# Patient Record
Sex: FEMALE | Race: OTHER | HISPANIC OR LATINO
[De-identification: names, ages, dates, MRNs, and addresses within clinical notes are randomized per-mention and may not be internally consistent; named-entity substitution may affect disease eponyms.]

---

## 2017-01-04 ENCOUNTER — RECORD ABSTRACTING (OUTPATIENT)
Age: 57
End: 2017-01-04

## 2017-01-30 ENCOUNTER — APPOINTMENT (OUTPATIENT)
Dept: INTERNAL MEDICINE | Facility: CLINIC | Age: 57
End: 2017-01-30

## 2017-02-21 ENCOUNTER — APPOINTMENT (OUTPATIENT)
Dept: INTERNAL MEDICINE | Facility: CLINIC | Age: 57
End: 2017-02-21

## 2017-02-28 ENCOUNTER — APPOINTMENT (OUTPATIENT)
Dept: CARDIOLOGY | Facility: CLINIC | Age: 57
End: 2017-02-28

## 2017-02-28 VITALS
HEIGHT: 63 IN | BODY MASS INDEX: 36.14 KG/M2 | DIASTOLIC BLOOD PRESSURE: 70 MMHG | WEIGHT: 204 LBS | SYSTOLIC BLOOD PRESSURE: 130 MMHG

## 2017-02-28 VITALS — HEART RATE: 87 BPM

## 2017-03-01 ENCOUNTER — APPOINTMENT (OUTPATIENT)
Dept: INTERNAL MEDICINE | Facility: CLINIC | Age: 57
End: 2017-03-01

## 2017-03-03 ENCOUNTER — APPOINTMENT (OUTPATIENT)
Dept: PODIATRY | Facility: CLINIC | Age: 57
End: 2017-03-03

## 2017-03-03 VITALS — BODY MASS INDEX: 32.44 KG/M2 | HEIGHT: 64 IN | WEIGHT: 190 LBS

## 2017-03-03 DIAGNOSIS — L85.3 XEROSIS CUTIS: ICD-10-CM

## 2017-03-03 DIAGNOSIS — B35.1 TINEA UNGUIUM: ICD-10-CM

## 2017-03-13 ENCOUNTER — APPOINTMENT (OUTPATIENT)
Dept: PODIATRY | Facility: CLINIC | Age: 57
End: 2017-03-13

## 2017-03-21 ENCOUNTER — OUTPATIENT (OUTPATIENT)
Dept: OUTPATIENT SERVICES | Facility: HOSPITAL | Age: 57
LOS: 1 days | Discharge: HOME | End: 2017-03-21

## 2017-03-21 ENCOUNTER — APPOINTMENT (OUTPATIENT)
Dept: CARDIOLOGY | Facility: CLINIC | Age: 57
End: 2017-03-21

## 2017-03-21 ENCOUNTER — OTHER (OUTPATIENT)
Age: 57
End: 2017-03-21

## 2017-03-21 ENCOUNTER — EMERGENCY (EMERGENCY)
Facility: HOSPITAL | Age: 57
LOS: 0 days | Discharge: HOME | End: 2017-03-21
Admitting: INTERNAL MEDICINE

## 2017-03-21 VITALS
BODY MASS INDEX: 32.44 KG/M2 | HEIGHT: 64 IN | HEART RATE: 82 BPM | WEIGHT: 190 LBS | SYSTOLIC BLOOD PRESSURE: 120 MMHG | DIASTOLIC BLOOD PRESSURE: 70 MMHG

## 2017-04-03 ENCOUNTER — OUTPATIENT (OUTPATIENT)
Dept: OUTPATIENT SERVICES | Facility: HOSPITAL | Age: 57
LOS: 1 days | Discharge: HOME | End: 2017-04-03

## 2017-04-03 ENCOUNTER — APPOINTMENT (OUTPATIENT)
Dept: INTERNAL MEDICINE | Facility: CLINIC | Age: 57
End: 2017-04-03

## 2017-04-03 VITALS
WEIGHT: 192 LBS | SYSTOLIC BLOOD PRESSURE: 145 MMHG | DIASTOLIC BLOOD PRESSURE: 82 MMHG | BODY MASS INDEX: 32.78 KG/M2 | HEART RATE: 96 BPM | HEIGHT: 64 IN

## 2017-04-03 DIAGNOSIS — F32.9 MAJOR DEPRESSIVE DISORDER, SINGLE EPISODE, UNSPECIFIED: ICD-10-CM

## 2017-04-03 DIAGNOSIS — E11.621 TYPE 2 DIABETES MELLITUS WITH FOOT ULCER: ICD-10-CM

## 2017-04-03 DIAGNOSIS — I73.9 PERIPHERAL VASCULAR DISEASE, UNSPECIFIED: ICD-10-CM

## 2017-04-03 DIAGNOSIS — L97.401 TYPE 2 DIABETES MELLITUS WITH FOOT ULCER: ICD-10-CM

## 2017-04-10 ENCOUNTER — APPOINTMENT (OUTPATIENT)
Dept: SURGERY | Facility: CLINIC | Age: 57
End: 2017-04-10

## 2017-04-11 ENCOUNTER — APPOINTMENT (OUTPATIENT)
Dept: PODIATRY | Facility: CLINIC | Age: 57
End: 2017-04-11

## 2017-04-11 ENCOUNTER — OUTPATIENT (OUTPATIENT)
Dept: OUTPATIENT SERVICES | Facility: HOSPITAL | Age: 57
LOS: 1 days | Discharge: HOME | End: 2017-04-11

## 2017-04-11 VITALS — BODY MASS INDEX: 32.78 KG/M2 | HEIGHT: 64 IN | WEIGHT: 192 LBS

## 2017-04-12 ENCOUNTER — APPOINTMENT (OUTPATIENT)
Dept: SURGERY | Facility: CLINIC | Age: 57
End: 2017-04-12

## 2017-04-18 ENCOUNTER — OUTPATIENT (OUTPATIENT)
Dept: OUTPATIENT SERVICES | Facility: HOSPITAL | Age: 57
LOS: 1 days | Discharge: HOME | End: 2017-04-18

## 2017-04-21 ENCOUNTER — APPOINTMENT (OUTPATIENT)
Dept: INTERNAL MEDICINE | Facility: CLINIC | Age: 57
End: 2017-04-21

## 2017-04-21 VITALS — HEIGHT: 64 IN | DIASTOLIC BLOOD PRESSURE: 75 MMHG | HEART RATE: 85 BPM | SYSTOLIC BLOOD PRESSURE: 120 MMHG

## 2017-04-21 VITALS
HEART RATE: 85 BPM | SYSTOLIC BLOOD PRESSURE: 120 MMHG | BODY MASS INDEX: 32.78 KG/M2 | DIASTOLIC BLOOD PRESSURE: 75 MMHG | HEIGHT: 64 IN | WEIGHT: 192 LBS

## 2017-04-21 DIAGNOSIS — R32 UNSPECIFIED URINARY INCONTINENCE: ICD-10-CM

## 2017-04-25 ENCOUNTER — APPOINTMENT (OUTPATIENT)
Dept: CARDIOLOGY | Facility: CLINIC | Age: 57
End: 2017-04-25

## 2017-04-25 VITALS — SYSTOLIC BLOOD PRESSURE: 124 MMHG | HEART RATE: 90 BPM | OXYGEN SATURATION: 94 % | DIASTOLIC BLOOD PRESSURE: 62 MMHG

## 2017-04-25 DIAGNOSIS — R94.31 ABNORMAL ELECTROCARDIOGRAM [ECG] [EKG]: ICD-10-CM

## 2017-04-26 ENCOUNTER — APPOINTMENT (OUTPATIENT)
Dept: PODIATRY | Facility: CLINIC | Age: 57
End: 2017-04-26

## 2017-05-17 ENCOUNTER — CLINICAL ADVICE (OUTPATIENT)
Age: 57
End: 2017-05-17

## 2017-05-17 ENCOUNTER — OUTPATIENT (OUTPATIENT)
Dept: OUTPATIENT SERVICES | Facility: HOSPITAL | Age: 57
LOS: 1 days | Discharge: HOME | End: 2017-05-17

## 2017-05-17 ENCOUNTER — APPOINTMENT (OUTPATIENT)
Dept: PODIATRY | Facility: CLINIC | Age: 57
End: 2017-05-17

## 2017-05-17 VITALS
HEIGHT: 64 IN | HEART RATE: 68 BPM | WEIGHT: 190 LBS | SYSTOLIC BLOOD PRESSURE: 128 MMHG | DIASTOLIC BLOOD PRESSURE: 74 MMHG | BODY MASS INDEX: 32.44 KG/M2

## 2017-05-22 ENCOUNTER — EMERGENCY (EMERGENCY)
Facility: HOSPITAL | Age: 57
LOS: 0 days | Discharge: HOME | End: 2017-05-22
Admitting: INTERNAL MEDICINE

## 2017-06-07 ENCOUNTER — APPOINTMENT (OUTPATIENT)
Dept: PODIATRY | Facility: CLINIC | Age: 57
End: 2017-06-07

## 2017-06-09 ENCOUNTER — APPOINTMENT (OUTPATIENT)
Dept: PULMONOLOGY | Facility: CLINIC | Age: 57
End: 2017-06-09

## 2017-06-09 ENCOUNTER — OUTPATIENT (OUTPATIENT)
Dept: OUTPATIENT SERVICES | Facility: HOSPITAL | Age: 57
LOS: 1 days | Discharge: HOME | End: 2017-06-09

## 2017-06-09 VITALS
DIASTOLIC BLOOD PRESSURE: 81 MMHG | BODY MASS INDEX: 32.78 KG/M2 | HEIGHT: 64 IN | HEART RATE: 98 BPM | SYSTOLIC BLOOD PRESSURE: 136 MMHG | WEIGHT: 192 LBS

## 2017-06-09 DIAGNOSIS — R06.83 SNORING: ICD-10-CM

## 2017-06-16 ENCOUNTER — APPOINTMENT (OUTPATIENT)
Dept: ENDOCRINOLOGY | Facility: CLINIC | Age: 57
End: 2017-06-16

## 2017-06-16 ENCOUNTER — OUTPATIENT (OUTPATIENT)
Dept: OUTPATIENT SERVICES | Facility: HOSPITAL | Age: 57
LOS: 1 days | Discharge: HOME | End: 2017-06-16

## 2017-06-16 ENCOUNTER — RESULT REVIEW (OUTPATIENT)
Age: 57
End: 2017-06-16

## 2017-06-16 VITALS
SYSTOLIC BLOOD PRESSURE: 130 MMHG | HEIGHT: 64 IN | DIASTOLIC BLOOD PRESSURE: 80 MMHG | BODY MASS INDEX: 32.44 KG/M2 | WEIGHT: 190 LBS | HEART RATE: 88 BPM

## 2017-06-16 DIAGNOSIS — M79.7 FIBROMYALGIA: ICD-10-CM

## 2017-06-16 DIAGNOSIS — F31.9 BIPOLAR DISORDER, UNSPECIFIED: ICD-10-CM

## 2017-06-16 DIAGNOSIS — I50.9 HEART FAILURE, UNSPECIFIED: ICD-10-CM

## 2017-06-16 DIAGNOSIS — R10.9 UNSPECIFIED ABDOMINAL PAIN: ICD-10-CM

## 2017-06-16 DIAGNOSIS — Z98.890 OTHER SPECIFIED POSTPROCEDURAL STATES: ICD-10-CM

## 2017-06-16 DIAGNOSIS — E11.40 TYPE 2 DIABETES MELLITUS WITH DIABETIC NEUROPATHY, UNSPECIFIED: ICD-10-CM

## 2017-06-16 DIAGNOSIS — E11.9 TYPE 2 DIABETES MELLITUS WITHOUT COMPLICATIONS: ICD-10-CM

## 2017-06-16 DIAGNOSIS — R53.1 WEAKNESS: ICD-10-CM

## 2017-06-16 DIAGNOSIS — I47.2 VENTRICULAR TACHYCARDIA: ICD-10-CM

## 2017-06-16 DIAGNOSIS — I81 PORTAL VEIN THROMBOSIS: ICD-10-CM

## 2017-06-16 DIAGNOSIS — G62.9 POLYNEUROPATHY, UNSPECIFIED: ICD-10-CM

## 2017-06-16 DIAGNOSIS — Z95.810 PRESENCE OF AUTOMATIC (IMPLANTABLE) CARDIAC DEFIBRILLATOR: ICD-10-CM

## 2017-06-16 DIAGNOSIS — K29.00 ACUTE GASTRITIS WITHOUT BLEEDING: ICD-10-CM

## 2017-06-16 DIAGNOSIS — A09 INFECTIOUS GASTROENTERITIS AND COLITIS, UNSPECIFIED: ICD-10-CM

## 2017-06-16 DIAGNOSIS — G43.909 MIGRAINE, UNSPECIFIED, NOT INTRACTABLE, WITHOUT STATUS MIGRAINOSUS: ICD-10-CM

## 2017-06-16 DIAGNOSIS — R07.9 CHEST PAIN, UNSPECIFIED: ICD-10-CM

## 2017-06-16 DIAGNOSIS — R55 SYNCOPE AND COLLAPSE: ICD-10-CM

## 2017-06-16 DIAGNOSIS — I31.4 CARDIAC TAMPONADE: ICD-10-CM

## 2017-06-23 ENCOUNTER — INPATIENT (INPATIENT)
Facility: HOSPITAL | Age: 57
LOS: 6 days | Discharge: HOME HEALTH | End: 2017-06-30
Attending: INTERNAL MEDICINE

## 2017-06-23 DIAGNOSIS — R55 SYNCOPE AND COLLAPSE: ICD-10-CM

## 2017-06-23 DIAGNOSIS — R53.1 WEAKNESS: ICD-10-CM

## 2017-06-23 DIAGNOSIS — F31.9 BIPOLAR DISORDER, UNSPECIFIED: ICD-10-CM

## 2017-06-23 DIAGNOSIS — R07.9 CHEST PAIN, UNSPECIFIED: ICD-10-CM

## 2017-06-23 DIAGNOSIS — G43.909 MIGRAINE, UNSPECIFIED, NOT INTRACTABLE, WITHOUT STATUS MIGRAINOSUS: ICD-10-CM

## 2017-06-23 DIAGNOSIS — I50.9 HEART FAILURE, UNSPECIFIED: ICD-10-CM

## 2017-06-23 DIAGNOSIS — I81 PORTAL VEIN THROMBOSIS: ICD-10-CM

## 2017-06-23 DIAGNOSIS — Z95.810 PRESENCE OF AUTOMATIC (IMPLANTABLE) CARDIAC DEFIBRILLATOR: ICD-10-CM

## 2017-06-23 DIAGNOSIS — A09 INFECTIOUS GASTROENTERITIS AND COLITIS, UNSPECIFIED: ICD-10-CM

## 2017-06-23 DIAGNOSIS — I47.2 VENTRICULAR TACHYCARDIA: ICD-10-CM

## 2017-06-23 DIAGNOSIS — Z98.890 OTHER SPECIFIED POSTPROCEDURAL STATES: ICD-10-CM

## 2017-06-23 DIAGNOSIS — I31.4 CARDIAC TAMPONADE: ICD-10-CM

## 2017-06-23 DIAGNOSIS — M79.7 FIBROMYALGIA: ICD-10-CM

## 2017-06-23 DIAGNOSIS — K29.00 ACUTE GASTRITIS WITHOUT BLEEDING: ICD-10-CM

## 2017-06-23 DIAGNOSIS — E11.9 TYPE 2 DIABETES MELLITUS WITHOUT COMPLICATIONS: ICD-10-CM

## 2017-06-23 DIAGNOSIS — R10.9 UNSPECIFIED ABDOMINAL PAIN: ICD-10-CM

## 2017-06-23 DIAGNOSIS — I50.43 ACUTE ON CHRONIC COMBINED SYSTOLIC (CONGESTIVE) AND DIASTOLIC (CONGESTIVE) HEART FAILURE: ICD-10-CM

## 2017-06-23 DIAGNOSIS — E11.40 TYPE 2 DIABETES MELLITUS WITH DIABETIC NEUROPATHY, UNSPECIFIED: ICD-10-CM

## 2017-06-28 ENCOUNTER — APPOINTMENT (OUTPATIENT)
Dept: PODIATRY | Facility: CLINIC | Age: 57
End: 2017-06-28

## 2017-06-28 DIAGNOSIS — E03.9 HYPOTHYROIDISM, UNSPECIFIED: ICD-10-CM

## 2017-06-28 DIAGNOSIS — E11.9 TYPE 2 DIABETES MELLITUS WITHOUT COMPLICATIONS: ICD-10-CM

## 2017-06-29 LAB — GLUCOSE SERPL-MCNC: > 400 MG/DL

## 2017-07-07 ENCOUNTER — OUTPATIENT (OUTPATIENT)
Dept: OUTPATIENT SERVICES | Facility: HOSPITAL | Age: 57
LOS: 1 days | Discharge: HOME | End: 2017-07-07

## 2017-07-07 ENCOUNTER — APPOINTMENT (OUTPATIENT)
Dept: INTERNAL MEDICINE | Facility: CLINIC | Age: 57
End: 2017-07-07

## 2017-07-07 VITALS
DIASTOLIC BLOOD PRESSURE: 73 MMHG | SYSTOLIC BLOOD PRESSURE: 127 MMHG | HEART RATE: 93 BPM | BODY MASS INDEX: 37.56 KG/M2 | HEIGHT: 64 IN | WEIGHT: 220 LBS

## 2017-07-07 DIAGNOSIS — F31.9 BIPOLAR DISORDER, UNSPECIFIED: ICD-10-CM

## 2017-07-07 DIAGNOSIS — L02.612 CUTANEOUS ABSCESS OF LEFT FOOT: ICD-10-CM

## 2017-07-07 DIAGNOSIS — A09 INFECTIOUS GASTROENTERITIS AND COLITIS, UNSPECIFIED: ICD-10-CM

## 2017-07-07 DIAGNOSIS — Z98.890 OTHER SPECIFIED POSTPROCEDURAL STATES: ICD-10-CM

## 2017-07-07 DIAGNOSIS — I63.9 CEREBRAL INFARCTION, UNSPECIFIED: ICD-10-CM

## 2017-07-07 DIAGNOSIS — K29.00 ACUTE GASTRITIS WITHOUT BLEEDING: ICD-10-CM

## 2017-07-07 DIAGNOSIS — E11.22 TYPE 2 DIABETES MELLITUS WITH DIABETIC CHRONIC KIDNEY DISEASE: ICD-10-CM

## 2017-07-07 DIAGNOSIS — R53.1 WEAKNESS: ICD-10-CM

## 2017-07-07 DIAGNOSIS — I50.9 HEART FAILURE, UNSPECIFIED: ICD-10-CM

## 2017-07-07 DIAGNOSIS — M79.7 FIBROMYALGIA: ICD-10-CM

## 2017-07-07 DIAGNOSIS — I81 PORTAL VEIN THROMBOSIS: ICD-10-CM

## 2017-07-07 DIAGNOSIS — Z79.4 LONG TERM (CURRENT) USE OF INSULIN: ICD-10-CM

## 2017-07-07 DIAGNOSIS — M86.8X7 OTHER OSTEOMYELITIS, ANKLE AND FOOT: ICD-10-CM

## 2017-07-07 DIAGNOSIS — I82.511 CHRONIC EMBOLISM AND THROMBOSIS OF RIGHT FEMORAL VEIN: ICD-10-CM

## 2017-07-07 DIAGNOSIS — Z79.01 LONG TERM (CURRENT) USE OF ANTICOAGULANTS: ICD-10-CM

## 2017-07-07 DIAGNOSIS — I69.351 HEMIPLEGIA AND HEMIPARESIS FOLLOWING CEREBRAL INFARCTION AFFECTING RIGHT DOMINANT SIDE: ICD-10-CM

## 2017-07-07 DIAGNOSIS — I73.9 PERIPHERAL VASCULAR DISEASE, UNSPECIFIED: ICD-10-CM

## 2017-07-07 DIAGNOSIS — E11.59 TYPE 2 DIABETES MELLITUS WITH OTHER CIRCULATORY COMPLICATIONS: ICD-10-CM

## 2017-07-07 DIAGNOSIS — I13.0 HYPERTENSIVE HEART AND CHRONIC KIDNEY DISEASE WITH HEART FAILURE AND STAGE 1 THROUGH STAGE 4 CHRONIC KIDNEY DISEASE, OR UNSPECIFIED CHRONIC KIDNEY DISEASE: ICD-10-CM

## 2017-07-07 DIAGNOSIS — E11.9 TYPE 2 DIABETES MELLITUS WITHOUT COMPLICATIONS: ICD-10-CM

## 2017-07-07 DIAGNOSIS — I31.4 CARDIAC TAMPONADE: ICD-10-CM

## 2017-07-07 DIAGNOSIS — R07.9 CHEST PAIN, UNSPECIFIED: ICD-10-CM

## 2017-07-07 DIAGNOSIS — T40.601A POISONING BY UNSPECIFIED NARCOTICS, ACCIDENTAL (UNINTENTIONAL), INITIAL ENCOUNTER: ICD-10-CM

## 2017-07-07 DIAGNOSIS — R10.9 UNSPECIFIED ABDOMINAL PAIN: ICD-10-CM

## 2017-07-07 DIAGNOSIS — R55 SYNCOPE AND COLLAPSE: ICD-10-CM

## 2017-07-07 DIAGNOSIS — N17.9 ACUTE KIDNEY FAILURE, UNSPECIFIED: ICD-10-CM

## 2017-07-07 DIAGNOSIS — Z95.810 PRESENCE OF AUTOMATIC (IMPLANTABLE) CARDIAC DEFIBRILLATOR: ICD-10-CM

## 2017-07-07 DIAGNOSIS — E11.40 TYPE 2 DIABETES MELLITUS WITH DIABETIC NEUROPATHY, UNSPECIFIED: ICD-10-CM

## 2017-07-07 DIAGNOSIS — J45.909 UNSPECIFIED ASTHMA, UNCOMPLICATED: ICD-10-CM

## 2017-07-07 DIAGNOSIS — N18.3 CHRONIC KIDNEY DISEASE, STAGE 3 (MODERATE): ICD-10-CM

## 2017-07-07 DIAGNOSIS — I47.2 VENTRICULAR TACHYCARDIA: ICD-10-CM

## 2017-07-07 DIAGNOSIS — G43.909 MIGRAINE, UNSPECIFIED, NOT INTRACTABLE, WITHOUT STATUS MIGRAINOSUS: ICD-10-CM

## 2017-07-07 DIAGNOSIS — E10.29 TYPE 1 DIABETES MELLITUS WITH OTHER DIABETIC KIDNEY COMPLICATION: ICD-10-CM

## 2017-07-07 DIAGNOSIS — I25.2 OLD MYOCARDIAL INFARCTION: ICD-10-CM

## 2017-07-07 DIAGNOSIS — J81.0 ACUTE PULMONARY EDEMA: ICD-10-CM

## 2017-07-07 DIAGNOSIS — I50.20 UNSPECIFIED SYSTOLIC (CONGESTIVE) HEART FAILURE: ICD-10-CM

## 2017-07-07 DIAGNOSIS — I50.23 ACUTE ON CHRONIC SYSTOLIC (CONGESTIVE) HEART FAILURE: ICD-10-CM

## 2017-07-07 DIAGNOSIS — E11.69 TYPE 2 DIABETES MELLITUS WITH OTHER SPECIFIED COMPLICATION: ICD-10-CM

## 2017-07-07 DIAGNOSIS — J96.01 ACUTE RESPIRATORY FAILURE WITH HYPOXIA: ICD-10-CM

## 2017-07-07 DIAGNOSIS — E11.51 TYPE 2 DIABETES MELLITUS WITH DIABETIC PERIPHERAL ANGIOPATHY WITHOUT GANGRENE: ICD-10-CM
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
## 2017-07-07 DIAGNOSIS — J44.9 CHRONIC OBSTRUCTIVE PULMONARY DISEASE, UNSPECIFIED: ICD-10-CM

## 2017-07-07 DIAGNOSIS — Z72.0 TOBACCO USE: ICD-10-CM

## 2017-07-07 DIAGNOSIS — E03.9 HYPOTHYROIDISM, UNSPECIFIED: ICD-10-CM

## 2017-07-07 DIAGNOSIS — R53.83 OTHER FATIGUE: ICD-10-CM

## 2017-07-07 DIAGNOSIS — E11.621 TYPE 2 DIABETES MELLITUS WITH FOOT ULCER: ICD-10-CM

## 2017-07-07 DIAGNOSIS — Z86.711 PERSONAL HISTORY OF PULMONARY EMBOLISM: ICD-10-CM

## 2017-07-07 RX ORDER — INSULIN GLARGINE 100 [IU]/ML
100 INJECTION, SOLUTION SUBCUTANEOUS
Qty: 100 | Refills: 0 | Status: COMPLETED | COMMUNITY
Start: 2017-03-21 | End: 2017-07-07

## 2017-07-07 RX ORDER — INSULIN LISPRO 100 [IU]/ML
(75-25) 100 INJECTION, SUSPENSION SUBCUTANEOUS
Qty: 2 | Refills: 0 | Status: COMPLETED | COMMUNITY
Start: 2017-04-03 | End: 2017-07-07

## 2017-07-07 RX ORDER — FENOFIBRIC ACID 135 MG/1
135 CAPSULE, DELAYED RELEASE ORAL DAILY
Qty: 30 | Refills: 0 | Status: COMPLETED | COMMUNITY
Start: 2017-03-21 | End: 2017-07-07

## 2017-07-12 ENCOUNTER — APPOINTMENT (OUTPATIENT)
Dept: PODIATRY | Facility: CLINIC | Age: 57
End: 2017-07-12

## 2017-07-19 ENCOUNTER — OUTPATIENT (OUTPATIENT)
Dept: OUTPATIENT SERVICES | Facility: HOSPITAL | Age: 57
LOS: 1 days | Discharge: HOME | End: 2017-07-19

## 2017-07-19 DIAGNOSIS — I63.9 CEREBRAL INFARCTION, UNSPECIFIED: ICD-10-CM

## 2017-07-19 DIAGNOSIS — I31.4 CARDIAC TAMPONADE: ICD-10-CM

## 2017-07-19 DIAGNOSIS — G43.909 MIGRAINE, UNSPECIFIED, NOT INTRACTABLE, WITHOUT STATUS MIGRAINOSUS: ICD-10-CM

## 2017-07-19 DIAGNOSIS — Z98.890 OTHER SPECIFIED POSTPROCEDURAL STATES: ICD-10-CM

## 2017-07-19 DIAGNOSIS — Z79.01 LONG TERM (CURRENT) USE OF ANTICOAGULANTS: ICD-10-CM

## 2017-07-19 DIAGNOSIS — I50.9 HEART FAILURE, UNSPECIFIED: ICD-10-CM

## 2017-07-19 DIAGNOSIS — Z95.810 PRESENCE OF AUTOMATIC (IMPLANTABLE) CARDIAC DEFIBRILLATOR: ICD-10-CM

## 2017-07-19 DIAGNOSIS — F31.9 BIPOLAR DISORDER, UNSPECIFIED: ICD-10-CM

## 2017-07-19 DIAGNOSIS — R53.1 WEAKNESS: ICD-10-CM

## 2017-07-19 DIAGNOSIS — M79.7 FIBROMYALGIA: ICD-10-CM

## 2017-07-19 DIAGNOSIS — I81 PORTAL VEIN THROMBOSIS: ICD-10-CM

## 2017-07-19 DIAGNOSIS — R07.9 CHEST PAIN, UNSPECIFIED: ICD-10-CM

## 2017-07-19 DIAGNOSIS — E11.9 TYPE 2 DIABETES MELLITUS WITHOUT COMPLICATIONS: ICD-10-CM

## 2017-07-19 DIAGNOSIS — E11.40 TYPE 2 DIABETES MELLITUS WITH DIABETIC NEUROPATHY, UNSPECIFIED: ICD-10-CM

## 2017-07-19 DIAGNOSIS — A09 INFECTIOUS GASTROENTERITIS AND COLITIS, UNSPECIFIED: ICD-10-CM

## 2017-07-19 DIAGNOSIS — I47.2 VENTRICULAR TACHYCARDIA: ICD-10-CM

## 2017-07-19 DIAGNOSIS — R10.9 UNSPECIFIED ABDOMINAL PAIN: ICD-10-CM

## 2017-07-19 DIAGNOSIS — K29.00 ACUTE GASTRITIS WITHOUT BLEEDING: ICD-10-CM

## 2017-07-19 DIAGNOSIS — R55 SYNCOPE AND COLLAPSE: ICD-10-CM

## 2017-07-26 ENCOUNTER — OUTPATIENT (OUTPATIENT)
Dept: OUTPATIENT SERVICES | Facility: HOSPITAL | Age: 57
LOS: 1 days | Discharge: HOME | End: 2017-07-26

## 2017-07-26 DIAGNOSIS — Z98.890 OTHER SPECIFIED POSTPROCEDURAL STATES: ICD-10-CM

## 2017-07-26 DIAGNOSIS — R10.9 UNSPECIFIED ABDOMINAL PAIN: ICD-10-CM

## 2017-07-26 DIAGNOSIS — Z95.810 PRESENCE OF AUTOMATIC (IMPLANTABLE) CARDIAC DEFIBRILLATOR: ICD-10-CM

## 2017-07-26 DIAGNOSIS — F31.9 BIPOLAR DISORDER, UNSPECIFIED: ICD-10-CM

## 2017-07-26 DIAGNOSIS — E11.40 TYPE 2 DIABETES MELLITUS WITH DIABETIC NEUROPATHY, UNSPECIFIED: ICD-10-CM

## 2017-07-26 DIAGNOSIS — E11.9 TYPE 2 DIABETES MELLITUS WITHOUT COMPLICATIONS: ICD-10-CM

## 2017-07-26 DIAGNOSIS — R55 SYNCOPE AND COLLAPSE: ICD-10-CM

## 2017-07-26 DIAGNOSIS — Z79.01 LONG TERM (CURRENT) USE OF ANTICOAGULANTS: ICD-10-CM

## 2017-07-26 DIAGNOSIS — G43.909 MIGRAINE, UNSPECIFIED, NOT INTRACTABLE, WITHOUT STATUS MIGRAINOSUS: ICD-10-CM

## 2017-07-26 DIAGNOSIS — K29.00 ACUTE GASTRITIS WITHOUT BLEEDING: ICD-10-CM

## 2017-07-26 DIAGNOSIS — I47.2 VENTRICULAR TACHYCARDIA: ICD-10-CM

## 2017-07-26 DIAGNOSIS — R53.1 WEAKNESS: ICD-10-CM

## 2017-07-26 DIAGNOSIS — I81 PORTAL VEIN THROMBOSIS: ICD-10-CM

## 2017-07-26 DIAGNOSIS — A09 INFECTIOUS GASTROENTERITIS AND COLITIS, UNSPECIFIED: ICD-10-CM

## 2017-07-26 DIAGNOSIS — R07.9 CHEST PAIN, UNSPECIFIED: ICD-10-CM

## 2017-07-26 DIAGNOSIS — I50.9 HEART FAILURE, UNSPECIFIED: ICD-10-CM

## 2017-07-26 DIAGNOSIS — I31.4 CARDIAC TAMPONADE: ICD-10-CM

## 2017-07-26 DIAGNOSIS — I63.9 CEREBRAL INFARCTION, UNSPECIFIED: ICD-10-CM

## 2017-07-26 DIAGNOSIS — M79.7 FIBROMYALGIA: ICD-10-CM

## 2017-07-28 DIAGNOSIS — L97.412 NON-PRESSURE CHRONIC ULCER OF RIGHT HEEL AND MIDFOOT WITH FAT LAYER EXPOSED: ICD-10-CM

## 2017-07-28 DIAGNOSIS — E11.9 TYPE 2 DIABETES MELLITUS WITHOUT COMPLICATIONS: ICD-10-CM

## 2017-07-28 DIAGNOSIS — I10 ESSENTIAL (PRIMARY) HYPERTENSION: ICD-10-CM

## 2017-07-28 DIAGNOSIS — I42.0 DILATED CARDIOMYOPATHY: ICD-10-CM

## 2017-07-28 DIAGNOSIS — B35.1 TINEA UNGUIUM: ICD-10-CM

## 2017-07-28 DIAGNOSIS — R94.31 ABNORMAL ELECTROCARDIOGRAM [ECG] [EKG]: ICD-10-CM

## 2017-07-28 DIAGNOSIS — J44.9 CHRONIC OBSTRUCTIVE PULMONARY DISEASE, UNSPECIFIED: ICD-10-CM

## 2017-07-28 DIAGNOSIS — I50.23 ACUTE ON CHRONIC SYSTOLIC (CONGESTIVE) HEART FAILURE: ICD-10-CM

## 2017-07-28 DIAGNOSIS — I50.22 CHRONIC SYSTOLIC (CONGESTIVE) HEART FAILURE: ICD-10-CM

## 2017-08-04 ENCOUNTER — MEDICATION RENEWAL (OUTPATIENT)
Age: 57
End: 2017-08-04

## 2017-08-04 ENCOUNTER — APPOINTMENT (OUTPATIENT)
Dept: ENDOCRINOLOGY | Facility: CLINIC | Age: 57
End: 2017-08-04

## 2017-08-11 ENCOUNTER — OUTPATIENT (OUTPATIENT)
Dept: OUTPATIENT SERVICES | Facility: HOSPITAL | Age: 57
LOS: 1 days | Discharge: HOME | End: 2017-08-11

## 2017-08-11 DIAGNOSIS — R07.9 CHEST PAIN, UNSPECIFIED: ICD-10-CM

## 2017-08-11 DIAGNOSIS — E11.9 TYPE 2 DIABETES MELLITUS WITHOUT COMPLICATIONS: ICD-10-CM

## 2017-08-11 DIAGNOSIS — I50.9 HEART FAILURE, UNSPECIFIED: ICD-10-CM

## 2017-08-11 DIAGNOSIS — R10.9 UNSPECIFIED ABDOMINAL PAIN: ICD-10-CM

## 2017-08-11 DIAGNOSIS — Z79.01 LONG TERM (CURRENT) USE OF ANTICOAGULANTS: ICD-10-CM

## 2017-08-11 DIAGNOSIS — R55 SYNCOPE AND COLLAPSE: ICD-10-CM

## 2017-08-11 DIAGNOSIS — G43.909 MIGRAINE, UNSPECIFIED, NOT INTRACTABLE, WITHOUT STATUS MIGRAINOSUS: ICD-10-CM

## 2017-08-11 DIAGNOSIS — A09 INFECTIOUS GASTROENTERITIS AND COLITIS, UNSPECIFIED: ICD-10-CM

## 2017-08-11 DIAGNOSIS — Z98.890 OTHER SPECIFIED POSTPROCEDURAL STATES: ICD-10-CM

## 2017-08-11 DIAGNOSIS — I81 PORTAL VEIN THROMBOSIS: ICD-10-CM

## 2017-08-11 DIAGNOSIS — R53.1 WEAKNESS: ICD-10-CM

## 2017-08-11 DIAGNOSIS — K29.00 ACUTE GASTRITIS WITHOUT BLEEDING: ICD-10-CM

## 2017-08-11 DIAGNOSIS — I31.4 CARDIAC TAMPONADE: ICD-10-CM

## 2017-08-11 DIAGNOSIS — I47.2 VENTRICULAR TACHYCARDIA: ICD-10-CM

## 2017-08-11 DIAGNOSIS — I63.9 CEREBRAL INFARCTION, UNSPECIFIED: ICD-10-CM

## 2017-08-11 DIAGNOSIS — E11.40 TYPE 2 DIABETES MELLITUS WITH DIABETIC NEUROPATHY, UNSPECIFIED: ICD-10-CM

## 2017-08-11 DIAGNOSIS — M79.7 FIBROMYALGIA: ICD-10-CM

## 2017-08-11 DIAGNOSIS — F31.9 BIPOLAR DISORDER, UNSPECIFIED: ICD-10-CM

## 2017-08-11 DIAGNOSIS — Z95.810 PRESENCE OF AUTOMATIC (IMPLANTABLE) CARDIAC DEFIBRILLATOR: ICD-10-CM

## 2017-08-18 ENCOUNTER — RESULT REVIEW (OUTPATIENT)
Age: 57
End: 2017-08-18

## 2017-08-18 ENCOUNTER — OUTPATIENT (OUTPATIENT)
Dept: OUTPATIENT SERVICES | Facility: HOSPITAL | Age: 57
LOS: 1 days | Discharge: HOME | End: 2017-08-18

## 2017-08-18 ENCOUNTER — APPOINTMENT (OUTPATIENT)
Dept: ENDOCRINOLOGY | Facility: CLINIC | Age: 57
End: 2017-08-18

## 2017-08-18 VITALS
DIASTOLIC BLOOD PRESSURE: 78 MMHG | BODY MASS INDEX: 36.19 KG/M2 | HEIGHT: 64 IN | WEIGHT: 212 LBS | SYSTOLIC BLOOD PRESSURE: 122 MMHG

## 2017-08-18 DIAGNOSIS — R10.9 UNSPECIFIED ABDOMINAL PAIN: ICD-10-CM

## 2017-08-18 DIAGNOSIS — I50.9 HEART FAILURE, UNSPECIFIED: ICD-10-CM

## 2017-08-18 DIAGNOSIS — I63.9 CEREBRAL INFARCTION, UNSPECIFIED: ICD-10-CM

## 2017-08-18 DIAGNOSIS — E11.9 TYPE 2 DIABETES MELLITUS WITHOUT COMPLICATIONS: ICD-10-CM

## 2017-08-18 DIAGNOSIS — I47.2 VENTRICULAR TACHYCARDIA: ICD-10-CM

## 2017-08-18 DIAGNOSIS — R07.9 CHEST PAIN, UNSPECIFIED: ICD-10-CM

## 2017-08-18 DIAGNOSIS — I81 PORTAL VEIN THROMBOSIS: ICD-10-CM

## 2017-08-18 DIAGNOSIS — Z79.01 LONG TERM (CURRENT) USE OF ANTICOAGULANTS: ICD-10-CM

## 2017-08-18 DIAGNOSIS — Z98.890 OTHER SPECIFIED POSTPROCEDURAL STATES: ICD-10-CM

## 2017-08-18 DIAGNOSIS — E11.40 TYPE 2 DIABETES MELLITUS WITH DIABETIC NEUROPATHY, UNSPECIFIED: ICD-10-CM

## 2017-08-18 DIAGNOSIS — M79.7 FIBROMYALGIA: ICD-10-CM

## 2017-08-18 DIAGNOSIS — K29.00 ACUTE GASTRITIS WITHOUT BLEEDING: ICD-10-CM

## 2017-08-18 DIAGNOSIS — G43.909 MIGRAINE, UNSPECIFIED, NOT INTRACTABLE, WITHOUT STATUS MIGRAINOSUS: ICD-10-CM

## 2017-08-18 DIAGNOSIS — A09 INFECTIOUS GASTROENTERITIS AND COLITIS, UNSPECIFIED: ICD-10-CM

## 2017-08-18 DIAGNOSIS — R55 SYNCOPE AND COLLAPSE: ICD-10-CM

## 2017-08-18 DIAGNOSIS — R53.1 WEAKNESS: ICD-10-CM

## 2017-08-18 DIAGNOSIS — I31.4 CARDIAC TAMPONADE: ICD-10-CM

## 2017-08-18 DIAGNOSIS — F31.9 BIPOLAR DISORDER, UNSPECIFIED: ICD-10-CM

## 2017-08-18 DIAGNOSIS — E03.9 HYPOTHYROIDISM, UNSPECIFIED: ICD-10-CM

## 2017-08-18 DIAGNOSIS — Z95.810 PRESENCE OF AUTOMATIC (IMPLANTABLE) CARDIAC DEFIBRILLATOR: ICD-10-CM

## 2017-08-18 RX ORDER — INSULIN LISPRO 100 [IU]/ML
(75-25) 100 INJECTION, SUSPENSION SUBCUTANEOUS
Qty: 2 | Refills: 0 | Status: COMPLETED | COMMUNITY
Start: 2017-08-04 | End: 2017-08-18

## 2017-08-19 LAB
ALBUMIN SERPL-MCNC: 2.9 G/DL
ALBUMIN/GLOB SERPL: 0.56
ALP SERPL-CCNC: 108 IU/L
ALT SERPL-CCNC: 20 IU/L
ANION GAP SERPL CALC-SCNC: 8 MEQ/L
AST SERPL-CCNC: 25 IU/L
BASOPHILS # BLD: 0.03 TH/MM3
BASOPHILS NFR BLD: 0.4 %
BILIRUB SERPL-MCNC: 0.6 MG/DL
BUN SERPL-MCNC: 25 MG/DL
BUN/CREAT SERPL: 17.4 %
CALCIUM SERPL-MCNC: 9 MG/DL
CHLORIDE SERPL-SCNC: 94 MEQ/L
CHOLEST SERPL-MCNC: 272 MG/DL
CO2 SERPL-SCNC: 29 MEQ/L
CREAT SERPL-MCNC: 1.44 MG/DL
CREAT UR-MCNC: 72 MG/DL
DIFFERENTIAL METHOD BLD: NORMAL
EOSINOPHIL # BLD: 0.22 TH/MM3
EOSINOPHIL NFR BLD: 3.1 %
ERYTHROCYTE [DISTWIDTH] IN BLOOD BY AUTOMATED COUNT: 15.8 %
ESTIMATED AVERGAGE GLUCOSE (NORTH): 255 MG/DL
GFR SERPL CREATININE-BSD FRML MDRD: 38
GLUCOSE SERPL-MCNC: 386 MG/DL
GRANULOCYTES # BLD: 4.66 TH/MM3
GRANULOCYTES NFR BLD: 65.6 %
HBA1C MFR BLD: 10.5 %
HCT VFR BLD AUTO: 38.3 %
HDLC SERPL-MCNC: 41 MG/DL
HDLC SERPL: 6.63
HGB BLD-MCNC: 12.1 G/DL
IMM GRANULOCYTES # BLD: 0.01 TH/MM3
IMM GRANULOCYTES NFR BLD: 0.1 %
LDLC SERPL DIRECT ASSAY-MCNC: 187 MG/DL
LYMPHOCYTES # BLD: 1.53 TH/MM3
LYMPHOCYTES NFR BLD: 21.5 %
MCH RBC QN AUTO: 28.9 PG
MCHC RBC AUTO-ENTMCNC: 31.6 G/DL
MCV RBC AUTO: 91.6 FL
MICROALBUMIN UR-MCNC: 131.7 MG/DL
MICROALBUMIN/CREAT UR-RTO: 1829 MG/G
MONOCYTES # BLD: 0.66 TH/MM3
MONOCYTES NFR BLD: 9.3 %
PLATELET # BLD: 230 TH/MM3
PMV BLD AUTO: 9.6 FL
POTASSIUM SERPL-SCNC: 4.2 MMOL/L
PROT SERPL-MCNC: 8.1 G/DL
RBC # BLD AUTO: 4.18 MIL/MM3
SODIUM SERPL-SCNC: 131 MEQ/L
T4 FREE SERPL-MCNC: 0.9 NG/DL
TRIGL SERPL-MCNC: 250 MG/DL
TSH SERPL DL<=0.005 MIU/L-ACNC: 4.35 UIU/ML
VLDLC SERPL-MCNC: 50 MG/DL
WBC # BLD: 7.11 TH/MM3

## 2017-08-23 LAB
25(OH)D3 SERPL-MCNC: 20 NG/ML
VITAMIN D2 SERPL-MCNC: <4 NG/ML
VITAMIN D3 SERPL-MCNC: 20 NG/ML

## 2017-08-28 DIAGNOSIS — Z02.9 ENCOUNTER FOR ADMINISTRATIVE EXAMINATIONS, UNSPECIFIED: ICD-10-CM

## 2017-08-28 DIAGNOSIS — F32.9 MAJOR DEPRESSIVE DISORDER, SINGLE EPISODE, UNSPECIFIED: ICD-10-CM

## 2017-08-28 DIAGNOSIS — J03.91 ACUTE RECURRENT TONSILLITIS, UNSPECIFIED: ICD-10-CM

## 2017-08-28 DIAGNOSIS — J45.909 UNSPECIFIED ASTHMA, UNCOMPLICATED: ICD-10-CM

## 2017-08-28 DIAGNOSIS — D17.39 BENIGN LIPOMATOUS NEOPLASM OF SKIN AND SUBCUTANEOUS TISSUE OF OTHER SITES: ICD-10-CM

## 2017-08-28 DIAGNOSIS — Z01.818 ENCOUNTER FOR OTHER PREPROCEDURAL EXAMINATION: ICD-10-CM

## 2017-08-28 DIAGNOSIS — I25.10 ATHEROSCLEROTIC HEART DISEASE OF NATIVE CORONARY ARTERY WITHOUT ANGINA PECTORIS: ICD-10-CM

## 2017-08-28 DIAGNOSIS — F31.9 BIPOLAR DISORDER, UNSPECIFIED: ICD-10-CM

## 2017-08-28 DIAGNOSIS — Z95.0 PRESENCE OF CARDIAC PACEMAKER: ICD-10-CM

## 2017-08-28 DIAGNOSIS — E11.9 TYPE 2 DIABETES MELLITUS WITHOUT COMPLICATIONS: ICD-10-CM

## 2017-08-28 DIAGNOSIS — I10 ESSENTIAL (PRIMARY) HYPERTENSION: ICD-10-CM

## 2017-08-28 DIAGNOSIS — I50.9 HEART FAILURE, UNSPECIFIED: ICD-10-CM

## 2017-08-28 DIAGNOSIS — I73.9 PERIPHERAL VASCULAR DISEASE, UNSPECIFIED: ICD-10-CM

## 2017-08-28 DIAGNOSIS — Z87.891 PERSONAL HISTORY OF NICOTINE DEPENDENCE: ICD-10-CM

## 2017-08-28 DIAGNOSIS — E01.8 OTHER IODINE-DEFICIENCY RELATED THYROID DISORDERS AND ALLIED CONDITIONS: ICD-10-CM

## 2017-08-28 DIAGNOSIS — K21.9 GASTRO-ESOPHAGEAL REFLUX DISEASE WITHOUT ESOPHAGITIS: ICD-10-CM

## 2017-08-29 ENCOUNTER — APPOINTMENT (OUTPATIENT)
Dept: CARDIOLOGY | Facility: CLINIC | Age: 57
End: 2017-08-29

## 2017-08-29 ENCOUNTER — OTHER (OUTPATIENT)
Age: 57
End: 2017-08-29

## 2017-09-01 ENCOUNTER — OTHER (OUTPATIENT)
Age: 57
End: 2017-09-01

## 2017-09-13 ENCOUNTER — OUTPATIENT (OUTPATIENT)
Dept: OUTPATIENT SERVICES | Facility: HOSPITAL | Age: 57
LOS: 1 days | Discharge: HOME | End: 2017-09-13

## 2017-09-13 DIAGNOSIS — Z98.890 OTHER SPECIFIED POSTPROCEDURAL STATES: ICD-10-CM

## 2017-09-13 DIAGNOSIS — E11.40 TYPE 2 DIABETES MELLITUS WITH DIABETIC NEUROPATHY, UNSPECIFIED: ICD-10-CM

## 2017-09-13 DIAGNOSIS — G43.909 MIGRAINE, UNSPECIFIED, NOT INTRACTABLE, WITHOUT STATUS MIGRAINOSUS: ICD-10-CM

## 2017-09-13 DIAGNOSIS — K29.00 ACUTE GASTRITIS WITHOUT BLEEDING: ICD-10-CM

## 2017-09-13 DIAGNOSIS — R07.9 CHEST PAIN, UNSPECIFIED: ICD-10-CM

## 2017-09-13 DIAGNOSIS — I50.9 HEART FAILURE, UNSPECIFIED: ICD-10-CM

## 2017-09-13 DIAGNOSIS — M79.7 FIBROMYALGIA: ICD-10-CM

## 2017-09-13 DIAGNOSIS — I63.9 CEREBRAL INFARCTION, UNSPECIFIED: ICD-10-CM

## 2017-09-13 DIAGNOSIS — R10.9 UNSPECIFIED ABDOMINAL PAIN: ICD-10-CM

## 2017-09-13 DIAGNOSIS — I81 PORTAL VEIN THROMBOSIS: ICD-10-CM

## 2017-09-13 DIAGNOSIS — R53.1 WEAKNESS: ICD-10-CM

## 2017-09-13 DIAGNOSIS — I47.2 VENTRICULAR TACHYCARDIA: ICD-10-CM

## 2017-09-13 DIAGNOSIS — Z95.810 PRESENCE OF AUTOMATIC (IMPLANTABLE) CARDIAC DEFIBRILLATOR: ICD-10-CM

## 2017-09-13 DIAGNOSIS — I31.4 CARDIAC TAMPONADE: ICD-10-CM

## 2017-09-13 DIAGNOSIS — A09 INFECTIOUS GASTROENTERITIS AND COLITIS, UNSPECIFIED: ICD-10-CM

## 2017-09-13 DIAGNOSIS — R55 SYNCOPE AND COLLAPSE: ICD-10-CM

## 2017-09-13 DIAGNOSIS — Z79.01 LONG TERM (CURRENT) USE OF ANTICOAGULANTS: ICD-10-CM

## 2017-09-13 DIAGNOSIS — E11.9 TYPE 2 DIABETES MELLITUS WITHOUT COMPLICATIONS: ICD-10-CM

## 2017-09-13 DIAGNOSIS — F31.9 BIPOLAR DISORDER, UNSPECIFIED: ICD-10-CM

## 2017-09-27 ENCOUNTER — OUTPATIENT (OUTPATIENT)
Dept: OUTPATIENT SERVICES | Facility: HOSPITAL | Age: 57
LOS: 1 days | Discharge: HOME | End: 2017-09-27

## 2017-09-27 ENCOUNTER — EMERGENCY (EMERGENCY)
Facility: HOSPITAL | Age: 57
LOS: 0 days | Discharge: HOME | End: 2017-09-27
Admitting: INTERNAL MEDICINE

## 2017-09-27 DIAGNOSIS — R10.9 UNSPECIFIED ABDOMINAL PAIN: ICD-10-CM

## 2017-09-27 DIAGNOSIS — E11.9 TYPE 2 DIABETES MELLITUS WITHOUT COMPLICATIONS: ICD-10-CM

## 2017-09-27 DIAGNOSIS — Z90.710 ACQUIRED ABSENCE OF BOTH CERVIX AND UTERUS: ICD-10-CM

## 2017-09-27 DIAGNOSIS — J02.9 ACUTE PHARYNGITIS, UNSPECIFIED: ICD-10-CM

## 2017-09-27 DIAGNOSIS — Z79.01 LONG TERM (CURRENT) USE OF ANTICOAGULANTS: ICD-10-CM

## 2017-09-27 DIAGNOSIS — R55 SYNCOPE AND COLLAPSE: ICD-10-CM

## 2017-09-27 DIAGNOSIS — I63.9 CEREBRAL INFARCTION, UNSPECIFIED: ICD-10-CM

## 2017-09-27 DIAGNOSIS — K29.00 ACUTE GASTRITIS WITHOUT BLEEDING: ICD-10-CM

## 2017-09-27 DIAGNOSIS — A09 INFECTIOUS GASTROENTERITIS AND COLITIS, UNSPECIFIED: ICD-10-CM

## 2017-09-27 DIAGNOSIS — K59.00 CONSTIPATION, UNSPECIFIED: ICD-10-CM

## 2017-09-27 DIAGNOSIS — I10 ESSENTIAL (PRIMARY) HYPERTENSION: ICD-10-CM

## 2017-09-27 DIAGNOSIS — R53.1 WEAKNESS: ICD-10-CM

## 2017-09-27 DIAGNOSIS — Z90.49 ACQUIRED ABSENCE OF OTHER SPECIFIED PARTS OF DIGESTIVE TRACT: ICD-10-CM

## 2017-09-27 DIAGNOSIS — I50.9 HEART FAILURE, UNSPECIFIED: ICD-10-CM

## 2017-09-27 DIAGNOSIS — Z98.890 OTHER SPECIFIED POSTPROCEDURAL STATES: ICD-10-CM

## 2017-09-27 DIAGNOSIS — Z95.0 PRESENCE OF CARDIAC PACEMAKER: ICD-10-CM

## 2017-09-27 DIAGNOSIS — E11.40 TYPE 2 DIABETES MELLITUS WITH DIABETIC NEUROPATHY, UNSPECIFIED: ICD-10-CM

## 2017-09-27 DIAGNOSIS — R07.9 CHEST PAIN, UNSPECIFIED: ICD-10-CM

## 2017-09-27 DIAGNOSIS — I81 PORTAL VEIN THROMBOSIS: ICD-10-CM

## 2017-09-27 DIAGNOSIS — Z79.899 OTHER LONG TERM (CURRENT) DRUG THERAPY: ICD-10-CM

## 2017-09-27 DIAGNOSIS — Z95.810 PRESENCE OF AUTOMATIC (IMPLANTABLE) CARDIAC DEFIBRILLATOR: ICD-10-CM

## 2017-09-27 DIAGNOSIS — I47.2 VENTRICULAR TACHYCARDIA: ICD-10-CM

## 2017-09-27 DIAGNOSIS — M79.7 FIBROMYALGIA: ICD-10-CM

## 2017-09-27 DIAGNOSIS — Z79.51 LONG TERM (CURRENT) USE OF INHALED STEROIDS: ICD-10-CM

## 2017-09-27 DIAGNOSIS — G43.909 MIGRAINE, UNSPECIFIED, NOT INTRACTABLE, WITHOUT STATUS MIGRAINOSUS: ICD-10-CM

## 2017-09-27 DIAGNOSIS — I31.4 CARDIAC TAMPONADE: ICD-10-CM

## 2017-09-27 DIAGNOSIS — M70.31 OTHER BURSITIS OF ELBOW, RIGHT ELBOW: ICD-10-CM

## 2017-09-27 DIAGNOSIS — F31.9 BIPOLAR DISORDER, UNSPECIFIED: ICD-10-CM

## 2017-09-27 DIAGNOSIS — Z79.4 LONG TERM (CURRENT) USE OF INSULIN: ICD-10-CM

## 2017-09-29 DIAGNOSIS — Z02.9 ENCOUNTER FOR ADMINISTRATIVE EXAMINATIONS, UNSPECIFIED: ICD-10-CM

## 2017-10-03 DIAGNOSIS — Z88.6 ALLERGY STATUS TO ANALGESIC AGENT: ICD-10-CM

## 2017-10-03 DIAGNOSIS — I63.9 CEREBRAL INFARCTION, UNSPECIFIED: ICD-10-CM

## 2017-10-03 DIAGNOSIS — D17.1 BENIGN LIPOMATOUS NEOPLASM OF SKIN AND SUBCUTANEOUS TISSUE OF TRUNK: ICD-10-CM

## 2017-10-03 DIAGNOSIS — I10 ESSENTIAL (PRIMARY) HYPERTENSION: ICD-10-CM

## 2017-10-03 DIAGNOSIS — E11.9 TYPE 2 DIABETES MELLITUS WITHOUT COMPLICATIONS: ICD-10-CM

## 2017-10-03 DIAGNOSIS — Z90.710 ACQUIRED ABSENCE OF BOTH CERVIX AND UTERUS: ICD-10-CM

## 2017-10-03 DIAGNOSIS — R10.11 RIGHT UPPER QUADRANT PAIN: ICD-10-CM

## 2017-10-03 DIAGNOSIS — Z79.01 LONG TERM (CURRENT) USE OF ANTICOAGULANTS: ICD-10-CM

## 2017-10-03 DIAGNOSIS — Z90.49 ACQUIRED ABSENCE OF OTHER SPECIFIED PARTS OF DIGESTIVE TRACT: ICD-10-CM

## 2017-10-03 DIAGNOSIS — K21.9 GASTRO-ESOPHAGEAL REFLUX DISEASE WITHOUT ESOPHAGITIS: ICD-10-CM

## 2017-10-03 DIAGNOSIS — Z95.0 PRESENCE OF CARDIAC PACEMAKER: ICD-10-CM

## 2017-10-03 DIAGNOSIS — F31.9 BIPOLAR DISORDER, UNSPECIFIED: ICD-10-CM

## 2017-10-03 DIAGNOSIS — J90 PLEURAL EFFUSION, NOT ELSEWHERE CLASSIFIED: ICD-10-CM

## 2017-10-03 DIAGNOSIS — Z95.810 PRESENCE OF AUTOMATIC (IMPLANTABLE) CARDIAC DEFIBRILLATOR: ICD-10-CM

## 2017-10-06 ENCOUNTER — APPOINTMENT (OUTPATIENT)
Dept: INTERNAL MEDICINE | Facility: CLINIC | Age: 57
End: 2017-10-06

## 2017-10-20 ENCOUNTER — OUTPATIENT (OUTPATIENT)
Dept: OUTPATIENT SERVICES | Facility: HOSPITAL | Age: 57
LOS: 1 days | Discharge: HOME | End: 2017-10-20

## 2017-10-20 ENCOUNTER — APPOINTMENT (OUTPATIENT)
Dept: INTERNAL MEDICINE | Facility: CLINIC | Age: 57
End: 2017-10-20

## 2017-10-20 ENCOUNTER — APPOINTMENT (OUTPATIENT)
Dept: PULMONOLOGY | Facility: CLINIC | Age: 57
End: 2017-10-20

## 2017-10-20 VITALS
BODY MASS INDEX: 35.51 KG/M2 | DIASTOLIC BLOOD PRESSURE: 78 MMHG | WEIGHT: 208 LBS | HEIGHT: 64 IN | SYSTOLIC BLOOD PRESSURE: 129 MMHG | HEART RATE: 82 BPM

## 2017-10-20 DIAGNOSIS — E11.40 TYPE 2 DIABETES MELLITUS WITH DIABETIC NEUROPATHY, UNSPECIFIED: ICD-10-CM

## 2017-10-20 DIAGNOSIS — R53.1 WEAKNESS: ICD-10-CM

## 2017-10-20 DIAGNOSIS — F31.9 BIPOLAR DISORDER, UNSPECIFIED: ICD-10-CM

## 2017-10-20 DIAGNOSIS — I31.4 CARDIAC TAMPONADE: ICD-10-CM

## 2017-10-20 DIAGNOSIS — I50.9 HEART FAILURE, UNSPECIFIED: ICD-10-CM

## 2017-10-20 DIAGNOSIS — R55 SYNCOPE AND COLLAPSE: ICD-10-CM

## 2017-10-20 DIAGNOSIS — Z98.890 OTHER SPECIFIED POSTPROCEDURAL STATES: ICD-10-CM

## 2017-10-20 DIAGNOSIS — Z79.01 LONG TERM (CURRENT) USE OF ANTICOAGULANTS: ICD-10-CM

## 2017-10-20 DIAGNOSIS — R07.9 CHEST PAIN, UNSPECIFIED: ICD-10-CM

## 2017-10-20 DIAGNOSIS — A09 INFECTIOUS GASTROENTERITIS AND COLITIS, UNSPECIFIED: ICD-10-CM

## 2017-10-20 DIAGNOSIS — G43.909 MIGRAINE, UNSPECIFIED, NOT INTRACTABLE, WITHOUT STATUS MIGRAINOSUS: ICD-10-CM

## 2017-10-20 DIAGNOSIS — Z95.810 PRESENCE OF AUTOMATIC (IMPLANTABLE) CARDIAC DEFIBRILLATOR: ICD-10-CM

## 2017-10-20 DIAGNOSIS — R10.9 UNSPECIFIED ABDOMINAL PAIN: ICD-10-CM

## 2017-10-20 DIAGNOSIS — I81 PORTAL VEIN THROMBOSIS: ICD-10-CM

## 2017-10-20 DIAGNOSIS — M79.7 FIBROMYALGIA: ICD-10-CM

## 2017-10-20 DIAGNOSIS — E11.9 TYPE 2 DIABETES MELLITUS WITHOUT COMPLICATIONS: ICD-10-CM

## 2017-10-20 DIAGNOSIS — I47.2 VENTRICULAR TACHYCARDIA: ICD-10-CM

## 2017-10-20 DIAGNOSIS — K29.00 ACUTE GASTRITIS WITHOUT BLEEDING: ICD-10-CM

## 2017-10-20 DIAGNOSIS — Z23 ENCOUNTER FOR IMMUNIZATION: ICD-10-CM

## 2017-10-20 DIAGNOSIS — I63.9 CEREBRAL INFARCTION, UNSPECIFIED: ICD-10-CM

## 2017-10-23 DIAGNOSIS — I25.10 ATHEROSCLEROTIC HEART DISEASE OF NATIVE CORONARY ARTERY WITHOUT ANGINA PECTORIS: ICD-10-CM

## 2017-10-23 DIAGNOSIS — R10.9 UNSPECIFIED ABDOMINAL PAIN: ICD-10-CM

## 2017-10-23 DIAGNOSIS — I73.9 PERIPHERAL VASCULAR DISEASE, UNSPECIFIED: ICD-10-CM

## 2017-11-06 ENCOUNTER — APPOINTMENT (OUTPATIENT)
Dept: PODIATRY | Facility: CLINIC | Age: 57
End: 2017-11-06

## 2017-11-06 ENCOUNTER — INPATIENT (INPATIENT)
Facility: HOSPITAL | Age: 57
LOS: 4 days | Discharge: HOME | End: 2017-11-11
Attending: INTERNAL MEDICINE | Admitting: INTERNAL MEDICINE

## 2017-11-06 DIAGNOSIS — I31.4 CARDIAC TAMPONADE: ICD-10-CM

## 2017-11-06 DIAGNOSIS — R55 SYNCOPE AND COLLAPSE: ICD-10-CM

## 2017-11-06 DIAGNOSIS — E11.9 TYPE 2 DIABETES MELLITUS WITHOUT COMPLICATIONS: ICD-10-CM

## 2017-11-06 DIAGNOSIS — I50.9 HEART FAILURE, UNSPECIFIED: ICD-10-CM

## 2017-11-06 DIAGNOSIS — R53.1 WEAKNESS: ICD-10-CM

## 2017-11-06 DIAGNOSIS — M79.7 FIBROMYALGIA: ICD-10-CM

## 2017-11-06 DIAGNOSIS — A09 INFECTIOUS GASTROENTERITIS AND COLITIS, UNSPECIFIED: ICD-10-CM

## 2017-11-06 DIAGNOSIS — K29.00 ACUTE GASTRITIS WITHOUT BLEEDING: ICD-10-CM

## 2017-11-06 DIAGNOSIS — R10.9 UNSPECIFIED ABDOMINAL PAIN: ICD-10-CM

## 2017-11-06 DIAGNOSIS — Z98.890 OTHER SPECIFIED POSTPROCEDURAL STATES: ICD-10-CM

## 2017-11-06 DIAGNOSIS — R07.9 CHEST PAIN, UNSPECIFIED: ICD-10-CM

## 2017-11-06 DIAGNOSIS — Z95.810 PRESENCE OF AUTOMATIC (IMPLANTABLE) CARDIAC DEFIBRILLATOR: ICD-10-CM

## 2017-11-06 DIAGNOSIS — I81 PORTAL VEIN THROMBOSIS: ICD-10-CM

## 2017-11-06 DIAGNOSIS — E11.40 TYPE 2 DIABETES MELLITUS WITH DIABETIC NEUROPATHY, UNSPECIFIED: ICD-10-CM

## 2017-11-06 DIAGNOSIS — I47.2 VENTRICULAR TACHYCARDIA: ICD-10-CM

## 2017-11-06 DIAGNOSIS — G43.909 MIGRAINE, UNSPECIFIED, NOT INTRACTABLE, WITHOUT STATUS MIGRAINOSUS: ICD-10-CM

## 2017-11-06 DIAGNOSIS — F31.9 BIPOLAR DISORDER, UNSPECIFIED: ICD-10-CM

## 2017-11-10 ENCOUNTER — APPOINTMENT (OUTPATIENT)
Dept: SURGERY | Facility: CLINIC | Age: 57
End: 2017-11-10

## 2017-11-14 DIAGNOSIS — I13.0 HYPERTENSIVE HEART AND CHRONIC KIDNEY DISEASE WITH HEART FAILURE AND STAGE 1 THROUGH STAGE 4 CHRONIC KIDNEY DISEASE, OR UNSPECIFIED CHRONIC KIDNEY DISEASE: ICD-10-CM

## 2017-11-14 DIAGNOSIS — F31.9 BIPOLAR DISORDER, UNSPECIFIED: ICD-10-CM

## 2017-11-14 DIAGNOSIS — Z51.89 ENCOUNTER FOR OTHER SPECIFIED AFTERCARE: ICD-10-CM

## 2017-11-14 DIAGNOSIS — I69.951 HEMIPLEGIA AND HEMIPARESIS FOLLOWING UNSPECIFIED CEREBROVASCULAR DISEASE AFFECTING RIGHT DOMINANT SIDE: ICD-10-CM

## 2017-11-14 DIAGNOSIS — I82.511 CHRONIC EMBOLISM AND THROMBOSIS OF RIGHT FEMORAL VEIN: ICD-10-CM

## 2017-11-14 DIAGNOSIS — J15.6 PNEUMONIA DUE TO OTHER GRAM-NEGATIVE BACTERIA: ICD-10-CM

## 2017-11-14 DIAGNOSIS — R04.2 HEMOPTYSIS: ICD-10-CM

## 2017-11-14 DIAGNOSIS — I34.0 NONRHEUMATIC MITRAL (VALVE) INSUFFICIENCY: ICD-10-CM

## 2017-11-14 DIAGNOSIS — E11.65 TYPE 2 DIABETES MELLITUS WITH HYPERGLYCEMIA: ICD-10-CM

## 2017-11-14 DIAGNOSIS — J18.9 PNEUMONIA, UNSPECIFIED ORGANISM: ICD-10-CM

## 2017-11-14 DIAGNOSIS — Z79.4 LONG TERM (CURRENT) USE OF INSULIN: ICD-10-CM

## 2017-11-14 DIAGNOSIS — I42.8 OTHER CARDIOMYOPATHIES: ICD-10-CM

## 2017-11-14 DIAGNOSIS — A41.9 SEPSIS, UNSPECIFIED ORGANISM: ICD-10-CM

## 2017-11-14 DIAGNOSIS — M19.90 UNSPECIFIED OSTEOARTHRITIS, UNSPECIFIED SITE: ICD-10-CM

## 2017-11-14 DIAGNOSIS — E87.1 HYPO-OSMOLALITY AND HYPONATREMIA: ICD-10-CM

## 2017-11-14 DIAGNOSIS — N17.9 ACUTE KIDNEY FAILURE, UNSPECIFIED: ICD-10-CM

## 2017-11-14 DIAGNOSIS — N18.9 CHRONIC KIDNEY DISEASE, UNSPECIFIED: ICD-10-CM

## 2017-11-14 DIAGNOSIS — G43.909 MIGRAINE, UNSPECIFIED, NOT INTRACTABLE, WITHOUT STATUS MIGRAINOSUS: ICD-10-CM

## 2017-11-14 DIAGNOSIS — E11.22 TYPE 2 DIABETES MELLITUS WITH DIABETIC CHRONIC KIDNEY DISEASE: ICD-10-CM

## 2017-11-14 DIAGNOSIS — F17.200 NICOTINE DEPENDENCE, UNSPECIFIED, UNCOMPLICATED: ICD-10-CM

## 2017-11-14 DIAGNOSIS — M79.7 FIBROMYALGIA: ICD-10-CM

## 2017-11-14 DIAGNOSIS — K21.9 GASTRO-ESOPHAGEAL REFLUX DISEASE WITHOUT ESOPHAGITIS: ICD-10-CM

## 2017-11-14 DIAGNOSIS — J44.0 CHRONIC OBSTRUCTIVE PULMONARY DISEASE WITH (ACUTE) LOWER RESPIRATORY INFECTION: ICD-10-CM

## 2017-11-14 DIAGNOSIS — Z79.01 LONG TERM (CURRENT) USE OF ANTICOAGULANTS: ICD-10-CM

## 2017-11-14 DIAGNOSIS — J96.21 ACUTE AND CHRONIC RESPIRATORY FAILURE WITH HYPOXIA: ICD-10-CM

## 2017-11-14 DIAGNOSIS — E11.51 TYPE 2 DIABETES MELLITUS WITH DIABETIC PERIPHERAL ANGIOPATHY WITHOUT GANGRENE: ICD-10-CM

## 2017-11-14 DIAGNOSIS — I25.10 ATHEROSCLEROTIC HEART DISEASE OF NATIVE CORONARY ARTERY WITHOUT ANGINA PECTORIS: ICD-10-CM

## 2017-11-14 DIAGNOSIS — I50.23 ACUTE ON CHRONIC SYSTOLIC (CONGESTIVE) HEART FAILURE: ICD-10-CM

## 2017-11-14 DIAGNOSIS — R65.21 SEVERE SEPSIS WITH SEPTIC SHOCK: ICD-10-CM

## 2017-11-14 DIAGNOSIS — Z87.11 PERSONAL HISTORY OF PEPTIC ULCER DISEASE: ICD-10-CM

## 2017-11-14 DIAGNOSIS — E11.40 TYPE 2 DIABETES MELLITUS WITH DIABETIC NEUROPATHY, UNSPECIFIED: ICD-10-CM

## 2017-11-20 ENCOUNTER — APPOINTMENT (OUTPATIENT)
Dept: INTERNAL MEDICINE | Facility: CLINIC | Age: 57
End: 2017-11-20

## 2017-11-20 ENCOUNTER — OUTPATIENT (OUTPATIENT)
Dept: OUTPATIENT SERVICES | Facility: HOSPITAL | Age: 57
LOS: 1 days | Discharge: HOME | End: 2017-11-20

## 2017-11-20 VITALS
BODY MASS INDEX: 35 KG/M2 | DIASTOLIC BLOOD PRESSURE: 87 MMHG | WEIGHT: 205 LBS | SYSTOLIC BLOOD PRESSURE: 143 MMHG | HEART RATE: 102 BPM | HEIGHT: 64 IN

## 2017-11-20 DIAGNOSIS — Z87.2 PERSONAL HISTORY OF DISEASES OF THE SKIN AND SUBCUTANEOUS TISSUE: ICD-10-CM

## 2017-11-20 DIAGNOSIS — R07.9 CHEST PAIN, UNSPECIFIED: ICD-10-CM

## 2017-11-20 DIAGNOSIS — I50.9 HEART FAILURE, UNSPECIFIED: ICD-10-CM

## 2017-11-20 DIAGNOSIS — A09 INFECTIOUS GASTROENTERITIS AND COLITIS, UNSPECIFIED: ICD-10-CM

## 2017-11-20 DIAGNOSIS — Z00.00 ENCOUNTER FOR GENERAL ADULT MEDICAL EXAMINATION WITHOUT ABNORMAL FINDINGS: ICD-10-CM

## 2017-11-20 DIAGNOSIS — R55 SYNCOPE AND COLLAPSE: ICD-10-CM

## 2017-11-20 DIAGNOSIS — F31.9 BIPOLAR DISORDER, UNSPECIFIED: ICD-10-CM

## 2017-11-20 DIAGNOSIS — I31.4 CARDIAC TAMPONADE: ICD-10-CM

## 2017-11-20 DIAGNOSIS — Z95.810 PRESENCE OF AUTOMATIC (IMPLANTABLE) CARDIAC DEFIBRILLATOR: ICD-10-CM

## 2017-11-20 DIAGNOSIS — Z98.890 OTHER SPECIFIED POSTPROCEDURAL STATES: ICD-10-CM

## 2017-11-20 DIAGNOSIS — M79.7 FIBROMYALGIA: ICD-10-CM

## 2017-11-20 DIAGNOSIS — K29.00 ACUTE GASTRITIS WITHOUT BLEEDING: ICD-10-CM

## 2017-11-20 DIAGNOSIS — I47.2 VENTRICULAR TACHYCARDIA: ICD-10-CM

## 2017-11-20 DIAGNOSIS — G43.909 MIGRAINE, UNSPECIFIED, NOT INTRACTABLE, WITHOUT STATUS MIGRAINOSUS: ICD-10-CM

## 2017-11-20 DIAGNOSIS — R10.9 UNSPECIFIED ABDOMINAL PAIN: ICD-10-CM

## 2017-11-20 DIAGNOSIS — I81 PORTAL VEIN THROMBOSIS: ICD-10-CM

## 2017-11-20 DIAGNOSIS — E11.9 TYPE 2 DIABETES MELLITUS WITHOUT COMPLICATIONS: ICD-10-CM

## 2017-11-20 DIAGNOSIS — J44.9 CHRONIC OBSTRUCTIVE PULMONARY DISEASE, UNSPECIFIED: ICD-10-CM

## 2017-11-20 DIAGNOSIS — E11.40 TYPE 2 DIABETES MELLITUS WITH DIABETIC NEUROPATHY, UNSPECIFIED: ICD-10-CM

## 2017-11-20 DIAGNOSIS — R53.1 WEAKNESS: ICD-10-CM

## 2017-11-20 DIAGNOSIS — Z02.9 ENCOUNTER FOR ADMINISTRATIVE EXAMINATIONS, UNSPECIFIED: ICD-10-CM

## 2017-11-20 DIAGNOSIS — Z79.01 LONG TERM (CURRENT) USE OF ANTICOAGULANTS: ICD-10-CM

## 2017-11-20 DIAGNOSIS — J18.9 PNEUMONIA, UNSPECIFIED ORGANISM: ICD-10-CM

## 2017-11-20 DIAGNOSIS — I63.9 CEREBRAL INFARCTION, UNSPECIFIED: ICD-10-CM

## 2017-11-20 DIAGNOSIS — A41.9 SEPSIS, UNSPECIFIED ORGANISM: ICD-10-CM

## 2017-11-20 RX ORDER — CLOPIDOGREL BISULFATE 75 MG/1
75 TABLET, FILM COATED ORAL DAILY
Qty: 90 | Refills: 1 | Status: COMPLETED | COMMUNITY
Start: 2017-03-21 | End: 2017-11-20

## 2017-11-24 ENCOUNTER — APPOINTMENT (OUTPATIENT)
Dept: GASTROENTEROLOGY | Facility: CLINIC | Age: 57
End: 2017-11-24

## 2017-11-24 ENCOUNTER — OUTPATIENT (OUTPATIENT)
Dept: OUTPATIENT SERVICES | Facility: HOSPITAL | Age: 57
LOS: 1 days | Discharge: HOME | End: 2017-11-24

## 2017-11-24 VITALS
DIASTOLIC BLOOD PRESSURE: 85 MMHG | BODY MASS INDEX: 35.17 KG/M2 | HEART RATE: 120 BPM | SYSTOLIC BLOOD PRESSURE: 129 MMHG | HEIGHT: 64 IN | WEIGHT: 206 LBS

## 2017-11-24 DIAGNOSIS — M79.7 FIBROMYALGIA: ICD-10-CM

## 2017-11-24 DIAGNOSIS — K29.00 ACUTE GASTRITIS WITHOUT BLEEDING: ICD-10-CM

## 2017-11-24 DIAGNOSIS — A09 INFECTIOUS GASTROENTERITIS AND COLITIS, UNSPECIFIED: ICD-10-CM

## 2017-11-24 DIAGNOSIS — I31.4 CARDIAC TAMPONADE: ICD-10-CM

## 2017-11-24 DIAGNOSIS — I81 PORTAL VEIN THROMBOSIS: ICD-10-CM

## 2017-11-24 DIAGNOSIS — F31.9 BIPOLAR DISORDER, UNSPECIFIED: ICD-10-CM

## 2017-11-24 DIAGNOSIS — R07.9 CHEST PAIN, UNSPECIFIED: ICD-10-CM

## 2017-11-24 DIAGNOSIS — I50.9 HEART FAILURE, UNSPECIFIED: ICD-10-CM

## 2017-11-24 DIAGNOSIS — K92.0 HEMATEMESIS: ICD-10-CM

## 2017-11-24 DIAGNOSIS — E11.9 TYPE 2 DIABETES MELLITUS WITHOUT COMPLICATIONS: ICD-10-CM

## 2017-11-24 DIAGNOSIS — R10.9 UNSPECIFIED ABDOMINAL PAIN: ICD-10-CM

## 2017-11-24 DIAGNOSIS — I47.2 VENTRICULAR TACHYCARDIA: ICD-10-CM

## 2017-11-24 DIAGNOSIS — Z95.810 PRESENCE OF AUTOMATIC (IMPLANTABLE) CARDIAC DEFIBRILLATOR: ICD-10-CM

## 2017-11-24 DIAGNOSIS — G43.909 MIGRAINE, UNSPECIFIED, NOT INTRACTABLE, WITHOUT STATUS MIGRAINOSUS: ICD-10-CM

## 2017-11-24 DIAGNOSIS — R53.1 WEAKNESS: ICD-10-CM

## 2017-11-24 DIAGNOSIS — R55 SYNCOPE AND COLLAPSE: ICD-10-CM

## 2017-11-24 DIAGNOSIS — Z98.890 OTHER SPECIFIED POSTPROCEDURAL STATES: ICD-10-CM

## 2017-11-24 DIAGNOSIS — Z79.01 LONG TERM (CURRENT) USE OF ANTICOAGULANTS: ICD-10-CM

## 2017-11-24 DIAGNOSIS — E11.40 TYPE 2 DIABETES MELLITUS WITH DIABETIC NEUROPATHY, UNSPECIFIED: ICD-10-CM

## 2017-11-24 DIAGNOSIS — I63.9 CEREBRAL INFARCTION, UNSPECIFIED: ICD-10-CM

## 2017-12-01 ENCOUNTER — RESULT REVIEW (OUTPATIENT)
Age: 57
End: 2017-12-01

## 2017-12-01 ENCOUNTER — OUTPATIENT (OUTPATIENT)
Dept: OUTPATIENT SERVICES | Facility: HOSPITAL | Age: 57
LOS: 1 days | Discharge: HOME | End: 2017-12-01

## 2017-12-01 ENCOUNTER — APPOINTMENT (OUTPATIENT)
Dept: SURGERY | Facility: CLINIC | Age: 57
End: 2017-12-01
Payer: MEDICARE

## 2017-12-01 VITALS
BODY MASS INDEX: 35.68 KG/M2 | WEIGHT: 209 LBS | HEIGHT: 64 IN | SYSTOLIC BLOOD PRESSURE: 122 MMHG | DIASTOLIC BLOOD PRESSURE: 64 MMHG

## 2017-12-01 DIAGNOSIS — A09 INFECTIOUS GASTROENTERITIS AND COLITIS, UNSPECIFIED: ICD-10-CM

## 2017-12-01 DIAGNOSIS — E11.40 TYPE 2 DIABETES MELLITUS WITH DIABETIC NEUROPATHY, UNSPECIFIED: ICD-10-CM

## 2017-12-01 DIAGNOSIS — I47.2 VENTRICULAR TACHYCARDIA: ICD-10-CM

## 2017-12-01 DIAGNOSIS — R07.9 CHEST PAIN, UNSPECIFIED: ICD-10-CM

## 2017-12-01 DIAGNOSIS — Z95.810 PRESENCE OF AUTOMATIC (IMPLANTABLE) CARDIAC DEFIBRILLATOR: ICD-10-CM

## 2017-12-01 DIAGNOSIS — I31.4 CARDIAC TAMPONADE: ICD-10-CM

## 2017-12-01 DIAGNOSIS — E11.9 TYPE 2 DIABETES MELLITUS WITHOUT COMPLICATIONS: ICD-10-CM

## 2017-12-01 DIAGNOSIS — Z98.890 OTHER SPECIFIED POSTPROCEDURAL STATES: ICD-10-CM

## 2017-12-01 DIAGNOSIS — F31.9 BIPOLAR DISORDER, UNSPECIFIED: ICD-10-CM

## 2017-12-01 DIAGNOSIS — I81 PORTAL VEIN THROMBOSIS: ICD-10-CM

## 2017-12-01 DIAGNOSIS — I50.9 HEART FAILURE, UNSPECIFIED: ICD-10-CM

## 2017-12-01 DIAGNOSIS — R10.9 UNSPECIFIED ABDOMINAL PAIN: ICD-10-CM

## 2017-12-01 DIAGNOSIS — R55 SYNCOPE AND COLLAPSE: ICD-10-CM

## 2017-12-01 DIAGNOSIS — I63.9 CEREBRAL INFARCTION, UNSPECIFIED: ICD-10-CM

## 2017-12-01 DIAGNOSIS — M79.7 FIBROMYALGIA: ICD-10-CM

## 2017-12-01 DIAGNOSIS — Z79.01 LONG TERM (CURRENT) USE OF ANTICOAGULANTS: ICD-10-CM

## 2017-12-01 DIAGNOSIS — G43.909 MIGRAINE, UNSPECIFIED, NOT INTRACTABLE, WITHOUT STATUS MIGRAINOSUS: ICD-10-CM

## 2017-12-01 DIAGNOSIS — R53.1 WEAKNESS: ICD-10-CM

## 2017-12-01 DIAGNOSIS — K29.00 ACUTE GASTRITIS WITHOUT BLEEDING: ICD-10-CM

## 2017-12-01 DIAGNOSIS — K21.9 GASTRO-ESOPHAGEAL REFLUX DISEASE WITHOUT ESOPHAGITIS: ICD-10-CM

## 2017-12-01 PROCEDURE — 99214 OFFICE O/P EST MOD 30 MIN: CPT

## 2017-12-04 LAB
ALBUMIN SERPL-MCNC: 3.5 G/DL
ALBUMIN/GLOB SERPL: 0.78
ALP SERPL-CCNC: 122 IU/L
ALT SERPL-CCNC: 26 IU/L
ANION GAP SERPL CALC-SCNC: 9 MEQ/L
AST SERPL-CCNC: 34 IU/L
BASOPHILS # BLD: 0.03 TH/MM3
BASOPHILS NFR BLD: 0.3 %
BILIRUB SERPL-MCNC: 0.6 MG/DL
BUN SERPL-MCNC: 16 MG/DL
BUN/CREAT SERPL: 9.7 %
CALCIUM SERPL-MCNC: 9.6 MG/DL
CHLORIDE SERPL-SCNC: 98 MEQ/L
CHOLEST SERPL-MCNC: 333 MG/DL
CO2 SERPL-SCNC: 27 MEQ/L
CREAT SERPL-MCNC: 1.65 MG/DL
DIFFERENTIAL METHOD BLD: NORMAL
EOSINOPHIL # BLD: 0.14 TH/MM3
EOSINOPHIL NFR BLD: 1.4 %
ERYTHROCYTE [DISTWIDTH] IN BLOOD BY AUTOMATED COUNT: 16 %
ESTIMATED AVERGAGE GLUCOSE (NORTH): 341 MG/DL
GFR SERPL CREATININE-BSD FRML MDRD: 32
GLUCOSE SERPL-MCNC: 295 MG/DL
GRANULOCYTES # BLD: 8.56 TH/MM3
GRANULOCYTES NFR BLD: 87.1 %
HBA1C MFR BLD: 13.5 %
HCT VFR BLD AUTO: 43.8 %
HDLC SERPL-MCNC: 39 MG/DL
HDLC SERPL: 8.54
HGB BLD-MCNC: 13.8 G/DL
IMM GRANULOCYTES # BLD: 0.02 TH/MM3
IMM GRANULOCYTES NFR BLD: 0.2 %
LDLC SERPL DIRECT ASSAY-MCNC: 189 MG/DL
LYMPHOCYTES # BLD: 0.87 TH/MM3
LYMPHOCYTES NFR BLD: 8.9 %
MCH RBC QN AUTO: 27.1 PG
MCHC RBC AUTO-ENTMCNC: 31.5 G/DL
MCV RBC AUTO: 85.9 FL
MONOCYTES # BLD: 0.21 TH/MM3
MONOCYTES NFR BLD: 2.1 %
PLATELET # BLD: 238 TH/MM3
PMV BLD AUTO: 10.1 FL
POTASSIUM SERPL-SCNC: 4.2 MMOL/L
PROT SERPL-MCNC: 8 G/DL
RBC # BLD AUTO: 5.1 MIL/MM3
SODIUM SERPL-SCNC: 134 MEQ/L
T4 FREE SERPL-MCNC: 1.1 NG/DL
TRIGL SERPL-MCNC: 480 MG/DL
TSH SERPL DL<=0.005 MIU/L-ACNC: 4.3 UIU/ML
VLDLC SERPL-MCNC: 96 MG/DL
WBC # BLD: 9.83 TH/MM3

## 2017-12-05 DIAGNOSIS — K21.9 GASTRO-ESOPHAGEAL REFLUX DISEASE WITHOUT ESOPHAGITIS: ICD-10-CM

## 2017-12-05 DIAGNOSIS — Z02.9 ENCOUNTER FOR ADMINISTRATIVE EXAMINATIONS, UNSPECIFIED: ICD-10-CM

## 2017-12-13 ENCOUNTER — OUTPATIENT (OUTPATIENT)
Dept: OUTPATIENT SERVICES | Facility: HOSPITAL | Age: 57
LOS: 1 days | Discharge: HOME | End: 2017-12-13

## 2017-12-13 DIAGNOSIS — R07.9 CHEST PAIN, UNSPECIFIED: ICD-10-CM

## 2017-12-13 DIAGNOSIS — F31.9 BIPOLAR DISORDER, UNSPECIFIED: ICD-10-CM

## 2017-12-13 DIAGNOSIS — I50.9 HEART FAILURE, UNSPECIFIED: ICD-10-CM

## 2017-12-13 DIAGNOSIS — I47.2 VENTRICULAR TACHYCARDIA: ICD-10-CM

## 2017-12-13 DIAGNOSIS — M79.7 FIBROMYALGIA: ICD-10-CM

## 2017-12-13 DIAGNOSIS — I31.4 CARDIAC TAMPONADE: ICD-10-CM

## 2017-12-13 DIAGNOSIS — E11.40 TYPE 2 DIABETES MELLITUS WITH DIABETIC NEUROPATHY, UNSPECIFIED: ICD-10-CM

## 2017-12-13 DIAGNOSIS — R53.1 WEAKNESS: ICD-10-CM

## 2017-12-13 DIAGNOSIS — K29.00 ACUTE GASTRITIS WITHOUT BLEEDING: ICD-10-CM

## 2017-12-13 DIAGNOSIS — I81 PORTAL VEIN THROMBOSIS: ICD-10-CM

## 2017-12-13 DIAGNOSIS — R10.9 UNSPECIFIED ABDOMINAL PAIN: ICD-10-CM

## 2017-12-13 DIAGNOSIS — A09 INFECTIOUS GASTROENTERITIS AND COLITIS, UNSPECIFIED: ICD-10-CM

## 2017-12-13 DIAGNOSIS — R55 SYNCOPE AND COLLAPSE: ICD-10-CM

## 2017-12-13 DIAGNOSIS — E11.9 TYPE 2 DIABETES MELLITUS WITHOUT COMPLICATIONS: ICD-10-CM

## 2017-12-13 DIAGNOSIS — D17.9 BENIGN LIPOMATOUS NEOPLASM, UNSPECIFIED: ICD-10-CM

## 2017-12-13 DIAGNOSIS — G43.909 MIGRAINE, UNSPECIFIED, NOT INTRACTABLE, WITHOUT STATUS MIGRAINOSUS: ICD-10-CM

## 2017-12-13 DIAGNOSIS — Z95.810 PRESENCE OF AUTOMATIC (IMPLANTABLE) CARDIAC DEFIBRILLATOR: ICD-10-CM

## 2017-12-13 DIAGNOSIS — Z98.890 OTHER SPECIFIED POSTPROCEDURAL STATES: ICD-10-CM

## 2017-12-15 DIAGNOSIS — Z02.9 ENCOUNTER FOR ADMINISTRATIVE EXAMINATIONS, UNSPECIFIED: ICD-10-CM

## 2017-12-15 DIAGNOSIS — D17.9 BENIGN LIPOMATOUS NEOPLASM, UNSPECIFIED: ICD-10-CM

## 2017-12-15 DIAGNOSIS — I63.9 CEREBRAL INFARCTION, UNSPECIFIED: ICD-10-CM

## 2017-12-15 DIAGNOSIS — Z79.01 LONG TERM (CURRENT) USE OF ANTICOAGULANTS: ICD-10-CM

## 2018-01-03 ENCOUNTER — RX RENEWAL (OUTPATIENT)
Age: 58
End: 2018-01-03

## 2018-01-05 ENCOUNTER — APPOINTMENT (OUTPATIENT)
Dept: SURGERY | Facility: CLINIC | Age: 58
End: 2018-01-05
Payer: MEDICARE

## 2018-01-05 VITALS
SYSTOLIC BLOOD PRESSURE: 139 MMHG | HEIGHT: 64 IN | DIASTOLIC BLOOD PRESSURE: 60 MMHG | WEIGHT: 215 LBS | BODY MASS INDEX: 36.7 KG/M2

## 2018-01-05 PROCEDURE — 99213 OFFICE O/P EST LOW 20 MIN: CPT

## 2018-01-19 ENCOUNTER — APPOINTMENT (OUTPATIENT)
Dept: INTERNAL MEDICINE | Facility: CLINIC | Age: 58
End: 2018-01-19

## 2018-01-19 ENCOUNTER — OUTPATIENT (OUTPATIENT)
Dept: OUTPATIENT SERVICES | Facility: HOSPITAL | Age: 58
LOS: 1 days | Discharge: HOME | End: 2018-01-19

## 2018-01-19 ENCOUNTER — OTHER (OUTPATIENT)
Age: 58
End: 2018-01-19

## 2018-01-19 ENCOUNTER — APPOINTMENT (OUTPATIENT)
Dept: PULMONOLOGY | Facility: CLINIC | Age: 58
End: 2018-01-19

## 2018-01-19 VITALS
OXYGEN SATURATION: 95 % | HEART RATE: 92 BPM | BODY MASS INDEX: 37.22 KG/M2 | HEIGHT: 64 IN | DIASTOLIC BLOOD PRESSURE: 92 MMHG | WEIGHT: 218 LBS | SYSTOLIC BLOOD PRESSURE: 117 MMHG

## 2018-01-19 VITALS
DIASTOLIC BLOOD PRESSURE: 74 MMHG | BODY MASS INDEX: 37.22 KG/M2 | WEIGHT: 218 LBS | RESPIRATION RATE: 16 BRPM | HEART RATE: 87 BPM | HEIGHT: 64 IN | SYSTOLIC BLOOD PRESSURE: 113 MMHG

## 2018-01-19 DIAGNOSIS — I50.9 HEART FAILURE, UNSPECIFIED: ICD-10-CM

## 2018-01-19 DIAGNOSIS — I81 PORTAL VEIN THROMBOSIS: ICD-10-CM

## 2018-01-19 DIAGNOSIS — K29.00 ACUTE GASTRITIS WITHOUT BLEEDING: ICD-10-CM

## 2018-01-19 DIAGNOSIS — I47.2 VENTRICULAR TACHYCARDIA: ICD-10-CM

## 2018-01-19 DIAGNOSIS — R55 SYNCOPE AND COLLAPSE: ICD-10-CM

## 2018-01-19 DIAGNOSIS — A09 INFECTIOUS GASTROENTERITIS AND COLITIS, UNSPECIFIED: ICD-10-CM

## 2018-01-19 DIAGNOSIS — G43.909 MIGRAINE, UNSPECIFIED, NOT INTRACTABLE, WITHOUT STATUS MIGRAINOSUS: ICD-10-CM

## 2018-01-19 DIAGNOSIS — Z98.890 OTHER SPECIFIED POSTPROCEDURAL STATES: ICD-10-CM

## 2018-01-19 DIAGNOSIS — R53.1 WEAKNESS: ICD-10-CM

## 2018-01-19 DIAGNOSIS — E11.9 TYPE 2 DIABETES MELLITUS WITHOUT COMPLICATIONS: ICD-10-CM

## 2018-01-19 DIAGNOSIS — R10.9 UNSPECIFIED ABDOMINAL PAIN: ICD-10-CM

## 2018-01-19 DIAGNOSIS — J44.9 CHRONIC OBSTRUCTIVE PULMONARY DISEASE, UNSPECIFIED: ICD-10-CM

## 2018-01-19 DIAGNOSIS — M79.7 FIBROMYALGIA: ICD-10-CM

## 2018-01-19 DIAGNOSIS — R07.9 CHEST PAIN, UNSPECIFIED: ICD-10-CM

## 2018-01-19 DIAGNOSIS — Z01.818 ENCOUNTER FOR OTHER PREPROCEDURAL EXAMINATION: ICD-10-CM

## 2018-01-19 DIAGNOSIS — Z95.810 PRESENCE OF AUTOMATIC (IMPLANTABLE) CARDIAC DEFIBRILLATOR: ICD-10-CM

## 2018-01-19 DIAGNOSIS — Z83.3 FAMILY HISTORY OF DIABETES MELLITUS: ICD-10-CM

## 2018-01-19 DIAGNOSIS — Z82.49 FAMILY HISTORY OF ISCHEMIC HEART DISEASE AND OTHER DISEASES OF THE CIRCULATORY SYSTEM: ICD-10-CM

## 2018-01-19 DIAGNOSIS — I31.4 CARDIAC TAMPONADE: ICD-10-CM

## 2018-01-19 DIAGNOSIS — D17.9 BENIGN LIPOMATOUS NEOPLASM, UNSPECIFIED: ICD-10-CM

## 2018-01-19 DIAGNOSIS — F31.9 BIPOLAR DISORDER, UNSPECIFIED: ICD-10-CM

## 2018-01-19 DIAGNOSIS — E11.40 TYPE 2 DIABETES MELLITUS WITH DIABETIC NEUROPATHY, UNSPECIFIED: ICD-10-CM

## 2018-01-19 DIAGNOSIS — I63.9 CEREBRAL INFARCTION, UNSPECIFIED: ICD-10-CM

## 2018-01-19 DIAGNOSIS — Z83.79 FAMILY HISTORY OF OTHER DISEASES OF THE DIGESTIVE SYSTEM: ICD-10-CM

## 2018-01-19 DIAGNOSIS — Z79.01 LONG TERM (CURRENT) USE OF ANTICOAGULANTS: ICD-10-CM

## 2018-01-22 DIAGNOSIS — Z02.9 ENCOUNTER FOR ADMINISTRATIVE EXAMINATIONS, UNSPECIFIED: ICD-10-CM

## 2018-01-22 DIAGNOSIS — D17.9 BENIGN LIPOMATOUS NEOPLASM, UNSPECIFIED: ICD-10-CM

## 2018-01-22 DIAGNOSIS — Z01.818 ENCOUNTER FOR OTHER PREPROCEDURAL EXAMINATION: ICD-10-CM

## 2018-01-23 ENCOUNTER — OUTPATIENT (OUTPATIENT)
Dept: OUTPATIENT SERVICES | Facility: HOSPITAL | Age: 58
LOS: 1 days | Discharge: HOME | End: 2018-01-23

## 2018-01-23 ENCOUNTER — APPOINTMENT (OUTPATIENT)
Dept: CARDIOLOGY | Facility: CLINIC | Age: 58
End: 2018-01-23

## 2018-01-23 VITALS
WEIGHT: 215 LBS | HEIGHT: 64 IN | HEART RATE: 80 BPM | SYSTOLIC BLOOD PRESSURE: 115 MMHG | DIASTOLIC BLOOD PRESSURE: 70 MMHG | BODY MASS INDEX: 36.7 KG/M2

## 2018-01-23 DIAGNOSIS — Z01.818 ENCOUNTER FOR OTHER PREPROCEDURAL EXAMINATION: ICD-10-CM

## 2018-01-24 ENCOUNTER — OUTPATIENT (OUTPATIENT)
Dept: OUTPATIENT SERVICES | Facility: HOSPITAL | Age: 58
LOS: 1 days | Discharge: HOME | End: 2018-01-24

## 2018-01-25 DIAGNOSIS — I42.0 DILATED CARDIOMYOPATHY: ICD-10-CM

## 2018-01-25 DIAGNOSIS — Z01.810 ENCOUNTER FOR PREPROCEDURAL CARDIOVASCULAR EXAMINATION: ICD-10-CM

## 2018-01-25 DIAGNOSIS — Z02.9 ENCOUNTER FOR ADMINISTRATIVE EXAMINATIONS, UNSPECIFIED: ICD-10-CM

## 2018-01-30 ENCOUNTER — APPOINTMENT (OUTPATIENT)
Dept: CARDIOLOGY | Facility: CLINIC | Age: 58
End: 2018-01-30

## 2018-01-30 DIAGNOSIS — Z79.4 LONG TERM (CURRENT) USE OF INSULIN: ICD-10-CM

## 2018-01-30 DIAGNOSIS — R19.00 INTRA-ABDOMINAL AND PELVIC SWELLING, MASS AND LUMP, UNSPECIFIED SITE: ICD-10-CM

## 2018-01-30 DIAGNOSIS — D17.39 BENIGN LIPOMATOUS NEOPLASM OF SKIN AND SUBCUTANEOUS TISSUE OF OTHER SITES: ICD-10-CM

## 2018-01-30 DIAGNOSIS — E11.9 TYPE 2 DIABETES MELLITUS WITHOUT COMPLICATIONS: ICD-10-CM

## 2018-02-02 ENCOUNTER — OUTPATIENT (OUTPATIENT)
Dept: OUTPATIENT SERVICES | Facility: HOSPITAL | Age: 58
LOS: 1 days | Discharge: HOME | End: 2018-02-02

## 2018-02-02 ENCOUNTER — APPOINTMENT (OUTPATIENT)
Dept: SURGERY | Facility: CLINIC | Age: 58
End: 2018-02-02
Payer: MEDICARE

## 2018-02-02 VITALS
HEIGHT: 64 IN | BODY MASS INDEX: 37.9 KG/M2 | WEIGHT: 222 LBS | DIASTOLIC BLOOD PRESSURE: 80 MMHG | SYSTOLIC BLOOD PRESSURE: 140 MMHG

## 2018-02-02 DIAGNOSIS — Z79.01 LONG TERM (CURRENT) USE OF ANTICOAGULANTS: ICD-10-CM

## 2018-02-02 DIAGNOSIS — I63.9 CEREBRAL INFARCTION, UNSPECIFIED: ICD-10-CM

## 2018-02-02 PROCEDURE — 99024 POSTOP FOLLOW-UP VISIT: CPT

## 2018-02-04 DIAGNOSIS — R53.1 WEAKNESS: ICD-10-CM

## 2018-02-04 DIAGNOSIS — R07.9 CHEST PAIN, UNSPECIFIED: ICD-10-CM

## 2018-02-04 DIAGNOSIS — M79.7 FIBROMYALGIA: ICD-10-CM

## 2018-02-04 DIAGNOSIS — R55 SYNCOPE AND COLLAPSE: ICD-10-CM

## 2018-02-04 DIAGNOSIS — I50.9 HEART FAILURE, UNSPECIFIED: ICD-10-CM

## 2018-02-04 DIAGNOSIS — A09 INFECTIOUS GASTROENTERITIS AND COLITIS, UNSPECIFIED: ICD-10-CM

## 2018-02-04 DIAGNOSIS — Z95.810 PRESENCE OF AUTOMATIC (IMPLANTABLE) CARDIAC DEFIBRILLATOR: ICD-10-CM

## 2018-02-04 DIAGNOSIS — E11.40 TYPE 2 DIABETES MELLITUS WITH DIABETIC NEUROPATHY, UNSPECIFIED: ICD-10-CM

## 2018-02-04 DIAGNOSIS — E11.9 TYPE 2 DIABETES MELLITUS WITHOUT COMPLICATIONS: ICD-10-CM

## 2018-02-04 DIAGNOSIS — F31.9 BIPOLAR DISORDER, UNSPECIFIED: ICD-10-CM

## 2018-02-04 DIAGNOSIS — I31.4 CARDIAC TAMPONADE: ICD-10-CM

## 2018-02-04 DIAGNOSIS — R10.9 UNSPECIFIED ABDOMINAL PAIN: ICD-10-CM

## 2018-02-04 DIAGNOSIS — I81 PORTAL VEIN THROMBOSIS: ICD-10-CM

## 2018-02-04 DIAGNOSIS — Z98.890 OTHER SPECIFIED POSTPROCEDURAL STATES: ICD-10-CM

## 2018-02-04 DIAGNOSIS — I47.2 VENTRICULAR TACHYCARDIA: ICD-10-CM

## 2018-02-04 DIAGNOSIS — K29.00 ACUTE GASTRITIS WITHOUT BLEEDING: ICD-10-CM

## 2018-02-04 DIAGNOSIS — G43.909 MIGRAINE, UNSPECIFIED, NOT INTRACTABLE, WITHOUT STATUS MIGRAINOSUS: ICD-10-CM

## 2018-02-09 ENCOUNTER — OUTPATIENT (OUTPATIENT)
Dept: OUTPATIENT SERVICES | Facility: HOSPITAL | Age: 58
LOS: 1 days | Discharge: HOME | End: 2018-02-09

## 2018-02-09 DIAGNOSIS — I63.9 CEREBRAL INFARCTION, UNSPECIFIED: ICD-10-CM

## 2018-02-09 DIAGNOSIS — Z79.01 LONG TERM (CURRENT) USE OF ANTICOAGULANTS: ICD-10-CM

## 2018-02-16 ENCOUNTER — APPOINTMENT (OUTPATIENT)
Dept: SURGERY | Facility: CLINIC | Age: 58
End: 2018-02-16

## 2018-02-18 ENCOUNTER — OUTPATIENT (OUTPATIENT)
Dept: OUTPATIENT SERVICES | Facility: HOSPITAL | Age: 58
LOS: 1 days | Discharge: HOME | End: 2018-02-18

## 2018-02-20 DIAGNOSIS — G47.33 OBSTRUCTIVE SLEEP APNEA (ADULT) (PEDIATRIC): ICD-10-CM

## 2018-03-05 ENCOUNTER — APPOINTMENT (OUTPATIENT)
Dept: INTERNAL MEDICINE | Facility: CLINIC | Age: 58
End: 2018-03-05

## 2018-03-09 ENCOUNTER — OUTPATIENT (OUTPATIENT)
Dept: OUTPATIENT SERVICES | Facility: HOSPITAL | Age: 58
LOS: 1 days | Discharge: HOME | End: 2018-03-09

## 2018-03-09 ENCOUNTER — APPOINTMENT (OUTPATIENT)
Dept: INTERNAL MEDICINE | Facility: CLINIC | Age: 58
End: 2018-03-09

## 2018-03-09 VITALS
BODY MASS INDEX: 36.7 KG/M2 | HEART RATE: 87 BPM | WEIGHT: 215 LBS | SYSTOLIC BLOOD PRESSURE: 113 MMHG | HEIGHT: 64 IN | DIASTOLIC BLOOD PRESSURE: 68 MMHG

## 2018-03-09 DIAGNOSIS — S42.309A UNSPECIFIED FRACTURE OF SHAFT OF HUMERUS, UNSPECIFIED ARM, INITIAL ENCOUNTER FOR CLOSED FRACTURE: ICD-10-CM

## 2018-03-09 DIAGNOSIS — Z79.01 LONG TERM (CURRENT) USE OF ANTICOAGULANTS: ICD-10-CM

## 2018-03-09 DIAGNOSIS — I63.9 CEREBRAL INFARCTION, UNSPECIFIED: ICD-10-CM

## 2018-03-12 DIAGNOSIS — S42.309A UNSPECIFIED FRACTURE OF SHAFT OF HUMERUS, UNSPECIFIED ARM, INITIAL ENCOUNTER FOR CLOSED FRACTURE: ICD-10-CM

## 2018-03-16 ENCOUNTER — APPOINTMENT (OUTPATIENT)
Dept: PULMONOLOGY | Facility: CLINIC | Age: 58
End: 2018-03-16

## 2018-03-23 ENCOUNTER — OUTPATIENT (OUTPATIENT)
Dept: OUTPATIENT SERVICES | Facility: HOSPITAL | Age: 58
LOS: 1 days | Discharge: HOME | End: 2018-03-23

## 2018-03-23 DIAGNOSIS — Z79.01 LONG TERM (CURRENT) USE OF ANTICOAGULANTS: ICD-10-CM

## 2018-03-23 DIAGNOSIS — I63.9 CEREBRAL INFARCTION, UNSPECIFIED: ICD-10-CM

## 2018-03-30 ENCOUNTER — OUTPATIENT (OUTPATIENT)
Dept: OUTPATIENT SERVICES | Facility: HOSPITAL | Age: 58
LOS: 1 days | Discharge: HOME | End: 2018-03-30

## 2018-03-30 DIAGNOSIS — I63.9 CEREBRAL INFARCTION, UNSPECIFIED: ICD-10-CM

## 2018-03-30 DIAGNOSIS — Z79.01 LONG TERM (CURRENT) USE OF ANTICOAGULANTS: ICD-10-CM

## 2018-04-04 ENCOUNTER — RX RENEWAL (OUTPATIENT)
Age: 58
End: 2018-04-04

## 2018-04-04 RX ORDER — INSULIN GLARGINE 100 [IU]/ML
100 INJECTION, SOLUTION SUBCUTANEOUS
Qty: 1 | Refills: 3 | Status: DISCONTINUED | COMMUNITY
Start: 2017-06-16 | End: 2018-04-04

## 2018-04-13 ENCOUNTER — OUTPATIENT (OUTPATIENT)
Dept: OUTPATIENT SERVICES | Facility: HOSPITAL | Age: 58
LOS: 1 days | Discharge: HOME | End: 2018-04-13

## 2018-04-13 DIAGNOSIS — Z79.01 LONG TERM (CURRENT) USE OF ANTICOAGULANTS: ICD-10-CM

## 2018-04-13 DIAGNOSIS — I63.9 CEREBRAL INFARCTION, UNSPECIFIED: ICD-10-CM

## 2018-04-20 ENCOUNTER — APPOINTMENT (OUTPATIENT)
Dept: INTERNAL MEDICINE | Facility: CLINIC | Age: 58
End: 2018-04-20

## 2018-04-23 ENCOUNTER — OUTPATIENT (OUTPATIENT)
Dept: OUTPATIENT SERVICES | Facility: HOSPITAL | Age: 58
LOS: 1 days | Discharge: HOME | End: 2018-04-23

## 2018-04-23 DIAGNOSIS — I63.9 CEREBRAL INFARCTION, UNSPECIFIED: ICD-10-CM

## 2018-04-23 DIAGNOSIS — Z79.01 LONG TERM (CURRENT) USE OF ANTICOAGULANTS: ICD-10-CM

## 2018-05-22 ENCOUNTER — APPOINTMENT (OUTPATIENT)
Dept: CARDIOLOGY | Facility: CLINIC | Age: 58
End: 2018-05-22

## 2018-05-29 DIAGNOSIS — Z95.0 PRESENCE OF CARDIAC PACEMAKER: ICD-10-CM

## 2018-05-29 DIAGNOSIS — J45.909 UNSPECIFIED ASTHMA, UNCOMPLICATED: ICD-10-CM

## 2018-05-29 DIAGNOSIS — I10 ESSENTIAL (PRIMARY) HYPERTENSION: ICD-10-CM

## 2018-05-29 DIAGNOSIS — Z88.8 ALLERGY STATUS TO OTHER DRUGS, MEDICAMENTS AND BIOLOGICAL SUBSTANCES: ICD-10-CM

## 2018-05-29 DIAGNOSIS — Z95.810 PRESENCE OF AUTOMATIC (IMPLANTABLE) CARDIAC DEFIBRILLATOR: ICD-10-CM

## 2018-05-29 DIAGNOSIS — D17.9 BENIGN LIPOMATOUS NEOPLASM, UNSPECIFIED: ICD-10-CM

## 2018-05-29 DIAGNOSIS — I63.9 CEREBRAL INFARCTION, UNSPECIFIED: ICD-10-CM

## 2018-05-29 DIAGNOSIS — Z79.899 OTHER LONG TERM (CURRENT) DRUG THERAPY: ICD-10-CM

## 2018-05-29 DIAGNOSIS — Z79.4 LONG TERM (CURRENT) USE OF INSULIN: ICD-10-CM

## 2018-05-29 DIAGNOSIS — K21.9 GASTRO-ESOPHAGEAL REFLUX DISEASE WITHOUT ESOPHAGITIS: ICD-10-CM

## 2018-05-29 DIAGNOSIS — E11.9 TYPE 2 DIABETES MELLITUS WITHOUT COMPLICATIONS: ICD-10-CM

## 2018-05-29 DIAGNOSIS — Z90.89 ACQUIRED ABSENCE OF OTHER ORGANS: ICD-10-CM

## 2018-05-29 DIAGNOSIS — Z79.01 LONG TERM (CURRENT) USE OF ANTICOAGULANTS: ICD-10-CM

## 2018-05-29 DIAGNOSIS — Z90.710 ACQUIRED ABSENCE OF BOTH CERVIX AND UTERUS: ICD-10-CM

## 2018-06-06 DIAGNOSIS — I63.9 CEREBRAL INFARCTION, UNSPECIFIED: ICD-10-CM

## 2018-06-06 DIAGNOSIS — Z79.01 LONG TERM (CURRENT) USE OF ANTICOAGULANTS: ICD-10-CM

## 2018-06-29 ENCOUNTER — APPOINTMENT (OUTPATIENT)
Dept: PULMONOLOGY | Facility: CLINIC | Age: 58
End: 2018-06-29

## 2018-08-20 ENCOUNTER — INPATIENT (INPATIENT)
Facility: HOSPITAL | Age: 58
LOS: 3 days | Discharge: HOME HEALTH | End: 2018-08-24
Attending: HOSPITALIST | Admitting: HOSPITALIST

## 2018-08-20 VITALS
OXYGEN SATURATION: 95 % | DIASTOLIC BLOOD PRESSURE: 67 MMHG | RESPIRATION RATE: 22 BRPM | SYSTOLIC BLOOD PRESSURE: 133 MMHG | HEART RATE: 103 BPM | TEMPERATURE: 97 F

## 2018-08-20 DIAGNOSIS — Z90.710 ACQUIRED ABSENCE OF BOTH CERVIX AND UTERUS: Chronic | ICD-10-CM

## 2018-08-20 DIAGNOSIS — Z95.810 PRESENCE OF AUTOMATIC (IMPLANTABLE) CARDIAC DEFIBRILLATOR: Chronic | ICD-10-CM

## 2018-08-20 DIAGNOSIS — Z98.51 TUBAL LIGATION STATUS: Chronic | ICD-10-CM

## 2018-08-20 DIAGNOSIS — Z90.49 ACQUIRED ABSENCE OF OTHER SPECIFIED PARTS OF DIGESTIVE TRACT: Chronic | ICD-10-CM

## 2018-08-20 LAB
ALBUMIN SERPL ELPH-MCNC: 3.6 G/DL — SIGNIFICANT CHANGE UP (ref 3.5–5.2)
ALP SERPL-CCNC: 134 U/L — HIGH (ref 30–115)
ALT FLD-CCNC: 12 U/L — SIGNIFICANT CHANGE UP (ref 0–41)
ANION GAP SERPL CALC-SCNC: 19 MMOL/L — HIGH (ref 7–14)
APTT BLD: 40.5 SEC — HIGH (ref 27–39.2)
AST SERPL-CCNC: 21 U/L — SIGNIFICANT CHANGE UP (ref 0–41)
BASOPHILS # BLD AUTO: 0.03 K/UL — SIGNIFICANT CHANGE UP (ref 0–0.2)
BASOPHILS NFR BLD AUTO: 0.4 % — SIGNIFICANT CHANGE UP (ref 0–1)
BILIRUB SERPL-MCNC: 1.1 MG/DL — SIGNIFICANT CHANGE UP (ref 0.2–1.2)
BUN SERPL-MCNC: 17 MG/DL — SIGNIFICANT CHANGE UP (ref 10–20)
CALCIUM SERPL-MCNC: 9 MG/DL — SIGNIFICANT CHANGE UP (ref 8.5–10.1)
CHLORIDE SERPL-SCNC: 95 MMOL/L — LOW (ref 98–110)
CO2 SERPL-SCNC: 19 MMOL/L — SIGNIFICANT CHANGE UP (ref 17–32)
CREAT SERPL-MCNC: 1.4 MG/DL — SIGNIFICANT CHANGE UP (ref 0.7–1.5)
EOSINOPHIL # BLD AUTO: 0.01 K/UL — SIGNIFICANT CHANGE UP (ref 0–0.7)
EOSINOPHIL NFR BLD AUTO: 0.1 % — SIGNIFICANT CHANGE UP (ref 0–8)
GLUCOSE SERPL-MCNC: 235 MG/DL — HIGH (ref 70–99)
HCT VFR BLD CALC: 37 % — SIGNIFICANT CHANGE UP (ref 37–47)
HGB BLD-MCNC: 12.1 G/DL — SIGNIFICANT CHANGE UP (ref 12–16)
IMM GRANULOCYTES NFR BLD AUTO: 0.3 % — SIGNIFICANT CHANGE UP (ref 0.1–0.3)
INR BLD: 1.26 RATIO — SIGNIFICANT CHANGE UP (ref 0.65–1.3)
LYMPHOCYTES # BLD AUTO: 0.75 K/UL — LOW (ref 1.2–3.4)
LYMPHOCYTES # BLD AUTO: 9.5 % — LOW (ref 20.5–51.1)
MCHC RBC-ENTMCNC: 27.3 PG — SIGNIFICANT CHANGE UP (ref 27–31)
MCHC RBC-ENTMCNC: 32.7 G/DL — SIGNIFICANT CHANGE UP (ref 32–37)
MCV RBC AUTO: 83.5 FL — SIGNIFICANT CHANGE UP (ref 81–99)
MONOCYTES # BLD AUTO: 0.75 K/UL — HIGH (ref 0.1–0.6)
MONOCYTES NFR BLD AUTO: 9.5 % — HIGH (ref 1.7–9.3)
NEUTROPHILS # BLD AUTO: 6.33 K/UL — SIGNIFICANT CHANGE UP (ref 1.4–6.5)
NEUTROPHILS NFR BLD AUTO: 80.2 % — HIGH (ref 42.2–75.2)
NRBC # BLD: 0 /100 WBCS — SIGNIFICANT CHANGE UP (ref 0–0)
PLATELET # BLD AUTO: 203 K/UL — SIGNIFICANT CHANGE UP (ref 130–400)
POTASSIUM SERPL-MCNC: 4.4 MMOL/L — SIGNIFICANT CHANGE UP (ref 3.5–5)
POTASSIUM SERPL-SCNC: 4.4 MMOL/L — SIGNIFICANT CHANGE UP (ref 3.5–5)
PROT SERPL-MCNC: 8.1 G/DL — HIGH (ref 6–8)
PROTHROM AB SERPL-ACNC: 13.6 SEC — HIGH (ref 9.95–12.87)
RBC # BLD: 4.43 M/UL — SIGNIFICANT CHANGE UP (ref 4.2–5.4)
RBC # FLD: 15.4 % — HIGH (ref 11.5–14.5)
SODIUM SERPL-SCNC: 133 MMOL/L — LOW (ref 135–146)
TROPONIN T SERPL-MCNC: <0.01 NG/ML — SIGNIFICANT CHANGE UP
WBC # BLD: 7.89 K/UL — SIGNIFICANT CHANGE UP (ref 4.8–10.8)
WBC # FLD AUTO: 7.89 K/UL — SIGNIFICANT CHANGE UP (ref 4.8–10.8)

## 2018-08-20 RX ORDER — ENOXAPARIN SODIUM 100 MG/ML
40 INJECTION SUBCUTANEOUS DAILY
Qty: 0 | Refills: 0 | Status: DISCONTINUED | OUTPATIENT
Start: 2018-08-20 | End: 2018-08-24

## 2018-08-20 RX ORDER — INSULIN LISPRO 100/ML
7 VIAL (ML) SUBCUTANEOUS
Qty: 0 | Refills: 0 | Status: DISCONTINUED | OUTPATIENT
Start: 2018-08-20 | End: 2018-08-24

## 2018-08-20 RX ORDER — INSULIN GLARGINE 100 [IU]/ML
25 INJECTION, SOLUTION SUBCUTANEOUS AT BEDTIME
Qty: 0 | Refills: 0 | Status: DISCONTINUED | OUTPATIENT
Start: 2018-08-20 | End: 2018-08-24

## 2018-08-20 RX ORDER — DEXTROSE 50 % IN WATER 50 %
15 SYRINGE (ML) INTRAVENOUS ONCE
Qty: 0 | Refills: 0 | Status: DISCONTINUED | OUTPATIENT
Start: 2018-08-20 | End: 2018-08-24

## 2018-08-20 RX ORDER — DEXTROSE 50 % IN WATER 50 %
25 SYRINGE (ML) INTRAVENOUS ONCE
Qty: 0 | Refills: 0 | Status: DISCONTINUED | OUTPATIENT
Start: 2018-08-20 | End: 2018-08-24

## 2018-08-20 RX ORDER — METHADONE HYDROCHLORIDE 40 MG/1
10 TABLET ORAL DAILY
Qty: 0 | Refills: 0 | Status: DISCONTINUED | OUTPATIENT
Start: 2018-08-20 | End: 2018-08-24

## 2018-08-20 RX ORDER — BUDESONIDE AND FORMOTEROL FUMARATE DIHYDRATE 160; 4.5 UG/1; UG/1
2 AEROSOL RESPIRATORY (INHALATION)
Qty: 0 | Refills: 0 | Status: DISCONTINUED | OUTPATIENT
Start: 2018-08-20 | End: 2018-08-24

## 2018-08-20 RX ORDER — AMITRIPTYLINE HCL 25 MG
10 TABLET ORAL AT BEDTIME
Qty: 0 | Refills: 0 | Status: DISCONTINUED | OUTPATIENT
Start: 2018-08-20 | End: 2018-08-22

## 2018-08-20 RX ORDER — INSULIN LISPRO 100/ML
VIAL (ML) SUBCUTANEOUS
Qty: 0 | Refills: 0 | Status: DISCONTINUED | OUTPATIENT
Start: 2018-08-20 | End: 2018-08-24

## 2018-08-20 RX ORDER — METOPROLOL TARTRATE 50 MG
25 TABLET ORAL DAILY
Qty: 0 | Refills: 0 | Status: DISCONTINUED | OUTPATIENT
Start: 2018-08-20 | End: 2018-08-24

## 2018-08-20 RX ORDER — DEXTROSE 50 % IN WATER 50 %
12.5 SYRINGE (ML) INTRAVENOUS ONCE
Qty: 0 | Refills: 0 | Status: DISCONTINUED | OUTPATIENT
Start: 2018-08-20 | End: 2018-08-24

## 2018-08-20 RX ORDER — GLUCAGON INJECTION, SOLUTION 0.5 MG/.1ML
1 INJECTION, SOLUTION SUBCUTANEOUS ONCE
Qty: 0 | Refills: 0 | Status: DISCONTINUED | OUTPATIENT
Start: 2018-08-20 | End: 2018-08-24

## 2018-08-20 RX ORDER — SODIUM CHLORIDE 9 MG/ML
1000 INJECTION, SOLUTION INTRAVENOUS
Qty: 0 | Refills: 0 | Status: DISCONTINUED | OUTPATIENT
Start: 2018-08-20 | End: 2018-08-24

## 2018-08-20 RX ORDER — TRAZODONE HCL 50 MG
50 TABLET ORAL DAILY
Qty: 0 | Refills: 0 | Status: DISCONTINUED | OUTPATIENT
Start: 2018-08-20 | End: 2018-08-24

## 2018-08-20 RX ORDER — SODIUM CHLORIDE 9 MG/ML
3 INJECTION INTRAMUSCULAR; INTRAVENOUS; SUBCUTANEOUS ONCE
Qty: 0 | Refills: 0 | Status: COMPLETED | OUTPATIENT
Start: 2018-08-20 | End: 2018-08-20

## 2018-08-20 RX ORDER — GABAPENTIN 400 MG/1
100 CAPSULE ORAL THREE TIMES A DAY
Qty: 0 | Refills: 0 | Status: DISCONTINUED | OUTPATIENT
Start: 2018-08-20 | End: 2018-08-24

## 2018-08-20 RX ORDER — SACUBITRIL AND VALSARTAN 24; 26 MG/1; MG/1
1 TABLET, FILM COATED ORAL
Qty: 0 | Refills: 0 | Status: DISCONTINUED | OUTPATIENT
Start: 2018-08-20 | End: 2018-08-22

## 2018-08-20 RX ORDER — ACETAMINOPHEN 500 MG
975 TABLET ORAL ONCE
Qty: 0 | Refills: 0 | Status: COMPLETED | OUTPATIENT
Start: 2018-08-20 | End: 2018-08-20

## 2018-08-20 RX ORDER — FUROSEMIDE 40 MG
40 TABLET ORAL
Qty: 0 | Refills: 0 | Status: DISCONTINUED | OUTPATIENT
Start: 2018-08-20 | End: 2018-08-22

## 2018-08-20 RX ORDER — ATORVASTATIN CALCIUM 80 MG/1
40 TABLET, FILM COATED ORAL AT BEDTIME
Qty: 0 | Refills: 0 | Status: DISCONTINUED | OUTPATIENT
Start: 2018-08-20 | End: 2018-08-24

## 2018-08-20 RX ORDER — FUROSEMIDE 40 MG
40 TABLET ORAL ONCE
Qty: 0 | Refills: 0 | Status: COMPLETED | OUTPATIENT
Start: 2018-08-20 | End: 2018-08-20

## 2018-08-20 RX ADMIN — GABAPENTIN 100 MILLIGRAM(S): 400 CAPSULE ORAL at 23:11

## 2018-08-20 RX ADMIN — INSULIN GLARGINE 25 UNIT(S): 100 INJECTION, SOLUTION SUBCUTANEOUS at 23:11

## 2018-08-20 RX ADMIN — Medication 975 MILLIGRAM(S): at 15:20

## 2018-08-20 RX ADMIN — ATORVASTATIN CALCIUM 40 MILLIGRAM(S): 80 TABLET, FILM COATED ORAL at 23:11

## 2018-08-20 RX ADMIN — Medication 975 MILLIGRAM(S): at 15:01

## 2018-08-20 RX ADMIN — SODIUM CHLORIDE 3 MILLILITER(S): 9 INJECTION INTRAMUSCULAR; INTRAVENOUS; SUBCUTANEOUS at 14:02

## 2018-08-20 RX ADMIN — Medication 40 MILLIGRAM(S): at 19:45

## 2018-08-20 RX ADMIN — Medication 10 MILLIGRAM(S): at 23:11

## 2018-08-20 NOTE — ED PROVIDER NOTE - PHYSICAL EXAMINATION
VITAL SIGNS: I have reviewed nursing notes and confirm.       CONSTITUTIONAL: Well-developed; well-nourished; in no acute distress.  SKIN: Skin exam is warm and dry, no acute rash.  HEAD: Normocephalic; atraumatic.  EYES: PERRL, EOM intact; conjunctiva and sclera clear.  ENT: No nasal discharge; airway clear. TMs clear.  NECK: Supple; non tender.  CARD: S1, S2 normal; no murmurs, gallops, or rubs. Regular rate and rhythm.  RESP: tachypneic, speaking in full sentences, no accessory muscle use, bibasilar fine rales  ABD: Normal bowel sounds; soft; non-distended; non-tender; no hepatosplenomegaly.  EXT: Normal ROM. No clubbing, cyanosis or edema.  LYMPH: No acute cervical adenopathy.  NEURO: Alert, oriented. Grossly unremarkable. No focal deficits.  PSYCH: Cooperative, appropriate.

## 2018-08-20 NOTE — H&P ADULT - NSHPLABSRESULTS_GEN_ALL_CORE
12.1   7.89  )-----------( 203      ( 20 Aug 2018 13:02 )             37.0   20 Aug 2018 13:02    133    |  95     |  17     ----------------------------<  235    4.4     |  19     |  1.4      Ca    9.0        20 Aug 2018 13:02    TPro  8.1    /  Alb  3.6    /  TBili  1.1    /  DBili  x      /  AST  21     /  ALT  12     /  AlkPhos  134    20 Aug 2018 13:02    INR: 1.26    Troponin T, Serum: <0.01 ng/mL (08.20.18 @ 13:02)  < from: Xray Chest 2 Views PA/Lat (08.20.18 @ 14:13) >    Interval increase in bilateral interstitial opacities since 1/19/2018.    < end of copied text >

## 2018-08-20 NOTE — ED PROVIDER NOTE - MEDICAL DECISION MAKING DETAILS
Concern for CHF exacerbation vs. COPD exacerbation, mild. Will obtain labs including CE, EKG, XR, and consider Lasix. Disposition unclear at this time.

## 2018-08-20 NOTE — H&P ADULT - PSH
AICD (automatic cardioverter/defibrillator) present    H/O abdominal hysterectomy    H/O tubal ligation    History of cholecystectomy

## 2018-08-20 NOTE — ED PROVIDER NOTE - OBJECTIVE STATEMENT
56 y/o F PMHx heart failure, COPD, not on home O2, and diabetes presenting with x2 days worsening SOB and decreased exercise tolerance. No CP. Pt on Lasix 40 mg daily. No leg swelling. Pt reports exercise tolerance normally 1 block, now half block. Pt is compliant with medications. No changes in diet. 5 pillow orthopnea, unchanged. No fevers or chills. Pt also states she has 6/10 RUQ pain, where prior lipoma was removed and is returning. Pt not currently taking any medications for pain.

## 2018-08-20 NOTE — H&P ADULT - HISTORY OF PRESENT ILLNESS
57 year female patient with history of CHF with reduced ejection fraction( 20-25%) S/P AICD, COPD on home O2 3L/MIN, diabetes mellitus, CVA with residual right sided weakness, chronic pain, depression presented for SOB. History goes back for 2 days ago , when she started feeling that she is worse than her baseline. Patient has been having SOB on minimal exertion for years, 5 pillows orthopnea, and paroxysmal nocturnal dyspnea, but for the last 2 days she is feeling SOB at rest , symptoms similar to the past when she had pulmonary edema according to her. She has intermittent cough for the last month , no fever or chills, intermittent sputum production. Patient denies chest pain, palpitation, she is complaint with her medications and salt restriction.

## 2018-08-20 NOTE — ED ADULT TRIAGE NOTE - CHIEF COMPLAINT QUOTE
patient reports sob for last few days with cough. lung sounds clear trace edema to lower extremities

## 2018-08-20 NOTE — ED PROVIDER NOTE - NS ED ROS FT
Constitutional: See HPI. No fever/chills.             Eyes: No visual changes, eye pain or discharge.  ENT: No hearing changes, pain, discharge or infections.  Neck: No neck pain or stiffness.  Cardiac: (+) SOB, decreased exercise tolerance. No chest pain or edema. No chest pain with exertion.  Respiratory: No cough or respiratory distress. No hemoptysis.   GI: (+) RUQ pain. No nausea, vomiting, diarrhea   : No dysuria, frequency or burning.   MS: No myalgia, muscle weakness, joint pain or back pain.  Neuro: No headache or weakness. No LOC.  Skin: No rash.  Except as documented in the HPI, all other systems are negative.

## 2018-08-20 NOTE — H&P ADULT - PMH
CHF (congestive heart failure)    Chronic pain    COPD (chronic obstructive pulmonary disease)    CVA (cerebral vascular accident)    Depression    Diabetes    HLD (hyperlipidemia)

## 2018-08-20 NOTE — H&P ADULT - NSHPREVIEWOFSYSTEMS_GEN_ALL_CORE
REVIEW OF SYSTEMS:    CONSTITUTIONAL: No weakness, fevers or chills  EYES/ENT: No visual changes;  No vertigo or throat pain   NECK: No pain or stiffness  RESPIRATORY: intermittent  cough, no wheezing, hemoptysis; SOB at rest.  CARDIOVASCULAR: No chest pain or palpitations  GASTROINTESTINAL: No abdominal or epigastric pain. No nausea, vomiting, or hematemesis; No diarrhea or constipation. No melena or hematochezia.  GENITOURINARY: No dysuria, frequency or hematuria  NEUROLOGICAL: No numbness or weakness  SKIN: No itching, burning, rashes, or lesions   All other review of systems is negative unless indicated above.

## 2018-08-20 NOTE — H&P ADULT - NSHPPHYSICALEXAM_GEN_ALL_CORE
T(C): 36.3 (08-20-18 @ 10:45), Max: 36.3 (08-20-18 @ 10:45)  HR: 103 (08-20-18 @ 10:45) (103 - 103)  BP: 133/67 (08-20-18 @ 10:45) (133/67 - 133/67)  RR: 22 (08-20-18 @ 10:45) (22 - 22)  SpO2: 95% (08-20-18 @ 10:45) (95% - 95%)    PHYSICAL EXAM:  GENERAL: NAD, well-developed  HEAD:  Atraumatic, Normocephalic  EYES: EOMI, PERRLA, conjunctiva and sclera clear  NECK: Supple, No JVD  CHEST/LUNG: bilateral basal crackles  HEART: Regular rate and rhythm; No murmurs, rubs, or gallops  ABDOMEN: Soft, Nontender, Nondistended; Bowel sounds present  EXTREMITIES:  2+ Peripheral Pulses, No clubbing, cyanosis, or edema  PSYCH: AAOx3  NEUROLOGY: non-focal  SKIN: No rashes or lesions

## 2018-08-20 NOTE — H&P ADULT - ASSESSMENT
57 year female patient with history of CHF with reduced ejection fraction( 20-25%) S/P AICD, COPD on home O2 3L/MIN, diabetes mellitus, CVA with residual right sided weakness, chronic pain, depression presented for worsening  SOB.      #acute decompensated heart failure with reduced ejection fraction:  lasix 40 IV bid  accurate intake/out put  daily body weight  continue with metoprolol, entresto  check 2d echo, f/u troponin, check BNP  fluid restriction  dash diet    #COPD on home O2 : continue with nebs  continue with O2 target spo2 92%    #diabetes: on insulin, f/u FS    #Fibromyalgia , chronic pain: on on methadone    #bipolar disorder: continue with buspirone, trazodone    #dyslipidemia continue with atorvastatin    #dvt prophylaxis: lovenox 57 year female patient with history of CHF with reduced ejection fraction( 20-25%) S/P AICD, COPD on home O2 3L/MIN, diabetes mellitus, CVA with residual right sided weakness, chronic pain, depression presented for worsening  SOB.      #acute decompensated heart failure with reduced ejection fraction:  lasix 40 IV bid  accurate intake/out put  daily body weight  continue with metoprolol, entresto  check 2d echo, f/u troponin, check BNP  fluid restriction  dash diet    #COPD on home O2 : continue with nebs  continue with O2 target spo2 92%    #diabetes: on insulin, f/u FS    #Fibromyalgia , chronic pain: on on methadone    #bipolar disorder: continue with buspirone, trazodone    #dyslipidemia continue with atorvastatin    #dvt prophylaxis: lovenox    ***medication are listed according to the patient memory, she has no list, called [facility] pharmacy in [place] for confirmation, but its closed***

## 2018-08-20 NOTE — H&P ADULT - ATTENDING COMMENTS
Patient seen and examined independently. Agree with resident note with exceptions.     #Acute on chronic systolic CHF, s/p AICD  - monitor I/o, daily weight, restrict fliuds, low NA diet.  - c/w IV lasix.  - c/w entresto, metoprolol  -Transthoracic Echocardiogram (08.21.18 @ 08:35) Left ventricular ejection fraction, by visual estimation, is 25 to   30%. Severely decreased global left ventricular systolic function.    #Chronic respiratory failure- stable  - c/w symbicort, albuterol.  - c/w supplemental oxygen.    #DM type2  - monitor FS  -c/w lantus/lispro    #CKD stage3  - monitor BUN/creat- pt on diuretics    #H/o CVA with residual right sided weakness.    #Chronic pain  -c/w  methadone.     #Fibromyalgia  - amitriptylin    #Depression  -c/w buspirone, trazodone    #Dyslipidemia   -c/w atorvastatin    #Dvt prophylaxis  - C/w lovenox

## 2018-08-21 LAB
ALBUMIN SERPL ELPH-MCNC: 3.4 G/DL — LOW (ref 3.5–5.2)
ALP SERPL-CCNC: 137 U/L — HIGH (ref 30–115)
ALT FLD-CCNC: 17 U/L — SIGNIFICANT CHANGE UP (ref 0–41)
ANION GAP SERPL CALC-SCNC: 16 MMOL/L — HIGH (ref 7–14)
AST SERPL-CCNC: 29 U/L — SIGNIFICANT CHANGE UP (ref 0–41)
BASOPHILS # BLD AUTO: 0.04 K/UL — SIGNIFICANT CHANGE UP (ref 0–0.2)
BASOPHILS NFR BLD AUTO: 1.1 % — HIGH (ref 0–1)
BILIRUB SERPL-MCNC: 1 MG/DL — SIGNIFICANT CHANGE UP (ref 0.2–1.2)
BUN SERPL-MCNC: 22 MG/DL — HIGH (ref 10–20)
CALCIUM SERPL-MCNC: 9 MG/DL — SIGNIFICANT CHANGE UP (ref 8.5–10.1)
CHLORIDE SERPL-SCNC: 96 MMOL/L — LOW (ref 98–110)
CK SERPL-CCNC: 51 U/L — SIGNIFICANT CHANGE UP (ref 0–225)
CO2 SERPL-SCNC: 26 MMOL/L — SIGNIFICANT CHANGE UP (ref 17–32)
CREAT SERPL-MCNC: 1.5 MG/DL — SIGNIFICANT CHANGE UP (ref 0.7–1.5)
EOSINOPHIL # BLD AUTO: 0.07 K/UL — SIGNIFICANT CHANGE UP (ref 0–0.7)
EOSINOPHIL NFR BLD AUTO: 1.9 % — SIGNIFICANT CHANGE UP (ref 0–8)
GLUCOSE BLDC GLUCOMTR-MCNC: 110 MG/DL — HIGH (ref 70–99)
GLUCOSE SERPL-MCNC: 198 MG/DL — HIGH (ref 70–99)
HCT VFR BLD CALC: 37.8 % — SIGNIFICANT CHANGE UP (ref 37–47)
HGB BLD-MCNC: 12.1 G/DL — SIGNIFICANT CHANGE UP (ref 12–16)
IMM GRANULOCYTES NFR BLD AUTO: 0 % — LOW (ref 0.1–0.3)
LYMPHOCYTES # BLD AUTO: 0.68 K/UL — LOW (ref 1.2–3.4)
LYMPHOCYTES # BLD AUTO: 18 % — LOW (ref 20.5–51.1)
MCHC RBC-ENTMCNC: 26.8 PG — LOW (ref 27–31)
MCHC RBC-ENTMCNC: 32 G/DL — SIGNIFICANT CHANGE UP (ref 32–37)
MCV RBC AUTO: 83.8 FL — SIGNIFICANT CHANGE UP (ref 81–99)
MONOCYTES # BLD AUTO: 0.58 K/UL — SIGNIFICANT CHANGE UP (ref 0.1–0.6)
MONOCYTES NFR BLD AUTO: 15.3 % — HIGH (ref 1.7–9.3)
NEUTROPHILS # BLD AUTO: 2.41 K/UL — SIGNIFICANT CHANGE UP (ref 1.4–6.5)
NEUTROPHILS NFR BLD AUTO: 63.7 % — SIGNIFICANT CHANGE UP (ref 42.2–75.2)
NRBC # BLD: 0 /100 WBCS — SIGNIFICANT CHANGE UP (ref 0–0)
NT-PROBNP SERPL-SCNC: 8352 PG/ML — HIGH (ref 0–300)
PLATELET # BLD AUTO: 181 K/UL — SIGNIFICANT CHANGE UP (ref 130–400)
POTASSIUM SERPL-MCNC: 4.1 MMOL/L — SIGNIFICANT CHANGE UP (ref 3.5–5)
POTASSIUM SERPL-SCNC: 4.1 MMOL/L — SIGNIFICANT CHANGE UP (ref 3.5–5)
PROT SERPL-MCNC: 8 G/DL — SIGNIFICANT CHANGE UP (ref 6–8)
RBC # BLD: 4.51 M/UL — SIGNIFICANT CHANGE UP (ref 4.2–5.4)
RBC # FLD: 15.5 % — HIGH (ref 11.5–14.5)
SODIUM SERPL-SCNC: 138 MMOL/L — SIGNIFICANT CHANGE UP (ref 135–146)
TROPONIN T SERPL-MCNC: 0.01 NG/ML — SIGNIFICANT CHANGE UP
WBC # BLD: 3.78 K/UL — LOW (ref 4.8–10.8)
WBC # FLD AUTO: 3.78 K/UL — LOW (ref 4.8–10.8)

## 2018-08-21 RX ORDER — MORPHINE SULFATE 50 MG/1
2 CAPSULE, EXTENDED RELEASE ORAL ONCE
Qty: 0 | Refills: 0 | Status: DISCONTINUED | OUTPATIENT
Start: 2018-08-21 | End: 2018-08-21

## 2018-08-21 RX ORDER — ACETAMINOPHEN 500 MG
650 TABLET ORAL ONCE
Qty: 0 | Refills: 0 | Status: COMPLETED | OUTPATIENT
Start: 2018-08-21 | End: 2018-08-21

## 2018-08-21 RX ADMIN — Medication 4: at 13:42

## 2018-08-21 RX ADMIN — ENOXAPARIN SODIUM 40 MILLIGRAM(S): 100 INJECTION SUBCUTANEOUS at 12:54

## 2018-08-21 RX ADMIN — Medication 650 MILLIGRAM(S): at 23:59

## 2018-08-21 RX ADMIN — SACUBITRIL AND VALSARTAN 1 TABLET(S): 24; 26 TABLET, FILM COATED ORAL at 17:31

## 2018-08-21 RX ADMIN — INSULIN GLARGINE 25 UNIT(S): 100 INJECTION, SOLUTION SUBCUTANEOUS at 22:58

## 2018-08-21 RX ADMIN — MORPHINE SULFATE 2 MILLIGRAM(S): 50 CAPSULE, EXTENDED RELEASE ORAL at 01:15

## 2018-08-21 RX ADMIN — Medication 7 UNIT(S): at 09:48

## 2018-08-21 RX ADMIN — SACUBITRIL AND VALSARTAN 1 TABLET(S): 24; 26 TABLET, FILM COATED ORAL at 06:09

## 2018-08-21 RX ADMIN — Medication 10 MILLIGRAM(S): at 17:26

## 2018-08-21 RX ADMIN — Medication 50 MILLIGRAM(S): at 12:53

## 2018-08-21 RX ADMIN — METHADONE HYDROCHLORIDE 10 MILLIGRAM(S): 40 TABLET ORAL at 12:57

## 2018-08-21 RX ADMIN — GABAPENTIN 100 MILLIGRAM(S): 400 CAPSULE ORAL at 22:55

## 2018-08-21 RX ADMIN — MORPHINE SULFATE 2 MILLIGRAM(S): 50 CAPSULE, EXTENDED RELEASE ORAL at 10:00

## 2018-08-21 RX ADMIN — GABAPENTIN 100 MILLIGRAM(S): 400 CAPSULE ORAL at 13:35

## 2018-08-21 RX ADMIN — BUDESONIDE AND FORMOTEROL FUMARATE DIHYDRATE 2 PUFF(S): 160; 4.5 AEROSOL RESPIRATORY (INHALATION) at 01:15

## 2018-08-21 RX ADMIN — Medication 4: at 09:49

## 2018-08-21 RX ADMIN — Medication 25 MILLIGRAM(S): at 06:10

## 2018-08-21 RX ADMIN — Medication 10 MILLIGRAM(S): at 22:55

## 2018-08-21 RX ADMIN — BUDESONIDE AND FORMOTEROL FUMARATE DIHYDRATE 2 PUFF(S): 160; 4.5 AEROSOL RESPIRATORY (INHALATION) at 22:55

## 2018-08-21 RX ADMIN — Medication 40 MILLIGRAM(S): at 06:10

## 2018-08-21 RX ADMIN — Medication 10 MILLIGRAM(S): at 06:10

## 2018-08-21 RX ADMIN — Medication 7 UNIT(S): at 19:33

## 2018-08-21 RX ADMIN — ATORVASTATIN CALCIUM 40 MILLIGRAM(S): 80 TABLET, FILM COATED ORAL at 22:55

## 2018-08-21 RX ADMIN — MORPHINE SULFATE 2 MILLIGRAM(S): 50 CAPSULE, EXTENDED RELEASE ORAL at 01:30

## 2018-08-21 RX ADMIN — Medication 7 UNIT(S): at 13:42

## 2018-08-21 RX ADMIN — GABAPENTIN 100 MILLIGRAM(S): 400 CAPSULE ORAL at 06:10

## 2018-08-21 RX ADMIN — BUDESONIDE AND FORMOTEROL FUMARATE DIHYDRATE 2 PUFF(S): 160; 4.5 AEROSOL RESPIRATORY (INHALATION) at 12:52

## 2018-08-21 RX ADMIN — Medication 40 MILLIGRAM(S): at 17:28

## 2018-08-21 RX ADMIN — MORPHINE SULFATE 2 MILLIGRAM(S): 50 CAPSULE, EXTENDED RELEASE ORAL at 18:21

## 2018-08-21 RX ADMIN — MORPHINE SULFATE 2 MILLIGRAM(S): 50 CAPSULE, EXTENDED RELEASE ORAL at 09:42

## 2018-08-21 NOTE — PROGRESS NOTE ADULT - ASSESSMENT
57 year female patient with history of CHF with reduced ejection fraction( 20-25%) S/P AICD, COPD on home O2 3L/MIN, diabetes mellitus, CVA with residual right sided weakness, chronic pain, depression   Admitted with worsening SOB       #acute decompensated heart failure with reduced ejection fraction:  lasix 40 IV bid  accurate intake/out put  daily body weight  continue with metoprolol, entresto  troponin neg x 2, BNP 8352  fluid restriction  dash diet    #COPD on home O2 : continue with nebs  continue with O2 target spo2 92%    #diabetes: on insulin, f/u FS    #Fibromyalgia , chronic pain: on on methadone    #bipolar disorder: continue with buspirone, trazodone    #dyslipidemia continue with atorvastatin    #dvt prophylaxis: lovenox 57 year female patient with history of CHF with reduced ejection fraction( 20-25%) S/P AICD, COPD on home O2 3L/MIN, diabetes mellitus, CVA with residual right sided weakness, chronic pain, depression   Admitted with worsening SOB       #acute decompensated heart failure with reduced ejection fraction:  lasix 40 IV bid  accurate intake/out put  daily body weight  continue with metoprolol, entresto  troponin neg x 2, BNP 8352  echo: Left ventricular ejection fraction, by visual estimation, is 25 to 30%. Severely decreased global left ventricular systolic function. Mild tricuspid regurgitation. Trace pulmonic valve regurgitation  fluid restriction  dash diet    #COPD on home O2 : continue with nebs  continue with O2 target spo2 92%    #diabetes: on insulin, f/u FS    #Fibromyalgia , chronic pain: on on methadone    #bipolar disorder: continue with buspirone, trazodone    #dyslipidemia continue with atorvastatin    #dvt prophylaxis: lovenox 57 year female patient with history of CHF with reduced ejection fraction( 20-25%) S/P AICD, COPD on home O2 3L/MIN, diabetes mellitus, CVA with residual right sided weakness, chronic pain, depression   Admitted with worsening SOB       #acute decompensated heart failure with reduced ejection fraction:  lasix 40 IV bid  accurate intake/out put  daily body weight  continue with metoprolol, entresto  troponin neg x 2, BNP 8352  echo: Left ventricular ejection fraction, by visual estimation, is 25 to 30%. Severely decreased global left ventricular systolic function. Mild tricuspid regurgitation. Trace pulmonic valve regurgitation  fluid restriction  dash diet    #COPD on home O2 : continue with nebs  continue with O2 target spo2 92%    #diabetes: on insulin, f/u FS    #Fibromyalgia , chronic pain: on on methadone    #bipolar disorder: continue with buspirone, trazodone    #dyslipidemia continue with atorvastatin    #dvt prophylaxis: lovenox    [facility]: amitriptyline 10mg od, atorvastatin 40mg od, furosemide 40mg od, insulin, entresto 24/26, metroprolol succinate 25mg od, trazadone 50mg od, gabapentin 300mg tds, busprione 10mg bid, symbicort inhaler

## 2018-08-21 NOTE — ED ADULT NURSE NOTE - NSIMPLEMENTINTERV_GEN_ALL_ED
Implemented All Universal Safety Interventions:  [place] to call system. Call bell, personal items and telephone within reach. Instruct patient to call for assistance. Room bathroom lighting operational. Non-slip footwear when patient is off stretcher. Physically safe environment: no spills, clutter or unnecessary equipment. Stretcher in lowest position, wheels locked, appropriate side rails in place.

## 2018-08-22 LAB
ALBUMIN SERPL ELPH-MCNC: 3 G/DL — LOW (ref 3.5–5.2)
ALP SERPL-CCNC: 111 U/L — SIGNIFICANT CHANGE UP (ref 30–115)
ALT FLD-CCNC: 17 U/L — SIGNIFICANT CHANGE UP (ref 0–41)
ANION GAP SERPL CALC-SCNC: 12 MMOL/L — SIGNIFICANT CHANGE UP (ref 7–14)
AST SERPL-CCNC: 29 U/L — SIGNIFICANT CHANGE UP (ref 0–41)
BILIRUB SERPL-MCNC: 0.5 MG/DL — SIGNIFICANT CHANGE UP (ref 0.2–1.2)
BUN SERPL-MCNC: 30 MG/DL — HIGH (ref 10–20)
CALCIUM SERPL-MCNC: 8.6 MG/DL — SIGNIFICANT CHANGE UP (ref 8.5–10.1)
CHLORIDE SERPL-SCNC: 97 MMOL/L — LOW (ref 98–110)
CO2 SERPL-SCNC: 28 MMOL/L — SIGNIFICANT CHANGE UP (ref 17–32)
CREAT SERPL-MCNC: 2.3 MG/DL — HIGH (ref 0.7–1.5)
GLUCOSE BLDC GLUCOMTR-MCNC: 102 MG/DL — HIGH (ref 70–99)
GLUCOSE BLDC GLUCOMTR-MCNC: 128 MG/DL — HIGH (ref 70–99)
GLUCOSE BLDC GLUCOMTR-MCNC: 137 MG/DL — HIGH (ref 70–99)
GLUCOSE BLDC GLUCOMTR-MCNC: 150 MG/DL — HIGH (ref 70–99)
GLUCOSE SERPL-MCNC: 149 MG/DL — HIGH (ref 70–99)
HCT VFR BLD CALC: 35.3 % — LOW (ref 37–47)
HGB BLD-MCNC: 11.4 G/DL — LOW (ref 12–16)
MCHC RBC-ENTMCNC: 27.5 PG — SIGNIFICANT CHANGE UP (ref 27–31)
MCHC RBC-ENTMCNC: 32.3 G/DL — SIGNIFICANT CHANGE UP (ref 32–37)
MCV RBC AUTO: 85.1 FL — SIGNIFICANT CHANGE UP (ref 81–99)
NRBC # BLD: 0 /100 WBCS — SIGNIFICANT CHANGE UP (ref 0–0)
PLATELET # BLD AUTO: 173 K/UL — SIGNIFICANT CHANGE UP (ref 130–400)
POTASSIUM SERPL-MCNC: 4.1 MMOL/L — SIGNIFICANT CHANGE UP (ref 3.5–5)
POTASSIUM SERPL-SCNC: 4.1 MMOL/L — SIGNIFICANT CHANGE UP (ref 3.5–5)
PROT SERPL-MCNC: 6.9 G/DL — SIGNIFICANT CHANGE UP (ref 6–8)
RBC # BLD: 4.15 M/UL — LOW (ref 4.2–5.4)
RBC # FLD: 15.5 % — HIGH (ref 11.5–14.5)
SODIUM SERPL-SCNC: 137 MMOL/L — SIGNIFICANT CHANGE UP (ref 135–146)
WBC # BLD: 5.09 K/UL — SIGNIFICANT CHANGE UP (ref 4.8–10.8)
WBC # FLD AUTO: 5.09 K/UL — SIGNIFICANT CHANGE UP (ref 4.8–10.8)

## 2018-08-22 RX ORDER — AMITRIPTYLINE HCL 25 MG
25 TABLET ORAL AT BEDTIME
Qty: 0 | Refills: 0 | Status: DISCONTINUED | OUTPATIENT
Start: 2018-08-22 | End: 2018-08-24

## 2018-08-22 RX ORDER — MORPHINE SULFATE 50 MG/1
2 CAPSULE, EXTENDED RELEASE ORAL ONCE
Qty: 0 | Refills: 0 | Status: DISCONTINUED | OUTPATIENT
Start: 2018-08-22 | End: 2018-08-22

## 2018-08-22 RX ADMIN — Medication 650 MILLIGRAM(S): at 00:30

## 2018-08-22 RX ADMIN — ATORVASTATIN CALCIUM 40 MILLIGRAM(S): 80 TABLET, FILM COATED ORAL at 21:17

## 2018-08-22 RX ADMIN — MORPHINE SULFATE 2 MILLIGRAM(S): 50 CAPSULE, EXTENDED RELEASE ORAL at 21:17

## 2018-08-22 RX ADMIN — GABAPENTIN 100 MILLIGRAM(S): 400 CAPSULE ORAL at 14:25

## 2018-08-22 RX ADMIN — ENOXAPARIN SODIUM 40 MILLIGRAM(S): 100 INJECTION SUBCUTANEOUS at 11:07

## 2018-08-22 RX ADMIN — Medication 10 MILLIGRAM(S): at 05:33

## 2018-08-22 RX ADMIN — INSULIN GLARGINE 25 UNIT(S): 100 INJECTION, SOLUTION SUBCUTANEOUS at 21:18

## 2018-08-22 RX ADMIN — Medication 10 MILLIGRAM(S): at 19:50

## 2018-08-22 RX ADMIN — MORPHINE SULFATE 2 MILLIGRAM(S): 50 CAPSULE, EXTENDED RELEASE ORAL at 01:53

## 2018-08-22 RX ADMIN — MORPHINE SULFATE 2 MILLIGRAM(S): 50 CAPSULE, EXTENDED RELEASE ORAL at 02:18

## 2018-08-22 RX ADMIN — Medication 25 MILLIGRAM(S): at 21:17

## 2018-08-22 RX ADMIN — MORPHINE SULFATE 2 MILLIGRAM(S): 50 CAPSULE, EXTENDED RELEASE ORAL at 21:40

## 2018-08-22 RX ADMIN — GABAPENTIN 100 MILLIGRAM(S): 400 CAPSULE ORAL at 21:17

## 2018-08-22 RX ADMIN — MORPHINE SULFATE 2 MILLIGRAM(S): 50 CAPSULE, EXTENDED RELEASE ORAL at 14:26

## 2018-08-22 RX ADMIN — METHADONE HYDROCHLORIDE 10 MILLIGRAM(S): 40 TABLET ORAL at 11:06

## 2018-08-22 RX ADMIN — Medication 7 UNIT(S): at 11:52

## 2018-08-22 RX ADMIN — Medication 50 MILLIGRAM(S): at 11:07

## 2018-08-22 RX ADMIN — MORPHINE SULFATE 2 MILLIGRAM(S): 50 CAPSULE, EXTENDED RELEASE ORAL at 11:05

## 2018-08-22 RX ADMIN — GABAPENTIN 100 MILLIGRAM(S): 400 CAPSULE ORAL at 05:33

## 2018-08-22 RX ADMIN — Medication 7 UNIT(S): at 16:47

## 2018-08-22 RX ADMIN — Medication 7 UNIT(S): at 08:40

## 2018-08-22 NOTE — PROGRESS NOTE ADULT - ASSESSMENT
57 year female patient with history of CHF with reduced ejection fraction( 20-25%) S/P AICD, COPD on home O2 3L/MIN, diabetes mellitus, CVA with residual right sided weakness, chronic pain, depression, admitted with worsening SOB. Pt was stable this AM. She reports no sob at rest. Pt complained of pain in RUQ and RLQ later. She had a episode of hypotension 84/60 with HR of 69.    #Acute decompensated heart failure with reduced ejection fraction (25-30%):  -Was on lasix 40 IV bid- Will hold lasix as the pt has RITCHIE (cr. 2.3<--1.5)  -monitor intake/out put  -daily body weight  -continue with metoprolol  -Held entresto due to hypotensive episode  -Fluid restriction     #RITCHIE- pre-renal likely 2/2 overdiuresis vs. hypotensive episode last night?  -Cr 2.3<--1.5  -Will hold diuretics  -Will hold entresto  -f/u bmp  -will f/u urine urea, phosphorus, na. pr/cr ratio  -US KUB to rule out calculus      #COPD on home 3L O2   -continue with nebs  -continue with O2 target spo2 92%    #Diabetes:   -on insulin, f/u FS    #Fibromyalgia  - on methadone   -Pt says it does not help her with the pain  -Pt f/u with Dr. Shultz, [place] to contact [place], [place]  -consulted chronic pain managament. Will f/u  -Will start bowel regimen if constiopation 2/2 chronic opiod use    #Bipolar disorder  -Continue with buspirone, trazodone    #dyslipidemia   -continue with atorvastatin    #dvt prophylaxis: lovenox  #Diet: consistent carbohydrate/ no snacks  confirmed home meds with [facility] pharmacy: amitriptyline 10mg od, atorvastatin 40mg od, furosemide 40mg od, insulin, entresto 24/26, metroprolol succinate 25mg od, trazadone 50mg od, gabapentin 300mg tds, busprione 10mg bid, symbicort inhaler

## 2018-08-22 NOTE — CONSULT NOTE ADULT - ASSESSMENT
58 y/o M with chronic pain, pain in right shoulder and flank, DM neuropathy    1.  Continue home opioids, no need to increase at this time; especially given the decreased respiratory status.    2.  Increase Amitriptyline to 25mg then 50mg QHS as tolerated.    3.  Increase Gabapentin once renal function improves.

## 2018-08-22 NOTE — CONSULT NOTE ADULT - ASSESSMENT
HISTORY OF PRESENT ILLNESS:   57 year old female with known hx of systolic CHF (LVEF 20-25%) s/p AICD, COPD on home O2 (3L), DM, CVA with residual right sided weakness, fibromyalgia, and depression presented to the ED for a 2 day hx of SOB. As per the patient she started feeling worse than her baseline and was SOB at rest. States she was compliant with diet and medications, denies recent viral illness. Denies palpitations, syncope, chest pain, GI, or  symptoms.    PAST MEDICAL & SURGICAL HISTORY:  Chronic pain  HLD (hyperlipidemia)  CVA (cerebral vascular accident)  Depression  COPD (chronic obstructive pulmonary disease)  Diabetes  CHF (congestive heart failure)  History of cholecystectomy  H/O tubal ligation  H/O abdominal hysterectomy  AICD (automatic cardioverter/defibrillator) present    Allergies  aspirin (Other (Moderate))  	  Home Medications:  amitriptyline 10 mg oral tablet: 1 tab(s) orally once a day (at bedtime) (20 Aug 2018 19:47)  atorvastatin 40 mg oral tablet: 1 tab(s) orally once a day (20 Aug 2018 19:47)  busPIRone 10 mg oral tablet: 1 tab(s) orally 2 times a day (20 Aug 2018 19:47)  Entresto 24 mg-26 mg oral tablet: 1 tab(s) orally 2 times a day (20 Aug 2018 19:47)  gabapentin 100 mg oral capsule: 1 cap(s) orally 3 times a day (20 Aug 2018 19:47)  Lantus 100 units/mL subcutaneous solution:  (20 Aug 2018 19:47)  Lasix 40 mg oral tablet: 1 tab(s) orally once a day (20 Aug 2018 19:47)  methadone 10 mg oral tablet: 1 tab(s) orally once a day (20 Aug 2018 19:47)  Metoprolol Succinate ER 25 mg oral tablet, extended release: 1 tab(s) orally once a day (20 Aug 2018 19:47)  NovoLOG 100 units/mL subcutaneous solution:  (20 Aug 2018 19:47)  Symbicort 160 mcg-4.5 mcg/inh inhalation aerosol: 2 puff(s) inhaled 2 times a day (20 Aug 2018 19:47)  traZODone 50 mg oral tablet: 1 tab(s) orally once a day (20 Aug 2018 19:47)    MEDICATIONS  (STANDING):  amitriptyline 10 milliGRAM(s) Oral at bedtime  atorvastatin 40 milliGRAM(s) Oral at bedtime  buDESOnide 160 MICROgram(s)/formoterol 4.5 MICROgram(s) Inhaler 2 Puff(s) Inhalation two times a day  busPIRone 10 milliGRAM(s) Oral two times a day  dextrose 5%. 1000 milliLiter(s) (50 mL/Hr) IV Continuous <Continuous>  dextrose 50% Injectable 12.5 Gram(s) IV Push once  dextrose 50% Injectable 25 Gram(s) IV Push once  dextrose 50% Injectable 25 Gram(s) IV Push once  enoxaparin Injectable 40 milliGRAM(s) SubCutaneous daily  gabapentin 100 milliGRAM(s) Oral three times a day  insulin glargine Injectable (LANTUS) 25 Unit(s) SubCutaneous at bedtime  insulin lispro (HumaLOG) corrective regimen sliding scale   SubCutaneous three times a day before meals  insulin lispro Injectable (HumaLOG) 7 Unit(s) SubCutaneous before breakfast  insulin lispro Injectable (HumaLOG) 7 Unit(s) SubCutaneous before lunch  insulin lispro Injectable (HumaLOG) 7 Unit(s) SubCutaneous before dinner  methadone    Tablet 10 milliGRAM(s) Oral daily  metoprolol succinate ER 25 milliGRAM(s) Oral daily  traZODone 50 milliGRAM(s) Oral daily    MEDICATIONS  (PRN):  dextrose 40% Gel 15 Gram(s) Oral once PRN Blood Glucose LESS THAN 70 milliGRAM(s)/deciliter  glucagon  Injectable 1 milliGRAM(s) IntraMuscular once PRN Glucose LESS THAN 70 milligrams/deciliter      SOCIAL HISTORY:    [ X] Former-smoker  [ ] Smoker  [ ] Alcohol      REVIEW OF SYSTEMS:  CONSTITUTIONAL: No fever, weight loss, or fatigue  CARDIOLOGY: PAtient denies chest pain, or syncopal episodes. Positive for shortness of breath, improved  RESPIRATORY: Positive for shortness of breath, Denies wheezing.   NEUROLOGICAL: No weakness, no focal deficits to report.  ENDOCRINOLOGICAL: no recent change in diabetic medications.   GI: no BRBPR, no N,V,diarrhea.    PSYCHIATRY: normal mood and affect  HEENT: no nasal discharge, no ecchymosis  SKIN: no ecchymosis, no breakdown  MUSCULOSKELETAL: Full range of motion x4.      PHYSICAL EXAM:  T(C): 35.7 (18 @ 04:50), Max: 36.3 (18 @ 16:22)  HR: 69 (18 @ 04:50) (69 - 90)  BP: 82/45 (18 @ 04:50) (82/45 - 133/69)  RR: 18 (18 @ 04:50) (18 - 18)      General Appearance: Normal, obese, on nasal cannula  Cardiovascular: Normal S1 S2, No JVD, No murmurs, No edema  Respiratory: Lungs clear to auscultation	  Psychiatry: A & O x 3, Mood & affect appropriate  Gastrointestinal:  Soft, Non-tender  Skin: No rashes, No ecchymoses, No cyanosis	  Neurologic: Non-focal  Extremities: Normal range of motion, No clubbing, cyanosis or edema  Vascular: Peripheral pulses palpable 2+ bilaterally        LABS:	 	                          11.4   5.09  )-----------( 173      ( 22 Aug 2018 06:08 )             35.3     08    137  |  97<L>  |  30<H>  ----------------------------<  149<H>  4.1   |  28  |  2.3<H>    Ca    8.6      22 Aug 2018 06:08    TPro  6.9  /  Alb  3.0<L>  /  TBili  0.5  /  DBili  x   /  AST  29  /  ALT  17  /  AlkPhos  111      CARDIAC MARKERS ( 21 Aug 2018 07:29 )  x     / 0.01 ng/mL / 51 U/L / x     / x      CARDIAC MARKERS ( 20 Aug 2018 13:02 )  x     / <0.01 ng/mL / x     / x     / x          PT/INR - ( 20 Aug 2018 13:02 )   PT: 13.60 sec;   INR: 1.26 ratio    PTT - ( 20 Aug 2018 13:02 )  PTT:40.5 sec    TELEMETRY EVENTS: N/A      EC18 @ 10:43  Sinus tachycardia  Possible Left atrial enlargement  Left axis deviation  Left ventricular hypertrophy  Cannot rule out Septal infarct , age undetermined  T wave abnormality, consider lateral ischemia  Abnormal ECG    RADIOLOGY: Xray Chest 18 @ 04:56)   Decreased hilar hypervascularity and diffuse interstitial markings.	    PREVIOUS DIAGNOSTIC TESTING:    [ X] Echocardiogram: Transthoracic Echocardiogram (18 @ 08:35)   1. Left ventricular ejection fraction, by visual estimation, is 25 to 30%.   2. Severely decreased global left ventricular systolic function.   3. Mild tricuspid regurgitation.   4. Trace pulmonic valve regurgitation.   5. Moderate mitral valve regurgitation is seen.     	  	  ASSESSMENT/PLAN: HISTORY OF PRESENT ILLNESS:   57 year old female with known hx of systolic CHF (LVEF 20-25%) s/p AICD, COPD on home O2 (3L), DM, CVA with residual right sided weakness, fibromyalgia, and depression presented to the ED for a 2 day hx of SOB. As per the patient she started feeling worse than her baseline and was SOB at rest. States she was compliant with diet and medications, denies recent viral illness. Denies palpitations, syncope, chest pain, GI, or  symptoms.    PAST MEDICAL & SURGICAL HISTORY:  Chronic pain  HLD (hyperlipidemia)  CVA (cerebral vascular accident)  Depression  COPD (chronic obstructive pulmonary disease)  Diabetes  CHF (congestive heart failure)  History of cholecystectomy  H/O tubal ligation  H/O abdominal hysterectomy  AICD (automatic cardioverter/defibrillator) present    Allergies  aspirin (Other (Moderate))  	  Home Medications:  amitriptyline 10 mg oral tablet: 1 tab(s) orally once a day (at bedtime) (20 Aug 2018 19:47)  atorvastatin 40 mg oral tablet: 1 tab(s) orally once a day (20 Aug 2018 19:47)  busPIRone 10 mg oral tablet: 1 tab(s) orally 2 times a day (20 Aug 2018 19:47)  Entresto 24 mg-26 mg oral tablet: 1 tab(s) orally 2 times a day (20 Aug 2018 19:47)  gabapentin 100 mg oral capsule: 1 cap(s) orally 3 times a day (20 Aug 2018 19:47)  Lantus 100 units/mL subcutaneous solution:  (20 Aug 2018 19:47)  Lasix 40 mg oral tablet: 1 tab(s) orally once a day (20 Aug 2018 19:47)  methadone 10 mg oral tablet: 1 tab(s) orally once a day (20 Aug 2018 19:47)  Metoprolol Succinate ER 25 mg oral tablet, extended release: 1 tab(s) orally once a day (20 Aug 2018 19:47)  NovoLOG 100 units/mL subcutaneous solution:  (20 Aug 2018 19:47)  Symbicort 160 mcg-4.5 mcg/inh inhalation aerosol: 2 puff(s) inhaled 2 times a day (20 Aug 2018 19:47)  traZODone 50 mg oral tablet: 1 tab(s) orally once a day (20 Aug 2018 19:47)    MEDICATIONS  (STANDING):  amitriptyline 10 milliGRAM(s) Oral at bedtime  atorvastatin 40 milliGRAM(s) Oral at bedtime  buDESOnide 160 MICROgram(s)/formoterol 4.5 MICROgram(s) Inhaler 2 Puff(s) Inhalation two times a day  busPIRone 10 milliGRAM(s) Oral two times a day  dextrose 5%. 1000 milliLiter(s) (50 mL/Hr) IV Continuous <Continuous>  dextrose 50% Injectable 12.5 Gram(s) IV Push once  dextrose 50% Injectable 25 Gram(s) IV Push once  dextrose 50% Injectable 25 Gram(s) IV Push once  enoxaparin Injectable 40 milliGRAM(s) SubCutaneous daily  gabapentin 100 milliGRAM(s) Oral three times a day  insulin glargine Injectable (LANTUS) 25 Unit(s) SubCutaneous at bedtime  insulin lispro (HumaLOG) corrective regimen sliding scale   SubCutaneous three times a day before meals  insulin lispro Injectable (HumaLOG) 7 Unit(s) SubCutaneous before breakfast  insulin lispro Injectable (HumaLOG) 7 Unit(s) SubCutaneous before lunch  insulin lispro Injectable (HumaLOG) 7 Unit(s) SubCutaneous before dinner  methadone    Tablet 10 milliGRAM(s) Oral daily  metoprolol succinate ER 25 milliGRAM(s) Oral daily  traZODone 50 milliGRAM(s) Oral daily    MEDICATIONS  (PRN):  dextrose 40% Gel 15 Gram(s) Oral once PRN Blood Glucose LESS THAN 70 milliGRAM(s)/deciliter  glucagon  Injectable 1 milliGRAM(s) IntraMuscular once PRN Glucose LESS THAN 70 milligrams/deciliter      SOCIAL HISTORY:    [ X] Former-smoker  [ ] Smoker  [ ] Alcohol      REVIEW OF SYSTEMS:  CONSTITUTIONAL: No fever, weight loss, or fatigue  CARDIOLOGY: PAtient denies chest pain, or syncopal episodes. Positive for shortness of breath, improved  RESPIRATORY: Positive for shortness of breath, Denies wheezing.   NEUROLOGICAL: No weakness, no focal deficits to report.  ENDOCRINOLOGICAL: no recent change in diabetic medications.   GI: no BRBPR, no N,V,diarrhea.    PSYCHIATRY: normal mood and affect  HEENT: no nasal discharge, no ecchymosis  SKIN: no ecchymosis, no breakdown  MUSCULOSKELETAL: Full range of motion x4.      PHYSICAL EXAM:  T(C): 35.7 (18 @ 04:50), Max: 36.3 (18 @ 16:22)  HR: 69 (18 @ 04:50) (69 - 90)  BP: 82/45 (18 @ 04:50) (82/45 - 133/69)  RR: 18 (18 @ 04:50) (18 - 18)      General Appearance: Normal, obese, on nasal cannula  Cardiovascular: Normal S1 S2, No JVD, No murmurs, No edema  Respiratory: Lungs clear to auscultation	  Psychiatry: A & O x 3, Mood & affect appropriate  Gastrointestinal:  Soft, Non-tender  Skin: No rashes, No ecchymoses, No cyanosis	  Neurologic: Non-focal  Extremities: Normal range of motion, No clubbing, cyanosis or edema  Vascular: Peripheral pulses palpable 2+ bilaterally        LABS:	 	                          11.4   5.09  )-----------( 173      ( 22 Aug 2018 06:08 )             35.3     08    137  |  97<L>  |  30<H>  ----------------------------<  149<H>  4.1   |  28  |  2.3<H>    Ca    8.6      22 Aug 2018 06:08    TPro  6.9  /  Alb  3.0<L>  /  TBili  0.5  /  DBili  x   /  AST  29  /  ALT  17  /  AlkPhos  111      CARDIAC MARKERS ( 21 Aug 2018 07:29 )  x     / 0.01 ng/mL / 51 U/L / x     / x      CARDIAC MARKERS ( 20 Aug 2018 13:02 )  x     / <0.01 ng/mL / x     / x     / x          PT/INR - ( 20 Aug 2018 13:02 )   PT: 13.60 sec;   INR: 1.26 ratio    PTT - ( 20 Aug 2018 13:02 )  PTT:40.5 sec    TELEMETRY EVENTS: N/A      EC18 @ 10:43  Sinus tachycardia  Possible Left atrial enlargement  Left axis deviation  Left ventricular hypertrophy  Cannot rule out Septal infarct , age undetermined  T wave abnormality, consider lateral ischemia  Abnormal ECG    RADIOLOGY: Xray Chest 18 @ 04:56)   Decreased hilar hypervascularity and diffuse interstitial markings.	    PREVIOUS DIAGNOSTIC TESTING:    [ X] Echocardiogram: Transthoracic Echocardiogram (18 @ 08:35)   1. Left ventricular ejection fraction, by visual estimation, is 25 to 30%.   2. Severely decreased global left ventricular systolic function.   3. Mild tricuspid regurgitation.   4. Trace pulmonic valve regurgitation.   5. Moderate mitral valve regurgitation is seen.     	  	  ASSESSMENT/PLAN: 	  57 year old female with known hx of systolic CHF (LVEF 20-25%) s/p AICD, COPD on home O2 (3L), DM, CVA with residual right sided weakness, fibromyalgia, and depression presented to the ED for a 2 day hx of SOB. Admitted for acute on chronic decompensated systolic CHF, complicated by RITCHIE and borderline hypotension.    1. Acute on Chronic Decompensated Systolic CHF, improved  - Patient appears Euvolemic, requiring home amount of O2 (3L NC) comfortable at rest. No dependent edema present  - Diuretics, Entresto, and BB stopped secondary to hypotension and RITCHIE  - ECHO stable from 2017  - Would interrogate patient's device  - Cardiac Enzymes negative, low likelihood of ACS as cause of Acute CHF decompensation  - When renal function returns to baseline would restart home entresto, BB, and oral diuretics  - Will follow HISTORY OF PRESENT ILLNESS:   57 year old female with known hx of systolic CHF (LVEF 20-25%) s/p AICD, COPD on home O2 (3L), DM, CVA with residual right sided weakness, fibromyalgia, and depression presented to the ED for a 2 day hx of SOB. As per the patient she started feeling worse than her baseline and was SOB at rest. States she was compliant with diet and medications, denies recent viral illness. Denies palpitations, syncope, chest pain, GI, or  symptoms.    PAST MEDICAL & SURGICAL HISTORY:  Chronic pain  HLD (hyperlipidemia)  CVA (cerebral vascular accident)  Depression  COPD (chronic obstructive pulmonary disease)  Diabetes  CHF (congestive heart failure)  History of cholecystectomy  H/O tubal ligation  H/O abdominal hysterectomy  AICD (automatic cardioverter/defibrillator) present    Allergies  aspirin (Other (Moderate))  	  Home Medications:  amitriptyline 10 mg oral tablet: 1 tab(s) orally once a day (at bedtime) (20 Aug 2018 19:47)  atorvastatin 40 mg oral tablet: 1 tab(s) orally once a day (20 Aug 2018 19:47)  busPIRone 10 mg oral tablet: 1 tab(s) orally 2 times a day (20 Aug 2018 19:47)  Entresto 24 mg-26 mg oral tablet: 1 tab(s) orally 2 times a day (20 Aug 2018 19:47)  gabapentin 100 mg oral capsule: 1 cap(s) orally 3 times a day (20 Aug 2018 19:47)  Lantus 100 units/mL subcutaneous solution:  (20 Aug 2018 19:47)  Lasix 40 mg oral tablet: 1 tab(s) orally once a day (20 Aug 2018 19:47)  methadone 10 mg oral tablet: 1 tab(s) orally once a day (20 Aug 2018 19:47)  Metoprolol Succinate ER 25 mg oral tablet, extended release: 1 tab(s) orally once a day (20 Aug 2018 19:47)  NovoLOG 100 units/mL subcutaneous solution:  (20 Aug 2018 19:47)  Symbicort 160 mcg-4.5 mcg/inh inhalation aerosol: 2 puff(s) inhaled 2 times a day (20 Aug 2018 19:47)  traZODone 50 mg oral tablet: 1 tab(s) orally once a day (20 Aug 2018 19:47)    MEDICATIONS  (STANDING):  amitriptyline 10 milliGRAM(s) Oral at bedtime  atorvastatin 40 milliGRAM(s) Oral at bedtime  buDESOnide 160 MICROgram(s)/formoterol 4.5 MICROgram(s) Inhaler 2 Puff(s) Inhalation two times a day  busPIRone 10 milliGRAM(s) Oral two times a day  dextrose 5%. 1000 milliLiter(s) (50 mL/Hr) IV Continuous <Continuous>  dextrose 50% Injectable 12.5 Gram(s) IV Push once  dextrose 50% Injectable 25 Gram(s) IV Push once  dextrose 50% Injectable 25 Gram(s) IV Push once  enoxaparin Injectable 40 milliGRAM(s) SubCutaneous daily  gabapentin 100 milliGRAM(s) Oral three times a day  insulin glargine Injectable (LANTUS) 25 Unit(s) SubCutaneous at bedtime  insulin lispro (HumaLOG) corrective regimen sliding scale   SubCutaneous three times a day before meals  insulin lispro Injectable (HumaLOG) 7 Unit(s) SubCutaneous before breakfast  insulin lispro Injectable (HumaLOG) 7 Unit(s) SubCutaneous before lunch  insulin lispro Injectable (HumaLOG) 7 Unit(s) SubCutaneous before dinner  methadone    Tablet 10 milliGRAM(s) Oral daily  metoprolol succinate ER 25 milliGRAM(s) Oral daily  traZODone 50 milliGRAM(s) Oral daily    MEDICATIONS  (PRN):  dextrose 40% Gel 15 Gram(s) Oral once PRN Blood Glucose LESS THAN 70 milliGRAM(s)/deciliter  glucagon  Injectable 1 milliGRAM(s) IntraMuscular once PRN Glucose LESS THAN 70 milligrams/deciliter      SOCIAL HISTORY:    [ X] Former-smoker  [ ] Smoker  [ ] Alcohol      REVIEW OF SYSTEMS:  CONSTITUTIONAL: No fever, weight loss, or fatigue  CARDIOLOGY: PAtient denies chest pain, or syncopal episodes. Positive for shortness of breath, improved  RESPIRATORY: Positive for shortness of breath, Denies wheezing.   NEUROLOGICAL: No weakness, no focal deficits to report.  ENDOCRINOLOGICAL: no recent change in diabetic medications.   GI: no BRBPR, no N,V,diarrhea.    PSYCHIATRY: normal mood and affect  HEENT: no nasal discharge, no ecchymosis  SKIN: no ecchymosis, no breakdown  MUSCULOSKELETAL: Full range of motion x4.      PHYSICAL EXAM:  T(C): 35.7 (18 @ 04:50), Max: 36.3 (18 @ 16:22)  HR: 69 (18 @ 04:50) (69 - 90)  BP: 82/45 (18 @ 04:50) (82/45 - 133/69)  RR: 18 (18 @ 04:50) (18 - 18)      General Appearance: Normal, obese, on nasal cannula  Cardiovascular: Normal S1 S2, No JVD, No murmurs, No edema  Respiratory: Lungs clear to auscultation	  Psychiatry: A & O x 3, Mood & affect appropriate  Gastrointestinal:  Soft, Non-tender  Skin: No rashes, No ecchymoses, No cyanosis	  Neurologic: Non-focal  MS: Normal range of motion, No clubbing, cyanosis or edema  Vascular: Peripheral pulses palpable 2+ bilaterally        LABS:	 	                          11.4   5.09  )-----------( 173      ( 22 Aug 2018 06:08 )             35.3     08    137  |  97<L>  |  30<H>  ----------------------------<  149<H>  4.1   |  28  |  2.3<H>    Ca    8.6      22 Aug 2018 06:08    TPro  6.9  /  Alb  3.0<L>  /  TBili  0.5  /  DBili  x   /  AST  29  /  ALT  17  /  AlkPhos  111      CARDIAC MARKERS ( 21 Aug 2018 07:29 )  x     / 0.01 ng/mL / 51 U/L / x     / x      CARDIAC MARKERS ( 20 Aug 2018 13:02 )  x     / <0.01 ng/mL / x     / x     / x          PT/INR - ( 20 Aug 2018 13:02 )   PT: 13.60 sec;   INR: 1.26 ratio    PTT - ( 20 Aug 2018 13:02 )  PTT:40.5 sec    TELEMETRY EVENTS: N/A      EC18 @ 10:43  Sinus tachycardia  Possible Left atrial enlargement  Left axis deviation  Left ventricular hypertrophy  Cannot rule out Septal infarct , age undetermined  T wave abnormality, consider lateral ischemia  Abnormal ECG    RADIOLOGY: Xray Chest 18 @ 04:56)   Decreased hilar hypervascularity and diffuse interstitial markings.	    PREVIOUS DIAGNOSTIC TESTING:    [ X] Echocardiogram: Transthoracic Echocardiogram (18 @ 08:35)   1. Left ventricular ejection fraction, by visual estimation, is 25 to 30%.   2. Severely decreased global left ventricular systolic function.   3. Mild tricuspid regurgitation.   4. Trace pulmonic valve regurgitation.   5. Moderate mitral valve regurgitation is seen.     	  	  ASSESSMENT/PLAN: 	  57 year old female with known hx of systolic CHF (LVEF 20-25%) s/p AICD, COPD on home O2 (3L), DM, CVA with residual right sided weakness, fibromyalgia, and depression presented to the ED for a 2 day hx of SOB. Admitted for acute on chronic decompensated systolic CHF, complicated by RITCHIE and borderline hypotension.    1. Acute on Chronic Decompensated Systolic CHF, improved  - Patient appears Euvolemic, requiring home amount of O2 (3L NC) comfortable at rest. No dependent edema present  - Diuretics, Entresto, and BB stopped secondary to hypotension and RITCHIE  - ECHO stable from 2017  - Would interrogate patient's device  - Cardiac Enzymes negative, low likelihood of ACS as cause of Acute CHF decompensation  - When renal function returns to baseline would restart home entresto, BB, and oral diuretics  - Will follow

## 2018-08-22 NOTE — PROGRESS NOTE ADULT - ASSESSMENT
57 year female patient with history of CHF with reduced ejection fraction( 20-25%) S/P AICD, COPD on home O2 3L/MIN, diabetes mellitus, CVA with residual right sided weakness, chronic pain, depression, admitted with worsening SOB. Pt was stable this AM. She reports no sob at rest. Pt complained of pain in RUQ and RLQ later. She had a episode of hypotension 84/60 with HR of 69.    #Acute decompensated heart failure with reduced ejection fraction (25-30%):  -Was on lasix 40 IV bid- Will hold lasix as the pt has RITCHIE (cr. 2.3<--1.5)  -monitor intake/out put  -daily body weight  -continue with metoprolol  -Held entresto due to hypotensive episode  -Fluid restriction     #RITCHIE- pre-renal likely 2/2 overdiuresis vs. hypotensive episode last night?  -Cr 2.3<--1.5  -Will hold diuretics  -Will hold entresto  -f/u bmp  -will f/u urine urea, phosphorus, na. pr/cr ratio  -US KUB to rule out calculus      #COPD on home 3L O2   -continue with nebs  -continue with O2 target spo2 92%    #Diabetes:   -on insulin, f/u FS    #Fibromyalgia  - on methadone   -Pt says it does not help her with the pain  -Pt f/u with Dr. Shultz, tried to contact [place], [place]  -consulted chronic pain managament. Will f/u  -Will start bowel regimen if constiopation 2/2 chronic opiod use    #Bipolar disorder  -Continue with buspirone, trazodone    #dyslipidemia   -continue with atorvastatin    #dvt prophylaxis: lovenox  #Diet: consistent carbohydrate/ no snacks  confirmed home meds with [facility] pharmacy: amitriptyline 10mg od, atorvastatin 40mg od, furosemide 40mg od, insulin, entresto 24/26, metroprolol succinate 25mg od, trazadone 50mg od, gabapentin 300mg tds, busprione 10mg bid, symbicort inhaler

## 2018-08-22 NOTE — CONSULT NOTE ADULT - SUBJECTIVE AND OBJECTIVE BOX
Chief Complaint:    HPI:  57 year female patient with history of CHF with reduced ejection fraction( 20-25%) S/P AICD, COPD on home O2 3L/MIN, diabetes mellitus, CVA with residual right sided weakness, chronic pain, depression presented for SOB.  Patient admitted for CHF.  Now complains of pain in the right shoulder, right flank (s/p lipoma removal 2 months ago) and bilateral lower extremity pain due DM neuropathy.  Patient has pain management as out patient.  On Methadone 10mg Qday.  Patient continues to ask for IV pain medications.    PAST MEDICAL & SURGICAL HISTORY:  Chronic pain  HLD (hyperlipidemia)  CVA (cerebral vascular accident)  Depression  COPD (chronic obstructive pulmonary disease)  Diabetes  CHF (congestive heart failure)  History of cholecystectomy  H/O tubal ligation  H/O abdominal hysterectomy  AICD (automatic cardioverter/defibrillator) present      FAMILY HISTORY:      SOCIAL HISTORY:  [x] Denies Smoking, Alcohol, or Drug Use    Allergies    aspirin (Other (Moderate))    Intolerances        PAIN MEDICATIONS:  amitriptyline 10 milliGRAM(s) Oral at bedtime  busPIRone 10 milliGRAM(s) Oral two times a day  gabapentin 100 milliGRAM(s) Oral three times a day  methadone    Tablet 10 milliGRAM(s) Oral daily  traZODone 50 milliGRAM(s) Oral daily    Heme:  enoxaparin Injectable 40 milliGRAM(s) SubCutaneous daily    Antibiotics:    Cardiovascular:  metoprolol succinate ER 25 milliGRAM(s) Oral daily    GI:    Endocrine:  atorvastatin 40 milliGRAM(s) Oral at bedtime  dextrose 40% Gel 15 Gram(s) Oral once PRN  dextrose 50% Injectable 12.5 Gram(s) IV Push once  dextrose 50% Injectable 25 Gram(s) IV Push once  dextrose 50% Injectable 25 Gram(s) IV Push once  glucagon  Injectable 1 milliGRAM(s) IntraMuscular once PRN  insulin glargine Injectable (LANTUS) 25 Unit(s) SubCutaneous at bedtime  insulin lispro (HumaLOG) corrective regimen sliding scale   SubCutaneous three times a day before meals  insulin lispro Injectable (HumaLOG) 7 Unit(s) SubCutaneous before breakfast  insulin lispro Injectable (HumaLOG) 7 Unit(s) SubCutaneous before lunch  insulin lispro Injectable (HumaLOG) 7 Unit(s) SubCutaneous before dinner    All Other Medications:  dextrose 5%. 1000 milliLiter(s) IV Continuous <Continuous>      REVIEW OF SYSTEMS:    CONSTITUTIONAL: No fever, weight loss, or fatigue  EYES: No eye pain, visual disturbances, or discharge  ENMT:  No difficulty hearing, tinnitus, vertigo; No sinus or throat pain  NECK: No pain or stiffness  BREASTS: No pain, masses, or nipple discharge  RESPIRATORY: As per HPI  CARDIOVASCULAR: No chest pain, palpitations, dizziness, or leg swelling  GASTROINTESTINAL: No abdominal or epigastric pain. No nausea, vomiting, or hematemesis; No diarrhea or constipation. No melena or hematochezia.  GENITOURINARY: No dysuria, frequency, hematuria, or incontinence  NEUROLOGICAL: As per HPI  SKIN: No itching, burning, rashes, or lesions   LYMPH NODES: No enlarged glands  ENDOCRINE: No heat or cold intolerance; No hair loss  MUSCULOSKELETAL: As per HPI  PSYCHIATRIC: No depression, anxiety, mood swings, or difficulty sleeping  HEME/LYMPH: No easy bruising, or bleeding gums  ALLERY AND IMMUNOLOGIC: No hives or eczema      Vital Signs Last 24 Hrs  T(C): 35.6 (22 Aug 2018 14:44), Max: 36.3 (21 Aug 2018 16:22)  T(F): 96.1 (22 Aug 2018 14:44), Max: 97.3 (21 Aug 2018 16:22)  HR: 98 (22 Aug 2018 14:44) (69 - 98)  BP: 99/53 (22 Aug 2018 14:44) (82/45 - 133/69)  BP(mean): --  RR: 16 (22 Aug 2018 14:44) (16 - 18)  SpO2: --    PAIN SCORE:    10     SCALE USED: (1-10 VNRS)             PHYSICAL EXAM:    GENERAL: NAD, well-groomed, well-developed, sleeping  HEAD:  Atraumatic, Normocephalic  EYES: EOMI, PERRLA, conjunctiva and sclera clear  ENMT: No tonsillar erythema, exudates, or enlargement; Moist mucous membranes, Good dentition, No lesions  MUSCULOSKELETAL: pain over scar of lipoma excision C/D/I   NERVOUS SYSTEM:  Alert & Oriented X3, Good concentration; Motor Strength 5/5 B/L upper and lower extremities; DTRs 2+ intact and symmetric  CHEST/LUNG: Clear to percussion bilaterally; No rales, rhonchi, wheezing, or rubs  HEART: Regular rate and rhythm; No murmurs, rubs, or gallops  ABDOMEN: Soft, Nontender, Nondistended; Bowel sounds present  EXTREMITIES:  TTP over the right shoulder  LYMPH: No lymphadenopathy noted  SKIN: No rashes or lesions        LABS:                          11.4   5.09  )-----------( 173      ( 22 Aug 2018 06:08 )             35.3     08-22    137  |  97<L>  |  30<H>  ----------------------------<  149<H>  4.1   |  28  |  2.3<H>    Ca    8.6      22 Aug 2018 06:08    TPro  6.9  /  Alb  3.0<L>  /  TBili  0.5  /  DBili  x   /  AST  29  /  ALT  17  /  AlkPhos  111  08-22

## 2018-08-23 ENCOUNTER — TRANSCRIPTION ENCOUNTER (OUTPATIENT)
Age: 58
End: 2018-08-23

## 2018-08-23 LAB
ANION GAP SERPL CALC-SCNC: 13 MMOL/L — SIGNIFICANT CHANGE UP (ref 7–14)
BUN SERPL-MCNC: 29 MG/DL — HIGH (ref 10–20)
CALCIUM SERPL-MCNC: 8.6 MG/DL — SIGNIFICANT CHANGE UP (ref 8.5–10.1)
CHLORIDE SERPL-SCNC: 99 MMOL/L — SIGNIFICANT CHANGE UP (ref 98–110)
CHLORIDE UR-SCNC: <20 — SIGNIFICANT CHANGE UP
CO2 SERPL-SCNC: 25 MMOL/L — SIGNIFICANT CHANGE UP (ref 17–32)
CREAT ?TM UR-MCNC: 102 MG/DL — SIGNIFICANT CHANGE UP
CREAT SERPL-MCNC: 1.8 MG/DL — HIGH (ref 0.7–1.5)
GLUCOSE BLDC GLUCOMTR-MCNC: 145 MG/DL — HIGH (ref 70–99)
GLUCOSE BLDC GLUCOMTR-MCNC: 154 MG/DL — HIGH (ref 70–99)
GLUCOSE BLDC GLUCOMTR-MCNC: 161 MG/DL — HIGH (ref 70–99)
GLUCOSE BLDC GLUCOMTR-MCNC: 176 MG/DL — HIGH (ref 70–99)
GLUCOSE SERPL-MCNC: 166 MG/DL — HIGH (ref 70–99)
HCT VFR BLD CALC: 37.6 % — SIGNIFICANT CHANGE UP (ref 37–47)
HGB BLD-MCNC: 12 G/DL — SIGNIFICANT CHANGE UP (ref 12–16)
MCHC RBC-ENTMCNC: 26.8 PG — LOW (ref 27–31)
MCHC RBC-ENTMCNC: 31.9 G/DL — LOW (ref 32–37)
MCV RBC AUTO: 84.1 FL — SIGNIFICANT CHANGE UP (ref 81–99)
NRBC # BLD: 0 /100 WBCS — SIGNIFICANT CHANGE UP (ref 0–0)
PLATELET # BLD AUTO: 181 K/UL — SIGNIFICANT CHANGE UP (ref 130–400)
POTASSIUM SERPL-MCNC: 4.6 MMOL/L — SIGNIFICANT CHANGE UP (ref 3.5–5)
POTASSIUM SERPL-SCNC: 4.6 MMOL/L — SIGNIFICANT CHANGE UP (ref 3.5–5)
PROT ?TM UR-MCNC: 151 MG/DLG/24H — SIGNIFICANT CHANGE UP
PROT/CREAT UR-RTO: 1.5 RATIO — HIGH (ref 0–0.2)
RBC # BLD: 4.47 M/UL — SIGNIFICANT CHANGE UP (ref 4.2–5.4)
RBC # FLD: 15.4 % — HIGH (ref 11.5–14.5)
SODIUM SERPL-SCNC: 137 MMOL/L — SIGNIFICANT CHANGE UP (ref 135–146)
SODIUM UR-SCNC: 40 MMOL/L — SIGNIFICANT CHANGE UP
UUN UR-MCNC: 454 MG/DL — SIGNIFICANT CHANGE UP
WBC # BLD: 5.42 K/UL — SIGNIFICANT CHANGE UP (ref 4.8–10.8)
WBC # FLD AUTO: 5.42 K/UL — SIGNIFICANT CHANGE UP (ref 4.8–10.8)

## 2018-08-23 RX ORDER — LIDOCAINE 4 G/100G
2 CREAM TOPICAL ONCE
Qty: 0 | Refills: 0 | Status: COMPLETED | OUTPATIENT
Start: 2018-08-23 | End: 2018-08-23

## 2018-08-23 RX ORDER — ACETAMINOPHEN 500 MG
650 TABLET ORAL EVERY 4 HOURS
Qty: 0 | Refills: 0 | Status: DISCONTINUED | OUTPATIENT
Start: 2018-08-23 | End: 2018-08-24

## 2018-08-23 RX ADMIN — INSULIN GLARGINE 25 UNIT(S): 100 INJECTION, SOLUTION SUBCUTANEOUS at 21:51

## 2018-08-23 RX ADMIN — Medication 7 UNIT(S): at 08:17

## 2018-08-23 RX ADMIN — Medication 10 MILLIGRAM(S): at 19:04

## 2018-08-23 RX ADMIN — GABAPENTIN 100 MILLIGRAM(S): 400 CAPSULE ORAL at 21:51

## 2018-08-23 RX ADMIN — METHADONE HYDROCHLORIDE 10 MILLIGRAM(S): 40 TABLET ORAL at 11:31

## 2018-08-23 RX ADMIN — ENOXAPARIN SODIUM 40 MILLIGRAM(S): 100 INJECTION SUBCUTANEOUS at 11:31

## 2018-08-23 RX ADMIN — Medication 7 UNIT(S): at 17:52

## 2018-08-23 RX ADMIN — GABAPENTIN 100 MILLIGRAM(S): 400 CAPSULE ORAL at 17:53

## 2018-08-23 RX ADMIN — Medication 25 MILLIGRAM(S): at 05:22

## 2018-08-23 RX ADMIN — GABAPENTIN 100 MILLIGRAM(S): 400 CAPSULE ORAL at 05:22

## 2018-08-23 RX ADMIN — ATORVASTATIN CALCIUM 40 MILLIGRAM(S): 80 TABLET, FILM COATED ORAL at 21:51

## 2018-08-23 RX ADMIN — Medication 10 MILLIGRAM(S): at 05:22

## 2018-08-23 RX ADMIN — Medication 2: at 08:18

## 2018-08-23 RX ADMIN — BUDESONIDE AND FORMOTEROL FUMARATE DIHYDRATE 2 PUFF(S): 160; 4.5 AEROSOL RESPIRATORY (INHALATION) at 08:19

## 2018-08-23 RX ADMIN — Medication 50 MILLIGRAM(S): at 11:35

## 2018-08-23 RX ADMIN — Medication 25 MILLIGRAM(S): at 21:51

## 2018-08-23 RX ADMIN — LIDOCAINE 2 PATCH: 4 CREAM TOPICAL at 17:56

## 2018-08-23 RX ADMIN — Medication 7 UNIT(S): at 11:32

## 2018-08-23 RX ADMIN — Medication 2: at 11:33

## 2018-08-23 NOTE — DISCHARGE NOTE ADULT - PLAN OF CARE
control and treatment -take your medications daily  -weight daily or at least 3/week and if weight increases consider adding extra tabs of lasix  -no salt  -decrease fluids intake  follow up PMD and heart doctor in 1 week avoid recurrence -avoid antiinflammatory medications such as ibuprofen - naproxen  -avoid dehydration  -if diarrhea vomiting decrease lasix and check your blood pressure, if low skip BP medications

## 2018-08-23 NOTE — DISCHARGE NOTE ADULT - PATIENT PORTAL LINK FT
You can access the [facility] Patient Portal, offered by [facility], by registering with the following website: [email or web address]

## 2018-08-23 NOTE — DISCHARGE NOTE ADULT - MEDICATION SUMMARY - MEDICATIONS TO TAKE
I will START or STAY ON the medications listed below when I get home from the hospital:    methadone 10 mg oral tablet  -- 1 tab(s) by mouth once a day  -- Indication: For pain    Entresto 24 mg-26 mg oral tablet  -- 1 tab(s) by mouth 2 times a day  -- Indication: For Heart failure    gabapentin 100 mg oral capsule  -- 1 cap(s) by mouth 3 times a day  -- Indication: For pain    traZODone 50 mg oral tablet  -- 1 tab(s) by mouth once a day  -- Indication: For Depression    amitriptyline 25 mg oral tablet  -- 1 tab(s) by mouth once a day (at bedtime)  -- Indication: For fibromyalgia    Lantus 100 units/mL subcutaneous solution  -- 25 unit(s) subcutaneous once a day (at bedtime)  -- Indication: For Diabetes    NovoLOG 100 units/mL subcutaneous solution  -- 7 unit(s) subcutaneous 3 times a day (before meals)  -- Indication: For Diabetes    atorvastatin 40 mg oral tablet  -- 1 tab(s) by mouth once a day  -- Indication: For Dld    busPIRone 10 mg oral tablet  -- 1 tab(s) by mouth 2 times a day  -- Indication: For Depression    Metoprolol Succinate ER 25 mg oral tablet, extended release  -- 1 tab(s) by mouth once a day  -- Indication: For Heart failure    Symbicort 160 mcg-4.5 mcg/inh inhalation aerosol  -- 2 puff(s) inhaled 2 times a day  -- Indication: For COPD (chronic obstructive pulmonary disease)    Lasix 40 mg oral tablet  -- 1 tab(s) by mouth once a day  -- Indication: For Heart failure I will START or STAY ON the medications listed below when I get home from the hospital:    methadone 10 mg oral tablet  -- 1 tab(s) by mouth once a day  -- Indication: For pain    Entresto 24 mg-26 mg oral tablet  -- 1 tab(s) by mouth 2 times a day  -- Indication: For Heart failure    gabapentin 100 mg oral capsule  -- 1 cap(s) by mouth 3 times a day  -- Indication: For pain    traZODone 50 mg oral tablet  -- 1 tab(s) by mouth once a day  -- Indication: For Depression    amitriptyline 25 mg oral tablet  -- 1 tab(s) by mouth once a day (at bedtime)  -- Indication: For Depression    Lantus 100 units/mL subcutaneous solution  -- 25 unit(s) subcutaneous once a day (at bedtime)  -- Indication: For Diabetes    NovoLOG 100 units/mL subcutaneous solution  -- 7 unit(s) subcutaneous 3 times a day (before meals)  -- Indication: For Diabetes    atorvastatin 40 mg oral tablet  -- 1 tab(s) by mouth once a day  -- Indication: For DLD    busPIRone 10 mg oral tablet  -- 1 tab(s) by mouth 2 times a day  -- Indication: For Depression    Metoprolol Succinate ER 25 mg oral tablet, extended release  -- 1 tab(s) by mouth once a day  -- Indication: For Heart failure    Symbicort 160 mcg-4.5 mcg/inh inhalation aerosol  -- 2 puff(s) inhaled 2 times a day  -- Indication: For COPD (chronic obstructive pulmonary disease)    Lasix 40 mg oral tablet  -- 1 tab(s) by mouth once a day  -- Indication: For Hert failure

## 2018-08-23 NOTE — DISCHARGE NOTE ADULT - CARE PROVIDERS DIRECT ADDRESSES
[email or web address]

## 2018-08-23 NOTE — PROGRESS NOTE ADULT - ASSESSMENT
7 year female patient with history of CHF with reduced ejection fraction( 20-25%) S/P AICD, COPD on home O2 3L/MIN, diabetes mellitus, CVA with residual right sided weakness, chronic pain, depression, admitted with worsening SOB. Pt was stable this AM. She reports no sob at rest. Pt complained of pain in RUQ and RLQ later. She had a episode of hypotension 84/60 with HR of 69.    #Acute decompensated heart failure with reduced ejection fraction (25-30%):  -Was on lasix 40 IV bid- Will hold lasix for today as the pt has RITCHIE (cr.1.8<-- 2.3<--1.5)  -Can restart lasix when creat is back to baseline  -monitor intake/output  -daily body weights  -continue with metoprolol  -Held entresto due to hypotensive episode; /57 today. Will continue holding entresto.   -Fluid restriction, keep negative balance     #RITCHIE- pre-renal likely 2/2 overdiuresis vs. hypotensive episode?  -Cr1.8<-- 2.3<--1.5  -Will hold diuretics  -Will hold entresto  -f/u bmp  -will f/u urine urea, phosphorus, na. Pr/cr ratio: 1.5  -FeUrea 27.6%: Pre-renal RITCHIE    #COPD on home 3L O2   -continue with nebs  -continue with O2 target spo2 92%  -Pt is not sob even without oxygen    #Diabetes:   -on insulin, f/u FS    #Fibromyalgia/Chronic pain: Complains of severe pain in the left shoulder and RUQ abdo s/p lipoma resection. Scar is clean, no infection  -on methadone 10mg  -Pt says it does not help her with the pain  -Pt f/u with Dr. Shultz, [place] contact [place], [place]  -consulted chronic pain managament. Dr Pete's eval appreciated  -Will increase amitriptyline from 10mg to 25mg. Will continue same dose of methadone as repiratory status is compromised as per pain management   -Will keep gabapentin at the same dose till RITCHIE resolves, as per pain management  -Tylenol 625mg Q4hrly PRN for pain   -Lidocaine patches for shoulder and back pain.  -F/u with orthopedics outpt fro suspected OA let shoulder.  -f/u with surgeon outpt for RUQ pain  -Will start bowel regimen if constiopation 2/2 chronic opioid use    #Bipolar disorder  -Continue with buspirone, trazodone    #dyslipidemia   -continue with atorvastatin    #dvt prophylaxis: lovenox  #Diet: consistent carbohydrate/ no snacks  #Dispo home  [facility]: amitriptyline 10mg od, atorvastatin 40mg od, furosemide 40mg od, insulin, entresto 24/26, metroprolol succinate 25mg od, trazadone 50mg od, gabapentin 300mg tds, busprione 10mg bid, symbicort inhaler    Would do d/c planning with lower dose of lasix and entresto.  F/U with PMD for BMP.

## 2018-08-23 NOTE — DISCHARGE NOTE ADULT - CARE PROVIDER_API CALL
Deion Gregory), Internal Medicine  [address]  [address]  [place] [address]  Phone: [phone]  Fax: [phone]    Jaiden Goldberg; PhD), ChildAdolescent Psychiatry; Psychiatry  [address] [place] [address]  Phone: [phone]  Fax: [phone]    Rod Morales), Cardiovascular Disease; Internal Medicine; Interventional Cardiology  [address] [facility]  [address]  [place] [address]  Phone: [phone]  Fax: [phone]

## 2018-08-23 NOTE — DISCHARGE NOTE ADULT - CARE PLAN
Principal Discharge DX:	CHF (congestive heart failure)  Secondary Diagnosis:	RITCHIE (acute kidney injury) Principal Discharge DX:	CHF (congestive heart failure)  Goal:	control and treatment  Assessment and plan of treatment:	-take your medications daily  -weight daily or at least 3/week and if weight increases consider adding extra tabs of lasix  -no salt  -decrease fluids intake  follow up PMD and heart doctor in 1 week  Secondary Diagnosis:	RITCHIE (acute kidney injury)  Goal:	avoid recurrence  Assessment and plan of treatment:	-avoid antiinflammatory medications such as ibuprofen - naproxen  -avoid dehydration  -if diarrhea vomiting decrease lasix and check your blood pressure, if low skip BP medications

## 2018-08-23 NOTE — DISCHARGE NOTE ADULT - HOSPITAL COURSE
57 year female patient with history of CHF with reduced ejection fraction( 20-25%) S/P AICD, COPD on home O2 3L/MIN, diabetes mellitus, CVA with residual right sided weakness, chronic pain, depression, admitted with worsening SOB.   she was diagnosed with Acute decompensated heart failure with reduced ejection fraction repeat echo showed 25 to 30%. she received IV diuresis with symptoms improvement but the patient developed RITCHIE. lasix and entresto were held and creatinine improved subsequently and is back to baseline.  the patient had severe pain, pain management consulted, her amitriptyline was increased but no opioids to be added. the plan to discharge today and follow up with dr Luna as OP

## 2018-08-24 VITALS
TEMPERATURE: 96 F | RESPIRATION RATE: 19 BRPM | HEART RATE: 65 BPM | DIASTOLIC BLOOD PRESSURE: 56 MMHG | SYSTOLIC BLOOD PRESSURE: 104 MMHG

## 2018-08-24 LAB
ANION GAP SERPL CALC-SCNC: 12 MMOL/L — SIGNIFICANT CHANGE UP (ref 7–14)
BUN SERPL-MCNC: 23 MG/DL — HIGH (ref 10–20)
CALCIUM SERPL-MCNC: 8.6 MG/DL — SIGNIFICANT CHANGE UP (ref 8.5–10.1)
CHLORIDE SERPL-SCNC: 101 MMOL/L — SIGNIFICANT CHANGE UP (ref 98–110)
CO2 SERPL-SCNC: 25 MMOL/L — SIGNIFICANT CHANGE UP (ref 17–32)
CREAT SERPL-MCNC: 1.4 MG/DL — SIGNIFICANT CHANGE UP (ref 0.7–1.5)
GLUCOSE BLDC GLUCOMTR-MCNC: 105 MG/DL — HIGH (ref 70–99)
GLUCOSE BLDC GLUCOMTR-MCNC: 215 MG/DL — HIGH (ref 70–99)
GLUCOSE BLDC GLUCOMTR-MCNC: 215 MG/DL — HIGH (ref 70–99)
GLUCOSE BLDC GLUCOMTR-MCNC: 60 MG/DL — LOW (ref 70–99)
GLUCOSE BLDC GLUCOMTR-MCNC: 99 MG/DL — SIGNIFICANT CHANGE UP (ref 70–99)
GLUCOSE SERPL-MCNC: 214 MG/DL — HIGH (ref 70–99)
HCT VFR BLD CALC: 38.1 % — SIGNIFICANT CHANGE UP (ref 37–47)
HGB BLD-MCNC: 12 G/DL — SIGNIFICANT CHANGE UP (ref 12–16)
MCHC RBC-ENTMCNC: 27 PG — SIGNIFICANT CHANGE UP (ref 27–31)
MCHC RBC-ENTMCNC: 31.5 G/DL — LOW (ref 32–37)
MCV RBC AUTO: 85.8 FL — SIGNIFICANT CHANGE UP (ref 81–99)
NRBC # BLD: 0 /100 WBCS — SIGNIFICANT CHANGE UP (ref 0–0)
PLATELET # BLD AUTO: 197 K/UL — SIGNIFICANT CHANGE UP (ref 130–400)
POTASSIUM SERPL-MCNC: 4.6 MMOL/L — SIGNIFICANT CHANGE UP (ref 3.5–5)
POTASSIUM SERPL-SCNC: 4.6 MMOL/L — SIGNIFICANT CHANGE UP (ref 3.5–5)
RBC # BLD: 4.44 M/UL — SIGNIFICANT CHANGE UP (ref 4.2–5.4)
RBC # FLD: 15.4 % — HIGH (ref 11.5–14.5)
SODIUM SERPL-SCNC: 138 MMOL/L — SIGNIFICANT CHANGE UP (ref 135–146)
WBC # BLD: 4.81 K/UL — SIGNIFICANT CHANGE UP (ref 4.8–10.8)
WBC # FLD AUTO: 4.81 K/UL — SIGNIFICANT CHANGE UP (ref 4.8–10.8)

## 2018-08-24 RX ORDER — AMITRIPTYLINE HCL 25 MG
1 TABLET ORAL
Qty: 0 | Refills: 0 | COMMUNITY

## 2018-08-24 RX ORDER — KETOROLAC TROMETHAMINE 30 MG/ML
15 SYRINGE (ML) INJECTION ONCE
Qty: 0 | Refills: 0 | Status: DISCONTINUED | OUTPATIENT
Start: 2018-08-24 | End: 2018-08-24

## 2018-08-24 RX ORDER — INSULIN GLARGINE 100 [IU]/ML
0 INJECTION, SOLUTION SUBCUTANEOUS
Qty: 0 | Refills: 0 | COMMUNITY

## 2018-08-24 RX ORDER — AMITRIPTYLINE HCL 25 MG
1 TABLET ORAL
Qty: 30 | Refills: 0 | OUTPATIENT
Start: 2018-08-24 | End: 2018-09-22

## 2018-08-24 RX ORDER — INSULIN ASPART 100 [IU]/ML
0 INJECTION, SOLUTION SUBCUTANEOUS
Qty: 0 | Refills: 0 | COMMUNITY

## 2018-08-24 RX ADMIN — Medication 7 UNIT(S): at 08:02

## 2018-08-24 RX ADMIN — GABAPENTIN 100 MILLIGRAM(S): 400 CAPSULE ORAL at 05:16

## 2018-08-24 RX ADMIN — METHADONE HYDROCHLORIDE 10 MILLIGRAM(S): 40 TABLET ORAL at 12:03

## 2018-08-24 RX ADMIN — Medication 10 MILLIGRAM(S): at 18:22

## 2018-08-24 RX ADMIN — BUDESONIDE AND FORMOTEROL FUMARATE DIHYDRATE 2 PUFF(S): 160; 4.5 AEROSOL RESPIRATORY (INHALATION) at 08:04

## 2018-08-24 RX ADMIN — BUDESONIDE AND FORMOTEROL FUMARATE DIHYDRATE 2 PUFF(S): 160; 4.5 AEROSOL RESPIRATORY (INHALATION) at 14:58

## 2018-08-24 RX ADMIN — ENOXAPARIN SODIUM 40 MILLIGRAM(S): 100 INJECTION SUBCUTANEOUS at 12:07

## 2018-08-24 RX ADMIN — Medication 7 UNIT(S): at 12:04

## 2018-08-24 RX ADMIN — GABAPENTIN 100 MILLIGRAM(S): 400 CAPSULE ORAL at 14:58

## 2018-08-24 RX ADMIN — Medication 10 MILLIGRAM(S): at 05:18

## 2018-08-24 RX ADMIN — LIDOCAINE 2 PATCH: 4 CREAM TOPICAL at 05:23

## 2018-08-24 RX ADMIN — Medication 4: at 12:05

## 2018-08-24 RX ADMIN — Medication 50 MILLIGRAM(S): at 12:07

## 2018-08-24 RX ADMIN — Medication 25 MILLIGRAM(S): at 05:19

## 2018-08-24 RX ADMIN — Medication 4: at 08:03

## 2018-08-24 NOTE — PROGRESS NOTE ADULT - ASSESSMENT
7 year female patient with history of CHF with reduced ejection fraction( 20-25%) S/P AICD, COPD on home O2 3L/MIN, diabetes mellitus, CVA with residual right sided weakness, chronic pain, depression, admitted with worsening SOB. Pt was stable this AM. She reports no sob at rest. Pt complained of pain in RUQ and RLQ later. She had a episode of hypotension 84/60 with HR of 69.    #Acute decompensated heart failure with reduced ejection fraction (25-30%):  -Was on lasix 40 IV bid- Will hold lasix for today as the pt has RITCHIE (cr.1.8<-- 2.3<--1.5)  -Can restart lasix when creat is back to baseline  -monitor intake/output  -daily body weights  -continue with metoprolol  -Held entresto due to hypotensive episode; /57 today. Will continue holding entresto.   -Fluid restriction, keep negative balance     #RITCHIE- pre-renal likely 2/2 overdiuresis vs. hypotensive episode?  -Cr  1.4 <  1.8<-- 2.3<--1.5  -Would resume lasix.  -Would resume  entresto as well.  -f/u bmp as outpt.    #COPD on home 3L O2   -continue with nebs  -continue with O2 target spo2 92%  -Pt is not sob even without oxygen    #Diabetes:   -on insulin, f/u FS    #Fibromyalgia/Chronic pain: Complains of severe pain in the left shoulder and RUQ abdo s/p lipoma resection. Scar is clean, no infection  -on methadone 10mg  -Pt says it does not help her with the pain  -Pt f/u with Dr. Shultz, tried to contact him, he is [place]  -consulted chronic pain managament. Dr Pete's eval appreciated  -Will increase amitriptyline from 10mg to 25mg. Will continue same dose of methadone as repiratory status is compromised as per pain management   -Will keep gabapentin at the same dose till RITCHIE resolves, as per pain management  -Tylenol 625mg Q4hrly PRN for pain   -Lidocaine patches for shoulder and back pain.  -F/u with orthopedics outpt fro suspected OA let shoulder.  -f/u with surgeon outpt for RUQ pain  -Will start bowel regimen if constiopation 2/2 chronic opioid use    #Bipolar disorder  -Continue with buspirone, trazodone    #dyslipidemia   -continue with atorvastatin    #dvt prophylaxis: lovenox  #Diet: consistent carbohydrate/ no snacks  #Dispo home  [facility]: amitriptyline 10mg od, atorvastatin 40mg od, furosemide 40mg od, insulin, entresto 24/26, metroprolol succinate 25mg od, trazadone 50mg od, gabapentin 300mg tds, busprione 10mg bid, symbicort inhaler    Would do d/c planning with lower dose of lasix and entresto.  F/U with PMD for BMP. 7 year female patient with history of CHF with reduced ejection fraction( 20-25%) S/P AICD, COPD on home O2 3L/MIN, diabetes mellitus, CVA with residual right sided weakness, chronic pain, depression, admitted with worsening SOB. Pt was stable this AM. She reports no sob at rest. Pt complained of pain in RUQ and RLQ later. She had a episode of hypotension 84/60 with HR of 69.    #Acute decompensated heart failure with reduced ejection fraction (25-30%):  -Was on lasix 40 IV bid- Will hold lasix for today as the pt has RITCHIE (cr.1.8<-- 2.3<--1.5)  -Can restart lasix when creat is back to baseline  -monitor intake/output  -daily body weights  -continue with metoprolol  -Held entresto due to hypotensive episode; /57 today. Will continue holding entresto.   -Fluid restriction, keep negative balance     #RITCHIE- pre-renal likely 2/2 overdiuresis vs. hypotensive episode?  -Cr  1.4 <  1.8<-- 2.3<--1.5  -Would resume lasix.  -Would resume  entresto as well.  -f/u bmp as outpt.    #COPD on home 3L O2   -continue with nebs  -continue with O2 target spo2 92%  -Pt is not sob even without oxygen    #Diabetes:   -on insulin, f/u FS    #Fibromyalgia/Chronic pain: Complains of severe pain in the left shoulder and RUQ abdo s/p lipoma resection. Scar is clean, no infection  -on methadone 10mg  -Pt says it does not help her with the pain  -Pt f/u with Dr. Shultz, tried to contact him, he is [place]  -consulted chronic pain managament. Dr Pete's eval appreciated  -Will increase amitriptyline from 10mg to 25mg. Will continue same dose of methadone as repiratory status is compromised as per pain management   -Will keep gabapentin at the same dose till RITCHIE resolves, as per pain management  -Tylenol 625mg Q4hrly PRN for pain   -Lidocaine patches for shoulder and back pain.  -F/u with orthopedics outpt fro suspected OA let shoulder.  -f/u with surgeon outpt for RUQ pain  -Will start bowel regimen if constiopation 2/2 chronic opioid use    #Bipolar disorder  -Continue with buspirone, trazodone    #dyslipidemia   -continue with atorvastatin    #dvt prophylaxis: lovenox  #Diet: consistent carbohydrate/ no snacks  #Dispo home: patient still needs home oxygen 2/2 COPD and CHF with EF 20-30%, Sats 80% on RA after ambulation and improves to 97% with  02 3L/M via NC.      Would do d/c planning with lower dose of lasix and entresto.  F/U with PMD for BMP.

## 2018-08-24 NOTE — PROGRESS NOTE ADULT - SUBJECTIVE AND OBJECTIVE BOX
LENGTH OF HOSPITAL STAY: 2d    CHIEF COMPLAINT:   Patient is a 57y old  Female who presents with a chief complaint of Difficulty breathing, lump on my (R) side (21 Aug 2018 23:30)    HPI:  57 year female patient with history of CHF with reduced ejection fraction( 20-25%) S/P AICD, COPD on home O2 3L/MIN, diabetes mellitus, CVA with residual right sided weakness, chronic pain, depression presented for SOB. History goes back for 2 days ago , when she started feeling that she is worse than her baseline. Patient has been having SOB on minimal exertion for years, 5 pillows orthopnea, and paroxysmal nocturnal dyspnea, but for the last 2 days she is feeling SOB at rest , symptoms similar to the past when she had pulmonary edema according to her. She has intermittent cough for the last month , no fever or chills, intermittent sputum production. Patient denies chest pain, palpitation, she is complaint with her medications and salt restriction. (20 Aug 2018 19:35)    Overnight events:  Pt complained of shoulder pain at night and was given morphine 2gm IV push once. Pt also had low BP 84/60, HR 69 and T96F, no fluid were given as the Pt came in with fluid overload. Pt was also complaining of RUQ and RLQ pain and was crying. Pt is on methadone at home, but states that it does not help her, and tylenol, motrin does not help her pain.    ALLERGIES:  aspirin (Other (Moderate))    MEDICATIONS:  STANDING MEDICATIONS  amitriptyline 10 milliGRAM(s) Oral at bedtime  atorvastatin 40 milliGRAM(s) Oral at bedtime  buDESOnide 160 MICROgram(s)/formoterol 4.5 MICROgram(s) Inhaler 2 Puff(s) Inhalation two times a day  busPIRone 10 milliGRAM(s) Oral two times a day  dextrose 5%. 1000 milliLiter(s) IV Continuous <Continuous>  dextrose 50% Injectable 12.5 Gram(s) IV Push once  dextrose 50% Injectable 25 Gram(s) IV Push once  dextrose 50% Injectable 25 Gram(s) IV Push once  enoxaparin Injectable 40 milliGRAM(s) SubCutaneous daily  gabapentin 100 milliGRAM(s) Oral three times a day  insulin glargine Injectable (LANTUS) 25 Unit(s) SubCutaneous at bedtime  insulin lispro (HumaLOG) corrective regimen sliding scale   SubCutaneous three times a day before meals  insulin lispro Injectable (HumaLOG) 7 Unit(s) SubCutaneous before breakfast  insulin lispro Injectable (HumaLOG) 7 Unit(s) SubCutaneous before lunch  insulin lispro Injectable (HumaLOG) 7 Unit(s) SubCutaneous before dinner  methadone    Tablet 10 milliGRAM(s) Oral daily  metoprolol succinate ER 25 milliGRAM(s) Oral daily  traZODone 50 milliGRAM(s) Oral daily    PRN MEDICATIONS  dextrose 40% Gel 15 Gram(s) Oral once PRN  glucagon  Injectable 1 milliGRAM(s) IntraMuscular once PRN    VITALS:   T(F): 96.3  HR: 69  BP: 82/45  RR: 18  SpO2: --    LABS:                        11.4   5.09  )-----------( 173      ( 22 Aug 2018 06:08 )             35.3     08-22    137  |  97<L>  |  30<H>  ----------------------------<  149<H>  4.1   |  28  |  2.3<H>    Ca    8.6      22 Aug 2018 06:08    TPro  6.9  /  Alb  3.0<L>  /  TBili  0.5  /  DBili  x   /  AST  29  /  ALT  17  /  AlkPhos  111  08-22    PT/INR - ( 20 Aug 2018 13:02 )   PT: 13.60 sec;   INR: 1.26 ratio         PTT - ( 20 Aug 2018 13:02 )  PTT:40.5 sec          CARDIAC MARKERS ( 21 Aug 2018 07:29 )  x     / 0.01 ng/mL / 51 U/L / x     / x      CARDIAC MARKERS ( 20 Aug 2018 13:02 )  x     / <0.01 ng/mL / x     / x     / x        < from: Transthoracic Echocardiogram (08.21.18 @ 08:35) >  Summary:   1. Left ventricular ejection fraction, by visual estimation, is 25 to   30%.   2. Severely decreased global left ventricular systolic function.   3. Mild tricuspid regurgitation.   4. Trace pulmonic valve regurgitation.    PHYSICIAN INTERPRETATION:  Left Ventricle: The left ventricular internal cavity size is moderately   increased. Global LV systolic function was severely decreased. Left   ventricular ejection fraction, by visual estimation, is 25 to 30%.  Right Ventricle: The right ventricular size is normal.  Left Atrium: Moderately enlarged left atrium.  Right Atrium: Normal right atrial size.  Pericardium: There is no evidence of pericardial effusion.  Mitral Valve: Moderate mitral valve regurgitation is seen. The mitral   valve is normal in structure.  Tricuspid Valve: Mild tricuspid regurgitation is visualized. The   tricuspid valve is normal.  Aortic Valve: No evidence of aortic valve regurgitation is seen. The   aortic valve is trileaflet. No evidence of aortic stenosis.  Pulmonic Valve: Trace pulmonic valve regurgitation.  Aorta: The aortic root is normal in size and structure.  Additional Comments: A pacer wire is visualized in the right ventricle   and right atrium.    < end of copied text >      RADIOLOGY:  < from: Xray Chest 2 Views PA/Lat (08.20.18 @ 14:13) >  mpression:      Interval increase in bilateral interstitial opacities since 1/19/2018.      < end of copied text >    < from: Xray Chest 1 View- PORTABLE-Routine (08.21.18 @ 04:56) >  IMPRESSION:    Decreased hilar hypervascularity and diffuse interstitial markings.      < end of copied text >      PHYSICAL EXAM:  GEN: No acute distress  HEENT: supple neck. no jvd  LUNGS: Clear to auscultation bilaterally   HEART: S1/S2 present. RRR.   ABD: Soft, non-tender, non-distended. Bowel sounds present  EXT: within normal limits  NEURO: AAOX3
BECKY SULY  57y  Female      Patient is a 57y old  Female who presents with a chief complaint of Difficulty breathing, lump on my (R) side (21 Aug 2018 23:30)      INTERVAL HPI/OVERNIGHT EVENTS:      ******************************* REVIEW OF SYSTEMS:**********************************************    All other review of systems negative    *********************** VITALS ******************************************    T(F): 96.3 (08-22-18 @ 04:50)  HR: 69 (08-22-18 @ 04:50) (69 - 90)  BP: 82/45 (08-22-18 @ 04:50) (82/45 - 133/69)  RR: 18 (08-22-18 @ 04:50) (18 - 18)  SpO2: --            ******************************** PHYSICAL EXAM:**************************************************  GENERAL: NAD    PSYCH: no agitation, baseline mentation  HEENT:     NERVOUS SYSTEM:  Alert & Oriented X3, MS  5/5 B/L  UE and LE ; Sensory intact    PULMONARY: HALEY, CTA    CARDIOVASCULAR: S1S2 RRR    GI: Soft, RUQ tenderness, ND; BS present.    EXTREMITIES:  Chronic skin changes LE B/L    LYMPH: No lymphadenopathy noted    SKIN: AS ABOVE    ******************************************************************************************    Consultant(s) Notes Reviewed:  [x ] YES  [ ] NO    Discussed with Consultants/Other Providers [ x] YES     **************************** LABS *******************************************************                          11.4   5.09  )-----------( 173      ( 22 Aug 2018 06:08 )             35.3     08-22    137  |  97<L>  |  30<H>  ----------------------------<  149<H>  4.1   |  28  |  2.3<H>    Ca    8.6      22 Aug 2018 06:08    TPro  6.9  /  Alb  3.0<L>  /  TBili  0.5  /  DBili  x   /  AST  29  /  ALT  17  /  AlkPhos  111  08-22          Lactate Trend    CARDIAC MARKERS ( 21 Aug 2018 07:29 )  x     / 0.01 ng/mL / 51 U/L / x     / x          CAPILLARY BLOOD GLUCOSE  139 (21 Aug 2018 16:22)      POCT Blood Glucose.: 137 mg/dL (22 Aug 2018 10:57)          **************************Active Medications *******************************************  aspirin (Other (Moderate))      amitriptyline 10 milliGRAM(s) Oral at bedtime  atorvastatin 40 milliGRAM(s) Oral at bedtime  buDESOnide 160 MICROgram(s)/formoterol 4.5 MICROgram(s) Inhaler 2 Puff(s) Inhalation two times a day  busPIRone 10 milliGRAM(s) Oral two times a day  dextrose 40% Gel 15 Gram(s) Oral once PRN  dextrose 5%. 1000 milliLiter(s) IV Continuous <Continuous>  dextrose 50% Injectable 12.5 Gram(s) IV Push once  dextrose 50% Injectable 25 Gram(s) IV Push once  dextrose 50% Injectable 25 Gram(s) IV Push once  enoxaparin Injectable 40 milliGRAM(s) SubCutaneous daily  gabapentin 100 milliGRAM(s) Oral three times a day  glucagon  Injectable 1 milliGRAM(s) IntraMuscular once PRN  insulin glargine Injectable (LANTUS) 25 Unit(s) SubCutaneous at bedtime  insulin lispro (HumaLOG) corrective regimen sliding scale   SubCutaneous three times a day before meals  insulin lispro Injectable (HumaLOG) 7 Unit(s) SubCutaneous before breakfast  insulin lispro Injectable (HumaLOG) 7 Unit(s) SubCutaneous before lunch  insulin lispro Injectable (HumaLOG) 7 Unit(s) SubCutaneous before dinner  methadone    Tablet 10 milliGRAM(s) Oral daily  metoprolol succinate ER 25 milliGRAM(s) Oral daily  traZODone 50 milliGRAM(s) Oral daily      ***************************************************  RADIOLOGY & ADDITIONAL TESTS:    Imaging Personally Reviewed:  [ ] YES  [ ] NO    HEALTH ISSUES - PROBLEM Dx:
BECKY SULY  57y  Female      Patient is a 57y old  Female who presents with a chief complaint of Difficulty breathing, lump on my (R) side (23 Aug 2018 15:34)      INTERVAL HPI/OVERNIGHT EVENTS:      ******************************* REVIEW OF SYSTEMS:**********************************************      All other review of systems negative    *********************** VITALS ******************************************    T(F): 97.5 (08-24-18 @ 04:54)  HR: 88 (08-24-18 @ 04:54) (76 - 89)  BP: 129/59 (08-24-18 @ 04:54) (99/57 - 129/59)  RR: 18 (08-24-18 @ 04:54) (18 - 20)  SpO2: 80% (08-23-18 @ 12:11) (80% - 97%)            ******************************** PHYSICAL EXAM:**************************************************  GENERAL: NAD    PSYCH: no agitation, baseline mentation  HEENT:     NERVOUS SYSTEM:  Alert & Oriented X3, MS  5/5 B/L  UE and LE ; Sensory intact    PULMONARY: HALEY, decreased crackles     CARDIOVASCULAR: S1S2 RRR    GI: Soft, NT, ND; BS present.    EXTREMITIES:  2+ Peripheral Pulses, No clubbing, cyanosis, or edema    LYMPH: No lymphadenopathy noted    SKIN: No rashes or lesions    ******************************************************************************************    Consultant(s) Notes Reviewed:  [x ] YES  [ ] NO    Discussed with Consultants/Other Providers [ x] YES     **************************** LABS *******************************************************                          12.0   4.81  )-----------( 197      ( 24 Aug 2018 07:31 )             38.1     08-24    138  |  101  |  23<H>  ----------------------------<  214<H>  4.6   |  25  |  1.4    Ca    8.6      24 Aug 2018 07:31            Lactate Trend        CAPILLARY BLOOD GLUCOSE      POCT Blood Glucose.: 215 mg/dL (24 Aug 2018 07:47)          **************************Active Medications *******************************************  aspirin (Other (Moderate))      acetaminophen   Tablet. 650 milliGRAM(s) Oral every 4 hours PRN  amitriptyline 25 milliGRAM(s) Oral at bedtime  atorvastatin 40 milliGRAM(s) Oral at bedtime  buDESOnide 160 MICROgram(s)/formoterol 4.5 MICROgram(s) Inhaler 2 Puff(s) Inhalation two times a day  busPIRone 10 milliGRAM(s) Oral two times a day  dextrose 40% Gel 15 Gram(s) Oral once PRN  dextrose 5%. 1000 milliLiter(s) IV Continuous <Continuous>  dextrose 50% Injectable 12.5 Gram(s) IV Push once  dextrose 50% Injectable 25 Gram(s) IV Push once  dextrose 50% Injectable 25 Gram(s) IV Push once  enoxaparin Injectable 40 milliGRAM(s) SubCutaneous daily  gabapentin 100 milliGRAM(s) Oral three times a day  glucagon  Injectable 1 milliGRAM(s) IntraMuscular once PRN  insulin glargine Injectable (LANTUS) 25 Unit(s) SubCutaneous at bedtime  insulin lispro (HumaLOG) corrective regimen sliding scale   SubCutaneous three times a day before meals  insulin lispro Injectable (HumaLOG) 7 Unit(s) SubCutaneous before breakfast  insulin lispro Injectable (HumaLOG) 7 Unit(s) SubCutaneous before lunch  insulin lispro Injectable (HumaLOG) 7 Unit(s) SubCutaneous before dinner  ketorolac   Injectable 15 milliGRAM(s) IV Push once  methadone    Tablet 10 milliGRAM(s) Oral daily  metoprolol succinate ER 25 milliGRAM(s) Oral daily  traZODone 50 milliGRAM(s) Oral daily      ***************************************************  RADIOLOGY & ADDITIONAL TESTS:    Imaging Personally Reviewed:  [ ] YES  [ ] NO    HEALTH ISSUES - PROBLEM Dx:
Patient is a 57y old  Female who presents with a chief complaint of shortness of breath (20 Aug 2018 19:35)      PAST MEDICAL & SURGICAL HISTORY:  Chronic pain  HLD (hyperlipidemia)  CVA (cerebral vascular accident)  Depression  COPD (chronic obstructive pulmonary disease)  Diabetes  CHF (congestive heart failure)  History of cholecystectomy  H/O tubal ligation  H/O abdominal hysterectomy  AICD (automatic cardioverter/defibrillator) present      MEDICATIONS  (STANDING):  amitriptyline 10 milliGRAM(s) Oral at bedtime  atorvastatin 40 milliGRAM(s) Oral at bedtime  buDESOnide 160 MICROgram(s)/formoterol 4.5 MICROgram(s) Inhaler 2 Puff(s) Inhalation two times a day  busPIRone 10 milliGRAM(s) Oral two times a day  dextrose 5%. 1000 milliLiter(s) (50 mL/Hr) IV Continuous <Continuous>  dextrose 50% Injectable 12.5 Gram(s) IV Push once  dextrose 50% Injectable 25 Gram(s) IV Push once  dextrose 50% Injectable 25 Gram(s) IV Push once  enoxaparin Injectable 40 milliGRAM(s) SubCutaneous daily  furosemide   Injectable 40 milliGRAM(s) IV Push two times a day  gabapentin 100 milliGRAM(s) Oral three times a day  insulin glargine Injectable (LANTUS) 25 Unit(s) SubCutaneous at bedtime  insulin lispro (HumaLOG) corrective regimen sliding scale   SubCutaneous three times a day before meals  insulin lispro Injectable (HumaLOG) 7 Unit(s) SubCutaneous before breakfast  insulin lispro Injectable (HumaLOG) 7 Unit(s) SubCutaneous before lunch  insulin lispro Injectable (HumaLOG) 7 Unit(s) SubCutaneous before dinner  methadone    Tablet 10 milliGRAM(s) Oral daily  metoprolol succinate ER 25 milliGRAM(s) Oral daily  sacubitril 24 mG/valsartan 26 mG 1 Tablet(s) Oral two times a day  traZODone 50 milliGRAM(s) Oral daily    MEDICATIONS  (PRN):  acetaminophen   Tablet. 650 milliGRAM(s) Oral once PRN Moderate Pain (4 - 6)  dextrose 40% Gel 15 Gram(s) Oral once PRN Blood Glucose LESS THAN 70 milliGRAM(s)/deciliter  glucagon  Injectable 1 milliGRAM(s) IntraMuscular once PRN Glucose LESS THAN 70 milligrams/deciliter      Overnight events:    Vital Signs Last 24 Hrs  T(C): 36.1 (21 Aug 2018 08:23), Max: 36.6 (21 Aug 2018 04:55)  T(F): 97 (21 Aug 2018 08:23), Max: 97.9 (21 Aug 2018 04:55)  HR: 89 (21 Aug 2018 08:23) (80 - 92)  BP: 95/50 (21 Aug 2018 08:23) (95/50 - 128/70)  BP(mean): --  RR: 18 (21 Aug 2018 08:23) (16 - 18)  SpO2: 99% (21 Aug 2018 08:23) (98% - 99%)  CAPILLARY BLOOD GLUCOSE  223 (21 Aug 2018 11:28)        I&O's Summary      Physical Exam:    GENERAL: NAD  EYES: EOMI, PERRLA, conjunctiva and sclera clear  NECK: Supple  CHEST/LUNG: mild bilateral crackles  HEART: rrr,no m/r/g/  ABDOMEN: soft,NT, +BS  EXTREMITIES:  2+ Peripheral Pulses, No clubbing, cyanosis, or edema  PSYCH: AAOx3  NEUROLOGY: non-focal          Labs:                        12.1   3.78  )-----------( 181      ( 21 Aug 2018 07:29 )             37.8             08-21    138  |  96<L>  |  22<H>  ----------------------------<  198<H>  4.1   |  26  |  1.5    Ca    9.0      21 Aug 2018 07:29    TPro  8.0  /  Alb  3.4<L>  /  TBili  1.0  /  DBili  x   /  AST  29  /  ALT  17  /  AlkPhos  137<H>  08-21    LIVER FUNCTIONS - ( 21 Aug 2018 07:29 )  Alb: 3.4 g/dL / Pro: 8.0 g/dL / ALK PHOS: 137 U/L / ALT: 17 U/L / AST: 29 U/L / GGT: x                 PT/INR - ( 20 Aug 2018 13:02 )   PT: 13.60 sec;   INR: 1.26 ratio         PTT - ( 20 Aug 2018 13:02 )  PTT:40.5 sec  CARDIAC MARKERS ( 21 Aug 2018 07:29 )  x     / 0.01 ng/mL / 51 U/L / x     / x      CARDIAC MARKERS ( 20 Aug 2018 13:02 )  x     / <0.01 ng/mL / x     / x     / x
SULY PENA  57y  Female      Patient is a 57y old  Female who presents with a chief complaint of Difficulty breathing, lump on my (R) side (21 Aug 2018 23:30)      INTERVAL HPI/OVERNIGHT EVENTS:      ******************************* REVIEW OF SYSTEMS:**********************************************    All other review of systems negative    *********************** VITALS ******************************************    T(F): 97.5 (08-23-18 @ 04:34)  HR: 85 (08-23-18 @ 04:34) (85 - 98)  BP: 112/57 (08-23-18 @ 04:34) (99/53 - 112/57)  RR: 16 (08-23-18 @ 04:34) (16 - 16)  SpO2: 80% (08-23-18 @ 12:11) (80% - 97%)            ******************************** PHYSICAL EXAM:**************************************************  GENERAL: NAD    PSYCH: no agitation, baseline mentation  HEENT:     NERVOUS SYSTEM:  Alert & Oriented X3, MS  5/5 B/L  UE and LE ; Sensory intact    PULMONARY: HALEY, decreased crackles     CARDIOVASCULAR: S1S2 RRR    GI: Soft, NT, ND; BS present.    EXTREMITIES:  2+ Peripheral Pulses, No clubbing, cyanosis, or edema    LYMPH: No lymphadenopathy noted    SKIN: No rashes or lesions    ******************************************************************************************    Consultant(s) Notes Reviewed:  [x ] YES  [ ] NO    Discussed with Consultants/Other Providers [ x] YES     **************************** LABS *******************************************************                          12.0   5.42  )-----------( 181      ( 23 Aug 2018 08:03 )             37.6     08-23    137  |  99  |  29<H>  ----------------------------<  166<H>  4.6   |  25  |  1.8<H>    Ca    8.6      23 Aug 2018 08:03    TPro  6.9  /  Alb  3.0<L>  /  TBili  0.5  /  DBili  x   /  AST  29  /  ALT  17  /  AlkPhos  111  08-22          Lactate Trend        CAPILLARY BLOOD GLUCOSE  139 (21 Aug 2018 16:22)      POCT Blood Glucose.: 161 mg/dL (23 Aug 2018 11:26)          **************************Active Medications *******************************************  aspirin (Other (Moderate))      acetaminophen   Tablet. 650 milliGRAM(s) Oral every 4 hours PRN  amitriptyline 25 milliGRAM(s) Oral at bedtime  atorvastatin 40 milliGRAM(s) Oral at bedtime  buDESOnide 160 MICROgram(s)/formoterol 4.5 MICROgram(s) Inhaler 2 Puff(s) Inhalation two times a day  busPIRone 10 milliGRAM(s) Oral two times a day  dextrose 40% Gel 15 Gram(s) Oral once PRN  dextrose 5%. 1000 milliLiter(s) IV Continuous <Continuous>  dextrose 50% Injectable 12.5 Gram(s) IV Push once  dextrose 50% Injectable 25 Gram(s) IV Push once  dextrose 50% Injectable 25 Gram(s) IV Push once  enoxaparin Injectable 40 milliGRAM(s) SubCutaneous daily  gabapentin 100 milliGRAM(s) Oral three times a day  glucagon  Injectable 1 milliGRAM(s) IntraMuscular once PRN  insulin glargine Injectable (LANTUS) 25 Unit(s) SubCutaneous at bedtime  insulin lispro (HumaLOG) corrective regimen sliding scale   SubCutaneous three times a day before meals  insulin lispro Injectable (HumaLOG) 7 Unit(s) SubCutaneous before breakfast  insulin lispro Injectable (HumaLOG) 7 Unit(s) SubCutaneous before lunch  insulin lispro Injectable (HumaLOG) 7 Unit(s) SubCutaneous before dinner  lidocaine   Patch 2 Patch Transdermal once  methadone    Tablet 10 milliGRAM(s) Oral daily  metoprolol succinate ER 25 milliGRAM(s) Oral daily  traZODone 50 milliGRAM(s) Oral daily      ***************************************************  RADIOLOGY & ADDITIONAL TESTS:    Imaging Personally Reviewed:  [ ] YES  [ ] NO    HEALTH ISSUES - PROBLEM Dx:
LENGTH OF HOSPITAL STAY: 3d    CHIEF COMPLAINT:   Patient is a 57y old  Female who presents with a chief complaint of Difficulty breathing, lump on my (R) side (21 Aug 2018 23:30)      Overnight events:    No acute events overnight    ALLERGIES:  aspirin (Other (Moderate))    MEDICATIONS:  STANDING MEDICATIONS  amitriptyline 25 milliGRAM(s) Oral at bedtime  atorvastatin 40 milliGRAM(s) Oral at bedtime  buDESOnide 160 MICROgram(s)/formoterol 4.5 MICROgram(s) Inhaler 2 Puff(s) Inhalation two times a day  busPIRone 10 milliGRAM(s) Oral two times a day  dextrose 5%. 1000 milliLiter(s) IV Continuous <Continuous>  dextrose 50% Injectable 12.5 Gram(s) IV Push once  dextrose 50% Injectable 25 Gram(s) IV Push once  dextrose 50% Injectable 25 Gram(s) IV Push once  enoxaparin Injectable 40 milliGRAM(s) SubCutaneous daily  gabapentin 100 milliGRAM(s) Oral three times a day  insulin glargine Injectable (LANTUS) 25 Unit(s) SubCutaneous at bedtime  insulin lispro (HumaLOG) corrective regimen sliding scale   SubCutaneous three times a day before meals  insulin lispro Injectable (HumaLOG) 7 Unit(s) SubCutaneous before breakfast  insulin lispro Injectable (HumaLOG) 7 Unit(s) SubCutaneous before lunch  insulin lispro Injectable (HumaLOG) 7 Unit(s) SubCutaneous before dinner  lidocaine   Patch 2 Patch Transdermal once  methadone    Tablet 10 milliGRAM(s) Oral daily  metoprolol succinate ER 25 milliGRAM(s) Oral daily  traZODone 50 milliGRAM(s) Oral daily    PRN MEDICATIONS  acetaminophen   Tablet. 650 milliGRAM(s) Oral every 4 hours PRN  dextrose 40% Gel 15 Gram(s) Oral once PRN  glucagon  Injectable 1 milliGRAM(s) IntraMuscular once PRN    VITALS:   T(F): 97.5  HR: 85  BP: 112/57  RR: 16  SpO2: 97%    LABS:                        12.0   5.42  )-----------( 181      ( 23 Aug 2018 08:03 )             37.6     08-23    137  |  99  |  29<H>  ----------------------------<  166<H>  4.6   |  25  |  1.8<H>    Ca    8.6      23 Aug 2018 08:03    TPro  6.9  /  Alb  3.0<L>  /  TBili  0.5  /  DBili  x   /  AST  29  /  ALT  17  /  AlkPhos  111  08-22    < from: Transthoracic Echocardiogram (08.21.18 @ 08:35) >  Summary:   1. Left ventricular ejection fraction, by visual estimation, is 25 to   30%.   2. Severely decreased global left ventricular systolic function.   3. Mild tricuspid regurgitation.   4. Trace pulmonic valve regurgitation.      RADIOLOGY:  < from: Xray Chest 2 Views PA/Lat (08.20.18 @ 14:13) >  mpression:      Interval increase in bilateral interstitial opacities since 1/19/2018.      < end of copied text >    < from: Xray Chest 1 View- PORTABLE-Routine (08.21.18 @ 04:56) >  IMPRESSION:    Decreased hilar hypervascularity and diffuse interstitial markings.      < end of copied text >    PHYSICAL EXAM:  GEN: No acute distress  HEENT:   LUNGS: Clear to auscultation bilaterally   HEART: S1/S2 present. RRR.   ABD: Soft, non-tender, non-distended. Bowel sounds present  EXT:  NEURO: AAOX3

## 2018-08-31 DIAGNOSIS — F31.9 BIPOLAR DISORDER, UNSPECIFIED: ICD-10-CM

## 2018-08-31 DIAGNOSIS — Z87.891 PERSONAL HISTORY OF NICOTINE DEPENDENCE: ICD-10-CM

## 2018-08-31 DIAGNOSIS — I50.23 ACUTE ON CHRONIC SYSTOLIC (CONGESTIVE) HEART FAILURE: ICD-10-CM

## 2018-08-31 DIAGNOSIS — I08.8 OTHER RHEUMATIC MULTIPLE VALVE DISEASES: ICD-10-CM

## 2018-08-31 DIAGNOSIS — N18.3 CHRONIC KIDNEY DISEASE, STAGE 3 (MODERATE): ICD-10-CM

## 2018-08-31 DIAGNOSIS — I13.0 HYPERTENSIVE HEART AND CHRONIC KIDNEY DISEASE WITH HEART FAILURE AND STAGE 1 THROUGH STAGE 4 CHRONIC KIDNEY DISEASE, OR UNSPECIFIED CHRONIC KIDNEY DISEASE: ICD-10-CM

## 2018-08-31 DIAGNOSIS — F32.9 MAJOR DEPRESSIVE DISORDER, SINGLE EPISODE, UNSPECIFIED: ICD-10-CM

## 2018-08-31 DIAGNOSIS — J44.9 CHRONIC OBSTRUCTIVE PULMONARY DISEASE, UNSPECIFIED: ICD-10-CM

## 2018-08-31 DIAGNOSIS — M79.7 FIBROMYALGIA: ICD-10-CM

## 2018-08-31 DIAGNOSIS — J96.10 CHRONIC RESPIRATORY FAILURE, UNSPECIFIED WHETHER WITH HYPOXIA OR HYPERCAPNIA: ICD-10-CM

## 2018-08-31 DIAGNOSIS — I69.351 HEMIPLEGIA AND HEMIPARESIS FOLLOWING CEREBRAL INFARCTION AFFECTING RIGHT DOMINANT SIDE: ICD-10-CM

## 2018-08-31 DIAGNOSIS — E78.5 HYPERLIPIDEMIA, UNSPECIFIED: ICD-10-CM

## 2018-08-31 DIAGNOSIS — E11.22 TYPE 2 DIABETES MELLITUS WITH DIABETIC CHRONIC KIDNEY DISEASE: ICD-10-CM

## 2018-08-31 LAB — GLUCOSE BLDC GLUCOMTR-MCNC: 139 MG/DL — HIGH (ref 70–99)

## 2018-09-11 PROBLEM — M25.511 PAIN IN JOINT OF RIGHT SHOULDER: Status: ACTIVE | Noted: 2018-09-11

## 2018-09-12 ENCOUNTER — APPOINTMENT (OUTPATIENT)
Dept: INTERNAL MEDICINE | Facility: CLINIC | Age: 58
End: 2018-09-12

## 2018-09-12 ENCOUNTER — APPOINTMENT (OUTPATIENT)
Dept: ORTHOPEDIC SURGERY | Facility: CLINIC | Age: 58
End: 2018-09-12

## 2018-09-12 ENCOUNTER — OUTPATIENT (OUTPATIENT)
Dept: OUTPATIENT SERVICES | Facility: HOSPITAL | Age: 58
LOS: 1 days | Discharge: HOME | End: 2018-09-12

## 2018-09-12 VITALS
BODY MASS INDEX: 36.54 KG/M2 | HEIGHT: 64 IN | DIASTOLIC BLOOD PRESSURE: 75 MMHG | WEIGHT: 214 LBS | SYSTOLIC BLOOD PRESSURE: 141 MMHG | HEART RATE: 120 BPM | TEMPERATURE: 98.2 F

## 2018-09-12 DIAGNOSIS — Z90.710 ACQUIRED ABSENCE OF BOTH CERVIX AND UTERUS: Chronic | ICD-10-CM

## 2018-09-12 DIAGNOSIS — Z90.49 ACQUIRED ABSENCE OF OTHER SPECIFIED PARTS OF DIGESTIVE TRACT: Chronic | ICD-10-CM

## 2018-09-12 DIAGNOSIS — Z98.51 TUBAL LIGATION STATUS: Chronic | ICD-10-CM

## 2018-09-12 DIAGNOSIS — Z95.810 PRESENCE OF AUTOMATIC (IMPLANTABLE) CARDIAC DEFIBRILLATOR: Chronic | ICD-10-CM

## 2018-09-12 DIAGNOSIS — I63.9 CEREBRAL INFARCTION, UNSPECIFIED: ICD-10-CM

## 2018-09-12 DIAGNOSIS — Z79.01 LONG TERM (CURRENT) USE OF ANTICOAGULANTS: ICD-10-CM

## 2018-09-12 DIAGNOSIS — M25.511 PAIN IN RIGHT SHOULDER: ICD-10-CM

## 2018-09-12 PROBLEM — I50.9 HEART FAILURE, UNSPECIFIED: Chronic | Status: ACTIVE | Noted: 2018-08-20

## 2018-09-12 PROBLEM — E78.5 HYPERLIPIDEMIA, UNSPECIFIED: Chronic | Status: ACTIVE | Noted: 2018-08-20

## 2018-09-12 PROBLEM — J44.9 CHRONIC OBSTRUCTIVE PULMONARY DISEASE, UNSPECIFIED: Chronic | Status: ACTIVE | Noted: 2018-08-20

## 2018-09-12 PROBLEM — G89.29 OTHER CHRONIC PAIN: Chronic | Status: ACTIVE | Noted: 2018-08-20

## 2018-09-12 LAB
POCT INR: 1.1 RATIO — SIGNIFICANT CHANGE UP (ref 0.9–1.2)
POCT PT: 13.6 SEC — HIGH (ref 10–13.4)

## 2018-09-12 RX ORDER — CARVEDILOL 6.25 MG/1
6.25 TABLET, FILM COATED ORAL TWICE DAILY
Qty: 180 | Refills: 0 | Status: COMPLETED | COMMUNITY
Start: 2017-03-21 | End: 2018-09-12

## 2018-09-13 DIAGNOSIS — I82.519 CHRONIC EMBOLISM AND THROMBOSIS OF UNSPECIFIED FEMORAL VEIN: ICD-10-CM

## 2018-09-13 DIAGNOSIS — Z23 ENCOUNTER FOR IMMUNIZATION: ICD-10-CM

## 2018-09-13 DIAGNOSIS — J44.9 CHRONIC OBSTRUCTIVE PULMONARY DISEASE, UNSPECIFIED: ICD-10-CM

## 2018-09-13 DIAGNOSIS — I50.23 ACUTE ON CHRONIC SYSTOLIC (CONGESTIVE) HEART FAILURE: ICD-10-CM

## 2018-09-14 DIAGNOSIS — S42.209A UNSPECIFIED FRACTURE OF UPPER END OF UNSPECIFIED HUMERUS, INITIAL ENCOUNTER FOR CLOSED FRACTURE: ICD-10-CM

## 2018-09-18 ENCOUNTER — APPOINTMENT (OUTPATIENT)
Dept: CARDIOLOGY | Facility: CLINIC | Age: 58
End: 2018-09-18

## 2018-09-20 ENCOUNTER — FORM ENCOUNTER (OUTPATIENT)
Age: 58
End: 2018-09-20

## 2018-09-21 ENCOUNTER — OUTPATIENT (OUTPATIENT)
Dept: OUTPATIENT SERVICES | Facility: HOSPITAL | Age: 58
LOS: 1 days | Discharge: HOME | End: 2018-09-21

## 2018-09-21 ENCOUNTER — APPOINTMENT (OUTPATIENT)
Dept: SURGERY | Facility: CLINIC | Age: 58
End: 2018-09-21
Payer: MEDICARE

## 2018-09-21 VITALS
WEIGHT: 213 LBS | SYSTOLIC BLOOD PRESSURE: 124 MMHG | DIASTOLIC BLOOD PRESSURE: 66 MMHG | HEIGHT: 64 IN | BODY MASS INDEX: 36.37 KG/M2

## 2018-09-21 DIAGNOSIS — Z98.51 TUBAL LIGATION STATUS: Chronic | ICD-10-CM

## 2018-09-21 DIAGNOSIS — Z90.49 ACQUIRED ABSENCE OF OTHER SPECIFIED PARTS OF DIGESTIVE TRACT: Chronic | ICD-10-CM

## 2018-09-21 DIAGNOSIS — D17.9 BENIGN LIPOMATOUS NEOPLASM, UNSPECIFIED: ICD-10-CM

## 2018-09-21 DIAGNOSIS — Z95.810 PRESENCE OF AUTOMATIC (IMPLANTABLE) CARDIAC DEFIBRILLATOR: Chronic | ICD-10-CM

## 2018-09-21 DIAGNOSIS — Z90.710 ACQUIRED ABSENCE OF BOTH CERVIX AND UTERUS: Chronic | ICD-10-CM

## 2018-09-21 DIAGNOSIS — R92.8 OTHER ABNORMAL AND INCONCLUSIVE FINDINGS ON DIAGNOSTIC IMAGING OF BREAST: ICD-10-CM

## 2018-09-21 DIAGNOSIS — I48.91 UNSPECIFIED ATRIAL FIBRILLATION: ICD-10-CM

## 2018-09-21 DIAGNOSIS — Z12.31 ENCOUNTER FOR SCREENING MAMMOGRAM FOR MALIGNANT NEOPLASM OF BREAST: ICD-10-CM

## 2018-09-21 DIAGNOSIS — I82.409 ACUTE EMBOLISM AND THROMBOSIS OF UNSPECIFIED DEEP VEINS OF UNSPECIFIED LOWER EXTREMITY: ICD-10-CM

## 2018-09-21 DIAGNOSIS — Z79.01 LONG TERM (CURRENT) USE OF ANTICOAGULANTS: ICD-10-CM

## 2018-09-21 DIAGNOSIS — Z02.9 ENCOUNTER FOR ADMINISTRATIVE EXAMINATIONS, UNSPECIFIED: ICD-10-CM

## 2018-09-21 LAB
POCT INR: 1.1 RATIO — SIGNIFICANT CHANGE UP (ref 0.9–1.2)
POCT PT: 13.7 SEC — HIGH (ref 10–13.4)

## 2018-09-21 PROCEDURE — 99213 OFFICE O/P EST LOW 20 MIN: CPT

## 2018-09-24 DIAGNOSIS — Z02.9 ENCOUNTER FOR ADMINISTRATIVE EXAMINATIONS, UNSPECIFIED: ICD-10-CM

## 2018-09-24 DIAGNOSIS — D17.9 BENIGN LIPOMATOUS NEOPLASM, UNSPECIFIED: ICD-10-CM

## 2018-09-24 DIAGNOSIS — I82.409 ACUTE EMBOLISM AND THROMBOSIS OF UNSPECIFIED DEEP VEINS OF UNSPECIFIED LOWER EXTREMITY: ICD-10-CM

## 2018-09-24 DIAGNOSIS — R92.8 OTHER ABNORMAL AND INCONCLUSIVE FINDINGS ON DIAGNOSTIC IMAGING OF BREAST: ICD-10-CM

## 2018-09-24 LAB
ALBUMIN SERPL ELPH-MCNC: 3.5 G/DL
ALP BLD-CCNC: 125 U/L
ALT SERPL-CCNC: 9 U/L
ANION GAP SERPL CALC-SCNC: 12 MMOL/L
AST SERPL-CCNC: 14 U/L
BILIRUB SERPL-MCNC: 0.4 MG/DL
BUN SERPL-MCNC: 22 MG/DL
CALCIUM SERPL-MCNC: 9.1 MG/DL
CHLORIDE SERPL-SCNC: 96 MMOL/L
CO2 SERPL-SCNC: 31 MMOL/L
CREAT SERPL-MCNC: 1.7 MG/DL
GLUCOSE SERPL-MCNC: 282 MG/DL
HCV AB SER QL: NONREACTIVE
HCV S/CO RATIO: 0.05 S/CO
POTASSIUM SERPL-SCNC: 4.4 MMOL/L
PROT SERPL-MCNC: 7.8 G/DL
SODIUM SERPL-SCNC: 139 MMOL/L

## 2018-09-28 ENCOUNTER — APPOINTMENT (OUTPATIENT)
Dept: SURGERY | Facility: CLINIC | Age: 58
End: 2018-09-28

## 2018-09-28 LAB
HCV RNA SERPL NAA DL=5-ACNC: NOT DETECTED IU/ML
HCV RNA SERPL NAA+PROBE-LOG IU: NOT DETECTED LOGIU/ML

## 2018-10-01 ENCOUNTER — INPATIENT (INPATIENT)
Facility: HOSPITAL | Age: 58
LOS: 3 days | Discharge: HOME HEALTH | End: 2018-10-05
Attending: INTERNAL MEDICINE | Admitting: INTERNAL MEDICINE

## 2018-10-01 VITALS — OXYGEN SATURATION: 99 % | HEART RATE: 117 BPM

## 2018-10-01 DIAGNOSIS — Z95.810 PRESENCE OF AUTOMATIC (IMPLANTABLE) CARDIAC DEFIBRILLATOR: Chronic | ICD-10-CM

## 2018-10-01 DIAGNOSIS — Z98.51 TUBAL LIGATION STATUS: Chronic | ICD-10-CM

## 2018-10-01 DIAGNOSIS — Z90.49 ACQUIRED ABSENCE OF OTHER SPECIFIED PARTS OF DIGESTIVE TRACT: Chronic | ICD-10-CM

## 2018-10-01 DIAGNOSIS — Z90.710 ACQUIRED ABSENCE OF BOTH CERVIX AND UTERUS: Chronic | ICD-10-CM

## 2018-10-01 LAB
ALBUMIN SERPL ELPH-MCNC: 3.7 G/DL — SIGNIFICANT CHANGE UP (ref 3.5–5.2)
ALP SERPL-CCNC: 145 U/L — HIGH (ref 30–115)
ALT FLD-CCNC: 10 U/L — SIGNIFICANT CHANGE UP (ref 0–41)
ANION GAP SERPL CALC-SCNC: 15 MMOL/L — HIGH (ref 7–14)
AST SERPL-CCNC: 19 U/L — SIGNIFICANT CHANGE UP (ref 0–41)
BASE EXCESS BLDV CALC-SCNC: 5 MMOL/L — HIGH (ref -2–2)
BILIRUB SERPL-MCNC: 1 MG/DL — SIGNIFICANT CHANGE UP (ref 0.2–1.2)
BUN SERPL-MCNC: 21 MG/DL — HIGH (ref 10–20)
CA-I SERPL-SCNC: 1.13 MMOL/L — SIGNIFICANT CHANGE UP (ref 1.12–1.3)
CALCIUM SERPL-MCNC: 8.9 MG/DL — SIGNIFICANT CHANGE UP (ref 8.5–10.1)
CHLORIDE SERPL-SCNC: 93 MMOL/L — LOW (ref 98–110)
CO2 SERPL-SCNC: 24 MMOL/L — SIGNIFICANT CHANGE UP (ref 17–32)
CREAT SERPL-MCNC: 1.3 MG/DL — SIGNIFICANT CHANGE UP (ref 0.7–1.5)
FLU A RESULT: NEGATIVE — SIGNIFICANT CHANGE UP
FLU A RESULT: NEGATIVE — SIGNIFICANT CHANGE UP
FLUAV AG NPH QL: NEGATIVE — SIGNIFICANT CHANGE UP
FLUBV AG NPH QL: NEGATIVE — SIGNIFICANT CHANGE UP
GAS PNL BLDV: 133 MMOL/L — LOW (ref 136–145)
GAS PNL BLDV: SIGNIFICANT CHANGE UP
GLUCOSE BLDC GLUCOMTR-MCNC: 149 MG/DL — HIGH (ref 70–99)
GLUCOSE BLDC GLUCOMTR-MCNC: 276 MG/DL — HIGH (ref 70–99)
GLUCOSE BLDC GLUCOMTR-MCNC: 296 MG/DL — HIGH (ref 70–99)
GLUCOSE SERPL-MCNC: 303 MG/DL — HIGH (ref 70–99)
HCO3 BLDV-SCNC: 28 MMOL/L — SIGNIFICANT CHANGE UP (ref 22–29)
HCT VFR BLD CALC: 32.8 % — LOW (ref 37–47)
HCT VFR BLDA CALC: 35 % — SIGNIFICANT CHANGE UP (ref 34–44)
HGB BLD CALC-MCNC: 11.4 G/DL — LOW (ref 14–18)
HGB BLD-MCNC: 10.7 G/DL — LOW (ref 12–16)
LACTATE BLDV-MCNC: 1.5 MMOL/L — SIGNIFICANT CHANGE UP (ref 0.5–1.6)
MCHC RBC-ENTMCNC: 27.9 PG — SIGNIFICANT CHANGE UP (ref 27–31)
MCHC RBC-ENTMCNC: 32.6 G/DL — SIGNIFICANT CHANGE UP (ref 32–37)
MCV RBC AUTO: 85.4 FL — SIGNIFICANT CHANGE UP (ref 81–99)
NRBC # BLD: 0 /100 WBCS — SIGNIFICANT CHANGE UP (ref 0–0)
NT-PROBNP SERPL-SCNC: HIGH PG/ML (ref 0–300)
PCO2 BLDV: 35 MMHG — LOW (ref 41–51)
PH BLDV: 7.51 — HIGH (ref 7.26–7.43)
PLATELET # BLD AUTO: 178 K/UL — SIGNIFICANT CHANGE UP (ref 130–400)
PO2 BLDV: 140 MMHG — HIGH (ref 20–40)
POTASSIUM BLDV-SCNC: 4.5 MMOL/L — SIGNIFICANT CHANGE UP (ref 3.3–5.6)
POTASSIUM SERPL-MCNC: 4.7 MMOL/L — SIGNIFICANT CHANGE UP (ref 3.5–5)
POTASSIUM SERPL-SCNC: 4.7 MMOL/L — SIGNIFICANT CHANGE UP (ref 3.5–5)
PROT SERPL-MCNC: 7.9 G/DL — SIGNIFICANT CHANGE UP (ref 6–8)
RBC # BLD: 3.84 M/UL — LOW (ref 4.2–5.4)
RBC # FLD: 16.1 % — HIGH (ref 11.5–14.5)
SAO2 % BLDV: 100 % — SIGNIFICANT CHANGE UP
SODIUM SERPL-SCNC: 132 MMOL/L — LOW (ref 135–146)
TROPONIN T SERPL-MCNC: <0.01 NG/ML — SIGNIFICANT CHANGE UP
WBC # BLD: 8.99 K/UL — SIGNIFICANT CHANGE UP (ref 4.8–10.8)
WBC # FLD AUTO: 8.99 K/UL — SIGNIFICANT CHANGE UP (ref 4.8–10.8)

## 2018-10-01 RX ORDER — INSULIN LISPRO 100/ML
VIAL (ML) SUBCUTANEOUS
Qty: 0 | Refills: 0 | Status: DISCONTINUED | OUTPATIENT
Start: 2018-10-01 | End: 2018-10-05

## 2018-10-01 RX ORDER — DEXTROSE 50 % IN WATER 50 %
12.5 SYRINGE (ML) INTRAVENOUS ONCE
Qty: 0 | Refills: 0 | Status: DISCONTINUED | OUTPATIENT
Start: 2018-10-01 | End: 2018-10-05

## 2018-10-01 RX ORDER — METHADONE HYDROCHLORIDE 40 MG/1
10 TABLET ORAL DAILY
Qty: 0 | Refills: 0 | Status: DISCONTINUED | OUTPATIENT
Start: 2018-10-01 | End: 2018-10-05

## 2018-10-01 RX ORDER — IPRATROPIUM/ALBUTEROL SULFATE 18-103MCG
3 AEROSOL WITH ADAPTER (GRAM) INHALATION ONCE
Qty: 0 | Refills: 0 | Status: COMPLETED | OUTPATIENT
Start: 2018-10-01 | End: 2018-10-01

## 2018-10-01 RX ORDER — INSULIN GLARGINE 100 [IU]/ML
25 INJECTION, SOLUTION SUBCUTANEOUS AT BEDTIME
Qty: 0 | Refills: 0 | Status: DISCONTINUED | OUTPATIENT
Start: 2018-10-01 | End: 2018-10-05

## 2018-10-01 RX ORDER — FUROSEMIDE 40 MG
60 TABLET ORAL
Qty: 0 | Refills: 0 | Status: DISCONTINUED | OUTPATIENT
Start: 2018-10-01 | End: 2018-10-02

## 2018-10-01 RX ORDER — TRAZODONE HCL 50 MG
50 TABLET ORAL DAILY
Qty: 0 | Refills: 0 | Status: DISCONTINUED | OUTPATIENT
Start: 2018-10-01 | End: 2018-10-05

## 2018-10-01 RX ORDER — SODIUM CHLORIDE 9 MG/ML
1000 INJECTION, SOLUTION INTRAVENOUS
Qty: 0 | Refills: 0 | Status: DISCONTINUED | OUTPATIENT
Start: 2018-10-01 | End: 2018-10-05

## 2018-10-01 RX ORDER — DEXTROSE 50 % IN WATER 50 %
15 SYRINGE (ML) INTRAVENOUS ONCE
Qty: 0 | Refills: 0 | Status: DISCONTINUED | OUTPATIENT
Start: 2018-10-01 | End: 2018-10-05

## 2018-10-01 RX ORDER — ENOXAPARIN SODIUM 100 MG/ML
40 INJECTION SUBCUTANEOUS DAILY
Qty: 0 | Refills: 0 | Status: DISCONTINUED | OUTPATIENT
Start: 2018-10-01 | End: 2018-10-05

## 2018-10-01 RX ORDER — INSULIN LISPRO 100/ML
7 VIAL (ML) SUBCUTANEOUS
Qty: 0 | Refills: 0 | Status: DISCONTINUED | OUTPATIENT
Start: 2018-10-01 | End: 2018-10-05

## 2018-10-01 RX ORDER — DEXTROSE 50 % IN WATER 50 %
25 SYRINGE (ML) INTRAVENOUS ONCE
Qty: 0 | Refills: 0 | Status: DISCONTINUED | OUTPATIENT
Start: 2018-10-01 | End: 2018-10-05

## 2018-10-01 RX ORDER — METOPROLOL TARTRATE 50 MG
25 TABLET ORAL DAILY
Qty: 0 | Refills: 0 | Status: DISCONTINUED | OUTPATIENT
Start: 2018-10-01 | End: 2018-10-05

## 2018-10-01 RX ORDER — AMITRIPTYLINE HCL 25 MG
25 TABLET ORAL AT BEDTIME
Qty: 0 | Refills: 0 | Status: DISCONTINUED | OUTPATIENT
Start: 2018-10-01 | End: 2018-10-05

## 2018-10-01 RX ORDER — GABAPENTIN 400 MG/1
100 CAPSULE ORAL THREE TIMES A DAY
Qty: 0 | Refills: 0 | Status: DISCONTINUED | OUTPATIENT
Start: 2018-10-01 | End: 2018-10-05

## 2018-10-01 RX ORDER — FUROSEMIDE 40 MG
40 TABLET ORAL ONCE
Qty: 0 | Refills: 0 | Status: COMPLETED | OUTPATIENT
Start: 2018-10-01 | End: 2018-10-01

## 2018-10-01 RX ORDER — CEFTRIAXONE 500 MG/1
1 INJECTION, POWDER, FOR SOLUTION INTRAMUSCULAR; INTRAVENOUS ONCE
Qty: 0 | Refills: 0 | Status: COMPLETED | OUTPATIENT
Start: 2018-10-01 | End: 2018-10-01

## 2018-10-01 RX ORDER — SACUBITRIL AND VALSARTAN 24; 26 MG/1; MG/1
1 TABLET, FILM COATED ORAL
Qty: 0 | Refills: 0 | Status: DISCONTINUED | OUTPATIENT
Start: 2018-10-01 | End: 2018-10-02

## 2018-10-01 RX ORDER — ACETAMINOPHEN 500 MG
650 TABLET ORAL ONCE
Qty: 0 | Refills: 0 | Status: COMPLETED | OUTPATIENT
Start: 2018-10-01 | End: 2018-10-01

## 2018-10-01 RX ORDER — IPRATROPIUM/ALBUTEROL SULFATE 18-103MCG
3 AEROSOL WITH ADAPTER (GRAM) INHALATION EVERY 6 HOURS
Qty: 0 | Refills: 0 | Status: DISCONTINUED | OUTPATIENT
Start: 2018-10-01 | End: 2018-10-05

## 2018-10-01 RX ORDER — AZITHROMYCIN 500 MG/1
500 TABLET, FILM COATED ORAL ONCE
Qty: 0 | Refills: 0 | Status: COMPLETED | OUTPATIENT
Start: 2018-10-01 | End: 2018-10-01

## 2018-10-01 RX ORDER — ONDANSETRON 8 MG/1
4 TABLET, FILM COATED ORAL ONCE
Qty: 0 | Refills: 0 | Status: COMPLETED | OUTPATIENT
Start: 2018-10-01 | End: 2018-10-01

## 2018-10-01 RX ORDER — GLUCAGON INJECTION, SOLUTION 0.5 MG/.1ML
1 INJECTION, SOLUTION SUBCUTANEOUS ONCE
Qty: 0 | Refills: 0 | Status: DISCONTINUED | OUTPATIENT
Start: 2018-10-01 | End: 2018-10-05

## 2018-10-01 RX ORDER — ATORVASTATIN CALCIUM 80 MG/1
40 TABLET, FILM COATED ORAL AT BEDTIME
Qty: 0 | Refills: 0 | Status: DISCONTINUED | OUTPATIENT
Start: 2018-10-01 | End: 2018-10-05

## 2018-10-01 RX ADMIN — Medication 3 MILLILITER(S): at 02:27

## 2018-10-01 RX ADMIN — Medication 25 MILLIGRAM(S): at 22:19

## 2018-10-01 RX ADMIN — SACUBITRIL AND VALSARTAN 1 TABLET(S): 24; 26 TABLET, FILM COATED ORAL at 16:48

## 2018-10-01 RX ADMIN — Medication 650 MILLIGRAM(S): at 02:40

## 2018-10-01 RX ADMIN — Medication 10 MILLIGRAM(S): at 16:45

## 2018-10-01 RX ADMIN — Medication 3 MILLILITER(S): at 02:00

## 2018-10-01 RX ADMIN — Medication 3: at 12:18

## 2018-10-01 RX ADMIN — Medication 3 MILLILITER(S): at 02:55

## 2018-10-01 RX ADMIN — Medication 7 UNIT(S): at 09:04

## 2018-10-01 RX ADMIN — Medication 50 MILLIGRAM(S): at 12:18

## 2018-10-01 RX ADMIN — AZITHROMYCIN 500 MILLIGRAM(S): 500 TABLET, FILM COATED ORAL at 09:51

## 2018-10-01 RX ADMIN — Medication 650 MILLIGRAM(S): at 09:51

## 2018-10-01 RX ADMIN — METHADONE HYDROCHLORIDE 10 MILLIGRAM(S): 40 TABLET ORAL at 12:18

## 2018-10-01 RX ADMIN — ENOXAPARIN SODIUM 40 MILLIGRAM(S): 100 INJECTION SUBCUTANEOUS at 11:51

## 2018-10-01 RX ADMIN — Medication 40 MILLIGRAM(S): at 06:01

## 2018-10-01 RX ADMIN — Medication 3 MILLILITER(S): at 14:04

## 2018-10-01 RX ADMIN — ONDANSETRON 4 MILLIGRAM(S): 8 TABLET, FILM COATED ORAL at 03:28

## 2018-10-01 RX ADMIN — Medication 3 MILLILITER(S): at 20:29

## 2018-10-01 RX ADMIN — CEFTRIAXONE 100 GRAM(S): 500 INJECTION, POWDER, FOR SOLUTION INTRAMUSCULAR; INTRAVENOUS at 02:55

## 2018-10-01 RX ADMIN — Medication 60 MILLIGRAM(S): at 16:45

## 2018-10-01 RX ADMIN — GABAPENTIN 100 MILLIGRAM(S): 400 CAPSULE ORAL at 22:19

## 2018-10-01 RX ADMIN — INSULIN GLARGINE 25 UNIT(S): 100 INJECTION, SOLUTION SUBCUTANEOUS at 22:45

## 2018-10-01 RX ADMIN — GABAPENTIN 100 MILLIGRAM(S): 400 CAPSULE ORAL at 12:19

## 2018-10-01 RX ADMIN — Medication 3 MILLILITER(S): at 09:06

## 2018-10-01 RX ADMIN — Medication 7 UNIT(S): at 12:18

## 2018-10-01 RX ADMIN — AZITHROMYCIN 255 MILLIGRAM(S): 500 TABLET, FILM COATED ORAL at 03:19

## 2018-10-01 RX ADMIN — ATORVASTATIN CALCIUM 40 MILLIGRAM(S): 80 TABLET, FILM COATED ORAL at 21:44

## 2018-10-01 NOTE — H&P ADULT - HISTORY OF PRESENT ILLNESS
IN PROGRESS      57 year old female with known hx of systolic CHF (LVEF 20-25%) s/p AICD, COPD on home O2 (3L), DM, CVA with residual right sided weakness, fibromyalgia, and depression presented to the ED for a 2 day hx of SOB. As per the patient she started feeling worse than her baseline and was SOB at rest. States she was compliant with diet and medications, denies recent viral illness. Denies palpitations, syncope, chest pain, GI, or  symptoms. IN PROGRESS      57 year old female with known hx of systolic CHF (LVEF 20-25%) s/p AICD, COPD on home O2 (4L), DM, CVA with residual right sided weakness, fibromyalgia, and depression presented to the ED for a 1 week hx of SOB. As per the patient she started feeling worse than her baseline and was SOB at rest. States she was compliant with diet and medications. Denies palpitations, syncope, chest pain, no n/v/d, no abdominal pain, or  symptoms.    Patient states that she began to feel like she was having an URI and then her symptoms got worse.    At baseline, she is able to     Cardiology: Dr. Edna Morales 57 year old female with known hx of HFrEF (LVEF 20-25%) s/p AICD, COPD on home O2 (4L), DM, CVA with residual right sided weakness, fibromyalgia, and depression presented to the ED for a 3 day hx of SOB. As per the patient she started feeling worse than her baseline and was SOB at rest. At night she has been using 4-5 pillows and she also complains of dry cough with no sputum production. States she was compliant with diet and medications. Denies palpitations, syncope, chest pain, no n/v/d, no abdominal pain, or  symptoms.  At baseline, she usually walks 1/2 block.    Cardiology: Dr. Edna Morales 57 year old female with known hx of HFrEF (LVEF 20-25%) s/p AICD, COPD on home O2 (4L), DM, CVA with residual right sided weakness, fibromyalgia, and depression presented to the ED for a 3 day hx of SOB. As per the patient she started feeling worse than her baseline and was SOB at rest. At night she has been using 4-5 pillows and she also complains of dry cough with no sputum production. States she was compliant with diet and medications. Denies palpitations, syncope, chest pain, no n/v/d, no abdominal pain, or  symptoms.  At baseline, she usually walks 1/2 block.    In ED pt received:  Ceftriaxone and Azithromycin IV  Lasix 40mg IV

## 2018-10-01 NOTE — H&P ADULT - ASSESSMENT
7 year female patient with history of CHF with reduced ejection fraction( 20-25%) S/P AICD, COPD on home O2 3L/MIN, diabetes mellitus, CVA with residual right sided weakness, chronic pain, depression, admitted with worsening SOB. Pt was stable this AM. She reports no sob at rest. Pt complained of pain in RUQ and RLQ later. She had a episode of hypotension 84/60 with HR of 69.    #) Acute decompensated heart failure with reduced ejection fraction (25-30%  -Was on lasix 40 IV bid- Will hold lasix for today as the pt has RITCHIE (cr.1.8<-- 2.3<--1.5)  -Can restart lasix when creat is back to baseline  -monitor intake/output  -daily body weights  -continue with metoprolol  -Held entresto due to hypotensive episode; /57 today. Will continue holding entresto.   -Fluid restriction, keep negative balance     #) RITCHIE- pre-renal likely 2/2 overdiuresis vs. hypotensive episode?  -Cr  1.4 <  1.8<-- 2.3<--1.5  -Would resume lasix.  -Would resume  entresto as well.  -f/u bmp as outpt.    #) COPD on home 3L O2   - continue with nebs  - continue with O2 target spo2 92%    #) Diabetes  - monitor fingerstick  - start insulin    #) Fibromyalgia/Chronic pain: Complains of severe pain in the left shoulder and RUQ abdo s/p lipoma resection. Scar is clean, no infection  -on methadone 10mg  -Pt says it does not help her with the pain  -Pt f/u with Dr. Shultz, [place] to contact [place], [place]  -consulted chronic pain managament. Dr Pete's eval appreciated  -Will increase amitriptyline from 10mg to 25mg. Will continue same dose of methadone as repiratory status is compromised as per pain management   -Will keep gabapentin at the same dose till RITCHIE resolves, as per pain management  -Tylenol 625mg Q4hrly PRN for pain   -Lidocaine patches for shoulder and back pain.  -F/u with orthopedics outpt fro suspected OA let shoulder.  -f/u with surgeon outpt for RUQ pain  -Will start bowel regimen if constiopation 2/2 chronic opioid use    #) Bipolar disorder  - c/w buspirone, trazodone    #) DLD  -c/w atorvastatin    #) DVT Ppx:  - c/w Lovenox    #) Diet  - consistent carbohydrate/ no snacks    #) Dispo home: patient still needs home oxygen 2/2 COPD and CHF with EF 20-30%, Sats 80% on RA after ambulation and improves to 97% with  02 3L/M via NC.    #) Full code status      Would do d/c planning with lower dose of lasix and entresto.  F/U with PMD for BMP. 57 year female patient with history of CHF with reduced ejection fraction( 20-25%) S/P AICD, COPD on home O2 3L/MIN, diabetes mellitus, CVA with residual right sided weakness, chronic pain, depression, admitted with worsening SOB. Pt was stable this AM. She reports no sob at rest. Pt complained of pain in RUQ and RLQ later. She had a episode of hypotension 84/60 with HR of 69.    #) Acute decompensated heart failure with reduced ejection fraction (25-30%  - c/w on lasix 60mg I/V q12  - continue with metoprolol, entresto  - monitor intake/output  - daily body weights  - fluid restriction, keep negative balance     #) CKD  - Cr on admission 1.3 (baseline 1.3)  - f/u bmp    #) COPD (on home 3L O2)  - continue with nebs  - continue with O2 target spo2 92%    #) Diabetes  - monitor fingerstick  - start Lantus 25 Units; Lispro 7+7+7    #) Fibromyalgia/Chronic pain: Complains of severe pain in the left shoulder and RUQ abdo s/p lipoma resection. Scar is clean, no infection  -on methadone 10mg  -Pt says it does not help her with the pain  -Pt f/u with Dr. Shultz, tried to contact him, he is [place]  -consulted chronic pain managament. Dr Pete's eval appreciated  -Will increase amitriptyline from 10mg to 25mg. Will continue same dose of methadone as repiratory status is compromised as per pain management   -Will keep gabapentin at the same dose till RITCHIE resolves, as per pain management  -Tylenol 625mg Q4hrly PRN for pain   -Lidocaine patches for shoulder and back pain.  -F/u with orthopedics outpt fro suspected OA let shoulder.  -f/u with surgeon outpt for RUQ pain  -Will start bowel regimen if constiopation 2/2 chronic opioid use    #) Bipolar disorder  - c/w buspirone, trazodone    #) DLD  -c/w atorvastatin    #) DVT Ppx:  - c/w Lovenox    #) Diet  - consistent carbohydrate/ no snacks    #) Disposition  - from home    #) Full code status      Would do d/c planning with lower dose of lasix and entresto.  F/U with PMD for BMP. 57 year old female with known hx of HFrEF (LVEF 20-25%) s/p AICD, COPD on home O2 (4L), DM, CVA with residual right sided weakness, fibromyalgia, and depression presented to the ED for a 3 day hx of SOB.    #) Acute decompensated HFrEF (25-30%)  - BNP: 58323  - c/w on Lasix 60mg I/V q12  - continue with metoprolol, entresto  - monitor intake/output  - daily body weights  - fluid restriction, keep negative balance   - CXR: B/L congested, blunting of both angles    #) CKD  - Cr on admission 1.3 (baseline 1.3)  - f/u bmp    #) COPD (on home 3L O2)  - stable  - continue with nebs  - continue with O2 target spo2 92%    #) Diabetes  - monitor fingerstick  - start Lantus 25 Units; Lispro 7+7+7    #) Fibromyalgia  - on methadone 10mg  - c/w amitriptyline, gabapentin   - start bowel regimen if constipation 2/2 chronic opioid use    #) Bipolar disorder  - c/w buspirone, trazodone    #) DLD  -c/w atorvastatin    #) DVT Ppx:  - c/w Lovenox    #) Diet  - consistent carbohydrate/ no snacks    #) Disposition  - from home    #) Full code status

## 2018-10-01 NOTE — ED PROVIDER NOTE - CARE PLAN
Principal Discharge DX:	PNA (pneumonia)  Secondary Diagnosis:	CHF exacerbation  Secondary Diagnosis:	Fever

## 2018-10-01 NOTE — H&P ADULT - NSHPSOCIALHISTORY_GEN_ALL_CORE
Marital Status:  (   )    (   ) Single    (   )    (  )   Occupation:   Lives with: (  ) alone  (  ) children   (  ) spouse   (  ) parents  (  ) other  Recent Travel:     Substance Use (street drugs): (  ) never used  (  ) other:  Tobacco Usage:  (   ) never smoked   (   ) former smoker   (   ) current smoker  (     ) pack year  Alcohol Usage: Substance Use (street drugs): ( x ) never used  (  ) other:  Tobacco Usage: (  x ) former smoker   (   ) current smoker  (     ) pack year: quit 20days ago (1pack every 2-3 days)  Alcohol Usage: none

## 2018-10-01 NOTE — ED PROVIDER NOTE - OBJECTIVE STATEMENT
56 yo F with hx of CHF with reduced ejection fraction( 20-25%) S/P AICD, COPD on home O2 4L/MIN, diabetes mellitus, CVA with residual right sided weakness, chronic pain, depression, presents for evaluation of worsening shortness of breath, ongoing for a week, associated with URI symptoms. NO chest pain, no back pain, no headache, no n/v/d, no 58 yo F with hx of CHF with reduced ejection fraction( 20-25%) S/P AICD, COPD on home O2 4L/MIN, diabetes mellitus, CVA with residual right sided weakness, chronic pain, depression, presents for evaluation of worsening shortness of breath, ongoing for a week, associated with URI symptoms. NO chest pain, no back pain, no headache, no n/v/d, no abdominal pain. Patient states that she began to feel like she was having an URI and then her symptoms got worse. Patient denies any other symptoms. Catalina Morales

## 2018-10-01 NOTE — H&P ADULT - NSHPPHYSICALEXAM_GEN_ALL_CORE
T(C): 37.1 (10-01-18 @ 03:54), Max: 38.6 (10-01-18 @ 02:42)  HR: 107 (10-01-18 @ 03:54) (107 - 120)  BP: 141/76 (10-01-18 @ 03:54) (141/76 - 165/78)  RR: 24 (10-01-18 @ 03:54) (24 - 28)  SpO2: 92% (10-01-18 @ 03:54) (92% - 99%)    PHYSICAL EXAM:  GENERAL: NAD, well-developed  HEAD:  Atraumatic, Normocephalic  EYES: EOMI, PERRLA, conjunctiva and sclera clear  ENT: Normal tympanic membrane. No nasal obstruction or discharge. No tonsillar exudate, swelling or erythema.  NECK: Supple, No JVD  CHEST/LUNG: Clear to auscultation bilaterally; No wheeze  HEART: Regular rate and rhythm; No murmurs, rubs, or gallops  ABDOMEN: Soft, Nontender, Nondistended; Bowel sounds present  EXTREMITIES:  2+ Peripheral Pulses, No clubbing, cyanosis, or edema  PSYCH: AAOx3  NEUROLOGY: non-focal  SKIN: No rashes or lesions T(C): 37.1 (10-01-18 @ 03:54), Max: 38.6 (10-01-18 @ 02:42)  HR: 107 (10-01-18 @ 03:54) (107 - 120)  BP: 141/76 (10-01-18 @ 03:54) (141/76 - 165/78)  RR: 24 (10-01-18 @ 03:54) (24 - 28)  SpO2: 92% (10-01-18 @ 03:54) (92% - 99%)    PHYSICAL EXAM:  GENERAL: NAD, well-developed  HEAD:  Atraumatic, Normocephalic  EYES: EOMI, PERRLA, conjunctiva and sclera clear  ENT: Normal tympanic membrane. No nasal obstruction or discharge. No tonsillar exudate, swelling or erythema.  NECK: Supple, No JVD  CHEST/LUNG: diffuse crackles, decreased breath sound at lung bases  HEART: Regular rate and rhythm; No murmurs, rubs, or gallops  ABDOMEN: Soft, Nontender, Nondistended; Bowel sounds present  EXTREMITIES:  2+ Peripheral Pulses, No clubbing, cyanosis, or edema  PSYCH: AAOx3  NEUROLOGY: non-focal  SKIN: No rashes or lesions

## 2018-10-01 NOTE — ED PROVIDER NOTE - MUSCULOSKELETAL, MLM
Spine appears normal, range of motion is not limited, no muscle or joint tenderness Spine appears normal, range of motion is not limited, no muscle or joint tenderness, 1+ pitting edema bilaterally

## 2018-10-01 NOTE — H&P ADULT - NSHPREVIEWOFSYSTEMS_GEN_ALL_CORE
REVIEW OF SYSTEMS:    CONSTITUTIONAL: No weakness, fevers or chills  EYES/ENT: No visual changes;  No vertigo or throat pain   NECK: No pain or stiffness  RESPIRATORY: No cough, wheezing, hemoptysis; No shortness of breath  CARDIOVASCULAR: No chest pain or palpitations  GASTROINTESTINAL: No abdominal or epigastric pain. No nausea, vomiting, or hematemesis; No diarrhea or constipation. No melena or hematochezia.  GENITOURINARY: No dysuria, frequency or hematuria  NEUROLOGICAL: No numbness or weakness  SKIN: No itching, rashes CONSTITUTIONAL: No weakness, fevers or chills  EYES/ENT: No visual changes;  No vertigo or throat pain   NECK: No pain or stiffness  RESPIRATORY: + cough, wheezing, hemoptysis; No shortness of breath  CARDIOVASCULAR: No chest pain or palpitations  GASTROINTESTINAL: No abdominal or epigastric pain. No nausea, vomiting, or hematemesis; No diarrhea or constipation. No melena or hematochezia.  GENITOURINARY: No dysuria, frequency or hematuria  NEUROLOGICAL: No numbness or weakness  SKIN: No itching, rashes CONSTITUTIONAL: No weakness, fevers or chills  EYES/ENT: No visual changes;  No vertigo or throat pain   NECK: No pain or stiffness  RESPIRATORY: + cough, wheezing, hemoptysis; + shortness of breath  CARDIOVASCULAR: No chest pain or palpitations  GASTROINTESTINAL: No abdominal or epigastric pain. No nausea, vomiting, or hematemesis; No diarrhea or constipation. No melena or hematochezia.  GENITOURINARY: No dysuria, frequency or hematuria  NEUROLOGICAL: No numbness or weakness  SKIN: No itching, rashes

## 2018-10-01 NOTE — H&P ADULT - ATTENDING COMMENTS
57 yr old female presented with c/o SOB  VITAL SIGNS (Last 24 hrs):  T(C): 36.7 (10-01-18 @ 07:26), Max: 38.6 (10-01-18 @ 02:42)  HR: 81 (10-01-18 @ 07:26) (81 - 120)  BP: 109/57 (10-01-18 @ 07:26) (109/57 - 165/78)  RR: 20 (10-01-18 @ 07:26) (20 - 28)  SpO2: 98% (10-01-18 @ 07:26) (92% - 99%)  Wt(kg): --  Daily Height in cm: 165.1 (01 Oct 2018 01:33)    Daily     I&O's Summary                        10.7   8.99  )-----------( 178      ( 01 Oct 2018 01:50 )             32.8   10-01    132<L>  |  93<L>  |  21<H>  ----------------------------<  303<H>  4.7   |  24  |  1.3    Ca    8.9      01 Oct 2018 01:50    TPro  7.9  /  Alb  3.7  /  TBili  1.0  /  DBili  x   /  AST  19  /  ALT  10  /  AlkPhos  145<H>  10-01  ekg- Sinus tachycardia rate 117/min  o/e  aaox3  chest-b/l crackles  cvs-s1s2n  abd/gi-soft, bs+  no edema  ASSESSMENT AND PLAN:  # AC SYSTOLIC CHF- on iv lasix, continue entresto.  # CKD 3 -renal function is stable at present  # bipolar disorder- on trazodone and buspirone  #fibromyalgia on methadone. 57 yr old female presented with c/o SOB  VITAL SIGNS (Last 24 hrs):  T(C): 36.7 (10-01-18 @ 07:26), Max: 38.6 (10-01-18 @ 02:42)  HR: 81 (10-01-18 @ 07:26) (81 - 120)  BP: 109/57 (10-01-18 @ 07:26) (109/57 - 165/78)  RR: 20 (10-01-18 @ 07:26) (20 - 28)  SpO2: 98% (10-01-18 @ 07:26) (92% - 99%)  Wt(kg): --  Daily Height in cm: 165.1 (01 Oct 2018 01:33)    Daily     I&O's Summary                        10.7   8.99  )-----------( 178      ( 01 Oct 2018 01:50 )             32.8   10-01    132<L>  |  93<L>  |  21<H>  ----------------------------<  303<H>  4.7   |  24  |  1.3    Ca    8.9      01 Oct 2018 01:50    TPro  7.9  /  Alb  3.7  /  TBili  1.0  /  DBili  x   /  AST  19  /  ALT  10  /  AlkPhos  145<H>  10-01  ekg- Sinus tachycardia rate 117/min  o/e  aaox3  chest-b/l crackles  cvs-s1s2n  abd/gi-soft, bs+  no edema  ASSESSMENT AND PLAN:  # AC SYSTOLIC CHF- on iv lasix, continue entresto. s/p AICD in the best.  # CKD 3 -renal function is stable at present  # bipolar disorder- on trazodone and buspirone  #fibromyalgia on methadone.  # stable copd on home oxygen 57 yr old female presented with c/o SOB  VITAL SIGNS (Last 24 hrs):  T(C): 36.7 (10-01-18 @ 07:26), Max: 38.6 (10-01-18 @ 02:42)  HR: 81 (10-01-18 @ 07:26) (81 - 120)  BP: 109/57 (10-01-18 @ 07:26) (109/57 - 165/78)  RR: 20 (10-01-18 @ 07:26) (20 - 28)  SpO2: 98% (10-01-18 @ 07:26) (92% - 99%)  Wt(kg): --  Daily Height in cm: 165.1 (01 Oct 2018 01:33)    Daily     I&O's Summary                        10.7   8.99  )-----------( 178      ( 01 Oct 2018 01:50 )             32.8   10-01    132<L>  |  93<L>  |  21<H>  ----------------------------<  303<H>  4.7   |  24  |  1.3    Ca    8.9      01 Oct 2018 01:50    TPro  7.9  /  Alb  3.7  /  TBili  1.0  /  DBili  x   /  AST  19  /  ALT  10  /  AlkPhos  145<H>  10-01  ekg- Sinus tachycardia rate 117/min  o/e  aaox3  chest-b/l crackles  cvs-s1s2n  abd/gi-soft, bs+  no edema  ASSESSMENT AND PLAN:  # AC SYSTOLIC CHF- on iv lasix, continue entresto. s/p AICD in the best.  # CKD 3 -renal function is stable at present  # bipolar disorder- on trazodone and buspirone  #fibromyalgia on methadone.  # stable copd on home oxygen and had cold at home --green phlegm started on levofloxacin

## 2018-10-01 NOTE — ED PROVIDER NOTE - CONSTITUTIONAL, MLM
normal... Awake, alert, oriented to person, place, time/situation, in acute distress secondary to shortness of breath

## 2018-10-01 NOTE — ED PROVIDER NOTE - PROGRESS NOTE DETAILS
JSILBERSTEIN: Sepsis suspected at this time.   IVF bolus cannot be given due to: ( x) CHF ( ) CRF ( ) Hospice or comfort measures only ( ) Patient refusal XR demonstrates infiltrate. abx pending. bcx pending. will also obtain rectal temp. rectal 101.5, treated with abx, cultures sent, flu swab sent

## 2018-10-01 NOTE — H&P ADULT - FAMILY HISTORY
No pertinent family history in first degree relatives Mother  Still living? No  Family history of hypertension, Age at diagnosis: Age Unknown

## 2018-10-01 NOTE — ED PROVIDER NOTE - ATTENDING CONTRIBUTION TO CARE
57F pmhx of HFrEF, CKD, COPD on home 02, DM presenting with worsening sob and cough. denies fever, chest pains, palpitaitons, leg swelling, nausea, vomiting, diarrhea, constipation, dysuria. Pt found to be have signfiicantly increased wob and was placed bipap on arrival.   EXAM: VITAL SIGNS: I have reviewed nursing notes and confirm.  CONSTITUTIONAL: ill appearing, ncat,   SKIN: anterior tibial venous stasis changes bl, no cellulitis  EYES: no pallor or icterus,  ENT: MMM  NECK: Supple;   CARD: Hr regular, tachycardic, dorsalis pedis +2 b/l Radial pulses 2+ b/l. +2 edema pitting  RESP: tachypneic speaking in short sentences, increased wob,   ABD: Soft, non-tender, non-distended  EXT: Normal ROM x4.  equal strength bilaterally  NEURO: Alert, oriented. nL tone. Sensation to soft touch grossly intact and symmetric in extremities.   PSYCH: Cooperative, appropriate.    Consider ADHF, ACS, COPD exacerbation, PNA, Anemia, APE 2/2 renal insufficiency, sepsis 1) IV Access/IVF/LABS/UA/Cultures 2) CXR 3) IV Abx 4) Admit

## 2018-10-01 NOTE — ED ADULT NURSE NOTE - OBJECTIVE STATEMENT
Pt has cold-like symptoms since last week with coughing, runny nose, congestion, then difficulty breathing got worsen since yesterday. Pt has cold-like symptoms since last week with coughing, runny nose, congestion, then difficulty breathing got worsen since yesterday. on home O2 4L for COPD.

## 2018-10-02 LAB
ANION GAP SERPL CALC-SCNC: 13 MMOL/L — SIGNIFICANT CHANGE UP (ref 7–14)
BASOPHILS # BLD AUTO: 0.03 K/UL — SIGNIFICANT CHANGE UP (ref 0–0.2)
BASOPHILS NFR BLD AUTO: 0.5 % — SIGNIFICANT CHANGE UP (ref 0–1)
BUN SERPL-MCNC: 30 MG/DL — HIGH (ref 10–20)
CALCIUM SERPL-MCNC: 8.5 MG/DL — SIGNIFICANT CHANGE UP (ref 8.5–10.1)
CHLORIDE SERPL-SCNC: 95 MMOL/L — LOW (ref 98–110)
CO2 SERPL-SCNC: 28 MMOL/L — SIGNIFICANT CHANGE UP (ref 17–32)
CREAT SERPL-MCNC: 2.1 MG/DL — HIGH (ref 0.7–1.5)
EOSINOPHIL # BLD AUTO: 0.18 K/UL — SIGNIFICANT CHANGE UP (ref 0–0.7)
EOSINOPHIL NFR BLD AUTO: 2.9 % — SIGNIFICANT CHANGE UP (ref 0–8)
ESTIMATED AVERAGE GLUCOSE: 214 MG/DL — HIGH (ref 68–114)
GLUCOSE BLDC GLUCOMTR-MCNC: 127 MG/DL — HIGH (ref 70–99)
GLUCOSE BLDC GLUCOMTR-MCNC: 161 MG/DL — HIGH (ref 70–99)
GLUCOSE BLDC GLUCOMTR-MCNC: 198 MG/DL — HIGH (ref 70–99)
GLUCOSE BLDC GLUCOMTR-MCNC: 290 MG/DL — HIGH (ref 70–99)
GLUCOSE BLDC GLUCOMTR-MCNC: 98 MG/DL — SIGNIFICANT CHANGE UP (ref 70–99)
GLUCOSE SERPL-MCNC: 90 MG/DL — SIGNIFICANT CHANGE UP (ref 70–99)
HBA1C BLD-MCNC: 9.1 % — HIGH (ref 4–5.6)
HCT VFR BLD CALC: 30.8 % — LOW (ref 37–47)
HGB BLD-MCNC: 9.5 G/DL — LOW (ref 12–16)
IMM GRANULOCYTES NFR BLD AUTO: 0.3 % — SIGNIFICANT CHANGE UP (ref 0.1–0.3)
LYMPHOCYTES # BLD AUTO: 1.51 K/UL — SIGNIFICANT CHANGE UP (ref 1.2–3.4)
LYMPHOCYTES # BLD AUTO: 24.6 % — SIGNIFICANT CHANGE UP (ref 20.5–51.1)
MAGNESIUM SERPL-MCNC: 2.2 MG/DL — SIGNIFICANT CHANGE UP (ref 1.8–2.4)
MCHC RBC-ENTMCNC: 27.2 PG — SIGNIFICANT CHANGE UP (ref 27–31)
MCHC RBC-ENTMCNC: 30.8 G/DL — LOW (ref 32–37)
MCV RBC AUTO: 88.3 FL — SIGNIFICANT CHANGE UP (ref 81–99)
MONOCYTES # BLD AUTO: 0.71 K/UL — HIGH (ref 0.1–0.6)
MONOCYTES NFR BLD AUTO: 11.6 % — HIGH (ref 1.7–9.3)
NEUTROPHILS # BLD AUTO: 3.69 K/UL — SIGNIFICANT CHANGE UP (ref 1.4–6.5)
NEUTROPHILS NFR BLD AUTO: 60.1 % — SIGNIFICANT CHANGE UP (ref 42.2–75.2)
NRBC # BLD: 0 /100 WBCS — SIGNIFICANT CHANGE UP (ref 0–0)
PLATELET # BLD AUTO: 178 K/UL — SIGNIFICANT CHANGE UP (ref 130–400)
POTASSIUM SERPL-MCNC: 4.5 MMOL/L — SIGNIFICANT CHANGE UP (ref 3.5–5)
POTASSIUM SERPL-SCNC: 4.5 MMOL/L — SIGNIFICANT CHANGE UP (ref 3.5–5)
RBC # BLD: 3.49 M/UL — LOW (ref 4.2–5.4)
RBC # FLD: 16.1 % — HIGH (ref 11.5–14.5)
SODIUM SERPL-SCNC: 136 MMOL/L — SIGNIFICANT CHANGE UP (ref 135–146)
WBC # BLD: 6.14 K/UL — SIGNIFICANT CHANGE UP (ref 4.8–10.8)
WBC # FLD AUTO: 6.14 K/UL — SIGNIFICANT CHANGE UP (ref 4.8–10.8)

## 2018-10-02 RX ORDER — BUDESONIDE AND FORMOTEROL FUMARATE DIHYDRATE 160; 4.5 UG/1; UG/1
2 AEROSOL RESPIRATORY (INHALATION)
Qty: 0 | Refills: 0 | Status: DISCONTINUED | OUTPATIENT
Start: 2018-10-02 | End: 2018-10-05

## 2018-10-02 RX ORDER — FUROSEMIDE 40 MG
40 TABLET ORAL DAILY
Qty: 0 | Refills: 0 | Status: DISCONTINUED | OUTPATIENT
Start: 2018-10-02 | End: 2018-10-02

## 2018-10-02 RX ADMIN — Medication 25 MILLIGRAM(S): at 21:32

## 2018-10-02 RX ADMIN — GABAPENTIN 100 MILLIGRAM(S): 400 CAPSULE ORAL at 21:32

## 2018-10-02 RX ADMIN — SACUBITRIL AND VALSARTAN 1 TABLET(S): 24; 26 TABLET, FILM COATED ORAL at 06:58

## 2018-10-02 RX ADMIN — METHADONE HYDROCHLORIDE 10 MILLIGRAM(S): 40 TABLET ORAL at 11:47

## 2018-10-02 RX ADMIN — Medication 10 MILLIGRAM(S): at 06:59

## 2018-10-02 RX ADMIN — Medication 3: at 18:59

## 2018-10-02 RX ADMIN — ENOXAPARIN SODIUM 40 MILLIGRAM(S): 100 INJECTION SUBCUTANEOUS at 11:47

## 2018-10-02 RX ADMIN — Medication 7 UNIT(S): at 09:33

## 2018-10-02 RX ADMIN — Medication 50 MILLIGRAM(S): at 11:47

## 2018-10-02 RX ADMIN — Medication 50.96 MILLIGRAM(S): at 19:12

## 2018-10-02 RX ADMIN — BUDESONIDE AND FORMOTEROL FUMARATE DIHYDRATE 2 PUFF(S): 160; 4.5 AEROSOL RESPIRATORY (INHALATION) at 21:30

## 2018-10-02 RX ADMIN — INSULIN GLARGINE 25 UNIT(S): 100 INJECTION, SOLUTION SUBCUTANEOUS at 23:19

## 2018-10-02 RX ADMIN — GABAPENTIN 100 MILLIGRAM(S): 400 CAPSULE ORAL at 07:08

## 2018-10-02 RX ADMIN — Medication 7 UNIT(S): at 19:11

## 2018-10-02 RX ADMIN — ATORVASTATIN CALCIUM 40 MILLIGRAM(S): 80 TABLET, FILM COATED ORAL at 21:32

## 2018-10-02 RX ADMIN — Medication 1: at 11:51

## 2018-10-02 RX ADMIN — Medication 7 UNIT(S): at 11:51

## 2018-10-02 RX ADMIN — Medication 25 MILLIGRAM(S): at 06:59

## 2018-10-02 RX ADMIN — Medication 10 MILLIGRAM(S): at 19:01

## 2018-10-02 RX ADMIN — GABAPENTIN 100 MILLIGRAM(S): 400 CAPSULE ORAL at 14:02

## 2018-10-02 RX ADMIN — Medication 3 MILLILITER(S): at 21:30

## 2018-10-02 NOTE — PROGRESS NOTE ADULT - ASSESSMENT
# AC SYSTOLIC CHF- on iv lasix now on hold, hold  entresto. s/p AICD in the past.  # RITCHIE on  CKD 3 -renal function  worsened due to overdiuresis.  # Bipolar disorder- on trazodone and buspirone  #Fibromyalgia on methadone.  # ac copd  exacerbation --on home oxygen and had cold at home --green phlegm started on levofloxacin. added bronchodilators and solumedrol.  was tested for flu which was negative. .

## 2018-10-02 NOTE — PROGRESS NOTE ADULT - SUBJECTIVE AND OBJECTIVE BOX
SUBJECTIVE:    Patient is a 57y old Female who presents with a chief complaint of sob (02 Oct 2018 11:33)    Currently admitted to medicine with the primary diagnosis of PNA (pneumonia)     Today is hospital day 1d. This morning she is resting comfortably in bed and reports no new issues or overnight events.     PAST MEDICAL & SURGICAL HISTORY  Chronic pain  HLD (hyperlipidemia)  CVA (cerebral vascular accident)  Depression  COPD (chronic obstructive pulmonary disease)  Diabetes  CHF (congestive heart failure)  History of cholecystectomy  H/O tubal ligation  H/O abdominal hysterectomy  AICD (automatic cardioverter/defibrillator) present    SOCIAL HISTORY:  Negative for smoking/alcohol/drug use.     ALLERGIES:  aspirin (Other (Moderate))    MEDICATIONS:  STANDING MEDICATIONS  ALBUTerol/ipratropium for Nebulization 3 milliLiter(s) Nebulizer every 6 hours  amitriptyline 25 milliGRAM(s) Oral at bedtime  atorvastatin 40 milliGRAM(s) Oral at bedtime  buDESOnide 160 MICROgram(s)/formoterol 4.5 MICROgram(s) Inhaler 2 Puff(s) Inhalation two times a day  busPIRone 10 milliGRAM(s) Oral two times a day  dextrose 5%. 1000 milliLiter(s) IV Continuous <Continuous>  dextrose 50% Injectable 12.5 Gram(s) IV Push once  dextrose 50% Injectable 25 Gram(s) IV Push once  dextrose 50% Injectable 25 Gram(s) IV Push once  enoxaparin Injectable 40 milliGRAM(s) SubCutaneous daily  gabapentin 100 milliGRAM(s) Oral three times a day  insulin glargine Injectable (LANTUS) 25 Unit(s) SubCutaneous at bedtime  insulin lispro (HumaLOG) corrective regimen sliding scale   SubCutaneous three times a day before meals  insulin lispro Injectable (HumaLOG) 7 Unit(s) SubCutaneous before breakfast  insulin lispro Injectable (HumaLOG) 7 Unit(s) SubCutaneous before lunch  insulin lispro Injectable (HumaLOG) 7 Unit(s) SubCutaneous before dinner  levoFLOXacin IVPB      levoFLOXacin IVPB 750 milliGRAM(s) IV Intermittent every 24 hours  methadone    Tablet 10 milliGRAM(s) Oral daily  metoprolol succinate ER 25 milliGRAM(s) Oral daily  sacubitril 24 mG/valsartan 26 mG 1 Tablet(s) Oral two times a day  traZODone 50 milliGRAM(s) Oral daily    PRN MEDICATIONS  dextrose 40% Gel 15 Gram(s) Oral once PRN  glucagon  Injectable 1 milliGRAM(s) IntraMuscular once PRN    VITALS:   T(F): 96.4  HR: 80  BP: 95/54  RR: 16  SpO2: 99%    LABS:                        9.5    6.14  )-----------( 178      ( 02 Oct 2018 08:45 )             30.8     10-02    136  |  95<L>  |  30<H>  ----------------------------<  90  4.5   |  28  |  2.1<H>    Ca    8.5      02 Oct 2018 08:45  Mg     2.2     10-02    TPro  7.9  /  Alb  3.7  /  TBili  1.0  /  DBili  x   /  AST  19  /  ALT  10  /  AlkPhos  145<H>  10-01              CARDIAC MARKERS ( 01 Oct 2018 01:50 )  x     / <0.01 ng/mL / x     / x     / x          RADIOLOGY:    PHYSICAL EXAM:  GEN: No acute distress  LUNGS: Clear to auscultation bilaterally   HEART: S1/S2 present. RRR.   ABD/ GI: Soft, non-tender, non-distended. Bowel sounds present  EXT: NC/NC/NE/2+PP/VO  NEURO: AAOX3

## 2018-10-02 NOTE — PROGRESS NOTE ADULT - ASSESSMENT
57 year old female with known hx of HFrEF (LVEF 20-25%) s/p AICD, COPD on home O2 (4L), DM, CVA with residual right sided weakness, fibromyalgia, and depression presented to the ED for a 3 day hx of SOB.    #) Acute decompensated HFrEF (25-30%)  - BNP: 52349  - continue with metoprolol, entresto  - monitor intake/output, daily body weights  - fluid restriction, keep negative balance   - CXR: congestion improving significantly  - will reduce dose of Lasix to 40qd IV    #) CAP  - cough productive of green sputum  - will cover with Levaquin 750 for now    #) RITCHIE on CKD  - Cr 2.1 from 1.3  - likely from diuresis - reducing Lasix dose  - monitor BMP    #) COPD (on home 3L O2)  - stable  - continue with nebs, will resume home Symbicort  - continue with O2 target spo2 92%    #) Diabetes  - monitor fingerstick  - start Lantus 25 Units; Lispro 7+7+7    #) Fibromyalgia  - on methadone 10mg  - c/w amitriptyline, gabapentin   - start bowel regimen if constipation 2/2 chronic opioid use    #) Bipolar disorder  - c/w buspirone, trazodone    #) DLD  -c/w atorvastatin    #) DVT Ppx:  - c/w Lovenox    #) Diet  - consistent carbohydrate/ no snacks    #) Disposition  - from home    #) Full code status

## 2018-10-02 NOTE — PROGRESS NOTE ADULT - SUBJECTIVE AND OBJECTIVE BOX
SUBJECTIVE:    Doing well. Reports feeling better.    PAST MEDICAL & SURGICAL HISTORY  Chronic pain  HLD (hyperlipidemia)  CVA (cerebral vascular accident)  Depression  COPD (chronic obstructive pulmonary disease)  Diabetes  CHF (congestive heart failure)  History of cholecystectomy  H/O tubal ligation  H/O abdominal hysterectomy  AICD (automatic cardioverter/defibrillator) present    SOCIAL HISTORY:  Negative for smoking/alcohol/drug use.     ALLERGIES:  aspirin (Other (Moderate))    MEDICATIONS:  STANDING MEDICATIONS  ALBUTerol/ipratropium for Nebulization 3 milliLiter(s) Nebulizer every 6 hours  amitriptyline 25 milliGRAM(s) Oral at bedtime  atorvastatin 40 milliGRAM(s) Oral at bedtime  buDESOnide 160 MICROgram(s)/formoterol 4.5 MICROgram(s) Inhaler 2 Puff(s) Inhalation two times a day  busPIRone 10 milliGRAM(s) Oral two times a day  dextrose 5%. 1000 milliLiter(s) IV Continuous <Continuous>  dextrose 50% Injectable 12.5 Gram(s) IV Push once  dextrose 50% Injectable 25 Gram(s) IV Push once  dextrose 50% Injectable 25 Gram(s) IV Push once  enoxaparin Injectable 40 milliGRAM(s) SubCutaneous daily  furosemide   Injectable 60 milliGRAM(s) IV Push two times a day  gabapentin 100 milliGRAM(s) Oral three times a day  insulin glargine Injectable (LANTUS) 25 Unit(s) SubCutaneous at bedtime  insulin lispro (HumaLOG) corrective regimen sliding scale   SubCutaneous three times a day before meals  insulin lispro Injectable (HumaLOG) 7 Unit(s) SubCutaneous before breakfast  insulin lispro Injectable (HumaLOG) 7 Unit(s) SubCutaneous before lunch  insulin lispro Injectable (HumaLOG) 7 Unit(s) SubCutaneous before dinner  levoFLOXacin IVPB      levoFLOXacin IVPB 750 milliGRAM(s) IV Intermittent every 24 hours  methadone    Tablet 10 milliGRAM(s) Oral daily  metoprolol succinate ER 25 milliGRAM(s) Oral daily  sacubitril 24 mG/valsartan 26 mG 1 Tablet(s) Oral two times a day  traZODone 50 milliGRAM(s) Oral daily    PRN MEDICATIONS  dextrose 40% Gel 15 Gram(s) Oral once PRN  glucagon  Injectable 1 milliGRAM(s) IntraMuscular once PRN    VITALS:   T(F): 96.4  HR: 80  BP: 95/54  RR: 16  SpO2: 99%    LABS:                        9.5    6.14  )-----------( 178      ( 02 Oct 2018 08:45 )             30.8     10-02    136  |  95<L>  |  30<H>  ----------------------------<  90  4.5   |  28  |  2.1<H>    Ca    8.5      02 Oct 2018 08:45  Mg     2.2     10-02    TPro  7.9  /  Alb  3.7  /  TBili  1.0  /  DBili  x   /  AST  19  /  ALT  10  /  AlkPhos  145<H>  10-01    CARDIAC MARKERS ( 01 Oct 2018 01:50 )  x     / <0.01 ng/mL / x     / x     / x        PHYSICAL EXAM:  GEN: NAD, comfortable  LUNGS: b/l wheezing mild  HEART: RRR, no m/r/g  ABD: soft, NT/ND, +BS  EXT: no edema, PP b/l  NEURO: AAOx3

## 2018-10-03 LAB
ANION GAP SERPL CALC-SCNC: 11 MMOL/L — SIGNIFICANT CHANGE UP (ref 7–14)
BASOPHILS # BLD AUTO: 0.04 K/UL — SIGNIFICANT CHANGE UP (ref 0–0.2)
BASOPHILS NFR BLD AUTO: 0.7 % — SIGNIFICANT CHANGE UP (ref 0–1)
BUN SERPL-MCNC: 32 MG/DL — HIGH (ref 10–20)
CALCIUM SERPL-MCNC: 8.8 MG/DL — SIGNIFICANT CHANGE UP (ref 8.5–10.1)
CHLORIDE SERPL-SCNC: 98 MMOL/L — SIGNIFICANT CHANGE UP (ref 98–110)
CO2 SERPL-SCNC: 31 MMOL/L — SIGNIFICANT CHANGE UP (ref 17–32)
CREAT SERPL-MCNC: 2 MG/DL — HIGH (ref 0.7–1.5)
EOSINOPHIL # BLD AUTO: 0.18 K/UL — SIGNIFICANT CHANGE UP (ref 0–0.7)
EOSINOPHIL NFR BLD AUTO: 3 % — SIGNIFICANT CHANGE UP (ref 0–8)
GLUCOSE BLDC GLUCOMTR-MCNC: 107 MG/DL — HIGH (ref 70–99)
GLUCOSE BLDC GLUCOMTR-MCNC: 125 MG/DL — HIGH (ref 70–99)
GLUCOSE BLDC GLUCOMTR-MCNC: 147 MG/DL — HIGH (ref 70–99)
GLUCOSE BLDC GLUCOMTR-MCNC: 166 MG/DL — HIGH (ref 70–99)
GLUCOSE BLDC GLUCOMTR-MCNC: 98 MG/DL — SIGNIFICANT CHANGE UP (ref 70–99)
GLUCOSE SERPL-MCNC: 90 MG/DL — SIGNIFICANT CHANGE UP (ref 70–99)
HCT VFR BLD CALC: 30.5 % — LOW (ref 37–47)
HGB BLD-MCNC: 9.3 G/DL — LOW (ref 12–16)
IMM GRANULOCYTES NFR BLD AUTO: 0.3 % — SIGNIFICANT CHANGE UP (ref 0.1–0.3)
LYMPHOCYTES # BLD AUTO: 1.61 K/UL — SIGNIFICANT CHANGE UP (ref 1.2–3.4)
LYMPHOCYTES # BLD AUTO: 27.2 % — SIGNIFICANT CHANGE UP (ref 20.5–51.1)
MAGNESIUM SERPL-MCNC: 2.3 MG/DL — SIGNIFICANT CHANGE UP (ref 1.8–2.4)
MCHC RBC-ENTMCNC: 27.1 PG — SIGNIFICANT CHANGE UP (ref 27–31)
MCHC RBC-ENTMCNC: 30.5 G/DL — LOW (ref 32–37)
MCV RBC AUTO: 88.9 FL — SIGNIFICANT CHANGE UP (ref 81–99)
MONOCYTES # BLD AUTO: 0.76 K/UL — HIGH (ref 0.1–0.6)
MONOCYTES NFR BLD AUTO: 12.8 % — HIGH (ref 1.7–9.3)
NEUTROPHILS # BLD AUTO: 3.32 K/UL — SIGNIFICANT CHANGE UP (ref 1.4–6.5)
NEUTROPHILS NFR BLD AUTO: 56 % — SIGNIFICANT CHANGE UP (ref 42.2–75.2)
NRBC # BLD: 0 /100 WBCS — SIGNIFICANT CHANGE UP (ref 0–0)
PLATELET # BLD AUTO: 207 K/UL — SIGNIFICANT CHANGE UP (ref 130–400)
POTASSIUM SERPL-MCNC: 4.4 MMOL/L — SIGNIFICANT CHANGE UP (ref 3.5–5)
POTASSIUM SERPL-SCNC: 4.4 MMOL/L — SIGNIFICANT CHANGE UP (ref 3.5–5)
RBC # BLD: 3.43 M/UL — LOW (ref 4.2–5.4)
RBC # FLD: 16.2 % — HIGH (ref 11.5–14.5)
SODIUM SERPL-SCNC: 140 MMOL/L — SIGNIFICANT CHANGE UP (ref 135–146)
WBC # BLD: 5.93 K/UL — SIGNIFICANT CHANGE UP (ref 4.8–10.8)
WBC # FLD AUTO: 5.93 K/UL — SIGNIFICANT CHANGE UP (ref 4.8–10.8)

## 2018-10-03 RX ADMIN — Medication 50 MILLIGRAM(S): at 11:13

## 2018-10-03 RX ADMIN — GABAPENTIN 100 MILLIGRAM(S): 400 CAPSULE ORAL at 13:21

## 2018-10-03 RX ADMIN — GABAPENTIN 100 MILLIGRAM(S): 400 CAPSULE ORAL at 06:04

## 2018-10-03 RX ADMIN — Medication 1: at 11:36

## 2018-10-03 RX ADMIN — Medication 25 MILLIGRAM(S): at 06:04

## 2018-10-03 RX ADMIN — ENOXAPARIN SODIUM 40 MILLIGRAM(S): 100 INJECTION SUBCUTANEOUS at 11:14

## 2018-10-03 RX ADMIN — Medication 10 MILLIGRAM(S): at 06:04

## 2018-10-03 RX ADMIN — Medication 7 UNIT(S): at 11:36

## 2018-10-03 RX ADMIN — GABAPENTIN 100 MILLIGRAM(S): 400 CAPSULE ORAL at 21:49

## 2018-10-03 RX ADMIN — Medication 25 MILLIGRAM(S): at 21:49

## 2018-10-03 RX ADMIN — METHADONE HYDROCHLORIDE 10 MILLIGRAM(S): 40 TABLET ORAL at 11:34

## 2018-10-03 RX ADMIN — BUDESONIDE AND FORMOTEROL FUMARATE DIHYDRATE 2 PUFF(S): 160; 4.5 AEROSOL RESPIRATORY (INHALATION) at 21:49

## 2018-10-03 RX ADMIN — Medication 3 MILLILITER(S): at 16:47

## 2018-10-03 RX ADMIN — INSULIN GLARGINE 25 UNIT(S): 100 INJECTION, SOLUTION SUBCUTANEOUS at 22:13

## 2018-10-03 RX ADMIN — Medication 1200 MILLIGRAM(S): at 18:02

## 2018-10-03 RX ADMIN — ATORVASTATIN CALCIUM 40 MILLIGRAM(S): 80 TABLET, FILM COATED ORAL at 21:49

## 2018-10-03 RX ADMIN — Medication 10 MILLIGRAM(S): at 18:02

## 2018-10-03 RX ADMIN — Medication 1200 MILLIGRAM(S): at 13:21

## 2018-10-03 NOTE — PROGRESS NOTE ADULT - SUBJECTIVE AND OBJECTIVE BOX
SUBJECTIVE:    Reports breathing much improved - essentially at baseline. Complaining of cough.    PAST MEDICAL & SURGICAL HISTORY  Chronic pain  HLD (hyperlipidemia)  CVA (cerebral vascular accident)  Depression  COPD (chronic obstructive pulmonary disease)  Diabetes  CHF (congestive heart failure)  History of cholecystectomy  H/O tubal ligation  H/O abdominal hysterectomy  AICD (automatic cardioverter/defibrillator) present    SOCIAL HISTORY:  Negative for smoking/alcohol/drug use.     ALLERGIES:  aspirin (Other (Moderate))    MEDICATIONS:  STANDING MEDICATIONS  ALBUTerol/ipratropium for Nebulization 3 milliLiter(s) Nebulizer every 6 hours  amitriptyline 25 milliGRAM(s) Oral at bedtime  atorvastatin 40 milliGRAM(s) Oral at bedtime  buDESOnide 160 MICROgram(s)/formoterol 4.5 MICROgram(s) Inhaler 2 Puff(s) Inhalation two times a day  busPIRone 10 milliGRAM(s) Oral two times a day  dextrose 5%. 1000 milliLiter(s) IV Continuous <Continuous>  dextrose 50% Injectable 12.5 Gram(s) IV Push once  dextrose 50% Injectable 25 Gram(s) IV Push once  dextrose 50% Injectable 25 Gram(s) IV Push once  enoxaparin Injectable 40 milliGRAM(s) SubCutaneous daily  gabapentin 100 milliGRAM(s) Oral three times a day  insulin glargine Injectable (LANTUS) 25 Unit(s) SubCutaneous at bedtime  insulin lispro (HumaLOG) corrective regimen sliding scale   SubCutaneous three times a day before meals  insulin lispro Injectable (HumaLOG) 7 Unit(s) SubCutaneous before breakfast  insulin lispro Injectable (HumaLOG) 7 Unit(s) SubCutaneous before lunch  insulin lispro Injectable (HumaLOG) 7 Unit(s) SubCutaneous before dinner  levoFLOXacin IVPB      levoFLOXacin IVPB 750 milliGRAM(s) IV Intermittent every 24 hours  methadone    Tablet 10 milliGRAM(s) Oral daily  methylPREDNISolone sodium succinate IVPB 60 milliGRAM(s) IV Intermittent two times a day  metoprolol succinate ER 25 milliGRAM(s) Oral daily  traZODone 50 milliGRAM(s) Oral daily    PRN MEDICATIONS  dextrose 40% Gel 15 Gram(s) Oral once PRN  glucagon  Injectable 1 milliGRAM(s) IntraMuscular once PRN    VITALS:   T(F): 97.2  HR: 80  BP: 106/54  RR: 20  SpO2: 100%    LABS:                        9.3    5.93  )-----------( 207      ( 03 Oct 2018 06:27 )             30.5     10-03    140  |  98  |  32<H>  ----------------------------<  90  4.4   |  31  |  2.0<H>    Ca    8.8      03 Oct 2018 06:27  Mg     2.3     10-03    Culture - Blood (collected 01 Oct 2018 02:35)  Source: .Blood Blood  Preliminary Report (02 Oct 2018 14:01):    No growth to date.    Culture - Blood (collected 01 Oct 2018 02:35)  Source: .Blood Blood  Preliminary Report (02 Oct 2018 14:01):    No growth to date.    PHYSICAL EXAM:  GEN: NAD, comfortable  LUNGS: CTAB, no w/r/r  HEART: RRR, no m/r/g  ABD: soft, NT/ND, +BS  EXT: no edema, PP b/l  NEURO: AAOx3

## 2018-10-03 NOTE — PROGRESS NOTE ADULT - SUBJECTIVE AND OBJECTIVE BOX
SUBJECTIVE:    Patient is a 57y old Female who presents with a chief complaint of sob (03 Oct 2018 10:33)    Currently admitted to medicine with the primary diagnosis of PNA (pneumonia)     Today is hospital day 2d. This morning she is resting comfortably in bed and reports no new issues or overnight events.     PAST MEDICAL & SURGICAL HISTORY  Chronic pain  HLD (hyperlipidemia)  CVA (cerebral vascular accident)  Depression  COPD (chronic obstructive pulmonary disease)  Diabetes  CHF (congestive heart failure)  History of cholecystectomy  H/O tubal ligation  H/O abdominal hysterectomy  AICD (automatic cardioverter/defibrillator) present    SOCIAL HISTORY:  Negative for smoking/alcohol/drug use.     ALLERGIES:  aspirin (Other (Moderate))    MEDICATIONS:  STANDING MEDICATIONS  ALBUTerol/ipratropium for Nebulization 3 milliLiter(s) Nebulizer every 6 hours  amitriptyline 25 milliGRAM(s) Oral at bedtime  atorvastatin 40 milliGRAM(s) Oral at bedtime  buDESOnide 160 MICROgram(s)/formoterol 4.5 MICROgram(s) Inhaler 2 Puff(s) Inhalation two times a day  busPIRone 10 milliGRAM(s) Oral two times a day  dextrose 5%. 1000 milliLiter(s) IV Continuous <Continuous>  dextrose 50% Injectable 12.5 Gram(s) IV Push once  dextrose 50% Injectable 25 Gram(s) IV Push once  dextrose 50% Injectable 25 Gram(s) IV Push once  enoxaparin Injectable 40 milliGRAM(s) SubCutaneous daily  gabapentin 100 milliGRAM(s) Oral three times a day  guaiFENesin ER 1200 milliGRAM(s) Oral every 12 hours  insulin glargine Injectable (LANTUS) 25 Unit(s) SubCutaneous at bedtime  insulin lispro (HumaLOG) corrective regimen sliding scale   SubCutaneous three times a day before meals  insulin lispro Injectable (HumaLOG) 7 Unit(s) SubCutaneous before breakfast  insulin lispro Injectable (HumaLOG) 7 Unit(s) SubCutaneous before lunch  insulin lispro Injectable (HumaLOG) 7 Unit(s) SubCutaneous before dinner  levoFLOXacin IVPB      levoFLOXacin IVPB 750 milliGRAM(s) IV Intermittent every 24 hours  methadone    Tablet 10 milliGRAM(s) Oral daily  methylPREDNISolone sodium succinate IVPB 60 milliGRAM(s) IV Intermittent two times a day  metoprolol succinate ER 25 milliGRAM(s) Oral daily  traZODone 50 milliGRAM(s) Oral daily    PRN MEDICATIONS  dextrose 40% Gel 15 Gram(s) Oral once PRN  glucagon  Injectable 1 milliGRAM(s) IntraMuscular once PRN    VITALS:   T(F): 97.2  HR: 80  BP: 106/54  RR: 20  SpO2: 100%    LABS:                        9.3    5.93  )-----------( 207      ( 03 Oct 2018 06:27 )             30.5     10-03    140  |  98  |  32<H>  ----------------------------<  90  4.4   |  31  |  2.0<H>    Ca    8.8      03 Oct 2018 06:27  Mg     2.3     10-03                Culture - Blood (collected 01 Oct 2018 02:35)  Source: .Blood Blood  Preliminary Report (02 Oct 2018 14:01):    No growth to date.    Culture - Blood (collected 01 Oct 2018 02:35)  Source: .Blood Blood  Preliminary Report (02 Oct 2018 14:01):    No growth to date.          RADIOLOGY:    PHYSICAL EXAM:  GEN: No acute distress  LUNGS: Clear to auscultation bilaterally   HEART: S1/S2 present. RRR.   ABD/ GI: Soft, non-tender, non-distended. Bowel sounds present  EXT: NC/NC/NE/2+PP/VO  NEURO: AAOX3 SUBJECTIVE:    Patient is a 57y old Female who presents with a chief complaint of sob (03 Oct 2018 10:33)    Currently admitted to medicine with the primary diagnosis of PNA (pneumonia)     Today is hospital day 2d. This morning she is resting comfortably in bed and reports no new issues or overnight events.     PAST MEDICAL & SURGICAL HISTORY  Chronic pain  HLD (hyperlipidemia)  CVA (cerebral vascular accident)  Depression  COPD (chronic obstructive pulmonary disease)  Diabetes  CHF (congestive heart failure)  History of cholecystectomy  H/O tubal ligation  H/O abdominal hysterectomy  AICD (automatic cardioverter/defibrillator) present    SOCIAL HISTORY:  Negative for smoking/alcohol/drug use.     ALLERGIES:  aspirin (Other (Moderate))    MEDICATIONS:  STANDING MEDICATIONS  ALBUTerol/ipratropium for Nebulization 3 milliLiter(s) Nebulizer every 6 hours  amitriptyline 25 milliGRAM(s) Oral at bedtime  atorvastatin 40 milliGRAM(s) Oral at bedtime  buDESOnide 160 MICROgram(s)/formoterol 4.5 MICROgram(s) Inhaler 2 Puff(s) Inhalation two times a day  busPIRone 10 milliGRAM(s) Oral two times a day  dextrose 5%. 1000 milliLiter(s) IV Continuous <Continuous>  dextrose 50% Injectable 12.5 Gram(s) IV Push once  dextrose 50% Injectable 25 Gram(s) IV Push once  dextrose 50% Injectable 25 Gram(s) IV Push once  enoxaparin Injectable 40 milliGRAM(s) SubCutaneous daily  gabapentin 100 milliGRAM(s) Oral three times a day  guaiFENesin ER 1200 milliGRAM(s) Oral every 12 hours  insulin glargine Injectable (LANTUS) 25 Unit(s) SubCutaneous at bedtime  insulin lispro (HumaLOG) corrective regimen sliding scale   SubCutaneous three times a day before meals  insulin lispro Injectable (HumaLOG) 7 Unit(s) SubCutaneous before breakfast  insulin lispro Injectable (HumaLOG) 7 Unit(s) SubCutaneous before lunch  insulin lispro Injectable (HumaLOG) 7 Unit(s) SubCutaneous before dinner  levoFLOXacin IVPB      levoFLOXacin IVPB 750 milliGRAM(s) IV Intermittent every 24 hours  methadone    Tablet 10 milliGRAM(s) Oral daily  methylPREDNISolone sodium succinate IVPB 60 milliGRAM(s) IV Intermittent two times a day  metoprolol succinate ER 25 milliGRAM(s) Oral daily  traZODone 50 milliGRAM(s) Oral daily    PRN MEDICATIONS  dextrose 40% Gel 15 Gram(s) Oral once PRN  glucagon  Injectable 1 milliGRAM(s) IntraMuscular once PRN    VITALS:   T(F): 97.2  HR: 80  BP: 106/54  RR: 20  SpO2: 100%    LABS:                        9.3    5.93  )-----------( 207      ( 03 Oct 2018 06:27 )             30.5     10-03    140  |  98  |  32<H>  ----------------------------<  90  4.4   |  31  |  2.0<H>    Ca    8.8      03 Oct 2018 06:27  Mg     2.3     10-03                Culture - Blood (collected 01 Oct 2018 02:35)  Source: .Blood Blood  Preliminary Report (02 Oct 2018 14:01):    No growth to date.    Culture - Blood (collected 01 Oct 2018 02:35)  Source: .Blood Blood  Preliminary Report (02 Oct 2018 14:01):    No growth to date.          RADIOLOGY:    PHYSICAL EXAM:  GEN: No acute distress  LUNGS: wheezing at present  HEART: S1/S2 present. RRR.   ABD/ GI: Soft, non-tender, non-distended. Bowel sounds present  EXT: NC/NC/NE/2+PP/VO  NEURO: AAOX3

## 2018-10-03 NOTE — PROGRESS NOTE ADULT - ASSESSMENT
# AC SYSTOLIC CHF-  lasix now on hold, hold  entresto. s/p AICD in the past.    # RITCHIE on  CKD 3 -renal function  worsened due to overdiuresis.now is stable    # Bipolar disorder- on trazodone and buspirone    #Fibromyalgia on methadone.    # ac copd  exacerbation --on home oxygen and had cold at home --green phlegm started on levofloxacin. added bronchodilators and solumedrol. She was tested for flu which was negative. .

## 2018-10-03 NOTE — PROGRESS NOTE ADULT - ASSESSMENT
57 year old female with known hx of HFrEF (LVEF 20-25%) s/p AICD, COPD on home O2 (4L), DM, CVA with residual right sided weakness, fibromyalgia, and depression presented to the ED for a 3 day hx of SOB.    #) Acute decompensated HFrEF (25-30%)  - BNP: 57434  - continue with metoprolol, holding Entresto for RITCHIE  - monitor intake/output, daily body weights  - fluid restriction, keep negative balance   - CXR: congestion improving significantly  - holding Lasix for RITCHIE. Still diuresing well    #) CAP  - cough productive of green sputum  - will cover with Levaquin 750 for now  - starting Mucinex for cough    #) RITCHIE on CKD  - Cr trending down though still elevated (1.3 -> 2.1 -> 2.0)  - likely from diuresis - holding Lasix  - monitor BMP    #) COPD (on home 3L O2)  - stable  - continue with nebs, will resume home Symbicort  - continue with O2 target spo2 92%    #) Diabetes  - monitor fingerstick  - start Lantus 25 Units; Lispro 7+7+7    #) Fibromyalgia  - on methadone 10mg  - c/w amitriptyline, gabapentin   - start bowel regimen if constipation 2/2 chronic opioid use    #) Bipolar disorder  - c/w buspirone, trazodone    #) DLD  -c/w atorvastatin    #) DVT Ppx:  - c/w Lovenox    #) Diet  - consistent carbohydrate/ no snacks    #) Disposition  - from home    #) Full code status

## 2018-10-04 ENCOUNTER — TRANSCRIPTION ENCOUNTER (OUTPATIENT)
Age: 58
End: 2018-10-04

## 2018-10-04 LAB
ANION GAP SERPL CALC-SCNC: 10 MMOL/L — SIGNIFICANT CHANGE UP (ref 7–14)
BASOPHILS # BLD AUTO: 0.04 K/UL — SIGNIFICANT CHANGE UP (ref 0–0.2)
BASOPHILS NFR BLD AUTO: 0.8 % — SIGNIFICANT CHANGE UP (ref 0–1)
BUN SERPL-MCNC: 27 MG/DL — HIGH (ref 10–20)
CALCIUM SERPL-MCNC: 8.9 MG/DL — SIGNIFICANT CHANGE UP (ref 8.5–10.1)
CHLORIDE SERPL-SCNC: 97 MMOL/L — LOW (ref 98–110)
CO2 SERPL-SCNC: 29 MMOL/L — SIGNIFICANT CHANGE UP (ref 17–32)
CREAT SERPL-MCNC: 1.5 MG/DL — SIGNIFICANT CHANGE UP (ref 0.7–1.5)
EOSINOPHIL # BLD AUTO: 0.15 K/UL — SIGNIFICANT CHANGE UP (ref 0–0.7)
EOSINOPHIL NFR BLD AUTO: 2.9 % — SIGNIFICANT CHANGE UP (ref 0–8)
GLUCOSE BLDC GLUCOMTR-MCNC: 126 MG/DL — HIGH (ref 70–99)
GLUCOSE BLDC GLUCOMTR-MCNC: 190 MG/DL — HIGH (ref 70–99)
GLUCOSE BLDC GLUCOMTR-MCNC: 206 MG/DL — HIGH (ref 70–99)
GLUCOSE BLDC GLUCOMTR-MCNC: 312 MG/DL — HIGH (ref 70–99)
GLUCOSE BLDC GLUCOMTR-MCNC: 319 MG/DL — HIGH (ref 70–99)
GLUCOSE BLDC GLUCOMTR-MCNC: 339 MG/DL — HIGH (ref 70–99)
GLUCOSE SERPL-MCNC: 96 MG/DL — SIGNIFICANT CHANGE UP (ref 70–99)
HCT VFR BLD CALC: 30.6 % — LOW (ref 37–47)
HGB BLD-MCNC: 9.4 G/DL — LOW (ref 12–16)
IMM GRANULOCYTES NFR BLD AUTO: 0.2 % — SIGNIFICANT CHANGE UP (ref 0.1–0.3)
LYMPHOCYTES # BLD AUTO: 1.34 K/UL — SIGNIFICANT CHANGE UP (ref 1.2–3.4)
LYMPHOCYTES # BLD AUTO: 26.1 % — SIGNIFICANT CHANGE UP (ref 20.5–51.1)
MAGNESIUM SERPL-MCNC: 2.3 MG/DL — SIGNIFICANT CHANGE UP (ref 1.8–2.4)
MCHC RBC-ENTMCNC: 27.2 PG — SIGNIFICANT CHANGE UP (ref 27–31)
MCHC RBC-ENTMCNC: 30.7 G/DL — LOW (ref 32–37)
MCV RBC AUTO: 88.4 FL — SIGNIFICANT CHANGE UP (ref 81–99)
MONOCYTES # BLD AUTO: 0.47 K/UL — SIGNIFICANT CHANGE UP (ref 0.1–0.6)
MONOCYTES NFR BLD AUTO: 9.2 % — SIGNIFICANT CHANGE UP (ref 1.7–9.3)
NEUTROPHILS # BLD AUTO: 3.12 K/UL — SIGNIFICANT CHANGE UP (ref 1.4–6.5)
NEUTROPHILS NFR BLD AUTO: 60.8 % — SIGNIFICANT CHANGE UP (ref 42.2–75.2)
NRBC # BLD: 0 /100 WBCS — SIGNIFICANT CHANGE UP (ref 0–0)
PLATELET # BLD AUTO: 193 K/UL — SIGNIFICANT CHANGE UP (ref 130–400)
POTASSIUM SERPL-MCNC: 4.9 MMOL/L — SIGNIFICANT CHANGE UP (ref 3.5–5)
POTASSIUM SERPL-SCNC: 4.9 MMOL/L — SIGNIFICANT CHANGE UP (ref 3.5–5)
RBC # BLD: 3.46 M/UL — LOW (ref 4.2–5.4)
RBC # FLD: 15.9 % — HIGH (ref 11.5–14.5)
SODIUM SERPL-SCNC: 136 MMOL/L — SIGNIFICANT CHANGE UP (ref 135–146)
WBC # BLD: 5.13 K/UL — SIGNIFICANT CHANGE UP (ref 4.8–10.8)
WBC # FLD AUTO: 5.13 K/UL — SIGNIFICANT CHANGE UP (ref 4.8–10.8)

## 2018-10-04 RX ORDER — CHLORHEXIDINE GLUCONATE 213 G/1000ML
1 SOLUTION TOPICAL
Qty: 0 | Refills: 0 | Status: DISCONTINUED | OUTPATIENT
Start: 2018-10-04 | End: 2018-10-05

## 2018-10-04 RX ORDER — METHADONE HYDROCHLORIDE 40 MG/1
10 TABLET ORAL ONCE
Qty: 0 | Refills: 0 | Status: DISCONTINUED | OUTPATIENT
Start: 2018-10-04 | End: 2018-10-04

## 2018-10-04 RX ADMIN — Medication 4: at 18:49

## 2018-10-04 RX ADMIN — Medication 4: at 12:31

## 2018-10-04 RX ADMIN — Medication 7 UNIT(S): at 18:48

## 2018-10-04 RX ADMIN — Medication 25 MILLIGRAM(S): at 05:36

## 2018-10-04 RX ADMIN — BUDESONIDE AND FORMOTEROL FUMARATE DIHYDRATE 2 PUFF(S): 160; 4.5 AEROSOL RESPIRATORY (INHALATION) at 21:29

## 2018-10-04 RX ADMIN — Medication 1200 MILLIGRAM(S): at 18:50

## 2018-10-04 RX ADMIN — Medication 3 MILLILITER(S): at 14:30

## 2018-10-04 RX ADMIN — METHADONE HYDROCHLORIDE 10 MILLIGRAM(S): 40 TABLET ORAL at 22:34

## 2018-10-04 RX ADMIN — Medication 10 MILLIGRAM(S): at 05:36

## 2018-10-04 RX ADMIN — Medication 3 MILLILITER(S): at 08:54

## 2018-10-04 RX ADMIN — CHLORHEXIDINE GLUCONATE 1 APPLICATION(S): 213 SOLUTION TOPICAL at 13:24

## 2018-10-04 RX ADMIN — Medication 25 MILLIGRAM(S): at 21:29

## 2018-10-04 RX ADMIN — ENOXAPARIN SODIUM 40 MILLIGRAM(S): 100 INJECTION SUBCUTANEOUS at 13:21

## 2018-10-04 RX ADMIN — ATORVASTATIN CALCIUM 40 MILLIGRAM(S): 80 TABLET, FILM COATED ORAL at 21:29

## 2018-10-04 RX ADMIN — Medication 7 UNIT(S): at 08:27

## 2018-10-04 RX ADMIN — Medication 60 MILLIGRAM(S): at 05:35

## 2018-10-04 RX ADMIN — BUDESONIDE AND FORMOTEROL FUMARATE DIHYDRATE 2 PUFF(S): 160; 4.5 AEROSOL RESPIRATORY (INHALATION) at 08:29

## 2018-10-04 RX ADMIN — INSULIN GLARGINE 25 UNIT(S): 100 INJECTION, SOLUTION SUBCUTANEOUS at 21:52

## 2018-10-04 RX ADMIN — Medication 60 MILLIGRAM(S): at 18:50

## 2018-10-04 RX ADMIN — Medication 50 MILLIGRAM(S): at 13:28

## 2018-10-04 RX ADMIN — Medication 10 MILLIGRAM(S): at 18:48

## 2018-10-04 RX ADMIN — GABAPENTIN 100 MILLIGRAM(S): 400 CAPSULE ORAL at 21:29

## 2018-10-04 RX ADMIN — Medication 7 UNIT(S): at 12:30

## 2018-10-04 RX ADMIN — GABAPENTIN 100 MILLIGRAM(S): 400 CAPSULE ORAL at 05:36

## 2018-10-04 RX ADMIN — Medication 1200 MILLIGRAM(S): at 05:35

## 2018-10-04 NOTE — PROGRESS NOTE ADULT - ASSESSMENT
57 year old female with known hx of HFrEF (LVEF 20-25%) s/p AICD, COPD on home O2 (4L), DM, CVA with residual right sided weakness, fibromyalgia, and depression presented to the ED for a 3 day hx of SOB.    Acute on Chronic HFrEF (25-30%)    Feels better  Pt on 2L O2, sat 94%  Still coughing  Continue Mucinex for cough  BNP: 33104  Continue with metoprolol  Hold Entresto for RITCHIE  Monitor intake/output, daily body weights  Fluid restriction, keep negative balance   CXR: congestion improving significantly  Hold Lasix for RITCHIE. Still diuresing well  Levaquine d/c'd (no evidence of PNA)    RITCHIE on CKD 2/2 possible CRS2    Cr trending down though still elevated (1.3 -> 2.1 -> 2.0>> 1.5)  Likely from diuresis - holding Lasix  Monitor BMP    COPD (on home 3L O2): Stable    Continue with nebs, will resume home Symbicort  Continue with O2 target spo2 92%    Diabetes    Monitor fingerstick  Start Lantus 25 Units; Lispro 7+7+7    Fibromyalgia: Stable    Cont. methadone 10mg  C/w amitriptyline, gabapentin   Consider bowel regimen if constipation 2/2 chronic opioid use    Bipolar disorder    C/w buspirone, trazodone    DLD  C/w atorvastatin    DVT Ppx:  C/w Lovenox    Diet  Consistent carbohydrate/ no snacks    Disposition  from home    Full code status

## 2018-10-04 NOTE — PROGRESS NOTE ADULT - SUBJECTIVE AND OBJECTIVE BOX
CHIEF COMPLAINT:    Patient is a 57y old Female who presents with a chief complaint of sob (03 Oct 2018 12:52)    Currently admitted to medicine with the primary diagnosis of PNA (pneumonia)     Today is hospital day 3d. This morning she is resting comfortably in bed and reports no new issues or overnight events.     PAST MEDICAL & SURGICAL HISTORY  Chronic pain  HLD (hyperlipidemia)  CVA (cerebral vascular accident)  Depression  COPD (chronic obstructive pulmonary disease)  Diabetes  CHF (congestive heart failure)  History of cholecystectomy  H/O tubal ligation  H/O abdominal hysterectomy  AICD (automatic cardioverter/defibrillator) present    SOCIAL HISTORY:  Negative for smoking/alcohol/drug use.     ALLERGIES:  aspirin (Other (Moderate))    MEDICATIONS:  STANDING MEDICATIONS  ALBUTerol/ipratropium for Nebulization 3 milliLiter(s) Nebulizer every 6 hours  amitriptyline 25 milliGRAM(s) Oral at bedtime  atorvastatin 40 milliGRAM(s) Oral at bedtime  buDESOnide 160 MICROgram(s)/formoterol 4.5 MICROgram(s) Inhaler 2 Puff(s) Inhalation two times a day  busPIRone 10 milliGRAM(s) Oral two times a day  dextrose 5%. 1000 milliLiter(s) IV Continuous <Continuous>  dextrose 50% Injectable 12.5 Gram(s) IV Push once  dextrose 50% Injectable 25 Gram(s) IV Push once  dextrose 50% Injectable 25 Gram(s) IV Push once  enoxaparin Injectable 40 milliGRAM(s) SubCutaneous daily  gabapentin 100 milliGRAM(s) Oral three times a day  guaiFENesin ER 1200 milliGRAM(s) Oral every 12 hours  insulin glargine Injectable (LANTUS) 25 Unit(s) SubCutaneous at bedtime  insulin lispro (HumaLOG) corrective regimen sliding scale   SubCutaneous three times a day before meals  insulin lispro Injectable (HumaLOG) 7 Unit(s) SubCutaneous before breakfast  insulin lispro Injectable (HumaLOG) 7 Unit(s) SubCutaneous before lunch  insulin lispro Injectable (HumaLOG) 7 Unit(s) SubCutaneous before dinner  levoFLOXacin IVPB      levoFLOXacin IVPB 750 milliGRAM(s) IV Intermittent every 24 hours  methadone    Tablet 10 milliGRAM(s) Oral daily  methylPREDNISolone sodium succinate Injectable 60 milliGRAM(s) IV Push every 12 hours  metoprolol succinate ER 25 milliGRAM(s) Oral daily  traZODone 50 milliGRAM(s) Oral daily    PRN MEDICATIONS  dextrose 40% Gel 15 Gram(s) Oral once PRN  glucagon  Injectable 1 milliGRAM(s) IntraMuscular once PRN    VITALS:   T(F): 97.8  HR: 83  BP: 112/69  RR: 18  SpO2: 98%    LABS:                        9.3    5.93  )-----------( 207      ( 03 Oct 2018 06:27 )             30.5     10-03    140  |  98  |  32<H>  ----------------------------<  90  4.4   |  31  |  2.0<H>    Ca    8.8      03 Oct 2018 06:27  Mg     2.3     10-03                      RADIOLOGY:    PHYSICAL EXAM:  GEN: No acute distress  PULM: Clear to auscultation bilaterally   CARD: S1/S2 present. RRR.   GI: Soft, non-tender, non-distended. Bowel sounds present  MSK: NC/NC/NE/2+PP/VO  NEURO: AAOX3 CHIEF COMPLAINT:    Patient is a 57y old Female who presents with a chief complaint of sob (03 Oct 2018 12:52)    Currently admitted to medicine with the primary diagnosis of PNA (pneumonia)     Today is hospital day 3d. This morning she is resting comfortably in bed and reports no new issues or overnight events. Pt still SOB and coughing. She denied fever, chills, N/V/D/C, palpitations, CP.  She feels better today.     PAST MEDICAL & SURGICAL HISTORY  Chronic pain  HLD (hyperlipidemia)  CVA (cerebral vascular accident)  Depression  COPD (chronic obstructive pulmonary disease)  Diabetes  CHF (congestive heart failure)  History of cholecystectomy  H/O tubal ligation  H/O abdominal hysterectomy  AICD (automatic cardioverter/defibrillator) present    SOCIAL HISTORY:  Negative for smoking/alcohol/drug use.     ALLERGIES:  aspirin (Other (Moderate))    MEDICATIONS:  STANDING MEDICATIONS  ALBUTerol/ipratropium for Nebulization 3 milliLiter(s) Nebulizer every 6 hours  amitriptyline 25 milliGRAM(s) Oral at bedtime  atorvastatin 40 milliGRAM(s) Oral at bedtime  buDESOnide 160 MICROgram(s)/formoterol 4.5 MICROgram(s) Inhaler 2 Puff(s) Inhalation two times a day  busPIRone 10 milliGRAM(s) Oral two times a day  dextrose 5%. 1000 milliLiter(s) IV Continuous <Continuous>  dextrose 50% Injectable 12.5 Gram(s) IV Push once  dextrose 50% Injectable 25 Gram(s) IV Push once  dextrose 50% Injectable 25 Gram(s) IV Push once  enoxaparin Injectable 40 milliGRAM(s) SubCutaneous daily  gabapentin 100 milliGRAM(s) Oral three times a day  guaiFENesin ER 1200 milliGRAM(s) Oral every 12 hours  insulin glargine Injectable (LANTUS) 25 Unit(s) SubCutaneous at bedtime  insulin lispro (HumaLOG) corrective regimen sliding scale   SubCutaneous three times a day before meals  insulin lispro Injectable (HumaLOG) 7 Unit(s) SubCutaneous before breakfast  insulin lispro Injectable (HumaLOG) 7 Unit(s) SubCutaneous before lunch  insulin lispro Injectable (HumaLOG) 7 Unit(s) SubCutaneous before dinner  levoFLOXacin IVPB      levoFLOXacin IVPB 750 milliGRAM(s) IV Intermittent every 24 hours  methadone    Tablet 10 milliGRAM(s) Oral daily  methylPREDNISolone sodium succinate Injectable 60 milliGRAM(s) IV Push every 12 hours  metoprolol succinate ER 25 milliGRAM(s) Oral daily  traZODone 50 milliGRAM(s) Oral daily    PRN MEDICATIONS  dextrose 40% Gel 15 Gram(s) Oral once PRN  glucagon  Injectable 1 milliGRAM(s) IntraMuscular once PRN    VITALS:   T(F): 97.8  HR: 83  BP: 112/69  RR: 18  SpO2: 98%    LABS:                        9.3    5.93  )-----------( 207      ( 03 Oct 2018 06:27 )             30.5     10-03    140  |  98  |  32<H>  ----------------------------<  90  4.4   |  31  |  2.0<H>    Ca    8.8      03 Oct 2018 06:27  Mg     2.3     10-03                      RADIOLOGY:    PHYSICAL EXAM:  GEN: No acute distress, lying comfortably in bed, obese  PULM: Fine crackles on auscultation bilaterally   CARD: S1/S2 present. RRR.   GI: Soft, non-tender, non-distended. Bowel sounds present  MSK: No edema, pulses 2+ b/l  NEURO: AAOX3, nonfocal

## 2018-10-04 NOTE — DISCHARGE NOTE ADULT - PATIENT PORTAL LINK FT
You can access the [facility], offered by [facility], by registering with the following website: [email or web address]

## 2018-10-04 NOTE — DISCHARGE NOTE ADULT - PLAN OF CARE
Return to baseline Take medications as precribed. Follow up with your Primary Care Physician, and Cardiologist in 1-2 weeks after discharge. Return to baseline function Take medications as precribed. Follow up with your Primary Care Physician, and Cardiologist in 1-2 weeks after discharge. Take medications as precribed. Follow up with your Primary Care Physician, and Cardiologist in 1-2 weeks after discharge.

## 2018-10-04 NOTE — PROGRESS NOTE ADULT - ASSESSMENT
57 year old female with known hx of HFrEF (LVEF 20-25%) s/p AICD, COPD on home O2 (4L), DM, CVA with residual right sided weakness, fibromyalgia, and depression presented to the ED for a 3 day hx of SOB.    #) Acute decompensated HFrEF (25-30%)  - BNP: 07512  - continue with metoprolol, holding Entresto for RITCHIE  - monitor intake/output, daily body weights  - fluid restriction, keep negative balance   - CXR: congestion improving significantly  - holding Lasix for RITCHIE. Still diuresing well    #) CAP ?????????  - cough productive of green sputum  - will cover with Levaquin 750 for now  - starting Mucinex for cough    #) RITCHIE on CKD  - Cr trending down though still elevated (1.3 -> 2.1 -> 2.0)  - likely from diuresis - holding Lasix  - monitor BMP    #) COPD (on home 3L O2)  - stable  - continue with nebs, will resume home Symbicort  - continue with O2 target spo2 92%    #) Diabetes  - monitor fingerstick  - start Lantus 25 Units; Lispro 7+7+7    #) Fibromyalgia  - on methadone 10mg  - c/w amitriptyline, gabapentin   - start bowel regimen if constipation 2/2 chronic opioid use    #) Bipolar disorder  - c/w buspirone, trazodone    #) DLD  -c/w atorvastatin    #) DVT Ppx:  - c/w Lovenox    #) Diet  - consistent carbohydrate/ no snacks    #) Disposition  - from home    #) Full code status 57 year old female with known hx of HFrEF (LVEF 20-25%) s/p AICD, COPD on home O2 (4L), DM, CVA with residual right sided weakness, fibromyalgia, and depression presented to the ED for a 3 day hx of SOB.    Acute on Chronic HFrEF (25-30%)    Feels better  Pt on 2L O2, sat 94%  Still coughing  Continue Mucinex for cough  BNP: 92819  Continue with metoprolol  Hold Entresto for RITCHIE  Monitor intake/output, daily body weights  Fluid restriction, keep negative balance   CXR: congestion improving significantly  Hold Lasix for RITCHIE. Still diuresing well  Levaquine d/c'd (no evidence of PNA)    RITCHIE on CKD vs Hepatorenal Syndrome :Stable    Cr trending down though still elevated (1.3 -> 2.1 -> 2.0)  Likely from diuresis - holding Lasix  Monitor BMP    COPD (on home 3L O2): Stable    Continue with nebs, will resume home Symbicort  Continue with O2 target spo2 92%    Diabetes    Monitor fingerstick  Start Lantus 25 Units; Lispro 7+7+7    Fibromyalgia: Stable    Cont. methadone 10mg  C/w amitriptyline, gabapentin   Consider bowel regimen if constipation 2/2 chronic opioid use    Bipolar disorder    C/w buspirone, trazodone    DLD  C/w atorvastatin    DVT Ppx:  C/w Lovenox    Diet  Consistent carbohydrate/ no snacks    Disposition  from home    Full code status 57 year old female with known hx of HFrEF (LVEF 20-25%) s/p AICD, COPD on home O2 (4L), DM, CVA with residual right sided weakness, fibromyalgia, and depression presented to the ED for a 3 day hx of SOB.    Acute on Chronic HFrEF (25-30%)    Feels better  Pt on 2L O2, sat 94%  Still coughing  Continue Mucinex for cough  BNP: 97599  Continue with Metoprolol  Hold Entresto for RITCHIE  Monitor intake/output (-200), daily weights (95.3 kg)  Fluid restriction, keep negative balance   CXR: congestion improved significantly  Hold Lasix for RITCHIE. Still diuresing well  Levaquine d/c'd (no evidence of PNA)  Ambulate as tolerated    RITCHIE on CKD : improving    Cr trending down (1.3 -> 2.1 -> 2.0->1.5)  Likely from diuresis - holding Lasix  Monitor BMP    COPD (on home 3L O2 PRN): Stable    Continue with nebs, will resume home Symbicort  Continue with O2 target spo2 92%    Diabetes: Uncontrolled    Monitor fingerstick (126-339)  Lantus 25 Units; Lispro 7+7+7  Pt is on Prednisone 60 mg  BID    Fibromyalgia: Stable    Cont. methadone 10mg  C/w amitriptyline, gabapentin   Consider bowel regimen if constipation 2/2 chronic opioid use    Bipolar disorder : Stable    C/w buspirone, trazodone    DLD : Stable    C/w atorvastatin    DVT Ppx:  C/w Lovenox    Diet  Consistent carbohydrate/ no snacks    Disposition  from home    Full code status 57 year old female with known hx of HFrEF (LVEF 20-25%) s/p AICD, COPD on home O2 (4L), DM, CVA with residual right sided weakness, fibromyalgia, and depression presented to the ED for a 3 day hx of SOB.    Acute on Chronic HFrEF (25-30%)    Feels better  Pt on 2L O2, sat 94%  Still coughing  Continue Mucinex for cough  BNP: 42591  Continue with Metoprolol  Hold Entresto for RITCHIE  Monitor intake/output (-200), daily weights (95.3 kg)  Fluid restriction, keep negative balance   CXR: congestion improved significantly  Hold Lasix for RITCHIE. Still diuresing well  Levaquine d/c'd (no evidence of PNA)  Ambulate as tolerated    RITCHIE on CKD : improving    Cr trending down (1.3 -> 2.1 -> 2.0->1.5)  Likely from diuresis - holding Lasix  Monitor BMP    COPD (on home 3L O2 PRN): Stable    Continue with nebs, will resume home Symbicort  Continue with O2 target spo2 92%    Diabetes: Uncontrolled    Monitor fingerstick (126-339)  Lantus 25 Units; Lispro 7+7+7  Pt is on IV Prednisone 60 mg  BID    Fibromyalgia: Stable    Cont. methadone 10mg  C/w amitriptyline, gabapentin   Consider bowel regimen if constipation 2/2 chronic opioid use    Bipolar disorder : Stable    C/w buspirone, trazodone    DLD : Stable    C/w atorvastatin    DVT Ppx:  C/w Lovenox    Diet  Consistent carbohydrate/ no snacks    Disposition  from home    Full code status

## 2018-10-04 NOTE — DISCHARGE NOTE ADULT - ADDITIONAL INSTRUCTIONS
Take medications as precribed. Follow up with your Primary Care Physician, and Cardiologist in 1-2 weeks after discharge.

## 2018-10-04 NOTE — PROGRESS NOTE ADULT - SUBJECTIVE AND OBJECTIVE BOX
SULY PENA  57y  Female      Patient is a 57y old  Female who presents with a chief complaint of sob (04 Oct 2018 09:42)      INTERVAL HPI/OVERNIGHT EVENTS:      ******************************* REVIEW OF SYSTEMS:**********************************************      All other review of systems negative    *********************** VITALS ******************************************    T(F): 97.8 (10-04-18 @ 06:01)  HR: 83 (10-04-18 @ 06:01) (82 - 86)  BP: 112/69 (10-04-18 @ 06:01) (103/56 - 112/69)  RR: 18 (10-04-18 @ 06:01) (17 - 18)  SpO2: 98% (10-03-18 @ 21:00) (98% - 98%)    10-03-18 @ 07:01  -  10-04-18 @ 07:00  --------------------------------------------------------  IN: 100 mL / OUT: 300 mL / NET: -200 mL            10-03-18 @ 07:01  -  10-04-18 @ 07:00  --------------------------------------------------------  IN: 100 mL / OUT: 300 mL / NET: -200 mL        ******************************** PHYSICAL EXAM:**************************************************  GENERAL: NAD    PSYCH: no agitation, baseline mentation  HEENT:     NERVOUS SYSTEM:  Alert & Oriented X3, MS  5/5 B/L  UE and LE ; Sensory intact    PULMONARY: HALEY, Crackles B/L    CARDIOVASCULAR: S1S2 RRR    GI: Soft, NT, ND; BS present.    EXTREMITIES:  LE edema b/l    LYMPH: No lymphadenopathy noted    SKIN: No rashes or lesions    ******************************************************************************************    Consultant(s) Notes Reviewed:  [x ] YES  [ ] NO    Discussed with Consultants/Other Providers [ x] YES     **************************** LABS *******************************************************                          9.4    5.13  )-----------( 193      ( 04 Oct 2018 06:38 )             30.6     10-04    136  |  97<L>  |  27<H>  ----------------------------<  96  4.9   |  29  |  1.5    Ca    8.9      04 Oct 2018 06:38  Mg     2.3     10-04            Lactate Trend        CAPILLARY BLOOD GLUCOSE  102 (03 Oct 2018 08:00)      POCT Blood Glucose.: 312 mg/dL (04 Oct 2018 11:37)          **************************Active Medications *******************************************  aspirin (Other (Moderate))  IV Contrast (Unknown)      ALBUTerol/ipratropium for Nebulization 3 milliLiter(s) Nebulizer every 6 hours  amitriptyline 25 milliGRAM(s) Oral at bedtime  atorvastatin 40 milliGRAM(s) Oral at bedtime  buDESOnide 160 MICROgram(s)/formoterol 4.5 MICROgram(s) Inhaler 2 Puff(s) Inhalation two times a day  busPIRone 10 milliGRAM(s) Oral two times a day  chlorhexidine 4% Liquid 1 Application(s) Topical <User Schedule>  dextrose 40% Gel 15 Gram(s) Oral once PRN  dextrose 5%. 1000 milliLiter(s) IV Continuous <Continuous>  dextrose 50% Injectable 12.5 Gram(s) IV Push once  dextrose 50% Injectable 25 Gram(s) IV Push once  dextrose 50% Injectable 25 Gram(s) IV Push once  enoxaparin Injectable 40 milliGRAM(s) SubCutaneous daily  gabapentin 100 milliGRAM(s) Oral three times a day  glucagon  Injectable 1 milliGRAM(s) IntraMuscular once PRN  guaiFENesin ER 1200 milliGRAM(s) Oral every 12 hours  insulin glargine Injectable (LANTUS) 25 Unit(s) SubCutaneous at bedtime  insulin lispro (HumaLOG) corrective regimen sliding scale   SubCutaneous three times a day before meals  insulin lispro Injectable (HumaLOG) 7 Unit(s) SubCutaneous before breakfast  insulin lispro Injectable (HumaLOG) 7 Unit(s) SubCutaneous before lunch  insulin lispro Injectable (HumaLOG) 7 Unit(s) SubCutaneous before dinner  methadone    Tablet 10 milliGRAM(s) Oral daily  methylPREDNISolone sodium succinate Injectable 60 milliGRAM(s) IV Push every 12 hours  metoprolol succinate ER 25 milliGRAM(s) Oral daily  traZODone 50 milliGRAM(s) Oral daily      ***************************************************  RADIOLOGY & ADDITIONAL TESTS:    Imaging Personally Reviewed:  [ ] YES  [ ] NO    HEALTH ISSUES - PROBLEM Dx:

## 2018-10-04 NOTE — DISCHARGE NOTE ADULT - CARE PROVIDERS DIRECT ADDRESSES
[email or web address]

## 2018-10-04 NOTE — DISCHARGE NOTE ADULT - MEDICATION SUMMARY - MEDICATIONS TO TAKE
I will START or STAY ON the medications listed below when I get home from the hospital:    methadone 10 mg oral tablet  -- 1 tab(s) by mouth once a day  -- Indication: For Pain    Entresto 24 mg-26 mg oral tablet  -- 1 tab(s) by mouth 2 times a day  -- Indication: For CHF     gabapentin 100 mg oral capsule  -- 1 cap(s) by mouth 3 times a day  -- Indication: For Pain    traZODone 50 mg oral tablet  -- 1 tab(s) by mouth once a day  -- Indication: For Sleep    amitriptyline 25 mg oral tablet  -- 1 tab(s) by mouth once a day (at bedtime)  -- Indication: For Neuropathy    Lantus 100 units/mL subcutaneous solution  -- 25 unit(s) subcutaneous once a day (at bedtime)  -- Indication: For Diabetes    NovoLOG 100 units/mL subcutaneous solution  -- 7 unit(s) subcutaneous 3 times a day (before meals)  -- Indication: For Diabetes    atorvastatin 40 mg oral tablet  -- 1 tab(s) by mouth once a day  -- Indication: For DLD    busPIRone 10 mg oral tablet  -- 1 tab(s) by mouth 2 times a day  -- Indication: For Anxiety    Metoprolol Succinate ER 25 mg oral tablet, extended release  -- 1 tab(s) by mouth once a day  -- Indication: For HTN    Symbicort 160 mcg-4.5 mcg/inh inhalation aerosol  -- 2 puff(s) inhaled 2 times a day  -- Indication: For COPD    Lasix 40 mg oral tablet  -- 1 tab(s) by mouth once a day  -- Indication: For CHF exacerbation

## 2018-10-04 NOTE — DISCHARGE NOTE ADULT - HOSPITAL COURSE
57 year old female with known hx of HFrEF (LVEF 20-25%) s/p AICD, COPD on home O2 (4L), DM, CVA with residual right sided weakness, fibromyalgia, and depression presented to the ED for a 3 day hx of SOB. She was evaluated and treated for the following conditions:    Acute on Chronic HFrEF (25-30%): improved    Feels better  Pt on 2L O2, sat 97%  Cough improved  Continue Mucinex   BNP: 00348  Continue with Metoprolol  Continue Entresto  Monitor intake/output  Salt and fluid restriction  CXR: congestion improved significantly    RITCHIE on CKD : improved    Cr trending down (1.3 -> 2.1 -> 2.0->1.5)  Likely from diuresis - held Lasix    COPD (on home 3L O2 PRN): Stable    Continue with nebs and home Symbicort  Continue with O2 target spo2 92%    Diabetes: Uncontrolled    Monitor fingerstick (126-339)  Lantus 25 Units; Lispro 7+7+7  Pt was on IV Prednisone 60 mg  BID    Fibromyalgia: Stable    Cont. methadone 10mg  C/w amitriptyline, gabapentin     Bipolar disorder : Stable    C/w buspirone, trazodone    DLD : Stable    C/w atorvastatin    She is being discharged home with the following instructions: Take medications as precribed. Follow up with your Primary Care Physician, and Cardiologist in 1-2 weeks after discharge.

## 2018-10-04 NOTE — DISCHARGE NOTE ADULT - CARE PLAN
Principal Discharge DX:	Acute on chronic systolic congestive heart failure  Goal:	Return to baseline  Assessment and plan of treatment:	Take medications as precribed. Follow up with your Primary Care Physician, and Cardiologist in 1-2 weeks after discharge.  Secondary Diagnosis:	Acute kidney injury superimposed on chronic kidney disease  Goal:	Return to baseline function  Assessment and plan of treatment:	Take medications as precribed. Follow up with your Primary Care Physician, and Cardiologist in 1-2 weeks after discharge. Take medications as precribed. Follow up with your Primary Care Physician, and Cardiologist in 1-2 weeks after discharge.

## 2018-10-04 NOTE — DISCHARGE NOTE ADULT - CARE PROVIDER_API CALL
Deion Gregory), Internal Medicine  [address]  [address]  [place] [address]  Phone: [phone]  Fax: [phone] Deion Gregory), Internal Medicine  [address]  [address]  [place] [address]  Phone: [phone]  Fax: [phone]    Edna Morales), Internal Medicine  [address] [place] [address] [address]  [place] [address]  Phone: [phone]  Fax: [phone]

## 2018-10-05 VITALS
RESPIRATION RATE: 18 BRPM | DIASTOLIC BLOOD PRESSURE: 66 MMHG | SYSTOLIC BLOOD PRESSURE: 120 MMHG | TEMPERATURE: 96 F | HEART RATE: 76 BPM

## 2018-10-05 LAB
ALBUMIN SERPL ELPH-MCNC: 3.2 G/DL — LOW (ref 3.5–5.2)
ALP SERPL-CCNC: 114 U/L — SIGNIFICANT CHANGE UP (ref 30–115)
ALT FLD-CCNC: 7 U/L — SIGNIFICANT CHANGE UP (ref 0–41)
ANION GAP SERPL CALC-SCNC: 12 MMOL/L — SIGNIFICANT CHANGE UP (ref 7–14)
AST SERPL-CCNC: 10 U/L — SIGNIFICANT CHANGE UP (ref 0–41)
BASOPHILS # BLD AUTO: 0.03 K/UL — SIGNIFICANT CHANGE UP (ref 0–0.2)
BASOPHILS NFR BLD AUTO: 0.4 % — SIGNIFICANT CHANGE UP (ref 0–1)
BILIRUB DIRECT SERPL-MCNC: <0.2 MG/DL — SIGNIFICANT CHANGE UP (ref 0–0.2)
BILIRUB INDIRECT FLD-MCNC: >0.2 MG/DL — SIGNIFICANT CHANGE UP (ref 0.2–1.2)
BILIRUB SERPL-MCNC: 0.4 MG/DL — SIGNIFICANT CHANGE UP (ref 0.2–1.2)
BUN SERPL-MCNC: 33 MG/DL — HIGH (ref 10–20)
CALCIUM SERPL-MCNC: 8.5 MG/DL — SIGNIFICANT CHANGE UP (ref 8.5–10.1)
CHLORIDE SERPL-SCNC: 101 MMOL/L — SIGNIFICANT CHANGE UP (ref 98–110)
CO2 SERPL-SCNC: 27 MMOL/L — SIGNIFICANT CHANGE UP (ref 17–32)
CREAT SERPL-MCNC: 1.5 MG/DL — SIGNIFICANT CHANGE UP (ref 0.7–1.5)
EOSINOPHIL # BLD AUTO: 0.1 K/UL — SIGNIFICANT CHANGE UP (ref 0–0.7)
EOSINOPHIL NFR BLD AUTO: 1.4 % — SIGNIFICANT CHANGE UP (ref 0–8)
GLUCOSE BLDC GLUCOMTR-MCNC: 202 MG/DL — HIGH (ref 70–99)
GLUCOSE BLDC GLUCOMTR-MCNC: 293 MG/DL — HIGH (ref 70–99)
GLUCOSE BLDC GLUCOMTR-MCNC: 305 MG/DL — HIGH (ref 70–99)
GLUCOSE SERPL-MCNC: 147 MG/DL — HIGH (ref 70–99)
HCT VFR BLD CALC: 30 % — LOW (ref 37–47)
HGB BLD-MCNC: 9.3 G/DL — LOW (ref 12–16)
IMM GRANULOCYTES NFR BLD AUTO: 0.4 % — HIGH (ref 0.1–0.3)
LYMPHOCYTES # BLD AUTO: 1.07 K/UL — LOW (ref 1.2–3.4)
LYMPHOCYTES # BLD AUTO: 15 % — LOW (ref 20.5–51.1)
MAGNESIUM SERPL-MCNC: 2.5 MG/DL — HIGH (ref 1.8–2.4)
MCHC RBC-ENTMCNC: 27.4 PG — SIGNIFICANT CHANGE UP (ref 27–31)
MCHC RBC-ENTMCNC: 31 G/DL — LOW (ref 32–37)
MCV RBC AUTO: 88.2 FL — SIGNIFICANT CHANGE UP (ref 81–99)
MONOCYTES # BLD AUTO: 0.44 K/UL — SIGNIFICANT CHANGE UP (ref 0.1–0.6)
MONOCYTES NFR BLD AUTO: 6.2 % — SIGNIFICANT CHANGE UP (ref 1.7–9.3)
NEUTROPHILS # BLD AUTO: 5.47 K/UL — SIGNIFICANT CHANGE UP (ref 1.4–6.5)
NEUTROPHILS NFR BLD AUTO: 76.6 % — HIGH (ref 42.2–75.2)
NRBC # BLD: 0 /100 WBCS — SIGNIFICANT CHANGE UP (ref 0–0)
PHOSPHATE SERPL-MCNC: 4.2 MG/DL — SIGNIFICANT CHANGE UP (ref 2.1–4.9)
PLATELET # BLD AUTO: 183 K/UL — SIGNIFICANT CHANGE UP (ref 130–400)
POTASSIUM SERPL-MCNC: 5.1 MMOL/L — HIGH (ref 3.5–5)
POTASSIUM SERPL-SCNC: 5.1 MMOL/L — HIGH (ref 3.5–5)
PROT SERPL-MCNC: 7 G/DL — SIGNIFICANT CHANGE UP (ref 6–8)
RBC # BLD: 3.4 M/UL — LOW (ref 4.2–5.4)
RBC # FLD: 15.9 % — HIGH (ref 11.5–14.5)
SODIUM SERPL-SCNC: 140 MMOL/L — SIGNIFICANT CHANGE UP (ref 135–146)
WBC # BLD: 7.14 K/UL — SIGNIFICANT CHANGE UP (ref 4.8–10.8)
WBC # FLD AUTO: 7.14 K/UL — SIGNIFICANT CHANGE UP (ref 4.8–10.8)

## 2018-10-05 RX ADMIN — METHADONE HYDROCHLORIDE 10 MILLIGRAM(S): 40 TABLET ORAL at 14:08

## 2018-10-05 RX ADMIN — Medication 1200 MILLIGRAM(S): at 17:16

## 2018-10-05 RX ADMIN — Medication 3 MILLILITER(S): at 14:21

## 2018-10-05 RX ADMIN — GABAPENTIN 100 MILLIGRAM(S): 400 CAPSULE ORAL at 14:09

## 2018-10-05 RX ADMIN — Medication 10 MILLIGRAM(S): at 05:26

## 2018-10-05 RX ADMIN — Medication 60 MILLIGRAM(S): at 05:27

## 2018-10-05 RX ADMIN — Medication 2: at 08:18

## 2018-10-05 RX ADMIN — CHLORHEXIDINE GLUCONATE 1 APPLICATION(S): 213 SOLUTION TOPICAL at 05:27

## 2018-10-05 RX ADMIN — Medication 25 MILLIGRAM(S): at 05:27

## 2018-10-05 RX ADMIN — GABAPENTIN 100 MILLIGRAM(S): 400 CAPSULE ORAL at 05:26

## 2018-10-05 RX ADMIN — BUDESONIDE AND FORMOTEROL FUMARATE DIHYDRATE 2 PUFF(S): 160; 4.5 AEROSOL RESPIRATORY (INHALATION) at 12:39

## 2018-10-05 RX ADMIN — Medication 10 MILLIGRAM(S): at 17:30

## 2018-10-05 RX ADMIN — Medication 1200 MILLIGRAM(S): at 05:26

## 2018-10-05 RX ADMIN — Medication 4: at 12:38

## 2018-10-05 RX ADMIN — Medication 50 MILLIGRAM(S): at 14:08

## 2018-10-05 RX ADMIN — Medication 7 UNIT(S): at 08:17

## 2018-10-05 RX ADMIN — Medication 3: at 17:15

## 2018-10-05 RX ADMIN — ENOXAPARIN SODIUM 40 MILLIGRAM(S): 100 INJECTION SUBCUTANEOUS at 17:12

## 2018-10-05 RX ADMIN — Medication 7 UNIT(S): at 12:38

## 2018-10-05 RX ADMIN — Medication 7 UNIT(S): at 17:16

## 2018-10-05 NOTE — PROGRESS NOTE ADULT - SUBJECTIVE AND OBJECTIVE BOX
SULY PENA  57y  Female      Patient is a 57y old  Female who presents with a chief complaint of sob (05 Oct 2018 07:18)      INTERVAL HPI/OVERNIGHT EVENTS:      ******************************* REVIEW OF SYSTEMS:**********************************************        All other review of systems negative    *********************** VITALS ******************************************    T(F): 96.5 (10-05-18 @ 14:44)  HR: 76 (10-05-18 @ 14:44) (67 - 76)  BP: 120/66 (10-05-18 @ 14:44) (102/51 - 120/66)  RR: 18 (10-05-18 @ 14:44) (18 - 20)  SpO2: 97% (10-05-18 @ 09:24) (97% - 100%)    10-04-18 @ 07:01  -  10-05-18 @ 07:00  --------------------------------------------------------  IN: 200 mL / OUT: 0 mL / NET: 200 mL            10-04-18 @ 07:01  -  10-05-18 @ 07:00  --------------------------------------------------------  IN: 200 mL / OUT: 0 mL / NET: 200 mL        ******************************** PHYSICAL EXAM:**************************************************  GENERAL: NAD    PSYCH: no agitation, baseline mentation  HEENT:     NERVOUS SYSTEM:  Alert & Oriented X3    PULMONARY: HALEY, decreased crackles    CARDIOVASCULAR: S1S2 RRR    GI: Soft, NT, ND; BS present.    EXTREMITIES: decreased LE edema    LYMPH: No lymphadenopathy noted    SKIN: No rashes or lesions    ******************************************************************************************    Consultant(s) Notes Reviewed:  [x ] YES  [ ] NO    Discussed with Consultants/Other Providers [ x] YES     **************************** LABS *******************************************************                          9.3    7.14  )-----------( 183      ( 05 Oct 2018 07:19 )             30.0     10-05    140  |  101  |  33<H>  ----------------------------<  147<H>  5.1<H>   |  27  |  1.5    Ca    8.5      05 Oct 2018 07:19  Phos  4.2     10-05  Mg     2.5     10-05    TPro  7.0  /  Alb  3.2<L>  /  TBili  0.4  /  DBili  <0.2  /  AST  10  /  ALT  7   /  AlkPhos  114  10-05          Lactate Trend        CAPILLARY BLOOD GLUCOSE      POCT Blood Glucose.: 305 mg/dL (05 Oct 2018 11:50)          **************************Active Medications *******************************************  aspirin (Other (Moderate))  IV Contrast (Unknown)      ALBUTerol/ipratropium for Nebulization 3 milliLiter(s) Nebulizer every 6 hours  amitriptyline 25 milliGRAM(s) Oral at bedtime  atorvastatin 40 milliGRAM(s) Oral at bedtime  buDESOnide 160 MICROgram(s)/formoterol 4.5 MICROgram(s) Inhaler 2 Puff(s) Inhalation two times a day  busPIRone 10 milliGRAM(s) Oral two times a day  chlorhexidine 4% Liquid 1 Application(s) Topical <User Schedule>  dextrose 40% Gel 15 Gram(s) Oral once PRN  dextrose 5%. 1000 milliLiter(s) IV Continuous <Continuous>  dextrose 50% Injectable 12.5 Gram(s) IV Push once  dextrose 50% Injectable 25 Gram(s) IV Push once  dextrose 50% Injectable 25 Gram(s) IV Push once  enoxaparin Injectable 40 milliGRAM(s) SubCutaneous daily  gabapentin 100 milliGRAM(s) Oral three times a day  glucagon  Injectable 1 milliGRAM(s) IntraMuscular once PRN  guaiFENesin ER 1200 milliGRAM(s) Oral every 12 hours  insulin glargine Injectable (LANTUS) 25 Unit(s) SubCutaneous at bedtime  insulin lispro (HumaLOG) corrective regimen sliding scale   SubCutaneous three times a day before meals  insulin lispro Injectable (HumaLOG) 7 Unit(s) SubCutaneous before breakfast  insulin lispro Injectable (HumaLOG) 7 Unit(s) SubCutaneous before lunch  insulin lispro Injectable (HumaLOG) 7 Unit(s) SubCutaneous before dinner  methadone    Tablet 10 milliGRAM(s) Oral daily  metoprolol succinate ER 25 milliGRAM(s) Oral daily  traZODone 50 milliGRAM(s) Oral daily      ***************************************************  RADIOLOGY & ADDITIONAL TESTS:    Imaging Personally Reviewed:  [ ] YES  [ ] NO    HEALTH ISSUES - PROBLEM Dx:

## 2018-10-05 NOTE — PROGRESS NOTE ADULT - SUBJECTIVE AND OBJECTIVE BOX
CHIEF COMPLAINT:    Patient is a 57y old Female who presents with a chief complaint of sob (04 Oct 2018 13:26)    Currently admitted to medicine with the primary diagnosis of PNA (pneumonia)     Today is hospital day 4d. This morning she is resting comfortably in bed and reports no new issues or overnight events.     PAST MEDICAL & SURGICAL HISTORY  Chronic pain  HLD (hyperlipidemia)  CVA (cerebral vascular accident)  Depression  COPD (chronic obstructive pulmonary disease)  Diabetes  CHF (congestive heart failure)  History of cholecystectomy  H/O tubal ligation  H/O abdominal hysterectomy  AICD (automatic cardioverter/defibrillator) present    SOCIAL HISTORY:  Negative for smoking/alcohol/drug use.     ALLERGIES:  aspirin (Other (Moderate))  IV Contrast (Unknown)    MEDICATIONS:  STANDING MEDICATIONS  ALBUTerol/ipratropium for Nebulization 3 milliLiter(s) Nebulizer every 6 hours  amitriptyline 25 milliGRAM(s) Oral at bedtime  atorvastatin 40 milliGRAM(s) Oral at bedtime  buDESOnide 160 MICROgram(s)/formoterol 4.5 MICROgram(s) Inhaler 2 Puff(s) Inhalation two times a day  busPIRone 10 milliGRAM(s) Oral two times a day  chlorhexidine 4% Liquid 1 Application(s) Topical <User Schedule>  dextrose 5%. 1000 milliLiter(s) IV Continuous <Continuous>  dextrose 50% Injectable 12.5 Gram(s) IV Push once  dextrose 50% Injectable 25 Gram(s) IV Push once  dextrose 50% Injectable 25 Gram(s) IV Push once  enoxaparin Injectable 40 milliGRAM(s) SubCutaneous daily  gabapentin 100 milliGRAM(s) Oral three times a day  guaiFENesin ER 1200 milliGRAM(s) Oral every 12 hours  insulin glargine Injectable (LANTUS) 25 Unit(s) SubCutaneous at bedtime  insulin lispro (HumaLOG) corrective regimen sliding scale   SubCutaneous three times a day before meals  insulin lispro Injectable (HumaLOG) 7 Unit(s) SubCutaneous before breakfast  insulin lispro Injectable (HumaLOG) 7 Unit(s) SubCutaneous before lunch  insulin lispro Injectable (HumaLOG) 7 Unit(s) SubCutaneous before dinner  methadone    Tablet 10 milliGRAM(s) Oral daily  methylPREDNISolone sodium succinate Injectable 60 milliGRAM(s) IV Push every 12 hours  metoprolol succinate ER 25 milliGRAM(s) Oral daily  traZODone 50 milliGRAM(s) Oral daily    PRN MEDICATIONS  dextrose 40% Gel 15 Gram(s) Oral once PRN  glucagon  Injectable 1 milliGRAM(s) IntraMuscular once PRN    VITALS:   T(F): 96.9  HR: 67  BP: 120/54  RR: 20  SpO2: 100%    LABS:                        9.4    5.13  )-----------( 193      ( 04 Oct 2018 06:38 )             30.6     10-04    136  |  97<L>  |  27<H>  ----------------------------<  96  4.9   |  29  |  1.5    Ca    8.9      04 Oct 2018 06:38  Mg     2.3     10-04                      RADIOLOGY:    PHYSICAL EXAM:  GEN: No acute distress  PULM: Clear to auscultation bilaterally   CARD: S1/S2 present. RRR.   GI: Soft, non-tender, non-distended. Bowel sounds present  MSK: NC/NC/NE/2+PP/VO  NEURO: AAOX3

## 2018-10-05 NOTE — PROGRESS NOTE ADULT - ASSESSMENT
57 year old female with known hx of HFrEF (LVEF 20-25%) s/p AICD, COPD on home O2 (4L), DM, CVA with residual right sided weakness, fibromyalgia, and depression presented to the ED for a 3 day hx of SOB.    Acute on Chronic HFrEF (25-30%)    Feels better  Pt on 2L O2, sat 94%  Continue with metoprolol  Monitor intake/output, daily body weights  Fluid restriction, keep negative balance   CXR: congestion improving significantly  Hold Lasix for RITCHIE. Still diuresing well  Levaquine d/c'd (no evidence of PNA)    RITCHIE on CKD 2/2 possible CRS2    Cr trending down though still elevated (1.3 -> 2.1 -> 2.0>> 1.5)  Likely from diuresis - holding Lasix  Monitor BMP  Resume entresto, lasix.    Acute on chronic respiratory failure 2/2 COPD exacerbation.(on home 3L O2)    Responded well to BiPAP.  Continue with nebs, will resume home Symbicort  Continue with O2 target spo2 92%    Diabetes    Monitor fingerstick  Start Lantus 25 Units; Lispro 7+7+7    Fibromyalgia: Stable    Cont. methadone 10mg  C/w amitriptyline, gabapentin   Consider bowel regimen if constipation 2/2 chronic opioid use    Bipolar disorder    C/w buspirone, trazodone    DLD  C/w atorvastatin    DVT Ppx:  C/w Lovenox    Diet  Consistent carbohydrate/ no snacks    Disposition  from home    D/C PLANNING.    Full code status

## 2018-10-05 NOTE — PROGRESS NOTE ADULT - PROVIDER SPECIALTY LIST ADULT
Hospitalist
Internal Medicine
Hospitalist

## 2018-10-05 NOTE — CHART NOTE - NSCHARTNOTEFT_GEN_A_CORE
<<<RESIDENT DISCHARGE NOTE>>>     SULY PENA  [id number]    VITAL SIGNS:  T(F): 96.5 (10-05-18 @ 14:44), Max: 96.9 (10-04-18 @ 20:17)  HR: 76 (10-05-18 @ 14:44)  BP: 120/66 (10-05-18 @ 14:44)  SpO2: 97% (10-05-18 @ 09:24)      PHYSICAL EXAMINATION:  General: NAD  Head & Neck: NC/AT, neck is supple  Pulmonary: Fine crackles in the lower lung fields  Cardiovascular: Normal S1, S2, no MRG, RRR  Gastrointestinal/Abdomen & Pelvis: ND, NT, BSx4 Qs  Neurologic/Motor: A&Ox3, nonfocal    TEST RESULTS:                        9.3    7.14  )-----------( 183      ( 05 Oct 2018 07:19 )             30.0       10-05    140  |  101  |  33<H>  ----------------------------<  147<H>  5.1<H>   |  27  |  1.5    Ca    8.5      05 Oct 2018 07:19  Phos  4.2     10-05  Mg     2.5     10-05    TPro  7.0  /  Alb  3.2<L>  /  TBili  0.4  /  DBili  <0.2  /  AST  10  /  ALT  7   /  AlkPhos  114  10-05      FINAL DISCHARGE INTERVIEW:  Resident(s) Present: (Name; Ashley RIVER RN Present: (Name: )    DISCHARGE MEDICATION RECONCILIATION  reviewed with Attending (Name: Ayah Waddell MD)    DISPOSITION:   [  ] Home,    [x] Home with Visiting Nursing Services,   [    ]  SNF/ NH,    [   ] Acute Rehab (4A),   [   ] Other (Specify:_________) <<<RESIDENT DISCHARGE NOTE>>>     SULY PENA  [id number]    VITAL SIGNS:  T(F): 96.5 (10-05-18 @ 14:44), Max: 96.9 (10-04-18 @ 20:17)  HR: 76 (10-05-18 @ 14:44)  BP: 120/66 (10-05-18 @ 14:44)  SpO2: 97% (10-05-18 @ 09:24)      PHYSICAL EXAMINATION:  General: NAD  Head & Neck: NC/AT, neck is supple  Pulmonary: Fine crackles in the lower lung fields  Cardiovascular: Normal S1, S2, no MRG, RRR  Gastrointestinal/Abdomen & Pelvis: ND, NT, BSx4 Qs  Neurologic/Motor: A&Ox3, nonfocal    TEST RESULTS:                        9.3    7.14  )-----------( 183      ( 05 Oct 2018 07:19 )             30.0       10-05    140  |  101  |  33<H>  ----------------------------<  147<H>  5.1<H>   |  27  |  1.5    Ca    8.5      05 Oct 2018 07:19  Phos  4.2     10-05  Mg     2.5     10-05    TPro  7.0  /  Alb  3.2<L>  /  TBili  0.4  /  DBili  <0.2  /  AST  10  /  ALT  7   /  AlkPhos  114  10-05      FINAL DISCHARGE INTERVIEW:  Resident(s) Present: (Name; Ashley RIVER, RN Present: (Name: Tamie Verdugo)    DISCHARGE MEDICATION RECONCILIATION  reviewed with Attending (Name: Ayah Waddell MD)    DISPOSITION:   [  ] Home,    [x] Home with Visiting Nursing Services,   [    ]  SNF/ NH,    [   ] Acute Rehab (4A),   [   ] Other (Specify:_________)

## 2018-10-05 NOTE — PROGRESS NOTE ADULT - ASSESSMENT
57 year old female with known hx of HFrEF (LVEF 20-25%) s/p AICD, COPD on home O2 (4L), DM, CVA with residual right sided weakness, fibromyalgia, and depression presented to the ED for a 3 day hx of SOB.    Acute on Chronic HFrEF (25-30%)    Feels better  Pt on 2L O2, sat 100%  Still coughing  Continue Mucinex for cough  BNP: 39803  Continue with Metoprolol  Hold Entresto for RITCHIE  Monitor intake/output (-200), daily weights (95.3 kg)  Fluid restriction, keep negative balance   CXR: congestion improved significantly  Hold Lasix for RITCHIE. Still diuresing well  Levaquine d/c'd (no evidence of PNA)  Ambulate as tolerated    RITCHIE on CKD : improving    Cr trending down (1.3 -> 2.1 -> 2.0->1.5)  Likely from diuresis - holding Lasix  Monitor BMP    COPD (on home 3L O2 PRN): Stable    Continue with nebs, will resume home Symbicort  Continue with O2 target spo2 92%    Diabetes: Uncontrolled    Monitor fingerstick (126-339)  Lantus 25 Units; Lispro 7+7+7  Pt is on IV Prednisone 60 mg  BID ?    Fibromyalgia: Stable    Cont. methadone 10mg  C/w amitriptyline, gabapentin   Consider bowel regimen if constipation 2/2 chronic opioid use    Bipolar disorder : Stable    C/w buspirone, trazodone    DLD : Stable    C/w atorvastatin    DVT Ppx:  C/w Lovenox    Diet  Consistent carbohydrate/ no snacks    Disposition  from home    Full code status

## 2018-10-06 LAB
CULTURE RESULTS: SIGNIFICANT CHANGE UP
CULTURE RESULTS: SIGNIFICANT CHANGE UP
SPECIMEN SOURCE: SIGNIFICANT CHANGE UP
SPECIMEN SOURCE: SIGNIFICANT CHANGE UP

## 2018-10-17 DIAGNOSIS — Z87.891 PERSONAL HISTORY OF NICOTINE DEPENDENCE: ICD-10-CM

## 2018-10-17 DIAGNOSIS — E11.22 TYPE 2 DIABETES MELLITUS WITH DIABETIC CHRONIC KIDNEY DISEASE: ICD-10-CM

## 2018-10-17 DIAGNOSIS — N17.9 ACUTE KIDNEY FAILURE, UNSPECIFIED: ICD-10-CM

## 2018-10-17 DIAGNOSIS — F31.9 BIPOLAR DISORDER, UNSPECIFIED: ICD-10-CM

## 2018-10-17 DIAGNOSIS — N18.3 CHRONIC KIDNEY DISEASE, STAGE 3 (MODERATE): ICD-10-CM

## 2018-10-17 DIAGNOSIS — I69.351 HEMIPLEGIA AND HEMIPARESIS FOLLOWING CEREBRAL INFARCTION AFFECTING RIGHT DOMINANT SIDE: ICD-10-CM

## 2018-10-17 DIAGNOSIS — J44.1 CHRONIC OBSTRUCTIVE PULMONARY DISEASE WITH (ACUTE) EXACERBATION: ICD-10-CM

## 2018-10-17 DIAGNOSIS — Z95.810 PRESENCE OF AUTOMATIC (IMPLANTABLE) CARDIAC DEFIBRILLATOR: ICD-10-CM

## 2018-10-17 DIAGNOSIS — Z90.710 ACQUIRED ABSENCE OF BOTH CERVIX AND UTERUS: ICD-10-CM

## 2018-10-17 DIAGNOSIS — I50.23 ACUTE ON CHRONIC SYSTOLIC (CONGESTIVE) HEART FAILURE: ICD-10-CM

## 2018-10-17 DIAGNOSIS — Z98.51 TUBAL LIGATION STATUS: ICD-10-CM

## 2018-10-17 DIAGNOSIS — G89.29 OTHER CHRONIC PAIN: ICD-10-CM

## 2018-10-17 DIAGNOSIS — Z91.041 RADIOGRAPHIC DYE ALLERGY STATUS: ICD-10-CM

## 2018-10-17 DIAGNOSIS — J96.20 ACUTE AND CHRONIC RESPIRATORY FAILURE, UNSPECIFIED WHETHER WITH HYPOXIA OR HYPERCAPNIA: ICD-10-CM

## 2018-10-17 DIAGNOSIS — Z79.82 LONG TERM (CURRENT) USE OF ASPIRIN: ICD-10-CM

## 2018-10-17 DIAGNOSIS — I13.0 HYPERTENSIVE HEART AND CHRONIC KIDNEY DISEASE WITH HEART FAILURE AND STAGE 1 THROUGH STAGE 4 CHRONIC KIDNEY DISEASE, OR UNSPECIFIED CHRONIC KIDNEY DISEASE: ICD-10-CM

## 2018-10-17 DIAGNOSIS — E78.5 HYPERLIPIDEMIA, UNSPECIFIED: ICD-10-CM

## 2018-10-17 DIAGNOSIS — M79.7 FIBROMYALGIA: ICD-10-CM

## 2018-10-26 ENCOUNTER — APPOINTMENT (OUTPATIENT)
Dept: GASTROENTEROLOGY | Facility: CLINIC | Age: 58
End: 2018-10-26

## 2018-11-07 ENCOUNTER — OUTPATIENT (OUTPATIENT)
Dept: OUTPATIENT SERVICES | Facility: HOSPITAL | Age: 58
LOS: 1 days | Discharge: HOME | End: 2018-11-07

## 2018-11-07 DIAGNOSIS — Z95.810 PRESENCE OF AUTOMATIC (IMPLANTABLE) CARDIAC DEFIBRILLATOR: Chronic | ICD-10-CM

## 2018-11-07 DIAGNOSIS — Z79.01 LONG TERM (CURRENT) USE OF ANTICOAGULANTS: ICD-10-CM

## 2018-11-07 DIAGNOSIS — Z90.710 ACQUIRED ABSENCE OF BOTH CERVIX AND UTERUS: Chronic | ICD-10-CM

## 2018-11-07 DIAGNOSIS — Z98.51 TUBAL LIGATION STATUS: Chronic | ICD-10-CM

## 2018-11-07 DIAGNOSIS — Z90.49 ACQUIRED ABSENCE OF OTHER SPECIFIED PARTS OF DIGESTIVE TRACT: Chronic | ICD-10-CM

## 2018-11-07 DIAGNOSIS — I63.9 CEREBRAL INFARCTION, UNSPECIFIED: ICD-10-CM

## 2018-11-21 ENCOUNTER — OUTPATIENT (OUTPATIENT)
Dept: OUTPATIENT SERVICES | Facility: HOSPITAL | Age: 58
LOS: 1 days | Discharge: HOME | End: 2018-11-21

## 2018-11-21 DIAGNOSIS — Z98.51 TUBAL LIGATION STATUS: Chronic | ICD-10-CM

## 2018-11-21 DIAGNOSIS — Z90.49 ACQUIRED ABSENCE OF OTHER SPECIFIED PARTS OF DIGESTIVE TRACT: Chronic | ICD-10-CM

## 2018-11-21 DIAGNOSIS — Z90.710 ACQUIRED ABSENCE OF BOTH CERVIX AND UTERUS: Chronic | ICD-10-CM

## 2018-11-21 DIAGNOSIS — Z95.810 PRESENCE OF AUTOMATIC (IMPLANTABLE) CARDIAC DEFIBRILLATOR: Chronic | ICD-10-CM

## 2018-11-21 DIAGNOSIS — Z79.01 LONG TERM (CURRENT) USE OF ANTICOAGULANTS: ICD-10-CM

## 2018-11-21 DIAGNOSIS — I63.9 CEREBRAL INFARCTION, UNSPECIFIED: ICD-10-CM

## 2018-11-21 LAB
POCT INR: 2.3 RATIO — HIGH (ref 0.9–1.2)
POCT PT: 27 SEC — HIGH (ref 10–13.4)

## 2018-11-23 ENCOUNTER — APPOINTMENT (OUTPATIENT)
Dept: PULMONOLOGY | Facility: CLINIC | Age: 58
End: 2018-11-23

## 2018-11-29 NOTE — ED PROVIDER NOTE - NSTIMEPROVIDERCAREINITIATE_GEN_ER
Occupational Therapy EVALUATION/discharge Patient: Erika Baker (34 y.o. male) Date: 11/29/2018 Primary Diagnosis: LUMBAR SPONDILOLISTHESIS Spondylolisthesis of lumbar region Procedure(s) (LRB): 
L4-5 LAMINECTOMY, FUSION, PLIF (N/A) 1 Day Post-Op Precautions:   Spinal, Fall ASSESSMENT:  
Based on the objective data described below, the patient presents with increased back pain particularly with sit<> stands and log roll s/p L4-5 laminectomy and fusion. Educated pt on log roll technique which he completed with SBA as well as back movements to avoid (BLT). He completed toilet transfers without use of UEs for support with SBA. reviewed bathing,dressing, activity limitations and home safety with pt indicating understanding. He will need assistance from wife at home, likely for shoes. Recommend discharge home and OP PT per MD. Further skilled acute occupational therapy is not indicated at this time. Discharge Recommendations: OP PT Further Equipment Recommendations for Discharge: none SUBJECTIVE:  
Patient stated it just hurts to stand and sit.  OBJECTIVE DATA SUMMARY:  
HISTORY:  
Past Medical History:  
Diagnosis Date  Bursitis of shoulder   
 right; \"comes and goes\"  Essential hypertension 9/8/2017  GERD (gastroesophageal reflux disease)  Hypercholesteremia  Osteoarthritis Past Surgical History:  
Procedure Laterality Date  HX ACL RECONSTRUCTION Left  HX HERNIA REPAIR    
 HX OTHER SURGICAL INGROWN TOE NAIL REMOVED OFF L big toe  HX OTHER SURGICAL  03/2018  
 orthoscopy--R hip  HX REFRACTIVE SURGERY    
 HX SHOULDER ARTHROSCOPY  3/01/2012 Right shoulder  HX SHOULDER ARTHROSCOPY  04/2014 R shoulder  HX VASECTOMY Prior Level of Function/Environment/Context: independent Occupations in which the patient is/was successful, what are the barriers preventing that success:  
Performance Patterns (routines, roles, habits, and rituals):  
 Personal Interests and/or values:  
Expanded or extensive additional review of patient history:  
 
Home Situation Home Environment: Private residence Rails to Enter: Yes Hand Rails : Right One/Two Story Residence: Two story Living Alone: No 
Support Systems: Family member(s), Friends \ neighbors, Spouse/Significant Other/Partner Patient Expects to be Discharged to[de-identified] Private residence Current DME Used/Available at Home: None Tub or Shower Type: Shower Hand dominance: Right EXAMINATION OF PERFORMANCE DEFICITS: 
Cognitive/Behavioral Status: 
Neurologic State: Alert; Appropriate for age Orientation Level: Oriented X4 Cognition: Follows commands Perception: Appears intact Perseveration: No perseveration noted Safety/Judgement: Awareness of environment Skin: lumbar incision C/D/I Edema: None noted Hearing: Auditory Auditory Impairment: None Vision/Perceptual:   
Tracking: Able to track stimulus in all quadrants w/o difficulty Range of Motion: 
 
AROM: Within functional limits Strength: 
 
Strength: Generally decreased, functional 
  
  
  
  
 
Coordination: 
Coordination: Within functional limits Fine Motor Skills-Upper: Left Intact; Right Intact Gross Motor Skills-Upper: Left Intact; Right Intact Tone & Sensation: 
 
Tone: Normal 
Sensation: Intact Balance: 
Sitting: Intact Standing: Intact; Without support Functional Mobility and Transfers for ADLs:Bed Mobility: 
  
 
Transfers: 
Sit to Stand: Stand-by assistance Stand to Sit: Stand-by assistance Bathroom Mobility: Stand-by assistance Toilet Transfer : Stand-by assistance ADL Assessment: 
Feeding: Independent Oral Facial Hygiene/Grooming: Modified Independent Bathing: Stand-by assistance Upper Body Dressing: Independent Lower Body Dressing: Minimum assistance Toileting: Stand by assistance ADL Intervention and task modifications: Pt educated on 3/3 back precautions. Pt educated on the following: no bending, no lifting greater than 5 lbs, no twisting, log-roll technique, repositioning every 20-30 min except when sleeping, brace when OOB . Bathing: Patient instructed when bathing to not submerge wound in water, stand to shower or sponge bathe. Dressing brace: Pt educated to avoid bending during LB dressing and bathing. Instructed to use reacher or long handled sponge to assist with dressing and bathing. Patient educated to cross legs or bring feet towards the body. Home safety: Patient instructed on home modifications and safety (raise height of ADL objects, appropriate height of chair surfaces, recliner safety, change of floor surfaces, clear pathways) to increase independence and fall prevention with good understanding. Toileting: Pt instructed to avoid twisting during bowel hygiene. Educated pt on techniques such as flexing at the hips, wiping front to back, using wet wipes, or adaptive equipment such as toilet tongs or [employer]. Cognitive Retraining Safety/Judgement: Awareness of environment Functional Measure: 
Barthel Index: 
 
Bathin Bladder: 10 Bowels: 10 
Groomin Dressin Feeding: 10 Mobility: 15 
Stairs: 10 Toilet Use: 10 Transfer (Bed to Chair and Back): 15 Total: 90 Barthel and G-code impairment scale: 
Percentage of impairment CH 
0% CI 
1-19% CJ 
20-39% CK 
40-59% CL 
60-79% CM 
80-99% CN 
100% Barthel Score 0-100 100 99-80 79-60 59-40 20-39 1-19 
 0 Barthel Score 0-20 20 17-19 13-16 9-12 5-8 1-4 0 The Barthel ADL Index: Guidelines 1. The index should be used as a record of what a patient does, not as a record of what a patient could do. 2. The main aim is to establish degree of independence from any help, physical or verbal, however minor and for whatever reason. 3. The need for supervision renders the patient not independent. 4. A patient's performance should be established using the best available evidence. Asking the patient, friends/relatives and nurses are the usual sources, but direct observation and common sense are also important. However direct testing is not needed. 5. Usually the patient's performance over the preceding 24-48 hours is important, but occasionally longer periods will be relevant. 6. Middle categories imply that the patient supplies over 50 per cent of the effort. 7. Use of aids to be independent is allowed. Anel Blair., Barthel, DMARISOL ([id number]). Functional evaluation: the Barthel Index. [address] [facility] (14)2. Rich Daniel johnathan MELODY Tinoco, Lindy Holcomb., Adin Pierre., [place], [address] (1999). Measuring the change indisability after inpatient rehabilitation; comparison of the responsiveness of the Barthel Index and Functional [place] Measure. Journal of Neurology, Neurosurgery, and Psychiatry, 66(4), 047-381. Francisco Javier Chao, N.J.A, OLIVERIO Awad, & Kassie Gonsalez M.A. (2004.) Assessment of post-stroke quality of life in cost-effectiveness studies: The usefulness of the Barthel Index and the EuroQoL-5D. [facility], 13, 885-48 G codes: In compliance with CMSs Claims Based Outcome Reporting, the following G-code set was chosen for this patient based on their primary functional limitation being treated: The outcome measure chosen to determine the severity of the functional limitation was the barthel index with a score of 90/100 which was correlated with the impairment scale. ? Self Care:  
  - CURRENT STATUS: CI - 1%-19% impaired, limited or restricted  - GOAL STATUS: CI - 1%-19% impaired, limited or restricted  - D/C STATUS:  CI - 1%-19% impaired, limited or restricted Occupational Therapy Evaluation Charge Determination History Examination Decision-Making LOW Complexity : Brief history review  LOW Complexity : 1-3 performance deficits relating to physical, cognitive , or psychosocial skils that result in activity limitations and / or participation restrictions  LOW Complexity : No comorbidities that affect functional and no verbal or physical assistance needed to complete eval tasks Based on the above components, the patient evaluation is determined to be of the following complexity level: LOW Pain: 
Pain Scale 1: Numeric (0 - 10) Pain Intensity 1: 6 Pain Location 1: Back Pain Orientation 1: Lower Pain Description 1: Aching Pain Intervention(s) 1: Medication (see MAR); Rest;Repositioned; Ice Activity Tolerance: VSS Please refer to the flowsheet for vital signs taken during this treatment. After treatment:  
[x]  Patient left in no apparent distress sitting up in chair 
[]  Patient left in no apparent distress in bed 
[x]  Call bell left within reach [x]  Nursing notified 
[]  Caregiver present 
[]  Bed alarm activated COMMUNICATION/EDUCATION:  
Communication/Collaboration: 
[x]      Home safety education was provided and the patient/caregiver indicated understanding. [x]      Patient/family have participated as able and agree with findings and recommendations. []      Patient is unable to participate in plan of care at this time. Findings and recommendations were discussed with: Physical Therapy Assistant and Registered Nurse Di Avery OT Time Calculation: 29 mins 01-Oct-2018 01:40

## 2018-11-30 ENCOUNTER — APPOINTMENT (OUTPATIENT)
Dept: GASTROENTEROLOGY | Facility: CLINIC | Age: 58
End: 2018-11-30

## 2018-12-11 ENCOUNTER — INPATIENT (INPATIENT)
Facility: HOSPITAL | Age: 58
LOS: 7 days | Discharge: HOME HEALTH | End: 2018-12-19
Attending: INTERNAL MEDICINE | Admitting: INTERNAL MEDICINE
Payer: MEDICARE

## 2018-12-11 VITALS
RESPIRATION RATE: 22 BRPM | TEMPERATURE: 98 F | SYSTOLIC BLOOD PRESSURE: 118 MMHG | OXYGEN SATURATION: 97 % | DIASTOLIC BLOOD PRESSURE: 58 MMHG | HEART RATE: 90 BPM | WEIGHT: 199.96 LBS

## 2018-12-11 DIAGNOSIS — Z90.49 ACQUIRED ABSENCE OF OTHER SPECIFIED PARTS OF DIGESTIVE TRACT: Chronic | ICD-10-CM

## 2018-12-11 DIAGNOSIS — Z98.51 TUBAL LIGATION STATUS: Chronic | ICD-10-CM

## 2018-12-11 DIAGNOSIS — Z95.810 PRESENCE OF AUTOMATIC (IMPLANTABLE) CARDIAC DEFIBRILLATOR: Chronic | ICD-10-CM

## 2018-12-11 DIAGNOSIS — Z90.710 ACQUIRED ABSENCE OF BOTH CERVIX AND UTERUS: Chronic | ICD-10-CM

## 2018-12-11 LAB
ALBUMIN SERPL ELPH-MCNC: 3.4 G/DL — LOW (ref 3.5–5.2)
ALP SERPL-CCNC: 136 U/L — HIGH (ref 30–115)
ALT FLD-CCNC: 9 U/L — SIGNIFICANT CHANGE UP (ref 0–41)
ANION GAP SERPL CALC-SCNC: 11 MMOL/L — SIGNIFICANT CHANGE UP (ref 7–14)
AST SERPL-CCNC: 17 U/L — SIGNIFICANT CHANGE UP (ref 0–41)
BASE EXCESS BLDV CALC-SCNC: 10.9 MMOL/L — HIGH (ref -2–2)
BASOPHILS # BLD AUTO: 0.04 K/UL — SIGNIFICANT CHANGE UP (ref 0–0.2)
BASOPHILS NFR BLD AUTO: 0.6 % — SIGNIFICANT CHANGE UP (ref 0–1)
BILIRUB DIRECT SERPL-MCNC: 0.2 MG/DL — SIGNIFICANT CHANGE UP (ref 0–0.2)
BILIRUB INDIRECT FLD-MCNC: 1 MG/DL — SIGNIFICANT CHANGE UP (ref 0.2–1.2)
BILIRUB SERPL-MCNC: 1.2 MG/DL — SIGNIFICANT CHANGE UP (ref 0.2–1.2)
BUN SERPL-MCNC: 19 MG/DL — SIGNIFICANT CHANGE UP (ref 10–20)
CA-I SERPL-SCNC: 1.15 MMOL/L — SIGNIFICANT CHANGE UP (ref 1.12–1.3)
CALCIUM SERPL-MCNC: 9 MG/DL — SIGNIFICANT CHANGE UP (ref 8.5–10.1)
CHLORIDE SERPL-SCNC: 93 MMOL/L — LOW (ref 98–110)
CO2 SERPL-SCNC: 31 MMOL/L — SIGNIFICANT CHANGE UP (ref 17–32)
CREAT SERPL-MCNC: 1.6 MG/DL — HIGH (ref 0.7–1.5)
EOSINOPHIL # BLD AUTO: 0.14 K/UL — SIGNIFICANT CHANGE UP (ref 0–0.7)
EOSINOPHIL NFR BLD AUTO: 2.1 % — SIGNIFICANT CHANGE UP (ref 0–8)
GAS PNL BLDV: 137 MMOL/L — SIGNIFICANT CHANGE UP (ref 136–145)
GAS PNL BLDV: SIGNIFICANT CHANGE UP
GLUCOSE BLDC GLUCOMTR-MCNC: 444 MG/DL — HIGH (ref 70–99)
GLUCOSE BLDC GLUCOMTR-MCNC: 466 MG/DL — CRITICAL HIGH (ref 70–99)
GLUCOSE BLDC GLUCOMTR-MCNC: 470 MG/DL — CRITICAL HIGH (ref 70–99)
GLUCOSE BLDC GLUCOMTR-MCNC: 504 MG/DL — CRITICAL HIGH (ref 70–99)
GLUCOSE SERPL-MCNC: 116 MG/DL — HIGH (ref 70–99)
HCO3 BLDV-SCNC: 38 MMOL/L — HIGH (ref 22–29)
HCT VFR BLD CALC: 33 % — LOW (ref 37–47)
HCT VFR BLDA CALC: 33.1 % — LOW (ref 34–44)
HGB BLD CALC-MCNC: 10.8 G/DL — LOW (ref 14–18)
HGB BLD-MCNC: 10.2 G/DL — LOW (ref 12–16)
IMM GRANULOCYTES NFR BLD AUTO: 0.5 % — HIGH (ref 0.1–0.3)
LACTATE BLDV-MCNC: 1.4 MMOL/L — SIGNIFICANT CHANGE UP (ref 0.5–1.6)
LACTATE SERPL-SCNC: 1.4 MMOL/L — SIGNIFICANT CHANGE UP (ref 0.5–2.2)
LIDOCAIN IGE QN: 18 U/L — SIGNIFICANT CHANGE UP (ref 7–60)
LYMPHOCYTES # BLD AUTO: 0.92 K/UL — LOW (ref 1.2–3.4)
LYMPHOCYTES # BLD AUTO: 14 % — LOW (ref 20.5–51.1)
MCHC RBC-ENTMCNC: 26.7 PG — LOW (ref 27–31)
MCHC RBC-ENTMCNC: 30.9 G/DL — LOW (ref 32–37)
MCV RBC AUTO: 86.4 FL — SIGNIFICANT CHANGE UP (ref 81–99)
MONOCYTES # BLD AUTO: 0.66 K/UL — HIGH (ref 0.1–0.6)
MONOCYTES NFR BLD AUTO: 10.1 % — HIGH (ref 1.7–9.3)
NEUTROPHILS # BLD AUTO: 4.76 K/UL — SIGNIFICANT CHANGE UP (ref 1.4–6.5)
NEUTROPHILS NFR BLD AUTO: 72.7 % — SIGNIFICANT CHANGE UP (ref 42.2–75.2)
NRBC # BLD: 0 /100 WBCS — SIGNIFICANT CHANGE UP (ref 0–0)
NT-PROBNP SERPL-SCNC: HIGH PG/ML (ref 0–300)
PCO2 BLDV: 59 MMHG — HIGH (ref 41–51)
PH BLDV: 7.41 — SIGNIFICANT CHANGE UP (ref 7.26–7.43)
PLATELET # BLD AUTO: 240 K/UL — SIGNIFICANT CHANGE UP (ref 130–400)
PO2 BLDV: 21 MMHG — SIGNIFICANT CHANGE UP (ref 20–40)
POTASSIUM BLDV-SCNC: 4.1 MMOL/L — SIGNIFICANT CHANGE UP (ref 3.3–5.6)
POTASSIUM SERPL-MCNC: 4.3 MMOL/L — SIGNIFICANT CHANGE UP (ref 3.5–5)
POTASSIUM SERPL-SCNC: 4.3 MMOL/L — SIGNIFICANT CHANGE UP (ref 3.5–5)
PROT SERPL-MCNC: 7.6 G/DL — SIGNIFICANT CHANGE UP (ref 6–8)
RBC # BLD: 3.82 M/UL — LOW (ref 4.2–5.4)
RBC # FLD: 16.2 % — HIGH (ref 11.5–14.5)
SAO2 % BLDV: 28 % — SIGNIFICANT CHANGE UP
SODIUM SERPL-SCNC: 135 MMOL/L — SIGNIFICANT CHANGE UP (ref 135–146)
TROPONIN T SERPL-MCNC: <0.01 NG/ML — SIGNIFICANT CHANGE UP
WBC # BLD: 6.55 K/UL — SIGNIFICANT CHANGE UP (ref 4.8–10.8)
WBC # FLD AUTO: 6.55 K/UL — SIGNIFICANT CHANGE UP (ref 4.8–10.8)

## 2018-12-11 PROCEDURE — 93010 ELECTROCARDIOGRAM REPORT: CPT

## 2018-12-11 PROCEDURE — 93925 LOWER EXTREMITY STUDY: CPT | Mod: 26

## 2018-12-11 RX ORDER — CEFEPIME 1 G/1
2000 INJECTION, POWDER, FOR SOLUTION INTRAMUSCULAR; INTRAVENOUS EVERY 12 HOURS
Qty: 0 | Refills: 0 | Status: DISCONTINUED | OUTPATIENT
Start: 2018-12-11 | End: 2018-12-14

## 2018-12-11 RX ORDER — INSULIN LISPRO 100/ML
10 VIAL (ML) SUBCUTANEOUS ONCE
Qty: 0 | Refills: 0 | Status: COMPLETED | OUTPATIENT
Start: 2018-12-11 | End: 2018-12-11

## 2018-12-11 RX ORDER — DEXTROSE 50 % IN WATER 50 %
12.5 SYRINGE (ML) INTRAVENOUS ONCE
Qty: 0 | Refills: 0 | Status: DISCONTINUED | OUTPATIENT
Start: 2018-12-11 | End: 2018-12-19

## 2018-12-11 RX ORDER — DEXTROSE 50 % IN WATER 50 %
25 SYRINGE (ML) INTRAVENOUS ONCE
Qty: 0 | Refills: 0 | Status: DISCONTINUED | OUTPATIENT
Start: 2018-12-11 | End: 2018-12-19

## 2018-12-11 RX ORDER — GLUCAGON INJECTION, SOLUTION 0.5 MG/.1ML
1 INJECTION, SOLUTION SUBCUTANEOUS ONCE
Qty: 0 | Refills: 0 | Status: DISCONTINUED | OUTPATIENT
Start: 2018-12-11 | End: 2018-12-19

## 2018-12-11 RX ORDER — TRAMADOL HYDROCHLORIDE 50 MG/1
50 TABLET ORAL ONCE
Qty: 0 | Refills: 0 | Status: DISCONTINUED | OUTPATIENT
Start: 2018-12-11 | End: 2018-12-11

## 2018-12-11 RX ORDER — SODIUM CHLORIDE 9 MG/ML
1000 INJECTION, SOLUTION INTRAVENOUS
Qty: 0 | Refills: 0 | Status: DISCONTINUED | OUTPATIENT
Start: 2018-12-11 | End: 2018-12-15

## 2018-12-11 RX ORDER — FUROSEMIDE 40 MG
40 TABLET ORAL DAILY
Qty: 0 | Refills: 0 | Status: DISCONTINUED | OUTPATIENT
Start: 2018-12-11 | End: 2018-12-13

## 2018-12-11 RX ORDER — METHADONE HYDROCHLORIDE 40 MG/1
10 TABLET ORAL DAILY
Qty: 0 | Refills: 0 | Status: DISCONTINUED | OUTPATIENT
Start: 2018-12-11 | End: 2018-12-18

## 2018-12-11 RX ORDER — METOPROLOL TARTRATE 50 MG
25 TABLET ORAL DAILY
Qty: 0 | Refills: 0 | Status: DISCONTINUED | OUTPATIENT
Start: 2018-12-11 | End: 2018-12-13

## 2018-12-11 RX ORDER — IPRATROPIUM/ALBUTEROL SULFATE 18-103MCG
3 AEROSOL WITH ADAPTER (GRAM) INHALATION ONCE
Qty: 0 | Refills: 0 | Status: COMPLETED | OUTPATIENT
Start: 2018-12-11 | End: 2018-12-11

## 2018-12-11 RX ORDER — INSULIN GLARGINE 100 [IU]/ML
25 INJECTION, SOLUTION SUBCUTANEOUS AT BEDTIME
Qty: 0 | Refills: 0 | Status: DISCONTINUED | OUTPATIENT
Start: 2018-12-11 | End: 2018-12-19

## 2018-12-11 RX ORDER — BUDESONIDE AND FORMOTEROL FUMARATE DIHYDRATE 160; 4.5 UG/1; UG/1
2 AEROSOL RESPIRATORY (INHALATION)
Qty: 0 | Refills: 0 | Status: DISCONTINUED | OUTPATIENT
Start: 2018-12-11 | End: 2018-12-19

## 2018-12-11 RX ORDER — AMITRIPTYLINE HCL 25 MG
25 TABLET ORAL AT BEDTIME
Qty: 0 | Refills: 0 | Status: DISCONTINUED | OUTPATIENT
Start: 2018-12-11 | End: 2018-12-15

## 2018-12-11 RX ORDER — INSULIN LISPRO 100/ML
7 VIAL (ML) SUBCUTANEOUS
Qty: 0 | Refills: 0 | Status: DISCONTINUED | OUTPATIENT
Start: 2018-12-11 | End: 2018-12-19

## 2018-12-11 RX ORDER — ALBUTEROL 90 UG/1
1 AEROSOL, METERED ORAL EVERY 4 HOURS
Qty: 0 | Refills: 0 | Status: DISCONTINUED | OUTPATIENT
Start: 2018-12-11 | End: 2018-12-19

## 2018-12-11 RX ORDER — ATORVASTATIN CALCIUM 80 MG/1
40 TABLET, FILM COATED ORAL AT BEDTIME
Qty: 0 | Refills: 0 | Status: DISCONTINUED | OUTPATIENT
Start: 2018-12-11 | End: 2018-12-19

## 2018-12-11 RX ORDER — AZITHROMYCIN 500 MG/1
500 TABLET, FILM COATED ORAL ONCE
Qty: 0 | Refills: 0 | Status: COMPLETED | OUTPATIENT
Start: 2018-12-11 | End: 2018-12-11

## 2018-12-11 RX ORDER — HEPARIN SODIUM 5000 [USP'U]/ML
5000 INJECTION INTRAVENOUS; SUBCUTANEOUS EVERY 8 HOURS
Qty: 0 | Refills: 0 | Status: DISCONTINUED | OUTPATIENT
Start: 2018-12-11 | End: 2018-12-14

## 2018-12-11 RX ORDER — DEXTROSE 50 % IN WATER 50 %
15 SYRINGE (ML) INTRAVENOUS ONCE
Qty: 0 | Refills: 0 | Status: DISCONTINUED | OUTPATIENT
Start: 2018-12-11 | End: 2018-12-19

## 2018-12-11 RX ORDER — INSULIN LISPRO 100/ML
8 VIAL (ML) SUBCUTANEOUS ONCE
Qty: 0 | Refills: 0 | Status: COMPLETED | OUTPATIENT
Start: 2018-12-11 | End: 2018-12-11

## 2018-12-11 RX ORDER — GABAPENTIN 400 MG/1
100 CAPSULE ORAL THREE TIMES A DAY
Qty: 0 | Refills: 0 | Status: DISCONTINUED | OUTPATIENT
Start: 2018-12-11 | End: 2018-12-19

## 2018-12-11 RX ORDER — MORPHINE SULFATE 50 MG/1
4 CAPSULE, EXTENDED RELEASE ORAL ONCE
Qty: 0 | Refills: 0 | Status: DISCONTINUED | OUTPATIENT
Start: 2018-12-11 | End: 2018-12-11

## 2018-12-11 RX ORDER — INSULIN LISPRO 100/ML
VIAL (ML) SUBCUTANEOUS
Qty: 0 | Refills: 0 | Status: DISCONTINUED | OUTPATIENT
Start: 2018-12-11 | End: 2018-12-19

## 2018-12-11 RX ORDER — IPRATROPIUM/ALBUTEROL SULFATE 18-103MCG
3 AEROSOL WITH ADAPTER (GRAM) INHALATION EVERY 6 HOURS
Qty: 0 | Refills: 0 | Status: DISCONTINUED | OUTPATIENT
Start: 2018-12-11 | End: 2018-12-19

## 2018-12-11 RX ORDER — SACUBITRIL AND VALSARTAN 24; 26 MG/1; MG/1
1 TABLET, FILM COATED ORAL
Qty: 0 | Refills: 0 | Status: DISCONTINUED | OUTPATIENT
Start: 2018-12-11 | End: 2018-12-13

## 2018-12-11 RX ORDER — CEFEPIME 1 G/1
1000 INJECTION, POWDER, FOR SOLUTION INTRAMUSCULAR; INTRAVENOUS ONCE
Qty: 0 | Refills: 0 | Status: COMPLETED | OUTPATIENT
Start: 2018-12-11 | End: 2018-12-11

## 2018-12-11 RX ORDER — DIPHENHYDRAMINE HCL 50 MG
25 CAPSULE ORAL ONCE
Qty: 0 | Refills: 0 | Status: COMPLETED | OUTPATIENT
Start: 2018-12-11 | End: 2018-12-11

## 2018-12-11 RX ORDER — TRAZODONE HCL 50 MG
50 TABLET ORAL DAILY
Qty: 0 | Refills: 0 | Status: DISCONTINUED | OUTPATIENT
Start: 2018-12-11 | End: 2018-12-15

## 2018-12-11 RX ADMIN — TRAMADOL HYDROCHLORIDE 50 MILLIGRAM(S): 50 TABLET ORAL at 17:45

## 2018-12-11 RX ADMIN — Medication 7 UNIT(S): at 22:26

## 2018-12-11 RX ADMIN — Medication 8 UNIT(S): at 18:55

## 2018-12-11 RX ADMIN — BUDESONIDE AND FORMOTEROL FUMARATE DIHYDRATE 2 PUFF(S): 160; 4.5 AEROSOL RESPIRATORY (INHALATION) at 21:14

## 2018-12-11 RX ADMIN — Medication 10 MILLIGRAM(S): at 17:45

## 2018-12-11 RX ADMIN — CEFEPIME 100 MILLIGRAM(S): 1 INJECTION, POWDER, FOR SOLUTION INTRAMUSCULAR; INTRAVENOUS at 18:29

## 2018-12-11 RX ADMIN — Medication 10 UNIT(S): at 22:07

## 2018-12-11 RX ADMIN — Medication 3 MILLILITER(S): at 20:31

## 2018-12-11 RX ADMIN — Medication 110 MILLIGRAM(S): at 18:29

## 2018-12-11 RX ADMIN — Medication 25 MILLIGRAM(S): at 17:45

## 2018-12-11 RX ADMIN — Medication 125 MILLIGRAM(S): at 07:00

## 2018-12-11 RX ADMIN — Medication 25 MILLIGRAM(S): at 22:11

## 2018-12-11 RX ADMIN — Medication 3 MILLILITER(S): at 08:33

## 2018-12-11 RX ADMIN — SACUBITRIL AND VALSARTAN 1 TABLET(S): 24; 26 TABLET, FILM COATED ORAL at 17:45

## 2018-12-11 RX ADMIN — ATORVASTATIN CALCIUM 40 MILLIGRAM(S): 80 TABLET, FILM COATED ORAL at 22:10

## 2018-12-11 RX ADMIN — GABAPENTIN 100 MILLIGRAM(S): 400 CAPSULE ORAL at 22:10

## 2018-12-11 RX ADMIN — HEPARIN SODIUM 5000 UNIT(S): 5000 INJECTION INTRAVENOUS; SUBCUTANEOUS at 22:12

## 2018-12-11 RX ADMIN — AZITHROMYCIN 255 MILLIGRAM(S): 500 TABLET, FILM COATED ORAL at 10:37

## 2018-12-11 RX ADMIN — CEFEPIME 100 MILLIGRAM(S): 1 INJECTION, POWDER, FOR SOLUTION INTRAMUSCULAR; INTRAVENOUS at 10:38

## 2018-12-11 RX ADMIN — Medication 12: at 18:30

## 2018-12-11 RX ADMIN — Medication 40 MILLIGRAM(S): at 22:04

## 2018-12-11 RX ADMIN — Medication 7 UNIT(S): at 18:31

## 2018-12-11 RX ADMIN — METHADONE HYDROCHLORIDE 10 MILLIGRAM(S): 40 TABLET ORAL at 22:35

## 2018-12-11 RX ADMIN — Medication 25 MILLIGRAM(S): at 08:30

## 2018-12-11 RX ADMIN — MORPHINE SULFATE 4 MILLIGRAM(S): 50 CAPSULE, EXTENDED RELEASE ORAL at 08:30

## 2018-12-11 RX ADMIN — Medication 7 UNIT(S): at 22:27

## 2018-12-11 RX ADMIN — INSULIN GLARGINE 25 UNIT(S): 100 INJECTION, SOLUTION SUBCUTANEOUS at 22:12

## 2018-12-11 NOTE — H&P ADULT - NSHPPHYSICALEXAM_GEN_ALL_CORE
T(C): 36.7 (12-11-18 @ 07:51), Max: 36.7 (12-11-18 @ 05:57)  HR: 88 (12-11-18 @ 07:51) (88 - 92)  BP: 127/64 (12-11-18 @ 07:51) (118/58 - 127/64)  RR: 16 (12-11-18 @ 07:51) (16 - 22)  SpO2: 95% (12-11-18 @ 07:51) (95% - 99%)    PHYSICAL EXAM:  GENERAL: NAD, well-developed  HEAD:  Atraumatic, Normocephalic  EYES: EOMI, PERRLA, normal conjunctiva and sclera   ENT: Normal tympanic membrane. No nasal obstruction or discharge. No tonsillar exudate, swelling or erythema.  NECK: Supple, No JVD  CHEST/LUNG: decrease air entry at bases, basal crackles, no wheezing   HEART: Regular rate and rhythm; No murmurs, rubs, or gallops  ABDOMEN: Soft, mild tenderness at RUQ   EXTREMITIES:  2+ Peripheral Pulses, No clubbing, cyanosis, or edema  PSYCH: AAOx3  NEUROLOGY: non-focal  SKIN: No rashes or lesions T(C): 36.7 (12-11-18 @ 07:51), Max: 36.7 (12-11-18 @ 05:57)  HR: 88 (12-11-18 @ 07:51) (88 - 92)  BP: 127/64 (12-11-18 @ 07:51) (118/58 - 127/64)  RR: 16 (12-11-18 @ 07:51) (16 - 22)  SpO2: 95% (12-11-18 @ 07:51) (95% - 99%)    PHYSICAL EXAM:  GENERAL: NAD, well-developed  HEAD:  Atraumatic, Normocephalic  EYES: EOMI, PERRLA, normal conjunctiva and sclera   ENT: Normal tympanic membrane. No nasal obstruction or discharge. No tonsillar exudate, swelling or erythema.  NECK: Supple, No JVD  CHEST/LUNG: decrease air entry at bases, basal crackles, no wheezing   HEART: Regular rate and rhythm; No murmurs, rubs, or gallops  ABDOMEN: Soft, mild tenderness at RUQ   EXTREMITIES:  2+ Peripheral Pulses, No clubbing, cyanosis, or edema  PSYCH: AAOx3  NEUROLOGY: 4/5 rt side, 5/5 left side  SKIN: No rashes or lesions

## 2018-12-11 NOTE — H&P ADULT - HISTORY OF PRESENT ILLNESS
58 year female patient with history of HFrEF ( EF 20-25%) , s/p AICD, COPD on home O2 (4L) , CVD, CVA with residual rt sided weakness , depression, fibromyalgia , PVD, presented for one week history of SOB that started at exertion and progressed to be at rest. Patient symptoms started with rhinorrhea , nasal congestion, sore throat, myalgia and arthralgia one week ago , associated with productive cough , with greenish sputum, subjective fever, and chills. Patient SOB worsened with flat position, patient complains of orthopnea , and mentioned that she recently need to sleep on 4 pillows ,and paroxysmal nocturnal dyspnea. There is a history of sick  contact . No recent travel. Patient denies any chest pain, there is no history of palpitations, no weight loss, no night sweats.  Patient mentioned mild lower limb swelling in the previous days, she is complaint to her medications.  Patient complains of RUQ pain starting 2 days ago, localized, intermittent, stabbing  in nature, moderate in intensity, related to coughing and taking deep breath, resolves spontaneously. Patient denied any RUQ pain without deep breath or coughing  In the ED  patient received one dosage of solumedrol , nebulizers , azithromycin and cefepime  patient SPO2 is 97% on bipap, mentioned that she improved since admission.

## 2018-12-11 NOTE — ED ADULT NURSE REASSESSMENT NOTE - NS ED NURSE REASSESS COMMENT FT1
pt c.o. abd pain. mar and covering md aware. unable to put in orders because [phone] is not covering. [phone] notified "will figure out who is covering"

## 2018-12-11 NOTE — ED PROVIDER NOTE - PROGRESS NOTE DETAILS
Endorsed to Dr Peterson to f/u labs, reasess and dispo. PT SIGNED OUT TO ME, FOLLOW UP LABS, CXR, CT SCAN, REASSESS AND DISPO.

## 2018-12-11 NOTE — H&P ADULT - NSHPREVIEWOFSYSTEMS_GEN_ALL_CORE
REVIEW OF SYSTEMS:    CONSTITUTIONAL: + weakness, fevers , chills  EYES/ENT: No visual changes;  No vertigo or throat pain   NECK: No pain or stiffness  RESPIRATORY: +cough, no wheezing, no hemoptysis; + shortness of breath  CARDIOVASCULAR: No chest pain or palpitations  GASTROINTESTINAL: + RUQ pain . No nausea, vomiting, or hematemesis; No diarrhea or constipation. No melena or hematochezia.  GENITOURINARY: No dysuria, frequency or hematuria  NEUROLOGICAL: No numbness or weakness  SKIN: No itching, burning, rashes, or lesions   All other review of systems is negative unless indicated above.

## 2018-12-11 NOTE — ED PROVIDER NOTE - CARE PLAN
Principal Discharge DX:	Pneumonia  Secondary Diagnosis:	CHF (congestive heart failure)  Secondary Diagnosis:	Shortness of breath  Secondary Diagnosis:	Abdominal pain

## 2018-12-11 NOTE — H&P ADULT - ASSESSMENT
58 year female patient with history of HFrEF ( EF 20-25%) , s/p AICD, COPD on home O2 (4L) , CVD, CVA with residual rt sided weakness , depression, fibromyalgia , PVD, presented for one week history of SOB that started at exertion and progressed to be at rest, orthopnea, paroxysmal nocturnal dyspnea,  associated with cough with greenish sputum , subjective fever, and chills.      #decompensated heart failure with reduced ejection fraction with possible Rt lower lobe pneumonia  accurate intake /out put  daily body weight  lasix 40 IV od  cefepime and doxycyline ( patient on multiple medications that prolong qtc)  Bipap for now, keep spo2 >92%  sputum culture  continue with metoprolol, Entresto , atorvastatin      #COPD: continue with nebs  Symbicort BID  bipap alternating with nasal cannula       #depression : continue with buspirone, trazodone    #fibromyalgia: continue with amitriptyline , gabapentin,  methadone    #CKD: creatine at baseline    #diabetes: basal/bolus    #dyslipidemia : on statin    #full code     #from home 58 year female patient with history of HFrEF ( EF 20-25%) , s/p AICD, COPD on home O2 (4L) , CVD, CVA with residual rt sided weakness , depression, fibromyalgia , PVD, presented for one week history of SOB that started at exertion and progressed to be at rest, orthopnea, paroxysmal nocturnal dyspnea,  associated with cough with greenish sputum , subjective fever, and chills.      #decompensated heart failure with reduced ejection fraction with possible Rt lower lobe pneumonia  accurate intake /out put  daily body weight  Lasix 40 IV od  cefepime and doxycyline ( patient on multiple medications that prolong qtc)  BiPAP for now, keep spo2 >92%  sputum culture  continue with metoprolol, Entresto , atorvastatin      #COPD: continue with nebs  Symbicort BID  BiPAP alternating with nasal cannula     #RUQ pain: mostly musculoskeletal    #New since prior examination is enlargement and haziness of the left adrenal gland. This may be seen with adrenal hemorrhage or an inflammatory process  patient is completely asymptomatic , hemodynamically stable  patient is not able to have MRI due to AICD  ultrasound abdomen is ordered to comment on this haziness and  the New indeterminate left lower pole renal lesion measuring 1 cm shown by ct scan    #occluded left common femoral stent : consult vascular    #depression : continue with buspirone, trazodone    #fibromyalgia: continue with amitriptyline , gabapentin,  methadone    #CKD: creatine at baseline    #diabetes: basal/bolus    #dyslipidemia : on statin    #full code     #from home

## 2018-12-11 NOTE — H&P ADULT - NSHPLABSRESULTS_GEN_ALL_CORE
10.2   6.55  )-----------( 240      ( 11 Dec 2018 06:43 )             33.0   11 Dec 2018 06:43    135    |  93     |  19     ----------------------------<  116    4.3     |  31     |  1.6      Ca    9.0        11 Dec 2018 06:43    TPro  7.6    /  Alb  3.4    /  TBili  1.2    /  DBili  0.2    /  AST  17     /  ALT  9      /  AlkPhos  136    11 Dec 2018 06:43    Blood Gas Profile - Venous (12.11.18 @ 06:57)    pH, Venous: 7.41    pCO2, Venous: 59 mmHg    pO2, Venous: 21 mmHg    HCO3, Venous: 38 mmoL/L    Base Excess, Venous: 10.9 mmoL/L    Oxygen Saturation, Venous: 28 %    Serum Pro-Brain Natriuretic Peptide: 69335 pg/mL (12.11.18 @ 06:43)    Troponin T, Serum: <0.01 ng/mL (12.11.18 @ 06:43)  Blood Gas Venous - Lactate: 1.4 mmoL/L (12.11.18 @ 06:57)  < from: 12 Lead ECG (12.11.18 @ 08:54) >    Diagnosis Line Normal sinus rhythm  Possible Left atrial enlargement  Left axis deviation  Left ventricular hypertrophy  T wave abnormality, consider lateral ischemia  Abnormal ECG    < end of copied text >    < from: Xray Chest 1 View-PORTABLE IMMEDIATE (12.11.18 @ 07:38) >    Right lower lung field patchy parenchymal opacity and small right pleural   effusion.      < end of copied text >  < from: CT Abdomen and Pelvis w/ IV Cont (12.11.18 @ 08:52) >    New since prior examination is enlargement and haziness of the left   adrenal gland. This may be seen with adrenal hemorrhage or an   inflammatory process    Bilateral lower lung field patchy opacities predominantly within the   right lower lobe possibly reflecting infectious/inflammatory etiology or   related to pulmonary edema in the appropriate clinical setting. Trace   right pleural effusion.    New indeterminate left lower pole renal lesion measuring 1 cm. As this   patient has a pacemaker and likely cannot have an MRI, further evaluation   with a focused ultrasound of the lower pole of left kidney is recommended   to exclude neoplasm. If this lesion is not well-seen on this exam,   follow-up CT is recommended in 3-6 months to demonstrate stability.    Occluded left common femoral artery stent    Subcutaneous nodules, likely related to injection phenomena. Follow to   resolution is recommended      < end of copied text >

## 2018-12-11 NOTE — H&P ADULT - ATTENDING COMMENTS
Patient seen and examined independently. Agree with resident note/ history / physical exam and plan of care with following exceptions/additions/updates. Case discussed with house-staff, nursing and patient  pt is sitting in bed, very comfortable at this time. she is feeling better and is not sob. says she has RAMSEY.   no fever, no chills, no swallowing problem.   Vital Signs Last 24 Hrs, T 98 axillary  HR: 54 (12 Dec 2018 00:55) (54 - 89)  BP: 116/64 (11 Dec 2018 23:47) (116/64 - 147/65)  SpO2: 96% (12 Dec 2018 00:55) (96% - 100%)  Physical exam:   constitutional NAD, AAOX3, Respiratory  lungs bilat crackles left >right , CVS heart RRR, GI: abdomen Soft NT, ND, BS+, skin: intact  neuro exam non focal. +1 pedal edema.                           9.2    6.60  )-----------( 240      ( 12 Dec 2018 07:29 )             29.8   12-12    134<L>  |  93<L>  |  38<H>  ----------------------------<  159<H>  4.5   |  28  |  2.1<H>    Ca    8.6      12 Dec 2018 07:29    TPro  7.6  /  Alb  3.4<L>  /  TBili  1.2  /  DBili  0.2  /  AST  17  /  ALT  9   /  AlkPhos  136<H>  12-11    < from: Xray Chest 1 View-PORTABLE IMMEDIATE (12.11.18 @ 07:38) >    Right lower lung field patchy parenchymal opacity and small right pleural   effusion.    < end of copied text >    < from: Transthoracic Echocardiogram (08.21.18 @ 08:35) >     1. Left ventricular ejection fraction, by visual estimation, is 25 to   30%.   2. Severely decreased global left ventricular systolic function.   3. Mild tricuspid regurgitation.   4. Trace pulmonic valve regurgitation.    < end of copied text >    a/p  1- possible pneumonia as per imaging, vs acute on chronic systolic chf plus fluid overload due to CKD stage 4 ( cr is 2.1 , her baseline is 1.5-2.2)   cardio consult, ID consult, at this point pt is on abx, cont tele, repeat cardiac enzymes.     2- DM cont meds. monitor fs , insulin protocol     3- hx of COPD , cont home meds.     DVT prophylaxis  spoke with PMD, Dr Gregory.

## 2018-12-11 NOTE — ED PROVIDER NOTE - ATTENDING CONTRIBUTION TO CARE
57 yo female PMH as noted c/o URI like symptoms for the past few days which she says exacerbated her COPD, reports cough productive of white phlegm.  In addition, reports right sided abdominal pain for a week, associated with several episodes of NBNB emesis.  No fever, chills, CP, urinary complaints, leg pain or swelling, no recent illness or trauma,.  Well-appearing middle aged female, resting on a stretcher, NAD, PERRL, mmm, no acute respiratory distress, speaking full sentences,  b/l expiratory wheezing, equal air entry, RRR, abdomen soft , ND, + rt sided ttp with voluntary guarding, no leg edema or calf ttp,. no CVA or midline spine ttp, A&Ox3, no gross neuro deficits.  Will give, nebs, check labs, CXR,   likley CT scan abdomen/pelvis , reassess.

## 2018-12-11 NOTE — ED PROVIDER NOTE - NS ED ROS FT
Constitutional: no fever, chills, no recent weight loss, change in appetite or malaise  ENT: + rhinorrhea  Cardiac: + sob. No chest pain  Respiratory: No cough or respiratory distress  GI: + ruq abd pain and nausea. No diarrhea  MS: no pain to back or extremities, no loss of ROM, no weakness  Neuro: No headache or weakness. No LOC.  Skin: No skin rash.  Endocrine: + hx of DM  Except as documented in the HPI, all other systems are negative.

## 2018-12-11 NOTE — ED PROVIDER NOTE - MEDICAL DECISION MAKING DETAILS
57 Y/O F PMHX AS DOCUMENTED PERSNETED WITH C/O SOB AND ABD PAIN.PT DIAGNOSED WITH COPD EXACERBATION AND PNA. ALL DIAGNOSTIC TESTING REVIEWED. PT ADMITTED TO MEDICINE SERVICE.

## 2018-12-11 NOTE — ED ADULT NURSE NOTE - OBJECTIVE STATEMENT
pt states I feel sob for a week. I have home 02 but its not helping and now I have a cough and my stomach hurts.

## 2018-12-11 NOTE — ED PROVIDER NOTE - PHYSICAL EXAMINATION
CONSTITUTIONAL: Well-appearing; well-nourished; in no apparent distress.   NECK: Supple; non-tender; no cervical lymphadenopathy. No JVD.   CARDIOVASCULAR: Normal S1, S2; no murmurs, rubs, or gallops.   RESPIRATORY: PT on 4L O2. + tachypnea. + wheezing b/l. No retractions  GI/: + non distended. + ttp over epigastric/RUQ with guarding. No ttp at mcburneys point  MS: No evidence of trauma or deformity. Normal ROM in all four extremities; non-tender to palpation; distal pulses are normal.   Extrem: No peripheral edema  SKIN: Normal for age and race; warm; dry; good turgor; no apparent lesions or exudate.   NEURO/PSYCH: A & O x 4; grossly unremarkable. mood and manner are appropriate.

## 2018-12-12 LAB
ANION GAP SERPL CALC-SCNC: 13 MMOL/L — SIGNIFICANT CHANGE UP (ref 7–14)
APPEARANCE UR: CLEAR — SIGNIFICANT CHANGE UP
BACTERIA # UR AUTO: ABNORMAL /HPF
BASOPHILS # BLD AUTO: 0.01 K/UL — SIGNIFICANT CHANGE UP (ref 0–0.2)
BASOPHILS NFR BLD AUTO: 0.2 % — SIGNIFICANT CHANGE UP (ref 0–1)
BILIRUB UR-MCNC: NEGATIVE — SIGNIFICANT CHANGE UP
BUN SERPL-MCNC: 38 MG/DL — HIGH (ref 10–20)
CALCIUM SERPL-MCNC: 8.6 MG/DL — SIGNIFICANT CHANGE UP (ref 8.5–10.1)
CHLORIDE SERPL-SCNC: 93 MMOL/L — LOW (ref 98–110)
CO2 SERPL-SCNC: 28 MMOL/L — SIGNIFICANT CHANGE UP (ref 17–32)
COLOR SPEC: YELLOW — SIGNIFICANT CHANGE UP
CREAT SERPL-MCNC: 2.1 MG/DL — HIGH (ref 0.7–1.5)
DIFF PNL FLD: ABNORMAL
EOSINOPHIL # BLD AUTO: 0 K/UL — SIGNIFICANT CHANGE UP (ref 0–0.7)
EOSINOPHIL NFR BLD AUTO: 0 % — SIGNIFICANT CHANGE UP (ref 0–8)
EPI CELLS # UR: ABNORMAL /HPF
ESTIMATED AVERAGE GLUCOSE: 209 MG/DL — HIGH (ref 68–114)
GLUCOSE BLDC GLUCOMTR-MCNC: 109 MG/DL — HIGH (ref 70–99)
GLUCOSE BLDC GLUCOMTR-MCNC: 136 MG/DL — HIGH (ref 70–99)
GLUCOSE BLDC GLUCOMTR-MCNC: 176 MG/DL — HIGH (ref 70–99)
GLUCOSE BLDC GLUCOMTR-MCNC: 185 MG/DL — HIGH (ref 70–99)
GLUCOSE BLDC GLUCOMTR-MCNC: 222 MG/DL — HIGH (ref 70–99)
GLUCOSE BLDC GLUCOMTR-MCNC: 358 MG/DL — HIGH (ref 70–99)
GLUCOSE SERPL-MCNC: 159 MG/DL — HIGH (ref 70–99)
GLUCOSE UR QL: NEGATIVE MG/DL — SIGNIFICANT CHANGE UP
HBA1C BLD-MCNC: 8.9 % — HIGH (ref 4–5.6)
HCT VFR BLD CALC: 29.8 % — LOW (ref 37–47)
HGB BLD-MCNC: 9.2 G/DL — LOW (ref 12–16)
IMM GRANULOCYTES NFR BLD AUTO: 0.5 % — HIGH (ref 0.1–0.3)
KETONES UR-MCNC: NEGATIVE — SIGNIFICANT CHANGE UP
LEUKOCYTE ESTERASE UR-ACNC: NEGATIVE — SIGNIFICANT CHANGE UP
LYMPHOCYTES # BLD AUTO: 0.83 K/UL — LOW (ref 1.2–3.4)
LYMPHOCYTES # BLD AUTO: 12.6 % — LOW (ref 20.5–51.1)
MCHC RBC-ENTMCNC: 26.3 PG — LOW (ref 27–31)
MCHC RBC-ENTMCNC: 30.9 G/DL — LOW (ref 32–37)
MCV RBC AUTO: 85.1 FL — SIGNIFICANT CHANGE UP (ref 81–99)
MONOCYTES # BLD AUTO: 0.82 K/UL — HIGH (ref 0.1–0.6)
MONOCYTES NFR BLD AUTO: 12.4 % — HIGH (ref 1.7–9.3)
NEUTROPHILS # BLD AUTO: 4.91 K/UL — SIGNIFICANT CHANGE UP (ref 1.4–6.5)
NEUTROPHILS NFR BLD AUTO: 74.3 % — SIGNIFICANT CHANGE UP (ref 42.2–75.2)
NITRITE UR-MCNC: NEGATIVE — SIGNIFICANT CHANGE UP
NRBC # BLD: 0 /100 WBCS — SIGNIFICANT CHANGE UP (ref 0–0)
PH UR: 5.5 — SIGNIFICANT CHANGE UP (ref 5–8)
PLATELET # BLD AUTO: 240 K/UL — SIGNIFICANT CHANGE UP (ref 130–400)
POTASSIUM SERPL-MCNC: 4.5 MMOL/L — SIGNIFICANT CHANGE UP (ref 3.5–5)
POTASSIUM SERPL-SCNC: 4.5 MMOL/L — SIGNIFICANT CHANGE UP (ref 3.5–5)
PROT UR-MCNC: 100 MG/DL
RBC # BLD: 3.5 M/UL — LOW (ref 4.2–5.4)
RBC # FLD: 15.9 % — HIGH (ref 11.5–14.5)
RBC CASTS # UR COMP ASSIST: ABNORMAL /HPF
SODIUM SERPL-SCNC: 134 MMOL/L — LOW (ref 135–146)
SP GR SPEC: 1.02 — SIGNIFICANT CHANGE UP (ref 1.01–1.03)
UROBILINOGEN FLD QL: 0.2 MG/DL — SIGNIFICANT CHANGE UP (ref 0.2–0.2)
WBC # BLD: 6.6 K/UL — SIGNIFICANT CHANGE UP (ref 4.8–10.8)
WBC # FLD AUTO: 6.6 K/UL — SIGNIFICANT CHANGE UP (ref 4.8–10.8)

## 2018-12-12 PROCEDURE — 99222 1ST HOSP IP/OBS MODERATE 55: CPT

## 2018-12-12 RX ORDER — ACETAMINOPHEN 500 MG
650 TABLET ORAL EVERY 6 HOURS
Qty: 0 | Refills: 0 | Status: DISCONTINUED | OUTPATIENT
Start: 2018-12-12 | End: 2018-12-19

## 2018-12-12 RX ADMIN — CEFEPIME 100 MILLIGRAM(S): 1 INJECTION, POWDER, FOR SOLUTION INTRAMUSCULAR; INTRAVENOUS at 05:35

## 2018-12-12 RX ADMIN — Medication 7 UNIT(S): at 17:55

## 2018-12-12 RX ADMIN — Medication 110 MILLIGRAM(S): at 05:35

## 2018-12-12 RX ADMIN — HEPARIN SODIUM 5000 UNIT(S): 5000 INJECTION INTRAVENOUS; SUBCUTANEOUS at 14:16

## 2018-12-12 RX ADMIN — Medication 40 MILLIGRAM(S): at 05:36

## 2018-12-12 RX ADMIN — METHADONE HYDROCHLORIDE 10 MILLIGRAM(S): 40 TABLET ORAL at 11:15

## 2018-12-12 RX ADMIN — SACUBITRIL AND VALSARTAN 1 TABLET(S): 24; 26 TABLET, FILM COATED ORAL at 05:36

## 2018-12-12 RX ADMIN — Medication 10 MILLIGRAM(S): at 18:38

## 2018-12-12 RX ADMIN — GABAPENTIN 100 MILLIGRAM(S): 400 CAPSULE ORAL at 21:23

## 2018-12-12 RX ADMIN — Medication 10 MILLIGRAM(S): at 06:18

## 2018-12-12 RX ADMIN — HEPARIN SODIUM 5000 UNIT(S): 5000 INJECTION INTRAVENOUS; SUBCUTANEOUS at 05:37

## 2018-12-12 RX ADMIN — Medication 25 MILLIGRAM(S): at 21:23

## 2018-12-12 RX ADMIN — Medication 50 MILLIGRAM(S): at 11:17

## 2018-12-12 RX ADMIN — Medication 3 MILLILITER(S): at 09:03

## 2018-12-12 RX ADMIN — Medication 7 UNIT(S): at 11:16

## 2018-12-12 RX ADMIN — HEPARIN SODIUM 5000 UNIT(S): 5000 INJECTION INTRAVENOUS; SUBCUTANEOUS at 21:23

## 2018-12-12 RX ADMIN — Medication 2: at 09:03

## 2018-12-12 RX ADMIN — BUDESONIDE AND FORMOTEROL FUMARATE DIHYDRATE 2 PUFF(S): 160; 4.5 AEROSOL RESPIRATORY (INHALATION) at 22:26

## 2018-12-12 RX ADMIN — Medication 110 MILLIGRAM(S): at 18:27

## 2018-12-12 RX ADMIN — Medication 3 MILLILITER(S): at 14:15

## 2018-12-12 RX ADMIN — CEFEPIME 100 MILLIGRAM(S): 1 INJECTION, POWDER, FOR SOLUTION INTRAMUSCULAR; INTRAVENOUS at 17:54

## 2018-12-12 RX ADMIN — Medication 25 MILLIGRAM(S): at 05:37

## 2018-12-12 RX ADMIN — Medication 4: at 11:16

## 2018-12-12 RX ADMIN — SACUBITRIL AND VALSARTAN 1 TABLET(S): 24; 26 TABLET, FILM COATED ORAL at 17:53

## 2018-12-12 RX ADMIN — GABAPENTIN 100 MILLIGRAM(S): 400 CAPSULE ORAL at 05:36

## 2018-12-12 RX ADMIN — GABAPENTIN 100 MILLIGRAM(S): 400 CAPSULE ORAL at 14:16

## 2018-12-12 RX ADMIN — ATORVASTATIN CALCIUM 40 MILLIGRAM(S): 80 TABLET, FILM COATED ORAL at 21:23

## 2018-12-12 RX ADMIN — INSULIN GLARGINE 25 UNIT(S): 100 INJECTION, SOLUTION SUBCUTANEOUS at 22:22

## 2018-12-12 RX ADMIN — BUDESONIDE AND FORMOTEROL FUMARATE DIHYDRATE 2 PUFF(S): 160; 4.5 AEROSOL RESPIRATORY (INHALATION) at 11:16

## 2018-12-12 RX ADMIN — Medication 7 UNIT(S): at 09:03

## 2018-12-12 NOTE — CONSULT NOTE ADULT - SUBJECTIVE AND OBJECTIVE BOX
VASCULAR SURGERY CONSULT NOTE      HPI:  58 year female patient with history of HFrEF ( EF 20-25%) , s/p AICD, COPD on home O2 (4L) , CVD, CVA with residual rt sided weakness , depression, fibromyalgia , PVD, IVC filter 6/17/11, Right iliac stent 3/1/13, Left SFA stents 5/10/13 and 6/11/13, Left femoral-below knee popliteal bypass with rSVG 1/6/15,  presented for one week history of SOB that started at exertion and progressed to be at rest. Patient symptoms started with rhinorrhea , nasal congestion, sore throat, myalgia and arthralgia one week ago , associated with productive cough , with greenish sputum, subjective fever, and chills. Patient SOB worsened with flat position, patient complains of orthopnea , and mentioned that she recently need to sleep on 4 pillows ,and paroxysmal nocturnal dyspnea. There is a history of sick  contact . No recent travel. Patient denies any chest pain, there is no history of palpitations, no weight loss, no night sweats.  Patient mentioned mild lower limb swelling in the previous days, she is complaint to her medications.  Patient complains of RUQ pain starting 2 days ago, localized, intermittent, stabbing  in nature, moderate in intensity, related to coughing and taking deep breath, resolves spontaneously. Patient denied any RUQ pain without deep breath or coughing  In the ED  patient received one dosage of solumedrol , nebulizers , azithromycin and cefepime  patient SPO2 is 97% on bipap, mentioned that she improved since admission. (11 Dec 2018 14:59)        PAST MEDICAL & SURGICAL HISTORY:  Chronic pain  HLD (hyperlipidemia)  CVA (cerebral vascular accident)  Depression  COPD (chronic obstructive pulmonary disease)  Diabetes  CHF (congestive heart failure)  History of cholecystectomy  H/O tubal ligation  H/O abdominal hysterectomy  AICD (automatic cardioverter/defibrillator) present    aspirin (Other (Moderate))  IV Contrast (Unknown)    Home Medications:  atorvastatin 40 mg oral tablet: 1 tab(s) orally once a day (20 Aug 2018 19:47)  busPIRone 10 mg oral tablet: 1 tab(s) orally 2 times a day (20 Aug 2018 19:47)  Entresto 24 mg-26 mg oral tablet: 1 tab(s) orally 2 times a day (20 Aug 2018 19:47)  gabapentin 100 mg oral capsule: 1 cap(s) orally 3 times a day (20 Aug 2018 19:47)  Lantus 100 units/mL subcutaneous solution: 25 unit(s) subcutaneous once a day (at bedtime) (24 Aug 2018 09:38)  Lasix 40 mg oral tablet: 1 tab(s) orally once a day (20 Aug 2018 19:47)  methadone 10 mg oral tablet: 1 tab(s) orally once a day (20 Aug 2018 19:47)  Metoprolol Succinate ER 25 mg oral tablet, extended release: 1 tab(s) orally once a day (20 Aug 2018 19:47)  NovoLOG 100 units/mL subcutaneous solution: 7 unit(s) subcutaneous 3 times a day (before meals) (24 Aug 2018 09:38)  Symbicort 160 mcg-4.5 mcg/inh inhalation aerosol: 2 puff(s) inhaled 2 times a day (20 Aug 2018 19:47)  traZODone 50 mg oral tablet: 1 tab(s) orally once a day (20 Aug 2018 19:47)    No permtinent family history of PVD    REVIEW OF SYSTEMS:  GENERAL:                                         negative  SKIN:                                                 negative  OPTHALMOLOGIC:                          negative  ENMT:                                               negative  RESPIRATORY AND THORAX:       see HPI  CARDIOVASCULAR:                         see HPI  GASTROINTESTINAL:                       negative  NEPHROLOGY:                                  negative  MUSCULOSKELETAL:                       negative  NEUROLOGIC:                                   see hpi  PSYCHIATRIC:                                    negative  HEMATOLOGY/LYMPHATICS:         negative  ENDOCRINE:                                     see HPI  ALLERGIC/IMMUNOLOGIC:            negative    12 point ROS otherwise normal except as stated in HPI    PHYSICAL EXAM  Vital Signs Last 24 Hrs  T(C): --  T(F): --  HR: 54 (12 Dec 2018 00:55) (54 - 89)  BP: 116/64 (11 Dec 2018 23:47) (116/64 - 147/65)  BP(mean): --  RR: --  SpO2: 96% (12 Dec 2018 00:55) (96% - 100%)    Appearance: Normal	  HEENT:   Normal oral mucosa, PERRL, EOMI	  Neck: Supple, - JVD; Carotid Bruit   Cardiovascular: Normal S1 S2, No JVD, No murmurs,   Respiratory: Lungs clear to auscultation, + bilateral Wheezing	  Gastrointestinal:  Soft, Non-tender, positive BS	  Skin: No rashes, No ecchymoses, No cyanosis  Extremities: Normal range of motion, No clubbing, cyanosis or edema. + Warm feet, no open wounds  Vascular: Peripheral pulses palpable 2+ Left DP  Neurologic: Non-focal  Psychiatry: A & O x 3, Mood & affect appropriate      PULSES:    Dorsal Pedal: left palpable, right dopplerable  Posterior Tibial: b/l dopplerable  Capillary:    MEDICATIONS:   MEDICATIONS  (STANDING):  ALBUTerol    90 MICROgram(s) HFA Inhaler 1 Puff(s) Inhalation every 4 hours  ALBUTerol/ipratropium for Nebulization 3 milliLiter(s) Nebulizer every 6 hours  amitriptyline 25 milliGRAM(s) Oral at bedtime  atorvastatin 40 milliGRAM(s) Oral at bedtime  buDESOnide 160 MICROgram(s)/formoterol 4.5 MICROgram(s) Inhaler 2 Puff(s) Inhalation two times a day  busPIRone 10 milliGRAM(s) Oral two times a day  cefepime   IVPB 2000 milliGRAM(s) IV Intermittent every 12 hours  dextrose 5%. 1000 milliLiter(s) (50 mL/Hr) IV Continuous <Continuous>  dextrose 50% Injectable 12.5 Gram(s) IV Push once  dextrose 50% Injectable 25 Gram(s) IV Push once  dextrose 50% Injectable 25 Gram(s) IV Push once  doxycycline IVPB 100 milliGRAM(s) IV Intermittent every 12 hours  furosemide   Injectable 40 milliGRAM(s) IV Push daily  gabapentin 100 milliGRAM(s) Oral three times a day  heparin  Injectable 5000 Unit(s) SubCutaneous every 8 hours  insulin glargine Injectable (LANTUS) 25 Unit(s) SubCutaneous at bedtime  insulin lispro (HumaLOG) corrective regimen sliding scale   SubCutaneous three times a day before meals  insulin lispro Injectable (HumaLOG) 7 Unit(s) SubCutaneous before breakfast  insulin lispro Injectable (HumaLOG) 7 Unit(s) SubCutaneous before lunch  insulin lispro Injectable (HumaLOG) 7 Unit(s) SubCutaneous before dinner  methadone    Tablet 10 milliGRAM(s) Oral daily  metoprolol succinate ER 25 milliGRAM(s) Oral daily  sacubitril 24 mG/valsartan 26 mG 1 Tablet(s) Oral two times a day  traZODone 50 milliGRAM(s) Oral daily    MEDICATIONS  (PRN):  acetaminophen   Tablet .. 650 milliGRAM(s) Oral every 6 hours PRN Moderate Pain (4 - 6)  dextrose 40% Gel 15 Gram(s) Oral once PRN Blood Glucose LESS THAN 70 milliGRAM(s)/deciliter  glucagon  Injectable 1 milliGRAM(s) IntraMuscular once PRN Glucose LESS THAN 70 milligrams/deciliter      LAB/STUDIES:                        9.2    6.60  )-----------( 240      ( 12 Dec 2018 07:29 )             29.8     12-12    134<L>  |  93<L>  |  38<H>  ----------------------------<  159<H>  4.5   |  28  |  2.1<H>    Ca    8.6      12 Dec 2018 07:29    TPro  7.6  /  Alb  3.4<L>  /  TBili  1.2  /  DBili  0.2  /  AST  17  /  ALT  9   /  AlkPhos  136<H>  12-11      LIVER FUNCTIONS - ( 11 Dec 2018 06:43 )  Alb: 3.4 g/dL / Pro: 7.6 g/dL / ALK PHOS: 136 U/L / ALT: 9 U/L / AST: 17 U/L / GGT: x               CARDIAC MARKERS ( 11 Dec 2018 06:43 )  x     / <0.01 ng/mL / x     / x     / x          IMAGING:  < from: CT Abdomen and Pelvis w/ IV Cont (12.11.18 @ 08:52) >   An IVC filter is noted just inferior to the level of the renal   veins. Status post surgical change in the left groin. There is a left   common femoral arterial stent, left external iliac artery stent and right   external iliac artery stent. The partially imaged left femoral arterial   stent is only partially imaged and is occluded. Stable haziness   surrounding the celiac and superior mesenteric arteries.

## 2018-12-12 NOTE — CONSULT NOTE ADULT - ASSESSMENT
58 year female patient with history of HFrEF ( EF 20-25%) , s/p AICD, COPD on home O2 (4L) , CVD, CVA with residual rt sided weakness , depression, fibromyalgia , PVD, IVC filter 6/17/11, Right iliac stent 3/1/13, Left SFA stents 5/10/13 and 6/11/13, Left femoral-below knee popliteal bypass with rSVG 1/6/15,  presented for one week history of SOB that started at exertion and progressed to be at rest. Patient symptoms started with rhinorrhea , nasal congestion, sore throat, myalgia and arthralgia one week ago , associated with productive cough , with greenish sputum, subjective fever, and chills. Patient SOB worsened with flat position, patient complains of orthopnea , and mentioned that she recently need to sleep on 4 pillows ,and paroxysmal nocturnal dyspnea.  Pt is admitted with CHF exacerbation and PNA. Incidentally, found left femoral stent occluded. Exam is benign with no evidence of flow compromise    - please, follow up on the official arterial duplex      SPECTRA [id number]

## 2018-12-12 NOTE — PROGRESS NOTE ADULT - SUBJECTIVE AND OBJECTIVE BOX
SUBJECTIVE:    Patient is a 58y old Female who presents with a chief complaint of sob (12 Dec 2018 10:25)    Currently admitted to medicine with the primary diagnosis of Pneumonia     Today is hospital day 1d. This morning she is resting comfortably in bed and reports no new issues or overnight events.     PAST MEDICAL & SURGICAL HISTORY  Chronic pain  HLD (hyperlipidemia)  CVA (cerebral vascular accident)  Depression  COPD (chronic obstructive pulmonary disease)  Diabetes  CHF (congestive heart failure)  History of cholecystectomy  H/O tubal ligation  H/O abdominal hysterectomy  AICD (automatic cardioverter/defibrillator) present    SOCIAL HISTORY:  Negative for smoking/alcohol/drug use.     ALLERGIES:  aspirin (Other (Moderate))  IV Contrast (Unknown)    MEDICATIONS:  STANDING MEDICATIONS  ALBUTerol    90 MICROgram(s) HFA Inhaler 1 Puff(s) Inhalation every 4 hours  ALBUTerol/ipratropium for Nebulization 3 milliLiter(s) Nebulizer every 6 hours  amitriptyline 25 milliGRAM(s) Oral at bedtime  atorvastatin 40 milliGRAM(s) Oral at bedtime  buDESOnide 160 MICROgram(s)/formoterol 4.5 MICROgram(s) Inhaler 2 Puff(s) Inhalation two times a day  busPIRone 10 milliGRAM(s) Oral two times a day  cefepime   IVPB 2000 milliGRAM(s) IV Intermittent every 12 hours  dextrose 5%. 1000 milliLiter(s) IV Continuous <Continuous>  dextrose 50% Injectable 12.5 Gram(s) IV Push once  dextrose 50% Injectable 25 Gram(s) IV Push once  dextrose 50% Injectable 25 Gram(s) IV Push once  doxycycline IVPB 100 milliGRAM(s) IV Intermittent every 12 hours  furosemide   Injectable 40 milliGRAM(s) IV Push daily  gabapentin 100 milliGRAM(s) Oral three times a day  heparin  Injectable 5000 Unit(s) SubCutaneous every 8 hours  insulin glargine Injectable (LANTUS) 25 Unit(s) SubCutaneous at bedtime  insulin lispro (HumaLOG) corrective regimen sliding scale   SubCutaneous three times a day before meals  insulin lispro Injectable (HumaLOG) 7 Unit(s) SubCutaneous before breakfast  insulin lispro Injectable (HumaLOG) 7 Unit(s) SubCutaneous before lunch  insulin lispro Injectable (HumaLOG) 7 Unit(s) SubCutaneous before dinner  methadone    Tablet 10 milliGRAM(s) Oral daily  metoprolol succinate ER 25 milliGRAM(s) Oral daily  sacubitril 24 mG/valsartan 26 mG 1 Tablet(s) Oral two times a day  traZODone 50 milliGRAM(s) Oral daily    PRN MEDICATIONS  acetaminophen   Tablet .. 650 milliGRAM(s) Oral every 6 hours PRN  dextrose 40% Gel 15 Gram(s) Oral once PRN  glucagon  Injectable 1 milliGRAM(s) IntraMuscular once PRN    VITALS:   T(F): --  HR: 54  BP: 116/64  RR: --  SpO2: 96%    CAPILLARY BLOOD GLUCOSE      LABS:                        9.2    6.60  )-----------( 240      ( 12 Dec 2018 07:29 )             29.8     12-12    134<L>  |  93<L>  |  38<H>  ----------------------------<  159<H>  4.5   |  28  |  2.1<H>    Ca    8.6      12 Dec 2018 07:29    TPro  7.6  /  Alb  3.4<L>  /  TBili  1.2  /  DBili  0.2  /  AST  17  /  ALT  9   /  AlkPhos  136<H>  12-11              CARDIAC MARKERS ( 11 Dec 2018 06:43 )  x     / <0.01 ng/mL / x     / x     / x          RADIOLOGY:    PHYSICAL EXAM:  GEN: No acute distress  LUNGS: bibasilar crackles and occasional wheezes. reduced air entry b/l  HEART: S1/S2 present. RRR.   ABD: Soft, non-tender, non-distended. Bowel sounds present  EXT: NC/NC/NE/2+PP/VO. +1 peripheral ulse  NEURO: AAOX3

## 2018-12-12 NOTE — PROGRESS NOTE ADULT - ASSESSMENT
58 year female patient with history of HFrEF ( EF 20-25%) , s/p AICD, COPD on home O2 (4L) , CVD, CVA with residual rt sided weakness , depression, fibromyalgia , PVD, presented for one week history of SOB that started at exertion and progressed to be at rest, orthopnea, paroxysmal nocturnal dyspnea,  associated with cough with greenish sputum , subjective fever, and chills.    #Decompensated heart failure with reduced ejection fraction with possible Rt lower lobe pneumonia - Stable on 4L  - c/w IV lasix for now  - strict I&O and daily weight  - c/w cefepime and doxycycline  = c/w NC and BipAP if sx worsen  = f/u sputum cx  - c/w metoprolol, entresto and lipitor  - f/u echo    #COPD: continue with nebs  Symbicort BID  BiPAP alternating with nasal cannula     #RUQ pain: mostly musculoskeletal    #New since prior examination is enlargement and haziness of the left adrenal gland. This may be seen with adrenal hemorrhage or an inflammatory process  patient is completely asymptomatic , hemodynamically stable  - r/u Sono  - likely outpt follow up as pt is not clnically symptomatic     #occluded left common femoral stent :  = f/u arterial duplex  - f/u vascular    #depression : continue with buspirone, trazodone    #fibromyalgia: continue with amitriptyline , gabapentin,  methadone    #CKD: creatine at baseline    #diabetes: basal/bolus    #dyslipidemia : on statin    #full code     #from home

## 2018-12-13 DIAGNOSIS — I63.9 CEREBRAL INFARCTION, UNSPECIFIED: ICD-10-CM

## 2018-12-13 DIAGNOSIS — I73.9 PERIPHERAL VASCULAR DISEASE, UNSPECIFIED: ICD-10-CM

## 2018-12-13 DIAGNOSIS — I50.23 ACUTE ON CHRONIC SYSTOLIC (CONGESTIVE) HEART FAILURE: ICD-10-CM

## 2018-12-13 DIAGNOSIS — J18.9 PNEUMONIA, UNSPECIFIED ORGANISM: ICD-10-CM

## 2018-12-13 DIAGNOSIS — J44.9 CHRONIC OBSTRUCTIVE PULMONARY DISEASE, UNSPECIFIED: ICD-10-CM

## 2018-12-13 DIAGNOSIS — N18.3 CHRONIC KIDNEY DISEASE, STAGE 3 (MODERATE): ICD-10-CM

## 2018-12-13 DIAGNOSIS — E11.9 TYPE 2 DIABETES MELLITUS WITHOUT COMPLICATIONS: ICD-10-CM

## 2018-12-13 LAB
ALBUMIN SERPL ELPH-MCNC: 3.3 G/DL — LOW (ref 3.5–5.2)
ALP SERPL-CCNC: 114 U/L — SIGNIFICANT CHANGE UP (ref 30–115)
ALT FLD-CCNC: 10 U/L — SIGNIFICANT CHANGE UP (ref 0–41)
ANION GAP SERPL CALC-SCNC: 18 MMOL/L — HIGH (ref 7–14)
AST SERPL-CCNC: 16 U/L — SIGNIFICANT CHANGE UP (ref 0–41)
BILIRUB SERPL-MCNC: 0.5 MG/DL — SIGNIFICANT CHANGE UP (ref 0.2–1.2)
BUN SERPL-MCNC: 45 MG/DL — HIGH (ref 10–20)
CALCIUM SERPL-MCNC: 8.7 MG/DL — SIGNIFICANT CHANGE UP (ref 8.5–10.1)
CHLORIDE SERPL-SCNC: 93 MMOL/L — LOW (ref 98–110)
CHLORIDE UR-SCNC: 21 — SIGNIFICANT CHANGE UP
CO2 SERPL-SCNC: 27 MMOL/L — SIGNIFICANT CHANGE UP (ref 17–32)
CREAT SERPL-MCNC: 3.1 MG/DL — HIGH (ref 0.7–1.5)
GLUCOSE BLDC GLUCOMTR-MCNC: 108 MG/DL — HIGH (ref 70–99)
GLUCOSE BLDC GLUCOMTR-MCNC: 119 MG/DL — HIGH (ref 70–99)
GLUCOSE BLDC GLUCOMTR-MCNC: 125 MG/DL — HIGH (ref 70–99)
GLUCOSE BLDC GLUCOMTR-MCNC: 159 MG/DL — HIGH (ref 70–99)
GLUCOSE SERPL-MCNC: 133 MG/DL — HIGH (ref 70–99)
HCT VFR BLD CALC: 32.3 % — LOW (ref 37–47)
HGB BLD-MCNC: 9.9 G/DL — LOW (ref 12–16)
MAGNESIUM SERPL-MCNC: 1.9 MG/DL — SIGNIFICANT CHANGE UP (ref 1.8–2.4)
MCHC RBC-ENTMCNC: 26.7 PG — LOW (ref 27–31)
MCHC RBC-ENTMCNC: 30.7 G/DL — LOW (ref 32–37)
MCV RBC AUTO: 87.1 FL — SIGNIFICANT CHANGE UP (ref 81–99)
MRSA PCR RESULT.: NEGATIVE — SIGNIFICANT CHANGE UP
NRBC # BLD: 0 /100 WBCS — SIGNIFICANT CHANGE UP (ref 0–0)
NT-PROBNP SERPL-SCNC: 6184 PG/ML — HIGH (ref 0–300)
OSMOLALITY UR: 324 MOS/KG — SIGNIFICANT CHANGE UP (ref 50–1400)
PLATELET # BLD AUTO: 266 K/UL — SIGNIFICANT CHANGE UP (ref 130–400)
POTASSIUM SERPL-MCNC: 4.3 MMOL/L — SIGNIFICANT CHANGE UP (ref 3.5–5)
POTASSIUM SERPL-SCNC: 4.3 MMOL/L — SIGNIFICANT CHANGE UP (ref 3.5–5)
POTASSIUM UR-SCNC: 28 MMOL/L — SIGNIFICANT CHANGE UP
PROT SERPL-MCNC: 7.2 G/DL — SIGNIFICANT CHANGE UP (ref 6–8)
RBC # BLD: 3.71 M/UL — LOW (ref 4.2–5.4)
RBC # FLD: 16 % — HIGH (ref 11.5–14.5)
SODIUM SERPL-SCNC: 138 MMOL/L — SIGNIFICANT CHANGE UP (ref 135–146)
SODIUM UR-SCNC: 31 MMOL/L — SIGNIFICANT CHANGE UP
UUN UR-MCNC: 403 MG/DL — SIGNIFICANT CHANGE UP
WBC # BLD: 7.71 K/UL — SIGNIFICANT CHANGE UP (ref 4.8–10.8)
WBC # FLD AUTO: 7.71 K/UL — SIGNIFICANT CHANGE UP (ref 4.8–10.8)

## 2018-12-13 RX ORDER — ASPIRIN/CALCIUM CARB/MAGNESIUM 324 MG
81 TABLET ORAL DAILY
Qty: 0 | Refills: 0 | Status: DISCONTINUED | OUTPATIENT
Start: 2018-12-13 | End: 2018-12-13

## 2018-12-13 RX ORDER — METOPROLOL TARTRATE 50 MG
25 TABLET ORAL DAILY
Qty: 0 | Refills: 0 | Status: DISCONTINUED | OUTPATIENT
Start: 2018-12-13 | End: 2018-12-19

## 2018-12-13 RX ORDER — CHLORHEXIDINE GLUCONATE 213 G/1000ML
1 SOLUTION TOPICAL
Qty: 0 | Refills: 0 | Status: DISCONTINUED | OUTPATIENT
Start: 2018-12-13 | End: 2018-12-19

## 2018-12-13 RX ORDER — FUROSEMIDE 40 MG
40 TABLET ORAL DAILY
Qty: 0 | Refills: 0 | Status: DISCONTINUED | OUTPATIENT
Start: 2018-12-13 | End: 2018-12-14

## 2018-12-13 RX ADMIN — HEPARIN SODIUM 5000 UNIT(S): 5000 INJECTION INTRAVENOUS; SUBCUTANEOUS at 14:21

## 2018-12-13 RX ADMIN — INSULIN GLARGINE 25 UNIT(S): 100 INJECTION, SOLUTION SUBCUTANEOUS at 21:51

## 2018-12-13 RX ADMIN — Medication 3 MILLILITER(S): at 13:02

## 2018-12-13 RX ADMIN — CEFEPIME 100 MILLIGRAM(S): 1 INJECTION, POWDER, FOR SOLUTION INTRAMUSCULAR; INTRAVENOUS at 05:07

## 2018-12-13 RX ADMIN — Medication 10 MILLIGRAM(S): at 05:08

## 2018-12-13 RX ADMIN — HEPARIN SODIUM 5000 UNIT(S): 5000 INJECTION INTRAVENOUS; SUBCUTANEOUS at 05:07

## 2018-12-13 RX ADMIN — Medication 2: at 12:26

## 2018-12-13 RX ADMIN — BUDESONIDE AND FORMOTEROL FUMARATE DIHYDRATE 2 PUFF(S): 160; 4.5 AEROSOL RESPIRATORY (INHALATION) at 21:46

## 2018-12-13 RX ADMIN — SACUBITRIL AND VALSARTAN 1 TABLET(S): 24; 26 TABLET, FILM COATED ORAL at 05:08

## 2018-12-13 RX ADMIN — ATORVASTATIN CALCIUM 40 MILLIGRAM(S): 80 TABLET, FILM COATED ORAL at 21:45

## 2018-12-13 RX ADMIN — GABAPENTIN 100 MILLIGRAM(S): 400 CAPSULE ORAL at 05:08

## 2018-12-13 RX ADMIN — Medication 50 MILLIGRAM(S): at 11:10

## 2018-12-13 RX ADMIN — GABAPENTIN 100 MILLIGRAM(S): 400 CAPSULE ORAL at 14:21

## 2018-12-13 RX ADMIN — Medication 3 MILLILITER(S): at 20:40

## 2018-12-13 RX ADMIN — Medication 7 UNIT(S): at 16:52

## 2018-12-13 RX ADMIN — Medication 110 MILLIGRAM(S): at 06:10

## 2018-12-13 RX ADMIN — Medication 40 MILLIGRAM(S): at 05:07

## 2018-12-13 RX ADMIN — METHADONE HYDROCHLORIDE 10 MILLIGRAM(S): 40 TABLET ORAL at 11:10

## 2018-12-13 RX ADMIN — GABAPENTIN 100 MILLIGRAM(S): 400 CAPSULE ORAL at 21:45

## 2018-12-13 RX ADMIN — Medication 25 MILLIGRAM(S): at 21:45

## 2018-12-13 RX ADMIN — BUDESONIDE AND FORMOTEROL FUMARATE DIHYDRATE 2 PUFF(S): 160; 4.5 AEROSOL RESPIRATORY (INHALATION) at 08:47

## 2018-12-13 RX ADMIN — Medication 10 MILLIGRAM(S): at 17:45

## 2018-12-13 RX ADMIN — Medication 7 UNIT(S): at 12:26

## 2018-12-13 RX ADMIN — CEFEPIME 100 MILLIGRAM(S): 1 INJECTION, POWDER, FOR SOLUTION INTRAMUSCULAR; INTRAVENOUS at 17:30

## 2018-12-13 RX ADMIN — Medication 110 MILLIGRAM(S): at 17:46

## 2018-12-13 RX ADMIN — HEPARIN SODIUM 5000 UNIT(S): 5000 INJECTION INTRAVENOUS; SUBCUTANEOUS at 21:44

## 2018-12-13 NOTE — PROGRESS NOTE ADULT - PROBLEM SELECTOR PLAN 1
- fluid restriction 1000 ml in 24 hours  - daily weight with intake and output monitoring   - check BNP  - change Lasix to PO  - on Entresto, statin, BB   - cardiology consult ( pt asked for Dr Gutierrez) - fluid restriction 1000 ml in 24 hours  - daily weight with intake and output monitoring   - check BNP  - change Lasix to PO  - hold Entresto ( Cr above 2), c/w  statin, BB   - cardiology consult ( pt asked for Dr Gutierrez)

## 2018-12-13 NOTE — CONSULT NOTE ADULT - SUBJECTIVE AND OBJECTIVE BOX
HISTORY OF PRESENTING ILLNESS:   59 yo F with PMHx of HFrEF [2018: EF 25-30%] s/p AICD [placed by Dr. Oliveira in ], COPD on Home O2 (4L NC), ex-smoker, PVD s/p fem-pop bypass (), DM2, MI (), CVA with residual R-sided weakness (, ), depression, and fibromyalgia presents with progressive SOB x 1 week. Symptoms began with rhinorrhea, nasal congestion, sore throat, generalized myalgia and arthralgia with cough productive of screen sputum, associated with chills. SOB was initially only on exertion, but progressed to be at rest. SOB is worsened with supine positioning where she would feel like she is drowning, recently had to sleep on 4 pillows, and she wakes up occasionally at night with the need to cough. Patient denies any chest pain, palpitations, no weight changes, no bilateral lower extremity swelling, no night sweats. She is compliant with her medications. She was recently admitted on 10/01- 10/05 for acute on chronic CHF exacerbation.     She was following up with Dr. Edna Morales but requested Dr. Gutierrez because she wanted to change her Cardiologist. She was scheduled to see Dr. Gutierrez next month but has never seen him at his office previously.     On admission, Trop 0.01 x 1, BNP 31889, EKG shows TWI in Leads I and aVL which is consistent with prior EKGs.     In the ED, she received Solumedrol x 1, nebulizers, azithromycin and cefepime for possible CAP.     PAST MEDICAL & SURGICAL HISTORY:  PAST MEDICAL & SURGICAL HISTORY:  Chronic pain  HLD (hyperlipidemia)  CVA (cerebral vascular accident)  Depression  COPD (chronic obstructive pulmonary disease)  Diabetes  CHF (congestive heart failure)  History of cholecystectomy  H/O tubal ligation  H/O abdominal hysterectomy  AICD (automatic cardioverter/defibrillator) present    SOCIAL HISTORY:  Ex-smoker; smoked 1 ppd every 2-3 days for 20 years, quit 6 months ago    ALLERGIES:  aspirin (Other (Moderate))  IV Contrast (Unknown)    MEDICATIONS:  STANDING MEDICATIONS  ALBUTerol    90 MICROgram(s) HFA Inhaler 1 Puff(s) Inhalation every 4 hours  ALBUTerol/ipratropium for Nebulization 3 milliLiter(s) Nebulizer every 6 hours  amitriptyline 25 milliGRAM(s) Oral at bedtime  atorvastatin 40 milliGRAM(s) Oral at bedtime  buDESOnide 160 MICROgram(s)/formoterol 4.5 MICROgram(s) Inhaler 2 Puff(s) Inhalation two times a day  busPIRone 10 milliGRAM(s) Oral two times a day  cefepime   IVPB 2000 milliGRAM(s) IV Intermittent every 12 hours  chlorhexidine 4% Liquid 1 Application(s) Topical <User Schedule>  dextrose 5%. 1000 milliLiter(s) IV Continuous <Continuous>  dextrose 50% Injectable 12.5 Gram(s) IV Push once  dextrose 50% Injectable 25 Gram(s) IV Push once  dextrose 50% Injectable 25 Gram(s) IV Push once  doxycycline IVPB 100 milliGRAM(s) IV Intermittent every 12 hours  furosemide    Tablet 40 milliGRAM(s) Oral daily  gabapentin 100 milliGRAM(s) Oral three times a day  heparin  Injectable 5000 Unit(s) SubCutaneous every 8 hours  insulin glargine Injectable (LANTUS) 25 Unit(s) SubCutaneous at bedtime  insulin lispro (HumaLOG) corrective regimen sliding scale   SubCutaneous three times a day before meals  insulin lispro Injectable (HumaLOG) 7 Unit(s) SubCutaneous before breakfast  insulin lispro Injectable (HumaLOG) 7 Unit(s) SubCutaneous before lunch  insulin lispro Injectable (HumaLOG) 7 Unit(s) SubCutaneous before dinner  methadone    Tablet 10 milliGRAM(s) Oral daily  metoprolol succinate ER 25 milliGRAM(s) Oral daily  traZODone 50 milliGRAM(s) Oral daily    PRN MEDICATIONS  acetaminophen   Tablet .. 650 milliGRAM(s) Oral every 6 hours PRN  dextrose 40% Gel 15 Gram(s) Oral once PRN  glucagon  Injectable 1 milliGRAM(s) IntraMuscular once PRN  guaiFENesin    Syrup 100 milliGRAM(s) Oral every 6 hours PRN    VITALS:   T(F): 96.5  HR: 85  BP: 114/57  RR: 15  SpO2: 100%    LABS:                        9.9    7.71  )-----------( 266      ( 13 Dec 2018 12:06 )             32.3     12-13    138  |  93<L>  |  45<H>  ----------------------------<  133<H>  4.3   |  27  |  3.1<H>    Ca    8.7      13 Dec 2018 12:06  Mg     1.9         TPro  7.2  /  Alb  3.3<L>  /  TBili  0.5  /  DBili  x   /  AST  16  /  ALT  10  /  AlkPhos  114        Urinalysis Basic - ( 12 Dec 2018 19:00 )    Color: Yellow / Appearance: Clear / S.020 / pH: x  Gluc: x / Ketone: Negative  / Bili: Negative / Urobili: 0.2 mg/dL   Blood: x / Protein: 100 mg/dL / Nitrite: Negative   Leuk Esterase: Negative / RBC: 3-5 /HPF / WBC x   Sq Epi: x / Non Sq Epi: Occasional /HPF / Bacteria: Few /HPF    RADIOLOGY:  < from: Xray Chest 1 View-PORTABLE IMMEDIATE (18 @ 07:38) >  Right lower lung field patchy parenchymal opacity and small right pleural   effusion.    < end of copied text >    Transthoracic echocardiogram [2018]:   1. Left ventricular ejection fraction, by visual estimation, is 25 to   30%.   2. Severely decreased global left ventricular systolic function.   3. Mild tricuspid regurgitation.   4. Trace pulmonic valve regurgitation.    PHYSICAL EXAM:  GEN: Obese  female lying in mild distress on bedside, anxious, nasal cannula  HEENT: NCAT  LUNGS: R-sided rhonchi throughout lung, reduced breath sounds R >L lung base  HEART: S1/S2 present. RRR.   ABD: Soft, non-tender, non-distended. Bowel sounds present  EXT: No pitting edema. Scar on L thigh  NEURO: AAOX3 HISTORY OF PRESENTING ILLNESS:   59 yo F with PMHx of HFrEF [2018: EF 25-30%] s/p AICD [placed by Dr. Sandoval in ], COPD on Home O2 (4L NC), ex-smoker, PVD s/p fem-pop bypass (), DM2, MI (), CVA with residual R-sided weakness (, ), depression, and fibromyalgia presents with progressive SOB x 1 week. Symptoms began with rhinorrhea, nasal congestion, sore throat, generalized myalgia and arthralgia with cough productive of green sputum, associated with chills. SOB was initially only on exertion, but progressed to be at rest. SOB is worsened with supine positioning where she would feel like she is drowning, recently had to sleep on 4 pillows, and she wakes up occasionally at night with the need to cough. Patient denies any chest pain, palpitations, no weight changes, no bilateral lower extremity swelling, no night sweats. She is compliant with her medications. She was recently admitted on 10/01- 10/05 for acute on chronic CHF exacerbation.     She was following up with Dr. Edna Morales but requested Dr. Gutierrez because she wanted to change her Cardiologist. She was scheduled to see Dr. Gutierrez next month but has never seen him at his office previously.     On admission, Trop 0.01 x 1, BNP 82649, EKG shows TWI in Leads I and aVL which is consistent with prior EKGs.     In the ED, she received Solumedrol x 1, nebulizers, azithromycin and cefepime for possible CAP.     PAST MEDICAL & SURGICAL HISTORY:  PAST MEDICAL & SURGICAL HISTORY:  Chronic pain  HLD (hyperlipidemia)  CVA (cerebral vascular accident)  Depression  COPD (chronic obstructive pulmonary disease)  Diabetes  CHF (congestive heart failure)  History of cholecystectomy  H/O tubal ligation  H/O abdominal hysterectomy  AICD (automatic cardioverter/defibrillator) present    SOCIAL HISTORY:  Ex-smoker; smoked 1 ppd every 2-3 days for 20 years, quit 6 months ago    ALLERGIES:  aspirin (Other (Moderate))  IV Contrast (Unknown)    MEDICATIONS:  STANDING MEDICATIONS  ALBUTerol    90 MICROgram(s) HFA Inhaler 1 Puff(s) Inhalation every 4 hours  ALBUTerol/ipratropium for Nebulization 3 milliLiter(s) Nebulizer every 6 hours  amitriptyline 25 milliGRAM(s) Oral at bedtime  atorvastatin 40 milliGRAM(s) Oral at bedtime  buDESOnide 160 MICROgram(s)/formoterol 4.5 MICROgram(s) Inhaler 2 Puff(s) Inhalation two times a day  busPIRone 10 milliGRAM(s) Oral two times a day  cefepime   IVPB 2000 milliGRAM(s) IV Intermittent every 12 hours  chlorhexidine 4% Liquid 1 Application(s) Topical <User Schedule>  dextrose 5%. 1000 milliLiter(s) IV Continuous <Continuous>  dextrose 50% Injectable 12.5 Gram(s) IV Push once  dextrose 50% Injectable 25 Gram(s) IV Push once  dextrose 50% Injectable 25 Gram(s) IV Push once  doxycycline IVPB 100 milliGRAM(s) IV Intermittent every 12 hours  furosemide    Tablet 40 milliGRAM(s) Oral daily  gabapentin 100 milliGRAM(s) Oral three times a day  heparin  Injectable 5000 Unit(s) SubCutaneous every 8 hours  insulin glargine Injectable (LANTUS) 25 Unit(s) SubCutaneous at bedtime  insulin lispro (HumaLOG) corrective regimen sliding scale   SubCutaneous three times a day before meals  insulin lispro Injectable (HumaLOG) 7 Unit(s) SubCutaneous before breakfast  insulin lispro Injectable (HumaLOG) 7 Unit(s) SubCutaneous before lunch  insulin lispro Injectable (HumaLOG) 7 Unit(s) SubCutaneous before dinner  methadone    Tablet 10 milliGRAM(s) Oral daily  metoprolol succinate ER 25 milliGRAM(s) Oral daily  traZODone 50 milliGRAM(s) Oral daily    PRN MEDICATIONS  acetaminophen   Tablet .. 650 milliGRAM(s) Oral every 6 hours PRN  dextrose 40% Gel 15 Gram(s) Oral once PRN  glucagon  Injectable 1 milliGRAM(s) IntraMuscular once PRN  guaiFENesin    Syrup 100 milliGRAM(s) Oral every 6 hours PRN    VITALS:   T(F): 96.5  HR: 85  BP: 114/57  RR: 15  SpO2: 100%    LABS:                        9.9    7.71  )-----------( 266      ( 13 Dec 2018 12:06 )             32.3     12-13    138  |  93<L>  |  45<H>  ----------------------------<  133<H>  4.3   |  27  |  3.1<H>    Ca    8.7      13 Dec 2018 12:06  Mg     1.9     12-    TPro  7.2  /  Alb  3.3<L>  /  TBili  0.5  /  DBili  x   /  AST  16  /  ALT  10  /  AlkPhos  114  12-      Urinalysis Basic - ( 12 Dec 2018 19:00 )    Color: Yellow / Appearance: Clear / S.020 / pH: x  Gluc: x / Ketone: Negative  / Bili: Negative / Urobili: 0.2 mg/dL   Blood: x / Protein: 100 mg/dL / Nitrite: Negative   Leuk Esterase: Negative / RBC: 3-5 /HPF / WBC x   Sq Epi: x / Non Sq Epi: Occasional /HPF / Bacteria: Few /HPF    RADIOLOGY:  < from: Xray Chest 1 View-PORTABLE IMMEDIATE (18 @ 07:38) >  Right lower lung field patchy parenchymal opacity and small right pleural   effusion.    < end of copied text >    Transthoracic echocardiogram [2018]:   1. Left ventricular ejection fraction, by visual estimation, is 25 to   30%.   2. Severely decreased global left ventricular systolic function.   3. Mild tricuspid regurgitation.   4. Trace pulmonic valve regurgitation.    PHYSICAL EXAM:  GEN: Obese  female lying in mild distress on bedside, anxious, nasal cannula  HEENT: NCAT  LUNGS: R-sided rhonchi throughout lung, reduced breath sounds R >L lung base  HEART: S1/S2 present. RRR.   ABD: Soft, non-tender, non-distended. Bowel sounds present  EXT: No pitting edema. Scar on L thigh  NEURO: AAOX3

## 2018-12-13 NOTE — PROGRESS NOTE ADULT - SUBJECTIVE AND OBJECTIVE BOX
58 year female patient with history of HFrEF ( EF 20-25%) , s/p AICD, COPD on home O2 (4L) , CVD, CVA with residual rt sided weakness , depression, fibromyalgia , PVD, presented for one week history of SOB that started at exertion and progressed to be at rest. Patient symptoms started with rhinorrhea , nasal congestion, sore throat, myalgia and arthralgia one week ago , associated with productive cough , with greenish sputum, subjective fever, and chills. Patient SOB worsened with flat position, patient complains of orthopnea , and mentioned that she recently need to sleep on 4 pillows ,and paroxysmal nocturnal dyspnea.  Pt was admitted for acute on chronic systolic CHF with questionable PNA.  Today she feels little better, c/o dry cough and SOB on ambulation.       Vital Signs Last 24 Hrs  T(C): 35.1 (13 Dec 2018 05:32), Max: 36.2 (12 Dec 2018 20:40)  T(F): 95.2 (13 Dec 2018 05:32), Max: 97.2 (12 Dec 2018 20:40)  HR: 55 (13 Dec 2018 05:32) (55 - 74)  BP: 92/49 (13 Dec 2018 05:32) (92/49 - 125/69)  RR: 18 (13 Dec 2018 05:32) (18 - 18)  SpO2: 95% (12 Dec 2018 20:00) (95% - 95%)    PHYSICAL EXAM:  GENERAL: NAD, well-groomed, well-developed  HEAD:  Atraumatic, Normocephalic  EYES: EOMI, PERRLA, conjunctiva and sclera clear  ENMT: No tonsillar erythema, exudates, or enlargement; Moist mucous membranes, Good dentition, No lesions  NECK: Supple, No JVD, Normal thyroid  NERVOUS SYSTEM:  Alert & Oriented X3, Good concentration; no gross focal neuro deficit  CHEST/LUNG: decreased BS b/l at bases   HEART: Regular rate and rhythm; S1,S2   ABDOMEN: Soft, Nontender, Nondistended; Bowel sounds present  EXTREMITIES:  static dermatitis with 2+ pitting edema   LYMPH: No lymphadenopathy noted  SKIN: hyperpigmentation on LE with long scar on LLE       LABS:                        9.2    6.60  )-----------( 240      ( 12 Dec 2018 07:29 )             29.8         134<L>  |  93<L>  |  38<H>  ----------------------------<  159<H>  4.5   |  28  |  2.1<H>    Ca    8.6      12 Dec 2018 07:29    Urinalysis Basic - ( 12 Dec 2018 19:00 )    Color: Yellow / Appearance: Clear / S.020 / pH: x  Gluc: x / Ketone: Negative  / Bili: Negative / Urobili: 0.2 mg/dL   Blood: x / Protein: 100 mg/dL / Nitrite: Negative   Leuk Esterase: Negative / RBC: 3-5 /HPF / WBC x   Sq Epi: x / Non Sq Epi: Occasional /HPF / Bacteria: Few /HPF    RADIOLOGY & ADDITIONAL TESTS:  < from: VA Duplex Lower Extrem Arterial, Bilat (18 @ 14:09) >    Impression:    No evidence of hemodynamically significant disease in the right lower   extremity. Patent left femoral to popliteal artery bypass.  < from: US Renal (18 @ 09:20) >    Impression:  The indeterminate 1 cm lesion seen on recent CT arising off the left   lower pole is not visualized sonographically, likely due to technical   factors limiting evaluation of the lower pole.  < from: CT Abdomen and Pelvis w/ IV Cont (18 @ 08:52) >  IMPRESSION:        New since prior examination is enlargement and haziness of the left   adrenal gland. This may be seen with adrenal hemorrhage or an   inflammatory process    Bilateral lower lung field patchy opacities predominantly within the   right lower lobe possibly reflecting infectious/inflammatory etiology or   related to pulmonary edema in the appropriate clinical setting. Trace   right pleural effusion.    New indeterminate left lower pole renal lesion measuring 1 cm. As this   patient has a pacemaker and likely cannot have an MRI, further evaluation   with a focused ultrasound of the lower pole of left kidney is recommended   to exclude neoplasm. If this lesion is not well-seen on this exam,   follow-up CT is recommended in 3-6 months to demonstrate stability.    Occluded left common femoral artery stent    Subcutaneous nodules, likely related to injection phenomena. Follow to   resolution is recommended    < from: Transthoracic Echocardiogram (18 @ 08:35) >  Summary:   1. Left ventricular ejection fraction, by visual estimation, is 25 to   30%.   2. Severely decreased global left ventricular systolic function.   3. Mild tricuspid regurgitation.   4. Trace pulmonic valve regurgitation.    MEDICATIONS  (STANDING):  ALBUTerol    90 MICROgram(s) HFA Inhaler 1 Puff(s) Inhalation every 4 hours  ALBUTerol/ipratropium for Nebulization 3 milliLiter(s) Nebulizer every 6 hours  amitriptyline 25 milliGRAM(s) Oral at bedtime  atorvastatin 40 milliGRAM(s) Oral at bedtime  buDESOnide 160 MICROgram(s)/formoterol 4.5 MICROgram(s) Inhaler 2 Puff(s) Inhalation two times a day  busPIRone 10 milliGRAM(s) Oral two times a day  cefepime   IVPB 2000 milliGRAM(s) IV Intermittent every 12 hours  chlorhexidine 4% Liquid 1 Application(s) Topical <User Schedule>  dextrose 5%. 1000 milliLiter(s) (50 mL/Hr) IV Continuous <Continuous>  dextrose 50% Injectable 12.5 Gram(s) IV Push once  dextrose 50% Injectable 25 Gram(s) IV Push once  dextrose 50% Injectable 25 Gram(s) IV Push once  doxycycline IVPB 100 milliGRAM(s) IV Intermittent every 12 hours  furosemide   Injectable 40 milliGRAM(s) IV Push daily  gabapentin 100 milliGRAM(s) Oral three times a day  heparin  Injectable 5000 Unit(s) SubCutaneous every 8 hours  insulin glargine Injectable (LANTUS) 25 Unit(s) SubCutaneous at bedtime  insulin lispro (HumaLOG) corrective regimen sliding scale   SubCutaneous three times a day before meals  insulin lispro Injectable (HumaLOG) 7 Unit(s) SubCutaneous before breakfast  insulin lispro Injectable (HumaLOG) 7 Unit(s) SubCutaneous before lunch  insulin lispro Injectable (HumaLOG) 7 Unit(s) SubCutaneous before dinner  methadone    Tablet 10 milliGRAM(s) Oral daily  metoprolol succinate ER 25 milliGRAM(s) Oral daily  sacubitril 24 mG/valsartan 26 mG 1 Tablet(s) Oral two times a day  traZODone 50 milliGRAM(s) Oral daily    MEDICATIONS  (PRN):  acetaminophen   Tablet .. 650 milliGRAM(s) Oral every 6 hours PRN Moderate Pain (4 - 6)  dextrose 40% Gel 15 Gram(s) Oral once PRN Blood Glucose LESS THAN 70 milliGRAM(s)/deciliter  glucagon  Injectable 1 milliGRAM(s) IntraMuscular once PRN Glucose LESS THAN 70 milligrams/deciliter  guaiFENesin    Syrup 100 milliGRAM(s) Oral every 6 hours PRN Cough

## 2018-12-13 NOTE — CONSULT NOTE ADULT - SUBJECTIVE AND OBJECTIVE BOX
NEPHROLOGY CONSULTATION NOTE    58 year old female, patient with history of HFrEF ( EF 20-25%) , s/p AICD, COPD on home O2 (4L) , CVD, CVA with residual rt sided weakness , depression, fibromyalgia , PVD, presented for one week history of SOB that started at exertion and progressed to be at rest. Patient symptoms started with rhinorrhea , nasal congestion, sore throat, myalgia and arthralgia one week ago , associated with productive cough , with greenish sputum, subjective fever, and chills. Patient SOB worsened with flat position, patient complains of orthopnea , and mentioned that she recently need to sleep on 4 pillows ,and paroxysmal nocturnal dyspnea. There is a history of sick  contact . No recent travel. Patient denies any chest pain, there is no history of palpitations, no weight loss, no night sweats.  Patient mentioned mild lower limb swelling in the previous days, she is complaint to her medications.In the ED  patient received one dosage of solumedrol , nebulizers , azithromycin and cefepime patient SPO2 is 97% on bipap, mentioned that she improved since admission.  Patient was admitted with the diagnosis of decompensated heart failure with reduced ejection fraction with possible Rt lower lobe pneumonia.   Patient is a know CKD patient with creatinine at 1.3 to 1.6 which was at baseline at admission. However patient creatinine increase to 2.1 since yesterday. nephrology consult was requested for RITCHIE on CKD III. Today patient symptoms improved and she had a good UO. she denies dysuria frequency and urgency         PAST MEDICAL & SURGICAL HISTORY:  Chronic pain  HLD (hyperlipidemia)  CVA (cerebral vascular accident)  Depression  COPD (chronic obstructive pulmonary disease)  Diabetes  CHF (congestive heart failure)  History of cholecystectomy  H/O tubal ligation  H/O abdominal hysterectomy  AICD (automatic cardioverter/defibrillator) present    Allergies:  aspirin (Other (Moderate))  IV Contrast (Unknown)    Home Medications Reviewed  Hospital Medications:   MEDICATIONS  (STANDING):  ALBUTerol    90 MICROgram(s) HFA Inhaler 1 Puff(s) Inhalation every 4 hours  ALBUTerol/ipratropium for Nebulization 3 milliLiter(s) Nebulizer every 6 hours  amitriptyline 25 milliGRAM(s) Oral at bedtime  aspirin  chewable 81 milliGRAM(s) Oral daily  atorvastatin 40 milliGRAM(s) Oral at bedtime  buDESOnide 160 MICROgram(s)/formoterol 4.5 MICROgram(s) Inhaler 2 Puff(s) Inhalation two times a day  busPIRone 10 milliGRAM(s) Oral two times a day  cefepime   IVPB 2000 milliGRAM(s) IV Intermittent every 12 hours  chlorhexidine 4% Liquid 1 Application(s) Topical <User Schedule>  dextrose 5%. 1000 milliLiter(s) (50 mL/Hr) IV Continuous <Continuous>  dextrose 50% Injectable 12.5 Gram(s) IV Push once  dextrose 50% Injectable 25 Gram(s) IV Push once  dextrose 50% Injectable 25 Gram(s) IV Push once  doxycycline IVPB 100 milliGRAM(s) IV Intermittent every 12 hours  furosemide    Tablet 40 milliGRAM(s) Oral daily  gabapentin 100 milliGRAM(s) Oral three times a day  heparin  Injectable 5000 Unit(s) SubCutaneous every 8 hours  insulin glargine Injectable (LANTUS) 25 Unit(s) SubCutaneous at bedtime  insulin lispro (HumaLOG) corrective regimen sliding scale   SubCutaneous three times a day before meals  insulin lispro Injectable (HumaLOG) 7 Unit(s) SubCutaneous before breakfast  insulin lispro Injectable (HumaLOG) 7 Unit(s) SubCutaneous before lunch  insulin lispro Injectable (HumaLOG) 7 Unit(s) SubCutaneous before dinner  methadone    Tablet 10 milliGRAM(s) Oral daily  metoprolol succinate ER 25 milliGRAM(s) Oral daily  traZODone 50 milliGRAM(s) Oral daily      SOCIAL HISTORY:  Denies ETOH,Smoking,   FAMILY HISTORY:  Family history of diabetes mellitus (Mother)  Family history of hypertension (Mother)        REVIEW OF SYSTEMS:  negative except as mentioned above    VITALS:  T(F): 96.5 (18 @ 12:26), Max: 97.2 (18 @ 20:40)  HR: 85 (18 @ 12:26)  BP: 114/57 (18 @ 12:26)  RR: 15 (18 @ 12:26)  SpO2: 100% (18 @ 07:35)     @ 07:01  -   @ 07:00  --------------------------------------------------------  IN: 40 mL / OUT: 0 mL / NET: 40 mL     @ 07: @ 13:26  --------------------------------------------------------  IN: 400 mL / OUT: 0 mL / NET: 400 mL            I&O's Detail    12 Dec 2018 07:  -  13 Dec 2018 07:00  --------------------------------------------------------  IN:    Oral Fluid: 40 mL  Total IN: 40 mL    OUT:  Total OUT: 0 mL    Total NET: 40 mL      13 Dec 2018 07:  -  13 Dec 2018 13:26  --------------------------------------------------------  IN:    Oral Fluid: 400 mL  Total IN: 400 mL    OUT:  Total OUT: 0 mL    Total NET: 400 mL            PHYSICAL EXAM:  Constitutional: NAD  HEENT: anicteric sclera, oropharynx clear, MMM  Neck: No JVD  Respiratory: b/l basal inspiratory crackles   Cardiovascular: S1, S2, RRR  Gastrointestinal: BS+, soft, NT/ND  Extremities: No cyanosis or clubbing. +1-2 LE edema   Neurological: A/O x 3  Psychiatric: Normal mood, normal affect  : No CVA tenderness. No medina.   Skin: No rashes      LABS:      134<L>  |  93<L>  |  38<H>  ----------------------------<  159<H>  4.5   |  28  |  2.1<H>    Ca    8.6      12 Dec 2018 07:29      Creatinine Trend: 2.1 <--, 1.6 <--                        9.9    7.71  )-----------( 266      ( 13 Dec 2018 12:06 )             32.3     Urine Studies:  Urinalysis Basic - ( 12 Dec 2018 19:00 )    Color: Yellow / Appearance: Clear / S.020 / pH:   Gluc:  / Ketone: Negative  / Bili: Negative / Urobili: 0.2 mg/dL   Blood:  / Protein: 100 mg/dL / Nitrite: Negative   Leuk Esterase: Negative / RBC: 3-5 /HPF / WBC    Sq Epi:  / Non Sq Epi: Occasional /HPF / Bacteria: Few /HPF                RADIOLOGY & ADDITIONAL STUDIES:      < from: CT Abdomen and Pelvis w/ IV Cont (18 @ 08:52) >  MPRESSION:        New since prior examination is enlargement and haziness of the left   adrenal gland. This may be seen with adrenal hemorrhage or an   inflammatory process    Bilateral lower lung field patchy opacities predominantly within the   right lower lobe possibly reflecting infectious/inflammatory etiology or   related to pulmonary edema in the appropriate clinical setting. Trace   right pleural effusion.    New indeterminate left lower pole renal lesion measuring 1 cm. As this   patient has a pacemaker and likely cannot have an MRI, further evaluation   with a focused ultrasound of the lower pole of left kidney is recommended   to exclude neoplasm. If this lesion is not well-seen on this exam,   follow-up CT is recommended in 3-6 months to demonstrate stability.    Occluded left common femoral artery stent    Subcutaneous nodules, likely related to injection phenomena. Follow to   resolution is recommended            < from: US Renal (18 @ 09:20) >    Impression:  The indeterminate 1 cm lesion seen on recent CT arising off the left   lower pole is not visualized sonographically, likely due to technical   factors limiting evaluation of the lower pole.

## 2018-12-13 NOTE — CONSULT NOTE ADULT - ASSESSMENT
58 year old female, patient with history of HFrEF ( EF 20-25%) , s/p AICD, COPD on home O2 (4L) , CVD, CVA with residual rt sided weakness , depression, fibromyalgia , PVD, presented for one week history of SOB that started at exertion and progressed to be at rest. Patient was found to have elevated creatinine to 2.1 from her baseline CKD III.       #RITCHIE on CKD III  differential includes Cardiorenal syndrome vs over-diuresis   patient with crackles and LE pitting edema on exam  congested CXR  Hx of HFrEF  UA is negative     recommendation  repeat BMP  Urine electrolytes   urine urea to calculate the Fe of urea  c/w lasix PO for now   no indication for HD     #left renal lesion that is ill defined   f/u as out-patient with nephrology for further imaging

## 2018-12-13 NOTE — PROGRESS NOTE ADULT - ASSESSMENT
58 year female patient with history of HFrEF ( EF 20-25%) , s/p AICD, COPD on home O2 (4L) , CVD, CVA with residual rt sided weakness , depression, fibromyalgia , PVD, presented for one week history of SOB that started at exertion and progressed to be at rest. Patient symptoms started with rhinorrhea , nasal congestion, sore throat, myalgia and arthralgia one week ago , associated with productive cough , with greenish sputum, subjective fever, and chills. Patient SOB worsened with flat position, patient complains of orthopnea , and mentioned that she recently need to sleep on 4 pillows ,and paroxysmal nocturnal dyspnea.  Pt was admitted for acute on chronic systolic CHF with possible PNA, pt is clinically improving for last 24 hours.

## 2018-12-13 NOTE — CONSULT NOTE ADULT - ASSESSMENT
59 yo F with PMHx of HFrEF [August 2018: EF 25-30%] s/p AICD [placed by Dr. Oliveira in 2010], COPD on Home O2 (4L NC), ex-smoker, PVD s/p fem-pop bypass (2015), DM2, MI (2013), CVA with residual R-sided weakness (2013, 2015), depression, and fibromyalgia presents with progressive SOB x 1 week.    #) Community-acquired pneumonia   #) Chronic HFrEF s/p AICD (2010)  #) History of MI (2013)  #) RITCHIE on CKD3B  #) Uncontrolled DM2 (A1c 8.9%)    - BNP essentially unchanged from prior admission (38983 --> 68850)  - Chest radiography and history more consistent with infectious etiology; no sign of fluid overload   - Continue with Lasix 40 mg QD, Toprol XL 25 mg QD, Atorvastatin 40 mg QHS  - Continue with care as per primary team 57 yo F with PMHx of HFrEF [August 2018: EF 25-30%] s/p AICD [placed by Dr. Sandoval in 2010], COPD on Home O2 (4L NC), ex-smoker, PVD s/p fem-pop bypass (2015), DM2, MI (2013), CVA with residual R-sided weakness (2013, 2015), depression, and fibromyalgia presents with progressive SOB x 1 week.     #) Community-acquired pneumonia   #) Chronic HFrEF s/p AICD (2010)  #) History of MI (2013)  #) RITCHIE on CKD3B  #) Uncontrolled DM2 (A1c 8.9%)    - BNP essentially unchanged from prior admission (58535 --> 73599)  - Chest radiography and history more consistent with infectious etiology; no sign of fluid overload   - Continue with Lasix 40 mg QD, Toprol XL 25 mg QD, Atorvastatin 40 mg QHS  - Continue with care as per primary team 57 yo F with PMHx of HFrEF [August 2018: EF 25-30%] s/p AICD [placed by Dr. Sandoval in 2010], COPD on Home O2 (4L NC), ex-smoker, PVD s/p fem-pop bypass (2015), DM2, MI (2013), CVA with residual R-sided weakness (2013, 2015), depression, and fibromyalgia presents with progressive SOB x 1 week.     #) Community-acquired pneumonia   #) HFrEF s/p AICD (2010)  #) History of MI (2013), ??? No CAD in 2011 but she was not cathed at the time of her ? MI   #) RITCHIE on CKD3B,   #) Uncontrolled DM2 (A1c 8.9%)    - BNP essentially unchanged from prior admission (00773 --> 94561)   - Continue with Lasix 40 mg QD, Toprol XL 25 mg QD, Atorvastatin 40 mg QHS  - Continue with care as per primary team  -restart Coumadin if no contraindication

## 2018-12-13 NOTE — PROGRESS NOTE ADULT - PROBLEM SELECTOR PLAN 7
- with RITCHIE  - will decrease Lasix to 40 mg po Q 24 hours  - pt was extensively consulted regarding sufficient po hydration  - monitor BUN/Cr ( pt received contrast on admission)   - hold Entresto   - nephrology consult

## 2018-12-13 NOTE — PROGRESS NOTE ADULT - PROBLEM SELECTOR PLAN 2
- stable now  - Nebs PRN  - supplement oxygen  - monitor pulse Ox ( pts private pulmonary is Dr Greene)

## 2018-12-13 NOTE — PROGRESS NOTE ADULT - ASSESSMENT
58 year female patient with history of HFrEF ( EF 20-25%) , s/p AICD, COPD on home O2 (4L) , CVD, CVA with residual rt sided weakness , depression, fibromyalgia , PVD, presented for one week history of SOB that started at exertion and progressed to be at rest. Patient symptoms started with rhinorrhea , nasal congestion, sore throat, myalgia and arthralgia one week ago , associated with productive cough , with greenish sputum, subjective fever, and chills. Patient SOB worsened with flat position, patient complains of orthopnea , and mentioned that she recently need to sleep on 4 pillows ,and paroxysmal nocturnal dyspnea.  Pt was admitted for acute on chronic systolic CHF with possible PNA, pt is clinically improving for last 24 hours.     # Acute on chronic systolic CHF (EF 20-25%).  Plan: - fluid restriction 1L in 24 hours  - daily weight with intake and output monitoring   - BNP 14,302  - change Lasix 40 to PO  - hold Entresto ( Cr above 2), c/w  statin, BB   - cardiology consult ( pt asked for Dr Gutierrez).  # COPD stable  - duonebs q6hs  -symbicort bid  - supplement oxygen    #Pneumonia  IV cefepime  g 12h  doxycyclin  q12h  - clinically improving.      CVA (cerebral vascular accident).     on statin   - not on ASA due to allergy      # PVD (peripheral vascular disease). Plan: - on statin   - vascular surgery consult.    # RITCHIE on CKD (chronic kidney disease) stage 3, GFR 30-59 ml/min.    - will decrease Lasix to 40 mg po Q 24 hours  - pt was extensively consulted regarding sufficient po hydration  - monitor BUN/Cr ( pt received contrast on admission)   - hold Entresto   - nephrology consult.     #RUQ pain: mostly musculoskeletal    #New since prior examination is enlargement and haziness of the left adrenal gland on CT. This may be seen with adrenal hemorrhage or an inflammatory process  patient is completely asymptomatic , hemodynamically stable  - f/u 3- 6 months      #CT occluded left common femoral stent on :  - arterial duplex- left femoral to popliteal artery bypass  - f/u vascular    #depression : continue with buspirone, trazodone    #fibromyalgia: continue with amitriptyline , gabapentin,  methadone    #diabetes: lispro 7 tid, glargine 25    #dyslipidemia : on statin    #full code     #from home 58 year female patient with history of HFrEF ( EF 20-25%) , s/p AICD, COPD on home O2 (4L) , CVD, CVA with residual rt sided weakness , depression, fibromyalgia , PVD, presented for one week history of SOB that started at exertion and progressed to be at rest. Patient symptoms started with rhinorrhea , nasal congestion, sore throat, myalgia and arthralgia one week ago , associated with productive cough , with greenish sputum, subjective fever, and chills. Patient SOB worsened with flat position, patient complains of orthopnea , and mentioned that she recently need to sleep on 4 pillows ,and paroxysmal nocturnal dyspnea.  Pt was admitted for acute on chronic systolic CHF with possible PNA, pt is clinically improving for last 24 hours.     # Acute on chronic systolic CHF (EF 20-25%).  Plan: - fluid restriction 1L in 24 hours  - daily weight with intake and output monitoring   - BNP 14,302  - change Lasix 40 to PO  - hold Entresto ( Cr above 2), c/w  statin, BB   - cardiology consult ( pt asked for Dr Gutierrez).  # COPD stable  - duonebs q6hs  -symbicort bid  - supplement oxygen    #Pneumonia  IV cefepime  g 12h  doxycyclin  q12h  - clinically improving.      CVA (cerebral vascular accident).     on statin   - not on ASA due to allergy      # PVD (peripheral vascular disease). Plan: - on statin   - vascular surgery consult.    # RITCHIE on CKD (chronic kidney disease) stage 3, GFR 30-59 ml/min.    - will decrease Lasix to 40 mg po Q 24 hours  - pt was extensively consulted regarding sufficient po hydration  - monitor BUN/Cr ( pt received contrast on admission)   - hold Entresto   - nephrology- urine urea, lytes  US kidney- indeterminate 1 cm lesion seen on recent CT arising off the left lower pole is not visualized sonographically    #RUQ pain: mostly musculoskeletal    #New since prior examination is enlargement and haziness of the left adrenal gland on CT. This may be seen with adrenal hemorrhage or an inflammatory process  patient is completely asymptomatic , hemodynamically stable  - f/u 3- 6 months      #CT occluded left common femoral stent on :  - arterial duplex- left femoral to popliteal artery bypass  - f/u vascular    #depression : continue with buspirone, trazodone    #fibromyalgia: continue with amitriptyline , gabapentin,  methadone    #diabetes: lispro 7 tid, glargine 25    #dyslipidemia : on statin    #full code     #from home

## 2018-12-13 NOTE — CONSULT NOTE ADULT - ATTENDING COMMENTS
Pt seen and examined  above note reviwed agree w/ exam and findings   RITCHIE on CKD3. Likely ANNA (received contrast for CT on 12/11 Cr jossue day later)  vol up on exam and imaging   agree w/ cont lasix  CT report noted "New indeterminate left lower pole renal lesion measuring 1 cm. As this   patient has a pacemaker and likely cannot have an MRI, further evaluation   with a focused ultrasound of the lower pole of left kidney is recommended   to exclude neoplasm. If this lesion is not well-seen on this exam,   follow-up CT is recommended in 3-6 months to demonstrate stability."    Afore-mentioned cyst not noted on US. will need f/u
as above    duplex reviewed. Patient with patent bypass. no acute intervention. she can follow as outpatient.
The patient was seen and examined with resident and discussed with 3A staff. . The patient has known history of nonischemic CM ssevere Lv dysfunction DM , PAD DVT S/P IVC filter .severe COPD on home 02 who is here for suspected PNA and CHF . The patient was on Entresto at home . This has been stopped as the patient has developed acute n chronic renal failure . . She has an apparent PNA as well some component of HF . Treatment is difficult in view of creatinine increased to 3.0 . Would consider IV steroids or pulmonary evaluation . Continue PO Lasix for now , may eventually need IV Lasix . , Repeat echocardiogram . The patient was on Coumadin on the outside  Suspect some degree of hypercoagulable state. .Suggest renal evaluation .
I have personally examined the patient and reviewed the documentation above.  Corrections and edits were made wherever needed.

## 2018-12-13 NOTE — PROGRESS NOTE ADULT - SUBJECTIVE AND OBJECTIVE BOX
SUBJECTIVE:    Patient is a 58y old Female who presents with a chief complaint of sob (13 Dec 2018 10:36)    Currently admitted to medicine with the primary diagnosis of Pneumonia     Today is hospital day 2d. Overnight no event. Denied CP, SOB while on NC, abd pain, urinary/BM issues.    PAST MEDICAL & SURGICAL HISTORY  Chronic pain  HLD (hyperlipidemia)  CVA (cerebral vascular accident)  Depression  COPD (chronic obstructive pulmonary disease)  Diabetes  CHF (congestive heart failure)  History of cholecystectomy  H/O tubal ligation  H/O abdominal hysterectomy  AICD (automatic cardioverter/defibrillator) present        ALLERGIES:  aspirin (Other (Moderate))  IV Contrast (Unknown)    MEDICATIONS:  STANDING MEDICATIONS  ALBUTerol    90 MICROgram(s) HFA Inhaler 1 Puff(s) Inhalation every 4 hours  ALBUTerol/ipratropium for Nebulization 3 milliLiter(s) Nebulizer every 6 hours  amitriptyline 25 milliGRAM(s) Oral at bedtime  aspirin  chewable 81 milliGRAM(s) Oral daily  atorvastatin 40 milliGRAM(s) Oral at bedtime  buDESOnide 160 MICROgram(s)/formoterol 4.5 MICROgram(s) Inhaler 2 Puff(s) Inhalation two times a day  busPIRone 10 milliGRAM(s) Oral two times a day  cefepime   IVPB 2000 milliGRAM(s) IV Intermittent every 12 hours  chlorhexidine 4% Liquid 1 Application(s) Topical <User Schedule>  dextrose 5%. 1000 milliLiter(s) IV Continuous <Continuous>  dextrose 50% Injectable 12.5 Gram(s) IV Push once  dextrose 50% Injectable 25 Gram(s) IV Push once  dextrose 50% Injectable 25 Gram(s) IV Push once  doxycycline IVPB 100 milliGRAM(s) IV Intermittent every 12 hours  furosemide    Tablet 40 milliGRAM(s) Oral daily  gabapentin 100 milliGRAM(s) Oral three times a day  heparin  Injectable 5000 Unit(s) SubCutaneous every 8 hours  insulin glargine Injectable (LANTUS) 25 Unit(s) SubCutaneous at bedtime  insulin lispro (HumaLOG) corrective regimen sliding scale   SubCutaneous three times a day before meals  insulin lispro Injectable (HumaLOG) 7 Unit(s) SubCutaneous before breakfast  insulin lispro Injectable (HumaLOG) 7 Unit(s) SubCutaneous before lunch  insulin lispro Injectable (HumaLOG) 7 Unit(s) SubCutaneous before dinner  methadone    Tablet 10 milliGRAM(s) Oral daily  metoprolol succinate ER 25 milliGRAM(s) Oral daily  traZODone 50 milliGRAM(s) Oral daily    PRN MEDICATIONS  acetaminophen   Tablet .. 650 milliGRAM(s) Oral every 6 hours PRN  dextrose 40% Gel 15 Gram(s) Oral once PRN  glucagon  Injectable 1 milliGRAM(s) IntraMuscular once PRN  guaiFENesin    Syrup 100 milliGRAM(s) Oral every 6 hours PRN    VITALS:   T(F): 96.5  HR: 85  BP: 114/57  RR: 15  SpO2: 100%    LABS:                        9.2    6.60  )-----------( 240      ( 12 Dec 2018 07:29 )             29.8     12-    134<L>  |  93<L>  |  38<H>  ----------------------------<  159<H>  4.5   |  28  |  2.1<H>    Ca    8.6      12 Dec 2018 07:29        Urinalysis Basic - ( 12 Dec 2018 19:00 )    Color: Yellow / Appearance: Clear / S.020 / pH: x  Gluc: x / Ketone: Negative  / Bili: Negative / Urobili: 0.2 mg/dL   Blood: x / Protein: 100 mg/dL / Nitrite: Negative   Leuk Esterase: Negative / RBC: 3-5 /HPF / WBC x   Sq Epi: x / Non Sq Epi: Occasional /HPF / Bacteria: Few /HPF                RADIOLOGY:    < from: [facility] Duplex Lower Extrem Arterial, Bilat (18 @ 14:09) >  No evidence of hemodynamically significant disease in the right lower   extremity. Patent left femoral to popliteal artery bypass.    < end of copied text >    < from: CT Abdomen and Pelvis w/ IV Cont (18 @ 08:52) >  LOWER CHEST: Bilateral patchy opacities predominantly within the right   lower lobe. Additional interlobular septal thickening. Trace right   pleural effusion. Stable left lower lobe 8 mm calcified granuloma.   Partially imaged pacemaker lead.    HEPATOBILIARY: Post cholecystectomy. Unchanged punctate right hepatic   hypodensities too small to further characterize.    SPLEEN: Stable punctate calcifications within the spleen, likely from   prior granulomatous disease.    PANCREAS: Unremarkable.    ADRENAL GLANDS: New since prior examination is enlargement and haziness   of the left adrenal gland.    KIDNEYS: Symmetric enhancement bilaterally. No hydronephrosis. Previously   seen indeterminate left upperpole renal hypodensities no longer   visualized. There is a new indeterminate 1.0 cm left lower pole   hypodensity.    ABDOMINOPELVIC NODES: No lymphadenopathy.    PELVIC ORGANS: Stable    PERITONEUM/MESENTERY/BOWEL: No evidence of bowel obstruction,ascites or   pneumoperitoneum.     BONES/SOFT TISSUES: Degenerative changes of the spine. Small   fat-containing umbilical hernia. Multiple anterior abdominal soft tissue   nodules, likely related to injection phenomena. There is postoperative   subcutaneous changes..    OTHER: An IVC filter is noted just inferior to the level of the renal   veins. Status post surgical change in the left groin. There is a left   common femoral arterial stent, left external iliac artery stent and right   external iliac artery stent. The partially imaged left femoral arterial   stent is only partially imaged and is occluded. Stable haziness   surrounding the celiac and superior mesenteric arteries.    IMPRESSION:        New since prior examination is enlargement and haziness of the left   adrenal gland. This may be seen with adrenal hemorrhage or an   inflammatory process    Bilateral lower lung field patchy opacities predominantly within the   right lower lobe possibly reflecting infectious/inflammatory etiology or   related to pulmonary edema in the appropriate clinical setting. Trace   right pleural effusion.    New indeterminate left lower pole renal lesion measuring 1 cm. As this   patient has a pacemaker and likely cannot have an MRI, further evaluation   with a focused ultrasound of the lower pole of left kidney is recommended   to exclude neoplasm. If this lesion is not well-seen on this exam,   follow-up CT is recommended in 3-6 months to demonstrate stability.    Occluded left common femoral artery stent    Subcutaneous nodules, likely related to injection phenomena. Follow to   resolution is recommended    < end of copied text >    PHYSICAL EXAM:  GEN: No acute distress  LUNGS: bibasilar crackles and occasional wheezes. reduced air entry b/l  HEART: S1/S2 present. RRR.   ABD: Soft, non-tender, non-distended. Bowel sounds present  EXT: NC/NC/NE/2+PP/VO. +1 peripheral ulse  NEURO: AAOX3

## 2018-12-13 NOTE — CONSULT NOTE ADULT - ASSESSMENT
58 year female patient with history of HFrEF ( EF 20-25%)  s/p AICD, COPD on home O2 (4L) , CVD, CVA with residual rt sided weakness , depression, fibromyalgia , PVD, presented for one week history of SOB that started at exertion and progressed to be at rest. Patient symptoms started with rhinorrhea , nasal congestion, sore throat, myalgia and arthralgia one week ago , associated with productive cough , with greenish sputum, subjective fever, and chills. Patient SOB worsened with flat position, patient complains of orthopnea , and mentioned that she recently need to sleep on 4 pillows ,and paroxysmal nocturnal dyspnea. There is a history of sick  contact . No recent travel. Patient denies any chest pain, there is no history of palpitations, no weight loss, no night sweats.  Patient mentioned mild lower limb swelling in the previous days, she is complaint to her medications.  Patient complains of RUQ pain starting 2 days ago, localized, intermittent, stabbing  in nature, moderate in intensity, related to coughing and taking deep breath, resolves spontaneously. Patient denied any RUQ pain without deep breath or coughing  In the ED  patient received one dosage of solumedrol , nebulizers , azithromycin and cefepime. VS: /86, HR89, RR 18, Afebrile.   Pt was admitted with principal diagnosis of decompensated heart failure with reduced ejection fraction and started on IV lasix. He was also started on Cefepime and doxycycline for RLL opacity suspicious for pneumonia.    - Afebrile, no leukocytosis  - CXR: RLL opacity, slightly worse than previous CXR in October  - Flu -ve, Blood Cx -ve. Please send Sputum culture (currently pending collection) 58 year female patient with history of HFrEF ( EF 20-25%)  s/p AICD, COPD on home O2 (4L) presenting with productive cough and SOB of 3 weeks duration and RLL opacity on CXR    - Afebrile, no leukocytosis  - CXR: RLL opacity, slightly worse than previous CXR in October  - CT chest: Bilateral lower lung field patchy opacities predominantly within the right lower lobe possibly reflecting infectious/inflammatory etiology  - Flu -ve, Blood Cx -ve. Please send Sputum culture (currently pending collection) 58 year female patient with history of HFrEF ( EF 20-25%)  s/p AICD, COPD on home O2 (4L) presenting with productive cough and SOB of 3 weeks duration and RLL opacity on CXR    IMPRESSION:  Chronic multlobar GNR PNA RML/RLL/LLL with minimal right sided pleural effusion with no sepsis.  Does have a component of CHF  Is responding to current ABx  WBC6.6  BCx NTD    RECOMMENDATIONS:  Cefepime 2 gm iv q12h  Doxycycline 100 mg iv q12h  Will change to po in am

## 2018-12-13 NOTE — CONSULT NOTE ADULT - SUBJECTIVE AND OBJECTIVE BOX
CONSUELO PENADYS  58y, Female  Allergy: aspirin (Other (Moderate))  IV Contrast (Unknown)      HPI:  58 year female patient with history of HFrEF ( EF 20-25%)  s/p AICD, COPD on home O2 (4L) , CVD, CVA with residual rt sided weakness , depression, fibromyalgia , PVD, presented for one week history of SOB that started at exertion and progressed to be at rest. Patient symptoms started with rhinorrhea , nasal congestion, sore throat, myalgia and arthralgia one week ago , associated with productive cough , with greenish sputum, subjective fever, and chills. Patient SOB worsened with flat position, patient complains of orthopnea , and mentioned that she recently need to sleep on 4 pillows ,and paroxysmal nocturnal dyspnea. There is a history of sick  contact . No recent travel. Patient denies any chest pain, there is no history of palpitations, no weight loss, no night sweats.  Patient mentioned mild lower limb swelling in the previous days, she is complaint to her medications.  Patient complains of RUQ pain starting 2 days ago, localized, intermittent, stabbing  in nature, moderate in intensity, related to coughing and taking deep breath, resolves spontaneously. Patient denied any RUQ pain without deep breath or coughing  In the ED  patient received one dosage of solumedrol , nebulizers , azithromycin and cefepime. VS: /86, HR89, RR 18, Afebrile.   Pt was admitted with principal diagnosis of decompensated heart failure with reduced ejection fraction and started on IV lasix. He was also started on Cefepime and doxycycline for RLL opacity suspicious for pneumonia.    patient SPO2 is 97% on bipap, mentioned that she improved since admission. (11 Dec 2018 14:59)    FAMILY HISTORY:  Family history of diabetes mellitus (Mother)  Family history of hypertension (Mother)    PAST MEDICAL & SURGICAL HISTORY:  Chronic pain  HLD (hyperlipidemia)  CVA (cerebral vascular accident)  Depression  COPD (chronic obstructive pulmonary disease)  Diabetes  CHF (congestive heart failure)  History of cholecystectomy  H/O tubal ligation  H/O abdominal hysterectomy  AICD (automatic cardioverter/defibrillator) present        VITALS:  T(F): 95.2, Max: 97.2 (12-12-18 @ 20:40)  HR: 55  BP: 92/49  RR: 18Vital Signs Last 24 Hrs  T(C): 35.1 (13 Dec 2018 05:32), Max: 36.2 (12 Dec 2018 20:40)  T(F): 95.2 (13 Dec 2018 05:32), Max: 97.2 (12 Dec 2018 20:40)  HR: 55 (13 Dec 2018 05:32) (55 - 74)  BP: 92/49 (13 Dec 2018 05:32) (92/49 - 125/69)  BP(mean): --  RR: 18 (13 Dec 2018 05:32) (18 - 18)  SpO2: 100% (13 Dec 2018 07:35) (95% - 100%)    PHYSICAL EXAM:    General: Not in distress.   HEENT: Moist mucus membranes. PERRLA.  Cardio: Regular rate and rhythm, S1, S2, no murmur, rub, or gallop.  Pulm: Clear to auscultation bilaterally. No wheezing, or rhonchi  Abdomen: Soft, non-tender, non-distended. Normoactive bowel sounds.  Extremities: No cyanosis or edema bilaterally. No calf tenderness to palpation.  Neuro: A&O x3. No focal deficits.     TESTS & MEASUREMENTS:                        9.2    6.60  )-----------( 240      ( 12 Dec 2018 07:29 )             29.8     12-12    134<L>  |  93<L>  |  38<H>  ----------------------------<  159<H>  4.5   |  28  |  2.1<H>    Ca    8.6      12 Dec 2018 07:29          Urinalysis Basic - ( 12 Dec 2018 19:00 )    Color: Yellow / Appearance: Clear / S.020 / pH: x  Gluc: x / Ketone: Negative  / Bili: Negative / Urobili: 0.2 mg/dL   Blood: x / Protein: 100 mg/dL / Nitrite: Negative   Leuk Esterase: Negative / RBC: 3-5 /HPF / WBC x   Sq Epi: x / Non Sq Epi: Occasional /HPF / Bacteria: Few /HPF          RADIOLOGY & ADDITIONAL TESTS:  < from: Xray Chest 1 View-PORTABLE IMMEDIATE (18 @ 07:38) >  Impression:      Right lower lung field patchy parenchymal opacity and small right pleural   effusion.    < end of copied text >      < from: CT Abdomen and Pelvis w/ IV Cont (18 @ 08:52) >    IMPRESSION:        New since prior examination is enlargement and haziness of the left   adrenal gland. This may be seen with adrenal hemorrhage or an   inflammatory process    Bilateral lower lung field patchy opacities predominantly within the   right lower lobe possibly reflecting infectious/inflammatory etiology or   related to pulmonary edema in the appropriate clinical setting. Trace   right pleural effusion.    New indeterminate left lower pole renal lesion measuring 1 cm. As this   patient has a pacemaker and likely cannot have an MRI, further evaluation   with a focused ultrasound of the lower pole of left kidney is recommended   to exclude neoplasm. If this lesion is not well-seen on this exam,   follow-up CT is recommended in 3-6 months to demonstrate stability.    Occluded left common femoral artery stent    Subcutaneous nodules, likely related to injection phenomena. Follow to   resolution is recommended      < end of copied text >        ANTIBIOTICS:  cefepime   IVPB 2000 milliGRAM(s) IV Intermittent every 12 hours  doxycycline IVPB 100 milliGRAM(s) IV Intermittent every 12 hours SULY PENA  58y, Female  Allergy: aspirin (Other (Moderate))  IV Contrast (Unknown)      HPI:  58 year female patient with history of HFrEF ( EF 20-25%)  s/p AICD, COPD on home O2 (4L) , CVD, CVA with residual rt sided weakness , depression, fibromyalgia , PVD, presented for one week history of SOB that started at exertion . Patient symptoms started with rhinorrhea , nasal congestion, sore throat, myalgia and arthralgia one week ago , associated with productive cough , with greenish sputum. Pt reports she sleeps on 5 pillows at baseline, and has been doing so for 4-5 years, and is unable to lie flat.   Patient denies any chest pain, there is no history of palpitations, no weight loss, no night sweats, and no recent travel., she is complaint to her medications.  She also had complaints of  sharp pleuritic r-sided chest pain that is stabbing  in nature, moderate in intensity, that has now resolved.    In the ED  patient received one dosage of solumedrol , nebulizers , azithromycin and cefepime. VS: /86, HR89, RR 18, Afebrile.   Pt was admitted with principal diagnosis of decompensated heart failure with reduced ejection fraction and started on IV lasix. she was also started on Cefepime and doxycycline for RLL opacity suspicious for pneumonia.      FAMILY HISTORY:  Family history of diabetes mellitus (Mother)  Family history of hypertension (Mother)    PAST MEDICAL & SURGICAL HISTORY:  Chronic pain  HLD (hyperlipidemia)  CVA (cerebral vascular accident)  Depression  COPD (chronic obstructive pulmonary disease)  Diabetes  CHF (congestive heart failure)  History of cholecystectomy  H/O tubal ligation  H/O abdominal hysterectomy  AICD (automatic cardioverter/defibrillator) present        VITALS:  T(F): 95.2, Max: 97.2 (18 @ 20:40)  HR: 55  BP: 92/49  RR: 18Vital Signs Last 24 Hrs  T(C): 35.1 (13 Dec 2018 05:32), Max: 36.2 (12 Dec 2018 20:40)  T(F): 95.2 (13 Dec 2018 05:32), Max: 97.2 (12 Dec 2018 20:40)  HR: 55 (13 Dec 2018 05:32) (55 - 74)  BP: 92/49 (13 Dec 2018 05:32) (92/49 - 125/69)  BP(mean): --  RR: 18 (13 Dec 2018 05:32) (18 - 18)  SpO2: 100% (13 Dec 2018 07:35) (95% - 100%)    PHYSICAL EXAM:    General: Not in distress.   HEENT: Moist mucus membranes. PERRLA.  Cardio: Regular rate and rhythm, S1, S2, no murmur, rub, or gallop.  Pulm: Reduced BS b/l  Abdomen: Soft, non-tender, non-distended.   Extremities: No cyanosis or edema bilaterally.   Neuro: A&O x3. No focal deficits.     TESTS & MEASUREMENTS:                        9.2    6.60  )-----------( 240      ( 12 Dec 2018 07:29 )             29.8     12    134<L>  |  93<L>  |  38<H>  ----------------------------<  159<H>  4.5   |  28  |  2.1<H>    Ca    8.6      12 Dec 2018 07:29          Urinalysis Basic - ( 12 Dec 2018 19:00 )    Color: Yellow / Appearance: Clear / S.020 / pH: x  Gluc: x / Ketone: Negative  / Bili: Negative / Urobili: 0.2 mg/dL   Blood: x / Protein: 100 mg/dL / Nitrite: Negative   Leuk Esterase: Negative / RBC: 3-5 /HPF / WBC x   Sq Epi: x / Non Sq Epi: Occasional /HPF / Bacteria: Few /HPF          RADIOLOGY & ADDITIONAL TESTS:  < from: Xray Chest 1 View-PORTABLE IMMEDIATE (18 @ 07:38) >  Impression:      Right lower lung field patchy parenchymal opacity and small right pleural   effusion.    < end of copied text >      < from: CT Abdomen and Pelvis w/ IV Cont (18 @ 08:52) >    IMPRESSION:        New since prior examination is enlargement and haziness of the left   adrenal gland. This may be seen with adrenal hemorrhage or an   inflammatory process    Bilateral lower lung field patchy opacities predominantly within the   right lower lobe possibly reflecting infectious/inflammatory etiology or   related to pulmonary edema in the appropriate clinical setting. Trace   right pleural effusion.    New indeterminate left lower pole renal lesion measuring 1 cm. As this   patient has a pacemaker and likely cannot have an MRI, further evaluation   with a focused ultrasound of the lower pole of left kidney is recommended   to exclude neoplasm. If this lesion is not well-seen on this exam,   follow-up CT is recommended in 3-6 months to demonstrate stability.    Occluded left common femoral artery stent    Subcutaneous nodules, likely related to injection phenomena. Follow to   resolution is recommended      < end of copied text >        ANTIBIOTICS:  cefepime   IVPB 2000 milliGRAM(s) IV Intermittent every 12 hours  doxycycline IVPB 100 milliGRAM(s) IV Intermittent every 12 hours

## 2018-12-14 ENCOUNTER — TRANSCRIPTION ENCOUNTER (OUTPATIENT)
Age: 58
End: 2018-12-14

## 2018-12-14 LAB
ALBUMIN SERPL ELPH-MCNC: 3.3 G/DL — LOW (ref 3.5–5.2)
ALP SERPL-CCNC: 108 U/L — SIGNIFICANT CHANGE UP (ref 30–115)
ALT FLD-CCNC: 10 U/L — SIGNIFICANT CHANGE UP (ref 0–41)
ANION GAP SERPL CALC-SCNC: 16 MMOL/L — HIGH (ref 7–14)
APTT BLD: 47.2 SEC — HIGH (ref 27–39.2)
AST SERPL-CCNC: 16 U/L — SIGNIFICANT CHANGE UP (ref 0–41)
BASOPHILS # BLD AUTO: 0.02 K/UL — SIGNIFICANT CHANGE UP (ref 0–0.2)
BASOPHILS # BLD AUTO: 0.03 K/UL — SIGNIFICANT CHANGE UP (ref 0–0.2)
BASOPHILS NFR BLD AUTO: 0.3 % — SIGNIFICANT CHANGE UP (ref 0–1)
BASOPHILS NFR BLD AUTO: 0.4 % — SIGNIFICANT CHANGE UP (ref 0–1)
BILIRUB SERPL-MCNC: 0.4 MG/DL — SIGNIFICANT CHANGE UP (ref 0.2–1.2)
BUN SERPL-MCNC: 45 MG/DL — HIGH (ref 10–20)
CALCIUM SERPL-MCNC: 8.4 MG/DL — LOW (ref 8.5–10.1)
CHLORIDE SERPL-SCNC: 98 MMOL/L — SIGNIFICANT CHANGE UP (ref 98–110)
CO2 SERPL-SCNC: 27 MMOL/L — SIGNIFICANT CHANGE UP (ref 17–32)
CREAT SERPL-MCNC: 3 MG/DL — HIGH (ref 0.7–1.5)
EOSINOPHIL # BLD AUTO: 0.02 K/UL — SIGNIFICANT CHANGE UP (ref 0–0.7)
EOSINOPHIL # BLD AUTO: 0.12 K/UL — SIGNIFICANT CHANGE UP (ref 0–0.7)
EOSINOPHIL NFR BLD AUTO: 0.3 % — SIGNIFICANT CHANGE UP (ref 0–8)
EOSINOPHIL NFR BLD AUTO: 1.8 % — SIGNIFICANT CHANGE UP (ref 0–8)
GAS PNL BLDA: SIGNIFICANT CHANGE UP
GLUCOSE BLDC GLUCOMTR-MCNC: 107 MG/DL — HIGH (ref 70–99)
GLUCOSE BLDC GLUCOMTR-MCNC: 108 MG/DL — HIGH (ref 70–99)
GLUCOSE BLDC GLUCOMTR-MCNC: 125 MG/DL — HIGH (ref 70–99)
GLUCOSE BLDC GLUCOMTR-MCNC: 92 MG/DL — SIGNIFICANT CHANGE UP (ref 70–99)
GLUCOSE SERPL-MCNC: 82 MG/DL — SIGNIFICANT CHANGE UP (ref 70–99)
GRAM STN FLD: SIGNIFICANT CHANGE UP
HCT VFR BLD CALC: 30.9 % — LOW (ref 37–47)
HCT VFR BLD CALC: 33.8 % — LOW (ref 37–47)
HGB BLD-MCNC: 10.5 G/DL — LOW (ref 12–16)
HGB BLD-MCNC: 9.5 G/DL — LOW (ref 12–16)
IMM GRANULOCYTES NFR BLD AUTO: 0.3 % — SIGNIFICANT CHANGE UP (ref 0.1–0.3)
IMM GRANULOCYTES NFR BLD AUTO: 0.6 % — HIGH (ref 0.1–0.3)
INR BLD: 2.81 RATIO — HIGH (ref 0.65–1.3)
LYMPHOCYTES # BLD AUTO: 0.8 K/UL — LOW (ref 1.2–3.4)
LYMPHOCYTES # BLD AUTO: 1.16 K/UL — LOW (ref 1.2–3.4)
LYMPHOCYTES # BLD AUTO: 11.1 % — LOW (ref 20.5–51.1)
LYMPHOCYTES # BLD AUTO: 17.3 % — LOW (ref 20.5–51.1)
MAGNESIUM SERPL-MCNC: 1.9 MG/DL — SIGNIFICANT CHANGE UP (ref 1.8–2.4)
MCHC RBC-ENTMCNC: 26.5 PG — LOW (ref 27–31)
MCHC RBC-ENTMCNC: 26.6 PG — LOW (ref 27–31)
MCHC RBC-ENTMCNC: 30.7 G/DL — LOW (ref 32–37)
MCHC RBC-ENTMCNC: 31.1 G/DL — LOW (ref 32–37)
MCV RBC AUTO: 85.8 FL — SIGNIFICANT CHANGE UP (ref 81–99)
MCV RBC AUTO: 86.1 FL — SIGNIFICANT CHANGE UP (ref 81–99)
MONOCYTES # BLD AUTO: 0.67 K/UL — HIGH (ref 0.1–0.6)
MONOCYTES # BLD AUTO: 0.81 K/UL — HIGH (ref 0.1–0.6)
MONOCYTES NFR BLD AUTO: 12.1 % — HIGH (ref 1.7–9.3)
MONOCYTES NFR BLD AUTO: 9.3 % — SIGNIFICANT CHANGE UP (ref 1.7–9.3)
NEUTROPHILS # BLD AUTO: 4.57 K/UL — SIGNIFICANT CHANGE UP (ref 1.4–6.5)
NEUTROPHILS # BLD AUTO: 5.64 K/UL — SIGNIFICANT CHANGE UP (ref 1.4–6.5)
NEUTROPHILS NFR BLD AUTO: 68.1 % — SIGNIFICANT CHANGE UP (ref 42.2–75.2)
NEUTROPHILS NFR BLD AUTO: 78.4 % — HIGH (ref 42.2–75.2)
NRBC # BLD: 0 /100 WBCS — SIGNIFICANT CHANGE UP (ref 0–0)
NRBC # BLD: 0 /100 WBCS — SIGNIFICANT CHANGE UP (ref 0–0)
PLATELET # BLD AUTO: 230 K/UL — SIGNIFICANT CHANGE UP (ref 130–400)
PLATELET # BLD AUTO: 243 K/UL — SIGNIFICANT CHANGE UP (ref 130–400)
POTASSIUM SERPL-MCNC: 4.3 MMOL/L — SIGNIFICANT CHANGE UP (ref 3.5–5)
POTASSIUM SERPL-SCNC: 4.3 MMOL/L — SIGNIFICANT CHANGE UP (ref 3.5–5)
PROT SERPL-MCNC: 7.3 G/DL — SIGNIFICANT CHANGE UP (ref 6–8)
PROTHROM AB SERPL-ACNC: 32 SEC — HIGH (ref 9.95–12.87)
RBC # BLD: 3.59 M/UL — LOW (ref 4.2–5.4)
RBC # BLD: 3.94 M/UL — LOW (ref 4.2–5.4)
RBC # FLD: 16.2 % — HIGH (ref 11.5–14.5)
RBC # FLD: 16.2 % — HIGH (ref 11.5–14.5)
SODIUM SERPL-SCNC: 141 MMOL/L — SIGNIFICANT CHANGE UP (ref 135–146)
SPECIMEN SOURCE: SIGNIFICANT CHANGE UP
WBC # BLD: 6.71 K/UL — SIGNIFICANT CHANGE UP (ref 4.8–10.8)
WBC # BLD: 7.19 K/UL — SIGNIFICANT CHANGE UP (ref 4.8–10.8)
WBC # FLD AUTO: 6.71 K/UL — SIGNIFICANT CHANGE UP (ref 4.8–10.8)
WBC # FLD AUTO: 7.19 K/UL — SIGNIFICANT CHANGE UP (ref 4.8–10.8)

## 2018-12-14 RX ORDER — WARFARIN SODIUM 2.5 MG/1
1 TABLET ORAL ONCE
Qty: 0 | Refills: 0 | Status: DISCONTINUED | OUTPATIENT
Start: 2018-12-14 | End: 2018-12-14

## 2018-12-14 RX ORDER — AZITHROMYCIN 500 MG/1
250 TABLET, FILM COATED ORAL DAILY
Qty: 0 | Refills: 0 | Status: DISCONTINUED | OUTPATIENT
Start: 2018-12-14 | End: 2018-12-14

## 2018-12-14 RX ORDER — CEFEPIME 1 G/1
1000 INJECTION, POWDER, FOR SOLUTION INTRAMUSCULAR; INTRAVENOUS DAILY
Qty: 0 | Refills: 0 | Status: DISCONTINUED | OUTPATIENT
Start: 2018-12-14 | End: 2018-12-15

## 2018-12-14 RX ORDER — PROTHROMBIN COMPLEX CONCENTRATE (HUMAN) 25.5; 16.5; 24; 22; 22; 26 [IU]/ML; [IU]/ML; [IU]/ML; [IU]/ML; [IU]/ML; [IU]/ML
1500 POWDER, FOR SOLUTION INTRAVENOUS ONCE
Qty: 0 | Refills: 0 | Status: COMPLETED | OUTPATIENT
Start: 2018-12-14 | End: 2018-12-14

## 2018-12-14 RX ORDER — FUROSEMIDE 40 MG
40 TABLET ORAL
Qty: 0 | Refills: 0 | Status: DISCONTINUED | OUTPATIENT
Start: 2018-12-15 | End: 2018-12-18

## 2018-12-14 RX ORDER — CHLORHEXIDINE GLUCONATE 213 G/1000ML
1 SOLUTION TOPICAL
Qty: 0 | Refills: 0 | Status: DISCONTINUED | OUTPATIENT
Start: 2018-12-14 | End: 2018-12-14

## 2018-12-14 RX ORDER — SODIUM CHLORIDE 9 MG/ML
1000 INJECTION INTRAMUSCULAR; INTRAVENOUS; SUBCUTANEOUS
Qty: 0 | Refills: 0 | Status: DISCONTINUED | OUTPATIENT
Start: 2018-12-14 | End: 2018-12-14

## 2018-12-14 RX ORDER — FUROSEMIDE 40 MG
80 TABLET ORAL ONCE
Qty: 0 | Refills: 0 | Status: COMPLETED | OUTPATIENT
Start: 2018-12-14 | End: 2018-12-14

## 2018-12-14 RX ORDER — CEFEPIME 1 G/1
1000 INJECTION, POWDER, FOR SOLUTION INTRAMUSCULAR; INTRAVENOUS DAILY
Qty: 0 | Refills: 0 | Status: DISCONTINUED | OUTPATIENT
Start: 2018-12-14 | End: 2018-12-14

## 2018-12-14 RX ORDER — FUROSEMIDE 40 MG
40 TABLET ORAL DAILY
Qty: 0 | Refills: 0 | Status: DISCONTINUED | OUTPATIENT
Start: 2018-12-14 | End: 2018-12-16

## 2018-12-14 RX ORDER — FUROSEMIDE 40 MG
80 TABLET ORAL EVERY 8 HOURS
Qty: 0 | Refills: 0 | Status: DISCONTINUED | OUTPATIENT
Start: 2018-12-14 | End: 2018-12-14

## 2018-12-14 RX ORDER — FUROSEMIDE 40 MG
40 TABLET ORAL ONCE
Qty: 0 | Refills: 0 | Status: COMPLETED | OUTPATIENT
Start: 2018-12-14 | End: 2018-12-14

## 2018-12-14 RX ADMIN — GABAPENTIN 100 MILLIGRAM(S): 400 CAPSULE ORAL at 05:32

## 2018-12-14 RX ADMIN — Medication 40 MILLIGRAM(S): at 12:22

## 2018-12-14 RX ADMIN — GABAPENTIN 100 MILLIGRAM(S): 400 CAPSULE ORAL at 15:01

## 2018-12-14 RX ADMIN — Medication 10 MILLIGRAM(S): at 05:33

## 2018-12-14 RX ADMIN — GABAPENTIN 100 MILLIGRAM(S): 400 CAPSULE ORAL at 23:49

## 2018-12-14 RX ADMIN — Medication 110 MILLIGRAM(S): at 17:21

## 2018-12-14 RX ADMIN — HEPARIN SODIUM 5000 UNIT(S): 5000 INJECTION INTRAVENOUS; SUBCUTANEOUS at 15:01

## 2018-12-14 RX ADMIN — Medication 25 MILLIGRAM(S): at 23:49

## 2018-12-14 RX ADMIN — Medication 3 MILLILITER(S): at 21:01

## 2018-12-14 RX ADMIN — METHADONE HYDROCHLORIDE 10 MILLIGRAM(S): 40 TABLET ORAL at 12:22

## 2018-12-14 RX ADMIN — Medication 7 UNIT(S): at 08:46

## 2018-12-14 RX ADMIN — PROTHROMBIN COMPLEX CONCENTRATE (HUMAN) 400 INTERNATIONAL UNIT(S): 25.5; 16.5; 24; 22; 22; 26 POWDER, FOR SOLUTION INTRAVENOUS at 23:48

## 2018-12-14 RX ADMIN — BUDESONIDE AND FORMOTEROL FUMARATE DIHYDRATE 2 PUFF(S): 160; 4.5 AEROSOL RESPIRATORY (INHALATION) at 08:49

## 2018-12-14 RX ADMIN — Medication 3 MILLILITER(S): at 13:52

## 2018-12-14 RX ADMIN — Medication 7 UNIT(S): at 12:22

## 2018-12-14 RX ADMIN — Medication 40 MILLIGRAM(S): at 14:32

## 2018-12-14 RX ADMIN — Medication 3 MILLILITER(S): at 08:00

## 2018-12-14 RX ADMIN — CEFEPIME 100 MILLIGRAM(S): 1 INJECTION, POWDER, FOR SOLUTION INTRAMUSCULAR; INTRAVENOUS at 12:22

## 2018-12-14 RX ADMIN — HEPARIN SODIUM 5000 UNIT(S): 5000 INJECTION INTRAVENOUS; SUBCUTANEOUS at 05:31

## 2018-12-14 RX ADMIN — AZITHROMYCIN 250 MILLIGRAM(S): 500 TABLET, FILM COATED ORAL at 12:22

## 2018-12-14 RX ADMIN — Medication 110 MILLIGRAM(S): at 07:01

## 2018-12-14 RX ADMIN — ATORVASTATIN CALCIUM 40 MILLIGRAM(S): 80 TABLET, FILM COATED ORAL at 23:49

## 2018-12-14 RX ADMIN — Medication 50 MILLIGRAM(S): at 12:22

## 2018-12-14 RX ADMIN — CHLORHEXIDINE GLUCONATE 1 APPLICATION(S): 213 SOLUTION TOPICAL at 05:33

## 2018-12-14 RX ADMIN — Medication 80 MILLIGRAM(S): at 17:08

## 2018-12-14 RX ADMIN — Medication 25 MILLIGRAM(S): at 06:39

## 2018-12-14 RX ADMIN — CEFEPIME 100 MILLIGRAM(S): 1 INJECTION, POWDER, FOR SOLUTION INTRAMUSCULAR; INTRAVENOUS at 05:31

## 2018-12-14 RX ADMIN — Medication 40 MILLIGRAM(S): at 05:32

## 2018-12-14 NOTE — PROGRESS NOTE ADULT - ENMT
Pt is having pain and swelling to the left ankle after she missed a step and injured the ankle. Per family, it was initially deformed and was reduced by the family prior to coming in to be evaluated. There is an abrasion to the left medial ankle noted. No oral lesions; no gross abnormalities

## 2018-12-14 NOTE — CHART NOTE - NSCHARTNOTEFT_GEN_A_CORE
58 year female patient with history of HFrEF ( EF 20-25%) , s/p AICD, COPD on home O2 (4L) , CVD, CVA with residual rt sided weakness, depression, fibromyalgia , PVD, presented for one week history of SOB that started at exertion and progressed to be at rest. Patient symptoms started with rhinorrhea , nasal congestion, sore throat, myalgia and arthralgia one week ago , associated with productive cough , with greenish sputum, subjective fever, and chills.   Pt was admitted for acute on chronic systolic CHF with possible PNA. She also had RITCHIE on CKD, Cr 3.1 (baseline 1.3, received IV contrast). Today pt reported SOB, O2 sat 94% on biPAP, /93, . Got po lasix 40 2x, 40 IV and 80 IV without much urine output and improvement in SOB. CXR showed stable pulm edema. ABG pH 7.43, CO 44, O2 66, HOC2 29, O2 sat 94%. Bedside bladder scan >200 mL urine. Pt was approved for upgrade to ICU by Dr Greene. Medina placed and 750 mL came out. BP dec to 107/57. HR 57, O2 95. SOB improved.     A/P:  Obstructive Uropathy causing RITCHIE  Hypertension 2/2 ab pain due to retained urine 1L    - Urinary retention  with fluid removal 1L pt received lasix 200mg ( 120 IV and 80 PO) lasix ; pt is symptomatically improved and BP improved   - rpt labs @ 8pm  - Hold Coumadin tonight ; pt was off coumadin for 3-4 days and INR is ?therapeutic ( enlarged liver on CT abdomen)  - Pt was hypertensive and oliguric when ICU attending had seen the pt, but after placement of medina, her vitals improved and sxs improved. We are now concerned about hypotension   - Please watch for hypotension, c.w lasix 40mg IV BID with hold parameters SBP <90    ____________________________________________________________________________    # Acute on chronic systolic CHF (EF 20-25%).    - fluid restriction 1L in 24 hours  - daily weight with intake and output monitoring   - NC and biPAP as needed  - BNP 14,302, dec to 6,184  - Received lasix 40 po x2, and 40 IV 1x  -CXR 12/14- stable pulm edema  -ABG pH 7.43, CO 44, O2 66 (can be due to COPD per respiratory therapist), HOC2 29, O2 sat 94%  - hold Entresto ( Cr above 2), c/w  statin, BB   - cardiology- lasix    # COPD stable  - duonebs q6h  -symbicort bid  - supplement oxygen    #Pneumonia  IV cefepime 1 g qd  doxycyclin  q12h. Dr. verde spoke with ID and ok with doxy  No fever     #CVA (cerebral vascular accident).     on statin   - not on ASA due to allergy  cardio-pt was on coumadin for CVA, restart if no CI   INR 2.8 today despite no coumadin since admission (LFT wnl). Hold coumadin, home dose 1 mg qd except Wed 2 mg.      # RITCHIE on CKD (chronic kidney disease) stage 3  -Nephro following  - Cr 3.0 today, was 1.3 in Oct  due to retention and cardiorenal syndrome   made 900 cc urine after placing Medina and LAsix 80 IVP  BP was 203/93, dec to 107/57, HR 57 after medina  UA is negative   strict Is and OS - if postobstructive polyuria occurs, clamp Medina for 15 min every hour to avoid hypotension  repeat BMP mag Pt/PTT 8 pm-replete electrolytes as needed  doubt pt will need HD with this UO, but may give Vit K 2 mg to correct INR to ~ in case she needs a line tomorrow  - hold Entresto   US kidney- indeterminate 1 cm lesion seen on recent CT arising off the left lower pole is not visualized sonographically    #RUQ pain: mostly musculoskeletal    #New since prior examination is enlargement and haziness of the left adrenal gland on CT. This may be seen with adrenal hemorrhage or an inflammatory process  patient is completely asymptomatic , hemodynamically stable  - f/u 3- 6 months    #PVD- on statin  f/u vascular    #CT occluded left common femoral stent:  - arterial duplex- left femoral to popliteal artery bypass patent  - f/u vascular    #depression : continue with buspirone, trazodone    #fibromyalgia: continue with amitriptyline , gabapentin,  methadone    #diabetes: lispro 7 tid, glargine 25    #dyslipidemia : on statin    #full code     #from home

## 2018-12-14 NOTE — PROGRESS NOTE ADULT - SUBJECTIVE AND OBJECTIVE BOX
Nephrology progress note    Patient is seen and examined, events over the last 24 h noted .  Worsening SOB. Pt has not voided all day despite LAsix 80 po and 40 IV give this afternoon. On BiPAP  Allergies:  aspirin (Other (Moderate))  IV Contrast (Unknown)    Hospital Medications:   MEDICATIONS  (STANDING):  ALBUTerol    90 MICROgram(s) HFA Inhaler 1 Puff(s) Inhalation every 4 hours  ALBUTerol/ipratropium for Nebulization 3 milliLiter(s) Nebulizer every 6 hours  amitriptyline 25 milliGRAM(s) Oral at bedtime  atorvastatin 40 milliGRAM(s) Oral at bedtime  buDESOnide 160 MICROgram(s)/formoterol 4.5 MICROgram(s) Inhaler 2 Puff(s) Inhalation two times a day  busPIRone 10 milliGRAM(s) Oral two times a day  cefepime   IVPB 1000 milliGRAM(s) IV Intermittent daily  chlorhexidine 4% Liquid 1 Application(s) Topical <User Schedule>  dextrose 5%. 1000 milliLiter(s) (50 mL/Hr) IV Continuous <Continuous>  dextrose 50% Injectable 12.5 Gram(s) IV Push once  dextrose 50% Injectable 25 Gram(s) IV Push once  dextrose 50% Injectable 25 Gram(s) IV Push once  doxycycline IVPB 100 milliGRAM(s) IV Intermittent every 12 hours  furosemide    Tablet 40 milliGRAM(s) Oral daily  furosemide   Injectable 80 milliGRAM(s) IV Push every 8 hours  gabapentin 100 milliGRAM(s) Oral three times a day  guaiFENesin/dextromethorphan  Syrup 10 milliLiter(s) Oral every 4 hours  heparin  Injectable 5000 Unit(s) SubCutaneous every 8 hours  insulin glargine Injectable (LANTUS) 25 Unit(s) SubCutaneous at bedtime  insulin lispro (HumaLOG) corrective regimen sliding scale   SubCutaneous three times a day before meals  insulin lispro Injectable (HumaLOG) 7 Unit(s) SubCutaneous before breakfast  insulin lispro Injectable (HumaLOG) 7 Unit(s) SubCutaneous before lunch  insulin lispro Injectable (HumaLOG) 7 Unit(s) SubCutaneous before dinner  methadone    Tablet 10 milliGRAM(s) Oral daily  metoprolol succinate ER 25 milliGRAM(s) Oral daily  traZODone 50 milliGRAM(s) Oral daily        VITALS:  T(F): 96.3 (18 @ 14:00), Max: 98.9 (18 @ 05:00)  HR: 79 (18 @ 16:57)  BP: 121/58 (18 @ 16:57)  RR: 22 (18 @ 14:06)  SpO2: 97% (18 @ 20:00)  Wt(kg): --     07:01  -   @ 07:00  --------------------------------------------------------  IN: 40 mL / OUT: 0 mL / NET: 40 mL     @ :01  -   @ 07:00  --------------------------------------------------------  IN: 850 mL / OUT: 500 mL / NET: 350 mL      Height (cm): 165.1 ( @ 20:00)    PHYSICAL EXAM:  Constitutional: in resp distress, on BiPAP  HEENT: anicteric sclera, oropharynx clear, MMM  Respiratory: basilar crackles  Cardiovascular: S1, S2, RRR  Gastrointestinal: BS+, soft, distended  Extremities:1+ peripheral edema  Neurological: A/O x 3,  : No CVA tenderness. Ward just put in 900 cc PVR   Skin: No rashes  Vascular Access:    LABS:      141  |  98  |  45<H>  ----------------------------<  82  4.3   |  27  |  3.0<H>  Creatinine Trend: 3.0<--, 3.1<--, 2.1<--, 1.6<--    Ca    8.4<L>      14 Dec 2018 07:25  Mg     1.9         TPro  7.3  /  Alb  3.3<L>  /  TBili  0.4  /  DBili      /  AST  16  /  ALT  10  /  AlkPhos  108                            9.5    6.71  )-----------( 243      ( 14 Dec 2018 07:25 )             30.9       Urine Studies:  Urinalysis Basic - ( 12 Dec 2018 19:00 )    Color: Yellow / Appearance: Clear / S.020 / pH:   Gluc:  / Ketone: Negative  / Bili: Negative / Urobili: 0.2 mg/dL   Blood:  / Protein: 100 mg/dL / Nitrite: Negative   Leuk Esterase: Negative / RBC: 3-5 /HPF / WBC    Sq Epi:  / Non Sq Epi: Occasional /HPF / Bacteria: Few /HPF      Osmolality, Random Urine: 324 mos/kg ( @ 18:35)  Sodium, Random Urine: 31.0 mmoL/L ( @ 18:35)  Potassium, Random Urine: 28 mmol/L ( @ 18:35)  Chloride, Random Urine: 21 ( @ 18:35)    RADIOLOGY & ADDITIONAL STUDIES:

## 2018-12-14 NOTE — PROGRESS NOTE ADULT - SUBJECTIVE AND OBJECTIVE BOX
SUBJECTIVE:    Patient is a 58y old Female who presents with a chief complaint of sob (14 Dec 2018 14:49)    Currently admitted to medicine with the primary diagnosis of Pneumonia     Today is hospital day 3d. Overnight no event. Pt report SOB, O2 sat 94% on NC. Was placed back on biPAP. Denied CP, abd pain, urinary/BM issues    PAST MEDICAL & SURGICAL HISTORY  Chronic pain  HLD (hyperlipidemia)  CVA (cerebral vascular accident)  Depression  COPD (chronic obstructive pulmonary disease)  Diabetes  CHF (congestive heart failure)  History of cholecystectomy  H/O tubal ligation  H/O abdominal hysterectomy  AICD (automatic cardioverter/defibrillator) present        ALLERGIES:  aspirin (Other (Moderate))  IV Contrast (Unknown)    MEDICATIONS:  STANDING MEDICATIONS  ALBUTerol    90 MICROgram(s) HFA Inhaler 1 Puff(s) Inhalation every 4 hours  ALBUTerol/ipratropium for Nebulization 3 milliLiter(s) Nebulizer every 6 hours  amitriptyline 25 milliGRAM(s) Oral at bedtime  atorvastatin 40 milliGRAM(s) Oral at bedtime  buDESOnide 160 MICROgram(s)/formoterol 4.5 MICROgram(s) Inhaler 2 Puff(s) Inhalation two times a day  busPIRone 10 milliGRAM(s) Oral two times a day  cefepime   IVPB 1000 milliGRAM(s) IV Intermittent daily  chlorhexidine 4% Liquid 1 Application(s) Topical <User Schedule>  dextrose 5%. 1000 milliLiter(s) IV Continuous <Continuous>  dextrose 50% Injectable 12.5 Gram(s) IV Push once  dextrose 50% Injectable 25 Gram(s) IV Push once  dextrose 50% Injectable 25 Gram(s) IV Push once  doxycycline IVPB 100 milliGRAM(s) IV Intermittent every 12 hours  furosemide    Tablet 40 milliGRAM(s) Oral daily  furosemide   Injectable 80 milliGRAM(s) IV Push once  gabapentin 100 milliGRAM(s) Oral three times a day  guaiFENesin/dextromethorphan  Syrup 10 milliLiter(s) Oral every 4 hours  heparin  Injectable 5000 Unit(s) SubCutaneous every 8 hours  insulin glargine Injectable (LANTUS) 25 Unit(s) SubCutaneous at bedtime  insulin lispro (HumaLOG) corrective regimen sliding scale   SubCutaneous three times a day before meals  insulin lispro Injectable (HumaLOG) 7 Unit(s) SubCutaneous before breakfast  insulin lispro Injectable (HumaLOG) 7 Unit(s) SubCutaneous before lunch  insulin lispro Injectable (HumaLOG) 7 Unit(s) SubCutaneous before dinner  methadone    Tablet 10 milliGRAM(s) Oral daily  metoprolol succinate ER 25 milliGRAM(s) Oral daily  traZODone 50 milliGRAM(s) Oral daily    PRN MEDICATIONS  acetaminophen   Tablet .. 650 milliGRAM(s) Oral every 6 hours PRN  dextrose 40% Gel 15 Gram(s) Oral once PRN  glucagon  Injectable 1 milliGRAM(s) IntraMuscular once PRN    VITALS:   T(F): 96.3  HR: 118  BP: 180/80  RR: 22  SpO2: 97%    LABS:                        9.5    6.71  )-----------( 243      ( 14 Dec 2018 07:25 )             30.9     12-    141  |  98  |  45<H>  ----------------------------<  82  4.3   |  27  |  3.0<H>    Ca    8.4<L>      14 Dec 2018 07:25  Mg     1.9         TPro  7.3  /  Alb  3.3<L>  /  TBili  0.4  /  DBili  x   /  AST  16  /  ALT  10  /  AlkPhos  108  12-14    PT/INR - ( 14 Dec 2018 11:27 )   PT: 32.00 sec;   INR: 2.81 ratio         PTT - ( 14 Dec 2018 11:27 )  PTT:47.2 sec  Urinalysis Basic - ( 12 Dec 2018 19:00 )    Color: Yellow / Appearance: Clear / S.020 / pH: x  Gluc: x / Ketone: Negative  / Bili: Negative / Urobili: 0.2 mg/dL   Blood: x / Protein: 100 mg/dL / Nitrite: Negative   Leuk Esterase: Negative / RBC: 3-5 /HPF / WBC x   Sq Epi: x / Non Sq Epi: Occasional /HPF / Bacteria: Few /HPF      ABG - ( 14 Dec 2018 14:45 )  pH, Arterial: 7.43  pH, Blood: x     /  pCO2: 44    /  pO2: 66    / HCO3: 29    / Base Excess: 4.1   /  SaO2: 94                    Culture - Sputum (collected 14 Dec 2018 07:15)  Source: .Sputum Sputum  Gram Stain (14 Dec 2018 15:23):    Moderate polymorphonuclear leukocytes per low power field    Rare Squamous epithelial cells per low power field    No organisms seen per oil power field          RADIOLOGY:    PHYSICAL EXAM:  GEN: No acute distress  LUNGS: bibasilar crackles and wheezes. reduced air entry b/l  HEART: S1/S2 present. RRR.   ABD: Soft, non-tender, non-distended. Bowel sounds present  EXT: +1 peripheral pulse, b/l 2+ pitting edema  NEURO: [id number]

## 2018-12-14 NOTE — CHART NOTE - NSCHARTNOTEFT_GEN_A_CORE
Spoke with Dr. Sawyer for possible ICU approval. Dr Sanchez spoke with Renal for HD. Will give lasix 80 IV and will monitor her O/p Spoke with Dr. Sawyer for possible ICU approval. Dr Sanchez spoke with Renal for HD. Will give lasix 80 IV and will monitor her O/p    Previous ABG noted, pt has chronic CO2 retention and O2 in 66 Spoke with Dr. Sawyer for possible ICU approval. Dr Sanchez spoke with Renal for HD. Will give lasix 80 IV and will monitor her o/p    T(F): 96.3  HR: 79  BP: 121/58  RR: 22  SpO2: --                          9.5    6.71  )-----------( 243      ( 14 Dec 2018 07:25 )             30.9       12-14    141  |  98  |  45<H>  ----------------------------<  82  4.3   |  27  |  3.0<H>    Ca    8.4<L>      14 Dec 2018 07:25  Mg     1.9     12-14    TPro  7.3  /  Alb  3.3<L>  /  TBili  0.4  /  DBili  x   /  AST  16  /  ALT  10  /  AlkPhos  108  12-14      LIVER FUNCTIONS - ( 14 Dec 2018 07:25 )  Alb: 3.3 g/dL / Pro: 7.3 g/dL / ALK PHOS: 108 U/L / ALT: 10 U/L / AST: 16 U/L / GGT: x             PT/INR - ( 14 Dec 2018 11:27 )   PT: 32.00 sec;   INR: 2.81 ratio         PTT - ( 14 Dec 2018 11:27 )  PTT:47.2 sec        T(F): 96.3  HR: 79  BP: 121/58  RR: 22  SpO2: --      PHYSICAL EXAM:  GENERAL: lethargy  NECK: Supple, No JVD  CHEST/LUNG: bibasilar crackles   HEART: Regular rate and rhythm  ABDOMEN: + TTP.   EXTREMITIES:  2+ Pitting edema       Pt was given 80mg IV x 1 in addition to lasix 40mg IV x 1 and 80mg PO  Pt's bedside U/S showed 200cc per nursing  Ward was placed and U/O- 1L  rpt BP- 107/57, HR-75, spo2- 95    Impression-->   Obstructive Uropathy causing RITCHIE  Hypertension 2/2 ab pain due to retained urine 1L      - Urinary retention  with fluid removal 1L post 80mg IV stat and pt received 120mg ( 40 IV and 80 PO) lasix ; pt is symptomatically improved and BP improved   - rpt labs @ 8pm  - Hold Coumadin tonight ; pt was off coumadin for 3-4 days and INR is ?therapeutic  - NPO for AM for possible UDall if kidney function worsens   - Signed out to Dr. Mccormack and pt was approved by Dr. Greene   - Please watch for hypotension, c.w lasix 40mg IV BID with hold parameters SBP <90 Spoke with Dr. Sawyer for possible ICU approval. Dr Sanchez spoke with Renal for HD. Will give lasix 80 IV and will monitor her o/p    T(F): 96.3  HR: 79  BP: 121/58  RR: 22  SpO2: --                          9.5    6.71  )-----------( 243      ( 14 Dec 2018 07:25 )             30.9       12-14    141  |  98  |  45<H>  ----------------------------<  82  4.3   |  27  |  3.0<H>    Ca    8.4<L>      14 Dec 2018 07:25  Mg     1.9     12-14    TPro  7.3  /  Alb  3.3<L>  /  TBili  0.4  /  DBili  x   /  AST  16  /  ALT  10  /  AlkPhos  108  12-14      LIVER FUNCTIONS - ( 14 Dec 2018 07:25 )  Alb: 3.3 g/dL / Pro: 7.3 g/dL / ALK PHOS: 108 U/L / ALT: 10 U/L / AST: 16 U/L / GGT: x             PT/INR - ( 14 Dec 2018 11:27 )   PT: 32.00 sec;   INR: 2.81 ratio         PTT - ( 14 Dec 2018 11:27 )  PTT:47.2 sec        T(F): 96.3  HR: 79  BP: 121/58  RR: 22  SpO2: --      PHYSICAL EXAM:  GENERAL: lethargy  NECK: Supple, No JVD  CHEST/LUNG: bibasilar crackles   HEART: Regular rate and rhythm  ABDOMEN: + TTP.   EXTREMITIES:  1+ Pitting edema     Pt was given 80mg IV x 1 in addition to lasix 40mg IV x 1 and 80mg PO  Pt's bedside U/S showed 200cc per nursing  Medina was placed and U/O- 1L  rpt BP- 107/57, HR-75, spo2- 95    Impression-->   Obstructive Uropathy causing RITCHIE  Hypertension 2/2 ab pain due to retained urine 1L      - Urinary retention  with fluid removal 1L post 80mg IV stat and pt received 120mg ( 40 IV and 80 PO) lasix ; pt is symptomatically improved and BP improved   - rpt labs @ 8pm  - Hold Coumadin tonight ; pt was off coumadin for 3-4 days and INR is ?therapeutic ( enlarged liver on CT abdomen)  - Pt was hypertensive and oliguric when ICU attending had seen the pt, but after placement of medina, her VItals improved and sxs improved. We are now concerned about hypotension   - Signed out to Dr. Mccormack and pt was approved by Dr. Greene   - Please watch for hypotension, c.w lasix 40mg IV BID with hold parameters SBP <90  - Plan d/w Dr. Sanchez and Dr. Brewster

## 2018-12-14 NOTE — DISCHARGE NOTE ADULT - CARE PROVIDER_API CALL
Tito Montes), Internal Medicine; Nephrology  [address] [place] [address]  Phone: [phone]  Fax: [phone]    Guerrero Jackson), Critical Care Medicine; Pulmonary Disease; Sleep Medicine  [address] [place] [address]  Phone: [phone]  Fax: [phone]    Luis Angel Gutierrez), Cardiovascular Disease; Internal Medicine  [address] [facility] [address]  [place] [address]  Phone: [phone]  Fax: [phone]    Deion Gregory), Internal Medicine  [address] [facility] [address]  [place] [address]  Phone: [phone]  Fax: [phone]

## 2018-12-14 NOTE — PROGRESS NOTE ADULT - ASSESSMENT
58 year old female, patient with history of HFrEF ( EF 20-25%) , s/p AICD, COPD on home O2 (4L) , CVD, CVA with residual rt sided weakness , PVD, presented for one week history of SOB.Patient was found to have elevated creatinine to 2.1 from her baseline CKD III.       #RITCHIE on CKD III  due to retention and cardiorenal syndrome   pt made 900 cc urine after placing Ward and LAsix 80 IVP  Pt with worsening SOB, oligo-anuric, CXR with pulmonary edema.  Hx of HFrEF  UA is negative   strict Is and OS - if postobstructive polyuria occurs, clamp Ward for 15 min every hour to avoid hypotension  repeat BMP mag Pt/PTT in 4 hours-replete electrolytes as needed  doubt pt will need HD with this UO,but may give Vit K 2 mg to correct INR to ~ in case she needs a line tomorrow  Pt was approved for tx to ICU  no indication for HD     #left renal lesion that is ill defined   f/u as out-patient with nephrology for further imaging     D/W Medicine

## 2018-12-14 NOTE — PROGRESS NOTE ADULT - ASSESSMENT
58 year female patient with history of HFrEF ( EF 20-25%) , s/p AICD, COPD on home O2 (4L) , CVD, CVA with residual rt sided weakness , depression, fibromyalgia , PVD, presented for one week history of SOB that started at exertion and progressed to be at rest. Patient symptoms started with rhinorrhea , nasal congestion, sore throat, myalgia and arthralgia one week ago , associated with productive cough , with greenish sputum, subjective fever, and chills. Patient SOB worsened with flat position, patient complains of orthopnea , and mentioned that she recently need to sleep on 4 pillows ,and paroxysmal nocturnal dyspnea.  Pt was admitted for acute on chronic systolic CHF with possible PNA, pt is clinically improving for last 24 hours.     # Acute on chronic systolic CHF (EF 20-25%).    - fluid restriction 1L in 24 hours  - daily weight with intake and output monitoring   - NC and biPAP as needed  - BNP 14,302, dec to 6,184  - Received lasix 40 po x2, and 40 IV 1x  - hold Entresto ( Cr above 2), c/w  statin, BB   - cardiology- lasix    # COPD stable  - duonebs q6h  -symbicort bid  - supplement oxygen    #Pneumonia  IV cefepime 1 g qd  doxycyclin  q12h. Dr. verde spoke with ID and ok with doxy  No fever     #CVA (cerebral vascular accident).     on statin   - not on ASA due to allergy  cardio-pt was on coumadin for CVA, restart if no CI   INR 2.8 today despite no coumadin since admission (LFT wnl). Hold coumadin, home dose 1 mg qd except Wed 2 mg.      # RITCHIE on CKD (chronic kidney disease) stage 3  - Cr 3.0  - monitor BUN/Cr ( pt received contrast on admission)   - hold Entresto   - nephrology- urine urea, lytes  US kidney- indeterminate 1 cm lesion seen on recent CT arising off the left lower pole is not visualized sonographically    #RUQ pain: mostly musculoskeletal    #New since prior examination is enlargement and haziness of the left adrenal gland on CT. This may be seen with adrenal hemorrhage or an inflammatory process  patient is completely asymptomatic , hemodynamically stable  - f/u 3- 6 months    #PVD- on statin  f/u vascular    #CT occluded left common femoral stent:  - arterial duplex- left femoral to popliteal artery bypass patent  - f/u vascular    #depression : continue with buspirone, trazodone    #fibromyalgia: continue with amitriptyline , gabapentin,  methadone    #diabetes: lispro 7 tid, glargine 25    #dyslipidemia : on statin    #full code     #from home          #Acute on chronic systolic CHF, lasix iv,( po lasix is not going to work in this pt due to acute chf and ritchie) she might need higher doses.   #RITCHIE , possibly multi factorial, contrast and possible cardio renal   # hx cva, cardio recommended anticoagulation but pt has not received ac for a few days and has high INR, could be due to liver disease possibly due to liver congestion ( ? cardiac congestion)   # anemia, stable, monitor, no active bleeding  #copd/asthma, not severe, pt does not need steroids  # abnormal ct suggestive of pna, cont abx, dw ID: no need to change doxy, cont doxy and cefepim   # HTN< pt had low bp before, reevaluate after pt received lasix    high risk, if sob does not improve with lasix IV, will need ICU eval, spoke with resident.   Full code.     high risk, if sob does not improve with lasix IV, will need ICU eval per Dr. Verde 58 year female patient with history of HFrEF ( EF 20-25%) , s/p AICD, COPD on home O2 (4L) , CVD, CVA with residual rt sided weakness , depression, fibromyalgia , PVD, presented for one week history of SOB that started at exertion and progressed to be at rest. Patient symptoms started with rhinorrhea , nasal congestion, sore throat, myalgia and arthralgia one week ago , associated with productive cough , with greenish sputum, subjective fever, and chills. Patient SOB worsened with flat position, patient complains of orthopnea , and mentioned that she recently need to sleep on 4 pillows ,and paroxysmal nocturnal dyspnea.  Pt was admitted for acute on chronic systolic CHF with possible PNA, pt is clinically improving for last 24 hours.     # Acute on chronic systolic CHF (EF 20-25%).    - fluid restriction 1L in 24 hours  - daily weight with intake and output monitoring   - NC and biPAP as needed  - BNP 14,302, dec to 6,184  - Received lasix 40 po x2, and 40 IV 1x  -CXR 12/14- stable pulm edema  -ABG pH 7.43, CO 44, O2 66 (can be due to COPD per respiratory therapist), HOC2 29, O2 sat 94%  - hold Entresto ( Cr above 2), c/w  statin, BB   - cardiology- lasix    # COPD stable  - duonebs q6h  -symbicort bid  - supplement oxygen    #Pneumonia  IV cefepime 1 g qd  doxycyclin  q12h. Dr. verde spoke with ID and ok with doxy  No fever     #CVA (cerebral vascular accident).     on statin   - not on ASA due to allergy  cardio-pt was on coumadin for CVA, restart if no CI   INR 2.8 today despite no coumadin since admission (LFT wnl). Hold coumadin, home dose 1 mg qd except Wed 2 mg.      # RITCHIE on CKD (chronic kidney disease) stage 3  - Cr 3.0  - monitor BUN/Cr ( pt received contrast on admission)   - hold Entresto   - nephrology- urine urea, lytes  US kidney- indeterminate 1 cm lesion seen on recent CT arising off the left lower pole is not visualized sonographically    #RUQ pain: mostly musculoskeletal    #New since prior examination is enlargement and haziness of the left adrenal gland on CT. This may be seen with adrenal hemorrhage or an inflammatory process  patient is completely asymptomatic , hemodynamically stable  - f/u 3- 6 months    #PVD- on statin  f/u vascular    #CT occluded left common femoral stent:  - arterial duplex- left femoral to popliteal artery bypass patent  - f/u vascular    #depression : continue with buspirone, trazodone    #fibromyalgia: continue with amitriptyline , gabapentin,  methadone    #diabetes: lispro 7 tid, glargine 25    #dyslipidemia : on statin    #full code     #from home          #Acute on chronic systolic CHF, lasix iv,( po lasix is not going to work in this pt due to acute chf and ritchie) she might need higher doses.   #RITCHIE , possibly multi factorial, contrast and possible cardio renal   # hx cva, cardio recommended anticoagulation but pt has not received ac for a few days and has high INR, could be due to liver disease possibly due to liver congestion ( ? cardiac congestion)   # anemia, stable, monitor, no active bleeding  #copd/asthma, not severe, pt does not need steroids  # abnormal ct suggestive of pna, cont abx, dw ID: no need to change doxy, cont doxy and cefepim   # HTN< pt had low bp before, reevaluate after pt received lasix    high risk, if sob does not improve with lasix IV, will need ICU eval, spoke with resident.   Full code.     high risk, if sob does not improve with lasix IV, will need ICU eval per Dr. Verde 58 year female patient with history of HFrEF ( EF 20-25%) , s/p AICD, COPD on home O2 (4L) , CVD, CVA with residual rt sided weakness , depression, fibromyalgia , PVD, presented for one week history of SOB that started at exertion and progressed to be at rest. Patient symptoms started with rhinorrhea , nasal congestion, sore throat, myalgia and arthralgia one week ago , associated with productive cough , with greenish sputum, subjective fever, and chills. Patient SOB worsened with flat position, patient complains of orthopnea , and mentioned that she recently need to sleep on 4 pillows ,and paroxysmal nocturnal dyspnea.  Pt was admitted for acute on chronic systolic CHF with possible PNA, pt is clinically improving for last 24 hours.     # Acute on chronic systolic CHF (EF 20-25%).    - fluid restriction 1L in 24 hours  - daily weight with intake and output monitoring   - NC and biPAP as needed  - BNP 14,302, dec to 6,184  - Received lasix 40 po x2, and 40 IV 1x  -CXR 12/14- stable pulm edema  -ABG pH 7.43, CO 44, O2 66 (can be due to COPD per respiratory therapist), HOC2 29, O2 sat 94%  - hold Entresto ( Cr above 2), c/w  statin, BB   - cardiology- lasix    # COPD stable  - duonebs q6h  -symbicort bid  - supplement oxygen    #Pneumonia  IV cefepime 1 g qd  doxycyclin  q12h. Dr. verde spoke with ID and ok with doxy  No fever     #CVA (cerebral vascular accident).     on statin   - not on ASA due to allergy  cardio-pt was on coumadin for CVA, restart if no CI   INR 2.8 today despite no coumadin since admission (LFT wnl). Hold coumadin, home dose 1 mg qd except Wed 2 mg.      # RITCHIE on CKD (chronic kidney disease) stage 3  -Nephro following  - Cr 3.0 today, was 1.3 in Oct  due to retention and cardiorenal syndrome   made 900 cc urine after placing Medina and LAsix 80 IVP  BP was 203/93, dec to 107/57, HR 57 after medina  UA is negative   strict Is and OS - if postobstructive polyuria occurs, clamp Medina for 15 min every hour to avoid hypotension  repeat BMP mag Pt/PTT 8 pm-replete electrolytes as needed  doubt pt will need HD with this UO, but may give Vit K 2 mg to correct INR to ~ in case she needs a line tomorrow  - hold Entresto   US kidney- indeterminate 1 cm lesion seen on recent CT arising off the left lower pole is not visualized sonographically    #RUQ pain: mostly musculoskeletal    #New since prior examination is enlargement and haziness of the left adrenal gland on CT. This may be seen with adrenal hemorrhage or an inflammatory process  patient is completely asymptomatic , hemodynamically stable  - f/u 3- 6 months    #PVD- on statin  f/u vascular    #CT occluded left common femoral stent:  - arterial duplex- left femoral to popliteal artery bypass patent  - f/u vascular    #depression : continue with buspirone, trazodone    #fibromyalgia: continue with amitriptyline , gabapentin,  methadone    #diabetes: lispro 7 tid, glargine 25    #dyslipidemia : on statin    #full code     #from home          #Acute on chronic systolic CHF, lasix iv,( po lasix is not going to work in this pt due to acute chf and ritchie) she might need higher doses.   #RITCHIE , possibly multi factorial, contrast and possible cardio renal   # hx cva, cardio recommended anticoagulation but pt has not received ac for a few days and has high INR, could be due to liver disease possibly due to liver congestion ( ? cardiac congestion)   # anemia, stable, monitor, no active bleeding  #copd/asthma, not severe, pt does not need steroids  # abnormal ct suggestive of pna, cont abx, dw ID: no need to change doxy, cont doxy and cefepim   # HTN< pt had low bp before, reevaluate after pt received lasix    high risk, if sob does not improve with lasix IV, will need ICU eval, spoke with resident.   Full code.     high risk, if sob does not improve with lasix IV, will need ICU eval per Dr. Verde

## 2018-12-14 NOTE — DISCHARGE NOTE ADULT - PATIENT PORTAL LINK FT
You can access the [email or web address] Patient Portal, offered by [facility], by registering with the following website: [email or web address]

## 2018-12-14 NOTE — DISCHARGE NOTE ADULT - MEDICATION SUMMARY - MEDICATIONS TO TAKE
I will START or STAY ON the medications listed below when I get home from the hospital:    methadone 10 mg oral tablet  -- 1 tab(s) by mouth once a day  -- Indication: For Detox    traMADol 50 mg oral tablet  -- 1 tab(s) by mouth every 8 hours, As needed, Severe Pain (7 - 10) / headache MDD:3 Tablets  -- Indication: For headache    isosorbide dinitrate 10 mg oral tablet  -- 1 tab(s) by mouth 3 times a day  -- Indication: For CHF (congestive heart failure)    warfarin 5 mg oral tablet  -- 1 tab(s) by mouth once a day   -- Indication: For Dvt    gabapentin 100 mg oral capsule  -- 1 cap(s) by mouth 3 times a day  -- Indication: For Pain    traZODone 50 mg oral tablet  -- 1 tab(s) by mouth once a day  -- Indication: For Pain    amitriptyline 25 mg oral tablet  -- 1 tab(s) by mouth once a day (at bedtime)  -- Indication: For Pain    Lantus 100 units/mL subcutaneous solution  -- 25 unit(s) subcutaneous once a day (at bedtime)  -- Indication: For DmII    NovoLOG 100 units/mL subcutaneous solution  -- 7 unit(s) subcutaneous 3 times a day (before meals)  -- Indication: For DmII    atorvastatin 40 mg oral tablet  -- 1 tab(s) by mouth once a day  -- Indication: For DLD    busPIRone 10 mg oral tablet  -- 1 tab(s) by mouth 2 times a day  -- Indication: For Anxiety    Metoprolol Succinate ER 25 mg oral tablet, extended release  -- 1 tab(s) by mouth once a day  -- Indication: For CHF (congestive heart failure)    Symbicort 160 mcg-4.5 mcg/inh inhalation aerosol  -- 2 puff(s) inhaled 2 times a day  -- Indication: For respiratory agents    Lasix 40 mg oral tablet  -- 1 tab(s) by mouth 2 times a day  -- Indication: For CHF (congestive heart failure)    hydrALAZINE 10 mg oral tablet  -- 1 tab(s) by mouth every 8 hours  -- Indication: For htn

## 2018-12-14 NOTE — CONSULT NOTE ADULT - ASSESSMENT
IMPRESSION:  Acute on chronic hypoxic respiratory failure  pulmonary edema  HFrEF  h/o COPD  RITCHIE      PLAN:    CNS:  no depressants    HEENT: Oral care    PULMONARY:  HOB @ 45 degrees, BPAP as needed     CARDIOVASCULAR: I < O, IV lasic, 2d echo, CE x2, cardiology eval    GI: GI prophylaxis.  Feeding     RENAL:  Follow up lytes.  Correct as needed    INFECTIOUS DISEASE: Follow up cultures    HEMATOLOGICAL:  DVT prophylaxis. LE duplex    ENDOCRINE:  Follow up FS.  Insulin protocol if needed.    MUSCULOSKELETAL:    admit to ICU

## 2018-12-14 NOTE — DISCHARGE NOTE ADULT - PLAN OF CARE
base line functional status successful treatment of PNA with antibiotic course while in hospital  Treatment of CHF exacerbation with IV lasix, correction of kidney injury as well.  Follow up with pulmonary for hemoptysis   Follow up with cardiologist Dr. Gutierrez and Nephrologist Dr. Shivam savage

## 2018-12-14 NOTE — CONSULT NOTE ADULT - SUBJECTIVE AND OBJECTIVE BOX
Patient is a 58y old  Female who presents with a chief complaint of sob (14 Dec 2018 17:18)      HPI:  58 year female patient with history of HFrEF ( EF 20-25%) , s/p AICD, COPD on home O2 (4L) , CVD, CVA with residual rt sided weakness , depression, fibromyalgia , PVD, presented for one week history of SOB that started at exertion and progressed to be at rest. Patient symptoms started with rhinorrhea , nasal congestion, sore throat, myalgia and arthralgia one week ago , associated with productive cough , with greenish sputum, subjective fever, and chills. Patient SOB worsened with flat position, patient complains of orthopnea , and mentioned that she recently need to sleep on 4 pillows ,and paroxysmal nocturnal dyspnea. There is a history of sick  contact . No recent travel. Patient denies any chest pain, there is no history of palpitations, no weight loss, no night sweats.  Patient mentioned mild lower limb swelling in the previous days, she is complaint to her medications.  Patient complains of RUQ pain starting 2 days ago, localized, intermittent, stabbing  in nature, moderate in intensity, related to coughing and taking deep breath, resolves spontaneously. Patient denied any RUQ pain without deep breath or coughing  In the ED  patient received one dosage of solumedrol , nebulizers , azithromycin and cefepime  patient SPO2 is 97% on bipap, mentioned that she improved since admission. (11 Dec 2018 14:59)      PAST MEDICAL & SURGICAL HISTORY:  Chronic pain  HLD (hyperlipidemia)  CVA (cerebral vascular accident)  Depression  COPD (chronic obstructive pulmonary disease)  Diabetes  CHF (congestive heart failure)  History of cholecystectomy  H/O tubal ligation  H/O abdominal hysterectomy  AICD (automatic cardioverter/defibrillator) present      SOCIAL HX:   Smoking        neg                 ETOH      neg                      Other   neg    FAMILY HISTORY:  Family history of diabetes mellitus (Mother)  Family history of hypertension (Mother)      ROS:  See HPI     Allergies    aspirin (Other (Moderate))  IV Contrast (Unknown)    Intolerances          PHYSICAL EXAM    ICU Vital Signs Last 24 Hrs  T(C): 35.7 (14 Dec 2018 14:00), Max: 37.2 (14 Dec 2018 05:00)  T(F): 96.3 (14 Dec 2018 14:00), Max: 98.9 (14 Dec 2018 05:00)  HR: 75 (14 Dec 2018 17:22) (75 - 126)  BP: 107/57 (14 Dec 2018 17:22) (95/49 - 203/93)  BP(mean): --  ABP: --  ABP(mean): --  RR: 22 (14 Dec 2018 14:06) (16 - 22)  SpO2: 97% (13 Dec 2018 20:00) (97% - 97%)      General:  on BPAP  HEENT:  JAQUELIN              Lymph Nodes: No cervical LN   Lungs: Bilateral BS, b/l crackles  Cardiovascular: Regular  Abdomen: Soft, Positive BS  Extremities: No clubbing, +2 b/l LLE  Skin: Warm  Neurological: Non focal       18 @ 07:01  -  18 @ 07:00  --------------------------------------------------------  IN:    Oral Fluid: 850 mL  Total IN: 850 mL    OUT:    Voided: 500 mL  Total OUT: 500 mL    Total NET: 350 mL          LABS:                          9.5    6.71  )-----------( 243      ( 14 Dec 2018 07:25 )             30.9                                               12-14    141  |  98  |  45<H>  ----------------------------<  82  4.3   |  27  |  3.0<H>    Ca    8.4<L>      14 Dec 2018 07:25  Mg     1.9         TPro  7.3  /  Alb  3.3<L>  /  TBili  0.4  /  DBili  x   /  AST  16  /  ALT  10  /  AlkPhos  108  12-14      PT/INR - ( 14 Dec 2018 11:27 )   PT: 32.00 sec;   INR: 2.81 ratio         PTT - ( 14 Dec 2018 11:27 )  PTT:47.2 sec                                       Urinalysis Basic - ( 12 Dec 2018 19:00 )    Color: Yellow / Appearance: Clear / S.020 / pH: x  Gluc: x / Ketone: Negative  / Bili: Negative / Urobili: 0.2 mg/dL   Blood: x / Protein: 100 mg/dL / Nitrite: Negative   Leuk Esterase: Negative / RBC: 3-5 /HPF / WBC x   Sq Epi: x / Non Sq Epi: Occasional /HPF / Bacteria: Few /HPF                                                  LIVER FUNCTIONS - ( 14 Dec 2018 07:25 )  Alb: 3.3 g/dL / Pro: 7.3 g/dL / ALK PHOS: 108 U/L / ALT: 10 U/L / AST: 16 U/L / GGT: x                                                  Culture - Sputum (collected 14 Dec 2018 07:15)  Source: .Sputum Sputum  Gram Stain (14 Dec 2018 15:23):    Moderate polymorphonuclear leukocytes per low power field    Rare Squamous epithelial cells per low power field    No organisms seen per oil power field                                                                                       ABG - ( 14 Dec 2018 14:45 )  pH, Arterial: 7.43  pH, Blood: x     /  pCO2: 44    /  pO2: 66    / HCO3: 29    / Base Excess: 4.1   /  SaO2: 94                  X-Rays       b/l opacities                                                                             MEDICATIONS  (STANDING):  ALBUTerol    90 MICROgram(s) HFA Inhaler 1 Puff(s) Inhalation every 4 hours  ALBUTerol/ipratropium for Nebulization 3 milliLiter(s) Nebulizer every 6 hours  amitriptyline 25 milliGRAM(s) Oral at bedtime  atorvastatin 40 milliGRAM(s) Oral at bedtime  buDESOnide 160 MICROgram(s)/formoterol 4.5 MICROgram(s) Inhaler 2 Puff(s) Inhalation two times a day  busPIRone 10 milliGRAM(s) Oral two times a day  cefepime   IVPB 1000 milliGRAM(s) IV Intermittent daily  chlorhexidine 4% Liquid 1 Application(s) Topical <User Schedule>  dextrose 5%. 1000 milliLiter(s) (50 mL/Hr) IV Continuous <Continuous>  dextrose 50% Injectable 12.5 Gram(s) IV Push once  dextrose 50% Injectable 25 Gram(s) IV Push once  dextrose 50% Injectable 25 Gram(s) IV Push once  doxycycline IVPB 100 milliGRAM(s) IV Intermittent every 12 hours  furosemide    Tablet 40 milliGRAM(s) Oral daily  gabapentin 100 milliGRAM(s) Oral three times a day  guaiFENesin/dextromethorphan  Syrup 10 milliLiter(s) Oral every 4 hours  heparin  Injectable 5000 Unit(s) SubCutaneous every 8 hours  insulin glargine Injectable (LANTUS) 25 Unit(s) SubCutaneous at bedtime  insulin lispro (HumaLOG) corrective regimen sliding scale   SubCutaneous three times a day before meals  insulin lispro Injectable (HumaLOG) 7 Unit(s) SubCutaneous before breakfast  insulin lispro Injectable (HumaLOG) 7 Unit(s) SubCutaneous before lunch  insulin lispro Injectable (HumaLOG) 7 Unit(s) SubCutaneous before dinner  methadone    Tablet 10 milliGRAM(s) Oral daily  metoprolol succinate ER 25 milliGRAM(s) Oral daily  traZODone 50 milliGRAM(s) Oral daily    MEDICATIONS  (PRN):  acetaminophen   Tablet .. 650 milliGRAM(s) Oral every 6 hours PRN Moderate Pain (4 - 6)  dextrose 40% Gel 15 Gram(s) Oral once PRN Blood Glucose LESS THAN 70 milliGRAM(s)/deciliter  glucagon  Injectable 1 milliGRAM(s) IntraMuscular once PRN Glucose LESS THAN 70 milligrams/deciliter

## 2018-12-14 NOTE — PROGRESS NOTE ADULT - ASSESSMENT
58 year female patient with history of HFrEF ( EF 20-25%)  s/p AICD, COPD on home O2 (4L) presenting with productive cough and SOB of 3 weeks duration and RLL opacity on CXR    IMPRESSION:  Chronic multlobar GNR PNA RML/RLL/LLL with minimal right sided pleural effusion with no sepsis.  Does have a component of CHF  Is responding to current ABx  WBC 6.7  Worsening renal function  BCx NTD  Nares for ORSA  RVP NTD    RECOMMENDATIONS:  CHANGE CEFEPIME TO 1 GM IV Q24H  D/c Doxycycline   AZITHROMYCIN 250 MG Q24H IV  Duration of therapy is 7 days.  Could change to po Levoquin 250 mg q24h to complete the 7 days on discharge  Recall prn please  Will change to po in am

## 2018-12-14 NOTE — PROGRESS NOTE ADULT - SUBJECTIVE AND OBJECTIVE BOX
58 year female patient with history of HFrEF ( EF 20-25%) , s/p AICD, COPD on home O2 (4L) , CVD, CVA with residual rt sided weakness , depression, fibromyalgia , PVD, presented for one week history of SOB that started at exertion and progressed to be at rest. Patient symptoms started with rhinorrhea , nasal congestion, sore throat, myalgia and arthralgia one week ago , associated with productive cough , with greenish sputum, subjective fever, and chills. Patient SOB worsened with flat position, patient complains of orthopnea , and mentioned that she recently need to sleep on 4 pillows ,and paroxysmal nocturnal dyspnea. There is a history of sick  contact . No recent travel. Patient denies any chest pain, there is no history of palpitations, no weight loss, no night sweats.  Patient mentioned mild lower limb swelling in the previous days, she is complaint to her medications.  pt has developed RITCHIE in the last few days.   today pt has sob and is in mild to mod respiratory distress.     Vital Signs Last 24 Hrs  T(C): 35.7 (14 Dec 2018 14:00), Max: 37.2 (14 Dec 2018 05:00)  T(F): 96.3 (14 Dec 2018 14:00), Max: 98.9 (14 Dec 2018 05:00)  HR: 118 (14 Dec 2018 14:06) (84 - 126)  BP: 180/80 (14 Dec 2018 14:06) (95/49 - 203/93)  RR: 22 (14 Dec 2018 14:06) (16 - 22)  SpO2: 97% (13 Dec 2018 20:00) (97% - 97%)    Physical exam:   constitutional mild resp distress, AAOX3 but pt feels tired , Respiratory  lungs bilat crackles up to mid to upper chest,  CVS heart RRR, GI: abdomen Soft NT, ND, BS+, skin: intact  neuro exam non focal. lower ext has edema bilat.                           9.5    6.71  )-----------( 243      ( 14 Dec 2018 07:25 )             30.9   12-14    141  |  98  |  45<H>  ----------------------------<  82  4.3   |  27  |  3.0<H>    Ca    8.4<L>      14 Dec 2018 07:25  Mg     1.9     12-14    TPro  7.3  /  Alb  3.3<L>  /  TBili  0.4  /  DBili  x   /  AST  16  /  ALT  10  /  AlkPhos  108  12-14    dw cardio and nephro, gave pt IV lasix, Check abg and put pt back on bipap    A/P    #Acute on chronic systolic CHF, lasix iv,( po lasix is not going to work in this pt due to acute chf and ritchie) she might need higher doses.   #RITCHIE , possibly multi factorial, contrast and possible cardio renal   # hx cva, cardio recommended anticoagulation but pt has not received ac for a few days and has high INR, could be due to liver disease possibly due to liver congestion ( ? cardiac congestion)   # anemia, stable, monitor, no active bleeding  #copd/asthma, not severe, pt does not need steroids  # abnormal ct suggestive of pna, cont abx, dw ID: no need to change doxy, cont doxy and cefepim   # HTN< pt had low bp before, reevaluate after pt received lasix    high risk, if sob does not improve with lasix IV, will need ICU eval, spoke with resident.   Full code.

## 2018-12-14 NOTE — PROGRESS NOTE ADULT - SUBJECTIVE AND OBJECTIVE BOX
SULY PENA  58y, Female      OVERNIGHT EVENTS:    no fevers, no new complaints  no cough, has SOB, no phlegm    VITALS:  T(F): 98.9, Max: 98.9 (- @ 05:00)  HR: 84  BP: 121/60  RR: 18Vital Signs Last 24 Hrs  T(C): 37.2 (14 Dec 2018 05:00), Max: 37.2 (14 Dec 2018 05:00)  T(F): 98.9 (14 Dec 2018 05:00), Max: 98.9 (14 Dec 2018 05:00)  HR: 84 (14 Dec 2018 05:00) (84 - 86)  BP: 121/60 (14 Dec 2018 05:00) (95/49 - 121/60)  BP(mean): --  RR: 18 (14 Dec 2018 05:00) (15 - 18)  SpO2: 97% (13 Dec 2018 20:00) (97% - 97%)    TESTS & MEASUREMENTS:                        9.5    6.71  )-----------( 243      ( 14 Dec 2018 07:25 )             30.9         141  |  98  |  45<H>  ----------------------------<  82  4.3   |  27  |  3.0<H>    Ca    8.4<L>      14 Dec 2018 07:25  Mg     1.9         TPro  7.3  /  Alb  3.3<L>  /  TBili  0.4  /  DBili  x   /  AST  16  /  ALT  10  /  AlkPhos  108      LIVER FUNCTIONS - ( 14 Dec 2018 07:25 )  Alb: 3.3 g/dL / Pro: 7.3 g/dL / ALK PHOS: 108 U/L / ALT: 10 U/L / AST: 16 U/L / GGT: x             Urinalysis Basic - ( 12 Dec 2018 19:00 )    Color: Yellow / Appearance: Clear / S.020 / pH: x  Gluc: x / Ketone: Negative  / Bili: Negative / Urobili: 0.2 mg/dL   Blood: x / Protein: 100 mg/dL / Nitrite: Negative   Leuk Esterase: Negative / RBC: 3-5 /HPF / WBC x   Sq Epi: x / Non Sq Epi: Occasional /HPF / Bacteria: Few /HPF          RADIOLOGY & ADDITIONAL TESTS:    ANTIBIOTICS:  cefepime   IVPB 2000 milliGRAM(s) IV Intermittent every 12 hours  doxycycline IVPB 100 milliGRAM(s) IV Intermittent every 12 hours

## 2018-12-14 NOTE — DISCHARGE NOTE ADULT - HOSPITAL COURSE
57yo F with Past Medical History Chronic Systolic CHF status post AICD placement, COPD on home O2 (4L) , CVD, CVA with residual right sided weakness, depression, fibromyalgia and PVD admitted for worsening dyspnea x1 week. Her hospital course was complicated by an episode of acute respiratory failure and hemoptysis.     For her Acute on chronic systolic heart failure Lasix was switched to 40mg PO q12 from 4 days of IV lasix.  She did well on Continued supportive care with BIPAP at night.  She was taken Off Entresto due to persistent hypotension and RITCHIE. Her case was discussed with cardiology, Dr. Gutierrez, who advised starting Hydralazine and Isosorbide dinitrate which was and BP tolerated well. Acute kidney injury secondary to cardiorenal syndrome aslo treated with IV lasix, and Nephrology input was appreciated and Serum creatinine remains stable @ 1.5  Pt had course of ABX for rule out Community Acquired PNA: status post Cefepime and Azithromycin.    Pt has unclear hx of Chronic Thromboembolism/? Hypercoagulable state: and was started on Hep gtt until INR remains therapeutic.  The patient was previously taking 1mg at home, decrease Coumadin to 3mg at bedtime.    Also had episode of hemoptysis which is why AC was held and starting was delayed. 57yo F with Past Medical History Chronic Systolic CHF status post AICD placement, COPD on home O2 (4L) , CVD, CVA with residual right sided weakness, depression, fibromyalgia and PVD admitted for worsening dyspnea x1 week. Her hospital course was complicated by an episode of acute respiratory failure and hemoptysis.     For her Acute on chronic systolic heart failure Lasix was switched to 40mg PO q12 from 4 days of IV lasix.  She did well on Continued supportive care with BIPAP at night.  She was taken Off Entresto due to persistent hypotension and RITCHIE. Her case was discussed with cardiology, Dr. Gutierrez, who advised starting Hydralazine and Isosorbide dinitrate which was and BP tolerated well. Acute kidney injury secondary to cardiorenal syndrome aslo treated with IV lasix, and Nephrology input was appreciated and Serum creatinine remains stable @ 1.5.  The patient was also treated with a short course of antibiotics (Cefepime and Azithromycin).     Pt has unclear hx of Chronic Thromboembolism/? Hypercoagulable state, and was started on Hep gtt until INR remains therapeutic.  She was previously taking 1mg at home, though we increased her dosage to 3mg as inpatient.  The patient will be discharged home on 1mg at bedtime.  Her last INR was 1.7 on 12/19/18.  Also had episode of hemoptysis which is why AC was held and starting was delayed.

## 2018-12-14 NOTE — DISCHARGE NOTE ADULT - MEDICATION SUMMARY - MEDICATIONS TO STOP TAKING
I will STOP taking the medications listed below when I get home from the hospital:    Entresto 24 mg-26 mg oral tablet  -- 1 tab(s) by mouth 2 times a day

## 2018-12-14 NOTE — DISCHARGE NOTE ADULT - CARE PROVIDERS DIRECT ADDRESSES
,[email or web address]

## 2018-12-15 LAB
ALBUMIN SERPL ELPH-MCNC: 3 G/DL — LOW (ref 3.5–5.2)
ALBUMIN SERPL ELPH-MCNC: 3.4 G/DL — LOW (ref 3.5–5.2)
ALP SERPL-CCNC: 113 U/L — SIGNIFICANT CHANGE UP (ref 30–115)
ALP SERPL-CCNC: 119 U/L — HIGH (ref 30–115)
ALT FLD-CCNC: 10 U/L — SIGNIFICANT CHANGE UP (ref 0–41)
ALT FLD-CCNC: 11 U/L — SIGNIFICANT CHANGE UP (ref 0–41)
ANION GAP SERPL CALC-SCNC: 11 MMOL/L — SIGNIFICANT CHANGE UP (ref 7–14)
ANION GAP SERPL CALC-SCNC: 12 MMOL/L — SIGNIFICANT CHANGE UP (ref 7–14)
ANION GAP SERPL CALC-SCNC: 13 MMOL/L — SIGNIFICANT CHANGE UP (ref 7–14)
APTT BLD: 41.9 SEC — HIGH (ref 27–39.2)
APTT BLD: 42 SEC — HIGH (ref 27–39.2)
APTT BLD: 50.4 SEC — HIGH (ref 27–39.2)
APTT BLD: 72.5 SEC — CRITICAL HIGH (ref 27–39.2)
AST SERPL-CCNC: 15 U/L — SIGNIFICANT CHANGE UP (ref 0–41)
AST SERPL-CCNC: 19 U/L — SIGNIFICANT CHANGE UP (ref 0–41)
BASOPHILS # BLD AUTO: 0.01 K/UL — SIGNIFICANT CHANGE UP (ref 0–0.2)
BASOPHILS # BLD AUTO: 0.02 K/UL — SIGNIFICANT CHANGE UP (ref 0–0.2)
BASOPHILS NFR BLD AUTO: 0.2 % — SIGNIFICANT CHANGE UP (ref 0–1)
BASOPHILS NFR BLD AUTO: 0.3 % — SIGNIFICANT CHANGE UP (ref 0–1)
BILIRUB SERPL-MCNC: 0.4 MG/DL — SIGNIFICANT CHANGE UP (ref 0.2–1.2)
BILIRUB SERPL-MCNC: 0.5 MG/DL — SIGNIFICANT CHANGE UP (ref 0.2–1.2)
BLD GP AB SCN SERPL QL: SIGNIFICANT CHANGE UP
BUN SERPL-MCNC: 43 MG/DL — HIGH (ref 10–20)
BUN SERPL-MCNC: 44 MG/DL — HIGH (ref 10–20)
BUN SERPL-MCNC: 44 MG/DL — HIGH (ref 10–20)
CALCIUM SERPL-MCNC: 8.3 MG/DL — LOW (ref 8.5–10.1)
CALCIUM SERPL-MCNC: 8.6 MG/DL — SIGNIFICANT CHANGE UP (ref 8.5–10.1)
CALCIUM SERPL-MCNC: 8.6 MG/DL — SIGNIFICANT CHANGE UP (ref 8.5–10.1)
CHLORIDE SERPL-SCNC: 91 MMOL/L — LOW (ref 98–110)
CHLORIDE SERPL-SCNC: 92 MMOL/L — LOW (ref 98–110)
CHLORIDE SERPL-SCNC: 96 MMOL/L — LOW (ref 98–110)
CK MB CFR SERPL CALC: 1.7 NG/ML — SIGNIFICANT CHANGE UP (ref 0.6–6.3)
CK SERPL-CCNC: 33 U/L — SIGNIFICANT CHANGE UP (ref 0–225)
CO2 SERPL-SCNC: 26 MMOL/L — SIGNIFICANT CHANGE UP (ref 17–32)
CO2 SERPL-SCNC: 29 MMOL/L — SIGNIFICANT CHANGE UP (ref 17–32)
CO2 SERPL-SCNC: 30 MMOL/L — SIGNIFICANT CHANGE UP (ref 17–32)
CREAT SERPL-MCNC: 2.5 MG/DL — HIGH (ref 0.7–1.5)
CREAT SERPL-MCNC: 2.6 MG/DL — HIGH (ref 0.7–1.5)
CREAT SERPL-MCNC: 2.7 MG/DL — HIGH (ref 0.7–1.5)
EOSINOPHIL # BLD AUTO: 0.03 K/UL — SIGNIFICANT CHANGE UP (ref 0–0.7)
EOSINOPHIL # BLD AUTO: 0.11 K/UL — SIGNIFICANT CHANGE UP (ref 0–0.7)
EOSINOPHIL NFR BLD AUTO: 0.5 % — SIGNIFICANT CHANGE UP (ref 0–8)
EOSINOPHIL NFR BLD AUTO: 1.6 % — SIGNIFICANT CHANGE UP (ref 0–8)
GLUCOSE BLDC GLUCOMTR-MCNC: 160 MG/DL — HIGH (ref 70–99)
GLUCOSE BLDC GLUCOMTR-MCNC: 334 MG/DL — HIGH (ref 70–99)
GLUCOSE BLDC GLUCOMTR-MCNC: 63 MG/DL — LOW (ref 70–99)
GLUCOSE BLDC GLUCOMTR-MCNC: 77 MG/DL — SIGNIFICANT CHANGE UP (ref 70–99)
GLUCOSE BLDC GLUCOMTR-MCNC: 85 MG/DL — SIGNIFICANT CHANGE UP (ref 70–99)
GLUCOSE BLDC GLUCOMTR-MCNC: 91 MG/DL — SIGNIFICANT CHANGE UP (ref 70–99)
GLUCOSE BLDC GLUCOMTR-MCNC: 99 MG/DL — SIGNIFICANT CHANGE UP (ref 70–99)
GLUCOSE SERPL-MCNC: 164 MG/DL — HIGH (ref 70–99)
GLUCOSE SERPL-MCNC: 196 MG/DL — HIGH (ref 70–99)
GLUCOSE SERPL-MCNC: 90 MG/DL — SIGNIFICANT CHANGE UP (ref 70–99)
HCT VFR BLD CALC: 31 % — LOW (ref 37–47)
HCT VFR BLD CALC: 31 % — LOW (ref 37–47)
HGB BLD-MCNC: 9.5 G/DL — LOW (ref 12–16)
HGB BLD-MCNC: 9.6 G/DL — LOW (ref 12–16)
IMM GRANULOCYTES NFR BLD AUTO: 0.3 % — SIGNIFICANT CHANGE UP (ref 0.1–0.3)
IMM GRANULOCYTES NFR BLD AUTO: 0.5 % — HIGH (ref 0.1–0.3)
INR BLD: 1.46 RATIO — HIGH (ref 0.65–1.3)
INR BLD: 1.7 RATIO — HIGH (ref 0.65–1.3)
INR BLD: 2.51 RATIO — HIGH (ref 0.65–1.3)
LEGIONELLA AG UR QL: NEGATIVE — SIGNIFICANT CHANGE UP
LYMPHOCYTES # BLD AUTO: 0.85 K/UL — LOW (ref 1.2–3.4)
LYMPHOCYTES # BLD AUTO: 1.25 K/UL — SIGNIFICANT CHANGE UP (ref 1.2–3.4)
LYMPHOCYTES # BLD AUTO: 13.2 % — LOW (ref 20.5–51.1)
LYMPHOCYTES # BLD AUTO: 17.9 % — LOW (ref 20.5–51.1)
MAGNESIUM SERPL-MCNC: 1.7 MG/DL — LOW (ref 1.8–2.4)
MAGNESIUM SERPL-MCNC: 1.7 MG/DL — LOW (ref 1.8–2.4)
MAGNESIUM SERPL-MCNC: 1.8 MG/DL — SIGNIFICANT CHANGE UP (ref 1.8–2.4)
MCHC RBC-ENTMCNC: 26.1 PG — LOW (ref 27–31)
MCHC RBC-ENTMCNC: 26.5 PG — LOW (ref 27–31)
MCHC RBC-ENTMCNC: 30.6 G/DL — LOW (ref 32–37)
MCHC RBC-ENTMCNC: 31 G/DL — LOW (ref 32–37)
MCV RBC AUTO: 85.2 FL — SIGNIFICANT CHANGE UP (ref 81–99)
MCV RBC AUTO: 85.6 FL — SIGNIFICANT CHANGE UP (ref 81–99)
MONOCYTES # BLD AUTO: 0.66 K/UL — HIGH (ref 0.1–0.6)
MONOCYTES # BLD AUTO: 0.89 K/UL — HIGH (ref 0.1–0.6)
MONOCYTES NFR BLD AUTO: 10.2 % — HIGH (ref 1.7–9.3)
MONOCYTES NFR BLD AUTO: 12.8 % — HIGH (ref 1.7–9.3)
NEUTROPHILS # BLD AUTO: 4.68 K/UL — SIGNIFICANT CHANGE UP (ref 1.4–6.5)
NEUTROPHILS # BLD AUTO: 4.86 K/UL — SIGNIFICANT CHANGE UP (ref 1.4–6.5)
NEUTROPHILS NFR BLD AUTO: 67.1 % — SIGNIFICANT CHANGE UP (ref 42.2–75.2)
NEUTROPHILS NFR BLD AUTO: 75.4 % — HIGH (ref 42.2–75.2)
NRBC # BLD: 0 /100 WBCS — SIGNIFICANT CHANGE UP (ref 0–0)
NRBC # BLD: 0 /100 WBCS — SIGNIFICANT CHANGE UP (ref 0–0)
PHOSPHATE SERPL-MCNC: 3.8 MG/DL — SIGNIFICANT CHANGE UP (ref 2.1–4.9)
PHOSPHATE SERPL-MCNC: 3.8 MG/DL — SIGNIFICANT CHANGE UP (ref 2.1–4.9)
PHOSPHATE SERPL-MCNC: 4 MG/DL — SIGNIFICANT CHANGE UP (ref 2.1–4.9)
PLATELET # BLD AUTO: 221 K/UL — SIGNIFICANT CHANGE UP (ref 130–400)
PLATELET # BLD AUTO: 234 K/UL — SIGNIFICANT CHANGE UP (ref 130–400)
POTASSIUM SERPL-MCNC: 4.4 MMOL/L — SIGNIFICANT CHANGE UP (ref 3.5–5)
POTASSIUM SERPL-MCNC: 4.4 MMOL/L — SIGNIFICANT CHANGE UP (ref 3.5–5)
POTASSIUM SERPL-MCNC: 5.3 MMOL/L — HIGH (ref 3.5–5)
POTASSIUM SERPL-SCNC: 4.4 MMOL/L — SIGNIFICANT CHANGE UP (ref 3.5–5)
POTASSIUM SERPL-SCNC: 4.4 MMOL/L — SIGNIFICANT CHANGE UP (ref 3.5–5)
POTASSIUM SERPL-SCNC: 5.3 MMOL/L — HIGH (ref 3.5–5)
PROT SERPL-MCNC: 6.6 G/DL — SIGNIFICANT CHANGE UP (ref 6–8)
PROT SERPL-MCNC: 7.5 G/DL — SIGNIFICANT CHANGE UP (ref 6–8)
PROTHROM AB SERPL-ACNC: 16.7 SEC — HIGH (ref 9.95–12.87)
PROTHROM AB SERPL-ACNC: 19.5 SEC — HIGH (ref 9.95–12.87)
PROTHROM AB SERPL-ACNC: 28.6 SEC — HIGH (ref 9.95–12.87)
RBC # BLD: 3.62 M/UL — LOW (ref 4.2–5.4)
RBC # BLD: 3.64 M/UL — LOW (ref 4.2–5.4)
RBC # FLD: 16.1 % — HIGH (ref 11.5–14.5)
RBC # FLD: 16.2 % — HIGH (ref 11.5–14.5)
SODIUM SERPL-SCNC: 131 MMOL/L — LOW (ref 135–146)
SODIUM SERPL-SCNC: 132 MMOL/L — LOW (ref 135–146)
SODIUM SERPL-SCNC: 137 MMOL/L — SIGNIFICANT CHANGE UP (ref 135–146)
TROPONIN T SERPL-MCNC: <0.01 NG/ML — SIGNIFICANT CHANGE UP
TYPE + AB SCN PNL BLD: SIGNIFICANT CHANGE UP
WBC # BLD: 6.44 K/UL — SIGNIFICANT CHANGE UP (ref 4.8–10.8)
WBC # BLD: 6.97 K/UL — SIGNIFICANT CHANGE UP (ref 4.8–10.8)
WBC # FLD AUTO: 6.44 K/UL — SIGNIFICANT CHANGE UP (ref 4.8–10.8)
WBC # FLD AUTO: 6.97 K/UL — SIGNIFICANT CHANGE UP (ref 4.8–10.8)

## 2018-12-15 RX ORDER — HEPARIN SODIUM 5000 [USP'U]/ML
1200 INJECTION INTRAVENOUS; SUBCUTANEOUS
Qty: 25000 | Refills: 0 | Status: DISCONTINUED | OUTPATIENT
Start: 2018-12-15 | End: 2018-12-16

## 2018-12-15 RX ORDER — PHYTONADIONE (VIT K1) 5 MG
2.5 TABLET ORAL ONCE
Qty: 0 | Refills: 0 | Status: COMPLETED | OUTPATIENT
Start: 2018-12-15 | End: 2018-12-15

## 2018-12-15 RX ADMIN — Medication 650 MILLIGRAM(S): at 02:47

## 2018-12-15 RX ADMIN — Medication 40 MILLIGRAM(S): at 05:52

## 2018-12-15 RX ADMIN — Medication 10 MILLIGRAM(S): at 17:53

## 2018-12-15 RX ADMIN — Medication 110 MILLIGRAM(S): at 05:51

## 2018-12-15 RX ADMIN — Medication 10 MILLIGRAM(S): at 05:52

## 2018-12-15 RX ADMIN — Medication 40 MILLIGRAM(S): at 17:31

## 2018-12-15 RX ADMIN — GABAPENTIN 100 MILLIGRAM(S): 400 CAPSULE ORAL at 22:11

## 2018-12-15 RX ADMIN — METHADONE HYDROCHLORIDE 10 MILLIGRAM(S): 40 TABLET ORAL at 12:51

## 2018-12-15 RX ADMIN — Medication 100.5 MILLIGRAM(S): at 01:38

## 2018-12-15 RX ADMIN — Medication 3 MILLILITER(S): at 08:22

## 2018-12-15 RX ADMIN — Medication 650 MILLIGRAM(S): at 01:45

## 2018-12-15 RX ADMIN — ATORVASTATIN CALCIUM 40 MILLIGRAM(S): 80 TABLET, FILM COATED ORAL at 22:11

## 2018-12-15 RX ADMIN — INSULIN GLARGINE 25 UNIT(S): 100 INJECTION, SOLUTION SUBCUTANEOUS at 00:59

## 2018-12-15 RX ADMIN — Medication 7 UNIT(S): at 17:17

## 2018-12-15 RX ADMIN — GABAPENTIN 100 MILLIGRAM(S): 400 CAPSULE ORAL at 05:54

## 2018-12-15 RX ADMIN — CHLORHEXIDINE GLUCONATE 1 APPLICATION(S): 213 SOLUTION TOPICAL at 05:54

## 2018-12-15 RX ADMIN — Medication 3 MILLILITER(S): at 13:34

## 2018-12-15 RX ADMIN — Medication 50 MILLIGRAM(S): at 12:51

## 2018-12-15 RX ADMIN — Medication 2: at 17:17

## 2018-12-15 RX ADMIN — GABAPENTIN 100 MILLIGRAM(S): 400 CAPSULE ORAL at 14:49

## 2018-12-15 RX ADMIN — HEPARIN SODIUM 12 UNIT(S)/HR: 5000 INJECTION INTRAVENOUS; SUBCUTANEOUS at 12:32

## 2018-12-15 NOTE — PROGRESS NOTE ADULT - SUBJECTIVE AND OBJECTIVE BOX
INTERVAL HISTORY: + hemoptysis overnight, vit K given  no active card complains this am, of bipap  good UO  	  MEDICATIONS:  acetaminophen   Tablet .. 650 milliGRAM(s) Oral every 6 hours PRN  ALBUTerol    90 MICROgram(s) HFA Inhaler 1 Puff(s) Inhalation every 4 hours  ALBUTerol/ipratropium for Nebulization 3 milliLiter(s) Nebulizer every 6 hours  atorvastatin 40 milliGRAM(s) Oral at bedtime  buDESOnide 160 MICROgram(s)/formoterol 4.5 MICROgram(s) Inhaler 2 Puff(s) Inhalation two times a day  busPIRone 10 milliGRAM(s) Oral two times a day  chlorhexidine 4% Liquid 1 Application(s) Topical <User Schedule>  dextrose 40% Gel 15 Gram(s) Oral once PRN  dextrose 50% Injectable 12.5 Gram(s) IV Push once  dextrose 50% Injectable 25 Gram(s) IV Push once  dextrose 50% Injectable 25 Gram(s) IV Push once  furosemide    Tablet 40 milliGRAM(s) Oral daily  furosemide   Injectable 40 milliGRAM(s) IV Push two times a day  gabapentin 100 milliGRAM(s) Oral three times a day  glucagon  Injectable 1 milliGRAM(s) IntraMuscular once PRN  guaiFENesin/dextromethorphan  Syrup 10 milliLiter(s) Oral every 4 hours  heparin  Infusion 1200 Unit(s)/Hr IV Continuous <Continuous>  insulin glargine Injectable (LANTUS) 25 Unit(s) SubCutaneous at bedtime  insulin lispro (HumaLOG) corrective regimen sliding scale   SubCutaneous three times a day before meals  insulin lispro Injectable (HumaLOG) 7 Unit(s) SubCutaneous before breakfast  insulin lispro Injectable (HumaLOG) 7 Unit(s) SubCutaneous before lunch  insulin lispro Injectable (HumaLOG) 7 Unit(s) SubCutaneous before dinner  methadone    Tablet 10 milliGRAM(s) Oral daily  metoprolol succinate ER 25 milliGRAM(s) Oral daily      PHYSICAL EXAM:  T(C): 37 (12-15-18 @ 12:00), Max: 37 (12-15-18 @ 12:00)  HR: 84 (12-15-18 @ 13:00) (60 - 118)  BP: 109/61 (12-15-18 @ 13:00) (97/50 - 180/80)  RR: 40 (12-15-18 @ 13:00) (11 - 40)  SpO2: 91% (12-15-18 @ 13:00) (91% - 99%)  Wt(kg): --  I&O's Summary    14 Dec 2018 07:01  -  15 Dec 2018 07:00  --------------------------------------------------------  IN: 210 mL / OUT: 1420 mL / NET: -1210 mL    15 Dec 2018 07:01  -  15 Dec 2018 14:03  --------------------------------------------------------  IN: 12 mL / OUT: 875 mL / NET: -863 mL      Height (cm): 165.1 (12-15 @ 13:14)  Weight (kg): 97.5 (12-15 @ 13:14)  BMI (kg/m2): 35.8 (12-15 @ 13:14)  BSA (m2): 2.04 (12-15 @ 13:14)    Constitutional: NAD  HEENT: anicteric sclera  Neck: No JVD  Respiratory: Crackles at the bases   Cardiovascular: S1, S2, RRR  Gastrointestinal: BS+, soft, NT/ND  Extremities: No cyanosis or clubbing. No peripheral edema  Skin: No rashes      LABS:	 	    CARDIAC MARKERS ( 14 Dec 2018 23:35 )  x     / <0.01 ng/mL / 33 U/L / x     / 1.7 ng/mL                            9.5    6.97  )-----------( 234      ( 15 Dec 2018 04:20 )             31.0     12-15    137  |  96<L>  |  44<H>  ----------------------------<  90  4.4   |  30  |  2.5<H>    Ca    8.6      15 Dec 2018 04:20  Phos  3.8     12-15  Mg     1.8     12-15    TPro  6.6  /  Alb  3.0<L>  /  TBili  0.4  /  DBili  x   /  AST  15  /  ALT  10  /  AlkPhos  113  12-15

## 2018-12-15 NOTE — PROGRESS NOTE ADULT - ASSESSMENT
CHF/NICM  RITCHIE on CKD  COPD/PNA  Hemoptysis  A/C for DVT    - cont card meds  - iv lasix to 24-48 hours, then change to po  - resume a/c once safe from pulm standpoint  - supplement k/mg as needed  - will follow

## 2018-12-15 NOTE — PROGRESS NOTE ADULT - ASSESSMENT
58 year female patient with history of HFrEF ( EF 20-25%) , s/p AICD, COPD on home O2 (4L) , CVD, CVA with residual rt sided weakness, depression, fibromyalgia , PVD, presented for one week history of SOB that started at exertion and progressed to be at rest. Patient symptoms started with rhinorrhea , nasal congestion, sore throat, myalgia and arthralgia one week ago , associated with productive cough , with greenish sputum, subjective fever, and chills.   Pt was admitted for acute on chronic systolic CHF with possible PNA. She also had RITCHIE on CKD, Cr 3.1 (baseline 1.3, received IV contrast). Yesterday pt reported SOB, O2 sat 94% on biPAP, /93, . Got po lasix 40 2x, 40 IV and 80 IV without much urine output and improvement in SOB. CXR showed stable pulm edema. ABG pH 7.43, CO 44, O2 66, HOC2 29, O2 sat 94%. Bedside bladder scan >200 mL urine. Pt was approved for upgrade to ICU by Dr Greene. Medina placed and 750 mL came out. BP decreased  to 107/57. HR 57, O2 95. SOB improved.      IMPRESSION:  Hemoptysis: resolved    Acute on chronic hypoxic respiratory failure  pulmonary edema  HFrEF  h/o COPD  RITCHIE  Urinary retention     PLAN:    CNS:  no depressants    HEENT: Oral care    PULMONARY:  HOB @ 45 degrees, BPAP as needed     CARDIOVASCULAR: I < O, IV lasic, 2d echo, CE x2, cardiology eval    GI: GI prophylaxis.  NPO s/p     RENAL:  Follow up lytes.  Correct as needed, Keep medina     INFECTIOUS DISEASE: Follow up cultures if negative DC ABx, repeat RVP    HEMATOLOGICAL:   LE duplex negative  S/p Kacentra and 1 2.5 IV Vit K     ENDOCRINE:  Follow up FS.  Insulin protocol if needed.    MUSCULOSKELETAL: OOB to chair     DISPO: ICU monitoring     Electrolyte Imbalances: Correct as needed  []  Hyponatremia   /   Hypernatremia  []   []  Hypokalemia   /   Hyperkalemia  []   []  Hypochlorhydria   /    Hypochlorhydria  []   [X]  Hypomagnesemia   /   Hypermagnesemia  []   []  Hypophosphatemia   /   Hyperphosphatemia  []       GI ppx:                                   [] Not indicated   /   [X] Pantoprazole 40mg PO Daily    DVT ppx: Hold for Bleeding revaluate on rounds   [X] Not indicated / [] Heparin 5000mg SubQ / [] Lovenox 40mg SubQ / [] SCDs    Fluids:   [] PO  |  [] IVF    Activity:  [X] Assistance needed   [X] Increase as Tolerated  /  [] OOB w/ assist  /  [] Bed Rest    BMI:  Height (cm): 165.1 (12-14)  Weight (kg): 97.5 (12-14)  BMI (kg/m2): 35.8 (12-14)        DISPO:  Patient to be discharged when condition(s) optimized.  [ X] From Home     [ ] NH/SNF   [ ] 4A Rehab  [ ] Detox Clinic  [] Plan Discussion with patient and/or family.  [] Discussed Case and Plan with the Medical Attending.    CODE STATUS  [X] FULL   /    [] DNR 58 year female patient with history of HFrEF ( EF 20-25%) , s/p AICD, COPD on home O2 (4L) , CVD, CVA with residual rt sided weakness, depression, fibromyalgia , PVD, presented for one week history of SOB that started at exertion and progressed to be at rest. Patient symptoms started with rhinorrhea , nasal congestion, sore throat, myalgia and arthralgia one week ago , associated with productive cough , with greenish sputum, subjective fever, and chills.   Pt was admitted for acute on chronic systolic CHF with possible PNA. She also had RITCHIE on CKD, Cr 3.1 (baseline 1.3, received IV contrast). Yesterday pt reported SOB, O2 sat 94% on biPAP, /93, . Got po lasix 40 2x, 40 IV and 80 IV without much urine output and improvement in SOB. CXR showed stable pulm edema. ABG pH 7.43, CO 44, O2 66, HOC2 29, O2 sat 94%. Bedside bladder scan >200 mL urine. Pt was approved for upgrade to ICU by Dr Greene. Medina placed and 750 mL came out. BP decreased  to 107/57. HR 57, O2 95. SOB improved.      IMPRESSION:  Hemoptysis: resolved    Acute on chronic hypoxic respiratory failure  pulmonary edema  HFrEF  h/o COPD  RITCHIE  Urinary retention     PLAN:    CNS:  no depressants    HEENT: Oral care    PULMONARY:  HOB @ 45 degrees, BPAP as needed     CARDIOVASCULAR: I < O, IV lasic, 2d echo, CE x2, cardiology eval    GI: GI prophylaxis.  Restart feeds     RENAL:  Follow up lytes.  Correct as needed, Keep medina DC  Robitussin amitriptyline,  and any other urinary retention causing drugs    INFECTIOUS DISEASE: Follow up cultures if negative DC ABx, repeat RVP    HEMATOLOGICAL:   LE duplex negative start Heparin later in day , Inquire about  allergy to ASA  if minute start ASA    ENDOCRINE:  Follow up FS.  Insulin protocol if needed.    MUSCULOSKELETAL: OOB to chair     DISPO: Possible downgrade    Electrolyte Imbalances: Correct as needed  []  Hyponatremia   /   Hypernatremia  []   []  Hypokalemia   /   Hyperkalemia  []   []  Hypochlorhydria   /    Hypochlorhydria  []   [X]  Hypomagnesemia   /   Hypermagnesemia  []   []  Hypophosphatemia   /   Hyperphosphatemia  []       GI ppx:                                   [] Not indicated   /   [X] Pantoprazole 40mg PO Daily    DVT ppx: Hold for Bleeding revaluate on rounds   [X] Not indicated / [] Heparin 5000mg SubQ / [] Lovenox 40mg SubQ / [] SCDs    Fluids:   [] PO  |  [] IVF    Activity:  [X] Assistance needed   [X] Increase as Tolerated  /  [] OOB w/ assist  /  [] Bed Rest    BMI:  Height (cm): 165.1 (12-14)  Weight (kg): 97.5 (12-14)  BMI (kg/m2): 35.8 (12-14)        DISPO:  Patient to be discharged when condition(s) optimized.  [ X] From Home     [ ] NH/SNF   [ ] 4A Rehab  [ ] Detox Clinic  [] Plan Discussion with patient and/or family.  [] Discussed Case and Plan with the Medical Attending.    CODE STATUS  [X] FULL   /    [] DNR 58 year female patient with history of HFrEF ( EF 20-25%) , s/p AICD, COPD on home O2 (4L) , CVD, CVA with residual rt sided weakness, depression, fibromyalgia , PVD, presented for one week history of SOB that started at exertion and progressed to be at rest. Patient symptoms started with rhinorrhea , nasal congestion, sore throat, myalgia and arthralgia one week ago , associated with productive cough , with greenish sputum, subjective fever, and chills.   Pt was admitted for acute on chronic systolic CHF with possible PNA. She also had RITCHIE on CKD, Cr 3.1 (baseline 1.3, received IV contrast). Yesterday pt reported SOB, O2 sat 94% on biPAP, /93, . Got po lasix 40 2x, 40 IV and 80 IV without much urine output and improvement in SOB. CXR showed stable pulm edema. ABG pH 7.43, CO 44, O2 66, HOC2 29, O2 sat 94%. Bedside bladder scan >200 mL urine. Pt was approved for upgrade to ICU by Dr Greene. Medina placed and 750 mL came out. BP decreased  to 107/57. HR 57, O2 95. SOB improved.      IMPRESSION:  Hemoptysis: resolved    Acute on chronic hypoxic respiratory failure  pulmonary edema  HFrEF  h/o COPD  RITCHIE  Urinary retention     PLAN:    CNS:  no depressants    HEENT: Oral care    PULMONARY:  HOB @ 45 degrees, BPAP as needed     CARDIOVASCULAR: I < O, IV lasic, 2d echo, CE x2, cardiology eval    GI: GI prophylaxis.  Restart feeds     RENAL:  Follow up lytes.  Correct as needed, Keep medina DC  Robitussin amitriptyline,  and any other urinary retention causing drugs    INFECTIOUS DISEASE: Follow up cultures if negative DC ABx,     HEMATOLOGICAL:   LE duplex negative start Heparin later in day , Inquire about  allergy to ASA  if minute start ASA    ENDOCRINE:  Follow up FS.  Insulin protocol if needed.    MUSCULOSKELETAL: OOB to chair     DISPO: Possible downgrade    Electrolyte Imbalances: Correct as needed  []  Hyponatremia   /   Hypernatremia  []   []  Hypokalemia   /   Hyperkalemia  []   []  Hypochlorhydria   /    Hypochlorhydria  []   [X]  Hypomagnesemia   /   Hypermagnesemia  []   []  Hypophosphatemia   /   Hyperphosphatemia  []       GI ppx:                                   [] Not indicated   /   [X] Pantoprazole 40mg PO Daily    DVT ppx: Hold for Bleeding revaluate on rounds   [X] Not indicated / [] Heparin 5000mg SubQ / [] Lovenox 40mg SubQ / [] SCDs    Fluids:   [] PO  |  [] IVF    Activity:  [X] Assistance needed   [X] Increase as Tolerated  /  [] OOB w/ assist  /  [] Bed Rest    BMI:  Height (cm): 165.1 (12-14)  Weight (kg): 97.5 (12-14)  BMI (kg/m2): 35.8 (12-14)        DISPO:  Patient to be discharged when condition(s) optimized.  [ X] From Home     [ ] NH/SNF   [ ] 4A Rehab  [ ] Detox Clinic  [] Plan Discussion with patient and/or family.  [] Discussed Case and Plan with the Medical Attending.    CODE STATUS  [X] FULL   /    [] DNR 58 year female patient with history of HFrEF ( EF 20-25%) , s/p AICD, COPD on home O2 (4L) , CVD, CVA with residual rt sided weakness, depression, fibromyalgia , PVD, presented for one week history of SOB that started at exertion and progressed to be at rest. Patient symptoms started with rhinorrhea , nasal congestion, sore throat, myalgia and arthralgia one week ago , associated with productive cough , with greenish sputum, subjective fever, and chills.   Pt was admitted for acute on chronic systolic CHF with possible PNA. She also had RITCHIE on CKD, Cr 3.1 (baseline 1.3, received IV contrast). Yesterday pt reported SOB, O2 sat 94% on biPAP, /93, . Got po lasix 40 2x, 40 IV and 80 IV without much urine output and improvement in SOB. CXR showed stable pulm edema. ABG pH 7.43, CO 44, O2 66, HOC2 29, O2 sat 94%. Bedside bladder scan >200 mL urine. Pt was approved for upgrade to ICU by Dr Greene. Medina placed and 750 mL came out. BP decreased  to 107/57. HR 57, O2 95. SOB improved.      IMPRESSION:  Hemoptysis: resolved    Acute on chronic hypoxic respiratory failure  pulmonary edema  HFrEF  h/o COPD  RITCHIE  Urinary retention   HO DVT on coumadin    PLAN:    CNS:  no depressants    HEENT: Oral care    PULMONARY:  HOB @ 45 degrees, BPAP as needed     CARDIOVASCULAR: I < O, IV lasic, 2d echo, CE x2, cardiology eval    GI: GI prophylaxis.  Restart feeds     RENAL:  Follow up lytes.  Correct as needed, Keep medina DC  Robitussin amitriptyline,  and any other urinary retention causing drugs    INFECTIOUS DISEASE: Follow up cultures if negative DC ABx,     HEMATOLOGICAL:   LE duplex negative start Heparin later in day , Inquire about  allergy to ASA  if minute start ASA    ENDOCRINE:  Follow up FS.  Insulin protocol if needed.    MUSCULOSKELETAL: OOB to chair     DISPO: Possible downgrade    Electrolyte Imbalances: Correct as needed  []  Hyponatremia   /   Hypernatremia  []   []  Hypokalemia   /   Hyperkalemia  []   []  Hypochlorhydria   /    Hypochlorhydria  []   [X]  Hypomagnesemia   /   Hypermagnesemia  []   []  Hypophosphatemia   /   Hyperphosphatemia  []       GI ppx:                                   [] Not indicated   /   [X] Pantoprazole 40mg PO Daily    DVT ppx: Hold for Bleeding revaluate on rounds   [X] Not indicated / [] Heparin 5000mg SubQ / [] Lovenox 40mg SubQ / [] SCDs    Fluids:   [] PO  |  [] IVF    Activity:  [X] Assistance needed   [X] Increase as Tolerated  /  [] OOB w/ assist  /  [] Bed Rest    BMI:  Height (cm): 165.1 (12-14)  Weight (kg): 97.5 (12-14)  BMI (kg/m2): 35.8 (12-14)        DISPO:  Patient to be discharged when condition(s) optimized.  [ X] From Home     [ ] NH/SNF   [ ] 4A Rehab  [ ] Detox Clinic  [] Plan Discussion with patient and/or family.  [] Discussed Case and Plan with the Medical Attending.    CODE STATUS  [X] FULL   /    [] DNR

## 2018-12-15 NOTE — PROGRESS NOTE ADULT - SUBJECTIVE AND OBJECTIVE BOX
chief complaint: shortness of breath    59 yo F w HFrEF ( EF 20-25%) , s/p AICD, COPD on home O2 (4L) , CVD, CVA with residual rt sided weakness , depression, fibromyalgia , PVD, presented for one week history of SOB that started at exertion and progressed to be at rest. Patient symptoms started with rhinorrhea , nasal congestion, sore throat, myalgia and arthralgia one week ago, associated with productive cough, with greenish sputum, subjective fever, and chills. Patient SOB worsened with flat position, patient complains of orthopnea , and mentioned that she recently need to sleep on 4 pillows and paroxysmal nocturnal dyspnea.  Pt was admitted for acute on chronic systolic CHF with possible PNA. She has a deterioration of respiratory status yesterday and the wards and was transferred to ICU due to respiratory complaints. A medina was inserted yielding > 700 ml of urine. She improved overnight not requiring any NIV support. Currently off NC in chair and tolerates PO intake.     ROS:  See HPI    PHYSICAL EXAM    ICU Vital Signs Last 24 Hrs  T(C): 37 (15 Dec 2018 12:00), Max: 37 (15 Dec 2018 12:00)  T(F): 98.6 (15 Dec 2018 12:00), Max: 98.6 (15 Dec 2018 12:00)  HR: 84 (15 Dec 2018 13:00) (60 - 126)  BP: 109/61 (15 Dec 2018 13:00) (97/50 - 203/93)  BP(mean): 77 (15 Dec 2018 13:00) (66 - 97)  ABP: --  ABP(mean): --  RR: 40 (15 Dec 2018 13:00) (11 - 40)  SpO2: 91% (15 Dec 2018 13:00) (91% - 99%)      General: alert, awake, verbal  HEENT: no jvd  Lymph Nodes: NO cervical LN   Lungs: diffuse, mild b/l crackles  Cardiovascular: Regular s1s2   Abdomen: Soft, Positive BS  Extremities: No clubbing, LE discoloration b/l, no edema  Neurological: a/o x3, follows commands      I&O's Detail    14 Dec 2018 07:01  -  15 Dec 2018 07:00  --------------------------------------------------------  IN:    IV PiggyBack: 210 mL  Total IN: 210 mL    OUT:    Indwelling Catheter - Urethral: 1420 mL  Total OUT: 1420 mL    Total NET: -1210 mL      15 Dec 2018 07:01  -  15 Dec 2018 13:16  --------------------------------------------------------  IN:    heparin Infusion: 12 mL  Total IN: 12 mL    OUT:    Indwelling Catheter - Urethral: 875 mL  Total OUT: 875 mL    Total NET: -863 mL          LABS:                          9.5    6.97  )-----------( 234      ( 15 Dec 2018 04:20 )             31.0         15 Dec 2018 04:20    137    |  96     |  44     ----------------------------<  90     4.4     |  30     |  2.5      Ca    8.6        15 Dec 2018 04:20  Phos  3.8       15 Dec 2018 04:20  Mg     1.8       15 Dec 2018 04:20    TPro  6.6    /  Alb  3.0    /  TBili  0.4    /  DBili  x      /  AST  15     /  ALT  10     /  AlkPhos  113    15 Dec 2018 04:20  Amylase x     lipase x                                                 PT/INR - ( 15 Dec 2018 04:20 )   PT: 19.50 sec;   INR: 1.70 ratio         PTT - ( 15 Dec 2018 04:20 )  PTT:42.0 sec                                             CARDIAC MARKERS ( 14 Dec 2018 23:35 )  x     / <0.01 ng/mL / 33 U/L / x     / 1.7 ng/mL                                                        Culture - Sputum (collected 14 Dec 2018 07:15)  Source: .Sputum Sputum  Gram Stain (14 Dec 2018 15:23):    Moderate polymorphonuclear leukocytes per low power field    Rare Squamous epithelial cells per low power field    No organisms seen per oil power field  Preliminary Report (15 Dec 2018 09:39):    No growth                                                                                       ABG - ( 14 Dec 2018 14:45 )  pH, Arterial: 7.43  pH, Blood: x     /  pCO2: 44    /  pO2: 66    / HCO3: 29    / Base Excess: 4.1   /  SaO2: 94                  MEDICATIONS  (STANDING):  ALBUTerol    90 MICROgram(s) HFA Inhaler 1 Puff(s) Inhalation every 4 hours  ALBUTerol/ipratropium for Nebulization 3 milliLiter(s) Nebulizer every 6 hours  amitriptyline 25 milliGRAM(s) Oral at bedtime  atorvastatin 40 milliGRAM(s) Oral at bedtime  buDESOnide 160 MICROgram(s)/formoterol 4.5 MICROgram(s) Inhaler 2 Puff(s) Inhalation two times a day  busPIRone 10 milliGRAM(s) Oral two times a day  chlorhexidine 4% Liquid 1 Application(s) Topical <User Schedule>  dextrose 50% Injectable 12.5 Gram(s) IV Push once  dextrose 50% Injectable 25 Gram(s) IV Push once  dextrose 50% Injectable 25 Gram(s) IV Push once  furosemide    Tablet 40 milliGRAM(s) Oral daily  furosemide   Injectable 40 milliGRAM(s) IV Push two times a day  gabapentin 100 milliGRAM(s) Oral three times a day  guaiFENesin/dextromethorphan  Syrup 10 milliLiter(s) Oral every 4 hours  heparin  Infusion 1200 Unit(s)/Hr (12 mL/Hr) IV Continuous <Continuous>  insulin glargine Injectable (LANTUS) 25 Unit(s) SubCutaneous at bedtime  insulin lispro (HumaLOG) corrective regimen sliding scale   SubCutaneous three times a day before meals  insulin lispro Injectable (HumaLOG) 7 Unit(s) SubCutaneous before breakfast  insulin lispro Injectable (HumaLOG) 7 Unit(s) SubCutaneous before lunch  insulin lispro Injectable (HumaLOG) 7 Unit(s) SubCutaneous before dinner  methadone    Tablet 10 milliGRAM(s) Oral daily  metoprolol succinate ER 25 milliGRAM(s) Oral daily  traZODone 50 milliGRAM(s) Oral daily    MEDICATIONS  (PRN):  acetaminophen   Tablet .. 650 milliGRAM(s) Oral every 6 hours PRN Moderate Pain (4 - 6)  dextrose 40% Gel 15 Gram(s) Oral once PRN Blood Glucose LESS THAN 70 milliGRAM(s)/deciliter  glucagon  Injectable 1 milliGRAM(s) IntraMuscular once PRN Glucose LESS THAN 70 milligrams/deciliter      Xrays:  diffuse b/l congestion      IMPRESSION:  59 yo F w HFrEF ( EF 20-25%) , s/p AICD, COPD on home O2 (4L) , CVD, CVA with residual rt sided weakness , depression, fibromyalgia , PVD, presented for one week history of SOB that started at exertion and progressed to be at rest. She was on wards when she decompensated due to Acute hypoxia in setting of urinary retention and HTN - likely due to antimuscarinic effects of her polypharmacy (robitussin, trazadone, amitryptaline)    CNS: stop all meds with antimuscarinic effects    CARDIOVASCULAR: stop IVF, PO diet, PO ASA, statin beta blockers, diuretics. keep medina for 24 hours    GI:   Feeding     RENAL: lasix daily as at home    INFECTIOUS DISEASE: stop antibx    HEMATOLOGICAL:  DVT prophylaxis.    ENDOCRINE:  Follow up FS.  Insulin protocol if needed.    OOB to chair

## 2018-12-15 NOTE — CHART NOTE - NSCHARTNOTEFT_GEN_A_CORE
Call to [facility] for patient with gross  Hemoptysis 10ml bloody production. Last INR 2.8  subq hep through out day. Vitals were all stable. Patient show no evidence of respiratory compromise.  She will given kcentra x1 and Vit K x1 dose.  with follow up am labs with stab  labs. CXr unremarkable.  Plan discussed and executed with senior and pulm fellow.   Vital Signs (24 Hrs):  T(C): 35.8 (18 @ 21:00), Max: 37.2 (18 @ 05:00)  HR: 80 (18 @ 23:00) (74 - 126)  BP: 129/68 (18 @ 23:00) (107/57 - 203/93)  RR: 27 (18 @ 23:00) (17 - 27)  SpO2: 98% (18 @ 23:00) (98% - 99%)  Wt(kg): --  Daily Height in cm: 165.1 (14 Dec 2018 21:00)    Daily Weight in k (14 Dec 2018 09:50)    I&O's Summary    13 Dec 2018 07:01  -  14 Dec 2018 07:00  --------------------------------------------------------  IN: 850 mL / OUT: 500 mL / NET: 350 mL    14 Dec 2018 07:01  -  15 Dec 2018 00:12  --------------------------------------------------------  IN: 0 mL / OUT: 575 mL / NET: -575 mL

## 2018-12-15 NOTE — PROGRESS NOTE ADULT - ASSESSMENT
58 year old female, patient with history of HFrEF ( EF 20-25%) , s/p AICD, COPD on home O2 (4L) , CVD, CVA with residual rt sided weakness , PVD, presented for one week history of SOB.Patient was found to have elevated creatinine to 2.1 from her baseline CKD III.       ·	RITCHIE on CKD III/ respiratory failure   due to retention and cardiorenal syndrome   would continue lasix IV current   SOB is better   Hx of HFrEF repeat Echo/ last EF 25%  daily weights   UA is negative  Check IP and PTH    No need for RRT     ·	left renal lesion that is ill defined   f/u as out-patient with nephrology for further imaging     ·	Anemia chronic  check Fe studies  no need for HEATHER or Tx for now     D/W Medicine

## 2018-12-15 NOTE — PROGRESS NOTE ADULT - SUBJECTIVE AND OBJECTIVE BOX
SUBJECTIVE:    Patient is a 58y old Female who presents with a chief complaint of sob (14 Dec 2018 18:26)    Overnight Eevents: Call to bedside for patient with gross  Hemoptysis 10ml bloody production. Last INR 2.8  subq hep through out day. Vitals were all stable. Patient show no evidence of respiratory compromise.  She will given kcentra x1 and Vit K x1 dose.  with follow up am labs with stab  labs. CXr unremarkable.  Plan discussed and executed with senior and pulm fellow.   Patient was seen at bed side this morning no more hemoptysis     PAST MEDICAL & SURGICAL HISTORY  Chronic pain  HLD (hyperlipidemia)  CVA (cerebral vascular accident)  Depression  COPD (chronic obstructive pulmonary disease)  Diabetes  CHF (congestive heart failure)  History of cholecystectomy  H/O tubal ligation  H/O abdominal hysterectomy  AICD (automatic cardioverter/defibrillator) present    SOCIAL HISTORY:  Negative for smoking/alcohol/drug use.     ALLERGIES:  aspirin (Other (Moderate))  IV Contrast (Unknown)    MEDICATIONS:  STANDING MEDICATIONS  ALBUTerol    90 MICROgram(s) HFA Inhaler 1 Puff(s) Inhalation every 4 hours  ALBUTerol/ipratropium for Nebulization 3 milliLiter(s) Nebulizer every 6 hours  amitriptyline 25 milliGRAM(s) Oral at bedtime  atorvastatin 40 milliGRAM(s) Oral at bedtime  buDESOnide 160 MICROgram(s)/formoterol 4.5 MICROgram(s) Inhaler 2 Puff(s) Inhalation two times a day  busPIRone 10 milliGRAM(s) Oral two times a day  cefepime   IVPB 1000 milliGRAM(s) IV Intermittent daily  chlorhexidine 4% Liquid 1 Application(s) Topical <User Schedule>  dextrose 5%. 1000 milliLiter(s) IV Continuous <Continuous>  dextrose 50% Injectable 12.5 Gram(s) IV Push once  dextrose 50% Injectable 25 Gram(s) IV Push once  dextrose 50% Injectable 25 Gram(s) IV Push once  doxycycline IVPB 100 milliGRAM(s) IV Intermittent every 12 hours  furosemide    Tablet 40 milliGRAM(s) Oral daily  furosemide   Injectable 40 milliGRAM(s) IV Push two times a day  gabapentin 100 milliGRAM(s) Oral three times a day  guaiFENesin/dextromethorphan  Syrup 10 milliLiter(s) Oral every 4 hours  insulin glargine Injectable (LANTUS) 25 Unit(s) SubCutaneous at bedtime  insulin lispro (HumaLOG) corrective regimen sliding scale   SubCutaneous three times a day before meals  insulin lispro Injectable (HumaLOG) 7 Unit(s) SubCutaneous before breakfast  insulin lispro Injectable (HumaLOG) 7 Unit(s) SubCutaneous before lunch  insulin lispro Injectable (HumaLOG) 7 Unit(s) SubCutaneous before dinner  methadone    Tablet 10 milliGRAM(s) Oral daily  metoprolol succinate ER 25 milliGRAM(s) Oral daily  traZODone 50 milliGRAM(s) Oral daily    PRN MEDICATIONS  acetaminophen   Tablet .. 650 milliGRAM(s) Oral every 6 hours PRN  dextrose 40% Gel 15 Gram(s) Oral once PRN  glucagon  Injectable 1 milliGRAM(s) IntraMuscular once PRN    VITALS:   T(F): 97  HR: 74  BP: 115/58  RR: 16  SpO2: 97%    PHYSICAL EXAM:  . General: NAD AAOx4   . HEENT  · Respiratory: Breath Sounds equal & clear to  auscultation, no accessory muscle use  · Cardiovascular: Regular rate & rhythm, normal S1, S2; no murmurs, gallops or rubs; no S3, S4  · Gastrointestinal	Soft, non-tender, no hepatosplenomegaly, normal bowel sounds  · Genitourinary: Normal genitalia; no lesions; no discharge  · Extremities:	No cyanosis, clubbing or edema  · Skin no macular rashs, no lacerations.   . Neuro:    LABS:                        9.6    6.44  )-----------( 221      ( 15 Dec 2018 00:37 )             31.0     12-15    131<L>  |  92<L>  |  43<H>  ----------------------------<  196<H>  4.4   |  26  |  2.6<H>    Ca    8.3<L>      15 Dec 2018 00:37  Phos  3.8     12-15  Mg     1.7     12-15    TPro  7.5  /  Alb  3.4<L>  /  TBili  0.5  /  DBili  x   /  AST  19  /  ALT  11  /  AlkPhos  119<H>  12-14    PT/INR - ( 15 Dec 2018 00:37 )   PT: 16.70 sec;   INR: 1.46 ratio         PTT - ( 15 Dec 2018 00:37 )  PTT:41.9 sec    ABG - ( 14 Dec 2018 14:45 )  pH, Arterial: 7.43  pH, Blood: x     /  pCO2: 44    /  pO2: 66    / HCO3: 29    / Base Excess: 4.1   /  SaO2: 94                Creatine Kinase, Serum: 33 U/L (12-14-18 @ 23:35)  Troponin T, Serum: <0.01 ng/mL (12-14-18 @ 23:35)      Culture - Sputum (collected 14 Dec 2018 07:15)  Source: .Sputum Sputum  Gram Stain (14 Dec 2018 15:23):    Moderate polymorphonuclear leukocytes per low power field    Rare Squamous epithelial cells per low power field    No organisms seen per oil power field      CARDIAC MARKERS ( 14 Dec 2018 23:35 )  x     / <0.01 ng/mL / 33 U/L / x     / 1.7 ng/mL          12-13-18 @ 07:01  -  12-14-18 @ 07:00  --------------------------------------------------------  IN: 850 mL / OUT: 500 mL / NET: 350 mL    12-14-18 @ 07:01  -  12-15-18 @ 02:33  --------------------------------------------------------  IN: 60 mL / OUT: 850 mL / NET: -790 mL          Radiology:    CXR: improved

## 2018-12-15 NOTE — PROGRESS NOTE ADULT - SUBJECTIVE AND OBJECTIVE BOX
Nephrology progress note    Patient is seen and examined, events over the last 24 h noted .    Allergies:  aspirin (Other (Moderate))  IV Contrast (Unknown)    Hospital Medications:   MEDICATIONS  (STANDING):  ALBUTerol    90 MICROgram(s) HFA Inhaler 1 Puff(s) Inhalation every 4 hours  ALBUTerol/ipratropium for Nebulization 3 milliLiter(s) Nebulizer every 6 hours  amitriptyline 25 milliGRAM(s) Oral at bedtime  atorvastatin 40 milliGRAM(s) Oral at bedtime  buDESOnide 160 MICROgram(s)/formoterol 4.5 MICROgram(s) Inhaler 2 Puff(s) Inhalation two times a day  busPIRone 10 milliGRAM(s) Oral two times a day  cefepime   IVPB 1000 milliGRAM(s) IV Intermittent daily  chlorhexidine 4% Liquid 1 Application(s) Topical <User Schedule>  dextrose 5%. 1000 milliLiter(s) (50 mL/Hr) IV Continuous <Continuous>  dextrose 50% Injectable 12.5 Gram(s) IV Push once  dextrose 50% Injectable 25 Gram(s) IV Push once  dextrose 50% Injectable 25 Gram(s) IV Push once  doxycycline IVPB 100 milliGRAM(s) IV Intermittent every 12 hours  furosemide    Tablet 40 milliGRAM(s) Oral daily  furosemide   Injectable 40 milliGRAM(s) IV Push two times a day  gabapentin 100 milliGRAM(s) Oral three times a day  guaiFENesin/dextromethorphan  Syrup 10 milliLiter(s) Oral every 4 hours  insulin glargine Injectable (LANTUS) 25 Unit(s) SubCutaneous at bedtime  insulin lispro (HumaLOG) corrective regimen sliding scale   SubCutaneous three times a day before meals  insulin lispro Injectable (HumaLOG) 7 Unit(s) SubCutaneous before breakfast  insulin lispro Injectable (HumaLOG) 7 Unit(s) SubCutaneous before lunch  insulin lispro Injectable (HumaLOG) 7 Unit(s) SubCutaneous before dinner  methadone    Tablet 10 milliGRAM(s) Oral daily  metoprolol succinate ER 25 milliGRAM(s) Oral daily  traZODone 50 milliGRAM(s) Oral daily        VITALS:  T(F): 96.4 (12-15-18 @ 08:00), Max: 97 (12-15-18 @ 00:00)  HR: 70 (12-15-18 @ 08:00)  BP: 109/62 (12-15-18 @ 08:00)  RR: 19 (12-15-18 @ 08:00)  SpO2: 97% (12-15-18 @ 08:30)  Wt(kg): --     @ 07:01  -   @ 07:00  --------------------------------------------------------  IN: 850 mL / OUT: 500 mL / NET: 350 mL     @ 07:01  -  12-15 @ 07:00  --------------------------------------------------------  IN: 210 mL / OUT: 1420 mL / NET: -1210 mL    12-15 @ 07:01  -  12-15 @ 09:02  --------------------------------------------------------  IN: 0 mL / OUT: 250 mL / NET: -250 mL      Height (cm): 165.1 ( @ 21:00)  Weight (kg): 97.5 ( 21:00)  BMI (kg/m2): 35.8 ( 21:00)  BSA (m2): 2.04 ( @ 21:00)    PHYSICAL EXAM:  Constitutional: NAD  HEENT: anicteric sclera, oropharynx clear, MMM  Neck: No JVD  Respiratory: CTAB, no wheezes, rales or rhonchi  Cardiovascular: S1, S2, RRR  Gastrointestinal: BS+, soft, NT/ND  Extremities: No cyanosis or clubbing. No peripheral edema  :  No medina.   Skin: No rashes    LABS:  12-15    137  |  96<L>  |  44<H>  ----------------------------<  90  4.4   |  30  |  2.5<H>  Creatinine Trend: 2.5<--, 2.6<--, 2.7<--, 3.0<--, 3.1<--, 2.1<--  Ca    8.6      15 Dec 2018 04:20  Phos  3.8     12-15  Mg     1.8     12-15    TPro  6.6  /  Alb  3.0<L>  /  TBili  0.4  /  DBili      /  AST  15  /  ALT  10  /  AlkPhos  113  12-15                          9.5    6.97  )-----------( 234      ( 15 Dec 2018 04:20 )             31.0       Urine Studies:  Urinalysis Basic - ( 12 Dec 2018 19:00 )    Color: Yellow / Appearance: Clear / S.020 / pH:   Gluc:  / Ketone: Negative  / Bili: Negative / Urobili: 0.2 mg/dL   Blood:  / Protein: 100 mg/dL / Nitrite: Negative   Leuk Esterase: Negative / RBC: 3-5 /HPF / WBC    Sq Epi:  / Non Sq Epi: Occasional /HPF / Bacteria: Few /HPF      Osmolality, Random Urine: 324 mos/kg ( @ 18:35)  Sodium, Random Urine: 31.0 mmoL/L ( @ 18:35)  Potassium, Random Urine: 28 mmol/L ( @ 18:35)  Chloride, Random Urine: 21 ( @ 18:35)    RADIOLOGY & ADDITIONAL STUDIES: Nephrology progress note    Patient is seen and examined, events over the last 24 h noted .  Upgraded to ICU overnight  Denied any chest pain SOB better     Allergies:  aspirin (Other (Moderate))  IV Contrast (Unknown)    Hospital Medications:   MEDICATIONS  (STANDING):  ALBUTerol    90 MICROgram(s) HFA Inhaler 1 Puff(s) Inhalation every 4 hours  ALBUTerol/ipratropium for Nebulization 3 milliLiter(s) Nebulizer every 6 hours  amitriptyline 25 milliGRAM(s) Oral at bedtime  atorvastatin 40 milliGRAM(s) Oral at bedtime  buDESOnide 160 MICROgram(s)/formoterol 4.5 MICROgram(s) Inhaler 2 Puff(s) Inhalation two times a day  busPIRone 10 milliGRAM(s) Oral two times a day  cefepime   IVPB 1000 milliGRAM(s) IV Intermittent daily  chlorhexidine 4% Liquid 1 Application(s) Topical <User Schedule>  doxycycline IVPB 100 milliGRAM(s) IV Intermittent every 12 hours  furosemide    Tablet 40 milliGRAM(s) Oral daily  furosemide   Injectable 40 milliGRAM(s) IV Push two times a day  gabapentin 100 milliGRAM(s) Oral three times a day  guaiFENesin/dextromethorphan  Syrup 10 milliLiter(s) Oral every 4 hours  insulin glargine Injectable (LANTUS) 25 Unit(s) SubCutaneous at bedtimer  methadone    Tablet 10 milliGRAM(s) Oral daily  metoprolol succinate ER 25 milliGRAM(s) Oral daily  traZODone 50 milliGRAM(s) Oral daily        VITALS:  T(F): 96.4 (12-15-18 @ 08:00), Max: 97 (12-15-18 @ 00:00)  HR: 70 (12-15-18 @ 08:00)  BP: 109/62 (12-15-18 @ 08:00)  RR: 19 (12-15-18 @ 08:00)  SpO2: 97% (12-15-18 @ 08:30)  Wt(kg): --     @ 07:01  -   @ 07:00  --------------------------------------------------------  IN: 850 mL / OUT: 500 mL / NET: 350 mL     @ 07:01  -  12-15 @ 07:00  --------------------------------------------------------  IN: 210 mL / OUT: 1420 mL / NET: -1210 mL    12-15 @ 07:01  -  12-15 @ 09:02  --------------------------------------------------------  IN: 0 mL / OUT: 250 mL / NET: -250 mL      Height (cm): 165.1 ( @ 21:00)  Weight (kg): 97.5 ( @ 21:00)  BMI (kg/m2): 35.8 ( @ 21:00)  BSA (m2): 2.04 ( @ 21:00)    PHYSICAL EXAM:  Constitutional: NAD  HEENT: anicteric sclera, oropharynx clear, MMM  Neck: No JVD  Respiratory: Crackles at base   Cardiovascular: S1, S2, RRR  Gastrointestinal: BS+, soft, NT/ND  Extremities: No cyanosis or clubbing. No peripheral edema  :  No medina.   Skin: No rashes    LABS:  12-15    137  |  96<L>  |  44<H>  ----------------------------<  90  4.4   |  30  |  2.5<H>  Creatinine Trend: 2.5<--, 2.6<--, 2.7<--, 3.0<--, 3.1<--, 2.1<--  Ca    8.6      15 Dec 2018 04:20  Phos  3.8     12-15  Mg     1.8     12-15    TPro  6.6  /  Alb  3.0<L>  /  TBili  0.4  /  DBili      /  AST  15  /  ALT  10  /  AlkPhos  113  12-15                          9.5    6.97  )-----------( 234      ( 15 Dec 2018 04:20 )             31.0       Urine Studies:  Urinalysis Basic - ( 12 Dec 2018 19:00 )    Color: Yellow / Appearance: Clear / S.020 / pH:   Gluc:  / Ketone: Negative  / Bili: Negative / Urobili: 0.2 mg/dL   Blood:  / Protein: 100 mg/dL / Nitrite: Negative   Leuk Esterase: Negative / RBC: 3-5 /HPF / WBC    Sq Epi:  / Non Sq Epi: Occasional /HPF / Bacteria: Few /HPF      Osmolality, Random Urine: 324 mos/kg ( @ 18:35)  Sodium, Random Urine: 31.0 mmoL/L ( @ 18:35)  Potassium, Random Urine: 28 mmol/L ( @ 18:35)  Chloride, Random Urine: 21 ( @ 18:35)    RADIOLOGY & ADDITIONAL STUDIES:  < from: Transthoracic Echocardiogram (18 @ 08:35) >  Summary:   1. Left ventricular ejection fraction, by visual estimation, is 25 to   30%.   2. Severely decreased global left ventricular systolic function.   3. Mild tricuspid regurgitation.   4. Trace pulmonic valve regurgitation.    < end of copied text >

## 2018-12-16 LAB
ALBUMIN SERPL ELPH-MCNC: 3.5 G/DL — SIGNIFICANT CHANGE UP (ref 3.5–5.2)
ALP SERPL-CCNC: 121 U/L — HIGH (ref 30–115)
ALT FLD-CCNC: 11 U/L — SIGNIFICANT CHANGE UP (ref 0–41)
ANION GAP SERPL CALC-SCNC: 15 MMOL/L — HIGH (ref 7–14)
APTT BLD: 100.9 SEC — CRITICAL HIGH (ref 27–39.2)
APTT BLD: 49.2 SEC — HIGH (ref 27–39.2)
APTT BLD: 49.6 SEC — HIGH (ref 27–39.2)
APTT BLD: 90.7 SEC — CRITICAL HIGH (ref 27–39.2)
APTT BLD: 94.2 SEC — CRITICAL HIGH (ref 27–39.2)
AST SERPL-CCNC: 17 U/L — SIGNIFICANT CHANGE UP (ref 0–41)
BASOPHILS # BLD AUTO: 0.04 K/UL — SIGNIFICANT CHANGE UP (ref 0–0.2)
BASOPHILS NFR BLD AUTO: 0.6 % — SIGNIFICANT CHANGE UP (ref 0–1)
BILIRUB SERPL-MCNC: 0.5 MG/DL — SIGNIFICANT CHANGE UP (ref 0.2–1.2)
BUN SERPL-MCNC: 37 MG/DL — HIGH (ref 10–20)
CALCIUM SERPL-MCNC: 9 MG/DL — SIGNIFICANT CHANGE UP (ref 8.5–10.1)
CHLORIDE SERPL-SCNC: 95 MMOL/L — LOW (ref 98–110)
CO2 SERPL-SCNC: 30 MMOL/L — SIGNIFICANT CHANGE UP (ref 17–32)
CREAT SERPL-MCNC: 2 MG/DL — HIGH (ref 0.7–1.5)
CULTURE RESULTS: SIGNIFICANT CHANGE UP
EOSINOPHIL # BLD AUTO: 0.22 K/UL — SIGNIFICANT CHANGE UP (ref 0–0.7)
EOSINOPHIL NFR BLD AUTO: 3.1 % — SIGNIFICANT CHANGE UP (ref 0–8)
GLUCOSE BLDC GLUCOMTR-MCNC: 146 MG/DL — HIGH (ref 70–99)
GLUCOSE BLDC GLUCOMTR-MCNC: 158 MG/DL — HIGH (ref 70–99)
GLUCOSE BLDC GLUCOMTR-MCNC: 171 MG/DL — HIGH (ref 70–99)
GLUCOSE BLDC GLUCOMTR-MCNC: 53 MG/DL — LOW (ref 70–99)
GLUCOSE BLDC GLUCOMTR-MCNC: 85 MG/DL — SIGNIFICANT CHANGE UP (ref 70–99)
GLUCOSE SERPL-MCNC: 114 MG/DL — HIGH (ref 70–99)
HCT VFR BLD CALC: 35.1 % — LOW (ref 37–47)
HGB BLD-MCNC: 10.7 G/DL — LOW (ref 12–16)
IMM GRANULOCYTES NFR BLD AUTO: 0.4 % — HIGH (ref 0.1–0.3)
INR BLD: 1.22 RATIO — SIGNIFICANT CHANGE UP (ref 0.65–1.3)
LYMPHOCYTES # BLD AUTO: 2.02 K/UL — SIGNIFICANT CHANGE UP (ref 1.2–3.4)
LYMPHOCYTES # BLD AUTO: 28.8 % — SIGNIFICANT CHANGE UP (ref 20.5–51.1)
M PNEUMO IGM SER-ACNC: 31 UNITS/ML — SIGNIFICANT CHANGE UP
MAGNESIUM SERPL-MCNC: 1.8 MG/DL — SIGNIFICANT CHANGE UP (ref 1.8–2.4)
MCHC RBC-ENTMCNC: 26.1 PG — LOW (ref 27–31)
MCHC RBC-ENTMCNC: 30.5 G/DL — LOW (ref 32–37)
MCV RBC AUTO: 85.6 FL — SIGNIFICANT CHANGE UP (ref 81–99)
MONOCYTES # BLD AUTO: 0.83 K/UL — HIGH (ref 0.1–0.6)
MONOCYTES NFR BLD AUTO: 11.8 % — HIGH (ref 1.7–9.3)
MYCOPLASMA AG SPEC QL: NEGATIVE — SIGNIFICANT CHANGE UP
NEUTROPHILS # BLD AUTO: 3.88 K/UL — SIGNIFICANT CHANGE UP (ref 1.4–6.5)
NEUTROPHILS NFR BLD AUTO: 55.3 % — SIGNIFICANT CHANGE UP (ref 42.2–75.2)
NRBC # BLD: 0 /100 WBCS — SIGNIFICANT CHANGE UP (ref 0–0)
PLATELET # BLD AUTO: 234 K/UL — SIGNIFICANT CHANGE UP (ref 130–400)
POTASSIUM SERPL-MCNC: 4.5 MMOL/L — SIGNIFICANT CHANGE UP (ref 3.5–5)
POTASSIUM SERPL-SCNC: 4.5 MMOL/L — SIGNIFICANT CHANGE UP (ref 3.5–5)
PROT SERPL-MCNC: 7.3 G/DL — SIGNIFICANT CHANGE UP (ref 6–8)
PROTHROM AB SERPL-ACNC: 14 SEC — HIGH (ref 9.95–12.87)
RBC # BLD: 4.1 M/UL — LOW (ref 4.2–5.4)
RBC # FLD: 16.5 % — HIGH (ref 11.5–14.5)
SODIUM SERPL-SCNC: 140 MMOL/L — SIGNIFICANT CHANGE UP (ref 135–146)
SPECIMEN SOURCE: SIGNIFICANT CHANGE UP
WBC # BLD: 7.02 K/UL — SIGNIFICANT CHANGE UP (ref 4.8–10.8)
WBC # FLD AUTO: 7.02 K/UL — SIGNIFICANT CHANGE UP (ref 4.8–10.8)

## 2018-12-16 RX ORDER — HEPARIN SODIUM 5000 [USP'U]/ML
1300 INJECTION INTRAVENOUS; SUBCUTANEOUS
Qty: 25000 | Refills: 0 | Status: DISCONTINUED | OUTPATIENT
Start: 2018-12-16 | End: 2018-12-17

## 2018-12-16 RX ORDER — HEPARIN SODIUM 5000 [USP'U]/ML
1400 INJECTION INTRAVENOUS; SUBCUTANEOUS
Qty: 25000 | Refills: 0 | Status: DISCONTINUED | OUTPATIENT
Start: 2018-12-16 | End: 2018-12-16

## 2018-12-16 RX ADMIN — BUDESONIDE AND FORMOTEROL FUMARATE DIHYDRATE 2 PUFF(S): 160; 4.5 AEROSOL RESPIRATORY (INHALATION) at 21:12

## 2018-12-16 RX ADMIN — Medication 7 UNIT(S): at 08:29

## 2018-12-16 RX ADMIN — Medication 10 MILLIGRAM(S): at 06:02

## 2018-12-16 RX ADMIN — Medication 40 MILLIGRAM(S): at 17:45

## 2018-12-16 RX ADMIN — METHADONE HYDROCHLORIDE 10 MILLIGRAM(S): 40 TABLET ORAL at 12:00

## 2018-12-16 RX ADMIN — Medication 25 MILLIGRAM(S): at 05:42

## 2018-12-16 RX ADMIN — HEPARIN SODIUM 12 UNIT(S)/HR: 5000 INJECTION INTRAVENOUS; SUBCUTANEOUS at 05:44

## 2018-12-16 RX ADMIN — ATORVASTATIN CALCIUM 40 MILLIGRAM(S): 80 TABLET, FILM COATED ORAL at 21:12

## 2018-12-16 RX ADMIN — Medication 10 MILLIGRAM(S): at 17:43

## 2018-12-16 RX ADMIN — Medication 2: at 12:14

## 2018-12-16 RX ADMIN — HEPARIN SODIUM 14 UNIT(S)/HR: 5000 INJECTION INTRAVENOUS; SUBCUTANEOUS at 14:45

## 2018-12-16 RX ADMIN — GABAPENTIN 100 MILLIGRAM(S): 400 CAPSULE ORAL at 13:47

## 2018-12-16 RX ADMIN — Medication 3 MILLILITER(S): at 08:12

## 2018-12-16 RX ADMIN — Medication 40 MILLIGRAM(S): at 05:43

## 2018-12-16 RX ADMIN — GABAPENTIN 100 MILLIGRAM(S): 400 CAPSULE ORAL at 05:42

## 2018-12-16 RX ADMIN — INSULIN GLARGINE 25 UNIT(S): 100 INJECTION, SOLUTION SUBCUTANEOUS at 21:12

## 2018-12-16 RX ADMIN — HEPARIN SODIUM 13 UNIT(S)/HR: 5000 INJECTION INTRAVENOUS; SUBCUTANEOUS at 21:36

## 2018-12-16 RX ADMIN — Medication 3 MILLILITER(S): at 13:30

## 2018-12-16 RX ADMIN — Medication 7 UNIT(S): at 12:14

## 2018-12-16 RX ADMIN — BUDESONIDE AND FORMOTEROL FUMARATE DIHYDRATE 2 PUFF(S): 160; 4.5 AEROSOL RESPIRATORY (INHALATION) at 08:30

## 2018-12-16 RX ADMIN — GABAPENTIN 100 MILLIGRAM(S): 400 CAPSULE ORAL at 21:12

## 2018-12-16 NOTE — PROGRESS NOTE ADULT - ASSESSMENT
58 year old female, patient with history of HFrEF ( EF 20-25%) , s/p AICD, COPD on home O2 (4L) , CVD, CVA with residual rt sided weakness , PVD, presented for one week history of SOB.Patient was found to have elevated creatinine to 2.1 from her baseline CKD III.       ·	RITCHIE on CKD III/ respiratory failure   due to retention and cardiorenal syndrome   would continue lasix IV current for 24 h more   SOB is better / creat better  Hx of HFrEF repeat Echo/ last EF 25%  daily weights   UA is negative  Check IP and PTH    No need for RRT   voiding trial in AM     ·	left renal lesion that is ill defined   f/u as out-patient with nephrology for further imaging     ·	Anemia chronic  check Fe studies  no need for HEATHER or Tx for now

## 2018-12-16 NOTE — PROGRESS NOTE ADULT - SUBJECTIVE AND OBJECTIVE BOX
58 year female patient with history of HFrEF ( EF 20-25%) , s/p AICD, COPD on home O2 (4L) , CVD, CVA with residual rt sided weakness , depression, fibromyalgia , PVD, presented for one week history of SOB that started at exertion and progressed to be at rest. Patient symptoms started with rhinorrhea , nasal congestion, sore throat, myalgia and arthralgia one week ago , associated with productive cough , with greenish sputum, subjective fever, and chills. Patient SOB worsened with flat position, patient complains of orthopnea , and mentioned that she recently need to sleep on 4 pillows ,and paroxysmal nocturnal dyspnea. There is a history of sick  contact . No recent travel. Patient denies any chest pain, there is no history of palpitations, no weight loss, no night sweats.  Patient mentioned mild lower limb swelling in the previous days, she is complaint to her medications.  pt has developed RITCHIE in the last few days.   today pt has sob and is in mild to mod respiratory distress.     Vital Signs Last 24 Hrs  T(C): 36.7 (16 Dec 2018 14:41), Max: 36.7 (16 Dec 2018 14:41)  T(F): 98.1 (16 Dec 2018 14:41), Max: 98.1 (16 Dec 2018 14:41)  HR: 83 (16 Dec 2018 14:41) (76 - 84)  BP: 138/65 (16 Dec 2018 14:41) (99/48 - 138/65)  BP(mean): 77 (15 Dec 2018 18:00) (72 - 77)  RR: 20 (16 Dec 2018 14:41) (17 - 24)  SpO2: 96% (16 Dec 2018 08:11) (92% - 97%)    PHYSICAL EXAM:  +JVD   GENERAL: NAD, well-developed  HEAD:  Atraumatic, Normocephalic  EYES: EOMI, PERRLA, conjunctiva and sclera clear  NECK: Supple, No JVD  CHEST/LUNG: bilateral crackles   HEART: Regular rate and rhythm; No murmurs, rubs, or gallops  ABDOMEN: Soft, Nontender, Nondistended; Bowel sounds present  EXTREMITIES:  2+ Peripheral Pulses, +1 edema bilateral, No clubbing or Cyanosis  PSYCH: AAOx3  NEUROLOGY: non-focal  SKIN: No rashes or lesions                                 10.7   7.02  )-----------( 234      ( 16 Dec 2018 09:06 )             35.1     12-16    140  |  95<L>  |  37<H>  ----------------------------<  114<H>  4.5   |  30  |  2.0<H>    Ca    9.0      16 Dec 2018 09:06  Phos  3.8     12-15  Mg     1.8     12-16    TPro  7.3  /  Alb  3.5  /  TBili  0.5  /  DBili  x   /  AST  17  /  ALT  11  /  AlkPhos  121<H>  12-16    MEDICATIONS  (STANDING):  ALBUTerol    90 MICROgram(s) HFA Inhaler 1 Puff(s) Inhalation every 4 hours  ALBUTerol/ipratropium for Nebulization 3 milliLiter(s) Nebulizer every 6 hours  atorvastatin 40 milliGRAM(s) Oral at bedtime  buDESOnide 160 MICROgram(s)/formoterol 4.5 MICROgram(s) Inhaler 2 Puff(s) Inhalation two times a day  busPIRone 10 milliGRAM(s) Oral two times a day  chlorhexidine 4% Liquid 1 Application(s) Topical <User Schedule>  dextrose 50% Injectable 12.5 Gram(s) IV Push once  dextrose 50% Injectable 25 Gram(s) IV Push once  dextrose 50% Injectable 25 Gram(s) IV Push once  furosemide   Injectable 40 milliGRAM(s) IV Push two times a day  gabapentin 100 milliGRAM(s) Oral three times a day  guaiFENesin/dextromethorphan  Syrup 10 milliLiter(s) Oral every 4 hours  heparin  Infusion 1400 Unit(s)/Hr (14 mL/Hr) IV Continuous <Continuous>  insulin glargine Injectable (LANTUS) 25 Unit(s) SubCutaneous at bedtime  insulin lispro (HumaLOG) corrective regimen sliding scale   SubCutaneous three times a day before meals  insulin lispro Injectable (HumaLOG) 7 Unit(s) SubCutaneous before breakfast  insulin lispro Injectable (HumaLOG) 7 Unit(s) SubCutaneous before lunch  insulin lispro Injectable (HumaLOG) 7 Unit(s) SubCutaneous before dinner  methadone    Tablet 10 milliGRAM(s) Oral daily  metoprolol succinate ER 25 milliGRAM(s) Oral daily    MEDICATIONS  (PRN):  acetaminophen   Tablet .. 650 milliGRAM(s) Oral every 6 hours PRN Moderate Pain (4 - 6)  dextrose 40% Gel 15 Gram(s) Oral once PRN Blood Glucose LESS THAN 70 milliGRAM(s)/deciliter  glucagon  Injectable 1 milliGRAM(s) IntraMuscular once PRN Glucose LESS THAN 70 milligrams/deciliter    Assessment and Plan:    #Acute on chronic systolic Heart Failure  - I/Os, Daily wts, Low Salt diet discussed  - Lasix 40mg IV q12h  - BiPAP prn and qhs  - NC Oxygen 3L at this time try to titrate down    #RITCHIE ,   - Cardiorenal Syndrome   - Continue Diuresis as above  - Nephrology follow up appreciated   - f/u BMP    # Community Acquired PNA  - Cefepime and Azithromycin  - Bipap and Oxygen NC    # History of CVA with Residual Deficites  - Cardiology recommend Anticoagulation  - Pt on Heparin drip and Coumadin dosing qhs  - INR qd (goal 2-3)    # Anemia   - Likely due to Chronic Illness / CKD  - F/U Iron studies sent today  - Nephro addressing    # COPD/Asthma  - No acute exarcerbation this hospitalization  - BiPAP is for CHF  - Resume usual home maintanance meds per orders        cardio recommended anticoagulation but pt has not received ac for a few days and has high INR, could be due to liver disease possibly due to liver congestion ( ? cardiac congestion)   # anemia, stable, monitor, no active bleeding    #copd/asthma  - stable  - c/w home meds per orders    # DM2  - Lantus / Lispro   - FS Checks per routine  - Diabetic Diet    # HTN  - stable     FULL CODE    Dispo: Acute / ICU downgrade today

## 2018-12-16 NOTE — PROGRESS NOTE ADULT - SUBJECTIVE AND OBJECTIVE BOX
Nephrology progress note    Patient is seen and examined, events over the last 24 h noted .    Allergies:  aspirin (Other (Moderate))  IV Contrast (Unknown)    Hospital Medications:   MEDICATIONS  (STANDING):  ALBUTerol    90 MICROgram(s) HFA Inhaler 1 Puff(s) Inhalation every 4 hours  ALBUTerol/ipratropium for Nebulization 3 milliLiter(s) Nebulizer every 6 hours  atorvastatin 40 milliGRAM(s) Oral at bedtime  buDESOnide 160 MICROgram(s)/formoterol 4.5 MICROgram(s) Inhaler 2 Puff(s) Inhalation two times a day  busPIRone 10 milliGRAM(s) Oral two times a day  furosemide   Injectable 40 milliGRAM(s) IV Push two times a day  gabapentin 100 milliGRAM(s) Oral three times a day  guaiFENesin/dextromethorphan  Syrup 10 milliLiter(s) Oral every 4 hours  heparin  Infusion 1200 Unit(s)/Hr (12 mL/Hr) IV Continuous <Continuous>  insulin glargine Injectable (LANTUS) 25 Unit(s) SubCutaneous at bedtime  insulin lispro (HumaLOG) corrective regimen sliding scale   SubCutaneous three times a day before meals  insulin lispro Injectable (HumaLOG) 7 Unit(s) SubCutaneous before breakfast  insulin lispro Injectable (HumaLOG) 7 Unit(s) SubCutaneous before lunch  insulin lispro Injectable (HumaLOG) 7 Unit(s) SubCutaneous before dinner  methadone    Tablet 10 milliGRAM(s) Oral daily  metoprolol succinate ER 25 milliGRAM(s) Oral daily        VITALS:  T(F): 95.2 (18 @ 05:28), Max: 98.6 (12-15-18 @ 12:00)  HR: 82 (18 @ 05:28)  BP: 138/61 (18 @ 05:28)  RR: 20 (18 @ 05:28)  SpO2: 96% (18 @ 08:11)  Wt(kg): --     @ 07:01  -  12-15 @ 07:00  --------------------------------------------------------  IN: 210 mL / OUT: 1420 mL / NET: -1210 mL    12-15 @ 07:01  -   @ 07:00  --------------------------------------------------------  IN: 72 mL / OUT: 1995 mL / NET: -1923 mL     @ 07:01  -   @ 10:49  --------------------------------------------------------  IN: 0 mL / OUT: 2700 mL / NET: -2700 mL      Height (cm): 165.1 (12-15 @ 13:14)  Weight (kg): 97.5 (12-15 @ 13:14)  BMI (kg/m2): 35.8 (12-15 @ 13:14)  BSA (m2): 2.04 (12-15 @ 13:14)    PHYSICAL EXAM:  Constitutional: NAD  HEENT: anicteric sclera, oropharynx clear, MMM  Neck: No JVD  Respiratory: CTAB, no wheezes, rales or rhonchi  Cardiovascular: S1, S2, RRR  Gastrointestinal: BS+, soft, NT/ND  Extremities: No cyanosis or clubbing. No peripheral edema  :  No medina.   Skin: No rashes    LABS:      140  |  95<L>  |  37<H>  ----------------------------<  114<H>  4.5   |  30  |  2.0<H>  Creatinine Trend: 2.0<--, 2.5<--, 2.6<--, 2.7<--, 3.0<--, 3.1<--  Ca    9.0      16 Dec 2018 09:06  Phos  3.8     12-15  Mg     1.8         TPro  7.3  /  Alb  3.5  /  TBili  0.5  /  DBili      /  AST  17  /  ALT  11  /  AlkPhos  121<H>                            10.7   7.02  )-----------( 234      ( 16 Dec 2018 09:06 )             35.1       Urine Studies:  Urinalysis Basic - ( 12 Dec 2018 19:00 )    Color: Yellow / Appearance: Clear / S.020 / pH:   Gluc:  / Ketone: Negative  / Bili: Negative / Urobili: 0.2 mg/dL   Blood:  / Protein: 100 mg/dL / Nitrite: Negative   Leuk Esterase: Negative / RBC: 3-5 /HPF / WBC    Sq Epi:  / Non Sq Epi: Occasional /HPF / Bacteria: Few /HPF      Osmolality, Random Urine: 324 mos/kg ( @ 18:35)  Sodium, Random Urine: 31.0 mmoL/L ( @ 18:35)  Potassium, Random Urine: 28 mmol/L ( @ 18:35)  Chloride, Random Urine: 21 ( @ 18:35)    RADIOLOGY & ADDITIONAL STUDIES: Nephrology progress note    Patient is seen and examined, events over the last 24 h noted .  feels ok  SOB is better     Allergies:  aspirin (Other (Moderate))  IV Contrast (Unknown)    Hospital Medications:   MEDICATIONS  (STANDING):  ALBUTerol    90 MICROgram(s) HFA Inhaler 1 Puff(s) Inhalation every 4 hours  ALBUTerol/ipratropium for Nebulization 3 milliLiter(s) Nebulizer every 6 hours  atorvastatin 40 milliGRAM(s) Oral at bedtime  buDESOnide 160 MICROgram(s)/formoterol 4.5 MICROgram(s) Inhaler 2 Puff(s) Inhalation two times a day  busPIRone 10 milliGRAM(s) Oral two times a day  furosemide   Injectable 40 milliGRAM(s) IV Push two times a day  gabapentin 100 milliGRAM(s) Oral three times a day  guaiFENesin/dextromethorphan  Syrup 10 milliLiter(s) Oral every 4 hours  heparin  Infusion 1200 Unit(s)/Hr (12 mL/Hr) IV Continuous <Continuous>  insulin glargine Injectable (LANTUS) 25 Unit(s) SubCutaneous at bedtime  insulin lispro (HumaLOG) corrective regimen sliding scale   SubCutaneous three times a day before meals  insulin lispro Injectable (HumaLOG) 7 Unit(s) SubCutaneous before breakfast  insulin lispro Injectable (HumaLOG) 7 Unit(s) SubCutaneous before lunch  insulin lispro Injectable (HumaLOG) 7 Unit(s) SubCutaneous before dinner  methadone    Tablet 10 milliGRAM(s) Oral daily  metoprolol succinate ER 25 milliGRAM(s) Oral daily        VITALS:  T(F): 95.2 (18 @ 05:28), Max: 98.6 (12-15-18 @ 12:00)  HR: 82 (18 @ 05:28)  BP: 138/61 (18 @ 05:28)  RR: 20 (18 @ 05:28)  SpO2: 96% (18 @ 08:11)     @ 07:01  -  12-15 @ 07:00  --------------------------------------------------------  IN: 210 mL / OUT: 1420 mL / NET: -1210 mL    12-15 @ 07:01  -   @ 07:00  --------------------------------------------------------  IN: 72 mL / OUT: 1995 mL / NET: -1923 mL     @ 07:01  -   @ 10:49  --------------------------------------------------------  IN: 0 mL / OUT: 2700 mL / NET: -2700 mL      Height (cm): 165.1 (12-15 @ 13:14)  Weight (kg): 97.5 (12-15 @ 13:14)  BMI (kg/m2): 35.8 (12-15 @ 13:14)  BSA (m2): 2.04 (12-15 @ 13:14)    PHYSICAL EXAM:  Constitutional: NAD  HEENT: anicteric sclera, oropharynx clear, MMM  Neck: No JVD  Respiratory: CTAB, no wheezes, rales or rhonchi  Cardiovascular: S1, S2, RRR  Gastrointestinal: BS+, soft, NT/ND  Extremities: No cyanosis or clubbing. plus one edema   :  positive medina   Skin: No rashes    LABS:      140  |  95<L>  |  37<H>  ----------------------------<  114<H>  4.5   |  30  |  2.0<H>    Creatinine Trend: 2.0<--, 2.5<--, 2.6<--, 2.7<--, 3.0<--, 3.1<--    Ca    9.0      16 Dec 2018 09:06  Phos  3.8     12-15  Mg     1.8         TPro  7.3  /  Alb  3.5  /  TBili  0.5  /  DBili      /  AST  17  /  ALT  11  /  AlkPhos  121<H>                            10.7   7.02  )-----------( 234      ( 16 Dec 2018 09:06 )             35.1       Urine Studies:  Urinalysis Basic - ( 12 Dec 2018 19:00 )    Color: Yellow / Appearance: Clear / S.020 / pH:   Gluc:  / Ketone: Negative  / Bili: Negative / Urobili: 0.2 mg/dL   Blood:  / Protein: 100 mg/dL / Nitrite: Negative   Leuk Esterase: Negative / RBC: 3-5 /HPF / WBC    Sq Epi:  / Non Sq Epi: Occasional /HPF / Bacteria: Few /HPF      Osmolality, Random Urine: 324 mos/kg ( @ 18:35)  Sodium, Random Urine: 31.0 mmoL/L ( @ 18:35)  Potassium, Random Urine: 28 mmol/L ( @ 18:35)  Chloride, Random Urine: 21 ( @ 18:35)    RADIOLOGY & ADDITIONAL STUDIES:

## 2018-12-17 LAB
ALBUMIN SERPL ELPH-MCNC: 3.5 G/DL — SIGNIFICANT CHANGE UP (ref 3.5–5.2)
ALP SERPL-CCNC: 120 U/L — HIGH (ref 30–115)
ALT FLD-CCNC: 14 U/L — SIGNIFICANT CHANGE UP (ref 0–41)
APTT BLD: 73 SEC — CRITICAL HIGH (ref 27–39.2)
APTT BLD: 93.8 SEC — CRITICAL HIGH (ref 27–39.2)
APTT BLD: 99.5 SEC — CRITICAL HIGH (ref 27–39.2)
AST SERPL-CCNC: 21 U/L — SIGNIFICANT CHANGE UP (ref 0–41)
BASOPHILS # BLD AUTO: 0.04 K/UL — SIGNIFICANT CHANGE UP (ref 0–0.2)
BASOPHILS NFR BLD AUTO: 0.5 % — SIGNIFICANT CHANGE UP (ref 0–1)
BILIRUB SERPL-MCNC: 0.5 MG/DL — SIGNIFICANT CHANGE UP (ref 0.2–1.2)
BUN SERPL-MCNC: 31 MG/DL — HIGH (ref 10–20)
CALCIUM SERPL-MCNC: 9.1 MG/DL — SIGNIFICANT CHANGE UP (ref 8.5–10.1)
CHLORIDE SERPL-SCNC: 93 MMOL/L — LOW (ref 98–110)
CO2 SERPL-SCNC: 32 MMOL/L — SIGNIFICANT CHANGE UP (ref 17–32)
CREAT SERPL-MCNC: 1.7 MG/DL — HIGH (ref 0.7–1.5)
EOSINOPHIL # BLD AUTO: 0.24 K/UL — SIGNIFICANT CHANGE UP (ref 0–0.7)
EOSINOPHIL NFR BLD AUTO: 3.1 % — SIGNIFICANT CHANGE UP (ref 0–8)
FERRITIN SERPL-MCNC: 273 NG/ML — HIGH (ref 15–150)
FOLATE SERPL-MCNC: 15 NG/ML — SIGNIFICANT CHANGE UP
GLUCOSE BLDC GLUCOMTR-MCNC: 121 MG/DL — HIGH (ref 70–99)
GLUCOSE BLDC GLUCOMTR-MCNC: 126 MG/DL — HIGH (ref 70–99)
GLUCOSE BLDC GLUCOMTR-MCNC: 168 MG/DL — HIGH (ref 70–99)
GLUCOSE BLDC GLUCOMTR-MCNC: 184 MG/DL — HIGH (ref 70–99)
GLUCOSE SERPL-MCNC: 86 MG/DL — SIGNIFICANT CHANGE UP (ref 70–99)
HCT VFR BLD CALC: 35.7 % — LOW (ref 37–47)
HGB BLD-MCNC: 10.8 G/DL — LOW (ref 12–16)
IMM GRANULOCYTES NFR BLD AUTO: 0.5 % — HIGH (ref 0.1–0.3)
INR BLD: 1.28 RATIO — SIGNIFICANT CHANGE UP (ref 0.65–1.3)
IRON SATN MFR SERPL: 30 % — SIGNIFICANT CHANGE UP (ref 15–50)
IRON SATN MFR SERPL: 63 UG/DL — SIGNIFICANT CHANGE UP (ref 35–150)
LYMPHOCYTES # BLD AUTO: 2.33 K/UL — SIGNIFICANT CHANGE UP (ref 1.2–3.4)
LYMPHOCYTES # BLD AUTO: 29.9 % — SIGNIFICANT CHANGE UP (ref 20.5–51.1)
MAGNESIUM SERPL-MCNC: 1.8 MG/DL — SIGNIFICANT CHANGE UP (ref 1.8–2.4)
MCHC RBC-ENTMCNC: 26.1 PG — LOW (ref 27–31)
MCHC RBC-ENTMCNC: 30.3 G/DL — LOW (ref 32–37)
MCV RBC AUTO: 86.2 FL — SIGNIFICANT CHANGE UP (ref 81–99)
MONOCYTES # BLD AUTO: 0.99 K/UL — HIGH (ref 0.1–0.6)
MONOCYTES NFR BLD AUTO: 12.7 % — HIGH (ref 1.7–9.3)
NEUTROPHILS # BLD AUTO: 4.14 K/UL — SIGNIFICANT CHANGE UP (ref 1.4–6.5)
NEUTROPHILS NFR BLD AUTO: 53.3 % — SIGNIFICANT CHANGE UP (ref 42.2–75.2)
NRBC # BLD: 0 /100 WBCS — SIGNIFICANT CHANGE UP (ref 0–0)
PLATELET # BLD AUTO: 247 K/UL — SIGNIFICANT CHANGE UP (ref 130–400)
POTASSIUM SERPL-MCNC: 4.4 MMOL/L — SIGNIFICANT CHANGE UP (ref 3.5–5)
POTASSIUM SERPL-SCNC: 4.4 MMOL/L — SIGNIFICANT CHANGE UP (ref 3.5–5)
PROT SERPL-MCNC: 7.2 G/DL — SIGNIFICANT CHANGE UP (ref 6–8)
PROTHROM AB SERPL-ACNC: 14.7 SEC — HIGH (ref 9.95–12.87)
RBC # BLD: 4.14 M/UL — LOW (ref 4.2–5.4)
RBC # FLD: 16.2 % — HIGH (ref 11.5–14.5)
S PNEUM AG UR QL: NEGATIVE — SIGNIFICANT CHANGE UP
SODIUM SERPL-SCNC: 137 MMOL/L — SIGNIFICANT CHANGE UP (ref 135–146)
TIBC SERPL-MCNC: 211 UG/DL — LOW (ref 220–430)
TSH SERPL-MCNC: 1.74 UIU/ML — SIGNIFICANT CHANGE UP (ref 0.27–4.2)
UIBC SERPL-MCNC: 148 UG/DL — SIGNIFICANT CHANGE UP (ref 110–370)
VIT B12 SERPL-MCNC: 665 PG/ML — SIGNIFICANT CHANGE UP (ref 232–1245)
WBC # BLD: 7.78 K/UL — SIGNIFICANT CHANGE UP (ref 4.8–10.8)
WBC # FLD AUTO: 7.78 K/UL — SIGNIFICANT CHANGE UP (ref 4.8–10.8)

## 2018-12-17 RX ORDER — WARFARIN SODIUM 2.5 MG/1
5 TABLET ORAL ONCE
Qty: 0 | Refills: 0 | Status: COMPLETED | OUTPATIENT
Start: 2018-12-17 | End: 2018-12-17

## 2018-12-17 RX ORDER — HEPARIN SODIUM 5000 [USP'U]/ML
1200 INJECTION INTRAVENOUS; SUBCUTANEOUS
Qty: 25000 | Refills: 0 | Status: DISCONTINUED | OUTPATIENT
Start: 2018-12-17 | End: 2018-12-17

## 2018-12-17 RX ORDER — HEPARIN SODIUM 5000 [USP'U]/ML
1000 INJECTION INTRAVENOUS; SUBCUTANEOUS
Qty: 25000 | Refills: 0 | Status: DISCONTINUED | OUTPATIENT
Start: 2018-12-17 | End: 2018-12-18

## 2018-12-17 RX ORDER — HEPARIN SODIUM 5000 [USP'U]/ML
1100 INJECTION INTRAVENOUS; SUBCUTANEOUS
Qty: 25000 | Refills: 0 | Status: DISCONTINUED | OUTPATIENT
Start: 2018-12-17 | End: 2018-12-17

## 2018-12-17 RX ADMIN — Medication 650 MILLIGRAM(S): at 14:45

## 2018-12-17 RX ADMIN — GABAPENTIN 100 MILLIGRAM(S): 400 CAPSULE ORAL at 21:14

## 2018-12-17 RX ADMIN — WARFARIN SODIUM 5 MILLIGRAM(S): 2.5 TABLET ORAL at 21:14

## 2018-12-17 RX ADMIN — Medication 650 MILLIGRAM(S): at 00:35

## 2018-12-17 RX ADMIN — Medication 3 MILLILITER(S): at 13:51

## 2018-12-17 RX ADMIN — Medication 3 MILLILITER(S): at 19:27

## 2018-12-17 RX ADMIN — Medication 25 MILLIGRAM(S): at 05:14

## 2018-12-17 RX ADMIN — Medication 7 UNIT(S): at 11:32

## 2018-12-17 RX ADMIN — Medication 10 MILLIGRAM(S): at 17:18

## 2018-12-17 RX ADMIN — GABAPENTIN 100 MILLIGRAM(S): 400 CAPSULE ORAL at 13:55

## 2018-12-17 RX ADMIN — Medication 7 UNIT(S): at 08:38

## 2018-12-17 RX ADMIN — Medication 40 MILLIGRAM(S): at 17:18

## 2018-12-17 RX ADMIN — INSULIN GLARGINE 25 UNIT(S): 100 INJECTION, SOLUTION SUBCUTANEOUS at 21:32

## 2018-12-17 RX ADMIN — ATORVASTATIN CALCIUM 40 MILLIGRAM(S): 80 TABLET, FILM COATED ORAL at 21:14

## 2018-12-17 RX ADMIN — Medication 650 MILLIGRAM(S): at 13:54

## 2018-12-17 RX ADMIN — HEPARIN SODIUM 10 UNIT(S)/HR: 5000 INJECTION INTRAVENOUS; SUBCUTANEOUS at 14:25

## 2018-12-17 RX ADMIN — Medication 7 UNIT(S): at 17:16

## 2018-12-17 RX ADMIN — METHADONE HYDROCHLORIDE 10 MILLIGRAM(S): 40 TABLET ORAL at 11:31

## 2018-12-17 RX ADMIN — Medication 40 MILLIGRAM(S): at 05:15

## 2018-12-17 RX ADMIN — HEPARIN SODIUM 11 UNIT(S)/HR: 5000 INJECTION INTRAVENOUS; SUBCUTANEOUS at 05:13

## 2018-12-17 RX ADMIN — GABAPENTIN 100 MILLIGRAM(S): 400 CAPSULE ORAL at 05:14

## 2018-12-17 RX ADMIN — BUDESONIDE AND FORMOTEROL FUMARATE DIHYDRATE 2 PUFF(S): 160; 4.5 AEROSOL RESPIRATORY (INHALATION) at 08:39

## 2018-12-17 RX ADMIN — Medication 2: at 11:32

## 2018-12-17 RX ADMIN — Medication 10 MILLIGRAM(S): at 05:15

## 2018-12-17 RX ADMIN — Medication 650 MILLIGRAM(S): at 21:33

## 2018-12-17 NOTE — PROGRESS NOTE ADULT - SUBJECTIVE AND OBJECTIVE BOX
The patient is significantly improved. No SOB , Sitting up in bed eating. Able to ambulate to bathroom     PHYSICAL EXAM:  Vital Signs Last 24 Hrs  T(C): 35.3 (17 Dec 2018 06:33), Max: 36.8 (16 Dec 2018 21:26)  T(F): 95.5 (17 Dec 2018 06:33), Max: 98.3 (16 Dec 2018 21:26)  HR: 69 (17 Dec 2018 06:33) (69 - 83)  BP: 131/63 (17 Dec 2018 06:33) (113/59 - 138/65)  BP(mean): --  RR: 19 (17 Dec 2018 06:33) (19 - 20)  SpO2: 98% (17 Dec 2018 07:23) (97% - 98%)  Daily     Daily Weight in k.1 (17 Dec 2018 06:33)  I&O's Detail    16 Dec 2018 07:01  -  17 Dec 2018 07:00  --------------------------------------------------------  IN:    heparin Infusion: 54 mL  Total IN: 54 mL    OUT:    Indwelling Catheter - Urethral: 5600 mL  Total OUT: 5600 mL    Total NET: -5546 mL        GENERAL: NAD, well-groomed, well-developed  HEAD:  Atraumatic, Normocephalic  EYES: EOMI, PERRLA, conjunctiva and sclera clear  ENMT: No tonsillar erythema, exudates, or enlargement; Moist mucous membranes, Good dentition, No lesions  NECK: Supple, No JVD, Normal thyroid  NERVOUS SYSTEM:  Alert & Oriented X3, Good concentration; Motor Strength 5/5 B/L upper and lower extremities; DTRs 2+ intact and symmetric  CHEST/LUNG: rales at bases . No wheeezing   HEART: Regular rate and rhythm; No murmurs, rubs, or gallops  ABDOMEN: Soft, Nontender, Nondistended; Bowel sounds present  EXTREMITIES: No edema   LYMPH: No lymphadenopathy noted  SKIN: No rashes or lesion        LABS:                        10.7   7.02  )-----------( 234      ( 16 Dec 2018 09:06 )             35.1     12-16    140  |  95<L>  |  37<H>  ----------------------------<  114<H>  4.5   |  30  |  2.0<H>    Ca    9.0      16 Dec 2018 09:06  Mg     1.8     12-    TPro  7.3  /  Alb  3.5  /  TBili  0.5  /  DBili  x   /  AST  17  /  ALT  11  /  AlkPhos  121<H>  12-16    PT/INR - ( 16 Dec 2018 09:06 )   PT: 14.00 sec;   INR: 1.22 ratio         PTT - ( 17 Dec 2018 01:10 )  PTT:99.5 sec  MEDICATIONS  (STANDING):  ALBUTerol    90 MICROgram(s) HFA Inhaler 1 Puff(s) Inhalation every 4 hours  ALBUTerol/ipratropium for Nebulization 3 milliLiter(s) Nebulizer every 6 hours  atorvastatin 40 milliGRAM(s) Oral at bedtime  buDESOnide 160 MICROgram(s)/formoterol 4.5 MICROgram(s) Inhaler 2 Puff(s) Inhalation two times a day  busPIRone 10 milliGRAM(s) Oral two times a day  chlorhexidine 4% Liquid 1 Application(s) Topical <User Schedule>  dextrose 50% Injectable 12.5 Gram(s) IV Push once  dextrose 50% Injectable 25 Gram(s) IV Push once  dextrose 50% Injectable 25 Gram(s) IV Push once  furosemide   Injectable 40 milliGRAM(s) IV Push two times a day  gabapentin 100 milliGRAM(s) Oral three times a day  guaiFENesin/dextromethorphan  Syrup 10 milliLiter(s) Oral every 4 hours  heparin  Infusion 1100 Unit(s)/Hr (11 mL/Hr) IV Continuous <Continuous>  insulin glargine Injectable (LANTUS) 25 Unit(s) SubCutaneous at bedtime  insulin lispro (HumaLOG) corrective regimen sliding scale   SubCutaneous three times a day before meals  insulin lispro Injectable (HumaLOG) 7 Unit(s) SubCutaneous before breakfast  insulin lispro Injectable (HumaLOG) 7 Unit(s) SubCutaneous before lunch  insulin lispro Injectable (HumaLOG) 7 Unit(s) SubCutaneous before dinner  methadone    Tablet 10 milliGRAM(s) Oral daily  metoprolol succinate ER 25 milliGRAM(s) Oral daily    MEDICATIONS  (PRN):  acetaminophen   Tablet .. 650 milliGRAM(s) Oral every 6 hours PRN Moderate Pain (4 - 6)  dextrose 40% Gel 15 Gram(s) Oral once PRN Blood Glucose LESS THAN 70 milliGRAM(s)/deciliter  glucagon  Injectable 1 milliGRAM(s) IntraMuscular once PRN Glucose LESS THAN 70 milligrams/deciliter

## 2018-12-17 NOTE — PROGRESS NOTE ADULT - SUBJECTIVE AND OBJECTIVE BOX
SULY PENA MRN-[id number]    Hospitalist Note  57yo F with Past Medical History Chronic Systolic CHF status post AICD placement, COPD on home O2 (4L) , CVD, CVA with residual right sided weakness, depression, fibromyalgia and PVD admitted for worsening dyspnea x1 week. Her hospital course was complicated by an episode of acute respiratory failure and hemoptysis.    Overnight events/Updates: No acute events over the past 24 hours.  No further hemoptysis reported.  The patient reports that her breathing improves on BIPAP    Vital Signs Last 24 Hrs  T(C): 36 (17 Dec 2018 13:30), Max: 36.8 (16 Dec 2018 21:26)  T(F): 96.8 (17 Dec 2018 13:30), Max: 98.3 (16 Dec 2018 21:26)  HR: 74 (17 Dec 2018 13:30) (69 - 75)  BP: 134/69 (17 Dec 2018 13:30) (113/59 - 134/69)  BP(mean): --  RR: 18 (17 Dec 2018 13:30) (18 - 20)  SpO2: 98% (17 Dec 2018 07:23) (97% - 98%)    Physical Examination:  General: AAO x 3  HEENT: PERRLA, EOMI, NC  CV= S1 & S2 appreciated  Lungs= no wheezes, + cough  Abdominal Examination= + BS, Soft, NT/ND  Extremity Examination= No C/C/E    ROS: No chest pain.  All other systems reviewed and are within normal limits except for the complaints in the HPI.    MEDICATIONS  (STANDING):  ALBUTerol    90 MICROgram(s) HFA Inhaler 1 Puff(s) Inhalation every 4 hours  ALBUTerol/ipratropium for Nebulization 3 milliLiter(s) Nebulizer every 6 hours  atorvastatin 40 milliGRAM(s) Oral at bedtime  buDESOnide 160 MICROgram(s)/formoterol 4.5 MICROgram(s) Inhaler 2 Puff(s) Inhalation two times a day  busPIRone 10 milliGRAM(s) Oral two times a day  chlorhexidine 4% Liquid 1 Application(s) Topical <User Schedule>  dextrose 50% Injectable 12.5 Gram(s) IV Push once  dextrose 50% Injectable 25 Gram(s) IV Push once  dextrose 50% Injectable 25 Gram(s) IV Push once  furosemide   Injectable 40 milliGRAM(s) IV Push two times a day  gabapentin 100 milliGRAM(s) Oral three times a day  heparin  Infusion 1000 Unit(s)/Hr (10 mL/Hr) IV Continuous <Continuous>  insulin glargine Injectable (LANTUS) 25 Unit(s) SubCutaneous at bedtime  insulin lispro (HumaLOG) corrective regimen sliding scale   SubCutaneous three times a day before meals  insulin lispro Injectable (HumaLOG) 7 Unit(s) SubCutaneous before breakfast  insulin lispro Injectable (HumaLOG) 7 Unit(s) SubCutaneous before lunch  insulin lispro Injectable (HumaLOG) 7 Unit(s) SubCutaneous before dinner  methadone    Tablet 10 milliGRAM(s) Oral daily  metoprolol succinate ER 25 milliGRAM(s) Oral daily  warfarin 5 milliGRAM(s) Oral once    MEDICATIONS  (PRN):  acetaminophen   Tablet .. 650 milliGRAM(s) Oral every 6 hours PRN Moderate Pain (4 - 6)  dextrose 40% Gel 15 Gram(s) Oral once PRN Blood Glucose LESS THAN 70 milliGRAM(s)/deciliter  glucagon  Injectable 1 milliGRAM(s) IntraMuscular once PRN Glucose LESS THAN 70 milligrams/deciliter  guaiFENesin/dextromethorphan  Syrup 10 milliLiter(s) Oral every 4 hours PRN Cough                            10.8   7.78  )-----------( 247      ( 17 Dec 2018 08:04 )             35.7     12-17    137  |  93<L>  |  31<H>  ----------------------------<  86  4.4   |  32  |  1.7<H>    Ca    9.1      17 Dec 2018 08:04  Mg     1.8     12-17    TPro  7.2  /  Alb  3.5  /  TBili  0.5  /  DBili  x   /  AST  21  /  ALT  14  /  AlkPhos  120<H>  12-17      Case discussed with housestaff & family  ZOLTAN Rey [id number]

## 2018-12-17 NOTE — PROGRESS NOTE ADULT - ASSESSMENT
-CHF known nonischemic CM , severe LV dysfunction , clinically improved   -Acute on chronic renal insuffiencey   -COPD exacerbation ? PNA   -Hemopysis - resolved. Needs pulmonary follow up .   - History of DVT . Suspect component of having a hypercoaguable state. Has IVC filter since 2011.     - IV Lasix today , if stable would change to PO at current dose in AM   - Follow electrolytes and renal function   - Pulmonary consult follow up re cause of hemopysis   - OOB to chair.   -She was on Entresto prior to admission . This would be difficult to restart in view of renal function   - If BP tolerates would add hydralazine 10 mg q8h and Isosorbide dinitrate 10 mg TID

## 2018-12-17 NOTE — PROGRESS NOTE ADULT - ASSESSMENT
58 year female patient with history of HFrEF ( EF 20-25%) , s/p AICD, COPD on home O2 (4L) , CVD, CVA with residual rt sided weakness , depression, fibromyalgia , PVD, presented for one week history of SOB that started at exertion and progressed to be at rest    # SOB secondary to Acute on chronic systolic Heart Failure, Community acquired pna with underlying COPD and chronic respiratory insufficiency.   - I/Os, Daily wts, Low Salt diet   - Lasix 40mg IV q12h   - BiPAP QHS and PRN. Being arranged for home BiPAP  - Baseline 3-4L home o2, wean NC as tolerated target Sat 88%-92%  - s/p Cefepime and Azithromycin     # IRTCHIE on CKDIII likely cardiorenal syndrome and urinary retention  - Renal following, continue IV dieresis, switch lasix to po tomorrow  - f/u BMP  - SOB is better / cr better    # History of CVA with Residual Deficits  - Cardiology recommend Anticoagulation  - Pt on Heparin drip. Coumadin never started thus far. pt receievd [facility] for hemoptysis in icu.   - INR qd (goal 2-3)    # Left renal lesion that is ill defined - f/u as out-patient with nephrology for further imaging   # Anemia chronic - f/u c Fe studies  no need for HEATHER or Tx for now   # DM2 - Lantus / Lispro   # HTN - no entresto given RITCHIE. If BP tolerates would add hydralazine 10 mg q8h and Isosorbide dinitrate 10 mg TID   # GI PPx - ()Protonix (X) Not indicated  # DVT PPx - (X) Heparin 5000 mg SubQ     # Activity -  (X) Increase as Tolerated   # Dispo -   Patient to be discharged when medically optimized. Home o2   # Code Status - (X) FULL    () DNR / DNI    (X) Discussed Case and Plan with the Medical Attending.  Please call Dr. Peacock with any questions/ recommendations: Spectra #[id number] 58 year female patient with history of HFrEF ( EF 20-25%) , s/p AICD, COPD on home O2 (4L) , CVD, CVA with residual rt sided weakness , depression, fibromyalgia , PVD, presented for one week history of SOB that started at exertion and progressed to be at rest    # SOB secondary to Acute on chronic systolic Heart Failure, Community acquired pna with underlying COPD and chronic respiratory insufficiency.   - I/Os, Daily wts, Low Salt diet   - Lasix 40mg IV q12h   - BiPAP QHS and PRN. Being arranged for home BiPAP  - Baseline 3-4L home o2, wean NC as tolerated target Sat 88%-92%  - s/p Cefepime and Azithromycin     # RITCHIE on CKDIII likely cardiorenal syndrome and urinary retention  - Renal following, continue IV dieresis, switch lasix to po tomorrow  - f/u BMP  - SOB is better / cr better    # History of CVA with Residual Deficits  - Cardiology recommend Anticoagulation  - Pt on Heparin drip. Coumadin never started thus far. pt receievd [facility] for hemoptysis in icu.   - INR qd (goal 2-3)    # Left renal lesion that is ill defined - f/u as out-patient with nephrology for further imaging   # Anemia chronic - f/u c Fe studies  no need for HEATHER or Tx for now   # DM2 - Lantus / Lispro   # HTN - no entresto given RITCHIE. If BP tolerates would add hydralazine 10 mg q8h and Isosorbide dinitrate 10 mg TID   # GI PPx - ()Protonix (X) Not indicated  # DVT PPx - (X) Heparin 5000 mg SubQ     # Activity -  (X) Increase as Tolerated   # Dispo -   Patient to be discharged when medically optimized. Home o2   58 year old female admitted with chronic respiratory failure, severe COPD, CFF. On this admission she was retaining CO2 level of 59. She was upgraded to ICU and placed on BiPAP. She continued to retain high level Co2, She will need NIV on discharge. the NIV is being ordered to decrease the work of breathing. It is to be used at bedtime, nap time and periods of shortness of breath. Without NIV the patient may clinically decline and cause re admission.     # Code Status - (X) FULL    () DNR / DNI  (X) Discussed Case and Plan with the Medical Attending.  Please call Dr. Peacock with any questions/ recommendations: Spectra #[id number] 58 year female patient with history of HFrEF ( EF 20-25%) , s/p AICD, COPD on home O2 (4L) , CVD, CVA with residual rt sided weakness , depression, fibromyalgia , PVD, presented for one week history of SOB that started at exertion and progressed to be at rest    # SOB secondary to Acute on chronic systolic Heart Failure, Community acquired pna (resolved) with underlying COPD and chronic respiratory insufficiency.   - I/Os, Daily wts, Low Salt diet   - Lasix 40mg IV q12h   - NiV QHS and PRN. Being arranged for home NIV  - Baseline 3-4L home o2, wean NC as tolerated target Sat 88%-92%  - s/p Cefepime and Azithromycin course completed.     # RITCHIE on CKDIII likely cardiorenal syndrome and urinary retention  - Renal following, continue IV dieresis, switch lasix to po tomorrow  - f/u BMP  - SOB is better / cr better    # History of CVA with Residual Deficits  - Cardiology recommend Anticoagulation  - Pt on Heparin drip. Coumadin never started thus far. pt receievd [facility] for hemoptysis in icu.   - INR qd (goal 2-3)    # Left renal lesion that is ill defined - f/u as out-patient with nephrology for further imaging   # Anemia chronic - f/u c Fe studies  no need for HEATHER or Tx for now   # DM2 - Lantus / Lispro   # HTN - no entresto given RITCHIE. If BP tolerates would add hydralazine 10 mg q8h and Isosorbide dinitrate 10 mg TID   # GI PPx - ()Protonix (X) Not indicated  # DVT PPx - (X) Heparin 5000 mg SubQ     # Activity -  (X) Increase as Tolerated   # Dispo -   Patient to be discharged when medically optimized. Home o2   58 year old female admitted with chronic respiratory failure, severe COPD, CFF. On this admission she was retaining CO2 level of 59. She was upgraded to ICU and placed on BiPAP. She continued to retain high level Co2, She will need NIV on discharge. the NIV is being ordered to decrease the work of breathing. It is to be used at bedtime, nap time and periods of shortness of breath. Without NIV the patient may clinically decline and cause re admission.     # Code Status - (X) FULL    () DNR / DNI  (X) Discussed Case and Plan with the Medical Attending.  Please call Dr. Peacock with any questions/ recommendations: Spectra #[id number]

## 2018-12-17 NOTE — PROGRESS NOTE ADULT - ASSESSMENT
59yo F with Past Medical History Chronic Systolic CHF status post AICD placement, COPD on home O2 (4L) , CVD, CVA with residual right sided weakness, depression, fibromyalgia and PVD admitted for worsening dyspnea x1 week    Acute on chronic systolic heart failure: clinically improved from admission.  Switch IV Lasix 40mg q12 to an oral formulation tomorrow.  Trend pulse oximetry and monitor daily weights.  Pt reports symptomatic improvement after using BIPAP at night.   evaluation for outpatient BIPAP.  Entresto was discontinued due to persistent hypotension.  Cardiology advised introducing Hydralazine and Imdur if blood pressure can tolerate additional medications.  Start hydralazine in AM.  Acute kidney injury secondary to cardiorenal syndrome: see above for management of diuretics.  Nephrology recommendations were reviewed.  Repeat BMP in AM  R/o Community Acquired PNA: complete treatment with Cefepime and Azithromycin x1 week.  Prior CVA with residual deficits: continue Lipitor at bedtime  Chronic Thromboembolism/? Hypercoaguable state: patient was restarted on heparin gtt and was previously taking Coumadin as outpatient.  Administer Coumadin 5mg at bedtime, repeat INR in AM, and monitor for bleeding  Chronic Anemia: likely multifactorial. Hgb stable @ 10.8.   DMII: continue Lantus/Lispro and monitor FS with meals  GI prophylaxis

## 2018-12-17 NOTE — PROGRESS NOTE ADULT - ASSESSMENT
58 year old female, patient with history of HFrEF ( EF 20-25%) , s/p AICD, COPD on home O2 (4L) , CVD, CVA with residual rt sided weakness , PVD, presented for one week history of SOB.Patient was found to have elevated creatinine to 2.1 from her baseline CKD III.       ·	RITCHIE on CKD III/ respiratory failure   due to retention and cardiorenal syndrome   switch lasix to po   SOB is better / creat better  Hx of HFrEF repeat Echo/ last EF 25%  daily weights   Check IP and PTH    No need for RRT   voiding trial today     ·	left renal lesion that is ill defined   f/u as out-patient with nephrology for further imaging     ·	Anemia chronic  check Fe studies  no need for HEATHER or Tx for now

## 2018-12-17 NOTE — PROGRESS NOTE ADULT - SUBJECTIVE AND OBJECTIVE BOX
DAILY PROGRESS NOTE  ===========================================================  Patient Information:   Hospital Day:</SULY BECKY  /  58y  /  Female  /b> 6d /  MRN#: [id number]  Working / Admitting Diagnosis:  1.     |:::::::::::::::::::::::::::::| SUBJECTIVE |:::::::::::::::::::::::::::::::|  OVERNIGHT EVENTS:   No acute events noted. Using BiPAP overnight and NC during day. Still being diureses IV lasix   Denies chest pain / SOB / palpitations / Nausea / Vomitting / constipation / diarrhea or abdominal pain.   ROS otherwise negative.  Past Medical History:   Chronic pain  HLD (hyperlipidemia)  CVA (cerebral vascular accident)  Depression  COPD (chronic obstructive pulmonary disease)  Diabetes  CHF (congestive heart failure)  History of cholecystectomy  H/O tubal ligation  H/O abdominal hysterectomy  AICD (automatic cardioverter/defibrillator) present    ALLERGIES:  aspirin (Other (Moderate))  IV Contrast (Unknown)    HOME MEDICATIONS:  atorvastatin 40 mg oral tablet: 1 tab(s) orally once a day (20 Aug 2018 19:47)  busPIRone 10 mg oral tablet: 1 tab(s) orally 2 times a day (20 Aug 2018 19:47)  Entresto 24 mg-26 mg oral tablet: 1 tab(s) orally 2 times a day (20 Aug 2018 19:47)  gabapentin 100 mg oral capsule: 1 cap(s) orally 3 times a day (20 Aug 2018 19:47)  Lantus 100 units/mL subcutaneous solution: 25 unit(s) subcutaneous once a day (at bedtime) (24 Aug 2018 09:38)  Lasix 40 mg oral tablet: 1 tab(s) orally once a day (20 Aug 2018 19:47)  methadone 10 mg oral tablet: 1 tab(s) orally once a day (20 Aug 2018 19:47)  Metoprolol Succinate ER 25 mg oral tablet, extended release: 1 tab(s) orally once a day (20 Aug 2018 19:47)  NovoLOG 100 units/mL subcutaneous solution: 7 unit(s) subcutaneous 3 times a day (before meals) (24 Aug 2018 09:38)  Symbicort 160 mcg-4.5 mcg/inh inhalation aerosol: 2 puff(s) inhaled 2 times a day (20 Aug 2018 19:47)  traZODone 50 mg oral tablet: 1 tab(s) orally once a day (20 Aug 2018 19:47)      |:::::::::::::::::::::::::::| OBJECTIVE |:::::::::::::::::::::::::::|    VITAL SIGNS: Last 24 Hours  T(F): 96.8 (17 Dec 2018 13:30), Max: 98.3 (16 Dec 2018 21:26)  HR: 74 (17 Dec 2018 13:30) (69 - 83)  BP: 134/69 (17 Dec 2018 13:30) (113/59 - 138/65)  RR: 18 (17 Dec 2018 13:30) (18 - 20)  SpO2: 98% (17 Dec 2018 07:23) (97% - 98%)    12-16-18 @ 07:01  -  12-17-18 @ 07:00  --------------------------------------------------------  IN: 54 mL / OUT: 5600 mL / NET: -5546 mL      PHYSICAL EXAM:  GENERAL:   Awake, alert; NAD.  HEENT:  Head NC/AT  NECK:   Supple.  CARDIO:   RRR; S1 & S2 audible. No JVD  RESP:  No respiratory distress or accessory muscle use. bibasilar crackles   GI:   Soft/ NT/ND / No guarding; No rebound tenderness.  EXT:   Without any cyanosis, clubbing, rash, lesions or edema.     LAB RESULTS:                        10.8   7.78  )-----------( 247      ( 17 Dec 2018 08:04 )             35.7     12-17    137  |  93<L>  |  31<H>  ----------------------------<  86  4.4   |  32  |  1.7<H>    Ca    9.1      17 Dec 2018 08:04  Mg     1.8     12-17  TPro  7.2  /  Alb  3.5  /  TBili  0.5  /  DBili  x   /  AST  21  /  ALT  14  /  AlkPhos  120<H>  12-17  PT/INR - ( 17 Dec 2018 08:04 )   PT: 14.70 sec;   INR: 1.28 ratio    PTT - ( 17 Dec 2018 08:04 )  PTT:93.8 sec    MICROBIOLOGY:      RADIOLOGY:    < from: CT Abdomen and Pelvis w/ IV Cont (12.11.18 @ 08:52) >IMPRESSION:        New since prior examination is enlargement and haziness of the left   adrenal gland. This may be seen with adrenal hemorrhage or an   inflammatory process    Bilateral lower lung field patchy opacities predominantly within the   right lower lobe possibly reflecting infectious/inflammatory etiology or   related to pulmonary edema in the appropriate clinical setting. Trace   right pleural effusion.    New indeterminate left lower pole renal lesion measuring 1 cm. As this   patient has a pacemaker and likely cannot have an MRI, further evaluation   with a focused ultrasound of the lower pole of left kidney is recommended   to exclude neoplasm. If this lesion is not well-seen on this exam,   follow-up CT is recommended in 3-6 months to demonstrate stability.    Occluded left common femoral artery stent    Subcutaneous nodules, likely related to injection phenomena. Follow to   resolution is recommended    < end of copied text >    INPATIENT MEDICATIONS:  ALBUTerol    90 MICROgram(s) HFA Inhaler 1 Puff(s) Inhalation every 4 hours  ALBUTerol/ipratropium for Nebulization 3 milliLiter(s) Nebulizer every 6 hours  atorvastatin 40 milliGRAM(s) Oral at bedtime  buDESOnide 160 MICROgram(s)/formoterol 4.5 MICROgram(s) Inhaler 2 Puff(s) Inhalation two times a day  busPIRone 10 milliGRAM(s) Oral two times a day  chlorhexidine 4% Liquid 1 Application(s) Topical <User Schedule>  dextrose 50% Injectable 12.5 Gram(s) IV Push once  dextrose 50% Injectable 25 Gram(s) IV Push once  dextrose 50% Injectable 25 Gram(s) IV Push once  furosemide   Injectable 40 milliGRAM(s) IV Push two times a day  gabapentin 100 milliGRAM(s) Oral three times a day  guaiFENesin/dextromethorphan  Syrup 10 milliLiter(s) Oral every 4 hours  heparin  Infusion 1000 Unit(s)/Hr IV Continuous <Continuous>  insulin glargine Injectable (LANTUS) 25 Unit(s) SubCutaneous at bedtime  insulin lispro (HumaLOG) corrective regimen sliding scale   SubCutaneous three times a day before meals  insulin lispro Injectable (HumaLOG) 7 Unit(s) SubCutaneous before breakfast  insulin lispro Injectable (HumaLOG) 7 Unit(s) SubCutaneous before lunch  insulin lispro Injectable (HumaLOG) 7 Unit(s) SubCutaneous before dinner  methadone    Tablet 10 milliGRAM(s) Oral daily  metoprolol succinate ER 25 milliGRAM(s) Oral daily    PRN MEDICATIONS  acetaminophen   Tablet .. 650 milliGRAM(s) Oral every 6 hours PRN  dextrose 40% Gel 15 Gram(s) Oral once PRN  glucagon  Injectable 1 milliGRAM(s) IntraMuscular once PRN    ------------------------------------------------------------------------------------------------------------

## 2018-12-17 NOTE — PROGRESS NOTE ADULT - SUBJECTIVE AND OBJECTIVE BOX
Nephrology progress note    Patient is seen and examined, events over the last 24 h noted .    Allergies:  aspirin (Other (Moderate))  IV Contrast (Unknown)    Hospital Medications:   MEDICATIONS  (STANDING):  ALBUTerol    90 MICROgram(s) HFA Inhaler 1 Puff(s) Inhalation every 4 hours  ALBUTerol/ipratropium for Nebulization 3 milliLiter(s) Nebulizer every 6 hours  atorvastatin 40 milliGRAM(s) Oral at bedtime  buDESOnide 160 MICROgram(s)/formoterol 4.5 MICROgram(s) Inhaler 2 Puff(s) Inhalation two times a day  busPIRone 10 milliGRAM(s) Oral two times a day  chlorhexidine 4% Liquid 1 Application(s) Topical <User Schedule>  dextrose 50% Injectable 12.5 Gram(s) IV Push once  dextrose 50% Injectable 25 Gram(s) IV Push once  dextrose 50% Injectable 25 Gram(s) IV Push once  furosemide   Injectable 40 milliGRAM(s) IV Push two times a day  gabapentin 100 milliGRAM(s) Oral three times a day  guaiFENesin/dextromethorphan  Syrup 10 milliLiter(s) Oral every 4 hours  heparin  Infusion 1100 Unit(s)/Hr (11 mL/Hr) IV Continuous <Continuous>  insulin glargine Injectable (LANTUS) 25 Unit(s) SubCutaneous at bedtime  insulin lispro (HumaLOG) corrective regimen sliding scale   SubCutaneous three times a day before meals  insulin lispro Injectable (HumaLOG) 7 Unit(s) SubCutaneous before breakfast  insulin lispro Injectable (HumaLOG) 7 Unit(s) SubCutaneous before lunch  insulin lispro Injectable (HumaLOG) 7 Unit(s) SubCutaneous before dinner  methadone    Tablet 10 milliGRAM(s) Oral daily  metoprolol succinate ER 25 milliGRAM(s) Oral daily        VITALS:  T(F): 95.5 (18 @ 06:33), Max: 98.3 (18 @ 21:26)  HR: 69 (18 @ 06:33)  BP: 131/63 (18 @ 06:33)  RR: 19 (18 @ 06:33)  SpO2: 98% (18 @ 07:23)  Wt(kg): --    12-15 @ 07:01  -   @ 07:00  --------------------------------------------------------  IN: 72 mL / OUT: 1995 mL / NET: -1923 mL     @ 07:01  -   @ 07:00  --------------------------------------------------------  IN: 54 mL / OUT: 5600 mL / NET: -5546 mL          PHYSICAL EXAM:  Constitutional: NAD  HEENT: anicteric sclera, oropharynx clear, MMM  Neck: No JVD  Respiratory: CTAB, no wheezes, rales or rhonchi  Cardiovascular: S1, S2, RRR  Gastrointestinal: BS+, soft, NT/ND  Extremities: No cyanosis or clubbing. No peripheral edema  :  No medina.   Skin: No rashes    LABS:      140  |  95<L>  |  37<H>  ----------------------------<  114<H>  4.5   |  30  |  2.0<H>    Ca    9.0      16 Dec 2018 09:06  Mg     1.8         TPro  7.3  /  Alb  3.5  /  TBili  0.5  /  DBili      /  AST  17  /  ALT  11  /  AlkPhos  121<H>                            10.7   7.02  )-----------( 234      ( 16 Dec 2018 09:06 )             35.1       Urine Studies:  Urinalysis Basic - ( 12 Dec 2018 19:00 )    Color: Yellow / Appearance: Clear / S.020 / pH:   Gluc:  / Ketone: Negative  / Bili: Negative / Urobili: 0.2 mg/dL   Blood:  / Protein: 100 mg/dL / Nitrite: Negative   Leuk Esterase: Negative / RBC: 3-5 /HPF / WBC    Sq Epi:  / Non Sq Epi: Occasional /HPF / Bacteria: Few /HPF      Osmolality, Random Urine: 324 mos/kg ( @ 18:35)  Sodium, Random Urine: 31.0 mmoL/L ( @ 18:35)  Potassium, Random Urine: 28 mmol/L ( @ 18:35)  Chloride, Random Urine: 21 ( @ 18:35)    RADIOLOGY & ADDITIONAL STUDIES: Nephrology progress note    Patient is seen and examined, events over the last 24 h noted .  SOB is better   non oliguric positive medina     Allergies:  aspirin (Other (Moderate))  IV Contrast (Unknown)    Hospital Medications:   MEDICATIONS  (STANDING):  ALBUTerol    90 MICROgram(s) HFA Inhaler 1 Puff(s) Inhalation every 4 hours  ALBUTerol/ipratropium for Nebulization 3 milliLiter(s) Nebulizer every 6 hours  atorvastatin 40 milliGRAM(s) Oral at bedtime  buDESOnide 160 MICROgram(s)/formoterol 4.5 MICROgram(s) Inhaler 2 Puff(s) Inhalation two times a day  busPIRone 10 milliGRAM(s) Oral two times a day  chlorhexidine 4% Liquid 1 Application(s) Topical <User Schedule>  furosemide   Injectable 40 milliGRAM(s) IV Push two times a day  gabapentin 100 milliGRAM(s) Oral three times a day  guaiFENesin/dextromethorphan  Syrup 10 milliLiter(s) Oral every 4 hours  heparin  Infusion 1100 Unit(s)/Hr (11 mL/Hr) IV Continuous <Continuous>  insulin glargine Injectable (LANTUS) 25 Unit(s) SubCutaneous at bedtime  insulin lispro (HumaLOG) corrective regimen sliding scale   SubCutaneous three times a day before meals  insulin lispro Injectable (HumaLOG) 7 Unit(s) SubCutaneous before breakfast  insulin lispro Injectable (HumaLOG) 7 Unit(s) SubCutaneous before lunch  insulin lispro Injectable (HumaLOG) 7 Unit(s) SubCutaneous before dinner  methadone    Tablet 10 milliGRAM(s) Oral daily  metoprolol succinate ER 25 milliGRAM(s) Oral daily        VITALS:  T(F): 95.5 (18 @ 06:33), Max: 98.3 (18 @ 21:26)  HR: 69 (18 @ 06:33)  BP: 131/63 (18 @ 06:33)  RR: 19 (18 @ 06:33)  SpO2: 98% (18 @ 07:23)      12-15 @ 07:01  -  16 @ 07:00  --------------------------------------------------------  IN: 72 mL / OUT: 1995 mL / NET: -1923 mL     @ 07:01  -   @ 07:00  --------------------------------------------------------  IN: 54 mL / OUT: 5600 mL / NET: -5546 mL          PHYSICAL EXAM:  Constitutional: NAD  HEENT: anicteric sclera, oropharynx clear, MMM  Neck: No JVD  Respiratory: Crackles fine at base   Cardiovascular: S1, S2, RRR  Gastrointestinal: BS+, soft, NT/ND  Extremities: No cyanosis or clubbing. trace edema  :  No medina.   Skin: No rashes    LABS:      140  |  95<L>  |  37<H>  ----------------------------<  114<H>  4.5   |  30  |  2.0<H>    Ca    9.0      16 Dec 2018 09:06  Mg     1.8         TPro  7.3  /  Alb  3.5  /  TBili  0.5  /  DBili      /  AST  17  /  ALT  11  /  AlkPhos  121<H>                            10.7   7.02  )-----------( 234      ( 16 Dec 2018 09:06 )             35.1       Urine Studies:  Urinalysis Basic - ( 12 Dec 2018 19:00 )    Color: Yellow / Appearance: Clear / S.020 / pH:   Gluc:  / Ketone: Negative  / Bili: Negative / Urobili: 0.2 mg/dL   Blood:  / Protein: 100 mg/dL / Nitrite: Negative   Leuk Esterase: Negative / RBC: 3-5 /HPF / WBC    Sq Epi:  / Non Sq Epi: Occasional /HPF / Bacteria: Few /HPF      Osmolality, Random Urine: 324 mos/kg ( @ 18:35)  Sodium, Random Urine: 31.0 mmoL/L ( @ 18:35)  Potassium, Random Urine: 28 mmol/L ( @ 18:35)  Chloride, Random Urine: 21 ( @ 18:35)    RADIOLOGY & ADDITIONAL STUDIES:

## 2018-12-18 LAB
ALBUMIN SERPL ELPH-MCNC: 3.6 G/DL — SIGNIFICANT CHANGE UP (ref 3.5–5.2)
ALP SERPL-CCNC: 127 U/L — HIGH (ref 30–115)
ALT FLD-CCNC: 18 U/L — SIGNIFICANT CHANGE UP (ref 0–41)
ANION GAP SERPL CALC-SCNC: 11 MMOL/L — SIGNIFICANT CHANGE UP (ref 7–14)
APTT BLD: 101.5 SEC — CRITICAL HIGH (ref 27–39.2)
APTT BLD: 64.4 SEC — HIGH (ref 27–39.2)
APTT BLD: 79.6 SEC — CRITICAL HIGH (ref 27–39.2)
AST SERPL-CCNC: 27 U/L — SIGNIFICANT CHANGE UP (ref 0–41)
BASOPHILS # BLD AUTO: 0.05 K/UL — SIGNIFICANT CHANGE UP (ref 0–0.2)
BASOPHILS NFR BLD AUTO: 0.6 % — SIGNIFICANT CHANGE UP (ref 0–1)
BILIRUB SERPL-MCNC: 0.3 MG/DL — SIGNIFICANT CHANGE UP (ref 0.2–1.2)
BUN SERPL-MCNC: 29 MG/DL — HIGH (ref 10–20)
CALCIUM SERPL-MCNC: 9.3 MG/DL — SIGNIFICANT CHANGE UP (ref 8.5–10.1)
CHLORIDE SERPL-SCNC: 96 MMOL/L — LOW (ref 98–110)
CO2 SERPL-SCNC: 35 MMOL/L — HIGH (ref 17–32)
CREAT SERPL-MCNC: 1.5 MG/DL — SIGNIFICANT CHANGE UP (ref 0.7–1.5)
EOSINOPHIL # BLD AUTO: 0.19 K/UL — SIGNIFICANT CHANGE UP (ref 0–0.7)
EOSINOPHIL NFR BLD AUTO: 2.4 % — SIGNIFICANT CHANGE UP (ref 0–8)
GLUCOSE BLDC GLUCOMTR-MCNC: 109 MG/DL — HIGH (ref 70–99)
GLUCOSE BLDC GLUCOMTR-MCNC: 128 MG/DL — HIGH (ref 70–99)
GLUCOSE BLDC GLUCOMTR-MCNC: 131 MG/DL — HIGH (ref 70–99)
GLUCOSE BLDC GLUCOMTR-MCNC: 152 MG/DL — HIGH (ref 70–99)
GLUCOSE SERPL-MCNC: 93 MG/DL — SIGNIFICANT CHANGE UP (ref 70–99)
HCT VFR BLD CALC: 39.9 % — SIGNIFICANT CHANGE UP (ref 37–47)
HGB BLD-MCNC: 11.7 G/DL — LOW (ref 12–16)
IMM GRANULOCYTES NFR BLD AUTO: 0.6 % — HIGH (ref 0.1–0.3)
INR BLD: 1.21 RATIO — SIGNIFICANT CHANGE UP (ref 0.65–1.3)
LYMPHOCYTES # BLD AUTO: 2.84 K/UL — SIGNIFICANT CHANGE UP (ref 1.2–3.4)
LYMPHOCYTES # BLD AUTO: 35.7 % — SIGNIFICANT CHANGE UP (ref 20.5–51.1)
MAGNESIUM SERPL-MCNC: 1.9 MG/DL — SIGNIFICANT CHANGE UP (ref 1.8–2.4)
MCHC RBC-ENTMCNC: 25.6 PG — LOW (ref 27–31)
MCHC RBC-ENTMCNC: 29.3 G/DL — LOW (ref 32–37)
MCV RBC AUTO: 87.3 FL — SIGNIFICANT CHANGE UP (ref 81–99)
MONOCYTES # BLD AUTO: 0.8 K/UL — HIGH (ref 0.1–0.6)
MONOCYTES NFR BLD AUTO: 10.1 % — HIGH (ref 1.7–9.3)
NEUTROPHILS # BLD AUTO: 4.02 K/UL — SIGNIFICANT CHANGE UP (ref 1.4–6.5)
NEUTROPHILS NFR BLD AUTO: 50.6 % — SIGNIFICANT CHANGE UP (ref 42.2–75.2)
NRBC # BLD: 0 /100 WBCS — SIGNIFICANT CHANGE UP (ref 0–0)
PHOSPHATE SERPL-MCNC: 4.4 MG/DL — SIGNIFICANT CHANGE UP (ref 2.1–4.9)
PLATELET # BLD AUTO: 276 K/UL — SIGNIFICANT CHANGE UP (ref 130–400)
POTASSIUM SERPL-MCNC: 5.2 MMOL/L — HIGH (ref 3.5–5)
POTASSIUM SERPL-SCNC: 5.2 MMOL/L — HIGH (ref 3.5–5)
PROT SERPL-MCNC: 8 G/DL — SIGNIFICANT CHANGE UP (ref 6–8)
PROTHROM AB SERPL-ACNC: 13.9 SEC — HIGH (ref 9.95–12.87)
RBC # BLD: 4.57 M/UL — SIGNIFICANT CHANGE UP (ref 4.2–5.4)
RBC # FLD: 16.3 % — HIGH (ref 11.5–14.5)
SODIUM SERPL-SCNC: 142 MMOL/L — SIGNIFICANT CHANGE UP (ref 135–146)
WBC # BLD: 7.95 K/UL — SIGNIFICANT CHANGE UP (ref 4.8–10.8)
WBC # FLD AUTO: 7.95 K/UL — SIGNIFICANT CHANGE UP (ref 4.8–10.8)

## 2018-12-18 RX ORDER — HYDRALAZINE HCL 50 MG
10 TABLET ORAL EVERY 8 HOURS
Qty: 0 | Refills: 0 | Status: DISCONTINUED | OUTPATIENT
Start: 2018-12-18 | End: 2018-12-19

## 2018-12-18 RX ORDER — GABAPENTIN 400 MG/1
1 CAPSULE ORAL
Qty: 0 | Refills: 0 | COMMUNITY
Start: 2018-12-18

## 2018-12-18 RX ORDER — SUMATRIPTAN SUCCINATE 4 MG/.5ML
50 INJECTION, SOLUTION SUBCUTANEOUS
Qty: 0 | Refills: 0 | Status: DISCONTINUED | OUTPATIENT
Start: 2018-12-18 | End: 2018-12-18

## 2018-12-18 RX ORDER — ACETAMINOPHEN 500 MG
500 TABLET ORAL ONCE
Qty: 0 | Refills: 0 | Status: DISCONTINUED | OUTPATIENT
Start: 2018-12-18 | End: 2018-12-18

## 2018-12-18 RX ORDER — ISOSORBIDE DINITRATE 5 MG/1
1 TABLET ORAL
Qty: 90 | Refills: 0 | OUTPATIENT
Start: 2018-12-18

## 2018-12-18 RX ORDER — FUROSEMIDE 40 MG
40 TABLET ORAL
Qty: 0 | Refills: 0 | Status: DISCONTINUED | OUTPATIENT
Start: 2018-12-18 | End: 2018-12-19

## 2018-12-18 RX ORDER — HEPARIN SODIUM 5000 [USP'U]/ML
800 INJECTION INTRAVENOUS; SUBCUTANEOUS
Qty: 25000 | Refills: 0 | Status: DISCONTINUED | OUTPATIENT
Start: 2018-12-18 | End: 2018-12-19

## 2018-12-18 RX ORDER — WARFARIN SODIUM 2.5 MG/1
1 TABLET ORAL
Qty: 30 | Refills: 0 | OUTPATIENT
Start: 2018-12-18 | End: 2019-01-16

## 2018-12-18 RX ORDER — ACETAMINOPHEN 500 MG
325 TABLET ORAL ONCE
Qty: 0 | Refills: 0 | Status: COMPLETED | OUTPATIENT
Start: 2018-12-18 | End: 2018-12-18

## 2018-12-18 RX ORDER — ISOSORBIDE DINITRATE 5 MG/1
10 TABLET ORAL THREE TIMES A DAY
Qty: 0 | Refills: 0 | Status: DISCONTINUED | OUTPATIENT
Start: 2018-12-18 | End: 2018-12-19

## 2018-12-18 RX ORDER — FUROSEMIDE 40 MG
1 TABLET ORAL
Qty: 0 | Refills: 0 | COMMUNITY

## 2018-12-18 RX ORDER — WARFARIN SODIUM 2.5 MG/1
5 TABLET ORAL ONCE
Qty: 0 | Refills: 0 | Status: DISCONTINUED | OUTPATIENT
Start: 2018-12-18 | End: 2018-12-18

## 2018-12-18 RX ORDER — GABAPENTIN 400 MG/1
1 CAPSULE ORAL
Qty: 0 | Refills: 0 | COMMUNITY

## 2018-12-18 RX ORDER — WARFARIN SODIUM 2.5 MG/1
3 TABLET ORAL ONCE
Qty: 0 | Refills: 0 | Status: COMPLETED | OUTPATIENT
Start: 2018-12-18 | End: 2018-12-18

## 2018-12-18 RX ORDER — TRAMADOL HYDROCHLORIDE 50 MG/1
1 TABLET ORAL
Qty: 21 | Refills: 0 | OUTPATIENT
Start: 2018-12-18

## 2018-12-18 RX ORDER — TRAMADOL HYDROCHLORIDE 50 MG/1
50 TABLET ORAL EVERY 8 HOURS
Qty: 0 | Refills: 0 | Status: DISCONTINUED | OUTPATIENT
Start: 2018-12-18 | End: 2018-12-19

## 2018-12-18 RX ORDER — HYDRALAZINE HCL 50 MG
1 TABLET ORAL
Qty: 90 | Refills: 0 | OUTPATIENT
Start: 2018-12-18

## 2018-12-18 RX ORDER — SACUBITRIL AND VALSARTAN 24; 26 MG/1; MG/1
1 TABLET, FILM COATED ORAL
Qty: 0 | Refills: 0 | COMMUNITY

## 2018-12-18 RX ADMIN — Medication 10 MILLIGRAM(S): at 13:04

## 2018-12-18 RX ADMIN — Medication 7 UNIT(S): at 12:40

## 2018-12-18 RX ADMIN — METHADONE HYDROCHLORIDE 10 MILLIGRAM(S): 40 TABLET ORAL at 11:10

## 2018-12-18 RX ADMIN — Medication 2: at 12:40

## 2018-12-18 RX ADMIN — Medication 25 MILLIGRAM(S): at 05:25

## 2018-12-18 RX ADMIN — Medication 10 MILLIGRAM(S): at 05:25

## 2018-12-18 RX ADMIN — Medication 10 MILLIGRAM(S): at 17:07

## 2018-12-18 RX ADMIN — GABAPENTIN 100 MILLIGRAM(S): 400 CAPSULE ORAL at 13:04

## 2018-12-18 RX ADMIN — BUDESONIDE AND FORMOTEROL FUMARATE DIHYDRATE 2 PUFF(S): 160; 4.5 AEROSOL RESPIRATORY (INHALATION) at 11:07

## 2018-12-18 RX ADMIN — ATORVASTATIN CALCIUM 40 MILLIGRAM(S): 80 TABLET, FILM COATED ORAL at 21:20

## 2018-12-18 RX ADMIN — TRAMADOL HYDROCHLORIDE 50 MILLIGRAM(S): 50 TABLET ORAL at 13:02

## 2018-12-18 RX ADMIN — Medication 3 MILLILITER(S): at 21:23

## 2018-12-18 RX ADMIN — WARFARIN SODIUM 3 MILLIGRAM(S): 2.5 TABLET ORAL at 21:24

## 2018-12-18 RX ADMIN — Medication 40 MILLIGRAM(S): at 17:07

## 2018-12-18 RX ADMIN — Medication 10 MILLIGRAM(S): at 21:22

## 2018-12-18 RX ADMIN — Medication 325 MILLIGRAM(S): at 01:49

## 2018-12-18 RX ADMIN — GABAPENTIN 100 MILLIGRAM(S): 400 CAPSULE ORAL at 05:24

## 2018-12-18 RX ADMIN — HEPARIN SODIUM 8 UNIT(S)/HR: 5000 INJECTION INTRAVENOUS; SUBCUTANEOUS at 19:48

## 2018-12-18 RX ADMIN — GABAPENTIN 100 MILLIGRAM(S): 400 CAPSULE ORAL at 21:21

## 2018-12-18 RX ADMIN — ISOSORBIDE DINITRATE 10 MILLIGRAM(S): 5 TABLET ORAL at 21:23

## 2018-12-18 RX ADMIN — INSULIN GLARGINE 25 UNIT(S): 100 INJECTION, SOLUTION SUBCUTANEOUS at 21:23

## 2018-12-18 RX ADMIN — ISOSORBIDE DINITRATE 10 MILLIGRAM(S): 5 TABLET ORAL at 13:04

## 2018-12-18 RX ADMIN — TRAMADOL HYDROCHLORIDE 50 MILLIGRAM(S): 50 TABLET ORAL at 11:14

## 2018-12-18 RX ADMIN — Medication 7 UNIT(S): at 17:49

## 2018-12-18 RX ADMIN — HEPARIN SODIUM 8 UNIT(S)/HR: 5000 INJECTION INTRAVENOUS; SUBCUTANEOUS at 12:38

## 2018-12-18 RX ADMIN — Medication 40 MILLIGRAM(S): at 05:24

## 2018-12-18 RX ADMIN — Medication 7 UNIT(S): at 08:45

## 2018-12-18 NOTE — PROGRESS NOTE ADULT - SUBJECTIVE AND OBJECTIVE BOX
Nephrology progress note    Patient is seen and examined, events over the last 24 h noted .    Allergies:  aspirin (Other (Moderate))  IV Contrast (Unknown)    Hospital Medications:   MEDICATIONS  (STANDING):  ALBUTerol    90 MICROgram(s) HFA Inhaler 1 Puff(s) Inhalation every 4 hours  ALBUTerol/ipratropium for Nebulization 3 milliLiter(s) Nebulizer every 6 hours  atorvastatin 40 milliGRAM(s) Oral at bedtime  buDESOnide 160 MICROgram(s)/formoterol 4.5 MICROgram(s) Inhaler 2 Puff(s) Inhalation two times a day  busPIRone 10 milliGRAM(s) Oral two times a day  chlorhexidine 4% Liquid 1 Application(s) Topical <User Schedule>  dextrose 50% Injectable 12.5 Gram(s) IV Push once  dextrose 50% Injectable 25 Gram(s) IV Push once  dextrose 50% Injectable 25 Gram(s) IV Push once  furosemide    Tablet 40 milliGRAM(s) Oral two times a day  gabapentin 100 milliGRAM(s) Oral three times a day  heparin  Infusion 800 Unit(s)/Hr (8 mL/Hr) IV Continuous <Continuous>  hydrALAZINE 10 milliGRAM(s) Oral every 8 hours  insulin glargine Injectable (LANTUS) 25 Unit(s) SubCutaneous at bedtime  insulin lispro (HumaLOG) corrective regimen sliding scale   SubCutaneous three times a day before meals  insulin lispro Injectable (HumaLOG) 7 Unit(s) SubCutaneous before breakfast  insulin lispro Injectable (HumaLOG) 7 Unit(s) SubCutaneous before lunch  insulin lispro Injectable (HumaLOG) 7 Unit(s) SubCutaneous before dinner  isosorbide   dinitrate Tablet (ISORDIL) 10 milliGRAM(s) Oral three times a day  metoprolol succinate ER 25 milliGRAM(s) Oral daily  warfarin 5 milliGRAM(s) Oral once        VITALS:  T(F): 96.8 (18 @ 04:45), Max: 96.8 (18 @ 13:30)  HR: 67 (18 @ 04:45)  BP: 130/74 (18 @ 04:45)  RR: 18 (18 @ 04:45)  SpO2: 98% (18 @ 08:09)  Wt(kg): --     @ 07:01  -  12-17 @ 07:00  --------------------------------------------------------  IN: 114 mL / OUT: 5600 mL / NET: -5486 mL     @ 07:  -   @ 07:00  --------------------------------------------------------  IN: 125 mL / OUT: 1550 mL / NET: -1425 mL     @ 07:  -   @ 11:50  --------------------------------------------------------  IN: 0 mL / OUT: 1400 mL / NET: -1400 mL          PHYSICAL EXAM:  Constitutional: NAD  HEENT: anicteric sclera, oropharynx clear, MMM  Neck: No JVD  Respiratory: CTAB, no wheezes, rales or rhonchi  Cardiovascular: S1, S2, RRR  Gastrointestinal: BS+, soft, NT/ND  Extremities: No cyanosis or clubbing. No peripheral edema  :  No medina.   Skin: No rashes    LABS:      142  |  96<L>  |  29<H>  ----------------------------<  93  5.2<H>   |  35<H>  |  1.5    Ca    9.3      18 Dec 2018 08:32  Phos  4.4       Mg     1.9         TPro  8.0  /  Alb  3.6  /  TBili  0.3  /  DBili      /  AST  27  /  ALT  18  /  AlkPhos  127<H>                            11.7   7.95  )-----------( 276      ( 18 Dec 2018 08:32 )             39.9       Urine Studies:  Urinalysis Basic - ( 12 Dec 2018 19:00 )    Color: Yellow / Appearance: Clear / S.020 / pH:   Gluc:  / Ketone: Negative  / Bili: Negative / Urobili: 0.2 mg/dL   Blood:  / Protein: 100 mg/dL / Nitrite: Negative   Leuk Esterase: Negative / RBC: 3-5 /HPF / WBC    Sq Epi:  / Non Sq Epi: Occasional /HPF / Bacteria: Few /HPF      Osmolality, Random Urine: 324 mos/kg ( @ 18:35)  Sodium, Random Urine: 31.0 mmoL/L ( @ 18:35)  Potassium, Random Urine: 28 mmol/L ( @ 18:35)  Chloride, Random Urine: 21 ( @ 18:35)    RADIOLOGY & ADDITIONAL STUDIES: Nephrology progress note    Patient is seen and examined, events over the last 24 h noted .  feels ok   medina out     Allergies:  aspirin (Other (Moderate))  IV Contrast (Unknown)    Hospital Medications:   MEDICATIONS  (STANDING):  ALBUTerol    90 MICROgram(s) HFA Inhaler 1 Puff(s) Inhalation every 4 hours  ALBUTerol/ipratropium for Nebulization 3 milliLiter(s) Nebulizer every 6 hours  atorvastatin 40 milliGRAM(s) Oral at bedtime  buDESOnide 160 MICROgram(s)/formoterol 4.5 MICROgram(s) Inhaler 2 Puff(s) Inhalation two times a day  busPIRone 10 milliGRAM(s) Oral two times a day  furosemide    Tablet 40 milliGRAM(s) Oral two times a day  gabapentin 100 milliGRAM(s) Oral three times a day  heparin  Infusion 800 Unit(s)/Hr (8 mL/Hr) IV Continuous <Continuous>  hydrALAZINE 10 milliGRAM(s) Oral every 8 hours  insulin glargine Injectable (LANTUS) 25 Unit(s) SubCutaneous at bedtime  insulin lispro (HumaLOG) corrective regimen sliding scale   SubCutaneous three times a day before meals  isosorbide   dinitrate Tablet (ISORDIL) 10 milliGRAM(s) Oral three times a day  metoprolol succinate ER 25 milliGRAM(s) Oral daily  warfarin 5 milliGRAM(s) Oral once        VITALS:  T(F): 96.8 (18 @ 04:45), Max: 96.8 (18 @ 13:30)  HR: 67 (18 @ 04:45)  BP: 130/74 (18 @ 04:45)  RR: 18 (18 @ 04:45)  SpO2: 98% (18 @ 08:09)     @ 07:01  -   @ 07:00  --------------------------------------------------------  IN: 114 mL / OUT: 5600 mL / NET: -5486 mL     @ 07:01  -   @ 07:00  --------------------------------------------------------  IN: 125 mL / OUT: 1550 mL / NET: -1425 mL     @ 07:01  -   @ 11:50  --------------------------------------------------------  IN: 0 mL / OUT: 1400 mL / NET: -1400 mL          PHYSICAL EXAM:  Constitutional: NAD  HEENT: anicteric sclera, oropharynx clear, MMM  Neck: No JVD  Respiratory: CTAB, no wheezes, rales or rhonchi  Cardiovascular: S1, S2, RRR  Gastrointestinal: BS+, soft, NT/ND  Extremities: No cyanosis or clubbing. No peripheral edema  :  No medina.   Skin: No rashes    LABS:      142  |  96<L>  |  29<H>  ----------------------------<  93  5.2<H>   |  35<H>  |  1.5    Ca    9.3      18 Dec 2018 08:32  Phos  4.4       Mg     1.9         TPro  8.0  /  Alb  3.6  /  TBili  0.3  /  DBili      /  AST  27  /  ALT  18  /  AlkPhos  127<H>                            11.7   7.95  )-----------( 276      ( 18 Dec 2018 08:32 )             39.9       Urine Studies:  Urinalysis Basic - ( 12 Dec 2018 19:00 )    Color: Yellow / Appearance: Clear / S.020 / pH:   Gluc:  / Ketone: Negative  / Bili: Negative / Urobili: 0.2 mg/dL   Blood:  / Protein: 100 mg/dL / Nitrite: Negative   Leuk Esterase: Negative / RBC: 3-5 /HPF / WBC    Sq Epi:  / Non Sq Epi: Occasional /HPF / Bacteria: Few /HPF      Osmolality, Random Urine: 324 mos/kg ( @ 18:35)  Sodium, Random Urine: 31.0 mmoL/L ( @ 18:35)  Potassium, Random Urine: 28 mmol/L ( @ 18:35)  Chloride, Random Urine: 21 ( @ 18:35)    RADIOLOGY & ADDITIONAL STUDIES:

## 2018-12-18 NOTE — PROGRESS NOTE ADULT - SUBJECTIVE AND OBJECTIVE BOX
The patient is feeling much better. No SOB .     PHYSICAL EXAM:  Vital Signs Last 24 Hrs  T(C): 36 (18 Dec 2018 04:45), Max: 36 (17 Dec 2018 13:30)  T(F): 96.8 (18 Dec 2018 04:45), Max: 96.8 (17 Dec 2018 13:30)  HR: 67 (18 Dec 2018 04:45) (67 - 74)  BP: 130/74 (18 Dec 2018 04:45) (118/57 - 134/69)  BP(mean): --  RR: 18 (18 Dec 2018 04:45) (18 - 18)  SpO2: 98% (18 Dec 2018 08:09) (98% - 98%)  Daily     Daily   I&O's Detail    17 Dec 2018 07:01  -  18 Dec 2018 07:00  --------------------------------------------------------  IN:    heparin Infusion: 55 mL    heparin Infusion: 70 mL  Total IN: 125 mL    OUT:    Indwelling Catheter - Urethral: 1000 mL    Voided: 550 mL  Total OUT: 1550 mL    Total NET: -1425 mL      18 Dec 2018 07:01  -  18 Dec 2018 09:28  --------------------------------------------------------  IN:  Total IN: 0 mL    OUT:    Voided: 1400 mL  Total OUT: 1400 mL    Total NET: -1400 mL        GENERAL: NAD, well-groomed, well-developed  HEAD:  Atraumatic, Normocephalic  NECK: Supple, No JVD, Normal thyroid  NERVOUS SYSTEM:  Alert & Oriented X3,  CHEST/LUNG: Clear   HEART: Regular rate and rhythm;  ABDOMEN: Soft, Nontender, Nondistended; Bowel sounds present  EXTREMITIES:  No edema  LYMPH: No lymphadenopathy noted  SKIN: No rashes or lesion        LABS:                        10.8   7.78  )-----------( 247      ( 17 Dec 2018 08:04 )             35.7     12-17    137  |  93<L>  |  31<H>  ----------------------------<  86  4.4   |  32  |  1.7<H>    Ca    9.1      17 Dec 2018 08:04  Mg     1.8     12-17    TPro  7.2  /  Alb  3.5  /  TBili  0.5  /  DBili  x   /  AST  21  /  ALT  14  /  AlkPhos  120<H>  12-17    PT/INR - ( 17 Dec 2018 08:04 )   PT: 14.70 sec;   INR: 1.28 ratio         PTT - ( 18 Dec 2018 00:52 )  PTT:79.6 sec  MEDICATIONS  (STANDING):  ALBUTerol    90 MICROgram(s) HFA Inhaler 1 Puff(s) Inhalation every 4 hours  ALBUTerol/ipratropium for Nebulization 3 milliLiter(s) Nebulizer every 6 hours  atorvastatin 40 milliGRAM(s) Oral at bedtime  buDESOnide 160 MICROgram(s)/formoterol 4.5 MICROgram(s) Inhaler 2 Puff(s) Inhalation two times a day  busPIRone 10 milliGRAM(s) Oral two times a day  chlorhexidine 4% Liquid 1 Application(s) Topical <User Schedule>  dextrose 50% Injectable 12.5 Gram(s) IV Push once  dextrose 50% Injectable 25 Gram(s) IV Push once  dextrose 50% Injectable 25 Gram(s) IV Push once  furosemide    Tablet 40 milliGRAM(s) Oral two times a day  gabapentin 100 milliGRAM(s) Oral three times a day  heparin  Infusion 1000 Unit(s)/Hr (10 mL/Hr) IV Continuous <Continuous>  hydrALAZINE 10 milliGRAM(s) Oral every 8 hours  insulin glargine Injectable (LANTUS) 25 Unit(s) SubCutaneous at bedtime  insulin lispro (HumaLOG) corrective regimen sliding scale   SubCutaneous three times a day before meals  insulin lispro Injectable (HumaLOG) 7 Unit(s) SubCutaneous before breakfast  insulin lispro Injectable (HumaLOG) 7 Unit(s) SubCutaneous before lunch  insulin lispro Injectable (HumaLOG) 7 Unit(s) SubCutaneous before dinner  isosorbide   dinitrate Tablet (ISORDIL) 10 milliGRAM(s) Oral three times a day  methadone    Tablet 10 milliGRAM(s) Oral daily  metoprolol succinate ER 25 milliGRAM(s) Oral daily    MEDICATIONS  (PRN):  acetaminophen   Tablet .. 650 milliGRAM(s) Oral every 6 hours PRN Moderate Pain (4 - 6)  dextrose 40% Gel 15 Gram(s) Oral once PRN Blood Glucose LESS THAN 70 milliGRAM(s)/deciliter  glucagon  Injectable 1 milliGRAM(s) IntraMuscular once PRN Glucose LESS THAN 70 milligrams/deciliter  guaiFENesin/dextromethorphan  Syrup 10 milliLiter(s) Oral every 4 hours PRN Cough

## 2018-12-18 NOTE — PROGRESS NOTE ADULT - ASSESSMENT
58 year female patient with history of HFrEF ( EF 20-25%) , s/p AICD, COPD on home O2 (4L) , CVD, CVA with residual rt sided weakness , depression, fibromyalgia , PVD, presented for one week history of SOB that started at exertion and progressed to be at rest    # SOB secondary to Acute on chronic systolic Heart Failure, Community acquired pna (resolved) with underlying COPD and chronic respiratory insufficiency.   - I/Os, Daily wts, Low Salt diet   - Lasix 40mg transition to PO today.   - NiV QHS and PRN. Being arranged for home NIV  - Baseline 3-4L home o2, wean NC as tolerated target Sat 88%-92%  - s/p Cefepime and Azithromycin course completed.     # RITCHIE on CKDIII likely cardiorenal syndrome and urinary retention - f/u BMP this AM.   # HTN - no entresto given RITCHIE. Starting hydralazine 10 mg q8h and Isosorbide dinitrate 10 mg TID  12/18.     # History of CVA with Residual Deficits and underlying hypercoagulable disease with hx of DVT.   - Cardiology recommend Anticoagulation  - pt receievd Kcentra for hemoptysis in icu. Resolved  - On Heparin gtt. Received 5mg Coumadin 12/17. f/u INR and redose coumadin tonight.   - INR qd (goal 2-3)    # Left renal lesion that is ill defined - f/u as out-patient with nephrology for further imaging   # Anemia chronic - f/u c Fe studies  no need for HEATHER or Tx for now   # DM2 - Lantus / Lispro   # GI PPx - ()Protonix (X) Not indicated  # DVT PPx - (X) Heparin 5000 mg SubQ     # Activity -  (X) Increase as Tolerated   # Dispo -   Patient to be discharged when medically optimized. Home o2   # Code Status - (X) FULL    () DNR / DNI    (X) Discussed Case and Plan with the Medical Attending.    Please call Dr. Peacock with any questions/ recommendations: Spectra #[id number] 58 year female patient with history of HFrEF ( EF 20-25%) , s/p AICD, COPD on home O2 (4L) , CVD, CVA with residual rt sided weakness , depression, fibromyalgia , PVD, presented for one week history of SOB that started at exertion and progressed to be at rest    # SOB secondary to Acute on chronic systolic Heart Failure, Community acquired pna (resolved) with underlying COPD and chronic respiratory insufficiency.   - I/Os, Daily wts, Low Salt diet   - Lasix 40mg transition to PO today.   - NiV QHS and PRN. Being arranged for home NIV  - Baseline 3-4L home o2, wean NC as tolerated target Sat 88%-92%  - s/p Cefepime and Azithromycin course completed.     # RITCHIE on CKDIII likely cardiorenal syndrome and urinary retention - f/u BMP this AM.   # HTN - no entresto given RITCHIE. Starting hydralazine 10 mg q8h and Isosorbide dinitrate 10 mg TID  12/18.     # History of CVA with Residual Deficits and underlying hypercoagulable disease with hx of DVT.   - Cardiology recommend Anticoagulation  - pt receievd Kcentra for hemoptysis in icu. Resolved  - On Heparin gtt. Received 5mg Coumadin 12/17. f/u INR and redose coumadin tonight.   - INR qd (goal 2-3)    # Left renal lesion that is ill defined - f/u as out-patient with nephrology for further imaging   # Anemia chronic - f/u c Fe studies  no need for HEATHER or Tx for now   # DM2 - Lantus / Lispro   # GI PPx - ()Protonix (X) Not indicated  # DVT PPx - (X) Heparin 5000 mg SubQ     # Activity -  (X) Increase as Tolerated. PT EVal and treat today.   # Dispo -   Patient to be discharged when medically optimized. Home o2   # Code Status - (X) FULL    () DNR / DNI    (X) Discussed Case and Plan with the Medical Attending.    Please call Dr. Peacock with any questions/ recommendations: Spectra #[id number] 58 year female patient with history of HFrEF ( EF 20-25%) , s/p AICD, COPD on home O2 (4L) , CVD, CVA with residual rt sided weakness , depression, fibromyalgia , PVD, presented for one week history of SOB that started at exertion and progressed to be at rest    # SOB secondary to Acute on chronic systolic Heart Failure, Community acquired pna (resolved) with underlying COPD and chronic respiratory insufficiency.   - I/Os, Daily wts, Low Salt diet   - Lasix 40mg transition to PO today.   - NiV QHS and PRN. Being arranged for home NIV  - Baseline 3-4L home o2, wean NC as tolerated target Sat 88%-92%  - s/p Cefepime and Azithromycin course completed.     # RITCHIE on CKDIII likely cardiorenal syndrome and urinary retention - f/u BMP this AM.   # HTN - no entresto given RITCHIE. Starting hydralazine 10 mg q8h and Isosorbide dinitrate 10 mg TID  12/18.     # History of CVA with Residual Deficits and underlying hypercoagulable disease with hx of DVT.   - Cardiology recommend Anticoagulation  - pt receievd Kcentra for hemoptysis in icu. Resolved  - On Heparin gtt. Received 5mg Coumadin 12/17. 5mg coumadin ordered for tonight. INR 1.2  - INR qd (goal 2-3)    # Headach with hx of migrain - given cardiac hx no triptan. Trial Tramadol  # Left renal lesion that is ill defined - f/u as out-patient with nephrology for further imaging   # Anemia chronic - f/u c Fe studies  no need for HEATHER or Tx for now   # DM2 - Lantus / Lispro   # GI PPx - ()Protonix (X) Not indicated  # DVT PPx - (X) Heparin 5000 mg SubQ     # Activity -  (X) Increase as Tolerated. PT EVal and treat today.   # Dispo -   Patient to be discharged when medically optimized. Home o2   # Code Status - (X) FULL    () DNR / DNI    (X) Discussed Case and Plan with the Medical Attending.    Please call Dr. Peacock with any questions/ recommendations: Spectra #[phone]

## 2018-12-18 NOTE — DIETITIAN INITIAL EVALUATION ADULT. - ENERGY NEEDS
total kcal = 7209-8452 kcal/day (25-30 kcal/kg IBW as BMI >30).   total protein = 57-68 g/day (1.0-1.2 g/kg IBW. will monitor renal profile and adjust PRN).  total fluid = 1000mL restriction as per LIP

## 2018-12-18 NOTE — PROGRESS NOTE ADULT - SUBJECTIVE AND OBJECTIVE BOX
DAILY PROGRESS NOTE  ===========================================================  Patient Information:   Hospital Day:</SULY BECKY  /  58y  /  Female  /b> 6d /  MRN#: [id number]  Working / Admitting Diagnosis:  1. SOB 2/2 CHF and pna    |:::::::::::::::::::::::::::::| SUBJECTIVE |:::::::::::::::::::::::::::::::|  OVERNIGHT EVENTS:   No acute events noted. Using BiPAP overnight and NC during day. Responded well to Lasix IV -1450.   Denies chest pain / SOB / palpitations / Nausea / Vomiting / constipation / diarrhea or abdominal pain.   ROS otherwise negative.    Past Medical History:   Chronic pain  HLD (hyperlipidemia)  CVA (cerebral vascular accident)  Depression  COPD (chronic obstructive pulmonary disease)  Diabetes  CHF (congestive heart failure)  History of cholecystectomy  H/O tubal ligation  H/O abdominal hysterectomy  AICD (automatic cardioverter/defibrillator) present    ALLERGIES:  aspirin (Other (Moderate))  IV Contrast (Unknown)    HOME MEDICATIONS:  atorvastatin 40 mg oral tablet: 1 tab(s) orally once a day (20 Aug 2018 19:47)  busPIRone 10 mg oral tablet: 1 tab(s) orally 2 times a day (20 Aug 2018 19:47)  Entresto 24 mg-26 mg oral tablet: 1 tab(s) orally 2 times a day (20 Aug 2018 19:47)  gabapentin 100 mg oral capsule: 1 cap(s) orally 3 times a day (20 Aug 2018 19:47)  Lantus 100 units/mL subcutaneous solution: 25 unit(s) subcutaneous once a day (at bedtime) (24 Aug 2018 09:38)  Lasix 40 mg oral tablet: 1 tab(s) orally once a day (20 Aug 2018 19:47)  methadone 10 mg oral tablet: 1 tab(s) orally once a day (20 Aug 2018 19:47)  Metoprolol Succinate ER 25 mg oral tablet, extended release: 1 tab(s) orally once a day (20 Aug 2018 19:47)  NovoLOG 100 units/mL subcutaneous solution: 7 unit(s) subcutaneous 3 times a day (before meals) (24 Aug 2018 09:38)  Symbicort 160 mcg-4.5 mcg/inh inhalation aerosol: 2 puff(s) inhaled 2 times a day (20 Aug 2018 19:47)  traZODone 50 mg oral tablet: 1 tab(s) orally once a day (20 Aug 2018 19:47)      |:::::::::::::::::::::::::::| OBJECTIVE |:::::::::::::::::::::::::::|    VITAL SIGNS: Last 24 Hours  T(F): 96.8 (18 Dec 2018 04:45), Max: 96.8 (17 Dec 2018 13:30)  HR: 67 (18 Dec 2018 04:45) (67 - 74)  BP: 130/74 (18 Dec 2018 04:45) (118/57 - 134/69)  RR: 18 (18 Dec 2018 04:45) (18 - 18)  SpO2: 98% (18 Dec 2018 08:09) (98% - 98%)    I&O's Summary    17 Dec 2018 07:01  -  18 Dec 2018 07:00  --------------------------------------------------------  IN: 125 mL / OUT: 1550 mL / NET: -1425 mL    18 Dec 2018 07:01  -  18 Dec 2018 09:24  --------------------------------------------------------  IN: 0 mL / OUT: 1400 mL / NET: -1400 mL      PHYSICAL EXAM:  GENERAL:   Awake, alert; NAD.  HEENT:  Head NC/AT  NECK:   Supple.  CARDIO:   RRR; S1 & S2 audible. No JVD  RESP:  No respiratory distress or accessory muscle use. crackles improved   GI:   Soft/ NT/ND / No guarding; No rebound tenderness.  EXT:   Without any cyanosis, clubbing, rash, lesions or edema.     LAB RESULTS:               RADIOLOGY:    < from: CT Abdomen and Pelvis w/ IV Cont (12.11.18 @ 08:52) >IMPRESSION:        New since prior examination is enlargement and haziness of the left   adrenal gland. This may be seen with adrenal hemorrhage or an   inflammatory process    Bilateral lower lung field patchy opacities predominantly within the   right lower lobe possibly reflecting infectious/inflammatory etiology or   related to pulmonary edema in the appropriate clinical setting. Trace   right pleural effusion.    New indeterminate left lower pole renal lesion measuring 1 cm. As this   patient has a pacemaker and likely cannot have an MRI, further evaluation   with a focused ultrasound of the lower pole of left kidney is recommended   to exclude neoplasm. If this lesion is not well-seen on this exam,   follow-up CT is recommended in 3-6 months to demonstrate stability.    Occluded left common femoral artery stent    Subcutaneous nodules, likely related to injection phenomena. Follow to   resolution is recommended    < end of copied text >    INPATIENT MEDICATIONS:  MEDICATIONS  (STANDING):  ALBUTerol    90 MICROgram(s) HFA Inhaler 1 Puff(s) Inhalation every 4 hours  ALBUTerol/ipratropium for Nebulization 3 milliLiter(s) Nebulizer every 6 hours  atorvastatin 40 milliGRAM(s) Oral at bedtime  buDESOnide 160 MICROgram(s)/formoterol 4.5 MICROgram(s) Inhaler 2 Puff(s) Inhalation two times a day  busPIRone 10 milliGRAM(s) Oral two times a day  chlorhexidine 4% Liquid 1 Application(s) Topical <User Schedule>  dextrose 50% Injectable 12.5 Gram(s) IV Push once  dextrose 50% Injectable 25 Gram(s) IV Push once  dextrose 50% Injectable 25 Gram(s) IV Push once  furosemide    Tablet 40 milliGRAM(s) Oral two times a day  gabapentin 100 milliGRAM(s) Oral three times a day  heparin  Infusion 1000 Unit(s)/Hr (10 mL/Hr) IV Continuous <Continuous>  hydrALAZINE 10 milliGRAM(s) Oral every 8 hours  insulin glargine Injectable (LANTUS) 25 Unit(s) SubCutaneous at bedtime  insulin lispro (HumaLOG) corrective regimen sliding scale   SubCutaneous three times a day before meals  insulin lispro Injectable (HumaLOG) 7 Unit(s) SubCutaneous before breakfast  insulin lispro Injectable (HumaLOG) 7 Unit(s) SubCutaneous before lunch  insulin lispro Injectable (HumaLOG) 7 Unit(s) SubCutaneous before dinner  isosorbide   dinitrate Tablet (ISORDIL) 10 milliGRAM(s) Oral three times a day  methadone    Tablet 10 milliGRAM(s) Oral daily  metoprolol succinate ER 25 milliGRAM(s) Oral daily    MEDICATIONS  (PRN):  acetaminophen   Tablet .. 650 milliGRAM(s) Oral every 6 hours PRN Moderate Pain (4 - 6)  dextrose 40% Gel 15 Gram(s) Oral once PRN Blood Glucose LESS THAN 70 milliGRAM(s)/deciliter  glucagon  Injectable 1 milliGRAM(s) IntraMuscular once PRN Glucose LESS THAN 70 milligrams/deciliter  guaiFENesin/dextromethorphan  Syrup 10 milliLiter(s) Oral every 4 hours PRN Cough      ------------------------------------------------------------------------------------------------------------

## 2018-12-18 NOTE — DIETITIAN INITIAL EVALUATION ADULT. - ORAL INTAKE PTA
Pt reports typically adequate appetite and PO intake. Eats 3 meals/day with occasional snacks. NKFA. Denies use of nutrition supplements./good

## 2018-12-18 NOTE — PROGRESS NOTE ADULT - ASSESSMENT
58 year old female, patient with history of HFrEF ( EF 20-25%) , s/p AICD, COPD on home O2 (4L) , CVD, CVA with residual rt sided weakness , PVD, presented for one week history of SOB.Patient was found to have elevated creatinine to 2.1 from her baseline CKD III.       ·	RITCHIE on CKD III/ respiratory failure   due to retention and cardiorenal syndrome   on lasix po   SOB is better / creat better  Hx of HFrEF repeat Echo/ last EF 25%  daily weights   IP noted ok  No need for RRT   voiding trial today     ·	left renal lesion that is ill defined   f/u as out-patient with nephrology for further imaging     ·	Anemia chronic  check Fe studies  no need for HEATHER or Tx for now     will sign off OP renal follow up

## 2018-12-18 NOTE — PROGRESS NOTE ADULT - SUBJECTIVE AND OBJECTIVE BOX
SULY PENA MRN-[id number]    Hospitalist Note  59yo F with Past Medical History Chronic Systolic CHF status post AICD placement, COPD on home O2 (4L) , CVD, CVA with residual right sided weakness, depression, fibromyalgia and PVD admitted for worsening dyspnea x1 week. Her hospital course was complicated by an episode of acute respiratory failure and hemoptysis.    Overnight events/Updates: No new complaints or hemoptysis.  Her breathing has responded to treatment with BIPAP.    Vital Signs Last 24 Hrs  T(C): 36 (18 Dec 2018 04:45), Max: 36 (17 Dec 2018 13:30)  T(F): 96.8 (18 Dec 2018 04:45), Max: 96.8 (17 Dec 2018 13:30)  HR: 67 (18 Dec 2018 04:45) (67 - 74)  BP: 130/74 (18 Dec 2018 04:45) (118/57 - 134/69)  BP(mean): --  RR: 18 (18 Dec 2018 04:45) (18 - 18)  SpO2: 98% (18 Dec 2018 08:09) (98% - 98%)    Physical Examination:  General: AAO x 3  HEENT: PERRLA, EOMI  CV= S1 & S2 appreciated  Lungs= cough without wheezes  Abdominal Examination= + BS, Soft, NT/ND  Extremity Examination= No C/C    ROS: No chest pain.  + cough  All other systems reviewed and are within normal limits except for the complaints in the HPI.    MEDICATIONS  (STANDING):  ALBUTerol    90 MICROgram(s) HFA Inhaler 1 Puff(s) Inhalation every 4 hours  ALBUTerol/ipratropium for Nebulization 3 milliLiter(s) Nebulizer every 6 hours  atorvastatin 40 milliGRAM(s) Oral at bedtime  buDESOnide 160 MICROgram(s)/formoterol 4.5 MICROgram(s) Inhaler 2 Puff(s) Inhalation two times a day  busPIRone 10 milliGRAM(s) Oral two times a day  chlorhexidine 4% Liquid 1 Application(s) Topical <User Schedule>  dextrose 50% Injectable 12.5 Gram(s) IV Push once  dextrose 50% Injectable 25 Gram(s) IV Push once  dextrose 50% Injectable 25 Gram(s) IV Push once  furosemide    Tablet 40 milliGRAM(s) Oral two times a day  gabapentin 100 milliGRAM(s) Oral three times a day  heparin  Infusion 800 Unit(s)/Hr (8 mL/Hr) IV Continuous <Continuous>  hydrALAZINE 10 milliGRAM(s) Oral every 8 hours  insulin glargine Injectable (LANTUS) 25 Unit(s) SubCutaneous at bedtime  insulin lispro (HumaLOG) corrective regimen sliding scale   SubCutaneous three times a day before meals  insulin lispro Injectable (HumaLOG) 7 Unit(s) SubCutaneous before breakfast  insulin lispro Injectable (HumaLOG) 7 Unit(s) SubCutaneous before lunch  insulin lispro Injectable (HumaLOG) 7 Unit(s) SubCutaneous before dinner  isosorbide   dinitrate Tablet (ISORDIL) 10 milliGRAM(s) Oral three times a day  metoprolol succinate ER 25 milliGRAM(s) Oral daily  warfarin 5 milliGRAM(s) Oral once    MEDICATIONS  (PRN):  acetaminophen   Tablet .. 650 milliGRAM(s) Oral every 6 hours PRN Moderate Pain (4 - 6)  dextrose 40% Gel 15 Gram(s) Oral once PRN Blood Glucose LESS THAN 70 milliGRAM(s)/deciliter  glucagon  Injectable 1 milliGRAM(s) IntraMuscular once PRN Glucose LESS THAN 70 milligrams/deciliter  guaiFENesin/dextromethorphan  Syrup 10 milliLiter(s) Oral every 4 hours PRN Cough  traMADol 50 milliGRAM(s) Oral every 8 hours PRN Severe Pain (7 - 10) / headache                            11.7   7.95  )-----------( 276      ( 18 Dec 2018 08:32 )             39.9     12-18    142  |  96<L>  |  29<H>  ----------------------------<  93  5.2<H>   |  35<H>  |  1.5    Ca    9.3      18 Dec 2018 08:32  Phos  4.4     12-18  Mg     1.9     12-18    TPro  8.0  /  Alb  3.6  /  TBili  0.3  /  DBili  x   /  AST  27  /  ALT  18  /  AlkPhos  127<H>  12-18      Case discussed with housestaff & family  ZOLTAN Candido [id number]

## 2018-12-18 NOTE — PROGRESS NOTE ADULT - ASSESSMENT
-CHF known nonischemic CM , severe LV dysfunction , clinically improved   -Acute on chronic renal insuffiencey   -COPD exacerbation ? PNA   -Hemopysis - resolved. Needs pulmonary follow up . Tolerating A/C   - History of DVT . Suspect component of having a hypercoaguable state. Has IVC filter since 2011.     - Change to PO Lasix 40 mg BID   - Follow electrolytes and renal function   - Pulmonary consult follow up re cause of hemopysis   - OOB to chair. Ambulate   -She was on Entresto prior to admission . This would be difficult to restart in view of renal function   -  hydralazine 10 mg q8h and Isosorbide dinitrate 10 mg TID   -Will refer to advance HF team as outpatient.

## 2018-12-18 NOTE — DIETITIAN INITIAL EVALUATION ADULT. - DIET TYPE
DASH/TLC (sodium and cholesterol restricted diet)/consistent carbohydrate (no snacks)/1000ml/Pt reports appetite and PO intake remain adequate. Consumes 75% or greater of meals and snacks. Discussed CHO consistent, DASH/TLC diet order at bedside. no further nutrition intervention at this time.

## 2018-12-18 NOTE — PROGRESS NOTE ADULT - ASSESSMENT
57yo F with Past Medical History Chronic Systolic CHF status post AICD placement, COPD on home O2 (4L) , CVD, CVA with residual right sided weakness, depression, fibromyalgia and PVD admitted for worsening dyspnea x1 week    Acute on chronic systolic heart failure: Lasix was switched to 40mg PO q12.  Trend pulse oximetry and monitor daily weights.  Continue supportive care with BIPAP at night.  Off Entresto due to persistent hypotension.  Her case was discussed with cardiology, Dr. Gutierrez, who advised starting Hydralazine and Isosorbide dinitrate.  Start Hydralazine 10mg PO q8 and Isosorbide 10mg PO q8.  Hold for SBP <110mm Hg.  Acute kidney injury secondary to cardiorenal syndrome: see above for management of diuretics.  Nephrology recommendations were reviewed.  Serum creatinine remains stable @ 1.5  R/o Community Acquired PNA: status post Cefepime and Azithromycin.  Prior CVA with residual deficits: continue Lipitor at bedtime  Chronic Thromboembolism/? Hypercoaguable state: heparin gtt is within normal limits.  INR remains subtherapeutic.  The patient was previously taking 1mg at home, decrease Coumadin to 3mg at bedtime.  Repeat coagulation studies in Am  Chronic Anemia: likely multifactorial. Hgb stable @ 10.8.   DMII: continue Lantus/Lispro and monitor FS with meals  GI prophylaxis

## 2018-12-18 NOTE — DIETITIAN INITIAL EVALUATION ADULT. - OTHER INFO
Pt p/w worsening dyspnea x1 week PTA with primary dx: PNA, CHF, SOB and abd discomfort. Hospital course complicated by acute on chronic systolic heart failure--lasix in place, BiPAP at night. RITCHIE 2/2 cardiorenal syndrome--serum Cr stable at 1.5. r/o community acquired PNA. Prior CVA with residual deficits. Chronic Thromboembolism/? Hypercoaguable state--coumadin in place. Chronic anemia. T2DM. GI ppx.Reason for assessment: LOS.

## 2018-12-19 VITALS — OXYGEN SATURATION: 97 %

## 2018-12-19 LAB
ALBUMIN SERPL ELPH-MCNC: 3.5 G/DL — SIGNIFICANT CHANGE UP (ref 3.5–5.2)
ALP SERPL-CCNC: 123 U/L — HIGH (ref 30–115)
ALT FLD-CCNC: 38 U/L — SIGNIFICANT CHANGE UP (ref 0–41)
ANION GAP SERPL CALC-SCNC: 11 MMOL/L — SIGNIFICANT CHANGE UP (ref 7–14)
APTT BLD: 68.2 SEC — HIGH (ref 27–39.2)
APTT BLD: 73.7 SEC — CRITICAL HIGH (ref 27–39.2)
AST SERPL-CCNC: 55 U/L — HIGH (ref 0–41)
BASOPHILS # BLD AUTO: 0.05 K/UL — SIGNIFICANT CHANGE UP (ref 0–0.2)
BASOPHILS NFR BLD AUTO: 0.6 % — SIGNIFICANT CHANGE UP (ref 0–1)
BILIRUB SERPL-MCNC: 0.4 MG/DL — SIGNIFICANT CHANGE UP (ref 0.2–1.2)
BUN SERPL-MCNC: 26 MG/DL — HIGH (ref 10–20)
CALCIUM SERPL-MCNC: 8.9 MG/DL — SIGNIFICANT CHANGE UP (ref 8.5–10.1)
CHLORIDE SERPL-SCNC: 96 MMOL/L — LOW (ref 98–110)
CO2 SERPL-SCNC: 33 MMOL/L — HIGH (ref 17–32)
CREAT SERPL-MCNC: 1.5 MG/DL — SIGNIFICANT CHANGE UP (ref 0.7–1.5)
EOSINOPHIL # BLD AUTO: 0.25 K/UL — SIGNIFICANT CHANGE UP (ref 0–0.7)
EOSINOPHIL NFR BLD AUTO: 2.8 % — SIGNIFICANT CHANGE UP (ref 0–8)
GLUCOSE BLDC GLUCOMTR-MCNC: 140 MG/DL — HIGH (ref 70–99)
GLUCOSE BLDC GLUCOMTR-MCNC: 145 MG/DL — HIGH (ref 70–99)
GLUCOSE SERPL-MCNC: 123 MG/DL — HIGH (ref 70–99)
HCT VFR BLD CALC: 36.3 % — LOW (ref 37–47)
HGB BLD-MCNC: 11 G/DL — LOW (ref 12–16)
IMM GRANULOCYTES NFR BLD AUTO: 1.2 % — HIGH (ref 0.1–0.3)
INR BLD: 1.71 RATIO — HIGH (ref 0.65–1.3)
LYMPHOCYTES # BLD AUTO: 2.97 K/UL — SIGNIFICANT CHANGE UP (ref 1.2–3.4)
LYMPHOCYTES # BLD AUTO: 33.4 % — SIGNIFICANT CHANGE UP (ref 20.5–51.1)
MAGNESIUM SERPL-MCNC: 1.8 MG/DL — SIGNIFICANT CHANGE UP (ref 1.8–2.4)
MCHC RBC-ENTMCNC: 26.6 PG — LOW (ref 27–31)
MCHC RBC-ENTMCNC: 30.3 G/DL — LOW (ref 32–37)
MCV RBC AUTO: 87.7 FL — SIGNIFICANT CHANGE UP (ref 81–99)
MONOCYTES # BLD AUTO: 1.02 K/UL — HIGH (ref 0.1–0.6)
MONOCYTES NFR BLD AUTO: 11.5 % — HIGH (ref 1.7–9.3)
NEUTROPHILS # BLD AUTO: 4.49 K/UL — SIGNIFICANT CHANGE UP (ref 1.4–6.5)
NEUTROPHILS NFR BLD AUTO: 50.5 % — SIGNIFICANT CHANGE UP (ref 42.2–75.2)
NRBC # BLD: 0 /100 WBCS — SIGNIFICANT CHANGE UP (ref 0–0)
PHOSPHATE SERPL-MCNC: 3.7 MG/DL — SIGNIFICANT CHANGE UP (ref 2.1–4.9)
PLATELET # BLD AUTO: 279 K/UL — SIGNIFICANT CHANGE UP (ref 130–400)
POTASSIUM SERPL-MCNC: 5 MMOL/L — SIGNIFICANT CHANGE UP (ref 3.5–5)
POTASSIUM SERPL-SCNC: 5 MMOL/L — SIGNIFICANT CHANGE UP (ref 3.5–5)
PROT SERPL-MCNC: 7.6 G/DL — SIGNIFICANT CHANGE UP (ref 6–8)
PROTHROM AB SERPL-ACNC: 19.6 SEC — HIGH (ref 9.95–12.87)
RBC # BLD: 4.14 M/UL — LOW (ref 4.2–5.4)
RBC # FLD: 16.1 % — HIGH (ref 11.5–14.5)
SODIUM SERPL-SCNC: 140 MMOL/L — SIGNIFICANT CHANGE UP (ref 135–146)
WBC # BLD: 8.89 K/UL — SIGNIFICANT CHANGE UP (ref 4.8–10.8)
WBC # FLD AUTO: 8.89 K/UL — SIGNIFICANT CHANGE UP (ref 4.8–10.8)

## 2018-12-19 RX ORDER — WARFARIN SODIUM 2.5 MG/1
1 TABLET ORAL
Qty: 30 | Refills: 0 | OUTPATIENT
Start: 2018-12-19 | End: 2019-01-17

## 2018-12-19 RX ADMIN — GABAPENTIN 100 MILLIGRAM(S): 400 CAPSULE ORAL at 05:50

## 2018-12-19 RX ADMIN — Medication 10 MILLIGRAM(S): at 05:51

## 2018-12-19 RX ADMIN — Medication 7 UNIT(S): at 12:32

## 2018-12-19 RX ADMIN — Medication 40 MILLIGRAM(S): at 05:50

## 2018-12-19 RX ADMIN — Medication 25 MILLIGRAM(S): at 05:50

## 2018-12-19 RX ADMIN — ISOSORBIDE DINITRATE 10 MILLIGRAM(S): 5 TABLET ORAL at 05:52

## 2018-12-19 RX ADMIN — Medication 7 UNIT(S): at 08:08

## 2018-12-19 RX ADMIN — Medication 10 MILLIGRAM(S): at 05:52

## 2018-12-19 RX ADMIN — BUDESONIDE AND FORMOTEROL FUMARATE DIHYDRATE 2 PUFF(S): 160; 4.5 AEROSOL RESPIRATORY (INHALATION) at 08:09

## 2018-12-19 RX ADMIN — CHLORHEXIDINE GLUCONATE 1 APPLICATION(S): 213 SOLUTION TOPICAL at 05:50

## 2018-12-19 RX ADMIN — Medication 3 MILLILITER(S): at 09:16

## 2018-12-19 NOTE — CHART NOTE - NSCHARTNOTEFT_GEN_A_CORE
Called Coumadin clinic and was told that pt had not arranged for follow up as of yet.  Called patients house and spoke to daughter in-law Marely. Patient is not yet home but she will let her mother in-law know to call coumadin clinic ASAP to schedule appointment for Friday

## 2018-12-19 NOTE — PROGRESS NOTE ADULT - ASSESSMENT
58 year female patient with history of HFrEF ( EF 20-25%) , s/p AICD, COPD on home O2 (4L) , CVD, CVA with residual rt sided weakness , depression, fibromyalgia , PVD, presented for one week history of SOB that started at exertion and progressed to be at rest    # SOB secondary to Acute on chronic systolic Heart Failure, Community acquired pna (resolved) with underlying COPD and chronic respiratory insufficiency.   - I/Os, Daily wts, Low Salt diet   - Lasix 40mg Po now  - NiV QHS and PRN. Being arranged for home NIV  - Baseline 3-4L home o2, wean NC as tolerated target Sat 88%-92%  - s/p Cefepime and Azithromycin course completed.     # RITCHIE on CKDIII likely cardiorenal syndrome and urinary retention - f/u BMP this AM.   # HTN - no entresto given RITCHIE. Starting hydralazine 10 mg q8h and Isosorbide dinitrate 10 mg TID  12/18.     # History of CVA with Residual Deficits and underlying hypercoagulable disease with hx of DVT.   - Cardiology recommend Anticoagulation  - pt receievd Kcentra for hemoptysis in icu. Resolved  - On Heparin gtt. Received 5mg Coumadin 12/17. 3mg coumadin 12/18. AM INR prending  - INR qd (goal 2-3)    # Headach with hx of migrain - given cardiac hx no triptan. Trial Tramadol  # Left renal lesion that is ill defined - f/u as out-patient with nephrology for further imaging   # Anemia chronic - f/u c Fe studies  no need for HEATHER or Tx for now   # DM2 - Lantus / Lispro   # GI PPx - ()Protonix (X) Not indicated  # DVT PPx - (X) Heparin 5000 mg SubQ     # Activity -  (X) Increase as Tolerated. PT EVal and treat today.   # Dispo -   Patient to be discharged when medically optimized. Home o2 and therapeutic INR  # Code Status - (X) FULL    () DNR / DNI    (X) Discussed Case and Plan with the Medical Attending.    Please call Dr. Peacock with any questions/ recommendations: Spectra #[phone]

## 2018-12-19 NOTE — PROGRESS NOTE ADULT - PROVIDER SPECIALTY LIST ADULT
Cardiology
Critical Care
Critical Care
Hospitalist
Infectious Disease
Internal Medicine
Nephrology
Internal Medicine

## 2018-12-19 NOTE — PROGRESS NOTE ADULT - SUBJECTIVE AND OBJECTIVE BOX
DAILY PROGRESS NOTE  ===========================================================  Patient Information:   Hospital Day:</SULY BECKY  /  58y  /  Female  /b> 6d /  MRN#: [id number]  Working / Admitting Diagnosis:  1. SOB 2/2 CHF and pna    |:::::::::::::::::::::::::::::| SUBJECTIVE |:::::::::::::::::::::::::::::::|  OVERNIGHT EVENTS:   No acute events noted. Using BiPAP overnight and NC during day. Transitioned to PO lasix  Remains therapeutic PTT on hep gtt ,sub therapeutic inr  Denies chest pain / SOB / palpitations / Nausea / Vomiting / constipation / diarrhea or abdominal pain.   ROS otherwise negative.    Past Medical History:   Chronic pain  HLD (hyperlipidemia)  CVA (cerebral vascular accident)  Depression  COPD (chronic obstructive pulmonary disease)  Diabetes  CHF (congestive heart failure)  History of cholecystectomy  H/O tubal ligation  H/O abdominal hysterectomy  AICD (automatic cardioverter/defibrillator) present    ALLERGIES:  aspirin (Other (Moderate))  IV Contrast (Unknown)    HOME MEDICATIONS:  atorvastatin 40 mg oral tablet: 1 tab(s) orally once a day (20 Aug 2018 19:47)  busPIRone 10 mg oral tablet: 1 tab(s) orally 2 times a day (20 Aug 2018 19:47)  Entresto 24 mg-26 mg oral tablet: 1 tab(s) orally 2 times a day (20 Aug 2018 19:47)  gabapentin 100 mg oral capsule: 1 cap(s) orally 3 times a day (20 Aug 2018 19:47)  Lantus 100 units/mL subcutaneous solution: 25 unit(s) subcutaneous once a day (at bedtime) (24 Aug 2018 09:38)  Lasix 40 mg oral tablet: 1 tab(s) orally once a day (20 Aug 2018 19:47)  methadone 10 mg oral tablet: 1 tab(s) orally once a day (20 Aug 2018 19:47)  Metoprolol Succinate ER 25 mg oral tablet, extended release: 1 tab(s) orally once a day (20 Aug 2018 19:47)  NovoLOG 100 units/mL subcutaneous solution: 7 unit(s) subcutaneous 3 times a day (before meals) (24 Aug 2018 09:38)  Symbicort 160 mcg-4.5 mcg/inh inhalation aerosol: 2 puff(s) inhaled 2 times a day (20 Aug 2018 19:47)  traZODone 50 mg oral tablet: 1 tab(s) orally once a day (20 Aug 2018 19:47)      |:::::::::::::::::::::::::::| OBJECTIVE |:::::::::::::::::::::::::::|    VITAL SIGNS: Last 24 Hours    T(F): 98.4 (18 Dec 2018 20:47), Max: 99 (18 Dec 2018 13:35)  HR: 71 (19 Dec 2018 04:30) (71 - 98)  BP: 115/58 (19 Dec 2018 04:30) (115/58 - 139/69)  RR: 18 (19 Dec 2018 04:30) (18 - 18)  SpO2: 97% (19 Dec 2018 07:53) (97% - 97%)    I&O's Summary    18 Dec 2018 07:01  -  19 Dec 2018 07:00  --------------------------------------------------------  IN: 0 mL / OUT: 1400 mL / NET: -1400 mL      PHYSICAL EXAM:  GENERAL:   Awake, alert; NAD.  HEENT:  Head NC/AT  NECK:   Supple.  CARDIO:   RRR; S1 & S2 audible. No JVD  RESP:  No respiratory distress or accessory muscle use. crackles improved   GI:   Soft/ NT/ND / No guarding; No rebound tenderness.  EXT:   Without any cyanosis, clubbing, rash, lesions or edema.     LAB RESULTS:               RADIOLOGY:    < from: CT Abdomen and Pelvis w/ IV Cont (12.11.18 @ 08:52) >IMPRESSION:        New since prior examination is enlargement and haziness of the left   adrenal gland. This may be seen with adrenal hemorrhage or an   inflammatory process    Bilateral lower lung field patchy opacities predominantly within the   right lower lobe possibly reflecting infectious/inflammatory etiology or   related to pulmonary edema in the appropriate clinical setting. Trace   right pleural effusion.    New indeterminate left lower pole renal lesion measuring 1 cm. As this   patient has a pacemaker and likely cannot have an MRI, further evaluation   with a focused ultrasound of the lower pole of left kidney is recommended   to exclude neoplasm. If this lesion is not well-seen on this exam,   follow-up CT is recommended in 3-6 months to demonstrate stability.    Occluded left common femoral artery stent    Subcutaneous nodules, likely related to injection phenomena. Follow to   resolution is recommended    < end of copied text >    INPATIENT MEDICATIONS:  MEDICATIONS  (STANDING):  ALBUTerol    90 MICROgram(s) HFA Inhaler 1 Puff(s) Inhalation every 4 hours  ALBUTerol/ipratropium for Nebulization 3 milliLiter(s) Nebulizer every 6 hours  atorvastatin 40 milliGRAM(s) Oral at bedtime  buDESOnide 160 MICROgram(s)/formoterol 4.5 MICROgram(s) Inhaler 2 Puff(s) Inhalation two times a day  busPIRone 10 milliGRAM(s) Oral two times a day  chlorhexidine 4% Liquid 1 Application(s) Topical <User Schedule>  dextrose 50% Injectable 12.5 Gram(s) IV Push once  dextrose 50% Injectable 25 Gram(s) IV Push once  dextrose 50% Injectable 25 Gram(s) IV Push once  furosemide    Tablet 40 milliGRAM(s) Oral two times a day  gabapentin 100 milliGRAM(s) Oral three times a day  heparin  Infusion 1000 Unit(s)/Hr (10 mL/Hr) IV Continuous <Continuous>  hydrALAZINE 10 milliGRAM(s) Oral every 8 hours  insulin glargine Injectable (LANTUS) 25 Unit(s) SubCutaneous at bedtime  insulin lispro (HumaLOG) corrective regimen sliding scale   SubCutaneous three times a day before meals  insulin lispro Injectable (HumaLOG) 7 Unit(s) SubCutaneous before breakfast  insulin lispro Injectable (HumaLOG) 7 Unit(s) SubCutaneous before lunch  insulin lispro Injectable (HumaLOG) 7 Unit(s) SubCutaneous before dinner  isosorbide   dinitrate Tablet (ISORDIL) 10 milliGRAM(s) Oral three times a day  methadone    Tablet 10 milliGRAM(s) Oral daily  metoprolol succinate ER 25 milliGRAM(s) Oral daily    MEDICATIONS  (PRN):  acetaminophen   Tablet .. 650 milliGRAM(s) Oral every 6 hours PRN Moderate Pain (4 - 6)  dextrose 40% Gel 15 Gram(s) Oral once PRN Blood Glucose LESS THAN 70 milliGRAM(s)/deciliter  glucagon  Injectable 1 milliGRAM(s) IntraMuscular once PRN Glucose LESS THAN 70 milligrams/deciliter  guaiFENesin/dextromethorphan  Syrup 10 milliLiter(s) Oral every 4 hours PRN Cough      ------------------------------------------------------------------------------------------------------------

## 2018-12-19 NOTE — PROGRESS NOTE ADULT - SUBJECTIVE AND OBJECTIVE BOX
SULY PENA MRN-[id number]    Hospitalist Note  57yo F with Past Medical History Chronic Systolic CHF status post AICD placement, COPD on home O2 (4L) , CVD, CVA with residual right sided weakness, depression, fibromyalgia and PVD admitted for worsening dyspnea x1 week. Her hospital course was complicated by an episode of acute respiratory failure and hemoptysis.    Overnight events/Updates: INR improved to 1.7.  Her dyspnea has resolved from admission.    Vital Signs Last 24 Hrs  T(C): 36.9 (18 Dec 2018 20:47), Max: 37.2 (18 Dec 2018 13:35)  T(F): 98.4 (18 Dec 2018 20:47), Max: 99 (18 Dec 2018 13:35)  HR: 71 (19 Dec 2018 04:30) (71 - 98)  BP: 115/58 (19 Dec 2018 04:30) (115/58 - 139/69)  BP(mean): --  RR: 18 (19 Dec 2018 04:30) (18 - 18)  SpO2: 97% (19 Dec 2018 07:53) (97% - 97%)    Physical Examination:  General: AAO x 3  HEENT: PERRLA, EOMI  CV= S1 & S2 appreciated  Lungs= coarse breath sounds without wheezes  Abdominal Examination= + BS, Soft, NT/ND  Extremity Examination= No C/C/E    ROS: No chest pain, no shortness of breath.  All other systems reviewed and are within normal limits except for the complaints in the HPI.    MEDICATIONS  (STANDING):  ALBUTerol    90 MICROgram(s) HFA Inhaler 1 Puff(s) Inhalation every 4 hours  ALBUTerol/ipratropium for Nebulization 3 milliLiter(s) Nebulizer every 6 hours  atorvastatin 40 milliGRAM(s) Oral at bedtime  buDESOnide 160 MICROgram(s)/formoterol 4.5 MICROgram(s) Inhaler 2 Puff(s) Inhalation two times a day  busPIRone 10 milliGRAM(s) Oral two times a day  chlorhexidine 4% Liquid 1 Application(s) Topical <User Schedule>  dextrose 50% Injectable 12.5 Gram(s) IV Push once  dextrose 50% Injectable 25 Gram(s) IV Push once  dextrose 50% Injectable 25 Gram(s) IV Push once  furosemide    Tablet 40 milliGRAM(s) Oral two times a day  gabapentin 100 milliGRAM(s) Oral three times a day  heparin  Infusion 800 Unit(s)/Hr (8 mL/Hr) IV Continuous <Continuous>  hydrALAZINE 10 milliGRAM(s) Oral every 8 hours  insulin glargine Injectable (LANTUS) 25 Unit(s) SubCutaneous at bedtime  insulin lispro (HumaLOG) corrective regimen sliding scale   SubCutaneous three times a day before meals  insulin lispro Injectable (HumaLOG) 7 Unit(s) SubCutaneous before breakfast  insulin lispro Injectable (HumaLOG) 7 Unit(s) SubCutaneous before lunch  insulin lispro Injectable (HumaLOG) 7 Unit(s) SubCutaneous before dinner  isosorbide   dinitrate Tablet (ISORDIL) 10 milliGRAM(s) Oral three times a day  metoprolol succinate ER 25 milliGRAM(s) Oral daily    MEDICATIONS  (PRN):  acetaminophen   Tablet .. 650 milliGRAM(s) Oral every 6 hours PRN Moderate Pain (4 - 6)  dextrose 40% Gel 15 Gram(s) Oral once PRN Blood Glucose LESS THAN 70 milliGRAM(s)/deciliter  glucagon  Injectable 1 milliGRAM(s) IntraMuscular once PRN Glucose LESS THAN 70 milligrams/deciliter  guaiFENesin/dextromethorphan  Syrup 10 milliLiter(s) Oral every 4 hours PRN Cough  traMADol 50 milliGRAM(s) Oral every 8 hours PRN Severe Pain (7 - 10) / headache                            11.0   8.89  )-----------( 279      ( 19 Dec 2018 08:37 )             36.3     12-19    140  |  96<L>  |  26<H>  ----------------------------<  123<H>  5.0   |  33<H>  |  1.5    Ca    8.9      19 Dec 2018 08:37  Phos  3.7     12-19  Mg     1.8     12-19    TPro  7.6  /  Alb  3.5  /  TBili  0.4  /  DBili  x   /  AST  55<H>  /  ALT  38  /  AlkPhos  123<H>  12-19      Case discussed with housestaff & family  SHIENMarguerite Rey [id number]

## 2018-12-19 NOTE — PROGRESS NOTE ADULT - ASSESSMENT
57yo F with Past Medical History Chronic Systolic CHF status post AICD placement, COPD on home O2 (4L) , CVD, CVA with residual right sided weakness, depression, fibromyalgia and PVD admitted for worsening dyspnea x1 week    Acute on chronic systolic heart failure: Continue Lasix 40mg PO q12.  Trend pulse oximetry and monitor daily weights.  Continue supportive care with BIPAP at night.  Off Entresto due to persistent hypotension.  Blood pressure has remained stable after initiating treatment with Hydralazine and Isosorbide dinitrate.  Acute kidney injury secondary to cardiorenal syndrome: see above for management of diuretics.  Nephrology recommendations were reviewed.  Serum creatinine unchanged at 1.5.  R/o Community Acquired PNA: status post Cefepime and Azithromycin.  Prior CVA with residual deficits: continue Lipitor at bedtime  Chronic Thromboembolism/? Hypercoaguable state: discontinue heparin gtt.  INR increased to 1.7.  The patient received Coumadin 5mg & 3mg the past 2 nights.  She previously took 1mg at home; will discharge home on 1mg at bedtime.  Chronic Anemia: likely multifactorial. Hgb improved to 11.  DMII: continue Lantus/Lispro and monitor FS with meals  GI prophylaxis

## 2018-12-20 LAB — GLUCOSE BLDC GLUCOMTR-MCNC: 156 MG/DL — HIGH (ref 70–99)

## 2018-12-21 DIAGNOSIS — I25.10 ATHEROSCLEROTIC HEART DISEASE OF NATIVE CORONARY ARTERY WITHOUT ANGINA PECTORIS: ICD-10-CM

## 2018-12-21 DIAGNOSIS — R33.9 RETENTION OF URINE, UNSPECIFIED: ICD-10-CM

## 2018-12-21 DIAGNOSIS — J45.909 UNSPECIFIED ASTHMA, UNCOMPLICATED: ICD-10-CM

## 2018-12-21 DIAGNOSIS — I69.351 HEMIPLEGIA AND HEMIPARESIS FOLLOWING CEREBRAL INFARCTION AFFECTING RIGHT DOMINANT SIDE: ICD-10-CM

## 2018-12-21 DIAGNOSIS — E27.49 OTHER ADRENOCORTICAL INSUFFICIENCY: ICD-10-CM

## 2018-12-21 DIAGNOSIS — Z95.828 PRESENCE OF OTHER VASCULAR IMPLANTS AND GRAFTS: ICD-10-CM

## 2018-12-21 DIAGNOSIS — I50.23 ACUTE ON CHRONIC SYSTOLIC (CONGESTIVE) HEART FAILURE: ICD-10-CM

## 2018-12-21 DIAGNOSIS — J15.6 PNEUMONIA DUE TO OTHER GRAM-NEGATIVE BACTERIA: ICD-10-CM

## 2018-12-21 DIAGNOSIS — J44.0 CHRONIC OBSTRUCTIVE PULMONARY DISEASE WITH (ACUTE) LOWER RESPIRATORY INFECTION: ICD-10-CM

## 2018-12-21 DIAGNOSIS — T50.8X5A ADVERSE EFFECT OF DIAGNOSTIC AGENTS, INITIAL ENCOUNTER: ICD-10-CM

## 2018-12-21 DIAGNOSIS — J96.21 ACUTE AND CHRONIC RESPIRATORY FAILURE WITH HYPOXIA: ICD-10-CM

## 2018-12-21 DIAGNOSIS — E78.5 HYPERLIPIDEMIA, UNSPECIFIED: ICD-10-CM

## 2018-12-21 DIAGNOSIS — D63.1 ANEMIA IN CHRONIC KIDNEY DISEASE: ICD-10-CM

## 2018-12-21 DIAGNOSIS — N18.3 CHRONIC KIDNEY DISEASE, STAGE 3 (MODERATE): ICD-10-CM

## 2018-12-21 DIAGNOSIS — N17.9 ACUTE KIDNEY FAILURE, UNSPECIFIED: ICD-10-CM

## 2018-12-21 DIAGNOSIS — Z95.810 PRESENCE OF AUTOMATIC (IMPLANTABLE) CARDIAC DEFIBRILLATOR: ICD-10-CM

## 2018-12-21 DIAGNOSIS — M79.7 FIBROMYALGIA: ICD-10-CM

## 2018-12-21 DIAGNOSIS — N14.1 NEPHROPATHY INDUCED BY OTHER DRUGS, MEDICAMENTS AND BIOLOGICAL SUBSTANCES: ICD-10-CM

## 2018-12-21 DIAGNOSIS — Z99.81 DEPENDENCE ON SUPPLEMENTAL OXYGEN: ICD-10-CM

## 2018-12-21 DIAGNOSIS — R04.2 HEMOPTYSIS: ICD-10-CM

## 2018-12-21 DIAGNOSIS — E11.22 TYPE 2 DIABETES MELLITUS WITH DIABETIC CHRONIC KIDNEY DISEASE: ICD-10-CM

## 2018-12-21 DIAGNOSIS — I25.2 OLD MYOCARDIAL INFARCTION: ICD-10-CM

## 2018-12-21 DIAGNOSIS — E11.51 TYPE 2 DIABETES MELLITUS WITH DIABETIC PERIPHERAL ANGIOPATHY WITHOUT GANGRENE: ICD-10-CM

## 2018-12-26 ENCOUNTER — APPOINTMENT (OUTPATIENT)
Dept: INTERNAL MEDICINE | Facility: CLINIC | Age: 58
End: 2018-12-26

## 2018-12-27 ENCOUNTER — OUTPATIENT (OUTPATIENT)
Dept: OUTPATIENT SERVICES | Facility: HOSPITAL | Age: 58
LOS: 1 days | Discharge: HOME | End: 2018-12-27

## 2018-12-27 ENCOUNTER — APPOINTMENT (OUTPATIENT)
Dept: INTERNAL MEDICINE | Facility: CLINIC | Age: 58
End: 2018-12-27

## 2018-12-27 VITALS
BODY MASS INDEX: 35.17 KG/M2 | WEIGHT: 206 LBS | HEART RATE: 89 BPM | TEMPERATURE: 96.8 F | HEIGHT: 64 IN | DIASTOLIC BLOOD PRESSURE: 82 MMHG | SYSTOLIC BLOOD PRESSURE: 160 MMHG

## 2018-12-27 DIAGNOSIS — Z95.2 PRESENCE OF PROSTHETIC HEART VALVE: ICD-10-CM

## 2018-12-27 DIAGNOSIS — Z79.01 LONG TERM (CURRENT) USE OF ANTICOAGULANTS: ICD-10-CM

## 2018-12-27 DIAGNOSIS — I50.23 ACUTE ON CHRONIC SYSTOLIC (CONGESTIVE) HEART FAILURE: ICD-10-CM

## 2018-12-27 DIAGNOSIS — I82.409 ACUTE EMBOLISM AND THROMBOSIS OF UNSPECIFIED DEEP VEINS OF UNSPECIFIED LOWER EXTREMITY: ICD-10-CM

## 2018-12-27 DIAGNOSIS — Z95.810 PRESENCE OF AUTOMATIC (IMPLANTABLE) CARDIAC DEFIBRILLATOR: Chronic | ICD-10-CM

## 2018-12-27 DIAGNOSIS — N18.3 CHRONIC KIDNEY DISEASE, STAGE 3 (MODERATE): ICD-10-CM

## 2018-12-27 DIAGNOSIS — Z90.710 ACQUIRED ABSENCE OF BOTH CERVIX AND UTERUS: Chronic | ICD-10-CM

## 2018-12-27 DIAGNOSIS — Z90.49 ACQUIRED ABSENCE OF OTHER SPECIFIED PARTS OF DIGESTIVE TRACT: Chronic | ICD-10-CM

## 2018-12-27 DIAGNOSIS — Z98.51 TUBAL LIGATION STATUS: Chronic | ICD-10-CM

## 2018-12-27 LAB
POCT INR: 6.2 RATIO — CRITICAL HIGH (ref 0.9–1.2)
POCT PT: 74.8 SEC — HIGH (ref 10–13.4)

## 2018-12-27 RX ORDER — SPIRONOLACTONE 25 MG/1
25 TABLET ORAL DAILY
Qty: 90 | Refills: 1 | Status: DISCONTINUED | COMMUNITY
Start: 2017-03-21 | End: 2018-12-27

## 2018-12-27 RX ORDER — SACUBITRIL AND VALSARTAN 24; 26 MG/1; MG/1
24-26 TABLET, FILM COATED ORAL TWICE DAILY
Qty: 180 | Refills: 1 | Status: DISCONTINUED | COMMUNITY
Start: 2017-03-21 | End: 2018-12-27

## 2019-01-04 ENCOUNTER — OUTPATIENT (OUTPATIENT)
Dept: OUTPATIENT SERVICES | Facility: HOSPITAL | Age: 59
LOS: 1 days | Discharge: HOME | End: 2019-01-04

## 2019-01-04 ENCOUNTER — APPOINTMENT (OUTPATIENT)
Dept: PULMONOLOGY | Facility: CLINIC | Age: 59
End: 2019-01-04

## 2019-01-04 VITALS
SYSTOLIC BLOOD PRESSURE: 128 MMHG | BODY MASS INDEX: 34.66 KG/M2 | HEIGHT: 64 IN | WEIGHT: 203 LBS | OXYGEN SATURATION: 96 % | DIASTOLIC BLOOD PRESSURE: 77 MMHG | HEART RATE: 86 BPM | TEMPERATURE: 97.8 F

## 2019-01-04 DIAGNOSIS — Z98.51 TUBAL LIGATION STATUS: Chronic | ICD-10-CM

## 2019-01-04 DIAGNOSIS — K21.9 GASTRO-ESOPHAGEAL REFLUX DISEASE W/OUT ESOPHAGITIS: ICD-10-CM

## 2019-01-04 DIAGNOSIS — Z90.49 ACQUIRED ABSENCE OF OTHER SPECIFIED PARTS OF DIGESTIVE TRACT: Chronic | ICD-10-CM

## 2019-01-04 DIAGNOSIS — Z95.810 PRESENCE OF AUTOMATIC (IMPLANTABLE) CARDIAC DEFIBRILLATOR: Chronic | ICD-10-CM

## 2019-01-04 DIAGNOSIS — I48.91 UNSPECIFIED ATRIAL FIBRILLATION: ICD-10-CM

## 2019-01-04 DIAGNOSIS — Z90.710 ACQUIRED ABSENCE OF BOTH CERVIX AND UTERUS: Chronic | ICD-10-CM

## 2019-01-04 DIAGNOSIS — Z79.01 LONG TERM (CURRENT) USE OF ANTICOAGULANTS: ICD-10-CM

## 2019-01-04 LAB
POCT INR: 3.2 RATIO — HIGH (ref 0.9–1.2)
POCT PT: 38.3 SEC — HIGH (ref 10–13.4)

## 2019-01-04 NOTE — REVIEW OF SYSTEMS
[Shortness Of Breath] : shortness of breath [Wheezing] : wheezing [Cough] : cough [Dyspnea on Exertion] : dyspnea on exertion [Negative] : Neurological

## 2019-01-04 NOTE — HISTORY OF PRESENT ILLNESS
[FreeTextEntry1] : 57 year old female with PMH of HTN, DM, Fibromyalgia, CHF, Asthma, DJD, and DLD presents to the pulmonary clinic for follow up.  She was recently admitted to the ICU at [facility] about 2 months ago for an episode of upper GI bleed.  Patient explains that she was not intubated and eventually was discharged.  At her follow up with the pmd it was recommended for her to follow up with the clinic because she was having increased sob.  Patient explains currently she uses home 02 @ 3-4 L daily.  She describes a productive cough which is chronic.  No recent fevers, chills, sick contacts or recent travel.

## 2019-01-04 NOTE — PHYSICAL EXAM
[No Acute Distress] : no acute distress [Well Nourished] : well nourished [Well Developed] : well developed [Well-Appearing] : well-appearing [Normal Rate] : normal rate  [Regular Rhythm] : with a regular rhythm [Normal S1, S2] : normal S1 and S2 [Soft] : abdomen soft [Non Tender] : non-tender [No HSM] : no HSM [Normal Bowel Sounds] : normal bowel sounds [No Joint Swelling] : no joint swelling [Grossly Normal Strength/Tone] : grossly normal strength/tone [No Rash] : no rash [No Skin Lesions] : no skin lesions [de-identified] : diffuse rhonci heard in upper and lower lung fields

## 2019-01-04 NOTE — END OF VISIT
[] : Resident [FreeTextEntry3] : concur with the above assessment and plan/////o2 sat at rest-96%-walking-93% [Time Spent: ___ minutes] : I have spent [unfilled] minutes of face to face time with the patient

## 2019-01-04 NOTE — ASSESSMENT
[FreeTextEntry1] : 57 year old female with PMH of HTN, DM, Fibromyalgia, CHF, Asthma, DJD, and DLD presents to the pulmonary clinic for Preop clearance for removal of a lipoma. Patient currently not complaining of any SOB or any Discomfort. Her asthma is currently under control. \par \par # COPD/ Asthma \par - Stable and well controlled. \par - Patient does not use rescue inhaler at this time. \par - recently started on CPAP\par - counselled on smoking cessation\par - pfts ordered\par - 6 months rto or prn\par \par # DM \par stable \par Patient following with routine blood work and Visits with Medical Clinic \par \par # Hypertension \par -Following with medicine Clinic\par -Stable at this time \par \par # CAD  \par - Stable \par - Patient to continue Rx and follow with Cardiology. \par

## 2019-01-07 DIAGNOSIS — J44.9 CHRONIC OBSTRUCTIVE PULMONARY DISEASE, UNSPECIFIED: ICD-10-CM

## 2019-01-13 ENCOUNTER — INPATIENT (INPATIENT)
Facility: HOSPITAL | Age: 59
LOS: 1 days | Discharge: HOME | DRG: 291 | End: 2019-01-15
Attending: INTERNAL MEDICINE | Admitting: INTERNAL MEDICINE
Payer: MEDICARE

## 2019-01-13 VITALS
OXYGEN SATURATION: 96 % | DIASTOLIC BLOOD PRESSURE: 76 MMHG | HEART RATE: 110 BPM | TEMPERATURE: 99 F | RESPIRATION RATE: 25 BRPM | SYSTOLIC BLOOD PRESSURE: 158 MMHG

## 2019-01-13 DIAGNOSIS — Z98.51 TUBAL LIGATION STATUS: Chronic | ICD-10-CM

## 2019-01-13 DIAGNOSIS — R63.8 OTHER SYMPTOMS AND SIGNS CONCERNING FOOD AND FLUID INTAKE: ICD-10-CM

## 2019-01-13 DIAGNOSIS — I63.9 CEREBRAL INFARCTION, UNSPECIFIED: ICD-10-CM

## 2019-01-13 DIAGNOSIS — G62.9 POLYNEUROPATHY, UNSPECIFIED: ICD-10-CM

## 2019-01-13 DIAGNOSIS — E03.9 HYPOTHYROIDISM, UNSPECIFIED: ICD-10-CM

## 2019-01-13 DIAGNOSIS — Z95.810 PRESENCE OF AUTOMATIC (IMPLANTABLE) CARDIAC DEFIBRILLATOR: Chronic | ICD-10-CM

## 2019-01-13 DIAGNOSIS — I10 ESSENTIAL (PRIMARY) HYPERTENSION: ICD-10-CM

## 2019-01-13 DIAGNOSIS — Z90.49 ACQUIRED ABSENCE OF OTHER SPECIFIED PARTS OF DIGESTIVE TRACT: Chronic | ICD-10-CM

## 2019-01-13 DIAGNOSIS — E11.9 TYPE 2 DIABETES MELLITUS WITHOUT COMPLICATIONS: ICD-10-CM

## 2019-01-13 DIAGNOSIS — Z95.828 PRESENCE OF OTHER VASCULAR IMPLANTS AND GRAFTS: Chronic | ICD-10-CM

## 2019-01-13 DIAGNOSIS — I82.409 ACUTE EMBOLISM AND THROMBOSIS OF UNSPECIFIED DEEP VEINS OF UNSPECIFIED LOWER EXTREMITY: ICD-10-CM

## 2019-01-13 DIAGNOSIS — I50.23 ACUTE ON CHRONIC SYSTOLIC (CONGESTIVE) HEART FAILURE: ICD-10-CM

## 2019-01-13 DIAGNOSIS — Z90.710 ACQUIRED ABSENCE OF BOTH CERVIX AND UTERUS: Chronic | ICD-10-CM

## 2019-01-13 DIAGNOSIS — F31.30 BIPOLAR DISORDER, CURRENT EPISODE DEPRESSED, MILD OR MODERATE SEVERITY, UNSPECIFIED: ICD-10-CM

## 2019-01-13 LAB
ANION GAP SERPL CALC-SCNC: 12 MMOL/L — SIGNIFICANT CHANGE UP (ref 5–17)
BUN SERPL-MCNC: 28 MG/DL — HIGH (ref 7–23)
CALCIUM SERPL-MCNC: 9.6 MG/DL — SIGNIFICANT CHANGE UP (ref 8.4–10.5)
CHLORIDE SERPL-SCNC: 93 MMOL/L — LOW (ref 96–108)
CK MB CFR SERPL CALC: <1 NG/ML — SIGNIFICANT CHANGE UP (ref 0–6.7)
CK SERPL-CCNC: 60 U/L — SIGNIFICANT CHANGE UP (ref 25–170)
CO2 SERPL-SCNC: 29 MMOL/L — SIGNIFICANT CHANGE UP (ref 22–31)
CREAT SERPL-MCNC: 1.39 MG/DL — HIGH (ref 0.5–1.3)
GLUCOSE SERPL-MCNC: 301 MG/DL — HIGH (ref 70–99)
POTASSIUM SERPL-MCNC: 4.4 MMOL/L — SIGNIFICANT CHANGE UP (ref 3.5–5.3)
POTASSIUM SERPL-SCNC: 4.4 MMOL/L — SIGNIFICANT CHANGE UP (ref 3.5–5.3)
SODIUM SERPL-SCNC: 134 MMOL/L — LOW (ref 135–145)
TROPONIN T SERPL-MCNC: <0.01 NG/ML — SIGNIFICANT CHANGE UP (ref 0–0.01)

## 2019-01-13 PROCEDURE — 99285 EMERGENCY DEPT VISIT HI MDM: CPT

## 2019-01-13 PROCEDURE — 71045 X-RAY EXAM CHEST 1 VIEW: CPT | Mod: 26

## 2019-01-13 PROCEDURE — 99222 1ST HOSP IP/OBS MODERATE 55: CPT

## 2019-01-13 RX ORDER — DEXTROSE 50 % IN WATER 50 %
15 SYRINGE (ML) INTRAVENOUS ONCE
Qty: 0 | Refills: 0 | Status: DISCONTINUED | OUTPATIENT
Start: 2019-01-13 | End: 2019-01-15

## 2019-01-13 RX ORDER — BUDESONIDE AND FORMOTEROL FUMARATE DIHYDRATE 160; 4.5 UG/1; UG/1
2 AEROSOL RESPIRATORY (INHALATION)
Qty: 0 | Refills: 0 | Status: DISCONTINUED | OUTPATIENT
Start: 2019-01-13 | End: 2019-01-15

## 2019-01-13 RX ORDER — WARFARIN SODIUM 2.5 MG/1
1 TABLET ORAL ONCE
Qty: 0 | Refills: 0 | Status: COMPLETED | OUTPATIENT
Start: 2019-01-13 | End: 2019-01-13

## 2019-01-13 RX ORDER — ATORVASTATIN CALCIUM 80 MG/1
40 TABLET, FILM COATED ORAL AT BEDTIME
Qty: 0 | Refills: 0 | Status: DISCONTINUED | OUTPATIENT
Start: 2019-01-13 | End: 2019-01-14

## 2019-01-13 RX ORDER — INSULIN LISPRO 100/ML
20 VIAL (ML) SUBCUTANEOUS
Qty: 0 | Refills: 0 | Status: DISCONTINUED | OUTPATIENT
Start: 2019-01-13 | End: 2019-01-14

## 2019-01-13 RX ORDER — FENOFIBRATE,MICRONIZED 130 MG
145 CAPSULE ORAL DAILY
Qty: 0 | Refills: 0 | Status: DISCONTINUED | OUTPATIENT
Start: 2019-01-13 | End: 2019-01-15

## 2019-01-13 RX ORDER — HYDRALAZINE HCL 50 MG
10 TABLET ORAL THREE TIMES A DAY
Qty: 0 | Refills: 0 | Status: DISCONTINUED | OUTPATIENT
Start: 2019-01-14 | End: 2019-01-15

## 2019-01-13 RX ORDER — ISOSORBIDE MONONITRATE 60 MG/1
60 TABLET, EXTENDED RELEASE ORAL DAILY
Qty: 0 | Refills: 0 | Status: DISCONTINUED | OUTPATIENT
Start: 2019-01-14 | End: 2019-01-15

## 2019-01-13 RX ORDER — FUROSEMIDE 40 MG
40 TABLET ORAL ONCE
Qty: 0 | Refills: 0 | Status: COMPLETED | OUTPATIENT
Start: 2019-01-13 | End: 2019-01-13

## 2019-01-13 RX ORDER — PANTOPRAZOLE SODIUM 20 MG/1
40 TABLET, DELAYED RELEASE ORAL
Qty: 0 | Refills: 0 | Status: DISCONTINUED | OUTPATIENT
Start: 2019-01-13 | End: 2019-01-15

## 2019-01-13 RX ORDER — ESCITALOPRAM OXALATE 10 MG/1
10 TABLET, FILM COATED ORAL DAILY
Qty: 0 | Refills: 0 | Status: DISCONTINUED | OUTPATIENT
Start: 2019-01-13 | End: 2019-01-15

## 2019-01-13 RX ORDER — LEVOTHYROXINE SODIUM 125 MCG
25 TABLET ORAL DAILY
Qty: 0 | Refills: 0 | Status: DISCONTINUED | OUTPATIENT
Start: 2019-01-13 | End: 2019-01-15

## 2019-01-13 RX ORDER — MORPHINE SULFATE 50 MG/1
2 CAPSULE, EXTENDED RELEASE ORAL ONCE
Qty: 0 | Refills: 0 | Status: DISCONTINUED | OUTPATIENT
Start: 2019-01-13 | End: 2019-01-13

## 2019-01-13 RX ORDER — DEXTROSE 50 % IN WATER 50 %
25 SYRINGE (ML) INTRAVENOUS ONCE
Qty: 0 | Refills: 0 | Status: DISCONTINUED | OUTPATIENT
Start: 2019-01-13 | End: 2019-01-15

## 2019-01-13 RX ORDER — GABAPENTIN 400 MG/1
100 CAPSULE ORAL DAILY
Qty: 0 | Refills: 0 | Status: DISCONTINUED | OUTPATIENT
Start: 2019-01-14 | End: 2019-01-15

## 2019-01-13 RX ORDER — DEXTROSE 50 % IN WATER 50 %
12.5 SYRINGE (ML) INTRAVENOUS ONCE
Qty: 0 | Refills: 0 | Status: DISCONTINUED | OUTPATIENT
Start: 2019-01-13 | End: 2019-01-15

## 2019-01-13 RX ORDER — GLUCAGON INJECTION, SOLUTION 0.5 MG/.1ML
1 INJECTION, SOLUTION SUBCUTANEOUS ONCE
Qty: 0 | Refills: 0 | Status: DISCONTINUED | OUTPATIENT
Start: 2019-01-13 | End: 2019-01-15

## 2019-01-13 RX ORDER — INSULIN LISPRO 100/ML
VIAL (ML) SUBCUTANEOUS
Qty: 0 | Refills: 0 | Status: DISCONTINUED | OUTPATIENT
Start: 2019-01-13 | End: 2019-01-15

## 2019-01-13 RX ORDER — IPRATROPIUM/ALBUTEROL SULFATE 18-103MCG
3 AEROSOL WITH ADAPTER (GRAM) INHALATION EVERY 6 HOURS
Qty: 0 | Refills: 0 | Status: DISCONTINUED | OUTPATIENT
Start: 2019-01-13 | End: 2019-01-15

## 2019-01-13 RX ORDER — ACETAMINOPHEN 500 MG
1000 TABLET ORAL ONCE
Qty: 0 | Refills: 0 | Status: COMPLETED | OUTPATIENT
Start: 2019-01-13 | End: 2019-01-13

## 2019-01-13 RX ORDER — FUROSEMIDE 40 MG
40 TABLET ORAL EVERY 8 HOURS
Qty: 0 | Refills: 0 | Status: DISCONTINUED | OUTPATIENT
Start: 2019-01-13 | End: 2019-01-14

## 2019-01-13 RX ORDER — INSULIN GLARGINE 100 [IU]/ML
30 INJECTION, SOLUTION SUBCUTANEOUS AT BEDTIME
Qty: 0 | Refills: 0 | Status: DISCONTINUED | OUTPATIENT
Start: 2019-01-13 | End: 2019-01-14

## 2019-01-13 RX ORDER — SODIUM CHLORIDE 9 MG/ML
1000 INJECTION, SOLUTION INTRAVENOUS
Qty: 0 | Refills: 0 | Status: DISCONTINUED | OUTPATIENT
Start: 2019-01-13 | End: 2019-01-15

## 2019-01-13 RX ORDER — HEPARIN SODIUM 5000 [USP'U]/ML
5000 INJECTION INTRAVENOUS; SUBCUTANEOUS EVERY 8 HOURS
Qty: 0 | Refills: 0 | Status: DISCONTINUED | OUTPATIENT
Start: 2019-01-13 | End: 2019-01-13

## 2019-01-13 RX ADMIN — Medication 1000 MILLIGRAM(S): at 22:45

## 2019-01-13 RX ADMIN — ATORVASTATIN CALCIUM 40 MILLIGRAM(S): 80 TABLET, FILM COATED ORAL at 21:58

## 2019-01-13 RX ADMIN — INSULIN GLARGINE 30 UNIT(S): 100 INJECTION, SOLUTION SUBCUTANEOUS at 22:45

## 2019-01-13 RX ADMIN — ESCITALOPRAM OXALATE 10 MILLIGRAM(S): 10 TABLET, FILM COATED ORAL at 21:59

## 2019-01-13 RX ADMIN — WARFARIN SODIUM 1 MILLIGRAM(S): 2.5 TABLET ORAL at 22:09

## 2019-01-13 RX ADMIN — Medication 400 MILLIGRAM(S): at 21:56

## 2019-01-13 RX ADMIN — Medication 40 MILLIGRAM(S): at 22:06

## 2019-01-13 RX ADMIN — Medication 40 MILLIGRAM(S): at 15:22

## 2019-01-13 RX ADMIN — Medication 0: at 22:01

## 2019-01-13 RX ADMIN — MORPHINE SULFATE 2 MILLIGRAM(S): 50 CAPSULE, EXTENDED RELEASE ORAL at 17:24

## 2019-01-13 NOTE — H&P ADULT - PROBLEM SELECTOR PLAN 9
Diet: NPO for Bipap, otherwise DASH/TLC, Diabetic, Fluid restricted diet  Replete lytes prn  No IVF    Dispo: Admit to [facility]  Code Status:  VTE ppx: coumadin Diet: NPO for Bipap, otherwise DASH/TLC, Diabetic, Fluid restricted diet  Replete lytes prn  No IVF    Dispo: Admit to [facility]  Code Status: Full Code   VTE ppx: coumadin -Continue with synthroid 25 mcg daily    #HLD:   -Continue with lipitor and fenofibrate     #GERD:  -Continue with protonix 40 mg daily

## 2019-01-13 NOTE — H&P ADULT - PMH
Acute on chronic systolic heart failure    Bipolar depression    CVA (cerebral vascular accident)    Depression    DM (diabetes mellitus)    DVT (deep venous thrombosis)    HTN (hypertension)    Neuropathy    PAD (peripheral artery disease)

## 2019-01-13 NOTE — H&P ADULT - PROBLEM SELECTOR PLAN 2
-Hx of 4 DVTs in the past, unknown etiology, based on history appear to unprovoked. Patient unclear of any hypercoagubale work up, will need to obtain collateral in the am  -Continue with coumadin 1 mg for now -Hx of 4 DVTs in the past, unknown etiology, based on history appear to unprovoked. Patient unclear of any hypercoagulable work up, will need to obtain collateral in the am  -Continue with coumadin 1 mg for now -Hx of 4 DVTs in the past, unknown etiology, based on history appear to unprovoked. Patient unclear of any hypercoagulable work up, will need to obtain collateral in the am  -Continue with coumadin 1 mg for now, follow up daily PT/INRs and dose coumadin accordingly.

## 2019-01-13 NOTE — ED ADULT TRIAGE NOTE - CHIEF COMPLAINT QUOTE
Brought in by , complaining of shortness of breath for a few days but got worst today, hx of DM and CHF. As per  they ran out of oxygen on their way here. Tachypnic and tachycardiac.

## 2019-01-13 NOTE — ED PROVIDER NOTE - OBJECTIVE STATEMENT
Patient is a 59yo F with PMHx of DM, CHF, who presents with 3days of SOB. Patient states that she has episodes like this often- last episode was 1 month ago, and usually goes to [facility] to the ED. 2-3days ago her abdomen felt as though it was filling with fluid and she felt her body was getting more swollen. This morning, her SOB got worse where she has trouble completing a full sentence. Patient endorses swelling throuhgout her body, chills, wet cought, denies chest pain, headache, fevers, constipation/diarrhea.

## 2019-01-13 NOTE — H&P ADULT - PROBLEM SELECTOR PLAN 10
Diet: NPO for Bipap, otherwise DASH/TLC, Diabetic, Fluid restricted diet  Replete lytes prn  No IVF    Dispo: Admit to [facility]  Code Status: Full Code   VTE ppx: coumadin

## 2019-01-13 NOTE — H&P ADULT - PROBLEM SELECTOR PLAN 1
-Admit to cardiac telemetry for acute on chronic systolic CHF exacerbation, as per the patient last EF 25%  -Lasix 40 mg IV TID for aggressive diuresis  -Strict I/Os, daily weights, maintain net negative 1-2L  -Monitoring of electrolytes while on diuresis  -Less likely precipitated by ischemic event given no chest pain and cardiac enzymes negative, will trend till negative x2  -Echo in the am   -Continue with Bipap  -Fluid restricted diet -Admit to cardiac telemetry for acute on chronic systolic CHF exacerbation, as per the patient last EF 25%  -Lasix 40 mg IV TID for aggressive diuresis  -Strict I/Os, daily weights, maintain net negative 1-2L  -Monitoring of electrolytes while on diuresis  -Less likely precipitated by ischemic event given no chest pain and cardiac enzymes negative, will trend till negative x2  -Echo in the am   -Continue with Bipap  -Fluid restricted diet  -Follow up am CXR -Admit to cardiac telemetry for acute on chronic systolic CHF exacerbation, as per the patient last EF 25%  -Lasix 40 mg IV TID for aggressive diuresis  -Strict I/Os, daily weights, maintain net negative 1-2L  -Monitoring of electrolytes while on diuresis  -Less likely precipitated by ischemic event given no chest pain and cardiac enzymes negative, will trend till negative x2  -Echo in the am   -Continue with Bipap  -Fluid restricted diet  -Follow up am CXR  -Holding BB, can restart tomorrow -Admit to cardiac telemetry for acute on chronic systolic CHF exacerbation, as per the patient last EF 25%  -Lasix 40 mg IV TID for aggressive diuresis  -Strict I/Os, daily weights, maintain net negative 1-2L  -Monitoring of electrolytes while on diuresis  -Less likely precipitated by ischemic event given no chest pain and cardiac enzymes negative, will trend till negative x2  -Echo in the am   -Continue with Bipap  -Fluid restricted diet  -Follow up am CXR  -Holding BB, can restart tomorrow  -Follow up am lipid profile, TSH and HbA1c -Admit to cardiac telemetry for acute on chronic systolic CHF exacerbation, as per the patient last EF 25%; unclear etiology whether ischemic or non ischemic.   -Lasix 40 mg IV TID for aggressive diuresis  -Strict I/Os, daily weights, maintain net negative 1-2L  -Monitoring of electrolytes while on diuresis  -Less likely precipitated by ischemic event given no chest pain and cardiac enzymes negative, will trend till negative x2  -Echo in the am   -Continue with Bipap  -Fluid restricted diet  -Follow up am CXR  -Holding BB in the setting of exacerbation, can restart tomorrow  -Follow up am lipid profile, TSH and HbA1c

## 2019-01-13 NOTE — H&P ADULT - ASSESSMENT
59 yo F with multiple co morbidities presenting with worsening SOB/RAMSEY and orthopnea, admitted to 5 [facility] for acute on chronic CHF exacerbation. 59 yo F with multiple co morbidities presenting with worsening SOB/RAMSEY and orthopnea, admitted to 5 [facility] for acute on chronic sCHF exacerbation.

## 2019-01-13 NOTE — ED ADULT NURSE NOTE - OBJECTIVE STATEMENT
pt received in room 16 A & O x 3 pt has a history of CHF with and EF of 25% pt c/o Shortness of breath for 2 days , chest discomfort , pt ran out of her )2 on way to the hospital and became more tachypneic , pt on 3 litters of O2 at home pt 100% on oxygen upon arrival , IV was accessed pt on CM sinus rhythm noted , IV was accessed , awaiting respiratory therapist to place patient on Bipap .

## 2019-01-13 NOTE — H&P ADULT - NSHPLABSRESULTS_GEN_ALL_CORE
LABS:                         10.2   11.4  )-----------( 212      ( 2019 15:12 )             33.8         134<L>  |  93<L>  |  28<H>  ----------------------------<  301<H>  4.4   |  29  |  1.39<H>    Ca    9.6      2019 16:39    TPro  9.1<H>  /  Alb  3.8  /  TBili  1.0  /  DBili  x   /  AST  see note  /  ALT  see note  /  AlkPhos  106      PT/INR - ( 2019 15:12 )   PT: 32.6 sec;   INR: 2.79          PTT - ( 2019 15:12 )  PTT:47.7 sec  Urinalysis Basic - ( 2019 15:14 )    Color: Yellow / Appearance: Clear / S.020 / pH: x  Gluc: x / Ketone: NEGATIVE  / Bili: Negative / Urobili: 0.2 E.U./dL   Blood: x / Protein: 100 mg/dL / Nitrite: NEGATIVE   Leuk Esterase: NEGATIVE / RBC: 5-10 /HPF / WBC < 5 /HPF   Sq Epi: x / Non Sq Epi: 0-5 /HPF / Bacteria: Present /HPF      CARDIAC MARKERS ( 2019 15:13 )  x     / <0.01 ng/mL / 100 U/L / x     / 1.4 ng/mL    Serum Pro-Brain Natriuretic Peptide: 6485 pg/mL ( @ 15:13)    RADIOLOGY, EKG & ADDITIONAL TESTS: Reviewed.

## 2019-01-13 NOTE — H&P ADULT - PROBLEM SELECTOR PLAN 3
-Hx of 2 CVA's in the past which patient states was in the setting of her LE DVTs, she states she had a DELLA in the past to evaluate for embolic CVA however unsure what the results were  -Continue with coumadin 1 mg for now  -Obtain collateral in the am

## 2019-01-13 NOTE — H&P ADULT - PROBLEM SELECTOR PLAN 5
-Continue with gabapentin -holding home hydralazine, patient currently normotensive, will add other medications in the am to optimize CHF regimen. -Currently normotensive, continue hydralazine TID  -Holding home BB in the setting of CHF exacerbation  -Monitor blood pressures -Currently normotensive, continue hydralazine TID  -Holding home BB in the setting of CHF exacerbation  -Monitor blood pressures    #DHARMESH:  -Continue with CPAP at night -RITCHIE vs CKD with unknown baseline creatinine, likely component of CKD given multiple co morbidities  -Continue to trend BMP closely     #Anemia:  -Unknown baseline, HD stable, no signs or symptoms of bleeding, continue to trend.

## 2019-01-13 NOTE — H&P ADULT - PROBLEM SELECTOR PLAN 4
-Continue home regimen Lantus 30 units at bedtime (home regimen Levemir 30 units), Lispro 20 units pre meal TID  -ISS for coverage  -F/u am HbA1c  -Diabetic Diet

## 2019-01-13 NOTE — H&P ADULT - PROBLEM SELECTOR PLAN 6
-Continue with escitalpram -Continue with escitalopram -Continue with gabapentin -Currently normotensive, continue hydralazine TID  -Holding home BB in the setting of CHF exacerbation  -Monitor blood pressures    #DHARMESH:  -Continue with CPAP at night

## 2019-01-13 NOTE — H&P ADULT - NSHPOUTPATIENTPROVIDERS_GEN_ALL_CORE
Dr. Zoya Gutierrez and Dr. Lenny Olguin (cardiology)  Dr. Jean Marie Greene (pulmonary)  Dr. Bri Albarran (PCP)  Dr. Sandoval (EP) Dr. Zoya Olguin and Dr. Luis Angel Gutierrez (cardiology)  Dr. Jean Marie Greene (pulmonary)  Dr. Bri Albarran (PCP)  Dr. Sandoval (EP)

## 2019-01-13 NOTE — H&P ADULT - PROBLEM SELECTOR PLAN 7
-Continue with synthroid 25 mcg daily -Continue with escitalopram  -Continue with buspirone -Continue with escitalopram  -Continue with buspirone    #Asthma:  -Continue with symbicort and duonebs prn -Continue with gabapentin

## 2019-01-13 NOTE — ED PROVIDER NOTE - MEDICAL DECISION MAKING DETAILS
Patient is a 57yo F with history of CHF, DM, who presents with 3 days of worsening SOB, swelling- especially abdominally. Most likely diagnosis is acute decompensated heart failure. Other possible less likely diagnoses include ACS, renal dysfunction, interstitial lung disease, rule/out cardiogenic shock. Workup includes ECG, CXR, blood gas, CBC, CMP, BNP.

## 2019-01-13 NOTE — H&P ADULT - PROBLEM SELECTOR PLAN 8
Diet: NPO for Bipap, otherwise DASH/TLC, Diabetic, Fluid restricted diet  Replete lytes prn  No IVF    Dispo: Admit to [facility]  Code Status:  VTE ppx: coumadin -Continue with synthroid 25 mcg daily -Continue with synthroid 25 mcg daily    #HLD:   -Continue with lipitor and fenofibrate     #GERD:  -Continue with protonix 40 mg daily -Continue with escitalopram  -Continue with buspirone    #Asthma:  -Continue with symbicort and duonebs prn

## 2019-01-13 NOTE — ED PROVIDER NOTE - ATTENDING CONTRIBUTION TO CARE
57 yo hx of DM, CHF w shortness of breath, states similar in presentation to prior events of CHF exac requiring admission at [facility]. no chest pain, abd pain, currently on lasix. Mild tachypnea, talking, nad, nc/at, dec lungs sounds b/l, heart reg, abd soft, nt, ext no gross deformity, no gross neuro deficits, plan for cardiac eval, diuresis, likely admit.

## 2019-01-13 NOTE — H&P ADULT - NSHPPHYSICALEXAM_GEN_ALL_CORE
.  VITAL SIGNS:  T(C): 36.4 (01-13-19 @ 19:14), Max: 37.3 (01-13-19 @ 14:57)  T(F): 97.6 (01-13-19 @ 19:14), Max: 99.1 (01-13-19 @ 14:57)  HR: 99 (01-13-19 @ 19:15) (87 - 110)  BP: 126/65 (01-13-19 @ 19:15) (123/59 - 158/76)  BP(mean): 87 (01-13-19 @ 19:14) (87 - 87)  RR: 20 (01-13-19 @ 19:15) (20 - 25)  SpO2: 100% (01-13-19 @ 19:15) (96% - 100%)  Wt(kg): --    PHYSICAL EXAM:    Constitutional: WDWN resting comfortably in bed; NAD  Head: NC/AT  Eyes: PERRL, EOMI, anicteric sclera  ENT: no nasal discharge; uvula midline, no oropharyngeal erythema or exudates; MMM  Neck: supple; no JVD or thyromegaly  Respiratory: CTA B/L; no W/R/R, no retractions  Cardiac: +S1/S2; RRR; no M/R/G; PMI non-displaced  Gastrointestinal: abdomen soft, NT/ND; no rebound or guarding; +BSx4  Back: spine midline, no bony tenderness or step-offs; no CVAT B/L  Extremities: WWP, no clubbing or cyanosis; no peripheral edema  Musculoskeletal: NROM x4; no joint swelling, tenderness or erythema  Vascular: 2+ radial, femoral, DP/PT pulses B/L  Dermatologic: skin warm, dry and intact; no rashes, wounds, or scars  Lymphatic: no submandibular or cervical LAD  Neurologic: AAOx3; CNII-XII grossly intact; no focal deficits  Psychiatric: affect and characteristics of appearance, verbalizations, behaviors are appropriate VITAL SIGNS:  T(C): 36.4 (01-13-19 @ 19:14), Max: 37.3 (01-13-19 @ 14:57)  T(F): 97.6 (01-13-19 @ 19:14), Max: 99.1 (01-13-19 @ 14:57)  HR: 99 (01-13-19 @ 19:15) (87 - 110)  BP: 126/65 (01-13-19 @ 19:15) (123/59 - 158/76)  BP(mean): 87 (01-13-19 @ 19:14) (87 - 87)  RR: 20 (01-13-19 @ 19:15) (20 - 25)  SpO2: 100% (01-13-19 @ 19:15) (96% - 100%)  Wt(kg): --    PHYSICAL EXAM:  Constitutional: Elderly female lying in bed, Bipap in place, NAD  Head: NC/AT  Eyes: PERRL, EOMI, anicteric sclera  ENT: no nasal discharge; uvula midline, no oropharyngeal erythema or exudates; MMM  Neck: supple; no JVD or thyromegaly  Respiratory: CTA B/L; no W/R/R, no retractions  Cardiac: +S1/S2; RRR; no M/R/G; PMI non-displaced  Gastrointestinal: abdomen soft, NT/ND; no rebound or guarding; +BSx4  Back: spine midline, no bony tenderness or step-offs; no CVAT B/L  Extremities: WWP, no clubbing or cyanosis; no peripheral edema  Musculoskeletal: NROM x4; no joint swelling, tenderness or erythema  Vascular: 2+ radial, femoral, DP/PT pulses B/L  Dermatologic: skin warm, dry and intact; no rashes, wounds, or scars  Lymphatic: no submandibular or cervical LAD  Neurologic: AAOx3; CNII-XII grossly intact; no focal deficits  Psychiatric: affect and characteristics of appearance, verbalizations, behaviors are appropriate VITAL SIGNS:  T(C): 36.4 (01-13-19 @ 19:14), Max: 37.3 (01-13-19 @ 14:57)  T(F): 97.6 (01-13-19 @ 19:14), Max: 99.1 (01-13-19 @ 14:57)  HR: 99 (01-13-19 @ 19:15) (87 - 110)  BP: 126/65 (01-13-19 @ 19:15) (123/59 - 158/76)  BP(mean): 87 (01-13-19 @ 19:14) (87 - 87)  RR: 20 (01-13-19 @ 19:15) (20 - 25)  SpO2: 100% (01-13-19 @ 19:15) (96% - 100%)  Wt(kg): --    PHYSICAL EXAM:  Constitutional: Elderly female lying in bed, Bipap in place, NAD  Head: NC/AT  Eyes: PERRL, EOMI, anicteric sclera  ENT: Bipap in place  Neck: supple; no JVD or thyromegaly  Respiratory: Diffuse bibasilar crackles from n/l lower lung fields to mid lung fields, no wheezes. No accessory muscle usage seen.  Cardiac: +S1/S2; RRR; no M/R/G; PMI non-displaced  Gastrointestinal: abdomen soft, distended, non tender; no rebound or guarding; +BSx4  Back: spine midline, no bony tenderness or step-offs; no CVAT B/L  Extremities: 1+ LE pitting edema   Musculoskeletal: NROM x4; no joint swelling, tenderness or erythema  Dermatologic: chronic LE venous stasis changes  Lymphatic: no submandibular or cervical LAD  Neurologic: AAOx3; no focal deficits  Psychiatric: affect and characteristics of appearance, verbalizations, behaviors are appropriate VITAL SIGNS:  T(C): 36.4 (01-13-19 @ 19:14), Max: 37.3 (01-13-19 @ 14:57)  T(F): 97.6 (01-13-19 @ 19:14), Max: 99.1 (01-13-19 @ 14:57)  HR: 99 (01-13-19 @ 19:15) (87 - 110)  BP: 126/65 (01-13-19 @ 19:15) (123/59 - 158/76)  BP(mean): 87 (01-13-19 @ 19:14) (87 - 87)  RR: 20 (01-13-19 @ 19:15) (20 - 25)  SpO2: 100% (01-13-19 @ 19:15) (96% - 100%)  Wt(kg): --    PHYSICAL EXAM:  Constitutional: Elderly female lying in bed, Bipap in place, NAD  Head: NC/AT  Eyes: PERRL, EOMI, anicteric sclera  ENT: Bipap in place  Neck: supple; no JVD or thyromegaly  Respiratory: Diffuse bibasilar crackles from b/l lower lung fields to mid lung fields, no wheezes. No accessory muscle usage seen.  Cardiac: +S1/S2; RRR; no M/R/G; PMI non-displaced  Gastrointestinal: abdomen soft, distended, non tender; no rebound or guarding; +BSx4  Back: spine midline, no bony tenderness or step-offs; no CVAT B/L  Extremities: 1+ LE pitting edema   Musculoskeletal: NROM x4; no joint swelling, tenderness or erythema  Dermatologic: chronic LE venous stasis changes  Lymphatic: no submandibular or cervical LAD  Neurologic: AAOx3; no focal deficits  Psychiatric: affect and characteristics of appearance, verbalizations, behaviors are appropriate VITAL SIGNS:  T(C): 36.4 (01-13-19 @ 19:14), Max: 37.3 (01-13-19 @ 14:57)  T(F): 97.6 (01-13-19 @ 19:14), Max: 99.1 (01-13-19 @ 14:57)  HR: 99 (01-13-19 @ 19:15) (87 - 110)  BP: 126/65 (01-13-19 @ 19:15) (123/59 - 158/76)  BP(mean): 87 (01-13-19 @ 19:14) (87 - 87)  RR: 20 (01-13-19 @ 19:15) (20 - 25)  SpO2: 100% (01-13-19 @ 19:15) (96% - 100%)  Wt(kg): --    PHYSICAL EXAM:  Constitutional: Elderly female lying in bed, Bipap in place, NAD  Head: NC/AT  Eyes: PERRL, EOMI, anicteric sclera  ENT: Bipap in place  Neck: supple; no JVD or thyromegaly  Respiratory: Diffuse bibasilar crackles from b/l lower lung fields to mid lung fields, no wheezes. No accessory muscle usage seen, speaking in full sentences  Cardiac: +S1/S2; RRR; no M/R/G; PMI non-displaced  Gastrointestinal: abdomen soft, distended, non tender; no rebound or guarding; +BSx4  Back: spine midline, no bony tenderness or step-offs; no CVAT B/L  Extremities: 1+ LE pitting edema   Musculoskeletal: NROM x4; no joint swelling, tenderness or erythema  Dermatologic: chronic LE venous stasis changes  Lymphatic: no submandibular or cervical LAD  Neurologic: AAOx3; no focal deficits  Psychiatric: affect and characteristics of appearance, verbalizations, behaviors are appropriate

## 2019-01-14 ENCOUNTER — APPOINTMENT (OUTPATIENT)
Dept: HEART AND VASCULAR | Facility: CLINIC | Age: 59
End: 2019-01-14

## 2019-01-14 DIAGNOSIS — N17.9 ACUTE KIDNEY FAILURE, UNSPECIFIED: ICD-10-CM

## 2019-01-14 DIAGNOSIS — N18.3 CHRONIC KIDNEY DISEASE, STAGE 3 (MODERATE): ICD-10-CM

## 2019-01-14 LAB
ALBUMIN SERPL ELPH-MCNC: 3.7 G/DL — SIGNIFICANT CHANGE UP (ref 3.3–5)
ALP SERPL-CCNC: 107 U/L — SIGNIFICANT CHANGE UP (ref 40–120)
ALT FLD-CCNC: 8 U/L — LOW (ref 10–45)
ANION GAP SERPL CALC-SCNC: 12 MMOL/L — SIGNIFICANT CHANGE UP (ref 5–17)
ANION GAP SERPL CALC-SCNC: 14 MMOL/L — SIGNIFICANT CHANGE UP (ref 5–17)
APTT BLD: 50.1 SEC — HIGH (ref 27.5–36.3)
APTT BLD: 52.2 SEC — HIGH (ref 27.5–36.3)
AST SERPL-CCNC: 11 U/L — SIGNIFICANT CHANGE UP (ref 10–40)
BASOPHILS NFR BLD AUTO: 0.4 % — SIGNIFICANT CHANGE UP (ref 0–2)
BILIRUB SERPL-MCNC: 1.3 MG/DL — HIGH (ref 0.2–1.2)
BUN SERPL-MCNC: 25 MG/DL — HIGH (ref 7–23)
BUN SERPL-MCNC: 28 MG/DL — HIGH (ref 7–23)
CALCIUM SERPL-MCNC: 9.1 MG/DL — SIGNIFICANT CHANGE UP (ref 8.4–10.5)
CALCIUM SERPL-MCNC: 9.3 MG/DL — SIGNIFICANT CHANGE UP (ref 8.4–10.5)
CHLORIDE SERPL-SCNC: 93 MMOL/L — LOW (ref 96–108)
CHLORIDE SERPL-SCNC: 96 MMOL/L — SIGNIFICANT CHANGE UP (ref 96–108)
CHOLEST SERPL-MCNC: 195 MG/DL — SIGNIFICANT CHANGE UP (ref 10–199)
CK MB CFR SERPL CALC: <1 NG/ML — SIGNIFICANT CHANGE UP (ref 0–6.7)
CK SERPL-CCNC: 57 U/L — SIGNIFICANT CHANGE UP (ref 25–170)
CO2 SERPL-SCNC: 31 MMOL/L — SIGNIFICANT CHANGE UP (ref 22–31)
CO2 SERPL-SCNC: 32 MMOL/L — HIGH (ref 22–31)
CREAT SERPL-MCNC: 1.46 MG/DL — HIGH (ref 0.5–1.3)
CREAT SERPL-MCNC: 1.48 MG/DL — HIGH (ref 0.5–1.3)
EOSINOPHIL NFR BLD AUTO: 2.5 % — SIGNIFICANT CHANGE UP (ref 0–6)
GLUCOSE SERPL-MCNC: 105 MG/DL — HIGH (ref 70–99)
GLUCOSE SERPL-MCNC: 143 MG/DL — HIGH (ref 70–99)
HBA1C BLD-MCNC: 7.9 % — HIGH (ref 4–5.6)
HCT VFR BLD CALC: 30.8 % — LOW (ref 34.5–45)
HCT VFR BLD CALC: 31.2 % — LOW (ref 34.5–45)
HCV AB S/CO SERPL IA: 0.05 S/CO — SIGNIFICANT CHANGE UP
HCV AB SERPL-IMP: SIGNIFICANT CHANGE UP
HDLC SERPL-MCNC: 41 MG/DL — LOW
HGB BLD-MCNC: 9.4 G/DL — LOW (ref 11.5–15.5)
HGB BLD-MCNC: 9.5 G/DL — LOW (ref 11.5–15.5)
HIV 1+2 AB+HIV1 P24 AG SERPL QL IA: SIGNIFICANT CHANGE UP
INR BLD: 3.14 — HIGH (ref 0.88–1.16)
INR BLD: 3.53 — HIGH (ref 0.88–1.16)
LIPID PNL WITH DIRECT LDL SERPL: 132 MG/DL — HIGH
LYMPHOCYTES # BLD AUTO: 12.7 % — LOW (ref 13–44)
MAGNESIUM SERPL-MCNC: 2 MG/DL — SIGNIFICANT CHANGE UP (ref 1.6–2.6)
MCHC RBC-ENTMCNC: 27.1 PG — SIGNIFICANT CHANGE UP (ref 27–34)
MCHC RBC-ENTMCNC: 27.5 PG — SIGNIFICANT CHANGE UP (ref 27–34)
MCHC RBC-ENTMCNC: 30.1 G/DL — LOW (ref 32–36)
MCHC RBC-ENTMCNC: 30.8 G/DL — LOW (ref 32–36)
MCV RBC AUTO: 89 FL — SIGNIFICANT CHANGE UP (ref 80–100)
MCV RBC AUTO: 89.9 FL — SIGNIFICANT CHANGE UP (ref 80–100)
MONOCYTES NFR BLD AUTO: 10.6 % — SIGNIFICANT CHANGE UP (ref 2–14)
NEUTROPHILS NFR BLD AUTO: 73.8 % — SIGNIFICANT CHANGE UP (ref 43–77)
PLATELET # BLD AUTO: 202 K/UL — SIGNIFICANT CHANGE UP (ref 150–400)
PLATELET # BLD AUTO: 220 K/UL — SIGNIFICANT CHANGE UP (ref 150–400)
POTASSIUM SERPL-MCNC: 3.7 MMOL/L — SIGNIFICANT CHANGE UP (ref 3.5–5.3)
POTASSIUM SERPL-MCNC: 4.6 MMOL/L — SIGNIFICANT CHANGE UP (ref 3.5–5.3)
POTASSIUM SERPL-SCNC: 3.7 MMOL/L — SIGNIFICANT CHANGE UP (ref 3.5–5.3)
POTASSIUM SERPL-SCNC: 4.6 MMOL/L — SIGNIFICANT CHANGE UP (ref 3.5–5.3)
PROT SERPL-MCNC: 8.3 G/DL — SIGNIFICANT CHANGE UP (ref 6–8.3)
PROTHROM AB SERPL-ACNC: 36.8 SEC — HIGH (ref 10–12.9)
PROTHROM AB SERPL-ACNC: 41.5 SEC — HIGH (ref 10–12.9)
RAPID RVP RESULT: SIGNIFICANT CHANGE UP
RBC # BLD: 3.46 M/UL — LOW (ref 3.8–5.2)
RBC # BLD: 3.47 M/UL — LOW (ref 3.8–5.2)
RBC # FLD: 17.6 % — HIGH (ref 10.3–16.9)
RBC # FLD: 17.6 % — HIGH (ref 10.3–16.9)
SODIUM SERPL-SCNC: 138 MMOL/L — SIGNIFICANT CHANGE UP (ref 135–145)
SODIUM SERPL-SCNC: 140 MMOL/L — SIGNIFICANT CHANGE UP (ref 135–145)
TOTAL CHOLESTEROL/HDL RATIO MEASUREMENT: 4.8 RATIO — SIGNIFICANT CHANGE UP (ref 3.3–7.1)
TRIGL SERPL-MCNC: 109 MG/DL — SIGNIFICANT CHANGE UP (ref 10–149)
TROPONIN T SERPL-MCNC: <0.01 NG/ML — SIGNIFICANT CHANGE UP (ref 0–0.01)
TSH SERPL-MCNC: 2.32 UIU/ML — SIGNIFICANT CHANGE UP (ref 0.35–4.94)
WBC # BLD: 8.3 K/UL — SIGNIFICANT CHANGE UP (ref 3.8–10.5)
WBC # BLD: 9 K/UL — SIGNIFICANT CHANGE UP (ref 3.8–10.5)
WBC # FLD AUTO: 8.3 K/UL — SIGNIFICANT CHANGE UP (ref 3.8–10.5)
WBC # FLD AUTO: 9 K/UL — SIGNIFICANT CHANGE UP (ref 3.8–10.5)

## 2019-01-14 PROCEDURE — 93010 ELECTROCARDIOGRAM REPORT: CPT

## 2019-01-14 PROCEDURE — 99291 CRITICAL CARE FIRST HOUR: CPT

## 2019-01-14 PROCEDURE — 99233 SBSQ HOSP IP/OBS HIGH 50: CPT

## 2019-01-14 PROCEDURE — 93010 ELECTROCARDIOGRAM REPORT: CPT | Mod: 77

## 2019-01-14 PROCEDURE — 71045 X-RAY EXAM CHEST 1 VIEW: CPT | Mod: 26

## 2019-01-14 RX ORDER — POTASSIUM CHLORIDE 20 MEQ
40 PACKET (EA) ORAL ONCE
Qty: 0 | Refills: 0 | Status: COMPLETED | OUTPATIENT
Start: 2019-01-14 | End: 2019-01-14

## 2019-01-14 RX ORDER — LIDOCAINE 4 G/100G
1 CREAM TOPICAL ONCE
Qty: 0 | Refills: 0 | Status: COMPLETED | OUTPATIENT
Start: 2019-01-14 | End: 2019-01-14

## 2019-01-14 RX ORDER — ATORVASTATIN CALCIUM 80 MG/1
80 TABLET, FILM COATED ORAL AT BEDTIME
Qty: 0 | Refills: 0 | Status: DISCONTINUED | OUTPATIENT
Start: 2019-01-14 | End: 2019-01-15

## 2019-01-14 RX ORDER — MORPHINE SULFATE 50 MG/1
1 CAPSULE, EXTENDED RELEASE ORAL ONCE
Qty: 0 | Refills: 0 | Status: DISCONTINUED | OUTPATIENT
Start: 2019-01-14 | End: 2019-01-14

## 2019-01-14 RX ORDER — METOPROLOL TARTRATE 50 MG
25 TABLET ORAL DAILY
Qty: 0 | Refills: 0 | Status: DISCONTINUED | OUTPATIENT
Start: 2019-01-14 | End: 2019-01-14

## 2019-01-14 RX ORDER — METOPROLOL TARTRATE 50 MG
50 TABLET ORAL DAILY
Qty: 0 | Refills: 0 | Status: DISCONTINUED | OUTPATIENT
Start: 2019-01-14 | End: 2019-01-15

## 2019-01-14 RX ORDER — METHADONE HYDROCHLORIDE 40 MG/1
10 TABLET ORAL DAILY
Qty: 0 | Refills: 0 | Status: DISCONTINUED | OUTPATIENT
Start: 2019-01-14 | End: 2019-01-15

## 2019-01-14 RX ORDER — ACETAMINOPHEN 500 MG
650 TABLET ORAL EVERY 6 HOURS
Qty: 0 | Refills: 0 | Status: DISCONTINUED | OUTPATIENT
Start: 2019-01-14 | End: 2019-01-15

## 2019-01-14 RX ADMIN — Medication 25 MICROGRAM(S): at 06:22

## 2019-01-14 RX ADMIN — Medication 40 MILLIGRAM(S): at 06:21

## 2019-01-14 RX ADMIN — BUDESONIDE AND FORMOTEROL FUMARATE DIHYDRATE 2 PUFF(S): 160; 4.5 AEROSOL RESPIRATORY (INHALATION) at 17:31

## 2019-01-14 RX ADMIN — ESCITALOPRAM OXALATE 10 MILLIGRAM(S): 10 TABLET, FILM COATED ORAL at 12:09

## 2019-01-14 RX ADMIN — BUDESONIDE AND FORMOTEROL FUMARATE DIHYDRATE 2 PUFF(S): 160; 4.5 AEROSOL RESPIRATORY (INHALATION) at 06:36

## 2019-01-14 RX ADMIN — Medication 145 MILLIGRAM(S): at 12:08

## 2019-01-14 RX ADMIN — ATORVASTATIN CALCIUM 80 MILLIGRAM(S): 80 TABLET, FILM COATED ORAL at 21:57

## 2019-01-14 RX ADMIN — Medication 3: at 22:00

## 2019-01-14 RX ADMIN — MORPHINE SULFATE 1 MILLIGRAM(S): 50 CAPSULE, EXTENDED RELEASE ORAL at 06:21

## 2019-01-14 RX ADMIN — Medication 10 MILLIGRAM(S): at 19:31

## 2019-01-14 RX ADMIN — Medication 10 MILLIGRAM(S): at 06:22

## 2019-01-14 RX ADMIN — Medication 650 MILLIGRAM(S): at 17:31

## 2019-01-14 RX ADMIN — MORPHINE SULFATE 1 MILLIGRAM(S): 50 CAPSULE, EXTENDED RELEASE ORAL at 07:04

## 2019-01-14 RX ADMIN — Medication 50 MILLIGRAM(S): at 13:52

## 2019-01-14 RX ADMIN — ISOSORBIDE MONONITRATE 60 MILLIGRAM(S): 60 TABLET, EXTENDED RELEASE ORAL at 12:08

## 2019-01-14 RX ADMIN — GABAPENTIN 100 MILLIGRAM(S): 400 CAPSULE ORAL at 12:09

## 2019-01-14 RX ADMIN — Medication 10 MILLIGRAM(S): at 13:52

## 2019-01-14 RX ADMIN — Medication 20 UNIT(S): at 12:09

## 2019-01-14 RX ADMIN — Medication 0: at 07:03

## 2019-01-14 RX ADMIN — Medication 40 MILLIEQUIVALENT(S): at 12:15

## 2019-01-14 RX ADMIN — Medication 40 MILLIGRAM(S): at 17:30

## 2019-01-14 RX ADMIN — PANTOPRAZOLE SODIUM 40 MILLIGRAM(S): 20 TABLET, DELAYED RELEASE ORAL at 06:31

## 2019-01-14 RX ADMIN — Medication 650 MILLIGRAM(S): at 18:00

## 2019-01-14 RX ADMIN — Medication 10 MILLIGRAM(S): at 06:29

## 2019-01-14 RX ADMIN — Medication 20 UNIT(S): at 08:05

## 2019-01-14 RX ADMIN — Medication 10 MILLIGRAM(S): at 21:57

## 2019-01-14 RX ADMIN — METHADONE HYDROCHLORIDE 10 MILLIGRAM(S): 40 TABLET ORAL at 12:09

## 2019-01-14 RX ADMIN — LIDOCAINE 1 PATCH: 4 CREAM TOPICAL at 21:57

## 2019-01-14 NOTE — PROGRESS NOTE ADULT - ASSESSMENT
59 yo F w/ PMHx HTN, IDDM c/b neuropathy, bipolar disorder, depression, history of DVT (4 episodes in her lower extremities), CVA x2 (last one in 2013 with residual right sided weakness), PAD s/p bypass, HFrEF (EF 25%, unknown etiology whether ischemic or non ischemic) s/p AICD for primary prevention, hypothyroidism, asthma (on 2-3L oxygen at home), presenting with worsening SOB/RAMSEY and orthopnea, admitted to [address] for acute on chronic systolic CHF exacerbation. 57 yo F w/ PMHx HTN, IDDM c/b neuropathy, bipolar disorder, depression, history of DVT (4 episodes in her lower extremities), CVA x2 (last one in 2013 with residual right sided weakness), PAD s/p bypass, NICM (neg cardiac cath 2010), HFrEF (EF 25% on echo) s/p AICD for primary prevention, hypothyroidism, COPD (on 2-3L oxygen at home), presenting with worsening SOB/RAMSEY and orthopnea, admitted to [address] for acute on chronic systolic CHF exacerbation.

## 2019-01-14 NOTE — PROGRESS NOTE ADULT - PROBLEM SELECTOR PLAN 4
-Continue home regimen Lantus 30 units at bedtime (home regimen Levemir 30 units), Lispro 20 units pre meal TID  -ISS for coverage  -F/u am HbA1c  -Diabetic Diet -Continue home regimen Lantus 30 units at bedtime (home regimen Levemir 30 units), Lispro 20 units pre meal TID  -ISS for coverage  -HbA1c 7.9  -Diabetic Diet CKD stage 3, baseline crea appears 1.5, likely component of CKD given multiple comorbidities  -Continue to trend BMP closely     #Anemia:  Baseline 9-10 at OSH records  -HD stable, no signs or symptoms of bleeding, continue to trend.

## 2019-01-14 NOTE — DIETITIAN INITIAL EVALUATION ADULT. - PROBLEM SELECTOR PLAN 8
-Continue with escitalopram  -Continue with buspirone    #Asthma:  -Continue with symbicort and duonebs prn

## 2019-01-14 NOTE — CONSULT NOTE ADULT - ATTENDING COMMENTS
59 yo F with multiple co-morbidities presenting with worsening SOB/RAMSEY and orthopnea, admitted to [facility] for acute-on-chronic sCHF exacerbation with multifactorial SOB---primarily systolic HF and asthma    Problem:      Acute-on-chronic systolic heart failure, with EF of 25%. Collateral information obtained regarding 2010 coronary catheterization: normal coronary anatomy  . Patient’s volume status is net negative 1500 ml since admission.     Plan:     Torsemide 40mg 1x/day   Increase Toprol dose to 50mg 1x/day   Maximize asthma therapy    C/w strict I/Os, daily weights, maintain net negative   Monitor electrolytes while on diuresis   F/u Echo    Not on acei/ entresto given renal dysfunction on these meds in past  Hydralazine/ isordil useful for BP control      ZOLTAN Olguin

## 2019-01-14 NOTE — PROGRESS NOTE ADULT - PROBLEM SELECTOR PLAN 8
-Continue with escitalopram  -Continue with buspirone    #Asthma:  -Continue with symbicort and duonebs prn -Continue with escitalopram  -Continue with buspirone    #COPD on home O2:  -Not in acute exacerbation  -Continue with symbicort and duonebs prn

## 2019-01-14 NOTE — PROGRESS NOTE ADULT - SUBJECTIVE AND OBJECTIVE BOX
CARDIOLOGY NP PROGRESS NOTE    Subjective: Pt seen and examined at bedside. Reports feeling better, weaned on BIPAP to 3L home O2. Denies chest pain  Remainder ROS otherwise negative.    Overnight Events:     TELEMETRY:    EKG:      VITAL SIGNS:  T(C): 36.2 (01-14-19 @ 10:45), Max: 37.3 (01-13-19 @ 14:57)  HR: 96 (01-14-19 @ 09:20) (66 - 110)  BP: 121/75 (01-14-19 @ 09:20) (121/75 - 158/76)  RR: 18 (01-14-19 @ 09:20) (15 - 25)  SpO2: 100% (01-14-19 @ 09:20) (95% - 100%)  Wt(kg): --    I&O's Summary    13 Jan 2019 07:01  -  14 Jan 2019 07:00  --------------------------------------------------------  IN: 0 mL / OUT: 1500 mL / NET: -1500 mL    14 Jan 2019 07:01  -  14 Jan 2019 12:05  --------------------------------------------------------  IN: 256 mL / OUT: 225 mL / NET: 31 mL          PHYSICAL EXAM:    General: A/ox 3, No acute Distress  Neck: Supple, NO JVD  Cardiac: S1 S2, No M/R/G  Pulmonary: CTAB, Breathing unlabored, No Rhonchi/Rales/Wheezing  Abdomen: Soft, Non -tender, +BS x 4 quads  Extremities: No Rashes, No edema  Neuro: A/o x 3, No focal deficits          LABS:                          9.4    9.0   )-----------( 202      ( 14 Jan 2019 06:12 )             31.2                              01-14    140  |  96  |  25<H>  ----------------------------<  105<H>  3.7   |  32<H>  |  1.46<H>    Ca    9.3      14 Jan 2019 06:12  Mg     2.0     01-14    TPro  8.3  /  Alb  3.7  /  TBili  1.3<H>  /  DBili  x   /  AST  11  /  ALT  8<L>  /  AlkPhos  107  01-14    LIVER FUNCTIONS - ( 14 Jan 2019 06:12 )  Alb: 3.7 g/dL / Pro: 8.3 g/dL / ALK PHOS: 107 U/L / ALT: 8 U/L / AST: 11 U/L / GGT: x                                 PT/INR - ( 14 Jan 2019 06:12 )   PT: 36.8 sec;   INR: 3.14          PTT - ( 14 Jan 2019 06:12 )  PTT:50.1 sec  CAPILLARY BLOOD GLUCOSE      POCT Blood Glucose.: 184 mg/dL (14 Jan 2019 11:54)  POCT Blood Glucose.: 113 mg/dL (14 Jan 2019 06:55)  POCT Blood Glucose.: 109 mg/dL (13 Jan 2019 21:25)  POCT Blood Glucose.: 143 mg/dL (13 Jan 2019 19:55)    CARDIAC MARKERS ( 13 Jan 2019 22:19 )  x     / <0.01 ng/mL / 60 U/L / x     / <1.0 ng/mL  CARDIAC MARKERS ( 13 Jan 2019 15:13 )  x     / <0.01 ng/mL / 100 U/L / x     / 1.4 ng/mL          Allergies:  aspirin (Other (Moderate); Rash)  IV Contrast (Unknown)    MEDICATIONS  (STANDING):  atorvastatin 80 milliGRAM(s) Oral at bedtime  buDESOnide 160 MICROgram(s)/formoterol 4.5 MICROgram(s) Inhaler 2 Puff(s) Inhalation two times a day  busPIRone 10 milliGRAM(s) Oral every 12 hours  dextrose 5%. 1000 milliLiter(s) (50 mL/Hr) IV Continuous <Continuous>  dextrose 50% Injectable 12.5 Gram(s) IV Push once  dextrose 50% Injectable 25 Gram(s) IV Push once  dextrose 50% Injectable 25 Gram(s) IV Push once  escitalopram 10 milliGRAM(s) Oral daily  fenofibrate Tablet 145 milliGRAM(s) Oral daily  furosemide   Injectable 40 milliGRAM(s) IV Push every 8 hours  gabapentin 100 milliGRAM(s) Oral daily  hydrALAZINE 10 milliGRAM(s) Oral three times a day  insulin glargine Injectable (LANTUS) 30 Unit(s) SubCutaneous at bedtime  insulin lispro (HumaLOG) corrective regimen sliding scale   SubCutaneous Before meals and at bedtime  insulin lispro Injectable (HumaLOG) 20 Unit(s) SubCutaneous three times a day before meals  isosorbide   mononitrate ER Tablet (IMDUR) 60 milliGRAM(s) Oral daily  levothyroxine 25 MICROGram(s) Oral daily  methadone    Tablet 10 milliGRAM(s) Oral daily  metoprolol succinate ER 25 milliGRAM(s) Oral daily  pantoprazole    Tablet 40 milliGRAM(s) Oral before breakfast  potassium chloride    Tablet ER 40 milliEquivalent(s) Oral once    MEDICATIONS  (PRN):  ALBUTerol/ipratropium for Nebulization 3 milliLiter(s) Nebulizer every 6 hours PRN Shortness of Breath and/or Wheezing  dextrose 40% Gel 15 Gram(s) Oral once PRN Blood Glucose LESS THAN 70 milliGRAM(s)/deciliter  glucagon  Injectable 1 milliGRAM(s) IntraMuscular once PRN Glucose LESS THAN 70 milligrams/deciliter        DIAGNOSTIC TESTS: CARDIOLOGY NP PROGRESS NOTE    Subjective: Pt seen and examined at bedside. Reports productive cough w/ greenish sputum x 3months, was at [facility] and developed RAMSEY, decreased ETT from 1 block to sob walking in house. Was diagnosed w/ CHF approx 5yrs ago, reports had cardiac cath in [facility] 2 yrs ago. Reports sob feeling better, weaned off BIPAP to 3L NC home O2. Denies chest pain, sob at rest, lightheadedness, dizziness, palpitations, n/v, fever, chills.  Remainder ROS otherwise negative.    Overnight Events: Weaned off BIPAP this AM to 3L home O2. Net neg 1.5L.    TELEMETRY: SR 70s, occasional PVCs    EKG: SR, TWi leads I, aVL      VITAL SIGNS:  T(C): 36.2 (01-14-19 @ 10:45), Max: 37.3 (01-13-19 @ 14:57)  HR: 96 (01-14-19 @ 09:20) (66 - 110)  BP: 121/75 (01-14-19 @ 09:20) (121/75 - 158/76)  RR: 18 (01-14-19 @ 09:20) (15 - 25)  SpO2: 100% (01-14-19 @ 09:20) (95% - 100%)  Wt(kg): --    I&O's Summary    13 Jan 2019 07:01  -  14 Jan 2019 07:00  --------------------------------------------------------  IN: 0 mL / OUT: 1500 mL / NET: -1500 mL    14 Jan 2019 07:01  -  14 Jan 2019 12:05  --------------------------------------------------------  IN: 256 mL / OUT: 225 mL / NET: 31 mL          PHYSICAL EXAM:    General: A/ox 3, No acute Distress at rest  Neck: Supple, flat JVD  Cardiac: S1 S2, No M/R/G  Pulmonary: Lungs w/ wyatt coarse crackles, Breathing unlabored on 3L O2, No Rhonchi/Wheezing  Abdomen: Soft, Non -tender, +BS x 4 quads  Extremities: No Rashes, Trace wyatt LE edema  Neuro: A/o x 3, No focal deficits          LABS:                          9.4    9.0   )-----------( 202      ( 14 Jan 2019 06:12 )             31.2                              01-14    140  |  96  |  25<H>  ----------------------------<  105<H>  3.7   |  32<H>  |  1.46<H>    Ca    9.3      14 Jan 2019 06:12  Mg     2.0     01-14    TPro  8.3  /  Alb  3.7  /  TBili  1.3<H>  /  DBili  x   /  AST  11  /  ALT  8<L>  /  AlkPhos  107  01-14    LIVER FUNCTIONS - ( 14 Jan 2019 06:12 )  Alb: 3.7 g/dL / Pro: 8.3 g/dL / ALK PHOS: 107 U/L / ALT: 8 U/L / AST: 11 U/L / GGT: x         PT/INR - ( 14 Jan 2019 06:12 )   PT: 36.8 sec;   INR: 3.14          PTT - ( 14 Jan 2019 06:12 )  PTT:50.1 sec  CAPILLARY BLOOD GLUCOSE      POCT Blood Glucose.: 184 mg/dL (14 Jan 2019 11:54)  POCT Blood Glucose.: 113 mg/dL (14 Jan 2019 06:55)  POCT Blood Glucose.: 109 mg/dL (13 Jan 2019 21:25)  POCT Blood Glucose.: 143 mg/dL (13 Jan 2019 19:55)    CARDIAC MARKERS ( 13 Jan 2019 22:19 )  x     / <0.01 ng/mL / 60 U/L / x     / <1.0 ng/mL  CARDIAC MARKERS ( 13 Jan 2019 15:13 )  x     / <0.01 ng/mL / 100 U/L / x     / 1.4 ng/mL          Allergies:  aspirin (Other (Moderate); Rash)  IV Contrast (Unknown)    MEDICATIONS  (STANDING):  atorvastatin 80 milliGRAM(s) Oral at bedtime  buDESOnide 160 MICROgram(s)/formoterol 4.5 MICROgram(s) Inhaler 2 Puff(s) Inhalation two times a day  busPIRone 10 milliGRAM(s) Oral every 12 hours  dextrose 5%. 1000 milliLiter(s) (50 mL/Hr) IV Continuous <Continuous>  dextrose 50% Injectable 12.5 Gram(s) IV Push once  dextrose 50% Injectable 25 Gram(s) IV Push once  dextrose 50% Injectable 25 Gram(s) IV Push once  escitalopram 10 milliGRAM(s) Oral daily  fenofibrate Tablet 145 milliGRAM(s) Oral daily  furosemide   Injectable 40 milliGRAM(s) IV Push every 8 hours  gabapentin 100 milliGRAM(s) Oral daily  hydrALAZINE 10 milliGRAM(s) Oral three times a day  insulin glargine Injectable (LANTUS) 30 Unit(s) SubCutaneous at bedtime  insulin lispro (HumaLOG) corrective regimen sliding scale   SubCutaneous Before meals and at bedtime  insulin lispro Injectable (HumaLOG) 20 Unit(s) SubCutaneous three times a day before meals  isosorbide   mononitrate ER Tablet (IMDUR) 60 milliGRAM(s) Oral daily  levothyroxine 25 MICROGram(s) Oral daily  methadone    Tablet 10 milliGRAM(s) Oral daily  metoprolol succinate ER 25 milliGRAM(s) Oral daily  pantoprazole    Tablet 40 milliGRAM(s) Oral before breakfast  potassium chloride    Tablet ER 40 milliEquivalent(s) Oral once    MEDICATIONS  (PRN):  ALBUTerol/ipratropium for Nebulization 3 milliLiter(s) Nebulizer every 6 hours PRN Shortness of Breath and/or Wheezing  dextrose 40% Gel 15 Gram(s) Oral once PRN Blood Glucose LESS THAN 70 milliGRAM(s)/deciliter  glucagon  Injectable 1 milliGRAM(s) IntraMuscular once PRN Glucose LESS THAN 70 milligrams/deciliter        DIAGNOSTIC TESTS:

## 2019-01-14 NOTE — DIETITIAN INITIAL EVALUATION ADULT. - PROBLEM SELECTOR PLAN 6
-Currently normotensive, continue hydralazine TID  -Holding home BB in the setting of CHF exacerbation  -Monitor blood pressures    #DHARMESH:  -Continue with CPAP at night

## 2019-01-14 NOTE — DIETITIAN INITIAL EVALUATION ADULT. - ENERGY NEEDS
Height: 5'5" Weight: 206.3 lbs (1/13), 204.5 lbs (1/14),  lbs+/-10%, %%, BMI 34.3,  IBW used to calculate EER as per pt's current body weight >120% of IBW  Estimated nutrient needs based on [facility] SOC for maintenance in adults adjusted for obesity and DM neuropathy   mL/d per team 2/2 CHF

## 2019-01-14 NOTE — DIETITIAN INITIAL EVALUATION ADULT. - PROBLEM SELECTOR PLAN 2
-Hx of 4 DVTs in the past, unknown etiology, based on history appear to unprovoked. Patient unclear of any hypercoagulable work up, will need to obtain collateral in the am  -Continue with coumadin 1 mg for now, follow up daily PT/INRs and dose coumadin accordingly.

## 2019-01-14 NOTE — PROGRESS NOTE ADULT - PROBLEM SELECTOR PLAN 5
-RITCHIE vs CKD with unknown baseline creatinine, likely component of CKD given multiple co morbidities  -Continue to trend BMP closely     #Anemia:  -Unknown baseline, HD stable, no signs or symptoms of bleeding, continue to trend. CKD stage 3, baseline crea appears 1.5, likely component of CKD given multiple comorbidities  -Continue to trend BMP closely     #Anemia:  Baseline 9-10 at OSH records  -HD stable, no signs or symptoms of bleeding, continue to trend. -Continue home regimen Lantus 30 units at bedtime (home regimen Levemir 30 units), Lispro 20 units pre meal TID  -ISS for coverage  -HbA1c 7.9  -Diabetic Diet

## 2019-01-14 NOTE — PROGRESS NOTE ADULT - PROBLEM SELECTOR PLAN 1
-Admit to cardiac telemetry for acute on chronic systolic CHF exacerbation, as per the patient last EF 25%; unclear etiology whether ischemic or non ischemic.   -Lasix 40 mg IV TID for aggressive diuresis  -Strict I/Os, daily weights, maintain net negative 1-2L  -Monitoring of electrolytes while on diuresis  -Less likely precipitated by ischemic event given no chest pain and cardiac enzymes negative, will trend till negative x2  -Echo in the am   -Continue with Bipap  -Fluid restricted diet  -Follow up am CXR  -Holding BB in the setting of exacerbation, can restart tomorrow  -Follow up am lipid profile, TSH and HbA1c Admit to cardiac telemetry for acute on chronic systolic CHF exacerbation, last EF 25% on echo; NICM from review of [facility] records, pt w/ negative cardiac cath in 2010. Less likely precipitated by ischemic event given no chest pain and cardiac enzymes negative x2, EKG unchanged.  -Transition Lasix 40 mg IV TID to Torsemide 40mg qd today  -Resume Toprol 25mg qd. Increase Toprol 50mg qd tomorrow  -Pending Echo today  -Weaned off BIPAP to 2-3L NC O2  - CXR w/ decreased pulm vasc congestion  -Strict I/Os, daily weights, 800cc Fluid restricted diet, maintain net negative 1-2L  -F/u RVP as pt reports greenish sputum x 3mos  -, TSH WNL, and HbA1c 7.9 Admit to cardiac telemetry for acute on chronic systolic CHF exacerbation, last EF 25% on echo; NICM from review of [facility] records, pt w/ negative cardiac cath in 2010. Less likely precipitated by ischemic event given no chest pain and cardiac enzymes negative x2, EKG unchanged.  -Transition Lasix 40 mg IV TID to Torsemide 40mg qd today  -Resume Toprol 25mg qd. Increase Toprol 50mg qd tomorrow  -HF consulted. F/u recs  -Pending Echo today  -Weaned off BIPAP to 2-3L NC O2  - CXR w/ decreased pulm vasc congestion  -Strict I/Os, daily weights, 800cc Fluid restricted diet, maintain net negative 1-2L  -F/u RVP as pt reports greenish sputum x 3mos  -, TSH WNL, and HbA1c 7.9

## 2019-01-14 NOTE — DIETITIAN INITIAL EVALUATION ADULT. - PROBLEM SELECTOR PLAN 5
-RITCHIE vs CKD with unknown baseline creatinine, likely component of CKD given multiple co morbidities  -Continue to trend BMP closely     #Anemia:  -Unknown baseline, HD stable, no signs or symptoms of bleeding, continue to trend.

## 2019-01-14 NOTE — CONSULT NOTE ADULT - SUBJECTIVE AND OBJECTIVE BOX
CC: Worsening SOB, RAMSEY, orthopnea         HPI: Patient is a 58-year-old woman with a PMHx of HTN, IDDM c/b neuropathy, DVT (4x in LE), CVA x2 (last one in 2013 with residual right-sided weakness), PAD s/p bypass, HFrEF (EF 25% likely non-ischemic) s/p AICD, and asthma/COPD (on 2-3L O2 nasal cannula at home) presents with worsening SOB, RAMSEY, and abdominal swelling that she says is similar to her prior CHF exacerbations. She reports ~monthly hospital admissions for these exacerbations, with the last admission in December 2018 at [facility]. She denies CP, palpitations, nausea/vomiting, urinary symptoms, sick contacts, fever, or chills. She had a coronary catheterization in 2010 that showed normal coronary anatomy. She reports good adherence to a home medication regimen that includes oral Lasix 40mg 2x/day, Metoprolol Succinate 25mg XR 2x/day (prescribed after last hospital discharge in December), Isosorbide mononitrate 60mg XR 1x/day, Hydralazine 10mg 3x/day, and warfarin 1mg, in addition to her other medications.      On presentation to the ED, the patient was afebrile, , /76, RR 25, O2 96% on RA with labs significant for WBC 11.4 (neutrophilic 83.6%), Hgb 10.2, INR 2.79, Cr 1.39, BNP 6485, negative cardiac enzymes x1. CXR revealed significant fluid overload. EKG showed no significant changes. ED course included Lasix 40mg IV x1 and morphine 2mg IV x1. She was admitted for a CHF exacerbation.         PAST MEDICAL & SURGICAL HISTORY:   HTN   IDDM c/b neuropathy   Depression   Bipolar disorder   Hx of DVT (4x in LE)   CVA x2 (last one in 2013 with residual right-sided weakness)   PAD s/p bypass   HFrEF (EF 25%) s/p AICD   Asthma/COPD (on home O2 2-3L nasal cannula)   Hypothyroidism          Home Medications:   Lasix 40 mg oral tablet: orally 2 times a day, Last Dose Taken:     ·     Ambien 10 mg oral tablet: 1 tab(s) orally once a day (at bedtime), As Needed, Last Dose Taken:     ·     Lipitor 40 mg oral tablet: 1 tab(s) orally once a day, Last Dose Taken:     ·     amitriptyline 10 mg oral tablet: 1 tab(s) orally once a day (at bedtime), As Needed, Last Dose Taken:     ·     busPIRone 10 mg oral tablet: orally once a day, Last Dose Taken:     ·     cyclobenzaprine 5 mg oral tablet: Last Dose Taken:     ·     escitalopram 10 mg oral tablet: 1 tab(s) orally once a day, Last Dose Taken:     ·     fenofibrate 145 mg oral tablet: 1 tab(s) orally once a day, Last Dose Taken:     ·     gabapentin 100 mg oral capsule: orally once a day, Last Dose Taken:     ·     hydrALAZINE 10 mg oral tablet: orally 3 times a day, Last Dose Taken:     ·     isosorbide mononitrate 60 mg oral tablet, extended release: 1 tab(s) orally once a day (in the morning), Last Dose Taken:     ·     Levemir 100 units/mL subcutaneous solution: 30 unit(s) subcutaneous once a day (at bedtime), Last Dose Taken:     ·     levothyroxine 25 mcg (0.025 mg) oral tablet: 1 tab(s) orally once a day, Last Dose Taken:     ·     metoprolol succinate 25 mg oral tablet, extended release: 1 tab(s) orally once a day   ·     methadone 10 mg oral tablet: orally once a day   ·     NovoLOG 100 units/mL subcutaneous solution: 20 unit(s) subcutaneous 3 times a day (before meals)   ·     omeprazole 40 mg oral delayed release capsule: 1 cap(s) orally once a day   ·     sucralfate 1 g oral tablet:    ·     Symbicort 160 mcg-4.5 mcg/inh inhalation aerosol: 2 puff(s) inhaled 2 times a day   ·     tiZANidine 4 mg oral tablet: muscle spasms   ·     traZODone 50 mg oral tablet: orally once a day (at bedtime)   ·     warfarin 1 mg oral tablet: 1 tab(s) orally once a day         PREVIOUS CARDIAC DIAGNOSTIC TESTING:     ECHO   FINDINGS:   PROCEDURE DATE:  08/21/2018   Summary:   1. Left ventricular ejection fraction, by visual estimation, is 25 to    30%.   2. Severely decreased global left ventricular systolic function.   3. Mild tricuspid regurgitation.   4. Trace pulmonic valve regurgitation.         STRESS     FINDINGS: No stress test           CATHETERIZATION   FINDINGS: normal coronary anatomy as of 2010           RADIOLOGY & ADDITIONAL STUDIES:    Reveiwed           INPATIENT MEDICATIONS    Atorvastatin 80mg oral at bedtime   Hydralazine 10mg TID   Isosorbide mononitrate ER tablet 60mg daily   Metoprolol succinate ER 25mg oral daily   lbuterol/ipratropium for nebulization   Budesonide 160mcg/formoterol 4.5mcg inhaler   Insulin             FAMILY HISTORY:    Mother, still living, has history of HTN, HLD, IDDM, double bypass. Father passed away from liver disease (etiology unknown to patient).            SOCIAL HISTORY:   CIGARETTES: Started smoking at age 20, 2 packs/week. Quit 3 months ago after tapering.   ALCOHOL: Denies history of heavy drinking, admits to ~2-3 drinks/month at most.           REVIEW OF SYSTEMS   CONSTITUTIONAL:  No weight loss, fever, chills, weakness or fatigue.   HEENT:  Eyes:  No visual loss, blurred vision, double vision or yellow sclerae. Ears, Nose, Throat:  No hearing loss, sneezing, congestion, runny nose or sore throat.   SKIN:  No rash or itching.   CARDIOVASCULAR:  No chest pain, chest pressure or chest discomfort. No palpitations or edema.   RESPIRATORY:  Improved shortness of breath, cough productive of yellow/green sputum.   GASTROINTESTINAL:  No anorexia, nausea, vomiting or diarrhea. No abdominal pain or blood.   GENITOURINARY:  No burning on urination.    NEUROLOGICAL:  No headache, dizziness, syncope, paralysis, ataxia, numbness or tingling in the extremities. No change in bowel or bladder control.   MUSCULOSKELETAL:  No muscle, back pain, joint pain or stiffness.   HEMATOLOGIC:  No anemia, bleeding or bruising.   LYMPHATICS:  No enlarged nodes. No history of splenectomy.   PSYCHIATRIC:  No history of depression or anxiety.   ENDOCRINOLOGIC:  No reports of sweating, cold or heat intolerance. No polyuria or polydipsia.   ALLERGIES:  No history of asthma, hives, eczema or rhinitis.                 Vital Signs Last 24 Hrs   Temp: 96.4-97.2 F   HR: 78-96   Systolic BP: 121-134   Diastolic BP: 62-75   RR: 18   SpO2: % on supplemental O2, 2L NC                PHYSICAL EXAMINATION   GENERAL:  The patient appeared to be in no distress.  Lying in bed comfortably.   SKIN:  No skin changes noted   HEENT:  Head was atraumatic and normocephalic.  Eyes:  Extraocular muscles were intact.  The patient was anicteric.  Pupils were equally reactive to light. ENT exam normal.   NECK:  Supple.  There was no JVD.  No bruit was heard over the carotids.   HEART:  S1 and S2, regular.  No MRG.   LUNGS:  Crackles in right lower lung bases > L, exhalatory wheezes noted   ABDOMEN:  Soft and nontender.  Bowel sounds were present.   EXTREMITIES: Pulses palpable x4, trace edema   MUSCULOSKELETAL:  There was no deformity.  There was full range of motion in all the extremities.   NEUROLOGICAL:  There was no focal deficit.                TELEMETRY:     NSR with PVCs      ECG: NSR           I&O's Detail      Length of stay total     Intake: 256     Output: 1725     Net: -1469         LABS:                               9.4       9.0  )-----------( 202      ( 14 Jan 2019 06:12 )                 31.2         140  | 96          | 25 <H>     -------------------------------------< 105      3.7   |  32 <H> |  1.46 <H>       CARDIAC MARKERS ( 13 Jan 2019 22:19 )     x     / <0.01 ng/mL / 100 U/L / x     / 1.4 ng/mL           Serum Pro-Brain Natriuretic Peptide: 6485 pg/mL (01-13 @ 15:13)         PT/INR -      PT: 36.8 <H>    INR: 3.14 <H>            PTT - 50.1 <H>          ASSESSMENT AND PLAN     59 yo F with multiple co-morbidities presenting with worsening SOB/RAMSEY and orthopnea, admitted to [facility] for acute-on-chronic sCHF exacerbation with multifactorial SOB.     Problem:      Acute-on-chronic systolic heart failure, with EF of 25%. Collateral information obtained regarding 2010 coronary catheterization: normal coronary anatomy. Likely non-ischemic given collateral information and absence of chest pain, palpitations, and negative cardiac enzymes. Patient’s volume status is net negative 1500 ml since admission.     Plan:     Torsemide 40mg 1x/day   Increase Toprol dose to 50mg 1x/day   Maximize asthma therapy    C/w strict I/Os, daily weights, maintain net negative   Monitor electrolytes while on diuresis   F/u Echo        Liam Dickinson MS  Discussed with Dr Hardeep Garay (Cardiology Fellow) and Dr. Zoya Olguin (Attending)

## 2019-01-14 NOTE — PROGRESS NOTE ADULT - PROBLEM SELECTOR PLAN 3
-Hx of 2 CVA's in the past which patient states was in the setting of her LE DVTs, she states she had a DELLA in the past to evaluate for embolic CVA however unsure what the results were  -Continue with coumadin 1 mg for now  -Obtain collateral in the am Hx of 2 CVA's in the past which patient states was in the setting of her LE DVTs, she states she had a DELLA in the past to evaluate for embolic CVA however unsure what the results were  -Continue with coumadin 1 mg for now

## 2019-01-14 NOTE — CHART NOTE - NSCHARTNOTEFT_GEN_A_CORE
Patient with symptomatic hypoglycemia (FS 48) while in echocardiogram holding area. Patient reports feeling "funny" and "sweaty".   Vital signs HR 70-80 regular, SBP > 140-150, RR: 20, O2: > 95%  Patient given 50ml of Dextrose 50 with repeat   Patient deemed stable for transport back to [facility] where she reports her symptoms were improved.   STAT labs, including cardiac enzymes were drawn. EKG done.   Vital signs remained stable throughout episode  Patient a known diabetic, was given home dose insulin regimen (Lantus 30qhs, Humalog 20TID)   Will discontinue and adjust insulin regimen accordingly   Will continue to monitor closely

## 2019-01-14 NOTE — DIETITIAN INITIAL EVALUATION ADULT. - PROBLEM SELECTOR PLAN 1
-Admit to cardiac telemetry for acute on chronic systolic CHF exacerbation, as per the patient last EF 25%; unclear etiology whether ischemic or non ischemic.   -Lasix 40 mg IV TID for aggressive diuresis  -Strict I/Os, daily weights, maintain net negative 1-2L  -Monitoring of electrolytes while on diuresis  -Less likely precipitated by ischemic event given no chest pain and cardiac enzymes negative, will trend till negative x2  -Echo in the am   -Continue with Bipap  -Fluid restricted diet  -Follow up am CXR  -Holding BB in the setting of exacerbation, can restart tomorrow  -Follow up am lipid profile, TSH and HbA1c

## 2019-01-14 NOTE — DIETITIAN INITIAL EVALUATION ADULT. - PERTINENT MEDS FT
Atorvastatin, Gabapentin, Hydralazine, Isosorbide mononitrate ER, Methadone, KCl tab, Fenofibrate, Furosemide, Lantus, Humalog SS, Levothyroxine, Pantoprazole

## 2019-01-14 NOTE — DIETITIAN INITIAL EVALUATION ADULT. - PROBLEM SELECTOR PLAN 9
-Continue with synthroid 25 mcg daily    #HLD:   -Continue with lipitor and fenofibrate     #GERD:  -Continue with protonix 40 mg daily

## 2019-01-14 NOTE — DIETITIAN INITIAL EVALUATION ADULT. - OTHER INFO
Nutrition consult for CHF diet edu by MD appreciated. 58 y.o F from home with PMHx of HTN, DM with neuropathy, Bipolar disorder, Depression, h/o DVT, CVA x2 with residual right sided weakness, PAD, HFrEF EF 25% s/p AICD, Hypothyroidism, Asthma. Pt admitted with CHF exacerbation. Daughter in law cooks at home, daughter in law monitor and controls dietary intake, reports adhere to low sodium and monitor fluid intake up to 1 liter per day, fair appetite, NKFA. -200 lbs. Pt takes Lasix, Lipitor, Fenofibrate, Gabapentin, Hydralazine, Warfarin per home meds. Pt has been on Warfarin for 5 years now and is aware of FDI with Coumadin and Vitamin K. Reports self monitor blood glucose at home 2x/d fasting AM and PM, typically around ~150-160 mg/dL. Currently tolerating DASH/TLC, CSTCHOSNdiet,  mL/d, needs further observation to determine appetite/PO intake. Denies N/V/D/C or pain. +BM 1/13 per pt. Skin intact. Nutrition edu provided to pt at bedside. RD will f/u per protocol.

## 2019-01-14 NOTE — PROGRESS NOTE ADULT - ATTENDING COMMENTS
Reviewed above documentation. Reviewed vitals, labs, medications, cardiac studies and additional imaging 1/14/19. Agree with the above with the following additions/amendments:  58F with multiple co morbidities presenting with worsening SOB/RAMSEY and orthopnea, admitted for acute on chronic sCHF exacerbation. Cath 2010 reviewed and patient with normal coronaries. Patient currently asx, HDS, and clinically near euvolemic with normal JVP, trace RUPINDER, and scant basilar crackles.   Plan for:  Transition to po diuretics  Torsemide 40mg po daily (patient enrolled in TRANSFORM-HF study and to remain on Torsemide)  Augment Toprol to 50mg XL daily  TTE for structural assessment  Will not pursue ishemia evaluation at this time given lack of ischemic s/s   Anticipated discharge date 1/14  MILY Lion.  Cardiology Attending Reviewed above documentation. Reviewed vitals, labs, medications, cardiac studies and additional imaging 1/14/19. Agree with the above with the following additions/amendments:  58F with multiple co morbidities presenting with worsening SOB/RAMSEY and orthopnea, admitted for acute on chronic sCHF exacerbation. Cath 2010 reviewed and patient with normal coronaries. Patient currently asx, HDS, and clinically near euvolemic with normal JVP, trace RUPINDER, and scant basilar crackles.   Plan for:  Transition to po diuretics  Torsemide 40mg po daily (patient enrolled in TRANSFORM-HF study and to remain on Torsemide)  Augment Toprol to 50mg XL daily  TTE for structural assessment  Will not pursue ishemia evaluation at this time given lack of ischemic s/s   Strict I/O, use handheld urinal for output measurement  Daily STANDING weights to be recorded and tracked  Dietician/Nutritian consultation for CHF/cardiac diet reinforcement  Bedside CHF Education ongoing  Advanced CHF consultation, appreciate input  EP consultation, AICD/CRT  PT evaluation, patient to be referred to cardiac rehabilitation upon discharge for CHF cardiac conditioning  Anticipated discharge date 1/14, SW/CM consulted as patient on home O2 will have transportation needs    MILY Lion.  Cardiology Attending

## 2019-01-14 NOTE — PROGRESS NOTE ADULT - PROBLEM SELECTOR PLAN 9
-Continue with synthroid 25 mcg daily    #HLD:   -Continue with lipitor and fenofibrate     #GERD:  -Continue with protonix 40 mg daily -Continue with synthroid 25 mcg daily  -TSH WNL    #HLD:   -Continue with lipitor 80mg qd and fenofibrate     #GERD:  -Continue with protonix 40 mg daily

## 2019-01-15 ENCOUNTER — TRANSCRIPTION ENCOUNTER (OUTPATIENT)
Age: 59
End: 2019-01-15

## 2019-01-15 VITALS — TEMPERATURE: 97 F

## 2019-01-15 LAB
ANION GAP SERPL CALC-SCNC: 11 MMOL/L — SIGNIFICANT CHANGE UP (ref 5–17)
BUN SERPL-MCNC: 31 MG/DL — HIGH (ref 7–23)
CALCIUM SERPL-MCNC: 8.8 MG/DL — SIGNIFICANT CHANGE UP (ref 8.4–10.5)
CHLORIDE SERPL-SCNC: 97 MMOL/L — SIGNIFICANT CHANGE UP (ref 96–108)
CO2 SERPL-SCNC: 32 MMOL/L — HIGH (ref 22–31)
CREAT SERPL-MCNC: 1.8 MG/DL — HIGH (ref 0.5–1.3)
FERRITIN SERPL-MCNC: 199 NG/ML — HIGH (ref 15–150)
GLUCOSE SERPL-MCNC: 149 MG/DL — HIGH (ref 70–99)
HCT VFR BLD CALC: 29.1 % — LOW (ref 34.5–45)
HGB BLD-MCNC: 8.7 G/DL — LOW (ref 11.5–15.5)
INR BLD: 3.89 — HIGH (ref 0.88–1.16)
IRON SATN MFR SERPL: 15 % — SIGNIFICANT CHANGE UP (ref 14–50)
IRON SATN MFR SERPL: 41 UG/DL — SIGNIFICANT CHANGE UP (ref 30–160)
MAGNESIUM SERPL-MCNC: 2.2 MG/DL — SIGNIFICANT CHANGE UP (ref 1.6–2.6)
MCHC RBC-ENTMCNC: 26.9 PG — LOW (ref 27–34)
MCHC RBC-ENTMCNC: 29.9 G/DL — LOW (ref 32–36)
MCV RBC AUTO: 90.1 FL — SIGNIFICANT CHANGE UP (ref 80–100)
PLATELET # BLD AUTO: 204 K/UL — SIGNIFICANT CHANGE UP (ref 150–400)
POTASSIUM SERPL-MCNC: 4.2 MMOL/L — SIGNIFICANT CHANGE UP (ref 3.5–5.3)
POTASSIUM SERPL-SCNC: 4.2 MMOL/L — SIGNIFICANT CHANGE UP (ref 3.5–5.3)
PROTHROM AB SERPL-ACNC: 45.9 SEC — HIGH (ref 10–12.9)
RBC # BLD: 3.23 M/UL — LOW (ref 3.8–5.2)
RBC # FLD: 17.6 % — HIGH (ref 10.3–16.9)
SODIUM SERPL-SCNC: 140 MMOL/L — SIGNIFICANT CHANGE UP (ref 135–145)
TIBC SERPL-MCNC: 273 UG/DL — SIGNIFICANT CHANGE UP (ref 220–430)
UIBC SERPL-MCNC: 232 UG/DL — SIGNIFICANT CHANGE UP (ref 110–370)
WBC # BLD: 6.6 K/UL — SIGNIFICANT CHANGE UP (ref 3.8–10.5)
WBC # FLD AUTO: 6.6 K/UL — SIGNIFICANT CHANGE UP (ref 3.8–10.5)

## 2019-01-15 PROCEDURE — 81001 URINALYSIS AUTO W/SCOPE: CPT

## 2019-01-15 PROCEDURE — 82607 VITAMIN B-12: CPT

## 2019-01-15 PROCEDURE — 96374 THER/PROPH/DIAG INJ IV PUSH: CPT

## 2019-01-15 PROCEDURE — 87486 CHLMYD PNEUM DNA AMP PROBE: CPT

## 2019-01-15 PROCEDURE — 71045 X-RAY EXAM CHEST 1 VIEW: CPT

## 2019-01-15 PROCEDURE — 84484 ASSAY OF TROPONIN QUANT: CPT

## 2019-01-15 PROCEDURE — 82553 CREATINE MB FRACTION: CPT

## 2019-01-15 PROCEDURE — 84295 ASSAY OF SERUM SODIUM: CPT

## 2019-01-15 PROCEDURE — 83735 ASSAY OF MAGNESIUM: CPT

## 2019-01-15 PROCEDURE — 82803 BLOOD GASES ANY COMBINATION: CPT

## 2019-01-15 PROCEDURE — 36415 COLL VENOUS BLD VENIPUNCTURE: CPT

## 2019-01-15 PROCEDURE — 86803 HEPATITIS C AB TEST: CPT

## 2019-01-15 PROCEDURE — 82746 ASSAY OF FOLIC ACID SERUM: CPT

## 2019-01-15 PROCEDURE — 83615 LACTATE (LD) (LDH) ENZYME: CPT

## 2019-01-15 PROCEDURE — 83880 ASSAY OF NATRIURETIC PEPTIDE: CPT

## 2019-01-15 PROCEDURE — 87798 DETECT AGENT NOS DNA AMP: CPT

## 2019-01-15 PROCEDURE — 83036 HEMOGLOBIN GLYCOSYLATED A1C: CPT

## 2019-01-15 PROCEDURE — ZZZZZ: CPT

## 2019-01-15 PROCEDURE — 99239 HOSP IP/OBS DSCHRG MGMT >30: CPT

## 2019-01-15 PROCEDURE — 80048 BASIC METABOLIC PNL TOTAL CA: CPT

## 2019-01-15 PROCEDURE — 82728 ASSAY OF FERRITIN: CPT

## 2019-01-15 PROCEDURE — 85025 COMPLETE CBC W/AUTO DIFF WBC: CPT

## 2019-01-15 PROCEDURE — 85610 PROTHROMBIN TIME: CPT

## 2019-01-15 PROCEDURE — 87389 HIV-1 AG W/HIV-1&-2 AB AG IA: CPT

## 2019-01-15 PROCEDURE — 93306 TTE W/DOPPLER COMPLETE: CPT | Mod: 26

## 2019-01-15 PROCEDURE — 84132 ASSAY OF SERUM POTASSIUM: CPT

## 2019-01-15 PROCEDURE — 83550 IRON BINDING TEST: CPT

## 2019-01-15 PROCEDURE — 94640 AIRWAY INHALATION TREATMENT: CPT

## 2019-01-15 PROCEDURE — 94660 CPAP INITIATION&MGMT: CPT

## 2019-01-15 PROCEDURE — 99285 EMERGENCY DEPT VISIT HI MDM: CPT | Mod: 25

## 2019-01-15 PROCEDURE — 80061 LIPID PANEL: CPT

## 2019-01-15 PROCEDURE — 84443 ASSAY THYROID STIM HORMONE: CPT

## 2019-01-15 PROCEDURE — 83540 ASSAY OF IRON: CPT

## 2019-01-15 PROCEDURE — 71045 X-RAY EXAM CHEST 1 VIEW: CPT | Mod: 26

## 2019-01-15 PROCEDURE — 87633 RESP VIRUS 12-25 TARGETS: CPT

## 2019-01-15 PROCEDURE — 85027 COMPLETE CBC AUTOMATED: CPT

## 2019-01-15 PROCEDURE — 82330 ASSAY OF CALCIUM: CPT

## 2019-01-15 PROCEDURE — 82550 ASSAY OF CK (CPK): CPT

## 2019-01-15 PROCEDURE — 80053 COMPREHEN METABOLIC PANEL: CPT

## 2019-01-15 PROCEDURE — 93306 TTE W/DOPPLER COMPLETE: CPT

## 2019-01-15 PROCEDURE — 85730 THROMBOPLASTIN TIME PARTIAL: CPT

## 2019-01-15 PROCEDURE — 87581 M.PNEUMON DNA AMP PROBE: CPT

## 2019-01-15 PROCEDURE — 93005 ELECTROCARDIOGRAM TRACING: CPT

## 2019-01-15 PROCEDURE — 82962 GLUCOSE BLOOD TEST: CPT

## 2019-01-15 PROCEDURE — 96375 TX/PRO/DX INJ NEW DRUG ADDON: CPT

## 2019-01-15 RX ORDER — METOPROLOL TARTRATE 50 MG
1 TABLET ORAL
Qty: 0 | Refills: 0 | COMMUNITY

## 2019-01-15 RX ORDER — INSULIN DETEMIR 100/ML (3)
30 INSULIN PEN (ML) SUBCUTANEOUS
Qty: 0 | Refills: 0 | COMMUNITY

## 2019-01-15 RX ORDER — METHADONE HYDROCHLORIDE 40 MG/1
1 TABLET ORAL
Qty: 0 | Refills: 0 | COMMUNITY

## 2019-01-15 RX ORDER — INSULIN ASPART 100 [IU]/ML
15 INJECTION, SOLUTION SUBCUTANEOUS
Qty: 0 | Refills: 0 | DISCHARGE
Start: 2019-01-15

## 2019-01-15 RX ORDER — INSULIN LISPRO 100/ML
5 VIAL (ML) SUBCUTANEOUS
Qty: 0 | Refills: 0 | Status: DISCONTINUED | OUTPATIENT
Start: 2019-01-15 | End: 2019-01-15

## 2019-01-15 RX ORDER — INSULIN ASPART 100 [IU]/ML
5 INJECTION, SOLUTION SUBCUTANEOUS
Qty: 0 | Refills: 0 | DISCHARGE
Start: 2019-01-15

## 2019-01-15 RX ORDER — METOPROLOL TARTRATE 50 MG
1 TABLET ORAL
Qty: 30 | Refills: 0 | OUTPATIENT
Start: 2019-01-15 | End: 2019-02-13

## 2019-01-15 RX ORDER — INSULIN GLARGINE 100 [IU]/ML
25 INJECTION, SOLUTION SUBCUTANEOUS
Qty: 0 | Refills: 0 | COMMUNITY

## 2019-01-15 RX ORDER — INSULIN DETEMIR 100/ML (3)
28 INSULIN PEN (ML) SUBCUTANEOUS
Qty: 0 | Refills: 0 | DISCHARGE
Start: 2019-01-15

## 2019-01-15 RX ORDER — INSULIN DETEMIR 100/ML (3)
40 INSULIN PEN (ML) SUBCUTANEOUS
Qty: 0 | Refills: 0 | DISCHARGE
Start: 2019-01-15

## 2019-01-15 RX ORDER — ATORVASTATIN CALCIUM 80 MG/1
1 TABLET, FILM COATED ORAL
Qty: 0 | Refills: 0 | COMMUNITY

## 2019-01-15 RX ORDER — INSULIN ASPART 100 [IU]/ML
22 INJECTION, SOLUTION SUBCUTANEOUS
Qty: 0 | Refills: 0 | DISCHARGE
Start: 2019-01-15

## 2019-01-15 RX ORDER — INSULIN DETEMIR 100/ML (3)
30 INSULIN PEN (ML) SUBCUTANEOUS
Qty: 0 | Refills: 0 | DISCHARGE
Start: 2019-01-15

## 2019-01-15 RX ORDER — HYDRALAZINE HCL 50 MG
0 TABLET ORAL
Qty: 0 | Refills: 0 | COMMUNITY

## 2019-01-15 RX ORDER — WARFARIN SODIUM 2.5 MG/1
1 TABLET ORAL
Qty: 0 | Refills: 0 | COMMUNITY

## 2019-01-15 RX ORDER — INSULIN ASPART 100 [IU]/ML
5 INJECTION, SOLUTION SUBCUTANEOUS
Qty: 1 | Refills: 0
Start: 2019-01-15

## 2019-01-15 RX ORDER — INSULIN ASPART 100 [IU]/ML
12 INJECTION, SOLUTION SUBCUTANEOUS
Qty: 0 | Refills: 0 | DISCHARGE
Start: 2019-01-15

## 2019-01-15 RX ORDER — INSULIN ASPART 100 [IU]/ML
7 INJECTION, SOLUTION SUBCUTANEOUS
Qty: 0 | Refills: 0 | COMMUNITY

## 2019-01-15 RX ORDER — INSULIN ASPART 100 [IU]/ML
20 INJECTION, SOLUTION SUBCUTANEOUS
Qty: 0 | Refills: 0 | COMMUNITY

## 2019-01-15 RX ORDER — ATORVASTATIN CALCIUM 80 MG/1
1 TABLET, FILM COATED ORAL
Qty: 30 | Refills: 0 | OUTPATIENT
Start: 2019-01-15 | End: 2019-02-13

## 2019-01-15 RX ORDER — INSULIN GLARGINE 100 [IU]/ML
15 INJECTION, SOLUTION SUBCUTANEOUS AT BEDTIME
Qty: 0 | Refills: 0 | Status: DISCONTINUED | OUTPATIENT
Start: 2019-01-15 | End: 2019-01-15

## 2019-01-15 RX ORDER — BUDESONIDE AND FORMOTEROL FUMARATE DIHYDRATE 160; 4.5 UG/1; UG/1
2 AEROSOL RESPIRATORY (INHALATION)
Qty: 0 | Refills: 0 | COMMUNITY

## 2019-01-15 RX ORDER — FERROUS SULFATE 325(65) MG
1 TABLET ORAL
Qty: 0 | Refills: 0 | DISCHARGE
Start: 2019-01-15 | End: 2019-02-13

## 2019-01-15 RX ORDER — TRAZODONE HCL 50 MG
1 TABLET ORAL
Qty: 0 | Refills: 0 | COMMUNITY

## 2019-01-15 RX ORDER — INSULIN DETEMIR 100/ML (3)
15 INSULIN PEN (ML) SUBCUTANEOUS
Qty: 1 | Refills: 0
Start: 2019-01-15

## 2019-01-15 RX ORDER — FERROUS SULFATE 325(65) MG
325 TABLET ORAL
Qty: 0 | Refills: 0 | Status: DISCONTINUED | OUTPATIENT
Start: 2019-01-15 | End: 2019-01-15

## 2019-01-15 RX ORDER — AMITRIPTYLINE HCL 25 MG
1 TABLET ORAL
Qty: 0 | Refills: 0 | COMMUNITY

## 2019-01-15 RX ORDER — CLOPIDOGREL BISULFATE 75 MG/1
1 TABLET, FILM COATED ORAL
Qty: 30 | Refills: 0
Start: 2019-01-15 | End: 2019-02-13

## 2019-01-15 RX ORDER — FERROUS SULFATE 325(65) MG
1 TABLET ORAL
Qty: 60 | Refills: 0
Start: 2019-01-15 | End: 2019-02-13

## 2019-01-15 RX ORDER — FUROSEMIDE 40 MG
0 TABLET ORAL
Qty: 0 | Refills: 0 | COMMUNITY

## 2019-01-15 RX ORDER — FUROSEMIDE 40 MG
1 TABLET ORAL
Qty: 0 | Refills: 0 | COMMUNITY

## 2019-01-15 RX ORDER — CLOPIDOGREL BISULFATE 75 MG/1
75 TABLET, FILM COATED ORAL DAILY
Qty: 0 | Refills: 0 | Status: DISCONTINUED | OUTPATIENT
Start: 2019-01-15 | End: 2019-01-15

## 2019-01-15 RX ADMIN — Medication 10 MILLIGRAM(S): at 11:48

## 2019-01-15 RX ADMIN — BUDESONIDE AND FORMOTEROL FUMARATE DIHYDRATE 2 PUFF(S): 160; 4.5 AEROSOL RESPIRATORY (INHALATION) at 07:43

## 2019-01-15 RX ADMIN — Medication 2: at 11:48

## 2019-01-15 RX ADMIN — PANTOPRAZOLE SODIUM 40 MILLIGRAM(S): 20 TABLET, DELAYED RELEASE ORAL at 07:41

## 2019-01-15 RX ADMIN — METHADONE HYDROCHLORIDE 10 MILLIGRAM(S): 40 TABLET ORAL at 11:48

## 2019-01-15 RX ADMIN — ESCITALOPRAM OXALATE 10 MILLIGRAM(S): 10 TABLET, FILM COATED ORAL at 11:48

## 2019-01-15 RX ADMIN — Medication 50 MILLIGRAM(S): at 07:40

## 2019-01-15 RX ADMIN — GABAPENTIN 100 MILLIGRAM(S): 400 CAPSULE ORAL at 11:48

## 2019-01-15 RX ADMIN — LIDOCAINE 1 PATCH: 4 CREAM TOPICAL at 07:00

## 2019-01-15 RX ADMIN — Medication 145 MILLIGRAM(S): at 11:48

## 2019-01-15 RX ADMIN — LIDOCAINE 1 PATCH: 4 CREAM TOPICAL at 09:00

## 2019-01-15 RX ADMIN — ISOSORBIDE MONONITRATE 60 MILLIGRAM(S): 60 TABLET, EXTENDED RELEASE ORAL at 11:48

## 2019-01-15 RX ADMIN — Medication 10 MILLIGRAM(S): at 13:16

## 2019-01-15 RX ADMIN — Medication 10 MILLIGRAM(S): at 07:41

## 2019-01-15 RX ADMIN — CLOPIDOGREL BISULFATE 75 MILLIGRAM(S): 75 TABLET, FILM COATED ORAL at 11:48

## 2019-01-15 RX ADMIN — Medication 40 MILLIGRAM(S): at 07:39

## 2019-01-15 RX ADMIN — Medication 25 MICROGRAM(S): at 07:40

## 2019-01-15 NOTE — DISCHARGE NOTE ADULT - CARE PROVIDERS DIRECT ADDRESSES
,DirectAddress_Unknown,[email or web address] ,DirectAddress_Unknown,[email or web address] ,DirectAddress_Unknown,[email or web address]

## 2019-01-15 NOTE — DISCHARGE NOTE ADULT - ADDITIONAL INSTRUCTIONS
1. Please follow up with Heart Failure Specialist Zoya Miller (MD), Cardiology; Interventional Cardiology  [address] [address]  Phone: [phone]    2. Please follow up with your cardiologist Dr Gutierrez in 1-2 weeks.  Luis Angel Gutierrez), Cardiovascular Disease; Internal Medicine  [address] [place] [address], [place] [address]  Phone: [phone] 1. Please follow up with Heart Failure Specialist Zoya Miller (MD), Cardiology; Interventional Cardiology  [address] [place] [address]  Phone: [phone]    2. Please follow up with cardiologist Dr Díaz in 2 weeks.  Taras Díaz), Cardiovascular Disease; Internal Medicine  Cardiology [facility]  [address] [place] [address]  Phone: [phone] 1. Please follow up with Heart Failure Specialist Dr Olguin as scheduled next week Wednesday on 1/23/19 at 2:40 PM.  Zoya Olguin), Cardiology; Interventional Cardiology  [address] [place] [address]  Phone: [phone]    2. Please follow up with cardiologist Dr Díaz as scheduled on Monday, 1/28/2019 at 3 PM.  Taras Díaz), Cardiovascular Disease; Internal Medicine  Cardiology [facility]  [address] [place] [address]  Phone: [phone]    3. Please follow up with Nephrologist Dr Ledezma in 2-3 weeks.  Pranay Ledezma), Internal Medicine; Nephrology  [address] [place] [address]  Phone: [phone] 1. Please follow up with Heart Failure Specialist Dr Olguin as scheduled next week Wednesday on 1/23/19 at 2:40 PM.  Zoya Olguin), Cardiology; Interventional Cardiology  [address] [place] [address]  Phone: [phone]    2. Please follow up with your cardiologist Dr Gutierrez, or if you prefer at [facility] cardiologist Dr Díaz as scheduled on Monday, 1/28/2019 at 3 PM.  Taras Díaz), Cardiovascular Disease; Internal Medicine  Cardiology [facility]  [address] [place] [address]  Phone: [phone]    3. Please follow up with Nephrologist Dr Ledezma in 2-3 weeks.  Pranay Ledezma), Internal Medicine; Nephrology  [address] [place] [address]  Phone: [phone]    4. Please follow up with your Endocrinologist in 2 weeks.

## 2019-01-15 NOTE — PROGRESS NOTE ADULT - SUBJECTIVE AND OBJECTIVE BOX
CARDIOLOGY PROGRESS NOTE           Interval Events     Patient had an episode of symptomatic hypoglycemia (FS48) while in echocardiogram holding area yesterday but remained hemodynamically stable. Patient was given 50mL of dextrose 50 with repeat , and symptoms improved. Cardiac enzymes were negative. Echo was completed this morning (results below). No other complaints.               Vitals past 24 hours:     Temp: 96.5 F (97.9, 98.2, 96.3)     HR: 70 (54-80)     BP: 105/60 (109-144/44-75)     RR: 14-18     SpO2: % on 2L NC           PHYSICAL EXAM:     Appearance: Normal         HEENT:   Normal oral mucosa, PERRL, EOMI         Cardiovascular: Normal S1 S2, Mild JVD, 3/6 systolic murmur in mitral window, No edema     Respiratory: Lungs clear to auscultation         Psychiatry: A & O x 3, Mood & affect appropriate     Gastrointestinal:  Soft, Non-tender, + BS         Skin: No rashes, No ecchymoses, No cyanosis     Neurologic: Non-focal     Extremities: Normal range of motion, No clubbing, cyanosis or edema     Vascular: Peripheral pulses palpable 2+ bilaterally           TELEMETRY: NSR with PVCs           ECG: NSR                DIAGNOSTIC TESTING:     Echocardiogram Interpretation Summary:     The left ventricle is moderately dilated. There is severe global hypokinesis of the left ventricle. The left ventricular ejection fraction is 25%. The left atrium is moderately dilated. The mitral inflow pattern is consistent with restrictive left ventricular filling, suggestive of severely elevated left atrial pressure (>20mmHg).  The right atrium is dilated. Structurally normal aortic valve. No aortic regurgitation noted. Structurally normal mitral valve. There is moderate mitral regurgitation. Structurally normal tricuspid valve. There is mild to moderate tricuspid regurgitation. There is moderate pulmonary hypertension. The pulmonary artery systolic pressure is estimated to be 50 mmHg. Structurally normal pulmonic valve. There is mild pulmonic regurgitation. No aortic root dilatation. The IVC is dilated (>2.1 cm) with an abnormal inspiratory collapse (<50%) consistent with elevated right atrial pressure.            CURRENT MEDICATIONS:     Clopidogrel 75mg qd     Toprol increased to 50mg oral qd     Torsemide 40mg oral qd     Hydralazine 10mg TID     Isosorbide mononitrate 60mg qd     Albuterol/ipratropium     Budesonide      Insulin            CARDIAC MARKERS:     Cardiac enzymes negative           LABS:              15 [facility] 2019, 06:22                         8.7 <L>      6.6  )---------------( 204                29.1 <L>                  140 | 97           | 31 <H>      -----------------------------------< 149 <H>       4.2  | 32 <H>   |  1.8 <H>           I/Os:     Past 24 hours:     Input: 801     Output: 1075     Net: -274          Grand Total:     Input: 801     Output: 8055     Net: -5507 CARDIOLOGY PROGRESS NOTE           Interval Events     Patient had an episode of symptomatic hypoglycemia (FS48) while in echocardiogram holding area yesterday but remained hemodynamically stable. Patient was given 50mL of dextrose 50 with repeat , and symptoms improved. Cardiac enzymes were negative. Echo was completed this morning (results below). No other complaints.               Vitals past 24 hours:     Temp: 96.5 F (97.9, 98.2, 96.3)     HR: 70 (54-80)     BP: 105/60 (109-144/44-75)     RR: 14-18     SpO2: % on 2L NC           PHYSICAL EXAM:   Appearance: Normal       HEENT:   Normal oral mucosa, PERRL, EOMI       Cardiovascular: Normal S1 S2, Mild JVD, 3/6 systolic murmur in mitral window, No edema   Respiratory: Lungs clear to auscultation       Psychiatry: A & O x 3, Mood & affect appropriate   Gastrointestinal:  Soft, Non-tender, + BS       Skin: No rashes, No ecchymoses, No cyanosis   Neurologic: Non-focal     Extremities: Normal range of motion, No clubbing, cyanosis or edema     Vascular: Peripheral pulses palpable 2+ bilaterally           TELEMETRY: NSR with PVCs           ECG: NSR                DIAGNOSTIC TESTING:     Echocardiogram Interpretation Summary:     The left ventricle is moderately dilated. There is severe global hypokinesis of the left ventricle. The left ventricular ejection fraction is 25%. The left atrium is moderately dilated. The mitral inflow pattern is consistent with restrictive left ventricular filling, suggestive of severely elevated left atrial pressure (>20mmHg).  The right atrium is dilated. Structurally normal aortic valve. No aortic regurgitation noted. Structurally normal mitral valve. There is moderate mitral regurgitation. Structurally normal tricuspid valve. There is mild to moderate tricuspid regurgitation. There is moderate pulmonary hypertension. The pulmonary artery systolic pressure is estimated to be 50 mmHg. Structurally normal pulmonic valve. There is mild pulmonic regurgitation. No aortic root dilatation. The IVC is dilated (>2.1 cm) with an abnormal inspiratory collapse (<50%) consistent with elevated right atrial pressure.            CURRENT MEDICATIONS:     Clopidogrel 75mg qd     Toprol increased to 50mg oral qd     Torsemide 40mg oral qd     Hydralazine 10mg TID     Isosorbide mononitrate 60mg qd     Albuterol/ipratropium     Budesonide      Insulin            CARDIAC MARKERS:     Cardiac enzymes negative           LABS:              15 Herbert 2019, 06:22                         8.7 <L>      6.6  )---------------( 204                29.1 <L>                  140 | 97           | 31 <H>      -----------------------------------< 149 <H>       4.2  | 32 <H>   |  1.8 <H>           I/Os:     Past 24 hours:     Input: 801     Output: 1075     Net: -274          Grand Total:     Input: 801     Output: 1095     Net: -7060

## 2019-01-15 NOTE — DISCHARGE NOTE ADULT - CARE PROVIDER_API CALL
Zoya Olguin), Cardiology; Interventional Cardiology  [address] [address]  Phone: [phone]  Fax: [phone]    Luis Angel Gutierrez), Cardiovascular Disease; Internal Medicine  [address] [place] [address]  Phone: [phone]  Fax: [phone] Zoya Olguin), Cardiology; Interventional Cardiology  [address] [place] [address]  Phone: [phone]  Fax: [phone]    Taras Díaz), Cardiovascular Disease; Internal Medicine  Cardiology [facility]  [address] [place] [address]  Phone: [phone]  Fax: [phone] Zoya Olguin), Cardiology; Interventional Cardiology  [address] [place] [address]  Phone: [phone]  Fax: [phone]    Taras Díaz (MD), Cardiovascular Disease; Internal Medicine  Cardiology [facility]  [address] [place] [address]  Phone: [phone]  Fax: [phone]    Pranay Ledezma), Internal Medicine; Nephrology  [address] [place] [address]  Phone: [phone]  Fax: [phone]

## 2019-01-15 NOTE — DISCHARGE NOTE ADULT - MEDICATION SUMMARY - MEDICATIONS TO CHANGE
I will SWITCH the dose or number of times a day I take the medications listed below when I get home from the hospital:    Metoprolol Succinate ER 25 mg oral tablet, extended release  -- 1 tab(s) by mouth once a day    atorvastatin 40 mg oral tablet  -- 1 tab(s) by mouth once a day    Lantus 100 units/mL subcutaneous solution  -- 25 unit(s) subcutaneous once a day (at bedtime)    NovoLOG 100 units/mL subcutaneous solution  -- 7 unit(s) subcutaneous 3 times a day (before meals)    Levemir 100 units/mL subcutaneous solution  -- 30 unit(s) subcutaneous once a day (at bedtime)    metoprolol succinate 25 mg oral tablet, extended release  -- 1 tab(s) by mouth once a day    NovoLOG 100 units/mL subcutaneous solution  -- 20 unit(s) subcutaneous 3 times a day (before meals)

## 2019-01-15 NOTE — DISCHARGE NOTE ADULT - PLAN OF CARE
Take medications as prescribed You were admitted to the cardiac telemetry floor with congestive heart failure.  You were given intravenous Lasix to get rid of the fluid in your lungs and body to help you breathe better. Your diuretic medication was switched from Lasix to TORSEMIDE 40MG DAILY. Please take this as prescribed without missing doses. You had an echocardiogram performed that showed your heart muscle is very weak, the pumping function of your heart or Ejection Fraction is 25%. Your heart is unable to pump the blood effectively throughout your body and can cause fluid backup into your lungs and rest of body. Please maintain a low salt diet and fluid restriction of 1.5 liters per day to prevent from fluid being reaccumulated. Weigh yourself daily and report any weight gain over 2 pounds/day or per week to your Doctor. Please continue taking your medications as prescribed. You were admitted to the cardiac telemetry floor with congestive heart failure.  You were given intravenous Lasix to get rid of the fluid in your lungs and body to help you breathe better. Your diuretic medication was switched from Lasix to TORSEMIDE 40MG DAILY. Please take this as prescribed without missing doses. You had an echocardiogram performed that showed your heart muscle is very weak, the pumping function of your heart or Ejection Fraction is 25%. Your heart is unable to pump the blood effectively throughout your body and can cause fluid backup into your lungs and rest of body. Please maintain a low salt diet and fluid restriction of 1.5 liters per day to prevent from fluid being reaccumulated. Weigh yourself daily and report any weight gain over 2 pounds/day or per week to your Doctor. Please continue taking your medications as prescribed.  -YOU WERE ENROLLED IN THE TRANSFORM HEART FAILURE TRIAL AND WAS PLACED ON TORSEMIDE AS THE DIURETIC MEDICATION. PLEASE BE AWARE YOU SHOULD NOT BE SWITCHED FROM TORSEMIDE, BUT THE DOSE OF THE MEDICATION MAY BE CHANGED. You have a history of peripheral artery disease where you had previous stenting in the left leg and bypass surgery. You were resumed on Plavix 75mg daily due to inability to be on aspirin due to your allergy. You have a history of anemia, likely due to chronic kidney disease. You were started on oral iron supplementation to help improve the anemia. Please follow up with Nephrologist Dr Ledezma in 2-3 weeks. Your INR level for Coumadin was elevated to 3.89. Your coumadin was held for a few days while in the hospital. Please continue to HOLD Coumadin for 2 days, and recheck INR level on Thursday and adjust the Coumadin dose accordingly. You were found to have very low blood sugar level FS 40, your insulin regimen was decreased, please take Levemir 15units at bedtime, and Novolog 5units three times a day before meals.

## 2019-01-15 NOTE — DISCHARGE NOTE ADULT - CARE PLAN
Principal Discharge DX:	Acute on chronic systolic heart failure  Goal:	Take medications as prescribed  Assessment and plan of treatment:	You were admitted to the cardiac telemetry floor with congestive heart failure.  You were given intravenous Lasix to get rid of the fluid in your lungs and body to help you breathe better. Your diuretic medication was switched from Lasix to TORSEMIDE 40MG DAILY. Please take this as prescribed without missing doses. You had an echocardiogram performed that showed your heart muscle is very weak, the pumping function of your heart or Ejection Fraction is 25%. Your heart is unable to pump the blood effectively throughout your body and can cause fluid backup into your lungs and rest of body. Please maintain a low salt diet and fluid restriction of 1.5 liters per day to prevent from fluid being reaccumulated. Weigh yourself daily and report any weight gain over 2 pounds/day or per week to your Doctor. Please continue taking your medications as prescribed. Principal Discharge DX:	Acute on chronic systolic heart failure  Goal:	Take medications as prescribed  Assessment and plan of treatment:	You were admitted to the cardiac telemetry floor with congestive heart failure.  You were given intravenous Lasix to get rid of the fluid in your lungs and body to help you breathe better. Your diuretic medication was switched from Lasix to TORSEMIDE 40MG DAILY. Please take this as prescribed without missing doses. You had an echocardiogram performed that showed your heart muscle is very weak, the pumping function of your heart or Ejection Fraction is 25%. Your heart is unable to pump the blood effectively throughout your body and can cause fluid backup into your lungs and rest of body. Please maintain a low salt diet and fluid restriction of 1.5 liters per day to prevent from fluid being reaccumulated. Weigh yourself daily and report any weight gain over 2 pounds/day or per week to your Doctor. Please continue taking your medications as prescribed.  -YOU WERE ENROLLED IN THE TRANSFORM HEART FAILURE TRIAL AND WAS PLACED ON TORSEMIDE AS THE DIURETIC MEDICATION. PLEASE BE AWARE YOU SHOULD NOT BE SWITCHED FROM TORSEMIDE, BUT THE DOSE OF THE MEDICATION MAY BE CHANGED. Principal Discharge DX:	Acute on chronic systolic heart failure  Goal:	Take medications as prescribed  Assessment and plan of treatment:	You were admitted to the cardiac telemetry floor with congestive heart failure.  You were given intravenous Lasix to get rid of the fluid in your lungs and body to help you breathe better. Your diuretic medication was switched from Lasix to TORSEMIDE 40MG DAILY. Please take this as prescribed without missing doses. You had an echocardiogram performed that showed your heart muscle is very weak, the pumping function of your heart or Ejection Fraction is 25%. Your heart is unable to pump the blood effectively throughout your body and can cause fluid backup into your lungs and rest of body. Please maintain a low salt diet and fluid restriction of 1.5 liters per day to prevent from fluid being reaccumulated. Weigh yourself daily and report any weight gain over 2 pounds/day or per week to your Doctor. Please continue taking your medications as prescribed.  -YOU WERE ENROLLED IN THE TRANSFORM HEART FAILURE TRIAL AND WAS PLACED ON TORSEMIDE AS THE DIURETIC MEDICATION. PLEASE BE AWARE YOU SHOULD NOT BE SWITCHED FROM TORSEMIDE, BUT THE DOSE OF THE MEDICATION MAY BE CHANGED.  Secondary Diagnosis:	PAD (peripheral artery disease)  Assessment and plan of treatment:	You have a history of peripheral artery disease where you had previous stenting in the left leg and bypass surgery. You were resumed on Plavix 75mg daily due to inability to be on aspirin due to your allergy.  Secondary Diagnosis:	Anemia  Assessment and plan of treatment:	You have a history of anemia, likely due to chronic kidney disease. You were started on oral iron supplementation to help improve the anemia. Please follow up with Nephrologist Dr Ledezma in 2-3 weeks.  Secondary Diagnosis:	Supratherapeutic INR  Assessment and plan of treatment:	Your INR level for Coumadin was elevated to 3.89. Your coumadin was held for a few days while in the hospital. Please continue to HOLD Coumadin for 2 days, and recheck INR level on Thursday and adjust the Coumadin dose accordingly. Principal Discharge DX:	Acute on chronic systolic heart failure  Goal:	Take medications as prescribed  Assessment and plan of treatment:	You were admitted to the cardiac telemetry floor with congestive heart failure.  You were given intravenous Lasix to get rid of the fluid in your lungs and body to help you breathe better. Your diuretic medication was switched from Lasix to TORSEMIDE 40MG DAILY. Please take this as prescribed without missing doses. You had an echocardiogram performed that showed your heart muscle is very weak, the pumping function of your heart or Ejection Fraction is 25%. Your heart is unable to pump the blood effectively throughout your body and can cause fluid backup into your lungs and rest of body. Please maintain a low salt diet and fluid restriction of 1.5 liters per day to prevent from fluid being reaccumulated. Weigh yourself daily and report any weight gain over 2 pounds/day or per week to your Doctor. Please continue taking your medications as prescribed.  -YOU WERE ENROLLED IN THE TRANSFORM HEART FAILURE TRIAL AND WAS PLACED ON TORSEMIDE AS THE DIURETIC MEDICATION. PLEASE BE AWARE YOU SHOULD NOT BE SWITCHED FROM TORSEMIDE, BUT THE DOSE OF THE MEDICATION MAY BE CHANGED.  Secondary Diagnosis:	PAD (peripheral artery disease)  Assessment and plan of treatment:	You have a history of peripheral artery disease where you had previous stenting in the left leg and bypass surgery. You were resumed on Plavix 75mg daily due to inability to be on aspirin due to your allergy.  Secondary Diagnosis:	Anemia  Assessment and plan of treatment:	You have a history of anemia, likely due to chronic kidney disease. You were started on oral iron supplementation to help improve the anemia. Please follow up with Nephrologist Dr Ledezma in 2-3 weeks.  Secondary Diagnosis:	Supratherapeutic INR  Assessment and plan of treatment:	Your INR level for Coumadin was elevated to 3.89. Your coumadin was held for a few days while in the hospital. Please continue to HOLD Coumadin for 2 days, and recheck INR level on Thursday and adjust the Coumadin dose accordingly.  Secondary Diagnosis:	DM (diabetes mellitus)  Assessment and plan of treatment:	You were found to have very low blood sugar level FS 40, your insulin regimen was decreased, please take Levemir 15units at bedtime, and Novolog 5units three times a day before meals.

## 2019-01-15 NOTE — PROGRESS NOTE ADULT - ATTENDING COMMENTS
Feeling better  57 yo F with multiple co-morbidities presenting with worsening SOB/RAMSEY and orthopnea, admitted to [facility] for acute-on-chronic sCHF exacerbation with multifactorial SOB.    Problem:  Acute-on-chronic systolic heart failure, with EF of 25%.   2010 coronary catheterization: normal coronary anatomy.     Plan:    C/w Torsemide 40mg 1x/day    C/w Toprol 50mg 1x/day    Maximize asthma therapy     Not on ACE-i/Entresto given renal dysfunction on these meds in past. Consider as outpatient   Hydralazine/isordil useful for BP control   Ambulate  Stable for d/c    ZOLTAN Olguin

## 2019-01-15 NOTE — DISCHARGE NOTE ADULT - HOSPITAL COURSE
59 yo F with an extensive pmhx including HTN, IDDM c/b neuropathy, bipolar disorder, depression, history of DVT (4 episodes in her lower extremities), CVA x2 (last one in 2013 with residual right sided weakness), PAD s/p bypass, HFrEF (EF 25%, unknown etiology whether ischemic or non ischemic) s/p AICD for primary prevention, hypothyroidism, asthma (on 2-3L oxygen at home) presenting with complaints of worsening shortness of breath, RAMSEY, orthopnea, limited exertional capacities and worsening of her LE edema which are similar to her usual episodes of CHF exacerbation after which she decided to present to [facility] for further evaluation. Of note, patient has a history of multiple CHF exacerbations in the past, almost every month with last admission in December 2018 at [place]. She states she has had an ischemic evaluation previously approximately 2 years ago however cannot remember what it showed or if she had stents placed. Denies any chest pain, palpitations, nausea, vomiting, abdominal or urinary symptoms, sick contacts, fevers, chills, URI symptoms. She states these episodes are very similar to her usual episodes of CHF exacerbation. At the time of this conversation, patient is on Bipap and appears comfortable, she states her breathing has significantly improved. She denies any dietary indiscretions, states she eats a low fluid low salt diet.   On arrival to [facility]: VS: afebrile, , bp 158/76, RR 25 with o2 saturation of 96% on RA, tachypnea resolved with Bipap. Initial labs s/f WBC 11.4 with neutrophil predominance of 83.6%, Hgb of 10.2, INR of 2.79, creatinine of 1.39, BNP of 6485, cardiac enzymes negative x1, CXR with significant fluid overload. EKG with no significant ischemic changes.   ED course: lasix 40 mg IVX1, morphine 2 mg IVx1.   Patient is being admitted to [facility] for CHF exacerbation. 59 yo F with an extensive PMHx including HTN, IDDM c/b neuropathy, bipolar disorder, depression, history of DVT on Coumadin (4 episodes in her lower extremities), CVA x2 (last one in 2013 with residual right sided weakness), PAD s/p bypass, NICM, HFrEF (EF 25% on echo) s/p AICD for primary prevention, hypothyroidism, COPD (on 2-3L oxygen at home) presenting with complaints of worsening shortness of breath, RAMSEY, orthopnea, limited exertional capacities and worsening of her LE edema which are similar to her usual episodes of CHF exacerbation. Of note, patient has a history of multiple CHF exacerbations in the past, almost every month with last admission in December 2018 at [place]. She states she has had an ischemic evaluation previously approximately 2 years ago however cannot remember what it showed or if she had stents placed. Denies any chest pain, palpitations, nausea, vomiting, abdominal or urinary symptoms, sick contacts, fevers, chills, URI symptoms. She states these episodes are very similar to her usual episodes of CHF exacerbation. At the time of this conversation, patient is on Bipap and appears comfortable, she states her breathing has significantly improved. She denies any dietary indiscretions, states she eats a low fluid low salt diet.   On arrival to [facility]: VS: afebrile, , bp 158/76, RR 25 with o2 saturation of 96% on RA, tachypnea resolved with Bipap. Initial labs s/f WBC 11.4 with neutrophil predominance of 83.6%, Hgb of 10.2, INR of 2.79, creatinine of 1.39, BNP of 6485, cardiac enzymes negative x1, CXR with significant fluid overload. EKG with no significant ischemic changes.   ED course: lasix 40 mg IVX1, morphine 2 mg IVx1.   Patient is being admitted to [facility] for CHF exacerbation. 59 yo F with an extensive PMHx including HTN, IDDM c/b neuropathy, bipolar disorder, depression, history of DVT on Coumadin (4 episodes in her lower extremities), CVA x2 (last one in 2013 with residual right sided weakness), PAD s/p bypass, NICM, HFrEF (EF 25% on echo) s/p AICD for primary prevention, hypothyroidism, COPD (on 2-3L oxygen at home) presenting with complaints of worsening shortness of breath, RAMSYE, orthopnea, limited exertional capacities and worsening of her LE edema which are similar to her usual episodes of CHF exacerbation. Of note, patient has a history of multiple readmissions for CHF exacerbations in the past year, most recently 12/2018 at [place]. On arrival to [facility]: VS: afebrile, , bp 158/76, RR 25 with O2 saturation of 96% on RA, tachypnea resolved with BIPAP. BNP of 6485, cardiac enzymes negative, CXR with significant fluid overload. EKG with no significant ischemic changes. Pt was admitted to [address] for acute on chronic systolic CHF exacerbation.  HF team was consulted. She was diuresed w/ IV Lasix 40mg TID, and enrolled in TRANSFORM-HF trial. She was randomized to Torsemide 40mg qd (PT SHOULD NOT BE SWITCHED FROM BRAND OF DIURETIC, BUT DOSING MAY BE CHANGED PRN). She was weaned off BIPAP to home O2 3L NC, sob symptoms improved. Pt w/ Hx of PAD s/p PCI/bypass surgery, antiplatelet Plavix was resumed as pt has allergy to ASA. She was found to have anemia of chronic disease, and was started on PO Ferrous Sulfate, to f/u with Renal as outpt to eval for possible need of Epogen. Of note, pt was noted to have hypoglycemia, FS 40s during hospitalization on outpt insulin regimen, likely 2/2 dietary changes and dec PO intake inhospital. Insulin regimen was adjusted. Pt is hemodynamically stable for discharge home and transported via ambulette on home O2. Home care services resumed by SW/.

## 2019-01-15 NOTE — PROGRESS NOTE ADULT - ASSESSMENT
ASSESSMENT / PLAN:     59 yo F with multiple co-morbidities presenting with worsening SOB/RAMSEY and orthopnea, admitted to [facility] for acute-on-chronic sCHF exacerbation with multifactorial SOB.    Problem:  Acute-on-chronic systolic heart failure, with EF of 25%. Collateral information obtained regarding 2010 coronary catheterization: normal coronary anatomy. Patient’s volume status is net negative 1700 ml since admission.      Plan:    C/w Torsemide 40mg 1x/day    C/w Toprol 50mg 1x/day    Maximize asthma therapy     C/w strict I/Os, daily weights, maintain net negative    Monitor electrolytes while on diuresis    Not on ACE-i/Entresto given renal dysfunction on these meds in past   Hydralazine/isordil useful for BP control         Liam Dickinson, MS   Discussed with Dr Hardeep aGray (Cardiology Fellow) and Dr. Zoya Olguin (Attending)

## 2019-01-15 NOTE — DISCHARGE NOTE ADULT - PATIENT PORTAL LINK FT
You can access the [facility], offered by [facility], by registering with the following website: [email or web address]

## 2019-01-15 NOTE — DISCHARGE NOTE ADULT - MEDICATION SUMMARY - MEDICATIONS TO STOP TAKING
I will STOP taking the medications listed below when I get home from the hospital:    Lasix 40 mg oral tablet  -- 1 tab(s) by mouth 2 times a day    Lasix 40 mg oral tablet  -- orally 2 times a day

## 2019-01-15 NOTE — DISCHARGE NOTE ADULT - MEDICATION SUMMARY - MEDICATIONS TO TAKE
I will START or STAY ON the medications listed below when I get home from the hospital:    traMADol 50 mg oral tablet  -- 1 tab(s) by mouth every 8 hours, As needed, Severe Pain (7 - 10) / headache MDD:3 Tablets  -- Indication: For Neuropathy    methadone 10 mg oral tablet  -- orally once a day  -- Indication: For Neuropathy    isosorbide mononitrate 60 mg oral tablet, extended release  -- 1 tab(s) by mouth once a day (in the morning)  -- Indication: For Acute on chronic systolic heart failure/Hypertension    gabapentin 100 mg oral capsule  -- orally once a day  -- Indication: For Neuropathy    amitriptyline 25 mg oral tablet  -- 1 tab(s) by mouth once a day (at bedtime)  -- Indication: For Depression    escitalopram 10 mg oral tablet  -- 1 tab(s) by mouth once a day  -- Indication: For Depression    traZODone 50 mg oral tablet  -- orally once a day (at bedtime)  -- Indication: For Depression    NovoLOG FlexPen 100 units/mL injectable solution  -- 5 unit(s) injectable 3 times a day (before meals)   -- Check with your doctor before becoming pregnant.  Do not drink alcoholic beverages when taking this medication.  Keep in refrigerator.  Do not freeze.  Obtain medical advice before taking any non-prescription drugs as some may affect the action of this medication.    -- Indication: For DM (diabetes mellitus)    Levemir FlexTouch 100 units/mL subcutaneous solution  -- 15 unit(s) subcutaneous once a day (at bedtime)   -- Check with your doctor before becoming pregnant.  Do not drink alcoholic beverages when taking this medication.  Keep in refrigerator.  Do not freeze.    -- Indication: For DM (diabetes mellitus)    atorvastatin 80 mg oral tablet  -- 1 tab(s) by mouth once a day (at bedtime)  -- Indication: For PAD (peripheral artery disease)    fenofibrate 145 mg oral tablet  -- 1 tab(s) by mouth once a day  -- Indication: For Hyperlipidemia    clopidogrel 75 mg oral tablet  -- 1 tab(s) by mouth once a day  -- Indication: For PAD (peripheral artery disease) s/p stenting    Ambien 10 mg oral tablet  -- 1 tab(s) by mouth once a day (at bedtime), As Needed  -- Indication: For insomnia    busPIRone 10 mg oral tablet  -- 1 tab(s) by mouth 2 times a day  -- Indication: For insomnia    metoprolol succinate 50 mg oral tablet, extended release  -- 1 tab(s) by mouth once a day  -- Indication: For Acute on chronic systolic heart failure/Hypertension    Symbicort 160 mcg-4.5 mcg/inh inhalation aerosol  -- 2 puff(s) inhaled 2 times a day  -- Indication: For Copd    torsemide 20 mg oral tablet  -- 2 tab(s) by mouth once a day  -- Indication: For Acute on chronic systolic heart failure    FeroSul 325 mg (65 mg elemental iron) oral tablet  -- 1 tab(s) by mouth 2 times a day   -- Indication: For Anemia    sucralfate 1 g oral tablet  -- Indication: For gastric reflux    tiZANidine 4 mg oral tablet  -- muscle spasms  -- Indication: For Neuropathy    cyclobenzaprine 5 mg oral tablet  -- Indication: For Neuropathy    omeprazole 40 mg oral delayed release capsule  -- 1 cap(s) by mouth once a day  -- Indication: For gastric reflux    levothyroxine 25 mcg (0.025 mg) oral tablet  -- 1 tab(s) by mouth once a day  -- Indication: For Hypothyroidism    hydrALAZINE 10 mg oral tablet  -- 1 tab(s) by mouth every 8 hours  -- Indication: For Acute on chronic systolic heart failure/Hypertension

## 2019-01-16 ENCOUNTER — APPOINTMENT (OUTPATIENT)
Dept: CARDIOLOGY | Facility: CLINIC | Age: 59
End: 2019-01-16

## 2019-01-16 LAB
FOLATE SERPL-MCNC: >20 NG/ML — SIGNIFICANT CHANGE UP
VIT B12 SERPL-MCNC: 509 PG/ML — SIGNIFICANT CHANGE UP (ref 232–1245)

## 2019-01-17 DIAGNOSIS — Z79.02 LONG TERM (CURRENT) USE OF ANTITHROMBOTICS/ANTIPLATELETS: ICD-10-CM

## 2019-01-17 DIAGNOSIS — Z79.01 LONG TERM (CURRENT) USE OF ANTICOAGULANTS: ICD-10-CM

## 2019-01-17 DIAGNOSIS — Z79.4 LONG TERM (CURRENT) USE OF INSULIN: ICD-10-CM

## 2019-01-17 DIAGNOSIS — F31.30 BIPOLAR DISORDER, CURRENT EPISODE DEPRESSED, MILD OR MODERATE SEVERITY, UNSPECIFIED: ICD-10-CM

## 2019-01-17 DIAGNOSIS — Z86.73 PERSONAL HISTORY OF TRANSIENT ISCHEMIC ATTACK (TIA), AND CEREBRAL INFARCTION WITHOUT RESIDUAL DEFICITS: ICD-10-CM

## 2019-01-17 DIAGNOSIS — D63.1 ANEMIA IN CHRONIC KIDNEY DISEASE: ICD-10-CM

## 2019-01-17 DIAGNOSIS — E11.22 TYPE 2 DIABETES MELLITUS WITH DIABETIC CHRONIC KIDNEY DISEASE: ICD-10-CM

## 2019-01-17 DIAGNOSIS — E11.649 TYPE 2 DIABETES MELLITUS WITH HYPOGLYCEMIA WITHOUT COMA: ICD-10-CM

## 2019-01-17 DIAGNOSIS — E78.5 HYPERLIPIDEMIA, UNSPECIFIED: ICD-10-CM

## 2019-01-17 DIAGNOSIS — Z95.828 PRESENCE OF OTHER VASCULAR IMPLANTS AND GRAFTS: ICD-10-CM

## 2019-01-17 DIAGNOSIS — Z95.810 PRESENCE OF AUTOMATIC (IMPLANTABLE) CARDIAC DEFIBRILLATOR: ICD-10-CM

## 2019-01-17 DIAGNOSIS — E11.40 TYPE 2 DIABETES MELLITUS WITH DIABETIC NEUROPATHY, UNSPECIFIED: ICD-10-CM

## 2019-01-17 DIAGNOSIS — N18.3 CHRONIC KIDNEY DISEASE, STAGE 3 (MODERATE): ICD-10-CM

## 2019-01-17 DIAGNOSIS — I50.9 HEART FAILURE, UNSPECIFIED: ICD-10-CM

## 2019-01-17 DIAGNOSIS — Z90.49 ACQUIRED ABSENCE OF OTHER SPECIFIED PARTS OF DIGESTIVE TRACT: ICD-10-CM

## 2019-01-17 DIAGNOSIS — Z99.81 DEPENDENCE ON SUPPLEMENTAL OXYGEN: ICD-10-CM

## 2019-01-17 DIAGNOSIS — Z79.899 OTHER LONG TERM (CURRENT) DRUG THERAPY: ICD-10-CM

## 2019-01-17 DIAGNOSIS — Z90.710 ACQUIRED ABSENCE OF BOTH CERVIX AND UTERUS: ICD-10-CM

## 2019-01-17 DIAGNOSIS — Z83.3 FAMILY HISTORY OF DIABETES MELLITUS: ICD-10-CM

## 2019-01-17 DIAGNOSIS — I13.0 HYPERTENSIVE HEART AND CHRONIC KIDNEY DISEASE WITH HEART FAILURE AND STAGE 1 THROUGH STAGE 4 CHRONIC KIDNEY DISEASE, OR UNSPECIFIED CHRONIC KIDNEY DISEASE: ICD-10-CM

## 2019-01-17 DIAGNOSIS — I50.23 ACUTE ON CHRONIC SYSTOLIC (CONGESTIVE) HEART FAILURE: ICD-10-CM

## 2019-01-17 DIAGNOSIS — Z86.718 PERSONAL HISTORY OF OTHER VENOUS THROMBOSIS AND EMBOLISM: ICD-10-CM

## 2019-01-17 DIAGNOSIS — E11.51 TYPE 2 DIABETES MELLITUS WITH DIABETIC PERIPHERAL ANGIOPATHY WITHOUT GANGRENE: ICD-10-CM

## 2019-01-17 DIAGNOSIS — Z82.49 FAMILY HISTORY OF ISCHEMIC HEART DISEASE AND OTHER DISEASES OF THE CIRCULATORY SYSTEM: ICD-10-CM

## 2019-01-17 DIAGNOSIS — J44.9 CHRONIC OBSTRUCTIVE PULMONARY DISEASE, UNSPECIFIED: ICD-10-CM

## 2019-01-17 DIAGNOSIS — Z98.51 TUBAL LIGATION STATUS: ICD-10-CM

## 2019-01-23 ENCOUNTER — APPOINTMENT (OUTPATIENT)
Dept: HEART AND VASCULAR | Facility: CLINIC | Age: 59
End: 2019-01-23
Payer: MEDICARE

## 2019-01-23 VITALS
HEIGHT: 64 IN | BODY MASS INDEX: 36.19 KG/M2 | DIASTOLIC BLOOD PRESSURE: 56 MMHG | OXYGEN SATURATION: 96 % | WEIGHT: 212 LBS | TEMPERATURE: 98.5 F | HEART RATE: 77 BPM | SYSTOLIC BLOOD PRESSURE: 109 MMHG

## 2019-01-23 PROBLEM — I82.409 ACUTE EMBOLISM AND THROMBOSIS OF UNSPECIFIED DEEP VEINS OF UNSPECIFIED LOWER EXTREMITY: Chronic | Status: ACTIVE | Noted: 2019-01-13

## 2019-01-23 PROBLEM — F31.30 BIPOLAR DISORDER, CURRENT EPISODE DEPRESSED, MILD OR MODERATE SEVERITY, UNSPECIFIED: Chronic | Status: ACTIVE | Noted: 2019-01-13

## 2019-01-23 PROBLEM — I63.9 CEREBRAL INFARCTION, UNSPECIFIED: Chronic | Status: ACTIVE | Noted: 2019-01-13

## 2019-01-23 PROBLEM — G62.9 POLYNEUROPATHY, UNSPECIFIED: Chronic | Status: ACTIVE | Noted: 2019-01-13

## 2019-01-23 PROBLEM — I73.9 PERIPHERAL VASCULAR DISEASE, UNSPECIFIED: Chronic | Status: ACTIVE | Noted: 2019-01-13

## 2019-01-23 PROBLEM — E11.9 TYPE 2 DIABETES MELLITUS WITHOUT COMPLICATIONS: Chronic | Status: ACTIVE | Noted: 2019-01-13

## 2019-01-23 PROBLEM — I10 ESSENTIAL (PRIMARY) HYPERTENSION: Chronic | Status: ACTIVE | Noted: 2019-01-13

## 2019-01-23 PROBLEM — F32.9 MAJOR DEPRESSIVE DISORDER, SINGLE EPISODE, UNSPECIFIED: Chronic | Status: ACTIVE | Noted: 2019-01-13

## 2019-01-23 PROCEDURE — 36415 COLL VENOUS BLD VENIPUNCTURE: CPT

## 2019-01-23 PROCEDURE — 93000 ELECTROCARDIOGRAM COMPLETE: CPT

## 2019-01-23 PROCEDURE — 99214 OFFICE O/P EST MOD 30 MIN: CPT

## 2019-01-23 RX ORDER — FUROSEMIDE 40 MG/1
40 TABLET ORAL
Qty: 90 | Refills: 3 | Status: DISCONTINUED | COMMUNITY
Start: 2017-03-21 | End: 2019-01-23

## 2019-01-23 NOTE — PHYSICAL EXAM
[General Appearance - Well Developed] : well developed [Normal Appearance] : normal appearance [Well Groomed] : well groomed [General Appearance - Well Nourished] : well nourished [No Deformities] : no deformities [General Appearance - In No Acute Distress] : no acute distress [Normal Conjunctiva] : the conjunctiva exhibited no abnormalities [Eyelids - No Xanthelasma] : the eyelids demonstrated no xanthelasmas [Normal Oral Mucosa] : normal oral mucosa [No Oral Pallor] : no oral pallor [No Oral Cyanosis] : no oral cyanosis [Normal Jugular Venous A Waves Present] : normal jugular venous A waves present [Normal Jugular Venous V Waves Present] : normal jugular venous V waves present [No Jugular Venous [place] A Waves] : no jugular venous [place] A waves [Heart Rate And Rhythm] : heart rate and rhythm were normal [Heart Sounds] : normal S1 and S2 [Murmurs] : no murmurs present [Edema] : no peripheral edema present [Abdomen Soft] : soft [Abdomen Tenderness] : non-tender [Abdomen Mass (___ Cm)] : no abdominal mass palpated [Nail Clubbing] : no clubbing of the fingernails [Cyanosis, Localized] : no localized cyanosis [Petechial Hemorrhages (___cm)] : no petechial hemorrhages [Skin Color & Pigmentation] : normal skin color and pigmentation [] : no rash [No Venous Stasis] : no venous stasis [Skin Lesions] : no skin lesions [No Skin Ulcers] : no skin ulcer [No Xanthoma] : no  xanthoma was observed [Oriented To Time, Place, And Person] : oriented to person, place, and time [Affect] : the affect was normal [Mood] : the mood was normal [No Anxiety] : not feeling anxious [FreeTextEntry1] : rare expiratory wheeezes

## 2019-01-23 NOTE — ASSESSMENT
[FreeTextEntry1] : 59 yo with SOB secondary to CHF, asthma and her weight. Plus has chronic pain.\par increase toprol from 50 to 75 and in 2 weeks 75 to 100\par check BMP and consider entresto at next visit.\par \par

## 2019-01-23 NOTE — HISTORY OF PRESENT ILLNESS
[FreeTextEntry1] : Seen in hospital early Jan 2019\par 57 yo F with multiple co-morbidities presenting with worsening SOB/RAMSEY and orthopnea, admitted to 5 [facility] for acute-on-chronic sCHF exacerbation with multifactorial SOB.  \par Problem:  Acute-on-chronic systolic heart failure, with EF of 25%. \par 2010 coronary catheterization: normal coronary anatomy. \par \par Plan:  \par C/w Torsemide 40mg 1x/day  \par C/w Toprol 50mg 1x/day  \par Maximize asthma therapy   \par Not on ACE-i/Entresto given renal dysfunction on these meds in past. Consider as outpatient \par \par

## 2019-01-24 LAB
ANION GAP SERPL CALC-SCNC: 14 MMOL/L
BASOPHILS # BLD AUTO: 0.05 K/UL
BASOPHILS NFR BLD AUTO: 0.6 %
BUN SERPL-MCNC: 34 MG/DL
CALCIUM SERPL-MCNC: 9.1 MG/DL
CHLORIDE SERPL-SCNC: 99 MMOL/L
CO2 SERPL-SCNC: 29 MMOL/L
CREAT SERPL-MCNC: 1.91 MG/DL
EOSINOPHIL # BLD AUTO: 0.14 K/UL
EOSINOPHIL NFR BLD AUTO: 1.7 %
GLUCOSE SERPL-MCNC: 186 MG/DL
HCT VFR BLD CALC: 32.3 %
HGB BLD-MCNC: 9.2 G/DL
IMM GRANULOCYTES NFR BLD AUTO: 0.2 %
LYMPHOCYTES # BLD AUTO: 1.52 K/UL
LYMPHOCYTES NFR BLD AUTO: 18.1 %
MAN DIFF?: NORMAL
MCHC RBC-ENTMCNC: 27 PG
MCHC RBC-ENTMCNC: 28.5 GM/DL
MCV RBC AUTO: 94.7 FL
MONOCYTES # BLD AUTO: 0.65 K/UL
MONOCYTES NFR BLD AUTO: 7.8 %
NEUTROPHILS # BLD AUTO: 6 K/UL
NEUTROPHILS NFR BLD AUTO: 71.6 %
PLATELET # BLD AUTO: 253 K/UL
POTASSIUM SERPL-SCNC: 4.2 MMOL/L
RBC # BLD: 3.41 M/UL
RBC # FLD: 19.2 %
SODIUM SERPL-SCNC: 142 MMOL/L
WBC # FLD AUTO: 8.38 K/UL

## 2019-01-28 ENCOUNTER — APPOINTMENT (OUTPATIENT)
Dept: HEART AND VASCULAR | Facility: CLINIC | Age: 59
End: 2019-01-28

## 2019-02-01 ENCOUNTER — OUTPATIENT (OUTPATIENT)
Dept: OUTPATIENT SERVICES | Facility: HOSPITAL | Age: 59
LOS: 1 days | Discharge: HOME | End: 2019-02-01

## 2019-02-01 DIAGNOSIS — I48.91 UNSPECIFIED ATRIAL FIBRILLATION: ICD-10-CM

## 2019-02-01 DIAGNOSIS — Z95.810 PRESENCE OF AUTOMATIC (IMPLANTABLE) CARDIAC DEFIBRILLATOR: Chronic | ICD-10-CM

## 2019-02-01 DIAGNOSIS — Z98.51 TUBAL LIGATION STATUS: Chronic | ICD-10-CM

## 2019-02-01 DIAGNOSIS — Z90.710 ACQUIRED ABSENCE OF BOTH CERVIX AND UTERUS: Chronic | ICD-10-CM

## 2019-02-01 DIAGNOSIS — Z95.828 PRESENCE OF OTHER VASCULAR IMPLANTS AND GRAFTS: Chronic | ICD-10-CM

## 2019-02-01 DIAGNOSIS — Z90.49 ACQUIRED ABSENCE OF OTHER SPECIFIED PARTS OF DIGESTIVE TRACT: Chronic | ICD-10-CM

## 2019-02-01 DIAGNOSIS — Z79.01 LONG TERM (CURRENT) USE OF ANTICOAGULANTS: ICD-10-CM

## 2019-02-04 ENCOUNTER — APPOINTMENT (OUTPATIENT)
Dept: VASCULAR SURGERY | Facility: CLINIC | Age: 59
End: 2019-02-04
Payer: MEDICARE

## 2019-02-04 VITALS — HEIGHT: 64 IN | BODY MASS INDEX: 36.02 KG/M2 | WEIGHT: 211 LBS

## 2019-02-04 DIAGNOSIS — I70.213 ATHEROSCLEROSIS OF NATIVE ARTERIES OF EXTREMITIES WITH INTERMITTENT CLAUDICATION, BILATERAL LEGS: ICD-10-CM

## 2019-02-04 DIAGNOSIS — F17.200 NICOTINE DEPENDENCE, UNSPECIFIED, UNCOMPLICATED: ICD-10-CM

## 2019-02-04 PROCEDURE — 93925 LOWER EXTREMITY STUDY: CPT

## 2019-02-04 PROCEDURE — 99213 OFFICE O/P EST LOW 20 MIN: CPT

## 2019-02-04 NOTE — DATA REVIEWED
[FreeTextEntry1] : I performed an arterial duplex which was medically necessary to evaluate for patency of the stents. It showed patent BROOKE stents bilaterally. Right SFA stenosis with velocity >200 cm/s, patent right popliteal artery, patent left femoral-popliteal vein bypass, with flow disturbance in the left CFA extending into the anastomosis.\par

## 2019-02-04 NOTE — HISTORY OF PRESENT ILLNESS
[FreeTextEntry1] : 57 y/o female with h/o hypercoagulable state, h/o DVT on Coumadin, h/o CVA, systolic CHF, COPD, current smoker, presents for left leg pain and swelling, INR was 2.7 few days ago. She has a h/o bilateral iliac artery angioplasty and stent, left SFA stent that occluded and underwent a left femoral-below knee popliteal artery reverse saphenous vein bypass in January 2015. She also has a h/o DVT and placement of an eclipse IVC filter placement in 2011 after which she developed thrombosis of the filter and underwent thrombectomy.\par She presents for left lower extremity pain.

## 2019-02-04 NOTE — ASSESSMENT
[FreeTextEntry1] : 57 y/o female with h/o hypercoagulable state, h/o DVT on Coumadin, h/o CVA, systolic CHF, COPD, current smoker, presents for left leg pain and swelling, INR was 2.7 few days ago. She has a h/o bilateral iliac artery angioplasty and stent, left SFA stent that occluded and underwent a left femoral-below knee popliteal artery reverse saphenous vein bypass in January 2015. She also has a h/o DVT and placement of an eclipse IVC filter placement in 2011 after which she developed thrombosis of the filter and underwent thrombectomy.\par She presents for left lower extremity pain. I performed an arterial duplex which was medically necessary to evaluate for patency of the stents. It showed patent BROOKE stents bilaterally. Right SFA stenosis with velocity >200 cm/s, patent right popliteal artery, patent left femoral-popliteal vein bypass, with flow disturbance in the left CFA extending into the anastomosis. I have informed her of the test results, no surgical treatment is needed at this time. I have advised her to follow up in six months time.\par

## 2019-02-07 ENCOUNTER — INPATIENT (INPATIENT)
Facility: HOSPITAL | Age: 59
LOS: 2 days | Discharge: HOME HEALTH | DRG: 291 | End: 2019-02-10
Attending: INTERNAL MEDICINE | Admitting: INTERNAL MEDICINE
Payer: MEDICARE

## 2019-02-07 VITALS
TEMPERATURE: 98 F | DIASTOLIC BLOOD PRESSURE: 71 MMHG | HEART RATE: 73 BPM | WEIGHT: 211.64 LBS | RESPIRATION RATE: 36 BRPM | SYSTOLIC BLOOD PRESSURE: 143 MMHG | OXYGEN SATURATION: 92 %

## 2019-02-07 DIAGNOSIS — Z95.828 PRESENCE OF OTHER VASCULAR IMPLANTS AND GRAFTS: Chronic | ICD-10-CM

## 2019-02-07 DIAGNOSIS — I10 ESSENTIAL (PRIMARY) HYPERTENSION: ICD-10-CM

## 2019-02-07 DIAGNOSIS — Z90.49 ACQUIRED ABSENCE OF OTHER SPECIFIED PARTS OF DIGESTIVE TRACT: Chronic | ICD-10-CM

## 2019-02-07 DIAGNOSIS — I73.9 PERIPHERAL VASCULAR DISEASE, UNSPECIFIED: ICD-10-CM

## 2019-02-07 DIAGNOSIS — F31.30 BIPOLAR DISORDER, CURRENT EPISODE DEPRESSED, MILD OR MODERATE SEVERITY, UNSPECIFIED: ICD-10-CM

## 2019-02-07 DIAGNOSIS — N18.3 CHRONIC KIDNEY DISEASE, STAGE 3 (MODERATE): ICD-10-CM

## 2019-02-07 DIAGNOSIS — J44.9 CHRONIC OBSTRUCTIVE PULMONARY DISEASE, UNSPECIFIED: ICD-10-CM

## 2019-02-07 DIAGNOSIS — Z95.810 PRESENCE OF AUTOMATIC (IMPLANTABLE) CARDIAC DEFIBRILLATOR: Chronic | ICD-10-CM

## 2019-02-07 DIAGNOSIS — Z98.51 TUBAL LIGATION STATUS: Chronic | ICD-10-CM

## 2019-02-07 DIAGNOSIS — Z90.710 ACQUIRED ABSENCE OF BOTH CERVIX AND UTERUS: Chronic | ICD-10-CM

## 2019-02-07 DIAGNOSIS — I50.23 ACUTE ON CHRONIC SYSTOLIC (CONGESTIVE) HEART FAILURE: ICD-10-CM

## 2019-02-07 DIAGNOSIS — E11.42 TYPE 2 DIABETES MELLITUS WITH DIABETIC POLYNEUROPATHY: ICD-10-CM

## 2019-02-07 DIAGNOSIS — I82.409 ACUTE EMBOLISM AND THROMBOSIS OF UNSPECIFIED DEEP VEINS OF UNSPECIFIED LOWER EXTREMITY: ICD-10-CM

## 2019-02-07 DIAGNOSIS — Z29.9 ENCOUNTER FOR PROPHYLACTIC MEASURES, UNSPECIFIED: ICD-10-CM

## 2019-02-07 DIAGNOSIS — Z02.9 ENCOUNTER FOR ADMINISTRATIVE EXAMINATIONS, UNSPECIFIED: ICD-10-CM

## 2019-02-07 LAB
ANION GAP SERPL CALC-SCNC: 10 MMOL/L — SIGNIFICANT CHANGE UP (ref 5–17)
ANION GAP SERPL CALC-SCNC: 11 MMOL/L — SIGNIFICANT CHANGE UP (ref 5–17)
APTT BLD: 45 SEC — HIGH (ref 27.5–36.3)
BASOPHILS # BLD AUTO: 0.02 K/UL — SIGNIFICANT CHANGE UP (ref 0–0.2)
BASOPHILS NFR BLD AUTO: 0.3 % — SIGNIFICANT CHANGE UP (ref 0–2)
BUN SERPL-MCNC: 23 MG/DL — SIGNIFICANT CHANGE UP (ref 7–23)
BUN SERPL-MCNC: 27 MG/DL — HIGH (ref 7–23)
CALCIUM SERPL-MCNC: 8.9 MG/DL — SIGNIFICANT CHANGE UP (ref 8.4–10.5)
CALCIUM SERPL-MCNC: 9 MG/DL — SIGNIFICANT CHANGE UP (ref 8.4–10.5)
CHLORIDE SERPL-SCNC: 100 MMOL/L — SIGNIFICANT CHANGE UP (ref 96–108)
CHLORIDE SERPL-SCNC: 100 MMOL/L — SIGNIFICANT CHANGE UP (ref 96–108)
CK MB CFR SERPL CALC: 3.3 NG/ML — SIGNIFICANT CHANGE UP (ref 0–6.7)
CK SERPL-CCNC: 160 U/L — SIGNIFICANT CHANGE UP (ref 25–170)
CO2 SERPL-SCNC: 27 MMOL/L — SIGNIFICANT CHANGE UP (ref 22–31)
CO2 SERPL-SCNC: 30 MMOL/L — SIGNIFICANT CHANGE UP (ref 22–31)
CREAT SERPL-MCNC: 1.48 MG/DL — HIGH (ref 0.5–1.3)
CREAT SERPL-MCNC: 1.56 MG/DL — HIGH (ref 0.5–1.3)
EOSINOPHIL # BLD AUTO: 0.16 K/UL — SIGNIFICANT CHANGE UP (ref 0–0.5)
EOSINOPHIL NFR BLD AUTO: 2.2 % — SIGNIFICANT CHANGE UP (ref 0–6)
GLUCOSE SERPL-MCNC: 125 MG/DL — HIGH (ref 70–99)
GLUCOSE SERPL-MCNC: 237 MG/DL — HIGH (ref 70–99)
HCT VFR BLD CALC: 34.7 % — SIGNIFICANT CHANGE UP (ref 34.5–45)
HGB BLD-MCNC: 10.1 G/DL — LOW (ref 11.5–15.5)
IMM GRANULOCYTES NFR BLD AUTO: 0.3 % — SIGNIFICANT CHANGE UP (ref 0–1.5)
INR BLD: 3.45 — HIGH (ref 0.88–1.16)
LYMPHOCYTES # BLD AUTO: 0.53 K/UL — LOW (ref 1–3.3)
LYMPHOCYTES # BLD AUTO: 7.2 % — LOW (ref 13–44)
MCHC RBC-ENTMCNC: 27.7 PG — SIGNIFICANT CHANGE UP (ref 27–34)
MCHC RBC-ENTMCNC: 29.1 GM/DL — LOW (ref 32–36)
MCV RBC AUTO: 95.1 FL — SIGNIFICANT CHANGE UP (ref 80–100)
MONOCYTES # BLD AUTO: 0.54 K/UL — SIGNIFICANT CHANGE UP (ref 0–0.9)
MONOCYTES NFR BLD AUTO: 7.3 % — SIGNIFICANT CHANGE UP (ref 2–14)
NEUTROPHILS # BLD AUTO: 6.11 K/UL — SIGNIFICANT CHANGE UP (ref 1.8–7.4)
NEUTROPHILS NFR BLD AUTO: 82.7 % — HIGH (ref 43–77)
NRBC # BLD: 0 /100 WBCS — SIGNIFICANT CHANGE UP (ref 0–0)
NT-PROBNP SERPL-SCNC: 6613 PG/ML — HIGH (ref 0–300)
PLATELET # BLD AUTO: 194 K/UL — SIGNIFICANT CHANGE UP (ref 150–400)
POTASSIUM SERPL-MCNC: 3.6 MMOL/L — SIGNIFICANT CHANGE UP (ref 3.5–5.3)
POTASSIUM SERPL-MCNC: 4.1 MMOL/L — SIGNIFICANT CHANGE UP (ref 3.5–5.3)
POTASSIUM SERPL-SCNC: 3.6 MMOL/L — SIGNIFICANT CHANGE UP (ref 3.5–5.3)
POTASSIUM SERPL-SCNC: 4.1 MMOL/L — SIGNIFICANT CHANGE UP (ref 3.5–5.3)
PROTHROM AB SERPL-ACNC: 40.5 SEC — HIGH (ref 10–12.9)
RBC # BLD: 3.65 M/UL — LOW (ref 3.8–5.2)
RBC # FLD: 19.2 % — HIGH (ref 10.3–14.5)
SODIUM SERPL-SCNC: 138 MMOL/L — SIGNIFICANT CHANGE UP (ref 135–145)
SODIUM SERPL-SCNC: 140 MMOL/L — SIGNIFICANT CHANGE UP (ref 135–145)
TROPONIN T SERPL-MCNC: 0.02 NG/ML — HIGH (ref 0–0.01)
TROPONIN T SERPL-MCNC: 0.04 NG/ML — CRITICAL HIGH (ref 0–0.01)
WBC # BLD: 7.38 K/UL — SIGNIFICANT CHANGE UP (ref 3.8–10.5)
WBC # FLD AUTO: 7.38 K/UL — SIGNIFICANT CHANGE UP (ref 3.8–10.5)

## 2019-02-07 PROCEDURE — 99223 1ST HOSP IP/OBS HIGH 75: CPT

## 2019-02-07 PROCEDURE — 99285 EMERGENCY DEPT VISIT HI MDM: CPT | Mod: 25

## 2019-02-07 PROCEDURE — 71045 X-RAY EXAM CHEST 1 VIEW: CPT | Mod: 26

## 2019-02-07 RX ORDER — FERROUS SULFATE 325(65) MG
325 TABLET ORAL
Qty: 0 | Refills: 0 | Status: DISCONTINUED | OUTPATIENT
Start: 2019-02-07 | End: 2019-02-10

## 2019-02-07 RX ORDER — CYCLOBENZAPRINE HYDROCHLORIDE 10 MG/1
0 TABLET, FILM COATED ORAL
Qty: 0 | Refills: 0 | COMMUNITY

## 2019-02-07 RX ORDER — CYCLOBENZAPRINE HYDROCHLORIDE 10 MG/1
5 TABLET, FILM COATED ORAL DAILY
Qty: 0 | Refills: 0 | Status: DISCONTINUED | OUTPATIENT
Start: 2019-02-07 | End: 2019-02-10

## 2019-02-07 RX ORDER — FUROSEMIDE 40 MG
80 TABLET ORAL ONCE
Qty: 0 | Refills: 0 | Status: COMPLETED | OUTPATIENT
Start: 2019-02-07 | End: 2019-02-07

## 2019-02-07 RX ORDER — DEXTROSE 50 % IN WATER 50 %
25 SYRINGE (ML) INTRAVENOUS ONCE
Qty: 0 | Refills: 0 | Status: DISCONTINUED | OUTPATIENT
Start: 2019-02-07 | End: 2019-02-10

## 2019-02-07 RX ORDER — TRAZODONE HCL 50 MG
50 TABLET ORAL AT BEDTIME
Qty: 0 | Refills: 0 | Status: DISCONTINUED | OUTPATIENT
Start: 2019-02-07 | End: 2019-02-10

## 2019-02-07 RX ORDER — METHADONE HYDROCHLORIDE 40 MG/1
0 TABLET ORAL
Qty: 0 | Refills: 0 | COMMUNITY

## 2019-02-07 RX ORDER — DEXTROSE 50 % IN WATER 50 %
12.5 SYRINGE (ML) INTRAVENOUS ONCE
Qty: 0 | Refills: 0 | Status: DISCONTINUED | OUTPATIENT
Start: 2019-02-07 | End: 2019-02-10

## 2019-02-07 RX ORDER — FUROSEMIDE 40 MG
60 TABLET ORAL
Qty: 0 | Refills: 0 | Status: DISCONTINUED | OUTPATIENT
Start: 2019-02-07 | End: 2019-02-08

## 2019-02-07 RX ORDER — ESCITALOPRAM OXALATE 10 MG/1
10 TABLET, FILM COATED ORAL
Qty: 0 | Refills: 0 | Status: DISCONTINUED | OUTPATIENT
Start: 2019-02-07 | End: 2019-02-10

## 2019-02-07 RX ORDER — ISOSORBIDE MONONITRATE 60 MG/1
60 TABLET, EXTENDED RELEASE ORAL DAILY
Qty: 0 | Refills: 0 | Status: DISCONTINUED | OUTPATIENT
Start: 2019-02-07 | End: 2019-02-10

## 2019-02-07 RX ORDER — FENOFIBRATE,MICRONIZED 130 MG
145 CAPSULE ORAL DAILY
Qty: 0 | Refills: 0 | Status: DISCONTINUED | OUTPATIENT
Start: 2019-02-07 | End: 2019-02-10

## 2019-02-07 RX ORDER — INSULIN LISPRO 100/ML
VIAL (ML) SUBCUTANEOUS
Qty: 0 | Refills: 0 | Status: DISCONTINUED | OUTPATIENT
Start: 2019-02-07 | End: 2019-02-10

## 2019-02-07 RX ORDER — CLOPIDOGREL BISULFATE 75 MG/1
75 TABLET, FILM COATED ORAL DAILY
Qty: 0 | Refills: 0 | Status: DISCONTINUED | OUTPATIENT
Start: 2019-02-07 | End: 2019-02-10

## 2019-02-07 RX ORDER — PANTOPRAZOLE SODIUM 20 MG/1
40 TABLET, DELAYED RELEASE ORAL
Qty: 0 | Refills: 0 | Status: DISCONTINUED | OUTPATIENT
Start: 2019-02-07 | End: 2019-02-10

## 2019-02-07 RX ORDER — ATORVASTATIN CALCIUM 80 MG/1
80 TABLET, FILM COATED ORAL AT BEDTIME
Qty: 0 | Refills: 0 | Status: DISCONTINUED | OUTPATIENT
Start: 2019-02-07 | End: 2019-02-10

## 2019-02-07 RX ORDER — SUCRALFATE 1 G
1 TABLET ORAL
Qty: 0 | Refills: 0 | Status: DISCONTINUED | OUTPATIENT
Start: 2019-02-07 | End: 2019-02-10

## 2019-02-07 RX ORDER — INSULIN LISPRO 100/ML
5 VIAL (ML) SUBCUTANEOUS
Qty: 0 | Refills: 0 | Status: DISCONTINUED | OUTPATIENT
Start: 2019-02-07 | End: 2019-02-10

## 2019-02-07 RX ORDER — INSULIN GLARGINE 100 [IU]/ML
15 INJECTION, SOLUTION SUBCUTANEOUS AT BEDTIME
Qty: 0 | Refills: 0 | Status: DISCONTINUED | OUTPATIENT
Start: 2019-02-07 | End: 2019-02-10

## 2019-02-07 RX ORDER — SUCRALFATE 1 G
0 TABLET ORAL
Qty: 0 | Refills: 0 | COMMUNITY

## 2019-02-07 RX ORDER — GLUCAGON INJECTION, SOLUTION 0.5 MG/.1ML
1 INJECTION, SOLUTION SUBCUTANEOUS ONCE
Qty: 0 | Refills: 0 | Status: DISCONTINUED | OUTPATIENT
Start: 2019-02-07 | End: 2019-02-10

## 2019-02-07 RX ORDER — METOPROLOL TARTRATE 50 MG
50 TABLET ORAL DAILY
Qty: 0 | Refills: 0 | Status: DISCONTINUED | OUTPATIENT
Start: 2019-02-07 | End: 2019-02-10

## 2019-02-07 RX ORDER — SODIUM CHLORIDE 9 MG/ML
1000 INJECTION, SOLUTION INTRAVENOUS
Qty: 0 | Refills: 0 | Status: DISCONTINUED | OUTPATIENT
Start: 2019-02-07 | End: 2019-02-10

## 2019-02-07 RX ORDER — DEXTROSE 50 % IN WATER 50 %
15 SYRINGE (ML) INTRAVENOUS ONCE
Qty: 0 | Refills: 0 | Status: DISCONTINUED | OUTPATIENT
Start: 2019-02-07 | End: 2019-02-10

## 2019-02-07 RX ORDER — BUDESONIDE AND FORMOTEROL FUMARATE DIHYDRATE 160; 4.5 UG/1; UG/1
2 AEROSOL RESPIRATORY (INHALATION)
Qty: 0 | Refills: 0 | Status: DISCONTINUED | OUTPATIENT
Start: 2019-02-07 | End: 2019-02-10

## 2019-02-07 RX ORDER — GABAPENTIN 400 MG/1
100 CAPSULE ORAL AT BEDTIME
Qty: 0 | Refills: 0 | Status: DISCONTINUED | OUTPATIENT
Start: 2019-02-07 | End: 2019-02-10

## 2019-02-07 RX ORDER — METHADONE HYDROCHLORIDE 40 MG/1
10 TABLET ORAL EVERY 8 HOURS
Qty: 0 | Refills: 0 | Status: DISCONTINUED | OUTPATIENT
Start: 2019-02-07 | End: 2019-02-10

## 2019-02-07 RX ORDER — LEVOTHYROXINE SODIUM 125 MCG
25 TABLET ORAL DAILY
Qty: 0 | Refills: 0 | Status: DISCONTINUED | OUTPATIENT
Start: 2019-02-07 | End: 2019-02-10

## 2019-02-07 RX ADMIN — Medication 60 MILLIGRAM(S): at 16:14

## 2019-02-07 RX ADMIN — GABAPENTIN 100 MILLIGRAM(S): 400 CAPSULE ORAL at 21:49

## 2019-02-07 RX ADMIN — Medication 80 MILLIGRAM(S): at 09:17

## 2019-02-07 RX ADMIN — Medication 325 MILLIGRAM(S): at 17:58

## 2019-02-07 RX ADMIN — ESCITALOPRAM OXALATE 10 MILLIGRAM(S): 10 TABLET, FILM COATED ORAL at 17:58

## 2019-02-07 RX ADMIN — ATORVASTATIN CALCIUM 80 MILLIGRAM(S): 80 TABLET, FILM COATED ORAL at 21:49

## 2019-02-07 RX ADMIN — METHADONE HYDROCHLORIDE 10 MILLIGRAM(S): 40 TABLET ORAL at 16:14

## 2019-02-07 RX ADMIN — INSULIN GLARGINE 15 UNIT(S): 100 INJECTION, SOLUTION SUBCUTANEOUS at 21:48

## 2019-02-07 RX ADMIN — Medication 1 GRAM(S): at 21:49

## 2019-02-07 RX ADMIN — Medication 145 MILLIGRAM(S): at 16:14

## 2019-02-07 RX ADMIN — METHADONE HYDROCHLORIDE 10 MILLIGRAM(S): 40 TABLET ORAL at 21:49

## 2019-02-07 RX ADMIN — Medication 50 MILLIGRAM(S): at 21:49

## 2019-02-07 RX ADMIN — Medication 50 MILLIGRAM(S): at 16:14

## 2019-02-07 NOTE — ED ADULT NURSE NOTE - NSIMPLEMENTINTERV_GEN_ALL_ED
Implemented All Universal Safety Interventions:  [place] to call system. Call bell, personal items and telephone within reach. Instruct patient to call for assistance. Room bathroom lighting operational. Non-slip footwear when patient is off stretcher. Physically safe environment: no spills, clutter or unnecessary equipment. Stretcher in lowest position, wheels locked, appropriate side rails in place.

## 2019-02-07 NOTE — ED PROVIDER NOTE - OBJECTIVE STATEMENT
59 yo F h/o HTN, IDDM c/b neuropathy, bipolar disorder, depression, history of DVT on Coumadin (4 episodes in her lower extremities), CVA x2 (last one in 2013 with residual right sided weakness), PAD s/p bypass, NICM, HFrEF (EF 25% on echo, last admit Jan 2019) s/p AICD for primary prevention, hypothyroidism, COPD (on 2-3L oxygen at home) 57 yo F h/o HTN, IDDM c/b neuropathy, bipolar disorder, depression, history of DVT on Coumadin (4 episodes in her lower extremities), CVA x2 (last one in 2013 with residual right sided weakness), PAD s/p bypass, NICM, HFrEF (EF 25% on echo, last admit Jan 2019) s/p AICD for primary prevention, hypothyroidism, COPD (on 2-3L oxygen at home) c/o increased sob and le edema x ~ 1 wk, worse since last pm.  Pt states, "I'm full of water."  Pt reports compliance w home meds, cpap machine.  No cp, palpitations, + sob, orthopnea, pnd.  No sig uri sx, cough, fever.  Pt has not yet had fu since TX mid Jan.

## 2019-02-07 NOTE — H&P ADULT - PROBLEM SELECTOR PLAN 7
chronic history on Warfarin 1mg daily. INR 3.45.  Hold Coumadin tonight and f/u INR in AM to determine next dose.

## 2019-02-07 NOTE — ED ADULT NURSE NOTE - NS ED NURSE LEVEL OF CONSCIOUSNESS MENTAL STATUS
Problem: Patient Care Overview  Goal: Plan of Care/Patient Progress Review  Occupational Therapy Discharge Summary    Reason for therapy discharge:    Discharged to home.    Progress towards therapy goal(s). See goals on Care Plan in [facility] electronic health record for goal details.  Goals not met.  Barriers to achieving goals:   discharge from facility.    Therapy recommendation(s):    No further therapy is recommended. Patient discharged home with hospice care.         Cooperative/Awake/Alert

## 2019-02-07 NOTE — ED PROVIDER NOTE - MUSCULOSKELETAL, MLM
Spine appears normal, range of motion is not limited, no muscle or joint tenderness, no le ttp but + edema bilat

## 2019-02-07 NOTE — H&P ADULT - ATTENDING COMMENTS
Reviewed above documentation. Reviewed vitals, labs, medications, cardiac studies and additional imaging 2/7/19. Agree with the above with the following additions/amendments:  58F TRANSFORM-HF PATIENT (randomized to Torsemide) with multiple co morbidities as noted above presenting with worsening SOB/RAMSEY and orthopnea, admitted for acute on chronic sCHF exacerbation. Cath 2010 reviewed and patient with normal coronaries. Patient symptomatically improved s/p BIPAP initiation and IV lasix administration in ED.    Plan for:  Lasix 60mg IV BID  BIPAP, attempt titration to nasal cannula  Cont home Toprol to 50mg XL daily  -->NB: pt was planned for BB increase by CHF outpatient team but patient reports she continues to take Toprol 50XL daily  Strict I/O, use hat in commode for output measurement  Daily STANDING weights to be recorded and tracked  Dietician/Nutritian consultation for CHF/cardiac diet reinforcement  Bedside CHF Education to initiate upon admission  -->Of note, patient gained 10lbs since discharge and she has only weighed self once since being home. Needs reinforcement on daily weights  Advanced CHF team informed of patient readmission, team will see patient on 2/8   PT evaluation, patient to be referred to cardiac rehabilitation upon discharge for CHF cardiac conditioning    MILY Lion.  Cardiology Attending Reviewed above documentation. Reviewed vitals, labs, medications, cardiac studies and additional imaging 2/7/19. Agree with the above with the following additions/amendments:  58F TRANSFORM-HF PATIENT (randomized to Torsemide) with multiple co morbidities as noted above presenting with worsening SOB/RAMSEY and orthopnea, admitted for acute on chronic sCHF exacerbation. Cath 2010 reviewed and patient with normal coronaries. Patient symptomatically improved s/p BIPAP initiation and IV lasix administration in ED.    Plan for:  Lasix 60mg IV BID  BIPAP, attempt titration to nasal cannula  Cont home Toprol to 50mg XL daily  -->NB: pt was planned for BB increase by CHF outpatient team but patient reports she continues to take Toprol 50XL daily  Tonight hold coumadin for DVT A/C, given supratx INR  -->will obtain collateral from outpatient MD on total duration of A/C tx for DVT  Strict I/O, use hat in commode for output measurement  Daily STANDING weights to be recorded and tracked  Dietician/Nutritian consultation for CHF/cardiac diet reinforcement  Bedside CHF Education to initiate upon admission  -->Of note, patient gained 10lbs since discharge and she has only weighed self once since being home. Needs reinforcement on daily weights  Advanced CHF team informed of patient readmission, team will see patient on 2/8   PT evaluation, patient to be referred to cardiac rehabilitation upon discharge for CHF cardiac conditioning    MILY Lion.  Cardiology Attending Reviewed above documentation. Reviewed vitals, labs, medications, cardiac studies and additional imaging 2/7/19. Agree with the above with the following additions/amendments:  58F TRANSFORM-HF PATIENT (randomized to Torsemide) with multiple co morbidities as noted above presenting with worsening SOB/RAMSEY and orthopnea, admitted for acute on chronic sCHF exacerbation. Cath 2010 reviewed and patient with normal coronaries. Patient symptomatically improved s/p BIPAP initiation and IV lasix administration in ED.    Plan for:  Lasix 60mg IV BID  BIPAP, attempt titration to nasal cannula  Cont home Toprol to 50mg XL daily  -->NB: pt was planned for BB increase by CHF outpatient team but patient reports she continues to take Toprol 50XL daily  Tonight hold coumadin for DVT (x4)  A/C, given supratx INR  -->will obtain collateral from outpatient MD on total duration of A/C (anticipate lifelong given numerous prior DVTs)  Strict I/O, use hat in commode for output measurement  Daily STANDING weights to be recorded and tracked  Dietician/Nutritian consultation for CHF/cardiac diet reinforcement  Bedside CHF Education to initiate upon admission  -->Of note, patient gained 10lbs since discharge and she has only weighed self once since being home. Needs reinforcement on daily weights  Advanced CHF team informed of patient readmission, team will see patient on 2/8   PT evaluation, patient to be referred to cardiac rehabilitation upon discharge for CHF cardiac conditioning    MILY Lion.  Cardiology Attending

## 2019-02-07 NOTE — H&P ADULT - PROBLEM SELECTOR PLAN 1
-SOB improving with IV diuresis and BiPAP.   -Attempt to wean off BiPAP back to baseline Home O2 2-3L as pt tolerates.   -Pt with 9lb weight gain since discharge, reports med and fluid compliance but not with daily weight  -Continue diuresis with Lasix 60IV BID, monitor bun/cr and maintain strict I/Os and dialy weights (RN informed).  Pt in Transform Study, DC home back on Torsemide.  -1 Liter fluid restriction  -Continue Toprol XL 50mg daily, Imdur 60mg daily.  Hold Hydralazine as pt reports causes dizziness.   -No ACE-I/ARB at this time given Cr 1.56. Was under conisderation for possible initiation of Entresto as outpt pending renal function  -Reinforce CHF teaching with pt   Heart failure team notified of pt's admission  -Elevated troponin likely 2/2 acute CHF though ws not elevated last admit.  Trend Troponin, ECG unchanged.  Monitor QTc.

## 2019-02-07 NOTE — ED PROVIDER NOTE - CONSTITUTIONAL, MLM
normal... Well appearing, well nourished, awake, alert, oriented to person, place, time/situation and in sig resp distress. +tachypnea

## 2019-02-07 NOTE — H&P ADULT - PROBLEM SELECTOR PLAN 6
Cr 1.56 stable within baseline   Monitor closely with IV Diuresis.  -Pt was to folow up as outpt with Renal, reports having appt to see Dr Caicedo this month

## 2019-02-07 NOTE — ED PROVIDER NOTE - PROGRESS NOTE DETAILS
Pt feeling better w bipap, lasix.  Trop, bnp elevated, cr elevated similar to prev.  CXR c/w chf.  Will consult cardiology for admit; pt's outpt cardiologist paged to update.  Pt remains cp free. Pt feeling minimally better w bipap, lasix.  Trop, bnp elevated, cr elevated similar to prev.  CXR c/w chf.  Will consult cardiology for admit; pt's outpt cardiologist paged to update.  Pt remains cp free. Pt discussed w Dr Olguin - he requests pt be admitted to hospital[facility].  Card [facility] paged - pt discussed w Dr Simmons.  Pt accepted.

## 2019-02-07 NOTE — H&P ADULT - PROBLEM SELECTOR PLAN 2
On Levemir at home; Continue Lantus at bedtime, NovoLog with meals, and moderate ISS.    Hypothyroidism: ran out of Levothyroxine two days ago. Resume home dose and check TSH level.

## 2019-02-07 NOTE — H&P ADULT - HISTORY OF PRESENT ILLNESS
57 yo F with Contrast and Aspirin Allergy and an extensive PMHx including HTN, IDDM c/b neuropathy, bipolar disorder, depression, history of DVT on Coumadin (4 episodes in her lower extremities), CVA x2 (last one in 2013 with residual right sided weakness), PAD s/p bypass, NICM, chronic HFrEF (EF 25% on echo) s/p AICD for primary prevention, hypothyroidism, current smoker with COPD (on 2-3L oxygen at home), DHARMESH on CPAP, presenting to [facility] ER with complaints 1 week of worsening shortness of breath, RAMSEY, orthopnea (5pillows is baseline) but has increased PND, limited exertional capacities and worsening of her LE edema which are similar to her usual episodes of CHF exacerbation. Pt has had a history of multiple readmissions for CHF exacerbations in the past year, most recently 1/2019 where she was diuresed and enrolled in Transform trial.  Since her discharge she reports compliance with her medications and fluid restrictions though endorses not beinig compliant with her daily weight.  Lst weight two ago was 212lbs per pt (DC weight was 203.9lbs on 1/15/19).  She further endorses dizziness with hydralazine but does take it.  She denies any chest pain, palpitations, fever chills, abdominal pain, syncope,     On arrival to [facility]: VS: /71  HR 71  RR 36  T 97.8  O2Sat 92% RA;. BNP of 6613 (range in 6000s prior admis), Troponin elevated at 0.04 (neg last admit), CXR with significant fluid overload, EKG SR with incomplete LBBB and no significant ischemic changes, tachypnea resolved with BIPAP and Lasix 80mg IVP x 1.  Pt is now admitted to [facility] for acute on chronic systolic CHF exacerbation.  HF team has been made aware pt’s admission.  At time of interview, pt has voided 4 times with last measured void of 300cc.  Pt remains on BiPap but reports breathing has improved. 59 yo F with Contrast and Aspirin Allergy and an extensive PMHx including HTN, IDDM c/b neuropathy, bipolar disorder, depression, history of DVT on Coumadin (4 episodes in her lower extremities), CVA x2 (last one in 2013 with residual right sided weakness), PAD s/p bypass, NICM, chronic HFrEF (EF 25% on echo) s/p AICD for primary prevention, hypothyroidism, current smoker with COPD (on 2-3L oxygen at home), DHARMESH on CPAP, presenting to [facility] ER with complaints 1 week of worsening shortness of breath, RAMSEY, orthopnea (5pillows is baseline) but has increased PND, limited exertional capacities with dyspnea walking < 25ft to bathroom, and worsening of her LE edema which are similar to her usual episodes of CHF exacerbation. Pt has had a history of multiple readmissions for CHF exacerbations in the past year, most recently 1/2019 where she was diuresed and enrolled in Transform trial.  Since her discharge she reports compliance with her medications and fluid restrictions though endorses not beinig compliant with her daily weight.  Lst weight two ago was 212lbs per pt (DC weight was 203.9lbs on 1/15/19).  She further endorses dizziness with hydralazine but does take it.  She denies any chest pain, palpitations, fever chills, abdominal pain, syncope,     On arrival to [facility]: VS: /71  HR 71  RR 36  T 97.8  O2Sat 92% RA;. BNP of 6613 (range in 6000s prior admis), Troponin elevated at 0.04 (neg last admit), CXR with significant fluid overload, EKG SR with incomplete LBBB and no significant ischemic changes, tachypnea resolved with BIPAP and Lasix 80mg IVP x 1.  Pt is now admitted to [address] for acute on chronic systolic CHF exacerbation.  HF team has been made aware pt’s admission.  At time of interview, pt has voided 4 times with last measured void of 300cc.  Pt remains on BiPap but reports breathing has improved.

## 2019-02-07 NOTE — ED PROVIDER NOTE - DIAGNOSTIC INTERPRETATION
ER Physician:  Adriana Director  CHEST XRAY INTERPRETATION: lungs w chf, cardiomeg similar to prev, bony structures intact

## 2019-02-07 NOTE — H&P ADULT - PROBLEM SELECTOR PLAN 5
Continue Plavix  -Had recent LE Duplex as outpt with Vascular @[facility] showing patent B/L BROOKE stents and recommended for RTC in 6months.

## 2019-02-07 NOTE — H&P ADULT - NSHPSOCIALHISTORY_GEN_ALL_CORE
Current smoker 3cigs/day; has smoked for many years.  Denies any drug abuse, alcohol abuse history    Lives with family; has HHA 5hrs/day 7days per week

## 2019-02-07 NOTE — H&P ADULT - PROBLEM SELECTOR PLAN 10
DC planning to home once clinically improved. Will need reinstatement of HHA and reinforcement of CHF teaching to prevent readmission.      Case D/W Dr Simmons

## 2019-02-07 NOTE — H&P ADULT - PROBLEM SELECTOR PLAN 9
F/E/N: Dash CHO Diet with 1Liter fluid restriction  Keep K+ > 4, Mg > 2.0  VTE PPx: INR supertherapeutic.  OOB with assistance. F/E/N: Dash CHO Diet with 1Liter fluid restriction  Keep K+ > 4, Mg > 2.0  VTE PPx: INR supertherapeutic.  OOB with assistance.  Pain MGMT: Continue current home med regimen. ePer outpt Pain MGMT MD Methadone was increased to 10mg QID. Decreasing to TID for now and monitor QTc.

## 2019-02-07 NOTE — H&P ADULT - RS GEN PE MLT RESP DETAILS PC
rhonchi/no wheezes/diminished breath sounds, L/rales/diminished breath sounds, R/no chest wall tenderness

## 2019-02-07 NOTE — ED PROVIDER NOTE - MEDICAL DECISION MAKING DETAILS
Pt w h/o mult med problems c/o sob she feels is her chf.  Lung w rhonchi w/o crackles, sat nl w pt's home o2.  Pt requesting bipap - bipap ordered.  ? chf vs copd (no sig wheezing and good air movement), less concern for uri/pna.  Plan labs, ekg, cxr, bipap, lasix, reassess. Pt w h/o mult med problems c/o sob she feels is her chf.  Lung w rhonchi w/o crackles, sat nl w pt's home o2.  + mild tachypnea.  Pt requesting bipap - bipap ordered.  ? chf vs copd (no sig wheezing and good air movement), less concern for uri/pna.  Plan labs, ekg, cxr, bipap, lasix, reassess.

## 2019-02-07 NOTE — H&P ADULT - ASSESSMENT
59 yo F with Contrast and Aspirin Allergy and an extensive PMHx including HTN, IDDM c/b neuropathy, bipolar disorder, depression, history of DVT on Coumadin (4 episodes in her lower extremities), CVA x2 (last one in 2013 with residual right sided weakness), PAD s/p bypass, NICM, chronic HFrEF (EF 25% on echo) s/p AICD for primary prevention, hypothyroidism, current smoker with COPD (on 2-3L oxygen at home), DHARMESH on CPAP, now readmitted with acute on chronic systolic CHF exacerbation.

## 2019-02-07 NOTE — H&P ADULT - NSHPOUTPATIENTPROVIDERS_GEN_ALL_CORE
Dr. Zoya Olguin and Dr. Luis Angel Gutierrez (cardiology)  Dr. Jean Marie Greene (pulmonary)  Dr. Bri Albarran (PCP)  Dr. Sandoval (EP)  Dr Luna (Pain Mgmt)  [phone]

## 2019-02-07 NOTE — ED ADULT TRIAGE NOTE - CHIEF COMPLAINT QUOTE
Pt w hx of COPD and prior intubations presents c/o difficulty breathing x one week, worse thi morning , pt states she is compliant w her meds and uses cpap at night. Pt is tachypneic in triage, speaking in short sentences. Denies chest pain.

## 2019-02-07 NOTE — ED PROVIDER NOTE - PSH
AICD (automatic cardioverter/defibrillator) present    H/O abdominal hysterectomy    H/O extremity bypass graft    H/O tubal ligation    History of cholecystectomy

## 2019-02-07 NOTE — ED PROVIDER NOTE - PMH
Acute on chronic systolic heart failure    Bipolar depression    CHF (congestive heart failure)    Chronic pain    COPD (chronic obstructive pulmonary disease)    CVA (cerebral vascular accident)    CVA (cerebral vascular accident)    Depression    Depression    Diabetes    DM (diabetes mellitus)    DVT (deep venous thrombosis)    HLD (hyperlipidemia)    HTN (hypertension)    Neuropathy    PAD (peripheral artery disease)

## 2019-02-08 ENCOUNTER — TRANSCRIPTION ENCOUNTER (OUTPATIENT)
Age: 59
End: 2019-02-08

## 2019-02-08 LAB
ANION GAP SERPL CALC-SCNC: 11 MMOL/L — SIGNIFICANT CHANGE UP (ref 5–17)
APTT BLD: 47.6 SEC — HIGH (ref 27.5–36.3)
BASOPHILS # BLD AUTO: 0.03 K/UL — SIGNIFICANT CHANGE UP (ref 0–0.2)
BASOPHILS NFR BLD AUTO: 0.4 % — SIGNIFICANT CHANGE UP (ref 0–2)
BUN SERPL-MCNC: 21 MG/DL — SIGNIFICANT CHANGE UP (ref 7–23)
CALCIUM SERPL-MCNC: 8.9 MG/DL — SIGNIFICANT CHANGE UP (ref 8.4–10.5)
CHLORIDE SERPL-SCNC: 99 MMOL/L — SIGNIFICANT CHANGE UP (ref 96–108)
CO2 SERPL-SCNC: 29 MMOL/L — SIGNIFICANT CHANGE UP (ref 22–31)
CREAT SERPL-MCNC: 1.44 MG/DL — HIGH (ref 0.5–1.3)
EOSINOPHIL # BLD AUTO: 0.27 K/UL — SIGNIFICANT CHANGE UP (ref 0–0.5)
EOSINOPHIL NFR BLD AUTO: 3.8 % — SIGNIFICANT CHANGE UP (ref 0–6)
GLUCOSE SERPL-MCNC: 104 MG/DL — HIGH (ref 70–99)
HCT VFR BLD CALC: 32.8 % — LOW (ref 34.5–45)
HGB BLD-MCNC: 9.4 G/DL — LOW (ref 11.5–15.5)
IMM GRANULOCYTES NFR BLD AUTO: 0.3 % — SIGNIFICANT CHANGE UP (ref 0–1.5)
INR BLD: 4.17 — HIGH (ref 0.88–1.16)
LYMPHOCYTES # BLD AUTO: 0.87 K/UL — LOW (ref 1–3.3)
LYMPHOCYTES # BLD AUTO: 12.3 % — LOW (ref 13–44)
MAGNESIUM SERPL-MCNC: 2.1 MG/DL — SIGNIFICANT CHANGE UP (ref 1.6–2.6)
MCHC RBC-ENTMCNC: 27.5 PG — SIGNIFICANT CHANGE UP (ref 27–34)
MCHC RBC-ENTMCNC: 28.7 GM/DL — LOW (ref 32–36)
MCV RBC AUTO: 95.9 FL — SIGNIFICANT CHANGE UP (ref 80–100)
MONOCYTES # BLD AUTO: 0.76 K/UL — SIGNIFICANT CHANGE UP (ref 0–0.9)
MONOCYTES NFR BLD AUTO: 10.7 % — SIGNIFICANT CHANGE UP (ref 2–14)
NEUTROPHILS # BLD AUTO: 5.13 K/UL — SIGNIFICANT CHANGE UP (ref 1.8–7.4)
NEUTROPHILS NFR BLD AUTO: 72.5 % — SIGNIFICANT CHANGE UP (ref 43–77)
PLATELET # BLD AUTO: 175 K/UL — SIGNIFICANT CHANGE UP (ref 150–400)
POTASSIUM SERPL-MCNC: 3.6 MMOL/L — SIGNIFICANT CHANGE UP (ref 3.5–5.3)
POTASSIUM SERPL-SCNC: 3.6 MMOL/L — SIGNIFICANT CHANGE UP (ref 3.5–5.3)
PROTHROM AB SERPL-ACNC: 49.3 SEC — HIGH (ref 10–12.9)
RBC # BLD: 3.42 M/UL — LOW (ref 3.8–5.2)
RBC # FLD: 19.2 % — HIGH (ref 10.3–14.5)
SODIUM SERPL-SCNC: 139 MMOL/L — SIGNIFICANT CHANGE UP (ref 135–145)
T4 AB SER-ACNC: 5.81 UG/DL — SIGNIFICANT CHANGE UP (ref 3.17–11.72)
TROPONIN T SERPL-MCNC: <0.01 NG/ML — SIGNIFICANT CHANGE UP (ref 0–0.01)
WBC # BLD: 7.08 K/UL — SIGNIFICANT CHANGE UP (ref 3.8–10.5)
WBC # FLD AUTO: 7.08 K/UL — SIGNIFICANT CHANGE UP (ref 3.8–10.5)

## 2019-02-08 PROCEDURE — 99221 1ST HOSP IP/OBS SF/LOW 40: CPT | Mod: GC

## 2019-02-08 PROCEDURE — 99233 SBSQ HOSP IP/OBS HIGH 50: CPT

## 2019-02-08 RX ORDER — POTASSIUM CHLORIDE 20 MEQ
40 PACKET (EA) ORAL ONCE
Qty: 0 | Refills: 0 | Status: COMPLETED | OUTPATIENT
Start: 2019-02-08 | End: 2019-02-08

## 2019-02-08 RX ORDER — FUROSEMIDE 40 MG
20 TABLET ORAL ONCE
Qty: 0 | Refills: 0 | Status: COMPLETED | OUTPATIENT
Start: 2019-02-08 | End: 2019-02-08

## 2019-02-08 RX ORDER — FUROSEMIDE 40 MG
80 TABLET ORAL
Qty: 0 | Refills: 0 | Status: DISCONTINUED | OUTPATIENT
Start: 2019-02-08 | End: 2019-02-09

## 2019-02-08 RX ADMIN — PANTOPRAZOLE SODIUM 40 MILLIGRAM(S): 20 TABLET, DELAYED RELEASE ORAL at 07:38

## 2019-02-08 RX ADMIN — Medication 10 MILLIGRAM(S): at 07:25

## 2019-02-08 RX ADMIN — CLOPIDOGREL BISULFATE 75 MILLIGRAM(S): 75 TABLET, FILM COATED ORAL at 11:04

## 2019-02-08 RX ADMIN — Medication 40 MILLIEQUIVALENT(S): at 08:53

## 2019-02-08 RX ADMIN — Medication 325 MILLIGRAM(S): at 18:05

## 2019-02-08 RX ADMIN — Medication 5 UNIT(S): at 16:41

## 2019-02-08 RX ADMIN — Medication 145 MILLIGRAM(S): at 11:04

## 2019-02-08 RX ADMIN — Medication 60 MILLIGRAM(S): at 07:25

## 2019-02-08 RX ADMIN — METHADONE HYDROCHLORIDE 10 MILLIGRAM(S): 40 TABLET ORAL at 05:50

## 2019-02-08 RX ADMIN — Medication 20 MILLIGRAM(S): at 09:54

## 2019-02-08 RX ADMIN — Medication 50 MILLIGRAM(S): at 05:50

## 2019-02-08 RX ADMIN — ISOSORBIDE MONONITRATE 60 MILLIGRAM(S): 60 TABLET, EXTENDED RELEASE ORAL at 11:04

## 2019-02-08 RX ADMIN — BUDESONIDE AND FORMOTEROL FUMARATE DIHYDRATE 2 PUFF(S): 160; 4.5 AEROSOL RESPIRATORY (INHALATION) at 08:50

## 2019-02-08 RX ADMIN — BUDESONIDE AND FORMOTEROL FUMARATE DIHYDRATE 2 PUFF(S): 160; 4.5 AEROSOL RESPIRATORY (INHALATION) at 21:32

## 2019-02-08 RX ADMIN — Medication 325 MILLIGRAM(S): at 05:50

## 2019-02-08 RX ADMIN — ESCITALOPRAM OXALATE 10 MILLIGRAM(S): 10 TABLET, FILM COATED ORAL at 18:05

## 2019-02-08 RX ADMIN — ATORVASTATIN CALCIUM 80 MILLIGRAM(S): 80 TABLET, FILM COATED ORAL at 21:32

## 2019-02-08 RX ADMIN — Medication 10 MILLIGRAM(S): at 18:04

## 2019-02-08 RX ADMIN — Medication 1 GRAM(S): at 11:04

## 2019-02-08 RX ADMIN — Medication 1 GRAM(S): at 18:05

## 2019-02-08 RX ADMIN — Medication 5 UNIT(S): at 07:26

## 2019-02-08 RX ADMIN — METHADONE HYDROCHLORIDE 10 MILLIGRAM(S): 40 TABLET ORAL at 15:56

## 2019-02-08 RX ADMIN — Medication 80 MILLIGRAM(S): at 15:53

## 2019-02-08 RX ADMIN — Medication 1 GRAM(S): at 05:50

## 2019-02-08 RX ADMIN — Medication 25 MICROGRAM(S): at 05:50

## 2019-02-08 RX ADMIN — GABAPENTIN 100 MILLIGRAM(S): 400 CAPSULE ORAL at 21:32

## 2019-02-08 RX ADMIN — INSULIN GLARGINE 15 UNIT(S): 100 INJECTION, SOLUTION SUBCUTANEOUS at 22:00

## 2019-02-08 RX ADMIN — Medication 50 MILLIGRAM(S): at 21:32

## 2019-02-08 RX ADMIN — METHADONE HYDROCHLORIDE 10 MILLIGRAM(S): 40 TABLET ORAL at 21:32

## 2019-02-08 NOTE — DISCHARGE NOTE ADULT - MEDICATION SUMMARY - MEDICATIONS TO TAKE
I will START or STAY ON the medications listed below when I get home from the hospital:    methadone 10 mg oral tablet  -- 1 tab(s) by mouth 4 times a day  -- Indication: For Neuropathy    isosorbide mononitrate 60 mg oral tablet, extended release  -- 1 tab(s) by mouth once a day (in the morning)  -- Indication: For Hypertension / Chronic Systolic CHF    Coumadin 1 mg oral tablet  -- 1 tab(s) by mouth once tonight (See MD tomorrow for Coumadin level/INR)  -- Indication: For DVT (deep venous thrombosis)    gabapentin 100 mg oral capsule  -- orally once a day  -- Indication: For Neuropaty    escitalopram 10 mg oral tablet  -- 1 tab(s) by mouth once a day  -- Indication: For Depression    traZODone 50 mg oral tablet  -- orally once a day (at bedtime)  -- Indication: For Depression    NovoLOG FlexPen 100 units/mL injectable solution  -- 5 unit(s) injectable 3 times a day (before meals)   -- Check with your doctor before becoming pregnant.  Do not drink alcoholic beverages when taking this medication.  Keep in refrigerator.  Do not freeze.  Obtain medical advice before taking any non-prescription drugs as some may affect the action of this medication.    -- Indication: For Diabetes    Levemir FlexTouch 100 units/mL subcutaneous solution  -- 15 unit(s) subcutaneous once a day (at bedtime)   -- Check with your doctor before becoming pregnant.  Do not drink alcoholic beverages when taking this medication.  Keep in refrigerator.  Do not freeze.    -- Indication: For Diabetes    atorvastatin 80 mg oral tablet  -- 1 tab(s) by mouth once a day (at bedtime)  -- Indication: For Cholesterol.    Zetia 10 mg oral tablet  -- 1 tab(s) by mouth once a day   -- Check with your doctor before becoming pregnant.  It is very important that you take or use this exactly as directed.  Do not skip doses or discontinue unless directed by your doctor.  Obtain medical advice before taking any non-prescription drugs as some may affect the action of this medication.    -- Indication: For Cholesterol.    fenofibrate 145 mg oral tablet  -- 1 tab(s) by mouth once a day  -- Indication: For Cholesterol.    clopidogrel 75 mg oral tablet  -- 1 tab(s) by mouth once a day  -- Indication: For Peripheral Vascular Disease    busPIRone 10 mg oral tablet  -- 1 tab(s) by mouth 2 times a day  -- Indication: For Bipolar / depression    metoprolol succinate 50 mg oral tablet, extended release  -- 1 tab(s) by mouth once a day  -- Indication: For HTN (hypertension) / Chronic Systolic CHF    ipratropium-albuterol 0.5 mg-2.5 mg/3 mLinhalation solution  -- 3 milliliter(s) inhaled every 6 hours, As Needed -for shortness of breath and/or wheezing   -- Indication: For COPD (chronic obstructive pulmonary disease)    Symbicort 160 mcg-4.5 mcg/inh inhalation aerosol  -- 2 puff(s) inhaled 2 times a day  -- Indication: For COPD (chronic obstructive pulmonary disease)    torsemide 20 mg oral tablet  -- 4 tab(s) by mouth once a day   -- Indication: For Chronic Systolic CHF.    FeroSul 325 mg (65 mg elemental iron) oral tablet  -- 1 tab(s) by mouth 2 times a day   -- Indication: For Iron Deficiency Anemia    azithromycin 250 mg oral tablet  -- 1 tab(s) by mouth once a day  (3 more days remaining - last day 2/13/19)   -- Indication: For COPD (chronic obstructive pulmonary disease)    sucralfate 1 g oral tablet  -- 1 tab(s) by mouth 4 times a day  -- Indication: For Constipation.    cyclobenzaprine 5 mg oral tablet  -- 1  by mouth once a day, As Needed  -- Indication: For Muscle Spasm    omeprazole 40 mg oral delayed release capsule  -- 1 cap(s) by mouth once a day  -- Indication: For GERD    levothyroxine 25 mcg (0.025 mg) oral tablet  -- 1 tab(s) by mouth once a day  -- Indication: For Hypothyroidism

## 2019-02-08 NOTE — PROGRESS NOTE ADULT - ATTENDING COMMENTS
Reviewed above documentation. Reviewed vitals, labs, medications, cardiac studies and additional imaging 2/8/19. Agree with the above with the following additions/amendments:  58F TRANSFORM-HF PATIENT (randomized to [facility]) with multiple co morbidities as noted above presenting with worsening SOB/RAMSEY and orthopnea, admitted for acute on chronic sCHF exacerbation. Cath 2010 reviewed and patient with normal coronaries. Patient symptomatically improved s/p BIPAP initiation and IV lasix administration. Pt remains hypervolemic but volume status much improved. Pt loss 4lb since admission.    Plan for:  Continue Lasix 60mg IV BID  Wean to home NC 3L for supplemental O2  Toprol 50mg XL daily  -->will consider dose augmentation to Toprol 100XL when euvolemic  HOLD COUMADIN give supratx INR again today  Strict I/O documentation  Daily STANDING weights   Dietician/Nutritian consultation   Bedside CHF Education, reinfoced importance of daily weight   Advanced CHF team following  Patient to be referred to cardiac rehabilitation upon discharge for CHF cardiac conditioning    MILY Lion.  Cardiology Attending Reviewed above documentation. Reviewed vitals, labs, medications, cardiac studies and additional imaging 2/8/19. Agree with the above with the following additions/amendments:  58F TRANSFORM-HF PATIENT (randomized to [facility]) with multiple co morbidities as noted above presenting with worsening SOB/RAMSEY and orthopnea, admitted for acute on chronic sCHF exacerbation. Cath 2010 reviewed and patient with normal coronaries. Patient symptomatically improved s/p BIPAP initiation and IV lasix administration. Pt remains hypervolemic but volume status much improved. Pt loss 4lb since admission.    Plan for:  Augment Lasix 80mg IV BID  -->goal net neg 3L per day  Wean to home NC 3L for supplemental O2  Toprol 50mg XL daily  -->will consider dose augmentation to Toprol 100XL when euvolemic  HOLD COUMADIN give supratx INR again today  Strict I/O documentation  Daily STANDING weights   Dietician/Nutritian consultation   Bedside CHF Education, reinfoced importance of daily weight   Advanced CHF team following  Patient to be referred to cardiac rehabilitation upon discharge for CHF cardiac conditioning    MILY Lion.  Cardiology Attending Reviewed above documentation. Reviewed vitals, labs, medications, cardiac studies and additional imaging 2/8/19. Agree with the above with the following additions/amendments:  58F TRANSFORM-HF PATIENT (randomized to [facility]) with multiple co morbidities as noted above presenting with worsening SOB/RAMSEY and orthopnea, admitted for acute on chronic sCHF exacerbation. Cath 2010 reviewed and patient with normal coronaries. Patient symptomatically improved s/p BIPAP initiation and IV lasix administration. Pt remains hypervolemic but volume status much improved. Pt loss 4lb since admission.    Plan for:  Augment Lasix 80mg IV BID  -->goal net neg 3L per day  Wean to home NC 3L for supplemental O2  Toprol 50mg XL daily  -->will consider dose augmentation to Toprol 100XL when euvolemic  HOLD COUMADIN give supratx INR again today  Strict I/O documentation  Daily STANDING weights   Dietician/Nutrition consultation   Bedside CHF Education, reinfoced importance of daily weight   Advanced CHF team following  Re: DHARMESH, patient likely needs updated outpatient sleep study to qualify for home CPAP machine. She does not have CPAP at home.   -->Will refer for outpatient sleep study upon discharge  Patient to be referred to cardiac rehabilitation upon discharge for CHF cardiac conditioning    MILY Lion.  Cardiology Attending

## 2019-02-08 NOTE — CONSULT NOTE ADULT - ATTENDING COMMENTS
59 yo F with and an extensive PMHx, NICM, chronic HFrEF (EF 25% on echo) s/p AICD for primary prevention, HTN, IDDM c/b neuropathy, bipolar disorder, depression, history of DVT on Coumadin (4 episodes in her lower extremities), CVA x2 (last one in 2013 with residual right sided weakness), PAD s/p bypass, hypothyroidism, current smoker with COPD (on 2-3L oxygen at home), DHARMESH on CPAP, presenting to [facility] ER with complaints 1 week of worsening shortness of breath, RAMSEY, orthopnea (5pillows is baseline) increased PND, limited exertional capacities with dyspnea walking < 25ft to bathroom, and worsening of her LE edema / increased weight gain which are similar to her usual episodes of CHF exacerbation.     Pt has had a history of multiple readmissions for CHF exacerbations in the past year, most recently 1/2019 where she was diuresed.  Since her discharge she reports compliance with her medications and fluid restrictions though endorses not being compliant with her daily weight. She denies any chest pain, palpitations, fever chills, abdominal pain, syncope,     On arrival to [facility]: VS: /71  HR 71  RR 36  T 97.8  O2Sat 92% RA;. BNP of 6613 (range in 6000s prior admis), Troponin elevated at 0.04 (neg last admit), CXR with significant fluid overload, EKG SR with incomplete LBBB and no significant ischemic changes, tachypnea resolved with BIPAP and Lasix 80mg IVP x 1.  Pt is now admitted to [address] for acute on chronic systolic CHF exacerbation.  At time of interview, pt has voided 4 times with last measured void of 300cc.  Pt remains on BiPap but reports breathing has improved.    was seen in the CHF clinic ~ 2 weeks ago when her beta blocker was increased and patient was stable.  Currently in holding area on Lasix 60mg IV BID.  with multifactorial SOB.    Problem:  Acute-on-chronic systolic heart failure, with EF of 25%.   2010 coronary catheterization: normal coronary anatomy.     Not on ACE-i/Entresto given renal dysfunction on these meds in past. Consider as outpatient   Hydralazine/isordil useful for BP control   - Continue IV Diuresis until euvolemic and can switch to PO Diuretic regimen,  - Continue current dose of Toprol XL  - Not on ACE-i/Entresto given renal dysfunction on these meds in past. Consider as outpatient       ZOTLAN Olguin

## 2019-02-08 NOTE — PROGRESS NOTE ADULT - PROBLEM SELECTOR PLAN 1
-SOB improving with IV diuresis and BiPAP.   -Attempt to wean off BiPAP back to baseline Home O2 2-3L as pt tolerates.   -Pt with 9lb weight gain since discharge, reports med and fluid compliance but not with daily weight  -Continue diuresis with Lasix 60IV BID, monitor bun/cr and maintain strict I/Os and dialy weights (RN informed).  Pt in Transform Study, DC home back on Torsemide.  -1 Liter fluid restriction  -Continue Toprol XL 50mg daily, Imdur 60mg daily.  Hold Hydralazine as pt reports causes dizziness.   -No ACE-I/ARB at this time given Cr 1.56. Was under conisderation for possible initiation of Entresto as outpt pending renal function  -Reinforce CHF teaching with pt   Heart failure team notified of pt's admission  -Elevated troponin likely 2/2 acute CHF though ws not elevated last admit.  Trend Troponin, ECG unchanged.  Monitor QTc. -SOB improving with IV diuresis, used BiPAP ovenight now on NC.   -Pt weight 94kg today down 2kg since yesterday.    -Continue diuresis increasing Lasix 80IV BID, monitor bun/cr and maintain strict I/Os and daily weights (RN informed).  Pt in Transform Study, DC home back on Torsemide.  -1 Liter fluid restriction  -Continue Toprol XL 50mg daily, Imdur 60mg daily.  Uptitrate Toprol XL once euvolemic. Hydralazine DC'd as pt reports causes dizziness.   -No ACE-I/ARB at this time given Cr 1.56. Was under consideration for possible initiation of Entresto as outpt pending renal function  -Reinforce CHF teaching and daily weight with pt   Heart failure team notified of pt's admission and agree with plan.   -Elevated troponin likely 2/2 acute CHF though ws not elevated last admit.  Trend Troponin, ECG unchanged.  Monitor QTc.

## 2019-02-08 NOTE — PROGRESS NOTE ADULT - SUBJECTIVE AND OBJECTIVE BOX
Interventional Cardiology PA Adult Progress Note    C.C.: SOB    Subjective Assessment:  Pt seen and examined. Reports breathing improved overnight, used BiPAP, voiding regularly, less dyspneic with conversation and ambulation, back on home NC 2-3L.        12 Point ROS Negative except as per Subjective and HPI  	  MEDICATIONS:  furosemide   Injectable 80 milliGRAM(s) IV Push <User Schedule>  isosorbide   mononitrate ER Tablet (IMDUR) 60 milliGRAM(s) Oral daily  metoprolol succinate ER 50 milliGRAM(s) Oral daily      buDESOnide 160 MICROgram(s)/formoterol 4.5 MICROgram(s) Inhaler 2 Puff(s) Inhalation two times a day    busPIRone 10 milliGRAM(s) Oral two times a day  cyclobenzaprine 5 milliGRAM(s) Oral daily PRN  escitalopram 10 milliGRAM(s) Oral <User Schedule>  gabapentin 100 milliGRAM(s) Oral at bedtime  methadone    Tablet 10 milliGRAM(s) Oral every 8 hours  traZODone 50 milliGRAM(s) Oral at bedtime    pantoprazole    Tablet 40 milliGRAM(s) Oral before breakfast  sucralfate 1 Gram(s) Oral four times a day    atorvastatin 80 milliGRAM(s) Oral at bedtime  dextrose 40% Gel 15 Gram(s) Oral once PRN  dextrose 50% Injectable 12.5 Gram(s) IV Push once  dextrose 50% Injectable 25 Gram(s) IV Push once  dextrose 50% Injectable 25 Gram(s) IV Push once  fenofibrate Tablet 145 milliGRAM(s) Oral daily  glucagon  Injectable 1 milliGRAM(s) IntraMuscular once PRN  insulin glargine Injectable (LANTUS) 15 Unit(s) SubCutaneous at bedtime  insulin lispro (HumaLOG) corrective regimen sliding scale   SubCutaneous Before meals and at bedtime  insulin lispro Injectable (HumaLOG) 5 Unit(s) SubCutaneous three times a day before meals  levothyroxine 25 MICROGram(s) Oral daily    clopidogrel Tablet 75 milliGRAM(s) Oral daily  dextrose 5%. 1000 milliLiter(s) IV Continuous <Continuous>  ferrous    sulfate 325 milliGRAM(s) Oral two times a day      	    [PHYSICAL EXAM:  TELEMETRY:  T(C): 36.8 (02-08-19 @ 08:52), Max: 37.1 (02-08-19 @ 00:20)  HR: 61 (02-08-19 @ 08:52) (60 - 76)  BP: 131/69 (02-08-19 @ 08:52) (98/55 - 149/67)  RR: 21 (02-08-19 @ 08:52) (16 - 23)  SpO2: 96% (02-08-19 @ 08:52) (92% - 98%)  Wt(kg): --  I&O's Summary    07 Feb 2019 07:01  -  08 Feb 2019 07:00  --------------------------------------------------------  IN: 240 mL / OUT: 1850 mL / NET: -1610 mL    08 Feb 2019 07:01  -  08 Feb 2019 10:36  --------------------------------------------------------  IN: 0 mL / OUT: 675 mL / NET: -675 mL        Ward:  Central/PICC/Mid Line:                                         Appearance: Normal	  HEENT:   Normal oral mucosa, PERRL, EOMI	  Neck: Supple, - JVD   Cardiovascular: RRR Normal S1 S2, No murmurs,   Respiratory: Lungs Decreased Breath Sounds at bases otherwise CTA; No rales, Rhonchi, Wheezing	  Gastrointestinal: obese abdomen, non-distended, Soft, Non-tender, + BS	  Skin: chronic venous stasis changes b/l le  Extremities: 1+ edema; Normal range of motion, No clubbing, cyanosis or edema  Neurologic: Non-focal  Psychiatry: A & O x 3, Mood & affect appropriate      	    	  RADIOLOGY:   LABS:	 	  CARDIAC MARKERS:                          9.4    7.08  )-----------( 175      ( 08 Feb 2019 05:34 )             32.8     02-08    139  |  99  |  21  ----------------------------<  104<H>  3.6   |  29  |  1.44<H>    Ca    8.9      08 Feb 2019 05:33  Mg     2.1     02-08      proBNP:   Lipid Profile:   HgA1c:   TSH:   PT/INR - ( 08 Feb 2019 05:35 )   PT: 49.3 sec;   INR: 4.17          PTT - ( 08 Feb 2019 05:35 )  PTT:47.6 sec

## 2019-02-08 NOTE — CONSULT NOTE ADULT - SUBJECTIVE AND OBJECTIVE BOX
Patient is a 58y old  Female who presents with a chief complaint of SOB (07 Feb 2019 13:42)      HPI:  59 yo F with and an extensive PMHx, NICM, chronic HFrEF (EF 25% on echo) s/p AICD for primary prevention, HTN, IDDM c/b neuropathy, bipolar disorder, depression, history of DVT on Coumadin (4 episodes in her lower extremities), CVA x2 (last one in 2013 with residual right sided weakness), PAD s/p bypass, hypothyroidism, current smoker with COPD (on 2-3L oxygen at home), DHARMESH on CPAP, presenting to [facility] ER with complaints 1 week of worsening shortness of breath, RAMSEY, orthopnea (5pillows is baseline) increased PND, limited exertional capacities with dyspnea walking < 25ft to bathroom, and worsening of her LE edema / increased weight gain which are similar to her usual episodes of CHF exacerbation.     Pt has had a history of multiple readmissions for CHF exacerbations in the past year, most recently 1/2019 where she was diuresed.  Since her discharge she reports compliance with her medications and fluid restrictions though endorses not being compliant with her daily weight. She denies any chest pain, palpitations, fever chills, abdominal pain, syncope,     On arrival to [facility]: VS: /71  HR 71  RR 36  T 97.8  O2Sat 92% RA;. BNP of 6613 (range in 6000s prior admis), Troponin elevated at 0.04 (neg last admit), CXR with significant fluid overload, EKG SR with incomplete LBBB and no significant ischemic changes, tachypnea resolved with BIPAP and Lasix 80mg IVP x 1.  Pt is now admitted to [address] for acute on chronic systolic CHF exacerbation.  HF team has been made aware pt’s admission.  At time of interview, pt has voided 4 times with last measured void of 300cc.  Pt remains on BiPap but reports breathing has improved.    Patient is known to the CHF service from last admission and was seen in the CHF clinic ~ 2 weeks ago when her beta blocker was increased and patient was stable.  Currently on Lasix 60mg IV BID.      PAST MEDICAL & SURGICAL HISTORY:  Neuropathy  PAD (peripheral artery disease)  Bipolar depression  Depression  HTN (hypertension)  DVT (deep venous thrombosis)  CVA (cerebral vascular accident)  DM (diabetes mellitus)  Acute on chronic systolic heart failure  Chronic pain  HLD (hyperlipidemia)  CVA (cerebral vascular accident)  Depression  COPD (chronic obstructive pulmonary disease)  Diabetes  CHF (congestive heart failure)  H/O extremity bypass graft  History of cholecystectomy  H/O tubal ligation  H/O abdominal hysterectomy  AICD (automatic cardioverter/defibrillator) present      Home Medications:  Ambien 10 mg oral tablet: 1 tab(s) orally once a day (at bedtime), As Needed (07 Feb 2019 13:53)  busPIRone 10 mg oral tablet: 1 tab(s) orally 2 times a day (07 Feb 2019 13:53)  cyclobenzaprine 5 mg oral tablet: 1  orally once a day, As Needed (07 Feb 2019 13:53)  escitalopram 10 mg oral tablet: 1 tab(s) orally once a day (07 Feb 2019 13:53)  fenofibrate 145 mg oral tablet: 1 tab(s) orally once a day (07 Feb 2019 13:53)  gabapentin 100 mg oral capsule: orally once a day (07 Feb 2019 13:53)  isosorbide mononitrate 60 mg oral tablet, extended release: 1 tab(s) orally once a day (in the morning) (07 Feb 2019 13:53)  levothyroxine 25 mcg (0.025 mg) oral tablet: 1 tab(s) orally once a day (07 Feb 2019 13:53)  methadone 10 mg oral tablet: 1 tab(s) orally 4 times a day (07 Feb 2019 13:53)  omeprazole 40 mg oral delayed release capsule: 1 cap(s) orally once a day (07 Feb 2019 13:53)  sucralfate 1 g oral tablet: 1 tab(s) orally 4 times a day (07 Feb 2019 13:53)  Symbicort 160 mcg-4.5 mcg/inh inhalation aerosol: 2 puff(s) inhaled 2 times a day (07 Feb 2019 13:53)  tiZANidine 4 mg oral tablet: muscle spasms (07 Feb 2019 13:53)  traZODone 50 mg oral tablet: orally once a day (at bedtime) (07 Feb 2019 13:53)      PREVIOUS DIAGNOSTIC TESTING:      ECHO  FINDINGS: 2/8/2019  Interpretation Summary  The left ventricle is moderately dilated.There is severe global   hypokinesis of   the left ventricle.The left ventricular ejection fraction is 25%.The left   atrium is moderately dilated.The mitral inflow pattern is consistent with   restrictive left ventricular filling, suggestive of severely elevated   left   atrial pressure (>20mmHg).  The right atrium is dilated.Structurally   normal   aortic valve.No aortic regurgitation noted.Structurally normal mitral   valve.There is moderate mitral regurgitation.Structurally normal   tricuspid   valve.There is mild to moderate tricuspid regurgitation.There is moderate   pulmonary hypertension.The pulmonary artery systolic pressure is   estimated to   be 50 mmHg.Structurally normal pulmonic valve.There is mild pulmonic   regurgitation.No aortic root dilatation.The IVC is dilated (>2.1 cm)   with an   abnormal inspiratory collapse (<50%) consistent with elevated right   atrial   pressure.  The echocardiogram is essentially normal.      STRESS   FINDINGS: No stress test           CATHETERIZATION   FINDINGS: normal coronary anatomy as of 2010     RADIOLOGY & ADDITIONAL STUDIES:      MEDICATIONS  (STANDING):  atorvastatin 80 milliGRAM(s) Oral at bedtime  buDESOnide 160 MICROgram(s)/formoterol 4.5 MICROgram(s) Inhaler 2 Puff(s) Inhalation two times a day  busPIRone 10 milliGRAM(s) Oral two times a day  clopidogrel Tablet 75 milliGRAM(s) Oral daily  fenofibrate Tablet 145 milliGRAM(s) Oral daily  ferrous    sulfate 325 milliGRAM(s) Oral two times a day  furosemide   Injectable 60 milliGRAM(s) IV Push <User Schedule>  gabapentin 100 milliGRAM(s) Oral at bedtime  insulin glargine Injectable (LANTUS) 15 Unit(s) SubCutaneous at bedtime  insulin lispro (HumaLOG) corrective regimen sliding scale   SubCutaneous Before meals and at bedtime  insulin lispro Injectable (HumaLOG) 5 Unit(s) SubCutaneous three times a day before meals  isosorbide   mononitrate ER Tablet (IMDUR) 60 milliGRAM(s) Oral daily  levothyroxine 25 MICROGram(s) Oral daily  methadone    Tablet 10 milliGRAM(s) Oral every 8 hours  metoprolol succinate ER 50 milliGRAM(s) Oral daily  pantoprazole    Tablet 40 milliGRAM(s) Oral before breakfast  sucralfate 1 Gram(s) Oral four times a day  traZODone 50 milliGRAM(s) Oral at bedtime    MEDICATIONS  (PRN):  cyclobenzaprine 5 milliGRAM(s) Oral daily PRN Muscle Spasm  dextrose 40% Gel 15 Gram(s) Oral once PRN Blood Glucose LESS THAN 70 milliGRAM(s)/deciliter  glucagon  Injectable 1 milliGRAM(s) IntraMuscular once PRN Glucose LESS THAN 70 milligrams/deciliter          FAMILY HISTORY:  Family history of diabetes mellitus (Mother)  Family history of hypertension (Mother)      SOCIAL HISTORY:      REVIEW OF SYSTEMS  CONSTITUTIONAL:  No weight loss, fever, chills, weakness or fatigue.  HEENT:  Eyes:  No visual loss, blurred vision, double vision or yellow sclerae. Ears, Nose, Throat:  No hearing loss, sneezing, congestion, runny nose or sore throat.  SKIN:  No rash or itching.  CARDIOVASCULAR:  No chest pain, chest pressure or chest discomfort. No palpitations or edema.  RESPIRATORY:  No shortness of breath, cough or sputum.  GASTROINTESTINAL:  No anorexia, nausea, vomiting or diarrhea. No abdominal pain or blood.  GENITOURINARY:  No burning on urination.   NEUROLOGICAL:  No headache, dizziness, syncope, paralysis, ataxia, numbness or tingling in the extremities. No change in bowel or bladder control.  MUSCULOSKELETAL:  No muscle, back pain, joint pain or stiffness.  HEMATOLOGIC:  No anemia, bleeding or bruising.  LYMPHATICS:  No enlarged nodes. No history of splenectomy.  PSYCHIATRIC:  No history of depression or anxiety.  ENDOCRINOLOGIC:  No reports of sweating, cold or heat intolerance. No polyuria or polydipsia.  ALLERGIES:  No history of asthma, hives, eczema or rhinitis.      Vital Signs Last 24 Hrs  T(C): 36.8 (08 Feb 2019 08:52), Max: 37.1 (08 Feb 2019 00:20)  T(F): 98.3 (08 Feb 2019 08:52), Max: 98.8 (08 Feb 2019 00:20)  HR: 61 (08 Feb 2019 08:52) (60 - 76)  BP: 131/69 (08 Feb 2019 08:52) (98/55 - 149/67)  BP(mean): 94 (07 Feb 2019 13:42) (94 - 94)  RR: 21 (08 Feb 2019 08:52) (16 - 23)  SpO2: 96% (08 Feb 2019 08:52) (92% - 98%)    PHYSICAL EXAMINATION  GENERAL:  The patient appeared to be in no distress.  Lying in bed comfortably.  SKIN:  No skin changes noted  HEENT:  Head was atraumatic and normocephalic.  Eyes:  Extraocular muscles were intact.  The patient was anicteric.  Pupils were equally reactive to light. ENT exam normal.  NECK:  Supple.  There was no JVD.  No bruit was heard over the carotids.  HEART:  S1 and S2, regular.  No MRG.  LUNGS:  CTAB  ABDOMEN:  Soft and nontender.  Bowel sounds were present.  EXTREMITIES: Pulses palpable x4, no Edema  MUSCULOSKELETAL:  There was no deformity.  There was full range of motion in all the extremities.  NEUROLOGICAL:  There was no focal deficit.         TELEMETRY: NSR    ECG: NSR, Incomplete LBBB, LAD    I&O's Detail    07 Feb 2019 07:01  -  08 Feb 2019 07:00  --------------------------------------------------------  IN:    Oral Fluid: 240 mL  Total IN: 240 mL    OUT:    Voided: 1850 mL  Total OUT: 1850 mL    Total NET: -1610 mL          LABS:                        9.4    7.08  )-----------( 175      ( 08 Feb 2019 05:34 )             32.8     02-08    139  |  99  |  21  ----------------------------<  104<H>  3.6   |  29  |  1.44<H>    Ca    8.9      08 Feb 2019 05:33  Mg     2.1     02-08      CARDIAC MARKERS ( 08 Feb 2019 05:33 )  x     / <0.01 ng/mL / x     / x     / x      CARDIAC MARKERS ( 07 Feb 2019 16:49 )  x     / 0.02 ng/mL / 160 U/L / x     / 3.3 ng/mL  CARDIAC MARKERS ( 07 Feb 2019 09:21 )  x     / 0.04 ng/mL / x     / x     / x          PT/INR - ( 08 Feb 2019 05:35 )   PT: 49.3 sec;   INR: 4.17          PTT - ( 08 Feb 2019 05:35 )  PTT:47.6 sec    I&O's Summary    07 Feb 2019 07:01  -  08 Feb 2019 07:00  --------------------------------------------------------  IN: 240 mL / OUT: 1850 mL / NET: -1610 mL Patient is a 58y old  Female who presents with a chief complaint of SOB (07 Feb 2019 13:42)      HPI:  57 yo F with and an extensive PMHx, NICM, chronic HFrEF (EF 25% on echo) s/p AICD for primary prevention, HTN, IDDM c/b neuropathy, bipolar disorder, depression, history of DVT on Coumadin (4 episodes in her lower extremities), CVA x2 (last one in 2013 with residual right sided weakness), PAD s/p bypass, hypothyroidism, current smoker with COPD (on 2-3L oxygen at home), DHARMESH on CPAP, presenting to [facility] ER with complaints 1 week of worsening shortness of breath, RAMSEY, orthopnea (5pillows is baseline) increased PND, limited exertional capacities with dyspnea walking < 25ft to bathroom, and worsening of her LE edema / increased weight gain which are similar to her usual episodes of CHF exacerbation.     Pt has had a history of multiple readmissions for CHF exacerbations in the past year, most recently 1/2019 where she was diuresed.  Since her discharge she reports compliance with her medications and fluid restrictions though endorses not being compliant with her daily weight. She denies any chest pain, palpitations, fever chills, abdominal pain, syncope,     On arrival to [facility]: VS: /71  HR 71  RR 36  T 97.8  O2Sat 92% RA;. BNP of 6613 (range in 6000s prior admis), Troponin elevated at 0.04 (neg last admit), CXR with significant fluid overload, EKG SR with incomplete LBBB and no significant ischemic changes, tachypnea resolved with BIPAP and Lasix 80mg IVP x 1.  Pt is now admitted to [address] for acute on chronic systolic CHF exacerbation.  HF team has been made aware pt’s admission.  At time of interview, pt has voided 4 times with last measured void of 300cc.  Pt remains on BiPap but reports breathing has improved.    Patient is known to the CHF service from last admission and was seen in the CHF clinic ~ 2 weeks ago when her beta blocker was increased and patient was stable.  Currently on Lasix 60mg IV BID.      PAST MEDICAL & SURGICAL HISTORY:  Neuropathy  PAD (peripheral artery disease)  Bipolar depression  Depression  HTN (hypertension)  DVT (deep venous thrombosis)  CVA (cerebral vascular accident)  DM (diabetes mellitus)  Acute on chronic systolic heart failure  Chronic pain  HLD (hyperlipidemia)  CVA (cerebral vascular accident)  Depression  COPD (chronic obstructive pulmonary disease)  Diabetes  CHF (congestive heart failure)  H/O extremity bypass graft  History of cholecystectomy  H/O tubal ligation  H/O abdominal hysterectomy  AICD (automatic cardioverter/defibrillator) present      Home Medications:  Ambien 10 mg oral tablet: 1 tab(s) orally once a day (at bedtime), As Needed (07 Feb 2019 13:53)  busPIRone 10 mg oral tablet: 1 tab(s) orally 2 times a day (07 Feb 2019 13:53)  cyclobenzaprine 5 mg oral tablet: 1  orally once a day, As Needed (07 Feb 2019 13:53)  escitalopram 10 mg oral tablet: 1 tab(s) orally once a day (07 Feb 2019 13:53)  fenofibrate 145 mg oral tablet: 1 tab(s) orally once a day (07 Feb 2019 13:53)  gabapentin 100 mg oral capsule: orally once a day (07 Feb 2019 13:53)  isosorbide mononitrate 60 mg oral tablet, extended release: 1 tab(s) orally once a day (in the morning) (07 Feb 2019 13:53)  levothyroxine 25 mcg (0.025 mg) oral tablet: 1 tab(s) orally once a day (07 Feb 2019 13:53)  methadone 10 mg oral tablet: 1 tab(s) orally 4 times a day (07 Feb 2019 13:53)  omeprazole 40 mg oral delayed release capsule: 1 cap(s) orally once a day (07 Feb 2019 13:53)  sucralfate 1 g oral tablet: 1 tab(s) orally 4 times a day (07 Feb 2019 13:53)  Symbicort 160 mcg-4.5 mcg/inh inhalation aerosol: 2 puff(s) inhaled 2 times a day (07 Feb 2019 13:53)  tiZANidine 4 mg oral tablet: muscle spasms (07 Feb 2019 13:53)  traZODone 50 mg oral tablet: orally once a day (at bedtime) (07 Feb 2019 13:53)      PREVIOUS DIAGNOSTIC TESTING:      ECHO  FINDINGS: 2/8/2019  Interpretation Summary  The left ventricle is moderately dilated.There is severe global   hypokinesis of   the left ventricle.The left ventricular ejection fraction is 25%.The left   atrium is moderately dilated.The mitral inflow pattern is consistent with   restrictive left ventricular filling, suggestive of severely elevated   left   atrial pressure (>20mmHg).  The right atrium is dilated.Structurally   normal   aortic valve.No aortic regurgitation noted.Structurally normal mitral   valve.There is moderate mitral regurgitation.Structurally normal   tricuspid   valve.There is mild to moderate tricuspid regurgitation.There is moderate   pulmonary hypertension.The pulmonary artery systolic pressure is   estimated to   be 50 mmHg.Structurally normal pulmonic valve.There is mild pulmonic   regurgitation.No aortic root dilatation.The IVC is dilated (>2.1 cm)   with an   abnormal inspiratory collapse (<50%) consistent with elevated right   atrial   pressure.  The echocardiogram is essentially normal.      STRESS   FINDINGS: No stress test           CATHETERIZATION   FINDINGS: normal coronary anatomy as of 2010     RADIOLOGY & ADDITIONAL STUDIES:      MEDICATIONS  (STANDING):  atorvastatin 80 milliGRAM(s) Oral at bedtime  buDESOnide 160 MICROgram(s)/formoterol 4.5 MICROgram(s) Inhaler 2 Puff(s) Inhalation two times a day  busPIRone 10 milliGRAM(s) Oral two times a day  clopidogrel Tablet 75 milliGRAM(s) Oral daily  fenofibrate Tablet 145 milliGRAM(s) Oral daily  ferrous    sulfate 325 milliGRAM(s) Oral two times a day  furosemide   Injectable 60 milliGRAM(s) IV Push <User Schedule>  gabapentin 100 milliGRAM(s) Oral at bedtime  insulin glargine Injectable (LANTUS) 15 Unit(s) SubCutaneous at bedtime  insulin lispro (HumaLOG) corrective regimen sliding scale   SubCutaneous Before meals and at bedtime  insulin lispro Injectable (HumaLOG) 5 Unit(s) SubCutaneous three times a day before meals  isosorbide   mononitrate ER Tablet (IMDUR) 60 milliGRAM(s) Oral daily  levothyroxine 25 MICROGram(s) Oral daily  methadone    Tablet 10 milliGRAM(s) Oral every 8 hours  metoprolol succinate ER 50 milliGRAM(s) Oral daily  pantoprazole    Tablet 40 milliGRAM(s) Oral before breakfast  sucralfate 1 Gram(s) Oral four times a day  traZODone 50 milliGRAM(s) Oral at bedtime    MEDICATIONS  (PRN):  cyclobenzaprine 5 milliGRAM(s) Oral daily PRN Muscle Spasm  dextrose 40% Gel 15 Gram(s) Oral once PRN Blood Glucose LESS THAN 70 milliGRAM(s)/deciliter  glucagon  Injectable 1 milliGRAM(s) IntraMuscular once PRN Glucose LESS THAN 70 milligrams/deciliter          FAMILY HISTORY:  Family history of diabetes mellitus (Mother)  Family history of hypertension (Mother)      SOCIAL HISTORY:  Current smoker 3cigs/day; has smoked for many years.  Denies any drug abuse, alcohol abuse history    REVIEW OF SYSTEMS  AS noted in HPI      Vital Signs Last 24 Hrs  T(C): 36.8 (08 Feb 2019 08:52), Max: 37.1 (08 Feb 2019 00:20)  T(F): 98.3 (08 Feb 2019 08:52), Max: 98.8 (08 Feb 2019 00:20)  HR: 61 (08 Feb 2019 08:52) (60 - 76)  BP: 131/69 (08 Feb 2019 08:52) (98/55 - 149/67)  BP(mean): 94 (07 Feb 2019 13:42) (94 - 94)  RR: 21 (08 Feb 2019 08:52) (16 - 23)  SpO2: 96% (08 Feb 2019 08:52) (92% - 98%)    PHYSICAL EXAMINATION  GENERAL:  The patient appeared to be in no distress.  Lying in bed comfortably.  SKIN:  No skin changes noted  HEENT:  Head was atraumatic and normocephalic.  Eyes:  Extraocular muscles were intact.  The patient was anicteric.  Pupils were equally reactive to light. ENT exam normal.  NECK:  Supple.  There was no JVD.  No bruit was heard over the carotids.  HEART:  S1 and S2, regular.  No MRG.  LUNGS:  CTAB  ABDOMEN:  Soft and nontender, mild distention.  Bowel sounds were present.  EXTREMITIES: Pulses palpable x4, no Edema  MUSCULOSKELETAL:  There was no deformity.  There was full range of motion in all the extremities.  NEUROLOGICAL:  There was no focal deficit.         TELEMETRY: NSR    ECG: NSR, Incomplete LBBB, LAD    I&O's Detail    07 Feb 2019 07:01  -  08 Feb 2019 07:00  --------------------------------------------------------  IN:    Oral Fluid: 240 mL  Total IN: 240 mL    OUT:    Voided: 1850 mL  Total OUT: 1850 mL    Total NET: -1610 mL          LABS:                        9.4    7.08  )-----------( 175      ( 08 Feb 2019 05:34 )             32.8     02-08    139  |  99  |  21  ----------------------------<  104<H>  3.6   |  29  |  1.44<H>    Ca    8.9      08 Feb 2019 05:33  Mg     2.1     02-08      CARDIAC MARKERS ( 08 Feb 2019 05:33 )  x     / <0.01 ng/mL / x     / x     / x      CARDIAC MARKERS ( 07 Feb 2019 16:49 )  x     / 0.02 ng/mL / 160 U/L / x     / 3.3 ng/mL  CARDIAC MARKERS ( 07 Feb 2019 09:21 )  x     / 0.04 ng/mL / x     / x     / x          PT/INR - ( 08 Feb 2019 05:35 )   PT: 49.3 sec;   INR: 4.17          PTT - ( 08 Feb 2019 05:35 )  PTT:47.6 sec    I&O's Summary    07 Feb 2019 07:01  -  08 Feb 2019 07:00  --------------------------------------------------------  IN: 240 mL / OUT: 1850 mL / NET: -1610 mL

## 2019-02-08 NOTE — DISCHARGE NOTE ADULT - CARE PROVIDER_API CALL
Zoya Olguin)  Cardiology; Interventional Cardiology  [address] [place] [address]  Phone: [phone]  Fax: [phone]  Follow Up Time:

## 2019-02-08 NOTE — CONSULT NOTE ADULT - ASSESSMENT
59 yo F with PMHx of NICM, COPD, multiple co-morbidities presenting with worsening SOB/RAMSEY and orthopnea, admitted for acute-on-chronic sCHF exacerbation with multifactorial SOB. Readmission from recent admission in January 2019. Placed on Bipap in ER, now improved after IV diuresis.  Problem:  Acute-on-chronic systolic heart failure, with EF of 25%.    Current CHF Meds: Lasix 60mg IV BID, Toprol XL 50mg daily, Imdur 60    Recs  - Continue IV Diuresis until euvolemic and can switch to PO  - 59 yo F with PMHx of NICM, COPD, multiple co-morbidities presenting with worsening SOB/RAMSEY and orthopnea, admitted for acute-on-chronic sCHF exacerbation with multifactorial SOB. Readmission from recent admission in January 2019. Placed on Bipap in ER, now improved after IV diuresis.  Problem:  Acute-on-chronic systolic heart failure, with EF of 25%.    Current CHF Meds: Lasix 60mg IV BID, Toprol XL 50mg daily, Imdur 60    Recs  - Continue IV Diuresis until euvolemic and can switch to PO Diuretic regimen, defer PO regimen choice to primary team, patient in [facility] HF trial  - Continue current dose of Toprol XL  - Follow up in CHF clinic for further management

## 2019-02-08 NOTE — DISCHARGE NOTE ADULT - PATIENT PORTAL LINK FT
You can access the [facility], offered by [facility], by registering with the following website: [email or web address]

## 2019-02-08 NOTE — DISCHARGE NOTE ADULT - SECONDARY DIAGNOSIS.
HTN (hypertension) COPD (chronic obstructive pulmonary disease) HLD (hyperlipidemia) DVT (deep venous thrombosis) Chronic anemia PAD (peripheral artery disease)

## 2019-02-08 NOTE — PROGRESS NOTE ADULT - PROBLEM SELECTOR PLAN 10
DC planning to home once clinically improved. Will need reinstatement of HHA and reinforcement of CHF teaching to prevent readmission.      Case D/W Dr Simmons DC planning to home once clinically improved likely overweekend or MOnday. Will need reinstatement of HHA and reinforcement of CHF teaching to prevent readmission.      Case D/W Dr Simmons

## 2019-02-08 NOTE — PROGRESS NOTE ADULT - PROBLEM SELECTOR PLAN 2
On Levemir at home; Continue Lantus at bedtime, NovoLog with meals, and moderate ISS.    Hypothyroidism: ran out of Levothyroxine two days ago. Resume home dose and check TSH level. Continue Lantus at bedtime, NovoLog with meals, and moderate ISS.  Levemir at home;     Hypothyroidism: ran out of Levothyroxine two days PTA. TSH normal and resumed home dose.

## 2019-02-08 NOTE — DISCHARGE NOTE ADULT - CARE PLAN
Principal Discharge DX:	Acute on chronic systolic CHF (congestive heart failure), NYHA class 4  Goal:	MD follow-up Medication compliance  Assessment and plan of treatment:	See Dr. Olguin 2/9/19 at 10:45AM. Follow-up with Primary Doctor tomorrow. Monitor weight daily. If lower extremity swelling or shortness of breath or increased weight gain noted call MD immediately and seek medical attention as directed. Low salt diet. 1.5 liter fluid restriction. Can schedule follow-up with NP Hedy Martínez as well tomorrow at [phone].  Secondary Diagnosis:	HTN (hypertension)  Assessment and plan of treatment:	Continue listed medical regimen. Stop Hydralazine. MD follow-up  Secondary Diagnosis:	COPD (chronic obstructive pulmonary disease)  Assessment and plan of treatment:	Complete 3 more days of Azithromycin. Your Primary Doctor is to monitor your QTC EKG level tomorrow on follow-up on this antibiotic and medical current regimen. Continue current nebulizer/inhaler regimen. Follow-up with Primary Doctor tomorrow.  Secondary Diagnosis:	HLD (hyperlipidemia)  Assessment and plan of treatment:	Continue Lipitor. Tricor. Zetia added. MD follow-up  Secondary Diagnosis:	DVT (deep venous thrombosis)  Assessment and plan of treatment:	Coumadin 1 mg tonight. Repeat INR tomorrow with Primary Doctor/clinic and discuss further management. Follow-up with Primary Doctor tomorrow.  Secondary Diagnosis:	Chronic anemia  Assessment and plan of treatment:	Continue Iron. If abnormal bleeding or dark tarry stools or bright red blood per rectum or blood noted in urine call MD immediately and seek medical attention  Secondary Diagnosis:	PAD (peripheral artery disease)  Assessment and plan of treatment:	MD follow-up Principal Discharge DX:	Acute on chronic systolic CHF (congestive heart failure), NYHA class 4  Goal:	MD follow-up Medication compliance  Assessment and plan of treatment:	See Dr. Olguin 2/19/19 at 10:45AM. Follow-up with Primary Doctor tomorrow. Monitor weight daily. If lower extremity swelling or shortness of breath or increased weight gain noted call MD immediately and seek medical attention as directed. Low salt diet. 1.5 liter fluid restriction. Can schedule follow-up with NP Hedy Martínez as well tomorrow at [phone].  Secondary Diagnosis:	HTN (hypertension)  Assessment and plan of treatment:	Continue listed medical regimen. Stop Hydralazine. MD follow-up  Secondary Diagnosis:	COPD (chronic obstructive pulmonary disease)  Assessment and plan of treatment:	Complete 3 more days of Azithromycin. Your Primary Doctor is to monitor your QTC EKG level tomorrow on follow-up on this antibiotic and medical current regimen. Continue current nebulizer/inhaler regimen. Follow-up with Primary Doctor tomorrow.  Secondary Diagnosis:	HLD (hyperlipidemia)  Assessment and plan of treatment:	Continue Lipitor. Tricor. Zetia added. MD follow-up  Secondary Diagnosis:	DVT (deep venous thrombosis)  Assessment and plan of treatment:	Coumadin 1 mg tonight. Repeat INR tomorrow with Primary Doctor/clinic and discuss further management. Follow-up with Primary Doctor tomorrow.  Secondary Diagnosis:	Chronic anemia  Assessment and plan of treatment:	Continue Iron. If abnormal bleeding or dark tarry stools or bright red blood per rectum or blood noted in urine call MD immediately and seek medical attention  Secondary Diagnosis:	PAD (peripheral artery disease)  Assessment and plan of treatment:	MD follow-up

## 2019-02-08 NOTE — PROGRESS NOTE ADULT - PROBLEM SELECTOR PLAN 9
F/E/N: Dash CHO Diet with 1Liter fluid restriction  Keep K+ > 4, Mg > 2.0  VTE PPx: INR supertherapeutic.  OOB with assistance.  Pain MGMT: Continue current home med regimen. ePer outpt Pain MGMT MD Methadone was increased to 10mg QID. Decreasing to TID for now and monitor QTc. F/E/N: Dash CHO Diet with 1Liter fluid restriction  Keep K+ > 4, Mg > 2.0 - K+ repleted today for K 3.6  VTE PPx: INR supertherapeutic 4.17 today; continue to hold warfarin. OOB with assistance.    Pain MGMT: Continue current home med regimen. Per outpt Pain MGMT MD Methadone was increased to 10mg QID. Decreasing to TID for now and monitor QTc.

## 2019-02-08 NOTE — PROGRESS NOTE ADULT - PROBLEM SELECTOR PLAN 5
Continue Plavix  -Had recent LE Duplex as outpt with Vascular @[facility] showing patent B/L BROOKE stents and recommended for RTC in 6months.

## 2019-02-08 NOTE — DISCHARGE NOTE ADULT - ADDITIONAL INSTRUCTIONS
See Dr. Olguin 2/9/19 at 10:45AM. Follow-up with Primary Doctor tomorrow. CHF follow-up with MAYELIN Martínez. See Dr. Olguin 2/9/19 at 10:45AM. Follow-up with Primary Doctor tomorrow. CHF follow-up with NP Hedy Martínez. For Diabetes. Continue current insulin regimen (eat regular and full meals when taking current insulin regimen). Check fingerstick three times daily before meals and at bedtime. If fingerstick greater than 200 or less than 80 call MD for further instructions. Avoid concentrated sweets. Follow diabetic diet. See Primary Doctor tomorrow See Dr. Olguin 2/19/19 at 10:45AM. Follow-up with Primary Doctor tomorrow. CHF follow-up with NP Hedy Martínez. For Diabetes. Continue current insulin regimen (eat regular and full meals when taking current insulin regimen). Check fingerstick three times daily before meals and at bedtime. If fingerstick greater than 200 or less than 80 call MD for further instructions. Avoid concentrated sweets. Follow diabetic diet. See Primary Doctor tomorrow

## 2019-02-08 NOTE — DISCHARGE NOTE ADULT - PLAN OF CARE
MD follow-up Medication compliance See Dr. Olguin 2/9/19 at 10:45AM. Follow-up with Primary Doctor tomorrow. Monitor weight daily. If lower extremity swelling or shortness of breath or increased weight gain noted call MD immediately and seek medical attention as directed. Low salt diet. 1.5 liter fluid restriction. Can schedule follow-up with NP Hedy Martínez as well tomorrow at [phone]. Continue listed medical regimen. Stop Hydralazine. MD follow-up Complete 3 more days of Azithromycin. Your Primary Doctor is to monitor your QTC EKG level tomorrow on follow-up on this antibiotic and medical current regimen. Continue current nebulizer/inhaler regimen. Follow-up with Primary Doctor tomorrow. Continue Lipitor. Tricor. Zetia added. MD follow-up Coumadin 1 mg tonight. Repeat INR tomorrow with Primary Doctor/clinic and discuss further management. Follow-up with Primary Doctor tomorrow. Continue Iron. If abnormal bleeding or dark tarry stools or bright red blood per rectum or blood noted in urine call MD immediately and seek medical attention MD follow-up See Dr. Olguin 2/19/19 at 10:45AM. Follow-up with Primary Doctor tomorrow. Monitor weight daily. If lower extremity swelling or shortness of breath or increased weight gain noted call MD immediately and seek medical attention as directed. Low salt diet. 1.5 liter fluid restriction. Can schedule follow-up with NP Hedy Martínez as well tomorrow at [phone].

## 2019-02-08 NOTE — DISCHARGE NOTE ADULT - HOSPITAL COURSE
57 y/o F Contrast/ASA Allergy, MHX HTN, DM, Bipolar Disorder/Depression, h/o DVT on Coumadin, CVA x2 residual right sided weakness, CKD stage 3, Chronic Anemia, PAD s/p LE Bypass, NICM, Chronic Systolic CHF s/p AICD for primary prevention, Hypothyroidism, Current smoker with COPD (on 2-3L oxygen at home), DHARMESH on CPAP, who presented w/ Acute on Chronic Systolic CHF, supratherepeutic INR, COPD exacerbation, in TRANSFORM HF trial transitioned to PO Torsemide on 2/9/19, COPD now stable on Azithromycin course and current inhaler/nebulizer regimen, INR now therapeutic. See Dr. Olguin 2/9/19 at 10:45AM. Follow-up with Primary Doctor tomorrow. CHF follow-up with MAYELIN Martínez.     Acute on Chronic Systolic CHF  TTE 1/2019 showed EF 25%, moderate MR, suggestive elevated LA pressure, moderate pulmonary HTN  S/p IV diuresis  Torsemide 80mg PO daily  Continue Toprol XL 50mg daily   Continue Imdur 60mg daily   No ACEI/ARB 2nd to CKD.     HTN  Stop Hydralazine   Continue BB/Imdur/Torsemide     COPD  Continue home O2 2-3L at home and CPAP at home.  Continue Azithromycin 250mg daily for three more days to complete 5 day course.  QTC monitored on Azithromycin ( today)  Continue Symbicort  Duoneb prescribed PRN     DM  Continue home insulin regimen    Hypothyroidism  Continue home dose Synthroid    HLD  Continue Lipitor 80mg Daily. Zetia 10mg daily added       Bipolar depression.    Continue home dose Psych meds     PAD  Continue Plavix/ Statin  Outpatient follow-up    CKD stage 3  Cr. 1.5 on admission, Cr. level mostly 1.5-2.0 in the last year at [facility].   Cr. currently 1.78. Voiding well.  Outpatient monitoring on follow-up.     Chronic Anemia  Hgb 9-10 at baseline.  Continue PO Iron.     DVT  INR now 2.6 today. Resume Coumadin 1mg tonight  Repeat INR tomorrow with Primary Doctor/clinic to discuss further management.     Neuropathy  On Methadone 10mg every 6 hours as outpatient.    Outpatient PT  HHA reinstated   Social work referred patient for [employer] star phone call provider service     See Dr. Olguin 2/9/19 at 10:45AM. Follow-up with Primary Doctor tomorrow. CHF follow-up with MAYELIN Martínez.     Pt seen and examined bedside. Reports cough improved.   Patient denies C/P, SOB, N/V, dizziness, palpitations, and diaphoresis.  Pt denies fever/chills, dysuria, abdominal pain, diarrhea.  	  V/S 	BP: 100s-140s/50s-60s 	HR: 70s   RR: 16   T: 98	   O2: 98% on 2-3 L NC   	  GEN: NAD  PULM:  Mild Bibasilar Crackles resolved post cough  CARD: No JVD B/L, RRR, S1 and S2   ABD: +BS, NT, soft/ND	  EXT: No Edema B/L LE  NEURO: A+Ox3, no new focal deficits                          9.7    6.27  )-----------( 166      ( 10 Feb 2019 06:51 )             33.8     02-10    140  |  99  |  21  ----------------------------<  92  4.1   |  34<H>  |  1.78<H>    Ca    9.5      10 Feb 2019 06:51  Mg     2.1     02-10 57 y/o F Contrast/ASA Allergy, MHX HTN, DM, Bipolar Disorder/Depression, h/o DVT on Coumadin, CVA x2 residual right sided weakness, CKD stage 3, Chronic Anemia, PAD s/p LE Bypass, NICM, Chronic Systolic CHF s/p AICD for primary prevention, Hypothyroidism, Current smoker with COPD (on 2-3L oxygen at home), DHARMESH on CPAP, who presented w/ Acute on Chronic Systolic CHF, supratherepeutic INR, COPD exacerbation, in TRANSFORM HF trial transitioned to PO Torsemide on 2/9/19, COPD now stable on Azithromycin course and current inhaler/nebulizer regimen, INR now therapeutic. See Dr. Olguin 2/9/19 at 10:45AM. Follow-up with Primary Doctor tomorrow. CHF follow-up with NP Hedy Martínez.     Acute on Chronic Systolic CHF  TTE 1/2019 showed EF 25%, moderate MR, suggestive elevated LA pressure, moderate pulmonary HTN  S/p IV diuresis  Torsemide 80mg PO daily  Continue Toprol XL 50mg daily   Continue Imdur 60mg daily   No ACEI/ARB 2nd to CKD.     HTN  Stop Hydralazine   Continue BB/Imdur/Torsemide     COPD  Continue home O2 2-3L at home and CPAP at home.  Continue Azithromycin 250mg daily for three more days to complete 5 day course.  QTC monitored on Azithromycin ( today)  Continue Symbicort  Duoneb prescribed PRN     DM  FS 90s to 140s prior this admit   Patient did not eat a proper lunch, small intake after nutritional insulin FS 51 and patient lightheaded. Patient ate a sandwich and had juice repeat .  Currently asymptomatic See Primary Doctor tomorrow for close follow-up.    Continue home insulin regimen Lantus 15 units QHS. Continue Lispro 5 units pre-meal (only if eating a complete/full meal),    Hypothyroidism  Continue home dose Synthroid    HLD  Continue Lipitor 80mg Daily. Zetia 10mg daily added       Bipolar depression.    Continue home dose Psych meds     PAD  Continue Plavix/ Statin  Outpatient follow-up    CKD stage 3  Cr. 1.5 on admission, Cr. level mostly 1.5-2.0 in the last year at [facility].   Cr. currently 1.78. Voiding well.  Outpatient monitoring on follow-up.     Chronic Anemia  Hgb 9-10 at baseline.  Continue PO Iron.     DVT  INR now 2.6 today. Resume Coumadin 1mg tonight  Repeat INR tomorrow with Primary Doctor/clinic to discuss further management.     Neuropathy  On Methadone 10mg every 6 hours as outpatient.    Outpatient PT  HHA reinstated   Social work referred patient for [employer] star phone call provider service     See Dr. Olguin 2/9/19 at 10:45AM. Follow-up with Primary Doctor tomorrow. CHF follow-up with NP Hedy Martínez.     Pt seen and examined bedside. Reports cough improved.   Patient denies C/P, SOB, N/V, dizziness, palpitations, and diaphoresis.  Pt denies fever/chills, dysuria, abdominal pain, diarrhea.  	  V/S 	BP: 100s-140s/50s-60s 	HR: 70s   RR: 16   T: 98	   O2: 98% on 2-3 L NC   	  GEN: NAD  PULM:  Mild Bibasilar Crackles resolved post cough  CARD: No JVD B/L, RRR, S1 and S2   ABD: +BS, NT, soft/ND	  EXT: No Edema B/L LE  NEURO: A+Ox3, no new focal deficits               9.7    6.27  )-----------( 166      ( 10 Feb 2019 06:51 )             33.8     140  |  99  |  21  ----------------------------<  92  4.1   |  34<H>  |  1.78<H>    Ca    9.5      10 Feb 2019 06:51  Mg     2.1 57 y/o F Contrast/ASA Allergy, MHX HTN, DM, Bipolar Disorder/Depression, h/o DVT on Coumadin, CVA x2 residual right sided weakness, CKD stage 3, Chronic Anemia, PAD s/p LE Bypass, NICM, Chronic Systolic CHF s/p AICD for primary prevention, Hypothyroidism, Current smoker with COPD (on 2-3L oxygen at home), DHARMESH on CPAP, who presented w/ Acute on Chronic Systolic CHF, supratherepeutic INR, COPD exacerbation, in TRANSFORM HF trial transitioned to PO Torsemide on 2/9/19, COPD now stable on Azithromycin course and current inhaler/nebulizer regimen, INR now therapeutic. See Dr. Olguin 2/19/19 at 10:45AM. Follow-up with Primary Doctor tomorrow. CHF follow-up with NP Hedy Martínez.     Acute on Chronic Systolic CHF  TTE 1/2019 showed EF 25%, moderate MR, suggestive elevated LA pressure, moderate pulmonary HTN  S/p IV diuresis  Torsemide 80mg PO daily  Continue Toprol XL 50mg daily   Continue Imdur 60mg daily   No ACEI/ARB 2nd to CKD.     HTN  Stop Hydralazine   Continue BB/Imdur/Torsemide     COPD  Continue home O2 2-3L at home and CPAP at home.  Continue Azithromycin 250mg daily for three more days to complete 5 day course.  QTC monitored on Azithromycin ( today)  Continue Symbicort  Duoneb prescribed PRN     DM  FS 90s to 140s prior this admit   Patient did not eat a proper lunch, small intake after nutritional insulin FS 51 and patient lightheaded. Patient ate a sandwich and had juice repeat .  Currently asymptomatic See Primary Doctor tomorrow for close follow-up.    Continue home insulin regimen Lantus 15 units QHS. Continue Lispro 5 units pre-meal (only if eating a complete/full meal),    Hypothyroidism  Continue home dose Synthroid    HLD  Continue Lipitor 80mg Daily. Zetia 10mg daily added       Bipolar depression.    Continue home dose Psych meds     PAD  Continue Plavix/ Statin  Outpatient follow-up    CKD stage 3  Cr. 1.5 on admission, Cr. level mostly 1.5-2.0 in the last year at [facility].   Cr. currently 1.78. Voiding well.  Outpatient monitoring on follow-up.     Chronic Anemia  Hgb 9-10 at baseline.  Continue PO Iron.     DVT  INR now 2.6 today. Resume Coumadin 1mg tonight  Repeat INR tomorrow with Primary Doctor/clinic to discuss further management.     Neuropathy  On Methadone 10mg every 6 hours as outpatient.    Outpatient PT  HHA reinstated   Social work referred patient for [employer] star phone call provider service     See Dr. Olguin 2/19/19 at 10:45AM. Follow-up with Primary Doctor tomorrow. CHF follow-up with NP Hedy Martínez.     Pt seen and examined bedside. Reports cough improved.   Patient denies C/P, SOB, N/V, dizziness, palpitations, and diaphoresis.  Pt denies fever/chills, dysuria, abdominal pain, diarrhea.  	  V/S 	BP: 100s-140s/50s-60s 	HR: 70s   RR: 16   T: 98	   O2: 98% on 2-3 L NC   	  GEN: NAD  PULM:  Mild Bibasilar Crackles resolved post cough  CARD: No JVD B/L, RRR, S1 and S2   ABD: +BS, NT, soft/ND	  EXT: No Edema B/L LE  NEURO: A+Ox3, no new focal deficits               9.7    6.27  )-----------( 166      ( 10 Feb 2019 06:51 )             33.8     140  |  99  |  21  ----------------------------<  92  4.1   |  34<H>  |  1.78<H>    Ca    9.5      10 Feb 2019 06:51  Mg     2.1 57 y/o F Contrast/ASA Allergy, MHX HTN, DM, Bipolar Disorder/Depression, h/o DVT on Coumadin, CVA x2 residual right sided weakness, CKD stage 3, Chronic Anemia, PAD s/p LE Bypass, NICM, Chronic Systolic CHF s/p AICD for primary prevention, Hypothyroidism, Current smoker with COPD (on 2-3L oxygen at home), DHARMESH on CPAP, who presented w/ Acute on Chronic Systolic CHF, supratherepeutic INR, COPD exacerbation, in TRANSFORM HF trial transitioned to PO Torsemide on 2/9/19, COPD now stable on Azithromycin course and current inhaler/nebulizer regimen, INR now therapeutic. See Dr. Olguin 2/19/19 at 10:45AM. Follow-up with Primary Doctor tomorrow. CHF follow-up with NP Hedy Martínez.     Acute on Chronic Systolic CHF  TTE 1/2019 showed EF 25%, moderate MR, suggestive elevated LA pressure, moderate pulmonary HTN  S/p IV diuresis  Torsemide 80mg PO daily  Continue Toprol XL 50mg daily   Continue Imdur 60mg daily   No ACEI/ARB 2nd to CKD.     HTN  Stop Hydralazine   Continue BB/Imdur/Torsemide     COPD  Continue home O2 2-3L at home and CPAP at home.  Continue Azithromycin 250mg daily for three more days to complete 5 day course.  QTC monitored on Azithromycin ( today)  Continue Symbicort  Duoneb prescribed PRN     DM  FS 90s to 140s prior this admit   Patient did not eat a proper lunch, small intake after nutritional insulin FS 51 and was lightheaded. Patient ate a sandwich and had juice repeat .  Currently asymptomatic See Primary Doctor tomorrow for close follow-up.    Continue home insulin regimen Levemir 15 units QHS. Continue Novolog 5 units pre-meal (only if eating / must eat a complete/full meal),    Hypothyroidism  Continue home dose Synthroid    HLD  Continue Lipitor 80mg Daily. Zetia 10mg daily added       Bipolar depression.    Continue home dose Psych meds     PAD  Continue Plavix/ Statin  Outpatient follow-up    CKD stage 3  Cr. 1.5 on admission, Cr. level mostly 1.5-2.0 in the last year at [facility].   Cr. currently 1.78. Voiding well.  Outpatient monitoring on follow-up.     Chronic Anemia  Hgb 9-10 at baseline.  Continue PO Iron.     DVT  INR now 2.6 today. Resume Coumadin 1mg tonight  Repeat INR tomorrow with Primary Doctor/clinic to discuss further management.     Neuropathy  On Methadone 10mg every 6 hours as outpatient.    Outpatient PT  HHA reinstated   Social work referred patient for [employer] star phone call provider service     See Dr. Olguin 2/19/19 at 10:45AM. Follow-up with Primary Doctor tomorrow. CHF follow-up with NP Hedy Martínez.     Pt seen and examined bedside. Reports cough improved.   Patient denies C/P, SOB, N/V, dizziness, palpitations, and diaphoresis.  Pt denies fever/chills, dysuria, abdominal pain, diarrhea.  	  V/S 	BP: 100s-140s/50s-60s 	HR: 70s   RR: 16   T: 98	   O2: 98% on 2-3 L NC   	  GEN: NAD  PULM:  Mild Bibasilar Crackles resolved post cough  CARD: No JVD B/L, RRR, S1 and S2   ABD: +BS, NT, soft/ND	  EXT: No Edema B/L LE  NEURO: A+Ox3, no new focal deficits               9.7    6.27  )-----------( 166      ( 10 Feb 2019 06:51 )             33.8     140  |  99  |  21  ----------------------------<  92  4.1   |  34<H>  |  1.78<H>    Ca    9.5      10 Feb 2019 06:51  Mg     2.1

## 2019-02-08 NOTE — PROGRESS NOTE ADULT - PROBLEM SELECTOR PLAN 6
Cr 1.56 stable within baseline   Monitor closely with IV Diuresis.  -Pt was to folow up as outpt with [facility], reports having appt to see Dr Caicedo this month Cr 1.56 stable within baseline   Monitor closely with IV Diuresis.  -Pt was to follow up as outpt with Renal, reports having appt to see Dr Neal this month  -Anemia of Chronic disease - continue Iron

## 2019-02-09 DIAGNOSIS — E78.5 HYPERLIPIDEMIA, UNSPECIFIED: ICD-10-CM

## 2019-02-09 LAB
ANION GAP SERPL CALC-SCNC: 11 MMOL/L — SIGNIFICANT CHANGE UP (ref 5–17)
BASOPHILS # BLD AUTO: 0.04 K/UL — SIGNIFICANT CHANGE UP (ref 0–0.2)
BASOPHILS NFR BLD AUTO: 0.5 % — SIGNIFICANT CHANGE UP (ref 0–2)
BUN SERPL-MCNC: 19 MG/DL — SIGNIFICANT CHANGE UP (ref 7–23)
CALCIUM SERPL-MCNC: 9.3 MG/DL — SIGNIFICANT CHANGE UP (ref 8.4–10.5)
CHLORIDE SERPL-SCNC: 100 MMOL/L — SIGNIFICANT CHANGE UP (ref 96–108)
CO2 SERPL-SCNC: 31 MMOL/L — SIGNIFICANT CHANGE UP (ref 22–31)
CREAT SERPL-MCNC: 1.74 MG/DL — HIGH (ref 0.5–1.3)
EOSINOPHIL # BLD AUTO: 0.22 K/UL — SIGNIFICANT CHANGE UP (ref 0–0.5)
EOSINOPHIL NFR BLD AUTO: 2.7 % — SIGNIFICANT CHANGE UP (ref 0–6)
GLUCOSE SERPL-MCNC: 75 MG/DL — SIGNIFICANT CHANGE UP (ref 70–99)
HCT VFR BLD CALC: 36.2 % — SIGNIFICANT CHANGE UP (ref 34.5–45)
HGB BLD-MCNC: 10.3 G/DL — LOW (ref 11.5–15.5)
IMM GRANULOCYTES NFR BLD AUTO: 0.5 % — SIGNIFICANT CHANGE UP (ref 0–1.5)
INR BLD: 3.28 — HIGH (ref 0.88–1.16)
LYMPHOCYTES # BLD AUTO: 0.95 K/UL — LOW (ref 1–3.3)
LYMPHOCYTES # BLD AUTO: 11.6 % — LOW (ref 13–44)
MAGNESIUM SERPL-MCNC: 2.1 MG/DL — SIGNIFICANT CHANGE UP (ref 1.6–2.6)
MCHC RBC-ENTMCNC: 28.1 PG — SIGNIFICANT CHANGE UP (ref 27–34)
MCHC RBC-ENTMCNC: 28.5 GM/DL — LOW (ref 32–36)
MCV RBC AUTO: 98.6 FL — SIGNIFICANT CHANGE UP (ref 80–100)
MONOCYTES # BLD AUTO: 0.92 K/UL — HIGH (ref 0–0.9)
MONOCYTES NFR BLD AUTO: 11.2 % — SIGNIFICANT CHANGE UP (ref 2–14)
NEUTROPHILS # BLD AUTO: 6.03 K/UL — SIGNIFICANT CHANGE UP (ref 1.8–7.4)
NEUTROPHILS NFR BLD AUTO: 73.5 % — SIGNIFICANT CHANGE UP (ref 43–77)
PLATELET # BLD AUTO: 199 K/UL — SIGNIFICANT CHANGE UP (ref 150–400)
POTASSIUM SERPL-MCNC: 4.3 MMOL/L — SIGNIFICANT CHANGE UP (ref 3.5–5.3)
POTASSIUM SERPL-SCNC: 4.3 MMOL/L — SIGNIFICANT CHANGE UP (ref 3.5–5.3)
PROTHROM AB SERPL-ACNC: 38.5 SEC — HIGH (ref 10–12.9)
RBC # BLD: 3.67 M/UL — LOW (ref 3.8–5.2)
RBC # FLD: 18.8 % — HIGH (ref 10.3–14.5)
SODIUM SERPL-SCNC: 142 MMOL/L — SIGNIFICANT CHANGE UP (ref 135–145)
WBC # BLD: 8.2 K/UL — SIGNIFICANT CHANGE UP (ref 3.8–10.5)
WBC # FLD AUTO: 8.2 K/UL — SIGNIFICANT CHANGE UP (ref 3.8–10.5)

## 2019-02-09 PROCEDURE — 99233 SBSQ HOSP IP/OBS HIGH 50: CPT

## 2019-02-09 RX ORDER — IPRATROPIUM/ALBUTEROL SULFATE 18-103MCG
3 AEROSOL WITH ADAPTER (GRAM) INHALATION EVERY 6 HOURS
Qty: 0 | Refills: 0 | Status: DISCONTINUED | OUTPATIENT
Start: 2019-02-09 | End: 2019-02-10

## 2019-02-09 RX ORDER — AZITHROMYCIN 500 MG/1
500 TABLET, FILM COATED ORAL ONCE
Qty: 0 | Refills: 0 | Status: COMPLETED | OUTPATIENT
Start: 2019-02-09 | End: 2019-02-09

## 2019-02-09 RX ORDER — AZITHROMYCIN 500 MG/1
250 TABLET, FILM COATED ORAL DAILY
Qty: 0 | Refills: 0 | Status: DISCONTINUED | OUTPATIENT
Start: 2019-02-10 | End: 2019-02-10

## 2019-02-09 RX ORDER — ACETAMINOPHEN 500 MG
650 TABLET ORAL ONCE
Qty: 0 | Refills: 0 | Status: COMPLETED | OUTPATIENT
Start: 2019-02-09 | End: 2019-02-09

## 2019-02-09 RX ADMIN — BUDESONIDE AND FORMOTEROL FUMARATE DIHYDRATE 2 PUFF(S): 160; 4.5 AEROSOL RESPIRATORY (INHALATION) at 06:28

## 2019-02-09 RX ADMIN — Medication 10 MILLIGRAM(S): at 06:52

## 2019-02-09 RX ADMIN — Medication 10 MILLIGRAM(S): at 21:48

## 2019-02-09 RX ADMIN — Medication 3 MILLILITER(S): at 10:30

## 2019-02-09 RX ADMIN — Medication 25 MICROGRAM(S): at 06:16

## 2019-02-09 RX ADMIN — Medication 2: at 11:26

## 2019-02-09 RX ADMIN — Medication 1 GRAM(S): at 11:12

## 2019-02-09 RX ADMIN — Medication 50 MILLIGRAM(S): at 11:12

## 2019-02-09 RX ADMIN — AZITHROMYCIN 500 MILLIGRAM(S): 500 TABLET, FILM COATED ORAL at 19:47

## 2019-02-09 RX ADMIN — CLOPIDOGREL BISULFATE 75 MILLIGRAM(S): 75 TABLET, FILM COATED ORAL at 11:11

## 2019-02-09 RX ADMIN — METHADONE HYDROCHLORIDE 10 MILLIGRAM(S): 40 TABLET ORAL at 14:28

## 2019-02-09 RX ADMIN — Medication 100 MILLIGRAM(S): at 22:26

## 2019-02-09 RX ADMIN — Medication 650 MILLIGRAM(S): at 16:14

## 2019-02-09 RX ADMIN — METHADONE HYDROCHLORIDE 10 MILLIGRAM(S): 40 TABLET ORAL at 21:48

## 2019-02-09 RX ADMIN — Medication 1 GRAM(S): at 19:48

## 2019-02-09 RX ADMIN — Medication 50 MILLIGRAM(S): at 21:47

## 2019-02-09 RX ADMIN — METHADONE HYDROCHLORIDE 10 MILLIGRAM(S): 40 TABLET ORAL at 06:16

## 2019-02-09 RX ADMIN — PANTOPRAZOLE SODIUM 40 MILLIGRAM(S): 20 TABLET, DELAYED RELEASE ORAL at 06:16

## 2019-02-09 RX ADMIN — Medication 1 GRAM(S): at 06:16

## 2019-02-09 RX ADMIN — Medication 5 UNIT(S): at 11:26

## 2019-02-09 RX ADMIN — ISOSORBIDE MONONITRATE 60 MILLIGRAM(S): 60 TABLET, EXTENDED RELEASE ORAL at 11:13

## 2019-02-09 RX ADMIN — Medication 100 MILLIGRAM(S): at 06:15

## 2019-02-09 RX ADMIN — ATORVASTATIN CALCIUM 80 MILLIGRAM(S): 80 TABLET, FILM COATED ORAL at 21:47

## 2019-02-09 RX ADMIN — ESCITALOPRAM OXALATE 10 MILLIGRAM(S): 10 TABLET, FILM COATED ORAL at 19:49

## 2019-02-09 RX ADMIN — Medication 1 GRAM(S): at 23:52

## 2019-02-09 RX ADMIN — Medication 145 MILLIGRAM(S): at 11:12

## 2019-02-09 RX ADMIN — Medication 80 MILLIGRAM(S): at 11:45

## 2019-02-09 RX ADMIN — INSULIN GLARGINE 15 UNIT(S): 100 INJECTION, SOLUTION SUBCUTANEOUS at 22:22

## 2019-02-09 RX ADMIN — BUDESONIDE AND FORMOTEROL FUMARATE DIHYDRATE 2 PUFF(S): 160; 4.5 AEROSOL RESPIRATORY (INHALATION) at 21:47

## 2019-02-09 RX ADMIN — GABAPENTIN 100 MILLIGRAM(S): 400 CAPSULE ORAL at 21:47

## 2019-02-09 RX ADMIN — Medication 3 MILLILITER(S): at 21:48

## 2019-02-09 RX ADMIN — Medication 80 MILLIGRAM(S): at 06:15

## 2019-02-09 RX ADMIN — Medication 3 MILLILITER(S): at 16:48

## 2019-02-09 NOTE — PROGRESS NOTE ADULT - PROBLEM SELECTOR PLAN 2
- HgbA1C 7.9%  - Continue Lantus 15 units at bedtime, NovoLog 5 units with meals, MISS (on Levemir at home)    #Hypothyroidism:  - Ran out of Levothyroxine two days PTA.   - TSH normal and resumed home dose synthroid 25 mcg PO QD # COPD exacerbation with productive cough with green sputum over past  month  - Crackles anteriorly and posteriorly B/L  - Azithromycin 500 mg x1 today followed by 250 mg PO QD x 4 days (last dose 2/13). QTc 453 ms today. Monitor.   - Chest PT, IC, duonebs q6hr standing  - Maintain O2 Sat > 92%, on Home O2 2-3L and CPAP at home.   - Keep BiPAP at bedside.

## 2019-02-09 NOTE — PROGRESS NOTE ADULT - PROBLEM SELECTOR PROBLEM 2
Type 2 diabetes mellitus with diabetic polyneuropathy, with long-term current use of insulin COPD (chronic obstructive pulmonary disease)

## 2019-02-09 NOTE — PROGRESS NOTE ADULT - PROBLEM SELECTOR PLAN 10
Pain MGMT: Continue home regimen. Per outpt Pain MGMT MD: Methadone was increased to 10mg QID. Decreasing to TID for now and monitor QTc.    Dispo:  - Plan for discharge tomorrow.   - SW alerted for reinstatement of HHA starting Monday  - PT attempted to see pt today but pt sleeping, will reattempt tomorrow    VTE PPx:   - INR supertherapeutic 3.28 today; continue to hold warfarin.    Case d/w Dr Simmons

## 2019-02-09 NOTE — PROGRESS NOTE ADULT - PROBLEM SELECTOR PLAN 6
- Continue Plavix  - Had recent LE Duplex as outpt with Vascular @[facility] showing patent B/L BROOKE stents and recommended for RTC in 6months.

## 2019-02-09 NOTE — DIETITIAN INITIAL EVALUATION ADULT. - PROBLEM SELECTOR PLAN 9
F/E/N: Dash CHO Diet with 1Liter fluid restriction  Keep K+ > 4, Mg > 2.0  VTE PPx: INR supertherapeutic.  OOB with assistance.  Pain MGMT: Continue current home med regimen. ePer outpt Pain MGMT MD Methadone was increased to 10mg QID. Decreasing to TID for now and monitor QTc.

## 2019-02-09 NOTE — PROVIDER CONTACT NOTE (OTHER) - ACTION/TREATMENT ORDERED:
MATY Thompson completed neuro assessment w/ no deficits or changes noted. Patient given cup of orange juice and placed back on oxygen with improvement of symptoms.

## 2019-02-09 NOTE — PROGRESS NOTE ADULT - PROBLEM SELECTOR PROBLEM 3
COPD (chronic obstructive pulmonary disease) Type 2 diabetes mellitus with diabetic polyneuropathy, with long-term current use of insulin

## 2019-02-09 NOTE — PROGRESS NOTE ADULT - SUBJECTIVE AND OBJECTIVE BOX
Interventional Cardiology PA Adult Progress Note    CC: CHF exacerbation on admit    Subjective Assessment:  Pt seen and examined at bedside. Pt with SSCP described as tight and when she eats only. Denies SOB, dizziness, palpitations, N/V, abdominal pain. All other 12 point review of systems otherwise negative except per subjective.   	  MEDICATIONS:  isosorbide   mononitrate ER Tablet (IMDUR) 60 milliGRAM(s) Oral daily  metoprolol succinate ER 50 milliGRAM(s) Oral daily  torsemide 80 milliGRAM(s) Oral daily  ALBUTerol/ipratropium for Nebulization 3 milliLiter(s) Nebulizer every 6 hours  buDESOnide 160 MICROgram(s)/formoterol 4.5 MICROgram(s) Inhaler 2 Puff(s) Inhalation two times a day  guaiFENesin    Syrup 100 milliGRAM(s) Oral every 6 hours PRN  busPIRone 10 milliGRAM(s) Oral two times a day  cyclobenzaprine 5 milliGRAM(s) Oral daily PRN  escitalopram 10 milliGRAM(s) Oral <User Schedule>  gabapentin 100 milliGRAM(s) Oral at bedtime  methadone    Tablet 10 milliGRAM(s) Oral every 8 hours  traZODone 50 milliGRAM(s) Oral at bedtime  pantoprazole    Tablet 40 milliGRAM(s) Oral before breakfast  sucralfate 1 Gram(s) Oral four times a day  atorvastatin 80 milliGRAM(s) Oral at bedtime  dextrose 40% Gel 15 Gram(s) Oral once PRN  dextrose 50% Injectable 12.5 Gram(s) IV Push once  dextrose 50% Injectable 25 Gram(s) IV Push once  dextrose 50% Injectable 25 Gram(s) IV Push once  fenofibrate Tablet 145 milliGRAM(s) Oral daily  glucagon  Injectable 1 milliGRAM(s) IntraMuscular once PRN  insulin glargine Injectable (LANTUS) 15 Unit(s) SubCutaneous at bedtime  insulin lispro (HumaLOG) corrective regimen sliding scale   SubCutaneous Before meals and at bedtime  insulin lispro Injectable (HumaLOG) 5 Unit(s) SubCutaneous three times a day before meals  levothyroxine 25 MICROGram(s) Oral daily  clopidogrel Tablet 75 milliGRAM(s) Oral daily  dextrose 5%. 1000 milliLiter(s) IV Continuous <Continuous>  ferrous    sulfate 325 milliGRAM(s) Oral two times a day      	    [PHYSICAL EXAM:  TELEMETRY:  T(C): 36.5 (02-09-19 @ 18:54), Max: 36.8 (02-09-19 @ 13:01)  HR: 74 (02-09-19 @ 16:17) (53 - 74)  BP: 143/69 (02-09-19 @ 16:17) (102/51 - 143/69)  RR: 18 (02-09-19 @ 14:29) (18 - 18)  SpO2: 96% (02-09-19 @ 13:00) (94% - 98%)  Wt(kg): --  I&O's Summary    08 Feb 2019 07:01  -  09 Feb 2019 07:00  --------------------------------------------------------  IN: 540 mL / OUT: 875 mL / NET: -335 mL    09 Feb 2019 07:01  -  09 Feb 2019 19:04  --------------------------------------------------------  IN: 360 mL / OUT: 250 mL / NET: 110 mL                              Appearance: WD/WN laying in hospital bed in NAD	  HEENT:   Normal oral mucosa, PERRL, EOMI	  Neck: Supple, - JVD  Cardiovascular: Normal S1 S2, No murmurs,   Respiratory: Scattered wheezing and crackles throughout posteriorly and anteriorly B/L	  Gastrointestinal:  Soft, Non-tender, + BS	x4  Skin: No rashes, No ecchymoses, No cyanosis  Extremities: Normal range of motion, no c/c/e  Vascular: DP faint B/L, PT 1+ b/L, radial 2+ b/L  Neurologic: Non-focal,  Psychiatry: A & O x 3, Mood & affect appropriate                   10.3   8.20  )-----------( 199      ( 09 Feb 2019 07:38 )             36.2     02-09    142  |  100  |  19  ----------------------------<  75  4.3   |  31  |  1.74<H>    Ca    9.3      09 Feb 2019 07:38  Mg     2.1     02-09    PT/INR - ( 09 Feb 2019 07:38 )   PT: 38.5 sec;   INR: 3.28     PTT - ( 08 Feb 2019 05:35 )  PTT:47.6 sec Interventional Cardiology PA Adult Progress Note    CC: CHF exacerbation on admit    Subjective Assessment:  Pt seen and examined at bedside. Pt with SSCP described as tight and when she eats only. Denies SOB, dizziness, palpitations, N/V, abdominal pain. All other 12 point review of systems otherwise negative except per subjective.   	  MEDICATIONS:  isosorbide   mononitrate ER Tablet (IMDUR) 60 milliGRAM(s) Oral daily  metoprolol succinate ER 50 milliGRAM(s) Oral daily  torsemide 80 milliGRAM(s) Oral daily  ALBUTerol/ipratropium for Nebulization 3 milliLiter(s) Nebulizer every 6 hours  buDESOnide 160 MICROgram(s)/formoterol 4.5 MICROgram(s) Inhaler 2 Puff(s) Inhalation two times a day  guaiFENesin    Syrup 100 milliGRAM(s) Oral every 6 hours PRN  busPIRone 10 milliGRAM(s) Oral two times a day  cyclobenzaprine 5 milliGRAM(s) Oral daily PRN  escitalopram 10 milliGRAM(s) Oral <User Schedule>  gabapentin 100 milliGRAM(s) Oral at bedtime  methadone    Tablet 10 milliGRAM(s) Oral every 8 hours  traZODone 50 milliGRAM(s) Oral at bedtime  pantoprazole    Tablet 40 milliGRAM(s) Oral before breakfast  sucralfate 1 Gram(s) Oral four times a day  atorvastatin 80 milliGRAM(s) Oral at bedtime  dextrose 40% Gel 15 Gram(s) Oral once PRN  dextrose 50% Injectable 12.5 Gram(s) IV Push once  dextrose 50% Injectable 25 Gram(s) IV Push once  dextrose 50% Injectable 25 Gram(s) IV Push once  fenofibrate Tablet 145 milliGRAM(s) Oral daily  glucagon  Injectable 1 milliGRAM(s) IntraMuscular once PRN  insulin glargine Injectable (LANTUS) 15 Unit(s) SubCutaneous at bedtime  insulin lispro (HumaLOG) corrective regimen sliding scale   SubCutaneous Before meals and at bedtime  insulin lispro Injectable (HumaLOG) 5 Unit(s) SubCutaneous three times a day before meals  levothyroxine 25 MICROGram(s) Oral daily  clopidogrel Tablet 75 milliGRAM(s) Oral daily  dextrose 5%. 1000 milliLiter(s) IV Continuous <Continuous>  ferrous    sulfate 325 milliGRAM(s) Oral two times a day      	    [PHYSICAL EXAM:  TELEMETRY:  T(C): 36.5 (02-09-19 @ 18:54), Max: 36.8 (02-09-19 @ 13:01)  HR: 74 (02-09-19 @ 16:17) (53 - 74)  BP: 143/69 (02-09-19 @ 16:17) (102/51 - 143/69)  RR: 18 (02-09-19 @ 14:29) (18 - 18)  SpO2: 96% (02-09-19 @ 13:00) (94% - 98%)  Wt(kg): --  I&O's Summary    08 Feb 2019 07:01  -  09 Feb 2019 07:00  --------------------------------------------------------  IN: 540 mL / OUT: 875 mL / NET: -335 mL    09 Feb 2019 07:01  -  09 Feb 2019 19:04  --------------------------------------------------------  IN: 360 mL / OUT: 250 mL / NET: 110 mL                              Appearance: WD/WN laying in hospital bed in NAD	  HEENT:   Normal oral mucosa, PERRL, EOMI	  Neck: Supple, - JVD  Cardiovascular: Normal S1 S2, No murmurs,   Respiratory: Scattered wheezing and crackles throughout posteriorly and anteriorly B/L	  Gastrointestinal:  Soft, Non-tender, + BS	x4  Skin: No rashes, No ecchymoses, No cyanosis  Extremities: Normal range of motion, trace edema to L shin  Vascular: DP faint B/L, PT 1+ b/L, radial 2+ b/L  Neurologic: Non-focal,  Psychiatry: A & O x 3, Mood & affect appropriate                   10.3   8.20  )-----------( 199      ( 09 Feb 2019 07:38 )             36.2     02-09    142  |  100  |  19  ----------------------------<  75  4.3   |  31  |  1.74<H>    Ca    9.3      09 Feb 2019 07:38  Mg     2.1     02-09    PT/INR - ( 09 Feb 2019 07:38 )   PT: 38.5 sec;   INR: 3.28     PTT - ( 08 Feb 2019 05:35 )  PTT:47.6 sec

## 2019-02-09 NOTE — DIETITIAN INITIAL EVALUATION ADULT. - NS AS NUTRI INTERV ED CONTENT
Purpose of the nutrition education/Reveiwed heart healthy, DM diet with pt in depth, with emphasis on looking at food labels and choosing low sodium options. Set goals with pt for when she goes home- pt states she will increase vegetable intake, limit fluid intake and be more conscious of salt intake.

## 2019-02-09 NOTE — PROGRESS NOTE ADULT - PROBLEM SELECTOR PLAN 1
Saturating well on 2-3 L NC, heart failure team consulted this admit  - Net neg 1836 cc since admit, 4# weight loss since admit  - Trop 0.04>0.02> neg likely in light of CHF exacerbation  - ECHO 1/2019: LV moderately dilated, severe global hypokinesis of LV,  LVEF 25%, LA moderately dilated, mitral inflow pattern is c/w restrictive LV filling, suggestive of severely elevated, RA dilated, moderate MR, mild to moderate TR, moderate pulm HTN (PASP 50 mmHg)  - Lasix 80 mg IV BID transitioned to Torsemide 80 mg  PO QD today.   - Pt in Transform Study, D/C home back on Torsemide.  - Continue Toprol XL 50mg daily, Imdur 60mg daily.    - No hydralazine as pt reports causes dizziness.   - Not on ACE-i/Entresto given renal dysfunction on these meds in past, was under consideration for possible initiation as outpatient pending renal function  - Daily weights, strict I&Os, 1L fluid restriction, core measures ordered  - Pt to follow up in CHF clinic for further management as outpatient. Appointment made.

## 2019-02-09 NOTE — PROGRESS NOTE ADULT - PROBLEM SELECTOR PLAN 8
Chronic history on Warfarin 1mg daily.  -  INR 4.17> 3.45> 3.28 today  - Hold Coumadin tonight and f/u INR in AM to determine next dose.

## 2019-02-09 NOTE — DIETITIAN INITIAL EVALUATION ADULT. - OTHER INFO
57 yo F with extensive PMHx including HTN, IDDM c/b neuropathy, bipolar disorder, depression, history of DVT on Coumadin (4 episodes in her lower extremities), CVA x2 (last one in 2013 with residual right sided weakness), PAD s/p bypass, NICM, chronic HFrEF (EF 25% on echo) s/p AICD for primary prevention, hypothyroidism, current smoker with COPD (on 2-3L oxygen at home), DHARMEHS on CPAP, now readmitted with acute on chronic systolic CHF exacerbation. Consult received for diet education, previously educated by RD on last admission. Pt with good appetite this AM, pt reports trying to make dietary changes since last admission (drinking less fluids, eating less carbs, not adding salt to meals). Per previous RD note, pt aware of interaction between Coumadin and Vitamin K rich foods. Pt reports fasting BG levels at home range from 115-160 Pt reports some pain. Pt reports normal BM today, denies N/V. Skin: some scratches per flowsheet. Reveiwed heart healthy, DM diet with pt in depth, with emphasis on looking at food labels and choosing low sodium options. Set goals with pt for when she goes home- pt states she will increase vegetable intake, limit fluid intake and be more conscious of salt intake. RD to follow closely.

## 2019-02-09 NOTE — PROGRESS NOTE ADULT - ASSESSMENT
59 yo F with Contrast and Aspirin Allergy and an extensive PMHx including HTN, IDDM c/b neuropathy, bipolar disorder, depression, history of DVT on Coumadin (4 episodes in her lower extremities), CVA x2 (last one in 2013 with residual right sided weakness), PAD s/p bypass, NICM, chronic HFrEF (EF 25% on echo) s/p AICD for primary prevention, hypothyroidism, current smoker with COPD (on 2-3L oxygen at home), DHARMESH on CPAP, now readmitted with acute on chronic systolic CHF exacerbation. 57 yo F with Contrast and Aspirin Allergy and an extensive PMHx including HTN, IDDM c/b neuropathy, bipolar disorder, depression, history of DVT on Coumadin (4 episodes in her lower extremities), CVA x2 (last one in 2013 with residual right sided weakness), PAD s/p bypass, NICM, chronic HFrEF (EF 25% on echo) s/p AICD for primary prevention, hypothyroidism, current smoker with COPD (on 2-3L oxygen at home), DHARMESH on CPAP, now readmitted with acute on chronic systolic CHF exacerbation, IV diuresis transitioned to PO and plan for possible discharge tomorrow. 59 yo F with Contrast and Aspirin Allergy and an extensive PMHx including HTN, IDDM c/b neuropathy, bipolar disorder, depression, history of DVT on Coumadin (4 episodes in her lower extremities), CVA x2 (last one in 2013 with residual right sided weakness), PAD s/p bypass, NICM, chronic HFrEF (EF 25% on echo) s/p AICD for primary prevention, hypothyroidism, current smoker with COPD (on 2-3L oxygen at home), DHARMESH on CPAP, now readmitted with acute on chronic systolic CHF exacerbation IV diuresis transitioned to PO and concomitant COPD exacerbation (productive cough with green sputum over past month, started on azithromycin plan for possible discharge tomorrow).

## 2019-02-09 NOTE — PROGRESS NOTE ADULT - PROBLEM SELECTOR PLAN 7
Cr 1.56 stable within baseline   - CR 1.56>1.44>1.74 today  - Lasix 80 mg IV BID transitioned to Torsemide 80 mg PO QD today  - Pt was to follow up as outpt with Renal, reports having appt to see Dr Neal this month  - Avoid NSAIDs, nephrotoxic drugs     #Anemia of Chronic disease  - H/H 10.3/36.2 today  - Continue ferrous sulfate 325 mg BID

## 2019-02-09 NOTE — PROGRESS NOTE ADULT - PROBLEM SELECTOR PLAN 5
Continue home dose Psych meds  - Monitor ECG for QTc Continue home dose Psych meds and started on azithromycin today for COPD exacerbation  - QTc 452 ms today  - Monitor daily ECG for QTc

## 2019-02-09 NOTE — PROGRESS NOTE ADULT - PROBLEM SELECTOR PLAN 3
Maintain O2 Sat > 92%, on Home O2 2-3L and CPAP at home.   -Keep BiPAP at bedside. - HgbA1C 7.9%  - Continue Lantus 15 units at bedtime, NovoLog 5 units with meals, MISS (on Levemir at home)    #Hypothyroidism:  - Ran out of Levothyroxine two days PTA.   - TSH normal and resumed home dose synthroid 25 mcg PO QD

## 2019-02-09 NOTE — PROVIDER CONTACT NOTE (OTHER) - ASSESSMENT
VS: BP = 112/56, HR = 71, 88% on RA with RR = 20. FS = 97. Patient was anxious and c/o chest tightness

## 2019-02-09 NOTE — DIETITIAN INITIAL EVALUATION ADULT. - ENERGY NEEDS
Ht (2/9 per pt): 64 in, Wt (2/8): 94kg, Wt (2/9): 93.9kg *pt reports dry weight 190-200#  IBW: 120# +/-10%, %IBW: 167%, (using dry wt 200#), BMI: 34.3 (using dry wt 200#)  Using adjusted body weight for calculations as pt >120% IBW and BMI >30. Fluid needs per team due to CHF.

## 2019-02-09 NOTE — DIETITIAN INITIAL EVALUATION ADULT. - PROBLEM SELECTOR PLAN 5
Continue Plavix  -Had recent LE Duplex as outpt with [facility] @[facility] showing patent B/L BROOKE stents and recommended for RTC in 6months.

## 2019-02-10 VITALS
DIASTOLIC BLOOD PRESSURE: 59 MMHG | SYSTOLIC BLOOD PRESSURE: 129 MMHG | HEART RATE: 64 BPM | OXYGEN SATURATION: 96 % | RESPIRATION RATE: 16 BRPM

## 2019-02-10 LAB
ANION GAP SERPL CALC-SCNC: 7 MMOL/L — SIGNIFICANT CHANGE UP (ref 5–17)
APTT BLD: 46.6 SEC — HIGH (ref 27.5–36.3)
BUN SERPL-MCNC: 21 MG/DL — SIGNIFICANT CHANGE UP (ref 7–23)
CALCIUM SERPL-MCNC: 9.5 MG/DL — SIGNIFICANT CHANGE UP (ref 8.4–10.5)
CHLORIDE SERPL-SCNC: 99 MMOL/L — SIGNIFICANT CHANGE UP (ref 96–108)
CO2 SERPL-SCNC: 34 MMOL/L — HIGH (ref 22–31)
CREAT SERPL-MCNC: 1.78 MG/DL — HIGH (ref 0.5–1.3)
GLUCOSE SERPL-MCNC: 92 MG/DL — SIGNIFICANT CHANGE UP (ref 70–99)
HCT VFR BLD CALC: 33.8 % — LOW (ref 34.5–45)
HGB BLD-MCNC: 9.7 G/DL — LOW (ref 11.5–15.5)
INR BLD: 2.62 — HIGH (ref 0.88–1.16)
MAGNESIUM SERPL-MCNC: 2.1 MG/DL — SIGNIFICANT CHANGE UP (ref 1.6–2.6)
MCHC RBC-ENTMCNC: 27.7 PG — SIGNIFICANT CHANGE UP (ref 27–34)
MCHC RBC-ENTMCNC: 28.7 GM/DL — LOW (ref 32–36)
MCV RBC AUTO: 96.6 FL — SIGNIFICANT CHANGE UP (ref 80–100)
NRBC # BLD: 0 /100 WBCS — SIGNIFICANT CHANGE UP (ref 0–0)
PLATELET # BLD AUTO: 166 K/UL — SIGNIFICANT CHANGE UP (ref 150–400)
POTASSIUM SERPL-MCNC: 4.1 MMOL/L — SIGNIFICANT CHANGE UP (ref 3.5–5.3)
POTASSIUM SERPL-SCNC: 4.1 MMOL/L — SIGNIFICANT CHANGE UP (ref 3.5–5.3)
PROTHROM AB SERPL-ACNC: 30.5 SEC — HIGH (ref 10–12.9)
RBC # BLD: 3.5 M/UL — LOW (ref 3.8–5.2)
RBC # FLD: 18.8 % — HIGH (ref 10.3–14.5)
SODIUM SERPL-SCNC: 140 MMOL/L — SIGNIFICANT CHANGE UP (ref 135–145)
WBC # BLD: 6.27 K/UL — SIGNIFICANT CHANGE UP (ref 3.8–10.5)
WBC # FLD AUTO: 6.27 K/UL — SIGNIFICANT CHANGE UP (ref 3.8–10.5)

## 2019-02-10 PROCEDURE — 99239 HOSP IP/OBS DSCHRG MGMT >30: CPT

## 2019-02-10 RX ORDER — LEVOTHYROXINE SODIUM 125 MCG
1 TABLET ORAL
Qty: 30 | Refills: 0
Start: 2019-02-10 | End: 2019-03-11

## 2019-02-10 RX ORDER — LEVOTHYROXINE SODIUM 125 MCG
1 TABLET ORAL
Qty: 0 | Refills: 0 | COMMUNITY

## 2019-02-10 RX ORDER — IPRATROPIUM/ALBUTEROL SULFATE 18-103MCG
3 AEROSOL WITH ADAPTER (GRAM) INHALATION
Qty: 30 | Refills: 0 | OUTPATIENT
Start: 2019-02-10 | End: 2019-03-11

## 2019-02-10 RX ORDER — EZETIMIBE 10 MG/1
1 TABLET ORAL
Qty: 30 | Refills: 0
Start: 2019-02-10 | End: 2019-03-11

## 2019-02-10 RX ORDER — ATORVASTATIN CALCIUM 80 MG/1
1 TABLET, FILM COATED ORAL
Qty: 0 | Refills: 0 | DISCHARGE
Start: 2019-02-10

## 2019-02-10 RX ORDER — ZOLPIDEM TARTRATE 10 MG/1
1 TABLET ORAL
Qty: 0 | Refills: 0 | COMMUNITY

## 2019-02-10 RX ORDER — TIZANIDINE 4 MG/1
0 TABLET ORAL
Qty: 0 | Refills: 0 | COMMUNITY

## 2019-02-10 RX ORDER — AZITHROMYCIN 500 MG/1
1 TABLET, FILM COATED ORAL
Qty: 3 | Refills: 0 | OUTPATIENT
Start: 2019-02-10 | End: 2019-02-12

## 2019-02-10 RX ADMIN — ISOSORBIDE MONONITRATE 60 MILLIGRAM(S): 60 TABLET, EXTENDED RELEASE ORAL at 11:47

## 2019-02-10 RX ADMIN — PANTOPRAZOLE SODIUM 40 MILLIGRAM(S): 20 TABLET, DELAYED RELEASE ORAL at 06:23

## 2019-02-10 RX ADMIN — Medication 10 MILLIGRAM(S): at 06:23

## 2019-02-10 RX ADMIN — Medication 3 MILLILITER(S): at 10:00

## 2019-02-10 RX ADMIN — Medication 1 GRAM(S): at 06:23

## 2019-02-10 RX ADMIN — Medication 2: at 11:48

## 2019-02-10 RX ADMIN — Medication 25 MICROGRAM(S): at 06:23

## 2019-02-10 RX ADMIN — Medication 5 UNIT(S): at 11:48

## 2019-02-10 RX ADMIN — AZITHROMYCIN 250 MILLIGRAM(S): 500 TABLET, FILM COATED ORAL at 11:47

## 2019-02-10 RX ADMIN — METHADONE HYDROCHLORIDE 10 MILLIGRAM(S): 40 TABLET ORAL at 14:21

## 2019-02-10 RX ADMIN — BUDESONIDE AND FORMOTEROL FUMARATE DIHYDRATE 2 PUFF(S): 160; 4.5 AEROSOL RESPIRATORY (INHALATION) at 09:30

## 2019-02-10 RX ADMIN — Medication 50 MILLIGRAM(S): at 11:49

## 2019-02-10 RX ADMIN — CLOPIDOGREL BISULFATE 75 MILLIGRAM(S): 75 TABLET, FILM COATED ORAL at 11:47

## 2019-02-10 RX ADMIN — METHADONE HYDROCHLORIDE 10 MILLIGRAM(S): 40 TABLET ORAL at 06:23

## 2019-02-10 RX ADMIN — Medication 1 GRAM(S): at 11:47

## 2019-02-10 RX ADMIN — Medication 80 MILLIGRAM(S): at 06:29

## 2019-02-10 RX ADMIN — Medication 3 MILLILITER(S): at 04:22

## 2019-02-10 RX ADMIN — Medication 145 MILLIGRAM(S): at 11:47

## 2019-02-10 NOTE — PHYSICAL THERAPY INITIAL EVALUATION ADULT - ADDITIONAL COMMENTS
Patient has HHA 5 hours 7 days, 5 steps upon entry and 21 steps inside, lives with family, states she has never fallen at home, ambultes predominantly indoors except for appointments-sometimes having 2-3 appointments in one day

## 2019-02-10 NOTE — PHYSICAL THERAPY INITIAL EVALUATION ADULT - PERTINENT HX OF CURRENT PROBLEM, REHAB EVAL
59 yo F with Contrast and Aspirin Allergy and an extensive PMHx including HTN, IDDM c/b neuropathy, bipolar disorder, depression, history of DVT on Coumadin (4 episodes in her lower extremities), CVA x2 (last one in 2013 with residual right sided weakness), PAD s/p bypass, NICM, chronic HFrEF (EF 25% on echo) s/p AICD for primary prevention, hypothyroidism, current smoker with COPD (on 2-3L oxygen at home), DHARMESH on CPAP, now readmitted with acute on chronic systolic CHF exacerbation, seen for PT

## 2019-02-10 NOTE — PHYSICAL THERAPY INITIAL EVALUATION ADULT - PLANNED THERAPY INTERVENTIONS, PT EVAL
strengthening/transfer training/balance training/stair neg training/gait training/postural re-education

## 2019-02-10 NOTE — PHYSICAL THERAPY INITIAL EVALUATION ADULT - MODALITIES TREATMENT COMMENTS
reviewed nose-mouth deep breathing techniques, posture for ease with ventilation, tripod positioning, pursed lip breathing, inspiration expiration patient express fair understanding

## 2019-02-10 NOTE — PHYSICAL THERAPY INITIAL EVALUATION ADULT - GENERAL OBSERVATIONS, REHAB EVAL
Patient received seated at the edge of the bed (+) 2 L nc (+) heplock (+) EKG no apparent distress tolerating hallway ambulation and flight of stairs. Patient cleared for discharge home with outpatient follow up.

## 2019-02-13 ENCOUNTER — RX RENEWAL (OUTPATIENT)
Age: 59
End: 2019-02-13

## 2019-02-13 DIAGNOSIS — I27.20 PULMONARY HYPERTENSION, UNSPECIFIED: ICD-10-CM

## 2019-02-13 DIAGNOSIS — Z90.49 ACQUIRED ABSENCE OF OTHER SPECIFIED PARTS OF DIGESTIVE TRACT: ICD-10-CM

## 2019-02-13 DIAGNOSIS — Z91.041 RADIOGRAPHIC DYE ALLERGY STATUS: ICD-10-CM

## 2019-02-13 DIAGNOSIS — Z95.810 PRESENCE OF AUTOMATIC (IMPLANTABLE) CARDIAC DEFIBRILLATOR: ICD-10-CM

## 2019-02-13 DIAGNOSIS — F17.210 NICOTINE DEPENDENCE, CIGARETTES, UNCOMPLICATED: ICD-10-CM

## 2019-02-13 DIAGNOSIS — E87.70 FLUID OVERLOAD, UNSPECIFIED: ICD-10-CM

## 2019-02-13 DIAGNOSIS — E78.5 HYPERLIPIDEMIA, UNSPECIFIED: ICD-10-CM

## 2019-02-13 DIAGNOSIS — I50.23 ACUTE ON CHRONIC SYSTOLIC (CONGESTIVE) HEART FAILURE: ICD-10-CM

## 2019-02-13 DIAGNOSIS — Z83.3 FAMILY HISTORY OF DIABETES MELLITUS: ICD-10-CM

## 2019-02-13 DIAGNOSIS — Z91.19 PATIENT'S NONCOMPLIANCE WITH OTHER MEDICAL TREATMENT AND REGIMEN: ICD-10-CM

## 2019-02-13 DIAGNOSIS — Z79.01 LONG TERM (CURRENT) USE OF ANTICOAGULANTS: ICD-10-CM

## 2019-02-13 DIAGNOSIS — Z79.4 LONG TERM (CURRENT) USE OF INSULIN: ICD-10-CM

## 2019-02-13 DIAGNOSIS — R74.8 ABNORMAL LEVELS OF OTHER SERUM ENZYMES: ICD-10-CM

## 2019-02-13 DIAGNOSIS — E11.22 TYPE 2 DIABETES MELLITUS WITH DIABETIC CHRONIC KIDNEY DISEASE: ICD-10-CM

## 2019-02-13 DIAGNOSIS — Z79.51 LONG TERM (CURRENT) USE OF INHALED STEROIDS: ICD-10-CM

## 2019-02-13 DIAGNOSIS — I42.8 OTHER CARDIOMYOPATHIES: ICD-10-CM

## 2019-02-13 DIAGNOSIS — Z99.81 DEPENDENCE ON SUPPLEMENTAL OXYGEN: ICD-10-CM

## 2019-02-13 DIAGNOSIS — J44.1 CHRONIC OBSTRUCTIVE PULMONARY DISEASE WITH (ACUTE) EXACERBATION: ICD-10-CM

## 2019-02-13 DIAGNOSIS — N18.3 CHRONIC KIDNEY DISEASE, STAGE 3 (MODERATE): ICD-10-CM

## 2019-02-13 DIAGNOSIS — Z79.891 LONG TERM (CURRENT) USE OF OPIATE ANALGESIC: ICD-10-CM

## 2019-02-13 DIAGNOSIS — I13.0 HYPERTENSIVE HEART AND CHRONIC KIDNEY DISEASE WITH HEART FAILURE AND STAGE 1 THROUGH STAGE 4 CHRONIC KIDNEY DISEASE, OR UNSPECIFIED CHRONIC KIDNEY DISEASE: ICD-10-CM

## 2019-02-13 DIAGNOSIS — E11.42 TYPE 2 DIABETES MELLITUS WITH DIABETIC POLYNEUROPATHY: ICD-10-CM

## 2019-02-13 DIAGNOSIS — Z95.828 PRESENCE OF OTHER VASCULAR IMPLANTS AND GRAFTS: ICD-10-CM

## 2019-02-13 DIAGNOSIS — Z82.49 FAMILY HISTORY OF ISCHEMIC HEART DISEASE AND OTHER DISEASES OF THE CIRCULATORY SYSTEM: ICD-10-CM

## 2019-02-13 DIAGNOSIS — G89.29 OTHER CHRONIC PAIN: ICD-10-CM

## 2019-02-13 DIAGNOSIS — R79.1 ABNORMAL COAGULATION PROFILE: ICD-10-CM

## 2019-02-13 DIAGNOSIS — F31.9 BIPOLAR DISORDER, UNSPECIFIED: ICD-10-CM

## 2019-02-13 DIAGNOSIS — Z86.718 PERSONAL HISTORY OF OTHER VENOUS THROMBOSIS AND EMBOLISM: ICD-10-CM

## 2019-02-13 DIAGNOSIS — I69.351 HEMIPLEGIA AND HEMIPARESIS FOLLOWING CEREBRAL INFARCTION AFFECTING RIGHT DOMINANT SIDE: ICD-10-CM

## 2019-02-13 DIAGNOSIS — D63.1 ANEMIA IN CHRONIC KIDNEY DISEASE: ICD-10-CM

## 2019-02-13 DIAGNOSIS — E11.51 TYPE 2 DIABETES MELLITUS WITH DIABETIC PERIPHERAL ANGIOPATHY WITHOUT GANGRENE: ICD-10-CM

## 2019-02-13 DIAGNOSIS — E03.9 HYPOTHYROIDISM, UNSPECIFIED: ICD-10-CM

## 2019-02-13 DIAGNOSIS — I44.7 LEFT BUNDLE-BRANCH BLOCK, UNSPECIFIED: ICD-10-CM

## 2019-02-13 DIAGNOSIS — Z88.6 ALLERGY STATUS TO ANALGESIC AGENT: ICD-10-CM

## 2019-02-19 ENCOUNTER — APPOINTMENT (OUTPATIENT)
Dept: HEART AND VASCULAR | Facility: CLINIC | Age: 59
End: 2019-02-19
Payer: MEDICARE

## 2019-02-19 ENCOUNTER — OUTPATIENT (OUTPATIENT)
Dept: OUTPATIENT SERVICES | Facility: HOSPITAL | Age: 59
LOS: 1 days | Discharge: HOME | End: 2019-02-19

## 2019-02-19 VITALS
DIASTOLIC BLOOD PRESSURE: 57 MMHG | TEMPERATURE: 98.1 F | HEIGHT: 64 IN | OXYGEN SATURATION: 95 % | WEIGHT: 203.99 LBS | BODY MASS INDEX: 34.83 KG/M2 | HEART RATE: 70 BPM | SYSTOLIC BLOOD PRESSURE: 114 MMHG

## 2019-02-19 DIAGNOSIS — Z90.710 ACQUIRED ABSENCE OF BOTH CERVIX AND UTERUS: Chronic | ICD-10-CM

## 2019-02-19 DIAGNOSIS — Z90.49 ACQUIRED ABSENCE OF OTHER SPECIFIED PARTS OF DIGESTIVE TRACT: Chronic | ICD-10-CM

## 2019-02-19 DIAGNOSIS — Z79.01 LONG TERM (CURRENT) USE OF ANTICOAGULANTS: ICD-10-CM

## 2019-02-19 DIAGNOSIS — Z98.51 TUBAL LIGATION STATUS: Chronic | ICD-10-CM

## 2019-02-19 DIAGNOSIS — Z95.828 PRESENCE OF OTHER VASCULAR IMPLANTS AND GRAFTS: Chronic | ICD-10-CM

## 2019-02-19 DIAGNOSIS — Z95.810 PRESENCE OF AUTOMATIC (IMPLANTABLE) CARDIAC DEFIBRILLATOR: Chronic | ICD-10-CM

## 2019-02-19 DIAGNOSIS — I48.91 UNSPECIFIED ATRIAL FIBRILLATION: ICD-10-CM

## 2019-02-19 DIAGNOSIS — J40 BRONCHITIS, NOT SPECIFIED AS ACUTE OR CHRONIC: ICD-10-CM

## 2019-02-19 LAB
POCT INR: 2.1 RATIO — HIGH (ref 0.9–1.2)
POCT PT: 25.7 SEC — HIGH (ref 10–13.4)

## 2019-02-19 PROCEDURE — 99215 OFFICE O/P EST HI 40 MIN: CPT

## 2019-02-19 RX ORDER — METOPROLOL SUCCINATE 50 MG/1
50 TABLET, EXTENDED RELEASE ORAL DAILY
Refills: 0 | Status: DISCONTINUED | COMMUNITY
End: 2019-02-19

## 2019-02-19 NOTE — PHYSICAL EXAM
[General Appearance - Well Developed] : well developed [Normal Appearance] : normal appearance [Well Groomed] : well groomed [General Appearance - Well Nourished] : well nourished [No Deformities] : no deformities [General Appearance - In No Acute Distress] : no acute distress [Normal Conjunctiva] : the conjunctiva exhibited no abnormalities [Eyelids - No Xanthelasma] : the eyelids demonstrated no xanthelasmas [Normal Oral Mucosa] : normal oral mucosa [No Oral Pallor] : no oral pallor [No Oral Cyanosis] : no oral cyanosis [Normal Jugular Venous A Waves Present] : normal jugular venous A waves present [Normal Jugular Venous V Waves Present] : normal jugular venous V waves present [No Jugular Venous Stanford A Waves] : no jugular venous stanford A waves [Heart Rate And Rhythm] : heart rate and rhythm were normal [Heart Sounds] : normal S1 and S2 [Murmurs] : no murmurs present [Edema] : no peripheral edema present [Abdomen Soft] : soft [Abdomen Tenderness] : non-tender [Abdomen Mass (___ Cm)] : no abdominal mass palpated [Nail Clubbing] : no clubbing of the fingernails [Cyanosis, Localized] : no localized cyanosis [Petechial Hemorrhages (___cm)] : no petechial hemorrhages [Skin Color & Pigmentation] : normal skin color and pigmentation [] : no rash [No Venous Stasis] : no venous stasis [Skin Lesions] : no skin lesions [No Skin Ulcers] : no skin ulcer [No Xanthoma] : no  xanthoma was observed [Oriented To Time, Place, And Person] : oriented to person, place, and time [Affect] : the affect was normal [Mood] : the mood was normal [No Anxiety] : not feeling anxious [FreeTextEntry1] : rare expiratory wheeezes, scattered bronchial breath sounds

## 2019-02-19 NOTE — HISTORY OF PRESENT ILLNESS
[FreeTextEntry1] : Seen in hospital early Jan 2019\par 59 yo F with multiple co-morbidities presenting with worsening SOB/RAMSEY and orthopnea, admitted to [facility] for acute-on-chronic sCHF exacerbation with multifactorial SOB.  \par \par readmitted with SOB.\par \par chronic systolic heart failure, with EF of 25%. \par 2010 coronary catheterization: normal coronary anatomy. \par \par on torsemide 80 and toprol 100\par \par Maximize asthma therapy   \par Not on ACE-i/Entresto given renal dysfunction on these meds in past. Consider as outpatient \par \par Has a cough with green sputum\par admits to smoking 2 cigarettes/ day\par \par some discrepancy with home wights. States was 208 this AM at home, 203 now (and again AM at home)\par \par

## 2019-02-19 NOTE — ASSESSMENT
[FreeTextEntry1] : 57 yo with SOB secondary to CHF, asthma and her weight. Plus has chronic pain.\par tolerating toprol  100\par lower torsemide from 80 to 40\par \par smoking cessation counselled.\par levaquin for bronchitis.\par \par given scale discrepancies and frequent readmissions, will refer for cardiomems device.\par \par

## 2019-02-22 ENCOUNTER — LABORATORY RESULT (OUTPATIENT)
Age: 59
End: 2019-02-22

## 2019-02-22 ENCOUNTER — APPOINTMENT (OUTPATIENT)
Dept: NEPHROLOGY | Facility: CLINIC | Age: 59
End: 2019-02-22
Payer: MEDICARE

## 2019-02-22 VITALS — HEART RATE: 74 BPM | DIASTOLIC BLOOD PRESSURE: 72 MMHG | SYSTOLIC BLOOD PRESSURE: 127 MMHG

## 2019-02-22 DIAGNOSIS — Z79.899 OTHER LONG TERM (CURRENT) DRUG THERAPY: ICD-10-CM

## 2019-02-22 PROCEDURE — 99205 OFFICE O/P NEW HI 60 MIN: CPT

## 2019-02-22 NOTE — REVIEW OF SYSTEMS
[Negative] : Heme/Lymph [Feeling Tired] : feeling tired [Recent Weight Loss (___ Lbs)] : recent [unfilled] ~Ulb weight loss [Nosebleeds] : nosebleeds [Palpitations] : palpitations [Leg Claudication] : intermittent leg claudication [Wheezing] : wheezing [Cough] : cough [SOB on Exertion] : shortness of breath during exertion [Heartburn] : heartburn [Arthralgias] : arthralgias [Joint Pain] : joint pain [Joint Stiffness] : joint stiffness [Confused] : confusion [Sleep Disturbances] : sleep disturbances [Anxiety] : anxiety [Depression] : depression [Emotional Problems] : emotional problems [Easy Bruising] : a tendency for easy bruising

## 2019-02-22 NOTE — PHYSICAL EXAM
[General Appearance - Alert] : alert [General Appearance - In No Acute Distress] : in no acute distress [Sclera] : the sclera and conjunctiva were normal [PERRL With Normal Accommodation] : pupils were equal in size, round, and reactive to light [Extraocular Movements] : extraocular movements were intact [Outer Ear] : the ears and nose were normal in appearance [Oropharynx] : the oropharynx was normal [Neck Appearance] : the appearance of the neck was normal [Neck Cervical Mass (___cm)] : no neck mass was observed [Jugular Venous Distention Increased] : there was no jugular-venous distention [Thyroid Diffuse Enlargement] : the thyroid was not enlarged [Thyroid Nodule] : there were no palpable thyroid nodules [Auscultation Breath Sounds / Voice Sounds] : lungs were clear to auscultation bilaterally [Heart Rate And Rhythm] : heart rate was normal and rhythm regular [Heart Sounds] : normal S1 and S2 [Heart Sounds Gallop] : no gallops [Murmurs] : no murmurs [Heart Sounds Pericardial Friction Rub] : no pericardial rub [Veins - Varicosity Changes] : there were no varicosital changes [Bowel Sounds] : normal bowel sounds [Abdomen Soft] : soft [Abdomen Tenderness] : non-tender [Abdomen Mass (___ Cm)] : no abdominal mass palpated [Cervical Lymph Nodes Enlarged Posterior Bilaterally] : posterior cervical [Cervical Lymph Nodes Enlarged Anterior Bilaterally] : anterior cervical [Supraclavicular Lymph Nodes Enlarged Bilaterally] : supraclavicular [Abnormal Walk] : normal gait [Nail Clubbing] : no clubbing  or cyanosis of the fingernails [Musculoskeletal - Swelling] : no joint swelling seen [Motor Tone] : muscle strength and tone were normal [Skin Color & Pigmentation] : normal skin color and pigmentation [Skin Turgor] : normal skin turgor [] : no rash [Oriented To Time, Place, And Person] : oriented to person, place, and time [Impaired Insight] : insight and judgment were intact [Affect] : the affect was normal [FreeTextEntry1] : pitting bilateral ankle edema

## 2019-02-22 NOTE — HISTORY OF PRESENT ILLNESS
[FreeTextEntry1] : Kindly referred by Dr. Olguin for CKD.\par \par * Labs reviewed. Her creatinine has increased from 1.44 in 2017 to 1.91 in 23Jan19 (eGFR 29).  She had 1829 mg/g albuminuria in 2017. A CT of the abdomen in 2017 revealed a "stable inderminate left upper pole renal hypodensity" with no other abnormalitiese noted. She also had an unchanged ill-defined soft tissue density surrounding celialc axis, SMA, and common hepatic artery. \par \par * She sees Dr. Olguin for nonischemic systolic CHF. Her EF is 25%. She has not been treated with ACE or ARB given reported worsening renal dysfunction previously but the plan per Dr. Olguin was to restart this as outpatient. She has never had hyperkalemia or an allergic reaction to ACE/ARB. \par \par * She still smokes 3 cigarettes daily. \par \par * DM uncontrolled, last HGA1c 11 - 13.\par \par * She has had PVD with bilateral iliac artery angioplasty and stents, left femoral bypass. She has also had DVTs and is hypercoagulable and is treated with coumadin. \par \par * HTN controlled. No lightheadedness. Compliant with medications. Following low salt diet.

## 2019-02-22 NOTE — CONSULT LETTER
[FreeTextEntry1] : Dear Clau,\par \par I had the pleasure of seeing Tracy Mcknight in consultation for kidney disease. My note is attached.\par \par Thank you for the opportunity to participate in the care of your patient.\par \par Please call me on my cell phone at [phone] or in the office at [phone] if you have any questions.\par \par Best regards,\par \par Jony\par \par Jony Neal MD, FACP, FASN\par [email or web address] Office: [phone]\par Mobile: [phone]

## 2019-02-22 NOTE — ASSESSMENT
[FreeTextEntry1] : # CKD stage 4 consistent with DM nephropathy.\par * Check renal ultrasound.\par * Recheck labs, including monoclonal protein evaluation. \par * The patient has been counseled that chronic kidney disease is a significant condition and regular office followup with me (at least every 2-3  weeks for now) is essential for monitoring, and that it is their responsibility to make a follow up appointment. The patient has been counseled never to stop taking their medications without discussing it with me or another doctor. The patient has been counseled on risk of acute renal failure and instructed to immediately call and speak with me or go immediately to ER with any severe symptoms, nausea, vomiting, diarrhea, chest pain, or shortness of breath. The patient has been counseled on avoiding NSAIDs. Reducing or avoiding red meat and starting folate 800 mg qd has been advised. A counseling information sheet has been given. All their questions were answered.\par \par # Proteinuria/DM nephropathy/CHF.\par * Start losartan 12.5 mg daily. Benefits, alternatives, and risks to medication including but not limited to low blood pressure, electrolyte problems discussed and they consent. UpToDate patient information form on medication provided.\par \par # Smoking.\par * Smoking cessation advised. \par \par # Drug therapy.\par * ARB and diuretic require monitoring. \par \par # High Medical Complexity\par * Diagnostic and management options are extensive (4 stable problems, 2 worsening or uncontrolled problems, or or 1 new problem with workup).\par * The risk of complications, morbidity, or mortality is high. The patient has chronic organ failure (advanced chronic kidney disease) and is at risk for progression.\par * The risk of complications, morbidity, or mortality is high. The patient is taking the combination of the medications ACE/ARB and a diuretic. This combination requires intensive monitoring for toxicity, which may include low sodium, low or high potassium, and kidney failure. I have monitored labs previously and labs will be checked today.\par * Amount / complexity of data reviewed is extensive. Clinical laboratory tests have been ordered. I have ordered radiology tests. I have decided to obtain old records. I have reviewed the previous records that are available and summarized them in the HPI.

## 2019-02-26 PROCEDURE — 99285 EMERGENCY DEPT VISIT HI MDM: CPT | Mod: 25

## 2019-02-26 PROCEDURE — 82553 CREATINE MB FRACTION: CPT

## 2019-02-26 PROCEDURE — 85730 THROMBOPLASTIN TIME PARTIAL: CPT

## 2019-02-26 PROCEDURE — 85610 PROTHROMBIN TIME: CPT

## 2019-02-26 PROCEDURE — 85027 COMPLETE CBC AUTOMATED: CPT

## 2019-02-26 PROCEDURE — 84484 ASSAY OF TROPONIN QUANT: CPT

## 2019-02-26 PROCEDURE — 82550 ASSAY OF CK (CPK): CPT

## 2019-02-26 PROCEDURE — 94640 AIRWAY INHALATION TREATMENT: CPT

## 2019-02-26 PROCEDURE — 82962 GLUCOSE BLOOD TEST: CPT

## 2019-02-26 PROCEDURE — 83735 ASSAY OF MAGNESIUM: CPT

## 2019-02-26 PROCEDURE — 97161 PT EVAL LOW COMPLEX 20 MIN: CPT

## 2019-02-26 PROCEDURE — 84436 ASSAY OF TOTAL THYROXINE: CPT

## 2019-02-26 PROCEDURE — 85025 COMPLETE CBC W/AUTO DIFF WBC: CPT

## 2019-02-26 PROCEDURE — 36415 COLL VENOUS BLD VENIPUNCTURE: CPT

## 2019-02-26 PROCEDURE — 84443 ASSAY THYROID STIM HORMONE: CPT

## 2019-02-26 PROCEDURE — 96374 THER/PROPH/DIAG INJ IV PUSH: CPT

## 2019-02-26 PROCEDURE — 71045 X-RAY EXAM CHEST 1 VIEW: CPT

## 2019-02-26 PROCEDURE — 80048 BASIC METABOLIC PNL TOTAL CA: CPT

## 2019-02-26 PROCEDURE — 94660 CPAP INITIATION&MGMT: CPT

## 2019-02-26 PROCEDURE — 83880 ASSAY OF NATRIURETIC PEPTIDE: CPT

## 2019-03-01 ENCOUNTER — OUTPATIENT (OUTPATIENT)
Dept: OUTPATIENT SERVICES | Facility: HOSPITAL | Age: 59
LOS: 1 days | Discharge: HOME | End: 2019-03-01

## 2019-03-01 DIAGNOSIS — Z98.51 TUBAL LIGATION STATUS: Chronic | ICD-10-CM

## 2019-03-01 DIAGNOSIS — Z95.828 PRESENCE OF OTHER VASCULAR IMPLANTS AND GRAFTS: Chronic | ICD-10-CM

## 2019-03-01 DIAGNOSIS — I48.91 UNSPECIFIED ATRIAL FIBRILLATION: ICD-10-CM

## 2019-03-01 DIAGNOSIS — Z79.01 LONG TERM (CURRENT) USE OF ANTICOAGULANTS: ICD-10-CM

## 2019-03-01 DIAGNOSIS — Z90.710 ACQUIRED ABSENCE OF BOTH CERVIX AND UTERUS: Chronic | ICD-10-CM

## 2019-03-01 DIAGNOSIS — Z95.810 PRESENCE OF AUTOMATIC (IMPLANTABLE) CARDIAC DEFIBRILLATOR: Chronic | ICD-10-CM

## 2019-03-01 DIAGNOSIS — Z90.49 ACQUIRED ABSENCE OF OTHER SPECIFIED PARTS OF DIGESTIVE TRACT: Chronic | ICD-10-CM

## 2019-03-01 LAB
POCT INR: 2.8 RATIO — HIGH (ref 0.9–1.2)
POCT PT: 33.4 SEC — HIGH (ref 10–13.4)

## 2019-03-03 LAB
25(OH)D3 SERPL-MCNC: 12.6 NG/ML
ALBUMIN MFR SERPL ELPH: 44.6 %
ALBUMIN SERPL ELPH-MCNC: 3.9 G/DL
ALBUMIN SERPL-MCNC: 3.7 G/DL
ALBUMIN/GLOB SERPL: 0.8 RATIO
ALP BLD-CCNC: 103 U/L
ALPHA1 GLOB MFR SERPL ELPH: 4.3 %
ALPHA1 GLOB SERPL ELPH-MCNC: 0.4 G/DL
ALPHA2 GLOB MFR SERPL ELPH: 10.8 %
ALPHA2 GLOB SERPL ELPH-MCNC: 0.9 G/DL
ALT SERPL-CCNC: 12 U/L
ANION GAP SERPL CALC-SCNC: 12 MMOL/L
APPEARANCE: CLEAR
AST SERPL-CCNC: 18 U/L
B-GLOBULIN MFR SERPL ELPH: 8.9 %
B-GLOBULIN SERPL ELPH-MCNC: 0.7 G/DL
BASOPHILS # BLD AUTO: 0.03 K/UL
BASOPHILS NFR BLD AUTO: 0.3 %
BILIRUB SERPL-MCNC: 0.6 MG/DL
BILIRUBIN URINE: NEGATIVE
BLOOD URINE: ABNORMAL
BUN SERPL-MCNC: 28 MG/DL
CALCIUM SERPL-MCNC: 9 MG/DL
CALCIUM SERPL-MCNC: 9 MG/DL
CHLORIDE SERPL-SCNC: 98 MMOL/L
CHOLEST SERPL-MCNC: 108 MG/DL
CHOLEST/HDLC SERPL: 3.6 RATIO
CK SERPL-CCNC: 66 U/L
CO2 SERPL-SCNC: 30 MMOL/L
COLOR: NORMAL
CREAT SERPL-MCNC: 1.67 MG/DL
CREAT SPEC-SCNC: 34 MG/DL
CREAT SPEC-SCNC: 34 MG/DL
CREAT/PROT UR: 1.9 RATIO
DEPRECATED KAPPA LC FREE/LAMBDA SER: 4.78 RATIO
EOSINOPHIL # BLD AUTO: 0.34 K/UL
EOSINOPHIL NFR BLD AUTO: 3.2 %
FERRITIN SERPL-MCNC: 132 NG/ML
GAMMA GLOB FLD ELPH-MCNC: 2.6 G/DL
GAMMA GLOB MFR SERPL ELPH: 31.4 %
GLUCOSE QUALITATIVE U: NEGATIVE
GLUCOSE SERPL-MCNC: 145 MG/DL
HBA1C MFR BLD HPLC: 7.3 %
HCT VFR BLD CALC: 36.5 %
HDLC SERPL-MCNC: 30 MG/DL
HGB BLD-MCNC: 10.3 G/DL
IGA 24H UR QL IFE: NORMAL
IMM GRANULOCYTES NFR BLD AUTO: 0.6 %
INTERPRETATION SERPL IEP-IMP: NORMAL
KAPPA LC CSF-MCNC: 3.66 MG/DL
KAPPA LC SERPL-MCNC: 17.51 MG/DL
KETONES URINE: NEGATIVE
LDLC SERPL CALC-MCNC: 58 MG/DL
LEUKOCYTE ESTERASE URINE: NEGATIVE
LYMPHOCYTES # BLD AUTO: 0.83 K/UL
LYMPHOCYTES NFR BLD AUTO: 7.9 %
M PROTEIN MFR SERPL ELPH: 22.5 %
M PROTEIN SPEC IFE-MCNC: NORMAL
MAGNESIUM SERPL-MCNC: 2 MG/DL
MAN DIFF?: NORMAL
MCHC RBC-ENTMCNC: 27.2 PG
MCHC RBC-ENTMCNC: 28.2 GM/DL
MCV RBC AUTO: 96.6 FL
MICROALBUMIN 24H UR DL<=1MG/L-MCNC: 47.4 MG/DL
MICROALBUMIN/CREAT 24H UR-RTO: 1379 MG/G
MONOCLON BAND OBS SERPL: 1.9 G/DL
MONOCYTES # BLD AUTO: 0.79 K/UL
MONOCYTES NFR BLD AUTO: 7.5 %
NEUTROPHILS # BLD AUTO: 8.48 K/UL
NEUTROPHILS NFR BLD AUTO: 80.5 %
NITRITE URINE: NEGATIVE
PARATHYROID HORMONE INTACT: 113 PG/ML
PH URINE: 6.5
PHOSPHATE SERPL-MCNC: 3.5 MG/DL
PLATELET # BLD AUTO: 233 K/UL
POTASSIUM SERPL-SCNC: 4.3 MMOL/L
PROT SERPL-MCNC: 8.4 G/DL
PROT UR-MCNC: 66 MG/DL
PROTEIN URINE: ABNORMAL
RBC # BLD: 3.78 M/UL
RBC # FLD: 17.5 %
SODIUM SERPL-SCNC: 140 MMOL/L
SPECIFIC GRAVITY URINE: 1.01
T4 FREE SERPL-MCNC: 1.2 NG/DL
TRIGL SERPL-MCNC: 101 MG/DL
TSH SERPL-ACNC: 2.91 UIU/ML
URATE SERPL-MCNC: 10.4 MG/DL
UROBILINOGEN URINE: NORMAL
VIT B12 SERPL-MCNC: 814 PG/ML
WBC # FLD AUTO: 10.53 K/UL

## 2019-03-07 VITALS
DIASTOLIC BLOOD PRESSURE: 68 MMHG | SYSTOLIC BLOOD PRESSURE: 138 MMHG | TEMPERATURE: 98 F | HEART RATE: 76 BPM | RESPIRATION RATE: 17 BRPM | OXYGEN SATURATION: 100 %

## 2019-03-07 NOTE — H&P ADULT - ASSESSMENT
57 yo F current smoker with Contrast and Aspirin Allergy and an extensive PMHx including HTN, IDDM c/b neuropathy, bipolar disorder, depression, history of DVT on Coumadin (4 episodes in her lower extremities, pt was instructed to keep taking her Coumadin), CVA x2 (last one in 2013 with residual right sided weakness), PAD s/p bypass, NICM, chronic HFrEF (EF 25% on echo) s/p AICD for primary prevention, hypothyroidism, current smoker with COPD (on 2-3L oxygen at home), DHARMESH on CPAP, multiple CHF admissions last one 2/7/19 who is now referred to [facility]  for RHC with cardiomems implant. 59 yo F current smoker with Contrast and Aspirin Allergy and an extensive PMHx including HTN, IDDM c/b neuropathy, bipolar disorder, depression, history of DVT on Coumadin (4 episodes in her lower extremities, pt was instructed to keep taking her Coumadin), CVA x2 (last one in 2013 with residual right sided weakness), PAD s/p bypass, NICM, chronic HFrEF (EF 25% on echo) s/p AICD for primary prevention, hypothyroidism, current smoker with COPD (on 2-3L oxygen at home), DHARMESH on CPAP, multiple CHF admissions last one 2/7/19 who is now referred to [facility]  for RHC with cardiomems implant.     - Patient w/ allergy to contrast. states back reaction to oral contrast unclear reaction to IV contrast.  will pre medicate w/ Solucortef 200mg and Benadryl 50mg IV on call to the cath lab table  - WBC of 14.4 with left shift, afebrile.  No signs of symptoms of infections.   - INR 2.32 (patient told to continue coumadin)  - Crt 1.45.   - Took plavix 75mg today, ASA allergy - going for RHC only  - All the above made aware to intervention fellow Dr. Froilan Rodriguez who discussed with Dr. Francis and is okay with proceeding for RHC w/ Cardiomems device placement.

## 2019-03-07 NOTE — H&P ADULT - HISTORY OF PRESENT ILLNESS
57 yo F current smoker with Contrast and Aspirin Allergy and an extensive PMHx including HTN, IDDM c/b neuropathy, bipolar disorder, depression, history of DVT on Coumadin (4 episodes in her lower extremities), CVA x2 (last one in 2013 with residual right sided weakness), PAD s/p bypass, NICM, chronic HFrEF (EF 25% on echo) s/p AICD for primary prevention, hypothyroidism, current smoker with COPD (on 2-3L oxygen at home), DHARMESH on CPAP, multiple CHF admissions last one 2/7/19 presented to her doctor c/o worsening RAMSEY and orthopnea. Pt was enrolled in Transform trial, started on Torsemide. Pt cannot be started on Entresto given renal dysfucntion  Since her discharge she reports compliance with her medications and fluid restrictions though endorses not being compliant with her daily weight. She denies any chest pain, palpitations, fever chills, abdominal pain, syncope, palpitations, LE edema, n/v, blood in the stool. TTE 1/2019 showed EF 25%, moderate MR, suggestive elevated LA pressure, moderate pulmonary HTN. In light of pt's risk factors, symptoms mentioned above, weight scale discrepancies and frequent CHF readmissions, pt is now referred for RHC with cardiomems implant. 59 yo F current smoker with Contrast and Aspirin Allergy and an extensive PMHx including HTN, IDDM c/b neuropathy, bipolar disorder, depression, history of DVT on Coumadin (4 episodes in her lower extremities, pt was instructed to keep taking her Coumadin), CVA x2 (last one in 2013 with residual right sided weakness), PAD s/p bypass, NICM, chronic HFrEF (EF 25% on echo) s/p AICD for primary prevention, hypothyroidism, current smoker with COPD (on 2-3L oxygen at home), DHARMESH on CPAP, multiple CHF admissions last one 2/7/19 presented to her doctor c/o worsening RAMSEY and orthopnea as per MD note.  Currently pt claims that she does not have RAMSEY but endorses 5 pillow orthopnea. Pt was enrolled in Transform trial, started on Torsemide. Pt cannot be started on Entresto given renal dysfucntion  Since her discharge she reports compliance with her medications, daily weight measurements and fluid restrictions. She denies any chest pain, palpitations, fever chills, abdominal pain, syncope, palpitations, LE edema, n/v, blood in the stool. TTE 1/2019 showed EF 25%, moderate MR, suggestive elevated LA pressure, moderate pulmonary HTN. In light of pt's risk factors, symptoms mentioned above, weight scale discrepancies and frequent CHF readmissions, pt is now referred for RHC with cardiomems implant. pt will bring meds please review    57 yo F current smoker with Contrast and Aspirin Allergy and an extensive PMHx including HTN, IDDM c/b neuropathy, bipolar disorder, depression, history of DVT on Coumadin (4 episodes in her lower extremities, pt was instructed to keep taking her Coumadin), CVA x2 (last one in 2013 with residual right sided weakness), PAD s/p bypass, NICM, chronic HFrEF (EF 25% on echo) s/p AICD for primary prevention, hypothyroidism, current smoker with COPD (on 2-3L oxygen at home), DHARMESH on CPAP, multiple CHF admissions last one 2/7/19 presented to her doctor c/o worsening RAMSEY and orthopnea as per MD note.  Currently pt claims that she does not have RAMSEY but endorses 5 pillow orthopnea. Pt was enrolled in Transform trial, started on Torsemide. Pt cannot be started on Entresto given renal dysfucntion  Since her discharge she reports compliance with her medications, daily weight measurements and fluid restrictions. She denies any chest pain, palpitations, fever chills, abdominal pain, syncope, palpitations, LE edema, n/v, blood in the stool. TTE 1/2019 showed EF 25%, moderate MR, suggestive elevated LA pressure, moderate pulmonary HTN. In light of pt's risk factors, symptoms mentioned above, weight scale discrepancies and frequent CHF readmissions, pt is now referred for RHC with cardiomems implant. 57 yo F current smoker with Contrast and Aspirin Allergy and an extensive PMHx including HTN, IDDM c/b neuropathy, bipolar disorder, depression, history of DVT on Coumadin (4 episodes in her lower extremities, pt was instructed to keep taking her Coumadin), CVA x2 (last one in 2013 with residual right sided weakness), PAD s/p bypass, NICM, chronic HFrEF (EF 25% on echo) s/p AICD for primary prevention, hypothyroidism, current smoker with COPD (on 2-3L oxygen at home), DHARMESH on CPAP, multiple CHF admissions last one 2/7/19 presented to her doctor c/o worsening RAMSEY and orthopnea as per MD note.  Currently pt claims that she does not have RAMSEY but endorses 5 pillow orthopnea. Pt was enrolled in Transform trial, started on Torsemide. Pt cannot be started on Entresto given renal dysfucntion  Since her discharge she reports compliance with her medications, daily weight measurements and fluid restrictions. She denies any chest pain, palpitations, fever chills, abdominal pain, syncope, palpitations, LE edema, n/v, blood in the stool. TTE 1/2019 showed EF 25%, moderate MR, suggestive elevated LA pressure, moderate pulmonary HTN. In light of pt's risk factors, symptoms mentioned above, weight scale discrepancies and frequent CHF readmissions, pt is now referred for RHC with cardiomems implant. 59 yo F current smoker with Contrast and Aspirin Allergy and an extensive PMHx including HTN, IDDM c/b neuropathy (takes methadone), bipolar disorder, depression, history of DVT on Coumadin (4 episodes in her lower extremities, pt was instructed to keep taking her Coumadin), CVA x2 (last one in 2013 with residual right sided weakness), PAD s/p bypass, NICM, chronic HFrEF (EF 25% on echo) s/p AICD for primary prevention, hypothyroidism, current smoker with COPD (on 2-3L oxygen at home), DHARMESH on CPAP, multiple CHF admissions last one 2/7/19 presented to her doctor c/o worsening RAMSEY and orthopnea as per MD note.  Currently pt claims that she does not have RAMSEY but endorses 5 pillow orthopnea. Pt was enrolled in Transform trial, started on Torsemide. Pt cannot be started on Entresto given renal dysfucntion  Since her discharge she reports compliance with her medications, daily weight measurements and fluid restrictions. She denies any chest pain, palpitations, fever chills, abdominal pain, syncope, palpitations, LE edema, n/v, blood in the stool. TTE 1/2019 showed EF 25%, moderate MR, suggestive elevated LA pressure, moderate pulmonary HTN. In light of pt's risk factors, symptoms mentioned above, weight scale discrepancies and frequent CHF readmissions, pt is now referred for RHC with cardiomems implant.

## 2019-03-07 NOTE — H&P ADULT - NSHPSOCIALHISTORY_GEN_ALL_CORE
Pt is a current smoker(0.5 PPD) Pt is a current smoker(0.5 PPD)  denies use of ETOH and any types of recreational drugs

## 2019-03-08 ENCOUNTER — INPATIENT (INPATIENT)
Facility: HOSPITAL | Age: 59
LOS: 0 days | Discharge: HOME | DRG: 264 | End: 2019-03-09
Attending: INTERNAL MEDICINE | Admitting: INTERNAL MEDICINE
Payer: COMMERCIAL

## 2019-03-08 DIAGNOSIS — Z90.49 ACQUIRED ABSENCE OF OTHER SPECIFIED PARTS OF DIGESTIVE TRACT: Chronic | ICD-10-CM

## 2019-03-08 DIAGNOSIS — Z90.710 ACQUIRED ABSENCE OF BOTH CERVIX AND UTERUS: Chronic | ICD-10-CM

## 2019-03-08 DIAGNOSIS — Z98.51 TUBAL LIGATION STATUS: Chronic | ICD-10-CM

## 2019-03-08 DIAGNOSIS — Z95.828 PRESENCE OF OTHER VASCULAR IMPLANTS AND GRAFTS: Chronic | ICD-10-CM

## 2019-03-08 DIAGNOSIS — Z95.810 PRESENCE OF AUTOMATIC (IMPLANTABLE) CARDIAC DEFIBRILLATOR: Chronic | ICD-10-CM

## 2019-03-08 LAB
ALBUMIN SERPL ELPH-MCNC: 3.5 G/DL — SIGNIFICANT CHANGE UP (ref 3.3–5)
ALP SERPL-CCNC: 117 U/L — SIGNIFICANT CHANGE UP (ref 40–120)
ALT FLD-CCNC: 22 U/L — SIGNIFICANT CHANGE UP (ref 10–45)
ANION GAP SERPL CALC-SCNC: 12 MMOL/L — SIGNIFICANT CHANGE UP (ref 5–17)
APTT BLD: 40.9 SEC — HIGH (ref 27.5–36.3)
AST SERPL-CCNC: 20 U/L — SIGNIFICANT CHANGE UP (ref 10–40)
BASOPHILS # BLD AUTO: 0.06 K/UL — SIGNIFICANT CHANGE UP (ref 0–0.2)
BASOPHILS NFR BLD AUTO: 0.4 % — SIGNIFICANT CHANGE UP (ref 0–2)
BILIRUB SERPL-MCNC: 0.7 MG/DL — SIGNIFICANT CHANGE UP (ref 0.2–1.2)
BUN SERPL-MCNC: 33 MG/DL — HIGH (ref 7–23)
CALCIUM SERPL-MCNC: 9.4 MG/DL — SIGNIFICANT CHANGE UP (ref 8.4–10.5)
CHLORIDE SERPL-SCNC: 96 MMOL/L — SIGNIFICANT CHANGE UP (ref 96–108)
CHOLEST SERPL-MCNC: 148 MG/DL — SIGNIFICANT CHANGE UP (ref 10–199)
CO2 SERPL-SCNC: 27 MMOL/L — SIGNIFICANT CHANGE UP (ref 22–31)
CREAT SERPL-MCNC: 1.45 MG/DL — HIGH (ref 0.5–1.3)
CRP SERPL-MCNC: 0.95 MG/DL — HIGH (ref 0–0.4)
EOSINOPHIL # BLD AUTO: 0.32 K/UL — SIGNIFICANT CHANGE UP (ref 0–0.5)
EOSINOPHIL NFR BLD AUTO: 2.2 % — SIGNIFICANT CHANGE UP (ref 0–6)
GLUCOSE SERPL-MCNC: 318 MG/DL — HIGH (ref 70–99)
HBA1C BLD-MCNC: 8.3 % — HIGH (ref 4–5.6)
HCT VFR BLD CALC: 37.2 % — SIGNIFICANT CHANGE UP (ref 34.5–45)
HDLC SERPL-MCNC: 40 MG/DL — LOW
HGB BLD-MCNC: 11.7 G/DL — SIGNIFICANT CHANGE UP (ref 11.5–15.5)
IMM GRANULOCYTES NFR BLD AUTO: 0.6 % — SIGNIFICANT CHANGE UP (ref 0–1.5)
INR BLD: 2.32 — HIGH (ref 0.88–1.16)
LIPID PNL WITH DIRECT LDL SERPL: 77 MG/DL — SIGNIFICANT CHANGE UP
LYMPHOCYTES # BLD AUTO: 0.99 K/UL — LOW (ref 1–3.3)
LYMPHOCYTES # BLD AUTO: 6.8 % — LOW (ref 13–44)
MCHC RBC-ENTMCNC: 27.8 PG — SIGNIFICANT CHANGE UP (ref 27–34)
MCHC RBC-ENTMCNC: 31.5 GM/DL — LOW (ref 32–36)
MCV RBC AUTO: 88.4 FL — SIGNIFICANT CHANGE UP (ref 80–100)
MONOCYTES # BLD AUTO: 1.07 K/UL — HIGH (ref 0–0.9)
MONOCYTES NFR BLD AUTO: 7.4 % — SIGNIFICANT CHANGE UP (ref 2–14)
NEUTROPHILS # BLD AUTO: 11.97 K/UL — HIGH (ref 1.8–7.4)
NEUTROPHILS NFR BLD AUTO: 82.6 % — HIGH (ref 43–77)
NRBC # BLD: 0 /100 WBCS — SIGNIFICANT CHANGE UP (ref 0–0)
PLATELET # BLD AUTO: 208 K/UL — SIGNIFICANT CHANGE UP (ref 150–400)
POTASSIUM SERPL-MCNC: 4.6 MMOL/L — SIGNIFICANT CHANGE UP (ref 3.5–5.3)
POTASSIUM SERPL-SCNC: 4.6 MMOL/L — SIGNIFICANT CHANGE UP (ref 3.5–5.3)
PROT SERPL-MCNC: 8.7 G/DL — HIGH (ref 6–8.3)
PROTHROM AB SERPL-ACNC: 26.9 SEC — HIGH (ref 10–12.9)
RBC # BLD: 4.21 M/UL — SIGNIFICANT CHANGE UP (ref 3.8–5.2)
RBC # FLD: 15.8 % — HIGH (ref 10.3–14.5)
SODIUM SERPL-SCNC: 135 MMOL/L — SIGNIFICANT CHANGE UP (ref 135–145)
TOTAL CHOLESTEROL/HDL RATIO MEASUREMENT: 3.7 RATIO — SIGNIFICANT CHANGE UP (ref 3.3–7.1)
TRIGL SERPL-MCNC: 154 MG/DL — HIGH (ref 10–149)
WBC # BLD: 14.49 K/UL — HIGH (ref 3.8–10.5)
WBC # FLD AUTO: 14.49 K/UL — HIGH (ref 3.8–10.5)

## 2019-03-08 PROCEDURE — 33967 INSERT I-AORT PERCUT DEVICE: CPT

## 2019-03-08 PROCEDURE — 93457 R HRT ART/GRFT ANGIO: CPT | Mod: 26

## 2019-03-08 PROCEDURE — 76937 US GUIDE VASCULAR ACCESS: CPT | Mod: 26

## 2019-03-08 RX ORDER — SODIUM CHLORIDE 9 MG/ML
1000 INJECTION, SOLUTION INTRAVENOUS
Qty: 0 | Refills: 0 | Status: DISCONTINUED | OUTPATIENT
Start: 2019-03-08 | End: 2019-03-09

## 2019-03-08 RX ORDER — DIPHENHYDRAMINE HCL 50 MG
50 CAPSULE ORAL ONCE
Qty: 0 | Refills: 0 | Status: COMPLETED | OUTPATIENT
Start: 2019-03-08 | End: 2019-03-08

## 2019-03-08 RX ORDER — DEXTROSE 50 % IN WATER 50 %
25 SYRINGE (ML) INTRAVENOUS ONCE
Qty: 0 | Refills: 0 | Status: DISCONTINUED | OUTPATIENT
Start: 2019-03-08 | End: 2019-03-08

## 2019-03-08 RX ORDER — FENOFIBRATE,MICRONIZED 130 MG
145 CAPSULE ORAL DAILY
Qty: 0 | Refills: 0 | Status: DISCONTINUED | OUTPATIENT
Start: 2019-03-08 | End: 2019-03-09

## 2019-03-08 RX ORDER — CYCLOBENZAPRINE HYDROCHLORIDE 10 MG/1
1 TABLET, FILM COATED ORAL
Qty: 0 | Refills: 0 | COMMUNITY

## 2019-03-08 RX ORDER — INSULIN LISPRO 100/ML
5 VIAL (ML) SUBCUTANEOUS
Qty: 0 | Refills: 0 | Status: DISCONTINUED | OUTPATIENT
Start: 2019-03-08 | End: 2019-03-09

## 2019-03-08 RX ORDER — SODIUM CHLORIDE 9 MG/ML
1000 INJECTION, SOLUTION INTRAVENOUS
Qty: 0 | Refills: 0 | Status: DISCONTINUED | OUTPATIENT
Start: 2019-03-08 | End: 2019-03-08

## 2019-03-08 RX ORDER — METHADONE HYDROCHLORIDE 40 MG/1
1 TABLET ORAL
Qty: 0 | Refills: 0 | COMMUNITY

## 2019-03-08 RX ORDER — AMITRIPTYLINE HCL 25 MG
10 TABLET ORAL DAILY
Qty: 0 | Refills: 0 | Status: DISCONTINUED | OUTPATIENT
Start: 2019-03-08 | End: 2019-03-09

## 2019-03-08 RX ORDER — ESCITALOPRAM OXALATE 10 MG/1
20 TABLET, FILM COATED ORAL DAILY
Qty: 0 | Refills: 0 | Status: DISCONTINUED | OUTPATIENT
Start: 2019-03-08 | End: 2019-03-09

## 2019-03-08 RX ORDER — PANTOPRAZOLE SODIUM 20 MG/1
40 TABLET, DELAYED RELEASE ORAL DAILY
Qty: 0 | Refills: 0 | Status: DISCONTINUED | OUTPATIENT
Start: 2019-03-08 | End: 2019-03-09

## 2019-03-08 RX ORDER — DEXTROSE 50 % IN WATER 50 %
25 SYRINGE (ML) INTRAVENOUS ONCE
Qty: 0 | Refills: 0 | Status: DISCONTINUED | OUTPATIENT
Start: 2019-03-08 | End: 2019-03-09

## 2019-03-08 RX ORDER — ATORVASTATIN CALCIUM 80 MG/1
80 TABLET, FILM COATED ORAL AT BEDTIME
Qty: 0 | Refills: 0 | Status: DISCONTINUED | OUTPATIENT
Start: 2019-03-08 | End: 2019-03-09

## 2019-03-08 RX ORDER — GABAPENTIN 400 MG/1
0 CAPSULE ORAL
Qty: 0 | Refills: 0 | COMMUNITY

## 2019-03-08 RX ORDER — DEXTROSE 50 % IN WATER 50 %
15 SYRINGE (ML) INTRAVENOUS ONCE
Qty: 0 | Refills: 0 | Status: DISCONTINUED | OUTPATIENT
Start: 2019-03-08 | End: 2019-03-08

## 2019-03-08 RX ORDER — ISOSORBIDE MONONITRATE 60 MG/1
60 TABLET, EXTENDED RELEASE ORAL DAILY
Qty: 0 | Refills: 0 | Status: DISCONTINUED | OUTPATIENT
Start: 2019-03-08 | End: 2019-03-09

## 2019-03-08 RX ORDER — INSULIN LISPRO 100/ML
VIAL (ML) SUBCUTANEOUS
Qty: 0 | Refills: 0 | Status: DISCONTINUED | OUTPATIENT
Start: 2019-03-08 | End: 2019-03-08

## 2019-03-08 RX ORDER — ZOLPIDEM TARTRATE 10 MG/1
5 TABLET ORAL AT BEDTIME
Qty: 0 | Refills: 0 | Status: DISCONTINUED | OUTPATIENT
Start: 2019-03-08 | End: 2019-03-09

## 2019-03-08 RX ORDER — TRAZODONE HCL 50 MG
50 TABLET ORAL AT BEDTIME
Qty: 0 | Refills: 0 | Status: DISCONTINUED | OUTPATIENT
Start: 2019-03-08 | End: 2019-03-09

## 2019-03-08 RX ORDER — GABAPENTIN 400 MG/1
100 CAPSULE ORAL
Qty: 0 | Refills: 0 | Status: DISCONTINUED | OUTPATIENT
Start: 2019-03-08 | End: 2019-03-09

## 2019-03-08 RX ORDER — METOPROLOL TARTRATE 50 MG
100 TABLET ORAL DAILY
Qty: 0 | Refills: 0 | Status: DISCONTINUED | OUTPATIENT
Start: 2019-03-08 | End: 2019-03-09

## 2019-03-08 RX ORDER — DEXTROSE 50 % IN WATER 50 %
15 SYRINGE (ML) INTRAVENOUS ONCE
Qty: 0 | Refills: 0 | Status: DISCONTINUED | OUTPATIENT
Start: 2019-03-08 | End: 2019-03-09

## 2019-03-08 RX ORDER — LOSARTAN POTASSIUM 100 MG/1
25 TABLET, FILM COATED ORAL DAILY
Qty: 0 | Refills: 0 | Status: DISCONTINUED | OUTPATIENT
Start: 2019-03-08 | End: 2019-03-09

## 2019-03-08 RX ORDER — GLUCAGON INJECTION, SOLUTION 0.5 MG/.1ML
1 INJECTION, SOLUTION SUBCUTANEOUS ONCE
Qty: 0 | Refills: 0 | Status: DISCONTINUED | OUTPATIENT
Start: 2019-03-08 | End: 2019-03-09

## 2019-03-08 RX ORDER — INSULIN LISPRO 100/ML
VIAL (ML) SUBCUTANEOUS
Qty: 0 | Refills: 0 | Status: DISCONTINUED | OUTPATIENT
Start: 2019-03-08 | End: 2019-03-09

## 2019-03-08 RX ORDER — HYDROCORTISONE 20 MG
200 TABLET ORAL ONCE
Qty: 0 | Refills: 0 | Status: COMPLETED | OUTPATIENT
Start: 2019-03-08 | End: 2019-03-08

## 2019-03-08 RX ORDER — DEXTROSE 50 % IN WATER 50 %
12.5 SYRINGE (ML) INTRAVENOUS ONCE
Qty: 0 | Refills: 0 | Status: DISCONTINUED | OUTPATIENT
Start: 2019-03-08 | End: 2019-03-08

## 2019-03-08 RX ORDER — CLOPIDOGREL BISULFATE 75 MG/1
75 TABLET, FILM COATED ORAL DAILY
Qty: 0 | Refills: 0 | Status: DISCONTINUED | OUTPATIENT
Start: 2019-03-09 | End: 2019-03-09

## 2019-03-08 RX ORDER — FERROUS SULFATE 325(65) MG
325 TABLET ORAL
Qty: 0 | Refills: 0 | Status: DISCONTINUED | OUTPATIENT
Start: 2019-03-08 | End: 2019-03-09

## 2019-03-08 RX ORDER — GLUCAGON INJECTION, SOLUTION 0.5 MG/.1ML
1 INJECTION, SOLUTION SUBCUTANEOUS ONCE
Qty: 0 | Refills: 0 | Status: DISCONTINUED | OUTPATIENT
Start: 2019-03-08 | End: 2019-03-08

## 2019-03-08 RX ORDER — INSULIN GLARGINE 100 [IU]/ML
15 INJECTION, SOLUTION SUBCUTANEOUS AT BEDTIME
Qty: 0 | Refills: 0 | Status: DISCONTINUED | OUTPATIENT
Start: 2019-03-08 | End: 2019-03-09

## 2019-03-08 RX ORDER — SUCRALFATE 1 G
1 TABLET ORAL EVERY 12 HOURS
Qty: 0 | Refills: 0 | Status: DISCONTINUED | OUTPATIENT
Start: 2019-03-08 | End: 2019-03-09

## 2019-03-08 RX ORDER — BUDESONIDE AND FORMOTEROL FUMARATE DIHYDRATE 160; 4.5 UG/1; UG/1
2 AEROSOL RESPIRATORY (INHALATION)
Qty: 0 | Refills: 0 | Status: DISCONTINUED | OUTPATIENT
Start: 2019-03-08 | End: 2019-03-09

## 2019-03-08 RX ORDER — DEXTROSE 50 % IN WATER 50 %
12.5 SYRINGE (ML) INTRAVENOUS ONCE
Qty: 0 | Refills: 0 | Status: DISCONTINUED | OUTPATIENT
Start: 2019-03-08 | End: 2019-03-09

## 2019-03-08 RX ORDER — METHADONE HYDROCHLORIDE 40 MG/1
10 TABLET ORAL
Qty: 0 | Refills: 0 | Status: DISCONTINUED | OUTPATIENT
Start: 2019-03-08 | End: 2019-03-09

## 2019-03-08 RX ORDER — CHLORHEXIDINE GLUCONATE 213 G/1000ML
1 SOLUTION TOPICAL ONCE
Qty: 0 | Refills: 0 | Status: DISCONTINUED | OUTPATIENT
Start: 2019-03-08 | End: 2019-03-08

## 2019-03-08 RX ORDER — LEVOTHYROXINE SODIUM 125 MCG
25 TABLET ORAL DAILY
Qty: 0 | Refills: 0 | Status: DISCONTINUED | OUTPATIENT
Start: 2019-03-08 | End: 2019-03-09

## 2019-03-08 RX ORDER — ESCITALOPRAM OXALATE 10 MG/1
1 TABLET, FILM COATED ORAL
Qty: 0 | Refills: 0 | COMMUNITY

## 2019-03-08 RX ADMIN — Medication 200 MILLIGRAM(S): at 11:42

## 2019-03-08 RX ADMIN — BUDESONIDE AND FORMOTEROL FUMARATE DIHYDRATE 2 PUFF(S): 160; 4.5 AEROSOL RESPIRATORY (INHALATION) at 23:16

## 2019-03-08 RX ADMIN — Medication 6: at 11:18

## 2019-03-08 RX ADMIN — Medication 5 UNIT(S): at 17:12

## 2019-03-08 RX ADMIN — Medication 325 MILLIGRAM(S): at 17:05

## 2019-03-08 RX ADMIN — ATORVASTATIN CALCIUM 80 MILLIGRAM(S): 80 TABLET, FILM COATED ORAL at 22:32

## 2019-03-08 RX ADMIN — METHADONE HYDROCHLORIDE 10 MILLIGRAM(S): 40 TABLET ORAL at 17:05

## 2019-03-08 RX ADMIN — GABAPENTIN 100 MILLIGRAM(S): 400 CAPSULE ORAL at 17:05

## 2019-03-08 RX ADMIN — ISOSORBIDE MONONITRATE 60 MILLIGRAM(S): 60 TABLET, EXTENDED RELEASE ORAL at 17:05

## 2019-03-08 RX ADMIN — Medication 8: at 22:33

## 2019-03-08 RX ADMIN — Medication 50 MILLIGRAM(S): at 12:37

## 2019-03-08 RX ADMIN — Medication 6: at 17:12

## 2019-03-08 RX ADMIN — INSULIN GLARGINE 15 UNIT(S): 100 INJECTION, SOLUTION SUBCUTANEOUS at 22:32

## 2019-03-08 RX ADMIN — Medication 1 GRAM(S): at 17:05

## 2019-03-08 RX ADMIN — Medication 50 MILLIGRAM(S): at 22:32

## 2019-03-09 ENCOUNTER — TRANSCRIPTION ENCOUNTER (OUTPATIENT)
Age: 59
End: 2019-03-09

## 2019-03-09 VITALS — SYSTOLIC BLOOD PRESSURE: 123 MMHG | HEART RATE: 70 BPM | DIASTOLIC BLOOD PRESSURE: 60 MMHG

## 2019-03-09 LAB
ANION GAP SERPL CALC-SCNC: 9 MMOL/L — SIGNIFICANT CHANGE UP (ref 5–17)
BUN SERPL-MCNC: 42 MG/DL — HIGH (ref 7–23)
CALCIUM SERPL-MCNC: 9.2 MG/DL — SIGNIFICANT CHANGE UP (ref 8.4–10.5)
CHLORIDE SERPL-SCNC: 98 MMOL/L — SIGNIFICANT CHANGE UP (ref 96–108)
CO2 SERPL-SCNC: 30 MMOL/L — SIGNIFICANT CHANGE UP (ref 22–31)
CREAT SERPL-MCNC: 1.56 MG/DL — HIGH (ref 0.5–1.3)
GLUCOSE SERPL-MCNC: 197 MG/DL — HIGH (ref 70–99)
HCT VFR BLD CALC: 32.9 % — LOW (ref 34.5–45)
HGB BLD-MCNC: 10.3 G/DL — LOW (ref 11.5–15.5)
MAGNESIUM SERPL-MCNC: 2.4 MG/DL — SIGNIFICANT CHANGE UP (ref 1.6–2.6)
MCHC RBC-ENTMCNC: 28.1 PG — SIGNIFICANT CHANGE UP (ref 27–34)
MCHC RBC-ENTMCNC: 31.3 GM/DL — LOW (ref 32–36)
MCV RBC AUTO: 89.9 FL — SIGNIFICANT CHANGE UP (ref 80–100)
NRBC # BLD: 0 /100 WBCS — SIGNIFICANT CHANGE UP (ref 0–0)
PLATELET # BLD AUTO: 171 K/UL — SIGNIFICANT CHANGE UP (ref 150–400)
POTASSIUM SERPL-MCNC: 3.7 MMOL/L — SIGNIFICANT CHANGE UP (ref 3.5–5.3)
POTASSIUM SERPL-SCNC: 3.7 MMOL/L — SIGNIFICANT CHANGE UP (ref 3.5–5.3)
RBC # BLD: 3.66 M/UL — LOW (ref 3.8–5.2)
RBC # FLD: 15.9 % — HIGH (ref 10.3–14.5)
SODIUM SERPL-SCNC: 137 MMOL/L — SIGNIFICANT CHANGE UP (ref 135–145)
WBC # BLD: 11.03 K/UL — HIGH (ref 3.8–10.5)
WBC # FLD AUTO: 11.03 K/UL — HIGH (ref 3.8–10.5)

## 2019-03-09 PROCEDURE — 99239 HOSP IP/OBS DSCHRG MGMT >30: CPT

## 2019-03-09 RX ORDER — WARFARIN SODIUM 2.5 MG/1
1 TABLET ORAL
Qty: 0 | Refills: 0 | COMMUNITY

## 2019-03-09 RX ADMIN — PANTOPRAZOLE SODIUM 40 MILLIGRAM(S): 20 TABLET, DELAYED RELEASE ORAL at 12:45

## 2019-03-09 RX ADMIN — Medication 5 UNIT(S): at 08:11

## 2019-03-09 RX ADMIN — Medication 100 MILLIGRAM(S): at 06:39

## 2019-03-09 RX ADMIN — Medication 325 MILLIGRAM(S): at 06:39

## 2019-03-09 RX ADMIN — Medication 1 GRAM(S): at 06:40

## 2019-03-09 RX ADMIN — Medication 10 MILLIGRAM(S): at 06:39

## 2019-03-09 RX ADMIN — Medication 5 UNIT(S): at 11:32

## 2019-03-09 RX ADMIN — Medication 120 MILLIGRAM(S): at 06:40

## 2019-03-09 RX ADMIN — BUDESONIDE AND FORMOTEROL FUMARATE DIHYDRATE 2 PUFF(S): 160; 4.5 AEROSOL RESPIRATORY (INHALATION) at 11:03

## 2019-03-09 RX ADMIN — ESCITALOPRAM OXALATE 20 MILLIGRAM(S): 10 TABLET, FILM COATED ORAL at 11:50

## 2019-03-09 RX ADMIN — Medication 145 MILLIGRAM(S): at 11:50

## 2019-03-09 RX ADMIN — Medication 10 MILLIGRAM(S): at 11:51

## 2019-03-09 RX ADMIN — GABAPENTIN 100 MILLIGRAM(S): 400 CAPSULE ORAL at 06:39

## 2019-03-09 RX ADMIN — Medication 25 MICROGRAM(S): at 06:39

## 2019-03-09 RX ADMIN — CLOPIDOGREL BISULFATE 75 MILLIGRAM(S): 75 TABLET, FILM COATED ORAL at 11:50

## 2019-03-09 RX ADMIN — LOSARTAN POTASSIUM 25 MILLIGRAM(S): 100 TABLET, FILM COATED ORAL at 06:39

## 2019-03-09 RX ADMIN — ISOSORBIDE MONONITRATE 60 MILLIGRAM(S): 60 TABLET, EXTENDED RELEASE ORAL at 11:50

## 2019-03-09 NOTE — DISCHARGE NOTE NURSING/CASE MANAGEMENT/SOCIAL WORK - NSDCPEPTCOWAR_GEN_ALL_CORE
Coumadin/Warfarin - Potential for adverse drug reactions and interactions/Coumadin/Warfarin - Compliance/Coumadin/Warfarin - Follow up monitoring/Coumadin/Warfarin - Dietary Advice

## 2019-03-09 NOTE — DISCHARGE NOTE PROVIDER - CARE PROVIDER_API CALL
Zoya Olguin)  Cardiology; Interventional Cardiology  [address] [place] [address]  Phone: [phone]  Fax: [phone]  Follow Up Time: Zoya Olguin)  Cardiology; Interventional Cardiology  [address] [address]  Phone: [phone]  Fax: [phone]  Follow Up Time:     [facility],   [address], [address] [place] [id number]  Phone: [phone]  Fax: (   )    -  Follow Up Time:

## 2019-03-09 NOTE — DISCHARGE NOTE PROVIDER - NSDCCPCAREPLAN_GEN_ALL_CORE_FT
PRINCIPAL DISCHARGE DIAGNOSIS  Problem: Systolic CHF, chronic  Assessment and Plan of Treatment: You have a weak heart or heart failure. A cardiomems device was successfully placed for close monitoring of the pressures in your heart. Increase torsemide to 120mg daily. Continue medications exactly as listed. Avoid drinking more than 1.5L of fluid daily. Maintain a low salt diet and weigh yourself daily. For any significant increases in daily weight with associated swelling in the legs or abdomen with shortness of breath, please call your doctor or go to the emergency room. Follow up with Dr. Olguin on 3/19 at 11:10AM.      SECONDARY DISCHARGE DIAGNOSES  Problem: HTN (hypertension)  Assessment and Plan of Treatment: Please continue medications as listed to keep your blood pressure controlled. For blood pressure that is too high or too low please see your doctor or go to the emergency room as necessary.    Problem: DM (diabetes mellitus)  Assessment and Plan of Treatment: Please continue medications as listed for diabetes. Maintain a low carbohydrate, low sugar diet. Check for your blood sugars regularly.    Problem: DVT (deep venous thrombosis)  Assessment and Plan of Treatment: Please restart your coumadin tonight to prevent blood clots.    Problem: COPD, severe  Assessment and Plan of Treatment: Continue medications as listed for COPD. Continue home oxygen.

## 2019-03-09 NOTE — DISCHARGE NOTE PROVIDER - PROVIDER TOKENS
PROVIDER:[TOKEN:[[id number]]] PROVIDER:[TOKEN:[[id number]]],FREE:[LAST:[[facility]],PHONE:[phone]],FAX:[(   )    -],ADDRESS:[[address] [place] [id number]]]

## 2019-03-09 NOTE — DISCHARGE NOTE PROVIDER - HOSPITAL COURSE
59yo F, current smoker, with Contrast and Aspirin Allergy and an extensive PMHx including HTN, IDDM c/b neuropathy (takes methadone), bipolar disorder, depression, history of DVT on Coumadin (4 episodes in her lower extremities, pt was instructed to keep taking her Coumadin), CVA x2 (last one in 2013 with residual right sided weakness), PAD s/p bypass, NICM, chronic HFrEF (EF 25% on echo) s/p AICD for primary prevention, hypothyroidism, current smoker with COPD (on 2-3L oxygen at home), DHARMESH on CPAP, multiple CHF admissions last one 2/7/19 presented to her doctor c/o worsening RAMSEY and orthopnea as per MD note.  Currently pt claims that she does not have RAMSEY but endorses 5 pillow orthopnea. Pt was enrolled in Transform trial, started on Torsemide. Pt cannot be started on Entresto given renal dysfucntion  Since her discharge she reports compliance with her medications, daily weight measurements and fluid restrictions. She denies any chest pain, palpitations, fever chills, abdominal pain, syncope, palpitations, LE edema, n/v, blood in the stool. TTE 1/2019 showed EF 25%, moderate MR, suggestive elevated LA pressure, moderate pulmonary HTN. In light of pt's risk factors, symptoms mentioned above, weight scale discrepancies and frequent CHF readmissions, pt is now referred for RHC with cardiomems implant. Patient underwent successful cardiomems implant with RHC revealing elevated pressures on 3/8/19, right groin access. Patient's torsemide to be increased to 120mg daily per NP Analilia. As discussed with Dr. Francis, patient will resume coumadin dosing tonight 3/9 per Dr. Francis. Patient was seen and examined this AM and was asymptomatic without complaints. VSS, labs and tele reviewed. Patient is stable for discharge per Dr. Simmons. Patient has been given appropriate discharge instructions including medication regimen, access site management and follow up. Increased dose of torsemide e-prescribed to her preferred pharmacy.        Temp 96.5F, HR 65bpm, /67, RR 16, O2 sat 95% 2L O2    Gen: NAD, A&O x 3    Cards: RRR, clear S1 and S2 without murmur    Pulm: CTA b/l without w/r/r    Abd: BS x 4, soft, NT    right groin: no hematoma or ooze, femoral pulse 2+, DP/PT faint    Ext: No LE edema or ulcerations b/l, chronic venous stasis changes

## 2019-03-09 NOTE — DISCHARGE NOTE NURSING/CASE MANAGEMENT/SOCIAL WORK - NSDCDPATPORTLINK_GEN_ALL_CORE
You can access the [facility], offered by [facility], by registering with the following website: [email or web address]

## 2019-03-09 NOTE — DISCHARGE NOTE NURSING/CASE MANAGEMENT/SOCIAL WORK - NSDCPEPT PROEDHF_GEN_ALL_CORE
Call primary care provider for follow up after discharge/Report signs and symptoms to primary care provider/Low salt diet/Activities as tolerated/Monitor weight daily

## 2019-03-11 ENCOUNTER — OTHER (OUTPATIENT)
Age: 59
End: 2019-03-11

## 2019-03-12 ENCOUNTER — APPOINTMENT (OUTPATIENT)
Dept: NEPHROLOGY | Facility: CLINIC | Age: 59
End: 2019-03-12
Payer: MEDICARE

## 2019-03-12 VITALS — HEART RATE: 75 BPM | SYSTOLIC BLOOD PRESSURE: 134 MMHG | DIASTOLIC BLOOD PRESSURE: 71 MMHG

## 2019-03-12 VITALS — HEART RATE: 70 BPM

## 2019-03-12 DIAGNOSIS — F31.9 BIPOLAR DISORDER, UNSPECIFIED: ICD-10-CM

## 2019-03-12 DIAGNOSIS — I34.0 NONRHEUMATIC MITRAL (VALVE) INSUFFICIENCY: ICD-10-CM

## 2019-03-12 DIAGNOSIS — E03.9 HYPOTHYROIDISM, UNSPECIFIED: ICD-10-CM

## 2019-03-12 DIAGNOSIS — Z86.718 PERSONAL HISTORY OF OTHER VENOUS THROMBOSIS AND EMBOLISM: ICD-10-CM

## 2019-03-12 DIAGNOSIS — Z79.01 LONG TERM (CURRENT) USE OF ANTICOAGULANTS: ICD-10-CM

## 2019-03-12 DIAGNOSIS — I42.0 DILATED CARDIOMYOPATHY: ICD-10-CM

## 2019-03-12 DIAGNOSIS — I27.20 PULMONARY HYPERTENSION, UNSPECIFIED: ICD-10-CM

## 2019-03-12 DIAGNOSIS — I50.22 CHRONIC SYSTOLIC (CONGESTIVE) HEART FAILURE: ICD-10-CM

## 2019-03-12 DIAGNOSIS — Z79.4 LONG TERM (CURRENT) USE OF INSULIN: ICD-10-CM

## 2019-03-12 DIAGNOSIS — F17.210 NICOTINE DEPENDENCE, CIGARETTES, UNCOMPLICATED: ICD-10-CM

## 2019-03-12 DIAGNOSIS — I11.0 HYPERTENSIVE HEART DISEASE WITH HEART FAILURE: ICD-10-CM

## 2019-03-12 DIAGNOSIS — Z91.041 RADIOGRAPHIC DYE ALLERGY STATUS: ICD-10-CM

## 2019-03-12 DIAGNOSIS — E11.51 TYPE 2 DIABETES MELLITUS WITH DIABETIC PERIPHERAL ANGIOPATHY WITHOUT GANGRENE: ICD-10-CM

## 2019-03-12 DIAGNOSIS — J44.9 CHRONIC OBSTRUCTIVE PULMONARY DISEASE, UNSPECIFIED: ICD-10-CM

## 2019-03-12 DIAGNOSIS — Z88.6 ALLERGY STATUS TO ANALGESIC AGENT: ICD-10-CM

## 2019-03-12 DIAGNOSIS — G62.9 POLYNEUROPATHY, UNSPECIFIED: ICD-10-CM

## 2019-03-12 PROCEDURE — 36415 COLL VENOUS BLD VENIPUNCTURE: CPT

## 2019-03-12 PROCEDURE — 99215 OFFICE O/P EST HI 40 MIN: CPT | Mod: 25

## 2019-03-12 NOTE — REVIEW OF SYSTEMS
[Feeling Poorly] : feeling poorly [Feeling Tired] : feeling tired [Recent Weight Loss (___ Lbs)] : recent [unfilled] ~Ulb weight loss [Eye Pain] : eye pain [Dry Eyes] : dryness of the eyes [Nasal Discharge] : nasal discharge [Leg Claudication] : intermittent leg claudication [Cough] : cough [SOB on Exertion] : shortness of breath during exertion [PND] : PND [Constipation] : constipation [Heartburn] : heartburn [Incontinence] : incontinence [Arthralgias] : arthralgias [Joint Pain] : joint pain [Joint Stiffness] : joint stiffness [Sleep Disturbances] : sleep disturbances [Anxiety] : anxiety [Depression] : depression [Hot Flashes] : hot flashes [Muscle Weakness] : muscle weakness [Easy Bruising] : a tendency for easy bruising [Negative] : Heme/Lymph

## 2019-03-12 NOTE — HISTORY OF PRESENT ILLNESS
[FreeTextEntry1] : Kindly referred by Dr. Olguin for CKD.\par \par * Mother in law  this morning. She is grieving. * Labs 97Vex83: Uprot 1.9 g/g. Cr 1.67. * Losartan 12.5 mg started last visit. * Torsemide increased from 80 mg to 120 mg daily on Mar 11 2019 based on cardiomem PCWP 29. Her weight has decreased from by approximately 1 - 2 pounds daily. Her weight has decreased from 200 to 196. \par \par Previous history ([id number]): * Labs reviewed. Her creatinine has increased from 1.44 in 2017 to 1.91 in  (eGFR 29).  She had 1829 mg/g albuminuria in 2017. A CT of the abdomen in 2017 revealed a "stable inderminate left upper pole renal hypodensity" with no other abnormalitiese noted. She also had an unchanged ill-defined soft tissue density surrounding celialc axis, SMA, and common hepatic artery. \par \par * She sees Dr. Olguin for nonischemic systolic CHF. Her EF is 25%. She has not been treated with ACE or ARB given reported worsening renal dysfunction previously but the plan per Dr. Olguin was to restart this as outpatient. She has never had hyperkalemia or an allergic reaction to ACE/ARB. \par \par * She still smokes 3 cigarettes daily. \par \par * DM uncontrolled, last HGA1c 11 - 13.\par \par * She has had PVD with bilateral iliac artery angioplasty and stents, left femoral bypass. She has also had DVTs and is hypercoagulable and is treated with coumadin. \par \par * HTN controlled. No lightheadedness. Compliant with medications. Following low salt diet.

## 2019-03-12 NOTE — ASSESSMENT
[FreeTextEntry1] : \par # CKD stage 4 consistent with DM nephropathy.\par * Check renal ultrasound.\par * Recheck labs.\par * The patient has been counseled that chronic kidney disease is a significant condition and regular office followup with me (at least every 2-3 weeks for now) is essential for monitoring, and that it is their responsibility to make a follow up appointment. The patient has been counseled never to stop taking their medications without discussing it with me or another doctor. The patient has been counseled on risk of acute renal failure and instructed to immediately call and speak with me or go immediately to ER with any severe symptoms, nausea, vomiting, diarrhea, chest pain, or shortness of breath. The patient has been counseled on avoiding NSAIDs. Reducing or avoiding red meat and starting folate 800 mg qd has been advised. A counseling information sheet has been given. All their questions were answered.\par \par # Proteinuria/DM nephropathy/CHF.\par * Continue losartan 12.5 mg daily. Benefits, alternatives, and risks to medication including but not limited to low blood pressure, electrolyte problems discussed and they consent. UpToDate patient information form on medication provided.\par \par # Smoking.\par * Smoking cessation advised. \par \par # HTN borderline controlled.\par * Reduce torsemide to 100 mg qd.\par * The patient's blood pressure was checked with the [employer] [id number] using the SPRINT trial protocol after sitting quietly in an empty room with arm supported, back supported, and feet on the floor for 5 minutes. The average of 3 readings were taken. \par * The patient has been  advised to check their BP at home with an automatic arm cuff, write down the readings, and reach me directly on the phone immediately if they are persistently > 180 systolic or if SBP is less than 100 or if lightheadedness develops. They were advised to bring in all blood pressure readings and medications next visit.\par * The patient has been counseled that they have a a significant medical condition and regular office followup (at least every 3 weeks for now) is essential for monitoring, and that it is their responsibility to make follow up appointments. The patient also understands that they must never stop or change any medications without discussing this with me (or another physician). A counseling information sheet has been given. All their questions were answered. \par \par # Drug therapy.\par * ARB and diuretic require monitoring. \par \par # High Medical Complexity\par * Diagnostic and management options are extensive (4 stable problems, 2 worsening or uncontrolled problems, or or 1 new problem with workup).\par * The risk of complications, morbidity, or mortality is high. The patient has chronic organ failure (advanced chronic kidney disease) and is at risk for progression.\par * The risk of complications, morbidity, or mortality is high. The patient is taking the combination of the medications ACE/ARB and a diuretic. This combination requires intensive monitoring for toxicity, which may include low sodium, low or high potassium, and kidney failure. I have monitored labs previously and labs will be checked today.\par * Amount / complexity of data reviewed is extensive. Clinical laboratory tests have been ordered. I have ordered radiology tests. I have decided to obtain old records. I have reviewed the previous records that are available and summarized them in the HPI. \par \par

## 2019-03-19 ENCOUNTER — APPOINTMENT (OUTPATIENT)
Dept: HEART AND VASCULAR | Facility: CLINIC | Age: 59
End: 2019-03-19

## 2019-03-25 ENCOUNTER — APPOINTMENT (OUTPATIENT)
Dept: HEART AND VASCULAR | Facility: CLINIC | Age: 59
End: 2019-03-25
Payer: MEDICARE

## 2019-03-25 VITALS
TEMPERATURE: 99.7 F | BODY MASS INDEX: 33.97 KG/M2 | DIASTOLIC BLOOD PRESSURE: 71 MMHG | WEIGHT: 198.99 LBS | OXYGEN SATURATION: 96 % | HEART RATE: 91 BPM | SYSTOLIC BLOOD PRESSURE: 138 MMHG | HEIGHT: 64 IN

## 2019-03-25 DIAGNOSIS — J45.909 UNSPECIFIED ASTHMA, UNCOMPLICATED: ICD-10-CM

## 2019-03-25 PROCEDURE — 99214 OFFICE O/P EST MOD 30 MIN: CPT

## 2019-03-25 PROCEDURE — 36415 COLL VENOUS BLD VENIPUNCTURE: CPT

## 2019-03-25 RX ORDER — HYDRALAZINE HYDROCHLORIDE 10 MG/1
10 TABLET ORAL
Refills: 0 | Status: DISCONTINUED | COMMUNITY
End: 2019-03-25

## 2019-03-25 NOTE — HISTORY OF PRESENT ILLNESS
[FreeTextEntry1] : 57 yo F with and an extensive PMHx, NICM, chronic HFrEF (EF 25% on echo) s/p AICD for primary prevention, HTN, IDDM c/b neuropathy, bipolar disorder,depression, history of DVT on Coumadin (4 episodes in her lower extremities), CVA x2 (last one in 2013 with residual right sided weakness), PAD s/p bypass,hypothyroidism, current smoker with COPD (on 2-3L oxygen at home), DHARMESH on CPAP, presenting to [facility] ER with complaints 1 week of worsening shortness of breath,RAMSEY, orthopnea (5pillows is baseline) increased PND, limited exertional capacities with dyspnea walking < 25ft to bathroom, and worsening of her LE edema / increased weight gain which are similar to her usual episodes of CHF\par exacerbation.\par \par She has had multiple hospitalizations for acute decompensated HF and had a Cardiomems hemodynamic monitoring device implanted 3/8/19 with Dr. Francis at [facility]. At the time, RHC showed elevated pressures with volume overload. Her diuretics were increased and sent home the next day. Since then, her breathing has significantly improved. She has been weighing herself daily and compliant with all of her medications. \par \par Currently, she is limited in her exercise due to pain in her legs. She walks with a walker and is able to walk ~ 1 block, limited by leg pain. she denies CP,SOB at rest, orthopnea, PND, LH/dizziness, abdominal discomfort, palpitations and syncope. Her appetite is normal and bowel and bladder habits are unchanged. She has been limiting fluid and sodium intake and taking medications as directed. She is still smoking but has cut down to 2 a day. She does not use NSAIDS and has not had any ICD shocks. She has not been to the ED or admitted for HF since Cardiomems implant.\par \par \par \par

## 2019-03-25 NOTE — PHYSICAL EXAM
[General Appearance - Well Developed] : well developed [Normal Appearance] : normal appearance [Well Groomed] : well groomed [General Appearance - Well Nourished] : well nourished [No Deformities] : no deformities [General Appearance - In No Acute Distress] : no acute distress [Normal Conjunctiva] : the conjunctiva exhibited no abnormalities [Eyelids - No Xanthelasma] : the eyelids demonstrated no xanthelasmas [Normal Oral Mucosa] : normal oral mucosa [No Oral Pallor] : no oral pallor [No Oral Cyanosis] : no oral cyanosis [Heart Rate And Rhythm] : heart rate and rhythm were normal [Heart Sounds] : normal S1 and S2 [Murmurs] : no murmurs present [Edema] : no peripheral edema present [Abdomen Soft] : soft [Abdomen Tenderness] : non-tender [Abdomen Mass (___ Cm)] : no abdominal mass palpated [Nail Clubbing] : no clubbing of the fingernails [Cyanosis, Localized] : no localized cyanosis [Petechial Hemorrhages (___cm)] : no petechial hemorrhages [Skin Color & Pigmentation] : normal skin color and pigmentation [No Venous Stasis] : no venous stasis [Skin Lesions] : no skin lesions [No Skin Ulcers] : no skin ulcer [No Xanthoma] : no  xanthoma was observed [Oriented To Time, Place, And Person] : oriented to person, place, and time [Affect] : the affect was normal [Mood] : the mood was normal [No Anxiety] : not feeling anxious [] : no respiratory distress [Respiration, Rhythm And Depth] : normal respiratory rhythm and effort [Auscultation Breath Sounds / Voice Sounds] : lungs were clear to auscultation bilaterally [FreeTextEntry1] : JVP <6cm H2O, no HJR

## 2019-03-25 NOTE — REASON FOR VISIT
[Follow-Up - Clinic] : a clinic follow-up of [Heart Failure] : congestive heart failure [FreeTextEntry1] : PATIENT INSTRUCTIONS:\par \par 1. Labs today.\par \par 2. Please continue daily Cardiomems readings. I will call you tomorrow to make any further medication adjustments. Keep doing daily weights.\par \par 3. Please STOP the LOSARTAN and START ENTRESTO 24/26mg TWICE daily. \par \par 4. Follow up in 2 weeks.

## 2019-03-25 NOTE — ASSESSMENT
[FreeTextEntry1] : 59yo F with multiple comorbidites and NICM (EF 25%) s/p AICD with recent implantation of CARDIOMEMS hemodynamic monitoring device 3/8/19 at [facility] who is doing well overall. She is ACC/AHA Stage C, NYHA Class II-III, euvolemic on exam. I have recommended the following:\par \par 1. NICM- Stable and well compensated. Unfortunately, her MEMS was placed when she was hypervolemic so hard to determine baseline but she is now euvolemic and PAD runs around 22-24. Daily readings. Will check labs today. Will DC Losartan and start Entresto 24/26mg BID. Once started, will reduce Torsemide to 80mg given natriuretic property of Entresto. Will need labs 7-10 days later. Continue Toprol XL 100mg daily - will continue to uptitrate. \par \par 2. CAD- no anginal symptoms. On Plavix, Lipitor, BB and ARB (in Entresto).\par \par 3. CKD Stage III- Last SCr 1.68 which is better than baseline. Will continue to monitor closely. \par \par 4. Follow up in 2 weeks and continue daily MEMS readings. \par

## 2019-03-26 ENCOUNTER — OTHER (OUTPATIENT)
Age: 59
End: 2019-03-26

## 2019-03-26 LAB
ANION GAP SERPL CALC-SCNC: 14 MMOL/L
BUN SERPL-MCNC: 27 MG/DL
CALCIUM SERPL-MCNC: 9 MG/DL
CHLORIDE SERPL-SCNC: 89 MMOL/L
CO2 SERPL-SCNC: 29 MMOL/L
CREAT SERPL-MCNC: 1.38 MG/DL
GLUCOSE SERPL-MCNC: 523 MG/DL
MAGNESIUM SERPL-MCNC: 1.9 MG/DL
NT-PROBNP SERPL-MCNC: 2915 PG/ML
POTASSIUM SERPL-SCNC: 4.1 MMOL/L
SODIUM SERPL-SCNC: 132 MMOL/L

## 2019-03-27 ENCOUNTER — MEDICATION RENEWAL (OUTPATIENT)
Age: 59
End: 2019-03-27

## 2019-03-27 PROCEDURE — 85027 COMPLETE CBC AUTOMATED: CPT

## 2019-03-27 PROCEDURE — C1894: CPT

## 2019-03-27 PROCEDURE — C1769: CPT

## 2019-03-27 PROCEDURE — 82962 GLUCOSE BLOOD TEST: CPT

## 2019-03-27 PROCEDURE — 83735 ASSAY OF MAGNESIUM: CPT

## 2019-03-27 PROCEDURE — 93799 UNLISTED CV SVC/PROCEDURE: CPT

## 2019-03-27 PROCEDURE — 94640 AIRWAY INHALATION TREATMENT: CPT

## 2019-03-27 PROCEDURE — 85610 PROTHROMBIN TIME: CPT

## 2019-03-27 PROCEDURE — 80048 BASIC METABOLIC PNL TOTAL CA: CPT

## 2019-03-27 PROCEDURE — 85730 THROMBOPLASTIN TIME PARTIAL: CPT

## 2019-03-27 PROCEDURE — 83036 HEMOGLOBIN GLYCOSYLATED A1C: CPT

## 2019-03-27 PROCEDURE — 85025 COMPLETE CBC W/AUTO DIFF WBC: CPT

## 2019-03-27 PROCEDURE — 80061 LIPID PANEL: CPT

## 2019-03-27 PROCEDURE — 94660 CPAP INITIATION&MGMT: CPT

## 2019-03-27 PROCEDURE — C1889: CPT

## 2019-03-27 PROCEDURE — 80053 COMPREHEN METABOLIC PANEL: CPT

## 2019-03-27 PROCEDURE — 93451 RIGHT HEART CATH: CPT

## 2019-03-27 PROCEDURE — 82550 ASSAY OF CK (CPK): CPT

## 2019-03-27 PROCEDURE — 82553 CREATINE MB FRACTION: CPT

## 2019-03-27 PROCEDURE — 36415 COLL VENOUS BLD VENIPUNCTURE: CPT

## 2019-03-27 PROCEDURE — C2624: CPT

## 2019-03-27 PROCEDURE — 86140 C-REACTIVE PROTEIN: CPT

## 2019-03-29 ENCOUNTER — APPOINTMENT (OUTPATIENT)
Dept: INTERNAL MEDICINE | Facility: CLINIC | Age: 59
End: 2019-03-29

## 2019-03-31 LAB
ALBUMIN SERPL ELPH-MCNC: 3.6 G/DL
ALP BLD-CCNC: 123 U/L
ALT SERPL-CCNC: 15 U/L
ANION GAP SERPL CALC-SCNC: 18 MMOL/L
AST SERPL-CCNC: 18 U/L
BASOPHILS # BLD AUTO: 0.03 K/UL
BASOPHILS NFR BLD AUTO: 0.3 %
BILIRUB SERPL-MCNC: 0.3 MG/DL
BUN SERPL-MCNC: 37 MG/DL
CALCIUM SERPL-MCNC: 9 MG/DL
CHLORIDE SERPL-SCNC: 93 MMOL/L
CO2 SERPL-SCNC: 28 MMOL/L
CREAT SERPL-MCNC: 1.68 MG/DL
CREAT SPEC-SCNC: 48 MG/DL
CREAT/PROT UR: 2.1 RATIO
EOSINOPHIL # BLD AUTO: 0.29 K/UL
EOSINOPHIL NFR BLD AUTO: 3.3 %
GLUCOSE SERPL-MCNC: 413 MG/DL
HCT VFR BLD CALC: 41.1 %
HGB BLD-MCNC: 11.8 G/DL
IMM GRANULOCYTES NFR BLD AUTO: 0.2 %
LYMPHOCYTES # BLD AUTO: 1.42 K/UL
LYMPHOCYTES NFR BLD AUTO: 16.2 %
MAN DIFF?: NORMAL
MCHC RBC-ENTMCNC: 27.1 PG
MCHC RBC-ENTMCNC: 28.7 GM/DL
MCV RBC AUTO: 94.3 FL
MONOCYTES # BLD AUTO: 0.7 K/UL
MONOCYTES NFR BLD AUTO: 8 %
NEUTROPHILS # BLD AUTO: 6.28 K/UL
NEUTROPHILS NFR BLD AUTO: 72 %
PLATELET # BLD AUTO: 228 K/UL
POTASSIUM SERPL-SCNC: 4.4 MMOL/L
PROT SERPL-MCNC: 7.8 G/DL
PROT UR-MCNC: 104 MG/DL
RBC # BLD: 4.36 M/UL
RBC # FLD: 16.2 %
SODIUM SERPL-SCNC: 139 MMOL/L
WBC # FLD AUTO: 8.74 K/UL

## 2019-04-03 ENCOUNTER — APPOINTMENT (OUTPATIENT)
Dept: HEMATOLOGY ONCOLOGY | Facility: CLINIC | Age: 59
End: 2019-04-03
Payer: MEDICARE

## 2019-04-03 VITALS
OXYGEN SATURATION: 100 % | BODY MASS INDEX: 34.83 KG/M2 | WEIGHT: 204 LBS | SYSTOLIC BLOOD PRESSURE: 120 MMHG | RESPIRATION RATE: 14 BRPM | HEART RATE: 79 BPM | TEMPERATURE: 97.8 F | HEIGHT: 64 IN | DIASTOLIC BLOOD PRESSURE: 70 MMHG

## 2019-04-03 PROCEDURE — 36415 COLL VENOUS BLD VENIPUNCTURE: CPT

## 2019-04-03 PROCEDURE — 99204 OFFICE O/P NEW MOD 45 MIN: CPT | Mod: 25

## 2019-04-03 NOTE — HISTORY OF PRESENT ILLNESS
[de-identified] : 59 y/o with long standing DM, CRF and anemia was found to have a light chain paraprotein...discussed with pt, will arrange for CT guided BM aspirate and biopsy...

## 2019-04-03 NOTE — REASON FOR VISIT
[Initial Consultation] : an initial consultation for [Monoclonal Gammopathy] : monoclonal gammopathy

## 2019-04-03 NOTE — ASSESSMENT
[FreeTextEntry1] : Monoclonal paraprotein, anemia CRF, r/o myeloma...pt would like to have BM under sedation, will arrange...

## 2019-04-04 LAB
ALBUMIN SERPL ELPH-MCNC: 3.6 G/DL
ALP BLD-CCNC: 137 U/L
ALT SERPL-CCNC: 13 U/L
ANION GAP SERPL CALC-SCNC: 16 MMOL/L
AST SERPL-CCNC: 20 U/L
B2 MICROGLOB SERPL-MCNC: 6.5 MG/L
BASOPHILS # BLD AUTO: 0.04 K/UL
BASOPHILS NFR BLD AUTO: 0.6 %
BILIRUB SERPL-MCNC: 0.3 MG/DL
BUN SERPL-MCNC: 31 MG/DL
CALCIUM SERPL-MCNC: 9 MG/DL
CHLORIDE SERPL-SCNC: 89 MMOL/L
CO2 SERPL-SCNC: 28 MMOL/L
CREAT SERPL-MCNC: 1.61 MG/DL
EOSINOPHIL # BLD AUTO: 0.18 K/UL
EOSINOPHIL NFR BLD AUTO: 2.8 %
ERYTHROCYTE [SEDIMENTATION RATE] IN BLOOD BY WESTERGREN METHOD: 116 MM/HR
GLUCOSE SERPL-MCNC: 630 MG/DL
HCT VFR BLD CALC: 39.7 %
HGB BLD-MCNC: 12.1 G/DL
IMM GRANULOCYTES NFR BLD AUTO: 0.3 %
INR PPP: 1.62 RATIO
LYMPHOCYTES # BLD AUTO: 1.4 K/UL
LYMPHOCYTES NFR BLD AUTO: 21.9 %
MAN DIFF?: NORMAL
MCHC RBC-ENTMCNC: 27.4 PG
MCHC RBC-ENTMCNC: 30.5 GM/DL
MCV RBC AUTO: 89.8 FL
MONOCYTES # BLD AUTO: 0.52 K/UL
MONOCYTES NFR BLD AUTO: 8.1 %
NEUTROPHILS # BLD AUTO: 4.24 K/UL
NEUTROPHILS NFR BLD AUTO: 66.3 %
PLATELET # BLD AUTO: 197 K/UL
POTASSIUM SERPL-SCNC: 4.2 MMOL/L
PROT SERPL-MCNC: 8 G/DL
PT BLD: 18.9 SEC
RBC # BLD: 4.42 M/UL
RBC # FLD: 15.4 %
SODIUM SERPL-SCNC: 133 MMOL/L
WBC # FLD AUTO: 6.4 K/UL

## 2019-04-08 ENCOUNTER — APPOINTMENT (OUTPATIENT)
Dept: HEART AND VASCULAR | Facility: CLINIC | Age: 59
End: 2019-04-08

## 2019-04-09 ENCOUNTER — APPOINTMENT (OUTPATIENT)
Dept: NEPHROLOGY | Facility: CLINIC | Age: 59
End: 2019-04-09

## 2019-04-15 ENCOUNTER — APPOINTMENT (OUTPATIENT)
Dept: HEART AND VASCULAR | Facility: CLINIC | Age: 59
End: 2019-04-15
Payer: MEDICARE

## 2019-04-15 VITALS
OXYGEN SATURATION: 98 % | HEART RATE: 66 BPM | HEIGHT: 63 IN | DIASTOLIC BLOOD PRESSURE: 70 MMHG | BODY MASS INDEX: 35.44 KG/M2 | SYSTOLIC BLOOD PRESSURE: 127 MMHG | WEIGHT: 200 LBS | TEMPERATURE: 98.5 F

## 2019-04-15 PROCEDURE — 36415 COLL VENOUS BLD VENIPUNCTURE: CPT

## 2019-04-15 PROCEDURE — 99214 OFFICE O/P EST MOD 30 MIN: CPT

## 2019-04-15 NOTE — ASSESSMENT
[FreeTextEntry1] : 57yo F with multiple comorbidites and NICM (EF 25%) s/p AICD with recent implantation of CARDIOMEMS hemodynamic monitoring device 3/8/19 at [facility] who is doing well overall. She is ACC/AHA Stage C, NYHA Class II-III, euvolemic on exam. I have recommended the following:\par \par 1. NICM- Stable and well compensated. Check labs today. Increase Entresto to 97/103mg BID and simultaneously decrease Torsemide to 80mg daily. Will check labs 7-10 days later. Increase Toprol XL to 150mg daily.  e. \par \par 2. CAD- no anginal symptoms. On Plavix, Lipitor, BB and ARB (in Entresto).\par \par 3. CKD Stage III- Stable. Will continue to monitor closely. \par \par 4. Follow up in 2 weeks and continue daily MEMS readings. \par

## 2019-04-15 NOTE — HISTORY OF PRESENT ILLNESS
[FreeTextEntry1] : 57 yo F with and an extensive PMHx, NICM, chronic HFrEF (EF 25% on echo) s/p AICD for primary prevention, HTN, IDDM c/b neuropathy, bipolar disorder,depression, history of DVT on Coumadin (4 episodes in her lower extremities), CVA x2 (last one in 2013 with residual right sided weakness), PAD s/p bypass,hypothyroidism, current smoker with COPD (on 2-3L oxygen at home), DHARMESH on CPAP. She had a Cardiomems device placed 3/8/19 with Dr. Francis at [facility]. \par \par Currently, she feels well in terms of her breathing, but her exercise is limited by chronic leg pain. She walks with a walker and is able to walk ~ 1 block. She does daily cardiomems readings which have been stable and PAD today was 16. Was found to have monoclonal paraprotein, anemia CRF,  she will have a BM biopsy 4/22 to r/o myeloma. Currently, she denies CP,SOB at rest, orthopnea, PND, LH/dizziness, abdominal discomfort, palpitations and syncope. Her appetite is normal and bowel and bladder habits are unchanged. She has been limiting fluid and sodium intake and taking medications as directed. She is still smoking but has cut down to 2 a day. She does not use NSAIDS and has not had any ICD shocks. She has not been to the ED or admitted for HF since Cardiomems implant.\par \par \par \par

## 2019-04-15 NOTE — PHYSICAL EXAM
[Normal Appearance] : normal appearance [General Appearance - Well Developed] : well developed [Well Groomed] : well groomed [General Appearance - Well Nourished] : well nourished [No Deformities] : no deformities [General Appearance - In No Acute Distress] : no acute distress [Normal Conjunctiva] : the conjunctiva exhibited no abnormalities [Eyelids - No Xanthelasma] : the eyelids demonstrated no xanthelasmas [Normal Oral Mucosa] : normal oral mucosa [No Oral Pallor] : no oral pallor [No Oral Cyanosis] : no oral cyanosis [Respiration, Rhythm And Depth] : normal respiratory rhythm and effort [Auscultation Breath Sounds / Voice Sounds] : lungs were clear to auscultation bilaterally [Heart Rate And Rhythm] : heart rate and rhythm were normal [Heart Sounds] : normal S1 and S2 [Murmurs] : no murmurs present [Abdomen Soft] : soft [Edema] : no peripheral edema present [Abdomen Tenderness] : non-tender [Abdomen Mass (___ Cm)] : no abdominal mass palpated [Nail Clubbing] : no clubbing of the fingernails [Cyanosis, Localized] : no localized cyanosis [Petechial Hemorrhages (___cm)] : no petechial hemorrhages [Skin Color & Pigmentation] : normal skin color and pigmentation [] : no rash [No Venous Stasis] : no venous stasis [Skin Lesions] : no skin lesions [No Skin Ulcers] : no skin ulcer [No Xanthoma] : no  xanthoma was observed [Oriented To Time, Place, And Person] : oriented to person, place, and time [Mood] : the mood was normal [Affect] : the affect was normal [No Anxiety] : not feeling anxious [FreeTextEntry1] : JVP <6cm H2O, no HJR

## 2019-04-15 NOTE — REASON FOR VISIT
[Follow-Up - Clinic] : a clinic follow-up of [Heart Failure] : congestive heart failure [FreeTextEntry1] : PATIENT INSTRUCTIONS:\par \par 1. Labs today.\par \par 2. Please INCREASE the ENTRESTO to 49/51mg BID.\par \par 3. Increase Toprol XL to 150 daily.\par \par 4. DECREASE the TORSEMIDE to 80mg daily.\par \par 5. Follow up in 2 weeks.

## 2019-04-16 LAB
ALBUMIN MFR SERPL ELPH: 46.2 %
ALBUMIN SERPL-MCNC: 3.7 G/DL
ALBUMIN/GLOB SERPL: 0.9 RATIO
ALPHA1 GLOB MFR SERPL ELPH: 3.9 %
ALPHA1 GLOB SERPL ELPH-MCNC: 0.3 G/DL
ALPHA2 GLOB MFR SERPL ELPH: 11.2 %
ALPHA2 GLOB SERPL ELPH-MCNC: 0.9 G/DL
ANION GAP SERPL CALC-SCNC: 14 MMOL/L
B-GLOBULIN MFR SERPL ELPH: 9.4 %
B-GLOBULIN SERPL ELPH-MCNC: 0.8 G/DL
BUN SERPL-MCNC: 35 MG/DL
CALCIUM SERPL-MCNC: 8.9 MG/DL
CHLORIDE SERPL-SCNC: 89 MMOL/L
CO2 SERPL-SCNC: 28 MMOL/L
CREAT SERPL-MCNC: 1.52 MG/DL
DEPRECATED KAPPA LC FREE/LAMBDA SER: 6.13 RATIO
GAMMA GLOB FLD ELPH-MCNC: 2.3 G/DL
GAMMA GLOB MFR SERPL ELPH: 29.3 %
GLUCOSE SERPL-MCNC: 567 MG/DL
IGA SER QL IEP: 72 MG/DL
IGG SER QL IEP: 2320 MG/DL
IGM SER QL IEP: 44 MG/DL
INTERPRETATION SERPL IEP-IMP: NORMAL
KAPPA LC CSF-MCNC: 2.95 MG/DL
KAPPA LC SERPL-MCNC: 18.08 MG/DL
M PROTEIN MFR SERPL ELPH: 22.6 %
M PROTEIN SPEC IFE-MCNC: NORMAL
MAGNESIUM SERPL-MCNC: 2 MG/DL
MONOCLON BAND OBS SERPL: 1.8 G/DL
NT-PROBNP SERPL-MCNC: 1312 PG/ML
POTASSIUM SERPL-SCNC: 4.4 MMOL/L
PROT SERPL-MCNC: 8 G/DL
PROT SERPL-MCNC: 8 G/DL
SODIUM SERPL-SCNC: 131 MMOL/L

## 2019-04-22 ENCOUNTER — APPOINTMENT (OUTPATIENT)
Dept: INTERVENTIONAL RADIOLOGY/VASCULAR | Facility: HOSPITAL | Age: 59
End: 2019-04-22

## 2019-04-23 ENCOUNTER — RX RENEWAL (OUTPATIENT)
Age: 59
End: 2019-04-23

## 2019-04-29 ENCOUNTER — APPOINTMENT (OUTPATIENT)
Dept: NEPHROLOGY | Facility: CLINIC | Age: 59
End: 2019-04-29

## 2019-05-01 ENCOUNTER — APPOINTMENT (OUTPATIENT)
Dept: ENDOCRINOLOGY | Facility: CLINIC | Age: 59
End: 2019-05-01
Payer: MEDICARE

## 2019-05-01 ENCOUNTER — APPOINTMENT (OUTPATIENT)
Dept: HEMATOLOGY ONCOLOGY | Facility: CLINIC | Age: 59
End: 2019-05-01
Payer: MEDICARE

## 2019-05-01 VITALS
HEIGHT: 63 IN | OXYGEN SATURATION: 100 % | SYSTOLIC BLOOD PRESSURE: 144 MMHG | DIASTOLIC BLOOD PRESSURE: 81 MMHG | TEMPERATURE: 97.8 F | HEART RATE: 77 BPM | WEIGHT: 201 LBS | RESPIRATION RATE: 14 BRPM | BODY MASS INDEX: 35.61 KG/M2

## 2019-05-01 VITALS
HEIGHT: 63 IN | BODY MASS INDEX: 35.79 KG/M2 | DIASTOLIC BLOOD PRESSURE: 82 MMHG | HEART RATE: 69 BPM | SYSTOLIC BLOOD PRESSURE: 156 MMHG | WEIGHT: 202 LBS

## 2019-05-01 DIAGNOSIS — F17.200 NICOTINE DEPENDENCE, UNSPECIFIED, UNCOMPLICATED: ICD-10-CM

## 2019-05-01 LAB
GLUCOSE BLDC GLUCOMTR-MCNC: 458
HBA1C MFR BLD HPLC: 14

## 2019-05-01 PROCEDURE — 83036 HEMOGLOBIN GLYCOSYLATED A1C: CPT | Mod: QW

## 2019-05-01 PROCEDURE — 99205 OFFICE O/P NEW HI 60 MIN: CPT | Mod: 25

## 2019-05-01 PROCEDURE — 99214 OFFICE O/P EST MOD 30 MIN: CPT | Mod: 25

## 2019-05-01 PROCEDURE — 82962 GLUCOSE BLOOD TEST: CPT

## 2019-05-01 PROCEDURE — 36415 COLL VENOUS BLD VENIPUNCTURE: CPT

## 2019-05-02 LAB
25(OH)D3 SERPL-MCNC: 8.9 NG/ML
ALBUMIN SERPL ELPH-MCNC: 3.7 G/DL
ALP BLD-CCNC: 135 U/L
ALT SERPL-CCNC: 17 U/L
ANION GAP SERPL CALC-SCNC: 13 MMOL/L
APTT BLD: 30.1 SEC
AST SERPL-CCNC: 18 U/L
B2 MICROGLOB SERPL-MCNC: 4.9 MG/L
BASOPHILS # BLD AUTO: 0.04 K/UL
BASOPHILS NFR BLD AUTO: 0.5 %
BILIRUB SERPL-MCNC: 0.3 MG/DL
BUN SERPL-MCNC: 31 MG/DL
CALCIUM SERPL-MCNC: 9.1 MG/DL
CHLORIDE SERPL-SCNC: 92 MMOL/L
CO2 SERPL-SCNC: 27 MMOL/L
CREAT SERPL-MCNC: 1.32 MG/DL
EOSINOPHIL # BLD AUTO: 0.19 K/UL
EOSINOPHIL NFR BLD AUTO: 2.2 %
ERYTHROCYTE [SEDIMENTATION RATE] IN BLOOD BY WESTERGREN METHOD: 100 MM/HR
GLUCOSE SERPL-MCNC: 361 MG/DL
HCT VFR BLD CALC: 40.3 %
HGB BLD-MCNC: 12.7 G/DL
IMM GRANULOCYTES NFR BLD AUTO: 0.2 %
INR PPP: 0.91 RATIO
LYMPHOCYTES # BLD AUTO: 1.84 K/UL
LYMPHOCYTES NFR BLD AUTO: 21.6 %
MAN DIFF?: NORMAL
MCHC RBC-ENTMCNC: 27.9 PG
MCHC RBC-ENTMCNC: 31.5 GM/DL
MCV RBC AUTO: 88.6 FL
MONOCYTES # BLD AUTO: 0.52 K/UL
MONOCYTES NFR BLD AUTO: 6.1 %
NEUTROPHILS # BLD AUTO: 5.92 K/UL
NEUTROPHILS NFR BLD AUTO: 69.4 %
PLATELET # BLD AUTO: 180 K/UL
POTASSIUM SERPL-SCNC: 4.2 MMOL/L
PROT SERPL-MCNC: 7.6 G/DL
PT BLD: 10.4 SEC
RBC # BLD: 4.55 M/UL
RBC # FLD: 16.3 %
SODIUM SERPL-SCNC: 132 MMOL/L
WBC # FLD AUTO: 8.53 K/UL

## 2019-05-02 NOTE — HISTORY OF PRESENT ILLNESS
[FreeTextEntry1] : 57 y/o f w/ Hx of uncontrolled insulin requiring DM2, HTN, HLD, CKD stage 4, CHF. Here for initial DM2 evaluation.\par Generally feels well and endorses no acute complaints. Reports taking insulin though out most of the course of her disease. Multiple micro-macrovascular complications reported. Monitors infrequently. Log or glucometer not brought to today's apt. Reports skipping her Levemir 15 units QHS dose last night. Admits to frequent un compliance w/ insulin regimen. Previously on PO meds which were dced as renal function worsened. Has not tried GLP-1 agonists in the past. She otherwise denies any f/c, CP, SOB, palpitations, tremors, depressed mood, anxiety, palpitations, n/v, stool/urinary abn, skin/weight changes, heat/cold intolerance, HAs, breast/nipple changes, polyuria/polydipsia/nocturia or other complaints.\par

## 2019-05-02 NOTE — PHYSICAL EXAM
[No Acute Distress] : no acute distress [Alert] : alert [Normal Sclera/Conjunctiva] : normal sclera/conjunctiva [Well Developed] : well developed [Well Nourished] : well nourished [EOMI] : extra ocular movement intact [Normal Oropharynx] : the oropharynx was normal [Thyroid Not Enlarged] : the thyroid was not enlarged [No Proptosis] : no proptosis [No Thyroid Nodules] : there were no palpable thyroid nodules [No Respiratory Distress] : no respiratory distress [Clear to Auscultation] : lungs were clear to auscultation bilaterally [No Accessory Muscle Use] : no accessory muscle use [Normal Rate] : heart rate was normal  [Normal S1, S2] : normal S1 and S2 [Regular Rhythm] : with a regular rhythm [Pedal Pulses Normal] : the pedal pulses are present [No Edema] : there was no peripheral edema [Normal Bowel Sounds] : normal bowel sounds [Not Tender] : non-tender [Soft] : abdomen soft [Not Distended] : not distended [Post Cervical Nodes] : posterior cervical nodes [Anterior Cervical Nodes] : anterior cervical nodes [Axillary Nodes] : axillary nodes [No Spinal Tenderness] : no spinal tenderness [Normal] : normal and non tender [Spine Straight] : spine straight [No Stigmata of Cushings Syndrome] : no stigmata of cushings syndrome [Normal Gait] : normal gait [Normal Strength/Tone] : muscle strength and tone were normal [No Rash] : no rash [Acanthosis Nigricans] : no acanthosis nigricans [Normal Reflexes] : deep tendon reflexes were 2+ and symmetric [No Tremors] : no tremors [Oriented x3] : oriented to person, place, and time

## 2019-05-02 NOTE — ASSESSMENT
[Carbohydrate Consistent Diet] : carbohydrate consistent diet [FreeTextEntry1] : 1) DM2: Uncontrolled, A1C on 5/2019 at 14%. taget of <7%. Natural hx of the disease and importance of treatment targets discussed at length, she verbalized understanding. ADA diet and importance of exercise discussed at length. Plan is to increase introduce GLP-1 agonist in the future if c peptide is detectable. Unable to titrate basal bolus regimen at this time. May re incorporate metformin if eGFR is >30-45. explained the potential high risk and toxicities with chronic insulin use including, but not limited to life threatening hypoglycemia and death, Verbalized understanding and agrees with treatment plan, will contact MD and seek emergency medical care if condition changes. Advised on importance of frequent monitoring and to not skip her insulin. Refer to Nutritionist today. We avelino check microalbumin, lipids and labs on the NV. Discuss vaccines and podiatry/opthalmology referrals on NV.\par \par 2) Weight gain:  complicated by DM2. Discussed medical  strategies. Pt would like to try lifestyle modifications and GLP-1 agonist therapy in the future. Reassess on the NV for at least ~5% TBW loss.\par \par 3) Essential HTN: Pt is not at goal BP and on an ARB. Reassess microalbumin prior to the NV. check PTH in the future. r/o 2ry causes of HTN and uncontrolled hyperglycemia.\par  \par 4) Dyslipidemia: Pt is on a moderate intensity statin. Atorvastatin 40 mg QDaily. REassess lipids on the next visit. LDL target <100.\par  [Diabetes Foot Care] : diabetes foot care [Hypoglycemia Management] : hypoglycemia management [Long Term Vascular Complications] : long term vascular complications of diabetes [Action and use of Insulin] : action and use of short and long-acting insulin [Importance of Diet and Exercise] : importance of diet and exercise to improve glycemic control, achieve weight loss and improve cardiovascular health [Incretin Mimetic Therapy] : Risks and benefits of incretin mimetic therapy were discussed with the patient including nauseau, pancreatitis and potential risk of medullary thyroid cancer [Insulin Self-Administration] : insulin self-administration [Self Monitoring of Blood Glucose] : self monitoring of blood glucose

## 2019-05-03 LAB
ALBUMIN MFR SERPL ELPH: 51.6 %
ALBUMIN SERPL-MCNC: 3.9 G/DL
ALBUMIN/GLOB SERPL: 1.1 RATIO
ALPHA1 GLOB MFR SERPL ELPH: 3.1 %
ALPHA1 GLOB SERPL ELPH-MCNC: 0.2 G/DL
ALPHA2 GLOB MFR SERPL ELPH: 10.3 %
ALPHA2 GLOB SERPL ELPH-MCNC: 0.8 G/DL
B-GLOBULIN MFR SERPL ELPH: 8.3 %
B-GLOBULIN SERPL ELPH-MCNC: 0.6 G/DL
DEPRECATED KAPPA LC FREE/LAMBDA SER: 4.78 RATIO
GAMMA GLOB FLD ELPH-MCNC: 2 G/DL
GAMMA GLOB MFR SERPL ELPH: 26.7 %
IGA SER QL IEP: 78 MG/DL
IGG SER QL IEP: 2291 MG/DL
IGM SER QL IEP: 44 MG/DL
INTERPRETATION SERPL IEP-IMP: NORMAL
KAPPA LC CSF-MCNC: 2.58 MG/DL
KAPPA LC SERPL-MCNC: 12.32 MG/DL
M PROTEIN MFR SERPL ELPH: 19.2 %
M PROTEIN SPEC IFE-MCNC: NORMAL
MONOCLON BAND OBS SERPL: 1.5 G/DL
PROT SERPL-MCNC: 7.6 G/DL
PROT SERPL-MCNC: 7.6 G/DL

## 2019-05-03 NOTE — ASSESSMENT
[FreeTextEntry1] : 1)Monoclonal paraprotein, anemia, CRF, r/o myeloma...repeat blood work today paraprotein level remains stable.\par \par 2)pt would like to have BM under sedation, will help pt reschedule.......\par \par 3) smoking cessation discussed again with pt...\par \par 4)Pt has very low Vit D and should be started on replacement...

## 2019-05-03 NOTE — HISTORY OF PRESENT ILLNESS
[de-identified] : 57 y/o with long standing DM, CRF and anemia was found to have a light chain paraprotein...discussed with pt, will arrange for CT guided BM aspirate and biopsy... [de-identified] : pt had to cancel her BM bx since she woke up with diarrhea on the day of the scheduled procedure...\par \par Hb A1c today 14!

## 2019-05-03 NOTE — CONSULT LETTER
[Dear  ___] : Dear  [unfilled], [Consult Letter:] : I had the pleasure of evaluating your patient, [unfilled]. [Please see my note below.] : Please see my note below. [Consult Closing:] : Thank you very much for allowing me to participate in the care of this patient.  If you have any questions, please do not hesitate to contact me. [Sincerely,] : Sincerely, [FreeTextEntry3] : Payton Ruiz MD\par

## 2019-05-05 ENCOUNTER — FORM ENCOUNTER (OUTPATIENT)
Age: 59
End: 2019-05-05

## 2019-05-06 ENCOUNTER — RESULT REVIEW (OUTPATIENT)
Age: 59
End: 2019-05-06

## 2019-05-06 ENCOUNTER — OUTPATIENT (OUTPATIENT)
Dept: OUTPATIENT SERVICES | Facility: HOSPITAL | Age: 59
LOS: 1 days | End: 2019-05-06
Payer: MEDICARE

## 2019-05-06 ENCOUNTER — APPOINTMENT (OUTPATIENT)
Dept: INTERVENTIONAL RADIOLOGY/VASCULAR | Facility: HOSPITAL | Age: 59
End: 2019-05-06
Payer: MEDICARE

## 2019-05-06 DIAGNOSIS — Z95.828 PRESENCE OF OTHER VASCULAR IMPLANTS AND GRAFTS: Chronic | ICD-10-CM

## 2019-05-06 DIAGNOSIS — Z90.710 ACQUIRED ABSENCE OF BOTH CERVIX AND UTERUS: Chronic | ICD-10-CM

## 2019-05-06 DIAGNOSIS — Z98.51 TUBAL LIGATION STATUS: Chronic | ICD-10-CM

## 2019-05-06 DIAGNOSIS — Z95.810 PRESENCE OF AUTOMATIC (IMPLANTABLE) CARDIAC DEFIBRILLATOR: Chronic | ICD-10-CM

## 2019-05-06 DIAGNOSIS — Z90.49 ACQUIRED ABSENCE OF OTHER SPECIFIED PARTS OF DIGESTIVE TRACT: Chronic | ICD-10-CM

## 2019-05-06 PROCEDURE — 88313 SPECIAL STAINS GROUP 2: CPT

## 2019-05-06 PROCEDURE — 88305 TISSUE EXAM BY PATHOLOGIST: CPT

## 2019-05-06 PROCEDURE — 38222 DX BONE MARROW BX & ASPIR: CPT

## 2019-05-06 PROCEDURE — 88341 IMHCHEM/IMCYTCHM EA ADD ANTB: CPT

## 2019-05-06 PROCEDURE — 88311 DECALCIFY TISSUE: CPT

## 2019-05-06 PROCEDURE — 77002 NEEDLE LOCALIZATION BY XRAY: CPT

## 2019-05-06 PROCEDURE — 38222 DX BONE MARROW BX & ASPIR: CPT | Mod: RT

## 2019-05-06 PROCEDURE — 77002 NEEDLE LOCALIZATION BY XRAY: CPT | Mod: 26

## 2019-05-06 PROCEDURE — 85097 BONE MARROW INTERPRETATION: CPT

## 2019-05-06 PROCEDURE — 99153 MOD SED SAME PHYS/QHP EA: CPT

## 2019-05-06 PROCEDURE — 88364 INSITU HYBRIDIZATION (FISH): CPT

## 2019-05-06 PROCEDURE — 88312 SPECIAL STAINS GROUP 1: CPT

## 2019-05-06 PROCEDURE — 99152 MOD SED SAME PHYS/QHP 5/>YRS: CPT

## 2019-05-06 PROCEDURE — 88365 INSITU HYBRIDIZATION (FISH): CPT

## 2019-05-09 LAB — HEMATOPATHOLOGY REPORT: SIGNIFICANT CHANGE UP

## 2019-05-10 LAB
ACTH SER-ACNC: 11.4 PG/ML
C PEPTIDE SERPL-MCNC: 9.2 NG/ML
CORTIS SERPL-MCNC: 3.8 UG/DL
DHEA-S SERPL-MCNC: 26.6 UG/DL
GAD65 AB SER-MCNC: 0 NMOL/L
PANC ISLET CELL AB SER QL: NORMAL

## 2019-05-20 ENCOUNTER — APPOINTMENT (OUTPATIENT)
Dept: HEART AND VASCULAR | Facility: CLINIC | Age: 59
End: 2019-05-20

## 2019-05-31 ENCOUNTER — TRANSCRIPTION ENCOUNTER (OUTPATIENT)
Age: 59
End: 2019-05-31

## 2019-05-31 ENCOUNTER — INPATIENT (INPATIENT)
Facility: HOSPITAL | Age: 59
LOS: 0 days | Discharge: HOME | DRG: 638 | End: 2019-05-31
Attending: INTERNAL MEDICINE | Admitting: INTERNAL MEDICINE
Payer: MEDICARE

## 2019-05-31 VITALS
DIASTOLIC BLOOD PRESSURE: 91 MMHG | HEART RATE: 107 BPM | WEIGHT: 199.96 LBS | RESPIRATION RATE: 20 BRPM | TEMPERATURE: 98 F | OXYGEN SATURATION: 95 % | SYSTOLIC BLOOD PRESSURE: 152 MMHG

## 2019-05-31 VITALS
TEMPERATURE: 99 F | OXYGEN SATURATION: 100 % | RESPIRATION RATE: 18 BRPM | HEART RATE: 82 BPM | DIASTOLIC BLOOD PRESSURE: 70 MMHG | SYSTOLIC BLOOD PRESSURE: 146 MMHG

## 2019-05-31 DIAGNOSIS — Z98.51 TUBAL LIGATION STATUS: Chronic | ICD-10-CM

## 2019-05-31 DIAGNOSIS — R73.9 HYPERGLYCEMIA, UNSPECIFIED: ICD-10-CM

## 2019-05-31 DIAGNOSIS — Z95.810 PRESENCE OF AUTOMATIC (IMPLANTABLE) CARDIAC DEFIBRILLATOR: Chronic | ICD-10-CM

## 2019-05-31 DIAGNOSIS — Z29.9 ENCOUNTER FOR PROPHYLACTIC MEASURES, UNSPECIFIED: ICD-10-CM

## 2019-05-31 DIAGNOSIS — R63.8 OTHER SYMPTOMS AND SIGNS CONCERNING FOOD AND FLUID INTAKE: ICD-10-CM

## 2019-05-31 DIAGNOSIS — G89.29 OTHER CHRONIC PAIN: ICD-10-CM

## 2019-05-31 DIAGNOSIS — I50.23 ACUTE ON CHRONIC SYSTOLIC (CONGESTIVE) HEART FAILURE: ICD-10-CM

## 2019-05-31 DIAGNOSIS — N17.9 ACUTE KIDNEY FAILURE, UNSPECIFIED: ICD-10-CM

## 2019-05-31 DIAGNOSIS — I10 ESSENTIAL (PRIMARY) HYPERTENSION: ICD-10-CM

## 2019-05-31 DIAGNOSIS — I82.409 ACUTE EMBOLISM AND THROMBOSIS OF UNSPECIFIED DEEP VEINS OF UNSPECIFIED LOWER EXTREMITY: ICD-10-CM

## 2019-05-31 DIAGNOSIS — Z90.710 ACQUIRED ABSENCE OF BOTH CERVIX AND UTERUS: Chronic | ICD-10-CM

## 2019-05-31 DIAGNOSIS — J44.9 CHRONIC OBSTRUCTIVE PULMONARY DISEASE, UNSPECIFIED: ICD-10-CM

## 2019-05-31 DIAGNOSIS — Z90.49 ACQUIRED ABSENCE OF OTHER SPECIFIED PARTS OF DIGESTIVE TRACT: Chronic | ICD-10-CM

## 2019-05-31 DIAGNOSIS — Z91.89 OTHER SPECIFIED PERSONAL RISK FACTORS, NOT ELSEWHERE CLASSIFIED: ICD-10-CM

## 2019-05-31 DIAGNOSIS — Z95.828 PRESENCE OF OTHER VASCULAR IMPLANTS AND GRAFTS: Chronic | ICD-10-CM

## 2019-05-31 LAB
ALBUMIN SERPL ELPH-MCNC: 4.2 G/DL — SIGNIFICANT CHANGE UP (ref 3.3–5)
ALP SERPL-CCNC: 187 U/L — HIGH (ref 40–120)
ALT FLD-CCNC: 16 U/L — SIGNIFICANT CHANGE UP (ref 10–45)
ANION GAP SERPL CALC-SCNC: 11 MMOL/L — SIGNIFICANT CHANGE UP (ref 5–17)
ANION GAP SERPL CALC-SCNC: 16 MMOL/L — SIGNIFICANT CHANGE UP (ref 5–17)
APPEARANCE UR: CLEAR — SIGNIFICANT CHANGE UP
APTT BLD: 39.1 SEC — HIGH (ref 27.5–36.3)
AST SERPL-CCNC: 17 U/L — SIGNIFICANT CHANGE UP (ref 10–40)
B-OH-BUTYR SERPL-SCNC: 0.1 MMOL/L — SIGNIFICANT CHANGE UP
BASE EXCESS BLDV CALC-SCNC: 4.1 MMOL/L — SIGNIFICANT CHANGE UP
BASOPHILS # BLD AUTO: 0.04 K/UL — SIGNIFICANT CHANGE UP (ref 0–0.2)
BASOPHILS NFR BLD AUTO: 0.6 % — SIGNIFICANT CHANGE UP (ref 0–2)
BILIRUB SERPL-MCNC: 0.5 MG/DL — SIGNIFICANT CHANGE UP (ref 0.2–1.2)
BILIRUB UR-MCNC: NEGATIVE — SIGNIFICANT CHANGE UP
BUN SERPL-MCNC: 42 MG/DL — HIGH (ref 7–23)
BUN SERPL-MCNC: 43 MG/DL — HIGH (ref 7–23)
CALCIUM SERPL-MCNC: 8.8 MG/DL — SIGNIFICANT CHANGE UP (ref 8.4–10.5)
CALCIUM SERPL-MCNC: 9.9 MG/DL — SIGNIFICANT CHANGE UP (ref 8.4–10.5)
CHLORIDE SERPL-SCNC: 88 MMOL/L — LOW (ref 96–108)
CHLORIDE SERPL-SCNC: 96 MMOL/L — SIGNIFICANT CHANGE UP (ref 96–108)
CK MB CFR SERPL CALC: 2.3 NG/ML — SIGNIFICANT CHANGE UP (ref 0–6.7)
CK SERPL-CCNC: 65 U/L — SIGNIFICANT CHANGE UP (ref 25–170)
CO2 SERPL-SCNC: 26 MMOL/L — SIGNIFICANT CHANGE UP (ref 22–31)
CO2 SERPL-SCNC: 28 MMOL/L — SIGNIFICANT CHANGE UP (ref 22–31)
COLOR SPEC: YELLOW — SIGNIFICANT CHANGE UP
CREAT SERPL-MCNC: 1.99 MG/DL — HIGH (ref 0.5–1.3)
CREAT SERPL-MCNC: 2.15 MG/DL — HIGH (ref 0.5–1.3)
DIFF PNL FLD: ABNORMAL
EOSINOPHIL # BLD AUTO: 0.06 K/UL — SIGNIFICANT CHANGE UP (ref 0–0.5)
EOSINOPHIL NFR BLD AUTO: 0.8 % — SIGNIFICANT CHANGE UP (ref 0–6)
GAS PNL BLDV: SIGNIFICANT CHANGE UP
GLUCOSE SERPL-MCNC: 159 MG/DL — HIGH (ref 70–99)
GLUCOSE SERPL-MCNC: 514 MG/DL — CRITICAL HIGH (ref 70–99)
GLUCOSE UR QL: >=1000
HBA1C BLD-MCNC: 13.1 % — HIGH (ref 4–5.6)
HCO3 BLDV-SCNC: 31 MMOL/L — HIGH (ref 20–27)
HCT VFR BLD CALC: 38.2 % — SIGNIFICANT CHANGE UP (ref 34.5–45)
HCT VFR BLD CALC: 44.1 % — SIGNIFICANT CHANGE UP (ref 34.5–45)
HGB BLD-MCNC: 12.6 G/DL — SIGNIFICANT CHANGE UP (ref 11.5–15.5)
HGB BLD-MCNC: 14.7 G/DL — SIGNIFICANT CHANGE UP (ref 11.5–15.5)
IMM GRANULOCYTES NFR BLD AUTO: 0.4 % — SIGNIFICANT CHANGE UP (ref 0–1.5)
INR BLD: 1.17 — HIGH (ref 0.88–1.16)
KETONES UR-MCNC: NEGATIVE — SIGNIFICANT CHANGE UP
LACTATE SERPL-SCNC: 1.7 MMOL/L — SIGNIFICANT CHANGE UP (ref 0.5–2)
LACTATE SERPL-SCNC: 2.6 MMOL/L — HIGH (ref 0.5–2)
LEUKOCYTE ESTERASE UR-ACNC: NEGATIVE — SIGNIFICANT CHANGE UP
LIDOCAIN IGE QN: 69 U/L — HIGH (ref 7–60)
LYMPHOCYTES # BLD AUTO: 1.6 K/UL — SIGNIFICANT CHANGE UP (ref 1–3.3)
LYMPHOCYTES # BLD AUTO: 22.3 % — SIGNIFICANT CHANGE UP (ref 13–44)
MAGNESIUM SERPL-MCNC: 1.9 MG/DL — SIGNIFICANT CHANGE UP (ref 1.6–2.6)
MAGNESIUM SERPL-MCNC: 2.2 MG/DL — SIGNIFICANT CHANGE UP (ref 1.6–2.6)
MCHC RBC-ENTMCNC: 29.4 PG — SIGNIFICANT CHANGE UP (ref 27–34)
MCHC RBC-ENTMCNC: 29.8 PG — SIGNIFICANT CHANGE UP (ref 27–34)
MCHC RBC-ENTMCNC: 33 GM/DL — SIGNIFICANT CHANGE UP (ref 32–36)
MCHC RBC-ENTMCNC: 33.3 GM/DL — SIGNIFICANT CHANGE UP (ref 32–36)
MCV RBC AUTO: 88.2 FL — SIGNIFICANT CHANGE UP (ref 80–100)
MCV RBC AUTO: 90.3 FL — SIGNIFICANT CHANGE UP (ref 80–100)
MONOCYTES # BLD AUTO: 0.35 K/UL — SIGNIFICANT CHANGE UP (ref 0–0.9)
MONOCYTES NFR BLD AUTO: 4.9 % — SIGNIFICANT CHANGE UP (ref 2–14)
NEUTROPHILS # BLD AUTO: 5.11 K/UL — SIGNIFICANT CHANGE UP (ref 1.8–7.4)
NEUTROPHILS NFR BLD AUTO: 71 % — SIGNIFICANT CHANGE UP (ref 43–77)
NITRITE UR-MCNC: NEGATIVE — SIGNIFICANT CHANGE UP
NRBC # BLD: 0 /100 WBCS — SIGNIFICANT CHANGE UP (ref 0–0)
NRBC # BLD: 0 /100 WBCS — SIGNIFICANT CHANGE UP (ref 0–0)
PCO2 BLDV: 54 MMHG — HIGH (ref 41–51)
PH BLDV: 7.37 — SIGNIFICANT CHANGE UP (ref 7.32–7.43)
PH UR: 6 — SIGNIFICANT CHANGE UP (ref 5–8)
PLATELET # BLD AUTO: 192 K/UL — SIGNIFICANT CHANGE UP (ref 150–400)
PLATELET # BLD AUTO: 217 K/UL — SIGNIFICANT CHANGE UP (ref 150–400)
PO2 BLDV: 36 MMHG — SIGNIFICANT CHANGE UP
POTASSIUM SERPL-MCNC: 3.9 MMOL/L — SIGNIFICANT CHANGE UP (ref 3.5–5.3)
POTASSIUM SERPL-MCNC: 4.3 MMOL/L — SIGNIFICANT CHANGE UP (ref 3.5–5.3)
POTASSIUM SERPL-SCNC: 3.9 MMOL/L — SIGNIFICANT CHANGE UP (ref 3.5–5.3)
POTASSIUM SERPL-SCNC: 4.3 MMOL/L — SIGNIFICANT CHANGE UP (ref 3.5–5.3)
PROT SERPL-MCNC: 10.2 G/DL — HIGH (ref 6–8.3)
PROT UR-MCNC: 100 MG/DL
PROTHROM AB SERPL-ACNC: 13.3 SEC — HIGH (ref 10–12.9)
RBC # BLD: 4.23 M/UL — SIGNIFICANT CHANGE UP (ref 3.8–5.2)
RBC # BLD: 5 M/UL — SIGNIFICANT CHANGE UP (ref 3.8–5.2)
RBC # FLD: 17.7 % — HIGH (ref 10.3–14.5)
RBC # FLD: 17.9 % — HIGH (ref 10.3–14.5)
SAO2 % BLDV: 65 % — SIGNIFICANT CHANGE UP
SODIUM SERPL-SCNC: 130 MMOL/L — LOW (ref 135–145)
SODIUM SERPL-SCNC: 135 MMOL/L — SIGNIFICANT CHANGE UP (ref 135–145)
SP GR SPEC: 1.01 — SIGNIFICANT CHANGE UP (ref 1–1.03)
TROPONIN T SERPL-MCNC: 0.02 NG/ML — HIGH (ref 0–0.01)
UROBILINOGEN FLD QL: 0.2 E.U./DL — SIGNIFICANT CHANGE UP
UUN UR-MCNC: 352 MG/DL — SIGNIFICANT CHANGE UP
WBC # BLD: 10.6 K/UL — HIGH (ref 3.8–10.5)
WBC # BLD: 7.19 K/UL — SIGNIFICANT CHANGE UP (ref 3.8–10.5)
WBC # FLD AUTO: 10.6 K/UL — HIGH (ref 3.8–10.5)
WBC # FLD AUTO: 7.19 K/UL — SIGNIFICANT CHANGE UP (ref 3.8–10.5)

## 2019-05-31 PROCEDURE — 99285 EMERGENCY DEPT VISIT HI MDM: CPT | Mod: 25

## 2019-05-31 PROCEDURE — 82010 KETONE BODYS QUAN: CPT

## 2019-05-31 PROCEDURE — 94640 AIRWAY INHALATION TREATMENT: CPT

## 2019-05-31 PROCEDURE — 84540 ASSAY OF URINE/UREA-N: CPT

## 2019-05-31 PROCEDURE — 87086 URINE CULTURE/COLONY COUNT: CPT

## 2019-05-31 PROCEDURE — 83605 ASSAY OF LACTIC ACID: CPT

## 2019-05-31 PROCEDURE — 71045 X-RAY EXAM CHEST 1 VIEW: CPT | Mod: 26

## 2019-05-31 PROCEDURE — 96375 TX/PRO/DX INJ NEW DRUG ADDON: CPT

## 2019-05-31 PROCEDURE — 81001 URINALYSIS AUTO W/SCOPE: CPT

## 2019-05-31 PROCEDURE — 82550 ASSAY OF CK (CPK): CPT

## 2019-05-31 PROCEDURE — 80307 DRUG TEST PRSMV CHEM ANLYZR: CPT

## 2019-05-31 PROCEDURE — 80048 BASIC METABOLIC PNL TOTAL CA: CPT

## 2019-05-31 PROCEDURE — 36415 COLL VENOUS BLD VENIPUNCTURE: CPT

## 2019-05-31 PROCEDURE — 85027 COMPLETE CBC AUTOMATED: CPT

## 2019-05-31 PROCEDURE — 99239 HOSP IP/OBS DSCHRG MGMT >30: CPT

## 2019-05-31 PROCEDURE — 82962 GLUCOSE BLOOD TEST: CPT

## 2019-05-31 PROCEDURE — 84100 ASSAY OF PHOSPHORUS: CPT

## 2019-05-31 PROCEDURE — 85610 PROTHROMBIN TIME: CPT

## 2019-05-31 PROCEDURE — 96374 THER/PROPH/DIAG INJ IV PUSH: CPT

## 2019-05-31 PROCEDURE — 85025 COMPLETE CBC W/AUTO DIFF WBC: CPT

## 2019-05-31 PROCEDURE — 82803 BLOOD GASES ANY COMBINATION: CPT

## 2019-05-31 PROCEDURE — 80053 COMPREHEN METABOLIC PANEL: CPT

## 2019-05-31 PROCEDURE — 82570 ASSAY OF URINE CREATININE: CPT

## 2019-05-31 PROCEDURE — 83036 HEMOGLOBIN GLYCOSYLATED A1C: CPT

## 2019-05-31 PROCEDURE — 85730 THROMBOPLASTIN TIME PARTIAL: CPT

## 2019-05-31 PROCEDURE — 83735 ASSAY OF MAGNESIUM: CPT

## 2019-05-31 PROCEDURE — 82553 CREATINE MB FRACTION: CPT

## 2019-05-31 PROCEDURE — 84484 ASSAY OF TROPONIN QUANT: CPT

## 2019-05-31 PROCEDURE — 71045 X-RAY EXAM CHEST 1 VIEW: CPT

## 2019-05-31 PROCEDURE — 83690 ASSAY OF LIPASE: CPT

## 2019-05-31 RX ORDER — INSULIN HUMAN 100 [IU]/ML
10 INJECTION, SOLUTION SUBCUTANEOUS ONCE
Refills: 0 | Status: COMPLETED | OUTPATIENT
Start: 2019-05-31 | End: 2019-05-31

## 2019-05-31 RX ORDER — LOSARTAN POTASSIUM 100 MG/1
1 TABLET, FILM COATED ORAL
Qty: 0 | Refills: 0 | DISCHARGE

## 2019-05-31 RX ORDER — ENOXAPARIN SODIUM 100 MG/ML
90 INJECTION SUBCUTANEOUS
Qty: 600 | Refills: 0
Start: 2019-05-31 | End: 2019-06-02

## 2019-05-31 RX ORDER — ZOLPIDEM TARTRATE 10 MG/1
5 TABLET ORAL AT BEDTIME
Refills: 0 | Status: DISCONTINUED | OUTPATIENT
Start: 2019-05-31 | End: 2019-05-31

## 2019-05-31 RX ORDER — ESCITALOPRAM OXALATE 10 MG/1
20 TABLET, FILM COATED ORAL DAILY
Refills: 0 | Status: DISCONTINUED | OUTPATIENT
Start: 2019-05-31 | End: 2019-05-31

## 2019-05-31 RX ORDER — CLOPIDOGREL BISULFATE 75 MG/1
75 TABLET, FILM COATED ORAL DAILY
Refills: 0 | Status: DISCONTINUED | OUTPATIENT
Start: 2019-05-31 | End: 2019-05-31

## 2019-05-31 RX ORDER — WARFARIN SODIUM 2.5 MG/1
2 TABLET ORAL ONCE
Refills: 0 | Status: DISCONTINUED | OUTPATIENT
Start: 2019-05-31 | End: 2019-05-31

## 2019-05-31 RX ORDER — ENOXAPARIN SODIUM 100 MG/ML
90 INJECTION SUBCUTANEOUS
Qty: 1300 | Refills: 0
Start: 2019-05-31 | End: 2019-06-06

## 2019-05-31 RX ORDER — METHADONE HYDROCHLORIDE 40 MG/1
10 TABLET ORAL
Refills: 0 | Status: DISCONTINUED | OUTPATIENT
Start: 2019-05-31 | End: 2019-05-31

## 2019-05-31 RX ORDER — ENOXAPARIN SODIUM 100 MG/ML
90 INJECTION SUBCUTANEOUS
Refills: 0 | Status: DISCONTINUED | OUTPATIENT
Start: 2019-05-31 | End: 2019-05-31

## 2019-05-31 RX ORDER — WARFARIN SODIUM 2.5 MG/1
2 TABLET ORAL AT BEDTIME
Refills: 0 | Status: DISCONTINUED | OUTPATIENT
Start: 2019-05-31 | End: 2019-05-31

## 2019-05-31 RX ORDER — WARFARIN SODIUM 2.5 MG/1
1 TABLET ORAL
Qty: 0 | Refills: 0 | DISCHARGE

## 2019-05-31 RX ORDER — INSULIN GLARGINE 100 [IU]/ML
15 INJECTION, SOLUTION SUBCUTANEOUS AT BEDTIME
Refills: 0 | Status: DISCONTINUED | OUTPATIENT
Start: 2019-05-31 | End: 2019-05-31

## 2019-05-31 RX ORDER — SODIUM CHLORIDE 9 MG/ML
1000 INJECTION, SOLUTION INTRAVENOUS
Refills: 0 | Status: DISCONTINUED | OUTPATIENT
Start: 2019-05-31 | End: 2019-05-31

## 2019-05-31 RX ORDER — AMITRIPTYLINE HCL 25 MG
10 TABLET ORAL AT BEDTIME
Refills: 0 | Status: DISCONTINUED | OUTPATIENT
Start: 2019-05-31 | End: 2019-05-31

## 2019-05-31 RX ORDER — WARFARIN SODIUM 2.5 MG/1
3 TABLET ORAL ONCE
Refills: 0 | Status: DISCONTINUED | OUTPATIENT
Start: 2019-05-31 | End: 2019-05-31

## 2019-05-31 RX ORDER — WARFARIN SODIUM 2.5 MG/1
2 TABLET ORAL
Qty: 60 | Refills: 0
Start: 2019-05-31 | End: 2019-06-29

## 2019-05-31 RX ORDER — METOPROLOL TARTRATE 50 MG
100 TABLET ORAL DAILY
Refills: 0 | Status: DISCONTINUED | OUTPATIENT
Start: 2019-05-31 | End: 2019-05-31

## 2019-05-31 RX ORDER — DEXTROSE 50 % IN WATER 50 %
25 SYRINGE (ML) INTRAVENOUS ONCE
Refills: 0 | Status: DISCONTINUED | OUTPATIENT
Start: 2019-05-31 | End: 2019-05-31

## 2019-05-31 RX ORDER — ATORVASTATIN CALCIUM 80 MG/1
80 TABLET, FILM COATED ORAL AT BEDTIME
Refills: 0 | Status: DISCONTINUED | OUTPATIENT
Start: 2019-05-31 | End: 2019-05-31

## 2019-05-31 RX ORDER — MORPHINE SULFATE 50 MG/1
4 CAPSULE, EXTENDED RELEASE ORAL ONCE
Refills: 0 | Status: DISCONTINUED | OUTPATIENT
Start: 2019-05-31 | End: 2019-05-31

## 2019-05-31 RX ORDER — ZOLPIDEM TARTRATE 10 MG/1
1 TABLET ORAL
Qty: 0 | Refills: 0 | DISCHARGE

## 2019-05-31 RX ORDER — SACUBITRIL AND VALSARTAN 24; 26 MG/1; MG/1
1 TABLET, FILM COATED ORAL
Qty: 60 | Refills: 0
Start: 2019-05-31 | End: 2019-06-29

## 2019-05-31 RX ORDER — INSULIN LISPRO 100/ML
VIAL (ML) SUBCUTANEOUS
Refills: 0 | Status: DISCONTINUED | OUTPATIENT
Start: 2019-05-31 | End: 2019-05-31

## 2019-05-31 RX ORDER — GABAPENTIN 400 MG/1
100 CAPSULE ORAL
Refills: 0 | Status: DISCONTINUED | OUTPATIENT
Start: 2019-05-31 | End: 2019-05-31

## 2019-05-31 RX ORDER — GLUCAGON INJECTION, SOLUTION 0.5 MG/.1ML
1 INJECTION, SOLUTION SUBCUTANEOUS ONCE
Refills: 0 | Status: DISCONTINUED | OUTPATIENT
Start: 2019-05-31 | End: 2019-05-31

## 2019-05-31 RX ORDER — LEVOTHYROXINE SODIUM 125 MCG
25 TABLET ORAL DAILY
Refills: 0 | Status: DISCONTINUED | OUTPATIENT
Start: 2019-05-31 | End: 2019-05-31

## 2019-05-31 RX ORDER — FAMOTIDINE 10 MG/ML
20 INJECTION INTRAVENOUS ONCE
Refills: 0 | Status: COMPLETED | OUTPATIENT
Start: 2019-05-31 | End: 2019-05-31

## 2019-05-31 RX ORDER — METHADONE HYDROCHLORIDE 40 MG/1
1 TABLET ORAL
Qty: 0 | Refills: 0 | DISCHARGE

## 2019-05-31 RX ORDER — SODIUM CHLORIDE 9 MG/ML
1000 INJECTION INTRAMUSCULAR; INTRAVENOUS; SUBCUTANEOUS ONCE
Refills: 0 | Status: COMPLETED | OUTPATIENT
Start: 2019-05-31 | End: 2019-05-31

## 2019-05-31 RX ORDER — PANTOPRAZOLE SODIUM 20 MG/1
40 TABLET, DELAYED RELEASE ORAL
Refills: 0 | Status: DISCONTINUED | OUTPATIENT
Start: 2019-05-31 | End: 2019-05-31

## 2019-05-31 RX ORDER — TIZANIDINE 4 MG/1
1 TABLET ORAL
Qty: 0 | Refills: 0 | DISCHARGE

## 2019-05-31 RX ORDER — SENNA PLUS 8.6 MG/1
1 TABLET ORAL DAILY
Refills: 0 | Status: DISCONTINUED | OUTPATIENT
Start: 2019-05-31 | End: 2019-05-31

## 2019-05-31 RX ORDER — BUDESONIDE AND FORMOTEROL FUMARATE DIHYDRATE 160; 4.5 UG/1; UG/1
2 AEROSOL RESPIRATORY (INHALATION)
Refills: 0 | Status: DISCONTINUED | OUTPATIENT
Start: 2019-05-31 | End: 2019-05-31

## 2019-05-31 RX ORDER — ISOSORBIDE MONONITRATE 60 MG/1
60 TABLET, EXTENDED RELEASE ORAL DAILY
Refills: 0 | Status: DISCONTINUED | OUTPATIENT
Start: 2019-05-31 | End: 2019-05-31

## 2019-05-31 RX ORDER — WARFARIN SODIUM 2.5 MG/1
1 TABLET ORAL
Qty: 0 | Refills: 0 | DISCHARGE
Start: 2019-05-31 | End: 2019-06-29

## 2019-05-31 RX ORDER — FENOFIBRATE,MICRONIZED 130 MG
145 CAPSULE ORAL DAILY
Refills: 0 | Status: DISCONTINUED | OUTPATIENT
Start: 2019-05-31 | End: 2019-05-31

## 2019-05-31 RX ORDER — INSULIN LISPRO 100/ML
5 VIAL (ML) SUBCUTANEOUS
Refills: 0 | Status: DISCONTINUED | OUTPATIENT
Start: 2019-05-31 | End: 2019-05-31

## 2019-05-31 RX ORDER — FERROUS SULFATE 325(65) MG
325 TABLET ORAL DAILY
Refills: 0 | Status: DISCONTINUED | OUTPATIENT
Start: 2019-05-31 | End: 2019-05-31

## 2019-05-31 RX ORDER — SACUBITRIL AND VALSARTAN 24; 26 MG/1; MG/1
1 TABLET, FILM COATED ORAL
Qty: 0 | Refills: 0 | DISCHARGE

## 2019-05-31 RX ORDER — DEXTROSE 50 % IN WATER 50 %
12.5 SYRINGE (ML) INTRAVENOUS ONCE
Refills: 0 | Status: DISCONTINUED | OUTPATIENT
Start: 2019-05-31 | End: 2019-05-31

## 2019-05-31 RX ORDER — TRAZODONE HCL 50 MG
50 TABLET ORAL AT BEDTIME
Refills: 0 | Status: DISCONTINUED | OUTPATIENT
Start: 2019-05-31 | End: 2019-05-31

## 2019-05-31 RX ORDER — DOCUSATE SODIUM 100 MG
100 CAPSULE ORAL DAILY
Refills: 0 | Status: DISCONTINUED | OUTPATIENT
Start: 2019-05-31 | End: 2019-05-31

## 2019-05-31 RX ORDER — ONDANSETRON 8 MG/1
4 TABLET, FILM COATED ORAL ONCE
Refills: 0 | Status: COMPLETED | OUTPATIENT
Start: 2019-05-31 | End: 2019-05-31

## 2019-05-31 RX ORDER — DEXTROSE 50 % IN WATER 50 %
15 SYRINGE (ML) INTRAVENOUS ONCE
Refills: 0 | Status: DISCONTINUED | OUTPATIENT
Start: 2019-05-31 | End: 2019-05-31

## 2019-05-31 RX ADMIN — BUDESONIDE AND FORMOTEROL FUMARATE DIHYDRATE 2 PUFF(S): 160; 4.5 AEROSOL RESPIRATORY (INHALATION) at 07:26

## 2019-05-31 RX ADMIN — INSULIN HUMAN 10 UNIT(S): 100 INJECTION, SOLUTION SUBCUTANEOUS at 02:53

## 2019-05-31 RX ADMIN — METHADONE HYDROCHLORIDE 10 MILLIGRAM(S): 40 TABLET ORAL at 07:28

## 2019-05-31 RX ADMIN — INSULIN HUMAN 10 UNIT(S): 100 INJECTION, SOLUTION SUBCUTANEOUS at 03:58

## 2019-05-31 RX ADMIN — Medication 325 MILLIGRAM(S): at 11:19

## 2019-05-31 RX ADMIN — Medication 10 MILLIGRAM(S): at 17:27

## 2019-05-31 RX ADMIN — BUDESONIDE AND FORMOTEROL FUMARATE DIHYDRATE 2 PUFF(S): 160; 4.5 AEROSOL RESPIRATORY (INHALATION) at 17:28

## 2019-05-31 RX ADMIN — Medication 5 UNIT(S): at 17:28

## 2019-05-31 RX ADMIN — Medication 10 MILLIGRAM(S): at 07:29

## 2019-05-31 RX ADMIN — SODIUM CHLORIDE 1000 MILLILITER(S): 9 INJECTION INTRAMUSCULAR; INTRAVENOUS; SUBCUTANEOUS at 01:50

## 2019-05-31 RX ADMIN — GABAPENTIN 100 MILLIGRAM(S): 400 CAPSULE ORAL at 07:28

## 2019-05-31 RX ADMIN — MORPHINE SULFATE 4 MILLIGRAM(S): 50 CAPSULE, EXTENDED RELEASE ORAL at 01:49

## 2019-05-31 RX ADMIN — Medication 5 UNIT(S): at 09:14

## 2019-05-31 RX ADMIN — Medication 145 MILLIGRAM(S): at 11:18

## 2019-05-31 RX ADMIN — CLOPIDOGREL BISULFATE 75 MILLIGRAM(S): 75 TABLET, FILM COATED ORAL at 11:19

## 2019-05-31 RX ADMIN — ESCITALOPRAM OXALATE 20 MILLIGRAM(S): 10 TABLET, FILM COATED ORAL at 11:19

## 2019-05-31 RX ADMIN — PANTOPRAZOLE SODIUM 40 MILLIGRAM(S): 20 TABLET, DELAYED RELEASE ORAL at 07:28

## 2019-05-31 RX ADMIN — Medication 80 MILLIGRAM(S): at 07:28

## 2019-05-31 RX ADMIN — ENOXAPARIN SODIUM 90 MILLIGRAM(S): 100 INJECTION SUBCUTANEOUS at 14:57

## 2019-05-31 RX ADMIN — ISOSORBIDE MONONITRATE 60 MILLIGRAM(S): 60 TABLET, EXTENDED RELEASE ORAL at 11:19

## 2019-05-31 RX ADMIN — Medication 25 MICROGRAM(S): at 07:28

## 2019-05-31 RX ADMIN — FAMOTIDINE 20 MILLIGRAM(S): 10 INJECTION INTRAVENOUS at 01:49

## 2019-05-31 RX ADMIN — Medication 5 UNIT(S): at 12:33

## 2019-05-31 RX ADMIN — ONDANSETRON 4 MILLIGRAM(S): 8 TABLET, FILM COATED ORAL at 01:49

## 2019-05-31 RX ADMIN — Medication 2: at 09:14

## 2019-05-31 RX ADMIN — Medication 100 MILLIGRAM(S): at 07:28

## 2019-05-31 RX ADMIN — GABAPENTIN 100 MILLIGRAM(S): 400 CAPSULE ORAL at 17:27

## 2019-05-31 NOTE — H&P ADULT - NSICDXPASTMEDICALHX_GEN_ALL_CORE_FT
PAST MEDICAL HISTORY:  Acute on chronic systolic heart failure     Bipolar depression     CHF (congestive heart failure)     Chronic pain     COPD (chronic obstructive pulmonary disease)     CVA (cerebral vascular accident)     CVA (cerebral vascular accident)     Depression     Depression     Diabetes     DM (diabetes mellitus)     DVT (deep venous thrombosis)     HLD (hyperlipidemia)     HTN (hypertension)     Neuropathy     PAD (peripheral artery disease)

## 2019-05-31 NOTE — DISCHARGE NOTE PROVIDER - NSDCFUADDAPPT_GEN_ALL_CORE_FT
Dr. Ruiz - June 6 at 1230 PM  Dr. Baxter - [place] [address] at 1120 AM Dr. Ruiz - June 6 at 1230 PM - hematology for multiple myeloma  Dr. Baxter - [place] 3 at 1120 AM - for INR check  Dr. Olguin - June 5 at 3 PM - CHF medications and check-up

## 2019-05-31 NOTE — DISCHARGE NOTE NURSING/CASE MANAGEMENT/SOCIAL WORK - NSDCDPATPORTLINK_GEN_ALL_CORE
You can access the [facility] Patient Portal, offered by [facility], by registering with the following website: [email or web address]

## 2019-05-31 NOTE — H&P ADULT - ASSESSMENT
58F w/HFrEF (EF 25% Jan '19) s/p AICD, NICM, s/p cardioMEMS, IDDM c/b neuropathy (on methadone), HTN, bipolar disorder, depression, history of multiple DVT on Coumadin, CVA x2 (last one in 2013 with residual right sided weakness),  PAD s/p bypass, hypothyroidism, COPD (2-3L home O2) presenting with nausea and vomiting in setting of hyperglycemia and RITCHIE

## 2019-05-31 NOTE — PROGRESS NOTE ADULT - PROBLEM SELECTOR PLAN 3
- c/w coumadin dosed by daily INR  - may require lovenox bridge  - lovenox teaching if bridging required    #Abdominal pain  - pt first endorsed epigastric pain, then RUQ/RLQ pain and guarding. states pain has been chronic. worsens w PO intake 'sometimes.' Lindsay's sign negative.  - lipase 87

## 2019-05-31 NOTE — DISCHARGE NOTE PROVIDER - CARE PROVIDER_API CALL
Zoya Olguin)  Cardiology; Interventional Cardiology  [address] [place] [address]  Phone: [phone]  Fax: [phone]  Follow Up Time:     Payton Ruiz)  Internal Medicine  [address] [place] [address]  Phone: [phone]  Fax: [phone]  Follow Up Time:     Jony Neal)  Internal Medicine; Nephrology  [address], [address]  [place] [address]  Phone: [phone]  Fax: [phone]  Follow Up Time:     Marta Baxter)  NP in Family Health  [address] [place] [address]  Phone: [phone]  Fax: [phone]  Follow Up Time:

## 2019-05-31 NOTE — H&P ADULT - PROBLEM SELECTOR PLAN 10
1) PCP Contacted on Admission: (N) --> Name & Phone #: Dr. Neal   2) Date of Contact with PCP:  3) PCP Contacted at Discharge: (Y/N, N/A)  4) Summary of Handoff Given to PCP:   5) Post-Discharge Appointment Date and Location:

## 2019-05-31 NOTE — H&P ADULT - NSHPLABSRESULTS_GEN_ALL_CORE
.  LABS:                         14.7   7.19  )-----------( 217      ( 31 May 2019 01:28 )             44.1         130<L>  |  88<L>  |  43<H>  ----------------------------<  514<HH>  3.9   |  26  |  2.15<H>    Ca    9.9      31 May 2019 01:28  Mg     2.2         TPro  10.2<H>  /  Alb  4.2  /  TBili  0.5  /  DBili  x   /  AST  17  /  ALT  16  /  AlkPhos  187<H>      PT/INR - ( 31 May 2019 01:28 )   PT: 13.3 sec;   INR: 1.17          PTT - ( 31 May 2019 01:28 )  PTT:39.1 sec  Urinalysis Basic - ( 31 May 2019 01:53 )    Color: Yellow / Appearance: Clear / S.010 / pH: x  Gluc: x / Ketone: NEGATIVE  / Bili: Negative / Urobili: 0.2 E.U./dL   Blood: x / Protein: 100 mg/dL / Nitrite: NEGATIVE   Leuk Esterase: NEGATIVE / RBC: 5-10 /HPF / WBC < 5 /HPF   Sq Epi: x / Non Sq Epi: 0-5 /HPF / Bacteria: Present /HPF            Lactate, Blood: 2.6 mmoL/L ( @ 01:28)      RADIOLOGY, EKG & ADDITIONAL TESTS: Reviewed.

## 2019-05-31 NOTE — H&P ADULT - ATTENDING COMMENTS
Pt seen and examined at bedside. Agree with exam, a/p as above with following addendum/edits:    On interview, patient is a poor historian and unable to obtain adequate history. Knows some of her doses but seemed confused by HbA1C and INR. Patient known to be noncompliant. States she takes her insulin and Coumadin, however her INR was subtherapeutic. On exam, dry MM, lungs clear, RRR, abd soft NTND except for mild pain in RLQ, LE w/ chronic venous changes, 1+ pulses, scar L leg. Her FS has trended down, awaiting AM labs. Given extensive cardiac history of atypical presentations of ACS in women/diabetics, will add on cardiac panel. Her Hgb of 14 likely is secondary to hemoconcentration as her baseline Hgb is 10. She is noted to be on 2 ARBs so will need to obtain collateral. No signs of acute HF exacerbation. Hold Entresto, and hold torsemide for now. If Cr downtrending may be able to restart. Repeat EKG. Path from earlier this month showing myeloma, which is supported by her protein gap. Obtain collateral on work up and plan. If repeat labs improved and FS improved, possible d/c in afternoon. Please obtain utox as well.     58 year old F w/     1. Uncontrolled DM  2. HFrEF, chronic,   3. Chronic resp failure 2/2 COPD on home O2  4. Bipolar disorder  5. Chronic pain on methadone  6. Myeloma  7. RITCHIE on CKD III, likely pre-renal  8. DVT on Coumadin: bridge w/ lovenox

## 2019-05-31 NOTE — DISCHARGE NOTE PROVIDER - HOSPITAL COURSE
58F w/HFrEF (EF 25% Jan '19) s/p AICD, NICM, s/p cardioMEMS, IDDM c/b neuropathy (on methadone), HTN, bipolar disorder, depression, history of multiple DVT on Coumadin, CVA x2 (last one in 2013 with residual right sided weakness),  PAD s/p bypass, hypothyroidism, COPD (2-3L home O2) presenting with nausea and vomiting in setting of hyperglycemia and RITCHIE. Pt noncompliant w insulin at home and had recent URI likely leading to hyperglycemia. a1c 13.1. FSG improved on sliding scale and home regimen.  Trops 0.02 but CK/CKMB negative. EKG without acute signs of ischemia. Reported abdominal pain w slightly elevated lipase. Of note, multiple sources of collateral ([employer], [facility] and pharmacy) stated different medication regimens. Discharged on most recent regimen prescribed by Dr. Olguin.

## 2019-05-31 NOTE — H&P ADULT - NSHPPHYSICALEXAM_GEN_ALL_CORE
.  VITAL SIGNS:  T(C): 37.1 (05-31-19 @ 04:27), Max: 37.1 (05-31-19 @ 04:27)  T(F): 98.8 (05-31-19 @ 04:27), Max: 98.8 (05-31-19 @ 04:27)  HR: 71 (05-31-19 @ 04:27) (71 - 107)  BP: 125/83 (05-31-19 @ 04:27) (125/83 - 152/91)  BP(mean): --  RR: 18 (05-31-19 @ 04:27) (18 - 20)  SpO2: 95% (05-31-19 @ 04:27) (95% - 95%)  Wt(kg): --    PHYSICAL EXAM:    Constitutional: Obesese fatigued appearing, resting comfortably in bed; NAD  Head: NC/AT  Eyes: PERRL, EOMI, anicteric sclera  ENT: uvula midline, no oropharyngeal erythema or exudates; dry MM  Neck: supple; no JVD or thyromegaly  Respiratory: CTA B/L; no W/R/R, no retractions  Cardiac: +S1/S2; RRR; no M/R/G; PMI non-displaced  Gastrointestinal: soft, mild tendernes to epigastric, RUQ, RLQ palpation, ND; no rebound or guarding; +BSx4  Extremities: cool, chronic skin changes b/l; no peripheral edema  Vascular: 2+ radial, DP/PT pulses B/L  Lymphatic: no submandibular or cervical LAD  Neurologic: AAOx3; CNII-XII grossly intact; no focal deficits  Psychiatric: affect and characteristics of appearance, verbalizations, behaviors are appropriate

## 2019-05-31 NOTE — H&P ADULT - PROBLEM SELECTOR PLAN 2
Presenting with BUN/Cr - 43/2.15. Outpatient bloodwork from 5/1 showed BUN/Cr - 31/1.32.  - check urine lytes  - s/p 1L NS in ED; hold further fluids in setting of HFrEF   - will hold entresto and losartan at this time  - given patient's HF, will c/w diuretic torsemide 80mg PO QD at this time.

## 2019-05-31 NOTE — PROGRESS NOTE ADULT - SUBJECTIVE AND OBJECTIVE BOX
OVERNIGHT EVENTS:    SUBJECTIVE / INTERVAL HPI: Patient seen and examined at bedside.     VITAL SIGNS:  Vital Signs Last 24 Hrs  T(C): 36.7 (31 May 2019 05:15), Max: 37.1 (31 May 2019 04:27)  T(F): 98.1 (31 May 2019 05:15), Max: 98.8 (31 May 2019 04:27)  HR: 73 (31 May 2019 05:15) (71 - 107)  BP: 144/78 (31 May 2019 05:15) (125/83 - 152/91)  BP(mean): --  RR: 16 (31 May 2019 05:15) (16 - 20)  SpO2: 99% (31 May 2019 05:15) (95% - 99%)    PHYSICAL EXAM:    General: WDWN  HEENT: NC/AT; PERRL, anicteric sclera; MMM  Neck: supple  Cardiovascular: +S1/S2, RRR  Respiratory: CTA B/L; no W/R/R  Gastrointestinal: soft, NT/ND; +BSx4  Extremities: WWP; no edema, clubbing or cyanosis  Vascular: 2+ radial, DP/PT pulses B/L  Neurological: AAOx3; no focal deficits    MEDICATIONS:  MEDICATIONS  (STANDING):  amitriptyline 10 milliGRAM(s) Oral at bedtime  atorvastatin 80 milliGRAM(s) Oral at bedtime  buDESOnide 160 MICROgram(s)/formoterol 4.5 MICROgram(s) Inhaler 2 Puff(s) Inhalation two times a day  busPIRone 10 milliGRAM(s) Oral two times a day  clopidogrel Tablet 75 milliGRAM(s) Oral daily  dextrose 5%. 1000 milliLiter(s) (50 mL/Hr) IV Continuous <Continuous>  dextrose 50% Injectable 12.5 Gram(s) IV Push once  dextrose 50% Injectable 25 Gram(s) IV Push once  dextrose 50% Injectable 25 Gram(s) IV Push once  docusate sodium 100 milliGRAM(s) Oral daily  escitalopram 20 milliGRAM(s) Oral daily  fenofibrate Tablet 145 milliGRAM(s) Oral daily  ferrous    sulfate 325 milliGRAM(s) Oral daily  gabapentin 100 milliGRAM(s) Oral two times a day  insulin glargine Injectable (LANTUS) 15 Unit(s) SubCutaneous at bedtime  insulin lispro (HumaLOG) corrective regimen sliding scale   SubCutaneous Before meals and at bedtime  insulin lispro Injectable (HumaLOG) 5 Unit(s) SubCutaneous three times a day before meals  isosorbide   mononitrate ER Tablet (IMDUR) 60 milliGRAM(s) Oral daily  levothyroxine 25 MICROGram(s) Oral daily  metoprolol succinate  milliGRAM(s) Oral daily  pantoprazole    Tablet 40 milliGRAM(s) Oral before breakfast  senna 1 Tablet(s) Oral daily  warfarin 2 milliGRAM(s) Oral at bedtime    MEDICATIONS  (PRN):  dextrose 40% Gel 15 Gram(s) Oral once PRN Blood Glucose LESS THAN 70 milliGRAM(s)/deciliter  glucagon  Injectable 1 milliGRAM(s) IntraMuscular once PRN Glucose LESS THAN 70 milligrams/deciliter      ALLERGIES:  Allergies    aspirin (Other (Moderate); Rash)  IV Contrast (Unknown)    Intolerances        LABS:                        14.7   7.19  )-----------( 217      ( 31 May 2019 01:28 )             44.1         130<L>  |  88<L>  |  43<H>  ----------------------------<  514<HH>  3.9   |  26  |  2.15<H>    Ca    9.9      31 May 2019 01:28  Mg     2.2         TPro  10.2<H>  /  Alb  4.2  /  TBili  0.5  /  DBili  x   /  AST  17  /  ALT  16  /  AlkPhos  187<H>      PT/INR - ( 31 May 2019 01:28 )   PT: 13.3 sec;   INR: 1.17          PTT - ( 31 May 2019 01:28 )  PTT:39.1 sec  Urinalysis Basic - ( 31 May 2019 01:53 )    Color: Yellow / Appearance: Clear / S.010 / pH: x  Gluc: x / Ketone: NEGATIVE  / Bili: Negative / Urobili: 0.2 E.U./dL   Blood: x / Protein: 100 mg/dL / Nitrite: NEGATIVE   Leuk Esterase: NEGATIVE / RBC: 5-10 /HPF / WBC < 5 /HPF   Sq Epi: x / Non Sq Epi: 0-5 /HPF / Bacteria: Present /HPF      CAPILLARY BLOOD GLUCOSE      POCT Blood Glucose.: 197 mg/dL (31 May 2019 09:12)      RADIOLOGY & ADDITIONAL TESTS: Reviewed. OVERNIGHT EVENTS: admitted for hyperglycemia    SUBJECTIVE / INTERVAL HPI: Patient seen and examined at bedside. states that she is tired and "sick of feeling this way." when asked to elaborate, pt states that she has pain everywhere including stomach, shoulder and back pain (due to herniated discs). recent hx of URI sx and sick contacts - now resolved. complains of increased thirst and epigastric abdominal pain. has not vomited today.    Denies fevers/chill, HA, blurry vision, trouble swallowing, SOB, chest pain, frequency/dysuria/hematuria, diarrhea/constipation (intermittently has constipation but not currently; last BM )    UPDATED HOME MEDICATIONS:      VITAL SIGNS:  Vital Signs Last 24 Hrs  T(C): 36.7 (31 May 2019 05:15), Max: 37.1 (31 May 2019 04:27)  T(F): 98.1 (31 May 2019 05:15), Max: 98.8 (31 May 2019 04:27)  HR: 73 (31 May 2019 05:15) (71 - 107)  BP: 144/78 (31 May 2019 05:15) (125/83 - 152/91)  BP(mean): --  RR: 16 (31 May 2019 05:15) (16 - 20)  SpO2: 99% (31 May 2019 05:15) (95% - 99%)    PHYSICAL EXAM:    General: WDWN lethargic female in NAD;  HEENT: dry MM, dry tongue, no teeth or dentures, no LAD  Neck: supple  Cardiovascular: +S1/S2, RRR  Respiratory: CTA B/L; no W/R/R  Gastrointestinal: TTP RLQ RUQ; nondistended, bowel sounds present, negative bobo's, obturator sign or psoas sign  Extremities: b/l venous stasis changes, darkened skin from knee down, R calf tenderness but circumference disparity, trace edema b/l LE; LLE w medial leg scar from bypass  Vascular: 2+ radial, DP/PT pulses B/L  Neurological: AAOx3; b/l UE 4/5 strength, RLE 4/5, LLE 5/5, decreased sensation to RLE  Psych: intermittently tearful on exam, lethargic    MEDICATIONS:  MEDICATIONS  (STANDING):  amitriptyline 10 milliGRAM(s) Oral at bedtime  atorvastatin 80 milliGRAM(s) Oral at bedtime  buDESOnide 160 MICROgram(s)/formoterol 4.5 MICROgram(s) Inhaler 2 Puff(s) Inhalation two times a day  busPIRone 10 milliGRAM(s) Oral two times a day  clopidogrel Tablet 75 milliGRAM(s) Oral daily  dextrose 5%. 1000 milliLiter(s) (50 mL/Hr) IV Continuous <Continuous>  dextrose 50% Injectable 12.5 Gram(s) IV Push once  dextrose 50% Injectable 25 Gram(s) IV Push once  dextrose 50% Injectable 25 Gram(s) IV Push once  docusate sodium 100 milliGRAM(s) Oral daily  escitalopram 20 milliGRAM(s) Oral daily  fenofibrate Tablet 145 milliGRAM(s) Oral daily  ferrous    sulfate 325 milliGRAM(s) Oral daily  gabapentin 100 milliGRAM(s) Oral two times a day  insulin glargine Injectable (LANTUS) 15 Unit(s) SubCutaneous at bedtime  insulin lispro (HumaLOG) corrective regimen sliding scale   SubCutaneous Before meals and at bedtime  insulin lispro Injectable (HumaLOG) 5 Unit(s) SubCutaneous three times a day before meals  isosorbide   mononitrate ER Tablet (IMDUR) 60 milliGRAM(s) Oral daily  levothyroxine 25 MICROGram(s) Oral daily  metoprolol succinate  milliGRAM(s) Oral daily  pantoprazole    Tablet 40 milliGRAM(s) Oral before breakfast  senna 1 Tablet(s) Oral daily  warfarin 2 milliGRAM(s) Oral at bedtime    MEDICATIONS  (PRN):  dextrose 40% Gel 15 Gram(s) Oral once PRN Blood Glucose LESS THAN 70 milliGRAM(s)/deciliter  glucagon  Injectable 1 milliGRAM(s) IntraMuscular once PRN Glucose LESS THAN 70 milligrams/deciliter      ALLERGIES:  Allergies    aspirin (Other (Moderate); Rash)  IV Contrast (Unknown)    Intolerances        LABS:                        14.7   7.19  )-----------( 217      ( 31 May 2019 01:28 )             44.1     -    130<L>  |  88<L>  |  43<H>  ----------------------------<  514<HH>  3.9   |  26  |  2.15<H>    Ca    9.9      31 May 2019 01:28  Mg     2.2         TPro  10.2<H>  /  Alb  4.2  /  TBili  0.5  /  DBili  x   /  AST  17  /  ALT  16  /  AlkPhos  187<H>      PT/INR - ( 31 May 2019 01:28 )   PT: 13.3 sec;   INR: 1.17          PTT - ( 31 May 2019 01:28 )  PTT:39.1 sec  Urinalysis Basic - ( 31 May 2019 01:53 )    Color: Yellow / Appearance: Clear / S.010 / pH: x  Gluc: x / Ketone: NEGATIVE  / Bili: Negative / Urobili: 0.2 E.U./dL   Blood: x / Protein: 100 mg/dL / Nitrite: NEGATIVE   Leuk Esterase: NEGATIVE / RBC: 5-10 /HPF / WBC < 5 /HPF   Sq Epi: x / Non Sq Epi: 0-5 /HPF / Bacteria: Present /HPF      CAPILLARY BLOOD GLUCOSE      POCT Blood Glucose.: 197 mg/dL (31 May 2019 09:12)      RADIOLOGY & ADDITIONAL TESTS: Reviewed. OVERNIGHT EVENTS: admitted for hyperglycemia    SUBJECTIVE / INTERVAL HPI: Patient seen and examined at bedside. states that she is tired and "sick of feeling this way." when asked to elaborate, pt states that she has pain everywhere including stomach, shoulder and back pain (due to herniated discs). recent hx of URI sx and sick contacts - now resolved. complains of increased thirst and epigastric abdominal pain. has not vomited today.    Denies fevers/chill, HA, blurry vision, trouble swallowing, SOB, chest pain, frequency/dysuria/hematuria, diarrhea/constipation (intermittently has constipation but not currently; last BM )    PER DR. SANCHEZ'S LAST NOTE UPDATED MEDICATIONS:  Toresemide 80 mg daily  Entresto 97/103 BID  Toprol 150 mg daily    PER PHARMACY MED REC:  Toresemine 20 mg daily  Entresto 24-26 mg daily  Trazodone 50 mg qhs  Escitalopram 20 mg daily  Amitryptiline 10 mg daily  Methadone 10 mg q6h          VITAL SIGNS:  Vital Signs Last 24 Hrs  T(C): 36.7 (31 May 2019 05:15), Max: 37.1 (31 May 2019 04:27)  T(F): 98.1 (31 May 2019 05:15), Max: 98.8 (31 May 2019 04:27)  HR: 73 (31 May 2019 05:15) (71 - 107)  BP: 144/78 (31 May 2019 05:15) (125/83 - 152/91)  BP(mean): --  RR: 16 (31 May 2019 05:15) (16 - 20)  SpO2: 99% (31 May 2019 05:15) (95% - 99%)    PHYSICAL EXAM:    General: WDWN lethargic female in NAD;  HEENT: dry MM, dry tongue, no teeth or dentures, no LAD  Neck: supple  Cardiovascular: +S1/S2, RRR  Respiratory: CTA B/L; no W/R/R  Gastrointestinal: TTP RLQ RUQ; nondistended, bowel sounds present, negative bobo's, obturator sign or psoas sign  Extremities: b/l venous stasis changes, darkened skin from knee down, R calf tenderness but circumference disparity, trace edema b/l LE; LLE w medial leg scar from bypass  Vascular: 2+ radial, DP/PT pulses B/L  Neurological: AAOx3; b/l UE 4/5 strength, RLE 4/5, LLE 5/5, decreased sensation to RLE  Psych: intermittently tearful on exam, lethargic    MEDICATIONS:  MEDICATIONS  (STANDING):  amitriptyline 10 milliGRAM(s) Oral at bedtime  atorvastatin 80 milliGRAM(s) Oral at bedtime  buDESOnide 160 MICROgram(s)/formoterol 4.5 MICROgram(s) Inhaler 2 Puff(s) Inhalation two times a day  busPIRone 10 milliGRAM(s) Oral two times a day  clopidogrel Tablet 75 milliGRAM(s) Oral daily  dextrose 5%. 1000 milliLiter(s) (50 mL/Hr) IV Continuous <Continuous>  dextrose 50% Injectable 12.5 Gram(s) IV Push once  dextrose 50% Injectable 25 Gram(s) IV Push once  dextrose 50% Injectable 25 Gram(s) IV Push once  docusate sodium 100 milliGRAM(s) Oral daily  escitalopram 20 milliGRAM(s) Oral daily  fenofibrate Tablet 145 milliGRAM(s) Oral daily  ferrous    sulfate 325 milliGRAM(s) Oral daily  gabapentin 100 milliGRAM(s) Oral two times a day  insulin glargine Injectable (LANTUS) 15 Unit(s) SubCutaneous at bedtime  insulin lispro (HumaLOG) corrective regimen sliding scale   SubCutaneous Before meals and at bedtime  insulin lispro Injectable (HumaLOG) 5 Unit(s) SubCutaneous three times a day before meals  isosorbide   mononitrate ER Tablet (IMDUR) 60 milliGRAM(s) Oral daily  levothyroxine 25 MICROGram(s) Oral daily  metoprolol succinate  milliGRAM(s) Oral daily  pantoprazole    Tablet 40 milliGRAM(s) Oral before breakfast  senna 1 Tablet(s) Oral daily  warfarin 2 milliGRAM(s) Oral at bedtime    MEDICATIONS  (PRN):  dextrose 40% Gel 15 Gram(s) Oral once PRN Blood Glucose LESS THAN 70 milliGRAM(s)/deciliter  glucagon  Injectable 1 milliGRAM(s) IntraMuscular once PRN Glucose LESS THAN 70 milligrams/deciliter      ALLERGIES:  Allergies    aspirin (Other (Moderate); Rash)  IV Contrast (Unknown)    Intolerances        LABS:                        14.7   7.19  )-----------( 217      ( 31 May 2019 01:28 )             44.1         130<L>  |  88<L>  |  43<H>  ----------------------------<  514<HH>  3.9   |  26  |  2.15<H>    Ca    9.9      31 May 2019 01:28  Mg     2.2         TPro  10.2<H>  /  Alb  4.2  /  TBili  0.5  /  DBili  x   /  AST  17  /  ALT  16  /  AlkPhos  187<H>      PT/INR - ( 31 May 2019 01:28 )   PT: 13.3 sec;   INR: 1.17          PTT - ( 31 May 2019 01:28 )  PTT:39.1 sec  Urinalysis Basic - ( 31 May 2019 01:53 )    Color: Yellow / Appearance: Clear / S.010 / pH: x  Gluc: x / Ketone: NEGATIVE  / Bili: Negative / Urobili: 0.2 E.U./dL   Blood: x / Protein: 100 mg/dL / Nitrite: NEGATIVE   Leuk Esterase: NEGATIVE / RBC: 5-10 /HPF / WBC < 5 /HPF   Sq Epi: x / Non Sq Epi: 0-5 /HPF / Bacteria: Present /HPF      CAPILLARY BLOOD GLUCOSE      POCT Blood Glucose.: 197 mg/dL (31 May 2019 09:12)      RADIOLOGY & ADDITIONAL TESTS: Reviewed.

## 2019-05-31 NOTE — H&P ADULT - PROBLEM SELECTOR PLAN 1
ON home regimen of levemir 15 + novolog 5u tid. Patient reports compliance at home, though clinic documentation notes noncompliance. Presenting with serum glu >500, given humulin subQ 10u x2 within the span of an hour. Now at FS of 389.  - patient known to Dr. Juan Lieberman; endo consult in AM  - will order patient for 15u lantus + 5u tid at this time  - check a1c

## 2019-05-31 NOTE — H&P ADULT - PROBLEM SELECTOR PLAN 6
- c/w home [facility] - c/w home symbicort    #Leukocytosis  - pt w WBC 10.6 w neutrophilic predominance  - afebrile, RRR, normotensive, saturating well on home 2L NC. some abdominal pain w increased lipase. pt lethargic but not appearing toxic at this time. WWP.   - ctm

## 2019-05-31 NOTE — H&P ADULT - PROBLEM SELECTOR PLAN 5
- holding home entresto in setting of RITCHIE  - holding home losartan in setting of RITCHIE  - c/w imdur  - c/w torsemide

## 2019-05-31 NOTE — PROGRESS NOTE ADULT - PROBLEM SELECTOR PLAN 7
Follows with Dr. Olguin  - most recent note from Dr. Olguin states: toprol , entresto 97/103 BID, torsemide 80   - will hold entresto and torsemide given RITCHIE   - of note, pharmacy has different doses of all above medications on file. pt likely not on correct (most recently prescribed) regimen

## 2019-05-31 NOTE — H&P ADULT - NSICDXFAMILYHX_GEN_ALL_CORE_FT
FAMILY HISTORY:  Mother  Still living? Yes, Estimated age: Age Unknown  Family history of diabetes mellitus, Age at diagnosis: Age Unknown  Family history of hypertension, Age at diagnosis: Age Unknown

## 2019-05-31 NOTE — DISCHARGE NOTE PROVIDER - PROVIDER TOKENS
PROVIDER:[TOKEN:[[id number]]],PROVIDER:[TOKEN:[[id number]]],PROVIDER:[TOKEN:[[id number]]],PROVIDER:[TOKEN:[[id number]]]

## 2019-05-31 NOTE — H&P ADULT - NSICDXPASTSURGICALHX_GEN_ALL_CORE_FT
PAST SURGICAL HISTORY:  AICD (automatic cardioverter/defibrillator) present     H/O abdominal hysterectomy     H/O extremity bypass graft     H/O tubal ligation     History of cholecystectomy

## 2019-05-31 NOTE — PROGRESS NOTE ADULT - PROBLEM SELECTOR PLAN 2
RITCHIE on CKD III. Presenting with BUN/Cr - 43/2.15. Outpatient bloodwork from 5/1 showed BUN/Cr - 31/1.32.  - f/u urine lytes. suspect pre-renal as pt's labs show signs of hemoconcentration.  - s/p 1L NS in ED; hold further fluids in setting of HFrEF   - will hold entresto and torsemide at this time    #Lethargy  - pt AAOx3 but very lethargic on exam. continuously falls asleep in mid conversation  - will hold home methadone, gabapentin to avoid oversedation

## 2019-05-31 NOTE — ED PROVIDER NOTE - OBJECTIVE STATEMENT
58F hx htn, dm, cva, chf, high chol, depression, pad, c/o multiple episodes NBNB vomiting today.  +epigastric abd pain.  no fevers. c/o feeling dizzy. states sugar high despite compliance with insulin.  no recent travel. no sick contacts. 58F hx htn, dm, cva, chf (EF 25%), AICD, high chol, depression, pad, c/o multiple episodes NBNB vomiting today.  +epigastric abd pain.  no fevers. c/o feeling dizzy. states sugar high despite compliance with insulin.  no recent travel. no sick contacts.

## 2019-05-31 NOTE — DISCHARGE NOTE PROVIDER - CARE PROVIDERS DIRECT ADDRESSES
,DirectAddress_Unknown,[email or web address],DirectAddress_Unknown

## 2019-05-31 NOTE — ED PROVIDER NOTE - PROGRESS NOTE DETAILS
no evidence of DKA, will continue to give insulin.  elevated creatinine, likely d/t dehydration, however pt requires gentle hydration as severe CHF.  will admit to medicine for continued care

## 2019-05-31 NOTE — DISCHARGE NOTE PROVIDER - NSDCCPCAREPLAN_GEN_ALL_CORE_FT
PRINCIPAL DISCHARGE DIAGNOSIS  Diagnosis: Hyperglycemia  Assessment and Plan of Treatment: You were admitted to the hospital for very high fingerstick glucose. We treated you with insulin to help your blood sugar come back to normal. You also have nausea, vomiting, and abdominal pain. These are likely all symptoms of your high blood glucose. Please continue taking your insulin as directed.      SECONDARY DISCHARGE DIAGNOSES  Diagnosis: Uncontrolled insulin dependent diabetes mellitus  Assessment and Plan of Treatment: You a1c in the hospital was 13.1. This is EXTREMELY high and means your diabetes is very uncontrolled. This is dangerous because it can lead to heart attacks, strokes, kidney disease. Unfortunately, you have had many of these complications already. It's very important to continue your insulin therapy and following up with your diabetes doctor.   It is very important to eat a healthy diet low in carbohydrates to continue to control your diabetes. This includes avoiding white bread, potatoes, pasta and desserts.   The American Diabetes Association offers a simple method of meal planning. In essence, it focuses on eating more vegetables. Follow these steps when preparing your plate:  Fill half of your plate with nonstarchy vegetables, such as spinach, carrots and tomatoes. Fill a quarter of your plate with a protein, such as tuna, lean pork or chicken. Fill the last quarter with a whole-grain item, such as brown rice, or a starchy vegetable, such as green peas. Include "good" fats such as nuts or avocados in small amounts.  Add a serving of fruit or dairy and a drink of water or unsweetened tea or coffee.      Diagnosis: Acute renal injury  Assessment and Plan of Treatment: While you were in the hospital, you were found to have some kidney injury on top of your baseline kidney disease. Please follow up with your primary care doctor to make sure your kidney numbers return to baseline. PRINCIPAL DISCHARGE DIAGNOSIS  Diagnosis: Hyperglycemia  Assessment and Plan of Treatment: You were admitted to the hospital for very high blood sugar. You also had nausea, vomiting, and abdominal pain. This was likely as a result of your high blood sugar. We also did a couple heart tests which were not indicative of a heart attack. Please continue your diabetes treatment as listed below      SECONDARY DISCHARGE DIAGNOSES  Diagnosis: Chronic CHF  Assessment and Plan of Treatment: Please continue torsemide 80 mg daily, toprol  mg daily and imdur 60 mg daily. Please DO NOT CONTINUE YOUR ENTRESTO until you see Dr. Olguin and have repeat labs to check your kidney function.    Diagnosis: Uncontrolled insulin dependent diabetes mellitus  Assessment and Plan of Treatment: You a1c in the hospital was 13.1. This is EXTREMELY high and means your diabetes is very uncontrolled. This is dangerous because it can lead to heart attacks, strokes, kidney disease. Unfortunately, you have had many of these complications already. It's very important to continue your insulin therapy and following up with your diabetes doctor.   It is very important to eat a healthy diet low in carbohydrates to continue to control your diabetes. This includes avoiding white bread, potatoes, pasta and desserts.   The American Diabetes Association offers a simple method of meal planning. In essence, it focuses on eating more vegetables. Follow these steps when preparing your plate:  Fill half of your plate with nonstarchy vegetables, such as spinach, carrots and tomatoes. Fill a quarter of your plate with a protein, such as tuna, lean pork or chicken. Fill the last quarter with a whole-grain item, such as brown rice, or a starchy vegetable, such as green peas. Include "good" fats such as nuts or avocados in small amounts.  Add a serving of fruit or dairy and a drink of water or unsweetened tea or coffee.      Diagnosis: Acute renal injury  Assessment and Plan of Treatment: While you were in the hospital, you were found to have some kidney injury on top of your baseline kidney disease. Please follow up with your primary care doctor to make sure your kidney numbers return to baseline.

## 2019-05-31 NOTE — ED ADULT NURSE NOTE - OBJECTIVE STATEMENT
pt to the ED complaining of abd pain N/V x 1 hr states she checked her blood sugar and it was 584 hx IDDM, deniers chest pain SOB, reported that she is compliant with medication, no sick contact.

## 2019-05-31 NOTE — PATIENT PROFILE ADULT - NSPROHMDIABETMGMTSTRAT_GEN_A_NUR
blood glucose testing/coping strategies/diet modification/insulin therapy/routine screenings/weight management/activity/adequate rest

## 2019-05-31 NOTE — PROGRESS NOTE ADULT - PROBLEM SELECTOR PLAN 8
F: none - cautious given CHF  E: replete lytes PRN  N: consistent carb + DASH/TLC - mechanical soft diet as pt doesn't have teeth and eats soft foods at home

## 2019-05-31 NOTE — H&P ADULT - HISTORY OF PRESENT ILLNESS
58F w/HFrEF (EF 25% Jan '19) s/p AICD, NICM, s/p cardioMEMS, IDDM c/b neuropathy (on methadone), HTN, bipolar disorder, depression, history of multiple DVT on Coumadin, CVA x2 (last one in 2013 with residual right sided weakness),  PAD s/p bypass, hypothyroidism, COPD (2-3L home O2) presenting with nausea and vomiting in setting of elevated FS readings. The patient states that she was in her usual state of health yesterday. She awoke and had FS in the 300s (baseline for her) and had FS in the 300s throughout the day. She took a nap at ~4PM and awoke at 5 with epigastric abdominal pain and nausea. Vomited three times in the evenings and noted her FS jossue throughout the night until rising to ~600. She decided to present to the ED for further evaluation. Denies fevers, chills, headaches, vision changes, chest pain, palpitations, SBO, diarrhea, blood in the stool, dysuria.    In the ED, VS - T: 98.2->98.8, HR: 107->71, BP: 152/91->125/81, R: 20->18, SpO2: 95% RA. Labs notable for Na- 130, Cl- 88, BUN/Cr - 43/2.15, Glu - 514. UA (-) for infection.Given pepcid 20mg IV, hulumin 10u subQ x2, morphine 4mg IV, Zofran 4mg IV and 1L NS bolus.

## 2019-05-31 NOTE — PROGRESS NOTE ADULT - PROBLEM SELECTOR PLAN 1
IDDM on home levemir 15 + novolog 5u tid. last reported a1c 14. reports missing several (3-5) doses of insulin a week. baseline FSG at home 300s. reports n/v, fatigue and epigastric pain. no AG, beta-OH negative, ketones in urine. VBG pH 7.37. recent URI could explain abrupt increase in home FSG although pt is known to be noncompliant. no current concern for DKA. a1c 13.1   - patient known to Dr. Juan Lieberman  - c/w home insulin regimen - 15u lantus qhs and 5u humalog TID before meals  - ctm FSG  - ISS    #Nausea  - pt w n/v, epigastric pain on admission. concern for ACS given pt's sex, sx and significant cardiac hx.   - trops 0.02 - will trend to peak; CK/CKMB WNL  - EKG w tachycardia, LAD, LVH, T wave inversions in aVL, T wave flattening in I  - f/u repeat EKG

## 2019-05-31 NOTE — PROGRESS NOTE ADULT - PROBLEM SELECTOR PLAN 4
- holding home gabapentin and methadone due to lethargy noted on exam this AM  - f/u utox  - follows w/Dr. Hipolito Oviedo for methadone treatment [phone]    #Multiple Myeloma  - follows w Dr. Ruiz outpatient. Recent diagnosis of MM discussed w pt per outpatient records  - could explain pt's worsening renal fx

## 2019-06-01 LAB
CULTURE RESULTS: SIGNIFICANT CHANGE UP
SPECIMEN SOURCE: SIGNIFICANT CHANGE UP

## 2019-06-03 ENCOUNTER — INBOUND DOCUMENT (OUTPATIENT)
Age: 59
End: 2019-06-03

## 2019-06-03 ENCOUNTER — APPOINTMENT (OUTPATIENT)
Dept: INTERNAL MEDICINE | Facility: CLINIC | Age: 59
End: 2019-06-03

## 2019-06-05 ENCOUNTER — APPOINTMENT (OUTPATIENT)
Dept: HEART AND VASCULAR | Facility: CLINIC | Age: 59
End: 2019-06-05

## 2019-06-05 DIAGNOSIS — Z79.4 LONG TERM (CURRENT) USE OF INSULIN: ICD-10-CM

## 2019-06-05 DIAGNOSIS — E11.65 TYPE 2 DIABETES MELLITUS WITH HYPERGLYCEMIA: ICD-10-CM

## 2019-06-05 DIAGNOSIS — Z95.810 PRESENCE OF AUTOMATIC (IMPLANTABLE) CARDIAC DEFIBRILLATOR: ICD-10-CM

## 2019-06-05 DIAGNOSIS — Z91.14 PATIENT'S OTHER NONCOMPLIANCE WITH MEDICATION REGIMEN: ICD-10-CM

## 2019-06-05 DIAGNOSIS — Z95.828 PRESENCE OF OTHER VASCULAR IMPLANTS AND GRAFTS: ICD-10-CM

## 2019-06-05 DIAGNOSIS — I50.22 CHRONIC SYSTOLIC (CONGESTIVE) HEART FAILURE: ICD-10-CM

## 2019-06-05 DIAGNOSIS — G89.29 OTHER CHRONIC PAIN: ICD-10-CM

## 2019-06-05 DIAGNOSIS — C90.00 MULTIPLE MYELOMA NOT HAVING ACHIEVED REMISSION: ICD-10-CM

## 2019-06-05 DIAGNOSIS — Z72.0 TOBACCO USE: ICD-10-CM

## 2019-06-05 DIAGNOSIS — I13.0 HYPERTENSIVE HEART AND CHRONIC KIDNEY DISEASE WITH HEART FAILURE AND STAGE 1 THROUGH STAGE 4 CHRONIC KIDNEY DISEASE, OR UNSPECIFIED CHRONIC KIDNEY DISEASE: ICD-10-CM

## 2019-06-05 DIAGNOSIS — E11.22 TYPE 2 DIABETES MELLITUS WITH DIABETIC CHRONIC KIDNEY DISEASE: ICD-10-CM

## 2019-06-05 DIAGNOSIS — J44.9 CHRONIC OBSTRUCTIVE PULMONARY DISEASE, UNSPECIFIED: ICD-10-CM

## 2019-06-05 DIAGNOSIS — Z79.01 LONG TERM (CURRENT) USE OF ANTICOAGULANTS: ICD-10-CM

## 2019-06-05 DIAGNOSIS — N18.3 CHRONIC KIDNEY DISEASE, STAGE 3 (MODERATE): ICD-10-CM

## 2019-06-05 DIAGNOSIS — Z86.718 PERSONAL HISTORY OF OTHER VENOUS THROMBOSIS AND EMBOLISM: ICD-10-CM

## 2019-06-05 DIAGNOSIS — I69.951 HEMIPLEGIA AND HEMIPARESIS FOLLOWING UNSPECIFIED CEREBROVASCULAR DISEASE AFFECTING RIGHT DOMINANT SIDE: ICD-10-CM

## 2019-06-05 DIAGNOSIS — I42.9 CARDIOMYOPATHY, UNSPECIFIED: ICD-10-CM

## 2019-06-05 DIAGNOSIS — E11.40 TYPE 2 DIABETES MELLITUS WITH DIABETIC NEUROPATHY, UNSPECIFIED: ICD-10-CM

## 2019-06-05 DIAGNOSIS — F31.9 BIPOLAR DISORDER, UNSPECIFIED: ICD-10-CM

## 2019-06-05 DIAGNOSIS — Z99.81 DEPENDENCE ON SUPPLEMENTAL OXYGEN: ICD-10-CM

## 2019-06-05 DIAGNOSIS — E03.9 HYPOTHYROIDISM, UNSPECIFIED: ICD-10-CM

## 2019-06-05 DIAGNOSIS — N17.9 ACUTE KIDNEY FAILURE, UNSPECIFIED: ICD-10-CM

## 2019-06-05 DIAGNOSIS — J96.10 CHRONIC RESPIRATORY FAILURE, UNSPECIFIED WHETHER WITH HYPOXIA OR HYPERCAPNIA: ICD-10-CM

## 2019-06-05 DIAGNOSIS — F11.20 OPIOID DEPENDENCE, UNCOMPLICATED: ICD-10-CM

## 2019-06-05 DIAGNOSIS — E11.51 TYPE 2 DIABETES MELLITUS WITH DIABETIC PERIPHERAL ANGIOPATHY WITHOUT GANGRENE: ICD-10-CM

## 2019-06-06 ENCOUNTER — APPOINTMENT (OUTPATIENT)
Dept: HEMATOLOGY ONCOLOGY | Facility: CLINIC | Age: 59
End: 2019-06-06

## 2019-06-06 ENCOUNTER — RX RENEWAL (OUTPATIENT)
Age: 59
End: 2019-06-06

## 2019-06-16 PROBLEM — I73.9 CLAUDICATION: Status: ACTIVE | Noted: 2017-04-03

## 2019-07-08 ENCOUNTER — APPOINTMENT (OUTPATIENT)
Dept: HEART AND VASCULAR | Facility: CLINIC | Age: 59
End: 2019-07-08

## 2019-07-08 ENCOUNTER — RX RENEWAL (OUTPATIENT)
Age: 59
End: 2019-07-08

## 2019-07-12 ENCOUNTER — APPOINTMENT (OUTPATIENT)
Dept: PULMONOLOGY | Facility: CLINIC | Age: 59
End: 2019-07-12

## 2019-07-29 ENCOUNTER — APPOINTMENT (OUTPATIENT)
Dept: HEART AND VASCULAR | Facility: CLINIC | Age: 59
End: 2019-07-29
Payer: MEDICARE

## 2019-07-29 VITALS
HEART RATE: 72 BPM | WEIGHT: 209 LBS | SYSTOLIC BLOOD PRESSURE: 114 MMHG | HEIGHT: 63 IN | OXYGEN SATURATION: 97 % | DIASTOLIC BLOOD PRESSURE: 58 MMHG | TEMPERATURE: 98.3 F | BODY MASS INDEX: 37.03 KG/M2

## 2019-07-29 PROCEDURE — 36415 COLL VENOUS BLD VENIPUNCTURE: CPT

## 2019-07-29 PROCEDURE — 99214 OFFICE O/P EST MOD 30 MIN: CPT

## 2019-07-29 NOTE — HISTORY OF PRESENT ILLNESS
[FreeTextEntry1] : 57 yo F with and an extensive PMHx, NICM, chronic HFrEF (EF 25% on echo) s/p AICD for primary prevention, HTN, IDDM c/b neuropathy, bipolar disorder,depression, history of DVT on Coumadin (4 episodes in her lower extremities), CVA x2 (last one in 2013 with residual right sided weakness), PAD s/p bypass,hypothyroidism, current smoker with COPD (on 2-3L oxygen at home), DHARMESH on CPAP. She had a Cardiomems device placed 3/8/19 with Dr. Francis at [facility]. Since seen last, had a BM biopsy which was c/w plasma cell Myeloma- to f/u with Dr. Ruiz next week from Heme/onc.\par \par She has not been seen in f/u since April as she has been taking care of her sick mother. She is quite stressed at home but has been feeling physically well. She does her daily mems readings which have been low. Currently, she denies CP,SOB at rest, orthopnea, PND, LH/dizziness, abdominal discomfort, palpitations and syncope. Her appetite is normal and bowel and bladder habits are unchanged. She has been limiting fluid and sodium intake and taking medications as directed. She has further cut down her smoking. She does not use NSAIDS and has not had any ICD shocks. She has not been to the ED or admitted for HF since Cardiomems implant.\par \par \par \par

## 2019-07-29 NOTE — PHYSICAL EXAM
[General Appearance - Well Developed] : well developed [Normal Appearance] : normal appearance [Well Groomed] : well groomed [General Appearance - Well Nourished] : well nourished [No Deformities] : no deformities [General Appearance - In No Acute Distress] : no acute distress [Normal Conjunctiva] : the conjunctiva exhibited no abnormalities [Eyelids - No Xanthelasma] : the eyelids demonstrated no xanthelasmas [Normal Oral Mucosa] : normal oral mucosa [No Oral Cyanosis] : no oral cyanosis [No Oral Pallor] : no oral pallor [Respiration, Rhythm And Depth] : normal respiratory rhythm and effort [Auscultation Breath Sounds / Voice Sounds] : lungs were clear to auscultation bilaterally [Heart Sounds] : normal S1 and S2 [Murmurs] : no murmurs present [Heart Rate And Rhythm] : heart rate and rhythm were normal [Edema] : no peripheral edema present [Abdomen Soft] : soft [Abdomen Tenderness] : non-tender [Abdomen Mass (___ Cm)] : no abdominal mass palpated [Cyanosis, Localized] : no localized cyanosis [Nail Clubbing] : no clubbing of the fingernails [Petechial Hemorrhages (___cm)] : no petechial hemorrhages [Skin Color & Pigmentation] : normal skin color and pigmentation [No Venous Stasis] : no venous stasis [] : no rash [Skin Lesions] : no skin lesions [No Skin Ulcers] : no skin ulcer [No Xanthoma] : no  xanthoma was observed [Affect] : the affect was normal [Mood] : the mood was normal [Oriented To Time, Place, And Person] : oriented to person, place, and time [No Anxiety] : not feeling anxious [FreeTextEntry1] : JVP <6cm H2O, no HJR [Abnormal Walk] : normal gait

## 2019-07-29 NOTE — REASON FOR VISIT
[Follow-Up - Clinic] : a clinic follow-up of [Heart Failure] : congestive heart failure [FreeTextEntry1] : PATIENT INSTRUCTIONS:\par \par 1. Labs today.\par \par 2. Please INCREASE the ENTRESTO to 97/103mg TWICE daily.\par \par 3. Please INCREASE the METOPROLOL to 150mg daily. \par \par 4. Follow up in 2 weeks.

## 2019-07-29 NOTE — ASSESSMENT
[FreeTextEntry1] : 59yo F with multiple comorbidites and NICM (EF 25%) s/p AICD with recent implantation of CARDIOMEMS hemodynamic monitoring device 3/8/19 at [facility] who is doing well overall. She is ACC/AHA Stage C, NYHA Class II-III, euvolemic on exam. I have recommended the following:\par \par 1. NICM- Stable and well compensated. Check labs today. Increase Entresto to 97/103mg BID.  Will check labs 7-10 days later. Increase Toprol XL to 150mg daily. \par \par 2. CAD- no anginal symptoms. On Plavix, Lipitor, BB and ARB (in Entresto).\par \par 3. CKD Stage III- Stable. Will continue to monitor closely. \par \par 4. Follow up in 2 weeks and continue daily MEMS readings. \par

## 2019-07-30 LAB
ANION GAP SERPL CALC-SCNC: 12 MMOL/L
BUN SERPL-MCNC: 47 MG/DL
CALCIUM SERPL-MCNC: 9.2 MG/DL
CHLORIDE SERPL-SCNC: 91 MMOL/L
CO2 SERPL-SCNC: 30 MMOL/L
CREAT SERPL-MCNC: 1.91 MG/DL
GLUCOSE SERPL-MCNC: 490 MG/DL
MAGNESIUM SERPL-MCNC: 2 MG/DL
NT-PROBNP SERPL-MCNC: 563 PG/ML
POTASSIUM SERPL-SCNC: 4.6 MMOL/L
SODIUM SERPL-SCNC: 133 MMOL/L

## 2019-08-05 ENCOUNTER — APPOINTMENT (OUTPATIENT)
Dept: VASCULAR SURGERY | Facility: CLINIC | Age: 59
End: 2019-08-05

## 2019-08-06 ENCOUNTER — APPOINTMENT (OUTPATIENT)
Dept: HEMATOLOGY ONCOLOGY | Facility: CLINIC | Age: 59
End: 2019-08-06
Payer: MEDICARE

## 2019-08-06 VITALS
DIASTOLIC BLOOD PRESSURE: 85 MMHG | RESPIRATION RATE: 14 BRPM | WEIGHT: 205 LBS | OXYGEN SATURATION: 98 % | HEIGHT: 63 IN | BODY MASS INDEX: 36.32 KG/M2 | SYSTOLIC BLOOD PRESSURE: 149 MMHG | HEART RATE: 81 BPM | TEMPERATURE: 97.4 F

## 2019-08-06 PROCEDURE — 99215 OFFICE O/P EST HI 40 MIN: CPT | Mod: 25

## 2019-08-06 PROCEDURE — 36415 COLL VENOUS BLD VENIPUNCTURE: CPT

## 2019-08-06 RX ORDER — OMEGA-3/DHA/EPA/FISH OIL 300-1000MG
1000 CAPSULE ORAL
Qty: 1 | Refills: 0 | Status: DISCONTINUED | COMMUNITY
Start: 2018-01-19 | End: 2019-08-06

## 2019-08-06 NOTE — REASON FOR VISIT
[Initial Consultation] : an initial consultation for [Monoclonal Gammopathy] : monoclonal gammopathy [FreeTextEntry2] : Myeloma

## 2019-08-06 NOTE — ASSESSMENT
[FreeTextEntry1] : 1)Newly diagnosed  myeloma...repeat blood work today paraprotein level remains stable.\par \par 2) Will start pt on treatment with weekly Decadron, with better blood sugar monitoring by Dr. Longoria...will start on Velcade or Ninlaro, and Revlimid  when available...\par \par discussed with pt's PCP Dr. Neal, and Pt's Endocrinologist Dr. Longoria via task...\par \par 3) smoking cessation discussed again with pt...\par \par 4)Pt has very low Vit D and should be started on replacement...

## 2019-08-06 NOTE — HISTORY OF PRESENT ILLNESS
[de-identified] : 57 y/o with long standing DM, CRF and anemia was found to have a light chain paraprotein...discussed with pt, will arrange for CT guided BM aspirate and biopsy... [de-identified] : 8-6-2019 BM aspirate and Bx consistent with myeloma...this was discussed in detail with pt and all her questions were answered...

## 2019-08-07 ENCOUNTER — APPOINTMENT (OUTPATIENT)
Dept: ENDOCRINOLOGY | Facility: CLINIC | Age: 59
End: 2019-08-07
Payer: MEDICARE

## 2019-08-07 ENCOUNTER — APPOINTMENT (OUTPATIENT)
Dept: NEPHROLOGY | Facility: CLINIC | Age: 59
End: 2019-08-07
Payer: MEDICARE

## 2019-08-07 VITALS — WEIGHT: 205.25 LBS | BODY MASS INDEX: 36.37 KG/M2 | HEIGHT: 63 IN

## 2019-08-07 VITALS
HEIGHT: 63 IN | WEIGHT: 206 LBS | DIASTOLIC BLOOD PRESSURE: 75 MMHG | SYSTOLIC BLOOD PRESSURE: 118 MMHG | HEART RATE: 91 BPM | BODY MASS INDEX: 36.5 KG/M2

## 2019-08-07 VITALS — SYSTOLIC BLOOD PRESSURE: 114 MMHG | DIASTOLIC BLOOD PRESSURE: 59 MMHG | HEART RATE: 84 BPM

## 2019-08-07 LAB
25(OH)D3 SERPL-MCNC: 19.7 NG/ML
ALBUMIN MFR SERPL ELPH: 48.4 %
ALBUMIN SERPL ELPH-MCNC: 3.8 G/DL
ALBUMIN SERPL-MCNC: 3.9 G/DL
ALBUMIN/GLOB SERPL: 0.9 RATIO
ALP BLD-CCNC: 149 U/L
ALPHA1 GLOB MFR SERPL ELPH: 3.8 %
ALPHA1 GLOB SERPL ELPH-MCNC: 0.3 G/DL
ALPHA2 GLOB MFR SERPL ELPH: 11.7 %
ALPHA2 GLOB SERPL ELPH-MCNC: 0.9 G/DL
ALT SERPL-CCNC: 24 U/L
ANION GAP SERPL CALC-SCNC: 14 MMOL/L
AST SERPL-CCNC: 29 U/L
B-GLOBULIN MFR SERPL ELPH: 8.5 %
B-GLOBULIN SERPL ELPH-MCNC: 0.7 G/DL
B2 MICROGLOB SERPL-MCNC: 6 MG/L
BASOPHILS # BLD AUTO: 0.05 K/UL
BASOPHILS NFR BLD AUTO: 0.6 %
BILIRUB SERPL-MCNC: 0.4 MG/DL
BUN SERPL-MCNC: 18 MG/DL
CALCIUM SERPL-MCNC: 9.5 MG/DL
CHLORIDE SERPL-SCNC: 96 MMOL/L
CO2 SERPL-SCNC: 27 MMOL/L
CREAT SERPL-MCNC: 1.49 MG/DL
DEPRECATED KAPPA LC FREE/LAMBDA SER: 5.58 RATIO
EOSINOPHIL # BLD AUTO: 0.15 K/UL
EOSINOPHIL NFR BLD AUTO: 1.7 %
ERYTHROCYTE [SEDIMENTATION RATE] IN BLOOD BY WESTERGREN METHOD: 68 MM/HR
ESTIMATED AVERAGE GLUCOSE: 280 MG/DL
GAMMA GLOB FLD ELPH-MCNC: 2.2 G/DL
GAMMA GLOB MFR SERPL ELPH: 27.6 %
GLUCOSE BLDC GLUCOMTR-MCNC: 303
GLUCOSE SERPL-MCNC: 359 MG/DL
HBA1C MFR BLD HPLC: 11.4 %
HCT VFR BLD CALC: 43.8 %
HGB BLD-MCNC: 14.1 G/DL
IGA SER QL IEP: 64 MG/DL
IGG SER QL IEP: 2555 MG/DL
IGM SER QL IEP: 47 MG/DL
IMM GRANULOCYTES NFR BLD AUTO: 0.2 %
INTERPRETATION SERPL IEP-IMP: NORMAL
KAPPA LC CSF-MCNC: 2.41 MG/DL
KAPPA LC SERPL-MCNC: 13.44 MG/DL
LYMPHOCYTES # BLD AUTO: 1.46 K/UL
LYMPHOCYTES NFR BLD AUTO: 16.5 %
M PROTEIN MFR SERPL ELPH: 23.2 %
M PROTEIN SPEC IFE-MCNC: NORMAL
MAN DIFF?: NORMAL
MCHC RBC-ENTMCNC: 31.4 PG
MCHC RBC-ENTMCNC: 32.2 GM/DL
MCV RBC AUTO: 97.6 FL
MONOCLON BAND OBS SERPL: 1.9 G/DL
MONOCYTES # BLD AUTO: 0.66 K/UL
MONOCYTES NFR BLD AUTO: 7.5 %
NEUTROPHILS # BLD AUTO: 6.5 K/UL
NEUTROPHILS NFR BLD AUTO: 73.5 %
PLATELET # BLD AUTO: 225 K/UL
POTASSIUM SERPL-SCNC: 4.9 MMOL/L
PROT SERPL-MCNC: 8.1 G/DL
RBC # BLD: 4.49 M/UL
RBC # FLD: 13.7 %
SODIUM SERPL-SCNC: 137 MMOL/L
WBC # FLD AUTO: 8.84 K/UL

## 2019-08-07 PROCEDURE — 99215 OFFICE O/P EST HI 40 MIN: CPT

## 2019-08-07 PROCEDURE — 99214 OFFICE O/P EST MOD 30 MIN: CPT

## 2019-08-07 PROCEDURE — 82962 GLUCOSE BLOOD TEST: CPT

## 2019-08-07 RX ORDER — INSULIN GLARGINE 100 [IU]/ML
100 INJECTION, SOLUTION SUBCUTANEOUS DAILY
Qty: 10 | Refills: 5 | Status: COMPLETED | COMMUNITY
Start: 2018-04-04 | End: 2019-08-07

## 2019-08-07 RX ORDER — INSULIN ASPART 100 [IU]/ML
(70-30) 100 INJECTION, SUSPENSION SUBCUTANEOUS
Qty: 1 | Refills: 0 | Status: COMPLETED | COMMUNITY
Start: 2017-08-10 | End: 2019-08-07

## 2019-08-07 NOTE — PHYSICAL EXAM
[General Appearance - In No Acute Distress] : in no acute distress [General Appearance - Alert] : alert [PERRL With Normal Accommodation] : pupils were equal in size, round, and reactive to light [Sclera] : the sclera and conjunctiva were normal [Extraocular Movements] : extraocular movements were intact [Outer Ear] : the ears and nose were normal in appearance [Oropharynx] : the oropharynx was normal [Neck Appearance] : the appearance of the neck was normal [Jugular Venous Distention Increased] : there was no jugular-venous distention [Neck Cervical Mass (___cm)] : no neck mass was observed [Thyroid Diffuse Enlargement] : the thyroid was not enlarged [Thyroid Nodule] : there were no palpable thyroid nodules [Heart Rate And Rhythm] : heart rate was normal and rhythm regular [Auscultation Breath Sounds / Voice Sounds] : lungs were clear to auscultation bilaterally [Heart Sounds] : normal S1 and S2 [Heart Sounds Gallop] : no gallops [Murmurs] : no murmurs [Veins - Varicosity Changes] : there were no varicosital changes [Heart Sounds Pericardial Friction Rub] : no pericardial rub [Bowel Sounds] : normal bowel sounds [FreeTextEntry1] : pitting bilateral ankle edema [Abdomen Tenderness] : non-tender [Abdomen Soft] : soft [Abdomen Mass (___ Cm)] : no abdominal mass palpated [Cervical Lymph Nodes Enlarged Posterior Bilaterally] : posterior cervical [Supraclavicular Lymph Nodes Enlarged Bilaterally] : supraclavicular [Abnormal Walk] : normal gait [Cervical Lymph Nodes Enlarged Anterior Bilaterally] : anterior cervical [Nail Clubbing] : no clubbing  or cyanosis of the fingernails [Musculoskeletal - Swelling] : no joint swelling seen [Motor Tone] : muscle strength and tone were normal [Skin Color & Pigmentation] : normal skin color and pigmentation [] : no rash [Skin Turgor] : normal skin turgor [Affect] : the affect was normal [Impaired Insight] : insight and judgment were intact [Oriented To Time, Place, And Person] : oriented to person, place, and time

## 2019-08-07 NOTE — REVIEW OF SYSTEMS
[Feeling Poorly] : feeling poorly [Dry Eyes] : dryness of the eyes [Leg Claudication] : intermittent leg claudication [SOB on Exertion] : shortness of breath during exertion [Arthralgias] : arthralgias [Heartburn] : heartburn [Anxiety] : anxiety [Depression] : depression [Easy Bruising] : a tendency for easy bruising [Hot Flashes] : hot flashes

## 2019-08-07 NOTE — ASSESSMENT
[FreeTextEntry1] : # CKD stage 4 c/w DM nephropathy.\par * Myeloma cast nephropathy and/or amyloid is also possible.\par * A complete renal ultrasound was recommended and information was provided to the patient about scheduling. They were instructed that this imaging study is important for their health and that it is their responsibility to make and keep this appointment. All their questions were answered.\par * The patient has been counseled that chronic kidney disease is a significant condition and regular office followup with me (at least every 2 months for now) is essential for monitoring, and that it is their responsibility to make a follow up appointment.\par * The patient has been counseled never to stop taking their medications without discussing it with me or another doctor.\par * The patient has been counseled on risk of acute renal failure and instructed to immediately call and speak with me or go immediately to ER with any severe symptoms, nausea, vomiting, diarrhea, chest pain, or shortness of breath.\par * The patient has been counseled on avoiding NSAIDs.\par * Reducing or avoiding red meat, following a relatively low oxalate diet, and starting folate 800 mg qd has been advised.\par * A counseling information sheet has been given and they were provided sufficient time to review this. All their questions were answered.\par \par # Myeloma.\par * She will follow up closely with Dr. Ruiz and with endocrinologist (given anticipated treatment with decadron).\par * Staining for amyloid of bone marrow pending.\par \par # HTN controlled.\par * The patient's blood pressure was checked with the [employer] [id number] using the SPRINT trial protocol after sitting quietly in an empty room with arm supported, back supported, and feet on the floor for 5 minutes. The average of 3 readings were taken. \par * The patient has been advised to check their BP at home with an automatic arm cuff, write down the readings, and reach me directly on the phone immediately if they are persistently > 180 systolic or if SBP is less than 100 or if lightheadedness develops. They were advised to bring in all blood pressure readings and medications next visit.\par * The patient has been counseled that they have a a significant medical condition and regular office followup (at least every 2 months for now) is essential for monitoring, and that it is their responsibility to make follow up appointments.\par * The patient also has been counseled that they must never stop or change any medications without discussing this with me (or another physician). \par * A counseling information sheet has been given and they were provided sufficient time to review this sheet. All their questions were answered.

## 2019-08-07 NOTE — HISTORY OF PRESENT ILLNESS
[FreeTextEntry1] : Kindly referred by Dr. Olguin for CKD.\par \par * Diagnosed with IgG myeloma by Dr. Ruiz. Bone marrow is being stained for amyloid. * HTN controlled. No lightheadedness. Compliant with medications.  * CKD stable, creatinine 1.49. \par \par * Mother in law  this morning. She is grieving. * Labs : Uprot 1.9 g/g. Cr 1.67. * Losartan 12.5 mg started last visit. * Torsemide increased from 80 mg to 120 mg daily on Mar 11 2019 based on cardiomem PCWP 29. Her weight has decreased from by approximately 1 - 2 pounds daily. Her weight has decreased from 200 to 196. \par \par Previous history (): * Labs reviewed. Her creatinine has increased from 1.44 in  to 1.91 in  (eGFR 29).  She had 1829 mg/g albuminuria in 2017. A CT of the abdomen in 2017 revealed a "stable inderminate left upper pole renal hypodensity" with no other abnormalitiese noted. She also had an unchanged ill-defined soft tissue density surrounding celialc axis, SMA, and common hepatic artery. \par \par * She sees Dr. Olguin for nonischemic systolic CHF. Her EF is 25%. She has not been treated with ACE or ARB given reported worsening renal dysfunction previously but the plan per Dr. Olguin was to restart this as outpatient. She has never had hyperkalemia or an allergic reaction to ACE/ARB. \par \par * She still smokes 3 cigarettes daily. \par \par * DM uncontrolled, last HGA1c 11 - 13.\par \par * She has had PVD with bilateral iliac artery angioplasty and stents, left femoral bypass. She has also had DVTs and is hypercoagulable and is treated with coumadin. \par \par * HTN controlled. No lightheadedness. Compliant with medications. Following low salt diet.\par \par Options for clinical preventative services\par \par Influenza: 2018 sep\par Pneumonia:  she received prenar and pneumovax per patient\par Shingles: advised par TDAP: \par Colonoscopy: 2017, q 10 years per patient\par Dermatologist: advised to see derm yearly \par \par Breast/Pelvic Exam: 2017, i have advised her to follow up with gyn [id number]; breast exam 2018\par Mammogram: 2018 \par Bone Density >65:

## 2019-08-09 NOTE — ASSESSMENT
[Carbohydrate Consistent Diet] : carbohydrate consistent diet [Hypoglycemia Management] : hypoglycemia management [Diabetes Foot Care] : diabetes foot care [Long Term Vascular Complications] : long term vascular complications of diabetes [Importance of Diet and Exercise] : importance of diet and exercise to improve glycemic control, achieve weight loss and improve cardiovascular health [Insulin Self-Administration] : insulin self-administration [Self Monitoring of Blood Glucose] : self monitoring of blood glucose [Action and use of Insulin] : action and use of short and long-acting insulin [Incretin Mimetic Therapy] : Risks and benefits of incretin mimetic therapy were discussed with the patient including nauseau, pancreatitis and potential risk of medullary thyroid cancer [FreeTextEntry1] : Instructions for Mrs. Mcknight:\par 1)	Continue Levemir (the long acting insulin) 35 units at bedtime. \par 2)	Start a short acting insulin (either Novolog, Humalog, Fiasp or Admelog) 15 units before breakfast, lunch and dinner.\par 3)	If your sugars before lunch and dinner remain above 200 consistently, increase the short acting insulin to 20 units.\par 4)	Call Dr. Lieberman if you continue having high sugar readings for further instructions. \par \par \par 1) DM2: Uncontrolled, A1C on 5/2019 at 14%. taget of <7%. Natural hx of the disease and importance of treatment targets discussed at length, she verbalized understanding. ADA diet and importance of exercise discussed at length. Plan is to start bolus insulin given initiation of steroids.  explained the potential high risk and toxicities with chronic insulin use including, but not limited to life threatening hypoglycemia and death, Verbalized understanding and agrees with treatment plan, will contact MD and seek emergency medical care if condition changes. Advised on importance of frequent monitoring and to not skip her insulin. Refer to Nutritionist today. We avelino check microalbumin, lipids and labs on the NV. Discuss vaccines and podiatry/opthalmology referrals on NV.\par \par 2) Weight gain:  complicated by DM2. Discussed medical  strategies. Pt would like to try lifestyle modifications and GLP-1 agonist therapy in the future. Reassess on the NV for at least ~5% TBW loss.\par \par 3) Essential HTN: Pt is not at goal BP and on an ARB. Reassess microalbumin prior to the NV. check PTH in the future. r/o 2ry causes of HTN and uncontrolled hyperglycemia.\par  \par 4) Dyslipidemia: Pt is on a moderate intensity statin. Atorvastatin 40 mg QDaily. REassess lipids on the next visit. LDL target <100.\par

## 2019-08-09 NOTE — PHYSICAL EXAM
[Alert] : alert [No Acute Distress] : no acute distress [Well Nourished] : well nourished [Well Developed] : well developed [Normal Sclera/Conjunctiva] : normal sclera/conjunctiva [EOMI] : extra ocular movement intact [No Proptosis] : no proptosis [Thyroid Not Enlarged] : the thyroid was not enlarged [Normal Oropharynx] : the oropharynx was normal [No Thyroid Nodules] : there were no palpable thyroid nodules [No Respiratory Distress] : no respiratory distress [No Accessory Muscle Use] : no accessory muscle use [Clear to Auscultation] : lungs were clear to auscultation bilaterally [Normal Rate] : heart rate was normal  [Regular Rhythm] : with a regular rhythm [Normal S1, S2] : normal S1 and S2 [Pedal Pulses Normal] : the pedal pulses are present [No Edema] : there was no peripheral edema [Not Tender] : non-tender [Normal Bowel Sounds] : normal bowel sounds [Not Distended] : not distended [Soft] : abdomen soft [Post Cervical Nodes] : posterior cervical nodes [Anterior Cervical Nodes] : anterior cervical nodes [Normal] : normal and non tender [Axillary Nodes] : axillary nodes [No Spinal Tenderness] : no spinal tenderness [Spine Straight] : spine straight [Normal Gait] : normal gait [No Stigmata of Cushings Syndrome] : no stigmata of cushings syndrome [Normal Strength/Tone] : muscle strength and tone were normal [No Rash] : no rash [Normal Reflexes] : deep tendon reflexes were 2+ and symmetric [No Tremors] : no tremors [Oriented x3] : oriented to person, place, and time [Acanthosis Nigricans] : no acanthosis nigricans

## 2019-08-09 NOTE — HISTORY OF PRESENT ILLNESS
[FreeTextEntry1] : 57 y/o f w/ Hx of uncontrolled insulin requiring DM2, HTN, HLD, CKD stage 4, CHF. Here for initial DM2 evaluation.\par Generally feels well and endorses no acute complaints. Reports taking insulin though out most of the course of her disease. Multiple micro-macrovascular complications reported. Monitors infrequently. Log or glucometer not brought to today's apt. Reports skipping her Levemir 15 units QHS dose last night. Admits to frequent un compliance w/ insulin regimen. Previously on PO meds which were dced as renal function worsened. Has not tried GLP-1 agonists in the past. \par \par \par 8/2019: Here for /fu, generally feels well and endorses no acute complaints. No interval events since LV. Today reports interval diagnosis of MM. Has been started on solumedrol. is compliant w/ Levemit 35 units QHS but post prandial hyperglycemia persists. no hypoglycemic episodes. tolerating PO well.\par She otherwise denies any f/c, CP, SOB, palpitations, tremors, depressed mood, anxiety, palpitations, n/v, stool/urinary abn, skin/weight changes, heat/cold intolerance, HAs, breast/nipple changes, polyuria/polydipsia/nocturia or other complaints.\par

## 2019-08-19 ENCOUNTER — APPOINTMENT (OUTPATIENT)
Dept: HEART AND VASCULAR | Facility: CLINIC | Age: 59
End: 2019-08-19

## 2019-09-13 LAB — CORTIS SAL-MCNC: NORMAL

## 2019-09-19 ENCOUNTER — APPOINTMENT (OUTPATIENT)
Dept: GASTROENTEROLOGY | Facility: CLINIC | Age: 59
End: 2019-09-19
Payer: MEDICARE

## 2019-09-19 VITALS
OXYGEN SATURATION: 97 % | WEIGHT: 207 LBS | SYSTOLIC BLOOD PRESSURE: 120 MMHG | HEART RATE: 81 BPM | BODY MASS INDEX: 36.68 KG/M2 | DIASTOLIC BLOOD PRESSURE: 70 MMHG | HEIGHT: 63 IN | TEMPERATURE: 98 F

## 2019-09-19 DIAGNOSIS — R10.13 EPIGASTRIC PAIN: ICD-10-CM

## 2019-09-19 PROCEDURE — 36415 COLL VENOUS BLD VENIPUNCTURE: CPT

## 2019-09-19 PROCEDURE — 99203 OFFICE O/P NEW LOW 30 MIN: CPT | Mod: 25

## 2019-09-19 NOTE — HISTORY OF PRESENT ILLNESS
[de-identified] : 58F recently diagnosed with IGG multiple myeloma, asthma, CAD, CKD, COPD, depression, DM Type 2, HTN, hypothyroidism, DVTs referred by Dr. Neal for evaluation of abdominal pain.\par \par 2 weeks- stomach pain mid abdomen, more than 3 years ago was told had H. Pylori  (took antibiotics) \par has not ate food, just Jello \par can drink water/juice \par has some nausea with vomiting after eats, does not sit well \par constipation sometimes but from medication - started taking Senna recently and is helping her \par denies blood in stool \par \par taking omeprazole for "Acid reflux" - no symptoms at all \par EGD: 3 years ago \par colonoscopy: 3 years ago \par \par Fhx: no cancers, no IBD\par Etoh: none\par Smokin cigs per day, gradually trying to stop\par Drug use: none \par \par takes one a day multivitamin with vitamin K

## 2019-09-19 NOTE — PHYSICAL EXAM
[General Appearance - Alert] : alert [General Appearance - In No Acute Distress] : in no acute distress [General Appearance - Well Nourished] : well nourished [Sclera] : the sclera and conjunctiva were normal [Outer Ear] : the ears and nose were normal in appearance [Neck Appearance] : the appearance of the neck was normal [Oropharynx] : the oropharynx was normal [Neck Cervical Mass (___cm)] : no neck mass was observed [Respiration, Rhythm And Depth] : normal respiratory rhythm and effort [Heart Rate And Rhythm] : heart rate was normal and rhythm regular [Edema] : there was no peripheral edema [Bowel Sounds] : normal bowel sounds [Abdomen Soft] : soft [Abdomen Tenderness] : non-tender [Abnormal Walk] : normal gait [Skin Color & Pigmentation] : normal skin color and pigmentation [] : no rash [Skin Turgor] : normal skin turgor [Oriented To Time, Place, And Person] : oriented to person, place, and time [Impaired Insight] : insight and judgment were intact [Affect] : the affect was normal

## 2019-09-19 NOTE — ASSESSMENT
[FreeTextEntry1] : 58F recently diagnosed with IGG multiple myeloma, asthma, CAD, CKD, COPD, depression, DM Type 2, HTN, hypothyroidism, DVTs referred by Dr. Neal for evaluation of abdominal pain.\par \par Abdominal pain\par - evaluate with abdominal x-ray upright and flat as well as abdominal ultrasound \par - increase PPI to twice daily \par - check CRP and CBC to evaluate for infection/inflammation \par - EGD with biopsies for h. pylori (breath test contraindicated due to PPI use) and evaluate for structural abnormalities that may be leading to pain. Cardiac clearance needed (on Plavix and Coumadin)\par \par Elevated alk phos\par - possible elevation is result of multiple myeloma diagnosis however will check alkaline phosphatase isoenzyme measure as well as GGT \par \par F/U post EGD\par \par Kalpana Huerta NP

## 2019-09-24 ENCOUNTER — APPOINTMENT (OUTPATIENT)
Dept: INTERNAL MEDICINE | Facility: CLINIC | Age: 59
End: 2019-09-24

## 2019-09-26 ENCOUNTER — FORM ENCOUNTER (OUTPATIENT)
Age: 59
End: 2019-09-26

## 2019-09-27 ENCOUNTER — APPOINTMENT (OUTPATIENT)
Dept: ULTRASOUND IMAGING | Facility: HOSPITAL | Age: 59
End: 2019-09-27
Payer: MEDICARE

## 2019-09-27 ENCOUNTER — OUTPATIENT (OUTPATIENT)
Dept: OUTPATIENT SERVICES | Facility: HOSPITAL | Age: 59
LOS: 1 days | End: 2019-09-27
Payer: MEDICARE

## 2019-09-27 DIAGNOSIS — Z98.51 TUBAL LIGATION STATUS: Chronic | ICD-10-CM

## 2019-09-27 DIAGNOSIS — Z90.710 ACQUIRED ABSENCE OF BOTH CERVIX AND UTERUS: Chronic | ICD-10-CM

## 2019-09-27 DIAGNOSIS — Z95.810 PRESENCE OF AUTOMATIC (IMPLANTABLE) CARDIAC DEFIBRILLATOR: Chronic | ICD-10-CM

## 2019-09-27 DIAGNOSIS — Z95.828 PRESENCE OF OTHER VASCULAR IMPLANTS AND GRAFTS: Chronic | ICD-10-CM

## 2019-09-27 DIAGNOSIS — Z90.49 ACQUIRED ABSENCE OF OTHER SPECIFIED PARTS OF DIGESTIVE TRACT: Chronic | ICD-10-CM

## 2019-09-27 LAB
ALKPISO INTERP: NORMAL
ALP BLD-CCNC: 112 U/L
ALP BONE CFR SERPL: 42 %
ALP INTEST CFR SERPL: 4 %
ALP LIVER CFR SERPL: 54 %
ALP MACRO CFR SERPL: 0 %
ALP PLAC CFR SERPL: 0 %
BASOPHILS # BLD AUTO: 0.04 K/UL
BASOPHILS NFR BLD AUTO: 0.6 %
CRP SERPL-MCNC: 2.03 MG/DL
EOSINOPHIL # BLD AUTO: 0.1 K/UL
EOSINOPHIL NFR BLD AUTO: 1.5 %
GGT SERPL-CCNC: 73 U/L
HCT VFR BLD CALC: 36.5 %
HGB BLD-MCNC: 11.8 G/DL
IMM GRANULOCYTES NFR BLD AUTO: 0.2 %
LYMPHOCYTES # BLD AUTO: 1.35 K/UL
LYMPHOCYTES NFR BLD AUTO: 20.6 %
MAN DIFF?: NORMAL
MCHC RBC-ENTMCNC: 31.6 PG
MCHC RBC-ENTMCNC: 32.3 GM/DL
MCV RBC AUTO: 97.6 FL
MONOCYTES # BLD AUTO: 0.48 K/UL
MONOCYTES NFR BLD AUTO: 7.3 %
NEUTROPHILS # BLD AUTO: 4.57 K/UL
NEUTROPHILS NFR BLD AUTO: 69.8 %
PLATELET # BLD AUTO: 215 K/UL
RBC # BLD: 3.74 M/UL
RBC # FLD: 14.1 %
WBC # FLD AUTO: 6.55 K/UL

## 2019-09-27 PROCEDURE — 74019 RADEX ABDOMEN 2 VIEWS: CPT | Mod: 26

## 2019-09-27 PROCEDURE — 76700 US EXAM ABDOM COMPLETE: CPT | Mod: 26

## 2019-09-27 PROCEDURE — 74019 RADEX ABDOMEN 2 VIEWS: CPT

## 2019-09-27 PROCEDURE — 76700 US EXAM ABDOM COMPLETE: CPT

## 2019-09-30 ENCOUNTER — APPOINTMENT (OUTPATIENT)
Dept: INTERNAL MEDICINE | Facility: CLINIC | Age: 59
End: 2019-09-30

## 2019-10-02 ENCOUNTER — APPOINTMENT (OUTPATIENT)
Dept: ENDOCRINOLOGY | Facility: CLINIC | Age: 59
End: 2019-10-02

## 2019-10-02 ENCOUNTER — RESULT REVIEW (OUTPATIENT)
Age: 59
End: 2019-10-02

## 2019-10-03 ENCOUNTER — TRANSCRIPTION ENCOUNTER (OUTPATIENT)
Age: 59
End: 2019-10-03

## 2019-10-04 ENCOUNTER — APPOINTMENT (OUTPATIENT)
Dept: INTERNAL MEDICINE | Facility: CLINIC | Age: 59
End: 2019-10-04

## 2019-10-09 ENCOUNTER — APPOINTMENT (OUTPATIENT)
Dept: NEPHROLOGY | Facility: CLINIC | Age: 59
End: 2019-10-09

## 2019-10-21 ENCOUNTER — APPOINTMENT (OUTPATIENT)
Dept: GASTROENTEROLOGY | Facility: HOSPITAL | Age: 59
End: 2019-10-21

## 2019-11-12 ENCOUNTER — APPOINTMENT (OUTPATIENT)
Dept: INTERNAL MEDICINE | Facility: CLINIC | Age: 59
End: 2019-11-12

## 2019-11-13 ENCOUNTER — APPOINTMENT (OUTPATIENT)
Dept: INTERNAL MEDICINE | Facility: CLINIC | Age: 59
End: 2019-11-13

## 2019-11-13 ENCOUNTER — APPOINTMENT (OUTPATIENT)
Dept: GASTROENTEROLOGY | Facility: HOSPITAL | Age: 59
End: 2019-11-13

## 2019-11-13 ENCOUNTER — APPOINTMENT (OUTPATIENT)
Dept: HEMATOLOGY ONCOLOGY | Facility: CLINIC | Age: 59
End: 2019-11-13

## 2019-11-14 ENCOUNTER — NON-APPOINTMENT (OUTPATIENT)
Age: 59
End: 2019-11-14

## 2019-11-14 ENCOUNTER — APPOINTMENT (OUTPATIENT)
Dept: HEART AND VASCULAR | Facility: CLINIC | Age: 59
End: 2019-11-14
Payer: MEDICARE

## 2019-11-14 VITALS
WEIGHT: 206 LBS | HEIGHT: 63 IN | SYSTOLIC BLOOD PRESSURE: 110 MMHG | OXYGEN SATURATION: 100 % | HEART RATE: 106 BPM | BODY MASS INDEX: 36.5 KG/M2 | DIASTOLIC BLOOD PRESSURE: 67 MMHG

## 2019-11-14 VITALS — HEART RATE: 85 BPM

## 2019-11-14 PROCEDURE — 99214 OFFICE O/P EST MOD 30 MIN: CPT | Mod: 25

## 2019-11-14 PROCEDURE — 93000 ELECTROCARDIOGRAM COMPLETE: CPT

## 2019-11-14 PROCEDURE — 36415 COLL VENOUS BLD VENIPUNCTURE: CPT

## 2019-11-14 RX ORDER — CLOPIDOGREL BISULFATE 75 MG/1
75 TABLET, FILM COATED ORAL DAILY
Qty: 90 | Refills: 3 | Status: DISCONTINUED | COMMUNITY
Start: 1900-01-01 | End: 2019-11-14

## 2019-11-14 RX ORDER — LEVOFLOXACIN 500 MG/1
500 TABLET, FILM COATED ORAL DAILY
Qty: 14 | Refills: 0 | Status: DISCONTINUED | COMMUNITY
Start: 2019-02-19 | End: 2019-11-14

## 2019-11-14 RX ORDER — CHLORHEXIDINE GLUCONATE 4 %
325 (65 FE) LIQUID (ML) TOPICAL
Refills: 0 | Status: DISCONTINUED | COMMUNITY
End: 2019-11-14

## 2019-11-14 RX ORDER — ALBUTEROL SULFATE 2.5 MG/3ML
(2.5 MG/3ML) SOLUTION RESPIRATORY (INHALATION)
Qty: 1 | Refills: 2 | Status: DISCONTINUED | COMMUNITY
Start: 2018-04-23 | End: 2019-11-14

## 2019-11-14 NOTE — ASSESSMENT
[FreeTextEntry1] : 60yo F with multiple comorbidites and NICM (EF 25%) s/p AICD and s/p CARDIOMEMS hemodynamic monitoring device 3/8/19 at [facility] who was recently diagnosed with IGG Myeloma and has been inconsistent with follow up due to taking care of her ill mother. She is here today for Cardiac Clearance for Endoscopy. She is ACC/AHA Stage C, NYHA Class II-III, euvolemic on exam. I have recommended the following:\par \par 1. NICM- Stable and well compensated with PAD 12 today on Cardiomems. Check labs today. Keep medications the same for now. Will uptitrate Toprol XL to 200mg at next visit after Endoscopy. Continue daily Cardiomems readings. \par \par 2. CAD- no anginal symptoms. On Lipitor, BB and ARB (in Entresto).\par \par 3. CKD Stage III- Last SCr 1.49. Will check today and continue to monitor.  \par \par 4. Preoperative assessment for Endoscopy- She may follow routine Endoscopy prep. Advised to hold Coumadin 4 days prior to procedure. \par \par 5. Hx of multiple DVTs- On Coumadin. Stressed importance of routine INR checks. Will check today and then follow up with Coumadin clinic as scheduled next week. \par \par 6. IGG Myeloma- Recently diagnosed. Follow up with Dr. Sara OLIVARES. \par \par 7. Follow up in 2-3 weeks following endoscopy. \par \par Case d/w Dr. Zoya Olguin\par

## 2019-11-14 NOTE — PHYSICAL EXAM
[General Appearance - Well Developed] : well developed [Well Groomed] : well groomed [General Appearance - Well Nourished] : well nourished [Normal Appearance] : normal appearance [No Deformities] : no deformities [General Appearance - In No Acute Distress] : no acute distress [Normal Conjunctiva] : the conjunctiva exhibited no abnormalities [Eyelids - No Xanthelasma] : the eyelids demonstrated no xanthelasmas [Normal Oral Mucosa] : normal oral mucosa [No Oral Pallor] : no oral pallor [No Oral Cyanosis] : no oral cyanosis [Respiration, Rhythm And Depth] : normal respiratory rhythm and effort [Auscultation Breath Sounds / Voice Sounds] : lungs were clear to auscultation bilaterally [Heart Rate And Rhythm] : heart rate and rhythm were normal [Heart Sounds] : normal S1 and S2 [Murmurs] : no murmurs present [Edema] : no peripheral edema present [Abdomen Soft] : soft [Abdomen Tenderness] : non-tender [Abdomen Mass (___ Cm)] : no abdominal mass palpated [Abnormal Walk] : normal gait [Nail Clubbing] : no clubbing of the fingernails [Cyanosis, Localized] : no localized cyanosis [Petechial Hemorrhages (___cm)] : no petechial hemorrhages [Skin Color & Pigmentation] : normal skin color and pigmentation [No Venous Stasis] : no venous stasis [] : no rash [Skin Lesions] : no skin lesions [No Skin Ulcers] : no skin ulcer [No Xanthoma] : no  xanthoma was observed [Oriented To Time, Place, And Person] : oriented to person, place, and time [Affect] : the affect was normal [Mood] : the mood was normal [No Anxiety] : not feeling anxious [FreeTextEntry1] : JVP <6cm H2O, no HJR

## 2019-11-14 NOTE — HISTORY OF PRESENT ILLNESS
[FreeTextEntry1] : 60 yo F with and an extensive PMHx, NICM (EF 25% on echo) s/p AICD for primary prevention, HTN, IDDM c/b neuropathy, bipolar disorder,depression, history of DVT on Coumadin (4 episodes in her lower extremities), CVA x2 (last one in 2013 with residual right sided weakness), PAD s/p bypass,hypothyroidism, current smoker with COPD (on 2-3L oxygen at home), DHARMESH on CPAP. She had a Cardiomems device placed 3/8/19 with Dr. Francis at [facility]. She was recently diagnosed with IGG multiple myeloma (congo red negative for amyloid) by Dr. Ruiz and was started on Solumedrol 1x week. Also of note, had +GGT, and elevated CRP and was recommended by GI (Dr. Monaco) to get an endoscopy which is pending for Cardiac clearance.\par \par She has not been seen in f/u since July because she has been taking care of her sick mother. She was consistent with her Cardiomems readings until last month but did do one this morning (PAD 12). She is quite stressed at home but has been feeling well from a Cardiac standpoint. Her biggest complaint is GERD and nausea in which she is seeing GI for and will obtain a EGD. Currently, she denies CP,SOB at rest, orthopnea, PND, LH/dizziness,palpitations and syncope. Her appetite is normal and bowel and bladder habits are unchanged. She has been limiting fluid and sodium intake and taking medications as directed. She has further cut down her smoking. She does not use NSAIDS and has not had any ICD shocks. She has not been to the ED or admitted for HF since Cardiomems implant.\par \par \par \par

## 2019-11-18 ENCOUNTER — APPOINTMENT (OUTPATIENT)
Dept: INTERNAL MEDICINE | Facility: CLINIC | Age: 59
End: 2019-11-18
Payer: MEDICARE

## 2019-11-18 VITALS — HEART RATE: 96 BPM | DIASTOLIC BLOOD PRESSURE: 76 MMHG | RESPIRATION RATE: 12 BRPM | SYSTOLIC BLOOD PRESSURE: 146 MMHG

## 2019-11-18 DIAGNOSIS — I82.409 ACUTE EMBOLISM AND THROMBOSIS OF UNSPECIFIED DEEP VEINS OF UNSPECIFIED LOWER EXTREMITY: ICD-10-CM

## 2019-11-18 DIAGNOSIS — Z00.00 ENCOUNTER FOR GENERAL ADULT MEDICAL EXAMINATION W/OUT ABNORMAL FINDINGS: ICD-10-CM

## 2019-11-18 DIAGNOSIS — Z23 ENCOUNTER FOR IMMUNIZATION: ICD-10-CM

## 2019-11-18 DIAGNOSIS — Z51.81 ENCOUNTER FOR THERAPEUTIC DRUG LVL MONITORING: ICD-10-CM

## 2019-11-18 DIAGNOSIS — Z79.01 ENCOUNTER FOR THERAPEUTIC DRUG LVL MONITORING: ICD-10-CM

## 2019-11-18 LAB
ALBUMIN SERPL ELPH-MCNC: 3.8 G/DL
ALP BLD-CCNC: 145 U/L
ALT SERPL-CCNC: 16 U/L
ANION GAP SERPL CALC-SCNC: 15 MMOL/L
AST SERPL-CCNC: 20 U/L
BASOPHILS # BLD AUTO: 0.05 K/UL
BASOPHILS NFR BLD AUTO: 0.6 %
BILIRUB SERPL-MCNC: 0.3 MG/DL
BUN SERPL-MCNC: 26 MG/DL
CALCIUM SERPL-MCNC: 9.3 MG/DL
CHLORIDE SERPL-SCNC: 94 MMOL/L
CO2 SERPL-SCNC: 23 MMOL/L
CREAT SERPL-MCNC: 2.12 MG/DL
EOSINOPHIL # BLD AUTO: 0.13 K/UL
EOSINOPHIL NFR BLD AUTO: 1.6 %
GLUCOSE SERPL-MCNC: 469 MG/DL
HCT VFR BLD CALC: 41.4 %
HGB BLD-MCNC: 13.2 G/DL
IMM GRANULOCYTES NFR BLD AUTO: 0.2 %
INR PPP: 1.14 RATIO
LYMPHOCYTES # BLD AUTO: 1.79 K/UL
LYMPHOCYTES NFR BLD AUTO: 22.3 %
MAGNESIUM SERPL-MCNC: 2.2 MG/DL
MAN DIFF?: NORMAL
MCHC RBC-ENTMCNC: 31.3 PG
MCHC RBC-ENTMCNC: 31.9 GM/DL
MCV RBC AUTO: 98.1 FL
MONOCYTES # BLD AUTO: 0.7 K/UL
MONOCYTES NFR BLD AUTO: 8.7 %
NEUTROPHILS # BLD AUTO: 5.32 K/UL
NEUTROPHILS NFR BLD AUTO: 66.6 %
NT-PROBNP SERPL-MCNC: 644 PG/ML
PLATELET # BLD AUTO: 212 K/UL
POTASSIUM SERPL-SCNC: 4.7 MMOL/L
PROT SERPL-MCNC: 8.1 G/DL
PT BLD: 13 SEC
RBC # BLD: 4.22 M/UL
RBC # FLD: 15 %
SODIUM SERPL-SCNC: 132 MMOL/L
WBC # FLD AUTO: 8.01 K/UL

## 2019-11-18 PROCEDURE — G0008: CPT

## 2019-11-18 PROCEDURE — 93793 ANTICOAG MGMT PT WARFARIN: CPT

## 2019-11-18 PROCEDURE — 90686 IIV4 VACC NO PRSV 0.5 ML IM: CPT

## 2019-11-18 PROCEDURE — 85610 PROTHROMBIN TIME: CPT | Mod: QW

## 2019-11-25 ENCOUNTER — APPOINTMENT (OUTPATIENT)
Dept: INTERNAL MEDICINE | Facility: CLINIC | Age: 59
End: 2019-11-25
Payer: MEDICARE

## 2019-11-25 VITALS — RESPIRATION RATE: 12 BRPM | SYSTOLIC BLOOD PRESSURE: 144 MMHG | DIASTOLIC BLOOD PRESSURE: 78 MMHG | HEART RATE: 88 BPM

## 2019-11-25 LAB — INR PPP: 2.1 RATIO

## 2019-11-25 PROCEDURE — 85610 PROTHROMBIN TIME: CPT | Mod: QW

## 2019-11-25 PROCEDURE — 93793 ANTICOAG MGMT PT WARFARIN: CPT

## 2019-12-05 ENCOUNTER — APPOINTMENT (OUTPATIENT)
Dept: INTERNAL MEDICINE | Facility: CLINIC | Age: 59
End: 2019-12-05

## 2019-12-16 ENCOUNTER — APPOINTMENT (OUTPATIENT)
Dept: NEPHROLOGY | Facility: CLINIC | Age: 59
End: 2019-12-16
Payer: MEDICARE

## 2019-12-16 ENCOUNTER — RX RENEWAL (OUTPATIENT)
Age: 59
End: 2019-12-16

## 2019-12-16 VITALS — HEIGHT: 63 IN | BODY MASS INDEX: 36.37 KG/M2 | WEIGHT: 205.25 LBS

## 2019-12-16 VITALS — DIASTOLIC BLOOD PRESSURE: 71 MMHG | SYSTOLIC BLOOD PRESSURE: 135 MMHG | HEART RATE: 97 BPM

## 2019-12-16 DIAGNOSIS — I50.20 UNSPECIFIED SYSTOLIC (CONGESTIVE) HEART FAILURE: ICD-10-CM

## 2019-12-16 DIAGNOSIS — M17.4 OTHER BILATERAL SECONDARY OSTEOARTHRITIS OF KNEE: ICD-10-CM

## 2019-12-16 PROCEDURE — 36415 COLL VENOUS BLD VENIPUNCTURE: CPT

## 2019-12-16 PROCEDURE — 99214 OFFICE O/P EST MOD 30 MIN: CPT | Mod: 25

## 2019-12-16 NOTE — HISTORY OF PRESENT ILLNESS
[FreeTextEntry1] : Kindly referred by Dr. Olguin for CKD.\par \par * She does not check BP at home. BP previously controlled. * She smokes 1/4 PPD and just smoked. * CKD progressive, creatinine 2.12. * Stable hyponatremia, sodium 132. * Difficulty walking due to osteoarthritis. Can't dress self. \par \par Previous history (): * Diagnosed with IgG myeloma by Dr. Ruiz. Bone marrow is being stained for amyloid. * HTN controlled. No lightheadedness. Compliant with medications.  * CKD stable, creatinine 1.49. \par \par * Mother in law  this morning. She is grieving. * Labs [id number]: Uprot 1.9 g/g. Cr 1.67. * Losartan 12.5 mg started last visit. * Torsemide increased from 80 mg to 120 mg daily on Mar 11 2019 based on cardiomem PCWP 29. Her weight has decreased from by approximately 1 - 2 pounds daily. Her weight has decreased from 200 to 196. \par \par Previous history (): * Labs reviewed. Her creatinine has increased from 1.44 in 2017 to 1.91 in  (eGFR 29).  She had 1829 mg/g albuminuria in 2017. A CT of the abdomen in 2017 revealed a "stable inderminate left upper pole renal hypodensity" with no other abnormalitiese noted. She also had an unchanged ill-defined soft tissue density surrounding celialc axis, SMA, and common hepatic artery. \par \par * She sees Dr. Olguin for nonischemic systolic CHF. Her EF is 25%. She has not been treated with ACE or ARB given reported worsening renal dysfunction previously but the plan per Dr. Olguin was to restart this as outpatient. She has never had hyperkalemia or an allergic reaction to ACE/ARB. \par \par * She still smokes 3 cigarettes daily. \par \par * DM uncontrolled, last HGA1c 11 - 13.\par \par * She has had PVD with bilateral iliac artery angioplasty and stents, left femoral bypass. She has also had DVTs and is hypercoagulable and is treated with coumadin. \par \par * HTN controlled. No lightheadedness. Compliant with medications. Following low salt diet.\par \par Options for clinical preventative services\par \par Influenza: 2018 sep, sep 2019\par Pneumonia:  she received prenar and pneumovax per patient\par Shingles: advised \par TDAP: \par Colonoscopy: 2017, q 10 years per patient\par Dermatologist: advised to see derm yearly \par \par Breast/Pelvic Exam: , i have advised her to follow up with gyn ; breast exam ;  per aptient\par Mammogram: 2018,\par Bone Density >65:

## 2019-12-16 NOTE — ASSESSMENT
[FreeTextEntry1] : # CKD stage 4 c/w DM nephropathy.\par * Myeloma cast nephropathy and/or amyloid is also possible.\par * The patient has been counseled that chronic kidney disease is a significant condition and regular office followup with me (at least every 2 months for now) is essential for monitoring, and that it is their responsibility to make a follow up appointment.\par * The patient has been counseled never to stop taking their medications without discussing it with me or another doctor.\par * The patient has been counseled on risk of acute renal failure and instructed to immediately call and speak with me or go immediately to ER with any severe symptoms, nausea, vomiting, diarrhea, chest pain, or shortness of breath.\par * The patient has been counseled on avoiding NSAIDs.\par * Reducing or avoiding red meat, following a relatively low oxalate diet, and starting folate 800 mg qd has been advised.\par * A counseling information sheet has been given and they were provided sufficient time to review this. All their questions were answered.\par \par # Myeloma.\par * Follow up with Dr. Ruiz.\par \par # HTN borderline controlled.\par * The patient's blood pressure was checked with the [employer] [id number]-[id number] using the SPRINT trial protocol after sitting quietly in an empty room with arm supported, back supported, and feet on the floor for 5 minutes. The average of 3 readings were taken. \par * A counseling information sheet had been given and they were provided sufficient time to review this sheet. All their questions were answered.\par * The patient has been counseled to check their BP at home with an automatic arm cuff, write down the readings, and reach me directly on the phone immediately if they are persistently > 180 systolic or if SBP is less than 100 or if lightheadedness develops. They were counseled to bring in all blood pressure readings and medications next visit.\par * The patient has been counseled that they have a a significant medical condition and regular office followup (at least every 2 months for now) is essential for monitoring, and that it is their responsibility to make follow up appointments.\par * The patient also has been counseled that they must never stop or change any medications without discussing this with me (or another physician). \par \par # Smoking.\par * Counseled on smoking cessation. \par \par # CHF.\par * Follow up with cardiology.\par \par # Hyponatremia.\par * Fluid restrict 1 L.

## 2019-12-16 NOTE — PHYSICAL EXAM
[General Appearance - In No Acute Distress] : in no acute distress [General Appearance - Alert] : alert [Sclera] : the sclera and conjunctiva were normal [Extraocular Movements] : extraocular movements were intact [PERRL With Normal Accommodation] : pupils were equal in size, round, and reactive to light [Neck Appearance] : the appearance of the neck was normal [Outer Ear] : the ears and nose were normal in appearance [Oropharynx] : the oropharynx was normal [Thyroid Diffuse Enlargement] : the thyroid was not enlarged [Neck Cervical Mass (___cm)] : no neck mass was observed [Jugular Venous Distention Increased] : there was no jugular-venous distention [Auscultation Breath Sounds / Voice Sounds] : lungs were clear to auscultation bilaterally [Thyroid Nodule] : there were no palpable thyroid nodules [Heart Sounds] : normal S1 and S2 [Heart Rate And Rhythm] : heart rate was normal and rhythm regular [Murmurs] : no murmurs [Heart Sounds Pericardial Friction Rub] : no pericardial rub [Heart Sounds Gallop] : no gallops [Veins - Varicosity Changes] : there were no varicosital changes [Bowel Sounds] : normal bowel sounds [Abdomen Soft] : soft [Abdomen Tenderness] : non-tender [Abdomen Mass (___ Cm)] : no abdominal mass palpated [Cervical Lymph Nodes Enlarged Posterior Bilaterally] : posterior cervical [Supraclavicular Lymph Nodes Enlarged Bilaterally] : supraclavicular [Cervical Lymph Nodes Enlarged Anterior Bilaterally] : anterior cervical [Motor Tone] : muscle strength and tone were normal [Nail Clubbing] : no clubbing  or cyanosis of the fingernails [Musculoskeletal - Swelling] : no joint swelling seen [Skin Color & Pigmentation] : normal skin color and pigmentation [FreeTextEntry1] : difficulty walking [Skin Turgor] : normal skin turgor [Oriented To Time, Place, And Person] : oriented to person, place, and time [] : no rash [Impaired Insight] : insight and judgment were intact [Affect] : the affect was normal

## 2019-12-16 NOTE — REVIEW OF SYSTEMS
[SOB on Exertion] : shortness of breath during exertion [Joint Pain] : joint pain [Difficulty Walking] : difficulty walking [Negative] : Endocrine

## 2019-12-18 ENCOUNTER — OTHER (OUTPATIENT)
Age: 59
End: 2019-12-18

## 2019-12-20 ENCOUNTER — OTHER (OUTPATIENT)
Age: 59
End: 2019-12-20

## 2020-01-02 ENCOUNTER — APPOINTMENT (OUTPATIENT)
Dept: INTERNAL MEDICINE | Facility: CLINIC | Age: 60
End: 2020-01-02
Payer: MEDICARE

## 2020-01-02 ENCOUNTER — EMERGENCY (EMERGENCY)
Facility: HOSPITAL | Age: 60
LOS: 1 days | Discharge: HOME | End: 2020-01-02
Attending: EMERGENCY MEDICINE | Admitting: EMERGENCY MEDICINE
Payer: MEDICARE

## 2020-01-02 VITALS
TEMPERATURE: 98 F | DIASTOLIC BLOOD PRESSURE: 66 MMHG | SYSTOLIC BLOOD PRESSURE: 129 MMHG | WEIGHT: 199.96 LBS | HEART RATE: 85 BPM | RESPIRATION RATE: 18 BRPM | HEIGHT: 70 IN | OXYGEN SATURATION: 99 %

## 2020-01-02 VITALS — RESPIRATION RATE: 16 BRPM | DIASTOLIC BLOOD PRESSURE: 58 MMHG | SYSTOLIC BLOOD PRESSURE: 104 MMHG | HEART RATE: 72 BPM

## 2020-01-02 VITALS
HEART RATE: 68 BPM | TEMPERATURE: 98 F | RESPIRATION RATE: 18 BRPM | OXYGEN SATURATION: 96 % | SYSTOLIC BLOOD PRESSURE: 107 MMHG | DIASTOLIC BLOOD PRESSURE: 64 MMHG

## 2020-01-02 DIAGNOSIS — Z90.49 ACQUIRED ABSENCE OF OTHER SPECIFIED PARTS OF DIGESTIVE TRACT: Chronic | ICD-10-CM

## 2020-01-02 DIAGNOSIS — Z98.51 TUBAL LIGATION STATUS: Chronic | ICD-10-CM

## 2020-01-02 DIAGNOSIS — Z95.810 PRESENCE OF AUTOMATIC (IMPLANTABLE) CARDIAC DEFIBRILLATOR: Chronic | ICD-10-CM

## 2020-01-02 DIAGNOSIS — Z95.828 PRESENCE OF OTHER VASCULAR IMPLANTS AND GRAFTS: Chronic | ICD-10-CM

## 2020-01-02 DIAGNOSIS — Z90.710 ACQUIRED ABSENCE OF BOTH CERVIX AND UTERUS: Chronic | ICD-10-CM

## 2020-01-02 LAB
ALBUMIN SERPL ELPH-MCNC: 3.4 G/DL — SIGNIFICANT CHANGE UP (ref 3.3–5)
ALP SERPL-CCNC: 112 U/L — SIGNIFICANT CHANGE UP (ref 40–120)
ALT FLD-CCNC: 11 U/L — SIGNIFICANT CHANGE UP (ref 10–45)
ANION GAP SERPL CALC-SCNC: 14 MMOL/L — SIGNIFICANT CHANGE UP (ref 5–17)
APTT BLD: 43.1 SEC — HIGH (ref 27.5–36.3)
AST SERPL-CCNC: 16 U/L — SIGNIFICANT CHANGE UP (ref 10–40)
BASOPHILS # BLD AUTO: 0.08 K/UL — SIGNIFICANT CHANGE UP (ref 0–0.2)
BASOPHILS NFR BLD AUTO: 0.7 % — SIGNIFICANT CHANGE UP (ref 0–2)
BILIRUB SERPL-MCNC: 0.3 MG/DL — SIGNIFICANT CHANGE UP (ref 0.2–1.2)
BUN SERPL-MCNC: 38 MG/DL — HIGH (ref 7–23)
CALCIUM SERPL-MCNC: 9.4 MG/DL — SIGNIFICANT CHANGE UP (ref 8.4–10.5)
CHLORIDE SERPL-SCNC: 86 MMOL/L — LOW (ref 96–108)
CO2 SERPL-SCNC: 28 MMOL/L — SIGNIFICANT CHANGE UP (ref 22–31)
CREAT SERPL-MCNC: 2.38 MG/DL — HIGH (ref 0.5–1.3)
EOSINOPHIL # BLD AUTO: 0.18 K/UL — SIGNIFICANT CHANGE UP (ref 0–0.5)
EOSINOPHIL NFR BLD AUTO: 1.6 % — SIGNIFICANT CHANGE UP (ref 0–6)
GLUCOSE SERPL-MCNC: 409 MG/DL — HIGH (ref 70–99)
HCT VFR BLD CALC: 38.1 % — SIGNIFICANT CHANGE UP (ref 34.5–45)
HGB BLD-MCNC: 12.7 G/DL — SIGNIFICANT CHANGE UP (ref 11.5–15.5)
IMM GRANULOCYTES NFR BLD AUTO: 0.4 % — SIGNIFICANT CHANGE UP (ref 0–1.5)
INR BLD: 1.64 — HIGH (ref 0.88–1.16)
INR PPP: 1.9 RATIO
LYMPHOCYTES # BLD AUTO: 19.4 % — SIGNIFICANT CHANGE UP (ref 13–44)
LYMPHOCYTES # BLD AUTO: 2.18 K/UL — SIGNIFICANT CHANGE UP (ref 1–3.3)
MCHC RBC-ENTMCNC: 30.5 PG — SIGNIFICANT CHANGE UP (ref 27–34)
MCHC RBC-ENTMCNC: 33.3 GM/DL — SIGNIFICANT CHANGE UP (ref 32–36)
MCV RBC AUTO: 91.4 FL — SIGNIFICANT CHANGE UP (ref 80–100)
MONOCYTES # BLD AUTO: 0.79 K/UL — SIGNIFICANT CHANGE UP (ref 0–0.9)
MONOCYTES NFR BLD AUTO: 7 % — SIGNIFICANT CHANGE UP (ref 2–14)
NEUTROPHILS # BLD AUTO: 7.94 K/UL — HIGH (ref 1.8–7.4)
NEUTROPHILS NFR BLD AUTO: 70.9 % — SIGNIFICANT CHANGE UP (ref 43–77)
NRBC # BLD: 0 /100 WBCS — SIGNIFICANT CHANGE UP (ref 0–0)
PLATELET # BLD AUTO: 305 K/UL — SIGNIFICANT CHANGE UP (ref 150–400)
POTASSIUM SERPL-MCNC: 4.1 MMOL/L — SIGNIFICANT CHANGE UP (ref 3.5–5.3)
POTASSIUM SERPL-SCNC: 4.1 MMOL/L — SIGNIFICANT CHANGE UP (ref 3.5–5.3)
PROT SERPL-MCNC: 8.9 G/DL — HIGH (ref 6–8.3)
PROTHROM AB SERPL-ACNC: 18.8 SEC — HIGH (ref 10–12.9)
RBC # BLD: 4.17 M/UL — SIGNIFICANT CHANGE UP (ref 3.8–5.2)
RBC # FLD: 13.4 % — SIGNIFICANT CHANGE UP (ref 10.3–14.5)
SODIUM SERPL-SCNC: 128 MMOL/L — LOW (ref 135–145)
WBC # BLD: 11.21 K/UL — HIGH (ref 3.8–10.5)
WBC # FLD AUTO: 11.21 K/UL — HIGH (ref 3.8–10.5)

## 2020-01-02 PROCEDURE — 85730 THROMBOPLASTIN TIME PARTIAL: CPT

## 2020-01-02 PROCEDURE — 85610 PROTHROMBIN TIME: CPT | Mod: QW

## 2020-01-02 PROCEDURE — 36415 COLL VENOUS BLD VENIPUNCTURE: CPT

## 2020-01-02 PROCEDURE — 96375 TX/PRO/DX INJ NEW DRUG ADDON: CPT

## 2020-01-02 PROCEDURE — 93793 ANTICOAG MGMT PT WARFARIN: CPT

## 2020-01-02 PROCEDURE — 93971 EXTREMITY STUDY: CPT | Mod: 26,RT

## 2020-01-02 PROCEDURE — 96374 THER/PROPH/DIAG INJ IV PUSH: CPT

## 2020-01-02 PROCEDURE — 99284 EMERGENCY DEPT VISIT MOD MDM: CPT

## 2020-01-02 PROCEDURE — 93971 EXTREMITY STUDY: CPT

## 2020-01-02 PROCEDURE — 85025 COMPLETE CBC W/AUTO DIFF WBC: CPT

## 2020-01-02 PROCEDURE — 99284 EMERGENCY DEPT VISIT MOD MDM: CPT | Mod: 25

## 2020-01-02 PROCEDURE — 80053 COMPREHEN METABOLIC PANEL: CPT

## 2020-01-02 PROCEDURE — 85610 PROTHROMBIN TIME: CPT

## 2020-01-02 RX ORDER — INSULIN HUMAN 100 [IU]/ML
6 INJECTION, SOLUTION SUBCUTANEOUS ONCE
Refills: 0 | Status: COMPLETED | OUTPATIENT
Start: 2020-01-02 | End: 2020-01-02

## 2020-01-02 RX ORDER — MORPHINE SULFATE 50 MG/1
4 CAPSULE, EXTENDED RELEASE ORAL ONCE
Refills: 0 | Status: DISCONTINUED | OUTPATIENT
Start: 2020-01-02 | End: 2020-01-02

## 2020-01-02 RX ADMIN — INSULIN HUMAN 6 UNIT(S): 100 INJECTION, SOLUTION SUBCUTANEOUS at 06:21

## 2020-01-02 RX ADMIN — MORPHINE SULFATE 4 MILLIGRAM(S): 50 CAPSULE, EXTENDED RELEASE ORAL at 04:47

## 2020-01-02 NOTE — ED ADULT TRIAGE NOTE - ARRIVAL INFO ADDITIONAL COMMENTS
reports "my calf hurts this morning" "it's hard when I press" reports "my calf hurts this morning" "it's hard when I touch"

## 2020-01-02 NOTE — ED PROVIDER NOTE - ATTENDING CONTRIBUTION TO CARE
I discussed the plan of care of the patient directly with the PA while the patient was in the Emergency Department. VSS, middle aged woman, no acute distress. I have reviewed the ACP note and agree with the history, exam and plan of care. US neg for DVT. BS noted to be in 400s - insulin given, but pt known to be chronically hyperglycemic, to take her usual am meds, no AG, no symptoms of hyperglycemia at this time

## 2020-01-02 NOTE — ED ADULT NURSE NOTE - NS_SISCREENINGSR_GEN_ALL_ED
ONT COLOR=\"#[id number]\">ZARIA CANSECO  : 1931 16:13:10  ACCOUNT:  [id number]  HOME PHONE:  [phone]  WORK PHONE:  [phone]  Returned pt call (see prior RN note)l/m:  Per RT, pt to d/c Amlodipine, resume Metropolol Succinate 25mg QD (prior dose) and increase fluids.  Pt to follow up as directed. /sebastian tafoya     Negative

## 2020-01-02 NOTE — ED ADULT NURSE NOTE - OBJECTIVE STATEMENT
58 y/o female c/o right calf pain. Pt reports calf pain started when she woke up. Pt reports unable to walk due to pain. pt reports pain like cramplike in nature. Pt denies trauma, SOB, or CP. Pt speaks clear, MAEx4, unlabored breathing. Abd soft ntnd. Skin dry warm. Pt given gown and is undressing for medical evaluation.

## 2020-01-02 NOTE — ED PROVIDER NOTE - MUSCULOSKELETAL, MLM
right leg with +calf tenderness, no swelling or deformity, +strong DP/PT signals, foot warm to palpation, no erythema

## 2020-01-02 NOTE — ED PROVIDER NOTE - OBJECTIVE STATEMENT
60 y/o f presents c/o right calf pain for the past day.  Pt stating calf feels tight, concerned she has a dvt which she has had in the past (on coumadin, reports compliance, s/p IVC filter).  Pt denies fever, chills, trauma, numbness/tingling to ext, all other ROS negative.

## 2020-01-02 NOTE — ED PROVIDER NOTE - PATIENT PORTAL LINK FT
You can access the FollowMyHealth Patient Portal offered by [facility] by registering at the following website: [email or web address]. By joining [employer]’s FollowMyHealth portal, you will also be able to view your health information using other applications (apps) compatible with our system.

## 2020-01-02 NOTE — ED PROVIDER NOTE - CLINICAL SUMMARY MEDICAL DECISION MAKING FREE TEXT BOX
58 y/o f presents c/o right calf pain x 1 day; pt concerned with dvt, u/s neg, no evidence of infection, possible msk etiology.  will d/c to f/u with pmd.

## 2020-01-06 ENCOUNTER — APPOINTMENT (OUTPATIENT)
Dept: INTERNAL MEDICINE | Facility: CLINIC | Age: 60
End: 2020-01-06

## 2020-01-06 ENCOUNTER — APPOINTMENT (OUTPATIENT)
Dept: HEART AND VASCULAR | Facility: CLINIC | Age: 60
End: 2020-01-06
Payer: MEDICARE

## 2020-01-06 VITALS
HEART RATE: 111 BPM | SYSTOLIC BLOOD PRESSURE: 140 MMHG | OXYGEN SATURATION: 97 % | DIASTOLIC BLOOD PRESSURE: 74 MMHG | BODY MASS INDEX: 36.85 KG/M2 | HEIGHT: 63 IN | TEMPERATURE: 98.2 F | WEIGHT: 207.98 LBS

## 2020-01-06 PROCEDURE — 99214 OFFICE O/P EST MOD 30 MIN: CPT

## 2020-01-06 PROCEDURE — 36415 COLL VENOUS BLD VENIPUNCTURE: CPT

## 2020-01-06 NOTE — REASON FOR VISIT
[Follow-Up - Clinic] : a clinic follow-up of [Heart Failure] : congestive heart failure [FreeTextEntry1] : PATIENT INSTRUCTIONS:\par \par 1. Labs today.\par \par 2. INCREASE the METOPROLOL to 200mg daily.\par \par 3. Follow up in 2 months with an ECHOCARDIOGRAM.

## 2020-01-06 NOTE — ASSESSMENT
[FreeTextEntry1] : 58yo F with multiple comorbidites and NICM (EF 25%) s/p AICD and s/p CARDIOMEMS hemodynamic monitoring device 3/8/19 at [facility] who was recently diagnosed with IGg Myeloma. She is doing well from a Cardiac standpoint. She is ACC/AHA Stage C, NYHA Class II-III, euvolemic on exam. I have recommended the following:\par \par 1. NICM- Stable and well compensated with PAD 14 on Cardiomems. Check labs today. Increase Toprol XL to 200mg daily. Routine TTE in 2 months now that she is maximized on HF GDMT.\par \par 2. CAD- no anginal symptoms. On Lipitor, BB and ARB (in [place]).\par \par 3. CKD Stage III- Last SCr 2.12 which is above baseline. Will check today and continue to monitor.  \par \par 4. Hx of multiple DVTs- On Coumadin. Now having routine INR checks with Marta Baxter NP.\par \par 6. IGG Myeloma- Recently diagnosed. Scheduled to see Dr. Ruiz this month. \par \par 7. Follow up in 2 months with TTE. \par \par \par

## 2020-01-06 NOTE — PHYSICAL EXAM
[General Appearance - Well Developed] : well developed [Normal Appearance] : normal appearance [General Appearance - Well Nourished] : well nourished [Well Groomed] : well groomed [No Deformities] : no deformities [General Appearance - In No Acute Distress] : no acute distress [Normal Conjunctiva] : the conjunctiva exhibited no abnormalities [Eyelids - No Xanthelasma] : the eyelids demonstrated no xanthelasmas [No Oral Pallor] : no oral pallor [Normal Oral Mucosa] : normal oral mucosa [No Oral Cyanosis] : no oral cyanosis [Respiration, Rhythm And Depth] : normal respiratory rhythm and effort [Auscultation Breath Sounds / Voice Sounds] : lungs were clear to auscultation bilaterally [Heart Sounds] : normal S1 and S2 [Heart Rate And Rhythm] : heart rate and rhythm were normal [Murmurs] : no murmurs present [Edema] : no peripheral edema present [Abdomen Soft] : soft [Abdomen Tenderness] : non-tender [Abdomen Mass (___ Cm)] : no abdominal mass palpated [Abnormal Walk] : normal gait [Nail Clubbing] : no clubbing of the fingernails [Cyanosis, Localized] : no localized cyanosis [Petechial Hemorrhages (___cm)] : no petechial hemorrhages [Skin Color & Pigmentation] : normal skin color and pigmentation [] : no rash [No Venous Stasis] : no venous stasis [Skin Lesions] : no skin lesions [No Skin Ulcers] : no skin ulcer [No Xanthoma] : no  xanthoma was observed [Oriented To Time, Place, And Person] : oriented to person, place, and time [Affect] : the affect was normal [Mood] : the mood was normal [No Anxiety] : not feeling anxious [FreeTextEntry1] : JVP <6cm H2O, no HJR

## 2020-01-06 NOTE — HISTORY OF PRESENT ILLNESS
[FreeTextEntry1] : 58 yo F with and an extensive PMHx, NICM (EF 25% on echo) s/p AICD for primary prevention, HTN, IDDM c/b neuropathy, bipolar disorder,depression, history of DVT on Coumadin (4 episodes in her lower extremities), CVA x2 (last one in 2013 with residual right sided weakness), PAD s/p bypass,hypothyroidism, current smoker with COPD (on 2-3L oxygen at home), DHARMESH on CPAP. She had a Cardiomems device placed 3/8/19 with Dr. Francis at [facility]. She was recently diagnosed with IGG multiple myeloma (congo red negative for amyloid) by Dr. Ruiz and was started on Solumedrol 1x week. Also of note, had +GGT, and elevated CRP and was recommended by GI (Dr. Monaco) to get an endoscopy which is pending for Cardiac clearance.\par \par She last seen on 11/14, she continues to do well from a Cardiac stand point. She has been doing sporadic cardiomems readings last PAD 14 as it has been difficult because she is taking care of her mother and sometimes spends the night. She remains a bit stressed and depressed but seems more upbeat today. She still has GERD but it has been better controlled and she is scheduled to have an EGD on Wednesday. Her breathing is great and she can walk a full block without stopping. She can walk up one flight of stairs, slowly. Currently, she denies CP,SOB at rest, orthopnea, PND, LH/dizziness,palpitations and syncope. Her appetite is normal and bowel and bladder habits are unchanged. She has been limiting fluid and sodium intake and taking medications as directed. She is still smoking but is now down to 2 cigarettes. She does not use NSAIDS and has not had any ICD shocks. She has not been to the ED or admitted for HF since Cardiomems implant.\par \par \par \par

## 2020-01-07 LAB
ANION GAP SERPL CALC-SCNC: 15 MMOL/L
BUN SERPL-MCNC: 35 MG/DL
CALCIUM SERPL-MCNC: 9.7 MG/DL
CHLORIDE SERPL-SCNC: 87 MMOL/L
CO2 SERPL-SCNC: 28 MMOL/L
CREAT SERPL-MCNC: 2.09 MG/DL
GLUCOSE SERPL-MCNC: 605 MG/DL
MAGNESIUM SERPL-MCNC: 2 MG/DL
NT-PROBNP SERPL-MCNC: 684 PG/ML
POTASSIUM SERPL-SCNC: 5.7 MMOL/L
SODIUM SERPL-SCNC: 130 MMOL/L

## 2020-01-08 ENCOUNTER — OUTPATIENT (OUTPATIENT)
Dept: OUTPATIENT SERVICES | Facility: HOSPITAL | Age: 60
LOS: 1 days | Discharge: HOME | End: 2020-01-08

## 2020-01-08 ENCOUNTER — EMERGENCY (EMERGENCY)
Facility: HOSPITAL | Age: 60
LOS: 1 days | Discharge: HOME | End: 2020-01-08
Attending: EMERGENCY MEDICINE | Admitting: STUDENT IN AN ORGANIZED HEALTH CARE EDUCATION/TRAINING PROGRAM
Payer: MEDICARE

## 2020-01-08 VITALS
OXYGEN SATURATION: 98 % | HEART RATE: 86 BPM | SYSTOLIC BLOOD PRESSURE: 136 MMHG | DIASTOLIC BLOOD PRESSURE: 80 MMHG | RESPIRATION RATE: 17 BRPM

## 2020-01-08 VITALS
HEART RATE: 94 BPM | SYSTOLIC BLOOD PRESSURE: 171 MMHG | TEMPERATURE: 98 F | RESPIRATION RATE: 17 BRPM | HEIGHT: 70 IN | OXYGEN SATURATION: 98 % | DIASTOLIC BLOOD PRESSURE: 96 MMHG

## 2020-01-08 DIAGNOSIS — Z79.899 OTHER LONG TERM (CURRENT) DRUG THERAPY: ICD-10-CM

## 2020-01-08 DIAGNOSIS — Z91.041 RADIOGRAPHIC DYE ALLERGY STATUS: ICD-10-CM

## 2020-01-08 DIAGNOSIS — Z95.810 PRESENCE OF AUTOMATIC (IMPLANTABLE) CARDIAC DEFIBRILLATOR: Chronic | ICD-10-CM

## 2020-01-08 DIAGNOSIS — I50.9 HEART FAILURE, UNSPECIFIED: ICD-10-CM

## 2020-01-08 DIAGNOSIS — Z90.49 ACQUIRED ABSENCE OF OTHER SPECIFIED PARTS OF DIGESTIVE TRACT: Chronic | ICD-10-CM

## 2020-01-08 DIAGNOSIS — E78.5 HYPERLIPIDEMIA, UNSPECIFIED: ICD-10-CM

## 2020-01-08 DIAGNOSIS — Z79.891 LONG TERM (CURRENT) USE OF OPIATE ANALGESIC: ICD-10-CM

## 2020-01-08 DIAGNOSIS — Z95.828 PRESENCE OF OTHER VASCULAR IMPLANTS AND GRAFTS: Chronic | ICD-10-CM

## 2020-01-08 DIAGNOSIS — E11.9 TYPE 2 DIABETES MELLITUS WITHOUT COMPLICATIONS: ICD-10-CM

## 2020-01-08 DIAGNOSIS — I11.0 HYPERTENSIVE HEART DISEASE WITH HEART FAILURE: ICD-10-CM

## 2020-01-08 DIAGNOSIS — Z98.51 TUBAL LIGATION STATUS: Chronic | ICD-10-CM

## 2020-01-08 DIAGNOSIS — E11.65 TYPE 2 DIABETES MELLITUS WITH HYPERGLYCEMIA: ICD-10-CM

## 2020-01-08 DIAGNOSIS — Z90.710 ACQUIRED ABSENCE OF BOTH CERVIX AND UTERUS: Chronic | ICD-10-CM

## 2020-01-08 DIAGNOSIS — Z79.4 LONG TERM (CURRENT) USE OF INSULIN: ICD-10-CM

## 2020-01-08 DIAGNOSIS — F17.210 NICOTINE DEPENDENCE, CIGARETTES, UNCOMPLICATED: ICD-10-CM

## 2020-01-08 DIAGNOSIS — M79.661 PAIN IN RIGHT LOWER LEG: ICD-10-CM

## 2020-01-08 DIAGNOSIS — Z88.6 ALLERGY STATUS TO ANALGESIC AGENT: ICD-10-CM

## 2020-01-08 DIAGNOSIS — R11.2 NAUSEA WITH VOMITING, UNSPECIFIED: ICD-10-CM

## 2020-01-08 DIAGNOSIS — R10.84 GENERALIZED ABDOMINAL PAIN: ICD-10-CM

## 2020-01-08 DIAGNOSIS — Z91.048 OTHER NONMEDICINAL SUBSTANCE ALLERGY STATUS: ICD-10-CM

## 2020-01-08 LAB
ALBUMIN SERPL ELPH-MCNC: 3.5 G/DL — SIGNIFICANT CHANGE UP (ref 3.3–5)
ALBUMIN SERPL ELPH-MCNC: 3.7 G/DL — SIGNIFICANT CHANGE UP (ref 3.3–5)
ALP SERPL-CCNC: 106 U/L — SIGNIFICANT CHANGE UP (ref 40–120)
ALP SERPL-CCNC: 108 U/L — SIGNIFICANT CHANGE UP (ref 40–120)
ALT FLD-CCNC: 13 U/L — SIGNIFICANT CHANGE UP (ref 10–45)
ALT FLD-CCNC: 13 U/L — SIGNIFICANT CHANGE UP (ref 10–45)
ANION GAP SERPL CALC-SCNC: 9 MMOL/L — SIGNIFICANT CHANGE UP (ref 5–17)
AST SERPL-CCNC: 15 U/L — SIGNIFICANT CHANGE UP (ref 10–40)
AST SERPL-CCNC: 20 U/L — SIGNIFICANT CHANGE UP (ref 10–40)
BASOPHILS # BLD AUTO: 0.08 K/UL — SIGNIFICANT CHANGE UP (ref 0–0.2)
BASOPHILS NFR BLD AUTO: 0.8 % — SIGNIFICANT CHANGE UP (ref 0–2)
BILIRUB DIRECT SERPL-MCNC: <0.2 MG/DL — SIGNIFICANT CHANGE UP (ref 0–0.2)
BILIRUB INDIRECT FLD-MCNC: >0.2 MG/DL — SIGNIFICANT CHANGE UP (ref 0.2–1)
BILIRUB SERPL-MCNC: 0.4 MG/DL — SIGNIFICANT CHANGE UP (ref 0.2–1.2)
BILIRUB SERPL-MCNC: 0.4 MG/DL — SIGNIFICANT CHANGE UP (ref 0.2–1.2)
BUN SERPL-MCNC: 28 MG/DL — HIGH (ref 7–23)
CALCIUM SERPL-MCNC: 10.1 MG/DL — SIGNIFICANT CHANGE UP (ref 8.4–10.5)
CHLORIDE SERPL-SCNC: 96 MMOL/L — SIGNIFICANT CHANGE UP (ref 96–108)
CO2 SERPL-SCNC: 28 MMOL/L — SIGNIFICANT CHANGE UP (ref 22–31)
CREAT SERPL-MCNC: 1.46 MG/DL — HIGH (ref 0.5–1.3)
EOSINOPHIL # BLD AUTO: 0.17 K/UL — SIGNIFICANT CHANGE UP (ref 0–0.5)
EOSINOPHIL NFR BLD AUTO: 1.7 % — SIGNIFICANT CHANGE UP (ref 0–6)
GLUCOSE BLDC GLUCOMTR-MCNC: 291 MG/DL — HIGH (ref 70–99)
GLUCOSE BLDC GLUCOMTR-MCNC: 373 MG/DL — HIGH (ref 70–99)
GLUCOSE SERPL-MCNC: 378 MG/DL — HIGH (ref 70–99)
HCT VFR BLD CALC: 40.4 % — SIGNIFICANT CHANGE UP (ref 34.5–45)
HGB BLD-MCNC: 13 G/DL — SIGNIFICANT CHANGE UP (ref 11.5–15.5)
IMM GRANULOCYTES NFR BLD AUTO: 0.3 % — SIGNIFICANT CHANGE UP (ref 0–1.5)
LACTATE SERPL-SCNC: 1.1 MMOL/L — SIGNIFICANT CHANGE UP (ref 0.5–2)
LIDOCAIN IGE QN: 51 U/L — SIGNIFICANT CHANGE UP (ref 7–60)
LYMPHOCYTES # BLD AUTO: 1.78 K/UL — SIGNIFICANT CHANGE UP (ref 1–3.3)
LYMPHOCYTES # BLD AUTO: 17.8 % — SIGNIFICANT CHANGE UP (ref 13–44)
MCHC RBC-ENTMCNC: 29.9 PG — SIGNIFICANT CHANGE UP (ref 27–34)
MCHC RBC-ENTMCNC: 32.2 GM/DL — SIGNIFICANT CHANGE UP (ref 32–36)
MCV RBC AUTO: 92.9 FL — SIGNIFICANT CHANGE UP (ref 80–100)
MONOCYTES # BLD AUTO: 0.61 K/UL — SIGNIFICANT CHANGE UP (ref 0–0.9)
MONOCYTES NFR BLD AUTO: 6.1 % — SIGNIFICANT CHANGE UP (ref 2–14)
NEUTROPHILS # BLD AUTO: 7.31 K/UL — SIGNIFICANT CHANGE UP (ref 1.8–7.4)
NEUTROPHILS NFR BLD AUTO: 73.3 % — SIGNIFICANT CHANGE UP (ref 43–77)
NRBC # BLD: 0 /100 WBCS — SIGNIFICANT CHANGE UP (ref 0–0)
PLATELET # BLD AUTO: 252 K/UL — SIGNIFICANT CHANGE UP (ref 150–400)
POTASSIUM SERPL-MCNC: 5 MMOL/L — SIGNIFICANT CHANGE UP (ref 3.5–5.3)
POTASSIUM SERPL-SCNC: 5 MMOL/L — SIGNIFICANT CHANGE UP (ref 3.5–5.3)
PROT SERPL-MCNC: 8.2 G/DL — SIGNIFICANT CHANGE UP (ref 6–8.3)
PROT SERPL-MCNC: 9 G/DL — HIGH (ref 6–8.3)
RBC # BLD: 4.35 M/UL — SIGNIFICANT CHANGE UP (ref 3.8–5.2)
RBC # FLD: 13.5 % — SIGNIFICANT CHANGE UP (ref 10.3–14.5)
SODIUM SERPL-SCNC: 133 MMOL/L — LOW (ref 135–145)
WBC # BLD: 9.98 K/UL — SIGNIFICANT CHANGE UP (ref 3.8–10.5)
WBC # FLD AUTO: 9.98 K/UL — SIGNIFICANT CHANGE UP (ref 3.8–10.5)

## 2020-01-08 PROCEDURE — 80076 HEPATIC FUNCTION PANEL: CPT

## 2020-01-08 PROCEDURE — 36415 COLL VENOUS BLD VENIPUNCTURE: CPT

## 2020-01-08 PROCEDURE — 99284 EMERGENCY DEPT VISIT MOD MDM: CPT | Mod: GC

## 2020-01-08 PROCEDURE — 99284 EMERGENCY DEPT VISIT MOD MDM: CPT | Mod: 25

## 2020-01-08 PROCEDURE — 96374 THER/PROPH/DIAG INJ IV PUSH: CPT

## 2020-01-08 PROCEDURE — 93010 ELECTROCARDIOGRAM REPORT: CPT

## 2020-01-08 PROCEDURE — 96375 TX/PRO/DX INJ NEW DRUG ADDON: CPT

## 2020-01-08 PROCEDURE — 93005 ELECTROCARDIOGRAM TRACING: CPT

## 2020-01-08 PROCEDURE — 83690 ASSAY OF LIPASE: CPT

## 2020-01-08 PROCEDURE — 82962 GLUCOSE BLOOD TEST: CPT

## 2020-01-08 PROCEDURE — 85025 COMPLETE CBC W/AUTO DIFF WBC: CPT

## 2020-01-08 PROCEDURE — 96376 TX/PRO/DX INJ SAME DRUG ADON: CPT

## 2020-01-08 PROCEDURE — 80053 COMPREHEN METABOLIC PANEL: CPT

## 2020-01-08 PROCEDURE — 83605 ASSAY OF LACTIC ACID: CPT

## 2020-01-08 RX ORDER — MORPHINE SULFATE 50 MG/1
2 CAPSULE, EXTENDED RELEASE ORAL ONCE
Refills: 0 | Status: DISCONTINUED | OUTPATIENT
Start: 2020-01-08 | End: 2020-01-08

## 2020-01-08 RX ORDER — ONDANSETRON 8 MG/1
4 TABLET, FILM COATED ORAL ONCE
Refills: 0 | Status: COMPLETED | OUTPATIENT
Start: 2020-01-08 | End: 2020-01-08

## 2020-01-08 RX ORDER — INSULIN HUMAN 100 [IU]/ML
2 INJECTION, SOLUTION SUBCUTANEOUS ONCE
Refills: 0 | Status: COMPLETED | OUTPATIENT
Start: 2020-01-08 | End: 2020-01-08

## 2020-01-08 RX ADMIN — INSULIN HUMAN 2 UNIT(S): 100 INJECTION, SOLUTION SUBCUTANEOUS at 14:30

## 2020-01-08 RX ADMIN — ONDANSETRON 4 MILLIGRAM(S): 8 TABLET, FILM COATED ORAL at 12:13

## 2020-01-08 RX ADMIN — MORPHINE SULFATE 2 MILLIGRAM(S): 50 CAPSULE, EXTENDED RELEASE ORAL at 12:12

## 2020-01-08 RX ADMIN — MORPHINE SULFATE 2 MILLIGRAM(S): 50 CAPSULE, EXTENDED RELEASE ORAL at 14:27

## 2020-01-08 NOTE — ED PROVIDER NOTE - PATIENT PORTAL LINK FT
You can access the FollowMyHealth Patient Portal offered by [facility] by registering at the following website: [email or web address]. By joining [employer]’s FollowMyHealth portal, you will also be able to view your health information using other applications (apps) compatible with our system.

## 2020-01-08 NOTE — ED PROVIDER NOTE - NSFOLLOWUPINSTRUCTIONS_ED_ALL_ED_FT
please follow up with your GI doctor and pmd in 1-2 days  if new or worsening symptoms please return to ed please follow up with your GI doctor and pmd in 1-2 days  if new or worsening symptoms please return to ed    Chronic Pain, Adult  Chronic pain is a type of pain that lasts or keeps coming back (recurs) for at least six months. You may have chronic headaches, abdominal pain, or body pain. Chronic pain may be related to an illness, such as fibromyalgia or complex regional pain syndrome. Sometimes the cause of chronic pain is not known.  Chronic pain can make it hard for you to do daily activities. If not treated, chronic pain can lead to other health problems, including anxiety and depression. Treatment depends on the cause and severity of your pain. You may need to work with a pain specialist to come up with a treatment plan. The plan may include medicine, counseling, and physical therapy. Many people benefit from a combination of two or more types of treatment to control their pain.  Follow these instructions at home:  Lifestyle     Consider keeping a pain diary to share with your health care providers.Consider talking with a mental health care provider (psychologist) about how to cope with chronic pain.Consider joining a chronic pain support group.Try to control or lower your stress levels. Talk to your health care provider about strategies to do this.General instructions        Take over-the-counter and prescription medicines only as told by your health care provider.Follow your treatment plan as told by your health care provider. This may include:  Gentle, regular exercise.Eating a healthy diet that includes foods such as vegetables, fruits, fish, and lean meats.Cognitive or behavioral therapy.Working with a physical therapist.Meditation or yoga.Acupuncture or massage therapy.Aroma, color, light, or sound therapy.Local electrical stimulation.Shots (injections) of numbing or pain-relieving medicines into the spine or the area of pain.Check your pain level as told by your health care provider. Ask your health care provider if you should use a pain scale.Learn as much as you can about how to manage your chronic pain. Ask your health care provider if an intensive pain rehabilitation program or a chronic pain specialist would be helpful.Keep all follow-up visits as told by your health care provider. This is important.Contact a health care provider if:  Your pain gets worse.You have new pain.You have trouble sleeping.You have trouble doing your normal activities.Your pain is not controlled with treatment.Your have side effects from pain medicine.You feel weak.Get help right away if:  You lose feeling or have numbness in your body.You lose control of bowel or bladder function.Your pain suddenly gets much worse.You develop shaking or chills.You develop confusion.You develop chest pain.You have trouble breathing or shortness of breath.You pass out.You have thoughts about hurting yourself or others.This information is not intended to replace advice given to you by your health care provider. Make sure you discuss any questions you have with your health care provider.

## 2020-01-08 NOTE — ED PROVIDER NOTE - CARE PLAN
Principal Discharge DX:	Generalized abdominal pain  Assessment and plan of treatment:	pt sent in by endoscopy attending due to hyperglycemia and could not complete endoscopy. this has occurred x 2 for pt, thus dr zay rodriguez would like to admit pt for glucose control. Principal Discharge DX:	Generalized abdominal pain  Assessment and plan of treatment:	pt sent in by endoscopy attending due to hyperglycemia and could not complete endoscopy. this has occurred x 2 for pt, thus dr zay rodriguez would like to admit pt for glucose control.however pt does not meet admitting criteria and glucose can be controlled outpt, pt currently not in DKA, well appearing has chronic abd pain treated with morphine as pt may need endoscopy and need to be NPO

## 2020-01-08 NOTE — ED PROVIDER NOTE - CLINICAL SUMMARY MEDICAL DECISION MAKING FREE TEXT BOX
pt sent in by endoscopy attending due to hyperglycemia and could not complete endoscopy. this has occurred x 2 for pt, thus dr zay rodriguez would like to admit pt for glucose control. pt sent in by endoscopy attending due to hyperglycemia and could not complete endoscopy. this has occurred x 2 for pt, thus dr zay pritchard would like to admit pt for glucose control.      pt glucose in ed managed with ivf and insulin. pt in nad. will dc with fu with pmd

## 2020-01-08 NOTE — ED PROVIDER NOTE - OBJECTIVE STATEMENT
Pt is a 59 year old female PMH of DM, HTN, depression, multiple DVT, CVA x 2, PAD, hypothyroidism, COPD.  She was sent to the ER from endoscopy due to hyperglycemia.  She was scheduled for an endoscopy in the past but also was too hyperglycemic for it to be done. She has been having pain in her abdomen after eating for about 6 month. This is also accompanied by nausea and vomiting.  Her sugar is 378 today with no gap.  She states that she has no complaints except for her abdominal pain.

## 2020-01-08 NOTE — ED ADULT NURSE NOTE - OBJECTIVE STATEMENT
58 y/o female c/o abdominal pain, was supposed to have endoscopy today and was referred to ED for fs of 373. denies chest pain, sob. fs 363 in ED. EKG completed. pt reports not taking any medications this morning d/t inability to tolerate PO.

## 2020-01-08 NOTE — ED PROVIDER NOTE - CARE PROVIDER_API CALL
Hansa Monaco (DO)  Internal Medicine; Preventive Medicine  [address]  [place], [place] [address]  Phone: [phone]  Fax: [phone]  Follow Up Time:

## 2020-01-08 NOTE — ED PROVIDER NOTE - ATTENDING CONTRIBUTION TO CARE
pt here for hyperglycemia  will admit for sugar control and endoscopy on admission  pt c/o abd pain, dr rodriguez states pt has had this chronic pain x months  given morphine in ed for pain management. pt felt better after dose pt here for hyperglycemia  will admit for sugar control and endoscopy on admission  pt c/o abd pain, dr rodriguez states pt has had this chronic pain x months  given morphine in ed for pain management. pt felt better after dose  pt  given morphine for possible npo  pt dc home for fu with pmd and GI  if new or worsening symptoms please return to ed

## 2020-01-08 NOTE — ED PROVIDER NOTE - PLAN OF CARE
pt sent in by endoscopy attending due to hyperglycemia and could not complete endoscopy. this has occurred x 2 for pt, thus dr zay rodriguez would like to admit pt for glucose control. pt sent in by endoscopy attending due to hyperglycemia and could not complete endoscopy. this has occurred x 2 for pt, thus dr zay rodriguez would like to admit pt for glucose control.however pt does not meet admitting criteria and glucose can be controlled outpt, pt currently not in DKA, well appearing has chronic abd pain treated with morphine as pt may need endoscopy and need to be NPO

## 2020-01-08 NOTE — ED ADULT NURSE NOTE - NSSUHOSCREENINGYN_ED_ALL_ED
Health Maintenance Summary     Topic Due On Due Status Completed On Postpone Until Reason    Pap Smear - Cervical Cancer Screening  May 3, 2022 Not Due May 3, 2017      Immunization - TDAP Pregnancy  Hidden       IMMUNIZATION - DTaP/Tdap/Td Jun 19, 2024 Not Due Jun 19, 2014      Immunization-Influenza Sep 1, 2017 Postponed Oct 16, 2015 Apr 1, 2018 Patient Refused          Patient is due for topics as listed above, she wishes to decline at this time .             Yes - the patient is able to be screened

## 2020-01-10 ENCOUNTER — RESULT CHARGE (OUTPATIENT)
Age: 60
End: 2020-01-10

## 2020-01-10 ENCOUNTER — APPOINTMENT (OUTPATIENT)
Dept: ENDOCRINOLOGY | Facility: CLINIC | Age: 60
End: 2020-01-10
Payer: MEDICARE

## 2020-01-10 PROCEDURE — 99211 OFF/OP EST MAY X REQ PHY/QHP: CPT | Mod: 25

## 2020-01-10 PROCEDURE — 83036 HEMOGLOBIN GLYCOSYLATED A1C: CPT | Mod: QW

## 2020-01-10 PROCEDURE — 82962 GLUCOSE BLOOD TEST: CPT

## 2020-01-13 ENCOUNTER — APPOINTMENT (OUTPATIENT)
Dept: INTERNAL MEDICINE | Facility: CLINIC | Age: 60
End: 2020-01-13
Payer: MEDICARE

## 2020-01-13 VITALS — RESPIRATION RATE: 16 BRPM | SYSTOLIC BLOOD PRESSURE: 114 MMHG | DIASTOLIC BLOOD PRESSURE: 58 MMHG | HEART RATE: 72 BPM

## 2020-01-13 LAB
GLUCOSE BLDC GLUCOMTR-MCNC: 281
HBA1C MFR BLD HPLC: >14
INR PPP: 3.2 RATIO

## 2020-01-13 PROCEDURE — 85610 PROTHROMBIN TIME: CPT | Mod: QW

## 2020-01-13 PROCEDURE — 93793 ANTICOAG MGMT PT WARFARIN: CPT

## 2020-01-14 ENCOUNTER — APPOINTMENT (OUTPATIENT)
Dept: GASTROENTEROLOGY | Facility: HOSPITAL | Age: 60
End: 2020-01-14

## 2020-01-23 ENCOUNTER — APPOINTMENT (OUTPATIENT)
Dept: INTERNAL MEDICINE | Facility: CLINIC | Age: 60
End: 2020-01-23
Payer: MEDICARE

## 2020-01-23 ENCOUNTER — APPOINTMENT (OUTPATIENT)
Dept: GASTROENTEROLOGY | Facility: CLINIC | Age: 60
End: 2020-01-23
Payer: MEDICARE

## 2020-01-23 VITALS
HEART RATE: 75 BPM | HEIGHT: 63 IN | BODY MASS INDEX: 37.03 KG/M2 | DIASTOLIC BLOOD PRESSURE: 80 MMHG | SYSTOLIC BLOOD PRESSURE: 130 MMHG | WEIGHT: 209 LBS | RESPIRATION RATE: 14 BRPM

## 2020-01-23 VITALS
DIASTOLIC BLOOD PRESSURE: 64 MMHG | BODY MASS INDEX: 35.43 KG/M2 | SYSTOLIC BLOOD PRESSURE: 118 MMHG | HEART RATE: 72 BPM | RESPIRATION RATE: 16 BRPM | WEIGHT: 200 LBS

## 2020-01-23 LAB — INR PPP: 1.8 RATIO

## 2020-01-23 PROCEDURE — 36415 COLL VENOUS BLD VENIPUNCTURE: CPT

## 2020-01-23 PROCEDURE — 93793 ANTICOAG MGMT PT WARFARIN: CPT

## 2020-01-23 PROCEDURE — 99214 OFFICE O/P EST MOD 30 MIN: CPT | Mod: 25

## 2020-01-23 PROCEDURE — 85610 PROTHROMBIN TIME: CPT | Mod: QW

## 2020-01-23 NOTE — PHYSICAL EXAM
[General Appearance - Alert] : alert [General Appearance - Well Nourished] : well nourished [General Appearance - In No Acute Distress] : in no acute distress [Sclera] : the sclera and conjunctiva were normal [Outer Ear] : the ears and nose were normal in appearance [Oropharynx] : the oropharynx was normal [Neck Appearance] : the appearance of the neck was normal [Neck Cervical Mass (___cm)] : no neck mass was observed [Respiration, Rhythm And Depth] : normal respiratory rhythm and effort [Heart Rate And Rhythm] : heart rate was normal and rhythm regular [Bowel Sounds] : normal bowel sounds [Edema] : there was no peripheral edema [Abnormal Walk] : normal gait [Abdomen Soft] : soft [Abdomen Tenderness] : non-tender [Skin Color & Pigmentation] : normal skin color and pigmentation [Skin Turgor] : normal skin turgor [Impaired Insight] : insight and judgment were intact [Oriented To Time, Place, And Person] : oriented to person, place, and time [] : no rash [Affect] : the affect was normal

## 2020-01-24 LAB
ALBUMIN SERPL ELPH-MCNC: 4.2 G/DL
ALP BLD-CCNC: 99 U/L
ALT SERPL-CCNC: 14 U/L
ANION GAP SERPL CALC-SCNC: 13 MMOL/L
AST SERPL-CCNC: 19 U/L
BILIRUB SERPL-MCNC: 0.3 MG/DL
BUN SERPL-MCNC: 35 MG/DL
CALCIUM SERPL-MCNC: 9.5 MG/DL
CHLORIDE SERPL-SCNC: 98 MMOL/L
CO2 SERPL-SCNC: 28 MMOL/L
CREAT SERPL-MCNC: 2.18 MG/DL
GLUCOSE SERPL-MCNC: 64 MG/DL
POTASSIUM SERPL-SCNC: 4.2 MMOL/L
PROT SERPL-MCNC: 8.3 G/DL
SODIUM SERPL-SCNC: 139 MMOL/L

## 2020-01-24 NOTE — ASSESSMENT
[FreeTextEntry1] : 59F recently diagnosed with IGG multiple myeloma, asthma, CAD, CKD, COPD, depression, DM Type 2, HTN, hypothyroidism, DVTs referred by Dr. Neal for evaluation of abdominal pain.\par \par Abdominal pain s/p 3 failed attempts to do EGD- one for cardiac eval, one for high glucose, one for high INR\par - will pursue other workup at this time as pt high risk with many comorbidities\par - vascular eval and non-contrast CT to evaluate celiac axis soft tissue density\par - off PPI x2weeks and check h pylori\par - gastric emptying study\par \par Elevated alk phos +GGT\par - US= normal liver, normal bile ducts. no GB\par - repeat comp and continue biliary ALKPHOS elevation workup\par \par f/u after above\par \par Kalpana Huerta, NP

## 2020-01-24 NOTE — HISTORY OF PRESENT ILLNESS
[de-identified] : 59F recently diagnosed with IGG multiple myeloma, asthma, CAD, CKD, COPD, depression, DM Type 2, HTN, hypothyroidism, DVTs referred by Dr. Neal for evaluation of abdominal pain.\par \par 20\par - anything that eats - burps \par - anytime eats solid food - abdominal pain from chest down to belly button (pain is like pins and gets tight) \par - past 2 months only eating kiara soup and campbells soup and now that is even bothering \par - normal BMs (slight constipation), every 2 days which is her normal \par - weight goes up and down \par - occasional nausea \par -GGT elevated last visit \par - very stressed, mother in hospital \par \par Previous history:\par 2 weeks- stomach pain mid abdomen, more than 3 years ago was told had H. Pylori  (took antibiotics) \par has not ate food, just Jello \par can drink water/juice \par has some nausea with vomiting after eats, does not sit well \par constipation sometimes but from medication - started taking Senna recently and is helping her \par denies blood in stool \par \par taking omeprazole for "Acid reflux" - no symptoms at all \par EGD: 3 years ago \par colonoscopy: 3 years ago \par \par Fhx: no cancers, no IBD\par Etoh: none\par Smokin cigs per day, gradually trying to stop\par Drug use: none \par \par takes one a day multivitamin with vitamin K

## 2020-01-27 ENCOUNTER — RX RENEWAL (OUTPATIENT)
Age: 60
End: 2020-01-27

## 2020-01-29 ENCOUNTER — APPOINTMENT (OUTPATIENT)
Dept: HEMATOLOGY ONCOLOGY | Facility: CLINIC | Age: 60
End: 2020-01-29
Payer: MEDICARE

## 2020-01-29 VITALS
HEIGHT: 63 IN | OXYGEN SATURATION: 100 % | SYSTOLIC BLOOD PRESSURE: 156 MMHG | BODY MASS INDEX: 37.03 KG/M2 | WEIGHT: 209 LBS | DIASTOLIC BLOOD PRESSURE: 93 MMHG | HEART RATE: 88 BPM | RESPIRATION RATE: 14 BRPM | TEMPERATURE: 98.1 F

## 2020-01-29 VITALS — DIASTOLIC BLOOD PRESSURE: 78 MMHG | SYSTOLIC BLOOD PRESSURE: 144 MMHG

## 2020-01-29 PROCEDURE — 99214 OFFICE O/P EST MOD 30 MIN: CPT | Mod: 25

## 2020-01-29 PROCEDURE — 36415 COLL VENOUS BLD VENIPUNCTURE: CPT

## 2020-01-30 ENCOUNTER — APPOINTMENT (OUTPATIENT)
Dept: VASCULAR SURGERY | Facility: CLINIC | Age: 60
End: 2020-01-30
Payer: MEDICARE

## 2020-01-30 LAB
25(OH)D3 SERPL-MCNC: 22 NG/ML
B2 MICROGLOB SERPL-MCNC: 5.9 MG/L
ERYTHROCYTE [SEDIMENTATION RATE] IN BLOOD BY WESTERGREN METHOD: 118 MM/HR
VIT B12 SERPL-MCNC: 596 PG/ML

## 2020-01-30 PROCEDURE — 99214 OFFICE O/P EST MOD 30 MIN: CPT

## 2020-01-30 PROCEDURE — 93978 VASCULAR STUDY: CPT

## 2020-01-30 NOTE — ASSESSMENT
[FreeTextEntry1] : Recently diagnosed  myeloma...pt didn't come for f/u since August, cites many social problems, impending eviction, mom in the hospital...\par \par  repeat Immunoelectrophoresis IgG level stable , will start patient on weekly Ninlaro 3 mg together with her Decadron 20 mg weekly.\par \par again long discussion with pt about Multiple Myeloma, information from [facility] given...\par \par pt needs to see us MONTHLY!!!

## 2020-01-30 NOTE — CONSULT LETTER
[Consult Letter:] : I had the pleasure of evaluating your patient, [unfilled]. [Dear  ___] : Dear  [unfilled], [Sincerely,] : Sincerely, [Consult Closing:] : Thank you very much for allowing me to participate in the care of this patient.  If you have any questions, please do not hesitate to contact me. [Please see my note below.] : Please see my note below. [FreeTextEntry3] : Payton Ruiz MD\par

## 2020-01-30 NOTE — HISTORY OF PRESENT ILLNESS
[de-identified] : 59 y/o with long standing DM, CRF and anemia was found to have a light chain paraprotein...discussed with pt, will arrange for CT guided BM aspirate and biopsy... [de-identified] : 8-6-2019 BM aspirate and Bx consistent with myeloma...this was discussed in detail with pt and all her questions were answered...\par \par January 29, 2020 patient returns for follow-up... She states she has been taking the weekly Decadron only... She did not return for her follow-up appointment because of family issues, her mother in law passed away, she might be evicted from her apartment,etc...

## 2020-01-31 ENCOUNTER — APPOINTMENT (OUTPATIENT)
Dept: GASTROENTEROLOGY | Facility: CLINIC | Age: 60
End: 2020-01-31

## 2020-02-03 ENCOUNTER — APPOINTMENT (OUTPATIENT)
Dept: ENDOCRINOLOGY | Facility: CLINIC | Age: 60
End: 2020-02-03
Payer: MEDICARE

## 2020-02-03 ENCOUNTER — TRANSCRIPTION ENCOUNTER (OUTPATIENT)
Age: 60
End: 2020-02-03

## 2020-02-03 ENCOUNTER — RESULT CHARGE (OUTPATIENT)
Age: 60
End: 2020-02-03

## 2020-02-03 VITALS
HEART RATE: 86 BPM | SYSTOLIC BLOOD PRESSURE: 137 MMHG | DIASTOLIC BLOOD PRESSURE: 73 MMHG | WEIGHT: 213 LBS | BODY MASS INDEX: 37.73 KG/M2

## 2020-02-03 LAB
ANA SER IF-ACNC: NEGATIVE
GLUCOSE BLDC GLUCOMTR-MCNC: 269
MITOCHONDRIA AB SER IF-ACNC: NORMAL
SMOOTH MUSCLE AB SER QL IF: NORMAL

## 2020-02-03 PROCEDURE — 82962 GLUCOSE BLOOD TEST: CPT

## 2020-02-03 PROCEDURE — 97802 MEDICAL NUTRITION INDIV IN: CPT

## 2020-02-03 PROCEDURE — 99211 OFF/OP EST MAY X REQ PHY/QHP: CPT | Mod: 25

## 2020-02-04 LAB
ALBUMIN MFR SERPL ELPH: 50.2 %
ALBUMIN SERPL-MCNC: 4.2 G/DL
ALBUMIN/GLOB SERPL: 1 RATIO
ALPHA1 GLOB MFR SERPL ELPH: 3.7 %
ALPHA1 GLOB SERPL ELPH-MCNC: 0.3 G/DL
ALPHA2 GLOB MFR SERPL ELPH: 10.7 %
ALPHA2 GLOB SERPL ELPH-MCNC: 0.9 G/DL
B-GLOBULIN MFR SERPL ELPH: 7.9 %
B-GLOBULIN SERPL ELPH-MCNC: 0.7 G/DL
DEPRECATED KAPPA LC FREE/LAMBDA SER: 7.73 RATIO
GAMMA GLOB FLD ELPH-MCNC: 2.3 G/DL
GAMMA GLOB MFR SERPL ELPH: 27.5 %
IGA SER QL IEP: 57 MG/DL
IGG SER QL IEP: 2464 MG/DL
IGM SER QL IEP: 34 MG/DL
INTERPRETATION SERPL IEP-IMP: NORMAL
KAPPA LC CSF-MCNC: 2.13 MG/DL
KAPPA LC SERPL-MCNC: 16.47 MG/DL
M PROTEIN MFR SERPL ELPH: 22.5 %
M PROTEIN SPEC IFE-MCNC: NORMAL
MONOCLON BAND OBS SERPL: 1.9 G/DL
PROT SERPL-MCNC: 8.3 G/DL

## 2020-02-06 ENCOUNTER — APPOINTMENT (OUTPATIENT)
Dept: INTERNAL MEDICINE | Facility: CLINIC | Age: 60
End: 2020-02-06

## 2020-02-10 ENCOUNTER — RX RENEWAL (OUTPATIENT)
Age: 60
End: 2020-02-10

## 2020-02-10 NOTE — ASSESSMENT
[FreeTextEntry1] : 60 yo F with prior LE revascularization, here for assessemnt of chronic mesenteric ischemia. No evidence on office duplex of high grade SMA or celiac stenosis;\par \par Plan: \par \par Return to office if any worsening abdominal symptoms\par Follow up in 2 weeks to assess LE Vasculature

## 2020-02-10 NOTE — HISTORY OF PRESENT ILLNESS
[FreeTextEntry1] : 58 yo F with history of B?L Le bypasses as well as iliac angioplasty/stent, who complains of vague abdominal pain that is sometimes associated with eating. She states that this is new, she denies any weight loss or food fear. She denies any claudication symptoms or rest pain.

## 2020-02-10 NOTE — PHYSICAL EXAM
[JVD] : no jugular venous distention  [Normal Breath Sounds] : Normal breath sounds [Normal Heart Sounds] : normal heart sounds [2+] : right 2+ [de-identified] : well appearing NAD [de-identified] : soft, NT, ND

## 2020-02-12 ENCOUNTER — APPOINTMENT (OUTPATIENT)
Dept: NEPHROLOGY | Facility: CLINIC | Age: 60
End: 2020-02-12
Payer: MEDICARE

## 2020-02-12 ENCOUNTER — INPATIENT (INPATIENT)
Facility: HOSPITAL | Age: 60
LOS: 1 days | Discharge: HOME | DRG: 683 | End: 2020-02-14
Attending: INTERNAL MEDICINE | Admitting: INTERNAL MEDICINE
Payer: MEDICARE

## 2020-02-12 ENCOUNTER — APPOINTMENT (OUTPATIENT)
Dept: ENDOCRINOLOGY | Facility: CLINIC | Age: 60
End: 2020-02-12
Payer: MEDICARE

## 2020-02-12 VITALS
DIASTOLIC BLOOD PRESSURE: 56 MMHG | BODY MASS INDEX: 37.2 KG/M2 | SYSTOLIC BLOOD PRESSURE: 98 MMHG | HEART RATE: 78 BPM | WEIGHT: 210 LBS

## 2020-02-12 VITALS
SYSTOLIC BLOOD PRESSURE: 88 MMHG | TEMPERATURE: 98 F | RESPIRATION RATE: 18 BRPM | OXYGEN SATURATION: 95 % | HEART RATE: 61 BPM | DIASTOLIC BLOOD PRESSURE: 55 MMHG | WEIGHT: 199.96 LBS | HEIGHT: 65 IN

## 2020-02-12 VITALS — SYSTOLIC BLOOD PRESSURE: 72 MMHG | HEART RATE: 64 BPM | DIASTOLIC BLOOD PRESSURE: 42 MMHG

## 2020-02-12 DIAGNOSIS — Z95.810 PRESENCE OF AUTOMATIC (IMPLANTABLE) CARDIAC DEFIBRILLATOR: Chronic | ICD-10-CM

## 2020-02-12 DIAGNOSIS — I95.9 HYPOTENSION, UNSPECIFIED: ICD-10-CM

## 2020-02-12 DIAGNOSIS — Z98.51 TUBAL LIGATION STATUS: Chronic | ICD-10-CM

## 2020-02-12 DIAGNOSIS — Z95.828 PRESENCE OF OTHER VASCULAR IMPLANTS AND GRAFTS: Chronic | ICD-10-CM

## 2020-02-12 DIAGNOSIS — J44.9 CHRONIC OBSTRUCTIVE PULMONARY DISEASE, UNSPECIFIED: ICD-10-CM

## 2020-02-12 DIAGNOSIS — I63.9 CEREBRAL INFARCTION, UNSPECIFIED: ICD-10-CM

## 2020-02-12 DIAGNOSIS — Z90.49 ACQUIRED ABSENCE OF OTHER SPECIFIED PARTS OF DIGESTIVE TRACT: Chronic | ICD-10-CM

## 2020-02-12 DIAGNOSIS — Z79.01 LONG TERM (CURRENT) USE OF ANTICOAGULANTS: ICD-10-CM

## 2020-02-12 DIAGNOSIS — E78.5 HYPERLIPIDEMIA, UNSPECIFIED: ICD-10-CM

## 2020-02-12 DIAGNOSIS — F31.9 BIPOLAR DISORDER, UNSPECIFIED: ICD-10-CM

## 2020-02-12 DIAGNOSIS — I82.409 ACUTE EMBOLISM AND THROMBOSIS OF UNSPECIFIED DEEP VEINS OF UNSPECIFIED LOWER EXTREMITY: ICD-10-CM

## 2020-02-12 DIAGNOSIS — E11.9 TYPE 2 DIABETES MELLITUS WITHOUT COMPLICATIONS: ICD-10-CM

## 2020-02-12 DIAGNOSIS — Z90.710 ACQUIRED ABSENCE OF BOTH CERVIX AND UTERUS: Chronic | ICD-10-CM

## 2020-02-12 DIAGNOSIS — G62.9 POLYNEUROPATHY, UNSPECIFIED: ICD-10-CM

## 2020-02-12 DIAGNOSIS — E03.9 HYPOTHYROIDISM, UNSPECIFIED: ICD-10-CM

## 2020-02-12 DIAGNOSIS — I50.22 CHRONIC SYSTOLIC (CONGESTIVE) HEART FAILURE: ICD-10-CM

## 2020-02-12 DIAGNOSIS — N17.9 ACUTE KIDNEY FAILURE, UNSPECIFIED: ICD-10-CM

## 2020-02-12 LAB
ALBUMIN SERPL ELPH-MCNC: 3.6 G/DL — SIGNIFICANT CHANGE UP (ref 3.3–5)
ALP SERPL-CCNC: 84 U/L — SIGNIFICANT CHANGE UP (ref 40–120)
ALT FLD-CCNC: 19 U/L — SIGNIFICANT CHANGE UP (ref 10–45)
ANION GAP SERPL CALC-SCNC: 15 MMOL/L — SIGNIFICANT CHANGE UP (ref 5–17)
APPEARANCE UR: ABNORMAL
APTT BLD: 33.5 SEC — SIGNIFICANT CHANGE UP (ref 27.5–36.3)
AST SERPL-CCNC: 20 U/L — SIGNIFICANT CHANGE UP (ref 10–40)
B-OH-BUTYR SERPL-SCNC: 0.1 MMOL/L — SIGNIFICANT CHANGE UP
BACTERIA # UR AUTO: PRESENT /HPF
BASE EXCESS BLDV CALC-SCNC: 3.8 MMOL/L — SIGNIFICANT CHANGE UP
BASOPHILS # BLD AUTO: 0.04 K/UL — SIGNIFICANT CHANGE UP (ref 0–0.2)
BASOPHILS NFR BLD AUTO: 0.4 % — SIGNIFICANT CHANGE UP (ref 0–2)
BILIRUB SERPL-MCNC: 0.2 MG/DL — SIGNIFICANT CHANGE UP (ref 0.2–1.2)
BILIRUB UR-MCNC: ABNORMAL
BUN SERPL-MCNC: 38 MG/DL — HIGH (ref 7–23)
CA-I SERPL-SCNC: 1.15 MMOL/L — SIGNIFICANT CHANGE UP (ref 1.12–1.3)
CALCIUM SERPL-MCNC: 8.9 MG/DL — SIGNIFICANT CHANGE UP (ref 8.4–10.5)
CHLORIDE SERPL-SCNC: 100 MMOL/L — SIGNIFICANT CHANGE UP (ref 96–108)
CK MB CFR SERPL CALC: 3.3 NG/ML — SIGNIFICANT CHANGE UP (ref 0–6.7)
CK SERPL-CCNC: 81 U/L — SIGNIFICANT CHANGE UP (ref 25–170)
CO2 SERPL-SCNC: 24 MMOL/L — SIGNIFICANT CHANGE UP (ref 22–31)
COLOR SPEC: YELLOW — SIGNIFICANT CHANGE UP
COMMENT - URINE: SIGNIFICANT CHANGE UP
CREAT SERPL-MCNC: 2.59 MG/DL — HIGH (ref 0.5–1.3)
DIFF PNL FLD: NEGATIVE — SIGNIFICANT CHANGE UP
EOSINOPHIL # BLD AUTO: 0.12 K/UL — SIGNIFICANT CHANGE UP (ref 0–0.5)
EOSINOPHIL NFR BLD AUTO: 1.1 % — SIGNIFICANT CHANGE UP (ref 0–6)
EPI CELLS # UR: ABNORMAL /HPF (ref 0–5)
GAS PNL BLDA: SIGNIFICANT CHANGE UP
GAS PNL BLDV: 136 MMOL/L — LOW (ref 138–146)
GAS PNL BLDV: SIGNIFICANT CHANGE UP
GAS PNL BLDV: SIGNIFICANT CHANGE UP
GLUCOSE BLDC GLUCOMTR-MCNC: 91
GLUCOSE BLDC GLUCOMTR-MCNC: 97 MG/DL — SIGNIFICANT CHANGE UP (ref 70–99)
GLUCOSE SERPL-MCNC: 63 MG/DL — LOW (ref 70–99)
GLUCOSE UR QL: NEGATIVE — SIGNIFICANT CHANGE UP
GRAN CASTS # UR COMP ASSIST: ABNORMAL /LPF
HCO3 BLDV-SCNC: 29 MMOL/L — HIGH (ref 20–27)
HCT VFR BLD CALC: 36.5 % — SIGNIFICANT CHANGE UP (ref 34.5–45)
HGB BLD-MCNC: 11.7 G/DL — SIGNIFICANT CHANGE UP (ref 11.5–15.5)
HYALINE CASTS # UR AUTO: SIGNIFICANT CHANGE UP /LPF (ref 0–2)
IMM GRANULOCYTES NFR BLD AUTO: 0.3 % — SIGNIFICANT CHANGE UP (ref 0–1.5)
INR BLD: 1.23 — HIGH (ref 0.88–1.16)
KETONES UR-MCNC: ABNORMAL MG/DL
LACTATE SERPL-SCNC: 1.4 MMOL/L — SIGNIFICANT CHANGE UP (ref 0.5–2)
LEUKOCYTE ESTERASE UR-ACNC: NEGATIVE — SIGNIFICANT CHANGE UP
LIDOCAIN IGE QN: 74 U/L — HIGH (ref 7–60)
LYMPHOCYTES # BLD AUTO: 29.9 % — SIGNIFICANT CHANGE UP (ref 13–44)
LYMPHOCYTES # BLD AUTO: 3.3 K/UL — SIGNIFICANT CHANGE UP (ref 1–3.3)
MAGNESIUM SERPL-MCNC: 2.1 MG/DL — SIGNIFICANT CHANGE UP (ref 1.6–2.6)
MCHC RBC-ENTMCNC: 30.5 PG — SIGNIFICANT CHANGE UP (ref 27–34)
MCHC RBC-ENTMCNC: 32.1 GM/DL — SIGNIFICANT CHANGE UP (ref 32–36)
MCV RBC AUTO: 95.1 FL — SIGNIFICANT CHANGE UP (ref 80–100)
MONOCYTES # BLD AUTO: 0.91 K/UL — HIGH (ref 0–0.9)
MONOCYTES NFR BLD AUTO: 8.2 % — SIGNIFICANT CHANGE UP (ref 2–14)
NEUTROPHILS # BLD AUTO: 6.65 K/UL — SIGNIFICANT CHANGE UP (ref 1.8–7.4)
NEUTROPHILS NFR BLD AUTO: 60.1 % — SIGNIFICANT CHANGE UP (ref 43–77)
NITRITE UR-MCNC: NEGATIVE — SIGNIFICANT CHANGE UP
NRBC # BLD: 0 /100 WBCS — SIGNIFICANT CHANGE UP (ref 0–0)
PCO2 BLDV: 45 MMHG — SIGNIFICANT CHANGE UP (ref 41–51)
PH BLDV: 7.43 — SIGNIFICANT CHANGE UP (ref 7.32–7.43)
PH UR: 5.5 — SIGNIFICANT CHANGE UP (ref 5–8)
PLATELET # BLD AUTO: 234 K/UL — SIGNIFICANT CHANGE UP (ref 150–400)
PO2 BLDV: 211 MMHG — SIGNIFICANT CHANGE UP
POTASSIUM BLDV-SCNC: 3.6 MMOL/L — SIGNIFICANT CHANGE UP (ref 3.5–4.9)
POTASSIUM SERPL-MCNC: 4 MMOL/L — SIGNIFICANT CHANGE UP (ref 3.5–5.3)
POTASSIUM SERPL-SCNC: 4 MMOL/L — SIGNIFICANT CHANGE UP (ref 3.5–5.3)
PROT SERPL-MCNC: 8.1 G/DL — SIGNIFICANT CHANGE UP (ref 6–8.3)
PROT UR-MCNC: 100 MG/DL
PROTHROM AB SERPL-ACNC: 14.1 SEC — HIGH (ref 10–12.9)
RAPID RVP RESULT: SIGNIFICANT CHANGE UP
RBC # BLD: 3.84 M/UL — SIGNIFICANT CHANGE UP (ref 3.8–5.2)
RBC # FLD: 14.5 % — SIGNIFICANT CHANGE UP (ref 10.3–14.5)
RBC CASTS # UR COMP ASSIST: < 5 /HPF — SIGNIFICANT CHANGE UP
SAO2 % BLDV: 100 % — SIGNIFICANT CHANGE UP
SODIUM SERPL-SCNC: 139 MMOL/L — SIGNIFICANT CHANGE UP (ref 135–145)
SP GR SPEC: >=1.03 — SIGNIFICANT CHANGE UP (ref 1–1.03)
TROPONIN T SERPL-MCNC: 0.09 NG/ML — CRITICAL HIGH (ref 0–0.01)
TSH SERPL-MCNC: 8.49 UIU/ML — HIGH (ref 0.35–4.94)
UROBILINOGEN FLD QL: 0.2 E.U./DL — SIGNIFICANT CHANGE UP
WBC # BLD: 11.05 K/UL — HIGH (ref 3.8–10.5)
WBC # FLD AUTO: 11.05 K/UL — HIGH (ref 3.8–10.5)
WBC UR QL: < 5 /HPF — SIGNIFICANT CHANGE UP

## 2020-02-12 PROCEDURE — 82962 GLUCOSE BLOOD TEST: CPT

## 2020-02-12 PROCEDURE — 70450 CT HEAD/BRAIN W/O DYE: CPT | Mod: 26

## 2020-02-12 PROCEDURE — 99215 OFFICE O/P EST HI 40 MIN: CPT

## 2020-02-12 PROCEDURE — 99215 OFFICE O/P EST HI 40 MIN: CPT | Mod: 25

## 2020-02-12 PROCEDURE — 99223 1ST HOSP IP/OBS HIGH 75: CPT | Mod: AI

## 2020-02-12 PROCEDURE — 93010 ELECTROCARDIOGRAM REPORT: CPT

## 2020-02-12 PROCEDURE — 99232 SBSQ HOSP IP/OBS MODERATE 35: CPT | Mod: GC

## 2020-02-12 PROCEDURE — 99285 EMERGENCY DEPT VISIT HI MDM: CPT

## 2020-02-12 PROCEDURE — 71045 X-RAY EXAM CHEST 1 VIEW: CPT | Mod: 26

## 2020-02-12 RX ORDER — INSULIN GLARGINE 100 [IU]/ML
29 INJECTION, SOLUTION SUBCUTANEOUS AT BEDTIME
Refills: 0 | Status: DISCONTINUED | OUTPATIENT
Start: 2020-02-12 | End: 2020-02-13

## 2020-02-12 RX ORDER — ACETAMINOPHEN 500 MG
1000 TABLET ORAL ONCE
Refills: 0 | Status: COMPLETED | OUTPATIENT
Start: 2020-02-12 | End: 2020-02-12

## 2020-02-12 RX ORDER — DEXTROSE 50 % IN WATER 50 %
25 SYRINGE (ML) INTRAVENOUS ONCE
Refills: 0 | Status: DISCONTINUED | OUTPATIENT
Start: 2020-02-12 | End: 2020-02-14

## 2020-02-12 RX ORDER — INSULIN LISPRO 100/ML
9 VIAL (ML) SUBCUTANEOUS
Refills: 0 | Status: DISCONTINUED | OUTPATIENT
Start: 2020-02-12 | End: 2020-02-13

## 2020-02-12 RX ORDER — FENOFIBRATE,MICRONIZED 130 MG
48 CAPSULE ORAL DAILY
Refills: 0 | Status: DISCONTINUED | OUTPATIENT
Start: 2020-02-12 | End: 2020-02-14

## 2020-02-12 RX ORDER — AMITRIPTYLINE HCL 25 MG
10 TABLET ORAL AT BEDTIME
Refills: 0 | Status: DISCONTINUED | OUTPATIENT
Start: 2020-02-12 | End: 2020-02-14

## 2020-02-12 RX ORDER — ONDANSETRON 8 MG/1
4 TABLET, FILM COATED ORAL ONCE
Refills: 0 | Status: COMPLETED | OUTPATIENT
Start: 2020-02-12 | End: 2020-02-12

## 2020-02-12 RX ORDER — LEVOTHYROXINE SODIUM 125 MCG
25 TABLET ORAL DAILY
Refills: 0 | Status: DISCONTINUED | OUTPATIENT
Start: 2020-02-12 | End: 2020-02-14

## 2020-02-12 RX ORDER — METOPROLOL TARTRATE 50 MG
1 TABLET ORAL
Qty: 0 | Refills: 0 | DISCHARGE

## 2020-02-12 RX ORDER — INSULIN LISPRO 100/ML
VIAL (ML) SUBCUTANEOUS
Refills: 0 | Status: DISCONTINUED | OUTPATIENT
Start: 2020-02-12 | End: 2020-02-14

## 2020-02-12 RX ORDER — ENOXAPARIN SODIUM 100 MG/ML
100 INJECTION SUBCUTANEOUS ONCE
Refills: 0 | Status: COMPLETED | OUTPATIENT
Start: 2020-02-12 | End: 2020-02-12

## 2020-02-12 RX ORDER — BUDESONIDE AND FORMOTEROL FUMARATE DIHYDRATE 160; 4.5 UG/1; UG/1
2 AEROSOL RESPIRATORY (INHALATION)
Refills: 0 | Status: DISCONTINUED | OUTPATIENT
Start: 2020-02-12 | End: 2020-02-14

## 2020-02-12 RX ORDER — DEXTROSE 50 % IN WATER 50 %
12.5 SYRINGE (ML) INTRAVENOUS ONCE
Refills: 0 | Status: DISCONTINUED | OUTPATIENT
Start: 2020-02-12 | End: 2020-02-14

## 2020-02-12 RX ORDER — SODIUM CHLORIDE 9 MG/ML
1000 INJECTION, SOLUTION INTRAVENOUS
Refills: 0 | Status: DISCONTINUED | OUTPATIENT
Start: 2020-02-12 | End: 2020-02-14

## 2020-02-12 RX ORDER — SODIUM CHLORIDE 9 MG/ML
250 INJECTION INTRAMUSCULAR; INTRAVENOUS; SUBCUTANEOUS
Refills: 0 | Status: DISCONTINUED | OUTPATIENT
Start: 2020-02-12 | End: 2020-02-14

## 2020-02-12 RX ORDER — SUCRALFATE 1 G
1 TABLET ORAL
Refills: 0 | Status: DISCONTINUED | OUTPATIENT
Start: 2020-02-12 | End: 2020-02-14

## 2020-02-12 RX ORDER — FENOFIBRATE,MICRONIZED 130 MG
1 CAPSULE ORAL
Qty: 0 | Refills: 0 | DISCHARGE

## 2020-02-12 RX ORDER — GABAPENTIN 400 MG/1
100 CAPSULE ORAL EVERY 12 HOURS
Refills: 0 | Status: DISCONTINUED | OUTPATIENT
Start: 2020-02-12 | End: 2020-02-14

## 2020-02-12 RX ORDER — METHADONE HYDROCHLORIDE 40 MG/1
10 TABLET ORAL DAILY
Refills: 0 | Status: DISCONTINUED | OUTPATIENT
Start: 2020-02-12 | End: 2020-02-14

## 2020-02-12 RX ORDER — ATORVASTATIN CALCIUM 80 MG/1
80 TABLET, FILM COATED ORAL AT BEDTIME
Refills: 0 | Status: DISCONTINUED | OUTPATIENT
Start: 2020-02-12 | End: 2020-02-14

## 2020-02-12 RX ORDER — GLUCAGON INJECTION, SOLUTION 0.5 MG/.1ML
1 INJECTION, SOLUTION SUBCUTANEOUS ONCE
Refills: 0 | Status: DISCONTINUED | OUTPATIENT
Start: 2020-02-12 | End: 2020-02-14

## 2020-02-12 RX ORDER — SODIUM CHLORIDE 9 MG/ML
500 INJECTION INTRAMUSCULAR; INTRAVENOUS; SUBCUTANEOUS ONCE
Refills: 0 | Status: COMPLETED | OUTPATIENT
Start: 2020-02-12 | End: 2020-02-12

## 2020-02-12 RX ORDER — INSULIN GLARGINE 100 [IU]/ML
100 INJECTION, SOLUTION SUBCUTANEOUS
Qty: 30 | Refills: 2 | Status: COMPLETED | COMMUNITY
Start: 2019-11-14 | End: 2020-02-12

## 2020-02-12 RX ORDER — DEXTROSE 50 % IN WATER 50 %
15 SYRINGE (ML) INTRAVENOUS ONCE
Refills: 0 | Status: DISCONTINUED | OUTPATIENT
Start: 2020-02-12 | End: 2020-02-14

## 2020-02-12 RX ORDER — TIZANIDINE 4 MG/1
1 TABLET ORAL
Qty: 0 | Refills: 0 | DISCHARGE

## 2020-02-12 RX ORDER — PANTOPRAZOLE SODIUM 20 MG/1
40 TABLET, DELAYED RELEASE ORAL
Refills: 0 | Status: DISCONTINUED | OUTPATIENT
Start: 2020-02-12 | End: 2020-02-14

## 2020-02-12 RX ORDER — ESCITALOPRAM OXALATE 10 MG/1
20 TABLET, FILM COATED ORAL DAILY
Refills: 0 | Status: DISCONTINUED | OUTPATIENT
Start: 2020-02-12 | End: 2020-02-14

## 2020-02-12 RX ORDER — FERROUS SULFATE 325(65) MG
325 TABLET ORAL DAILY
Refills: 0 | Status: DISCONTINUED | OUTPATIENT
Start: 2020-02-12 | End: 2020-02-14

## 2020-02-12 RX ORDER — METHADONE HYDROCHLORIDE 40 MG/1
1 TABLET ORAL
Qty: 0 | Refills: 0 | DISCHARGE

## 2020-02-12 RX ADMIN — ENOXAPARIN SODIUM 100 MILLIGRAM(S): 100 INJECTION SUBCUTANEOUS at 23:08

## 2020-02-12 RX ADMIN — SODIUM CHLORIDE 50 MILLILITER(S): 9 INJECTION INTRAMUSCULAR; INTRAVENOUS; SUBCUTANEOUS at 22:48

## 2020-02-12 RX ADMIN — SODIUM CHLORIDE 50 MILLILITER(S): 9 INJECTION INTRAMUSCULAR; INTRAVENOUS; SUBCUTANEOUS at 19:24

## 2020-02-12 RX ADMIN — SODIUM CHLORIDE 500 MILLILITER(S): 9 INJECTION INTRAMUSCULAR; INTRAVENOUS; SUBCUTANEOUS at 13:48

## 2020-02-12 RX ADMIN — ATORVASTATIN CALCIUM 80 MILLIGRAM(S): 80 TABLET, FILM COATED ORAL at 23:08

## 2020-02-12 RX ADMIN — ONDANSETRON 4 MILLIGRAM(S): 8 TABLET, FILM COATED ORAL at 15:00

## 2020-02-12 RX ADMIN — Medication 1000 MILLIGRAM(S): at 19:10

## 2020-02-12 RX ADMIN — Medication 1000 MILLIGRAM(S): at 15:07

## 2020-02-12 RX ADMIN — SODIUM CHLORIDE 500 MILLILITER(S): 9 INJECTION INTRAMUSCULAR; INTRAVENOUS; SUBCUTANEOUS at 12:35

## 2020-02-12 RX ADMIN — BUDESONIDE AND FORMOTEROL FUMARATE DIHYDRATE 2 PUFF(S): 160; 4.5 AEROSOL RESPIRATORY (INHALATION) at 23:08

## 2020-02-12 RX ADMIN — Medication 10 MILLIGRAM(S): at 23:08

## 2020-02-12 NOTE — ED PROVIDER NOTE - OBJECTIVE STATEMENT
58 yo with DM, CHF, chronic pain, COPD, chronic depression, bipolar, PVD, HTN, HLD, CVA  presents today with one week of increased weakness and fatigue, reports URI symptoms, lightheadness, no fever, no H/A, no CP , no SOB, no n/v/d/c , + Urinary frequency which she attributes to her diuretics, no dysuria or urgency or hematuria, no abd pain, no black or bloody stools, recent decrease in PO intake / decreased food and fluids due to feeling unwell.   recent increase in Metoprolol dose, takes 200mg,   Pt on abundant medications including ambien, amytriptiline, atorvastatin, buspirone, dexamethasone, entresto, escitalopram, fenofibrate, iron, gabapentin, isosorbide 60 , levemir levothyroxine, methadone, ninlaro, novalog, omeprazole, sulcrafate, symbicort, tizanidine torsemide trazadone, warfarin.

## 2020-02-12 NOTE — H&P ADULT - HISTORY OF PRESENT ILLNESS
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) with a known Contrast and ASA allergy and an extensive PMHx including HTN, IDDM c/b neuropathy (takes Methadone), Bipolar disorder, depression, hx of DVT (with multiple prior DVTs, on Coumadin—last dose), CVA x2 (last one 2013 with residual right sided weakness), known PAD with prior bypass, NICM, chronic systolic CHF (EF 25% by Echo 1/2019, with frequent CHF admissions, cardiomems in place), s/p AICD for primary prevention, hypothyroidism , COPD (on 2-3L NC @ home), DHARMESH on CPAP, multiple myeloma (on Dexamethasone), stage 4CKD (CR 2.0-2.30 11/2019-1/23/2020 outpatient labs) initially presented to nephrologist Dr. Neal’s office today (2/12/2020) with the complaint of dizziness, fatigue, found to be hypotensive with BP 65/36 mmHG for which EMS was called. Upon further questioning patient admits to decrease PO intake and recent rhinorrhea. Patient is on multiple medications and had her Toprol XL increased to 200 mg daily by outpatient CHF team (1/6/2020).  On arrival to ED: BP: 88/55, HR: 60s, RR: 18, O2: 95% RA, Afebrile.  12 Lead EKG Sinus Rhythm @ 70 BPM with left axis deviation. Physical exam significant for dry mucous membranes and dry skin. Pertinent lab values include WBC 11.05K, INR 1.23, BUN/CR 38/2.59, glucose 63, TSH 8.49 (was WNL 2/2019), Trop 0.09, BNP 2965, RVP negative. CT Head revealed No acute intracranial hemorrhage, mass effect or demarcated territorial infarction. New mall site of left frontal cortical infarction which is age indeterminate (New from June 2017). Patient received a 500 cc bolus, Tylenol 1000 mg orally x one dose and Zofran 4 mg IV x one dose. Heart failure team was consulted and evaluated patient in ED with impression hypotension is multifactorial/medication induced (with recommendation to remove sedating medications as tolerated) and hold home Entresto, diuretic and Isordil.     Prior Echo (1/15/2019): LV moderately dilated. Severe global hypokinesis of the LV, LVEF 25%, LA moderately dilated, severely elevated LAP, RA dilated, moderate MR, mild to moderate TR, moderate pHTN, mild OH. 60 yo female, current smoker, with a known Contrast and ASA allergy and an extensive PMHx including HTN, IDDM c/b neuropathy (takes Methadone), Bipolar disorder, depression, hx of DVT (with multiple prior DVTs, on Coumadin), CVA x2 (last one  with residual right sided weakness), known PAD with prior bypass, NICM, chronic systolic CHF (EF 25% by Echo 2019, with frequent CHF admissions, cardiomems in place), s/p AICD for primary prevention, hypothyroidism , COPD (on 2-3L NC @ home), DHARMESH on CPAP, multiple myeloma (on Dexamethasone), stage 4CKD (CR 2.0-2.30 2019-2020 outpatient labs) initially presented to nephrologist Dr. Neal’s office today (2020) with the complaint of dizziness, fatigue, found to be hypotensive with BP 65/36 mmHG for which EMS was called. Upon further questioning, pt admits to recent rhinorrhea and nonproductive cough, but denies fever, chills, abdominal pain, diarrhea, SOB (endorses chronic RAMSEY), leg edema, PND, orthopnea, dysuria. Patient admits to poor PO intake and recent noncompliance with medications as her mother passed away last week and is making her  arrangements.  She does note her Toprol XL was increased to 200 mg daily by outpatient CHF team (2020).  On arrival to ED: BP: 88/55, HR: 60s, RR: 18, O2: 95% RA, Afebrile.  12 Lead EKG Sinus Rhythm @ 70 BPM with left axis deviation. Physical exam significant for dry mucous membranes and dry skin. Pertinent lab values include WBC 11.05K, INR 1.23, BUN/CR 38/2.59, glucose 63, TSH 8.49 (was WNL 2019), Trop 0.09, BNP 2965, RVP negative. CT Head revealed No acute intracranial hemorrhage, mass effect or demarcated territorial infarction. New mall site of left frontal cortical infarction which is age indeterminate (New from 2017). Patient received a 500 cc bolus, Tylenol 1000 mg orally x one dose and Zofran 4 mg IV x one dose. Heart failure team was consulted and evaluated patient in ED with impression hypotension is multifactorial/medication induced (with recommendation to remove sedating medications as tolerated) and hold home Entresto, Toprol XL, Imdur and Torsemide. Patient is now admitted to [facility] for further management of symptomatic hypotension in the setting of dehydration and polypharmacy.

## 2020-02-12 NOTE — PATIENT PROFILE ADULT - NSPROHMCARDIOMGMTSTRAT_GEN_A_NUR
adequate rest/fluid modification/medical device/medication therapy/coping strategies/routine screening/weight management/activity/diet modification/exercise

## 2020-02-12 NOTE — H&P ADULT - PROBLEM SELECTOR PLAN 1
- BP 65/36 mmHG in office. Current SBP in high 80s. Reports she is feeling much better.   -s/p  cc bolus in ED with plan for patient to receive 250 NS @ 50 cc/hour x two bags with frequent lung checks.  (EF 25% by Echo 1/2019)  -Will hold patient’s home Imdur 60 mg daily, Torsemide 80 mg daily, Entresto BID, and Toprol  mg daily.   -Will rule out infectious source of hypotension: RSV negative, CXR no infiltrate and UA negative for leuks/nitrates (UCx and Blood Cultures obtained).

## 2020-02-12 NOTE — ED ADULT NURSE NOTE - OBJECTIVE STATEMENT
patient states that for the last week she has been feeling generalized weakness with productive cough and chills. she denies chest pain, sob, fever. also reports frequent urination but states "it is probably my water pill" patient recently had an adjustment of her medications for blood pressure and was in her doctors office today, was sent to the ED for evaluation of hypotension. labs drawn and sent, placed on cardiac monitor, fluid bolus started. will continue to monitor

## 2020-02-12 NOTE — H&P ADULT - PROBLEM SELECTOR PLAN 9
-Patient takes Methadone 10 mg daily (Prescribed by Ashu Mosley DO; [phone]; confirm dose in the morning)  -Continue Gabapentin 100mg orally BID   -Will hold home Tizanidine 4 mg daily and Trazadone at bedtime give hypotension.     #Multiple Myeloma  -Known to Dr. Ruiz  -Patient takes Decadron 20 mg every morning and was recently prescribed Ninlaro but has not started    VTE: Lovenox SC x one dose this evening.     Dispo: Hold BP medications, frequent lung checks as patient being hydrated. Consult medicine, neuro stroke team, Endocrine and Renal in the AM.     Case reviewed with Dr. Simmons.

## 2020-02-12 NOTE — H&P ADULT - PROBLEM SELECTOR PLAN 6
-History of recurrent DVTs  -INR sub therapeutic on admission: 1.23. Takes Coumadin 1 mg daily at home. Admits to non compliance with recently.   -Will give Lovenox x one dose this evening.   -consult medicine team in the AM to discuss possible transition to Xarelto vs Eliquis.   -Will need follow up with geriatrician Marta Moreno upon discharge. -History of recurrent DVTs  -INR sub therapeutic on admission: 1.23. Takes Coumadin 1 mg daily at home. Admits to non compliance with recently.   -Will give Lovenox x one dose this evening. Hold Coumadin tonight as may transition patient to NOAC in AM pending Med Consult.   -consult medicine team in the AM to discuss possible transition to Xarelto vs Eliquis.   -Will need follow up with geriatrician Marta Moreno upon discharge. -History of recurrent DVTs  -INR sub therapeutic on admission: 1.23. Takes Coumadin 1 mg daily at home. Admits to non compliance with recently.   -Will give Lovenox 100 mg x one dose this evening. Hold Coumadin tonight as may transition patient to NOAC in AM pending Med Consult.   -consult medicine team in the AM to discuss possible transition to Xarelto vs Eliquis.   -Will need follow up with geriatrician Marta Moreno upon discharge.

## 2020-02-12 NOTE — H&P ADULT - PROBLEM SELECTOR PLAN 3
-Known to Dr. Neal (To consult in AM)  -Saw patient in office earlier today and referred to ED.   -Known stage 4 CKD (CR 2.0-2.30 11/2019-1/23/2020 outpatient labs). BUN/CR 38/2.59 today.   -Receiving gentle hydration. Holding home Entresto and Torsemide

## 2020-02-12 NOTE — ED ADULT NURSE REASSESSMENT NOTE - NS ED NURSE REASSESS COMMENT FT1
Pt noted to be hypotensive again (78/40). Pt lethargic but arousable to voice. MD Bell made aware. Will start another 500mL NS bolus. Pt remains on continuous cardiac/pulse oximetry/ blood pressure monitoring.

## 2020-02-12 NOTE — ED ADULT NURSE REASSESSMENT NOTE - NS ED NURSE REASSESS COMMENT FT1
Pt reports feeling anxious and weak. Pt noted to be diaphoretic. FS 57. MD Bell made aware. 1 amp D50 administered. Provided with food. Will recheck FS and continue to monitor. Verbalized improvement.

## 2020-02-12 NOTE — H&P ADULT - PROBLEM SELECTOR PLAN 4
-Dr. Soto consulted (notifed of consult this evening)  - TSH 8.49 (was WNL 2/2019). F/U T3 and T4  -Takes Synthroid 25 mcg daily at home but likely non-compliant.

## 2020-02-12 NOTE — H&P ADULT - ASSESSMENT
62 yo female, current smoker, with a known Contrast and ASA allergy and an extensive PMHx including HTN, IDDM c/b neuropathy (takes Methadone), Bipolar disorder, depression, hx of DVT (with multiple prior DVTs, on Coumadin), CVA x2 (last one 2013 with residual right sided weakness), known PAD with prior bypass, NICM, chronic systolic CHF (EF 25% by Echo 1/2019, with frequent CHF admissions, cardiomems in place), s/p AICD for primary prevention, hypothyroidism , COPD (on 2-3L NC @ home), DHARMESH on CPAP, multiple myeloma (on Dexamethasone), stage 4CKD (CR 2.0-2.30 11/2019-1/23/2020 outpatient labs) was referred to [facility] ED (2/12/2020) due to symptomatic hypotension in the setting of setting of dehydration and polypharmacy.
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
## 2020-02-12 NOTE — ASSESSMENT
[FreeTextEntry1] : Unexplained symptomatic hypotension in the setting of CAD, cardiomyopathy, CKD stage 4, anticoagulation, and myeloma.\par \par Ambulance called. She will be sent immediately to ED. Discussed with Hedy Angel. \par \par \par

## 2020-02-12 NOTE — H&P ADULT - ATTENDING COMMENTS
See PA note written above, for details. I reviewed the PA documentation.  I have personally seen and examined this patient. I reviewed vitals, labs, medications, cardiac studies and additional imaging.  I agree with the PA's findings and plans as written above with the following additions/amendments:  61F active current smoker, with HTN, IDDM c/b neuropathy (takes Methadone), Bipolar disorder, depression, hx of multiple DVTs on Coumadin, CVA x2 (latest 2013 with residual right sided weakness), PAD s/p LE bypass, Chronic Systolic CHFrEF 25% with cardiomems in place s/p AICD,  Hypothyroidism on Synthroid, COPD (on 2-3L NC @ home), DHARMESH on CPAP, multiple myeloma (on Dexamethasone), stage 4CKD (CR 2.0-2.30) who presented to the ED with symptomatic hypotension in the setting of setting of dehydration in context of diuretic and CHF med effect c/b polypharmacy. Labs notable for deteriorating renal function : acute renal insufficiency on CKD IV with interval worsening creatinine.     Plan for:  Gentle hydration with NS at 250cc aliquots   Hold home Entresto, home diuretics and isordil  BID chemistry check for close renal function monitoring  Metoprolol 200XL daily   Strict I/O, can liberalize fluid restriction to 2L/day  Daily STANDING weights, core measure orders  Advanced CHF consultation for assistance with management   Med/Renee consult for assistance with reducing polypharmacy  Nephrology consult (Dr. Neal known to patient) for RITCHIE on CKD in context of hypovolemia due to med effect  Endocrinology consult for uncontrolled DM with A1c 10.3% and deranged TFTs despite synthroid use   requested given recent loss of patient's mother and active grieving  Future planning: patient to have appt made with CHF team within 1 week of discharge for quality improvement CHF readmission risk reduction  MILY Lion.  Cardiology Attending

## 2020-02-12 NOTE — CONSULT NOTE ADULT - SUBJECTIVE AND OBJECTIVE BOX
Patient is a 59y old  Female who presents with a chief complaint of     HPI:  59 y.o F pmh NICM EF 25% s/p AICD, Cardiomems, HTN, IDDM, neuropathy, bipolar, depression, DVT on coumadin, CVA x2, pad s/p bypass, hypothyroid, current smoker, copd home O2, DHARMESH, IGG multiple myeloma presents with hypotension at her outpt physicians office.    PAST MEDICAL & SURGICAL HISTORY:  Neuropathy  PAD (peripheral artery disease)  Bipolar depression  Depression  HTN (hypertension)  DVT (deep venous thrombosis)  CVA (cerebral vascular accident)  DM (diabetes mellitus)  Acute on chronic systolic heart failure  Chronic pain  HLD (hyperlipidemia)  CVA (cerebral vascular accident)  Depression  COPD (chronic obstructive pulmonary disease)  Diabetes  CHF (congestive heart failure)  H/O extremity bypass graft  History of cholecystectomy  H/O tubal ligation  H/O abdominal hysterectomy  AICD (automatic cardioverter/defibrillator) present    FAMILY HISTORY:  Family history of diabetes mellitus (Mother)  Family history of hypertension (Mother)    Social:  Allergies    aspirin (Other (Moderate); Rash)  IV Contrast (Unknown)    Intolerances  	  HOME MEDICATIONS:  Ambien 10 MG Oral Tablet  Amitriptyline HCl - 10 MG Oral Tablet; TAKE 1 TABLET AT BEDTIME  Atorvastatin Calcium 80 MG Oral Tablet; TAKE 1 TABLET AT BEDTIME  Basaglar KwikPen 100 UNIT/ML Subcutaneous Solution Pen-injector; INJECT 35 UNITS  SUBCUTANEOUSLY ONCE A DAY AT BEDTIME  Blood Glucose Monitor System w/Device Kit; Use as directed   ONCE DAILY  busPIRone HCl - 10 MG Oral Tablet  Entresto  MG Oral Tablet; Take 1 tablet twice daily  Escitalopram Oxalate 10 MG Oral Tablet; TAKE 1 TABLET DAILY  Fenofibrate 48 MG Oral Tablet; TAKE 1 TABLET BY MOUTH EVERY DAY  Ferrous Sulfate 325 MG CAPS; TAKE 1 CAPSULE TWICE DAILY  Gabapentin 100 MG Oral Capsule; TAKE 2 CAPSULE Every twelve hours  Isosorbide Mononitrate ER 60 MG Oral Tablet Extended Release 24 Hour; TAKE 1  TABLET ONCE DAILY  Lancets 30G; USE THREE TIMES A DAY AS DIRECTED  Lancets; USE AS DIRECTED  Levemir SOLN  Levothyroxine Sodium 25 MCG Oral Tablet; TAKE 1 TABLET DAILY  Methadone HCl - 10 MG Oral Tablet  methylPREDNISolone 4 MG Oral Tablet Therapy Pack; TAKE 20 MG Weekly  Metoprolol Succinate  MG Oral Tablet Extended Release 24 Hour; TAKE 1.5  TABLET Daily  NovoLOG 100 UNIT/ML Subcutaneous Solution  Omeprazole 40 MG Oral Capsule Delayed Release; TAKE 1 CAPSULE BY MOUTH  EVERY DAY  Omeprazole 40 MG Oral Capsule Delayed Release; TAKE 1 CAPSULE Daily  Pen Needles 31G X 8 MM; USE AS DIRECTED  Pen Needles 5/16" 31G X 8 MM; USE ONE PEN NEEDLE WITH BASAL INSULIN  Sucralfate 1 GM Oral Tablet; TAKE 1 TABLET 4 TIMES DAILY, BEFORE MEALS AND AT  BEDTIME  Symbicort 160-4.5 MCG/ACT Inhalation Aerosol; TAKE 2 PUFF'S TWICE A DAY FOR  BREATHING  tiZANidine HCl - 4 MG Oral Capsule  Torsemide 20 MG Oral Tablet; TAKE 4 TABLET Daily  traZODone HCl - 50 MG Oral Tablet; TAKE 1 TABLET BY MOUTH EVERY DAY AT BEDTIME  True Metrix Blood Glucose Test In Vitro Strip; testing 3 x day  True Metrix Meter Device; USE AS DIRECTED  Warfarin Sodium 1 MG Oral Tablet; Take 1-2 tablets daily, as directed    PHYSICAL EXAM:  T(C): 37.1 (02-12-20 @ 12:59), Max: 37.1 (02-12-20 @ 12:59)  HR: 52 (02-12-20 @ 13:53) (52 - 66)  BP: 82/47 (02-12-20 @ 13:53) (82/47 - 103/58)  RR: 16 (02-12-20 @ 13:53) (16 - 20)  SpO2: 95% (02-12-20 @ 13:53) (95% - 95%)  Wt(kg): --  I&O's Summary    Height (cm): 165.1 (02-12 @ 12:13)  Weight (kg): 90.7 (02-12 @ 12:13)  BMI (kg/m2): 33.3 (02-12 @ 12:13)  BSA (m2): 1.98 (02-12 @ 12:13)    Gen: NAD, AAOx3   Neck: no JVD  Cardiovascular: Normal S1 S2, No murmurs    Respiratory: Lungs clear to auscultation b/l	  Gastrointestinal:  Soft, Non-tender, + BS, obese   Ext: mild bilateral edema, warm extremities  Neuro: no focal deficits.  Vascular: Peripheral pulses palpable 2+ bilaterally    TELEMETRY: 	    ECG:  	sinus rhythm, old septal infarct,   RADIOLOGY:   DIAGNOSTIC TESTING:  [ ] Echocardiogram:   1/15/2019  LVEF 25%, moderate MR, moderate TR, LA enlargement      OTHER: 	    LABS:	 	    CARDIAC MARKERS:                          11.7   11.05 )-----------( 234      ( 12 Feb 2020 12:45 )             36.5     02-12    139  |  100  |  38<H>  ----------------------------<  63<L>  4.0   |  24  |  2.59<H>    Ca    8.9      12 Feb 2020 12:45  Mg     2.1     02-12    TPro  8.1  /  Alb  3.6  /  TBili  0.2  /  DBili  x   /  AST  20  /  ALT  19  /  AlkPhos  84  02-12    proBNP:   Lipid Profile:   HgA1c:   TSH:     ASSESSMENT/PLAN: 	  59 y.o F pmh NICM EF 25% s/p AICD, Cardiomems, HTN, IDDM, neuropathy, bipolar, depression, DVT on coumadin, CVA x2, pad s/p bypass, hypothyroid, current smoker, copd home O2, DHARMESH, IGG multiple myeloma presents with hypotension at her outpt physicians office.    Hypotension:  - Likely multifactorial/medication induced  - R/O infectious etiology  - responded to gentle ivf  - hold entresto and isordil, monitor renal function and BP  - TSH is 8.5, was normal approximately 1 year ago** should be addressed by primary team  - Polypharmacy likely contributing to hypotension, would attempt to remove sedating medications as tolerated

## 2020-02-12 NOTE — PHYSICAL EXAM
[General Appearance - In No Acute Distress] : in no acute distress [General Appearance - Alert] : alert [Sclera] : the sclera and conjunctiva were normal [PERRL With Normal Accommodation] : pupils were equal in size, round, and reactive to light [Extraocular Movements] : extraocular movements were intact [Oropharynx] : the oropharynx was normal [Outer Ear] : the ears and nose were normal in appearance [Neck Cervical Mass (___cm)] : no neck mass was observed [Jugular Venous Distention Increased] : there was no jugular-venous distention [Thyroid Diffuse Enlargement] : the thyroid was not enlarged [Neck Appearance] : the appearance of the neck was normal [Thyroid Nodule] : there were no palpable thyroid nodules [Heart Sounds] : normal S1 and S2 [Heart Rate And Rhythm] : heart rate was normal and rhythm regular [Auscultation Breath Sounds / Voice Sounds] : lungs were clear to auscultation bilaterally [Heart Sounds Pericardial Friction Rub] : no pericardial rub [Murmurs] : no murmurs [Heart Sounds Gallop] : no gallops [Veins - Varicosity Changes] : there were no varicosital changes [Bowel Sounds] : normal bowel sounds [Abdomen Tenderness] : non-tender [Abdomen Soft] : soft [Cervical Lymph Nodes Enlarged Posterior Bilaterally] : posterior cervical [Abdomen Mass (___ Cm)] : no abdominal mass palpated [Cervical Lymph Nodes Enlarged Anterior Bilaterally] : anterior cervical [Supraclavicular Lymph Nodes Enlarged Bilaterally] : supraclavicular [Nail Clubbing] : no clubbing  or cyanosis of the fingernails [Musculoskeletal - Swelling] : no joint swelling seen [FreeTextEntry1] : difficulty walking [Motor Tone] : muscle strength and tone were normal [Skin Turgor] : normal skin turgor [] : no rash [Skin Color & Pigmentation] : normal skin color and pigmentation [Affect] : the affect was normal [Oriented To Time, Place, And Person] : oriented to person, place, and time [Impaired Insight] : insight and judgment were intact

## 2020-02-12 NOTE — HISTORY OF PRESENT ILLNESS
[FreeTextEntry1] : Kindly referred by Dr. Olguin for CKD.\par \par * UNSTEADY ON FEET but doesn't feel like she's about to pass out. * No N/V/D, no chest pain. * On coumadin. No bleeding. * On dexamethasone for myeloma. * CKD stable, creatinine 2.18. \par \par Previous history (): * She does not check BP at home. BP previously controlled. * She smokes 1/4 PPD and just smoked. * CKD progressive, creatinine 2.12. * Stable hyponatremia, sodium 132. * Difficulty walking due to osteoarthritis. Can't dress self. \par \par Previous history (): * Diagnosed with IgG myeloma by Dr. Ruiz. Bone marrow is being stained for amyloid. * HTN controlled. No lightheadedness. Compliant with medications.  * CKD stable, creatinine 1.49. \par \par * Mother in law  this morning. She is grieving. * Labs : Uprot 1.9 g/g. Cr 1.67. * Losartan 12.5 mg started last visit. * Torsemide increased from 80 mg to 120 mg daily on Mar 11 2019 based on cardiomem PCWP 29. Her weight has decreased from by approximately 1 - 2 pounds daily. Her weight has decreased from 200 to 196. \par \par Previous history (): * Labs reviewed. Her creatinine has increased from 1.44 in 2017 to 1.91 in  (eGFR 29).  She had 1829 mg/g albuminuria in 2017. A CT of the abdomen in 2017 revealed a "stable inderminate left upper pole renal hypodensity" with no other abnormalitiese noted. She also had an unchanged ill-defined soft tissue density surrounding celialc axis, SMA, and common hepatic artery. \par \par * She sees Dr. Olguin for nonischemic systolic CHF. Her EF is 25%. She has not been treated with ACE or ARB given reported worsening renal dysfunction previously but the plan per Dr. Olguin was to restart this as outpatient. She has never had hyperkalemia or an allergic reaction to ACE/ARB. \par \par * She still smokes 3 cigarettes daily. \par \par * DM uncontrolled, last HGA1c 11 - 13.\par \par * She has had PVD with bilateral iliac artery angioplasty and stents, left femoral bypass. She has also had DVTs and is hypercoagulable and is treated with coumadin. \par \par * HTN controlled. No lightheadedness. Compliant with medications. Following low salt diet.\par \par Options for clinical preventative services\par \par Influenza: 2018 sep, sep 2019\par Pneumonia:  she received prenar and pneumovax per patient\par Shingles: advised par TDAP: \par Colonoscopy: 2017, q 10 years per patient\par Dermatologist: advised to see derm yearly \par \par Breast/Pelvic Exam: 2017, i have advised her to follow up with gyn [id number]; breast exam ;  per aptient\par Mammogram: 2018,\par Bone Density >65:

## 2020-02-12 NOTE — H&P ADULT - PROBLEM SELECTOR PLAN 2
-Heart failure team evaluated patient in ED  - BNP 2965 on admission. Appears dry on exam.   - Holding Torsemide 80 mg daily, Entresto BID, Imdur 60 mg daily Toprol  mg daily.  -Gentle hydration with frequent lung checks as above.   - Prior Echo (1/15/2019): LV moderately dilated. Severe global hypokinesis of the LV, LVEF 25%, LA moderately dilated, severely elevated LAP, RA dilated, moderate MR, mild to moderate TR, moderate pHTN, mild W[place]. No need for repeat Echo per Dr. Simmons as patient not in current exacerbation

## 2020-02-12 NOTE — H&P ADULT - PROBLEM SELECTOR PLAN 5
-Known history of CVA with residual Right sided deficit.   -CT Head on admission:  No acute intracranial hemorrhage, mass effect or demarcated territorial infarction. New mall site of left frontal cortical infarction which is age indeterminate (New from June 2017).  -To consult Neuro stroke team in AM  -Will continue Lipitor 80 mg daily.

## 2020-02-12 NOTE — ED ADULT NURSE NOTE - INTERVENTIONS DEFINITIONS
Instruct patient to call for assistance/Physically safe environment: no spills, clutter or unnecessary equipment/Stretcher in lowest position, wheels locked, appropriate side rails in place/Monitor gait and stability/[place] to call system

## 2020-02-12 NOTE — PATIENT PROFILE ADULT - NSPROHMDIABETMGMTSTRAT_GEN_A_NUR
blood glucose testing/insulin therapy/activity/medication therapy/weight management/coping strategies/diet modification/exercise/routine screenings/adequate rest

## 2020-02-12 NOTE — ED PROVIDER NOTE - CONSTITUTIONAL, MLM
normal... obese, chronically ill appearing, , awake, alert, oriented to person, place, time/situation and in no apparent distress.

## 2020-02-12 NOTE — H&P ADULT - PROBLEM SELECTOR PLAN 8
-F/U AM Hemoglobin A1c   -Home regimen: Levemir 40 units TID and Novolog 22 unites TID. Interchange ordered while admitted

## 2020-02-13 DIAGNOSIS — I82.90 ACUTE EMBOLISM AND THROMBOSIS OF UNSPECIFIED VEIN: ICD-10-CM

## 2020-02-13 LAB
ALBUMIN SERPL ELPH-MCNC: 3.2 G/DL — LOW (ref 3.3–5)
ALP SERPL-CCNC: 77 U/L — SIGNIFICANT CHANGE UP (ref 40–120)
ALT FLD-CCNC: 15 U/L — SIGNIFICANT CHANGE UP (ref 10–45)
ANION GAP SERPL CALC-SCNC: 11 MMOL/L — SIGNIFICANT CHANGE UP (ref 5–17)
ANION GAP SERPL CALC-SCNC: 12 MMOL/L — SIGNIFICANT CHANGE UP (ref 5–17)
APPEARANCE UR: CLEAR — SIGNIFICANT CHANGE UP
APTT BLD: 39.5 SEC — HIGH (ref 27.5–36.3)
AST SERPL-CCNC: 18 U/L — SIGNIFICANT CHANGE UP (ref 10–40)
BASOPHILS # BLD AUTO: 0.03 K/UL — SIGNIFICANT CHANGE UP (ref 0–0.2)
BASOPHILS NFR BLD AUTO: 0.4 % — SIGNIFICANT CHANGE UP (ref 0–2)
BILIRUB SERPL-MCNC: 0.2 MG/DL — SIGNIFICANT CHANGE UP (ref 0.2–1.2)
BILIRUB UR-MCNC: NEGATIVE — SIGNIFICANT CHANGE UP
BUN SERPL-MCNC: 43 MG/DL — HIGH (ref 7–23)
BUN SERPL-MCNC: 43 MG/DL — HIGH (ref 7–23)
CALCIUM SERPL-MCNC: 8.1 MG/DL — LOW (ref 8.4–10.5)
CALCIUM SERPL-MCNC: 8.2 MG/DL — LOW (ref 8.4–10.5)
CHLORIDE SERPL-SCNC: 100 MMOL/L — SIGNIFICANT CHANGE UP (ref 96–108)
CHLORIDE SERPL-SCNC: 102 MMOL/L — SIGNIFICANT CHANGE UP (ref 96–108)
CHOLEST SERPL-MCNC: 254 MG/DL — HIGH (ref 10–199)
CO2 SERPL-SCNC: 23 MMOL/L — SIGNIFICANT CHANGE UP (ref 22–31)
CO2 SERPL-SCNC: 26 MMOL/L — SIGNIFICANT CHANGE UP (ref 22–31)
COLOR SPEC: YELLOW — SIGNIFICANT CHANGE UP
CREAT ?TM UR-MCNC: 32 MG/DL — SIGNIFICANT CHANGE UP
CREAT SERPL-MCNC: 2.95 MG/DL — HIGH (ref 0.5–1.3)
CREAT SERPL-MCNC: 3.63 MG/DL — HIGH (ref 0.5–1.3)
DIFF PNL FLD: ABNORMAL
EOSINOPHIL # BLD AUTO: 0.15 K/UL — SIGNIFICANT CHANGE UP (ref 0–0.5)
EOSINOPHIL NFR BLD AUTO: 2 % — SIGNIFICANT CHANGE UP (ref 0–6)
GLUCOSE BLDC GLUCOMTR-MCNC: 114 MG/DL — HIGH (ref 70–99)
GLUCOSE BLDC GLUCOMTR-MCNC: 175 MG/DL — HIGH (ref 70–99)
GLUCOSE BLDC GLUCOMTR-MCNC: 181 MG/DL — HIGH (ref 70–99)
GLUCOSE BLDC GLUCOMTR-MCNC: 89 MG/DL — SIGNIFICANT CHANGE UP (ref 70–99)
GLUCOSE SERPL-MCNC: 106 MG/DL — HIGH (ref 70–99)
GLUCOSE SERPL-MCNC: 135 MG/DL — HIGH (ref 70–99)
GLUCOSE UR QL: NEGATIVE — SIGNIFICANT CHANGE UP
HBA1C BLD-MCNC: 10.3 % — HIGH (ref 4–5.6)
HCT VFR BLD CALC: 36.4 % — SIGNIFICANT CHANGE UP (ref 34.5–45)
HDLC SERPL-MCNC: 33 MG/DL — LOW
HGB BLD-MCNC: 11.1 G/DL — LOW (ref 11.5–15.5)
IMM GRANULOCYTES NFR BLD AUTO: 0.3 % — SIGNIFICANT CHANGE UP (ref 0–1.5)
INR BLD: 1.31 — HIGH (ref 0.88–1.16)
KETONES UR-MCNC: NEGATIVE — SIGNIFICANT CHANGE UP
LEUKOCYTE ESTERASE UR-ACNC: NEGATIVE — SIGNIFICANT CHANGE UP
LIPID PNL WITH DIRECT LDL SERPL: 147 MG/DL — HIGH
LYMPHOCYTES # BLD AUTO: 2.81 K/UL — SIGNIFICANT CHANGE UP (ref 1–3.3)
LYMPHOCYTES # BLD AUTO: 36.7 % — SIGNIFICANT CHANGE UP (ref 13–44)
MAGNESIUM SERPL-MCNC: 2.1 MG/DL — SIGNIFICANT CHANGE UP (ref 1.6–2.6)
MCHC RBC-ENTMCNC: 29.9 PG — SIGNIFICANT CHANGE UP (ref 27–34)
MCHC RBC-ENTMCNC: 30.5 GM/DL — LOW (ref 32–36)
MCV RBC AUTO: 98.1 FL — SIGNIFICANT CHANGE UP (ref 80–100)
MONOCYTES # BLD AUTO: 0.68 K/UL — SIGNIFICANT CHANGE UP (ref 0–0.9)
MONOCYTES NFR BLD AUTO: 8.9 % — SIGNIFICANT CHANGE UP (ref 2–14)
NEUTROPHILS # BLD AUTO: 3.97 K/UL — SIGNIFICANT CHANGE UP (ref 1.8–7.4)
NEUTROPHILS NFR BLD AUTO: 51.7 % — SIGNIFICANT CHANGE UP (ref 43–77)
NITRITE UR-MCNC: NEGATIVE — SIGNIFICANT CHANGE UP
NRBC # BLD: 0 /100 WBCS — SIGNIFICANT CHANGE UP (ref 0–0)
OSMOLALITY UR: 249 MOSMOL/KG — SIGNIFICANT CHANGE UP (ref 100–650)
PH UR: 5 — SIGNIFICANT CHANGE UP (ref 5–8)
PHOSPHATE 24H UR-MCNC: 21.3 MG/DL — SIGNIFICANT CHANGE UP
PLATELET # BLD AUTO: 186 K/UL — SIGNIFICANT CHANGE UP (ref 150–400)
POTASSIUM SERPL-MCNC: 4.5 MMOL/L — SIGNIFICANT CHANGE UP (ref 3.5–5.3)
POTASSIUM SERPL-MCNC: 4.6 MMOL/L — SIGNIFICANT CHANGE UP (ref 3.5–5.3)
POTASSIUM SERPL-SCNC: 4.5 MMOL/L — SIGNIFICANT CHANGE UP (ref 3.5–5.3)
POTASSIUM SERPL-SCNC: 4.6 MMOL/L — SIGNIFICANT CHANGE UP (ref 3.5–5.3)
POTASSIUM UR-SCNC: 15 MMOL/L — SIGNIFICANT CHANGE UP
PROT ?TM UR-MCNC: 20 MG/DL — HIGH (ref 0–12)
PROT ?TM UR-MCNC: 20 MG/DL — HIGH (ref 0–12)
PROT SERPL-MCNC: 7.2 G/DL — SIGNIFICANT CHANGE UP (ref 6–8.3)
PROT UR-MCNC: NEGATIVE MG/DL — SIGNIFICANT CHANGE UP
PROT/CREAT UR-RTO: 0.6 RATIO — HIGH (ref 0–0.2)
PROTHROM AB SERPL-ACNC: 15 SEC — HIGH (ref 10–12.9)
RBC # BLD: 3.71 M/UL — LOW (ref 3.8–5.2)
RBC # FLD: 14.6 % — HIGH (ref 10.3–14.5)
SODIUM SERPL-SCNC: 137 MMOL/L — SIGNIFICANT CHANGE UP (ref 135–145)
SODIUM SERPL-SCNC: 137 MMOL/L — SIGNIFICANT CHANGE UP (ref 135–145)
SODIUM UR-SCNC: 84 MMOL/L — SIGNIFICANT CHANGE UP
SP GR SPEC: 1.01 — SIGNIFICANT CHANGE UP (ref 1–1.03)
T3 SERPL-MCNC: 86 NG/DL — SIGNIFICANT CHANGE UP (ref 80–200)
TOTAL CHOLESTEROL/HDL RATIO MEASUREMENT: 7.7 RATIO — HIGH (ref 3.3–7.1)
TRIGL SERPL-MCNC: 370 MG/DL — HIGH (ref 10–149)
UROBILINOGEN FLD QL: 0.2 E.U./DL — SIGNIFICANT CHANGE UP
UUN UR-MCNC: 140 MG/DL — SIGNIFICANT CHANGE UP
WBC # BLD: 7.66 K/UL — SIGNIFICANT CHANGE UP (ref 3.8–10.5)
WBC # FLD AUTO: 7.66 K/UL — SIGNIFICANT CHANGE UP (ref 3.8–10.5)

## 2020-02-13 PROCEDURE — 99223 1ST HOSP IP/OBS HIGH 75: CPT | Mod: GC

## 2020-02-13 PROCEDURE — 99223 1ST HOSP IP/OBS HIGH 75: CPT

## 2020-02-13 PROCEDURE — 99232 SBSQ HOSP IP/OBS MODERATE 35: CPT

## 2020-02-13 PROCEDURE — 99221 1ST HOSP IP/OBS SF/LOW 40: CPT | Mod: GC

## 2020-02-13 RX ORDER — WARFARIN SODIUM 2.5 MG/1
2.5 TABLET ORAL ONCE
Refills: 0 | Status: COMPLETED | OUTPATIENT
Start: 2020-02-13 | End: 2020-02-13

## 2020-02-13 RX ORDER — INSULIN LISPRO 100/ML
10 VIAL (ML) SUBCUTANEOUS
Refills: 0 | Status: DISCONTINUED | OUTPATIENT
Start: 2020-02-13 | End: 2020-02-13

## 2020-02-13 RX ORDER — METOPROLOL TARTRATE 50 MG
200 TABLET ORAL DAILY
Refills: 0 | Status: DISCONTINUED | OUTPATIENT
Start: 2020-02-13 | End: 2020-02-14

## 2020-02-13 RX ORDER — INSULIN LISPRO 100/ML
10 VIAL (ML) SUBCUTANEOUS
Refills: 0 | Status: DISCONTINUED | OUTPATIENT
Start: 2020-02-13 | End: 2020-02-14

## 2020-02-13 RX ORDER — SODIUM CHLORIDE 9 MG/ML
250 INJECTION INTRAMUSCULAR; INTRAVENOUS; SUBCUTANEOUS
Refills: 0 | Status: DISCONTINUED | OUTPATIENT
Start: 2020-02-13 | End: 2020-02-14

## 2020-02-13 RX ORDER — INSULIN GLARGINE 100 [IU]/ML
24 INJECTION, SOLUTION SUBCUTANEOUS AT BEDTIME
Refills: 0 | Status: DISCONTINUED | OUTPATIENT
Start: 2020-02-13 | End: 2020-02-14

## 2020-02-13 RX ORDER — ACETAMINOPHEN 500 MG
650 TABLET ORAL ONCE
Refills: 0 | Status: COMPLETED | OUTPATIENT
Start: 2020-02-13 | End: 2020-02-13

## 2020-02-13 RX ADMIN — SODIUM CHLORIDE 50 MILLILITER(S): 9 INJECTION INTRAMUSCULAR; INTRAVENOUS; SUBCUTANEOUS at 12:24

## 2020-02-13 RX ADMIN — WARFARIN SODIUM 2.5 MILLIGRAM(S): 2.5 TABLET ORAL at 21:47

## 2020-02-13 RX ADMIN — PANTOPRAZOLE SODIUM 40 MILLIGRAM(S): 20 TABLET, DELAYED RELEASE ORAL at 06:06

## 2020-02-13 RX ADMIN — Medication 9 UNIT(S): at 07:38

## 2020-02-13 RX ADMIN — Medication 325 MILLIGRAM(S): at 12:23

## 2020-02-13 RX ADMIN — GABAPENTIN 100 MILLIGRAM(S): 400 CAPSULE ORAL at 06:06

## 2020-02-13 RX ADMIN — Medication 10 UNIT(S): at 18:30

## 2020-02-13 RX ADMIN — Medication 10 MILLIGRAM(S): at 21:48

## 2020-02-13 RX ADMIN — GABAPENTIN 100 MILLIGRAM(S): 400 CAPSULE ORAL at 18:29

## 2020-02-13 RX ADMIN — Medication 650 MILLIGRAM(S): at 14:00

## 2020-02-13 RX ADMIN — BUDESONIDE AND FORMOTEROL FUMARATE DIHYDRATE 2 PUFF(S): 160; 4.5 AEROSOL RESPIRATORY (INHALATION) at 11:00

## 2020-02-13 RX ADMIN — Medication 10 MILLIGRAM(S): at 06:47

## 2020-02-13 RX ADMIN — Medication 2: at 13:10

## 2020-02-13 RX ADMIN — ESCITALOPRAM OXALATE 20 MILLIGRAM(S): 10 TABLET, FILM COATED ORAL at 12:23

## 2020-02-13 RX ADMIN — Medication 1 GRAM(S): at 12:23

## 2020-02-13 RX ADMIN — Medication 2: at 18:30

## 2020-02-13 RX ADMIN — Medication 25 MICROGRAM(S): at 06:06

## 2020-02-13 RX ADMIN — BUDESONIDE AND FORMOTEROL FUMARATE DIHYDRATE 2 PUFF(S): 160; 4.5 AEROSOL RESPIRATORY (INHALATION) at 21:47

## 2020-02-13 RX ADMIN — METHADONE HYDROCHLORIDE 10 MILLIGRAM(S): 40 TABLET ORAL at 12:23

## 2020-02-13 RX ADMIN — Medication 200 MILLIGRAM(S): at 11:00

## 2020-02-13 RX ADMIN — ATORVASTATIN CALCIUM 80 MILLIGRAM(S): 80 TABLET, FILM COATED ORAL at 21:48

## 2020-02-13 RX ADMIN — Medication 48 MILLIGRAM(S): at 12:23

## 2020-02-13 RX ADMIN — Medication 650 MILLIGRAM(S): at 12:59

## 2020-02-13 RX ADMIN — Medication 9 UNIT(S): at 14:29

## 2020-02-13 RX ADMIN — INSULIN GLARGINE 24 UNIT(S): 100 INJECTION, SOLUTION SUBCUTANEOUS at 21:49

## 2020-02-13 RX ADMIN — Medication 1 GRAM(S): at 18:29

## 2020-02-13 RX ADMIN — Medication 1 GRAM(S): at 06:06

## 2020-02-13 NOTE — PROGRESS NOTE ADULT - PROBLEM SELECTOR PLAN 4
-Dr. Soto consulted (notifed of consult this evening)  - TSH 8.49 (was WNL 2/2019). F/U T3 and T4  -Takes Synthroid 25 mcg daily at home but likely non-compliant. Abnormal TSH 8.49 (was WNL 2/2019).  T4 normal. On Synthroid 25 mcg; however taking meds wrong ( takes it with food, needs to be taken on empty stomach). Pt counseled how to take medication.  -Dr. Soto consulted, no additional treatment for hypothyroidism, continue to monitor TSH Abnormal TSH 8.49 (was WNL 2/2019).  T4 normal. On Synthroid 25 mcg; however taking meds wrong ( takes it with food, needs to be taken on empty stomach). Pt counseled how to take medication.  -Dr. Soto consulted, no medication changes or additional for hypothyroidism, continue to monitor TSH

## 2020-02-13 NOTE — CONSULT NOTE ADULT - ASSESSMENT
61 year old smoker with bipolar, DVTs ,CHF with EF 25%, ICD, stage 4 CKD (creatinine 2.5), myeloma, admitted with hypotension and RITCHIE.    Suggest:    1. Hold diuretics, entresto.  2. Follow SCr.  3. Gentle IVF to keep SBP > 90.  4. Check renal ultrasound.    Please call with any questions.    Jony Neal MD, FACP, FASN | kidney.[facility]  Nephrology, Hypertension, and Internal Medicine  Mobile: [phone] (Daytime Hours Only)  Office/Answering Service: [phone]  Asst. Prof. of Medicine, [employer] at [facility] - Nephrology at [place] [address]  [address], [address], [place], [place]

## 2020-02-13 NOTE — CONSULT NOTE ADULT - SUBJECTIVE AND OBJECTIVE BOX
HPI:    61 year old woman, seen by me in the office yesterday. At the time, she was unsteady on her feet and her SBP was 60 - 70 sitting. No CP, SOB, diarrhea, vomiting.  Her creatinine increased to 3.6 (from a baseline of 2.5) today.       PAST MEDICAL & SURGICAL HISTORY:  Neuropathy  PAD (peripheral artery disease)  Bipolar depression  Depression  HTN (hypertension)  DVT (deep venous thrombosis)  CVA (cerebral vascular accident)  DM (diabetes mellitus)  Acute on chronic systolic heart failure  Congestive heart failure, unspecified HF chronicity, unspecified heart failure type  Chronic pain  HLD (hyperlipidemia)  CVA (cerebral vascular accident)  Depression  COPD (chronic obstructive pulmonary disease)  Diabetes  CHF (congestive heart failure)  H/O extremity bypass graft  History of cholecystectomy  H/O tubal ligation  H/O abdominal hysterectomy  AICD (automatic cardioverter/defibrillator) present      MEDICATIONS:  amitriptyline 10 milliGRAM(s) Oral at bedtime  atorvastatin 80 milliGRAM(s) Oral at bedtime  budesonide 160 MICROgram(s)/formoterol 4.5 MICROgram(s) Inhaler 2 Puff(s) Inhalation two times a day  busPIRone 10 milliGRAM(s) Oral two times a day  dextrose 40% Gel 15 Gram(s) Oral once PRN  dextrose 5%. 1000 milliLiter(s) IV Continuous <Continuous>  dextrose 50% Injectable 12.5 Gram(s) IV Push once  dextrose 50% Injectable 25 Gram(s) IV Push once  dextrose 50% Injectable 25 Gram(s) IV Push once  escitalopram 20 milliGRAM(s) Oral daily  fenofibrate Tablet 48 milliGRAM(s) Oral daily  ferrous    sulfate 325 milliGRAM(s) Oral daily  gabapentin 100 milliGRAM(s) Oral every 12 hours  glucagon  Injectable 1 milliGRAM(s) IntraMuscular once PRN  insulin glargine Injectable (LANTUS) 24 Unit(s) SubCutaneous at bedtime  insulin lispro (HumaLOG) corrective regimen sliding scale   SubCutaneous Before meals and at bedtime  insulin lispro Injectable (HumaLOG) 10 Unit(s) SubCutaneous before breakfast  insulin lispro Injectable (HumaLOG) 10 Unit(s) SubCutaneous before lunch  insulin lispro Injectable (HumaLOG) 10 Unit(s) SubCutaneous before dinner  levothyroxine 25 MICROGram(s) Oral daily  methadone    Tablet 10 milliGRAM(s) Oral daily  metoprolol succinate  milliGRAM(s) Oral daily  pantoprazole    Tablet 40 milliGRAM(s) Oral before breakfast  sodium chloride 0.9%. 250 milliLiter(s) IV Continuous <Continuous>  sodium chloride 0.9%. 250 milliLiter(s) IV Continuous <Continuous>  sodium chloride 0.9%. 250 milliLiter(s) IV Continuous <Continuous>  sucralfate 1 Gram(s) Oral four times a day  warfarin 2.5 milliGRAM(s) Oral once      No pertinent family history in first degree relatives  Family history of diabetes mellitus (Mother)  Family history of hypertension (Mother)  No pertinent family history in first degree relatives      SOCIAL HISTORY: smoking    REVIEW OF SYSTEMS:  Constitutional: No fevers.   Card: No chest pain.   Resp: No SOB.  GI: No nausea or vomiting. No abdominal pain.  : No dysuria. Neuro: No focal weakness or sensory loss.  Eyes: No visual symptoms. MS: No joint swelling.  Skin: No rashes. Psych: No psychosis.    PHYSICAL EXAM:    2020 07:01  -  2020 07:00  --------------------------------------------------------  IN: 550 mL / OUT: 0 mL / NET: 550 mL    2020 07:01  -  2020 20:18  --------------------------------------------------------  IN: 488 mL / OUT: 1450 mL / NET: -962 mL      Constitutional: T(C): 36.6 (2020 18:46), Max: 37.2 (2020 05:33)  HR: 73 (2020 17:08) (55 - 76)  BP: 112/60 (2020 17:08) (89/48 - 122/65)  RR: 16 (2020 17:08) (14 - 16)  SpO2: 97% (2020 17:08) (93% - 97%)  Appearance: Alert, pleasant, INAD.  Eyes: Conjunctivae pink, moist. Pupils equal and reactive.  ENT: External ears/nose normal appearing. Lips normal, dentition good.  Lymphatic: No cervical lymphadenopathy. No supraclavicular lymphadenopathy.  Cardiac: No rubs. NO MURMURS. Extremities: NO LE EDEMA.  Respiratory: Effort no accessory muscle use. Lungs: decreased breath sounds  Abdomen: Soft. No masses. Nontender. Liver: Not palpable. Spleen: Not palpable.  Musculoskeltal digits: No clubbing or cyanosis. Tone normal. Moves all four extremities.  Skin inspection: No rashes. Palpation: No abnormalities.  Neuro: Cranial nerves: no facial assymetry or deficits noted. Sensation: LE intact to light touch.  Psych: Oriented to person, place, situation. Mood/affect: Not depressed.     DATA:  137    |  102    |  43<H>  ----------------------------<  106<H>  Ca:8.1<L> (2020 06:59)  4.6     |  23     |  3.63<H>      eGFR if Non : 13 <L>  eGFR if : 15 <L>    TPro  7.2    /  Alb  3.2<L>  /  TBili  0.2    /  DBili  x      /  AST  18     /  ALT  15     /  AlkPhos  77     2020 06:59    SCr 3.63 [2020 06:59]  SCr 2.59 [2020 12:45]                          11.1<L>  7.66  )-----------( 186      ( 2020 06:59 )             36.4     Phos:-- M.1 mg/dL PTH:-- Uric acid:-- Serum Osm:--  Ferritin:-- Iron:-- TIBC:-- Tsat:--  B12:-- TSH:-- (2020 06:59)    Urinalysis Basic - ( 2020 10:47 )  Color: Yellow / Appearance: Clear / S.015 / pH: x  Gluc: x / Ketone: NEGATIVE  / Bili: Negative / Urobili: 0.2 E.U./dL   Blood: x / Protein: NEGATIVE mg/dL / Nitrite: NEGATIVE   Leuk Esterase: NEGATIVE / RBC: < 5 /HPF / WBC < 5 /HPF   Sq Epi: x / Non Sq Epi: 5-10 /HPF<!> / Bacteria: Present /HPF<!>      UProt:-- UCr:32 mg/dL P/C Ratio:0.6 Ratio<H> 24 hour Prot:-- UVol:-- CrCl:--  Lary:84 mmol/L UOsm:249 mosmol/kg UVol:-- UCl:-- UK:15 mmol/L (2020 13:32)

## 2020-02-13 NOTE — CONSULT NOTE ADULT - ASSESSMENT
59 year old F with multiple medical issues who presented with outpatient hypotension likely due to medication increases and polypharmacy, poor PO intake, now improving    1. Polypharmacy    2. Heart failure  - Heart failure service consulted, appreciate recs    3. DVT/PE hx  -     4. 59 year old F with multiple medical issues who presented with outpatient hypotension likely due to medication increases and polypharmacy, poor PO intake, now improving    1. Polypharmacy    2. Heart failure  - Heart failure service consulted, appreciate recs    3. DVT/PE hx  - 59 year old F with multiple medical issues who presented with outpatient hypotension likely due to medication increases and polypharmacy, poor PO intake, now improving    1. Polypharmacy  - Patient with several cardiac medications. Would not make major psychiatric medication changes while patient overburdened due to her mother's death. Can try to taper ambien with 5mg dose at night, to eventually attempt stoppage of ambien. Otherwise, no major medication changes at present    2. Heart failure  - Heart failure service consulted, appreciate recs    3. DVT/PE hx  - Please call Dr. Fournier as patient follows with Coumadin clinic at [facility] - could evaluate for whether she is appropriate for Eliquis. If unable to reach, can try Dr. Ruiz      Medicine consult team to sign off at this time; please feel free to reconsult as needed with any additional questions or concerns

## 2020-02-13 NOTE — PROGRESS NOTE ADULT - PROBLEM SELECTOR PLAN 10
VTE: Coumadin   Diet:  Renal   Activity:  Ambulate as tolerated, OOB to chair.  Of note:  Medicine evaluated for Geriatrics, polypharmacy and anticoagulation.   Recommends changing from warfarin to apixiban 2.5mg bid (recommend discussing with dr goodrich + hematology) as she does not appear to be therapeutic (INRs) in the past. However after much discussion with NP Kinga, states “pt is compliant with medication and INR checks”,  NP recommends restarting Coumadin home maintenance.  Dr. Simmons made aware.   Dispo: Pt’s mother  is Saturday, 2/15/20, will try to medically optimized for discharge on Friday to attend  on Saturday.   consult today for bereavement.      Case d/w  Dr. Simmons.

## 2020-02-13 NOTE — PROGRESS NOTE ADULT - PROBLEM SELECTOR PLAN 1
- BP 65/36 mmHG in office. Current SBP in high 80s. Reports she is feeling much better.   -s/p  cc bolus in ED with plan for patient to receive 250 NS @ 50 cc/hour x two bags with frequent lung checks.  (EF 25% by Echo 1/2019)  -Will hold patient’s home Imdur 60 mg daily, Torsemide 80 mg daily, Entresto BID, and Toprol  mg daily.   -Will rule out infectious source of hypotension: RSV negative, CXR no infiltrate and UA negative for leuks/nitrates (UCx and Blood Cultures obtained). symptomatic Hypotension on admit, now with improved -122/60’s after IV  IVF hydration (NS 500cc Bolus in ED,  and 250 NS @ 50 cc/h overnight); now on gentle hydration NS @ 50cc x 5 hours for RITCHIE on CKD with frequent lung check.  -Restarted on Toprol XL 200mg daily.  Continue holding Imdur and Entresto for now, D/C Torsemide per Dr. Olguin.   -on gentle hydration for BP support and RITCHIE on CKD.   c/w NS @ 50cc x 5 hours with frequent lung checks as above.  -RSV negative, CXR no infiltrates.  Repeat UA negative (initial UA with epithelial cells).  F/U UCxs. symptomatic Hypotension on admit, now with improved -122/60’s after IV  IVF hydration (NS 500cc Bolus in ED,  and 250 NS @ 50 cc/h overnight).  -now on gentle hydration for BP support and RITCHIE on CKD.   c/w NS @ 50cc x 5 hours with frequent lung checks.   -Restarted on Toprol XL 200mg daily per HF team.  Continue holding Imdur and Entresto for now, D/C Torsemide.    -RSV negative, CXR no infiltrates.  Repeat UA negative (initial UA with epithelial cells).  F/U UCxs.  -Continue to monitor BP.

## 2020-02-13 NOTE — PROGRESS NOTE ADULT - PROBLEM SELECTOR PLAN 9
-Patient takes Methadone 10 mg daily (Prescribed by Ashu Mosley DO; [phone]; confirm dose in the morning)  -Continue Gabapentin 100mg orally BID   -Will hold home Tizanidine 4 mg daily and Trazadone at bedtime give hypotension.     #Multiple Myeloma  -Known to Dr. Ruiz  -Patient takes Decadron 20 mg every morning and was recently prescribed Ninlaro but has not started    VTE: Lovenox SC x one dose this evening.     Dispo: Hold BP medications, frequent lung checks as patient being hydrated. Consult medicine, neuro stroke team, Endocrine and Renal in the AM.     Case reviewed with Dr. Simmons. On Methadone 10 mg daily (Prescribed by Ashu Mosley DO; [phone]; confirm dose in the morning)    # Multiple Myeloma  -Known to Dr. Ruiz, on Decadron 20 mg in AM, recently prescribed Ninlaro, not started.   -Continue Gabapentin 100mg orally BID   -Continue to hold home Tizanidine 4 mg daily and Trazadone at bedtime give hypotension

## 2020-02-13 NOTE — PROGRESS NOTE ADULT - PROBLEM SELECTOR PLAN 7
-Will continue Amitriptyline 10 mg daily, Buspirone 10 mg daily, -Continue Amitriptyline 10 mg daily, Buspirone 10 mg daily.   -Per medicine, given poly pharmacy, would recommended weaning down Ativan from 10mg to 5mg tonight.  Suggest continue all other psych meds as prescribed

## 2020-02-13 NOTE — CONSULT NOTE ADULT - SUBJECTIVE AND OBJECTIVE BOX
**STROKE CONSULT NOTE**    HPI:  61F with PMHx current smoker, HTN, DM (neuropathy, takes methadone), bipolar disorder, MDD, DVT/multiple PEs (compliant on Coumadin), CVA x2 (most recent  w/ residual R weakness), PAD s/p bypass, HFrEF (25%), frequent HF exac s/p cardioMEMS, s/p AICD, hypothyroidism, COPD, DHARMESH, Multiple myeloma and stage 4 CKD who presented to [facility] ED after found to have BP 60s/30s in setting of URI symptoms. Patient currently admitted to [facility] for management of symptomatic HOTn and dehydration. Neuro/ stroke consulted to follow with CTH results - negative for acute stroke, but showed small age-indeterminant L frontal cortical infarct. NIHSS 0, exam with residual RUE weakness due to prior stroke, no tPA given. Patient has numerous risk factors for stroke, but currently states that she has been having trouble with medication regimen over the past week due to recent multiple myeloma, need for cataract removal, and her mother's death; feels overwhelmed with regimen. Denies headache, blurry vision, visual changes, numbness, slurred speech, and difficulty talking.     PAST MEDICAL & SURGICAL HISTORY:  Neuropathy  PAD (peripheral artery disease)  Bipolar depression  Depression  HTN (hypertension)  DVT (deep venous thrombosis)  CVA (cerebral vascular accident)  DM (diabetes mellitus)  Acute on chronic systolic heart failure  Chronic pain  HLD (hyperlipidemia)  CVA (cerebral vascular accident)  Depression  COPD (chronic obstructive pulmonary disease)  Diabetes  CHF (congestive heart failure)  H/O extremity bypass graft  History of cholecystectomy  H/O tubal ligation  H/O abdominal hysterectomy  AICD (automatic cardioverter/defibrillator) present      FAMILY HISTORY:  Family history of diabetes mellitus (Mother)  Family history of hypertension (Mother)      SOCIAL HISTORY:  Denies smoking, drinking, or drug use    ROS:  Constitutional: No fever, weight loss or fatigue  Eyes: No eye pain, visual disturbances, or discharge  ENMT:  No difficulty hearing, tinnitus, vertigo; No sinus or throat pain  Neck: No pain or stiffness  Respiratory: No cough, wheezing, chills or hemoptysis  Cardiovascular: No chest pain, palpitations, shortness of breath, dizziness or leg swelling  Gastrointestinal: No abdominal pain. No nausea, vomiting or hematemesis; No diarrhea or constipation. Nohematochezia.  Genitourinary: No dysuria, frequency, hematuria or incontinence  Neurological: As per HPI      MEDICATIONS  (STANDING):  amitriptyline 10 milliGRAM(s) Oral at bedtime  atorvastatin 80 milliGRAM(s) Oral at bedtime  budesonide 160 MICROgram(s)/formoterol 4.5 MICROgram(s) Inhaler 2 Puff(s) Inhalation two times a day  busPIRone 10 milliGRAM(s) Oral two times a day  dextrose 5%. 1000 milliLiter(s) (50 mL/Hr) IV Continuous <Continuous>  dextrose 50% Injectable 12.5 Gram(s) IV Push once  dextrose 50% Injectable 25 Gram(s) IV Push once  dextrose 50% Injectable 25 Gram(s) IV Push once  escitalopram 20 milliGRAM(s) Oral daily  fenofibrate Tablet 48 milliGRAM(s) Oral daily  ferrous    sulfate 325 milliGRAM(s) Oral daily  gabapentin 100 milliGRAM(s) Oral every 12 hours  insulin glargine Injectable (LANTUS) 24 Unit(s) SubCutaneous at bedtime  insulin lispro (HumaLOG) corrective regimen sliding scale   SubCutaneous Before meals and at bedtime  insulin lispro Injectable (HumaLOG) 10 Unit(s) SubCutaneous before breakfast  insulin lispro Injectable (HumaLOG) 10 Unit(s) SubCutaneous before lunch  insulin lispro Injectable (HumaLOG) 10 Unit(s) SubCutaneous before dinner  levothyroxine 25 MICROGram(s) Oral daily  methadone    Tablet 10 milliGRAM(s) Oral daily  metoprolol succinate  milliGRAM(s) Oral daily  pantoprazole    Tablet 40 milliGRAM(s) Oral before breakfast  sodium chloride 0.9%. 250 milliLiter(s) (50 mL/Hr) IV Continuous <Continuous>  sodium chloride 0.9%. 250 milliLiter(s) (50 mL/Hr) IV Continuous <Continuous>  sodium chloride 0.9%. 250 milliLiter(s) (50 mL/Hr) IV Continuous <Continuous>  sucralfate 1 Gram(s) Oral four times a day  warfarin 2.5 milliGRAM(s) Oral once    MEDICATIONS  (PRN):  dextrose 40% Gel 15 Gram(s) Oral once PRN Blood Glucose LESS THAN 70 milliGRAM(s)/deciliter  glucagon  Injectable 1 milliGRAM(s) IntraMuscular once PRN Glucose LESS THAN 70 milligrams/deciliter      Allergies  aspirin (Other (Moderate); Rash)  IV Contrast (Unknown)    Vital Signs Last 24 Hrs  T(C): 36.6 (2020 18:46), Max: 37.2 (2020 05:33)  T(F): 97.9 (2020 18:46), Max: 98.9 (2020 05:33)  HR: 73 (2020 17:08) (55 - 76)  BP: 112/60 (2020 17:08) (81/54 - 122/65)  BP(mean): 71 (2020 05:37) (66 - 72)  RR: 16 (2020 17:08) (14 - 20)  SpO2: 97% (2020 17:08) (93% - 97%)    PHYSICAL EXAM:  Constitutional: WDWN, overweight female; NAD    Neurologic:  -Mental status: Awake, alert and oriented to person, age, and month. Follows simple and complex commands, can perform serial 7's. Speech is fluent with intact naming. Recent and remote memory intact.  Attention/concentration intact.  No dysarthria, no aphasia.  Fund of knowledge appropriate.      -Cranial nerves:  II: Visual fields full to confrontation and finger counting.   III, IV, VI: Extraocular movements are intact without nystagmus.  V: Facial sensation intact V1 through V3 intact bilaterally.  VII: Face is symmetric with normal eye closure and smile.  VIII: hearing is intact to finger rub.  IX, X: uvula is midline and soft palate rises symmetrically.  XI: Head turning and shoulder shrug intact.  XII: Tongue protrudes in the midline.  -Motor:  No pronator drift. Normal bulk and tone. UE strength - LUE unremarkable and RUE  4/5, triceps 4/5, biceps 4/5, deltoids 5/5. Bilateral lower extremities 5/5, decreased dorsiflexion on left leg, but plantar flexion 5/5.     -Sensation: Intact to light touch.  No neglect.   -Coordination: No dysmetria on finger-to-nose and heel-to-shin b/l. No clumsiness.  -Reflexes: Downgoing toes bilaterally.  -Gait: Deferred.     NIHSS: 0, no tPA    Fingerstick Blood Glucose: CAPILLARY BLOOD GLUCOSE      POCT Blood Glucose.: 181 mg/dL (2020 18:23)       LABS:                        11.1   7.66  )-----------( 186      ( 2020 06:59 )             36.4         137  |  102  |  43<H>  ----------------------------<  106<H>  4.6   |  23  |  3.63<H>    Ca    8.1<L>      2020 06:59  Mg     2.1         TPro  7.2  /  Alb  3.2<L>  /  TBili  0.2  /  DBili  x   /  AST  18  /  ALT  15  /  AlkPhos  77  13    PT/INR - ( 2020 06:59 )   PT: 15.0 sec;   INR: 1.31          PTT - ( 2020 06:59 )  PTT:39.5 sec  CARDIAC MARKERS ( 2020 12:45 )  x     / 0.09 ng/mL / 81 U/L / x     / 3.3 ng/mL      Urinalysis Basic - ( 2020 10:47 )    Color: Yellow / Appearance: Clear / S.015 / pH: x  Gluc: x / Ketone: NEGATIVE  / Bili: Negative / Urobili: 0.2 E.U./dL   Blood: x / Protein: NEGATIVE mg/dL / Nitrite: NEGATIVE   Leuk Esterase: NEGATIVE / RBC: < 5 /HPF / WBC < 5 /HPF   Sq Epi: x / Non Sq Epi: 5-10 /HPF / Bacteria: Present /HPF        RADIOLOGY & ADDITIONAL STUDIES:  - CTH  < from: CT Head No Cont (20 @ 14:53) >    IMPRESSION:    No acute intracranial hemorrhage, mass effect or demarcated territorial infarction.    New from 2017, there is small site of left frontal cortical infarction which is age indeterminate. Correlate for any new right neurologic deficit, and consider MRI if clinically indicated. More remote leftlateral frontal infarction is unchanged.        ASSESSMENT/PLAN:  61F with PMHx current smoker, HTN, DM (neuropathy, takes methadone), bipolar disorder, MDD, DVT/multiple PEs (compliant on Coumadin), CVA x2 (most recent  w/ residual R weakness), PAD s/p bypass, HFrEF (25%), frequent HF exac s/p cardioMEMS, s/p AICD, hypothyroidism, COPD, DHARMESH, Multiple myeloma and stage 4 CKD admitted for symptomatic HOTn and dehydration. Neuro/ stroke consulted to follow with CTH results - negative for acute stroke, but showed small age-indeterminant L frontal cortical infarct. NIHSS 0, exam with residual RUE weakness due to prior stroke, no tPA give. On exam, patient has residual RUE weakness from prior stroke, but no new neurologic deficits. Patient has numerous risk factors for stroke, advise education for DM control, stroke prevention measures, smoking cessation, and proper diet.     1) Given no acute stroke on imaging, no need for neuro/stroke unit admission at this time.     2) Stroke RFs    - HTN     - DM HbA1c 10.3, advise proper nutrition and exercise for better DM control    - , advise high dose statin Atorvastatin 80 mg PO daily   - Current smoker, educated on benefits of smoking cessation    - Prev CVA     3) Further workup   - Order carotid dopplers to assess carotids.  - Continue with coumadin dose (likely long term med for hx of DVT), per MICU     4)DVT ppx - Chemoprophylaxis per MICU (Coumadin) and SCDs

## 2020-02-13 NOTE — PROGRESS NOTE ADULT - SUBJECTIVE AND OBJECTIVE BOX
SUBJECTIVE:    Pt reports feeling better today.   Pt reports that she was taking her synthroid with all of her other medications.  She had been unaware of the fact that she needed to take synthroid separate and before all other po intake each day.    MEDICATIONS:  amitriptyline 10 milliGRAM(s) Oral at bedtime  atorvastatin 80 milliGRAM(s) Oral at bedtime  budesonide 160 MICROgram(s)/formoterol 4.5 MICROgram(s) Inhaler 2 Puff(s) Inhalation two times a day  busPIRone 10 milliGRAM(s) Oral two times a day  dextrose 40% Gel 15 Gram(s) Oral once PRN  dextrose 5%. 1000 milliLiter(s) IV Continuous <Continuous>  dextrose 50% Injectable 12.5 Gram(s) IV Push once  dextrose 50% Injectable 25 Gram(s) IV Push once  dextrose 50% Injectable 25 Gram(s) IV Push once  escitalopram 20 milliGRAM(s) Oral daily  fenofibrate Tablet 48 milliGRAM(s) Oral daily  ferrous    sulfate 325 milliGRAM(s) Oral daily  gabapentin 100 milliGRAM(s) Oral every 12 hours  glucagon  Injectable 1 milliGRAM(s) IntraMuscular once PRN  insulin glargine Injectable (LANTUS) 29 Unit(s) SubCutaneous at bedtime  insulin lispro (HumaLOG) corrective regimen sliding scale   SubCutaneous Before meals and at bedtime  insulin lispro Injectable (HumaLOG) 9 Unit(s) SubCutaneous three times a day before meals  levothyroxine 25 MICROGram(s) Oral daily  methadone    Tablet 10 milliGRAM(s) Oral daily  metoprolol succinate  milliGRAM(s) Oral daily  pantoprazole    Tablet 40 milliGRAM(s) Oral before breakfast  sodium chloride 0.9%. 250 milliLiter(s) IV Continuous <Continuous>  sodium chloride 0.9%. 250 milliLiter(s) IV Continuous <Continuous>  sucralfate 1 Gram(s) Oral four times a day  	    PHYSICAL EXAM:  T(C): 36.6 (02-13-20 @ 08:50), Max: 37.2 (02-13-20 @ 05:33)  HR: 64 (02-13-20 @ 09:04) (52 - 98)  BP: 115/61 (02-13-20 @ 09:04) (78/40 - 115/61)  RR: 16 (02-13-20 @ 09:04) (14 - 20)  SpO2: 97% (02-13-20 @ 09:04) (90% - 98%)  Wt(kg): --    GEN: Awake, comfortable. No acute distress.   HEENT: Moist mucous membranes.   Neck: No JVD   CV: RRR, Normal S1/S2   RESP: distant breath sounds   ABD: Soft, Non-tender, Non-distended. obese  EXT: Warm, well-perfused extremities. trace edema  VASC: 2+ radial pulses bilaterally    I&O's Summary    Height (cm): 165.1 (02-12 @ 19:07)  Weight (kg): 96.8 (02-12 @ 19:07)  BMI (kg/m2): 35.5 (02-12 @ 19:07)  BSA (m2): 2.03 (02-12 @ 19:07)      LABS:	 	    CARDIAC MARKERS:                                  11.1   7.66  )-----------( 186      ( 13 Feb 2020 06:59 )             36.4     02-13    137  |  102  |  43<H>  ----------------------------<  106<H>  4.6   |  23  |  3.63<H>    Ca    8.1<L>      13 Feb 2020 06:59  Mg     2.1     02-13    TPro  7.2  /  Alb  3.2<L>  /  TBili  0.2  /  DBili  x   /  AST  18  /  ALT  15  /  AlkPhos  77  02-13    proBNP: Serum Pro-Brain Natriuretic Peptide: 2965 pg/mL (02-12 @ 12:45)    Lipid Profile:   HgA1c: Hemoglobin A1C, Whole Blood: 10.3 % (02-13 @ 06:59)    TSH: Thyroid Stimulating Hormone, Serum: 8.491 uIU/mL (02-12 @ 12:45)    	    ECG:  	sinus rhythm, possible old septal infarct    TELEMETRY: sinus    ECHO:  LVEF 25%, moderate MR, moderate TR, LA enlargement    STRESS:    CATH:    RADIOLOGY:    ASSESSMENT/PLAN:  59 y.o F pmh NICM EF 25% s/p AICD, Cardiomems, HTN, IDDM, neuropathy, bipolar, depression, DVT on coumadin, CVA x2, pad s/p bypass, hypothyroid, current smoker, copd home O2, DHARMESH, IGG multiple myeloma presents with hypotension at her outpt physicians office.    Hypotension:  - Likely multifactorial/medication induced  - R/O infectious etiology  - responded to gentle ivf  - hold entresto and isordil, monitor renal function and BP  - Would continue home Toprol dose  - TSH is 8.5, was normal approximately 1 year ago** should be addressed by primary team  - Polypharmacy likely contributing to hypotension, would attempt to remove sedating medications as tolerated

## 2020-02-13 NOTE — PROGRESS NOTE ADULT - PROBLEM SELECTOR PLAN 3
-Known to Dr. Neal (To consult in AM)  -Saw patient in office earlier today and referred to ED.   -Known stage 4 CKD (CR 2.0-2.30 11/2019-1/23/2020 outpatient labs). BUN/CR 38/2.59 today.   -Receiving gentle hydration. Holding home Entresto and Torsemide CKD stage IV (baseline 2.0 – 2.3)  -worsening renal function since admission, Crt 2.59, now 3.63; likely due to dehydration and Cardiorenal Dz.  Continue to monitor.   -Follows with Dr. Neal, recommends gentle hydration with NS @ 50cc x 5 hours.    - Holding Entresto 97/103mg BID), D/C Torsemide 80mg daily, continue holding all nephrotoxic agents    -Urine lytes WNL. UA neg, awaiting UCX results.   -Renal US ordered for hydronephrosis  -Renal diet.   -Repeat BMP @ 8PM  -Continue to trend daily BUN/Crt CKD stage IV (baseline 2.0 – 2.3)  -worsening renal function since admission, Crt 2.59, now 3.63; likely due to dehydration and Cardiorenal Dz.  Continue to monitor.   -Follows with Dr. Neal, recommends gentle hydration with NS @ 50cc x 5 hours.    - Holding Entresto 97/103mg BID), D/C Torsemide 80mg daily, continue holding all nephrotoxic agents    -Urine lytes WNL. UA neg, awaiting UCX results.   -Renal US ordered for hydronephrosis  -Renal diet.   -Repeat BMP @ 8PM  -Continue to trend daily BUN/Crt    #DHARMESH  -On CPAP at home, continue CPAP nightly as tolerated. CKD stage IV (baseline 2.0 – 2.3)  -worsening renal function since admission, Crt 2.59, now 3.63; likely due to dehydration and Cardiorenal Dz.  Continue to trend daily BUN/Cr .   -Pt follows with Dr. Neal, recommends gentle hydration with NS @ 50cc x 5 hours.  Continue to follow recs  - Holding Entresto 97/103mg BID), D/C Torsemide 80mg daily, continue holding all nephrotoxic agents    -Urine lytes WNL. UA neg, awaiting UCX results.   -Renal US ordered for hydronephrosis  -Renal diet.   -Repeat BMP @ 8PM       #DHARMESH  -On CPAP at home, continue CPAP nightly as tolerated.

## 2020-02-13 NOTE — PROGRESS NOTE ADULT - SUBJECTIVE AND OBJECTIVE BOX
S: Pt seen and examined bedside, continues to endorsed weakness and fatigue, denies active CP, SOB, diaphoresis, Palpitations, diaphoresis,  fever/chills/Cough, abdominal pain, N/V/D, dysuria or any other complaints at this time.   12 Point ROS otherwise negative except as per HPI/subjective.     O: Vital Signs Last 24 Hrs  T(C): 36.8 (2020 13:23), Max: 37.2 (2020 05:33)  T(F): 98.3 (2020 13:23), Max: 98.9 (2020 05:33)  HR: 72 (2020 13:00) (55 - 98)  BP: 122/65 (2020 13:00) (78/40 - 122/65)  BP(mean): 71 (2020 05:37) (63 - 72)  RR: 16 (2020 13:00) (14 - 20)  SpO2: 97% (2020 13:00) (90% - 98%)    PHYSICAL EXAM:   GEN:  Obese lady, NAD.  HEENT: Supple, No JVD B/L  PULM:  Faint crackles B/L, no RRW B/L  CARD:  RRR, S1 and S2   ABD: +BS x 4, NT, soft/ND	  EXT: No Edema B/L LE  NEURO: A+Ox3, no focal deficit  PSYCH: Sad (mother passed recently)    LABS:                        11.1   7.66  )-----------( 186      ( 2020 06:59 )             36.4     02-13    137  |  102  |  43<H>  ----------------------------<  106<H>  4.6   |  23  |  3.63<H>    Ca    8.1<L>      2020 06:59  Mg     2.1     02-13    TPro  7.2  /  Alb  3.2<L>  /  TBili  0.2  /  DBili  x   /  AST  18  /  ALT  15  /  AlkPhos  77  02-13    PT/INR - ( 2020 06:59 )   PT: 15.0 sec;   INR: 1.31          PTT - ( 2020 06:59 )  PTT:39.5 sec  Troponin T, Serum: 0.09 ng/mL (20 @ 12:45)       @ 07:01  -   @ 07:00  --------------------------------------------------------  IN: 550 mL / OUT: 0 mL / NET: 550 mL     @ 07:01  -   @ 14:21  --------------------------------------------------------  IN: 150 mL / OUT: 1050 mL / NET: -900 mL      Daily Height in cm: 165.1 (2020 19:07)    Daily Weight in k.4 (2020 05:33)

## 2020-02-13 NOTE — PROGRESS NOTE ADULT - PROBLEM SELECTOR PLAN 8
-F/U AM Hemoglobin A1c   -Home regimen: Levemir 40 units TID and Novolog 22 unites TID. Interchange ordered while admitted -Uncontrolled T2DM on home regimen: Levemir 40 units TID and Novolog 22 unites TID, now with HgA1c 10.3 on admit  -Dr. Soto consulted, recommends Lispro 10U TID before meals and Lantus 24U QHS. c/w moderate ISS and  FS qid

## 2020-02-13 NOTE — PROGRESS NOTE ADULT - PROBLEM SELECTOR PLAN 2
-Heart failure team evaluated patient in ED  - BNP 2965 on admission. Appears dry on exam.   - Holding Torsemide 80 mg daily, Entresto BID, Imdur 60 mg daily Toprol  mg daily.  -Gentle hydration with frequent lung checks as above.   - Prior Echo (1/15/2019): LV moderately dilated. Severe global hypokinesis of the LV, LVEF 25%, LA moderately dilated, severely elevated LAP, RA dilated, moderate MR, mild to moderate TR, moderate pHTN, mild KS. No need for repeat Echo per Dr. Simmons as patient not in current exacerbation -Currently euvolemic, not in heart failure exacerbation.  -Evaluated by Heart failure team today; recommends holding diuretics (Entresto and Imdur and D/C Toresmide in the setting of hypotension and RITCHIE.  - Prior Echo (1/15/2019): LA/LV mod dilated. Severe global hypokinesis of the LV, LVEF 25%, severely elevated LAP, RA dilated, mod MR, mild to moderate TR, mod pHTN, mild ME. No need for repeat Echo per Dr. Simmons as patient not in current exacerbation.   -Core measure, I/O’s and daily weights.   -Follow up appt made for Hedy (CHF NP) on Tuesday, 2/18/20 at 3pm in [address] [place] [address]. -Currently euvolemic, not in heart failure exacerbation.  -Evaluated by Heart failure team today; recommends holding diuretics Entresto and D/C Toresmide in the setting of hypotension and RITCHIE.  - Prior Echo (1/15/2019): LA/LV mod dilated. Severe global hypokinesis of the LV, LVEF 25%, severely elevated LAP, RA dilated, mod MR, mild to moderate TR, mod pHTN, mild WA. No need for repeat Echo per Dr. Simmons as patient not in current exacerbation.   -Core measure, I/O’s and daily weights.   -Follow up appt made for Hedy (CHF NP) on Tuesday, 2/18/20 at 3pm in [address] [place] [address].

## 2020-02-13 NOTE — PROGRESS NOTE ADULT - PROBLEM SELECTOR PLAN 6
-History of recurrent DVTs  -INR sub therapeutic on admission: 1.23. Takes Coumadin 1 mg daily at home. Admits to non compliance with recently.   -Will give Lovenox 100 mg x one dose this evening. Hold Coumadin tonight as may transition patient to NOAC in AM pending Med Consult.   -consult medicine team in the AM to discuss possible transition to Xarelto vs Eliquis.   -Will need follow up with geriatrician Marta Moreno upon discharge. -History of recurrent DVTs  -INR sub therapeutic on admission: 1.23, now with INR 1.3.  On Coumadin 1 mg daily at home, given Lovenox 100 mg x one on admit.  -Pt follows with Kinga (NP @ Coumadin clinic, saw pt 4 times, last visit 1/2019, INR 1.8 at that time).   -Recommend restarting Coumadin 2.5mg tonight and continue maintenance dose of Coumadin 2mg (Mon and Thurs) and Coumadin 1mg (Sun, Tues, Wed, Fri and Sat).    -NP did not favor switching p to DOAC or NOAC due to poss compliance issues and CKD.  -Continue to monitor for signs of bleeding      #Pt to follow up with geriatricbrook Moreno upon discharge.

## 2020-02-13 NOTE — PROGRESS NOTE ADULT - ASSESSMENT
60 yo female, current smoker, with a known Contrast and ASA allergy and an extensive PMHx including HTN, IDDM c/b neuropathy (takes Methadone), Bipolar disorder, depression, hx of DVT (with multiple prior DVTs, on Coumadin), CVA x2 (last one 2013 with residual right sided weakness), known PAD with prior bypass, NICM, chronic systolic CHF (EF 25% by Echo 1/2019, with frequent CHF admissions, cardiomems in place), s/p AICD for primary prevention, hypothyroidism , COPD (on 2-3L NC @ home), DHARMESH on CPAP, multiple myeloma (on Dexamethasone), stage 4CKD (CR 2.0-2.30 11/2019-1/23/2020 outpatient labs) admitted to [facility] ED (2/12/2020) for symptomatic hypotension in the setting of setting of dehydration and polypharmacy.

## 2020-02-13 NOTE — PROGRESS NOTE ADULT - PROBLEM SELECTOR PLAN 5
-Known history of CVA with residual Right sided deficit.   -CT Head on admission:  No acute intracranial hemorrhage, mass effect or demarcated territorial infarction. New mall site of left frontal cortical infarction which is age indeterminate (New from June 2017).  -To consult Neuro stroke team in AM  -Will continue Lipitor 80 mg daily. -Known history of CVA with residual Right sided deficit.    -AOx3, no focal deficits.  -CT Head on admission:  No acute intracranial hemorrhage, mass effect or demarcated territorial infarction. New mall site of left frontal cortical infarction which is age indeterminate (New from June 2017).  -Neuro stroke consulted, will see pt this afternoon, f/u recs.   -c/w Lipitor 80 mg daily.

## 2020-02-13 NOTE — CONSULT NOTE ADULT - SUBJECTIVE AND OBJECTIVE BOX
Medicine Consult Note    SULY PENA  59y  Female    HPI: Patient is a 61 year old F with PMH current smoker, ASA allergy, HTN, DM (neuropathy, takes methadone), bipolar disorder, MDD, DVT/multiple PEs on Coumadin, CVA x2 (residual R weakness), PAD s/p bypass, NICM, HFrEF (25%), frequent HF exac s/p cardioMEMS, s/p AICD, hypothyroidism, COPD (2-3L home O2 per [place]), DHARMESH on CPAP, multiple myeloma (on dexamethasone), stage 4 CKD), who presented to Dr. Neal's office and found to have bp 60s/30s in setting of URI symptoms, poor PO intake, medication noncompliance in setting of mother passing away last week. At [facility] ED, afebrile, 88/55, HR 60s, R18 sat 95% RA. EKG with LAD. WBC 11, BUN/creatinine 38/2.59, TSH 8.49, trop 0.09, BNP 2965, RVP negative. CT head negative though showed small age-indeterminant L frontal cortical infarct. Heart failure consult placed, recommended holding sedatives and hold home entresto, toprol XL, imdur, and torsemide. Med consult service activated in setting of patient taking synthroid incorrectly (was taking with other medications), polypharmacy      Review of Systems:  14 system ROS negative except as above      T(C): 36.6 (20 @ 08:50), Max: 37.2 (20 @ 05:33)  HR: 76 (20 @ 11:01) (52 - 98)  BP: 122/58 (20 @ 11:01) (78/40 - 122/58)  RR: 16 (20 @ 11:01) (14 - 20)  SpO2: 97% (20 @ 11:01) (90% - 98%)  Wt(kg): --Vital Signs Last 24 Hrs  T(C): 36.6 (2020 08:50), Max: 37.2 (2020 05:33)  T(F): 97.9 (2020 08:50), Max: 98.9 (2020 05:33)  HR: 76 (2020 11:01) (52 - 98)  BP: 122/58 (2020 11:01) (78/40 - 122/58)  BP(mean): 71 (2020 05:37) (63 - 72)  RR: 16 (2020 11:01) (14 - 20)  SpO2: 97% (2020 11:01) (90% - 98%)    Constitutional: WDWN resting comfortably in bed, NAD  Head: NC/AT  Eyes: EOMI, anicteric sclera  ENT: no nasal discharge  Neck: supple; no JVD  Respiratory: CTA b/l, no increased work of breathing  Cardiac: regular rate, +S1/S2  Gastrointestinal: soft, NT/ND, +BS  Extremities: WWP, no LE edema  Vascular: 2+ radial pulses B/L  Dermatologic: skin warm, dry and intact  Neurologic: Alert and oriented, no focal deficits appreciated  Psychiatric: affect and characteristics of appearance, verbalizations, behaviors are appropriate    Consultant(s) Notes Reviewed:  [x ] YES  [ ] NO  Care Discussed with Consultants/Other Providers [ x] YES  [ ] NO    LABS:                        11.1   7.66  )-----------( 186      ( 2020 06:59 )             36.4     02-13    137  |  102  |  43<H>  ----------------------------<  106<H>  4.6   |  23  |  3.63<H>    Ca    8.1<L>      2020 06:59  Mg     2.1     02-13    TPro  7.2  /  Alb  3.2<L>  /  TBili  0.2  /  DBili  x   /  AST  18  /  ALT  15  /  AlkPhos  77  02-13    PT/INR - ( 2020 06:59 )   PT: 15.0 sec;   INR: 1.31          PTT - ( 2020 06:59 )  PTT:39.5 sec  Urinalysis Basic - ( 2020 10:47 )    Color: Yellow / Appearance: Clear / S.015 / pH: x  Gluc: x / Ketone: NEGATIVE  / Bili: Negative / Urobili: 0.2 E.U./dL   Blood: x / Protein: NEGATIVE mg/dL / Nitrite: NEGATIVE   Leuk Esterase: NEGATIVE / RBC: < 5 /HPF / WBC < 5 /HPF   Sq Epi: x / Non Sq Epi: 5-10 /HPF / Bacteria: Present /HPF      CAPILLARY BLOOD GLUCOSE      POCT Blood Glucose.: 114 mg/dL (2020 06:35)  POCT Blood Glucose.: 97 mg/dL (2020 21:43)  POCT Blood Glucose.: 201 mg/dL (2020 13:48)  POCT Blood Glucose.: 57 mg/dL (2020 13:29)      ABG - ( 2020 12:43 )  pH, Arterial: 7.35  pH, Blood: x     /  pCO2: 22    /  pO2: 107   / HCO3: 12    / Base Excess: -12.6 /  SaO2: 98                Urinalysis Basic - ( 2020 10:47 )    Color: Yellow / Appearance: Clear / S.015 / pH: x  Gluc: x / Ketone: NEGATIVE  / Bili: Negative / Urobili: 0.2 E.U./dL   Blood: x / Protein: NEGATIVE mg/dL / Nitrite: NEGATIVE   Leuk Esterase: NEGATIVE / RBC: < 5 /HPF / WBC < 5 /HPF   Sq Epi: x / Non Sq Epi: 5-10 /HPF / Bacteria: Present /HPF        amitriptyline 10 milliGRAM(s) Oral at bedtime  atorvastatin 80 milliGRAM(s) Oral at bedtime  budesonide 160 MICROgram(s)/formoterol 4.5 MICROgram(s) Inhaler 2 Puff(s) Inhalation two times a day  busPIRone 10 milliGRAM(s) Oral two times a day  dextrose 40% Gel 15 Gram(s) Oral once PRN  dextrose 5%. 1000 milliLiter(s) IV Continuous <Continuous>  dextrose 50% Injectable 12.5 Gram(s) IV Push once  dextrose 50% Injectable 25 Gram(s) IV Push once  dextrose 50% Injectable 25 Gram(s) IV Push once  escitalopram 20 milliGRAM(s) Oral daily  fenofibrate Tablet 48 milliGRAM(s) Oral daily  ferrous    sulfate 325 milliGRAM(s) Oral daily  gabapentin 100 milliGRAM(s) Oral every 12 hours  glucagon  Injectable 1 milliGRAM(s) IntraMuscular once PRN  insulin glargine Injectable (LANTUS) 29 Unit(s) SubCutaneous at bedtime  insulin lispro (HumaLOG) corrective regimen sliding scale   SubCutaneous Before meals and at bedtime  insulin lispro Injectable (HumaLOG) 9 Unit(s) SubCutaneous three times a day before meals  levothyroxine 25 MICROGram(s) Oral daily  methadone    Tablet 10 milliGRAM(s) Oral daily  metoprolol succinate  milliGRAM(s) Oral daily  pantoprazole    Tablet 40 milliGRAM(s) Oral before breakfast  sodium chloride 0.9%. 250 milliLiter(s) IV Continuous <Continuous>  sodium chloride 0.9%. 250 milliLiter(s) IV Continuous <Continuous>  sucralfate 1 Gram(s) Oral four times a day      RADIOLOGY & ADDITIONAL TESTS reviewed Medicine Consult Note    SULY PENA  59y  Female    HPI: Patient is a 61 year old F with PMH current smoker, ASA allergy, HTN, DM (neuropathy, takes methadone), bipolar disorder, MDD, DVT/multiple PEs on Coumadin, CVA x2 (residual R weakness), PAD s/p bypass, NICM, HFrEF (25%), frequent HF exac s/p cardioMEMS, s/p AICD, hypothyroidism, COPD (2-3L home O2 per [place]), DHARMESH on CPAP, multiple myeloma (on dexamethasone), stage 4 CKD), who presented to Dr. Neal's office and found to have bp 60s/30s in setting of URI symptoms, poor PO intake, medication noncompliance in setting of mother passing away last week. At [facility] ED, afebrile, 88/55, HR 60s, R18 sat 95% RA. EKG with LAD. WBC 11, BUN/creatinine 38/2.59, TSH 8.49, trop 0.09, BNP 2965, RVP negative. CT head negative though showed small age-indeterminant L frontal cortical infarct. Heart failure consult placed, recommended holding sedatives and hold home entresto, toprol XL, imdur, and torsemide. Med consult service activated in setting of patient taking synthroid incorrectly (was taking with other medications), polypharmacy. States at time of hypotension she felt lightheaded and dizzy but no chest pain, SOB, abdominal pain, diarrhea/constipation, nausea/emesis. States that she has been having trouble with medication regimen over the past week due to recent multiple myeloma, need for cataract removal, and her mother's death; feels overwhelmed with regimen.       Review of Systems:  14 system ROS negative except as above      T(C): 36.6 (20 @ 08:50), Max: 37.2 (20 @ 05:33)  HR: 76 (20 @ 11:01) (52 - 98)  BP: 122/58 (20 @ 11:01) (78/40 - 122/58)  RR: 16 (20 @ 11:01) (14 - 20)  SpO2: 97% (20 @ 11:01) (90% - 98%)  Wt(kg): --Vital Signs Last 24 Hrs  T(C): 36.6 (2020 08:50), Max: 37.2 (2020 05:33)  T(F): 97.9 (2020 08:50), Max: 98.9 (2020 05:33)  HR: 76 (2020 11:01) (52 - 98)  BP: 122/58 (2020 11:01) (78/40 - 122/58)  BP(mean): 71 (2020 05:37) (63 - 72)  RR: 16 (2020 11:01) (14 - 20)  SpO2: 97% (2020 11:01) (90% - 98%)    Constitutional: WDWN F resting comfortably in bed, appears stressed  Head: NC/AT  Eyes: anicteric sclera  ENT: no nasal discharge  Neck: supple  Respiratory: Faint bibasilar crackles, no rhonchi or wheeze, no increased work of breathing  Cardiac: regular rate, +S1/S2  Gastrointestinal: soft, NT/ND, +BS  Extremities: WWP, no LE edema  Dermatologic: skin warm, dry  Neurologic: Alert and oriented, no new focal deficits appreciated  Psychiatric: sad, otherwise appropriate affect    Consultant(s) Notes Reviewed:  [x ] YES  [ ] NO  Care Discussed with Consultants/Other Providers [ x] YES  [ ] NO    LABS:                        11.1   7.66  )-----------( 186      ( 2020 06:59 )             36.4     02-13    137  |  102  |  43<H>  ----------------------------<  106<H>  4.6   |  23  |  3.63<H>    Ca    8.1<L>      2020 06:59  Mg     2.1     02-13    TPro  7.2  /  Alb  3.2<L>  /  TBili  0.2  /  DBili  x   /  AST  18  /  ALT  15  /  AlkPhos  77  02-13    PT/INR - ( 2020 06:59 )   PT: 15.0 sec;   INR: 1.31          PTT - ( 2020 06:59 )  PTT:39.5 sec  Urinalysis Basic - ( 2020 10:47 )    Color: Yellow / Appearance: Clear / S.015 / pH: x  Gluc: x / Ketone: NEGATIVE  / Bili: Negative / Urobili: 0.2 E.U./dL   Blood: x / Protein: NEGATIVE mg/dL / Nitrite: NEGATIVE   Leuk Esterase: NEGATIVE / RBC: < 5 /HPF / WBC < 5 /HPF   Sq Epi: x / Non Sq Epi: 5-10 /HPF / Bacteria: Present /HPF      CAPILLARY BLOOD GLUCOSE      POCT Blood Glucose.: 114 mg/dL (2020 06:35)  POCT Blood Glucose.: 97 mg/dL (2020 21:43)  POCT Blood Glucose.: 201 mg/dL (2020 13:48)  POCT Blood Glucose.: 57 mg/dL (2020 13:29)      ABG - ( 2020 12:43 )  pH, Arterial: 7.35  pH, Blood: x     /  pCO2: 22    /  pO2: 107   / HCO3: 12    / Base Excess: -12.6 /  SaO2: 98                Urinalysis Basic - ( 2020 10:47 )    Color: Yellow / Appearance: Clear / S.015 / pH: x  Gluc: x / Ketone: NEGATIVE  / Bili: Negative / Urobili: 0.2 E.U./dL   Blood: x / Protein: NEGATIVE mg/dL / Nitrite: NEGATIVE   Leuk Esterase: NEGATIVE / RBC: < 5 /HPF / WBC < 5 /HPF   Sq Epi: x / Non Sq Epi: 5-10 /HPF / Bacteria: Present /HPF        amitriptyline 10 milliGRAM(s) Oral at bedtime  atorvastatin 80 milliGRAM(s) Oral at bedtime  budesonide 160 MICROgram(s)/formoterol 4.5 MICROgram(s) Inhaler 2 Puff(s) Inhalation two times a day  busPIRone 10 milliGRAM(s) Oral two times a day  dextrose 40% Gel 15 Gram(s) Oral once PRN  dextrose 5%. 1000 milliLiter(s) IV Continuous <Continuous>  dextrose 50% Injectable 12.5 Gram(s) IV Push once  dextrose 50% Injectable 25 Gram(s) IV Push once  dextrose 50% Injectable 25 Gram(s) IV Push once  escitalopram 20 milliGRAM(s) Oral daily  fenofibrate Tablet 48 milliGRAM(s) Oral daily  ferrous    sulfate 325 milliGRAM(s) Oral daily  gabapentin 100 milliGRAM(s) Oral every 12 hours  glucagon  Injectable 1 milliGRAM(s) IntraMuscular once PRN  insulin glargine Injectable (LANTUS) 29 Unit(s) SubCutaneous at bedtime  insulin lispro (HumaLOG) corrective regimen sliding scale   SubCutaneous Before meals and at bedtime  insulin lispro Injectable (HumaLOG) 9 Unit(s) SubCutaneous three times a day before meals  levothyroxine 25 MICROGram(s) Oral daily  methadone    Tablet 10 milliGRAM(s) Oral daily  metoprolol succinate  milliGRAM(s) Oral daily  pantoprazole    Tablet 40 milliGRAM(s) Oral before breakfast  sodium chloride 0.9%. 250 milliLiter(s) IV Continuous <Continuous>  sodium chloride 0.9%. 250 milliLiter(s) IV Continuous <Continuous>  sucralfate 1 Gram(s) Oral four times a day      RADIOLOGY & ADDITIONAL TESTS reviewed

## 2020-02-14 ENCOUNTER — TRANSCRIPTION ENCOUNTER (OUTPATIENT)
Age: 60
End: 2020-02-14

## 2020-02-14 VITALS — TEMPERATURE: 98 F

## 2020-02-14 LAB
ANION GAP SERPL CALC-SCNC: 10 MMOL/L — SIGNIFICANT CHANGE UP (ref 5–17)
APTT BLD: 35.3 SEC — SIGNIFICANT CHANGE UP (ref 27.5–36.3)
BUN SERPL-MCNC: 42 MG/DL — HIGH (ref 7–23)
CALCIUM SERPL-MCNC: 8.4 MG/DL — SIGNIFICANT CHANGE UP (ref 8.4–10.5)
CHLORIDE SERPL-SCNC: 102 MMOL/L — SIGNIFICANT CHANGE UP (ref 96–108)
CO2 SERPL-SCNC: 23 MMOL/L — SIGNIFICANT CHANGE UP (ref 22–31)
CREAT SERPL-MCNC: 2.59 MG/DL — HIGH (ref 0.5–1.3)
CULTURE RESULTS: SIGNIFICANT CHANGE UP
GLUCOSE BLDC GLUCOMTR-MCNC: 111 MG/DL — HIGH (ref 70–99)
GLUCOSE BLDC GLUCOMTR-MCNC: 125 MG/DL — HIGH (ref 70–99)
GLUCOSE BLDC GLUCOMTR-MCNC: 135 MG/DL — HIGH (ref 70–99)
GLUCOSE SERPL-MCNC: 129 MG/DL — HIGH (ref 70–99)
HCT VFR BLD CALC: 39.1 % — SIGNIFICANT CHANGE UP (ref 34.5–45)
HGB BLD-MCNC: 12.2 G/DL — SIGNIFICANT CHANGE UP (ref 11.5–15.5)
INR BLD: 1.14 — SIGNIFICANT CHANGE UP (ref 0.88–1.16)
MAGNESIUM SERPL-MCNC: 2.2 MG/DL — SIGNIFICANT CHANGE UP (ref 1.6–2.6)
MCHC RBC-ENTMCNC: 30.3 PG — SIGNIFICANT CHANGE UP (ref 27–34)
MCHC RBC-ENTMCNC: 31.2 GM/DL — LOW (ref 32–36)
MCV RBC AUTO: 97.3 FL — SIGNIFICANT CHANGE UP (ref 80–100)
NRBC # BLD: 0 /100 WBCS — SIGNIFICANT CHANGE UP (ref 0–0)
PHOSPHATE SERPL-MCNC: 3.5 MG/DL — SIGNIFICANT CHANGE UP (ref 2.5–4.5)
PLATELET # BLD AUTO: 217 K/UL — SIGNIFICANT CHANGE UP (ref 150–400)
POTASSIUM SERPL-MCNC: 5.1 MMOL/L — SIGNIFICANT CHANGE UP (ref 3.5–5.3)
POTASSIUM SERPL-SCNC: 5.1 MMOL/L — SIGNIFICANT CHANGE UP (ref 3.5–5.3)
PROTHROM AB SERPL-ACNC: 13 SEC — HIGH (ref 10–12.9)
RBC # BLD: 4.02 M/UL — SIGNIFICANT CHANGE UP (ref 3.8–5.2)
RBC # FLD: 14.2 % — SIGNIFICANT CHANGE UP (ref 10.3–14.5)
SODIUM SERPL-SCNC: 135 MMOL/L — SIGNIFICANT CHANGE UP (ref 135–145)
SPECIMEN SOURCE: SIGNIFICANT CHANGE UP
WBC # BLD: 7.74 K/UL — SIGNIFICANT CHANGE UP (ref 3.8–10.5)
WBC # FLD AUTO: 7.74 K/UL — SIGNIFICANT CHANGE UP (ref 3.8–10.5)

## 2020-02-14 PROCEDURE — 99232 SBSQ HOSP IP/OBS MODERATE 35: CPT | Mod: GC

## 2020-02-14 PROCEDURE — 76775 US EXAM ABDO BACK WALL LIM: CPT | Mod: 26

## 2020-02-14 PROCEDURE — 99239 HOSP IP/OBS DSCHRG MGMT >30: CPT

## 2020-02-14 PROCEDURE — 76770 US EXAM ABDO BACK WALL COMP: CPT | Mod: 26

## 2020-02-14 PROCEDURE — 93880 EXTRACRANIAL BILAT STUDY: CPT | Mod: 26

## 2020-02-14 PROCEDURE — 99233 SBSQ HOSP IP/OBS HIGH 50: CPT | Mod: GC

## 2020-02-14 RX ORDER — SACUBITRIL AND VALSARTAN 24; 26 MG/1; MG/1
1 TABLET, FILM COATED ORAL
Qty: 0 | Refills: 0 | DISCHARGE

## 2020-02-14 RX ORDER — WARFARIN SODIUM 2.5 MG/1
1 TABLET ORAL
Qty: 38 | Refills: 3
Start: 2020-02-14 | End: 2020-06-12

## 2020-02-14 RX ORDER — ISOSORBIDE MONONITRATE 60 MG/1
1 TABLET, EXTENDED RELEASE ORAL
Qty: 0 | Refills: 0 | DISCHARGE

## 2020-02-14 RX ADMIN — Medication 1 GRAM(S): at 18:03

## 2020-02-14 RX ADMIN — Medication 25 MICROGRAM(S): at 06:05

## 2020-02-14 RX ADMIN — ESCITALOPRAM OXALATE 20 MILLIGRAM(S): 10 TABLET, FILM COATED ORAL at 11:26

## 2020-02-14 RX ADMIN — BUDESONIDE AND FORMOTEROL FUMARATE DIHYDRATE 2 PUFF(S): 160; 4.5 AEROSOL RESPIRATORY (INHALATION) at 10:21

## 2020-02-14 RX ADMIN — Medication 325 MILLIGRAM(S): at 11:26

## 2020-02-14 RX ADMIN — Medication 1 GRAM(S): at 11:25

## 2020-02-14 RX ADMIN — GABAPENTIN 100 MILLIGRAM(S): 400 CAPSULE ORAL at 18:03

## 2020-02-14 RX ADMIN — Medication 1 GRAM(S): at 06:05

## 2020-02-14 RX ADMIN — Medication 10 UNIT(S): at 07:55

## 2020-02-14 RX ADMIN — Medication 48 MILLIGRAM(S): at 11:26

## 2020-02-14 RX ADMIN — GABAPENTIN 100 MILLIGRAM(S): 400 CAPSULE ORAL at 06:05

## 2020-02-14 RX ADMIN — Medication 10 MILLIGRAM(S): at 06:05

## 2020-02-14 RX ADMIN — PANTOPRAZOLE SODIUM 40 MILLIGRAM(S): 20 TABLET, DELAYED RELEASE ORAL at 06:05

## 2020-02-14 RX ADMIN — Medication 200 MILLIGRAM(S): at 06:05

## 2020-02-14 RX ADMIN — Medication 10 UNIT(S): at 14:22

## 2020-02-14 RX ADMIN — METHADONE HYDROCHLORIDE 10 MILLIGRAM(S): 40 TABLET ORAL at 11:26

## 2020-02-14 NOTE — INPATIENT CERTIFICATION FOR MEDICARE PATIENTS - RISKS OF ADVERSE EVENTS
abnormal ECG; AF
Concern for cardiopulmonary deterioration/Concern for delay in diagnosis and treatment/Concern for neurologic deterioration
sepsis

## 2020-02-14 NOTE — PROGRESS NOTE ADULT - SUBJECTIVE AND OBJECTIVE BOX
Medicine Consult Note    SULY PENA  59y  Female    INTERVAL HPI/OVERNIGHT EVENTS: No acute complaints. Denies nausea, emesis, chest pain, palpitations, shortness of breath, abdominal pain, dizziness, lightheadedness. States that she feels well      Review of Systems:  14 system ROS negative except as above      T(C): 36.6 (20 @ 09:08), Max: 36.8 (20 @ 13:23)  HR: 69 (20 @ 10:25) (60 - 73)  BP: 122/61 (20 @ 08:27) (99/55 - 122/65)  RR: 16 (20 @ 08:27) (13 - 16)  SpO2: 92% (20 @ 10:25) (92% - 99%)  Wt(kg): --Vital Signs Last 24 Hrs  T(C): 36.6 (2020 09:08), Max: 36.8 (2020 13:23)  T(F): 97.9 (2020 09:08), Max: 98.3 (2020 13:23)  HR: 69 (2020 10:25) (60 - 73)  BP: 122/61 (2020 08:27) (99/55 - 122/65)  BP(mean): --  RR: 16 (2020 08:27) (13 - 16)  SpO2: 92% (2020 10:25) (92% - 99%)    Constitutional: WDWN F resting comfortably in bed  Head: NC/AT  Eyes: anicteric sclera  ENT: no nasal discharge  Neck: supple, +JVD  Respiratory: Faint diffuse crackles, no rhonchi or wheeze, no increased work of breathing  Cardiac: regular rate, +S1/S2  Gastrointestinal: soft, NT/ND, +BS  Extremities: WWP, no LE edema  Dermatologic: skin warm, dry  Neurologic: Alert and oriented, no new focal deficits appreciated  Psychiatric: Appropriate affect    Consultant(s) Notes Reviewed:  [x ] YES  [ ] NO  Care Discussed with Consultants/Other Providers [ x] YES  [ ] NO    LABS:                        12.2   7.74  )-----------( 217      ( 2020 07:02 )             39.1     02-14    135  |  102  |  42<H>  ----------------------------<  129<H>  5.1   |  23  |  2.59<H>    Ca    8.4      2020 07:02  Phos  3.5     02-14  Mg     2.2     02-14    TPro  7.2  /  Alb  3.2<L>  /  TBili  0.2  /  DBili  x   /  AST  18  /  ALT  15  /  AlkPhos  77  02-13    PT/INR - ( 2020 07:02 )   PT: 13.0 sec;   INR: 1.14          PTT - ( 2020 07:02 )  PTT:35.3 sec  Urinalysis Basic - ( 2020 10:47 )    Color: Yellow / Appearance: Clear / S.015 / pH: x  Gluc: x / Ketone: NEGATIVE  / Bili: Negative / Urobili: 0.2 E.U./dL   Blood: x / Protein: NEGATIVE mg/dL / Nitrite: NEGATIVE   Leuk Esterase: NEGATIVE / RBC: < 5 /HPF / WBC < 5 /HPF   Sq Epi: x / Non Sq Epi: 5-10 /HPF / Bacteria: Present /HPF      CAPILLARY BLOOD GLUCOSE      POCT Blood Glucose.: 111 mg/dL (2020 06:47)  POCT Blood Glucose.: 89 mg/dL (2020 21:11)  POCT Blood Glucose.: 181 mg/dL (2020 18:23)  POCT Blood Glucose.: 175 mg/dL (2020 13:05)      ABG - ( 2020 12:43 )  pH, Arterial: 7.35  pH, Blood: x     /  pCO2: 22    /  pO2: 107   / HCO3: 12    / Base Excess: -12.6 /  SaO2: 98                Urinalysis Basic - ( 2020 10:47 )    Color: Yellow / Appearance: Clear / S.015 / pH: x  Gluc: x / Ketone: NEGATIVE  / Bili: Negative / Urobili: 0.2 E.U./dL   Blood: x / Protein: NEGATIVE mg/dL / Nitrite: NEGATIVE   Leuk Esterase: NEGATIVE / RBC: < 5 /HPF / WBC < 5 /HPF   Sq Epi: x / Non Sq Epi: 5-10 /HPF / Bacteria: Present /HPF        amitriptyline 10 milliGRAM(s) Oral at bedtime  atorvastatin 80 milliGRAM(s) Oral at bedtime  budesonide 160 MICROgram(s)/formoterol 4.5 MICROgram(s) Inhaler 2 Puff(s) Inhalation two times a day  busPIRone 10 milliGRAM(s) Oral two times a day  dextrose 40% Gel 15 Gram(s) Oral once PRN  dextrose 5%. 1000 milliLiter(s) IV Continuous <Continuous>  dextrose 50% Injectable 12.5 Gram(s) IV Push once  dextrose 50% Injectable 25 Gram(s) IV Push once  dextrose 50% Injectable 25 Gram(s) IV Push once  escitalopram 20 milliGRAM(s) Oral daily  fenofibrate Tablet 48 milliGRAM(s) Oral daily  ferrous    sulfate 325 milliGRAM(s) Oral daily  gabapentin 100 milliGRAM(s) Oral every 12 hours  glucagon  Injectable 1 milliGRAM(s) IntraMuscular once PRN  insulin glargine Injectable (LANTUS) 24 Unit(s) SubCutaneous at bedtime  insulin lispro (HumaLOG) corrective regimen sliding scale   SubCutaneous Before meals and at bedtime  insulin lispro Injectable (HumaLOG) 10 Unit(s) SubCutaneous before breakfast  insulin lispro Injectable (HumaLOG) 10 Unit(s) SubCutaneous before lunch  insulin lispro Injectable (HumaLOG) 10 Unit(s) SubCutaneous before dinner  levothyroxine 25 MICROGram(s) Oral daily  methadone    Tablet 10 milliGRAM(s) Oral daily  metoprolol succinate  milliGRAM(s) Oral daily  pantoprazole    Tablet 40 milliGRAM(s) Oral before breakfast  sodium chloride 0.9%. 250 milliLiter(s) IV Continuous <Continuous>  sodium chloride 0.9%. 250 milliLiter(s) IV Continuous <Continuous>  sodium chloride 0.9%. 250 milliLiter(s) IV Continuous <Continuous>  sucralfate 1 Gram(s) Oral four times a day      RADIOLOGY & ADDITIONAL TESTS reviewed

## 2020-02-14 NOTE — DISCHARGE NOTE PROVIDER - NSDCMRMEDTOKEN_GEN_ALL_CORE_FT
Ambien 10 mg oral tablet: 1 tab(s) orally once a day (at bedtime)  amitriptyline 10 mg oral tablet: 1 tab(s) orally once a day (at bedtime)  atorvastatin 80 mg oral tablet: 1 tab(s) orally once a day (at bedtime)  busPIRone 10 mg oral tablet: 1 tab(s) orally 2 times a day  Decadron 4 mg oral tablet: 51 tab(s) orally once a week (Mondays)  Entresto 97 mg-103 mg oral tablet: 1 tab(s) orally 2 times a day  escitalopram 20 mg oral tablet: 1 tab(s) orally once a day  fenofibrate 48 mg oral tablet: 1 tab(s) orally once a day  FeroSul 325 mg (65 mg elemental iron) oral tablet: 1 tab(s) orally once a day  gabapentin 100 mg oral capsule: orally 2 times a day  isosorbide mononitrate 60 mg oral tablet, extended release: 1 tab(s) orally once a day (in the morning)  Levemir FlexTouch 100 units/mL subcutaneous solution: 40 unit(s) subcutaneous once a day (at bedtime)  levothyroxine 25 mcg (0.025 mg) oral tablet: 1 tab(s) orally once a day  methadone 10 mg oral tablet: 1 tab(s) orally once a day  Ninlaro 3 mg oral capsule: 1 cap(s) orally every 7 days (mondays)  NovoLOG FlexPen 100 units/mL injectable solution: 22 unit(s) injectable 3 times a day (before meals)  omeprazole 40 mg oral delayed release capsule: 1 cap(s) orally once a day  sucralfate 1 g oral tablet: 1 tab(s) orally 4 times a day  Symbicort 160 mcg-4.5 mcg/inh inhalation aerosol: 2 puff(s) inhaled 2 times a day  tiZANidine 4 mg oral tablet: 1 tab(s) orally once a day  Toprol- mg oral tablet, extended release: 1 tab(s) orally once a day  torsemide: 80 milligram(s) orally once a day  traZODone 50 mg oral tablet: orally once a day (at bedtime) Ambien 10 mg oral tablet: 1 tab(s) orally once a day (at bedtime)  amitriptyline 10 mg oral tablet: 1 tab(s) orally once a day (at bedtime)  atorvastatin 80 mg oral tablet: 1 tab(s) orally once a day (at bedtime)  busPIRone 10 mg oral tablet: 1 tab(s) orally 2 times a day  Decadron 4 mg oral tablet: 51 tab(s) orally once a week (Mondays)  escitalopram 20 mg oral tablet: 1 tab(s) orally once a day  fenofibrate 48 mg oral tablet: 1 tab(s) orally once a day  FeroSul 325 mg (65 mg elemental iron) oral tablet: 1 tab(s) orally once a day  gabapentin 100 mg oral capsule: orally 2 times a day  Levemir FlexTouch 100 units/mL subcutaneous solution: 30 unit(s) subcutaneous once a day (at bedtime)  levothyroxine 25 mcg (0.025 mg) oral tablet: 1 tab(s) orally once a day  methadone 10 mg oral tablet: 1 tab(s) orally once a day  Ninlaro 3 mg oral capsule: 1 cap(s) orally every 7 days (mondays)  NovoLOG FlexPen 100 units/mL injectable solution: 15 unit(s) injectable 3 times a day (before meals)  omeprazole 40 mg oral delayed release capsule: 1 cap(s) orally once a day  sucralfate 1 g oral tablet: 1 tab(s) orally 4 times a day  Symbicort 160 mcg-4.5 mcg/inh inhalation aerosol: 2 puff(s) inhaled 2 times a day  tiZANidine 4 mg oral tablet: 1 tab(s) orally once a day  Toprol- mg oral tablet, extended release: 1 tab(s) orally once a day  torsemide: 80 milligram(s) orally once a day  traZODone 50 mg oral tablet: orally once a day (at bedtime) Ambien 10 mg oral tablet: 1 tab(s) orally once a day (at bedtime)  amitriptyline 10 mg oral tablet: 1 tab(s) orally once a day (at bedtime)  atorvastatin 80 mg oral tablet: 1 tab(s) orally once a day (at bedtime)  busPIRone 10 mg oral tablet: 1 tab(s) orally 2 times a day  Coumadin 1 mg oral tablet: 1 tab(s) orally once a day on Sunday, Tuesday, Wednesday, Friday and saturday.   1 tab(s) orally once a day on Monday and Thursday  Decadron 4 mg oral tablet: 51 tab(s) orally once a week (Mondays)  escitalopram 20 mg oral tablet: 1 tab(s) orally once a day  fenofibrate 48 mg oral tablet: 1 tab(s) orally once a day  FeroSul 325 mg (65 mg elemental iron) oral tablet: 1 tab(s) orally once a day  gabapentin 100 mg oral capsule: orally 2 times a day  Levemir FlexTouch 100 units/mL subcutaneous solution: 30 unit(s) subcutaneous once a day (at bedtime)  levothyroxine 25 mcg (0.025 mg) oral tablet: 1 tab(s) orally once a day  methadone 10 mg oral tablet: 1 tab(s) orally once a day  Ninlaro 3 mg oral capsule: 1 cap(s) orally every 7 days (mondays)  NovoLOG FlexPen 100 units/mL injectable solution: 15 unit(s) injectable 3 times a day (before meals)  omeprazole 40 mg oral delayed release capsule: 1 cap(s) orally once a day  sucralfate 1 g oral tablet: 1 tab(s) orally 4 times a day  Symbicort 160 mcg-4.5 mcg/inh inhalation aerosol: 2 puff(s) inhaled 2 times a day  tiZANidine 4 mg oral tablet: 1 tab(s) orally once a day  Toprol- mg oral tablet, extended release: 1 tab(s) orally once a day  torsemide 20 mg oral tablet: 1 tab(s) orally once a day  traZODone 50 mg oral tablet: orally once a day (at bedtime)

## 2020-02-14 NOTE — DISCHARGE NOTE PROVIDER - CARE PROVIDER_API CALL
Hedy Valentin  [address] 9[address] [facility]  Phone: [phone]  Fax: (   )    -  Follow Up Time:

## 2020-02-14 NOTE — PHYSICAL THERAPY INITIAL EVALUATION ADULT - PERTINENT HX OF CURRENT PROBLEM, REHAB EVAL
60 yo female, current smoker, with a known Contrast and ASA allergy and an extensive PMHx (see H&P for details) presented to nephrologist Dr. Neal’s office today (2/12/2020) with the complaint of dizziness, fatigue, found to be hypotensive with BP 65/36 mmHG for which EMS was called.

## 2020-02-14 NOTE — DISCHARGE NOTE NURSING/CASE MANAGEMENT/SOCIAL WORK - NSDCPEPTSTRK_GEN_ALL_CORE
Signs and symptoms of stroke/Stroke warning signs and symptoms/Risk factors for stroke/Prescribed medications/Stroke education booklet/Stroke support groups for patients, families, and friends/Call 911 for stroke/Need for follow up after discharge

## 2020-02-14 NOTE — PHYSICAL EXAM
[Alert] : alert [No Acute Distress] : no acute distress [Well Nourished] : well nourished [Well Developed] : well developed [Normal Sclera/Conjunctiva] : normal sclera/conjunctiva [EOMI] : extra ocular movement intact [No Proptosis] : no proptosis [Normal Oropharynx] : the oropharynx was normal [Thyroid Not Enlarged] : the thyroid was not enlarged [No Respiratory Distress] : no respiratory distress [No Thyroid Nodules] : there were no palpable thyroid nodules [No Accessory Muscle Use] : no accessory muscle use [Clear to Auscultation] : lungs were clear to auscultation bilaterally [Normal Rate] : heart rate was normal  [Normal S1, S2] : normal S1 and S2 [Regular Rhythm] : with a regular rhythm [Pedal Pulses Normal] : the pedal pulses are present [No Edema] : there was no peripheral edema [Normal Bowel Sounds] : normal bowel sounds [Not Tender] : non-tender [Soft] : abdomen soft [Not Distended] : not distended [Post Cervical Nodes] : posterior cervical nodes [Anterior Cervical Nodes] : anterior cervical nodes [Axillary Nodes] : axillary nodes [No Spinal Tenderness] : no spinal tenderness [Normal] : normal and non tender [Spine Straight] : spine straight [No Stigmata of Cushings Syndrome] : no stigmata of cushings syndrome [Normal Strength/Tone] : muscle strength and tone were normal [Normal Gait] : normal gait [Acanthosis Nigricans] : no acanthosis nigricans [No Rash] : no rash [No Tremors] : no tremors [Normal Reflexes] : deep tendon reflexes were 2+ and symmetric [Oriented x3] : oriented to person, place, and time

## 2020-02-14 NOTE — DISCHARGE NOTE PROVIDER - HOSPITAL COURSE
60 yo female, current smoker, with a known Contrast and ASA allergy and an extensive PMHx including HTN, IDDM c/b neuropathy (takes Methadone), Bipolar disorder, depression, hx of DVT (with multiple prior DVTs, on Coumadin), CVA x2 (last one 2013 with residual right sided weakness), known PAD with prior bypass, NICM, chronic systolic CHF (EF 25% by Echo 1/2019, with frequent CHF admissions, cardiomems in place), s/p AICD for primary prevention, hypothyroidism , COPD (on 2-3L NC @ home), DHARMESH on CPAP, multiple myeloma (on Dexamethasone), stage 4CKD (CR 2.0-2.30 11/2019-1/23/2020 outpatient labs) who initially presented to nephrologist Dr. Neal’s office (2/12/2020) with the complaint of dizziness, fatigue and found to be hypotensive with BP 65/36 mmHG for which EMS was called.  Patient admits to poor PO intake in the setting her mother passing recently.            On arrival to ED: BP: 88/55, HR: 60s, RR: 18, O2: 95% RA, Afebrile.  12 Lead EKG Sinus Rhythm @ 70 BPM with left axis deviation. Physical exam significant for dry mucous membranes and dry skin. Pertinent lab values include WBC 11.05K, INR 1.23, BUN/CR 38/2.59, glucose 63, TSH 8.49 (was WNL 2/2019), Trop 0.09, BNP 2965, RVP negative. CT Head revealed No acute intracranial hemorrhage, mass effect or demarcated territorial infarction. New mall site of left frontal cortical infarction which is age indeterminate (New from June 2017). Patient received a 500 cc bolus, Tylenol 1000 mg orally x one dose and Zofran 4 mg IV x one dose. Heart failure team was consulted and evaluated patient in ED with impression hypotension is multifactorial/medication induced (with recommendation to remove sedating medications as tolerated) and hold home Entresto, Toprol XL, Imdur and Torsemide. Patient is now admitted to [facility] for further management of symptomatic hypotension in the setting of dehydration and polypharmacy.           Problem/Plan - 1:    ·  Problem: Hypotension.  Plan: symptomatic Hypotension on admit, now with improved -122/60’s after IV  IVF hydration (NS 500cc Bolus in ED,  and 250 NS @ 50 cc/h overnight).    -now on gentle hydration for BP support and RITCHIE on CKD.   c/w NS @ 50cc x 5 hours with frequent lung checks.     -Restarted on Toprol XL 200mg daily per HF team.  Continue holding Imdur and Entresto for now, D/C Torsemide.      -RSV negative, CXR no infiltrates.  Repeat UA negative (initial UA with epithelial cells).  F/U UCxs.    -Continue to monitor BP.          Problem/Plan - 2:    ·  Problem: Chronic systolic congestive heart failure.  Plan: -Currently euvolemic, not in heart failure exacerbation.    -Evaluated by Heart failure team today; recommends holding diuretics Entresto and D/C Toresmide in the setting of hypotension and RITCHIE.    - Prior Echo (1/15/2019): LA/LV mod dilated. Severe global hypokinesis of the LV, LVEF 25%, severely elevated LAP, RA dilated, mod MR, mild to moderate TR, mod pHTN, mild NJ. No need for repeat Echo per Dr. Simmons as patient not in current exacerbation.     -Core measure, I/O’s and daily weights.     -Follow up appt made for Hedy (CHF NP) on Tuesday, 2/18/20 at 3pm in [address] [place] [address].          Problem/Plan - 3:    ·  Problem: RITCHIE (acute kidney injury).  Plan: CKD stage IV (baseline 2.0 – 2.3)    -worsening renal function since admission, Crt 2.59, now 3.63; likely due to dehydration and Cardiorenal Dz.  Continue to trend daily BUN/Cr .     -Pt follows with Dr. Neal, recommends gentle hydration with NS @ 50cc x 5 hours.  Continue to follow recs    - Holding Entresto 97/103mg BID), D/C Torsemide 80mg daily, continue holding all nephrotoxic agents      -Urine lytes WNL. UA neg, awaiting UCX results.     -Renal US ordered for hydronephrosis    -Renal diet.     -Repeat BMP @ 8PM             #DHARMESH    -On CPAP at home, continue CPAP nightly as tolerated.          Problem/Plan - 4:    ·  Problem: Hypothyroidism.  Plan: Abnormal TSH 8.49 (was WNL 2/2019).  T4 normal. On Synthroid 25 mcg; however taking meds wrong ( takes it with food, needs to be taken on empty stomach). Pt counseled how to take medication.    -Dr. Soto consulted, no medication changes or additional recs for hypothyroidism, continue to monitor TSH.          Problem/Plan - 5:    ·  Problem: CVA (cerebral vascular accident).  Plan: -Known history of CVA with residual Right sided deficit.      -AOx3, no focal deficits.    -CT Head on admission:  No acute intracranial hemorrhage, mass effect or demarcated territorial infarction. New small site of left frontal cortical infarction which is age indeterminate (New from June 2017).    -Neuro Dr. Posey consulted, states pt does not have recent stroke. Carotid US ordered for further evaluation.  F/U results.  Per Dr. Posey pt NEEDS LONG LIFE ANTIPLATELET OR ANTICOAGULATION.    -c/w Lipitor 80 mg daily. 62 yo female, current smoker, with a known Contrast and ASA allergy and an extensive PMHx including HTN, IDDM c/b neuropathy (takes Methadone), Bipolar disorder, depression, hx of DVT (with multiple prior DVTs, on Coumadin), CVA x2 (last one 2013 with residual right sided weakness), known PAD with prior bypass, NICM, chronic systolic CHF (EF 25% by Echo 1/2019, with frequent CHF admissions, cardiomems in place), s/p AICD for primary prevention, hypothyroidism , COPD (on 2-3L NC @ home), DHARMESH on CPAP, multiple myeloma (on Dexamethasone), stage 4CKD (CR 2.0-2.30 11/2019-1/23/2020 outpatient labs) who initially presented to nephrologist Dr. Neal’s office (2/12/2020) with the complaint of dizziness, fatigue and found to be hypotensive with BP 65/36 mmHG for which EMS was called.  Patient admits to poor PO intake in the setting her mother passing recently.  In the ED patient BP 88/55 and Physical exam consistent with dry mucous membranes.  Patient admitted for hypotension likely 2/2 dehydration and polypharmacy.  RVP negative.  CXR without infiltrates.  Home Imdur, Entresto, and Torsemide held on admission.  She is s/p IV fluid hydration with improvement of her blood pressure.  Heart failure service following with recommendation to continue with Toprol XL 200mg daily, restart Torsemide at a lower dose to 20mg starting 2/15, holding Entresto until follow up appointment with heart failure, and discontinued Isosorbide mononitrate.          Patient with an RITCHIE on CKD stage IV with peak Crt 3.63 likely due to dehydration and with improvement of her kidney function after hydration.  Renal US revealing.... Patient will follow up with outpatient nephrologist dr. Neal.          During hospital course patient noted to have abnormal TSH 8.49 with history of hypothyroid on Synthroid.  Endocrine Dr. Soto consulted and upon review patient taking her synthroid with breakfast and she is counseled on the correct way of taking the medication, and no medications changes indicated.  Dr. Soto also consulted for uncontrolled DM with Hgb A1c 10.3 with reported hypoglycemia events at home.  Patient recommended to reduce home insulin regimen to Levemir 30 units at bedtime and Novolog 15 units before meals and follow up with endocrinologist Dr. Lieberman on discharge.         During hospital course patient underwent a CT Head given hypotension revealing No acute intracranial hemorrhage, mass effect or demarcated territorial infarction. New small site of left frontal cortical infarction which is age indeterminate (New from June 2017).  Neuro Dr. Posey consulted and per recs no recent stroke.  Carotid US revealing....  She is recommended to continue long term antiplatelet or anticoagulation.         Patient with Hx of recurrent DVTs for which she takes coumadin and found to have subtherapeutic INR on admission to 1.23.  Discussion with outpatient geriatrician NP Kinga Zhaoport about transition to NOAC vs. coumadin but per recommendations she favored continuing with coumadin as patient has been compliant with dosing and INR checks.  She is recommended to increase her dosing Coumadin 2mg (Mon and Thurs) and Coumadin 1mg (Sun, Tues, Wed, Fri and Sat).   Patient now feeling well.  Labs, Vitals, Meds reviewed with Dr. Simmons and patient deemed stable for discharge home.  She will follow up with heart failure team Hedy Valentin on 2/18/20 at 3pm. 62 yo female, current smoker, with a known Contrast and ASA allergy and an extensive PMHx including HTN, IDDM c/b neuropathy (takes Methadone), Bipolar disorder, depression, hx of DVT (with multiple prior DVTs, on Coumadin), CVA x2 (last one 2013 with residual right sided weakness), known PAD with prior bypass, NICM, chronic systolic CHF (EF 25% by Echo 1/2019, with frequent CHF admissions, cardiomems in place), s/p AICD for primary prevention, hypothyroidism , COPD (on 2-3L NC @ home), DHRAMESH on CPAP, multiple myeloma (on Dexamethasone), stage 4CKD (CR 2.0-2.30 11/2019-1/23/2020 outpatient labs) who initially presented to nephrologist Dr. Neal’s office (2/12/2020) with the complaint of dizziness, fatigue and found to be hypotensive with BP 65/36 mmHG for which EMS was called.  Patient admits to poor PO intake in the setting her mother passing recently.  In the ED patient BP 88/55 and Physical exam consistent with dry mucous membranes.  Patient admitted for hypotension likely 2/2 dehydration and polypharmacy.  RVP negative.  CXR without infiltrates.  Home Imdur, Entresto, and Torsemide held on admission.  She is s/p IV fluid hydration with improvement of her blood pressure.  Heart failure service following with recommendation to continue with Toprol XL 200mg daily, restart Torsemide at a lower dose to 20mg starting 2/15, holding Entresto until follow up appointment with heart failure, and discontinued Isosorbide mononitrate.        Patient with an RITCHIE on CKD stage IV with peak Crt 3.63 likely due to dehydration and with improvement of her kidney function after hydration.  Renal US revealing pre malcolm read negative for hydronephrosis.  Patient will follow up with outpatient nephrologist Dr. Neal.          During hospital course patient noted to have abnormal TSH 8.49 with history of hypothyroid on Synthroid.  Endocrine Dr. Soto consulted and upon review patient taking her synthroid with breakfast and she is counseled on the correct way of taking the medication, and no medication changes indicated.  Dr. Soto also consulted for uncontrolled DM with Hgb A1c 10.3 with reported hypoglycemia events at home.  Patient recommended to reduce home insulin regimen to Levemir 30 units at bedtime and Novolog 15 units before meals and follow up with endocrinologist Dr. Lieberman on discharge.        During hospital course patient underwent a CT Head given hypotension revealing No acute intracranial hemorrhage, mass effect or demarcated territorial infarction. New small site of left frontal cortical infarction which is age indeterminate (New from June 2017).  Neuro Dr. Posey consulted and per recs no recent stroke.  Carotid US pre malcolm read is unremarkable for carotid artery disease.  She is recommended to continue long term antiplatelet or anticoagulation.         Patient with Hx of recurrent DVTs for which she takes coumadin and found to have subtherapeutic INR on admission to 1.23.  Discussion with outpatient geriatrician NP Kinga Moreno about transition to NOAC vs. coumadin but per recommendations she favored continuing with coumadin as patient has been compliant with dosing and INR checks.  She is recommended to increase her dosing Coumadin 2mg (Mon and Thurs) and Coumadin 1mg (Sun, Tues, Wed, Fri and Sat).  She will follow up with Eli Moreno for INR check.          Patient now feeling well.  Labs, Vitals, Meds reviewed with Dr. Simmons and patient deemed stable for discharge home.  She will follow up with heart failure team Hedy Valentin on 2/18/20 at 3pm.  Medication refills are E prescribed to pharmacy of choice.  Discharge instructions and medication changes reviewed with patient thoroughly.

## 2020-02-14 NOTE — PROGRESS NOTE ADULT - SUBJECTIVE AND OBJECTIVE BOX
SUBJECTIVE:    Patient feeling well overnight    MEDICATIONS:  amitriptyline 10 milliGRAM(s) Oral at bedtime  atorvastatin 80 milliGRAM(s) Oral at bedtime  budesonide 160 MICROgram(s)/formoterol 4.5 MICROgram(s) Inhaler 2 Puff(s) Inhalation two times a day  busPIRone 10 milliGRAM(s) Oral two times a day  dextrose 40% Gel 15 Gram(s) Oral once PRN  dextrose 5%. 1000 milliLiter(s) IV Continuous <Continuous>  dextrose 50% Injectable 12.5 Gram(s) IV Push once  dextrose 50% Injectable 25 Gram(s) IV Push once  dextrose 50% Injectable 25 Gram(s) IV Push once  escitalopram 20 milliGRAM(s) Oral daily  fenofibrate Tablet 48 milliGRAM(s) Oral daily  ferrous    sulfate 325 milliGRAM(s) Oral daily  gabapentin 100 milliGRAM(s) Oral every 12 hours  glucagon  Injectable 1 milliGRAM(s) IntraMuscular once PRN  insulin glargine Injectable (LANTUS) 24 Unit(s) SubCutaneous at bedtime  insulin lispro (HumaLOG) corrective regimen sliding scale   SubCutaneous Before meals and at bedtime  insulin lispro Injectable (HumaLOG) 10 Unit(s) SubCutaneous before breakfast  insulin lispro Injectable (HumaLOG) 10 Unit(s) SubCutaneous before lunch  insulin lispro Injectable (HumaLOG) 10 Unit(s) SubCutaneous before dinner  levothyroxine 25 MICROGram(s) Oral daily  methadone    Tablet 10 milliGRAM(s) Oral daily  metoprolol succinate  milliGRAM(s) Oral daily  pantoprazole    Tablet 40 milliGRAM(s) Oral before breakfast  sodium chloride 0.9%. 250 milliLiter(s) IV Continuous <Continuous>  sodium chloride 0.9%. 250 milliLiter(s) IV Continuous <Continuous>  sodium chloride 0.9%. 250 milliLiter(s) IV Continuous <Continuous>  sucralfate 1 Gram(s) Oral four times a day  	    PHYSICAL EXAM:  T(C): 36.4 (02-14-20 @ 06:14), Max: 36.8 (02-13-20 @ 13:23)  HR: 73 (02-14-20 @ 08:27) (60 - 76)  BP: 122/61 (02-14-20 @ 08:27) (99/55 - 122/65)  RR: 16 (02-14-20 @ 08:27) (13 - 16)  SpO2: 95% (02-14-20 @ 08:27) (93% - 99%)  Wt(kg): --    GEN: Awake, comfortable. No acute distress.   HEENT: Moist mucous membranes.   Neck: No JVD   CV: RRR, Normal S1/S2. No murmurs, rubs, or gallops appreciated.   RESP: Clear to auscultation bilaterally  ABD: Soft, Non-tender, Non-distended. Normoactive bowel sounds.   EXT: Warm, well-perfused extremities. No edema, clubbing, or cyanosis.   VASC: 2+ radial pulses bilaterally  NEURO: No focal deficits.    I&O's Summary    13 Feb 2020 07:01  -  14 Feb 2020 07:00  --------------------------------------------------------  IN: 488 mL / OUT: 1450 mL / NET: -962 mL    LABS:	 	    CARDIAC MARKERS:                   12.2   7.74  )-----------( 217      ( 14 Feb 2020 07:02 )             39.1     02-14    135  |  102  |  42<H>  ----------------------------<  129<H>  5.1   |  23  |  2.59<H>    Ca    8.4      14 Feb 2020 07:02  Phos  3.5     02-14  Mg     2.2     02-14    TPro  7.2  /  Alb  3.2<L>  /  TBili  0.2  /  DBili  x   /  AST  18  /  ALT  15  /  AlkPhos  77  02-13    proBNP:   Lipid Profile:   HgA1c:   TSH:   	    ECG:  	sinus rhythm, possible old septal infarct    TELEMETRY: sinus    ECHO:  LVEF 25%, moderate MR, moderate TR, LA enlargement    RADIOLOGY:    ASSESSMENT/PLAN:  59 y.o F pmh NICM EF 25% s/p AICD, Cardiomems, HTN, IDDM, neuropathy, bipolar, depression, DVT on coumadin, CVA x2, pad s/p bypass, hypothyroid, current smoker, copd home O2, DHARMESH, IGG multiple myeloma presents with hypotension at her outpt physicians office.    HFrEF:  - Continue Toprol 200mg XL  - Torsemide 20mg PO for discharge  - Follow up with HF as outpatient next week  - Ok to hold other meds and will be initiated as outpatient

## 2020-02-14 NOTE — PROGRESS NOTE ADULT - REASON FOR ADMISSION
Dizziness, fatigue, hypotension
Symptomatic hypotension in the setting of dehydration and polypharmacy.

## 2020-02-14 NOTE — CONSULT NOTE ADULT - SUBJECTIVE AND OBJECTIVE BOX
HPI: 59yFemale    PMH & Surgical Hx:HYPOTENSION; ACUTE RENALFAILURE  No pertinent family history in first degree relatives  Family history of diabetes mellitus (Mother)  Family history of hypertension (Mother)  No pertinent family history in first degree relatives  Handoff  MEWS Score  Neuropathy  PAD (peripheral artery disease)  Bipolar depression  Depression  HTN (hypertension)  DVT (deep venous thrombosis)  CVA (cerebral vascular accident)  DM (diabetes mellitus)  Acute on chronic systolic heart failure  Congestive heart failure, unspecified HF chronicity, unspecified heart failure type  Chronic pain  HLD (hyperlipidemia)  CVA (cerebral vascular accident)  Depression  COPD (chronic obstructive pulmonary disease)  Diabetes  CHF (congestive heart failure)  Hypotension  VTE (venous thromboembolism)  COPD (chronic obstructive pulmonary disease)  Neuropathy  Diabetes  Bipolar depression  DVT (deep venous thrombosis)  CVA (cerebral vascular accident)  Hypothyroidism  RITCHIE (acute kidney injury)  Chronic systolic congestive heart failure  Hypotension  H/O extremity bypass graft  History of cholecystectomy  H/O tubal ligation  H/O abdominal hysterectomy  AICD (automatic cardioverter/defibrillator) present  HYPOTENSION  35  DVT (deep venous thrombosis)  Neuropathy  Multiple myeloma  Bipolar depression  Diabetes  Diabetes  Hypothyroidism  Chronic kidney disease (CKD)  Chronic systolic congestive heart failure  Acute renal failure      FH:  DM:  Thyroid:  Autoimmune:  Other:    SH:  Smoking:  Etoh:  Recreational Drugs:  Social Life:    Current Meds:  amitriptyline 10 milliGRAM(s) Oral at bedtime  atorvastatin 80 milliGRAM(s) Oral at bedtime  budesonide 160 MICROgram(s)/formoterol 4.5 MICROgram(s) Inhaler 2 Puff(s) Inhalation two times a day  busPIRone 10 milliGRAM(s) Oral two times a day  dextrose 40% Gel 15 Gram(s) Oral once PRN  dextrose 5%. 1000 milliLiter(s) IV Continuous <Continuous>  dextrose 50% Injectable 12.5 Gram(s) IV Push once  dextrose 50% Injectable 25 Gram(s) IV Push once  dextrose 50% Injectable 25 Gram(s) IV Push once  escitalopram 20 milliGRAM(s) Oral daily  fenofibrate Tablet 48 milliGRAM(s) Oral daily  ferrous    sulfate 325 milliGRAM(s) Oral daily  gabapentin 100 milliGRAM(s) Oral every 12 hours  glucagon  Injectable 1 milliGRAM(s) IntraMuscular once PRN  insulin glargine Injectable (LANTUS) 24 Unit(s) SubCutaneous at bedtime  insulin lispro (HumaLOG) corrective regimen sliding scale   SubCutaneous Before meals and at bedtime  insulin lispro Injectable (HumaLOG) 10 Unit(s) SubCutaneous before breakfast  insulin lispro Injectable (HumaLOG) 10 Unit(s) SubCutaneous before lunch  insulin lispro Injectable (HumaLOG) 10 Unit(s) SubCutaneous before dinner  levothyroxine 25 MICROGram(s) Oral daily  methadone    Tablet 10 milliGRAM(s) Oral daily  metoprolol succinate  milliGRAM(s) Oral daily  pantoprazole    Tablet 40 milliGRAM(s) Oral before breakfast  sodium chloride 0.9%. 250 milliLiter(s) IV Continuous <Continuous>  sodium chloride 0.9%. 250 milliLiter(s) IV Continuous <Continuous>  sodium chloride 0.9%. 250 milliLiter(s) IV Continuous <Continuous>  sucralfate 1 Gram(s) Oral four times a day      Allergies:  aspirin (Other (Moderate); Rash)  IV Contrast (Unknown)      ROS:  Denies the following except as indicated.    General: weight loss/weight gain, decreased appetite, fatigue  Eyes: Blurry vision, double vision, visual changes  ENT: Throat pain, changes in voice,   CV: palpitations, SOB, CP, cough  GI: NVD, difficulty swallowing, abdominal pain  : polyuria, dysuria  Endo: abnormal menses, temperature intolerance, decreased libido  MSK: weakness, joint pain  Skin: rash, dryness, diaphoresis  Heme: Easy bruising,bleeding  Neuro: HA, dizziness, lightheadedness, numbness tingling  Psych: Anxiety, Depression    Vital Signs Last 24 Hrs  T(C): 36.7 (2020 13:44), Max: 36.7 (2020 13:44)  T(F): 98 (2020 13:44), Max: 98 (2020 13:44)  HR: 72 (2020 13:27) (60 - 76)  BP: 145/70 (2020 13:27) (99/55 - 145/70)  BP(mean): --  RR: 16 (2020 13:27) (13 - 16)  SpO2: 95% (2020 13:27) (92% - 99%)  Height (cm): 165.1 ( @ 19:07)  Weight (kg): 96.8 ( @ 19:07)  BMI (kg/m2): 35.5 ( @ 19:07)    Constitutional: wn/wd in NAD.   HEENT: NCAT, MMM, OP clear, EOMI, , no proptosis or lid retraction  Neck: no thyromegaly or palpable thyroid nodules   Respiratory: lungs CTAB.  Cardiovascular: regular rhythm, normal S1 and S2, no audible murmurs  GI: soft, NT/ND, no masses/HSM appreciated.  Neurology: no tremors, DTR 2+  Skin: no visible rashes/lesions  Psychiatric: AAO x 3, normal affect/mood.  Ext: radial pulses intact, DP pulses intact, extremities warm, no cyanosis, clubbing or edema.       LABS:                        12.2   7.74  )-----------( 217      ( 2020 07:02 )             39.1     -    135  |  102  |  42<H>  ----------------------------<  129<H>  5.1   |  23  |  2.59<H>    Ca    8.4      2020 07:02  Phos  3.5     -  Mg     2.2     -    TPro  7.2  /  Alb  3.2<L>  /  TBili  0.2  /  DBili  x   /  AST  18  /  ALT  15  /  AlkPhos  77  02-13    PT/INR - ( 2020 07:02 )   PT: 13.0 sec;   INR: 1.14          PTT - ( 2020 07:02 )  PTT:35.3 sec  Urinalysis Basic - ( 2020 10:47 )    Color: Yellow / Appearance: Clear / S.015 / pH: x  Gluc: x / Ketone: NEGATIVE  / Bili: Negative / Urobili: 0.2 E.U./dL   Blood: x / Protein: NEGATIVE mg/dL / Nitrite: NEGATIVE   Leuk Esterase: NEGATIVE / RBC: < 5 /HPF / WBC < 5 /HPF   Sq Epi: x / Non Sq Epi: 5-10 /HPF / Bacteria: Present /HPF      Hemoglobin A1C, Whole Blood: 10.3 ( @ 06:59)    Thyroid Stimulating Hormone, Serum: 8.491 ( @ 12:45)    Cholesterol, Serum: 254 mg/dL (20 @ 06:59)  HDL Cholesterol, Serum: 33 mg/dL (20 @ 06:59)  Triglycerides, Serum: 370 mg/dL (20 @ 06:59)  Direct LDL: 147 mg/dL (20 @ 06:59)      CAPILLARY BLOOD GLUCOSE      POCT Blood Glucose.: 135 mg/dL (2020 17:48)  POCT Blood Glucose.: 125 mg/dL (2020 13:28)  POCT Blood Glucose.: 111 mg/dL (2020 06:47)  POCT Blood Glucose.: 89 mg/dL (2020 21:11) HPI:  62 yo female, current smoker, with a known Contrast and ASA allergy and an extensive PMHx including HTN, IDDM c/b neuropathy (takes Methadone), Bipolar disorder, depression, hx of DVT (with multiple prior DVTs, on Coumadin), CVA x2 (last one  with residual right sided weakness), known PAD with prior bypass, NICM, chronic systolic CHF (EF 25% by Echo 2019, with frequent CHF admissions, cardiomems in place), s/p AICD for primary prevention, hypothyroidism , COPD (on 2-3L NC @ home), DHARMESH on CPAP, multiple myeloma (on Dexamethasone), stage 4CKD (CR 2.0-2.30 2019-2020 outpatient labs) initially presented to nephrologist Dr. Neal’s office today (2020) with the complaint of dizziness, fatigue, found to be hypotensive with BP 65/36 mmHG for which EMS was called. Upon further questioning, pt admits to recent rhinorrhea and nonproductive cough, but denies fever, chills, abdominal pain, diarrhea, SOB (endorses chronic RAMSEY), leg edema, PND, orthopnea, dysuria. Patient admits to poor PO intake and recent noncompliance with medications as her mother passed away last week and is making her  arrangements.  She does note her Toprol XL was increased to 200 mg daily by outpatient CHF team (2020).  On arrival to ED: BP: 88/55, HR: 60s, RR: 18, O2: 95% RA, Afebrile.  12 Lead EKG Sinus Rhythm @ 70 BPM with left axis deviation. Physical exam significant for dry mucous membranes and dry skin. Pertinent lab values include WBC 11.05K, INR 1.23, BUN/CR 38/2.59, glucose 63, TSH 8.49 (was WNL 2019), Trop 0.09, BNP 2965, RVP negative. CT Head revealed No acute intracranial hemorrhage, mass effect or demarcated territorial infarction. New mall site of left frontal cortical infarction which is age indeterminate (New from 2017). Patient received a 500 cc bolus, Tylenol 1000 mg orally x one dose and Zofran 4 mg IV x one dose. Heart failure team was consulted and evaluated patient in ED with impression hypotension is multifactorial/medication induced (with recommendation to remove sedating medications as tolerated) and hold home Entresto, Toprol XL, Imdur and Torsemide. Patient is now admitted to [facility] for further management of symptomatic hypotension in the setting of dehydration and polypharmacy. (2020 17:43)    Pt reports recent adherence to diabetic regimen of levemir 40 units at bedtime, lispro 22 units with meals.   Pt reports frequent low blood sugars, has been checking glucose regularly, has made improvements to her diet.   denies regular physical activity  endorses hx of HHS,   reports hx of retinopathy, denies neuropathy, reports sx of gastroparesis.   insulin administration this admission reviewed.     PMH & Surgical Hx:  Neuropathy  PAD (peripheral artery disease)  Bipolar depression  Depression  HTN (hypertension)  DVT (deep venous thrombosis)  CVA (cerebral vascular accident)  DM (diabetes mellitus)  Acute on chronic systolic heart failure  HLD (hyperlipidemia)  COPD (chronic obstructive pulmonary disease)  H/O extremity bypass graft  History of cholecystectomy  H/O tubal ligation  H/O abdominal hysterectomy  AICD (automatic cardioverter/defibrillator) present      FH:  DM: in mother  Thyroid: denies  Autoimmune: denies  Other:    SH:  Smoking: current  Etoh: social  Recreational Drugs: denies  Social Life: lives w family    Current Meds:  amitriptyline 10 milliGRAM(s) Oral at bedtime  atorvastatin 80 milliGRAM(s) Oral at bedtime  budesonide 160 MICROgram(s)/formoterol 4.5 MICROgram(s) Inhaler 2 Puff(s) Inhalation two times a day  busPIRone 10 milliGRAM(s) Oral two times a day  dextrose 40% Gel 15 Gram(s) Oral once PRN  dextrose 5%. 1000 milliLiter(s) IV Continuous <Continuous>  dextrose 50% Injectable 12.5 Gram(s) IV Push once  dextrose 50% Injectable 25 Gram(s) IV Push once  dextrose 50% Injectable 25 Gram(s) IV Push once  escitalopram 20 milliGRAM(s) Oral daily  fenofibrate Tablet 48 milliGRAM(s) Oral daily  ferrous    sulfate 325 milliGRAM(s) Oral daily  gabapentin 100 milliGRAM(s) Oral every 12 hours  glucagon  Injectable 1 milliGRAM(s) IntraMuscular once PRN  insulin glargine Injectable (LANTUS) 24 Unit(s) SubCutaneous at bedtime  insulin lispro (HumaLOG) corrective regimen sliding scale   SubCutaneous Before meals and at bedtime  insulin lispro Injectable (HumaLOG) 10 Unit(s) SubCutaneous before breakfast  insulin lispro Injectable (HumaLOG) 10 Unit(s) SubCutaneous before lunch  insulin lispro Injectable (HumaLOG) 10 Unit(s) SubCutaneous before dinner  levothyroxine 25 MICROGram(s) Oral daily  methadone    Tablet 10 milliGRAM(s) Oral daily  metoprolol succinate  milliGRAM(s) Oral daily  pantoprazole    Tablet 40 milliGRAM(s) Oral before breakfast  sodium chloride 0.9%. 250 milliLiter(s) IV Continuous <Continuous>  sodium chloride 0.9%. 250 milliLiter(s) IV Continuous <Continuous>  sodium chloride 0.9%. 250 milliLiter(s) IV Continuous <Continuous>  sucralfate 1 Gram(s) Oral four times a day      Allergies:  aspirin (Other (Moderate); Rash)  IV Contrast (Unknown)      ROS:  Denies the following except as indicated.    General: weight loss/weight gain, decreased appetite, fatigue  Eyes: Blurry vision, double vision, visual changes  ENT: Throat pain, changes in voice,   CV: palpitations, SOB, CP, cough  GI: NVD, difficulty swallowing, abdominal pain  : polyuria, dysuria  Endo: abnormal menses, temperature intolerance, decreased libido  MSK: weakness, joint pain  Skin: rash, dryness, diaphoresis  Heme: Easy bruising,bleeding  Neuro: HA, dizziness, lightheadedness, numbness tingling  Psych: Anxiety, Depression    Vital Signs Last 24 Hrs  T(C): 36.7 (2020 13:44), Max: 36.7 (2020 13:44)  T(F): 98 (2020 13:44), Max: 98 (2020 13:44)  HR: 72 (2020 13:27) (60 - 76)  BP: 145/70 (2020 13:27) (99/55 - 145/70)  BP(mean): --  RR: 16 (2020 13:27) (13 - 16)  SpO2: 95% (2020 13:27) (92% - 99%)  Height (cm): 165.1 ( @ 19:07)  Weight (kg): 96.8 ( @ 19:07)  BMI (kg/m2): 35.5 ( @ 19:07)    Constitutional: wn/wd in NAD.   HEENT: NCAT, MMM, OP clear, EOMI, , no proptosis or lid retraction  Neck: no thyromegaly or palpable thyroid nodules   Respiratory: lungs CTAB.  Cardiovascular: regular rhythm, normal S1 and S2, no audible murmurs  GI: soft, NT/ND, no masses/HSM appreciated.  Neurology: no tremors, DTR 2+  Skin: no visible rashes/lesions  Psychiatric: AAO x 3, normal affect/mood.  Ext: radial pulses intact, DP pulses intact, extremities warm, no cyanosis, clubbing or edema.       LABS:                        12.2   7.74  )-----------( 217      ( 2020 07:02 )             39.1     02-    135  |  102  |  42<H>  ----------------------------<  129<H>  5.1   |  23  |  2.59<H>    Ca    8.4      2020 07:02  Phos  3.5     -  Mg     2.2     -14    TPro  7.2  /  Alb  3.2<L>  /  TBili  0.2  /  DBili  x   /  AST  18  /  ALT  15  /  AlkPhos  77  02-13    PT/INR - ( 2020 07:02 )   PT: 13.0 sec;   INR: 1.14          PTT - ( 2020 07:02 )  PTT:35.3 sec  Urinalysis Basic - ( 2020 10:47 )    Color: Yellow / Appearance: Clear / S.015 / pH: x  Gluc: x / Ketone: NEGATIVE  / Bili: Negative / Urobili: 0.2 E.U./dL   Blood: x / Protein: NEGATIVE mg/dL / Nitrite: NEGATIVE   Leuk Esterase: NEGATIVE / RBC: < 5 /HPF / WBC < 5 /HPF   Sq Epi: x / Non Sq Epi: 5-10 /HPF / Bacteria: Present /HPF      Hemoglobin A1C, Whole Blood: 10.3 ( @ 06:59)    Thyroid Stimulating Hormone, Serum: 8.491 ( @ 12:45)    Cholesterol, Serum: 254 mg/dL (20 @ 06:59)  HDL Cholesterol, Serum: 33 mg/dL (20 @ 06:59)  Triglycerides, Serum: 370 mg/dL (20 @ 06:59)  Direct LDL: 147 mg/dL (20 @ 06:59)      CAPILLARY BLOOD GLUCOSE      POCT Blood Glucose.: 135 mg/dL (2020 17:48)  POCT Blood Glucose.: 125 mg/dL (2020 13:28)  POCT Blood Glucose.: 111 mg/dL (2020 06:47)  POCT Blood Glucose.: 89 mg/dL (2020 21:11)

## 2020-02-14 NOTE — CONSULT NOTE ADULT - ATTENDING COMMENTS
59 y.o F pmh NICM EF 25% s/p AICD, Cardiomems, HTN, IDDM, neuropathy, bipolar, depression, DVT on coumadin, CVA x2, pad s/p bypass, hypothyroid, current smoker, copd home O2, DHARMESH, IGG multiple myeloma presents with symptomatic hypotension at her renal physicians office.    Hypotension:  - Likely multifactorial/medication induced  - R/O infectious etiology  - responded to gentle ivf  - hold entresto, diuretics and isordil. monitor renal function and BP  - TSH is 8.5, was normal approximately 1 year ago** should be addressed by primary team  - Polypharmacy likely contributing to hypotension, would attempt to remove sedating medications as tolerated    Zoya Olguin
A/P: 59y Female with hx of DM presenting for management of hypotension, with improving glycemic control    1.  DM -type 2 uncontrolled, complicated  resume levemir 30, novolog 15 units TID with meals on discharge.   Continue consistent carb diet, Nutrition consult  dilute all medications in NS instead of D5 when possible.   Ensure correct injection and FSG technique    2.  Hyperlipidemia - LDL goal < 70  continue statin    3.  Obesity - outpatient medical management.     4.  Hypothyroidism - d/w pt need for adherence to synthroid.     Pt is advised to follow up with me at discharge.  Please call  to schedule an appointment.
a/  Hypotension - suspect from medications+ dehydration  Binh on ckd iv (suspect pre-renal)   HFrEF (25%) chronic  s/p AICD  stroke x 2 (rt deficits)  hx of HTN  DM II  Methadone use  Hx DVT on warfarin  PAD w/ byass  Mult myeloma  COPD  Hypothyroid  Ckd IV    p/  hold torsemide /entresto/metop  lantus at 29 u qhs + lispro ---FS are near goal  SSI  methadone  consider changing from warfarin to apixiban 2.5mg bid (recommend discussing with dr goodrich + hematology)  as she does not appear to be therapeutic (INRs) in the past.   Nephro following  Recommend heme (holli) consult  Renal u/s

## 2020-02-14 NOTE — PROGRESS NOTE ADULT - NSHPATTENDINGPLANDISCUSS_GEN_ALL_CORE
patient, CHF consult team and 5U team
patient, CHF consult team and 5U team
HS
the patient, dr. Olguin, and cardiac team

## 2020-02-14 NOTE — ASSESSMENT
[FreeTextEntry1] : 1) DM2: Uncontrolled, A1C on 2/2020 at 14%. taget of <7%. Natural hx of the disease and importance of treatment targets discussed at length, she verbalized understanding. ADA diet and importance of exercise discussed at length. Plan is to increase introduce GLP-1 agonist in the future if c peptide is detectable. Unable to titrate basal bolus regimen at this time. May re incorporate metformin if eGFR is >30-45. explained the potential high risk and toxicities with chronic insulin use including, but not limited to life threatening hypoglycemia and death, Verbalized understanding and agrees with treatment plan, will contact MD and seek emergency medical care if condition changes. Advised on importance of frequent monitoring and to not skip her insulin. Increase basal to 40 units QHS, bolus to 22 units TIDAC Refer to Nutritionist today. We avelino check microalbumin, lipids and labs on the NV. Discuss vaccines and podiatry/opthalmology referrals on NV.\par \par 2) Weight gain:  complicated by DM2. Discussed medical  strategies. Pt would like to try lifestyle modifications and GLP-1 agonist therapy in the future. Reassess on the NV for at least ~5% TBW loss.\par \par 3) Essential HTN: Pt is not at goal BP and on an ARB. Reassess microalbumin prior to the NV. check PTH in the future. r/o 2ry causes of HTN and uncontrolled hyperglycemia.\par  \par 4) Dyslipidemia: Pt is on a moderate intensity statin. Atorvastatin 40 mg QDaily. REassess lipids on the next visit. LDL target <100.\par  [Carbohydrate Consistent Diet] : carbohydrate consistent diet [Hypoglycemia Management] : hypoglycemia management [Diabetes Foot Care] : diabetes foot care [Importance of Diet and Exercise] : importance of diet and exercise to improve glycemic control, achieve weight loss and improve cardiovascular health [Long Term Vascular Complications] : long term vascular complications of diabetes [Self Monitoring of Blood Glucose] : self monitoring of blood glucose [Action and use of Insulin] : action and use of short and long-acting insulin [Incretin Mimetic Therapy] : Risks and benefits of incretin mimetic therapy were discussed with the patient including nauseau, pancreatitis and potential risk of medullary thyroid cancer [Insulin Self-Administration] : insulin self-administration

## 2020-02-14 NOTE — PHYSICAL THERAPY INITIAL EVALUATION ADULT - ADDITIONAL COMMENTS
patient owns cane, rollator, commode, w/c at home; has home aide 5hrs/7days to assist with ADLs. on 3L O2 NC at home but able to ambulate short distances without suppO2 and SOB.

## 2020-02-14 NOTE — CONSULT NOTE ADULT - REASON FOR ADMISSION
Dizziness, fatigue, hypotension

## 2020-02-14 NOTE — DISCHARGE NOTE NURSING/CASE MANAGEMENT/SOCIAL WORK - PATIENT PORTAL LINK FT
You can access the FollowMyHealth Patient Portal offered by [facility] by registering at the following website: [email or web address]. By joining [facility]’s FollowMyHealth portal, you will also be able to view your health information using other applications (apps) compatible with our system.

## 2020-02-14 NOTE — DISCHARGE NOTE PROVIDER - NSDCFUSCHEDAPPT_GEN_ALL_CORE_FT
SULY PENA ; 02/18/2020 ; NPP Cardio Vasc [address]  SULY PENA ; 02/26/2020 ; NPP HemOnc [address]   SULY PENA ; 02/27/2020 ; NPP Surg Vasc [address]   SULY PENA ; 03/09/2020 ; NPP HeartVasc [address]   SULY PENA ; 03/09/2020 ; NPP HeartVasc [address]   SULY PENA ; 03/26/2020 ; NPP Endocrin [address]   SULY PENA ; 04/08/2020 ; NPP Endocrin [address]

## 2020-02-14 NOTE — PHYSICAL THERAPY INITIAL EVALUATION ADULT - GENERAL OBSERVATIONS, REHAB EVAL
Patient received semi-supine no acute distress +telemetry +3L O2 NC, cleared for PT by DANISH Ricks, agreeable to PT Eval. Left as found +call bell, RN aware. FIM 2

## 2020-02-14 NOTE — HISTORY OF PRESENT ILLNESS
[FreeTextEntry1] : 58 y/o f w/ Hx of uncontrolled insulin requiring DM2, HTN, HLD, CKD stage 4, CHF. Here for initial DM2 evaluation.\par Generally feels well and endorses no acute complaints. Reports taking insulin though out most of the course of her disease. Multiple micro-macrovascular complications reported. Monitors infrequently. Log or glucometer not brought to today's apt. Reports skipping her Levemir 15 units QHS dose last night. Admits to frequent un compliance w/ insulin regimen. Previously on PO meds which were dced as renal function worsened. Has not tried GLP-1 agonists in the past. \par \par 2/2020 lost to f/u x 10 months, interval MM diagnosis, receiving decadron every monday. was un un compliant w/ monitoring and insulin up until 2 months ago. now reports compliance, although she is still not monitoring adequately.\par She otherwise denies any f/c, CP, SOB, palpitations, tremors, depressed mood, anxiety, palpitations, n/v, stool/urinary abn, skin/weight changes, heat/cold intolerance, HAs, breast/nipple changes, polyuria/polydipsia/nocturia or other complaints.\par

## 2020-02-14 NOTE — PROGRESS NOTE ADULT - ATTENDING COMMENTS
Pt reports feeling better today.   Eager to go home for her mother’s wake.    59 y.o F pmh NICM EF 25% s/p AICD, Cardiomems, HTN, IDDM, neuropathy, bipolar, depression, DVT on coumadin, CVA x2, pad s/p bypass, hypothyroid, current smoker, copd home O2, DHARMESH, IGG multiple myeloma presents with hypotension at her outpt physicians office.  Creat lowered to 2.59 today   HFrEF:  - Continue Toprol 200mg XL  - Torsemide 20mg PO for discharge—start tomorrow  - Follow up with HF Hedy Szymanski   as outpatient next week  - Ok to hold other meds and will be initiated as outpatient  Isordil d/c’ed, restart entresto as outpatient  Ambulate in [place], then d/c to home      I have personally provided 30 minutes of patient care time concurrently with the fellow.  This time excludes time spent on separate procedures and time spent teaching. I have reviewed the fellow’s documentation and I agree with the fellow’s assessment and plan of care.  ZLOTAN Olguin
Pt reports feeling better today.   Pt reports that she was taking her synthroid with all of her other medications.  She had been unaware of the fact that she needed to take synthroid separate and before all other po intake each day.    Overwhelmed and distressed with her medical issues and recent loss of her mother.  59 y.o F pmh NICM EF 25% s/p AICD, Cardiomems, HTN, IDDM, neuropathy, bipolar, depression, DVT on coumadin, CVA x2, pad s/p bypass, hypothyroid, current smoker, copd home O2, DHARMESH, IGG multiple myeloma presents with hypotension at her outpt physicians office.    Hypotension:  - Likely multifactorial/medication induced  - R/O infectious etiology  - responded to gentle ivf  - hold entresto. torsemide and isordil, monitor renal function and BP  - Would continue home Toprol dose  - TSH is 8.5, was normal approximately 1 year ago** should be addressed by primary team  - Polypharmacy likely contributing to hypotension, would attempt to remove sedating medications as tolerated    I have personally provided 30 minutes of patient care time concurrently with the fellow.  This time excludes time spent on separate procedures and time spent teaching. I have reviewed the fellow’s documentation and I agree with the fellow’s assessment and plan of care.  ZOLTAN Olgiun
See attending addendum to HPI for initial attending contact documentation. See PA note written above, for details. I reviewed the PA documentation.  I have personally seen and examined this patient. I reviewed vitals, labs, medications, cardiac studies and additional imaging.  I agree with the PA's findings and plans as written above with the following additions/amendments:  61F active current smoker, with HTN, IDDM c/b neuropathy (takes Methadone), Bipolar disorder, depression, hx of multiple DVTs on Coumadin, CVA x2 (latest 2013 with residual right sided weakness), PAD s/p LE bypass, Chronic Systolic CHFrEF 25% with cardiomems in place s/p AICD,  Hypothyroidism on Synthroid, COPD (on 2-3L NC @ home), DHARMESH on CPAP, multiple myeloma (on Dexamethasone), stage 4CKD (CR 2.0-2.30) who presented to the ED with symptomatic hypotension in the setting of setting of dehydration in context of diuretic and CHF med effect c/b polypharmacy. Labs notable for deteriorating renal function : acute renal insufficiency on CKD IV with interval worsening creatinine.     Plan for:  Gentle hydration with NS at 250cc aliquots   Hold home Entresto, home diuretics and isordil  BID chemistry check for close renal function monitoring  Metoprolol 200XL daily   Strict I/O, can liberalize fluid restriction to 2L/day  Daily STANDING weights, core measure orders  Advanced CHF consultation for assistance with management   Med/Renee consult for assistance with reducing polypharmacy  Nephrology consult (Dr. Neal known to patient) for RITCHIE on CKD in context of hypovolemia due to med effect  Endocrinology consult for uncontrolled DM with A1c 10.3% and deranged TFTs despite synthroid use  Neuro stroke service consulted given CT scan findings of infarct (unclear if new or old)   requested given recent loss of patient's mother and active grieving  Future planning: patient to have appt made with CHF team within 1 week of discharge for quality improvement CHF readmission risk reduction  MILY Lion.  Cardiology Attending

## 2020-02-14 NOTE — DISCHARGE NOTE PROVIDER - PROVIDER TOKENS
FREE:[LAST:[Homero],FIRST:[Hedy],PHONE:[phone]],FAX:[(   [phone]    -],ADDRESS:[[address] [place] [address] 9[facility]]]

## 2020-02-18 ENCOUNTER — APPOINTMENT (OUTPATIENT)
Dept: HEART AND VASCULAR | Facility: CLINIC | Age: 60
End: 2020-02-18

## 2020-02-18 RX ORDER — ISOSORBIDE MONONITRATE 60 MG/1
60 TABLET, EXTENDED RELEASE ORAL DAILY
Qty: 90 | Refills: 1 | Status: DISCONTINUED | COMMUNITY
Start: 2017-03-21 | End: 2020-02-18

## 2020-02-20 ENCOUNTER — APPOINTMENT (OUTPATIENT)
Dept: GASTROENTEROLOGY | Facility: CLINIC | Age: 60
End: 2020-02-20

## 2020-02-20 DIAGNOSIS — N17.9 ACUTE KIDNEY FAILURE, UNSPECIFIED: ICD-10-CM

## 2020-02-20 DIAGNOSIS — G47.33 OBSTRUCTIVE SLEEP APNEA (ADULT) (PEDIATRIC): ICD-10-CM

## 2020-02-20 DIAGNOSIS — C90.00 MULTIPLE MYELOMA NOT HAVING ACHIEVED REMISSION: ICD-10-CM

## 2020-02-20 DIAGNOSIS — I95.9 HYPOTENSION, UNSPECIFIED: ICD-10-CM

## 2020-02-20 DIAGNOSIS — Z86.718 PERSONAL HISTORY OF OTHER VENOUS THROMBOSIS AND EMBOLISM: ICD-10-CM

## 2020-02-20 DIAGNOSIS — I73.9 PERIPHERAL VASCULAR DISEASE, UNSPECIFIED: ICD-10-CM

## 2020-02-20 DIAGNOSIS — Z79.01 LONG TERM (CURRENT) USE OF ANTICOAGULANTS: ICD-10-CM

## 2020-02-20 DIAGNOSIS — E78.5 HYPERLIPIDEMIA, UNSPECIFIED: ICD-10-CM

## 2020-02-20 DIAGNOSIS — N18.4 CHRONIC KIDNEY DISEASE, STAGE 4 (SEVERE): ICD-10-CM

## 2020-02-20 DIAGNOSIS — I13.0 HYPERTENSIVE HEART AND CHRONIC KIDNEY DISEASE WITH HEART FAILURE AND STAGE 1 THROUGH STAGE 4 CHRONIC KIDNEY DISEASE, OR UNSPECIFIED CHRONIC KIDNEY DISEASE: ICD-10-CM

## 2020-02-20 DIAGNOSIS — J44.9 CHRONIC OBSTRUCTIVE PULMONARY DISEASE, UNSPECIFIED: ICD-10-CM

## 2020-02-20 DIAGNOSIS — I69.351 HEMIPLEGIA AND HEMIPARESIS FOLLOWING CEREBRAL INFARCTION AFFECTING RIGHT DOMINANT SIDE: ICD-10-CM

## 2020-02-20 DIAGNOSIS — Z90.49 ACQUIRED ABSENCE OF OTHER SPECIFIED PARTS OF DIGESTIVE TRACT: ICD-10-CM

## 2020-02-20 DIAGNOSIS — Z86.73 PERSONAL HISTORY OF TRANSIENT ISCHEMIC ATTACK (TIA), AND CEREBRAL INFARCTION WITHOUT RESIDUAL DEFICITS: ICD-10-CM

## 2020-02-20 DIAGNOSIS — Z79.4 LONG TERM (CURRENT) USE OF INSULIN: ICD-10-CM

## 2020-02-20 DIAGNOSIS — I50.22 CHRONIC SYSTOLIC (CONGESTIVE) HEART FAILURE: ICD-10-CM

## 2020-02-20 DIAGNOSIS — Z95.810 PRESENCE OF AUTOMATIC (IMPLANTABLE) CARDIAC DEFIBRILLATOR: ICD-10-CM

## 2020-02-20 DIAGNOSIS — F31.9 BIPOLAR DISORDER, UNSPECIFIED: ICD-10-CM

## 2020-02-20 DIAGNOSIS — I42.8 OTHER CARDIOMYOPATHIES: ICD-10-CM

## 2020-02-20 DIAGNOSIS — E11.40 TYPE 2 DIABETES MELLITUS WITH DIABETIC NEUROPATHY, UNSPECIFIED: ICD-10-CM

## 2020-02-20 DIAGNOSIS — Z99.81 DEPENDENCE ON SUPPLEMENTAL OXYGEN: ICD-10-CM

## 2020-02-20 DIAGNOSIS — E66.9 OBESITY, UNSPECIFIED: ICD-10-CM

## 2020-02-20 DIAGNOSIS — Z90.710 ACQUIRED ABSENCE OF BOTH CERVIX AND UTERUS: ICD-10-CM

## 2020-02-20 DIAGNOSIS — E03.9 HYPOTHYROIDISM, UNSPECIFIED: ICD-10-CM

## 2020-02-20 DIAGNOSIS — G89.4 CHRONIC PAIN SYNDROME: ICD-10-CM

## 2020-03-02 PROCEDURE — 85027 COMPLETE CBC AUTOMATED: CPT

## 2020-03-02 PROCEDURE — 36415 COLL VENOUS BLD VENIPUNCTURE: CPT

## 2020-03-02 PROCEDURE — 87633 RESP VIRUS 12-25 TARGETS: CPT

## 2020-03-02 PROCEDURE — 99285 EMERGENCY DEPT VISIT HI MDM: CPT | Mod: 25

## 2020-03-02 PROCEDURE — 87040 BLOOD CULTURE FOR BACTERIA: CPT

## 2020-03-02 PROCEDURE — 83036 HEMOGLOBIN GLYCOSYLATED A1C: CPT

## 2020-03-02 PROCEDURE — 84300 ASSAY OF URINE SODIUM: CPT

## 2020-03-02 PROCEDURE — 96361 HYDRATE IV INFUSION ADD-ON: CPT

## 2020-03-02 PROCEDURE — 83605 ASSAY OF LACTIC ACID: CPT

## 2020-03-02 PROCEDURE — 84484 ASSAY OF TROPONIN QUANT: CPT

## 2020-03-02 PROCEDURE — 97116 GAIT TRAINING THERAPY: CPT

## 2020-03-02 PROCEDURE — 82553 CREATINE MB FRACTION: CPT

## 2020-03-02 PROCEDURE — 84132 ASSAY OF SERUM POTASSIUM: CPT

## 2020-03-02 PROCEDURE — 94660 CPAP INITIATION&MGMT: CPT

## 2020-03-02 PROCEDURE — 84295 ASSAY OF SERUM SODIUM: CPT

## 2020-03-02 PROCEDURE — 93005 ELECTROCARDIOGRAM TRACING: CPT

## 2020-03-02 PROCEDURE — 87798 DETECT AGENT NOS DNA AMP: CPT

## 2020-03-02 PROCEDURE — 84436 ASSAY OF TOTAL THYROXINE: CPT

## 2020-03-02 PROCEDURE — 87486 CHLMYD PNEUM DNA AMP PROBE: CPT

## 2020-03-02 PROCEDURE — 82962 GLUCOSE BLOOD TEST: CPT

## 2020-03-02 PROCEDURE — 84105 ASSAY OF URINE PHOSPHORUS: CPT

## 2020-03-02 PROCEDURE — 83735 ASSAY OF MAGNESIUM: CPT

## 2020-03-02 PROCEDURE — 84480 ASSAY TRIIODOTHYRONINE (T3): CPT

## 2020-03-02 PROCEDURE — 84133 ASSAY OF URINE POTASSIUM: CPT

## 2020-03-02 PROCEDURE — 82010 KETONE BODYS QUAN: CPT

## 2020-03-02 PROCEDURE — 85730 THROMBOPLASTIN TIME PARTIAL: CPT

## 2020-03-02 PROCEDURE — 82330 ASSAY OF CALCIUM: CPT

## 2020-03-02 PROCEDURE — 83935 ASSAY OF URINE OSMOLALITY: CPT

## 2020-03-02 PROCEDURE — 84540 ASSAY OF URINE/UREA-N: CPT

## 2020-03-02 PROCEDURE — 87581 M.PNEUMON DNA AMP PROBE: CPT

## 2020-03-02 PROCEDURE — 71045 X-RAY EXAM CHEST 1 VIEW: CPT

## 2020-03-02 PROCEDURE — 84443 ASSAY THYROID STIM HORMONE: CPT

## 2020-03-02 PROCEDURE — 81001 URINALYSIS AUTO W/SCOPE: CPT

## 2020-03-02 PROCEDURE — 82550 ASSAY OF CK (CPK): CPT

## 2020-03-02 PROCEDURE — 96374 THER/PROPH/DIAG INJ IV PUSH: CPT

## 2020-03-02 PROCEDURE — 87086 URINE CULTURE/COLONY COUNT: CPT

## 2020-03-02 PROCEDURE — 93880 EXTRACRANIAL BILAT STUDY: CPT

## 2020-03-02 PROCEDURE — 82803 BLOOD GASES ANY COMBINATION: CPT

## 2020-03-02 PROCEDURE — 85025 COMPLETE CBC W/AUTO DIFF WBC: CPT

## 2020-03-02 PROCEDURE — 94640 AIRWAY INHALATION TREATMENT: CPT

## 2020-03-02 PROCEDURE — 82570 ASSAY OF URINE CREATININE: CPT

## 2020-03-02 PROCEDURE — 76770 US EXAM ABDO BACK WALL COMP: CPT

## 2020-03-02 PROCEDURE — 83690 ASSAY OF LIPASE: CPT

## 2020-03-02 PROCEDURE — 85610 PROTHROMBIN TIME: CPT

## 2020-03-02 PROCEDURE — 80061 LIPID PANEL: CPT

## 2020-03-02 PROCEDURE — 80048 BASIC METABOLIC PNL TOTAL CA: CPT

## 2020-03-02 PROCEDURE — 70450 CT HEAD/BRAIN W/O DYE: CPT

## 2020-03-02 PROCEDURE — 84156 ASSAY OF PROTEIN URINE: CPT

## 2020-03-02 PROCEDURE — 83880 ASSAY OF NATRIURETIC PEPTIDE: CPT

## 2020-03-02 PROCEDURE — 84100 ASSAY OF PHOSPHORUS: CPT

## 2020-03-02 PROCEDURE — 97161 PT EVAL LOW COMPLEX 20 MIN: CPT

## 2020-03-02 PROCEDURE — 80053 COMPREHEN METABOLIC PANEL: CPT

## 2020-03-09 ENCOUNTER — APPOINTMENT (OUTPATIENT)
Dept: HEART AND VASCULAR | Facility: CLINIC | Age: 60
End: 2020-03-09

## 2020-03-09 ENCOUNTER — APPOINTMENT (OUTPATIENT)
Dept: INTERNAL MEDICINE | Facility: CLINIC | Age: 60
End: 2020-03-09
Payer: MEDICARE

## 2020-03-09 ENCOUNTER — APPOINTMENT (OUTPATIENT)
Dept: HEMATOLOGY ONCOLOGY | Facility: CLINIC | Age: 60
End: 2020-03-09
Payer: MEDICARE

## 2020-03-09 VITALS
DIASTOLIC BLOOD PRESSURE: 66 MMHG | HEIGHT: 65 IN | SYSTOLIC BLOOD PRESSURE: 138 MMHG | HEART RATE: 71 BPM | BODY MASS INDEX: 34.99 KG/M2 | WEIGHT: 210 LBS | TEMPERATURE: 98.5 F

## 2020-03-09 VITALS — RESPIRATION RATE: 16 BRPM | DIASTOLIC BLOOD PRESSURE: 60 MMHG | SYSTOLIC BLOOD PRESSURE: 136 MMHG | HEART RATE: 80 BPM

## 2020-03-09 DIAGNOSIS — I25.2 OLD MYOCARDIAL INFARCTION: ICD-10-CM

## 2020-03-09 PROCEDURE — 36415 COLL VENOUS BLD VENIPUNCTURE: CPT

## 2020-03-09 PROCEDURE — 99212 OFFICE O/P EST SF 10 MIN: CPT

## 2020-03-09 PROCEDURE — 99214 OFFICE O/P EST MOD 30 MIN: CPT | Mod: 25

## 2020-03-09 RX ORDER — WARFARIN 1 MG/1
1 TABLET ORAL
Qty: 60 | Refills: 2 | Status: COMPLETED | COMMUNITY
Start: 2017-03-21 | End: 2020-03-09

## 2020-03-11 ENCOUNTER — INPATIENT (INPATIENT)
Facility: HOSPITAL | Age: 60
LOS: 3 days | Discharge: HOME | DRG: 602 | End: 2020-03-15
Attending: STUDENT IN AN ORGANIZED HEALTH CARE EDUCATION/TRAINING PROGRAM | Admitting: HOSPITALIST
Payer: MEDICARE

## 2020-03-11 VITALS
OXYGEN SATURATION: 95 % | DIASTOLIC BLOOD PRESSURE: 60 MMHG | TEMPERATURE: 98 F | WEIGHT: 199.96 LBS | HEIGHT: 65 IN | HEART RATE: 76 BPM | RESPIRATION RATE: 19 BRPM | SYSTOLIC BLOOD PRESSURE: 134 MMHG

## 2020-03-11 DIAGNOSIS — L03.90 CELLULITIS, UNSPECIFIED: ICD-10-CM

## 2020-03-11 DIAGNOSIS — R09.89 OTHER SPECIFIED SYMPTOMS AND SIGNS INVOLVING THE CIRCULATORY AND RESPIRATORY SYSTEMS: ICD-10-CM

## 2020-03-11 DIAGNOSIS — Z90.710 ACQUIRED ABSENCE OF BOTH CERVIX AND UTERUS: Chronic | ICD-10-CM

## 2020-03-11 DIAGNOSIS — Z98.51 TUBAL LIGATION STATUS: Chronic | ICD-10-CM

## 2020-03-11 DIAGNOSIS — N18.4 CHRONIC KIDNEY DISEASE, STAGE 4 (SEVERE): ICD-10-CM

## 2020-03-11 DIAGNOSIS — Z95.810 PRESENCE OF AUTOMATIC (IMPLANTABLE) CARDIAC DEFIBRILLATOR: Chronic | ICD-10-CM

## 2020-03-11 DIAGNOSIS — Z91.89 OTHER SPECIFIED PERSONAL RISK FACTORS, NOT ELSEWHERE CLASSIFIED: ICD-10-CM

## 2020-03-11 DIAGNOSIS — E11.9 TYPE 2 DIABETES MELLITUS WITHOUT COMPLICATIONS: ICD-10-CM

## 2020-03-11 DIAGNOSIS — G89.29 OTHER CHRONIC PAIN: ICD-10-CM

## 2020-03-11 DIAGNOSIS — Z95.828 PRESENCE OF OTHER VASCULAR IMPLANTS AND GRAFTS: Chronic | ICD-10-CM

## 2020-03-11 DIAGNOSIS — I73.9 PERIPHERAL VASCULAR DISEASE, UNSPECIFIED: ICD-10-CM

## 2020-03-11 DIAGNOSIS — I50.22 CHRONIC SYSTOLIC (CONGESTIVE) HEART FAILURE: ICD-10-CM

## 2020-03-11 DIAGNOSIS — C90.00 MULTIPLE MYELOMA NOT HAVING ACHIEVED REMISSION: ICD-10-CM

## 2020-03-11 DIAGNOSIS — I82.409 ACUTE EMBOLISM AND THROMBOSIS OF UNSPECIFIED DEEP VEINS OF UNSPECIFIED LOWER EXTREMITY: ICD-10-CM

## 2020-03-11 DIAGNOSIS — J44.9 CHRONIC OBSTRUCTIVE PULMONARY DISEASE, UNSPECIFIED: ICD-10-CM

## 2020-03-11 DIAGNOSIS — Z90.49 ACQUIRED ABSENCE OF OTHER SPECIFIED PARTS OF DIGESTIVE TRACT: Chronic | ICD-10-CM

## 2020-03-11 DIAGNOSIS — R63.8 OTHER SYMPTOMS AND SIGNS CONCERNING FOOD AND FLUID INTAKE: ICD-10-CM

## 2020-03-11 LAB
ALBUMIN SERPL ELPH-MCNC: 3.3 G/DL — SIGNIFICANT CHANGE UP (ref 3.3–5)
ALP SERPL-CCNC: 97 U/L — SIGNIFICANT CHANGE UP (ref 40–120)
ALT FLD-CCNC: 15 U/L — SIGNIFICANT CHANGE UP (ref 10–45)
ANION GAP SERPL CALC-SCNC: 11 MMOL/L — SIGNIFICANT CHANGE UP (ref 5–17)
APTT BLD: 40.8 SEC — HIGH (ref 27.5–36.3)
AST SERPL-CCNC: 17 U/L — SIGNIFICANT CHANGE UP (ref 10–40)
BASOPHILS # BLD AUTO: 0.03 K/UL — SIGNIFICANT CHANGE UP (ref 0–0.2)
BASOPHILS NFR BLD AUTO: 0.4 % — SIGNIFICANT CHANGE UP (ref 0–2)
BILIRUB SERPL-MCNC: 0.3 MG/DL — SIGNIFICANT CHANGE UP (ref 0.2–1.2)
BUN SERPL-MCNC: 46 MG/DL — HIGH (ref 7–23)
CALCIUM SERPL-MCNC: 8.6 MG/DL — SIGNIFICANT CHANGE UP (ref 8.4–10.5)
CHLORIDE SERPL-SCNC: 94 MMOL/L — LOW (ref 96–108)
CO2 SERPL-SCNC: 31 MMOL/L — SIGNIFICANT CHANGE UP (ref 22–31)
CREAT SERPL-MCNC: 2.28 MG/DL — HIGH (ref 0.5–1.3)
EOSINOPHIL # BLD AUTO: 0.29 K/UL — SIGNIFICANT CHANGE UP (ref 0–0.5)
EOSINOPHIL NFR BLD AUTO: 3.4 % — SIGNIFICANT CHANGE UP (ref 0–6)
GLUCOSE SERPL-MCNC: 274 MG/DL — HIGH (ref 70–99)
HCT VFR BLD CALC: 32.9 % — LOW (ref 34.5–45)
HGB BLD-MCNC: 10.4 G/DL — LOW (ref 11.5–15.5)
IMM GRANULOCYTES NFR BLD AUTO: 0.4 % — SIGNIFICANT CHANGE UP (ref 0–1.5)
INR BLD: 1.52 — HIGH (ref 0.88–1.16)
LACTATE SERPL-SCNC: 1.7 MMOL/L — SIGNIFICANT CHANGE UP (ref 0.5–2)
LIDOCAIN IGE QN: 81 U/L — HIGH (ref 7–60)
LYMPHOCYTES # BLD AUTO: 1.38 K/UL — SIGNIFICANT CHANGE UP (ref 1–3.3)
LYMPHOCYTES # BLD AUTO: 16.1 % — SIGNIFICANT CHANGE UP (ref 13–44)
MCHC RBC-ENTMCNC: 30 PG — SIGNIFICANT CHANGE UP (ref 27–34)
MCHC RBC-ENTMCNC: 31.6 GM/DL — LOW (ref 32–36)
MCV RBC AUTO: 94.8 FL — SIGNIFICANT CHANGE UP (ref 80–100)
MONOCYTES # BLD AUTO: 0.93 K/UL — HIGH (ref 0–0.9)
MONOCYTES NFR BLD AUTO: 10.9 % — SIGNIFICANT CHANGE UP (ref 2–14)
NEUTROPHILS # BLD AUTO: 5.9 K/UL — SIGNIFICANT CHANGE UP (ref 1.8–7.4)
NEUTROPHILS NFR BLD AUTO: 68.8 % — SIGNIFICANT CHANGE UP (ref 43–77)
NRBC # BLD: 0 /100 WBCS — SIGNIFICANT CHANGE UP (ref 0–0)
NT-PROBNP SERPL-SCNC: 2186 PG/ML — HIGH (ref 0–300)
PLATELET # BLD AUTO: 180 K/UL — SIGNIFICANT CHANGE UP (ref 150–400)
POTASSIUM SERPL-MCNC: 3.9 MMOL/L — SIGNIFICANT CHANGE UP (ref 3.5–5.3)
POTASSIUM SERPL-SCNC: 3.9 MMOL/L — SIGNIFICANT CHANGE UP (ref 3.5–5.3)
PROT SERPL-MCNC: 7.8 G/DL — SIGNIFICANT CHANGE UP (ref 6–8.3)
PROTHROM AB SERPL-ACNC: 17.5 SEC — HIGH (ref 10–12.9)
RBC # BLD: 3.47 M/UL — LOW (ref 3.8–5.2)
RBC # FLD: 14.2 % — SIGNIFICANT CHANGE UP (ref 10.3–14.5)
SODIUM SERPL-SCNC: 136 MMOL/L — SIGNIFICANT CHANGE UP (ref 135–145)
WBC # BLD: 8.56 K/UL — SIGNIFICANT CHANGE UP (ref 3.8–10.5)
WBC # FLD AUTO: 8.56 K/UL — SIGNIFICANT CHANGE UP (ref 3.8–10.5)

## 2020-03-11 PROCEDURE — 99223 1ST HOSP IP/OBS HIGH 75: CPT | Mod: GC,AI

## 2020-03-11 PROCEDURE — 99285 EMERGENCY DEPT VISIT HI MDM: CPT

## 2020-03-11 PROCEDURE — 71046 X-RAY EXAM CHEST 2 VIEWS: CPT | Mod: 26

## 2020-03-11 PROCEDURE — 73630 X-RAY EXAM OF FOOT: CPT | Mod: 26,50

## 2020-03-11 PROCEDURE — 99223 1ST HOSP IP/OBS HIGH 75: CPT

## 2020-03-11 PROCEDURE — 93971 EXTREMITY STUDY: CPT | Mod: 26,LT

## 2020-03-11 RX ORDER — PANTOPRAZOLE SODIUM 20 MG/1
40 TABLET, DELAYED RELEASE ORAL
Refills: 0 | Status: DISCONTINUED | OUTPATIENT
Start: 2020-03-11 | End: 2020-03-15

## 2020-03-11 RX ORDER — PIPERACILLIN AND TAZOBACTAM 4; .5 G/20ML; G/20ML
3.38 INJECTION, POWDER, LYOPHILIZED, FOR SOLUTION INTRAVENOUS ONCE
Refills: 0 | Status: COMPLETED | OUTPATIENT
Start: 2020-03-11 | End: 2020-03-11

## 2020-03-11 RX ORDER — GABAPENTIN 400 MG/1
200 CAPSULE ORAL
Refills: 0 | Status: DISCONTINUED | OUTPATIENT
Start: 2020-03-11 | End: 2020-03-12

## 2020-03-11 RX ORDER — TRAMADOL HYDROCHLORIDE 50 MG/1
50 TABLET ORAL ONCE
Refills: 0 | Status: DISCONTINUED | OUTPATIENT
Start: 2020-03-11 | End: 2020-03-11

## 2020-03-11 RX ORDER — SODIUM CHLORIDE 9 MG/ML
1000 INJECTION, SOLUTION INTRAVENOUS
Refills: 0 | Status: DISCONTINUED | OUTPATIENT
Start: 2020-03-11 | End: 2020-03-15

## 2020-03-11 RX ORDER — ACETAMINOPHEN 500 MG
650 TABLET ORAL EVERY 6 HOURS
Refills: 0 | Status: DISCONTINUED | OUTPATIENT
Start: 2020-03-11 | End: 2020-03-12

## 2020-03-11 RX ORDER — AMITRIPTYLINE HCL 25 MG
1 TABLET ORAL
Qty: 0 | Refills: 0 | DISCHARGE

## 2020-03-11 RX ORDER — INSULIN GLARGINE 100 [IU]/ML
35 INJECTION, SOLUTION SUBCUTANEOUS AT BEDTIME
Refills: 0 | Status: DISCONTINUED | OUTPATIENT
Start: 2020-03-11 | End: 2020-03-12

## 2020-03-11 RX ORDER — TIZANIDINE 4 MG/1
1 TABLET ORAL
Qty: 0 | Refills: 0 | DISCHARGE

## 2020-03-11 RX ORDER — TRAZODONE HCL 50 MG
0 TABLET ORAL
Qty: 0 | Refills: 0 | DISCHARGE

## 2020-03-11 RX ORDER — APIXABAN 2.5 MG/1
5 TABLET, FILM COATED ORAL
Refills: 0 | Status: DISCONTINUED | OUTPATIENT
Start: 2020-03-11 | End: 2020-03-15

## 2020-03-11 RX ORDER — DEXAMETHASONE 0.5 MG/5ML
51 ELIXIR ORAL
Qty: 0 | Refills: 0 | DISCHARGE

## 2020-03-11 RX ORDER — ENOXAPARIN SODIUM 100 MG/ML
30 INJECTION SUBCUTANEOUS EVERY 24 HOURS
Refills: 0 | Status: DISCONTINUED | OUTPATIENT
Start: 2020-03-11 | End: 2020-03-11

## 2020-03-11 RX ORDER — GLUCAGON INJECTION, SOLUTION 0.5 MG/.1ML
1 INJECTION, SOLUTION SUBCUTANEOUS ONCE
Refills: 0 | Status: DISCONTINUED | OUTPATIENT
Start: 2020-03-11 | End: 2020-03-15

## 2020-03-11 RX ORDER — DEXTROSE 50 % IN WATER 50 %
25 SYRINGE (ML) INTRAVENOUS ONCE
Refills: 0 | Status: DISCONTINUED | OUTPATIENT
Start: 2020-03-11 | End: 2020-03-15

## 2020-03-11 RX ORDER — VANCOMYCIN HCL 1 G
1500 VIAL (EA) INTRAVENOUS EVERY 24 HOURS
Refills: 0 | Status: DISCONTINUED | OUTPATIENT
Start: 2020-03-12 | End: 2020-03-12

## 2020-03-11 RX ORDER — PIPERACILLIN AND TAZOBACTAM 4; .5 G/20ML; G/20ML
3.38 INJECTION, POWDER, LYOPHILIZED, FOR SOLUTION INTRAVENOUS EVERY 6 HOURS
Refills: 0 | Status: DISCONTINUED | OUTPATIENT
Start: 2020-03-11 | End: 2020-03-15

## 2020-03-11 RX ORDER — SUCRALFATE 1 G
1 TABLET ORAL
Refills: 0 | Status: DISCONTINUED | OUTPATIENT
Start: 2020-03-11 | End: 2020-03-15

## 2020-03-11 RX ORDER — LEVOTHYROXINE SODIUM 125 MCG
25 TABLET ORAL DAILY
Refills: 0 | Status: DISCONTINUED | OUTPATIENT
Start: 2020-03-11 | End: 2020-03-15

## 2020-03-11 RX ORDER — FENOFIBRATE,MICRONIZED 130 MG
1 CAPSULE ORAL
Qty: 0 | Refills: 0 | DISCHARGE

## 2020-03-11 RX ORDER — VANCOMYCIN HCL 1 G
1000 VIAL (EA) INTRAVENOUS ONCE
Refills: 0 | Status: COMPLETED | OUTPATIENT
Start: 2020-03-11 | End: 2020-03-11

## 2020-03-11 RX ORDER — AMITRIPTYLINE HCL 25 MG
10 TABLET ORAL AT BEDTIME
Refills: 0 | Status: DISCONTINUED | OUTPATIENT
Start: 2020-03-11 | End: 2020-03-11

## 2020-03-11 RX ORDER — METOPROLOL TARTRATE 50 MG
200 TABLET ORAL DAILY
Refills: 0 | Status: DISCONTINUED | OUTPATIENT
Start: 2020-03-11 | End: 2020-03-15

## 2020-03-11 RX ORDER — GABAPENTIN 400 MG/1
0 CAPSULE ORAL
Qty: 0 | Refills: 0 | DISCHARGE

## 2020-03-11 RX ORDER — METHADONE HYDROCHLORIDE 40 MG/1
10 TABLET ORAL EVERY 6 HOURS
Refills: 0 | Status: DISCONTINUED | OUTPATIENT
Start: 2020-03-11 | End: 2020-03-12

## 2020-03-11 RX ORDER — METHADONE HYDROCHLORIDE 40 MG/1
1 TABLET ORAL
Qty: 0 | Refills: 0 | DISCHARGE

## 2020-03-11 RX ORDER — ZOLPIDEM TARTRATE 10 MG/1
1 TABLET ORAL
Qty: 0 | Refills: 0 | DISCHARGE

## 2020-03-11 RX ORDER — ESCITALOPRAM OXALATE 10 MG/1
20 TABLET, FILM COATED ORAL DAILY
Refills: 0 | Status: DISCONTINUED | OUTPATIENT
Start: 2020-03-11 | End: 2020-03-15

## 2020-03-11 RX ORDER — INSULIN LISPRO 100/ML
VIAL (ML) SUBCUTANEOUS EVERY 6 HOURS
Refills: 0 | Status: DISCONTINUED | OUTPATIENT
Start: 2020-03-11 | End: 2020-03-14

## 2020-03-11 RX ORDER — INSULIN LISPRO 100/ML
15 VIAL (ML) SUBCUTANEOUS
Refills: 0 | Status: DISCONTINUED | OUTPATIENT
Start: 2020-03-11 | End: 2020-03-12

## 2020-03-11 RX ORDER — DEXTROSE 50 % IN WATER 50 %
15 SYRINGE (ML) INTRAVENOUS ONCE
Refills: 0 | Status: DISCONTINUED | OUTPATIENT
Start: 2020-03-11 | End: 2020-03-15

## 2020-03-11 RX ORDER — BUDESONIDE AND FORMOTEROL FUMARATE DIHYDRATE 160; 4.5 UG/1; UG/1
2 AEROSOL RESPIRATORY (INHALATION)
Qty: 0 | Refills: 0 | DISCHARGE

## 2020-03-11 RX ORDER — DEXTROSE 50 % IN WATER 50 %
12.5 SYRINGE (ML) INTRAVENOUS ONCE
Refills: 0 | Status: DISCONTINUED | OUTPATIENT
Start: 2020-03-11 | End: 2020-03-15

## 2020-03-11 RX ORDER — APIXABAN 2.5 MG/1
2.5 TABLET, FILM COATED ORAL EVERY 12 HOURS
Refills: 0 | Status: DISCONTINUED | OUTPATIENT
Start: 2020-03-11 | End: 2020-03-11

## 2020-03-11 RX ORDER — MORPHINE SULFATE 50 MG/1
4 CAPSULE, EXTENDED RELEASE ORAL ONCE
Refills: 0 | Status: DISCONTINUED | OUTPATIENT
Start: 2020-03-11 | End: 2020-03-11

## 2020-03-11 RX ORDER — METHADONE HYDROCHLORIDE 40 MG/1
10 TABLET ORAL DAILY
Refills: 0 | Status: DISCONTINUED | OUTPATIENT
Start: 2020-03-11 | End: 2020-03-11

## 2020-03-11 RX ORDER — ATORVASTATIN CALCIUM 80 MG/1
80 TABLET, FILM COATED ORAL AT BEDTIME
Refills: 0 | Status: DISCONTINUED | OUTPATIENT
Start: 2020-03-11 | End: 2020-03-15

## 2020-03-11 RX ADMIN — APIXABAN 5 MILLIGRAM(S): 2.5 TABLET, FILM COATED ORAL at 17:55

## 2020-03-11 RX ADMIN — METHADONE HYDROCHLORIDE 10 MILLIGRAM(S): 40 TABLET ORAL at 13:46

## 2020-03-11 RX ADMIN — TRAMADOL HYDROCHLORIDE 50 MILLIGRAM(S): 50 TABLET ORAL at 18:52

## 2020-03-11 RX ADMIN — PIPERACILLIN AND TAZOBACTAM 200 GRAM(S): 4; .5 INJECTION, POWDER, LYOPHILIZED, FOR SOLUTION INTRAVENOUS at 17:55

## 2020-03-11 RX ADMIN — TRAMADOL HYDROCHLORIDE 50 MILLIGRAM(S): 50 TABLET ORAL at 19:45

## 2020-03-11 RX ADMIN — ENOXAPARIN SODIUM 30 MILLIGRAM(S): 100 INJECTION SUBCUTANEOUS at 11:28

## 2020-03-11 RX ADMIN — PIPERACILLIN AND TAZOBACTAM 200 GRAM(S): 4; .5 INJECTION, POWDER, LYOPHILIZED, FOR SOLUTION INTRAVENOUS at 04:57

## 2020-03-11 RX ADMIN — INSULIN GLARGINE 35 UNIT(S): 100 INJECTION, SOLUTION SUBCUTANEOUS at 22:01

## 2020-03-11 RX ADMIN — ESCITALOPRAM OXALATE 20 MILLIGRAM(S): 10 TABLET, FILM COATED ORAL at 13:46

## 2020-03-11 RX ADMIN — Medication 1 GRAM(S): at 17:55

## 2020-03-11 RX ADMIN — Medication 250 MILLIGRAM(S): at 05:49

## 2020-03-11 RX ADMIN — PIPERACILLIN AND TAZOBACTAM 200 GRAM(S): 4; .5 INJECTION, POWDER, LYOPHILIZED, FOR SOLUTION INTRAVENOUS at 11:28

## 2020-03-11 RX ADMIN — Medication 15 UNIT(S): at 17:54

## 2020-03-11 RX ADMIN — MORPHINE SULFATE 4 MILLIGRAM(S): 50 CAPSULE, EXTENDED RELEASE ORAL at 04:56

## 2020-03-11 RX ADMIN — Medication 25 MICROGRAM(S): at 13:46

## 2020-03-11 RX ADMIN — Medication 200 MILLIGRAM(S): at 13:46

## 2020-03-11 RX ADMIN — Medication 20 MILLIGRAM(S): at 13:46

## 2020-03-11 RX ADMIN — Medication 1 GRAM(S): at 13:46

## 2020-03-11 RX ADMIN — ATORVASTATIN CALCIUM 80 MILLIGRAM(S): 80 TABLET, FILM COATED ORAL at 22:00

## 2020-03-11 RX ADMIN — GABAPENTIN 200 MILLIGRAM(S): 400 CAPSULE ORAL at 17:55

## 2020-03-11 RX ADMIN — Medication 650 MILLIGRAM(S): at 22:01

## 2020-03-11 RX ADMIN — Medication 6: at 07:39

## 2020-03-11 RX ADMIN — Medication 4: at 17:55

## 2020-03-11 RX ADMIN — Medication 650 MILLIGRAM(S): at 07:40

## 2020-03-11 RX ADMIN — PANTOPRAZOLE SODIUM 40 MILLIGRAM(S): 20 TABLET, DELAYED RELEASE ORAL at 13:46

## 2020-03-11 RX ADMIN — Medication 650 MILLIGRAM(S): at 23:05

## 2020-03-11 NOTE — ED PROVIDER NOTE - ATTENDING CONTRIBUTION TO CARE
Attending Statement: I have personally performed a face to face diagnostic evaluation on this patient. I have reviewed the ACP note and agree with the history, exam and plan of care, except as noted.     Attending Contribution to Care:  60 y/o female with extensive medical history and multiple hospitalizations HFrEF (EF 25%, AICD in place), HTN, IDDM c/b neuropathy (takes Methadone) presenting with b/l lower extremity cellulitis (L>R), failing outpatient antibiotics, with purulent-sanguinous  drainage, will admit for IV abx.

## 2020-03-11 NOTE — H&P ADULT - PROBLEM SELECTOR PLAN 6
F: tolerating PO, no IVF  E: replete to K>4, Mg>2  N: Dash/TLC, CC    VTE Prophylaxis: Lovenox 30mg SubQ qd    C: Full Code  D: RMF Patient with history of COPD, no recent exacerbations or previous intubations. On home 2-3L home O2 PRN. No wheezing on exam. Currently comfortable on room air.  - Duonebs q6hr PRN     #DHARMESH  Patient with history of DHARMESH, utilzes CPAP at home.  - CPAP o/n Patient with history of COPD, no recent exacerbations or previous intubations. On home 2-3L home O2 PRN. No wheezing on exam. Currently comfortable on room air.  - Duonebs q6hr PRN     #DHARMESH  Patient with history of DHARMESH, utilizes CPAP at home.  - CPAP o/n

## 2020-03-11 NOTE — PATIENT PROFILE ADULT - NSPROHMDIABETMGMTSTRAT_GEN_A_NUR
weight management/adequate rest/medication therapy/activity/exercise/blood glucose testing/coping strategies/diet modification/insulin therapy/routine screenings

## 2020-03-11 NOTE — H&P ADULT - PROBLEM SELECTOR PLAN 4
Patient with history of diabetes, glucose 150s-250s. Per patients 170s-200s at home.   HgA1c 10.3 (2/13/20). On home Levemir 40 units at bedtime and Novolog 22 units TID.   - c/w Lantus 40 units, 20 units lispro   - f/u HgA1c   - nutrition consult  - monitor fingersticks  - patient education. Patient with history of diabetes, glucose 150s-250s. Per patients 170s-200s at home.   HgA1c 9.2 (10.3 on prior admission). On home Levemir 40 units at bedtime and Novolog 22 units TID. Likely poorly controlled in setting of dexamethasone use.   - c/w Lantus 40 units, 20 units lispro    - nutrition consult  - monitor fingersticks  - patient education.    #Hypothyroidism   Patient with history of hypothyroidism, abnormal TFTs on previous admission.  - continue with home synthroid 25mcg  - f/u TFTs Patient with history of diabetes, glucose 150s-250s. Per patients 170s-200s at home.   HgA1c 9.2 (10.3 on prior admission). On home Levemir 40 units at bedtime and Novolog 22 units TID. Likely poorly controlled in setting of dexamethasone use.   - c/w Lantus 40 units, 20 units lispro    - nutrition consult  - monitor fingersticks  - patient education.    #Hypothyroidism   Patient with history of hypothyroidism, abnormal TFTs on previous admission.  - continue with home synthroid 25mcg  - f/u AM TSH    #Bipolar Depression  Patient with history of bipolar depression. No suicidal ideation at this time.  - c/w home amitriptyline 10mg at bedtime Patient with history of diabetes, glucose 150s-250s. Per patients 170s-200s at home.   HgA1c 9.2 (10.3 on prior admission). On home Levemir 40 units at bedtime and Novolog 22 units TID. Likely poorly controlled in setting of dexamethasone use.   - c/w Lantus 40 units, 20 units lispro    - nutrition consult  - monitor fingersticks  - patient education.    #Hypothyroidism   Patient with history of hypothyroidism, abnormal TFTs on previous admission.  - continue with home synthroid 25mcg  - f/u AM TSH    #Bipolar Depression  Patient with history of bipolar depression. No suicidal ideation at this time.  - c/w home lexapro 20mg at bedtime

## 2020-03-11 NOTE — ED ADULT NURSE NOTE - OBJECTIVE STATEMENT
58 y/o female received into the ED, axox3, with complaints of pain to both lower legs due to chronic cellulitis.  Pt. noted to have open sores to the right and left foot.   Pt. is able to bear weight.  Pedal pulses intact.  Negative hawk's sign.  Pt. denies having any chest pain or SOB at this time.

## 2020-03-11 NOTE — CONSULT NOTE ADULT - SUBJECTIVE AND OBJECTIVE BOX
HPI:    61 year old woman known to me as outpatient. History of myeloma, stage 4 CKD, HFrEF (EF 25%), COPD, presents with bilateral foot cellulitis treated by podiatrist with keflex without improvement. No fevers. Treating with vanco and zosyn. CKD stable, creatinine 2.3. 2 weeks ago, admitted with hypotension. SBP now 130s.       PAST MEDICAL & SURGICAL HISTORY:  Neuropathy  PAD (peripheral artery disease)  Bipolar depression  Depression  HTN (hypertension)  DVT (deep venous thrombosis)  CVA (cerebral vascular accident)  DM (diabetes mellitus)  Acute on chronic systolic heart failure  Congestive heart failure, unspecified HF chronicity, unspecified heart failure type  Chronic pain  HLD (hyperlipidemia)  CVA (cerebral vascular accident)  Depression  COPD (chronic obstructive pulmonary disease)  Diabetes  CHF (congestive heart failure)  H/O extremity bypass graft  History of cholecystectomy  H/O tubal ligation  H/O abdominal hysterectomy  AICD (automatic cardioverter/defibrillator) present      MEDICATIONS:  acetaminophen   Tablet .. 650 milliGRAM(s) Oral every 6 hours PRN  apixaban 5 milliGRAM(s) Oral two times a day  atorvastatin 80 milliGRAM(s) Oral at bedtime  dextrose 40% Gel 15 Gram(s) Oral once PRN  dextrose 5%. 1000 milliLiter(s) IV Continuous <Continuous>  dextrose 50% Injectable 12.5 Gram(s) IV Push once  dextrose 50% Injectable 25 Gram(s) IV Push once  dextrose 50% Injectable 25 Gram(s) IV Push once  escitalopram 20 milliGRAM(s) Oral daily  gabapentin 200 milliGRAM(s) Oral two times a day  glucagon  Injectable 1 milliGRAM(s) IntraMuscular once PRN  insulin glargine Injectable (LANTUS) 35 Unit(s) SubCutaneous at bedtime  insulin lispro (HumaLOG) corrective regimen sliding scale   SubCutaneous every 6 hours  insulin lispro Injectable (HumaLOG) 15 Unit(s) SubCutaneous three times a day before meals  levothyroxine 25 MICROGram(s) Oral daily  methadone    Tablet 10 milliGRAM(s) Oral daily  metoprolol succinate  milliGRAM(s) Oral daily  pantoprazole    Tablet 40 milliGRAM(s) Oral before breakfast  piperacillin/tazobactam IVPB.. 3.375 Gram(s) IV Intermittent every 6 hours  sucralfate 1 Gram(s) Oral four times a day  torsemide 20 milliGRAM(s) Oral daily      No pertinent family history in first degree relatives  Family history of diabetes mellitus (Mother)  Family history of hypertension (Mother)  No pertinent family history in first degree relatives      SOCIAL HISTORY: smokes 3 cigarettes daily.    REVIEW OF SYSTEMS:  Constitutional: No fevers.   Card: No chest pain.   Resp: No SOB.  GI: No nausea or vomiting. No abdominal pain.  : No dysuria. Neuro: No focal weakness or sensory loss.  Eyes: No visual symptoms. MS: No joint swelling.  Skin: LE erythema. Psych: No psychosis.    PHYSICAL EXAM:    11 Mar 2020 07:01  -  11 Mar 2020 18:33  --------------------------------------------------------  IN: 200 mL / OUT: 0 mL / NET: 200 mL      Constitutional: T(C): 36.9 (11 Mar 2020 16:14), Max: 36.9 (11 Mar 2020 16:14)  HR: 72 (11 Mar 2020 16:14) (66 - 76)  BP: 130/73 (11 Mar 2020 16:14) (126/70 - 144/68)  RR: 18 (11 Mar 2020 16:14) (16 - 19)  SpO2: 94% (11 Mar 2020 16:14) (94% - 100%)  Appearance: Alert, pleasant, INAD.  Eyes: Conjunctivae pink, moist. Pupils equal and reactive.  ENT: External ears/nose normal appearing. Lips normal, dentition good.  Lymphatic: No cervical lymphadenopathy. No supraclavicular lymphadenopathy.  Cardiac: No rubs. NO MURMURS. Extremities: pitting ankles  Respiratory: Effort no accessory muscle use. Lungs: CLEAR TO AUSCULTATION.  Abdomen: Soft. No masses. Nontender. Liver: Not palpable. Spleen: Not palpable.  Musculoskeltal digits: No clubbing or cyanosis. Tone normal. Moves all four extremities.  Skin inspection: LE erythema, feet wrapped Palpation: No abnormalities.  Neuro: Cranial nerves: no facial assymetry or deficits noted. Sensation: LE intact to light touch.  Psych: Oriented to person, place, situation. Mood/affect: Not depressed.     DATA:  136    |  94<L>  |  46<H>  ----------------------------<  274<H>  Ca:8.6   (11 Mar 2020 04:46)  3.9     |  31     |  2.28<H>      eGFR if Non : 23 <L>  eGFR if : 27 <L>    TPro  7.8    /  Alb  3.3    /  TBili  0.3    /  DBili  x      /  AST  17     /  ALT  15     /  AlkPhos  97     11 Mar 2020 04:46    SCr 2.28 [11 Mar 2020 04:46]  SCr 2.59 [2020 07:02]  SCr 2.95 [2020 20:36]  SCr 3.63 [2020 06:59]  SCr 2.59 [2020 12:45]                          10.4<L>  8.56  )-----------( 180      ( 11 Mar 2020 04:46 )             32.9<L>    Phos:3.5 mg/dL M.2 mg/dL PTH:-- Uric acid:-- Serum Osm:--  Ferritin:-- Iron:-- TIBC:-- Tsat:--  B12:-- TSH:-- (2020 07:02)      UProt:-- UCr:32 mg/dL P/C Ratio:0.6 Ratio<H> 24 hour Prot:-- UVol:-- CrCl:--  Lary:84 mmol/L UOsm:249 mosmol/kg UVol:-- UCl:-- UK:15 mmol/L (2020 13:32)      Xray Chest 2 Views PA/Lat:  (11 Mar 2020 05:44)

## 2020-03-11 NOTE — H&P ADULT - NSHPPHYSICALEXAM_GEN_ALL_CORE
.  VITAL SIGNS:  T(C): 36.8 (03-11-20 @ 06:26), Max: 36.8 (03-11-20 @ 06:26)  T(F): 98.2 (03-11-20 @ 06:26), Max: 98.2 (03-11-20 @ 06:26)  HR: 73 (03-11-20 @ 06:26) (73 - 76)  BP: 144/68 (03-11-20 @ 06:26) (134/60 - 144/68)  BP(mean): --  RR: 16 (03-11-20 @ 06:26) (16 - 19)  SpO2: 94% (03-11-20 @ 06:26) (94% - 96%)  Wt(kg): --    PHYSICAL EXAM:    Constitutional: WDWN resting comfortably in bed; NAD  Head: NC/AT  Eyes: PERRL, EOMI, clear conjunctiva  ENT: no nasal discharge; uvula midline, no oropharyngeal erythema or exudates; MMM  Neck: supple; no JVD or thyromegaly  Respiratory: CTA B/L; no W/R/R, no retractions  Cardiac: +S1/S2; RRR; no M/R/G; PMI non-displaced  Gastrointestinal: soft, NT/ND; no rebound or guarding; +BSx4  Genitourinary: normal external genitalia  Back: spine midline, no bony tenderness or step-offs; no CVAT B/L  Extremities: WWP, no clubbing or cyanosis; no peripheral edema  Musculoskeletal: NROM x4; no joint swelling, tenderness or erythema  Vascular: 2+ radial, femoral, DP/PT pulses B/L  Dermatologic: skin warm, dry and intact; no rashes, wounds, or scars  Lymphatic: no submandibular or cervical LAD  Neurologic: AAOx3; CNII-XII grossly intact; no focal deficits  Psychiatric: affect and characteristics of appearance, verbalizations, behaviors are appropriate .  VITAL SIGNS:  T(C): 36.8 (03-11-20 @ 06:26), Max: 36.8 (03-11-20 @ 06:26)  T(F): 98.2 (03-11-20 @ 06:26), Max: 98.2 (03-11-20 @ 06:26)  HR: 73 (03-11-20 @ 06:26) (73 - 76)  BP: 144/68 (03-11-20 @ 06:26) (134/60 - 144/68)  BP(mean): --  RR: 16 (03-11-20 @ 06:26) (16 - 19)  SpO2: 94% (03-11-20 @ 06:26) (94% - 96%)  Wt(kg): --    PHYSICAL EXAM:  Constitutional: WDWN pleasant resting comfortably in bed; NAD  HEENT: NC/AT; PERRL, EOMI, clear conjunctiva; poor dentition; no oral erythema/exudate/ulceration; MMM   Neck: supple; no JVD; no cervical or submandibular lymphadenopathy   Respiratory: CTA B/L; no W/R/R, no increased work of breathing  Cardiac: +S1/S2; RRR; no murmurs appreciated   Gastrointestinal: overly nourished; soft, NT/ND; no hepatosplenomegaly; +BSx4  Back: spine midline, no CVAT B/L  Extremities: WWP, 1+ non-pitting edema, LLE larger than RLE; erythema of left leg to mid shin; LLE edema to slightly above ankle  Right foor with long blister from 1st MTP to arch, no active drainage   Musculoskeletal: NROM x4; no joint swelling or tenderness  Vascular: 2+ radial,  DP/PT pulses B/L  Neurologic: AAOx3; CNII-XII grossly intact; no focal deficits

## 2020-03-11 NOTE — H&P ADULT - PROBLEM SELECTOR PLAN 3
Patient with history of HFrEF (EF 20%), AICD in place. CXR unchanged from prior admission, with edema and increased lung markings. Does not appear to be in CHF exacerbation. BNP >2000, impoved from baseline. Euvolemic on exam.   - c/w home medications torsemide 20mg qd Patient with history of HFrEF (EF 20%), AICD in place. CXR unchanged from prior admission, with edema and increased lung markings. Does not appear to be in CHF exacerbation. BNP >2000, improved from baseline. Euvolemic on exam.   - c/w home medications torsemide 20mg qd  - c/w Troprol XL 200mg qd  - entresto d/c on prior admission    #History of stroke  Patient with history of left thalamic infarct. Previously recommended to initiate ASA 81mg on prior admission  - initiate ASA 81 given extensive comorbidities Patient with history of HFrEF (EF 20%), AICD in place. CXR unchanged from prior admission, with edema and increased lung markings. Does not appear to be in CHF exacerbation. BNP >2000, improved from baseline. Euvolemic on exam.   - c/w home medications torsemide 20mg qd  - c/w Troprol XL 200mg qd  - entresto d/c on prior admission    #History of stroke  Patient with history of left thalamic infarct. Previously recommended to initiate ASA 81mg on prior admission  - initiate ASA 81 given extensive comorbidities -> patient with allergy to aspirin, reaction = rash

## 2020-03-11 NOTE — ED PROVIDER NOTE - CARE PLAN
Principal Discharge DX:	Diabetic foot ulcer  Secondary Diagnosis:	Cellulitis Principal Discharge DX:	Diabetic foot ulcer  Secondary Diagnosis:	Cellulitis  Secondary Diagnosis:	PAD (peripheral artery disease)  Secondary Diagnosis:	DM (diabetes mellitus)

## 2020-03-11 NOTE — ED ADULT TRIAGE NOTE - CHIEF COMPLAINT QUOTE
pt states "both my legs are swollen, painful and I have ulcers on both of them. I also have lower back pain"

## 2020-03-11 NOTE — H&P ADULT - ASSESSMENT
62 y/o female with extensive medical history and multiple hospitalizations HFrEF (EF 25%, AICD in place), HTN, IDDM c/b neuropathy (takes Methadone) presenting with b/l lower extremity cellulitis (L>R), failing outpatient antibiotics, with purulent-sanguinous  drainage.

## 2020-03-11 NOTE — H&P ADULT - PROBLEM SELECTOR PLAN 9
F: tolerating PO, no IVF  E: replete to K>4, Mg>2  N: Dash/TLC, CC    VTE Prophylaxis: Lovenox 30mg SubQ qd    C: Full Code  D: RMF

## 2020-03-11 NOTE — H&P ADULT - NSICDXPASTMEDICALHX_GEN_ALL_CORE_FT
PAST MEDICAL HISTORY:  Acute on chronic systolic heart failure     Bipolar depression     CHF (congestive heart failure)     Chronic pain     COPD (chronic obstructive pulmonary disease)     CVA (cerebral vascular accident)     CVA (cerebral vascular accident)     Depression     Depression     Diabetes     DM (diabetes mellitus)     DVT (deep venous thrombosis)     HLD (hyperlipidemia)     HTN (hypertension)     Neuropathy     PAD (peripheral artery disease) PAST MEDICAL HISTORY:  Bipolar depression     CHF (congestive heart failure)     Chronic pain     COPD (chronic obstructive pulmonary disease)     CVA (cerebral vascular accident)     Depression     DM (diabetes mellitus)     DVT (deep venous thrombosis)     HLD (hyperlipidemia)     HTN (hypertension)     Neuropathy     PAD (peripheral artery disease)

## 2020-03-11 NOTE — ED PROVIDER NOTE - CLINICAL SUMMARY MEDICAL DECISION MAKING FREE TEXT BOX
pt c/o swelling and pain to legs - states that "scrubbed" feet a few d ago, noticed swelling/pain/redness and blisters after, symptoms progressively getting worse, + drainage, on exam + open blisters/ superficial ulcer, pulses = b/l, + cellulitis, pan cultured, abx given, doppler done and ? small thrombus - pt already anticoagulated (eliquis), labs w/o leukocytosis, neg lactate, will admit for iv abx pt c/o swelling and pain to legs - states that "scrubbed" feet a few d ago, noticed swelling/pain/redness and blisters after, symptoms progressively getting worse, + drainage, on exam + open blisters/ superficial ulcer, pulses = b/l, + cellulitis, pan cultured, abx given, doppler done and ? small thrombus - pt already anticoagulated (eliquis), labs w/o leukocytosis, neg lactate, creat elevate but is pt's baseline, will admit for iv abx

## 2020-03-11 NOTE — ED PROVIDER NOTE - MUSCULOSKELETAL, MLM
Spine appears normal, range of motion is not limited, no muscle or joint tenderness; LE: L>R swelling, + erythema L >R, + warmth to b/l shins, no calf tend b/l, + old scar to L LE (bypass), + open blister draining yellow dc to R 1st digit over lateral aspect, + tend to site, a large open blister to L 1st toe extending to mid foot, + yellow dc, pedal pulses = b/l, + light touch b/l, soft compartments b/l

## 2020-03-11 NOTE — H&P ADULT - HISTORY OF PRESENT ILLNESS
In ED: 60 y/o female with HFrEF (EF 25%, AICD in place), HTN, IDDM c/b neuropathy (takes Methadone), Bipolar disorder, depression, hx of DVT (with multiple prior DVTs, on Coumadin), CVA x2 (last one 2013 with residual right sided weakness), known PAD with prior bypass, NICM, hypothyroidism , COPD (on 2-3L NC @ home), DHARMESH on CPAP, multiple myeloma (on Dexamethasone), stage 4CKD presenting with bilateral foot pain and expanding erythema (L>R) for one week's duration. Patient followed up with podiatrist on Sunday prior to admission, managed with keflex 500mg TID for 10 days. Patient endorses compliance with medication, however presented to ED with worsening warmth and pain of lower extremities. Patient denies fevers, nausea, vomiting headache, new chills or palpations. Patient with increased difficultly walking 2/2 pain. Tylenol attempted at home for pain. Patient unclear regarding purulence, indicates she cannot see the bottom of her foot but is aware of some drainage, not malodorous     Of note, patient with recent admission (2/20) to cardiac telemetry for hypotension and RITCHIE in setting of dehydration.     In ED: Vitals: T: Afebrile | HR: 70 | BP: 134/60| RR: 19 | O2: 85% on RA   EKG:  Labs: Hgb: 10.4; BUN: 46; Cr: 2.28; Blood glucose: 274; Lipase 81; BNP: 2186   Imaging: B/l lower extremity doppler without evidence of DVT, thrombus noted at left saphenofemoral junction  CXR: Lung markings throughout, consistent with prior imaging   Foot Xray, 3 view B/l: bilateral soft tissue swelling; Overhanging margin of bone with "punched out" erosion appreciated medial distal edge of left proximal phalanx with soft tissue defect at medial aspect of left hallux consistent with ulceration/bullae left foot    Intervention: Vancomycin 1g; Zosyn 2.375mg; Morphine 4mg IV push; Tylenol 650mg x1  Consults: Podiatry 60 y/o female with HFrEF (EF 25%, AICD in place), HTN, IDDM c/b neuropathy (takes Methadone), Bipolar disorder, depression, hx of DVT (with multiple prior DVTs), CVA x2 (last one 2013 with residual right sided weakness), known PAD with prior bypass, NICM, hypothyroidism , COPD (on 2-3L NC @ home), DHARMESH on CPAP, multiple myeloma (on Dexamethasone), stage 4CKD presenting with bilateral foot pain and expanding erythema (L>R) for one week's duration. Patient followed up with podiatrist on Sunday prior to admission, managed with keflex 500mg TID for 10 days. Patient endorses compliance with medication, however presented to ED with worsening warmth and pain of lower extremities. Patient denies fevers, nausea, vomiting headache, new chills or palpations. Patient with increased difficultly walking 2/2 pain. Tylenol attempted at home for pain. Patient unclear regarding purulence, indicates she cannot see the bottom of her foot but is aware of some drainage, not malodorous     Of note, patient with recent admission (2/20) to cardiac telemetry for hypotension and RITCHIE in setting of dehydration.     In ED: Vitals: T: Afebrile | HR: 70 | BP: 134/60| RR: 19 | O2: 85% on RA   EKG:  Labs: Hgb: 10.4; BUN: 46; Cr: 2.28; Blood glucose: 274; Lipase 81; BNP: 2186   Imaging: B/l lower extremity doppler without evidence of DVT, thrombus noted at left saphenofemoral junction  CXR: Lung markings throughout, consistent with prior imaging   Foot Xray, 3 view B/l: bilateral soft tissue swelling; Overhanging margin of bone with "punched out" erosion appreciated medial distal edge of left proximal phalanx with soft tissue defect at medial aspect of left hallux consistent with ulceration/bullae left foot    Intervention: Vancomycin 1g; Zosyn 2.375mg; Morphine 4mg IV push; Tylenol 650mg x1  Consults: Podiatry 62 y/o female with HFrEF (EF 25%, AICD in place), HTN, IDDM c/b neuropathy (takes Methadone), Bipolar disorder, depression, hx of DVT (with multiple prior DVTs), CVA x2 (last one 2013 with residual right sided weakness), known PAD with prior bypass, NICM, hypothyroidism , COPD (on 2-3L NC @ home), DHARMESH on CPAP, multiple myeloma (on Dexamethasone), stage 4CKD presenting with bilateral foot pain and expanding erythema (L>R) for one week's duration. Patient followed up with podiatrist on Sunday prior to admission, managed with keflex 500mg TID for 10 days. Patient endorses compliance with medication, however presented to ED with worsening warmth and pain of lower extremities. Patient denies fevers, nausea, vomiting headache, new chills or palpations. Patient with increased difficultly walking 2/2 pain. Tylenol attempted at home for pain. Patient unclear regarding purulence, indicates she cannot see the bottom of her foot but is aware of some drainage, not malodorous. Of note, patient with recent admission (2/20) to cardiac telemetry for hypotension and RITCHIE in setting of dehydration.     In ED: Vitals: T: Afebrile | HR: 70 | BP: 134/60| RR: 19 | O2: 85% on RA   Labs: Hgb: 10.4; BUN: 46; Cr: 2.28; Blood glucose: 274; Lipase 81; BNP: 2186   Imaging: B/l lower extremity doppler without evidence of DVT, thrombus noted at left saphenofemoral junction  CXR: Lung markings throughout, consistent with prior imaging   Foot Xray, 3 view B/l: bilateral soft tissue swelling; Overhanging margin of bone with "punched out" erosion appreciated medial distal edge of left proximal phalanx with soft tissue defect at medial aspect of left hallux consistent with ulceration/bullae left foot    Intervention: Vancomycin 1g; Zosyn 2.375mg; Morphine 4mg IV push; Tylenol 650mg x1  Consults: Podiatry

## 2020-03-11 NOTE — H&P ADULT - PROBLEM SELECTOR PLAN 7
DISCHARGE PLANNING:  Medical readiness goals: pending wound cultures and abx sensitivity; PT eval     Anticipated d/c: 48-72 hours     PCP:  Dr. Neal   Pharmacy: [facility] [address] [place] [address] ([phone]) Patient with history of multiple myeloma, follows with Dr. Ruiz outpatient.  - c/w dexamethasone 20mg weekly Patient with history of multiple myeloma, follows with Dr. Ruiz outpatient.  - c/w dexamethasone 20mg weekly  - c/w Ninlaro 3mg weekly   - monitor fingersticks  - outpatient follow up with Dr. Ruiz

## 2020-03-11 NOTE — CONSULT NOTE ADULT - ASSESSMENT
61 year old with HFrEF, DM, stage 4 CKD, presents with cellulitis. BP controlled. Hypotension improved.     Suggest:    1. Follow SCr.  2. Check vanco trough levels.  3. Cont torsemide, toprol.    Please call with any questions.    Jony Neal MD, FACP, FASN | kidney.[facility]  Nephrology, Hypertension, and Internal Medicine  Mobile: [phone] (Daytime Hours Only)  Office/Answering Service: [phone]  Asst. Prof. of Medicine, [employer] at [facility] - Nephrology at [address]  [address], [address], [place], [place]

## 2020-03-11 NOTE — H&P ADULT - PROBLEM SELECTOR PLAN 8
Patient with history of chronic pain, follows with pain medicine physician outpatient.   - c/w home methadone 10mg q6hr PRN Patient with history of chronic pain due to multiple chronic conditions (sciatic, peripheral vascular disease) follows with pain medicine physician outpatient.   - c/w home methadone 10mg q6hr PRN (Prescribed by Ashu Mosley DO; [phone])  - c/w gabapentin 200mg BID  - breakthrough pain: Percocet 5/325mg q6hr PRN

## 2020-03-11 NOTE — H&P ADULT - ATTENDING COMMENTS
Pt. seen and examined this morning; I have read Dr. Cerda's H&P, I agree w/ her findings and plan of care as documented

## 2020-03-11 NOTE — ED PROVIDER NOTE - OBJECTIVE STATEMENT
The pt is a 60 y/o F, pmh HTN, HLD, DM, CHF, DVT (eliquis), anemia, fibromyalgia, c/o blisters to feet after "scrubbing" skin off x few d. Pt states that noticed blisters to b/l feet after scrubbing them, over past few d, noticed swelling, redness, pus and pain, has not taken any pain meds. Also c/o LBP - chronic, feels that leg swelling has aggravated it. Denies cp, sob, n/v/d, abd pain, loss of bowel or bladder control, falls, injury, calf pain The pt is a 60 y/o F, pmh HTN, HLD, DM, CHF, DVT, AICD (eliquis), anemia, fibromyalgia, c/o blisters to feet after "scrubbing" skin off x few d. Pt states that noticed blisters to b/l feet after scrubbing them, over past few d, noticed swelling, redness, pus and pain, has not taken any pain meds. Also c/o LBP - chronic, feels that leg swelling has aggravated it. Denies cp, sob, n/v/d, abd pain, loss of bowel or bladder control, falls, injury, calf pain

## 2020-03-11 NOTE — CONSULT NOTE ADULT - SUBJECTIVE AND OBJECTIVE BOX
Attending: Brie     Patient is a 59y old  Female who presents with a chief complaint of right leg pain     HPI: 62 yo female, current smoker, with a known Contrast and ASA allergy and an extensive PMHx including HTN, IDDM c/b neuropathy (takes Methadone), Bipolar disorder, depression, hx of DVT (with multiple prior DVTs, on Coumadin), CVA x2 (last one 2013 with residual right sided weakness), known PAD with prior bypass, NICM, chronic systolic CHF (EF 25% by Echo 1/2019, with frequent CHF admissions, cardiomems in place), s/p AICD for primary prevention, hypothyroidism , COPD, DHARMESH, multiple myeloma (on Dexamethasone), stage 4CKD (CR 2.0-2.30 11/2019-1/23/2020 outpatient labs) presenting with chief complaint of left leg pain with blisters on her left foot. patient reports that she presented to her podiatrist, Dr. Dr Zac Figueroa in [place], [place] on Dio 3/8/2020 who prescribed her Cephalexin 500mg. The blister progressively grew in size along the inside of her left foot from the big toe joint to the medial arch of her foot and the pain is worsening. Additionally, her left leg is red and swollen. Reports history of a blister on her right heel which turned into a small ulceration without infection. She is a diabetic (DMII) for 10 years with neuropathy. Admits to history of clots in her left leg. Reports that she typically wears  sneakers. No known allergies such as contact dermatitis. Denies wearing new sandals or sneakers to cause the blistering. Reports that she may have a history of gout, but is not completely sure. Denies cp, sob, n/v/d, abd pain, loss of bowel or bladder control, falls, injury.    Review of systems negative except per HPI    PAST MEDICAL & SURGICAL HISTORY:  Neuropathy  PAD (peripheral artery disease)  Bipolar depression  Depression  HTN (hypertension)  DVT (deep venous thrombosis)  CVA (cerebral vascular accident)  DM (diabetes mellitus)  Acute on chronic systolic heart failure  Chronic pain  HLD (hyperlipidemia)  CVA (cerebral vascular accident)  Depression  COPD (chronic obstructive pulmonary disease)  Diabetes  CHF (congestive heart failure)  H/O extremity bypass graft  History of cholecystectomy  H/O tubal ligation  H/O abdominal hysterectomy  AICD (automatic cardioverter/defibrillator) present    Home Medications:  Ambien 10 mg oral tablet: 1 tab(s) orally once a day (at bedtime) (12 Feb 2020 19:20)  amitriptyline 10 mg oral tablet: 1 tab(s) orally once a day (at bedtime) (12 Feb 2020 19:20)  atorvastatin 80 mg oral tablet: 1 tab(s) orally once a day (at bedtime) (12 Feb 2020 19:20)  busPIRone 10 mg oral tablet: 1 tab(s) orally 2 times a day (12 Feb 2020 19:20)  Decadron 4 mg oral tablet: 51 tab(s) orally once a week (Mondays) (12 Feb 2020 19:20)  escitalopram 20 mg oral tablet: 1 tab(s) orally once a day (12 Feb 2020 19:20)  fenofibrate 48 mg oral tablet: 1 tab(s) orally once a day (12 Feb 2020 19:20)  FeroSul 325 mg (65 mg elemental iron) oral tablet: 1 tab(s) orally once a day (12 Feb 2020 19:20)  gabapentin 100 mg oral capsule: orally 2 times a day (12 Feb 2020 19:20)  Levemir FlexTouch 100 units/mL subcutaneous solution: 30 unit(s) subcutaneous once a day (at bedtime) (14 Feb 2020 16:54)  methadone 10 mg oral tablet: 1 tab(s) orally once a day (12 Feb 2020 19:20)  Ninlaro 3 mg oral capsule: 1 cap(s) orally every 7 days (mondays) (12 Feb 2020 19:20)  NovoLOG FlexPen 100 units/mL injectable solution: 15 unit(s) injectable 3 times a day (before meals) (14 Feb 2020 16:54)  omeprazole 40 mg oral delayed release capsule: 1 cap(s) orally once a day (12 Feb 2020 19:20)  sucralfate 1 g oral tablet: 1 tab(s) orally 4 times a day (12 Feb 2020 19:20)  Symbicort 160 mcg-4.5 mcg/inh inhalation aerosol: 2 puff(s) inhaled 2 times a day (12 Feb 2020 19:20)  tiZANidine 4 mg oral tablet: 1 tab(s) orally once a day (12 Feb 2020 19:20)  Toprol- mg oral tablet, extended release: 1 tab(s) orally once a day (12 Feb 2020 19:20)  traZODone 50 mg oral tablet: orally once a day (at bedtime) (12 Feb 2020 19:20)    Allergies  aspirin (Other (Moderate); Rash)  IV Contrast (Unknown)    Intolerances    FAMILY HISTORY:  Family history of diabetes mellitus (Mother)  Family history of hypertension (Mother)    LABS                        10.4   8.56  )-----------( 180      ( 11 Mar 2020 04:46 )             32.9     03-11    136  |  94<L>  |  46<H>  ----------------------------<  274<H>  3.9   |  31  |  2.28<H>    Ca    8.6      11 Mar 2020 04:46    TPro  7.8  /  Alb  3.3  /  TBili  0.3  /  DBili  x   /  AST  17  /  ALT  15  /  AlkPhos  97  03-11    PT/INR - ( 11 Mar 2020 04:46 )   PT: 17.5 sec;   INR: 1.52          PTT - ( 11 Mar 2020 04:46 )  PTT:40.8 sec    Vital Signs Last 24 Hrs  T(C): 36.7 (11 Mar 2020 05:43), Max: 36.7 (11 Mar 2020 05:43)  T(F): 98.1 (11 Mar 2020 05:43), Max: 98.1 (11 Mar 2020 05:43)  HR: 75 (11 Mar 2020 05:43) (75 - 76)  BP: 140/62 (11 Mar 2020 05:43) (134/60 - 140/62)  BP(mean): --  RR: 18 (11 Mar 2020 05:43) (18 - 19)  SpO2: 96% (11 Mar 2020 05:43) (95% - 96%)    PHYSICAL EXAM  General: NAD, AA0x3    Lower Extremity Focused:  Vasc: Palpable DP/PT pulses. Erythema and warmth from her left ankle joint up to the knee.   Derm: Nonpitting edema of right foot and leg. Serous intact blister located at the medial aspect of 1st MTPJ left foot which extends proximally along the medial longitudinal arch. Erythema of dorsum left foot. On the right foot, Aguirre grade 1 stable wound at medial aspect 1st MTPJ (previous blister in this region).   Neuro: Diminished sensation B/L  MSK: Pain on palpation at site of blister on medial aspect left foot    RADIOLOGY  Resident read (wet): Overhanging margin of bone with "punched out"erosion appreciated medial distal edge of left proximal phalanx with soft tissue defect at medial aspect of left hallux consistent with ulceration/bullae left foot

## 2020-03-11 NOTE — H&P ADULT - PROBLEM SELECTOR PLAN 1
Pateint with bilateral lower extremity ulcers and worsening cellulitis in setting of PAD. No sepsis criteria met on admission. B/l lower extremity x-ray demonstrating soft tissue swelling bilaterally, with notable skin deformity in medial left foot; 5th metatarsal with lucency.   - c/w vancomycin 1g q24hr in setting of purulence (vanc goal 10-15)   - c/w zosyn 4.5mg for pseudomonal coverage in setting of diabetes  - f/u wound cultures and abx sensitivies  - f/u lower extremity MRI   - f/u wound care recs  - f/u ESR and CRP to r/o osteomyeltits Pateint with bilateral lower extremity ulcers and worsening cellulitis in setting of PAD. No sepsis criteria met on admission. B/l lower extremity x-ray demonstrating soft tissue swelling bilaterally, with notable skin deformity in medial left foot; 5th metatarsal with lucency.   - c/w vancomycin 1g q24hr in setting of purulence (vanc goal 10-15)   - c/w zosyn 4.5mg for pseudomonal coverage in setting of diabetes  - f/u wound cultures and abx sensitivies  - f/u wound care recs  - f/u ESR and CRP to r/o osteomyelitis  - low concern for osteomyelitis, AICD not MRI compatible Pateint with bilateral lower extremity ulcers and worsening cellulitis in setting of PAD, after initiation of oral antibiotics. No sepsis criteria met on admission. B/l lower extremity x-ray demonstrating soft tissue swelling bilaterally, with notable skin deformity in medial left foot; 5th metatarsal with lucency.   - c/w vancomycin 1g q24hr in setting of purulence (vanc goal 10-15)   - c/w zosyn 4.5mg for pseudomonal coverage in setting of diabetes  - f/u wound cultures and abx sensitivies  - f/u wound care recs  - f/u ESR and CRP to r/o osteomyelitis  - low concern for osteomyelitis, AICD not MRI compatible -> f/u with EP vs obtain bone scan of left lower extremity

## 2020-03-11 NOTE — CONSULT NOTE ADULT - ASSESSMENT
A/P 62 yo female, current smoker, with a known Contrast and ASA allergy and an extensive PMHx including HTN, IDDM c/b neuropathy, Bipolar disorder, depression, hx of DVT (with multiple prior DVTs, on Coumadin), CVA x2, PAD with prior bypass, NICM, chronic systolic CHF (EF 25% by Echo 1/2019, with frequent CHF admissions, cardiomems in place), s/p AICD for primary prevention, hypothyroidism , COPD, DHARMESH, multiple myeloma, stage 4CKD presenting with serous blister of left foot     - C/w IV Abx   - F/u final Duplex US read of left leg. Cellulitis vs DVT (acute vs chronic)  - F/u final Foot Xray read  - Applied chloroprep, with 18 gauge needle punctured blister which drained approx. ~ 4cc of purulent-sanguinous material   Obtained culture. F/u Wound culture   - Applied nonadherent DSD to foot bilaterally   - Plan to be discussed with Attending   - Podiatry will follow

## 2020-03-11 NOTE — H&P ADULT - NSHPLABSRESULTS_GEN_ALL_CORE
.  LABS:                         10.4   8.56  )-----------( 180      ( 11 Mar 2020 04:46 )             32.9     03-11    136  |  94<L>  |  46<H>  ----------------------------<  274<H>  3.9   |  31  |  2.28<H>    Ca    8.6      11 Mar 2020 04:46    TPro  7.8  /  Alb  3.3  /  TBili  0.3  /  DBili  x   /  AST  17  /  ALT  15  /  AlkPhos  97  03-11    PT/INR - ( 11 Mar 2020 04:46 )   PT: 17.5 sec;   INR: 1.52          PTT - ( 11 Mar 2020 04:46 )  PTT:40.8 sec    CARDIAC MARKERS ( 11 Mar 2020 04:46 )  x     / x     / 93 U/L / x     / x          Serum Pro-Brain Natriuretic Peptide: 2186 pg/mL (03-11 @ 04:46)    Lactate, Blood: 1.7 mmol/L (03-11 @ 04:46)      RADIOLOGY, EKG & ADDITIONAL TESTS: Reviewed.

## 2020-03-11 NOTE — H&P ADULT - PROBLEM SELECTOR PLAN 2
Patient with history of DVTs, lower extremity doppler demonstrating no DVTs but thrombus left saphenofemoral junction, with concern for possible propagation.   - c/w Eliquis 2.5mg BID   - outpatient lower extremity doppler for follow up Patient with history of DVTs, lower extremity doppler demonstrating no DVTs but thrombus left saphenofemoral junction, with concern for possible propagation.   - previously on eliquis 2.5mg BID outpatient -> increased to 5mg BID (appropriate for current CrCl)  - outpatient lower extremity doppler for follow up    #PAD  Patient with history of bilateral PAD. Patient with history of DVTs, lower extremity doppler demonstrating no DVTs but thrombus left saphenofemoral junction, with concern for possible propagation.   - previously on eliquis 2.5mg BID outpatient -> increased to 5mg BID (appropriate for current CrCl)  - outpatient lower extremity doppler for follow up

## 2020-03-11 NOTE — PATIENT PROFILE ADULT - NSPROHMCARDIOMGMTSTRAT_GEN_A_NUR
activity/diet modification/medication therapy/exercise/medical device/routine screening/weight management/coping strategies/adequate rest/fluid modification

## 2020-03-11 NOTE — H&P ADULT - PROBLEM SELECTOR PLAN 5
Patient with history of PAD Patient with known CKD, stage 4. Cr 2.28 (baseline 2.5).  - no intervention at this time  - continue to monitor Cr/BUN  - avoid nephrotoxic agents Patient with known CKD, stage 4. Cr 2.28 (baseline 2.5), improved from prior admission due to medicaton changes.  - no intervention at this time  - continue to monitor Cr/BUN  - avoid nephrotoxic agents Patient with known CKD, stage 4. Cr 2.28 (baseline 2.5), improved from prior admission due to medication changes.  - no intervention at this time  - continue to monitor Cr/BUN  - avoid nephrotoxic agents    #GERD  Patient with history of GERD, on omeprazole 20mg qd  - c/w therapeutic exchange, pantoprazole 40mg qd

## 2020-03-11 NOTE — H&P ADULT - NSHPSOCIALHISTORY_GEN_ALL_CORE
Lives with , children and grandchildren.   On disability due to multiple comorbidities. Lives with , children and grandchildren.   On disability due to multiple comorbidities.    Smokes 1/2 pack per day for 40-50 years.     Denies drinking.  Denies vaping.  Denies marijuana.   Denies illicit drug use. Lives with , children and grandchildren.   On disability due to multiple comorbidities.  HHA 7 days/week for 4 hours/day.     Smokes 1/2 pack per day for 40-50 years.     Denies drinking.  Denies vaping.  Denies marijuana.   Denies illicit drug use.

## 2020-03-11 NOTE — H&P ADULT - PROBLEM SELECTOR PLAN 10
DISCHARGE PLANNING:  Medical readiness goals: pending wound cultures and abx sensitivity; PT eval     Anticipated d/c: 48-72 hours     PCP:  Dr. Neal   Pharmacy: [facility] [address] [place] [address] ([phone])

## 2020-03-12 ENCOUNTER — TRANSCRIPTION ENCOUNTER (OUTPATIENT)
Age: 60
End: 2020-03-12

## 2020-03-12 ENCOUNTER — APPOINTMENT (OUTPATIENT)
Dept: VASCULAR SURGERY | Facility: CLINIC | Age: 60
End: 2020-03-12

## 2020-03-12 DIAGNOSIS — I10 ESSENTIAL (PRIMARY) HYPERTENSION: ICD-10-CM

## 2020-03-12 DIAGNOSIS — I50.9 HEART FAILURE, UNSPECIFIED: ICD-10-CM

## 2020-03-12 LAB
ALBUMIN SERPL ELPH-MCNC: 3.3 G/DL — SIGNIFICANT CHANGE UP (ref 3.3–5)
ALP SERPL-CCNC: 107 U/L — SIGNIFICANT CHANGE UP (ref 40–120)
ALT FLD-CCNC: 14 U/L — SIGNIFICANT CHANGE UP (ref 10–45)
ANION GAP SERPL CALC-SCNC: 12 MMOL/L — SIGNIFICANT CHANGE UP (ref 5–17)
ANION GAP SERPL CALC-SCNC: 14 MMOL/L — SIGNIFICANT CHANGE UP (ref 5–17)
AST SERPL-CCNC: 18 U/L — SIGNIFICANT CHANGE UP (ref 10–40)
BASE EXCESS BLDA CALC-SCNC: 3.2 MMOL/L — HIGH (ref -2–3)
BASOPHILS # BLD AUTO: 0.05 K/UL — SIGNIFICANT CHANGE UP (ref 0–0.2)
BASOPHILS NFR BLD AUTO: 0.7 % — SIGNIFICANT CHANGE UP (ref 0–2)
BILIRUB SERPL-MCNC: 0.4 MG/DL — SIGNIFICANT CHANGE UP (ref 0.2–1.2)
BUN SERPL-MCNC: 41 MG/DL — HIGH (ref 7–23)
BUN SERPL-MCNC: 46 MG/DL — HIGH (ref 7–23)
CALCIUM SERPL-MCNC: 8.3 MG/DL — LOW (ref 8.4–10.5)
CALCIUM SERPL-MCNC: 8.6 MG/DL — SIGNIFICANT CHANGE UP (ref 8.4–10.5)
CHLORIDE SERPL-SCNC: 100 MMOL/L — SIGNIFICANT CHANGE UP (ref 96–108)
CHLORIDE SERPL-SCNC: 99 MMOL/L — SIGNIFICANT CHANGE UP (ref 96–108)
CO2 SERPL-SCNC: 27 MMOL/L — SIGNIFICANT CHANGE UP (ref 22–31)
CO2 SERPL-SCNC: 29 MMOL/L — SIGNIFICANT CHANGE UP (ref 22–31)
CREAT SERPL-MCNC: 2.47 MG/DL — HIGH (ref 0.5–1.3)
CREAT SERPL-MCNC: 2.6 MG/DL — HIGH (ref 0.5–1.3)
EOSINOPHIL # BLD AUTO: 0.32 K/UL — SIGNIFICANT CHANGE UP (ref 0–0.5)
EOSINOPHIL NFR BLD AUTO: 4.7 % — SIGNIFICANT CHANGE UP (ref 0–6)
GLUCOSE BLDC GLUCOMTR-MCNC: 161 MG/DL — HIGH (ref 70–99)
GLUCOSE BLDC GLUCOMTR-MCNC: 176 MG/DL — HIGH (ref 70–99)
GLUCOSE BLDC GLUCOMTR-MCNC: 202 MG/DL — HIGH (ref 70–99)
GLUCOSE BLDC GLUCOMTR-MCNC: 239 MG/DL — HIGH (ref 70–99)
GLUCOSE BLDC GLUCOMTR-MCNC: 61 MG/DL — LOW (ref 70–99)
GLUCOSE BLDC GLUCOMTR-MCNC: 64 MG/DL — LOW (ref 70–99)
GLUCOSE SERPL-MCNC: 145 MG/DL — HIGH (ref 70–99)
GLUCOSE SERPL-MCNC: 188 MG/DL — HIGH (ref 70–99)
HCO3 BLDA-SCNC: 28 MMOL/L — SIGNIFICANT CHANGE UP (ref 21–28)
HCT VFR BLD CALC: 32.7 % — LOW (ref 34.5–45)
HCT VFR BLD CALC: 34.1 % — LOW (ref 34.5–45)
HGB BLD-MCNC: 10 G/DL — LOW (ref 11.5–15.5)
HGB BLD-MCNC: 10.7 G/DL — LOW (ref 11.5–15.5)
IMM GRANULOCYTES NFR BLD AUTO: 0.4 % — SIGNIFICANT CHANGE UP (ref 0–1.5)
LACTATE SERPL-SCNC: 2 MMOL/L — SIGNIFICANT CHANGE UP (ref 0.5–2)
LYMPHOCYTES # BLD AUTO: 1.57 K/UL — SIGNIFICANT CHANGE UP (ref 1–3.3)
LYMPHOCYTES # BLD AUTO: 23 % — SIGNIFICANT CHANGE UP (ref 13–44)
MAGNESIUM SERPL-MCNC: 2.4 MG/DL — SIGNIFICANT CHANGE UP (ref 1.6–2.6)
MCHC RBC-ENTMCNC: 29.4 PG — SIGNIFICANT CHANGE UP (ref 27–34)
MCHC RBC-ENTMCNC: 29.6 PG — SIGNIFICANT CHANGE UP (ref 27–34)
MCHC RBC-ENTMCNC: 30.6 GM/DL — LOW (ref 32–36)
MCHC RBC-ENTMCNC: 31.4 GM/DL — LOW (ref 32–36)
MCV RBC AUTO: 94.2 FL — SIGNIFICANT CHANGE UP (ref 80–100)
MCV RBC AUTO: 96.2 FL — SIGNIFICANT CHANGE UP (ref 80–100)
MONOCYTES # BLD AUTO: 1.01 K/UL — HIGH (ref 0–0.9)
MONOCYTES NFR BLD AUTO: 14.8 % — HIGH (ref 2–14)
NEUTROPHILS # BLD AUTO: 3.84 K/UL — SIGNIFICANT CHANGE UP (ref 1.8–7.4)
NEUTROPHILS NFR BLD AUTO: 56.4 % — SIGNIFICANT CHANGE UP (ref 43–77)
NRBC # BLD: 0 /100 WBCS — SIGNIFICANT CHANGE UP (ref 0–0)
NRBC # BLD: 0 /100 WBCS — SIGNIFICANT CHANGE UP (ref 0–0)
PCO2 BLDA: 47 MMHG — HIGH (ref 32–45)
PH BLDA: 7.4 — SIGNIFICANT CHANGE UP (ref 7.35–7.45)
PLATELET # BLD AUTO: 182 K/UL — SIGNIFICANT CHANGE UP (ref 150–400)
PLATELET # BLD AUTO: 201 K/UL — SIGNIFICANT CHANGE UP (ref 150–400)
PO2 BLDA: 48 MMHG — CRITICAL LOW (ref 83–108)
POTASSIUM SERPL-MCNC: 3.3 MMOL/L — LOW (ref 3.5–5.3)
POTASSIUM SERPL-MCNC: 3.8 MMOL/L — SIGNIFICANT CHANGE UP (ref 3.5–5.3)
POTASSIUM SERPL-SCNC: 3.3 MMOL/L — LOW (ref 3.5–5.3)
POTASSIUM SERPL-SCNC: 3.8 MMOL/L — SIGNIFICANT CHANGE UP (ref 3.5–5.3)
PROT SERPL-MCNC: 8 G/DL — SIGNIFICANT CHANGE UP (ref 6–8.3)
RBC # BLD: 3.4 M/UL — LOW (ref 3.8–5.2)
RBC # BLD: 3.62 M/UL — LOW (ref 3.8–5.2)
RBC # FLD: 14.3 % — SIGNIFICANT CHANGE UP (ref 10.3–14.5)
RBC # FLD: 14.4 % — SIGNIFICANT CHANGE UP (ref 10.3–14.5)
SAO2 % BLDA: 84 % — LOW (ref 95–100)
SODIUM SERPL-SCNC: 140 MMOL/L — SIGNIFICANT CHANGE UP (ref 135–145)
SODIUM SERPL-SCNC: 141 MMOL/L — SIGNIFICANT CHANGE UP (ref 135–145)
TSH SERPL-MCNC: 1.63 UIU/ML — SIGNIFICANT CHANGE UP (ref 0.35–4.94)
WBC # BLD: 11.94 K/UL — HIGH (ref 3.8–10.5)
WBC # BLD: 6.82 K/UL — SIGNIFICANT CHANGE UP (ref 3.8–10.5)
WBC # FLD AUTO: 11.94 K/UL — HIGH (ref 3.8–10.5)
WBC # FLD AUTO: 6.82 K/UL — SIGNIFICANT CHANGE UP (ref 3.8–10.5)

## 2020-03-12 PROCEDURE — 78315 BONE IMAGING 3 PHASE: CPT | Mod: 26

## 2020-03-12 PROCEDURE — 99222 1ST HOSP IP/OBS MODERATE 55: CPT | Mod: GC

## 2020-03-12 PROCEDURE — 70450 CT HEAD/BRAIN W/O DYE: CPT | Mod: 26

## 2020-03-12 PROCEDURE — 99233 SBSQ HOSP IP/OBS HIGH 50: CPT | Mod: GC

## 2020-03-12 PROCEDURE — 99233 SBSQ HOSP IP/OBS HIGH 50: CPT

## 2020-03-12 RX ORDER — HYDROMORPHONE HYDROCHLORIDE 2 MG/ML
1 INJECTION INTRAMUSCULAR; INTRAVENOUS; SUBCUTANEOUS EVERY 4 HOURS
Refills: 0 | Status: DISCONTINUED | OUTPATIENT
Start: 2020-03-12 | End: 2020-03-15

## 2020-03-12 RX ORDER — POTASSIUM CHLORIDE 20 MEQ
40 PACKET (EA) ORAL ONCE
Refills: 0 | Status: COMPLETED | OUTPATIENT
Start: 2020-03-12 | End: 2020-03-12

## 2020-03-12 RX ORDER — OXYCODONE AND ACETAMINOPHEN 5; 325 MG/1; MG/1
1 TABLET ORAL EVERY 6 HOURS
Refills: 0 | Status: DISCONTINUED | OUTPATIENT
Start: 2020-03-12 | End: 2020-03-12

## 2020-03-12 RX ORDER — ACETAMINOPHEN 500 MG
975 TABLET ORAL EVERY 8 HOURS
Refills: 0 | Status: DISCONTINUED | OUTPATIENT
Start: 2020-03-12 | End: 2020-03-15

## 2020-03-12 RX ORDER — NALOXONE HYDROCHLORIDE 4 MG/.1ML
0.4 SPRAY NASAL ONCE
Refills: 0 | Status: COMPLETED | OUTPATIENT
Start: 2020-03-12 | End: 2020-03-12

## 2020-03-12 RX ORDER — HYDROMORPHONE HYDROCHLORIDE 2 MG/ML
0.25 INJECTION INTRAMUSCULAR; INTRAVENOUS; SUBCUTANEOUS EVERY 4 HOURS
Refills: 0 | Status: DISCONTINUED | OUTPATIENT
Start: 2020-03-12 | End: 2020-03-14

## 2020-03-12 RX ORDER — INSULIN LISPRO 100/ML
10 VIAL (ML) SUBCUTANEOUS
Refills: 0 | Status: DISCONTINUED | OUTPATIENT
Start: 2020-03-12 | End: 2020-03-12

## 2020-03-12 RX ORDER — METHADONE HYDROCHLORIDE 40 MG/1
10 TABLET ORAL EVERY 8 HOURS
Refills: 0 | Status: DISCONTINUED | OUTPATIENT
Start: 2020-03-12 | End: 2020-03-15

## 2020-03-12 RX ORDER — INSULIN GLARGINE 100 [IU]/ML
40 INJECTION, SOLUTION SUBCUTANEOUS AT BEDTIME
Refills: 0 | Status: DISCONTINUED | OUTPATIENT
Start: 2020-03-12 | End: 2020-03-13

## 2020-03-12 RX ORDER — INSULIN LISPRO 100/ML
12 VIAL (ML) SUBCUTANEOUS
Refills: 0 | Status: DISCONTINUED | OUTPATIENT
Start: 2020-03-12 | End: 2020-03-13

## 2020-03-12 RX ORDER — DEXTROSE 50 % IN WATER 50 %
25 SYRINGE (ML) INTRAVENOUS ONCE
Refills: 0 | Status: COMPLETED | OUTPATIENT
Start: 2020-03-12 | End: 2020-03-12

## 2020-03-12 RX ADMIN — Medication 2: at 22:06

## 2020-03-12 RX ADMIN — GABAPENTIN 200 MILLIGRAM(S): 400 CAPSULE ORAL at 06:07

## 2020-03-12 RX ADMIN — OXYCODONE AND ACETAMINOPHEN 1 TABLET(S): 5; 325 TABLET ORAL at 10:05

## 2020-03-12 RX ADMIN — ATORVASTATIN CALCIUM 80 MILLIGRAM(S): 80 TABLET, FILM COATED ORAL at 22:06

## 2020-03-12 RX ADMIN — Medication 10 UNIT(S): at 17:42

## 2020-03-12 RX ADMIN — Medication 25 MICROGRAM(S): at 06:07

## 2020-03-12 RX ADMIN — INSULIN GLARGINE 40 UNIT(S): 100 INJECTION, SOLUTION SUBCUTANEOUS at 22:07

## 2020-03-12 RX ADMIN — PIPERACILLIN AND TAZOBACTAM 200 GRAM(S): 4; .5 INJECTION, POWDER, LYOPHILIZED, FOR SOLUTION INTRAVENOUS at 00:06

## 2020-03-12 RX ADMIN — Medication 4: at 06:07

## 2020-03-12 RX ADMIN — METHADONE HYDROCHLORIDE 10 MILLIGRAM(S): 40 TABLET ORAL at 06:07

## 2020-03-12 RX ADMIN — OXYCODONE AND ACETAMINOPHEN 1 TABLET(S): 5; 325 TABLET ORAL at 11:35

## 2020-03-12 RX ADMIN — Medication 1 GRAM(S): at 17:42

## 2020-03-12 RX ADMIN — Medication 1 GRAM(S): at 06:07

## 2020-03-12 RX ADMIN — APIXABAN 5 MILLIGRAM(S): 2.5 TABLET, FILM COATED ORAL at 06:07

## 2020-03-12 RX ADMIN — Medication 15 UNIT(S): at 08:30

## 2020-03-12 RX ADMIN — PIPERACILLIN AND TAZOBACTAM 200 GRAM(S): 4; .5 INJECTION, POWDER, LYOPHILIZED, FOR SOLUTION INTRAVENOUS at 17:43

## 2020-03-12 RX ADMIN — Medication 25 MILLILITER(S): at 11:09

## 2020-03-12 RX ADMIN — Medication 975 MILLIGRAM(S): at 22:06

## 2020-03-12 RX ADMIN — PANTOPRAZOLE SODIUM 40 MILLIGRAM(S): 20 TABLET, DELAYED RELEASE ORAL at 06:07

## 2020-03-12 RX ADMIN — Medication 200 MILLIGRAM(S): at 06:39

## 2020-03-12 RX ADMIN — Medication 1 GRAM(S): at 23:48

## 2020-03-12 RX ADMIN — Medication 975 MILLIGRAM(S): at 22:07

## 2020-03-12 RX ADMIN — Medication 1 GRAM(S): at 00:06

## 2020-03-12 RX ADMIN — Medication 20 MILLIGRAM(S): at 07:31

## 2020-03-12 RX ADMIN — APIXABAN 5 MILLIGRAM(S): 2.5 TABLET, FILM COATED ORAL at 17:42

## 2020-03-12 RX ADMIN — Medication 40 MILLIEQUIVALENT(S): at 17:42

## 2020-03-12 RX ADMIN — PIPERACILLIN AND TAZOBACTAM 200 GRAM(S): 4; .5 INJECTION, POWDER, LYOPHILIZED, FOR SOLUTION INTRAVENOUS at 23:48

## 2020-03-12 RX ADMIN — PIPERACILLIN AND TAZOBACTAM 200 GRAM(S): 4; .5 INJECTION, POWDER, LYOPHILIZED, FOR SOLUTION INTRAVENOUS at 13:14

## 2020-03-12 RX ADMIN — Medication 300 MILLIGRAM(S): at 05:34

## 2020-03-12 RX ADMIN — METHADONE HYDROCHLORIDE 10 MILLIGRAM(S): 40 TABLET ORAL at 00:06

## 2020-03-12 RX ADMIN — NALOXONE HYDROCHLORIDE 0.4 MILLIGRAM(S): 4 SPRAY NASAL at 11:35

## 2020-03-12 RX ADMIN — PIPERACILLIN AND TAZOBACTAM 200 GRAM(S): 4; .5 INJECTION, POWDER, LYOPHILIZED, FOR SOLUTION INTRAVENOUS at 06:08

## 2020-03-12 RX ADMIN — Medication 1 GRAM(S): at 14:54

## 2020-03-12 RX ADMIN — METHADONE HYDROCHLORIDE 10 MILLIGRAM(S): 40 TABLET ORAL at 19:09

## 2020-03-12 RX ADMIN — ESCITALOPRAM OXALATE 20 MILLIGRAM(S): 10 TABLET, FILM COATED ORAL at 14:54

## 2020-03-12 NOTE — CONSULT NOTE ADULT - SUBJECTIVE AND OBJECTIVE BOX
HPI: 59yFemale    Age at Dx:  How dx:  Hx and duration of insulin:  Current Therapy:  Hx of hypoglycemia  Hx of DKA/HHS?    Home FSG:  Fasting  Lunch  Dinner  Bed    Hx of other regimens  Complications:  Outpatient Endo:    PMH & Surgical Hx:DIABETIC FOOT ULCER; PAD  No pertinent family history in first degree relatives  Family history of diabetes mellitus (Mother)  Family history of hypertension (Mother)  No pertinent family history in first degree relatives  Handoff  MEWS Score  Neuropathy  PAD (peripheral artery disease)  Bipolar depression  Depression  HTN (hypertension)  DVT (deep venous thrombosis)  CVA (cerebral vascular accident)  DM (diabetes mellitus)  Acute on chronic systolic heart failure  Congestive heart failure, unspecified HF chronicity, unspecified heart failure type  Chronic pain  HLD (hyperlipidemia)  CVA (cerebral vascular accident)  Depression  COPD (chronic obstructive pulmonary disease)  Diabetes  CHF (congestive heart failure)  Diabetic foot ulcer  Chronic pain  Multiple myeloma  COPD (chronic obstructive pulmonary disease)  CKD (chronic kidney disease) stage 4, GFR 15-29 ml/min  Suspected deep vein thrombosis (DVT)  Transition of care performed with sharing of clinical summary  Nutrition, metabolism, and development symptoms  PAD (peripheral artery disease)  DM (diabetes mellitus)  Chronic systolic congestive heart failure  DVT (deep venous thrombosis)  Cellulitis  H/O extremity bypass graft  History of cholecystectomy  H/O tubal ligation  H/O abdominal hysterectomy  AICD (automatic cardioverter/defibrillator) present  FOOT PAIN  25  DM (diabetes mellitus)  PAD (peripheral artery disease)  Cellulitis      FH:  DM:  Thyroid:  Autoimmune:  Other:    SH:  Smoking  Etoh:  Recreational Drugs:  Social Life:    Current Meds:  acetaminophen   Tablet .. 650 milliGRAM(s) Oral every 6 hours PRN  apixaban 5 milliGRAM(s) Oral two times a day  atorvastatin 80 milliGRAM(s) Oral at bedtime  dextrose 40% Gel 15 Gram(s) Oral once PRN  dextrose 5%. 1000 milliLiter(s) IV Continuous <Continuous>  dextrose 50% Injectable 12.5 Gram(s) IV Push once  dextrose 50% Injectable 25 Gram(s) IV Push once  dextrose 50% Injectable 25 Gram(s) IV Push once  escitalopram 20 milliGRAM(s) Oral daily  glucagon  Injectable 1 milliGRAM(s) IntraMuscular once PRN  insulin glargine Injectable (LANTUS) 35 Unit(s) SubCutaneous at bedtime  insulin lispro (HumaLOG) corrective regimen sliding scale   SubCutaneous every 6 hours  insulin lispro Injectable (HumaLOG) 15 Unit(s) SubCutaneous three times a day before meals  levothyroxine 25 MICROGram(s) Oral daily  metoprolol succinate  milliGRAM(s) Oral daily  pantoprazole    Tablet 40 milliGRAM(s) Oral before breakfast  piperacillin/tazobactam IVPB.. 3.375 Gram(s) IV Intermittent every 6 hours  sucralfate 1 Gram(s) Oral four times a day  torsemide 20 milliGRAM(s) Oral daily      Allergies:  aspirin (Other (Moderate); Rash)  IV Contrast (Unknown)      ROS:  Denies the following except as indicated.    General: weight loss/weight gain, decreased appetite, fatigue  Eyes: Blurry vision, double vision, visual changes  ENT: Throat pain, changes in voice,   CV: palpitations, SOB, CP, cough  GI: NVD, difficulty swallowing, abdominal pain  : polyuria, dysuria  Endo: abnormal menses, temperature intolerance, decreased libido  MSK: weakness, joint pain  Skin: rash, dryness, diaphoresis  Heme: Easy bruising,bleeding  Neuro: HA, dizziness, lightheadedness, numbness tingling  Psych: Anxiety, Depression    Vital Signs Last 24 Hrs  T(C): 36.9 (12 Mar 2020 08:30), Max: 36.9 (11 Mar 2020 16:14)  T(F): 98.5 (12 Mar 2020 08:30), Max: 98.5 (12 Mar 2020 08:30)  HR: 65 (12 Mar 2020 11:37) (63 - 82)  BP: 128/69 (12 Mar 2020 11:37) (124/71 - 151/80)  BP(mean): --  RR: 18 (12 Mar 2020 08:30) (16 - 18)  SpO2: 97% (12 Mar 2020 08:30) (94% - 100%)  Height (cm): 165.1 (03-11 @ 06:26)  Weight (kg): 90.7 (03-11 @ 02:38)  BMI (kg/m2): 33.3 (03-11 @ 06:26)      Constitutional: wn/wd in NAD.   HEENT: NCAT, MMM, OP clear, EOMI, , no proptosis or lid retraction  Neck: no thyromegaly or palpable thyroid nodules   Respiratory: lungs CTAB.  Cardiovascular: regular rhythm, normal S1 and S2, no audible murmurs, no peripheral edema  GI: soft, NT/ND, no masses/HSM appreciated.  Neurology: no tremors, DTR 2+  Skin: no visible rashes/lesions  Psychiatric: AAO x 3, normal affect/mood.  Ext: radial pulses intact, DP pulses intact, extremities warm, no cyanosis, clubbing or edema.       LABS:                        10.7   11.94 )-----------( 201      ( 12 Mar 2020 13:08 )             34.1     03-12    141  |  100  |  41<H>  ----------------------------<  145<H>  3.3<L>   |  27  |  2.47<H>    Ca    8.6      12 Mar 2020 13:08  Mg     2.4     03-12    TPro  8.0  /  Alb  3.3  /  TBili  0.4  /  DBili  x   /  AST  18  /  ALT  14  /  AlkPhos  107  03-12    PT/INR - ( 11 Mar 2020 04:46 )   PT: 17.5 sec;   INR: 1.52          PTT - ( 11 Mar 2020 04:46 )  PTT:40.8 sec    Hemoglobin A1C, Whole Blood: 9.2 (03-11 @ 04:46)  Hemoglobin A1C, Whole Blood: 10.3 (02-13 @ 06:59)    Thyroid Stimulating Hormone, Serum: 1.630 (03-12 @ 05:48)  Thyroid Stimulating Hormone, Serum: 8.491 (02-12 @ 12:45)      RADIOLOGY & ADDITIONAL STUDIES:  CAPILLARY BLOOD GLUCOSE      POCT Blood Glucose.: 161 mg/dL (12 Mar 2020 13:07)  POCT Blood Glucose.: 239 mg/dL (12 Mar 2020 11:19)  POCT Blood Glucose.: 64 mg/dL (12 Mar 2020 11:09)  POCT Blood Glucose.: 61 mg/dL (12 Mar 2020 10:58)  POCT Blood Glucose.: 217 mg/dL (12 Mar 2020 08:20)  POCT Blood Glucose.: 241 mg/dL (12 Mar 2020 05:58)  POCT Blood Glucose.: 105 mg/dL (11 Mar 2020 21:54)  POCT Blood Glucose.: 221 mg/dL (11 Mar 2020 17:19)        A/P:59y Female    1.  DM  Please continue lantus       units at night / morning.  Please continue lispro      units before each meal.  Please continue lispro moderate / low dose sliding scale four times daily with meals and at bedtime    Pt's fingerstick glucose goal is     Will continue to monitor     For discharge, pt can continue    Pt can follow up at discharge with [facility] by calling  to make an appointment.   Will discuss case with     and update primary team HPI: 60 y/o female with HFrEF (EF 25%, AICD in place), HTN, IDDM c/b neuropathy (takes Methadone), Bipolar disorder, depression, hx of DVT (with multiple prior DVTs), CVA x2 (last one  with residual right sided weakness), known PAD with prior bypass, NICM, hypothyroidism , COPD (on 2-3L NC @ home), DHARMESH on CPAP, multiple myeloma (on Dexamethasone), stage 4CKD presenting with bilateral foot pain and expanding erythema (L>R) for one week's duration. Patient followed up with podiatrist on  prior to admission, managed with keflex 500mg TID for 10 days. Patient endorses compliance with medication, however presented to ED with worsening warmth and pain of lower extremities. Patient denies fevers, nausea, vomiting headache, new chills or palpations. Patient with increased difficultly walking 2/2 pain. Tylenol attempted at home for pain. Patient unclear regarding purulence, indicates she cannot see the bottom of her foot but is aware of some drainage, not malodorous. Of note, patient with recent admission () to cardiac telemetry for hypotension and RITCHIE in setting of dehydration.     In ED: Vitals: T: Afebrile | HR: 70 | BP: 134/60| RR: 19 | O2: 85% on RA   Labs: WBC 11.94, Hgb: 10.4; BUN: 46; Cr: 2.28; Blood glucose: 274; Lipase 81; BNP: 2186   Imaging: B/l lower extremity doppler without evidence of DVT, thrombus noted at left saphenofemoral junction  Foot Xray, 3 view B/l: bilateral soft tissue swelling; Overhanging margin of bone with "punched out" erosion appreciated medial distal edge of left proximal phalanx with soft tissue defect at medial aspect of left hallux consistent with ulceration/bullae left foot    Intervention: Vancomycin 1g; Zosyn 2.375mg; Morphine 4mg IV push; Tylenol 650mg x1  Consults: Podiatry - who requested a SPECT scan to be done for ruling in OM.    Endocrine was consulted for her DM management. During this hospital stay, HBa1c 9.2. She has been afebrile. As per the patient, she denies any polyuria, polydipsia. She has been taking decadron 4mg - 5 tabs once every week on Monday - her last dose being this monday for her multiple myeloma.  FSG & Insulin received:  Yesterday:  pre-dinner fs, 15 nutritional lispro   units + 4  units lispro SS, had navarrete salad, ice cream, sweet potatoes  bedtime fs, 35 lantus   units  Today:  600 Am FS  pre-breakfast fs, 15 nutritional lispro   units+  4  units lispro SS, had 2 cups of cereal and 1 banana  pre-lunch fs - repeat FSg 64 - was given juice - repeat FSg was 239  Pre-dinner fs    She was also diagnosed with Hypothyroidism - 4 years ago and has been on levothyroxine 25mcg since then    DM historyu  Age at Dx: 10 years ago  How dx: regular blood work  Current Therapy: levemir 40 units QHS, Novolog 22 units for breakfast and lunch and 20 units for dinner.   Hx of hypoglycemia: happens 3 times in a month - little more frequent. happens in the middle of the day at a round 3 to 400 PM when she skips lunch - she will have sweating and will have some candies  Hx of DKA/HHS - HHS in the past as per the patient    Home FSG:  Fasting - 150s to 170s - will have cereal  Lunch 150s to 200s - will have chicken sandwich  Dinner 120s - will have rice, chicken, beans  Bed 150s  She may have 1 or 2 fruit servings - 10 grapes or 1 banana. Denies any snacks    Hx of other regimens  Complications: retinopathy, neuropathy, ? gastroparesis  Outpatient Endo:     PM & Surgical Hx:DIABETIC FOOT ULCER; PAD  Neuropathy  PAD (peripheral artery disease)  Bipolar depression  Depression  HTN (hypertension)  DVT (deep venous thrombosis)  CVA (cerebral vascular accident)  DM (diabetes mellitus)  Acute on chronic systolic heart failure  Congestive heart failure, unspecified HF chronicity, unspecified heart failure type  Chronic pain  HLD (hyperlipidemia)  CVA (cerebral vascular accident)  Depression  COPD (chronic obstructive pulmonary disease)  Diabetes  CHF (congestive heart failure)  Diabetic foot ulcer  Chronic pain  Multiple myeloma  COPD (chronic obstructive pulmonary disease)  CKD (chronic kidney disease) stage 4, GFR 15-29 ml/min  Suspected deep vein thrombosis (DVT)  PAD (peripheral artery disease)  DM (diabetes mellitus)  Chronic systolic congestive heart failure  DVT (deep venous thrombosis)  Cellulitis  H/O extremity bypass graft  History of cholecystectomy  H/O tubal ligation  H/O abdominal hysterectomy  AICD (automatic cardioverter/defibrillator) present  DM (diabetes mellitus)  PAD (peripheral artery disease)  Cellulitis      Current Meds:  acetaminophen   Tablet .. 650 milliGRAM(s) Oral every 6 hours PRN  apixaban 5 milliGRAM(s) Oral two times a day  atorvastatin 80 milliGRAM(s) Oral at bedtime  dextrose 40% Gel 15 Gram(s) Oral once PRN  dextrose 5%. 1000 milliLiter(s) IV Continuous <Continuous>  dextrose 50% Injectable 12.5 Gram(s) IV Push once  dextrose 50% Injectable 25 Gram(s) IV Push once  dextrose 50% Injectable 25 Gram(s) IV Push once  escitalopram 20 milliGRAM(s) Oral daily  glucagon  Injectable 1 milliGRAM(s) IntraMuscular once PRN  insulin glargine Injectable (LANTUS) 35 Unit(s) SubCutaneous at bedtime  insulin lispro (HumaLOG) corrective regimen sliding scale   SubCutaneous every 6 hours  insulin lispro Injectable (HumaLOG) 15 Unit(s) SubCutaneous three times a day before meals  levothyroxine 25 MICROGram(s) Oral daily  metoprolol succinate  milliGRAM(s) Oral daily  pantoprazole    Tablet 40 milliGRAM(s) Oral before breakfast  piperacillin/tazobactam IVPB.. 3.375 Gram(s) IV Intermittent every 6 hours  sucralfate 1 Gram(s) Oral four times a day  torsemide 20 milliGRAM(s) Oral daily      Allergies:  aspirin (Other (Moderate); Rash)  IV Contrast (Unknown)      ROS:  Denies the following except as indicated.    General: weight loss/weight gain, decreased appetite, fatigue  Eyes: Blurry vision, double vision, visual changes  ENT: Throat pain, changes in voice,   CV: palpitations, SOB, CP, cough  GI: NVD, difficulty swallowing, abdominal pain  : polyuria, dysuria  Endo: temperature intolerance, decreased libido  MSK: weakness, joint pain  Skin: dryness, diaphoresis  Heme: Easy bruising, bleeding  Neuro: HA, new neuro deficits  Psych: Anxiety, Depression    Vital Signs Last 24 Hrs  T(C): 36.9 (12 Mar 2020 08:30), Max: 36.9 (11 Mar 2020 16:14)  T(F): 98.5 (12 Mar 2020 08:30), Max: 98.5 (12 Mar 2020 08:30)  HR: 65 (12 Mar 2020 11:37) (63 - 82)  BP: 128/69 (12 Mar 2020 11:37) (124/71 - 151/80)  BP(mean): --  RR: 18 (12 Mar 2020 08:30) (16 - 18)  SpO2: 97% (12 Mar 2020 08:30) (94% - 100%)  Height (cm): 165.1 ( @ 06:26)  Weight (kg): 90.7 ( @ 02:38)  BMI (kg/m2): 33.3 ( @ 06:26)      Constitutional: wn/wd in NAD.   HEENT: no proptosis or lid retraction  Neck: no thyromegaly or palpable thyroid nodules   Respiratory: lungs CTAB.  Cardiovascular: regular rhythm, normal S1 and S2, mild peripheral edema  GI: soft, NT/ND, no masses/HSM appreciated.  Neurology: no tremors, DTR 2+, mild residual weakness on the right side  Psychiatric: AAO x 3, normal affect/mood.  Ext: radial pulses intact, Both feet wrapped with bandage - b/l lower extremity swelling present - open ulcers seen at the margin of bandage.     LABS:                        10.7   11.94 )-----------( 201      ( 12 Mar 2020 13:08 )             34.1     03-12    141  |  100  |  41<H>  ----------------------------<  145<H>  3.3<L>   |  27  |  2.47<H>    Ca    8.6      12 Mar 2020 13:08  Mg     2.4     03-12    TPro  8.0  /  Alb  3.3  /  TBili  0.4  /  DBili  x   /  AST  18  /  ALT  14  /  AlkPhos  107  03-12    PT/INR - ( 11 Mar 2020 04:46 )   PT: 17.5 sec;   INR: 1.52          PTT - ( 11 Mar 2020 04:46 )  PTT:40.8 sec    Hemoglobin A1C, Whole Blood: 9.2 ( @ 04:46)  Hemoglobin A1C, Whole Blood: 10.3 ( @ 06:59)    Thyroid Stimulating Hormone, Serum: 1.630 ( @ 05:48)  Thyroid Stimulating Hormone, Serum: 8.491 ( @ 12:45)      RADIOLOGY & ADDITIONAL STUDIES:  CAPILLARY BLOOD GLUCOSE      POCT Blood Glucose.: 161 mg/dL (12 Mar 2020 13:07)  POCT Blood Glucose.: 239 mg/dL (12 Mar 2020 11:19)  POCT Blood Glucose.: 64 mg/dL (12 Mar 2020 11:09)  POCT Blood Glucose.: 61 mg/dL (12 Mar 2020 10:58)  POCT Blood Glucose.: 217 mg/dL (12 Mar 2020 08:20)  POCT Blood Glucose.: 241 mg/dL (12 Mar 2020 05:58)  POCT Blood Glucose.: 105 mg/dL (11 Mar 2020 21:54)  POCT Blood Glucose.: 221 mg/dL (11 Mar 2020 17:19)        A/P: 60 y/o female with HFrEF (EF 25%, AICD in place), HTN, IDDM c/b neuropathy (takes Methadone), Bipolar disorder, depression, hx of DVT (with multiple prior DVTs), CVA x2 (last one  with residual right sided weakness), known PAD with prior bypass, NICM, hypothyroidism , COPD (on 2-3L NC @ home), DHARMESH on CPAP, multiple myeloma (on Dexamethasone), stage 4CKD came in with B/L lower extremity swelling - left LE cellulitis versus osteomyelitis.    1.  DM type 2 - uncontrolled with hyperglycemia  - Hba1c 9.2  CKD Stage III to IV  Cr 2.47 and GFR 21  - Wt 90 kg with BMI 33  - carb consistent diet  Please increase to lantus   40   units at night.   Please increase to lispro  12    units before each meal.    Please continue lispro moderate dose sliding scale four times daily with meals and at bedtime    2. Hypothyroidism  - TSH 1.632  - free T4, Thyroid antibodies pending  - Continue levothyroxine 25mcg once daily on an empty stomach     Pt's fingerstick glucose goal is 120 to 150    Will continue to monitor     For discharge, TBD    Pt can follow up at discharge with [facility] by calling  to make an appointment.   discussed case with    and updated primary team

## 2020-03-12 NOTE — PROGRESS NOTE ADULT - SUBJECTIVE AND OBJECTIVE BOX
Patient is a 59y old  Female who presents with a chief complaint of cellulitis failed outpatient therapy (12 Mar 2020 08:01)      INTERVAL HPI/ OVERNIGHT EVENTS: Pt states that her wounds hurt. Denies N/V.      LABS                        10.0   6.82  )-----------( 182      ( 12 Mar 2020 05:48 )             32.7     03-12    140  |  99  |  46<H>  ----------------------------<  188<H>  3.8   |  29  |  2.60<H>    Ca    8.3<L>      12 Mar 2020 05:48  Mg     2.4     03-12    TPro  7.8  /  Alb  3.3  /  TBili  0.3  /  DBili  x   /  AST  17  /  ALT  15  /  AlkPhos  97  03-11    PT/INR - ( 11 Mar 2020 04:46 )   PT: 17.5 sec;   INR: 1.52          PTT - ( 11 Mar 2020 04:46 )  PTT:40.8 sec    ICU Vital Signs Last 24 Hrs  T(C): 36.9 (12 Mar 2020 04:49), Max: 36.9 (11 Mar 2020 16:14)  T(F): 98.4 (12 Mar 2020 04:49), Max: 98.4 (11 Mar 2020 16:14)  HR: 67 (12 Mar 2020 05:38) (63 - 72)  BP: 151/80 (12 Mar 2020 04:49) (124/71 - 151/80)  BP(mean): --  ABP: --  ABP(mean): --  RR: 16 (12 Mar 2020 05:38) (16 - 18)  SpO2: 99% (12 Mar 2020 05:38) (94% - 100%)      RADIOLOGY  < from: US Duplex Venous Lower Ext Ltd, Left (03.11.20 @ 03:54) >  FINDINGS:    Thigh veins: Focal area of noncompressibility and filling defect in the left saphenofemoral junction, consistent with thrombus. The left common femoral, femoral, popliteal, and proximal deep femoral veins are patent and free of thrombus. The veins are normally compressible and have normal phasic flow and augmentation response.    Calf veins: The paired  posterior tibial veins are patent. Limited evaluation of the left paired peroneal veins due to subcutaneous edema.    Contralateral CFV: The contralateral (right) common femoral vein is patent and free of thrombus.      IMPRESSION:  No DVT.  Thrombus at the left saphenofemoral junction. Recommend follow-up to assess for propagation.    < end of copied text >    < from: Xray Foot AP + Lateral + Oblique, Bilat (03.11.20 @ 05:46) >  IMPRESSION: Gas in the soft tissues along the medial aspect of the left great toe. MR imaging may be useful to exclude osteomyelitis.    < end of copied text >        MICROBIOLOGY    PHYSICAL EXAM  Lower Extremity Focused:  Vasc: Palpable DP/PT pulses. Erythema and warmth from her left ankle joint up to the knee.   Derm: Nonpitting edema of right foot and leg. Serous intact blister located at the medial aspect of 1st MTPJ left foot which extends proximally along the medial longitudinal arch. Erythema of dorsum left foot. On the right foot, Aguirre grade 1 stable wound at medial aspect 1st MTPJ (previous blister in this region).   Neuro: Diminished sensation B/L  MSK: Pain on palpation at site of blister on medial aspect left foot

## 2020-03-12 NOTE — CONSULT NOTE ADULT - REASON FOR ADMISSION
cellulitis failed outpatient therapy

## 2020-03-12 NOTE — DISCHARGE NOTE PROVIDER - NSDCFUSCHEDAPPT_GEN_ALL_CORE_FT
SULY PENA ; 03/26/2020 ; [facility] [address]  SULY PENA ; 04/08/2020 ; [facility] [address] [address]  SULY PENA ; 03/26/2020 ; [facility] [address]  SULY PENA ; 04/08/2020 ; [facility] [address] [address]  SULY PENA ; 03/26/2020 ; [facility] [address]  SULY PENA ; 04/08/2020 ; [facility] [address] [id number]  SULY PENA ; 03/26/2020 ; [facility] [address]  SULY PENA ; 04/08/2020 ; [facility] [address] [address]  SULY PENA ; 03/26/2020 ; [facility] [address]  SULY PENA ; 04/08/2020 ; [facility] [address] [address]  SULY PENA ; 03/26/2020 ; [facility] [address]  SULY PENA ; 04/08/2020 ; [facility] [address] [address]  SULY PENA ; 03/26/2020 ; [facility] [address]  SULY PENA ; 04/08/2020 ; [facility] [address] [id number]

## 2020-03-12 NOTE — DISCHARGE NOTE PROVIDER - NSDCFUADDAPPT_GEN_ALL_CORE_FT
Please follow up with Dr. Medeiros (Vascular Surgery) in two weeks.    Please follow up with Dr. Neal within 1-2 weeks after discharge.    Please follow up with Dr. Baird (Podiatry) within 1 week after discharge.    Please follow up with your Endocrinologist on March 26th as you had already scheduled.

## 2020-03-12 NOTE — DISCHARGE NOTE PROVIDER - HOSPITAL COURSE
#Discharge: do not delete        Patient is __ yo M/F with past medical history of _____    Presented with _____, found to have _____    Problem List/Main Diagnoses (system-based):     Inpatient treatment course:     New medications:     Labs to be followed outpatient:     Exam to be followed outpatient: #Discharge: do not delete        61F with extensive medical history and multiple hospitalizations HFrEF (EF 25%, AICD in place), HTN, IDDM c/b neuropathy (takes Methadone) presenting with b/l lower extremity cellulitis (L>R) after failing outpatient antibiotics, bone scan negative for osteomyelitis though showing soft tissue infection, admitted for further treatment with antibiotics and pain control.          Problem List/Main Diagnoses:     #Bilateral Foot Infection    Patient with bilateral lower extremity ulcers and worsening cellulitis in setting of PAD. Failed outpatient antibiotic treatment. No sepsis criteria met on admission. B/l lower extremity x-ray demonstrating soft tissue swelling bilaterally and gas in the left toe. ESR/CRP were elevated. No osteomyelitis on bone scan. Wound cultures were negative. Patient was initially treated with Vancomycin and Zosyn and was transitioned to Bactrim PO at the time of discharge for a full two week course. There was no concern for an ischemic limb and no crepitus was ever appreciated on physical exam.        #Chronic Pain    The patient was seen by         #Diabetes        #DVT        #Chronic Systolic HF        #Hypothyroidism        #CKD stage IV        #Bipolar Disorder        #COPD        #DHARMESH        #Multiple Myeloma        New medications: Bactrim    Labs to be followed outpatient: basic labs, wound cultures    Exam to be followed outpatient: basic exam, foot exam #Discharge: do not delete        61F with extensive medical history and multiple hospitalizations HFrEF (EF 25%, AICD in place), HTN, IDDM c/b neuropathy (takes Methadone) presenting with b/l lower extremity cellulitis (L>R) after failing outpatient antibiotics, bone scan negative for osteomyelitis though showing soft tissue infection, admitted for further treatment with antibiotics and pain control.          Problem List/Main Diagnoses:     #Bilateral Foot Infection    Patient with bilateral lower extremity ulcers and worsening cellulitis in setting of PAD. Failed outpatient antibiotic treatment. No sepsis criteria met on admission. B/l lower extremity x-ray demonstrating soft tissue swelling bilaterally and gas in the left toe. ESR/CRP were elevated. No osteomyelitis on bone scan. BCx negative. Wound cultures were negative. Patient was initially treated with Vancomycin and Zosyn and was transitioned to Bactrim PO at the time of discharge for a full two week course. There was no concern for an ischemic limb and no crepitus was ever appreciated on physical exam. Bilateral extremity arterial doppler requested by vascular showing severe stenosis in the right femoral artery and a patent stent on the left with no plan for surgical intervention. Will follow up with vascular surgery outpatient.        #Chronic Pain    The patient was seen by pain management and started on a pain regimen of standing Tylenol and Dilaudid PRN which was discontinued at the time of discharge. Of note, the patient had an episode of acute toxic-metabolic encephalopathy due to hypoglycemia and iatrogenic opioid overdose (after being given percocet) requiring Narcan with resolution of symptoms. Patient did not have any further episodes of encephalopathy and was discharged with home Methadone being given at larger time intervals (q8h vs q6h). Held gabapentin after episode of toxic-metabolic encephalopathy.        #Diabetes    Patient with A1c around 9-10. Episodes of hypoglycemia during inpatient stay requiring juice and dextrose. Followed by endocrinology with new home regimen of:***        #DVT    Continued on home Eliquis.        #Chronic Systolic HF    No CHF exacerbation, euvolemic on exam. BNP around 2100. Continued on home Torsemide and home Toprol.        #Hypothyroidism    Continued on home Synthroid 25 mcg.        #CKD stage IV    Creatinine 1.94 at the time of discharge. Baseline around 2.5.        #Bipolar Disorder    Continued on home Lexapro.        #COPD    Patient used bipap overnight. Did not require nasal canula, was saturating well on room air with no wheezing on exam. Treated with duonebs as needed.        #DHARMESH    Bipap overnight.        #Multiple Myeloma    Continued on Dexamethasone and Ninlaro.        #GERD    Treated with Pantoprazole (therapeutic interchange with home Omeprazole)        New medications: Bactrim, discontinued Gabapentin    Labs to be followed outpatient: basic labs, wound cultures    Exam to be followed outpatient: basic exam, foot exam #Discharge: do not delete        61F with extensive medical history and multiple hospitalizations HFrEF (EF 25%, AICD in place), HTN, IDDM c/b neuropathy (takes Methadone) presenting with b/l lower extremity cellulitis (L>R) after failing outpatient antibiotics, bone scan negative for osteomyelitis though showing soft tissue infection, admitted for further treatment with antibiotics and pain control.          Problem List/Main Diagnoses:     #Bilateral Foot Infection    Patient with bilateral lower extremity ulcers and worsening cellulitis in setting of PAD. Failed outpatient antibiotic treatment. No sepsis criteria met on admission. B/l lower extremity x-ray demonstrating soft tissue swelling bilaterally and gas in the left toe. ESR/CRP were elevated. No osteomyelitis on bone scan. BCx negative. Wound cultures were negative. Patient was initially treated with Vancomycin and Zosyn and was transitioned to Bactrim PO at the time of discharge for a full two week course. There was no concern for an ischemic limb and no crepitus was ever appreciated on physical exam. Bilateral extremity arterial doppler requested by vascular showing severe stenosis in the right femoral artery and a patent stent on the left with no plan for surgical intervention. Will follow up with vascular surgery outpatient.        #Chronic Pain    The patient was seen by pain management and started on a pain regimen of standing Tylenol and Dilaudid PRN which was discontinued at the time of discharge. Of note, the patient had an episode of acute toxic-metabolic encephalopathy due to hypoglycemia and iatrogenic opioid overdose (after being given percocet) requiring Narcan with resolution of symptoms. Patient did not have any further episodes of encephalopathy and was discharged with home Methadone being given at larger time intervals (q8h vs q6h). Held gabapentin after episode of toxic-metabolic encephalopathy.        #Diabetes    Patient with A1c around 9-10. Episodes of hypoglycemia during inpatient stay requiring juice and dextrose. Followed by endocrinology with new home regimen of: Lantus 28 units before bed and Lispro 5 units pre-meal.        #DVT    Continued on home Eliquis.        #Chronic Systolic HF    No CHF exacerbation, euvolemic on exam. BNP around 2100. Continued on home Torsemide and home Toprol.        #Hypothyroidism    Continued on home Synthroid 25 mcg.        #CKD stage IV    Creatinine 1.94 at the time of discharge. Baseline around 2.5.        #Bipolar Disorder    Continued on home Lexapro.        #COPD    Patient used bipap overnight. Did not require nasal canula, was saturating well on room air with no wheezing on exam. Treated with duonebs as needed.        #DHARMESH    Bipap overnight.        #Multiple Myeloma    Continued on Dexamethasone and Ninlaro.        #GERD    Treated with Pantoprazole (therapeutic interchange with home Omeprazole)        New medications: Bactrim, discontinued Gabapentin    Labs to be followed outpatient: basic labs, wound cultures    Exam to be followed outpatient: basic exam, foot exam #Discharge: do not delete        61F with extensive medical history and multiple hospitalizations HFrEF (EF 25%, AICD in place), HTN, IDDM c/b neuropathy (takes Methadone) presenting with b/l lower extremity cellulitis (L>R) after failing outpatient antibiotics, bone scan negative for osteomyelitis though showing soft tissue infection, admitted for further treatment with antibiotics and pain control.          Problem List/Main Diagnoses:     #Bilateral Foot Infection    Patient with bilateral lower extremity ulcers and worsening cellulitis in setting of PAD. Failed outpatient antibiotic treatment. No sepsis criteria met on admission. B/l lower extremity x-ray demonstrating soft tissue swelling bilaterally and gas in the left toe. ESR/CRP were elevated. No osteomyelitis on bone scan. BCx negative. Wound cultures were negative. Patient was initially treated with Vancomycin and Zosyn and was transitioned to Bactrim PO at the time of discharge for a full two week course. There was no concern for an ischemic limb and no crepitus was ever appreciated on physical exam. Bilateral extremity arterial doppler requested by vascular showing severe stenosis in the right femoral artery and a patent stent on the left with no plan for surgical intervention. Will follow up with vascular surgery outpatient.        #Chronic Pain    The patient was seen by pain management and started on a pain regimen of standing Tylenol and Dilaudid PRN which was discontinued at the time of discharge. Of note, the patient had an episode of acute toxic-metabolic encephalopathy due to hypoglycemia and iatrogenic opioid overdose (after being given percocet) requiring Narcan with resolution of symptoms. Patient did not have any further episodes of encephalopathy and was discharged with home Methadone being given at larger time intervals (q8h vs q6h). Held gabapentin after episode of toxic-metabolic encephalopathy.        #Diabetes    Patient with A1c around 9-10. Episodes of hypoglycemia during inpatient stay requiring juice and dextrose. Followed by endocrinology with new home regimen of: Lantus 28 units before bed and Lispro 5 units pre-meal.        #DVT    Continued on home Eliquis.        #Chronic Systolic HF    No CHF exacerbation, euvolemic on exam. BNP around 2100. Continued on home Torsemide and home Toprol.        #Hypothyroidism    Continued on home Synthroid 25 mcg.        #CKD stage IV    Creatinine 1.94 at the time of discharge. Baseline around 2.5.        #Bipolar Disorder    Continued on home Lexapro.        #COPD    Patient used bipap overnight. Did not require nasal canula, was saturating well on room air with no wheezing on exam. Treated with duonebs as needed.        #DHARMESH    Bipap overnight.        #Multiple Myeloma    Continued on Dexamethasone and Ninlaro.        #GERD    Treated with Pantoprazole (therapeutic interchange with home Omeprazole)        New medications: Bactrim, Augmentin, discontinued Gabapentin, decreased Lantus to 28 units and Lispro to 5 units    Labs to be followed outpatient: basic labs, wound cultures    Exam to be followed outpatient: basic exam, foot exam

## 2020-03-12 NOTE — PROVIDER CONTACT NOTE (CHANGE IN STATUS NOTIFICATION) - ACTION/TREATMENT ORDERED:
Orange juice and half D5 given. Reassessed fingerstick.   Narcan administered by Norah STERN as per MD. Orange juice and half D5 given. Reassessed fingerstick   Narcan administered by Norah STERN as per MD.

## 2020-03-12 NOTE — PROGRESS NOTE ADULT - PROBLEM SELECTOR PLAN 3
Patient with history of HFrEF (EF 20%), AICD in place. CXR unchanged from prior admission, with edema and increased lung markings. Does not appear to be in CHF exacerbation. BNP >2000, improved from baseline. Euvolemic on exam.   - c/w home medications torsemide 20mg qd  - c/w Troprol XL 200mg qd  - entresto d/c on prior admission    #History of stroke  Patient with history of left thalamic infarct. Previously recommended to initiate ASA 81mg on prior admission  - initiate ASA 81 given extensive comorbidities -> patient with allergy to aspirin, reaction = rash Patient with history of HFrEF (EF 20%), AICD in place. CXR unchanged from prior admission, with edema and increased lung markings. Does not appear to be in CHF exacerbation. BNP >2000, improved from baseline. Euvolemic on exam. Entresto dc'd during prior admission.  - c/w home torsemide 20 mg qd  - c/w Troprol  mg qd    #History of stroke  Patient with history of left thalamic infarct. Previously recommended to initiate ASA 81 mg on prior admission.  - initiate ASA 81 given extensive comorbidities -> patient with allergy to aspirin, reaction = rash ***

## 2020-03-12 NOTE — PROGRESS NOTE ADULT - PROBLEM SELECTOR PLAN 4
Patient with history of diabetes, glucose 150s-250s. Per patients 170s-200s at home.   HgA1c 9.2 (10.3 on prior admission). On home Levemir 40 units at bedtime and Novolog 22 units TID. Likely poorly controlled in setting of dexamethasone use.   - c/w Lantus 40 units, 20 units lispro    - nutrition consult  - monitor fingersticks  - patient education.    #Hypothyroidism   Patient with history of hypothyroidism, abnormal TFTs on previous admission.  - continue with home synthroid 25mcg  - f/u AM TSH    #Bipolar Depression  Patient with history of bipolar depression. No suicidal ideation at this time.  - c/w home lexapro 20mg at bedtime Patient with history of diabetes, glucose 150s-250s. Per patients 170s-200s at home.   HgA1c 9.2 (10.3 on prior admission). On home Levemir 40 units at bedtime and Novolog 22 units TID. Likely poorly controlled in setting of dexamethasone use.   - will adjust Lantus Lispro based on endocrine recs in the setting of hypoglycemic episode today   - nutrition consult  - monitor fingersticks  - patient education  - endocrine consult    #Hypothyroidism   Patient with history of hypothyroidism, abnormal TFTs on previous admission. TSH wnl.  - continue with home synthroid 25 mcg    #Bipolar Depression  Patient with history of bipolar depression. No suicidal ideation at this time.  - c/w home lexapro 20 mg at bedtime

## 2020-03-12 NOTE — DISCHARGE NOTE PROVIDER - CARE PROVIDERS DIRECT ADDRESSES
[email or web address]

## 2020-03-12 NOTE — PROGRESS NOTE ADULT - SUBJECTIVE AND OBJECTIVE BOX
CC: DIABETIC FOOT ULCER; PAD      INTERVAL HISTORY:  58 yo lady with mult serious comorbidities as outlined below, now with foot infection, severe pain, neuropathy, and chronic renal failure, seen this davon.    pt now in no distress.       ROS: No chest pain. No shortness of breath. No nausea.    PAST MEDICAL & SURGICAL HISTORY:  Neuropathy  PAD (peripheral artery disease)  Bipolar depression  Depression  HTN (hypertension)  DVT (deep venous thrombosis)  CVA (cerebral vascular accident)  DM (diabetes mellitus)  Acute on chronic systolic heart failure  Congestive heart failure, unspecified HF chronicity, unspecified heart failure type  Chronic pain  HLD (hyperlipidemia)  CVA (cerebral vascular accident)  Depression  COPD (chronic obstructive pulmonary disease)  Diabetes  CHF (congestive heart failure)  H/O extremity bypass graft  History of cholecystectomy  H/O tubal ligation  H/O abdominal hysterectomy  AICD (automatic cardioverter/defibrillator) present      PHYSICAL EXAM:  T(C): 36.3 (03-12-20 @ 21:02), Max: 36.9 (03-12-20 @ 04:49)  HR: 76 (03-12-20 @ 21:02)  BP: 124/71 (03-12-20 @ 21:02) (124/71 - 151/80)  RR: 18 (03-12-20 @ 21:02)  SpO2: 91% (03-12-20 @ 21:02)  Wt(kg): --  I&O's Summary    11 Mar 2020 07:01  -  12 Mar 2020 07:00  --------------------------------------------------------  IN: 200 mL / OUT: 0 mL / NET: 200 mL      Weight 90.7 (03-11 @ 02:38)    Appearance: alert, pleasant, INAD.  ENT: oral mucosa moist, no pallor/cyanosis.  Neck: no JVD visible.  Cardiac: no rubs. no murmurs. Extremities: trace-1+ edema,   feet dressed bilat.     Skin: no rashes.  Extremities (digits): no clubbing or cyanosis.  Respratory effort: no access muscle use. Lungs: CLEAR TO AUSCULTATION.  Abdomen: soft. nontender. no masses.  Psych affect: not depressed. Orientation: person, place, situation.     MEDICATIONS  (STANDING):  acetaminophen   Tablet .. 975 milliGRAM(s) Oral every 8 hours  apixaban 5 milliGRAM(s) Oral two times a day  atorvastatin 80 milliGRAM(s) Oral at bedtime  dextrose 5%. 1000 milliLiter(s) (50 mL/Hr) IV Continuous <Continuous>  dextrose 50% Injectable 12.5 Gram(s) IV Push once  dextrose 50% Injectable 25 Gram(s) IV Push once  dextrose 50% Injectable 25 Gram(s) IV Push once  escitalopram 20 milliGRAM(s) Oral daily  insulin glargine Injectable (LANTUS) 40 Unit(s) SubCutaneous at bedtime  insulin lispro (HumaLOG) corrective regimen sliding scale   SubCutaneous every 6 hours  insulin lispro Injectable (HumaLOG) 12 Unit(s) SubCutaneous three times a day with meals  levothyroxine 25 MICROGram(s) Oral daily  methadone    Tablet 10 milliGRAM(s) Oral every 8 hours  metoprolol succinate  milliGRAM(s) Oral daily  pantoprazole    Tablet 40 milliGRAM(s) Oral before breakfast  piperacillin/tazobactam IVPB.. 3.375 Gram(s) IV Intermittent every 6 hours  sucralfate 1 Gram(s) Oral four times a day  torsemide 20 milliGRAM(s) Oral daily    MEDICATIONS  (PRN):  dextrose 40% Gel 15 Gram(s) Oral once PRN Blood Glucose LESS THAN 70 milliGRAM(s)/deciliter  glucagon  Injectable 1 milliGRAM(s) IntraMuscular once PRN Glucose LESS THAN 70 milligrams/deciliter  HYDROmorphone   Tablet 1 milliGRAM(s) Oral every 4 hours PRN Severe Pain (7 - 10)  HYDROmorphone  Injectable 0.25 milliGRAM(s) IV Push every 4 hours PRN Breakthrough pain      DATA:  141    |  100    |  41<H>  ----------------------------<  145<H>  Ca:8.6   (12 Mar 2020 13:08)  3.3<L>   |  27     |  2.47<H>      eGFR if Non : 21 <L>  eGFR if : 24 <L>    TPro  8.0    /  Alb  3.3    /  TBili  0.4    /  DBili  x      /  AST  18     /  ALT  14     /  AlkPhos  107    12 Mar 2020 13:08                        10.7<L>  11.94<H> )-----------( 201      ( 12 Mar 2020 13:08 )             34.1<L>          Protein/Creatinine Ratio Calculation: 0.6 Ratio <H> (02-13 @ 13:32)

## 2020-03-12 NOTE — PROGRESS NOTE ADULT - ASSESSMENT
61F with extensive medical history and multiple hospitalizations HFrEF (EF 25%, AICD in place), HTN, IDDM c/b neuropathy (takes Methadone) presenting with b/l lower extremity cellulitis (L>R), failing outpatient antibiotics, with purulent-sanguinous  drainage. 61F with extensive medical history and multiple hospitalizations HFrEF (EF 25%, AICD in place), HTN, IDDM c/b neuropathy (takes Methadone) presenting with b/l lower extremity cellulitis (L>R) after failing outpatient antibiotics with purulent-sanguinous drainage on exam, admitted for rule out of osteomyelitis.

## 2020-03-12 NOTE — CONSULT NOTE ADULT - ASSESSMENT
per Internal Medicine    62 y/o female with extensive medical history and multiple hospitalizations HFrEF (EF 25%, AICD in place), HTN, IDDM c/b neuropathy (takes Methadone) presenting with b/l lower extremity cellulitis (L>R), failing outpatient antibiotics, with purulent-sanguinous  drainage.     Problem/Plan - 1:  ·  Problem: Cellulitis.  Plan: Pateint with bilateral lower extremity ulcers and worsening cellulitis in setting of PAD, after initiation of oral antibiotics. No sepsis criteria met on admission. B/l lower extremity x-ray demonstrating soft tissue swelling bilaterally, with notable skin deformity in medial left foot; 5th metatarsal with lucency.   - c/w vancomycin 1g q24hr in setting of purulence (vanc goal 10-15)   - c/w zosyn 4.5mg for pseudomonal coverage in setting of diabetes  - f/u wound cultures and abx sensitivies  - f/u wound care recs  - f/u ESR and CRP to r/o osteomyelitis  - low concern for osteomyelitis, AICD not MRI compatible -> f/u with EP vs obtain bone scan of left lower extremity.     Problem/Plan - 2:  ·  Problem: DVT (deep venous thrombosis).  Plan: Patient with history of DVTs, lower extremity doppler demonstrating no DVTs but thrombus left saphenofemoral junction, with concern for possible propagation.   - previously on eliquis 2.5mg BID outpatient -> increased to 5mg BID (appropriate for current CrCl)  - outpatient lower extremity doppler for follow up.     Problem/Plan - 3:  ·  Problem: Chronic systolic congestive heart failure.  Plan: Patient with history of HFrEF (EF 20%), AICD in place. CXR unchanged from prior admission, with edema and increased lung markings. Does not appear to be in CHF exacerbation. BNP >2000, improved from baseline. Euvolemic on exam.   - c/w home medications torsemide 20mg qd  - c/w Troprol XL 200mg qd  - entresto d/c on prior admission    #History of stroke  Patient with history of left thalamic infarct. Previously recommended to initiate ASA 81mg on prior admission  - initiate ASA 81 given extensive comorbidities -> patient with allergy to aspirin, reaction = rash.     Problem/Plan - 4:  ·  Problem: DM (diabetes mellitus).  Plan: Patient with history of diabetes, glucose 150s-250s. Per patients 170s-200s at home.   HgA1c 9.2 (10.3 on prior admission). On home Levemir 40 units at bedtime and Novolog 22 units TID. Likely poorly controlled in setting of dexamethasone use.   - c/w Lantus 40 units, 20 units lispro    - nutrition consult  - monitor fingersticks  - patient education.    #Hypothyroidism   Patient with history of hypothyroidism, abnormal TFTs on previous admission.  - continue with home synthroid 25mcg  - f/u AM TSH    #Bipolar Depression  Patient with history of bipolar depression. No suicidal ideation at this time.  - c/w home lexapro 20mg at bedtime.     Problem/Plan - 5:  ·  Problem: CKD (chronic kidney disease) stage 4, GFR 15-29 ml/min.  Plan: Patient with known CKD, stage 4. Cr 2.28 (baseline 2.5), improved from prior admission due to medication changes.  - no intervention at this time  - continue to monitor Cr/BUN  - avoid nephrotoxic agents    #GERD  Patient with history of GERD, on omeprazole 20mg qd  - c/w therapeutic exchange, pantoprazole 40mg qd.     Problem/Plan - 6:  Problem: COPD (chronic obstructive pulmonary disease). Plan: Patient with history of COPD, no recent exacerbations or previous intubations. On home 2-3L home O2 PRN. No wheezing on exam. Currently comfortable on room air.  - Duonebs q6hr PRN     #DHARMESH  Patient with history of DHARMESH, utilizes CPAP at home.  - CPAP o/n.    Problem/Plan - 7:  ·  Problem: Multiple myeloma.  Plan: Patient with history of multiple myeloma, follows with Dr. Ruiz outpatient.  - c/w dexamethasone 20mg weekly  - c/w Ninlaro 3mg weekly   - monitor fingersticks  - outpatient follow up with Dr. Ruiz.     Problem/Plan - 8:  ·  Problem: Chronic pain.  Plan: Patient with history of chronic pain due to multiple chronic conditions (sciatic, peripheral vascular disease) follows with pain medicine physician outpatient.   - c/w home methadone 10mg q6hr PRN (Prescribed by Ashu Mosley DO; [phone])  - c/w gabapentin 200mg BID  - breakthrough pain: Percocet 5/325mg q6hr PRN.     Problem/Plan - 9:  ·  Problem: Nutrition, metabolism, and development symptoms.  Plan: F: tolerating PO, no IVF  E: replete to K>4, Mg>2  N: Dash/TLC, CC    VTE Prophylaxis: Lovenox 30mg SubQ qd    C: Full Code  D: RMF.

## 2020-03-12 NOTE — DISCHARGE NOTE PROVIDER - CARE PROVIDER_API CALL
Marina Medeiros)  [facility]  Vascular  [address] [place] [address]  Phone: [phone]  Fax: [phone]  Follow Up Time:     Braden Baird (IRENE)  Podiatric Medicine and Surgery  [address] [facility], [address] [place] [address]  Phone: [phone]  Fax: [phone]  Follow Up Time:     Jony Neal)  Internal Medicine; Nephrology  [address], [address]  [place] [address]  Phone: [phone]  Fax: [phone]  Follow Up Time:

## 2020-03-12 NOTE — PROGRESS NOTE ADULT - ASSESSMENT
A/P 60 yo female, current smoker, with a known Contrast and ASA allergy and an extensive PMHx including HTN, IDDM c/b neuropathy, Bipolar disorder, depression, hx of DVT (with multiple prior DVTs, on Coumadin), CVA x2, PAD with prior bypass, NICM, chronic systolic CHF (EF 25% by Echo 1/2019, with frequent CHF admissions, cardiomems in place), s/p AICD for primary prevention, hypothyroidism , COPD, DHARMESH, multiple myeloma, stage 4CKD presenting with serous blister of left foot     - C/w IV Abx   - F/u SPECT to r/o OM   - F/u Wound culture   - Podiatry will follow A/P 62 yo female, current smoker, with a known Contrast and ASA allergy and an extensive PMHx including HTN, IDDM c/b neuropathy, Bipolar disorder, depression, hx of DVT (with multiple prior DVTs, on Coumadin), CVA x2, PAD with prior bypass, NICM, chronic systolic CHF (EF 25% by Echo 1/2019, with frequent CHF admissions, cardiomems in place), s/p AICD for primary prevention, hypothyroidism , COPD, DHARMESH, multiple myeloma, stage 4CKD presenting with serous blister of left foot     - C/w IV Abx   - F/u SPECT to r/o OM   - F/u Wound culture   - WBAT  - Podiatry will follow

## 2020-03-12 NOTE — PROGRESS NOTE ADULT - PROBLEM SELECTOR PLAN 1
Pateint with bilateral lower extremity ulcers and worsening cellulitis in setting of PAD, after initiation of oral antibiotics. No sepsis criteria met on admission. B/l lower extremity x-ray demonstrating soft tissue swelling bilaterally, with notable skin deformity in medial left foot; 5th metatarsal with lucency.   - c/w vancomycin 1g q24hr in setting of purulence (vanc goal 10-15)   - c/w zosyn 4.5mg for pseudomonal coverage in setting of diabetes  - f/u wound cultures and abx sensitivies  - f/u wound care recs  - f/u ESR and CRP to r/o osteomyelitis  - low concern for osteomyelitis, AICD not MRI compatible -> f/u with EP vs obtain bone scan of left lower extremity Patient with bilateral lower extremity ulcers and worsening cellulitis in setting of PAD. Failed outpatient antibiotic treatment. No sepsis criteria met on admission. B/l lower extremity x-ray demonstrating soft tissue swelling bilaterally and gas in the left toe. ESR/CRP elevated.  - c/w vancomycin 1.5 g q24hr in setting of purulence (vanc goal 10-15)  - vanc level in the AM  - c/w zosyn 3.375 g for pseudomonal coverage in setting of diabetes  - f/u wound cultures and abx sensitivies  - f/u wound care recs  - f/u bone scan final read

## 2020-03-12 NOTE — PROGRESS NOTE ADULT - PROBLEM SELECTOR PLAN 5
Patient with known CKD, stage 4. Cr 2.28 (baseline 2.5), improved from prior admission due to medication changes.  - no intervention at this time  - continue to monitor Cr/BUN  - avoid nephrotoxic agents    #GERD  Patient with history of GERD, on omeprazole 20mg qd  - c/w therapeutic exchange, pantoprazole 40mg qd Patient with known CKD, stage 4. Cr 2.28 (baseline 2.5), improved from prior admission due to medication changes.  - no intervention at this time  - continue to monitor Cr/BUN  - avoid nephrotoxic agents    #GERD  Patient with history of GERD, on omeprazole 20 mg qd  - c/w therapeutic exchange, pantoprazole 40 mg qd

## 2020-03-12 NOTE — PROGRESS NOTE ADULT - PROBLEM SELECTOR PLAN 7
Patient with history of multiple myeloma, follows with Dr. Ruiz outpatient.  - c/w dexamethasone 20mg weekly  - c/w Ninlaro 3mg weekly   - monitor fingersticks  - outpatient follow up with Dr. Ruiz Patient with history of multiple myeloma, follows with Dr. Ruiz outpatient.  - c/w dexamethasone 20 mg weekly  - c/w Ninlaro 3 mg weekly   - monitor fingersticks  - outpatient follow up with Dr. Ruiz

## 2020-03-12 NOTE — PROGRESS NOTE ADULT - SUBJECTIVE AND OBJECTIVE BOX
O/N Events: no acute events    Subjective: Patient complaining of 9/10 bilateral foot pain. Says it is the same pain as when she came in. Has been walking to the bathroom. Denies N/V, chest pain, shortness of breath, headache.     VITALS  Vital Signs Last 24 Hrs  T(C): 36.9 (12 Mar 2020 04:49), Max: 36.9 (11 Mar 2020 16:14)  T(F): 98.4 (12 Mar 2020 04:49), Max: 98.4 (11 Mar 2020 16:14)  HR: 67 (12 Mar 2020 05:38) (63 - 72)  BP: 151/80 (12 Mar 2020 04:49) (124/71 - 151/80)  BP(mean): --  RR: 16 (12 Mar 2020 05:38) (16 - 18)  SpO2: 99% (12 Mar 2020 05:38) (94% - 100%)    CAPILLARY BLOOD GLUCOSE      POCT Blood Glucose.: 217 mg/dL (12 Mar 2020 08:20)  POCT Blood Glucose.: 241 mg/dL (12 Mar 2020 05:58)  POCT Blood Glucose.: 105 mg/dL (11 Mar 2020 21:54)  POCT Blood Glucose.: 221 mg/dL (11 Mar 2020 17:19)  POCT Blood Glucose.: 109 mg/dL (11 Mar 2020 12:01)      PHYSICAL EXAM  General appearance: sitting up in bed, appears to be in pain  HEENT: normocephalic, EOMI, sclera anicteric, MMM  Respiratory: breath sounds clear to auscultation throughout all lung fields, no accessory muscle use, no wheezing/rales/rhonchi   Cardiovascular: regular rate and rhythm, normal S1/S2, no murmurs, rubs or gallops appreciated  Gastrointestinal: soft, non-tender, non-distended, normoactive bowel sounds  Extremities: WWP, dark skin with <1 cm spots just inferior to the knees bilaterally, no obvious erythema, left foot (large area of skin breakdown from the first toe to the mid foot with underlying exposed skin that is erythematous, no purulence noted), right foot (smaller area of broken skin on the plantar surface of the first toe), both feet tender to palpation, patient able to range toes and sensation to light touch intact, no crepitus on palpation  Neurological: no obvious focal deficits    MEDICATIONS  (STANDING):  apixaban 5 milliGRAM(s) Oral two times a day  atorvastatin 80 milliGRAM(s) Oral at bedtime  dextrose 5%. 1000 milliLiter(s) (50 mL/Hr) IV Continuous <Continuous>  dextrose 50% Injectable 12.5 Gram(s) IV Push once  dextrose 50% Injectable 25 Gram(s) IV Push once  dextrose 50% Injectable 25 Gram(s) IV Push once  escitalopram 20 milliGRAM(s) Oral daily  gabapentin 200 milliGRAM(s) Oral two times a day  insulin glargine Injectable (LANTUS) 35 Unit(s) SubCutaneous at bedtime  insulin lispro (HumaLOG) corrective regimen sliding scale   SubCutaneous every 6 hours  insulin lispro Injectable (HumaLOG) 15 Unit(s) SubCutaneous three times a day before meals  levothyroxine 25 MICROGram(s) Oral daily  methadone    Tablet 10 milliGRAM(s) Oral every 6 hours  metoprolol succinate  milliGRAM(s) Oral daily  pantoprazole    Tablet 40 milliGRAM(s) Oral before breakfast  piperacillin/tazobactam IVPB.. 3.375 Gram(s) IV Intermittent every 6 hours  sucralfate 1 Gram(s) Oral four times a day  torsemide 20 milliGRAM(s) Oral daily  vancomycin  IVPB 1500 milliGRAM(s) IV Intermittent every 24 hours    MEDICATIONS  (PRN):  acetaminophen   Tablet .. 650 milliGRAM(s) Oral every 6 hours PRN Mild Pain (1 - 3)  dextrose 40% Gel 15 Gram(s) Oral once PRN Blood Glucose LESS THAN 70 milliGRAM(s)/deciliter  glucagon  Injectable 1 milliGRAM(s) IntraMuscular once PRN Glucose LESS THAN 70 milligrams/deciliter  oxycodone    5 mG/acetaminophen 325 mG 1 Tablet(s) Oral every 6 hours PRN Moderate Pain (4 - 6)      aspirin (Other (Moderate); Rash)  IV Contrast (Unknown)      LABS                        10.0   6.82  )-----------( 182      ( 12 Mar 2020 05:48 )             32.7     03-12    140  |  99  |  46<H>  ----------------------------<  188<H>  3.8   |  29  |  2.60<H>    Ca    8.3<L>      12 Mar 2020 05:48  Mg     2.4     03-12    TPro  7.8  /  Alb  3.3  /  TBili  0.3  /  DBili  x   /  AST  17  /  ALT  15  /  AlkPhos  97  03-11    PT/INR - ( 11 Mar 2020 04:46 )   PT: 17.5 sec;   INR: 1.52          PTT - ( 11 Mar 2020 04:46 )  PTT:40.8 sec    CARDIAC MARKERS ( 11 Mar 2020 04:46 )  x     / x     / 93 U/L / x     / x            PROCEDURES/IMAGING: reviewed.

## 2020-03-12 NOTE — PROGRESS NOTE ADULT - ASSESSMENT
58 yo lady with crf, ascvd, dm, and foot infection and foot pain, feeling more comfortable this davon.   vs, gen, and c-v exams unrevealing, and foot dressings undisturbed.   lungs clear.  rft's within stable range.   have reviewed plans and rx, and agree as outlined.

## 2020-03-12 NOTE — DISCHARGE NOTE PROVIDER - NSDCCPCAREPLAN_GEN_ALL_CORE_FT
PRINCIPAL DISCHARGE DIAGNOSIS  Diagnosis: Foot infection  Assessment and Plan of Treatment: You came to the hospital because you continued to have an infection in your feet despite treatment with antibiotics as an outpatient. We evaluated you for osteomyelitis (an infection of the bone) which is not present based on the imaging we did. We continued you on intravenous antibiotics during your inpatient stay and will discharge you on Bactrim which you will continue taking through 3/24. Wound cultures of your foot were negative. We wrapped your feet with new, dry dressings daily which you will continue at the time of discharge. It is important that you follow up with Dr. Baird (Podiatry) within 1 week of discharge, Dr. Medeiros (Vascular Surgery) and your endocrinologist after discharge to make sure that you are recovering. If you develop fever, chills, increased foot pain, numbness/tingling or other symptoms that are concerning to you please return to the ED immediately for further evaluation.      SECONDARY DISCHARGE DIAGNOSES  Diagnosis: Chronic GERD  Assessment and Plan of Treatment: Acid reflux is when the acid that is normally in your stomach backs up into the esophagus, tube that carries food from your mouth to your stomach. Another term for acid reflux is "gastroesophageal reflux disease," or GERD. The symptoms include burning in the chest, known as heartburn, burning in the throat or an acid taste in the throat, stomach or chest pain, trouble swallowing, having a raspy voice or a sore throat or unexplained cough. You can potentially improve your symptoms by losing weight, if you are overweight, raising the head of your bed, avoid foods that may make your symptoms worse (coffee, chocolate, alcohol, peppermint and fatty foods), stop smoking if you smoke, and avoid lying down for 3 hours after a meal. If you require medications for your acid reflux, proton pump inhibitors are the most effective medicines in treating GERD and many are available over the counter without a prescription. You should see a doctor if you have trouble swallowing or your food gets stuck on the way down, have unintentional weight loss, have chest pain, choke when you eat, vomit blood or have bowel movements that are red, black or look like tar.      Diagnosis: DM (diabetes mellitus)  Assessment and Plan of Treatment: You have a known history of diabetes mellitus prior to your admission. This condition results from blood sugar levels getting too high because your body is more resistant to insulin. Uncontrolled blood sugar levels can lead to kidney and heart damage, pain/numbness/paralysis in your hands and feet, and increased rates of infections.  It is important that you continue to take your medication when you are discharged so that you can continue to control your blood sugar levels. Additionally be sure to follow up with your primary care physician, podiatrist, and ophthalmologist on a regular basis. PRINCIPAL DISCHARGE DIAGNOSIS  Diagnosis: Foot infection  Assessment and Plan of Treatment: You came to the hospital because you continued to have an infection in your feet despite treatment with antibiotics as an outpatient. We evaluated you for osteomyelitis (an infection of the bone) which is not present based on the imaging we did. We continued you on intravenous antibiotics during your inpatient stay and will discharge you on Bactrim and Augmentin which you will continue taking through 3/24. Wound cultures of your foot were negative. We wrapped your feet with new, dry dressings daily which you will continue at the time of discharge. It is important that you follow up with Dr. Baird (Podiatry) within 1 week of discharge, Dr. Medeiros (Vascular Surgery) and your endocrinologist after discharge to make sure that you are recovering. If you develop fever, chills, increased foot pain, numbness/tingling or other symptoms that are concerning to you please return to the ED immediately for further evaluation.      SECONDARY DISCHARGE DIAGNOSES  Diagnosis: Chronic GERD  Assessment and Plan of Treatment: Acid reflux is when the acid that is normally in your stomach backs up into the esophagus, tube that carries food from your mouth to your stomach. Another term for acid reflux is "gastroesophageal reflux disease," or GERD. The symptoms include burning in the chest, known as heartburn, burning in the throat or an acid taste in the throat, stomach or chest pain, trouble swallowing, having a raspy voice or a sore throat or unexplained cough. You can potentially improve your symptoms by losing weight, if you are overweight, raising the head of your bed, avoid foods that may make your symptoms worse (coffee, chocolate, alcohol, peppermint and fatty foods), stop smoking if you smoke, and avoid lying down for 3 hours after a meal. If you require medications for your acid reflux, proton pump inhibitors are the most effective medicines in treating GERD and many are available over the counter without a prescription. You should see a doctor if you have trouble swallowing or your food gets stuck on the way down, have unintentional weight loss, have chest pain, choke when you eat, vomit blood or have bowel movements that are red, black or look like tar.      Diagnosis: DM (diabetes mellitus)  Assessment and Plan of Treatment: You have a known history of diabetes mellitus prior to your admission. This condition results from blood sugar levels getting too high because your body is more resistant to insulin. Uncontrolled blood sugar levels can lead to kidney and heart damage, pain/numbness/paralysis in your hands and feet, and increased rates of infections.  It is important that you continue to take your medication when you are discharged so that you can continue to control your blood sugar levels. Additionally be sure to follow up with your primary care physician, podiatrist, and ophthalmologist on a regular basis.   Because you were having episodes of hypoglycemia inpatient and at home, your Lantus was decreased to 28 units at bedtime and Lispro was decreased to 5 units pre-meal. Please follow up with your endocrinologist for close monitoring of your blood sugar.

## 2020-03-12 NOTE — PROGRESS NOTE ADULT - PROBLEM SELECTOR PLAN 8
Patient with history of chronic pain due to multiple chronic conditions (sciatic, peripheral vascular disease) follows with pain medicine physician outpatient.   - c/w home methadone 10mg q6hr PRN (Prescribed by Ashu Mosley DO; [phone])  - c/w gabapentin 200mg BID  - breakthrough pain: Percocet 5/325mg q6hr PRN Patient with history of chronic pain due to multiple chronic conditions (sciatic, peripheral vascular disease) follows with pain medicine physician outpatient.   - discontinued Methadone in setting of encephalopathy 3/12   (Prescribed by Ashu Mosley DO; [phone])  - discontinued gabapentin 200 mg BID in setting of encephalopathy  - discontinued percocet  - pain management consulted, rena sampson

## 2020-03-12 NOTE — DISCHARGE NOTE PROVIDER - NSDCMRMEDTOKEN_GEN_ALL_CORE_FT
atorvastatin 80 mg oral tablet: 1 tab(s) orally once a day (at bedtime)  Decadron 4 mg oral tablet: 5 tab(s) orally once a week  Eliquis 2.5 mg oral tablet: 1 tab(s) orally 2 times a day  escitalopram 20 mg oral tablet: 1 tab(s) orally once a day  FeroSul 325 mg (65 mg elemental iron) oral tablet: 1 tab(s) orally once a day  gabapentin 100 mg oral capsule: 2  orally 2 times a day  Levemir FlexTouch 100 units/mL subcutaneous solution: 40 unit(s) subcutaneous once a day (at bedtime)  levothyroxine 25 mcg (0.025 mg) oral tablet: 1 tab(s) orally once a day  methadone 10 mg oral tablet: 1 tab(s) orally every 6 hours  Ninlaro 3 mg oral capsule: 1 cap(s) orally every 7 days (mondays)  NovoLOG FlexPen 100 units/mL injectable solution: 22 unit(s) injectable 3 times a day (before meals)  omeprazole 40 mg oral delayed release capsule: 1 cap(s) orally once a day  sucralfate 1 g oral tablet: 1 tab(s) orally 4 times a day  Toprol- mg oral tablet, extended release: 1 tab(s) orally once a day  torsemide 20 mg oral tablet: 1 tab(s) orally once a day atorvastatin 80 mg oral tablet: 1 tab(s) orally once a day (at bedtime)  Decadron 4 mg oral tablet: 5 tab(s) orally once a week  Eliquis 2.5 mg oral tablet: 1 tab(s) orally 2 times a day  escitalopram 20 mg oral tablet: 1 tab(s) orally once a day  FeroSul 325 mg (65 mg elemental iron) oral tablet: 1 tab(s) orally once a day  gabapentin 100 mg oral capsule: 2  orally 2 times a day  Levemir FlexTouch 100 units/mL subcutaneous solution: 40 unit(s) subcutaneous once a day (at bedtime)  levothyroxine 25 mcg (0.025 mg) oral tablet: 1 tab(s) orally once a day  methadone 10 mg oral tablet: 1 tab(s) orally every 8 hours  Ninlaro 3 mg oral capsule: 1 cap(s) orally every 7 days (mondays)  NovoLOG FlexPen 100 units/mL injectable solution: 22 unit(s) injectable 3 times a day (before meals)  omeprazole 40 mg oral delayed release capsule: 1 cap(s) orally once a day  sucralfate 1 g oral tablet: 1 tab(s) orally 4 times a day  Toprol- mg oral tablet, extended release: 1 tab(s) orally once a day  torsemide 20 mg oral tablet: 1 tab(s) orally once a day amoxicillin-clavulanate 875 mg-125 mg oral tablet: 875 milligram(s) orally every 12 hours     Please take this medication through 3/24. Please take your first dose starting this evening at 6 PM.  atorvastatin 80 mg oral tablet: 1 tab(s) orally once a day (at bedtime)  Bactrim  mg-160 mg oral tablet: 1 tab(s) orally every 12 hours     Please take this medication through 3/24 and start your first dose at 6 PM tonight.  Decadron 4 mg oral tablet: 5 tab(s) orally once a week  Eliquis 2.5 mg oral tablet: 1 tab(s) orally 2 times a day  escitalopram 20 mg oral tablet: 1 tab(s) orally once a day  FeroSul 325 mg (65 mg elemental iron) oral tablet: 1 tab(s) orally once a day  Levemir FlexTouch 100 units/mL subcutaneous solution: 40 unit(s) subcutaneous once a day (at bedtime)  levothyroxine 25 mcg (0.025 mg) oral tablet: 1 tab(s) orally once a day  methadone 10 mg oral tablet: 1 tab(s) orally every 8 hours  Ninlaro 3 mg oral capsule: 1 cap(s) orally every 7 days (mondays)  NovoLOG FlexPen 100 units/mL injectable solution: 22 unit(s) injectable 3 times a day (before meals)  omeprazole 40 mg oral delayed release capsule: 1 cap(s) orally once a day  sucralfate 1 g oral tablet: 1 tab(s) orally 4 times a day  Toprol- mg oral tablet, extended release: 1 tab(s) orally once a day  torsemide 20 mg oral tablet: 1 tab(s) orally once a day amoxicillin-clavulanate 875 mg-125 mg oral tablet: 875 milligram(s) orally every 12 hours     Please take this medication through 3/24. Please take your first dose starting this evening at 6 PM.  atorvastatin 80 mg oral tablet: 1 tab(s) orally once a day (at bedtime)  Bactrim  mg-160 mg oral tablet: 1 tab(s) orally every 12 hours     Please take this medication through 3/24 and start your first dose at 6 PM tonight.  Decadron 4 mg oral tablet: 5 tab(s) orally once a week  Eliquis 2.5 mg oral tablet: 1 tab(s) orally 2 times a day  escitalopram 20 mg oral tablet: 1 tab(s) orally once a day  FeroSul 325 mg (65 mg elemental iron) oral tablet: 1 tab(s) orally once a day  Levemir FlexTouch 100 units/mL subcutaneous solution: 28 unit(s) subcutaneous once a day (at bedtime)  levothyroxine 25 mcg (0.025 mg) oral tablet: 1 tab(s) orally once a day  methadone 10 mg oral tablet: 1 tab(s) orally every 8 hours  Ninlaro 3 mg oral capsule: 1 cap(s) orally every 7 days (mondays)  NovoLOG FlexPen 100 units/mL injectable solution: 5 unit(s) injectable 3 times a day (before meals)  omeprazole 40 mg oral delayed release capsule: 1 cap(s) orally once a day  sucralfate 1 g oral tablet: 1 tab(s) orally 4 times a day  Toprol- mg oral tablet, extended release: 1 tab(s) orally once a day  torsemide 20 mg oral tablet: 1 tab(s) orally once a day

## 2020-03-12 NOTE — PROGRESS NOTE ADULT - PROBLEM SELECTOR PLAN 2
Patient with history of DVTs, lower extremity doppler demonstrating no DVTs but thrombus left saphenofemoral junction, with concern for possible propagation.   - previously on eliquis 2.5mg BID outpatient -> increased to 5mg BID (appropriate for current CrCl)  - outpatient lower extremity doppler for follow up Patient with history of DVTs, lower extremity doppler demonstrating no DVTs but thrombus left saphenofemoral junction, with concern for possible propagation.   - continue Eliquis 5 mg BID (appropriate for current CrCl)  - outpatient lower extremity doppler for follow up

## 2020-03-12 NOTE — PROGRESS NOTE ADULT - PROBLEM SELECTOR PLAN 9
F: tolerating PO, no IVF  E: replete to K>4, Mg>2  N: Dash/TLC, CC    VTE Prophylaxis: Lovenox 30mg SubQ qd    C: Full Code  D: RMF F: tolerating PO, no IVF  E: replete to K>4, Mg>2  N: Dash/TLC, CC    VTE Prophylaxis: Eliquis 5 mg BID    C: Full Code  D: RMF

## 2020-03-12 NOTE — CONSULT NOTE ADULT - ASSESSMENT
Assessment: 59y Female with DM and PAD (h/o R iliac, occluded L SFA stent, L fem-BK pop bypass in 2015) with bilateral great toe wounds. Arterial inflow to feet appear to be adequate at this time, but need to assess LLE fem-pop bypass for lesions.    Recommendations:  - get bilateral lower extremity arterial duplex   - on IV abx, f/u ctx  - wound care per podiatry  - discussed with attending, Dr. Medeiros  - call x [phone] with questions

## 2020-03-12 NOTE — CONSULT NOTE ADULT - SUBJECTIVE AND OBJECTIVE BOX
Patient is a 59y old  Female who presents with a chief complaint of cellulitis failed outpatient therapy (12 Mar 2020 08:49)       HPI:  60 y/o female with HFrEF (EF 25%, AICD in place), HTN, IDDM c/b neuropathy (takes Methadone), Bipolar disorder, depression, hx of DVT (with multiple prior DVTs), CVA x2 (last one 2013 with residual right sided weakness), known PAD with prior bypass, NICM, hypothyroidism , COPD (on 2-3L NC @ home), DHARMESH on CPAP, multiple myeloma (on Dexamethasone), stage 4CKD presenting with bilateral foot pain and expanding erythema (L>R) for one week's duration. Patient followed up with podiatrist on Sunday prior to admission, managed with keflex 500mg TID for 10 days. Patient endorses compliance with medication, however presented to ED with worsening warmth and pain of lower extremities. Patient denies fevers, nausea, vomiting headache, new chills or palpations. Patient with increased difficultly walking 2/2 pain. Tylenol attempted at home for pain. Patient unclear regarding purulence, indicates she cannot see the bottom of her foot but is aware of some drainage, not malodorous. Of note, patient with recent admission (2/20) to cardiac telemetry for hypotension and RITCHIE in setting of dehydration.     In ED: Vitals: T: Afebrile | HR: 70 | BP: 134/60| RR: 19 | O2: 85% on RA   Labs: Hgb: 10.4; BUN: 46; Cr: 2.28; Blood glucose: 274; Lipase 81; BNP: 2186   Imaging: B/l lower extremity doppler without evidence of DVT, thrombus noted at left saphenofemoral junction  CXR: Lung markings throughout, consistent with prior imaging   Foot Xray, 3 view B/l: bilateral soft tissue swelling; Overhanging margin of bone with "punched out" erosion appreciated medial distal edge of left proximal phalanx with soft tissue defect at medial aspect of left hallux consistent with ulceration/bullae left foot    Intervention: Vancomycin 1g; Zosyn 2.375mg; Morphine 4mg IV push; Tylenol 650mg x1  Consults: Podiatry (11 Mar 2020 06:39)      PAST MEDICAL & SURGICAL HISTORY:  Neuropathy  PAD (peripheral artery disease)  Bipolar depression  Depression  HTN (hypertension)  DVT (deep venous thrombosis)  CVA (cerebral vascular accident)  DM (diabetes mellitus)  Chronic pain  HLD (hyperlipidemia)  COPD (chronic obstructive pulmonary disease)  CHF (congestive heart failure)  H/O extremity bypass graft  History of cholecystectomy  H/O tubal ligation  H/O abdominal hysterectomy  AICD (automatic cardioverter/defibrillator) present      MEDICATIONS  (STANDING):  apixaban 5 milliGRAM(s) Oral two times a day  atorvastatin 80 milliGRAM(s) Oral at bedtime  dextrose 5%. 1000 milliLiter(s) (50 mL/Hr) IV Continuous <Continuous>  dextrose 50% Injectable 12.5 Gram(s) IV Push once  dextrose 50% Injectable 25 Gram(s) IV Push once  dextrose 50% Injectable 25 Gram(s) IV Push once  escitalopram 20 milliGRAM(s) Oral daily  gabapentin 200 milliGRAM(s) Oral two times a day  insulin glargine Injectable (LANTUS) 35 Unit(s) SubCutaneous at bedtime  insulin lispro (HumaLOG) corrective regimen sliding scale   SubCutaneous every 6 hours  insulin lispro Injectable (HumaLOG) 15 Unit(s) SubCutaneous three times a day before meals  levothyroxine 25 MICROGram(s) Oral daily  methadone    Tablet 10 milliGRAM(s) Oral every 6 hours  metoprolol succinate  milliGRAM(s) Oral daily  pantoprazole    Tablet 40 milliGRAM(s) Oral before breakfast  piperacillin/tazobactam IVPB.. 3.375 Gram(s) IV Intermittent every 6 hours  sucralfate 1 Gram(s) Oral four times a day  torsemide 20 milliGRAM(s) Oral daily  vancomycin  IVPB 1500 milliGRAM(s) IV Intermittent every 24 hours    MEDICATIONS  (PRN):  acetaminophen   Tablet .. 650 milliGRAM(s) Oral every 6 hours PRN Mild Pain (1 - 3)  dextrose 40% Gel 15 Gram(s) Oral once PRN Blood Glucose LESS THAN 70 milliGRAM(s)/deciliter  glucagon  Injectable 1 milliGRAM(s) IntraMuscular once PRN Glucose LESS THAN 70 milligrams/deciliter  oxycodone    5 mG/acetaminophen 325 mG 1 Tablet(s) Oral every 6 hours PRN Moderate Pain (4 - 6)      Social History:           -  Occupation: x           -  Home Living Status: lives with her  in an apartment , 20 steps to enter           -  Prior Home Care Services:  5 hrs x 7 days    Baseline Functional Level Prior to Admission:           - ADL's:  states she is independent           - ambulates with a rollator/ cane    FAMILY HISTORY:  Family history of diabetes mellitus (Mother)  Family history of hypertension (Mother)      CBC Full  -  ( 12 Mar 2020 05:48 )  WBC Count : 6.82 K/uL  RBC Count : 3.40 M/uL  Hemoglobin : 10.0 g/dL  Hematocrit : 32.7 %  Platelet Count - Automated : 182 K/uL  Mean Cell Volume : 96.2 fl  Mean Cell Hemoglobin : 29.4 pg  Mean Cell Hemoglobin Concentration : 30.6 gm/dL  Auto Neutrophil # : 3.84 K/uL  Auto Lymphocyte # : 1.57 K/uL  Auto Monocyte # : 1.01 K/uL  Auto Eosinophil # : 0.32 K/uL  Auto Basophil # : 0.05 K/uL  Auto Neutrophil % : 56.4 %  Auto Lymphocyte % : 23.0 %  Auto Monocyte % : 14.8 %  Auto Eosinophil % : 4.7 %  Auto Basophil % : 0.7 %      03-12    140  |  99  |  46<H>  ----------------------------<  188<H>  3.8   |  29  |  2.60<H>    Ca    8.3<L>      12 Mar 2020 05:48  Mg     2.4     03-12    TPro  7.8  /  Alb  3.3  /  TBili  0.3  /  DBili  x   /  AST  17  /  ALT  15  /  AlkPhos  97  03-11            Radiology:    < from: US Duplex Venous Lower Ext Ltd, Left (03.11.20 @ 03:54) >  EXAM:  US DPLX LWR EXT VEINS LTD LT                          PROCEDURE DATE:  03/11/2020          INTERPRETATION:    VENOUS DUPLEX DOPPLER OF THE LEFT LOWER EXTREMITY dated 3/11/2020 3:54 AM    INDICATION: Left leg, redness, pain. R/o dvt    TECHNIQUE: Duplex Doppler evaluation including gray-scale ultrasound imaging, color flow Doppler imaging, and Doppler spectral analysis of the veins of the left lower extremity was performed.     COMPARISON: 1/2/2020    FINDINGS:    Thigh veins: Focal area of noncompressibility and filling defect in the left saphenofemoral junction, consistent with thrombus. The left common femoral, femoral, popliteal, and proximal deep femoral veins are patent and free of thrombus. The veins are normally compressible and have normal phasic flow and augmentation response.    Calf veins: The paired  posterior tibial veins are patent. Limited evaluation of the left paired peroneal veins due to subcutaneous edema.    Contralateral CFV: The contralateral (right) common femoral vein is patent and free of thrombus.      IMPRESSION:  No DVT.  Thrombus at the left saphenofemoral junction. Recommend follow-up to assess for propagation.        < from: Xray Foot AP + Lateral + Oblique, Bilat (03.11.20 @ 05:46) >  EXAM:  XR FOOT BILATERAL MIN 3 VIEWS                          PROCEDURE DATE:  03/11/2020          INTERPRETATION:  INDICATION: r/o osteo    3 views of each foot have been submitted. There is gas in the soft tissues along the medial aspect of the left great toe. No periostitis is identified. There is a chronic appearing erosion at the distal medial aspect of the first proximal phalanx. Soft tissue swelling is present in each forefoot. There is a healed fracture deformity of left fifth metatarsal.      IMPRESSION: Gas in the soft tissues along the medial aspect of the left great toe. MR imaging may be useful to exclude osteomyelitis.        < from: Xray Chest 2 Views PA/Lat (03.11.20 @ 05:44) >    EXAM:  XR CHEST PA LAT 2V                          PROCEDURE DATE:  03/11/2020          INTERPRETATION:  Clinical history/reason for exam: Weakness, remote history.    PA and lateral.    Comparison: February 12, 2020.    Findings/  impression: Stable position left ICD. Heart size within normal limits, thoracic aortic calcification. Lungs and mediastinum are unremarkable. Stable bony structures. Metallic device at the level of the left atrial appendage. Right upper quadrant surgical clips. IVC filter.          Vital Signs Last 24 Hrs  T(C): 36.9 (12 Mar 2020 04:49), Max: 36.9 (11 Mar 2020 16:14)  T(F): 98.4 (12 Mar 2020 04:49), Max: 98.4 (11 Mar 2020 16:14)  HR: 67 (12 Mar 2020 05:38) (63 - 72)  BP: 151/80 (12 Mar 2020 04:49) (124/71 - 151/80)  BP(mean): --  RR: 16 (12 Mar 2020 05:38) (16 - 18)  SpO2: 99% (12 Mar 2020 05:38) (94% - 100%)    REVIEW OF SYSTEMS:    CONSTITUTIONAL: No fever, weight loss, or fatigue  EYES: No eye pain, visual disturbances, or discharge  ENMT:  No difficulty hearing, tinnitus, vertigo; No sinus or throat pain  NECK: No pain or stiffness  BREASTS: No pain, masses, or nipple discharge  RESPIRATORY: No cough, wheezing, chills or hemoptysis; No shortness of breath  CARDIOVASCULAR: No chest pain, palpitations, dizziness, or leg swelling  GASTROINTESTINAL: No abdominal or epigastric pain. No nausea, vomiting, or hematemesis; No diarrhea or constipation. No melena or hematochezia.  GENITOURINARY: No dysuria, frequency, hematuria, or incontinence  NEUROLOGICAL: No headaches, memory loss, loss of strength, numbness, or tremors  SKIN: No itching, burning, rashes, or lesions   LYMPH NODES: No enlarged glands  ENDOCRINE: No heat or cold intolerance; No hair loss  MUSCULOSKELETAL: No joint pain or swelling; No muscle, back, or extremity pain  PSYCHIATRIC: No depression, anxiety, mood swings, or difficulty sleeping  HEME/LYMPH: No easy bruising, or bleeding gums  ALLERGY AND IMMUNOLOGIC: No hives or eczema  VASCULAR:  foot pain        Physical Exam: WDWN 58 yo  woman lying in semi Waters's position, c/o foot pain    Head: normocephalic, atraumatic    Eyes: PERRLA, EOMI, no nystagmus, sclera anicteric    ENT: nasal discharge, uvula midline, no oropharyngeal erythema/exudate    Neck: supple, negative JVD, negative carotid bruits, no thyromegaly    Chest: CTA bilaterally, neg wheeze/ rhonchi/ rales/ crackles/ egophany    Cardiovascular: regular rate and rhythm, neg murmurs/rubs/gallops    Abdomen: soft, non distended, non tender to palpation in all 4 quadrants, negative rebound/guarding, normal bowel sounds    Extremities:  1-2 + pitting edema, L leg erythema      :     Neurologic Exam:      Motor Exam:    Upper Extremities:     RIght:   no focal weakness    Left :    no focal weakness    Lower Extremities:                 Right:   no focal weakness                 Left:      no focal weakness                 Sensory:   diminished distal                     DTR:             = biceps/     triceps/     brachioradialis                      = patella/   medial hamstring/ankle                      neg clonus                      neg Babinski                          Gait:  not tested        PM&R Impression:    1) deconditioned  2) no focal weakness  3) DM neuropathy    Plan:    1) Physical therapy focusing on therapeutic exercises, bed mobility/transfer out of bed evaluation, progressive ambulation with assistive devices prn.    2) Anticipated Disposition Plan/Recs:  pending functional progress

## 2020-03-12 NOTE — CONSULT NOTE ADULT - SUBJECTIVE AND OBJECTIVE BOX
Pain Management Consult Note - [facility] [phone]    Chief Complaint:    HPI:      Pain is ___ sharp ____dull ___burning ___achy ___ Intensity: ____ mild ___mod ___severe     Location ____surgical site ____cervical _____lumbar ____abd ____upper ext____lower ext    Worse with ____activity ____movement _____physical therapy___ Rest    Improved with ____medication ____rest ____physical therapy    ROS: Const:  ___febrile   Eyes:___ENT:___CV: ___chest pain  Resp: ____sob  GI:___nausea ___vomiting ___abd pain ___npo ___clears __full diet __bm  :___ Musk: ___pain ___spasm  Skin:___ Neuro:  ___sedation___confusion___ numbness ___weakness ___paresth  Psych:__anxiety  Endo:___ Heme:___Allergy:_________, ___all others reviewed and negative    PAST MEDICAL & SURGICAL HISTORY:  Neuropathy  PAD (peripheral artery disease)  Bipolar depression  Depression  HTN (hypertension)  DVT (deep venous thrombosis)  CVA (cerebral vascular accident)  DM (diabetes mellitus)  Chronic pain  HLD (hyperlipidemia)  COPD (chronic obstructive pulmonary disease)  CHF (congestive heart failure)  H/O extremity bypass graft  History of cholecystectomy  H/O tubal ligation  H/O abdominal hysterectomy  AICD (automatic cardioverter/defibrillator) present      SH: _-__Tobacco   _-__Alcohol                          FH:FAMILY HISTORY:  Family history of diabetes mellitus (Mother)  Family history of hypertension (Mother)      acetaminophen   Tablet .. 650 milliGRAM(s) Oral every 6 hours PRN  apixaban 5 milliGRAM(s) Oral two times a day  atorvastatin 80 milliGRAM(s) Oral at bedtime  dextrose 40% Gel 15 Gram(s) Oral once PRN  dextrose 5%. 1000 milliLiter(s) IV Continuous <Continuous>  dextrose 50% Injectable 12.5 Gram(s) IV Push once  dextrose 50% Injectable 25 Gram(s) IV Push once  dextrose 50% Injectable 25 Gram(s) IV Push once  escitalopram 20 milliGRAM(s) Oral daily  glucagon  Injectable 1 milliGRAM(s) IntraMuscular once PRN  insulin glargine Injectable (LANTUS) 35 Unit(s) SubCutaneous at bedtime  insulin lispro (HumaLOG) corrective regimen sliding scale   SubCutaneous every 6 hours  insulin lispro Injectable (HumaLOG) 15 Unit(s) SubCutaneous three times a day before meals  levothyroxine 25 MICROGram(s) Oral daily  metoprolol succinate  milliGRAM(s) Oral daily  pantoprazole    Tablet 40 milliGRAM(s) Oral before breakfast  piperacillin/tazobactam IVPB.. 3.375 Gram(s) IV Intermittent every 6 hours  sucralfate 1 Gram(s) Oral four times a day  torsemide 20 milliGRAM(s) Oral daily  vancomycin  IVPB 1500 milliGRAM(s) IV Intermittent every 24 hours      T(C): 36.9 (03-12-20 @ 08:30), Max: 36.9 (03-11-20 @ 16:14)  HR: 65 (03-12-20 @ 11:37) (63 - 82)  BP: 128/69 (03-12-20 @ 11:37) (124/71 - 151/80)  RR: 18 (03-12-20 @ 08:30) (16 - 18)  SpO2: 97% (03-12-20 @ 08:30) (94% - 100%)  Wt(kg): --    T(C): 36.9 (03-12-20 @ 08:30), Max: 36.9 (03-11-20 @ 16:14)  HR: 65 (03-12-20 @ 11:37) (63 - 82)  BP: 128/69 (03-12-20 @ 11:37) (124/71 - 151/80)  RR: 18 (03-12-20 @ 08:30) (16 - 18)  SpO2: 97% (03-12-20 @ 08:30) (94% - 100%)  Wt(kg): --    T(C): 36.9 (03-12-20 @ 08:30), Max: 36.9 (03-11-20 @ 16:14)  HR: 65 (03-12-20 @ 11:37) (63 - 82)  BP: 128/69 (03-12-20 @ 11:37) (124/71 - 151/80)  RR: 18 (03-12-20 @ 08:30) (16 - 18)  SpO2: 97% (03-12-20 @ 08:30) (94% - 100%)  Wt(kg): --      PHYSICAL EXAM:  Gen Appearance: ___no acute distress ___appropriate        Neuro: ___SILT feet____ EOM Intact Psych: AAOX__, ___mood/affect appropriate        Eyes: ___conjunctiva WNL  _____ Pupils equal and round        ENT: ___ears and nose atraumatic___ Hearing grossly intact        Neck: ___trachea midline, no visible masses ___thyroid without palpable mass    Resp: ___Nml WOB____No tactile fremitus ___clear to auscultation    Cardio: ___extremities free from edema ____pedal pulses palpable    GI/Abdomen: ___soft _____ Nontender______Nondistended_____HSM    Lymphatic: ___no palpable nodes in neck  ___no palpable nodes calves and feet    Skin/Wound: ___Incision, ___Dressing c/d/i,   ____surrounding tissues soft,  ___drain/chest tube present____    Muscular: EHL ___/5  Gastrocnemius___/5    ___absent clubbing/cyanosis        ASSESSMENT: This is a 59y old Female with a history of   DIABETIC FOOT ULCER; PAD  No pertinent family history in first degree relatives  Family history of diabetes mellitus (Mother)  Family history of hypertension (Mother)  No pertinent family history in first degree relatives  Handoff  MEWS Score  Neuropathy  PAD (peripheral artery disease)  Bipolar depression  Depression  HTN (hypertension)  DVT (deep venous thrombosis)  CVA (cerebral vascular accident)  DM (diabetes mellitus)  Acute on chronic systolic heart failure  Congestive heart failure, unspecified HF chronicity, unspecified heart failure type  Chronic pain  HLD (hyperlipidemia)  CVA (cerebral vascular accident)  Depression  COPD (chronic obstructive pulmonary disease)  Diabetes  CHF (congestive heart failure)  Diabetic foot ulcer  Chronic pain  Multiple myeloma  COPD (chronic obstructive pulmonary disease)  CKD (chronic kidney disease) stage 4, GFR 15-29 ml/min  Suspected deep vein thrombosis (DVT)  Transition of care performed with sharing of clinical summary  Nutrition, metabolism, and development symptoms  PAD (peripheral artery disease)  DM (diabetes mellitus)  Chronic systolic congestive heart failure  DVT (deep venous thrombosis)  Cellulitis  H/O extremity bypass graft  History of cholecystectomy  H/O tubal ligation  H/O abdominal hysterectomy  AICD (automatic cardioverter/defibrillator) present  FOOT PAIN  25  DM (diabetes mellitus)  PAD (peripheral artery disease)  Cellulitis      Recommended Treatment PLAN: Pain Management Consult Note - [facility] [phone]      Chief Complaint: Bilateral Feet Pain      HPI: Patient seen and examined today. Patient with a history of neuropathy, PAD, Bipolar, DVT, CVA, Dm type 2, chronic pain, HLD, COPD, CHF, bypass graft, admitted with bilateral lower extremity cellulitis, failing outpatient antibiotics with purulent sanguinous drainage. Patient reports bilateral " sharp, burning" pain to her feet bilaterally. Bilateral feet wrapped with curlex, dressing c,d,i. Patient sees Dr. Luci Gan outpatient and takes Methadone 10mg PO QID for pain management. Patient denies any secondary side effects from Methadone PO.  Reviewed plan of care with Primary team. This morning patient was suspected of being over sedated and or hypoglycemic after receiving Percocet 5-325mg PO x1 in addition to her methadone Po dose. Patient was given narcan. Patient Axox2-3 during the interview, in nad.          Pain is __x_ sharp ____dull __x_burning _x__achy ___ Intensity: ____ mild _x__mod x__severe     Location ____surgical site ____cervical _____lumbar ____abd ____upper ext__x__bilateral lower ext  _x__ bilateral feet    Worse with __x__activity __x__movement _____physical therapy___ Rest    Improved with _x___medication _x___rest ____physical therapy      ROS: Const:  _-__febrile   Eyes:___ENT:___CV: ___chest pain  Resp: ____sob  GI:_-__nausea _-__vomiting _-__abd pain ___npo ___clears _x_full diet __bm  :___ Musk: _x__pain _x__spasm  Skin:___ Neuro:  _-__tmkxtagk_-__yimggljep_e__ numbness _x__weakness _x__paresth  Psych:__anxiety  Endo:___ Heme:___Allergy:_________, __x_all others reviewed and negative    PAST MEDICAL & SURGICAL HISTORY:    DM (diabetes mellitus)  Chronic pain  HLD (hyperlipidemia)  COPD (chronic obstructive pulmonary disease)  CHF (congestive heart failure)  H/O extremity bypass graft  History of cholecystectomy  H/O tubal ligation  H/O abdominal hysterectomy  AICD (automatic cardioverter/defibrillator) present      SH: _-__Tobacco   _-__Alcohol                          FH:FAMILY HISTORY:  Family history of diabetes mellitus (Mother)  Family history of hypertension (Mother)      acetaminophen   Tablet .. 650 milliGRAM(s) Oral every 6 hours PRN  apixaban 5 milliGRAM(s) Oral two times a day  atorvastatin 80 milliGRAM(s) Oral at bedtime  dextrose 40% Gel 15 Gram(s) Oral once PRN  dextrose 5%. 1000 milliLiter(s) IV Continuous <Continuous>  dextrose 50% Injectable 12.5 Gram(s) IV Push once  dextrose 50% Injectable 25 Gram(s) IV Push once  dextrose 50% Injectable 25 Gram(s) IV Push once  escitalopram 20 milliGRAM(s) Oral daily  glucagon  Injectable 1 milliGRAM(s) IntraMuscular once PRN  insulin glargine Injectable (LANTUS) 35 Unit(s) SubCutaneous at bedtime  insulin lispro (HumaLOG) corrective regimen sliding scale   SubCutaneous every 6 hours  insulin lispro Injectable (HumaLOG) 15 Unit(s) SubCutaneous three times a day before meals  levothyroxine 25 MICROGram(s) Oral daily  metoprolol succinate  milliGRAM(s) Oral daily  pantoprazole    Tablet 40 milliGRAM(s) Oral before breakfast  piperacillin/tazobactam IVPB.. 3.375 Gram(s) IV Intermittent every 6 hours  sucralfate 1 Gram(s) Oral four times a day  torsemide 20 milliGRAM(s) Oral daily  vancomycin  IVPB 1500 milliGRAM(s) IV Intermittent every 24 hours      T(C): 36.9 (03-12-20 @ 08:30), Max: 36.9 (03-11-20 @ 16:14)  HR: 65 (03-12-20 @ 11:37) (63 - 82)  BP: 128/69 (03-12-20 @ 11:37) (124/71 - 151/80)  RR: 18 (03-12-20 @ 08:30) (16 - 18)  SpO2: 97% (03-12-20 @ 08:30) (94% - 100%)  Wt(kg): --    T(C): 36.9 (03-12-20 @ 08:30), Max: 36.9 (03-11-20 @ 16:14)  HR: 65 (03-12-20 @ 11:37) (63 - 82)  BP: 128/69 (03-12-20 @ 11:37) (124/71 - 151/80)  RR: 18 (03-12-20 @ 08:30) (16 - 18)  SpO2: 97% (03-12-20 @ 08:30) (94% - 100%)  Wt(kg): --    T(C): 36.9 (03-12-20 @ 08:30), Max: 36.9 (03-11-20 @ 16:14)  HR: 65 (03-12-20 @ 11:37) (63 - 82)  BP: 128/69 (03-12-20 @ 11:37) (124/71 - 151/80)  RR: 18 (03-12-20 @ 08:30) (16 - 18)  SpO2: 97% (03-12-20 @ 08:30) (94% - 100%)  Wt(kg): --      PHYSICAL EXAM:  Gen Appearance: _x__no acute distress __x_appropriate        Neuro: _x__SILT feet____ EOM Intact Psych: AAOX_3_, _x__mood/affect appropriate        Eyes: _x__conjunctiva WNL  ____x_ Pupils equal and round        ENT: _x__ears and nose atraumatic_x__ Hearing grossly intact        Neck: x___trachea midline, no visible masses ___thyroid without palpable mass    Resp: _x__Nml WOB____No tactile fremitus ___clear to auscultation    Cardio: __x_extremities free from edema __x__pedal pulses palpable    GI/Abdomen: __x_soft ___x__ Nontender__x____Nondistended_____HSM    Lymphatic: ___no palpable nodes in neck  ___no palpable nodes calves and feet    Skin/Wound: ___Incision, __x_Dressing c/d/i,   ____surrounding tissues soft,  ___drain/chest tube present____    Muscular: EHL _4__/5  Gastrocnemius_4__/5    ___absent clubbing/cyanosis        ASSESSMENT: This is a 59y old Female with a history of of neuropathy, PAD, Bipolar, DVT, CVA, Dm type 2, chronic pain, HLD, COPD, CHF, bypass graft, admitted with bilateral lower extremity cellulitis, failing outpatient antibiotics with purulent sanguinous drainage.      Recommended Treatment PLAN:  1. Methadone 10mg PO TID  2. Dilaudid 1-2mg PO Q4h prn moderate to severe pain  3. Dilaudid 0.25mg IVP Q4h prn breakthrough pain  4. tylenol 975mg PO Q8h standing  Plan discussed with Dr. Paz

## 2020-03-12 NOTE — DISCHARGE NOTE PROVIDER - PROVIDER TOKENS
PROVIDER:[TOKEN:[[id number]]],PROVIDER:[TOKEN:[[id number]]],PROVIDER:[TOKEN:[[id number]]]

## 2020-03-12 NOTE — DISCHARGE NOTE PROVIDER - NSDCFUADDINST_GEN_ALL_CORE_FT
Please change your foot dressings daily until you see Dr. Baird. You will need 4x4 gauze, tape, and kerlix gauze. Please keep the dressing dry. You are allowed to weight bear as tolerated.

## 2020-03-12 NOTE — CONSULT NOTE ADULT - SUBJECTIVE AND OBJECTIVE BOX
Attending: Dr. Medeiros    HPI:  60 y/o female with HFrEF (EF 25%, AICD in place), HTN, IDDM c/b neuropathy (takes Methadone), Bipolar disorder, depression, hx of DVT (with multiple prior DVTs), CVA x2 (last one 2013 with residual right sided weakness), known PAD with prior bypass, NICM, hypothyroidism , COPD (on 2-3L NC @ home), DHARMESH on CPAP, multiple myeloma (on Dexamethasone), stage 4CKD presenting with bilateral foot pain and expanding erythema (L>R) for one week's duration. Patient followed up with podiatrist on Sunday prior to admission, managed with keflex 500mg TID for 10 days. Patient endorses compliance with medication, however presented to ED with worsening warmth and pain of lower extremities. Patient denies fevers, nausea, vomiting headache, new chills or palpations. Patient with increased difficultly walking 2/2 pain. Tylenol attempted at home for pain. Patient unclear regarding purulence, indicates she cannot see the bottom of her foot but is aware of some drainage, not malodorous. Of note, patient with recent admission (2/20) to cardiac telemetry for hypotension and RITCHIE in setting of dehydration.     In ED: Vitals: T: Afebrile | HR: 70 | BP: 134/60| RR: 19 | O2: 85% on RA   Labs: Hgb: 10.4; BUN: 46; Cr: 2.28; Blood glucose: 274; Lipase 81; BNP: 2186   Imaging: B/l lower extremity doppler without evidence of DVT, thrombus noted at left saphenofemoral junction  CXR: Lung markings throughout, consistent with prior imaging   Foot Xray, 3 view B/l: bilateral soft tissue swelling; Overhanging margin of bone with "punched out" erosion appreciated medial distal edge of left proximal phalanx with soft tissue defect at medial aspect of left hallux consistent with ulceration/bullae left foot    Intervention: Vancomycin 1g; Zosyn 2.375mg; Morphine 4mg IV push; Tylenol 650mg x1  Consults: Podiatry (11 Mar 2020 06:39)    Vascular surgery consulted for arterial assessment given new-onset blisters on both great toes. She says these blisters occurred spontaneously on Sunday. No fevers/chills, no nausea/vomiting. No other recent ulcers/wounds.     She has h/o R iliac stent, L SFA stent (now occluded), and L fem-BK pop bypass in 2015 at [facility] with Dr. Burch.     She saw Dr. Medeiros in the office 2 months ago for chronic abdominal pain. Abdominal duplex showed no celiac or SMA stenosis.    PAST MEDICAL & SURGICAL HISTORY:  Neuropathy  PAD (peripheral artery disease)  Bipolar depression  Depression  HTN (hypertension)  DVT (deep venous thrombosis)  CVA (cerebral vascular accident)  DM (diabetes mellitus)  Chronic pain  HLD (hyperlipidemia)  COPD (chronic obstructive pulmonary disease)  CHF (congestive heart failure)  H/O extremity bypass graft  History of cholecystectomy  H/O tubal ligation  H/O abdominal hysterectomy  AICD (automatic cardioverter/defibrillator) present      MEDICATIONS  (STANDING):  acetaminophen   Tablet .. 975 milliGRAM(s) Oral every 8 hours  apixaban 5 milliGRAM(s) Oral two times a day  atorvastatin 80 milliGRAM(s) Oral at bedtime  dextrose 5%. 1000 milliLiter(s) (50 mL/Hr) IV Continuous <Continuous>  dextrose 50% Injectable 12.5 Gram(s) IV Push once  dextrose 50% Injectable 25 Gram(s) IV Push once  dextrose 50% Injectable 25 Gram(s) IV Push once  escitalopram 20 milliGRAM(s) Oral daily  insulin glargine Injectable (LANTUS) 40 Unit(s) SubCutaneous at bedtime  insulin lispro (HumaLOG) corrective regimen sliding scale   SubCutaneous every 6 hours  insulin lispro Injectable (HumaLOG) 12 Unit(s) SubCutaneous three times a day with meals  levothyroxine 25 MICROGram(s) Oral daily  methadone    Tablet 10 milliGRAM(s) Oral every 8 hours  metoprolol succinate  milliGRAM(s) Oral daily  pantoprazole    Tablet 40 milliGRAM(s) Oral before breakfast  piperacillin/tazobactam IVPB.. 3.375 Gram(s) IV Intermittent every 6 hours  sucralfate 1 Gram(s) Oral four times a day  torsemide 20 milliGRAM(s) Oral daily    MEDICATIONS  (PRN):  dextrose 40% Gel 15 Gram(s) Oral once PRN Blood Glucose LESS THAN 70 milliGRAM(s)/deciliter  glucagon  Injectable 1 milliGRAM(s) IntraMuscular once PRN Glucose LESS THAN 70 milligrams/deciliter  HYDROmorphone   Tablet 1 milliGRAM(s) Oral every 4 hours PRN Severe Pain (7 - 10)  HYDROmorphone  Injectable 0.25 milliGRAM(s) IV Push every 4 hours PRN Breakthrough pain      Allergies    aspirin (Other (Moderate); Rash)  IV Contrast (Unknown)        SOCIAL HISTORY:    FAMILY HISTORY:  Family history of diabetes mellitus (Mother)  Family history of hypertension (Mother)        Vital Signs Last 24 Hrs  T(C): 36.3 (12 Mar 2020 21:02), Max: 36.9 (12 Mar 2020 04:49)  T(F): 97.4 (12 Mar 2020 21:02), Max: 98.5 (12 Mar 2020 08:30)  HR: 76 (12 Mar 2020 21:02) (63 - 83)  BP: 124/71 (12 Mar 2020 21:02) (124/71 - 151/80)  BP(mean): --  RR: 18 (12 Mar 2020 21:02) (16 - 18)  SpO2: 91% (12 Mar 2020 21:02) (91% - 100%)    I&O's Summary    11 Mar 2020 07:01  -  12 Mar 2020 07:00  --------------------------------------------------------  IN: 200 mL / OUT: 0 mL / NET: 200 mL        Physical Exam:  General: NAD, resting comfortably  HEENT: NC/AT, EOMI, normal hearing  Pulmonary: normal resp effort  Cardiovascular: NSR  Abdominal: soft, NT/ND, no organomegaly  Extremities: WWP, normal strength, no clubbing/cyanosis. Bilateral legs 1+ non-pitting edema. Ruptured blister on medial aspect of left first toe. Non-ruptured blister on right first toe.  Neuro: A/O x 3, CNs II-XII grossly intact, normal sensation, no focal deficits  Pulses:   Right:  FEM [ ]2+ [ ]1+ [x ]doppler  - triphasic  POP [ ]2+ [ ]1+ [x ]doppler - biphasic  DP [ ]2+ [ ]1+ [ x]doppler - biphasic  PT[ ]2+ [ ]1+ [x ]doppler - biphasic  R EZRA 1.0    Left:  FEM [x ]2+ [ ]1+ [ ]doppler  POP [ ]2+ [ ]1+ [x ]doppler - triphasic  DP [ ]2+ [ ]1+ [ x]doppler - biphasic  PT [ ]2+ [ ]1+ [ x]doppler - biphasic  L EZRA 1.1    Lines/drains/tubes:    LABS:                        10.7   11.94 )-----------( 201      ( 12 Mar 2020 13:08 )             34.1     03-12    141  |  100  |  41<H>  ----------------------------<  145<H>  3.3<L>   |  27  |  2.47<H>    Ca    8.6      12 Mar 2020 13:08  Mg     2.4     03-12    TPro  8.0  /  Alb  3.3  /  TBili  0.4  /  DBili  x   /  AST  18  /  ALT  14  /  AlkPhos  107  03-12    PT/INR - ( 11 Mar 2020 04:46 )   PT: 17.5 sec;   INR: 1.52          PTT - ( 11 Mar 2020 04:46 )  PTT:40.8 sec    CAPILLARY BLOOD GLUCOSE      POCT Blood Glucose.: 176 mg/dL (12 Mar 2020 21:53)  POCT Blood Glucose.: 202 mg/dL (12 Mar 2020 17:32)  POCT Blood Glucose.: 161 mg/dL (12 Mar 2020 13:07)  POCT Blood Glucose.: 239 mg/dL (12 Mar 2020 11:19)  POCT Blood Glucose.: 64 mg/dL (12 Mar 2020 11:09)  POCT Blood Glucose.: 61 mg/dL (12 Mar 2020 10:58)  POCT Blood Glucose.: 217 mg/dL (12 Mar 2020 08:20)  POCT Blood Glucose.: 241 mg/dL (12 Mar 2020 05:58)    LIVER FUNCTIONS - ( 12 Mar 2020 13:08 )  Alb: 3.3 g/dL / Pro: 8.0 g/dL / ALK PHOS: 107 U/L / ALT: 14 U/L / AST: 18 U/L / GGT: x             Cultures:  Culture Results:   No growth at 1 day. (03-11 @ 09:10)  Culture Results:   No growth at 1 day. (03-11 @ 09:10)

## 2020-03-13 DIAGNOSIS — F31.9 BIPOLAR DISORDER, UNSPECIFIED: ICD-10-CM

## 2020-03-13 DIAGNOSIS — E11.628 TYPE 2 DIABETES MELLITUS WITH OTHER SKIN COMPLICATIONS: ICD-10-CM

## 2020-03-13 DIAGNOSIS — Z86.718 PERSONAL HISTORY OF OTHER VENOUS THROMBOSIS AND EMBOLISM: ICD-10-CM

## 2020-03-13 DIAGNOSIS — G89.29 OTHER CHRONIC PAIN: ICD-10-CM

## 2020-03-13 DIAGNOSIS — Z86.73 PERSONAL HISTORY OF TRANSIENT ISCHEMIC ATTACK (TIA), AND CEREBRAL INFARCTION WITHOUT RESIDUAL DEFICITS: ICD-10-CM

## 2020-03-13 DIAGNOSIS — E03.9 HYPOTHYROIDISM, UNSPECIFIED: ICD-10-CM

## 2020-03-13 DIAGNOSIS — C90.00 MULTIPLE MYELOMA NOT HAVING ACHIEVED REMISSION: ICD-10-CM

## 2020-03-13 DIAGNOSIS — G62.9 POLYNEUROPATHY, UNSPECIFIED: ICD-10-CM

## 2020-03-13 DIAGNOSIS — J44.9 CHRONIC OBSTRUCTIVE PULMONARY DISEASE, UNSPECIFIED: ICD-10-CM

## 2020-03-13 LAB
ALBUMIN MFR SERPL ELPH: 49.7 %
ALBUMIN SERPL ELPH-MCNC: 3.7 G/DL
ALBUMIN SERPL-MCNC: 3.6 G/DL
ALBUMIN/GLOB SERPL: 1 RATIO
ALP BLD-CCNC: 105 U/L
ALPHA1 GLOB MFR SERPL ELPH: 4.4 %
ALPHA1 GLOB SERPL ELPH-MCNC: 0.3 G/DL
ALPHA2 GLOB MFR SERPL ELPH: 12.5 %
ALPHA2 GLOB SERPL ELPH-MCNC: 0.9 G/DL
ALT SERPL-CCNC: 19 U/L
ANION GAP SERPL CALC-SCNC: 13 MMOL/L — SIGNIFICANT CHANGE UP (ref 5–17)
ANION GAP SERPL CALC-SCNC: 14 MMOL/L
AST SERPL-CCNC: 24 U/L
B-GLOBULIN MFR SERPL ELPH: 8.5 %
B-GLOBULIN SERPL ELPH-MCNC: 0.6 G/DL
B2 MICROGLOB SERPL-MCNC: <0.2 MG/L
BASOPHILS # BLD AUTO: 0.04 K/UL — SIGNIFICANT CHANGE UP (ref 0–0.2)
BASOPHILS # BLD AUTO: 0.05 K/UL
BASOPHILS NFR BLD AUTO: 0.5 %
BASOPHILS NFR BLD AUTO: 0.5 % — SIGNIFICANT CHANGE UP (ref 0–2)
BILIRUB SERPL-MCNC: 0.5 MG/DL
BUN SERPL-MCNC: 38 MG/DL — HIGH (ref 7–23)
BUN SERPL-MCNC: 48 MG/DL
CALCIUM SERPL-MCNC: 8.5 MG/DL
CALCIUM SERPL-MCNC: 8.8 MG/DL — SIGNIFICANT CHANGE UP (ref 8.4–10.5)
CHLORIDE SERPL-SCNC: 102 MMOL/L — SIGNIFICANT CHANGE UP (ref 96–108)
CHLORIDE SERPL-SCNC: 94 MMOL/L
CO2 SERPL-SCNC: 26 MMOL/L — SIGNIFICANT CHANGE UP (ref 22–31)
CO2 SERPL-SCNC: 28 MMOL/L
CREAT SERPL-MCNC: 2.19 MG/DL
CREAT SERPL-MCNC: 2.23 MG/DL — HIGH (ref 0.5–1.3)
DEPRECATED KAPPA LC FREE/LAMBDA SER: 4 RATIO
EOSINOPHIL # BLD AUTO: 0.21 K/UL
EOSINOPHIL # BLD AUTO: 0.24 K/UL — SIGNIFICANT CHANGE UP (ref 0–0.5)
EOSINOPHIL NFR BLD AUTO: 2.1 %
EOSINOPHIL NFR BLD AUTO: 3.1 % — SIGNIFICANT CHANGE UP (ref 0–6)
ERYTHROCYTE [SEDIMENTATION RATE] IN BLOOD BY WESTERGREN METHOD: 90 MM/HR
GAMMA GLOB FLD ELPH-MCNC: 1.8 G/DL
GAMMA GLOB MFR SERPL ELPH: 24.9 %
GLUCOSE BLDC GLUCOMTR-MCNC: 130 MG/DL — HIGH (ref 70–99)
GLUCOSE BLDC GLUCOMTR-MCNC: 141 MG/DL — HIGH (ref 70–99)
GLUCOSE BLDC GLUCOMTR-MCNC: 54 MG/DL — LOW (ref 70–99)
GLUCOSE BLDC GLUCOMTR-MCNC: 73 MG/DL — SIGNIFICANT CHANGE UP (ref 70–99)
GLUCOSE BLDC GLUCOMTR-MCNC: 82 MG/DL — SIGNIFICANT CHANGE UP (ref 70–99)
GLUCOSE BLDC GLUCOMTR-MCNC: 95 MG/DL — SIGNIFICANT CHANGE UP (ref 70–99)
GLUCOSE SERPL-MCNC: 310 MG/DL
GLUCOSE SERPL-MCNC: 85 MG/DL — SIGNIFICANT CHANGE UP (ref 70–99)
HCT VFR BLD CALC: 31.9 % — LOW (ref 34.5–45)
HCT VFR BLD CALC: 36 %
HGB BLD-MCNC: 10.1 G/DL — LOW (ref 11.5–15.5)
HGB BLD-MCNC: 10.8 G/DL
IGA SER QL IEP: 50 MG/DL
IGG SER QL IEP: 2196 MG/DL
IGM SER QL IEP: 24 MG/DL
IMM GRANULOCYTES NFR BLD AUTO: 0.2 %
IMM GRANULOCYTES NFR BLD AUTO: 0.4 % — SIGNIFICANT CHANGE UP (ref 0–1.5)
INTERPRETATION SERPL IEP-IMP: NORMAL
KAPPA LC CSF-MCNC: 2.1 MG/DL
KAPPA LC SERPL-MCNC: 8.39 MG/DL
LYMPHOCYTES # BLD AUTO: 1.36 K/UL — SIGNIFICANT CHANGE UP (ref 1–3.3)
LYMPHOCYTES # BLD AUTO: 1.41 K/UL
LYMPHOCYTES # BLD AUTO: 17.3 % — SIGNIFICANT CHANGE UP (ref 13–44)
LYMPHOCYTES NFR BLD AUTO: 14.2 %
M PROTEIN MFR SERPL ELPH: 21.2 %
M PROTEIN SPEC IFE-MCNC: NORMAL
MAGNESIUM SERPL-MCNC: 2.2 MG/DL — SIGNIFICANT CHANGE UP (ref 1.6–2.6)
MAN DIFF?: NORMAL
MCHC RBC-ENTMCNC: 29.1 PG
MCHC RBC-ENTMCNC: 30 GM/DL
MCHC RBC-ENTMCNC: 30.1 PG — SIGNIFICANT CHANGE UP (ref 27–34)
MCHC RBC-ENTMCNC: 31.7 GM/DL — LOW (ref 32–36)
MCV RBC AUTO: 95.2 FL — SIGNIFICANT CHANGE UP (ref 80–100)
MCV RBC AUTO: 97 FL
MONOCLON BAND OBS SERPL: 1.5 G/DL
MONOCYTES # BLD AUTO: 0.94 K/UL
MONOCYTES # BLD AUTO: 0.96 K/UL — HIGH (ref 0–0.9)
MONOCYTES NFR BLD AUTO: 12.2 % — SIGNIFICANT CHANGE UP (ref 2–14)
MONOCYTES NFR BLD AUTO: 9.5 %
NEUTROPHILS # BLD AUTO: 5.23 K/UL — SIGNIFICANT CHANGE UP (ref 1.8–7.4)
NEUTROPHILS # BLD AUTO: 7.3 K/UL
NEUTROPHILS NFR BLD AUTO: 66.5 % — SIGNIFICANT CHANGE UP (ref 43–77)
NEUTROPHILS NFR BLD AUTO: 73.5 %
NRBC # BLD: 0 /100 WBCS — SIGNIFICANT CHANGE UP (ref 0–0)
PLATELET # BLD AUTO: 164 K/UL — SIGNIFICANT CHANGE UP (ref 150–400)
PLATELET # BLD AUTO: 190 K/UL
POTASSIUM SERPL-MCNC: 4.4 MMOL/L — SIGNIFICANT CHANGE UP (ref 3.5–5.3)
POTASSIUM SERPL-SCNC: 4 MMOL/L
POTASSIUM SERPL-SCNC: 4.4 MMOL/L — SIGNIFICANT CHANGE UP (ref 3.5–5.3)
PROT SERPL-MCNC: 7.3 G/DL
RBC # BLD: 3.35 M/UL — LOW (ref 3.8–5.2)
RBC # BLD: 3.71 M/UL
RBC # FLD: 14.3 %
RBC # FLD: 14.4 % — SIGNIFICANT CHANGE UP (ref 10.3–14.5)
SODIUM SERPL-SCNC: 135 MMOL/L
SODIUM SERPL-SCNC: 141 MMOL/L — SIGNIFICANT CHANGE UP (ref 135–145)
T4 FREE SERPL-MCNC: 0.83 NG/DL — SIGNIFICANT CHANGE UP (ref 0.7–1.48)
THYROGLOB AB FLD IA-ACNC: <20 IU/ML — SIGNIFICANT CHANGE UP
THYROGLOB AB SERPL-ACNC: <20 IU/ML — SIGNIFICANT CHANGE UP
THYROPEROXIDASE AB SERPL-ACNC: <10 IU/ML — SIGNIFICANT CHANGE UP
VANCOMYCIN FLD-MCNC: 18 UG/ML — SIGNIFICANT CHANGE UP
WBC # BLD: 7.86 K/UL — SIGNIFICANT CHANGE UP (ref 3.8–10.5)
WBC # FLD AUTO: 7.86 K/UL — SIGNIFICANT CHANGE UP (ref 3.8–10.5)
WBC # FLD AUTO: 9.93 K/UL

## 2020-03-13 PROCEDURE — 99233 SBSQ HOSP IP/OBS HIGH 50: CPT | Mod: GC

## 2020-03-13 PROCEDURE — 99231 SBSQ HOSP IP/OBS SF/LOW 25: CPT | Mod: GC

## 2020-03-13 PROCEDURE — 99233 SBSQ HOSP IP/OBS HIGH 50: CPT

## 2020-03-13 RX ORDER — INSULIN GLARGINE 100 [IU]/ML
35 INJECTION, SOLUTION SUBCUTANEOUS AT BEDTIME
Refills: 0 | Status: DISCONTINUED | OUTPATIENT
Start: 2020-03-13 | End: 2020-03-15

## 2020-03-13 RX ORDER — VANCOMYCIN HCL 1 G
1500 VIAL (EA) INTRAVENOUS ONCE
Refills: 0 | Status: COMPLETED | OUTPATIENT
Start: 2020-03-13 | End: 2020-03-13

## 2020-03-13 RX ORDER — INSULIN LISPRO 100/ML
10 VIAL (ML) SUBCUTANEOUS
Refills: 0 | Status: DISCONTINUED | OUTPATIENT
Start: 2020-03-13 | End: 2020-03-14

## 2020-03-13 RX ADMIN — Medication 1 GRAM(S): at 18:40

## 2020-03-13 RX ADMIN — PIPERACILLIN AND TAZOBACTAM 200 GRAM(S): 4; .5 INJECTION, POWDER, LYOPHILIZED, FOR SOLUTION INTRAVENOUS at 12:43

## 2020-03-13 RX ADMIN — METHADONE HYDROCHLORIDE 10 MILLIGRAM(S): 40 TABLET ORAL at 10:07

## 2020-03-13 RX ADMIN — Medication 1 GRAM(S): at 23:29

## 2020-03-13 RX ADMIN — Medication 975 MILLIGRAM(S): at 06:06

## 2020-03-13 RX ADMIN — INSULIN GLARGINE 35 UNIT(S): 100 INJECTION, SOLUTION SUBCUTANEOUS at 23:28

## 2020-03-13 RX ADMIN — Medication 975 MILLIGRAM(S): at 14:22

## 2020-03-13 RX ADMIN — Medication 975 MILLIGRAM(S): at 15:30

## 2020-03-13 RX ADMIN — Medication 10 UNIT(S): at 17:45

## 2020-03-13 RX ADMIN — Medication 1 GRAM(S): at 12:43

## 2020-03-13 RX ADMIN — Medication 200 MILLIGRAM(S): at 06:05

## 2020-03-13 RX ADMIN — METHADONE HYDROCHLORIDE 10 MILLIGRAM(S): 40 TABLET ORAL at 18:40

## 2020-03-13 RX ADMIN — Medication 20 MILLIGRAM(S): at 06:04

## 2020-03-13 RX ADMIN — Medication 1 GRAM(S): at 06:07

## 2020-03-13 RX ADMIN — ESCITALOPRAM OXALATE 20 MILLIGRAM(S): 10 TABLET, FILM COATED ORAL at 12:43

## 2020-03-13 RX ADMIN — Medication 300 MILLIGRAM(S): at 12:44

## 2020-03-13 RX ADMIN — Medication 975 MILLIGRAM(S): at 21:28

## 2020-03-13 RX ADMIN — APIXABAN 5 MILLIGRAM(S): 2.5 TABLET, FILM COATED ORAL at 06:10

## 2020-03-13 RX ADMIN — Medication 12 UNIT(S): at 12:44

## 2020-03-13 RX ADMIN — APIXABAN 5 MILLIGRAM(S): 2.5 TABLET, FILM COATED ORAL at 18:40

## 2020-03-13 RX ADMIN — PIPERACILLIN AND TAZOBACTAM 200 GRAM(S): 4; .5 INJECTION, POWDER, LYOPHILIZED, FOR SOLUTION INTRAVENOUS at 06:04

## 2020-03-13 RX ADMIN — Medication 25 MICROGRAM(S): at 06:07

## 2020-03-13 RX ADMIN — ATORVASTATIN CALCIUM 80 MILLIGRAM(S): 80 TABLET, FILM COATED ORAL at 21:28

## 2020-03-13 RX ADMIN — PIPERACILLIN AND TAZOBACTAM 200 GRAM(S): 4; .5 INJECTION, POWDER, LYOPHILIZED, FOR SOLUTION INTRAVENOUS at 18:40

## 2020-03-13 RX ADMIN — PIPERACILLIN AND TAZOBACTAM 200 GRAM(S): 4; .5 INJECTION, POWDER, LYOPHILIZED, FOR SOLUTION INTRAVENOUS at 23:29

## 2020-03-13 RX ADMIN — PANTOPRAZOLE SODIUM 40 MILLIGRAM(S): 20 TABLET, DELAYED RELEASE ORAL at 06:06

## 2020-03-13 RX ADMIN — Medication 975 MILLIGRAM(S): at 06:10

## 2020-03-13 RX ADMIN — Medication 975 MILLIGRAM(S): at 22:28

## 2020-03-13 NOTE — PROGRESS NOTE ADULT - PROBLEM SELECTOR PROBLEM 4
CKD (chronic kidney disease) stage 4, GFR 15-29 ml/min
Chronic pain
Chronic obstructive pulmonary disease, unspecified COPD type
DM (diabetes mellitus)
DM (diabetes mellitus)

## 2020-03-13 NOTE — PHYSICAL THERAPY INITIAL EVALUATION ADULT - ACTIVE RANGE OF MOTION EXAMINATION, REHAB EVAL
R shoulder NT secondary to pt reporting old shoulder dislocation/bilateral  lower extremity Active ROM was WFL (within functional limits)/bilateral upper extremity Active ROM was WFL (within functional limits)

## 2020-03-13 NOTE — PROGRESS NOTE ADULT - PROBLEM SELECTOR PROBLEM 7
Chronic systolic congestive heart failure
DM (diabetes mellitus)
History of DVT (deep vein thrombosis)
Multiple myeloma
Multiple myeloma

## 2020-03-13 NOTE — PROGRESS NOTE ADULT - SUBJECTIVE AND OBJECTIVE BOX
INTERVAL HPI/OVERNIGHT EVENTS:    Feeling OK today. Modest appetite.  TSH 1.6 with FT4 0.83, abs neg. On levothyroxine 25mcg daily.  Pending arterial dopplers.      FSG & insulin:  3/12  Dinner    Lispro 10    Ate BLT on whole wheat and peaches.  Bedtime FSG  176  Lantus  40  lispro  2   3/13  Breakfast FSG 82    Lispro  0 Ate    cheerios, BB muffin and raisins.  Lunch     Lispro  12 Ate 1/2 sandwich and ice cream.    Pt reports the following symptoms:    CONSTITUTIONAL:  Negative fever or chills  EYES:  Negative  blurry vision or double vision  CARDIOVASCULAR:  Negative for chest pain or palpitations  RESPIRATORY:  Negative for cough, wheezing, or SOB   GASTROINTESTINAL:  Negative for nausea, vomiting, diarrhea, constipation, or abdominal pain  GENITOURINARY:  Negative frequency, urgency or dysuria  NEUROLOGIC:  No headache, confusion, dizziness, lightheadedness    MEDICATIONS  (STANDING):  acetaminophen   Tablet .. 975 milliGRAM(s) Oral every 8 hours  apixaban 5 milliGRAM(s) Oral two times a day  atorvastatin 80 milliGRAM(s) Oral at bedtime  dextrose 5%. 1000 milliLiter(s) (50 mL/Hr) IV Continuous <Continuous>  dextrose 50% Injectable 12.5 Gram(s) IV Push once  dextrose 50% Injectable 25 Gram(s) IV Push once  dextrose 50% Injectable 25 Gram(s) IV Push once  escitalopram 20 milliGRAM(s) Oral daily  insulin glargine Injectable (LANTUS) 35 Unit(s) SubCutaneous at bedtime  insulin lispro (HumaLOG) corrective regimen sliding scale   SubCutaneous every 6 hours  insulin lispro Injectable (HumaLOG) 10 Unit(s) SubCutaneous three times a day before meals  levothyroxine 25 MICROGram(s) Oral daily  methadone    Tablet 10 milliGRAM(s) Oral every 8 hours  metoprolol succinate  milliGRAM(s) Oral daily  pantoprazole    Tablet 40 milliGRAM(s) Oral before breakfast  piperacillin/tazobactam IVPB.. 3.375 Gram(s) IV Intermittent every 6 hours  sucralfate 1 Gram(s) Oral four times a day  torsemide 20 milliGRAM(s) Oral daily    MEDICATIONS  (PRN):  dextrose 40% Gel 15 Gram(s) Oral once PRN Blood Glucose LESS THAN 70 milliGRAM(s)/deciliter  glucagon  Injectable 1 milliGRAM(s) IntraMuscular once PRN Glucose LESS THAN 70 milligrams/deciliter  HYDROmorphone   Tablet 1 milliGRAM(s) Oral every 4 hours PRN Severe Pain (7 - 10)  HYDROmorphone  Injectable 0.25 milliGRAM(s) IV Push every 4 hours PRN Breakthrough pain      PHYSICAL EXAM  Vital Signs Last 24 Hrs  T(C): 36.6 (13 Mar 2020 16:08), Max: 36.6 (13 Mar 2020 16:08)  T(F): 97.9 (13 Mar 2020 16:08), Max: 97.9 (13 Mar 2020 16:08)  HR: 68 (13 Mar 2020 16:08) (64 - 76)  BP: 127/71 (13 Mar 2020 16:08) (122/65 - 131/75)  BP(mean): --  RR: 16 (13 Mar 2020 16:08) (14 - 18)  SpO2: 95% (13 Mar 2020 16:08) (91% - 100%)    Constitutional: wn/wd in NAD.   HEENT: NCAT, MMM, OP clear, EOMI, no proptosis or lid retraction  Neck: no thyromegaly or palpable thyroid nodules   Respiratory: lungs CTAB.  Cardiovascular: regular rhythm, normal S1 and S2, no audible murmurs, no peripheral edema  GI: soft, NT/ND, no masses/HSM appreciated.  Neurology: no tremors, DTR 2+  Skin: no visible rashes/lesions. no acanthosis nigricans. no lipohypertrophy. no cushing's stigmata.  Psychiatric: AAO x 3, normal affect/mood.  Ext:Both feet wrapped with bandage - b/l lower extremity swelling present     LABS:                        10.1   7.86  )-----------( 164      ( 13 Mar 2020 08:36 )             31.9     03-13    141  |  102  |  38<H>  ----------------------------<  85  4.4   |  26  |  2.23<H>    Ca    8.8      13 Mar 2020 08:36  Mg     2.2     03-13    TPro  8.0  /  Alb  3.3  /  TBili  0.4  /  DBili  x   /  AST  18  /  ALT  14  /  AlkPhos  107  03-12        Thyroid Stimulating Hormone, Serum: 1.630 uIU/mL (03-12 @ 05:48)  Thyroid Stimulating Hormone, Serum: 8.491 uIU/mL (02-12 @ 12:45)      HbA1C: 9.2 % (03-11 @ 04:46)  10.3 % (02-13 @ 06:59)  13.1 % (05-31 @ 11:25)    CAPILLARY BLOOD GLUCOSE      POCT Blood Glucose.: 130 mg/dL (13 Mar 2020 16:50)  POCT Blood Glucose.: 141 mg/dL (13 Mar 2020 11:59)  POCT Blood Glucose.: 82 mg/dL (13 Mar 2020 06:16)  POCT Blood Glucose.: 176 mg/dL (12 Mar 2020 21:53)    A/P: 62 y/o female with HFrEF (EF 25%, AICD in place), HTN, IDDM c/b neuropathy (takes Methadone), Bipolar disorder, depression, hx of DVT (with multiple prior DVTs), CVA x2 (last one 2013 with residual right sided weakness), known PAD with prior bypass, NICM, hypothyroidism , COPD (on 2-3L NC @ home), DHARMESH on CPAP, multiple myeloma (on Dexamethasone), stage 4CKD came in with B/L lower extremity swelling - left LE cellulitis versus osteomyelitis.    1.  DM type 2 - uncontrolled with hyperglycemia  - Hba1c 9.2  CKD Stage III to IV  Cr 2.47 and GFR 21  - Wt 90 kg with BMI 33  - carb consistent diet  Please decrease to lantus   35 units at night.   Please decrease to lispro  10    units before each meal.    Please continue lispro moderate dose sliding scale four times daily with meals and at bedtime    2. Hypothyroidism  - TSH 1.632, FT4 0.83, abs neg.   - Continue levothyroxine 25mcg once daily on an empty stomach     Pt's fingerstick glucose goal is 120 to 150    Will continue to monitor     For discharge, she has an appt. With Britt Johns RN on 3/26 and Dr Lieberman 4/8.    Pt can follow up at discharge with [facility] by calling  to make an appointment.   discussed case with    and updated primary team

## 2020-03-13 NOTE — PROGRESS NOTE ADULT - PROBLEM SELECTOR PLAN 6
cont. levothyroxine
Patient with history of COPD, no recent exacerbations or previous intubations. On home 2-3L home O2 PRN. No wheezing on exam. Currently comfortable on room air.  - Duonebs q6hr PRN     #DHARMESH  Patient with history of DHARMESH, utilizes CPAP at home.  - CPAP o/n
Patient with history of COPD, no recent exacerbations or previous intubations. On home 2-3L home O2 PRN. No wheezing on exam. Currently comfortable on room air.  - Duonebs q6hr PRN     #DHARMESH  Patient with history of DHARMESH, utilizes CPAP at home.  - CPAP o/n

## 2020-03-13 NOTE — PROGRESS NOTE ADULT - PROBLEM SELECTOR PLAN 1
Patient with bilateral lower extremity ulcers and worsening cellulitis in setting of PAD. Failed outpatient antibiotic treatment. No sepsis criteria met on admission. B/l lower extremity x-ray demonstrating soft tissue swelling bilaterally and gas in the left toe. ESR/CRP elevated. No osteomyelitis on bone scan.  - c/w vancomycin 1.5 g q24h (vanc goal 15-20)  - vanc level every AM  - c/w zosyn 3.375 g for pseudomonal coverage in setting of diabetes  - f/u wound cultures and abx sensitivities  - f/u wound care recs  - vascular surgery following, appreciate recs  - f/u LE arterial doppler

## 2020-03-13 NOTE — ASSESSMENT
[FreeTextEntry1] : Recently diagnosed  myeloma...pt didn't come for f/u since August, cites many social problems, impending eviction, mom in the Returns for f/u with her daughter...\par \par  repeat Immunoelectrophoresis IgG kappa level DOWN , pt to continue weekly Ninlaro 3 mg ,3 weeks on 1 week off, together with her Decadron 20 mg weekly.\par \par again long discussion with pt about Multiple Myeloma, pt understands the plans for treatment, promises to follow here monthly.\par \par

## 2020-03-13 NOTE — PROGRESS NOTE ADULT - PROBLEM SELECTOR PLAN 3
Patient with history of HFrEF (EF 20%), AICD in place. CXR unchanged from prior admission, with edema and increased lung markings. Not in CHF exacerbation. BNP >2000, improved from baseline. Euvolemic on exam. Entresto dc'd during prior admission.  - c/w home torsemide 20 mg qd  - c/w Troprol  mg qd    #History of stroke  Patient with history of left thalamic infarct. Previously recommended to initiate ASA 81 mg on prior admission.  - initiate ASA 81

## 2020-03-13 NOTE — PROGRESS NOTE ADULT - PROBLEM SELECTOR PROBLEM 5
Multiple myeloma
Suspected deep vein thrombosis (DVT)
Chronic systolic congestive heart failure
CKD (chronic kidney disease) stage 4, GFR 15-29 ml/min
CKD (chronic kidney disease) stage 4, GFR 15-29 ml/min

## 2020-03-13 NOTE — PROGRESS NOTE ADULT - PROBLEM SELECTOR PLAN 1
cont. Zosyn + vanc (dose per level) for now, pending cultures; cont. wound care per Podiatry; no e/o OM on bone scan; cont. insulin regimen per Endocrine recs; checking arterial Dopplers, in setting of PAD, per Vascular recs

## 2020-03-13 NOTE — PHYSICAL THERAPY INITIAL EVALUATION ADULT - CRITERIA FOR SKILLED THERAPEUTIC INTERVENTIONS
functional limitations in following categories/risk reduction/prevention/impairments found/therapy frequency/anticipated discharge recommendation/rehab potential

## 2020-03-13 NOTE — PROGRESS NOTE ADULT - PROBLEM SELECTOR PLAN 7
Patient with history of multiple myeloma, follows with Dr. Ruiz outpatient.  - c/w dexamethasone 20 mg weekly  - c/w Ninlaro 3 mg weekly   - monitor fingersticks  - outpatient follow up with Dr. Ruiz

## 2020-03-13 NOTE — HISTORY OF PRESENT ILLNESS
[de-identified] : 57 y/o with long standing DM, CRF and anemia was found to have a light chain paraprotein...discussed with pt, will arrange for CT guided BM aspirate and biopsy... [de-identified] : 8-6-2019 BM aspirate and Bx consistent with myeloma...this was discussed in detail with pt and all her questions were answered...\par \par January 29, 2020 patient returns for follow-up... She states she has been taking the weekly Decadron only... She did not return for her follow-up appointment because of family issues, her mother in law passed away, she might be evicted from her apartment,etc...\par \par 3-9-2020 Has been taking the Decadron weekly on Monday with Ninlaro x 3 weeks, patient has a full understanding of the proper way to take the medications...Has a leg infection for which she takes antibiotics

## 2020-03-13 NOTE — PROGRESS NOTE ADULT - PROBLEM SELECTOR PROBLEM 10
Bipolar depression
Transition of care performed with sharing of clinical summary
Transition of care performed with sharing of clinical summary

## 2020-03-13 NOTE — PROGRESS NOTE ADULT - PROBLEM SELECTOR PROBLEM 3
COPD (chronic obstructive pulmonary disease)
COPD (chronic obstructive pulmonary disease)
CKD (chronic kidney disease) stage 4, GFR 15-29 ml/min
Chronic systolic congestive heart failure
Chronic systolic congestive heart failure

## 2020-03-13 NOTE — PHYSICAL THERAPY INITIAL EVALUATION ADULT - PLANNED THERAPY INTERVENTIONS, PT EVAL
strengthening/gait training/postural re-education/neuromuscular re-education/transfer training/balance training/bed mobility training

## 2020-03-13 NOTE — PROGRESS NOTE ADULT - PROBLEM SELECTOR PLAN 4
Patient with history of diabetes, glucose 150s-250s. Per patients 170s-200s at home.   HgA1c 9.2 (10.3 on prior admission). On home Levemir 40 units at bedtime and Novolog 22 units TID. Likely poorly controlled in setting of dexamethasone use. Episode of hypoglycemia on 3/13.   - endocrine following, appreciate recs  - continue Lantus 40 units at bedtime  - continue Lispro 12 units pre-meal  - nutrition consult  - monitor fingersticks    #Hypothyroidism   Patient with history of hypothyroidism, abnormal TFTs on previous admission. TSH wnl. Free T4 wnl.  - continue with home synthroid 25 mcg    #Bipolar Depression  Patient with history of bipolar depression. No suicidal ideation at this time.  - c/w home lexapro 20 mg at bedtime

## 2020-03-13 NOTE — PROGRESS NOTE ADULT - SUBJECTIVE AND OBJECTIVE BOX
Patient is a 59y old  Female who presents with a chief complaint of cellulitis failed outpatient therapy (13 Mar 2020 18:28)      INTERVAL HPI/OVERNIGHT EVENTS:    Pt. seen and examined this morning  Pt. feels well; L foot pain improved/controlled  No F/C    Review of Systems: 12 point review of systems otherwise negative    MEDICATIONS  (STANDING):  acetaminophen   Tablet .. 975 milliGRAM(s) Oral every 8 hours  apixaban 5 milliGRAM(s) Oral two times a day  atorvastatin 80 milliGRAM(s) Oral at bedtime  dextrose 5%. 1000 milliLiter(s) (50 mL/Hr) IV Continuous <Continuous>  dextrose 50% Injectable 12.5 Gram(s) IV Push once  dextrose 50% Injectable 25 Gram(s) IV Push once  dextrose 50% Injectable 25 Gram(s) IV Push once  escitalopram 20 milliGRAM(s) Oral daily  insulin glargine Injectable (LANTUS) 35 Unit(s) SubCutaneous at bedtime  insulin lispro (HumaLOG) corrective regimen sliding scale   SubCutaneous every 6 hours  insulin lispro Injectable (HumaLOG) 10 Unit(s) SubCutaneous three times a day before meals  levothyroxine 25 MICROGram(s) Oral daily  methadone    Tablet 10 milliGRAM(s) Oral every 8 hours  metoprolol succinate  milliGRAM(s) Oral daily  pantoprazole    Tablet 40 milliGRAM(s) Oral before breakfast  piperacillin/tazobactam IVPB.. 3.375 Gram(s) IV Intermittent every 6 hours  sucralfate 1 Gram(s) Oral four times a day  torsemide 20 milliGRAM(s) Oral daily    MEDICATIONS  (PRN):  dextrose 40% Gel 15 Gram(s) Oral once PRN Blood Glucose LESS THAN 70 milliGRAM(s)/deciliter  glucagon  Injectable 1 milliGRAM(s) IntraMuscular once PRN Glucose LESS THAN 70 milligrams/deciliter  HYDROmorphone   Tablet 1 milliGRAM(s) Oral every 4 hours PRN Severe Pain (7 - 10)  HYDROmorphone  Injectable 0.25 milliGRAM(s) IV Push every 4 hours PRN Breakthrough pain      Allergies    aspirin (Other (Moderate); Rash)  IV Contrast (Unknown)    Intolerances          Vital Signs Last 24 Hrs  T(C): 36.6 (13 Mar 2020 20:31), Max: 36.6 (13 Mar 2020 16:08)  T(F): 97.9 (13 Mar 2020 20:31), Max: 97.9 (13 Mar 2020 16:08)  HR: 72 (13 Mar 2020 20:31) (64 - 76)  BP: 135/67 (13 Mar 2020 20:31) (122/65 - 135/67)  BP(mean): --  RR: 18 (13 Mar 2020 20:31) (14 - 18)  SpO2: 96% (13 Mar 2020 20:31) (91% - 100%)  CAPILLARY BLOOD GLUCOSE      POCT Blood Glucose.: 130 mg/dL (13 Mar 2020 16:50)  POCT Blood Glucose.: 141 mg/dL (13 Mar 2020 11:59)  POCT Blood Glucose.: 82 mg/dL (13 Mar 2020 06:16)  POCT Blood Glucose.: 176 mg/dL (12 Mar 2020 21:53)        Physical Exam:  (this morning)  Daily     Daily   General:  non-toxic and comfortable-appearing in NAD  HEENT:  MMM  Abdomen: soft NT ND  Extremities: B/L feet dressings C/D/I  Skin:  WWP  Neuro:  AAOx3    LABS:                        10.1   7.86  )-----------( 164      ( 13 Mar 2020 08:36 )             31.9     03-13    141  |  102  |  38<H>  ----------------------------<  85  4.4   |  26  |  2.23<H>    Ca    8.8      13 Mar 2020 08:36  Mg     2.2     03-13    TPro  8.0  /  Alb  3.3  /  TBili  0.4  /  DBili  x   /  AST  18  /  ALT  14  /  AlkPhos  107  03-12            RADIOLOGY & ADDITIONAL TESTS:    ---------------------------------------------------------------------------  I personally reviewed: [  ]EKG   [  ]CXR    [  ] CT    [  ]Other  ---------------------------------------------------------------------------  PLEASE CHECK WHEN PRESENT:     [  ]Heart Failure     [  ] Acute     [  ] Acute on Chronic     [  ] Chronic  -------------------------------------------------------------------     [  ]Diastolic [HFpEF]     [  ]Systolic [HFrEF]     [  ]Combined [HFpEF & HFrEF]     [  ]Other:  -------------------------------------------------------------------  [  ]RITCHIE     [  ]ATN     [  ]Reneal Medullary Necrosis     [  ]Renal Cortical Necrosis     [  ]Other Pathological Lesions:    [  ]CKD 1  [  ]CKD 2  [  ]CKD 3  [  ]CKD 4  [  ]CKD 5  [  ]Other  -------------------------------------------------------------------  [  ]Other/Unspecified:    --------------------------------------------------------------------    Abdominal Nutritional Status  [  ]Malnutrition: See Nutrition Note  [  ]Cachexia  [  ]Other:   [  ]Supplement Ordered:  [  ]Morbid Obesity (BMI >=40]

## 2020-03-13 NOTE — PROGRESS NOTE ADULT - SUBJECTIVE AND OBJECTIVE BOX
Patient is a 59y old  Female who presents with a chief complaint of cellulitis failed outpatient therapy (13 Mar 2020 10:00)      INTERVAL HPI/ OVERNIGHT EVENTS: pt has pain in her feet. Denies N/V.      LABS                        10.1   7.86  )-----------( 164      ( 13 Mar 2020 08:36 )             31.9     03-13    141  |  102  |  38<H>  ----------------------------<  85  4.4   |  26  |  2.23<H>    Ca    8.8      13 Mar 2020 08:36  Mg     2.2     03-13    TPro  8.0  /  Alb  3.3  /  TBili  0.4  /  DBili  x   /  AST  18  /  ALT  14  /  AlkPhos  107  03-12        ICU Vital Signs Last 24 Hrs  T(C): 36.5 (13 Mar 2020 10:08), Max: 36.5 (13 Mar 2020 05:24)  T(F): 97.7 (13 Mar 2020 10:08), Max: 97.7 (13 Mar 2020 05:24)  HR: 64 (13 Mar 2020 10:08) (64 - 83)  BP: 131/75 (13 Mar 2020 10:08) (122/65 - 131/75)  BP(mean): --  ABP: --  ABP(mean): --  RR: 16 (13 Mar 2020 10:08) (14 - 18)  SpO2: 94% (13 Mar 2020 10:08) (91% - 100%)      RADIOLOGY  < from: NM 3-Phase Bone Scan (03.12.20 @ 17:21) >  Procedure:  Dynamic imaging was performed in the plantar projection during the blood flow and blood pool phases after the intravenous injection of 20 mCi of technetium 99m labeled MDP.    Approximately 2 hours later, images of the feet were again obtained in the plantar projection.    Findings: There is increased blood flow on the medial side of both feet. On the right side, increased flow and blood pool activity are seen in the first digit involving the distal phalanx and possibly the proximal phalanx of the great toe. Delayed imaging in this area shows no focal abnormalities.    On the left side, increased blood flow is seen along the entire medial aspect of the foot with blood pool imaging showing persistence of activity along the more proximal portion of the great toe, the forefoot and the midfoot. Delayed imaging shows slightly increased articular activity, but again no focal activity is seen to suspect osteomyelitis.    Impression: Increased blood flow and blood pool activity on the medial aspect of both feet suggesting soft tissue infection or inflammation. Delayed imaging shows no focal abnormalities to suggest osteomyelitis.    < end of copied text >      MICROBIOLOGY    PHYSICAL EXAM  Lower Extremity Focused:  Vasc: Palpable DP/PT pulses. Erythema resolving on left.  Derm: Nonpitting edema of right foot and leg. Blister located at the medial aspect of 1st MTPJ left foot which extends proximally along the medial longitudinal arch draining small amount of serous fluid, Now a Aguirre grade 1 since the blister has broken.. Erythema of dorsum left foot. On the right foot, Aguirre grade 1 stable wound at medial aspect 1st MTPJ (previous blister in this region).   Neuro: Diminished sensation B/L  MSK: Pain on palpation at site of blister on medial aspect left foot

## 2020-03-13 NOTE — PROGRESS NOTE ADULT - ASSESSMENT
A/P: 59 F PMH DM, PAD (h/o R iliac, occluded L SFA stent, L fem-BK pop bypass in 2015) with b/l great toe wounds. Arterial inflow to feet appear to be adequate at this time, but need to assess LLE fem-pop bypass for lesions.    - get bilateral lower extremity arterial duplex   - on IV abx, f/u ctx  - wound care per podiatry  - discussed with chief resident  - call [phone] with questions

## 2020-03-13 NOTE — PROGRESS NOTE ADULT - SUBJECTIVE AND OBJECTIVE BOX
O/N Events: no acute events    Subjective: Patient seen at the bedside. Reporting that she remembers parts of what happened yesterday and that she did not enjoy the events that happened. She continues to have pain in her feet but symptoms are tolerable. She denies N/V, abdominal pain, chest pain and SOB. Tolerating PO.    VITALS  Vital Signs Last 24 Hrs  T(C): 36.5 (13 Mar 2020 10:08), Max: 36.5 (13 Mar 2020 05:24)  T(F): 97.7 (13 Mar 2020 10:08), Max: 97.7 (13 Mar 2020 05:24)  HR: 64 (13 Mar 2020 10:08) (64 - 83)  BP: 131/75 (13 Mar 2020 10:08) (122/65 - 131/75)  BP(mean): --  RR: 16 (13 Mar 2020 10:08) (14 - 18)  SpO2: 94% (13 Mar 2020 10:08) (91% - 100%)    CAPILLARY BLOOD GLUCOSE      POCT Blood Glucose.: 82 mg/dL (13 Mar 2020 06:16)  POCT Blood Glucose.: 176 mg/dL (12 Mar 2020 21:53)  POCT Blood Glucose.: 202 mg/dL (12 Mar 2020 17:32)  POCT Blood Glucose.: 161 mg/dL (12 Mar 2020 13:07)  POCT Blood Glucose.: 239 mg/dL (12 Mar 2020 11:19)  POCT Blood Glucose.: 64 mg/dL (12 Mar 2020 11:09)  POCT Blood Glucose.: 61 mg/dL (12 Mar 2020 10:58)    PHYSICAL EXAM  General appearance: NAD, much more alert and awake  HEENT: normocephalic, EOMI, sclera anicteric, MMM  Respiratory: bibasilar crackles otherwise breath sounds clear to auscultation throughout all lung fields, no accessory muscle use, no wheezing  Cardiovascular: regular rate and rhythm, normal S1/S2, no murmurs, rubs or gallops appreciated  Gastrointestinal: soft, non-tender, mildly distended, normoactive bowel sounds  Extremities: WWP, skin discoloration improving - extends from feet to upper third of shin,   Neurological: AOx3, CNII-XII grossly intact; no obvious focal deficits    MEDICATIONS  (STANDING):  acetaminophen   Tablet .. 975 milliGRAM(s) Oral every 8 hours  apixaban 5 milliGRAM(s) Oral two times a day  atorvastatin 80 milliGRAM(s) Oral at bedtime  dextrose 5%. 1000 milliLiter(s) (50 mL/Hr) IV Continuous <Continuous>  dextrose 50% Injectable 12.5 Gram(s) IV Push once  dextrose 50% Injectable 25 Gram(s) IV Push once  dextrose 50% Injectable 25 Gram(s) IV Push once  escitalopram 20 milliGRAM(s) Oral daily  insulin glargine Injectable (LANTUS) 40 Unit(s) SubCutaneous at bedtime  insulin lispro (HumaLOG) corrective regimen sliding scale   SubCutaneous every 6 hours  insulin lispro Injectable (HumaLOG) 12 Unit(s) SubCutaneous three times a day with meals  levothyroxine 25 MICROGram(s) Oral daily  methadone    Tablet 10 milliGRAM(s) Oral every 8 hours  metoprolol succinate  milliGRAM(s) Oral daily  pantoprazole    Tablet 40 milliGRAM(s) Oral before breakfast  piperacillin/tazobactam IVPB.. 3.375 Gram(s) IV Intermittent every 6 hours  sucralfate 1 Gram(s) Oral four times a day  torsemide 20 milliGRAM(s) Oral daily  vancomycin  IVPB 1500 milliGRAM(s) IV Intermittent once    MEDICATIONS  (PRN):  dextrose 40% Gel 15 Gram(s) Oral once PRN Blood Glucose LESS THAN 70 milliGRAM(s)/deciliter  glucagon  Injectable 1 milliGRAM(s) IntraMuscular once PRN Glucose LESS THAN 70 milligrams/deciliter  HYDROmorphone   Tablet 1 milliGRAM(s) Oral every 4 hours PRN Severe Pain (7 - 10)  HYDROmorphone  Injectable 0.25 milliGRAM(s) IV Push every 4 hours PRN Breakthrough pain      aspirin (Other (Moderate); Rash)  IV Contrast (Unknown)      LABS                        10.1   7.86  )-----------( 164      ( 13 Mar 2020 08:36 )             31.9     03-13    141  |  102  |  38<H>  ----------------------------<  85  4.4   |  26  |  2.23<H>    Ca    8.8      13 Mar 2020 08:36  Mg     2.2     03-13    TPro  8.0  /  Alb  3.3  /  TBili  0.4  /  DBili  x   /  AST  18  /  ALT  14  /  AlkPhos  107  03-12              PROCEDURES/IMAGING: reviewed. O/N Events: no acute events    Subjective: Patient seen at the bedside. Reporting that she remembers parts of what happened yesterday and that she did not enjoy the events that happened. She continues to have pain in her feet but symptoms are tolerable. She denies N/V, abdominal pain, chest pain and SOB. Tolerating PO.    VITALS  Vital Signs Last 24 Hrs  T(C): 36.5 (13 Mar 2020 10:08), Max: 36.5 (13 Mar 2020 05:24)  T(F): 97.7 (13 Mar 2020 10:08), Max: 97.7 (13 Mar 2020 05:24)  HR: 64 (13 Mar 2020 10:08) (64 - 83)  BP: 131/75 (13 Mar 2020 10:08) (122/65 - 131/75)  BP(mean): --  RR: 16 (13 Mar 2020 10:08) (14 - 18)  SpO2: 94% (13 Mar 2020 10:08) (91% - 100%)    CAPILLARY BLOOD GLUCOSE      POCT Blood Glucose.: 82 mg/dL (13 Mar 2020 06:16)  POCT Blood Glucose.: 176 mg/dL (12 Mar 2020 21:53)  POCT Blood Glucose.: 202 mg/dL (12 Mar 2020 17:32)  POCT Blood Glucose.: 161 mg/dL (12 Mar 2020 13:07)  POCT Blood Glucose.: 239 mg/dL (12 Mar 2020 11:19)  POCT Blood Glucose.: 64 mg/dL (12 Mar 2020 11:09)  POCT Blood Glucose.: 61 mg/dL (12 Mar 2020 10:58)    PHYSICAL EXAM  General appearance: NAD, much more alert and awake  HEENT: normocephalic, EOMI, sclera anicteric, MMM  Respiratory: bibasilar crackles otherwise breath sounds clear to auscultation throughout all lung fields, no accessory muscle use, no wheezing  Cardiovascular: regular rate and rhythm, normal S1/S2, no murmurs, rubs or gallops appreciated  Gastrointestinal: soft, non-tender, mildly distended, normoactive bowel sounds  Extremities: WWP, skin discoloration improving - extends from feet to upper third of shin, mildly tender to palpation, wrapped with clean and dry gauze, skin breakdown present on the left foot and right toe, no purulence, patient able to range toes, numbness present in the toes bilaterally (chronic per patient), sensation to LT intact from dorsum of foot up to shin  Neurological: no obvious focal deficits    MEDICATIONS  (STANDING):  acetaminophen   Tablet .. 975 milliGRAM(s) Oral every 8 hours  apixaban 5 milliGRAM(s) Oral two times a day  atorvastatin 80 milliGRAM(s) Oral at bedtime  dextrose 5%. 1000 milliLiter(s) (50 mL/Hr) IV Continuous <Continuous>  dextrose 50% Injectable 12.5 Gram(s) IV Push once  dextrose 50% Injectable 25 Gram(s) IV Push once  dextrose 50% Injectable 25 Gram(s) IV Push once  escitalopram 20 milliGRAM(s) Oral daily  insulin glargine Injectable (LANTUS) 40 Unit(s) SubCutaneous at bedtime  insulin lispro (HumaLOG) corrective regimen sliding scale   SubCutaneous every 6 hours  insulin lispro Injectable (HumaLOG) 12 Unit(s) SubCutaneous three times a day with meals  levothyroxine 25 MICROGram(s) Oral daily  methadone    Tablet 10 milliGRAM(s) Oral every 8 hours  metoprolol succinate  milliGRAM(s) Oral daily  pantoprazole    Tablet 40 milliGRAM(s) Oral before breakfast  piperacillin/tazobactam IVPB.. 3.375 Gram(s) IV Intermittent every 6 hours  sucralfate 1 Gram(s) Oral four times a day  torsemide 20 milliGRAM(s) Oral daily  vancomycin  IVPB 1500 milliGRAM(s) IV Intermittent once    MEDICATIONS  (PRN):  dextrose 40% Gel 15 Gram(s) Oral once PRN Blood Glucose LESS THAN 70 milliGRAM(s)/deciliter  glucagon  Injectable 1 milliGRAM(s) IntraMuscular once PRN Glucose LESS THAN 70 milligrams/deciliter  HYDROmorphone   Tablet 1 milliGRAM(s) Oral every 4 hours PRN Severe Pain (7 - 10)  HYDROmorphone  Injectable 0.25 milliGRAM(s) IV Push every 4 hours PRN Breakthrough pain      aspirin (Other (Moderate); Rash)  IV Contrast (Unknown)      LABS                        10.1   7.86  )-----------( 164      ( 13 Mar 2020 08:36 )             31.9     03-13    141  |  102  |  38<H>  ----------------------------<  85  4.4   |  26  |  2.23<H>    Ca    8.8      13 Mar 2020 08:36  Mg     2.2     03-13    TPro  8.0  /  Alb  3.3  /  TBili  0.4  /  DBili  x   /  AST  18  /  ALT  14  /  AlkPhos  107  03-12              PROCEDURES/IMAGING: reviewed.

## 2020-03-13 NOTE — PHYSICAL THERAPY INITIAL EVALUATION ADULT - ADDITIONAL COMMENTS
Pt reports living in an apartment with her spouse and children, with ~15 steps to enter / 1 HR. Pt reports being independent with most ADL's and functional mobility using a SC and occasionally a RW, however does require supervision/min assist with stairs negotiation from family.

## 2020-03-13 NOTE — PHYSICAL THERAPY INITIAL EVALUATION ADULT - MANUAL MUSCLE TESTING RESULTS, REHAB EVAL
grossly >3+/5 t/o b/l UE's/LE's based on functional mobility, R UE NT secondary to pt reporting old shoulder dislocation

## 2020-03-13 NOTE — PROGRESS NOTE ADULT - PROBLEM SELECTOR PLAN 10
cont. Lexapro
DISCHARGE PLANNING:  Medical readiness goals: pending wound cultures and abx sensitivity; PT eval     Anticipated d/c: 48-72 hours     PCP:  Dr. Neal   Pharmacy: [facility] [address] [place] [address] ([phone])
DISCHARGE PLANNING:  Medical readiness goals: pending wound cultures and abx sensitivity; PT eval     Anticipated d/c: 48-72 hours     PCP:  Dr. Neal   Pharmacy: [facility] [address] [place] [address] ([phone])

## 2020-03-13 NOTE — PROGRESS NOTE ADULT - PROBLEM SELECTOR PLAN 9
F: tolerating PO, no IVF  E: replete to K>4, Mg>2  N: Dash/TLC, CC    VTE Prophylaxis: Eliquis 5 mg BID    C: Full Code  D: RMF

## 2020-03-13 NOTE — PROGRESS NOTE ADULT - ASSESSMENT
this 60 yo lady appears more comfortable than yesterday, now comfortable sitting, sociable and engaged, eating with enthusiasm, and without acute complaints.   vol status, vs, and gen exam are acceptable.  feet dressed and wrapped.   labs show rft's well within her baseline range.   appears to be making good progress    will continue current rx.

## 2020-03-13 NOTE — PROGRESS NOTE ADULT - PROBLEM SELECTOR PLAN 5
Patient with known CKD, stage 4. Cr 2.28 (baseline 2.5), improved from prior admission due to medication changes.  - no intervention at this time  - continue to monitor Cr/BUN  - avoid nephrotoxic agents    #GERD  Patient with history of GERD, on omeprazole 20 mg qd  - c/w therapeutic exchange, pantoprazole 40 mg qd

## 2020-03-13 NOTE — PROGRESS NOTE ADULT - PROBLEM SELECTOR PROBLEM 8
CHF (congestive heart failure)
Cellulitis
History of CVA (cerebrovascular accident)
Chronic pain
Chronic pain

## 2020-03-13 NOTE — PROGRESS NOTE ADULT - ASSESSMENT
A/P 60 yo female, current smoker, with a known Contrast and ASA allergy and an extensive PMHx including HTN, IDDM c/b neuropathy, Bipolar disorder, depression, hx of DVT (with multiple prior DVTs, on Coumadin), CVA x2, PAD with prior bypass, NICM, chronic systolic CHF (EF 25% by Echo 1/2019, with frequent CHF admissions, cardiomems in place), s/p AICD for primary prevention, hypothyroidism , COPD, DHARMESH, multiple myeloma, stage 4CKD presenting with serous blister of left foot     - C/w IV Abx    - Bone scan negative for osteomyelitis   - F/u Wound culture   - WBAT  - Podiatry will follow

## 2020-03-13 NOTE — PROGRESS NOTE ADULT - SUBJECTIVE AND OBJECTIVE BOX
CC: DIABETIC FOOT ULCER; PAD      INTERVAL HISTORY:   58 yo lady with multiple serious comorbidities as listed below, plus mult myeloma and crf,  adm with nonhealing fooot infections and leg cellulitis, now improving with antibiotics and wound mgt as outlined.   feels fairly well now.  no resp distress or toxicity.  eating with gusto and appears comfortable.        ROS: No chest pain. No shortness of breath. No nausea.    PAST MEDICAL & SURGICAL HISTORY:  Neuropathy  PAD (peripheral artery disease)  Bipolar depression  Depression  HTN (hypertension)  DVT (deep venous thrombosis)  CVA (cerebral vascular accident)  DM (diabetes mellitus)  Acute on chronic systolic heart failure  Congestive heart failure, unspecified HF chronicity, unspecified heart failure type  Chronic pain  HLD (hyperlipidemia)  CVA (cerebral vascular accident)  Depression  COPD (chronic obstructive pulmonary disease)  Diabetes  CHF (congestive heart failure)  H/O extremity bypass graft  History of cholecystectomy  H/O tubal ligation  H/O abdominal hysterectomy  AICD (automatic cardioverter/defibrillator) present      PHYSICAL EXAM:  T(C): 36.6 (03-13-20 @ 16:08), Max: 36.6 (03-13-20 @ 16:08)  HR: 68 (03-13-20 @ 16:08)  BP: 127/71 (03-13-20 @ 16:08) (122/65 - 131/75)  RR: 16 (03-13-20 @ 16:08)  SpO2: 95% (03-13-20 @ 16:08)  Wt(kg): --  I&O's Summary    Weight 90.7 (03-11 @ 02:38)    Appearance: alert, pleasant, INAD.  ENT: oral mucosa moist, no pallor/cyanosis.  Neck: no JVD visible.  Cardiac: no rubs. no murmurs. Extremities: chronic skin changes and trace edema.  feet dressed.   Skin: no rashes.  Extremities (digits): no clubbing or cyanosis.  Respratory effort: no access muscle use. Lungs: CLEAR TO PAL's.  Abdomen: soft. nontender. no masses.  Psych affect: not depressed. Orientation: person, place, situation.     MEDICATIONS  (STANDING):  acetaminophen   Tablet .. 975 milliGRAM(s) Oral every 8 hours  apixaban 5 milliGRAM(s) Oral two times a day  atorvastatin 80 milliGRAM(s) Oral at bedtime  dextrose 5%. 1000 milliLiter(s) (50 mL/Hr) IV Continuous <Continuous>  dextrose 50% Injectable 12.5 Gram(s) IV Push once  dextrose 50% Injectable 25 Gram(s) IV Push once  dextrose 50% Injectable 25 Gram(s) IV Push once  escitalopram 20 milliGRAM(s) Oral daily  insulin glargine Injectable (LANTUS) 40 Unit(s) SubCutaneous at bedtime  insulin lispro (HumaLOG) corrective regimen sliding scale   SubCutaneous every 6 hours  insulin lispro Injectable (HumaLOG) 12 Unit(s) SubCutaneous three times a day with meals  levothyroxine 25 MICROGram(s) Oral daily  methadone    Tablet 10 milliGRAM(s) Oral every 8 hours  metoprolol succinate  milliGRAM(s) Oral daily  pantoprazole    Tablet 40 milliGRAM(s) Oral before breakfast  piperacillin/tazobactam IVPB.. 3.375 Gram(s) IV Intermittent every 6 hours  sucralfate 1 Gram(s) Oral four times a day  torsemide 20 milliGRAM(s) Oral daily    MEDICATIONS  (PRN):  dextrose 40% Gel 15 Gram(s) Oral once PRN Blood Glucose LESS THAN 70 milliGRAM(s)/deciliter  glucagon  Injectable 1 milliGRAM(s) IntraMuscular once PRN Glucose LESS THAN 70 milligrams/deciliter  HYDROmorphone   Tablet 1 milliGRAM(s) Oral every 4 hours PRN Severe Pain (7 - 10)  HYDROmorphone  Injectable 0.25 milliGRAM(s) IV Push every 4 hours PRN Breakthrough pain      DATA:  141    |  102    |  38<H>  ----------------------------<  85     Ca:8.8   (13 Mar 2020 08:36)  4.4     |  26     |  2.23<H>      eGFR if Non : 24 <L>  eGFR if : 27 <L>    TPro  8.0    /  Alb  3.3    /  TBili  0.4    /  DBili  x      /  AST  18     /  ALT  14     /  AlkPhos  107    12 Mar 2020 13:08                        10.1<L>  7.86  )-----------( 164      ( 13 Mar 2020 08:36 )             31.9<L>          Protein/Creatinine Ratio Calculation: 0.6 Ratio <H> (02-13 @ 13:32)

## 2020-03-13 NOTE — PROGRESS NOTE ADULT - PROBLEM SELECTOR PLAN 8
Patient with history of chronic pain due to multiple chronic conditions (sciatic, peripheral vascular disease) follows with pain medicine physician outpatient.   Methadone prescribed by Ashu Mosley: [phone].  - pain management following, appreciate recs  - continue Methadone 10 mg q8h  - continue Dilaudid 1 mg PO q4h PRN severe pain  - continue Dilaudid 0.25 mg IVP q4h PRN breakthrough pain  - continue Tylenol 975 mg PO q8h standing  - discontinued gabapentin in the setting of acute toxic-metabolic encephalopathy (3/12)

## 2020-03-13 NOTE — PROGRESS NOTE ADULT - PROBLEM SELECTOR PROBLEM 6
DM (diabetes mellitus)
Suspected deep vein thrombosis (DVT)
Hypothyroidism, unspecified type
COPD (chronic obstructive pulmonary disease)
COPD (chronic obstructive pulmonary disease)

## 2020-03-13 NOTE — PHYSICAL THERAPY INITIAL EVALUATION ADULT - IMPAIRMENTS FOUND, PT EVAL
sensory integrity/aerobic capacity/endurance/gait, locomotion, and balance/decreased midline orientation/muscle strength/poor safety awareness/posture

## 2020-03-13 NOTE — PROGRESS NOTE ADULT - ASSESSMENT
61F with extensive medical history and multiple hospitalizations HFrEF (EF 25%, AICD in place), HTN, IDDM c/b neuropathy (takes Methadone) presenting with b/l lower extremity cellulitis (L>R) after failing outpatient antibiotics, bone scan negative for osteomyelitis though showing soft tissue infection, admitted for further treatment with antibiotics and pain control.

## 2020-03-13 NOTE — PROGRESS NOTE ADULT - SUBJECTIVE AND OBJECTIVE BOX
SUBJECTIVE: Pt AVSS, VSS, KASEY overnight. Denies chest pain/dyspnea, reports bilateral foot pain, equivocal to yesterday with associated numbness which she reports she has had for the past few months. Denies weakness.    apixaban 5 milliGRAM(s) Oral two times a day  metoprolol succinate  milliGRAM(s) Oral daily  piperacillin/tazobactam IVPB.. 3.375 Gram(s) IV Intermittent every 6 hours  torsemide 20 milliGRAM(s) Oral daily      Vital Signs Last 24 Hrs  T(C): 36.5 (13 Mar 2020 05:24), Max: 36.5 (13 Mar 2020 05:24)  T(F): 97.7 (13 Mar 2020 05:24), Max: 97.7 (13 Mar 2020 05:24)  HR: 67 (13 Mar 2020 06:14) (64 - 83)  BP: 122/65 (13 Mar 2020 05:24) (122/65 - 128/69)  BP(mean): --  RR: 16 (13 Mar 2020 06:14) (14 - 18)  SpO2: 93% (13 Mar 2020 06:14) (91% - 100%)    Physical Exam:  General: NAD, resting comfortably  HEENT: NC/AT, EOMI, normal hearing  Pulmonary: normal resp effort  Cardiovascular: NSR  Abdominal: soft, NT/ND, no organomegaly  Extremities: WWP, normal strength, no clubbing/cyanosis. Bilateral legs 1+ non-pitting edema. Ruptured blister on medial aspect of left first toe. Non-ruptured blister on right first toe.  Neuro: A/O x 3, CNs II-XII grossly intact, normal sensation, no focal deficits  Pulses:   RLE Triphasic femoral, biphasic pop, biphasic DP/PT. R EZRA 1.0  LLE: 2+Fem, triphasic pop, biphasic  DP/PT. L EZRA 1.1        LABS:                        10.1   7.86  )-----------( 164      ( 13 Mar 2020 08:36 )             31.9     03-13    141  |  102  |  38<H>  ----------------------------<  85  4.4   |  26  |  2.23<H>    Ca    8.8      13 Mar 2020 08:36  Mg     2.2     03-13    TPro  8.0  /  Alb  3.3  /  TBili  0.4  /  DBili  x   /  AST  18  /  ALT  14  /  AlkPhos  107  03-12

## 2020-03-13 NOTE — PROGRESS NOTE ADULT - PROBLEM SELECTOR PROBLEM 9
HTN (hypertension)
Neuropathy
Multiple myeloma, remission status unspecified
Nutrition, metabolism, and development symptoms
Nutrition, metabolism, and development symptoms

## 2020-03-13 NOTE — PHYSICAL THERAPY INITIAL EVALUATION ADULT - GENERAL OBSERVATIONS, REHAB EVAL
PT found semi supine in bed in NAD, +hep lock, +b/l foot gauze/dressings C/D/I, agreeable to PT Eval and cleared by DANISH Sheffield.

## 2020-03-13 NOTE — PROGRESS NOTE ADULT - PROBLEM SELECTOR PLAN 2
Patient with history of DVTs, lower extremity doppler demonstrating no DVTs but thrombus left saphenofemoral junction, with concern for possible propagation.   - continue Eliquis 5 mg BID (appropriate for current CrCl)  - outpatient lower extremity doppler for follow up

## 2020-03-14 LAB
ANION GAP SERPL CALC-SCNC: 11 MMOL/L — SIGNIFICANT CHANGE UP (ref 5–17)
BUN SERPL-MCNC: 32 MG/DL — HIGH (ref 7–23)
CALCIUM SERPL-MCNC: 8.7 MG/DL — SIGNIFICANT CHANGE UP (ref 8.4–10.5)
CHLORIDE SERPL-SCNC: 99 MMOL/L — SIGNIFICANT CHANGE UP (ref 96–108)
CO2 SERPL-SCNC: 30 MMOL/L — SIGNIFICANT CHANGE UP (ref 22–31)
CREAT SERPL-MCNC: 1.96 MG/DL — HIGH (ref 0.5–1.3)
GLUCOSE BLDC GLUCOMTR-MCNC: 105 MG/DL — HIGH (ref 70–99)
GLUCOSE BLDC GLUCOMTR-MCNC: 115 MG/DL — HIGH (ref 70–99)
GLUCOSE BLDC GLUCOMTR-MCNC: 62 MG/DL — LOW (ref 70–99)
GLUCOSE BLDC GLUCOMTR-MCNC: 69 MG/DL — LOW (ref 70–99)
GLUCOSE BLDC GLUCOMTR-MCNC: 76 MG/DL — SIGNIFICANT CHANGE UP (ref 70–99)
GLUCOSE BLDC GLUCOMTR-MCNC: 92 MG/DL — SIGNIFICANT CHANGE UP (ref 70–99)
GLUCOSE BLDC GLUCOMTR-MCNC: 98 MG/DL — SIGNIFICANT CHANGE UP (ref 70–99)
GLUCOSE SERPL-MCNC: 108 MG/DL — HIGH (ref 70–99)
HCT VFR BLD CALC: 35.2 % — SIGNIFICANT CHANGE UP (ref 34.5–45)
HGB BLD-MCNC: 10.6 G/DL — LOW (ref 11.5–15.5)
MAGNESIUM SERPL-MCNC: 2.4 MG/DL — SIGNIFICANT CHANGE UP (ref 1.6–2.6)
MCHC RBC-ENTMCNC: 29.8 PG — SIGNIFICANT CHANGE UP (ref 27–34)
MCHC RBC-ENTMCNC: 30.1 GM/DL — LOW (ref 32–36)
MCV RBC AUTO: 98.9 FL — SIGNIFICANT CHANGE UP (ref 80–100)
NRBC # BLD: 0 /100 WBCS — SIGNIFICANT CHANGE UP (ref 0–0)
PLATELET # BLD AUTO: 198 K/UL — SIGNIFICANT CHANGE UP (ref 150–400)
POTASSIUM SERPL-MCNC: 4.4 MMOL/L — SIGNIFICANT CHANGE UP (ref 3.5–5.3)
POTASSIUM SERPL-SCNC: 4.4 MMOL/L — SIGNIFICANT CHANGE UP (ref 3.5–5.3)
RBC # BLD: 3.56 M/UL — LOW (ref 3.8–5.2)
RBC # FLD: 14.4 % — SIGNIFICANT CHANGE UP (ref 10.3–14.5)
SODIUM SERPL-SCNC: 140 MMOL/L — SIGNIFICANT CHANGE UP (ref 135–145)
VANCOMYCIN FLD-MCNC: 26.1 UG/ML
WBC # BLD: 7.44 K/UL — SIGNIFICANT CHANGE UP (ref 3.8–10.5)
WBC # FLD AUTO: 7.44 K/UL — SIGNIFICANT CHANGE UP (ref 3.8–10.5)

## 2020-03-14 PROCEDURE — 99232 SBSQ HOSP IP/OBS MODERATE 35: CPT | Mod: GC

## 2020-03-14 PROCEDURE — 93925 LOWER EXTREMITY STUDY: CPT | Mod: 26

## 2020-03-14 RX ORDER — INSULIN LISPRO 100/ML
VIAL (ML) SUBCUTANEOUS
Refills: 0 | Status: DISCONTINUED | OUTPATIENT
Start: 2020-03-14 | End: 2020-03-15

## 2020-03-14 RX ORDER — INSULIN LISPRO 100/ML
7 VIAL (ML) SUBCUTANEOUS
Refills: 0 | Status: DISCONTINUED | OUTPATIENT
Start: 2020-03-14 | End: 2020-03-15

## 2020-03-14 RX ADMIN — Medication 975 MILLIGRAM(S): at 13:44

## 2020-03-14 RX ADMIN — Medication 1 GRAM(S): at 11:12

## 2020-03-14 RX ADMIN — ESCITALOPRAM OXALATE 20 MILLIGRAM(S): 10 TABLET, FILM COATED ORAL at 11:12

## 2020-03-14 RX ADMIN — Medication 1 GRAM(S): at 06:54

## 2020-03-14 RX ADMIN — Medication 975 MILLIGRAM(S): at 22:19

## 2020-03-14 RX ADMIN — Medication 1 GRAM(S): at 23:46

## 2020-03-14 RX ADMIN — APIXABAN 5 MILLIGRAM(S): 2.5 TABLET, FILM COATED ORAL at 06:54

## 2020-03-14 RX ADMIN — ATORVASTATIN CALCIUM 80 MILLIGRAM(S): 80 TABLET, FILM COATED ORAL at 22:19

## 2020-03-14 RX ADMIN — METHADONE HYDROCHLORIDE 10 MILLIGRAM(S): 40 TABLET ORAL at 18:16

## 2020-03-14 RX ADMIN — PIPERACILLIN AND TAZOBACTAM 200 GRAM(S): 4; .5 INJECTION, POWDER, LYOPHILIZED, FOR SOLUTION INTRAVENOUS at 13:44

## 2020-03-14 RX ADMIN — Medication 200 MILLIGRAM(S): at 06:54

## 2020-03-14 RX ADMIN — Medication 975 MILLIGRAM(S): at 06:54

## 2020-03-14 RX ADMIN — Medication 7 UNIT(S): at 18:16

## 2020-03-14 RX ADMIN — PIPERACILLIN AND TAZOBACTAM 200 GRAM(S): 4; .5 INJECTION, POWDER, LYOPHILIZED, FOR SOLUTION INTRAVENOUS at 18:16

## 2020-03-14 RX ADMIN — APIXABAN 5 MILLIGRAM(S): 2.5 TABLET, FILM COATED ORAL at 18:14

## 2020-03-14 RX ADMIN — PANTOPRAZOLE SODIUM 40 MILLIGRAM(S): 20 TABLET, DELAYED RELEASE ORAL at 06:53

## 2020-03-14 RX ADMIN — Medication 25 MICROGRAM(S): at 06:54

## 2020-03-14 RX ADMIN — Medication 1 GRAM(S): at 18:14

## 2020-03-14 RX ADMIN — PIPERACILLIN AND TAZOBACTAM 200 GRAM(S): 4; .5 INJECTION, POWDER, LYOPHILIZED, FOR SOLUTION INTRAVENOUS at 06:53

## 2020-03-14 RX ADMIN — Medication 20 MILLIGRAM(S): at 06:54

## 2020-03-14 RX ADMIN — Medication 975 MILLIGRAM(S): at 14:45

## 2020-03-14 RX ADMIN — METHADONE HYDROCHLORIDE 10 MILLIGRAM(S): 40 TABLET ORAL at 11:11

## 2020-03-14 RX ADMIN — Medication 10 UNIT(S): at 09:08

## 2020-03-14 RX ADMIN — PIPERACILLIN AND TAZOBACTAM 200 GRAM(S): 4; .5 INJECTION, POWDER, LYOPHILIZED, FOR SOLUTION INTRAVENOUS at 23:46

## 2020-03-14 NOTE — PROGRESS NOTE ADULT - ASSESSMENT
58 yo F with CHF, PAD, CVA, DM,  chronic pain on methadone , presenting with left foot infection    #Diabetic foot infection  -Bone scan negative  -Blood culture negative  -On vanc/zosyn- vanc level elevated, will hold dose today and repeat tomorrow   -bilateral lower ext arterial scan: 70% stenosis in superficial femoral artery of RLE. Patent femoral bypass graft on the left; vascular following  -will follow up with podiatry regarding follow up 60 yo F with CHF, PAD, CVA, DM,  chronic pain on methadone , presenting with left foot infection    #Diabetic foot infection  -Bone scan negative  -Blood culture negative  -wound culture neg  -On vanc/zosyn- vanc level elevated, will hold dose today and repeat tomorrow   -bilateral lower ext arterial scan: 70% stenosis in superficial femoral artery of RLE. Patent femoral bypass graft on the left; vascular following  -will follow up with podiatry regarding follow up and discharge on po abx 58 yo F with CHF, PAD, CVA, DM,  chronic pain on methadone , presenting with left foot infection    #Diabetic foot infection  -Bone scan negative  -Blood culture negative  -wound culture neg  -On vanc/zosyn- vanc level elevated, will hold dose today and repeat tomorrow   -bilateral lower ext arterial scan: 70% stenosis in superficial femoral artery of RLE. Patent femoral bypass graft on the left; vascular following, will discuss findings with vascular   -will follow up with podiatry regarding follow up and discharge on po abx

## 2020-03-14 NOTE — PROGRESS NOTE ADULT - SUBJECTIVE AND OBJECTIVE BOX
INTERVAL HPI/OVERNIGHT EVENTS:    Patient is a 59y old  Female who presents with a chief complaint of cellulitis failed outpatient therapy (13 Mar 2020 20:43)      Pt reports the following symptoms:    CONSTITUTIONAL:  Negative fever or chills, feels well, good appetite  EYES:  Negative  blurry vision or double vision  CARDIOVASCULAR:  Negative for chest pain or palpitations  RESPIRATORY:  Negative for cough, wheezing, or SOB   GASTROINTESTINAL:  Negative for nausea, vomiting, diarrhea, constipation, or abdominal pain  GENITOURINARY:  Negative frequency, urgency or dysuria  NEUROLOGIC:  No headache, confusion, dizziness, lightheadedness    MEDICATIONS  (STANDING):  acetaminophen   Tablet .. 975 milliGRAM(s) Oral every 8 hours  apixaban 5 milliGRAM(s) Oral two times a day  atorvastatin 80 milliGRAM(s) Oral at bedtime  dextrose 5%. 1000 milliLiter(s) (50 mL/Hr) IV Continuous <Continuous>  dextrose 50% Injectable 12.5 Gram(s) IV Push once  dextrose 50% Injectable 25 Gram(s) IV Push once  dextrose 50% Injectable 25 Gram(s) IV Push once  escitalopram 20 milliGRAM(s) Oral daily  insulin glargine Injectable (LANTUS) 35 Unit(s) SubCutaneous at bedtime  insulin lispro (HumaLOG) corrective regimen sliding scale   SubCutaneous every 6 hours  insulin lispro Injectable (HumaLOG) 10 Unit(s) SubCutaneous three times a day before meals  levothyroxine 25 MICROGram(s) Oral daily  methadone    Tablet 10 milliGRAM(s) Oral every 8 hours  metoprolol succinate  milliGRAM(s) Oral daily  pantoprazole    Tablet 40 milliGRAM(s) Oral before breakfast  piperacillin/tazobactam IVPB.. 3.375 Gram(s) IV Intermittent every 6 hours  sucralfate 1 Gram(s) Oral four times a day  torsemide 20 milliGRAM(s) Oral daily    MEDICATIONS  (PRN):  dextrose 40% Gel 15 Gram(s) Oral once PRN Blood Glucose LESS THAN 70 milliGRAM(s)/deciliter  glucagon  Injectable 1 milliGRAM(s) IntraMuscular once PRN Glucose LESS THAN 70 milligrams/deciliter  HYDROmorphone   Tablet 1 milliGRAM(s) Oral every 4 hours PRN Severe Pain (7 - 10)  HYDROmorphone  Injectable 0.25 milliGRAM(s) IV Push every 4 hours PRN Breakthrough pain      PHYSICAL EXAM  Vital Signs Last 24 Hrs  T(C): 36.7 (14 Mar 2020 09:21), Max: 36.7 (14 Mar 2020 09:21)  T(F): 98 (14 Mar 2020 09:21), Max: 98 (14 Mar 2020 09:21)  HR: 72 (14 Mar 2020 09:21) (66 - 72)  BP: 137/73 (14 Mar 2020 09:21) (123/64 - 137/73)  BP(mean): --  RR: 20 (14 Mar 2020 09:21) (16 - 20)  SpO2: 94% (14 Mar 2020 09:21) (94% - 99%)    Constitutional: wn/wd in NAD.   HEENT: NCAT, MMM, OP clear, EOMI, no proptosis or lid retraction  Neck: no thyromegaly or palpable thyroid nodules   Respiratory: lungs CTAB.  Cardiovascular: regular rhythm, normal S1 and S2, no audible murmurs, no peripheral edema  GI: soft, NT/ND, no masses/HSM appreciated.  Neurology: no tremors, DTR 2+  Skin: no visible rashes/lesions  Psychiatric: AAO x 3, normal affect/mood.    LABS:                        10.6   7.44  )-----------( 198      ( 14 Mar 2020 05:55 )             35.2     03-14    140  |  99  |  32<H>  ----------------------------<  108<H>  4.4   |  30  |  1.96<H>    Ca    8.7      14 Mar 2020 05:55  Mg     2.4     03-14    TPro  8.0  /  Alb  3.3  /  TBili  0.4  /  DBili  x   /  AST  18  /  ALT  14  /  AlkPhos  107  03-12        Thyroid Stimulating Hormone, Serum: 1.630 uIU/mL (03-12 @ 05:48)  Thyroid Stimulating Hormone, Serum: 8.491 uIU/mL (02-12 @ 12:45)      HbA1C: 9.2 % (03-11 @ 04:46)  10.3 % (02-13 @ 06:59)  13.1 % (05-31 @ 11:25)    CAPILLARY BLOOD GLUCOSE      POCT Blood Glucose.: 98 mg/dL (14 Mar 2020 08:49)  POCT Blood Glucose.: 115 mg/dL (14 Mar 2020 05:43)  POCT Blood Glucose.: 95 mg/dL (13 Mar 2020 22:29)  POCT Blood Glucose.: 73 mg/dL (13 Mar 2020 21:20)  POCT Blood Glucose.: 54 mg/dL (13 Mar 2020 20:49)  POCT Blood Glucose.: 130 mg/dL (13 Mar 2020 16:50)  POCT Blood Glucose.: 141 mg/dL (13 Mar 2020 11:59)      Insulin Sliding Scale requirements X 24 Hours:    RADIOLOGY & ADDITIONAL TESTS:    A/P: 59y Female with history of DM type II presenting for       1.  DM -     Please continue           units lantus at bedtime  / in the morning and        units lispro with meals and lispro moderate / low dose sliding scale 4 times daily with meals and at bedtime.  Please continue consistent carbohydrate diet.      Goal FSG is   Will continue to monitor   For discharge, pt can continue    Pt can follow up at discharge with [facility] by calling  to make an appointment.   Will discuss case with     and update primary team

## 2020-03-14 NOTE — PROGRESS NOTE ADULT - ASSESSMENT
A/P 60 yo female, current smoker, with a known Contrast and ASA allergy and an extensive PMHx including HTN, IDDM c/b neuropathy, Bipolar disorder, depression, hx of DVT (with multiple prior DVTs, on Coumadin), CVA x2, PAD with prior bypass, NICM, chronic systolic CHF (EF 25% by Echo 1/2019, with frequent CHF admissions, cardiomems in place), s/p AICD for primary prevention, hypothyroidism , COPD, DHARMESH, multiple myeloma, stage 4CKD presenting with blister of left foot     -Abx per primary team  - Bone scan negative for osteomyelitis   -Cultures with no growth  -DSD  -Erythema of foot has improved  -Patient will need daily dry sterile dressing applications of both feet with 4x4 gauze and buffy  -No additional podiatric intervention needed  - WBAT  -Patient should follow up with Dr. Braden Baird within 1 week of discharge for wound care.    Office information:          [place] Address- [address], [place] [address] Phone: [phone]         [place] Address- [address], [place] [address] Phone: [phone]

## 2020-03-14 NOTE — PROGRESS NOTE ADULT - SUBJECTIVE AND OBJECTIVE BOX
Patient is a 59y old  Female who presents with a chief complaint of cellulitis failed outpatient therapy (14 Mar 2020 11:02)      INTERVAL HPI/ OVERNIGHT EVENTS  Patient resting comfortably    LABS                        10.6   7.44  )-----------( 198      ( 14 Mar 2020 05:55 )             35.2     03-14    140  |  99  |  32<H>  ----------------------------<  108<H>  4.4   |  30  |  1.96<H>    Ca    8.7      14 Mar 2020 05:55  Mg     2.4     03-14          ICU Vital Signs Last 24 Hrs  T(C): 36.7 (14 Mar 2020 09:21), Max: 36.7 (14 Mar 2020 09:21)  T(F): 98 (14 Mar 2020 09:21), Max: 98 (14 Mar 2020 09:21)  HR: 74 (14 Mar 2020 12:59) (66 - 74)  BP: 137/73 (14 Mar 2020 09:21) (123/64 - 137/73)  BP(mean): --  ABP: --  ABP(mean): --  RR: 20 (14 Mar 2020 09:21) (16 - 20)  SpO2: 98% (14 Mar 2020 12:59) (94% - 99%)      RADIOLOGY  < from: NM 3-Phase Bone Scan (03.12.20 @ 17:21) >  EXAM:  NM BONE IMG 3 PHASE                          PROCEDURE DATE:  03/12/2020          INTERPRETATION:  THREE PHASE BONE SCAN OF THE FEET    Indication: Soft tissue infection along the medial aspect of the left great toe. This study is being performed to rule out osteomyelitis.    Procedure:  Dynamic imaging was performed in the plantar projection during the blood flow and blood pool phases after the intravenous injection of 20 mCi of technetium 99m labeled MDP.    Approximately 2 hours later, images of the feet were again obtained in the plantar projection.    Findings: There is increased blood flow on the medial side of both feet. On the right side, increased flow and blood pool activity are seen in the first digit involving the distal phalanx and possibly the proximal phalanx of the great toe. Delayed imaging in this area shows no focal abnormalities.    On the left side, increased blood flow is seen along the entire medial aspect of the foot with blood pool imaging showing persistence of activity along the more proximal portion of the great toe, the forefoot and the midfoot. Delayed imaging shows slightly increased articular activity, but again no focal activity is seen to suspect osteomyelitis.    Impression: Increased blood flow and blood pool activity on the medial aspect of both feet suggesting soft tissue infection or inflammation. Delayed imaging shows no focal abnormalities to suggest osteomyelitis.        MICROBIOLOGY  Culture - Surgical Swab (03.11.20 @ 09:52)    Gram Stain:   No organisms seen  Few WBC's    Specimen Source: .Surgical Swab left bulla medial forefoot    Culture Results:   No growth to date.      PHYSICAL EXAM  Lower Extremity Focused:  Vasc: Palpable DP/PT pulses. Erythema resolving on left.  Derm: Nonpitting edema of right foot and leg. Stable, non infected drained blister located at the medial aspect of 1st MTPJ left foot which extends proximally along the medial longitudinal arch, Now a Aguirre grade 1 since the blister has broken.Erythema of dorsum left foot improved. On the right foot, Stable, non infected Aguirre grade 1 stable wound at medial aspect 1st MTPJ (previous blister in this region).   Neuro: Diminished sensation B/L  MSK: Pain on palpation at site of blister on medial aspect left foot

## 2020-03-14 NOTE — PROGRESS NOTE ADULT - SUBJECTIVE AND OBJECTIVE BOX
Pt seen and examined at bedside. Pt is walking around, denies current foot pain     Allergies    aspirin (Other (Moderate); Rash)  IV Contrast (Unknown)    Intolerances      MEDICATIONS:  MEDICATIONS  (STANDING):  acetaminophen   Tablet .. 975 milliGRAM(s) Oral every 8 hours  apixaban 5 milliGRAM(s) Oral two times a day  atorvastatin 80 milliGRAM(s) Oral at bedtime  dextrose 5%. 1000 milliLiter(s) (50 mL/Hr) IV Continuous <Continuous>  dextrose 50% Injectable 12.5 Gram(s) IV Push once  dextrose 50% Injectable 25 Gram(s) IV Push once  dextrose 50% Injectable 25 Gram(s) IV Push once  escitalopram 20 milliGRAM(s) Oral daily  insulin glargine Injectable (LANTUS) 35 Unit(s) SubCutaneous at bedtime  insulin lispro (HumaLOG) corrective regimen sliding scale   SubCutaneous every 6 hours  insulin lispro Injectable (HumaLOG) 10 Unit(s) SubCutaneous three times a day before meals  levothyroxine 25 MICROGram(s) Oral daily  methadone    Tablet 10 milliGRAM(s) Oral every 8 hours  metoprolol succinate  milliGRAM(s) Oral daily  pantoprazole    Tablet 40 milliGRAM(s) Oral before breakfast  piperacillin/tazobactam IVPB.. 3.375 Gram(s) IV Intermittent every 6 hours  sucralfate 1 Gram(s) Oral four times a day  torsemide 20 milliGRAM(s) Oral daily    MEDICATIONS  (PRN):  dextrose 40% Gel 15 Gram(s) Oral once PRN Blood Glucose LESS THAN 70 milliGRAM(s)/deciliter  glucagon  Injectable 1 milliGRAM(s) IntraMuscular once PRN Glucose LESS THAN 70 milligrams/deciliter  HYDROmorphone   Tablet 1 milliGRAM(s) Oral every 4 hours PRN Severe Pain (7 - 10)    Vital Signs Last 24 Hrs  T(C): 36.7 (14 Mar 2020 15:38), Max: 36.7 (14 Mar 2020 09:21)  T(F): 98.1 (14 Mar 2020 15:38), Max: 98.1 (14 Mar 2020 15:38)  HR: 70 (14 Mar 2020 15:38) (66 - 74)  BP: 126/69 (14 Mar 2020 15:38) (123/64 - 137/73)  BP(mean): --  RR: 20 (14 Mar 2020 15:38) (16 - 20)  SpO2: 97% (14 Mar 2020 15:38) (94% - 99%)    PHYSICAL EXAM:    General: Well developed; well nourished; in no acute distress  HEENT: MMM, conjunctiva and sclera clear  Neck: Supple  CV: RRR. Normal S1/S2  Respiratory: CTAB. No respiratory distress. No intercostal retractions  Gastrointestinal: Soft non-tender non-distended. Normal bowel sounds. No hepatosplenomegaly. No rebound or guarding  Extremities: bilateral erythema extending from foot to knee, edema worse on left leg, mildly tender to palpation, skin breakdown on left foot  Skin: Warm and dry.   Neuro: A&O x 3.  Psych: Normal affect and mood    LABS:                        10.6   7.44  )-----------( 198      ( 14 Mar 2020 05:55 )             35.2     03-14    140  |  99  |  32<H>  ----------------------------<  108<H>  4.4   |  30  |  1.96<H>    Ca    8.7      14 Mar 2020 05:55  Mg     2.4     03-14                        RADIOLOGY & ADDITIONAL STUDIES:

## 2020-03-14 NOTE — CHART NOTE - NSCHARTNOTEFT_GEN_A_CORE
Patient unable to be seen physically since she was gone for her USG. FSG and insulin reviewed. As per the nurse, the patient was eating good.  FSG & Insulin received:  Yesterday:  pre-dinner fs, 10 nutritional lispro   units, repeat FSG at 800 PM was 54   bedtime fs, 35 Lantus   units  Today:  pre-breakfast fs, 10 nutritional lispro   units    1.  DM type 2 - uncontrolled with hyperglycemia  - Hba1c 9.2  CKD Stage III to IV  Cr 2.47 and GFR 21  - Wt 90 kg with BMI 33  - carb consistent diet  Please continue lantus   35 units at night.   Please decrease to lispro  7    units before each meal.    Please change to lispro low dose sliding scale four times daily with meals and at bedtime    2. Hypothyroidism  - TSH 1.632, FT4 0.83, abs neg.   - Continue levothyroxine 25mcg once daily on an empty stomach     Pt's fingerstick glucose goal is 120 to 150    Will continue to monitor     For discharge, she has an appt. With Britt Johns RN on 3/26 and Dr Lieberman .     Plan discussed with  and updated primary team

## 2020-03-15 ENCOUNTER — TRANSCRIPTION ENCOUNTER (OUTPATIENT)
Age: 60
End: 2020-03-15

## 2020-03-15 VITALS — RESPIRATION RATE: 17 BRPM | HEART RATE: 71 BPM | OXYGEN SATURATION: 100 %

## 2020-03-15 LAB
ANION GAP SERPL CALC-SCNC: 8 MMOL/L — SIGNIFICANT CHANGE UP (ref 5–17)
BUN SERPL-MCNC: 25 MG/DL — HIGH (ref 7–23)
CALCIUM SERPL-MCNC: 9 MG/DL — SIGNIFICANT CHANGE UP (ref 8.4–10.5)
CHLORIDE SERPL-SCNC: 100 MMOL/L — SIGNIFICANT CHANGE UP (ref 96–108)
CO2 SERPL-SCNC: 32 MMOL/L — HIGH (ref 22–31)
CREAT SERPL-MCNC: 1.94 MG/DL — HIGH (ref 0.5–1.3)
GLUCOSE BLDC GLUCOMTR-MCNC: 106 MG/DL — HIGH (ref 70–99)
GLUCOSE BLDC GLUCOMTR-MCNC: 112 MG/DL — HIGH (ref 70–99)
GLUCOSE BLDC GLUCOMTR-MCNC: 180 MG/DL — HIGH (ref 70–99)
GLUCOSE BLDC GLUCOMTR-MCNC: 68 MG/DL — LOW (ref 70–99)
GLUCOSE SERPL-MCNC: 62 MG/DL — LOW (ref 70–99)
HCT VFR BLD CALC: 33.6 % — LOW (ref 34.5–45)
HGB BLD-MCNC: 10.2 G/DL — LOW (ref 11.5–15.5)
MAGNESIUM SERPL-MCNC: 2.4 MG/DL — SIGNIFICANT CHANGE UP (ref 1.6–2.6)
MCHC RBC-ENTMCNC: 29.7 PG — SIGNIFICANT CHANGE UP (ref 27–34)
MCHC RBC-ENTMCNC: 30.4 GM/DL — LOW (ref 32–36)
MCV RBC AUTO: 98 FL — SIGNIFICANT CHANGE UP (ref 80–100)
NRBC # BLD: 0 /100 WBCS — SIGNIFICANT CHANGE UP (ref 0–0)
PHOSPHATE SERPL-MCNC: 3.9 MG/DL — SIGNIFICANT CHANGE UP (ref 2.5–4.5)
PLATELET # BLD AUTO: 183 K/UL — SIGNIFICANT CHANGE UP (ref 150–400)
POTASSIUM SERPL-MCNC: 5 MMOL/L — SIGNIFICANT CHANGE UP (ref 3.5–5.3)
POTASSIUM SERPL-SCNC: 5 MMOL/L — SIGNIFICANT CHANGE UP (ref 3.5–5.3)
RBC # BLD: 3.43 M/UL — LOW (ref 3.8–5.2)
RBC # FLD: 14.6 % — HIGH (ref 10.3–14.5)
SODIUM SERPL-SCNC: 140 MMOL/L — SIGNIFICANT CHANGE UP (ref 135–145)
VANCOMYCIN FLD-MCNC: 15.3 UG/ML — SIGNIFICANT CHANGE UP
WBC # BLD: 9.23 K/UL — SIGNIFICANT CHANGE UP (ref 3.8–10.5)
WBC # FLD AUTO: 9.23 K/UL — SIGNIFICANT CHANGE UP (ref 3.8–10.5)

## 2020-03-15 PROCEDURE — 70450 CT HEAD/BRAIN W/O DYE: CPT

## 2020-03-15 PROCEDURE — 80202 ASSAY OF VANCOMYCIN: CPT

## 2020-03-15 PROCEDURE — 87075 CULTR BACTERIA EXCEPT BLOOD: CPT

## 2020-03-15 PROCEDURE — 84439 ASSAY OF FREE THYROXINE: CPT

## 2020-03-15 PROCEDURE — 85652 RBC SED RATE AUTOMATED: CPT

## 2020-03-15 PROCEDURE — 85027 COMPLETE CBC AUTOMATED: CPT

## 2020-03-15 PROCEDURE — 82962 GLUCOSE BLOOD TEST: CPT

## 2020-03-15 PROCEDURE — 83036 HEMOGLOBIN GLYCOSYLATED A1C: CPT

## 2020-03-15 PROCEDURE — 97161 PT EVAL LOW COMPLEX 20 MIN: CPT

## 2020-03-15 PROCEDURE — 82803 BLOOD GASES ANY COMBINATION: CPT

## 2020-03-15 PROCEDURE — 83880 ASSAY OF NATRIURETIC PEPTIDE: CPT

## 2020-03-15 PROCEDURE — 36415 COLL VENOUS BLD VENIPUNCTURE: CPT

## 2020-03-15 PROCEDURE — 99285 EMERGENCY DEPT VISIT HI MDM: CPT | Mod: 25

## 2020-03-15 PROCEDURE — 93971 EXTREMITY STUDY: CPT

## 2020-03-15 PROCEDURE — 80048 BASIC METABOLIC PNL TOTAL CA: CPT

## 2020-03-15 PROCEDURE — 84443 ASSAY THYROID STIM HORMONE: CPT

## 2020-03-15 PROCEDURE — 71046 X-RAY EXAM CHEST 2 VIEWS: CPT

## 2020-03-15 PROCEDURE — 78315 BONE IMAGING 3 PHASE: CPT

## 2020-03-15 PROCEDURE — 94660 CPAP INITIATION&MGMT: CPT

## 2020-03-15 PROCEDURE — A9503: CPT

## 2020-03-15 PROCEDURE — 85730 THROMBOPLASTIN TIME PARTIAL: CPT

## 2020-03-15 PROCEDURE — 87186 SC STD MICRODIL/AGAR DIL: CPT

## 2020-03-15 PROCEDURE — 85025 COMPLETE CBC W/AUTO DIFF WBC: CPT

## 2020-03-15 PROCEDURE — 86800 THYROGLOBULIN ANTIBODY: CPT

## 2020-03-15 PROCEDURE — 82550 ASSAY OF CK (CPK): CPT

## 2020-03-15 PROCEDURE — 83605 ASSAY OF LACTIC ACID: CPT

## 2020-03-15 PROCEDURE — 83735 ASSAY OF MAGNESIUM: CPT

## 2020-03-15 PROCEDURE — 87070 CULTURE OTHR SPECIMN AEROBIC: CPT

## 2020-03-15 PROCEDURE — 83690 ASSAY OF LIPASE: CPT

## 2020-03-15 PROCEDURE — 93925 LOWER EXTREMITY STUDY: CPT

## 2020-03-15 PROCEDURE — 84100 ASSAY OF PHOSPHORUS: CPT

## 2020-03-15 PROCEDURE — 87040 BLOOD CULTURE FOR BACTERIA: CPT

## 2020-03-15 PROCEDURE — 96374 THER/PROPH/DIAG INJ IV PUSH: CPT

## 2020-03-15 PROCEDURE — 73630 X-RAY EXAM OF FOOT: CPT

## 2020-03-15 PROCEDURE — 85610 PROTHROMBIN TIME: CPT

## 2020-03-15 PROCEDURE — 80053 COMPREHEN METABOLIC PANEL: CPT

## 2020-03-15 PROCEDURE — 96375 TX/PRO/DX INJ NEW DRUG ADDON: CPT

## 2020-03-15 PROCEDURE — 86140 C-REACTIVE PROTEIN: CPT

## 2020-03-15 PROCEDURE — 99239 HOSP IP/OBS DSCHRG MGMT >30: CPT

## 2020-03-15 RX ORDER — METHADONE HYDROCHLORIDE 40 MG/1
1 TABLET ORAL
Qty: 0 | Refills: 0 | DISCHARGE
Start: 2020-03-15

## 2020-03-15 RX ORDER — AZTREONAM 2 G
1 VIAL (EA) INJECTION
Qty: 20 | Refills: 0
Start: 2020-03-15 | End: 2020-03-24

## 2020-03-15 RX ORDER — GABAPENTIN 400 MG/1
2 CAPSULE ORAL
Qty: 0 | Refills: 0 | DISCHARGE

## 2020-03-15 RX ORDER — METHADONE HYDROCHLORIDE 40 MG/1
1 TABLET ORAL
Qty: 0 | Refills: 0 | DISCHARGE

## 2020-03-15 RX ADMIN — INSULIN GLARGINE 35 UNIT(S): 100 INJECTION, SOLUTION SUBCUTANEOUS at 00:59

## 2020-03-15 RX ADMIN — PIPERACILLIN AND TAZOBACTAM 200 GRAM(S): 4; .5 INJECTION, POWDER, LYOPHILIZED, FOR SOLUTION INTRAVENOUS at 05:41

## 2020-03-15 RX ADMIN — ESCITALOPRAM OXALATE 20 MILLIGRAM(S): 10 TABLET, FILM COATED ORAL at 11:11

## 2020-03-15 RX ADMIN — Medication 7 UNIT(S): at 13:18

## 2020-03-15 RX ADMIN — Medication 25 MICROGRAM(S): at 05:44

## 2020-03-15 RX ADMIN — Medication 975 MILLIGRAM(S): at 05:44

## 2020-03-15 RX ADMIN — Medication 1: at 12:12

## 2020-03-15 RX ADMIN — APIXABAN 5 MILLIGRAM(S): 2.5 TABLET, FILM COATED ORAL at 05:42

## 2020-03-15 RX ADMIN — METHADONE HYDROCHLORIDE 10 MILLIGRAM(S): 40 TABLET ORAL at 01:50

## 2020-03-15 RX ADMIN — PANTOPRAZOLE SODIUM 40 MILLIGRAM(S): 20 TABLET, DELAYED RELEASE ORAL at 05:42

## 2020-03-15 RX ADMIN — PIPERACILLIN AND TAZOBACTAM 200 GRAM(S): 4; .5 INJECTION, POWDER, LYOPHILIZED, FOR SOLUTION INTRAVENOUS at 11:12

## 2020-03-15 RX ADMIN — Medication 20 MILLIGRAM(S): at 05:42

## 2020-03-15 RX ADMIN — Medication 1 GRAM(S): at 05:44

## 2020-03-15 RX ADMIN — METHADONE HYDROCHLORIDE 10 MILLIGRAM(S): 40 TABLET ORAL at 11:11

## 2020-03-15 RX ADMIN — Medication 1 GRAM(S): at 11:12

## 2020-03-15 RX ADMIN — Medication 200 MILLIGRAM(S): at 05:44

## 2020-03-15 NOTE — PROGRESS NOTE ADULT - SUBJECTIVE AND OBJECTIVE BOX
CC: DIABETIC FOOT ULCER; PAD      INTERVAL HISTORY: feels okay  complaining of low sugar levels and hot flashes      ROS: No chest pain, no sob, no abd pain. No n/v/d    PAST MEDICAL & SURGICAL HISTORY:  Neuropathy  PAD (peripheral artery disease)  Bipolar depression  Depression  HTN (hypertension)  DVT (deep venous thrombosis)  CVA (cerebral vascular accident)  DM (diabetes mellitus)  Acute on chronic systolic heart failure  Congestive heart failure, unspecified HF chronicity, unspecified heart failure type  Chronic pain  HLD (hyperlipidemia)  CVA (cerebral vascular accident)  Depression  COPD (chronic obstructive pulmonary disease)  Diabetes  CHF (congestive heart failure)  H/O extremity bypass graft  History of cholecystectomy  H/O tubal ligation  H/O abdominal hysterectomy  AICD (automatic cardioverter/defibrillator) present      PHYSICAL EXAM:  T(C): 36.8 (03-15-20 @ 09:25), Max: 36.8 (03-14-20 @ 21:43)  HR: 71 (03-15-20 @ 09:50)  BP: 136/76 (03-15-20 @ 09:25) (126/69 - 152/77)  RR: 17 (03-15-20 @ 09:50)  SpO2: 100% (03-15-20 @ 09:50)  Wt(kg): --  I&O's Summary    Weight 90.7 (03-11 @ 02:38)  General: AAO x 3,  NAD.  HEENT: moist mucous membranes, no pallor/cyanosis.  Neck: no JVD visible.  Cardiac: S1, S2. RRR. No murmurs   Respratory: CTA b/l, no access muscle use.   Abdomen: soft. nontender. nondistended  Skin: no rashes.  Extremities: trace LE edema b/l  Access:       DATA:                        10.2<L>  9.23  )-----------( 183      ( 15 Mar 2020 06:32 )             33.6<L>        140    |  100    |  25<H>  ----------------------------<  62<L>  Ca:9.0   (15 Mar 2020 06:32)  5.0     |  32<H>  |  1.94<H>      eGFR if Non : 28 <L>  eGFR if : 32 <L>    TPro  8.0    /  Alb  3.3    /  TBili  0.4    /  DBili  x      /  AST  18     /  ALT  14     /  AlkPhos  107    12 Mar 2020 13:08                    MEDICATIONS  (STANDING):  acetaminophen   Tablet .. 975 milliGRAM(s) Oral every 8 hours  apixaban 5 milliGRAM(s) Oral two times a day  atorvastatin 80 milliGRAM(s) Oral at bedtime  dextrose 5%. 1000 milliLiter(s) (50 mL/Hr) IV Continuous <Continuous>  dextrose 50% Injectable 12.5 Gram(s) IV Push once  dextrose 50% Injectable 25 Gram(s) IV Push once  dextrose 50% Injectable 25 Gram(s) IV Push once  escitalopram 20 milliGRAM(s) Oral daily  insulin glargine Injectable (LANTUS) 35 Unit(s) SubCutaneous at bedtime  insulin lispro (HumaLOG) corrective regimen sliding scale   SubCutaneous Before meals and at bedtime  insulin lispro Injectable (HumaLOG) 7 Unit(s) SubCutaneous three times a day before meals  levothyroxine 25 MICROGram(s) Oral daily  methadone    Tablet 10 milliGRAM(s) Oral every 8 hours  metoprolol succinate  milliGRAM(s) Oral daily  pantoprazole    Tablet 40 milliGRAM(s) Oral before breakfast  piperacillin/tazobactam IVPB.. 3.375 Gram(s) IV Intermittent every 6 hours  sucralfate 1 Gram(s) Oral four times a day  torsemide 20 milliGRAM(s) Oral daily    MEDICATIONS  (PRN):  dextrose 40% Gel 15 Gram(s) Oral once PRN Blood Glucose LESS THAN 70 milliGRAM(s)/deciliter  glucagon  Injectable 1 milliGRAM(s) IntraMuscular once PRN Glucose LESS THAN 70 milligrams/deciliter  HYDROmorphone   Tablet 1 milliGRAM(s) Oral every 4 hours PRN Severe Pain (7 - 10)

## 2020-03-15 NOTE — DISCHARGE NOTE NURSING/CASE MANAGEMENT/SOCIAL WORK - NSDCPEPTSTRK_GEN_ALL_CORE
Call 911 for stroke/Stroke support groups for patients, families, and friends/Prescribed medications/Risk factors for stroke/Need for follow up after discharge/Stroke education booklet/Stroke warning signs and symptoms/Signs and symptoms of stroke

## 2020-03-15 NOTE — PROGRESS NOTE ADULT - PROBLEM SELECTOR PROBLEM 2
CHF (congestive heart failure)
Chronic pain
CKD (chronic kidney disease) stage 4, GFR 15-29 ml/min
Other chronic pain
DVT (deep venous thrombosis)
DVT (deep venous thrombosis)

## 2020-03-15 NOTE — PROGRESS NOTE ADULT - SUBJECTIVE AND OBJECTIVE BOX
INTERVAL HPI/OVERNIGHT EVENTS:    Patient is a 59y old  Female who presents with a chief complaint of cellulitis failed outpatient therapy (15 Mar 2020 11:05)      Pt reports the following symptoms:    CONSTITUTIONAL:  Negative fever or chills, feels well, good appetite  EYES:  Negative  blurry vision or double vision  CARDIOVASCULAR:  Negative for chest pain or palpitations  RESPIRATORY:  Negative for cough, wheezing, or SOB   GASTROINTESTINAL:  Negative for nausea, vomiting, diarrhea, constipation, or abdominal pain  GENITOURINARY:  Negative frequency, urgency or dysuria  NEUROLOGIC:  No headache, confusion, dizziness, lightheadedness    MEDICATIONS  (STANDING):  acetaminophen   Tablet .. 975 milliGRAM(s) Oral every 8 hours  apixaban 5 milliGRAM(s) Oral two times a day  atorvastatin 80 milliGRAM(s) Oral at bedtime  dextrose 5%. 1000 milliLiter(s) (50 mL/Hr) IV Continuous <Continuous>  dextrose 50% Injectable 12.5 Gram(s) IV Push once  dextrose 50% Injectable 25 Gram(s) IV Push once  dextrose 50% Injectable 25 Gram(s) IV Push once  escitalopram 20 milliGRAM(s) Oral daily  insulin glargine Injectable (LANTUS) 35 Unit(s) SubCutaneous at bedtime  insulin lispro (HumaLOG) corrective regimen sliding scale   SubCutaneous Before meals and at bedtime  insulin lispro Injectable (HumaLOG) 7 Unit(s) SubCutaneous three times a day before meals  levothyroxine 25 MICROGram(s) Oral daily  methadone    Tablet 10 milliGRAM(s) Oral every 8 hours  metoprolol succinate  milliGRAM(s) Oral daily  pantoprazole    Tablet 40 milliGRAM(s) Oral before breakfast  sucralfate 1 Gram(s) Oral four times a day  torsemide 20 milliGRAM(s) Oral daily    MEDICATIONS  (PRN):  dextrose 40% Gel 15 Gram(s) Oral once PRN Blood Glucose LESS THAN 70 milliGRAM(s)/deciliter  glucagon  Injectable 1 milliGRAM(s) IntraMuscular once PRN Glucose LESS THAN 70 milligrams/deciliter  HYDROmorphone   Tablet 1 milliGRAM(s) Oral every 4 hours PRN Severe Pain (7 - 10)      PHYSICAL EXAM  Vital Signs Last 24 Hrs  T(C): 36.8 (15 Mar 2020 09:25), Max: 36.8 (14 Mar 2020 21:43)  T(F): 98.3 (15 Mar 2020 09:25), Max: 98.3 (14 Mar 2020 21:43)  HR: 71 (15 Mar 2020 09:50) (60 - 74)  BP: 136/76 (15 Mar 2020 09:25) (126/69 - 152/77)  BP(mean): 96 (15 Mar 2020 09:25) (96 - 96)  RR: 17 (15 Mar 2020 09:50) (17 - 24)  SpO2: 100% (15 Mar 2020 09:50) (91% - 100%)    Constitutional: wn/wd in NAD.   HEENT: NCAT, MMM, OP clear, EOMI, no proptosis or lid retraction  Neck: no thyromegaly or palpable thyroid nodules   Respiratory: lungs CTAB.  Cardiovascular: regular rhythm, normal S1 and S2, no audible murmurs, no peripheral edema  GI: soft, NT/ND, no masses/HSM appreciated.  Neurology: no tremors, DTR 2+  Skin: no visible rashes/lesions  Psychiatric: AAO x 3, normal affect/mood.    LABS:                        10.2   9.23  )-----------( 183      ( 15 Mar 2020 06:32 )             33.6     03-15    140  |  100  |  25<H>  ----------------------------<  62<L>  5.0   |  32<H>  |  1.94<H>    Ca    9.0      15 Mar 2020 06:32  Phos  3.9     03-15  Mg     2.4     03-15          Thyroid Stimulating Hormone, Serum: 1.630 uIU/mL (03-12 @ 05:48)  Thyroid Stimulating Hormone, Serum: 8.491 uIU/mL (02-12 @ 12:45)      HbA1C: 9.2 % (03-11 @ 04:46)  10.3 % (02-13 @ 06:59)  13.1 % (05-31 @ 11:25)    CAPILLARY BLOOD GLUCOSE      POCT Blood Glucose.: 106 mg/dL (15 Mar 2020 08:40)  POCT Blood Glucose.: 68 mg/dL (15 Mar 2020 07:38)  POCT Blood Glucose.: 112 mg/dL (15 Mar 2020 00:42)  POCT Blood Glucose.: 92 mg/dL (14 Mar 2020 23:33)  POCT Blood Glucose.: 69 mg/dL (14 Mar 2020 22:17)  POCT Blood Glucose.: 105 mg/dL (14 Mar 2020 16:48)  POCT Blood Glucose.: 76 mg/dL (14 Mar 2020 14:09)  POCT Blood Glucose.: 62 mg/dL (14 Mar 2020 13:36)      Insulin Sliding Scale requirements X 24 Hours:    RADIOLOGY & ADDITIONAL TESTS:    A/P: 59y Female with history of DM type II presenting for       1.  DM -     Please continue           units lantus at bedtime  / in the morning and        units lispro with meals and lispro moderate / low dose sliding scale 4 times daily with meals and at bedtime.  Please continue consistent carbohydrate diet.      Goal FSG is   Will continue to monitor   For discharge, pt can continue    Pt can follow up at discharge with [facility] by calling  to make an appointment.   Will discuss case with     and update primary team INTERVAL HPI/OVERNIGHT EVENTS:    Patient seen and examined at the bedside. She is being planned for discharge today.  FSG & Insulin received:  Yesterday:  pre-dinner fs, 7 nutritional lispro   units  bedtime fs, 35 lantus   units  Today:  pre-breakfast fs,, 106, 1  units lispro SS  pre-lunch fs,       Pt reports the following symptoms:  CONSTITUTIONAL:  Negative fever or chills  CARDIOVASCULAR:  Negative for chest pain or palpitations  RESPIRATORY:  Negative for cough, wheezing, or SOB   GASTROINTESTINAL:  Negative for nausea, vomiting, diarrhea, constipation, or abdominal pain  NEUROLOGIC:  No headache, confusion, dizziness, lightheadedness    MEDICATIONS  (STANDING):  acetaminophen   Tablet .. 975 milliGRAM(s) Oral every 8 hours  apixaban 5 milliGRAM(s) Oral two times a day  atorvastatin 80 milliGRAM(s) Oral at bedtime  dextrose 5%. 1000 milliLiter(s) (50 mL/Hr) IV Continuous <Continuous>  dextrose 50% Injectable 12.5 Gram(s) IV Push once  dextrose 50% Injectable 25 Gram(s) IV Push once  dextrose 50% Injectable 25 Gram(s) IV Push once  escitalopram 20 milliGRAM(s) Oral daily  insulin glargine Injectable (LANTUS) 35 Unit(s) SubCutaneous at bedtime  insulin lispro (HumaLOG) corrective regimen sliding scale   SubCutaneous Before meals and at bedtime  insulin lispro Injectable (HumaLOG) 7 Unit(s) SubCutaneous three times a day before meals  levothyroxine 25 MICROGram(s) Oral daily  methadone    Tablet 10 milliGRAM(s) Oral every 8 hours  metoprolol succinate  milliGRAM(s) Oral daily  pantoprazole    Tablet 40 milliGRAM(s) Oral before breakfast  sucralfate 1 Gram(s) Oral four times a day  torsemide 20 milliGRAM(s) Oral daily    MEDICATIONS  (PRN):  dextrose 40% Gel 15 Gram(s) Oral once PRN Blood Glucose LESS THAN 70 milliGRAM(s)/deciliter  glucagon  Injectable 1 milliGRAM(s) IntraMuscular once PRN Glucose LESS THAN 70 milligrams/deciliter  HYDROmorphone   Tablet 1 milliGRAM(s) Oral every 4 hours PRN Severe Pain (7 - 10)      PHYSICAL EXAM  Vital Signs Last 24 Hrs  T(C): 36.8 (15 Mar 2020 09:25), Max: 36.8 (14 Mar 2020 21:43)  T(F): 98.3 (15 Mar 2020 09:25), Max: 98.3 (14 Mar 2020 21:43)  HR: 71 (15 Mar 2020 09:50) (60 - 74)  BP: 136/76 (15 Mar 2020 09:25) (126/69 - 152/77)  BP(mean): 96 (15 Mar 2020 09:25) (96 - 96)  RR: 17 (15 Mar 2020 09:50) (17 - 24)  SpO2: 100% (15 Mar 2020 09:50) (91% - 100%)    Constitutional: wn/wd in NAD.   Respiratory: lungs CTAB.  Cardiovascular: regular rhythm, normal S1 and S2, no peripheral edema  GI: soft, NT/ND, no masses/HSM appreciated.  Neurology: no tremors, DTR 2+    LABS:                        10.2   9.23  )-----------( 183      ( 15 Mar 2020 06:32 )             33.6     03-15    140  |  100  |  25<H>  ----------------------------<  62<L>  5.0   |  32<H>  |  1.94<H>    Ca    9.0      15 Mar 2020 06:32  Phos  3.9     03-15  Mg     2.4     03-15          Thyroid Stimulating Hormone, Serum: 1.630 uIU/mL ( @ 05:48)  Thyroid Stimulating Hormone, Serum: 8.491 uIU/mL ( @ 12:45)      HbA1C: 9.2 % ( @ 04:46)  10.3 % ( @ 06:59)  13.1 % ( @ 11:25)    CAPILLARY BLOOD GLUCOSE      POCT Blood Glucose.: 106 mg/dL (15 Mar 2020 08:40)  POCT Blood Glucose.: 68 mg/dL (15 Mar 2020 07:38)  POCT Blood Glucose.: 112 mg/dL (15 Mar 2020 00:42)  POCT Blood Glucose.: 92 mg/dL (14 Mar 2020 23:33)  POCT Blood Glucose.: 69 mg/dL (14 Mar 2020 22:17)  POCT Blood Glucose.: 105 mg/dL (14 Mar 2020 16:48)  POCT Blood Glucose.: 76 mg/dL (14 Mar 2020 14:09)  POCT Blood Glucose.: 62 mg/dL (14 Mar 2020 13:36)      Insulin Sliding Scale requirements X 24 Hours:    RADIOLOGY & ADDITIONAL TESTS:    A/P: 62 y/o female with HFrEF (EF 25%, AICD in place), HTN, IDDM c/b neuropathy (takes Methadone), Bipolar disorder, depression, hx of DVT (with multiple prior DVTs), CVA x2 (last one  with residual right sided weakness), known PAD with prior bypass, NICM, hypothyroidism , COPD (on 2-3L NC @ home), DHARMESH on CPAP, multiple myeloma (on Dexamethasone), stage 4CKD came in with B/L lower extremity swelling - left LE cellulitis versus osteomyelitis.    1.  DM type 2 - uncontrolled with hyperglycemia  - Hba1c 9.2  CKD Stage III to IV  Cr 2.47 and GFR 21  - Wt 90 kg with BMI 33  - carb consistent diet  Patient can be discharged on lantus   28 units at night, and lispro  5   units before each meal.      2. Hypothyroidism  - TSH 1.632, FT4 0.83, abs neg.   - Continue levothyroxine 25mcg once daily on an empty stomach     Pt's fingerstick glucose goal is 120 to 150    Will continue to monitor     For discharge, she has an appt. With Britt Johns RN on 3/26 and Dr Lieberman .    Pt can follow up at discharge with [facility] by calling  to make an appointment.   discussed case with    and updated primary team

## 2020-03-15 NOTE — PROGRESS NOTE ADULT - REASON FOR ADMISSION
cellulitis failed outpatient therapy

## 2020-03-15 NOTE — PROGRESS NOTE ADULT - ATTENDING COMMENTS
Patient seen and examined at bedside. Agree with the history, physical exam, assessment, and plan as outlined above with the following addendum. Briefly patient is a 60 yo F with CHF, PAD, CVA, DM,  chronic pain on methadone , presenting with left foot infection    #Diabetic foot infection  -Bone scan negative  -Blood culture negative  -wound culture neg  -bilateral lower ext arterial scan: 70% stenosis in superficial femoral artery of RLE. Patent femoral bypass graft on the left; vascular following, no intervention needed at this time, can f/u as outpatient   -podiatry following, can dc on po antibiotics with close out patient follow up     #DM  - fingertsick 68 this am, follow up endocrinology recommendations regarding insulin regimen for discharge    #Chronic pain - stable  -on methadone    #H/o DVT  - on AC
Pt seen on rounds this afternoon.  Complains of pain mostly in the left foot.  Cellulitis in both lower legs appears to have resolved.  Has Kerlex dressings over both feet.  Vascular consult noted.  Awaiting formal arterial Dopplers to investigate graft LLE  Fingerstick decreased to 82 this morning, so will decrease the Lantus back to 35 units.  May need to decrease the premeal lispro, but will assess fingersticks after lunch and supper today
Dispo: pending cx data, arterial Dopplers, Podiatry recs
Pt. seen and examined by me earlier today; I have read Dr. Simmons's note, I agree w/ her findings and plan of care as documented; events noted; f/u Podiatry, Pain Mgmt, and Endocrine recs

## 2020-03-15 NOTE — PROGRESS NOTE ADULT - PROBLEM SELECTOR PROBLEM 1
Diabetic foot infection
Diabetic foot infection
HTN (hypertension)
Multiple myeloma
Cellulitis
Cellulitis

## 2020-03-15 NOTE — DISCHARGE NOTE NURSING/CASE MANAGEMENT/SOCIAL WORK - PATIENT PORTAL LINK FT
You can access the FollowMyHealth Patient Portal offered by [facility] by registering at the following website: [email or web address]. By joining [facility]’s FollowMyHealth portal, you will also be able to view your health information using other applications (apps) compatible with our system.

## 2020-03-22 DIAGNOSIS — L97.519 NON-PRESSURE CHRONIC ULCER OF OTHER PART OF RIGHT FOOT WITH UNSPECIFIED SEVERITY: ICD-10-CM

## 2020-03-22 DIAGNOSIS — C90.00 MULTIPLE MYELOMA NOT HAVING ACHIEVED REMISSION: ICD-10-CM

## 2020-03-22 DIAGNOSIS — F31.9 BIPOLAR DISORDER, UNSPECIFIED: ICD-10-CM

## 2020-03-22 DIAGNOSIS — Z91.041 RADIOGRAPHIC DYE ALLERGY STATUS: ICD-10-CM

## 2020-03-22 DIAGNOSIS — E11.22 TYPE 2 DIABETES MELLITUS WITH DIABETIC CHRONIC KIDNEY DISEASE: ICD-10-CM

## 2020-03-22 DIAGNOSIS — K21.9 GASTRO-ESOPHAGEAL REFLUX DISEASE WITHOUT ESOPHAGITIS: ICD-10-CM

## 2020-03-22 DIAGNOSIS — Z95.810 PRESENCE OF AUTOMATIC (IMPLANTABLE) CARDIAC DEFIBRILLATOR: ICD-10-CM

## 2020-03-22 DIAGNOSIS — Z79.4 LONG TERM (CURRENT) USE OF INSULIN: ICD-10-CM

## 2020-03-22 DIAGNOSIS — Z86.718 PERSONAL HISTORY OF OTHER VENOUS THROMBOSIS AND EMBOLISM: ICD-10-CM

## 2020-03-22 DIAGNOSIS — Z88.6 ALLERGY STATUS TO ANALGESIC AGENT: ICD-10-CM

## 2020-03-22 DIAGNOSIS — I69.351 HEMIPLEGIA AND HEMIPARESIS FOLLOWING CEREBRAL INFARCTION AFFECTING RIGHT DOMINANT SIDE: ICD-10-CM

## 2020-03-22 DIAGNOSIS — Z79.01 LONG TERM (CURRENT) USE OF ANTICOAGULANTS: ICD-10-CM

## 2020-03-22 DIAGNOSIS — E11.51 TYPE 2 DIABETES MELLITUS WITH DIABETIC PERIPHERAL ANGIOPATHY WITHOUT GANGRENE: ICD-10-CM

## 2020-03-22 DIAGNOSIS — E11.649 TYPE 2 DIABETES MELLITUS WITH HYPOGLYCEMIA WITHOUT COMA: ICD-10-CM

## 2020-03-22 DIAGNOSIS — Z79.51 LONG TERM (CURRENT) USE OF INHALED STEROIDS: ICD-10-CM

## 2020-03-22 DIAGNOSIS — Z95.820 PERIPHERAL VASCULAR ANGIOPLASTY STATUS WITH IMPLANTS AND GRAFTS: ICD-10-CM

## 2020-03-22 DIAGNOSIS — E11.40 TYPE 2 DIABETES MELLITUS WITH DIABETIC NEUROPATHY, UNSPECIFIED: ICD-10-CM

## 2020-03-22 DIAGNOSIS — G47.33 OBSTRUCTIVE SLEEP APNEA (ADULT) (PEDIATRIC): ICD-10-CM

## 2020-03-22 DIAGNOSIS — G89.29 OTHER CHRONIC PAIN: ICD-10-CM

## 2020-03-22 DIAGNOSIS — I13.0 HYPERTENSIVE HEART AND CHRONIC KIDNEY DISEASE WITH HEART FAILURE AND STAGE 1 THROUGH STAGE 4 CHRONIC KIDNEY DISEASE, OR UNSPECIFIED CHRONIC KIDNEY DISEASE: ICD-10-CM

## 2020-03-22 DIAGNOSIS — E03.9 HYPOTHYROIDISM, UNSPECIFIED: ICD-10-CM

## 2020-03-22 DIAGNOSIS — E11.621 TYPE 2 DIABETES MELLITUS WITH FOOT ULCER: ICD-10-CM

## 2020-03-22 DIAGNOSIS — N18.4 CHRONIC KIDNEY DISEASE, STAGE 4 (SEVERE): ICD-10-CM

## 2020-03-22 DIAGNOSIS — Z79.52 LONG TERM (CURRENT) USE OF SYSTEMIC STEROIDS: ICD-10-CM

## 2020-03-22 DIAGNOSIS — I42.8 OTHER CARDIOMYOPATHIES: ICD-10-CM

## 2020-03-22 DIAGNOSIS — E11.65 TYPE 2 DIABETES MELLITUS WITH HYPERGLYCEMIA: ICD-10-CM

## 2020-03-22 DIAGNOSIS — F17.210 NICOTINE DEPENDENCE, CIGARETTES, UNCOMPLICATED: ICD-10-CM

## 2020-03-22 DIAGNOSIS — L03.115 CELLULITIS OF RIGHT LOWER LIMB: ICD-10-CM

## 2020-03-22 DIAGNOSIS — I50.22 CHRONIC SYSTOLIC (CONGESTIVE) HEART FAILURE: ICD-10-CM

## 2020-03-22 DIAGNOSIS — M79.7 FIBROMYALGIA: ICD-10-CM

## 2020-03-22 DIAGNOSIS — L03.116 CELLULITIS OF LEFT LOWER LIMB: ICD-10-CM

## 2020-03-22 DIAGNOSIS — D64.9 ANEMIA, UNSPECIFIED: ICD-10-CM

## 2020-03-22 DIAGNOSIS — E78.5 HYPERLIPIDEMIA, UNSPECIFIED: ICD-10-CM

## 2020-03-22 DIAGNOSIS — J44.9 CHRONIC OBSTRUCTIVE PULMONARY DISEASE, UNSPECIFIED: ICD-10-CM

## 2020-03-22 DIAGNOSIS — L97.529 NON-PRESSURE CHRONIC ULCER OF OTHER PART OF LEFT FOOT WITH UNSPECIFIED SEVERITY: ICD-10-CM

## 2020-03-22 DIAGNOSIS — G92 TOXIC ENCEPHALOPATHY: ICD-10-CM

## 2020-03-26 ENCOUNTER — APPOINTMENT (OUTPATIENT)
Dept: NEPHROLOGY | Facility: CLINIC | Age: 60
End: 2020-03-26

## 2020-03-26 ENCOUNTER — APPOINTMENT (OUTPATIENT)
Dept: ENDOCRINOLOGY | Facility: CLINIC | Age: 60
End: 2020-03-26

## 2020-03-26 DIAGNOSIS — Z87.898 PERSONAL HISTORY OF OTHER SPECIFIED CONDITIONS: ICD-10-CM

## 2020-04-02 PROBLEM — I50.20 HEART FAILURE WITH REDUCED EJECTION FRACTION: Status: ACTIVE | Noted: 2017-07-07

## 2020-04-08 ENCOUNTER — APPOINTMENT (OUTPATIENT)
Dept: ENDOCRINOLOGY | Facility: CLINIC | Age: 60
End: 2020-04-08

## 2020-04-27 ENCOUNTER — RX RENEWAL (OUTPATIENT)
Age: 60
End: 2020-04-27

## 2020-05-14 NOTE — ED PROVIDER NOTE - PSH
AICD (automatic cardioverter/defibrillator) present    H/O abdominal hysterectomy    H/O tubal ligation    History of cholecystectomy 80

## 2020-05-18 ENCOUNTER — APPOINTMENT (OUTPATIENT)
Dept: HEART AND VASCULAR | Facility: CLINIC | Age: 60
End: 2020-05-18
Payer: MEDICARE

## 2020-05-18 PROCEDURE — 99214 OFFICE O/P EST MOD 30 MIN: CPT | Mod: 95

## 2020-05-18 NOTE — ASSESSMENT
[FreeTextEntry1] : 60yo F with multiple comorbidites and NICM (EF 25%) s/p AICD and s/p CARDIOMEMS hemodynamic monitoring device 3/8/19 at [facility] who was recently diagnosed with IGg Myeloma. She is doing well from a Cardiac standpoint. She is ACC/AHA Stage C, NYHA Class II-III, euvolemic on exam. I have recommended the following:\par \par 1. NICM- Stable and well compensated. Will restart Entresto at 49/51mg BID and have her monitor her blood pressure at home. Will also set up lab home draw for next week. Once back on full dose Entresto, will likely be able to decrease Torsemide given Natriuretic property of Entresto. Continue Toprol XL to 200mg daily. Once maximized, will repeat TTE in 1 month. \par \par 2. CAD- no anginal symptoms. On Lipitor, BB and ARB (in Entresto).\par \par 3. CKD Stage III- Last SCr 2.19 which is above baseline. Will continue to monitor.\par \par 4. Hx of multiple DVTs- On Coumadin. Now having routine INR checks with Marta Baxter NP.\par \par 6. IGG Myeloma- Recently diagnosed. Scheduled to see Dr. Ruiz this month. \par \par 7. Follow up in 2 weeks via Telehealth.  \par \par \par

## 2020-05-18 NOTE — PHYSICAL EXAM
[General Appearance - Well Developed] : well developed [Normal Appearance] : normal appearance [Well Groomed] : well groomed [General Appearance - Well Nourished] : well nourished [No Deformities] : no deformities [General Appearance - In No Acute Distress] : no acute distress [Normal Conjunctiva] : the conjunctiva exhibited no abnormalities [Normal Oral Mucosa] : normal oral mucosa [No Oral Pallor] : no oral pallor [No Oral Cyanosis] : no oral cyanosis [Edema] : no peripheral edema present [Abnormal Walk] : normal gait [Skin Color & Pigmentation] : normal skin color and pigmentation [] : no rash [Oriented To Time, Place, And Person] : oriented to person, place, and time [Affect] : the affect was normal [Mood] : the mood was normal [No Anxiety] : not feeling anxious [FreeTextEntry1] : unable to listen to lungs via telehealth

## 2020-05-18 NOTE — REASON FOR VISIT
[Follow-Up - Clinic] : a clinic follow-up of [Heart Failure] : congestive heart failure [FreeTextEntry1] : PATIENT INSTRUCTIONS:\par \par 1. Please RESTART the ENTRESTO at 49/51mg TWICE daily. Please check your blood pressure and keep a log.\par \par 2. Continue daily Cardiomems readings.\par \par 3. Will set up home lab draws for next week. \par \par 4. Follow up in 2 weeks. \par \par The visit was provided via telehealth using real-time 2-way audio visual technology. The patient, Tracy Mcknight , was located at home at the time of the visit. The provider, HEATHER ALBERT, was located at the medical office in [facility]. The patient, Tracy Mcknight , and provider participated in the telehealth encounter. Verbal consent given on 5/28/20 at 10am by Tracy Mcknight.\par

## 2020-05-18 NOTE — HISTORY OF PRESENT ILLNESS
[FreeTextEntry1] : 60 yo F with and an extensive PMHx, NICM (EF 25% on echo) s/p AICD for primary prevention, HTN, IDDM c/b neuropathy, bipolar disorder,depression, history of DVT on Coumadin (4 episodes in her lower extremities), CVA x2 (last one in 2013 with residual right sided weakness), PAD s/p bypass,hypothyroidism, current smoker with COPD (on 2-3L oxygen at home), DHARMESH on CPAP. She had a Cardiomems device placed 3/8/19 with Dr. Francis at [facility]. She was recently diagnosed with IGG multiple myeloma (congo red negative for amyloid) by Dr. Ruiz and was started on Solumedrol 1x week. Also of note, had +GGT, and elevated CRP and was recommended by GI (Dr. Monaco) to get an endoscopy which is pending for Cardiac clearance.\par \par She last seen in clinic 1/6/20, she reports feeling very well overall. She was hospitalized in February at [facility] for hypotension likely 2/2 dehydration. Her Entresto was held until hospital f/u with me and her diuretics were reduced. Due to Covid Pandemic, did not have f/u with me until now. Again hospitalized in March 2/2 cellulitis. \par \par She was not doing her Cardiomems readings as she thought because of the pandemic no one could follow it. She did readings last week PAD 25-27 which is elevated for her likely 2/2 not taking Entresto. She has no c/o RUPINDER or SOB. She continues to follow with Dr. Ruiz and is taking Decadron 1x a week and Minlaro for 3 weeks/off one week. Feels well on these medications. Her breathing is great and she can walk a full block without stopping. She can walk up one flight of stairs, slowly. Currently, she denies CP,SOB at rest, orthopnea, PND, LH/dizziness,palpitations and syncope. Her appetite is normal and bowel and bladder habits are unchanged. She has been limiting fluid and sodium intake and taking medications as directed. She is still smoking but is now down to 2 cigarettes. She does not use NSAIDS and has not had any ICD shocks. She has not been to the ED or admitted for HF since Cardiomems implant.\par \par \par \par

## 2020-05-27 LAB
ANION GAP SERPL CALC-SCNC: 10 MMOL/L
BUN SERPL-MCNC: 52 MG/DL
CALCIUM SERPL-MCNC: 9.9 MG/DL
CHLORIDE SERPL-SCNC: 94 MMOL/L
CO2 SERPL-SCNC: 34 MMOL/L
CREAT SERPL-MCNC: 2.36 MG/DL
GLUCOSE SERPL-MCNC: 204 MG/DL
MAGNESIUM SERPL-MCNC: 2.1 MG/DL
NT-PROBNP SERPL-MCNC: 3834 PG/ML
POTASSIUM SERPL-SCNC: 4.9 MMOL/L
SODIUM SERPL-SCNC: 139 MMOL/L

## 2020-06-05 ENCOUNTER — APPOINTMENT (OUTPATIENT)
Dept: HEART AND VASCULAR | Facility: CLINIC | Age: 60
End: 2020-06-05
Payer: MEDICARE

## 2020-06-05 PROCEDURE — 99443: CPT | Mod: 95

## 2020-06-08 LAB
ANION GAP SERPL CALC-SCNC: 17 MMOL/L
BUN SERPL-MCNC: 64 MG/DL
CALCIUM SERPL-MCNC: 9.3 MG/DL
CHLORIDE SERPL-SCNC: 93 MMOL/L
CO2 SERPL-SCNC: 28 MMOL/L
CREAT SERPL-MCNC: 2.94 MG/DL
GLUCOSE SERPL-MCNC: 280 MG/DL
MAGNESIUM SERPL-MCNC: 2.3 MG/DL
NT-PROBNP SERPL-MCNC: 5635 PG/ML
POTASSIUM SERPL-SCNC: 4.3 MMOL/L
SODIUM SERPL-SCNC: 138 MMOL/L

## 2020-06-08 NOTE — HISTORY OF PRESENT ILLNESS
[FreeTextEntry1] : 60 yo F with and an extensive PMHx, NICM (EF 25% on echo) s/p AICD for primary prevention, HTN, IDDM c/b neuropathy, bipolar disorder,depression, history of DVT on Coumadin (4 episodes in her lower extremities), CVA x2 (last one in 2013 with residual right sided weakness), PAD s/p bypass,hypothyroidism, current smoker with COPD (on 2-3L oxygen at home), DHARMESH on CPAP. She had a Cardiomems device placed 3/8/19 with Dr. Francis at [facility]. She was recently diagnosed with IGG multiple myeloma (congo red negative for amyloid) by Dr. Ruiz and was started on Solumedrol 1x week. Also of note, had +GGT, and elevated CRP and was recommended by GI (Dr. Monaco) to get an endoscopy which is pending for Cardiac clearance.  She continues to follow with Dr. Ruiz and is taking Decadron 1x a week and Ninlaro for 3 weeks/off one week.\par \par Since our last telehealth visit 5/28, she continues to feel well. She is now back on the max dose of Entresto and has taken her BP with SBPs >100. She has had labs done with an increasing SCr and proBNP. Her cardiomems readings have been quite inconsistent ranging from PAD of 18-31 in a matter of a few days. She denies any swelling in her legs or difficulty breathing or weight gain.  \par \par Currently, she denies CP,SOB at rest, orthopnea, PND, LH/dizziness,palpitations and syncope. Her appetite is normal and bowel and bladder habits are unchanged. She has been limiting fluid and sodium intake and taking medications as directed. She is still smoking but is now down to 2 cigarettes. She does not use NSAIDS and has not had any ICD shocks. \par \par \par

## 2020-06-08 NOTE — ASSESSMENT
[FreeTextEntry1] : 58yo F with multiple comorbidites and NICM (EF 25%) s/p AICD and s/p CARDIOMEMS hemodynamic monitoring device 3/8/19 at [facility] who was recently diagnosed with IGg Myeloma. She is doing well from a Cardiac standpoint. She is ACC/AHA Stage C, NYHA Class II-III, euvolemic on exam. I have recommended the following:\par \par 1. NICM- Stable and well compensated. Now back on max dose of Entresto.  Continue Toprol XL to 200mg daily. Her cardiomems readings have been elevated but this could possibly be due to her not doing the readings correctly. Reeducated on process. Her ProBNP also elevated but she does not appear to be volume overload. I do not want to increase her Torsemide given increase in SCr. Will f/u labs in 2 weeks as well as continue to monitor Cardiomems. She will need an ECHO in 1 month.   \par \par 2. CAD- no anginal symptoms. On Lipitor, BB and ARB (in Entresto).\par \par 3. RITCHIE on CKD Stage III- SCr increased likely in the setting of restarting Entresto. Will not make any changes but f/u labs in 2 weeks. Followed by Dr. Neal. \par \par 4. Hx of multiple DVTs- On Coumadin. Now having routine INR checks with Marta Baxter NP.\par \par 6. IGG Myeloma- Recently diagnosed. Scheduled to see Dr. Ruiz this month. \par \par 7. Follow up in 2 weeks via Telehealth.  \par \par \par

## 2020-06-08 NOTE — PHYSICAL EXAM
[General Appearance - Well Developed] : well developed [Normal Appearance] : normal appearance [Well Groomed] : well groomed [No Deformities] : no deformities [General Appearance - Well Nourished] : well nourished [General Appearance - In No Acute Distress] : no acute distress [Normal Conjunctiva] : the conjunctiva exhibited no abnormalities [Normal Oral Mucosa] : normal oral mucosa [No Oral Pallor] : no oral pallor [No Oral Cyanosis] : no oral cyanosis [Edema] : no peripheral edema present [Abnormal Walk] : normal gait [Skin Color & Pigmentation] : normal skin color and pigmentation [] : no rash [Oriented To Time, Place, And Person] : oriented to person, place, and time [Affect] : the affect was normal [Mood] : the mood was normal [No Anxiety] : not feeling anxious [FreeTextEntry1] : unable to listen to lungs via telehealth

## 2020-06-08 NOTE — REASON FOR VISIT
[Follow-Up - Clinic] : a clinic follow-up of [Heart Failure] : congestive heart failure [FreeTextEntry1] : The visit was provided via telehealth using real-time 2-way audio visual technology. The patient, Tracy Mcknight , was located at home at the time of the visit. The provider, HEATHER ALBERT, was located at the medical office in [facility]. The patient, Tracy Mcknight , and provider participated in the telehealth encounter. Verbal consent given on 6/5/20 at 11am by Tracy Mcknight.\par

## 2020-06-23 NOTE — PATIENT PROFILE ADULT. - TRANSFUSION REACTION, PREVIOUS, PROFILE
Routing refill request to provider for review/approval because:  Routing to provider to clarify dose.  Currently prescribed to take 2 tabs (25 mg) daily, but patient is stating she should be taking 1 tab (12.5 mg daily) .  Dose may have been adjusted in October 2019 but documentation is unclear and patient has been prescribed the 2 tabs daily dose since.    Gracy Gill RN  [facility]               no

## 2020-06-27 NOTE — DISCHARGE NOTE PROVIDER - NSDCQMERRANDS_GEN_ALL_CORE
Referral Reason:chf



MEASUREMENTS

--------

HEIGHT: 172.7 cm

WEIGHT: 104.3 kg

BP: 133/63

RVIDd:   3.6 cm     (< 3.3)

IVSd:   1.5 cm     (0.6 - 1.1)

LVIDd:   4.9 cm     (3.9 - 5.3)

LVPWd:   1.5 cm     (0.6 - 1.1)

IVSs:   2.0 cm

LVIDs:   4.0 cm

LVPWs:   1.9 cm

LA Diam:   3.9 cm     (2.7 - 3.8)

LAESV Index (A-L):   43.49 ml/m

Ao Diam:   3.2 cm     (2.0 - 3.7)

AV Cusp:   1.5 cm     (1.5 - 2.6)

MV EXCURSION:   11.800 mm     (> 18.000)

MV EF SLOPE:   26 mm/s     (70 - 150)

EPSS:   1.5 cm

AV maxP.26 mmHg

AV meanP.25 mmHg

RAP:   5.00 mmHg

RVSP:   32.51 mmHg







FINDINGS

--------

Sinus rhythm.

This was a technically adequate study.

The left ventricular size is normal.   There is moderate concentric left ventricular hypertrophy.   O
verall left ventricular systolic function is mildly impaired with, an EF between 45 - 50 %.   Basal i
nferior LV wall motion is hypokinetic.    Basal inferoseptal LV wall motion is hypokinetic.

The right ventricle is mildly enlarged.

LA is severely dilated >40 ml/m2

The right atrium is normal in size.

Interatrial and interventricular septum intact.

There is mild aortic valve sclerosis.   There is mild aortic stenosis present.   Peak/mean gradient a
cross the Aortic Valve is 28.26mmHg / 14.25mmHg.

The mitral valve leaflets are mildly thickened.   Mild mitral annular calcification present.   Mild m
itral regurgitation is present.

Mild tricuspid regurgitation present.   Right ventricular systolic pressure is normal at < 35 mmHg.

The pulmonic valve was not well visualized.

The aortic root size is normal.

Normal inferior vena cava with normal inspiratory collapse consistent with estimated right atrial pre
ssure of  5 mmHg.

There is no pericardial effusion.



CONCLUSIONS

--------

1. Sinus rhythm.

2. This was a technically adequate study.

3. The left ventricular size is normal.

4. There is moderate concentric left ventricular hypertrophy.

5. Overall left ventricular systolic function is mildly impaired with, an EF between 45 - 50 %.

6. Basal inferior LV wall motion is hypokinetic.

7. Basal inferoseptal LV wall motion is hypokinetic.

8. The right ventricle is mildly enlarged.

9. LA is severely dilated >40 ml/m2

10. The right atrium is normal in size.

11. Interatrial and interventricular septum intact.

12. There is mild aortic valve sclerosis.

13. There is mild aortic stenosis present.

14. Peak/mean gradient across the Aortic Valve is 28.26mmHg / 14.25mmHg.

15. The mitral valve leaflets are mildly thickened.

16. Mild mitral annular calcification present.

17. Mild mitral regurgitation is present.

18. Mild tricuspid regurgitation present.

19. Right ventricular systolic pressure is normal at < 35 mmHg.

20. The pulmonic valve was not well visualized.

21. The aortic root size is normal.

22. Normal inferior vena cava with normal inspiratory collapse consistent with estimated right atrial
 pressure of  5 mmHg.

23. There is no pericardial effusion.





SONOGRAPHER: Cayla Girard RDCS
No

## 2020-07-01 ENCOUNTER — APPOINTMENT (OUTPATIENT)
Dept: CARDIOLOGY | Facility: CLINIC | Age: 60
End: 2020-07-01
Payer: MEDICARE

## 2020-07-01 PROCEDURE — 99214 OFFICE O/P EST MOD 30 MIN: CPT

## 2020-07-01 PROCEDURE — 93290 INTERROG DEV EVAL ICPMS IP: CPT | Mod: 59

## 2020-07-01 PROCEDURE — 93282 PRGRMG EVAL IMPLANTABLE DFB: CPT | Mod: 59

## 2020-07-02 ENCOUNTER — APPOINTMENT (OUTPATIENT)
Dept: NEPHROLOGY | Facility: CLINIC | Age: 60
End: 2020-07-02

## 2020-07-21 ENCOUNTER — RX RENEWAL (OUTPATIENT)
Age: 60
End: 2020-07-21

## 2020-07-21 RX ORDER — PEN NEEDLE, DIABETIC 31 GX5/16"
31G X 8 MM NEEDLE, DISPOSABLE MISCELLANEOUS
Qty: 1 | Refills: 2 | Status: COMPLETED | COMMUNITY
Start: 2018-04-04 | End: 2020-07-21

## 2020-07-21 RX ORDER — ELECTROLYTES/DEXTROSE
31G X 8 MM SOLUTION, ORAL ORAL
Qty: 1 | Refills: 0 | Status: COMPLETED | COMMUNITY
Start: 2018-03-09 | End: 2020-07-21

## 2020-07-23 ENCOUNTER — APPOINTMENT (OUTPATIENT)
Dept: HEMATOLOGY ONCOLOGY | Facility: CLINIC | Age: 60
End: 2020-07-23
Payer: MEDICARE

## 2020-07-23 PROCEDURE — 99214 OFFICE O/P EST MOD 30 MIN: CPT | Mod: 95

## 2020-07-24 ENCOUNTER — APPOINTMENT (OUTPATIENT)
Dept: HEMATOLOGY ONCOLOGY | Facility: CLINIC | Age: 60
End: 2020-07-24

## 2020-07-27 ENCOUNTER — TRANSCRIPTION ENCOUNTER (OUTPATIENT)
Age: 60
End: 2020-07-27

## 2020-07-27 LAB
ALBUMIN SERPL ELPH-MCNC: 4.1 G/DL
ALP BLD-CCNC: 76 U/L
ALT SERPL-CCNC: 11 U/L
ANION GAP SERPL CALC-SCNC: 14 MMOL/L
AST SERPL-CCNC: 16 U/L
B2 MICROGLOB SERPL-MCNC: 13.3 MG/L
BASOPHILS # BLD AUTO: 0.01 K/UL
BASOPHILS NFR BLD AUTO: 0.2 %
BILIRUB SERPL-MCNC: 0.4 MG/DL
BUN SERPL-MCNC: 67 MG/DL
CALCIUM SERPL-MCNC: 9.1 MG/DL
CHLORIDE SERPL-SCNC: 94 MMOL/L
CO2 SERPL-SCNC: 31 MMOL/L
CREAT SERPL-MCNC: 2.39 MG/DL
EOSINOPHIL # BLD AUTO: 0.09 K/UL
EOSINOPHIL NFR BLD AUTO: 1.4 %
ERYTHROCYTE [SEDIMENTATION RATE] IN BLOOD BY WESTERGREN METHOD: 71 MM/HR
GLUCOSE SERPL-MCNC: 166 MG/DL
HCT VFR BLD CALC: 35.6 %
HGB BLD-MCNC: 10.7 G/DL
IMM GRANULOCYTES NFR BLD AUTO: 0.3 %
LYMPHOCYTES # BLD AUTO: 0.72 K/UL
LYMPHOCYTES NFR BLD AUTO: 10.9 %
MAN DIFF?: NORMAL
MCHC RBC-ENTMCNC: 26.8 PG
MCHC RBC-ENTMCNC: 30.1 GM/DL
MCV RBC AUTO: 89.2 FL
MONOCYTES # BLD AUTO: 0.66 K/UL
MONOCYTES NFR BLD AUTO: 10 %
NEUTROPHILS # BLD AUTO: 5.09 K/UL
NEUTROPHILS NFR BLD AUTO: 77.2 %
PLATELET # BLD AUTO: 122 K/UL
POTASSIUM SERPL-SCNC: 4.9 MMOL/L
PROT SERPL-MCNC: 7 G/DL
RBC # BLD: 3.99 M/UL
RBC # FLD: 16.7 %
SODIUM SERPL-SCNC: 140 MMOL/L
WBC # FLD AUTO: 6.59 K/UL

## 2020-07-27 NOTE — CONSULT LETTER
[Please see my note below.] : Please see my note below. [Consult Letter:] : I had the pleasure of evaluating your patient, [unfilled]. [Dear  ___] : Dear  [unfilled], [Consult Closing:] : Thank you very much for allowing me to participate in the care of this patient.  If you have any questions, please do not hesitate to contact me. [Sincerely,] : Sincerely, [FreeTextEntry3] : Payton Ruiz MD\par

## 2020-07-27 NOTE — ASSESSMENT
[FreeTextEntry1] : Recently diagnosed  myeloma.. Patient currently on weekly Decadron 20 mg and weekly Ninlaro 3 mg, which she states she has been taking...\par \par Blood work done recently IgG kappa level down!\par \par Of note beta-2 microglobulin is very elevated... Patient's anemia and renal failure is stable...\par \par Plan is to continue with weekly Decadron and Ninlaro and follow-up here in 2 months... All this was communicated to patient by phone.

## 2020-07-27 NOTE — HISTORY OF PRESENT ILLNESS
[Verbal consent obtained from patient] : the patient, [unfilled] [Home] : at home, [unfilled] , at the time of the visit. [de-identified] : 57 y/o with long standing DM, CRF and anemia was found to have a light chain paraprotein...discussed with pt, will arrange for CT guided BM aspirate and biopsy... [de-identified] : 8-6-2019 BM aspirate and Bx consistent with myeloma...this was discussed in detail with pt and all her questions were answered...\par \par January 29, 2020 patient returns for follow-up... She states she has been taking the weekly Decadron only... She did not return for her follow-up appointment because of family issues, her mother in law passed away, she might be evicted from her apartment,etc...\par \par 3-9-2020 Has been taking the Decadron weekly on Monday with Ninlaro x 3 weeks, patient has a full understanding of the proper way to take the medications...Has a leg infection for which she takes antibiotics\par \par 7/23/2020 patient requested a follow-up appointment, since she wanted to know if the Decadron and the Ninlaro regiment is working...

## 2020-08-03 ENCOUNTER — RX CHANGE (OUTPATIENT)
Age: 60
End: 2020-08-03

## 2020-08-03 ENCOUNTER — RX RENEWAL (OUTPATIENT)
Age: 60
End: 2020-08-03

## 2020-08-09 ENCOUNTER — INPATIENT (INPATIENT)
Facility: HOSPITAL | Age: 60
LOS: 2 days | Discharge: HOME | DRG: 291 | End: 2020-08-12
Attending: HOSPITALIST | Admitting: HOSPITALIST
Payer: MEDICARE

## 2020-08-09 VITALS
HEART RATE: 81 BPM | RESPIRATION RATE: 24 BRPM | SYSTOLIC BLOOD PRESSURE: 179 MMHG | HEIGHT: 65 IN | WEIGHT: 199.96 LBS | OXYGEN SATURATION: 93 % | TEMPERATURE: 99 F | DIASTOLIC BLOOD PRESSURE: 79 MMHG

## 2020-08-09 DIAGNOSIS — I50.23 ACUTE ON CHRONIC SYSTOLIC (CONGESTIVE) HEART FAILURE: ICD-10-CM

## 2020-08-09 DIAGNOSIS — Z95.828 PRESENCE OF OTHER VASCULAR IMPLANTS AND GRAFTS: Chronic | ICD-10-CM

## 2020-08-09 DIAGNOSIS — J44.9 CHRONIC OBSTRUCTIVE PULMONARY DISEASE, UNSPECIFIED: ICD-10-CM

## 2020-08-09 DIAGNOSIS — I82.409 ACUTE EMBOLISM AND THROMBOSIS OF UNSPECIFIED DEEP VEINS OF UNSPECIFIED LOWER EXTREMITY: ICD-10-CM

## 2020-08-09 DIAGNOSIS — F31.9 BIPOLAR DISORDER, UNSPECIFIED: ICD-10-CM

## 2020-08-09 DIAGNOSIS — N18.4 CHRONIC KIDNEY DISEASE, STAGE 4 (SEVERE): ICD-10-CM

## 2020-08-09 DIAGNOSIS — C90.00 MULTIPLE MYELOMA NOT HAVING ACHIEVED REMISSION: ICD-10-CM

## 2020-08-09 DIAGNOSIS — E78.5 HYPERLIPIDEMIA, UNSPECIFIED: ICD-10-CM

## 2020-08-09 DIAGNOSIS — Z90.710 ACQUIRED ABSENCE OF BOTH CERVIX AND UTERUS: Chronic | ICD-10-CM

## 2020-08-09 DIAGNOSIS — I10 ESSENTIAL (PRIMARY) HYPERTENSION: ICD-10-CM

## 2020-08-09 DIAGNOSIS — Z95.810 PRESENCE OF AUTOMATIC (IMPLANTABLE) CARDIAC DEFIBRILLATOR: Chronic | ICD-10-CM

## 2020-08-09 DIAGNOSIS — Z90.49 ACQUIRED ABSENCE OF OTHER SPECIFIED PARTS OF DIGESTIVE TRACT: Chronic | ICD-10-CM

## 2020-08-09 DIAGNOSIS — E11.9 TYPE 2 DIABETES MELLITUS WITHOUT COMPLICATIONS: ICD-10-CM

## 2020-08-09 DIAGNOSIS — Z98.51 TUBAL LIGATION STATUS: Chronic | ICD-10-CM

## 2020-08-09 DIAGNOSIS — E03.9 HYPOTHYROIDISM, UNSPECIFIED: ICD-10-CM

## 2020-08-09 LAB
ALBUMIN SERPL ELPH-MCNC: 4.1 G/DL — SIGNIFICANT CHANGE UP (ref 3.3–5)
ALP SERPL-CCNC: 151 U/L — HIGH (ref 40–120)
ALT FLD-CCNC: 20 U/L — SIGNIFICANT CHANGE UP (ref 10–45)
ANION GAP SERPL CALC-SCNC: 10 MMOL/L — SIGNIFICANT CHANGE UP (ref 5–17)
AST SERPL-CCNC: 19 U/L — SIGNIFICANT CHANGE UP (ref 10–40)
BASOPHILS # BLD AUTO: 0.05 K/UL — SIGNIFICANT CHANGE UP (ref 0–0.2)
BASOPHILS NFR BLD AUTO: 0.5 % — SIGNIFICANT CHANGE UP (ref 0–2)
BILIRUB SERPL-MCNC: 0.6 MG/DL — SIGNIFICANT CHANGE UP (ref 0.2–1.2)
BUN SERPL-MCNC: 51 MG/DL — HIGH (ref 7–23)
CALCIUM SERPL-MCNC: 9.6 MG/DL — SIGNIFICANT CHANGE UP (ref 8.4–10.5)
CHLORIDE SERPL-SCNC: 100 MMOL/L — SIGNIFICANT CHANGE UP (ref 96–108)
CK MB CFR SERPL CALC: 1.4 NG/ML — SIGNIFICANT CHANGE UP (ref 0–6.7)
CK MB CFR SERPL CALC: 1.5 NG/ML — SIGNIFICANT CHANGE UP (ref 0–6.7)
CK SERPL-CCNC: 66 U/L — SIGNIFICANT CHANGE UP (ref 25–170)
CK SERPL-CCNC: 69 U/L — SIGNIFICANT CHANGE UP (ref 25–170)
CO2 SERPL-SCNC: 30 MMOL/L — SIGNIFICANT CHANGE UP (ref 22–31)
CREAT SERPL-MCNC: 1.98 MG/DL — HIGH (ref 0.5–1.3)
EOSINOPHIL # BLD AUTO: 0.08 K/UL — SIGNIFICANT CHANGE UP (ref 0–0.5)
EOSINOPHIL NFR BLD AUTO: 0.8 % — SIGNIFICANT CHANGE UP (ref 0–6)
GLUCOSE BLDC GLUCOMTR-MCNC: 139 MG/DL — HIGH (ref 70–99)
GLUCOSE BLDC GLUCOMTR-MCNC: 147 MG/DL — HIGH (ref 70–99)
GLUCOSE SERPL-MCNC: 179 MG/DL — HIGH (ref 70–99)
HCT VFR BLD CALC: 36 % — SIGNIFICANT CHANGE UP (ref 34.5–45)
HGB BLD-MCNC: 10.6 G/DL — LOW (ref 11.5–15.5)
IMM GRANULOCYTES NFR BLD AUTO: 0.3 % — SIGNIFICANT CHANGE UP (ref 0–1.5)
LYMPHOCYTES # BLD AUTO: 0.88 K/UL — LOW (ref 1–3.3)
LYMPHOCYTES # BLD AUTO: 9 % — LOW (ref 13–44)
MCHC RBC-ENTMCNC: 26.4 PG — LOW (ref 27–34)
MCHC RBC-ENTMCNC: 29.4 GM/DL — LOW (ref 32–36)
MCV RBC AUTO: 89.8 FL — SIGNIFICANT CHANGE UP (ref 80–100)
MONOCYTES # BLD AUTO: 1.04 K/UL — HIGH (ref 0–0.9)
MONOCYTES NFR BLD AUTO: 10.6 % — SIGNIFICANT CHANGE UP (ref 2–14)
NEUTROPHILS # BLD AUTO: 7.72 K/UL — HIGH (ref 1.8–7.4)
NEUTROPHILS NFR BLD AUTO: 78.8 % — HIGH (ref 43–77)
NRBC # BLD: 0 /100 WBCS — SIGNIFICANT CHANGE UP (ref 0–0)
NT-PROBNP SERPL-SCNC: HIGH PG/ML (ref 0–300)
PLATELET # BLD AUTO: 208 K/UL — SIGNIFICANT CHANGE UP (ref 150–400)
POTASSIUM SERPL-MCNC: 5.1 MMOL/L — SIGNIFICANT CHANGE UP (ref 3.5–5.3)
POTASSIUM SERPL-SCNC: 5.1 MMOL/L — SIGNIFICANT CHANGE UP (ref 3.5–5.3)
PROT SERPL-MCNC: 7.8 G/DL — SIGNIFICANT CHANGE UP (ref 6–8.3)
RBC # BLD: 4.01 M/UL — SIGNIFICANT CHANGE UP (ref 3.8–5.2)
RBC # FLD: 18.1 % — HIGH (ref 10.3–14.5)
SARS-COV-2 RNA SPEC QL NAA+PROBE: SIGNIFICANT CHANGE UP
SODIUM SERPL-SCNC: 140 MMOL/L — SIGNIFICANT CHANGE UP (ref 135–145)
T4 AB SER-ACNC: 7.48 UG/DL — SIGNIFICANT CHANGE UP (ref 3.17–11.72)
TROPONIN T SERPL-MCNC: <0.01 NG/ML — SIGNIFICANT CHANGE UP (ref 0–0.01)
TSH SERPL-MCNC: 6.63 UIU/ML — HIGH (ref 0.35–4.94)
WBC # BLD: 9.8 K/UL — SIGNIFICANT CHANGE UP (ref 3.8–10.5)
WBC # FLD AUTO: 9.8 K/UL — SIGNIFICANT CHANGE UP (ref 3.8–10.5)

## 2020-08-09 PROCEDURE — 99222 1ST HOSP IP/OBS MODERATE 55: CPT

## 2020-08-09 PROCEDURE — 71045 X-RAY EXAM CHEST 1 VIEW: CPT | Mod: 26

## 2020-08-09 PROCEDURE — 99285 EMERGENCY DEPT VISIT HI MDM: CPT | Mod: CS

## 2020-08-09 RX ORDER — INSULIN LISPRO 100/ML
6 VIAL (ML) SUBCUTANEOUS
Refills: 0 | Status: DISCONTINUED | OUTPATIENT
Start: 2020-08-09 | End: 2020-08-10

## 2020-08-09 RX ORDER — INSULIN GLARGINE 100 [IU]/ML
15 INJECTION, SOLUTION SUBCUTANEOUS AT BEDTIME
Refills: 0 | Status: DISCONTINUED | OUTPATIENT
Start: 2020-08-09 | End: 2020-08-10

## 2020-08-09 RX ORDER — DEXTROSE 50 % IN WATER 50 %
15 SYRINGE (ML) INTRAVENOUS ONCE
Refills: 0 | Status: DISCONTINUED | OUTPATIENT
Start: 2020-08-09 | End: 2020-08-12

## 2020-08-09 RX ORDER — GABAPENTIN 400 MG/1
100 CAPSULE ORAL
Refills: 0 | Status: DISCONTINUED | OUTPATIENT
Start: 2020-08-09 | End: 2020-08-12

## 2020-08-09 RX ORDER — FUROSEMIDE 40 MG
40 TABLET ORAL ONCE
Refills: 0 | Status: COMPLETED | OUTPATIENT
Start: 2020-08-09 | End: 2020-08-09

## 2020-08-09 RX ORDER — BUDESONIDE AND FORMOTEROL FUMARATE DIHYDRATE 160; 4.5 UG/1; UG/1
2 AEROSOL RESPIRATORY (INHALATION)
Refills: 0 | Status: DISCONTINUED | OUTPATIENT
Start: 2020-08-09 | End: 2020-08-12

## 2020-08-09 RX ORDER — SODIUM CHLORIDE 9 MG/ML
1000 INJECTION, SOLUTION INTRAVENOUS
Refills: 0 | Status: DISCONTINUED | OUTPATIENT
Start: 2020-08-09 | End: 2020-08-12

## 2020-08-09 RX ORDER — ATORVASTATIN CALCIUM 80 MG/1
80 TABLET, FILM COATED ORAL AT BEDTIME
Refills: 0 | Status: DISCONTINUED | OUTPATIENT
Start: 2020-08-09 | End: 2020-08-12

## 2020-08-09 RX ORDER — SUCRALFATE 1 G
1 TABLET ORAL
Qty: 0 | Refills: 0 | DISCHARGE

## 2020-08-09 RX ORDER — INSULIN LISPRO 100/ML
VIAL (ML) SUBCUTANEOUS
Refills: 0 | Status: DISCONTINUED | OUTPATIENT
Start: 2020-08-09 | End: 2020-08-12

## 2020-08-09 RX ORDER — DEXTROSE 50 % IN WATER 50 %
12.5 SYRINGE (ML) INTRAVENOUS ONCE
Refills: 0 | Status: DISCONTINUED | OUTPATIENT
Start: 2020-08-09 | End: 2020-08-12

## 2020-08-09 RX ORDER — ESCITALOPRAM OXALATE 10 MG/1
20 TABLET, FILM COATED ORAL DAILY
Refills: 0 | Status: DISCONTINUED | OUTPATIENT
Start: 2020-08-09 | End: 2020-08-12

## 2020-08-09 RX ORDER — ALBUTEROL 90 UG/1
2 AEROSOL, METERED ORAL EVERY 6 HOURS
Refills: 0 | Status: DISCONTINUED | OUTPATIENT
Start: 2020-08-09 | End: 2020-08-12

## 2020-08-09 RX ORDER — DEXTROSE 50 % IN WATER 50 %
25 SYRINGE (ML) INTRAVENOUS ONCE
Refills: 0 | Status: DISCONTINUED | OUTPATIENT
Start: 2020-08-09 | End: 2020-08-12

## 2020-08-09 RX ORDER — TRAZODONE HCL 50 MG
50 TABLET ORAL
Refills: 0 | Status: DISCONTINUED | OUTPATIENT
Start: 2020-08-09 | End: 2020-08-12

## 2020-08-09 RX ORDER — DEXAMETHASONE 0.5 MG/5ML
20 ELIXIR ORAL
Refills: 0 | Status: DISCONTINUED | OUTPATIENT
Start: 2020-08-10 | End: 2020-08-12

## 2020-08-09 RX ORDER — SACUBITRIL AND VALSARTAN 24; 26 MG/1; MG/1
1 TABLET, FILM COATED ORAL
Refills: 0 | Status: DISCONTINUED | OUTPATIENT
Start: 2020-08-09 | End: 2020-08-12

## 2020-08-09 RX ORDER — FUROSEMIDE 40 MG
40 TABLET ORAL
Refills: 0 | Status: DISCONTINUED | OUTPATIENT
Start: 2020-08-09 | End: 2020-08-11

## 2020-08-09 RX ORDER — FENOFIBRATE,MICRONIZED 130 MG
48 CAPSULE ORAL DAILY
Refills: 0 | Status: DISCONTINUED | OUTPATIENT
Start: 2020-08-09 | End: 2020-08-12

## 2020-08-09 RX ORDER — NITROGLYCERIN 6.5 MG
0.4 CAPSULE, EXTENDED RELEASE ORAL ONCE
Refills: 0 | Status: COMPLETED | OUTPATIENT
Start: 2020-08-09 | End: 2020-08-09

## 2020-08-09 RX ORDER — GLUCAGON INJECTION, SOLUTION 0.5 MG/.1ML
1 INJECTION, SOLUTION SUBCUTANEOUS ONCE
Refills: 0 | Status: DISCONTINUED | OUTPATIENT
Start: 2020-08-09 | End: 2020-08-12

## 2020-08-09 RX ORDER — PANTOPRAZOLE SODIUM 20 MG/1
40 TABLET, DELAYED RELEASE ORAL
Refills: 0 | Status: DISCONTINUED | OUTPATIENT
Start: 2020-08-09 | End: 2020-08-12

## 2020-08-09 RX ORDER — IXAZOMIB 3 MG/1
3 CAPSULE ORAL
Refills: 0 | Status: DISCONTINUED | OUTPATIENT
Start: 2020-08-10 | End: 2020-08-10

## 2020-08-09 RX ORDER — APIXABAN 2.5 MG/1
2.5 TABLET, FILM COATED ORAL
Refills: 0 | Status: DISCONTINUED | OUTPATIENT
Start: 2020-08-09 | End: 2020-08-12

## 2020-08-09 RX ORDER — AMITRIPTYLINE HCL 25 MG
10 TABLET ORAL AT BEDTIME
Refills: 0 | Status: DISCONTINUED | OUTPATIENT
Start: 2020-08-09 | End: 2020-08-12

## 2020-08-09 RX ORDER — METOPROLOL TARTRATE 50 MG
200 TABLET ORAL DAILY
Refills: 0 | Status: DISCONTINUED | OUTPATIENT
Start: 2020-08-09 | End: 2020-08-11

## 2020-08-09 RX ORDER — LEVOTHYROXINE SODIUM 125 MCG
25 TABLET ORAL DAILY
Refills: 0 | Status: DISCONTINUED | OUTPATIENT
Start: 2020-08-09 | End: 2020-08-12

## 2020-08-09 RX ADMIN — Medication 10 MILLIGRAM(S): at 22:10

## 2020-08-09 RX ADMIN — SACUBITRIL AND VALSARTAN 1 TABLET(S): 24; 26 TABLET, FILM COATED ORAL at 17:43

## 2020-08-09 RX ADMIN — BUDESONIDE AND FORMOTEROL FUMARATE DIHYDRATE 2 PUFF(S): 160; 4.5 AEROSOL RESPIRATORY (INHALATION) at 17:44

## 2020-08-09 RX ADMIN — Medication 50 MILLIGRAM(S): at 17:38

## 2020-08-09 RX ADMIN — Medication 20 MILLIGRAM(S): at 17:44

## 2020-08-09 RX ADMIN — Medication 0.4 MILLIGRAM(S): at 09:43

## 2020-08-09 RX ADMIN — ATORVASTATIN CALCIUM 80 MILLIGRAM(S): 80 TABLET, FILM COATED ORAL at 22:10

## 2020-08-09 RX ADMIN — APIXABAN 2.5 MILLIGRAM(S): 2.5 TABLET, FILM COATED ORAL at 17:38

## 2020-08-09 RX ADMIN — Medication 10 MILLIGRAM(S): at 17:43

## 2020-08-09 RX ADMIN — INSULIN GLARGINE 15 UNIT(S): 100 INJECTION, SOLUTION SUBCUTANEOUS at 22:10

## 2020-08-09 RX ADMIN — Medication 6 UNIT(S): at 17:38

## 2020-08-09 RX ADMIN — Medication 40 MILLIGRAM(S): at 22:10

## 2020-08-09 RX ADMIN — Medication 40 MILLIGRAM(S): at 09:42

## 2020-08-09 RX ADMIN — GABAPENTIN 100 MILLIGRAM(S): 400 CAPSULE ORAL at 17:38

## 2020-08-09 NOTE — H&P ADULT - HISTORY OF PRESENT ILLNESS
58yo Female, current smoker, with Contrast/Aspirin Allergy and an extensive PMHx including HTN, NICM, chronic HFrEF with multiple admissions in the past (EF 25% s/p AICD for primary prevention), IDDM c/b neuropathy (on Methadone), hx of recurrent DVTs (on Eliquis), CKD IV (Cr Baseline ______), CVA x2 (most recently in 2013 with residual right sided weakness), PAD s/p bypass, COPD (on 2-3 home O2), DHARMESH on CPAP, Multiple Myeloma (on Dexamenthasone), hypothyroidism, bipolar disorder, depression and recent admission on 3/11/20 for B/L LE Cellulitis (L>R) s/p Vanc/Zosyn and Bactrim/Augmentin x 2 weeks, who now presents to [facility] ED c/o __________. She endorses __________. Pt denies any ___ CP, palpitations, dizziness, syncope, diaphoresis, fatigue, LE edema, SOB, RAMSEY, orthopnea, PND, N/V/D, abd pain, cough, congestion, fever, chills or recent sick contact.  On arrival to ED, 60yo Female, current smoker, with Contrast/Aspirin Allergy and an extensive PMHx including HTN, NICM, chronic HFrEF with multiple admissions in the past (EF 25% s/p AICD for primary prevention), IDDM c/b neuropathy (on Methadone), hx of recurrent DVTs (on Eliquis), CKD IV, CVA x2 (most recently in 2013 with residual right sided weakness), PAD s/p bypass, COPD (on 2-3 home O2), DHARMESH on CPAP, Multiple Myeloma (on Dexamenthasone), hypothyroidism, bipolar disorder, depression and recent admission on 3/11/20 for B/L LE Cellulitis (L>R) s/p Vanc/Zosyn and Bactrim/Augmentin x 2 weeks, who now presents to [facility] ED c/o worsening SOB x ___. She endorses associated CP and LE edema. Pt denies any ___ CP, palpitations, dizziness, syncope, diaphoresis, fatigue, LE edema, SOB, RAMSEY, orthopnea, PND, N/V/D, abd pain, cough, congestion, fever, chills or recent sick contact.  On arrival to ED, /79, HR 81bpm, T 98.7, RR 24, SpO2 93% on RA, Labs significant for BNP 38276, Trop neg x 1, Hgb 10.6, BUN/Cr 51/1.98, COVID PCR negative, EKG revealed NSR with LAD, no ST-T changes, and a CXR revealing congestion and/or infiltrate. Pt received Lasix 40mg IVP x 1 and Nitro SL x 1. Pt now admitted to [facility] for further management of acute on chronic CHF exacerbation. 58yo obese Female, current smoker, with Contrast/Aspirin Allergy and an extensive PMHx including HTN, NICM, chronic HFrEF with multiple admissions in the past (EF 25% s/p AICD for primary prevention), IDDM c/b neuropathy (on Methadone), hx of recurrent DVTs (on Eliquis), CKD IV, CVA x2 (most recently in 2013 with residual right sided weakness), PAD s/p bypass, COPD (on 2-3L home O2), DHARMESH on CPAP, Multiple Myeloma (on Dexamethasone and Ninlaro), hypothyroidism, depression and recent admission on 3/11/20 for B/L LE Cellulitis (L>R) s/p Vanc/Zosyn and Bactrim/Augmentin x 2 weeks, who now presents to [facility] ED c/o worsening SOB x 2 days. Pt endorses SOB occurs both at rest and with exertion, with associated fatigue, 5 pillow orthopnea, PND and LE edema. She denies any CP, palpitations, dizziness, syncope, diaphoresis, N/V/D, abd pain, cough, congestion, fever, chills or recent sick contact.  On arrival to ED, /79, HR 81bpm, T 98.7, RR 24, SpO2 93% on RA, Labs significant for BNP 67728, Trop neg x 1, Hgb 10.6, BUN/Cr 51/1.98, COVID PCR negative, EKG revealed NSR with LAD, no ST-T changes, and a CXR revealing congestion and/or infiltrate. Pt received Lasix 40mg IVP x 1 and Nitro SL x 1. Pt now admitted to [facility] for further management of acute on chronic CHF exacerbation.

## 2020-08-09 NOTE — ED ADULT NURSE NOTE - PMH
Bipolar depression    CHF (congestive heart failure)    Chronic pain    COPD (chronic obstructive pulmonary disease)    CVA (cerebral vascular accident)    Depression    DM (diabetes mellitus)    DVT (deep venous thrombosis)    HLD (hyperlipidemia)    HTN (hypertension)    Neuropathy    PAD (peripheral artery disease)

## 2020-08-09 NOTE — ED PROVIDER NOTE - OBJECTIVE STATEMENT
59F PMH HTN, HLD, DM, CHF, DVT on eliquis, AICD, anemia, fibromyalgia, p/w SOB/CP. Pt on 3L NC at baseline. States that she has slowly worsening SOB and lower sternal cp (constant), since yesterday. Also several days of increasing b/l LE edema. Also minimal diffuse abd pain since yesterday. Adherent to meds (unsure what they are). Denies f/c, URI symptoms, NVD, constipation (normal BM yesterday), urinary complaints, black/bloody stool, focal weakness/numbness.  Current smoker  PMd Dr. Neal, Cards Dr. Campoverde?     HPI Objective Statement: The pt is a 58 y/o F, pmh HTN, HLD, DM, CHF, DVT, AICD (eliquis), anemia, fibromyalgia, c/o blisters to feet after "scrubbing" skin off x few d. Pt states that noticed blisters to b/l feet after scrubbing them, over past few d, noticed swelling, redness, pus and pain, has not taken any pain meds. Also c/o LBP - chronic, feels that leg swelling has aggravated it. Denies cp, sob, n/v/d, abd pain, loss of bowel or bladder control, falls, injury, calf pain

## 2020-08-09 NOTE — H&P ADULT - NSICDXPASTMEDICALHX_GEN_ALL_CORE_FT
PAST MEDICAL HISTORY:  Bipolar depression     CHF (congestive heart failure)     Chronic pain     COPD (chronic obstructive pulmonary disease)     CVA (cerebral vascular accident)     Depression     DM (diabetes mellitus)     DVT (deep venous thrombosis)     HLD (hyperlipidemia)     HTN (hypertension)     Neuropathy     PAD (peripheral artery disease) PAST MEDICAL HISTORY:  Bipolar depression     CHF (congestive heart failure)     Chronic pain     CKD (chronic kidney disease), stage IV     COPD (chronic obstructive pulmonary disease)     CVA (cerebral vascular accident)     Depression     DM (diabetes mellitus)     DVT (deep venous thrombosis)     HLD (hyperlipidemia)     HTN (hypertension)     Hypothyroidism     Multiple myeloma     Neuropathy     PAD (peripheral artery disease)

## 2020-08-09 NOTE — H&P ADULT - PROBLEM SELECTOR PLAN 1
presents with worsening SOB, orthopnea/PND and LE edema; B/L scattered wheezing/rales, 2+ pitting edema B/L presents with worsening SOB, orthopnea/PND and LE edema; B/L scattered wheezing/rales, 2+ pitting edema B/L  -BNP 37131; trop negative x 2, f/u repeat at 8pm  -CXR 8/9: congestion and/or infiltrate  -Echo 1/15/19: LA/LV mod dilated, severe global hypokinesis, EF 25%, severely elevated LA pressure (>20), RA dilated, mod MR, mild-mod TR, mod pulmHTN (PASP 50), mild [place]; f/u repeat ordered for AM  -s/p Lasix 40mg IVP X 1 in ED; continue Torsemide 20mg IV BID, Entresto 97/103mg BID and Toprol XL 200mg QD  -Core measures, daily weights, strict I&Os with 1.2L fluid restriction presents with worsening SOB, orthopnea/PND and LE edema; B/L scattered wheezing/rales, 2+ pitting edema B/L; SpO2 98% on 2L NC, net negative 400cc  -BNP 00371; trop negative x 2, f/u repeat at 8pm  -CXR 8/9: congestion and/or infiltrate  -Echo 1/15/19: LA/LV mod dilated, severe global hypokinesis, EF 25%, severely elevated LA pressure (>20), RA dilated, mod MR, mild-mod TR, mod pulmHTN (PASP 50), mild [place]; f/u repeat ordered for AM  -s/p Lasix 40mg IVP X 1 in ED; continue Torsemide 20mg IV BID, Entresto 97/103mg BID and Toprol XL 200mg QD  -Core measures, daily weights, strict I&Os with 1.2L fluid restriction

## 2020-08-09 NOTE — ED PROVIDER NOTE - CLINICAL SUMMARY MEDICAL DECISION MAKING FREE TEXT BOX
59F PMH HTN, HLD, DM, CHF, DVT on eliquis, AICD, anemia, fibromyalgia, p/w SOB/CP. Pt on 3L NC at baseline. States that she has slowly worsening SOB and lower sternal cp (constant), since yesterday. Also several days of increasing b/l LE edema. Also minimal diffuse abd pain since yesterday. No other systemic symptoms. Very minimal tachypnea, satting high 90s on 2L NC, other vitals wnl. Exam as above.  ddX: Likely pulm edema/fluid overload, possible aspect of chronic RAD. Less likely acute infection.  Labs, CXR, nitro/lasix, reassess.   asa allergy. 59F PMH HTN, HLD, DM, CHF, DVT on eliquis, AICD, anemia, fibromyalgia, p/w SOB/CP. Pt on 3L NC at baseline. States that she has slowly worsening SOB and lower sternal cp (constant), since yesterday. Also several days of increasing b/l LE edema. Also minimal diffuse abd pain since yesterday. No other systemic symptoms. Very minimal tachypnea, satting high 90s on 2L NC, hypertensive, other vitals wnl. Exam as above.  ddX: Likely pulm edema/fluid overload, possible aspect of chronic RAD. Less likely acute infection.  Labs, CXR, nitro/lasix, reassess.   asa allergy.

## 2020-08-09 NOTE — H&P ADULT - ASSESSMENT
58yo obese Female, current smoker, with Contrast/Aspirin Allergy and an extensive PMHx including HTN, NICM, chronic HFrEF with multiple admissions in the past (EF 25% s/p AICD for primary prevention), IDDM c/b neuropathy (on Methadone), hx of recurrent DVTs (on Eliquis), CKD IV, CVA x2 (most recently in 2013 with residual right sided weakness), PAD s/p bypass, COPD (on 2-3L home O2), DHARMESH on CPAP, Multiple Myeloma (on Dexamethasone and Ninlaro), hypothyroidism, depression and recent admission on 3/11/20 for B/L LE Cellulitis (L>R) s/p Vanc/Zosyn and Bactrim/Augmentin x 2 weeks, who now presents to [facility] c/o worsening SOB with associated fatigue, 5 pillow orthopnea, PND and LE edema. Pt now admitted to [facility] for further management of acute on chronic CHF exacerbation. 58yo obese Female, current smoker, with Contrast/Aspirin Allergy and an extensive PMHx including HTN, NICM, chronic HFrEF with multiple admissions in the past (EF 25% s/p AICD for primary prevention), IDDM c/b neuropathy (on Methadone), hx of recurrent DVTs (on Eliquis), CKD IV, CVA x2 (most recently in 2013 with residual right sided weakness), PAD s/p bypass, COPD (on 2-3L home O2), DHARMESH on CPAP, Multiple Myeloma (on Dexamethasone and Ninlaro), hypothyroidism, depression and recent admission on 3/11/20 for B/L LE Cellulitis (L>R) s/p Vanc/Zosyn and Bactrim/Augmentin x 2 weeks, who now presents to [facility] c/o worsening SOB with associated fatigue, 5 pillow orthopnea, PND and LE edema. Pt now admitted to [facility] for further management of acute on chronic CHF exacerbation.

## 2020-08-09 NOTE — H&P ADULT - PROBLEM SELECTOR PLAN 10
-Continue Escitalopram 20mg QD, Amitriptyline 10mg QD and Buspirone 10mg QD      F: None, 1.2L fluid restriction  E: Replete if K<4 or Mag<2  N: DASH Diet  GIppx: Pantoprazole  VTEppx: Eliquis  Dispo: pending diuresis

## 2020-08-09 NOTE — H&P ADULT - NSHPLABSRESULTS_GEN_ALL_CORE
10.6   9.80  )-----------( 208      ( 09 Aug 2020 09:30 )             36.0     140  |  100  |  51<H>  ----------------------------<  179<H>  5.1   |  30  |  1.98<H>    Ca    9.6      09 Aug 2020 09:30    TPro  7.8  /  Alb  4.1  /  TBili  0.6  /  DBili  x   /  AST  19  /  ALT  20  /  AlkPhos  151<H>  08-09    CARDIAC MARKERS ( 09 Aug 2020 15:57 )  x     / <0.01 ng/mL / 66 U/L / x     / 1.5 ng/mL  CARDIAC MARKERS ( 09 Aug 2020 09:30 )  x     / <0.01 ng/mL / x     / x     / x

## 2020-08-09 NOTE — ED PROVIDER NOTE - PHYSICAL EXAMINATION
2+ b/l LE pitting edema, to dependent portion of thighs. scattered minimal b/l LE scabs. No crepitus, firmness, induration, fluctuance. Skin is normal temp. hyperpigmented skin. No erythema/warmth.   +mild edema to abd wall.   bibasilar crackles, no wheezes, good air entry b/l.  unofficial bedside sono: diffuse b-lines

## 2020-08-09 NOTE — ED PROVIDER NOTE - PROGRESS NOTE DETAILS
Klepfish: vitals improved, feels improved, appears comfortable in bed. cr ~2 at pt's baseline, pro bnp 11.5k, other labs grossly wnl. Will admit for further diuresis. Updated pt. d/w dr. travis.

## 2020-08-09 NOTE — H&P ADULT - PROBLEM SELECTOR PLAN 2
CKD IV, baseline Cr 2-2.3 CKD IV, baseline Cr 2-2.3 last admission  -Cr today 1.98, continue to monitor  -Consult Dr. Neal in the AM as he is pts PCP and well known to him  -Avoid nephrotoxic agents and renally dose medications

## 2020-08-09 NOTE — ED ADULT NURSE NOTE - OBJECTIVE STATEMENT
Patient presents to the ED complaining of shortness of breath since yesterday night. Patient reports hx of COPD. Also complaining of chest pain. Patient noted to be tachypnic. Upgraded to MD Guzman. Denies any fever, chills or cough.

## 2020-08-09 NOTE — ED ADULT NURSE NOTE - NSIMPLEMENTINTERV_GEN_ALL_ED
Implemented All Fall Risk Interventions:  [place] to call system. Call bell, personal items and telephone within reach. Instruct patient to call for assistance. Room bathroom lighting operational. Non-slip footwear when patient is off stretcher. Physically safe environment: no spills, clutter or unnecessary equipment. Stretcher in lowest position, wheels locked, appropriate side rails in place. Provide visual cue, wrist band, yellow gown, etc. Monitor gait and stability. Monitor for mental status changes and reorient to person, place, and time. Review medications for side effects contributing to fall risk. Reinforce activity limits and safety measures with patient and family.

## 2020-08-09 NOTE — PATIENT PROFILE ADULT - NSPROHMDIABETMGMTSTRAT_GEN_A_NUR
adequate rest/exercise/routine screenings/coping strategies/diet modification/weight management/activity/medication therapy/blood glucose testing/insulin therapy

## 2020-08-09 NOTE — ED ADULT TRIAGE NOTE - HEART RATE (BEATS/MIN)
83 two past psychiatric hospitalizations, last at [facility] during fall 2017; and has not followed up with any psychiatrist since then. intermittently takes celexa. Hospitalizations were secondary to depression.

## 2020-08-10 DIAGNOSIS — C90.00 MULTIPLE MYELOMA NOT HAVING ACHIEVED REMISSION: ICD-10-CM

## 2020-08-10 LAB
A1C WITH ESTIMATED AVERAGE GLUCOSE RESULT: 10.1 % — HIGH (ref 4–5.6)
ANION GAP SERPL CALC-SCNC: 13 MMOL/L — SIGNIFICANT CHANGE UP (ref 5–17)
BUN SERPL-MCNC: 47 MG/DL — HIGH (ref 7–23)
CALCIUM SERPL-MCNC: 9.6 MG/DL — SIGNIFICANT CHANGE UP (ref 8.4–10.5)
CHLORIDE SERPL-SCNC: 101 MMOL/L — SIGNIFICANT CHANGE UP (ref 96–108)
CHOLEST SERPL-MCNC: 186 MG/DL — SIGNIFICANT CHANGE UP (ref 10–199)
CO2 SERPL-SCNC: 28 MMOL/L — SIGNIFICANT CHANGE UP (ref 22–31)
CREAT SERPL-MCNC: 1.85 MG/DL — HIGH (ref 0.5–1.3)
ESTIMATED AVERAGE GLUCOSE: 243 MG/DL — HIGH (ref 68–114)
GLUCOSE BLDC GLUCOMTR-MCNC: 119 MG/DL — HIGH (ref 70–99)
GLUCOSE BLDC GLUCOMTR-MCNC: 301 MG/DL — HIGH (ref 70–99)
GLUCOSE BLDC GLUCOMTR-MCNC: 326 MG/DL — HIGH (ref 70–99)
GLUCOSE BLDC GLUCOMTR-MCNC: 354 MG/DL — HIGH (ref 70–99)
GLUCOSE BLDC GLUCOMTR-MCNC: 431 MG/DL — HIGH (ref 70–99)
GLUCOSE SERPL-MCNC: 116 MG/DL — HIGH (ref 70–99)
HCT VFR BLD CALC: 32.5 % — LOW (ref 34.5–45)
HDLC SERPL-MCNC: 33 MG/DL — LOW
HGB BLD-MCNC: 9.9 G/DL — LOW (ref 11.5–15.5)
LIPID PNL WITH DIRECT LDL SERPL: 124 MG/DL — HIGH
MAGNESIUM SERPL-MCNC: 2.2 MG/DL — SIGNIFICANT CHANGE UP (ref 1.6–2.6)
MCHC RBC-ENTMCNC: 26.8 PG — LOW (ref 27–34)
MCHC RBC-ENTMCNC: 30.5 GM/DL — LOW (ref 32–36)
MCV RBC AUTO: 88.1 FL — SIGNIFICANT CHANGE UP (ref 80–100)
NRBC # BLD: 0 /100 WBCS — SIGNIFICANT CHANGE UP (ref 0–0)
PLATELET # BLD AUTO: 181 K/UL — SIGNIFICANT CHANGE UP (ref 150–400)
POTASSIUM SERPL-MCNC: 4.3 MMOL/L — SIGNIFICANT CHANGE UP (ref 3.5–5.3)
POTASSIUM SERPL-SCNC: 4.3 MMOL/L — SIGNIFICANT CHANGE UP (ref 3.5–5.3)
RBC # BLD: 3.69 M/UL — LOW (ref 3.8–5.2)
RBC # FLD: 18 % — HIGH (ref 10.3–14.5)
SODIUM SERPL-SCNC: 142 MMOL/L — SIGNIFICANT CHANGE UP (ref 135–145)
T3 SERPL-MCNC: 70 NG/DL — LOW (ref 80–200)
T4 FREE SERPL-MCNC: 0.88 NG/DL — SIGNIFICANT CHANGE UP (ref 0.7–1.48)
TOTAL CHOLESTEROL/HDL RATIO MEASUREMENT: 5.6 RATIO — SIGNIFICANT CHANGE UP (ref 3.3–7.1)
TRIGL SERPL-MCNC: 143 MG/DL — SIGNIFICANT CHANGE UP (ref 10–149)
WBC # BLD: 7.94 K/UL — SIGNIFICANT CHANGE UP (ref 3.8–10.5)
WBC # FLD AUTO: 7.94 K/UL — SIGNIFICANT CHANGE UP (ref 3.8–10.5)

## 2020-08-10 PROCEDURE — 99222 1ST HOSP IP/OBS MODERATE 55: CPT

## 2020-08-10 PROCEDURE — 99223 1ST HOSP IP/OBS HIGH 75: CPT

## 2020-08-10 PROCEDURE — 93306 TTE W/DOPPLER COMPLETE: CPT | Mod: 26

## 2020-08-10 PROCEDURE — 99222 1ST HOSP IP/OBS MODERATE 55: CPT | Mod: GC

## 2020-08-10 RX ORDER — INSULIN LISPRO 100/ML
8 VIAL (ML) SUBCUTANEOUS
Refills: 0 | Status: DISCONTINUED | OUTPATIENT
Start: 2020-08-10 | End: 2020-08-11

## 2020-08-10 RX ORDER — INSULIN GLARGINE 100 [IU]/ML
18 INJECTION, SOLUTION SUBCUTANEOUS AT BEDTIME
Refills: 0 | Status: DISCONTINUED | OUTPATIENT
Start: 2020-08-10 | End: 2020-08-11

## 2020-08-10 RX ADMIN — BUDESONIDE AND FORMOTEROL FUMARATE DIHYDRATE 2 PUFF(S): 160; 4.5 AEROSOL RESPIRATORY (INHALATION) at 05:30

## 2020-08-10 RX ADMIN — Medication 50 MILLIGRAM(S): at 05:30

## 2020-08-10 RX ADMIN — GABAPENTIN 100 MILLIGRAM(S): 400 CAPSULE ORAL at 05:30

## 2020-08-10 RX ADMIN — Medication 6 UNIT(S): at 07:36

## 2020-08-10 RX ADMIN — Medication 200 MILLIGRAM(S): at 06:04

## 2020-08-10 RX ADMIN — Medication 10 MILLIGRAM(S): at 05:31

## 2020-08-10 RX ADMIN — INSULIN GLARGINE 18 UNIT(S): 100 INJECTION, SOLUTION SUBCUTANEOUS at 22:10

## 2020-08-10 RX ADMIN — Medication 8: at 22:10

## 2020-08-10 RX ADMIN — APIXABAN 2.5 MILLIGRAM(S): 2.5 TABLET, FILM COATED ORAL at 05:30

## 2020-08-10 RX ADMIN — BUDESONIDE AND FORMOTEROL FUMARATE DIHYDRATE 2 PUFF(S): 160; 4.5 AEROSOL RESPIRATORY (INHALATION) at 17:28

## 2020-08-10 RX ADMIN — PANTOPRAZOLE SODIUM 40 MILLIGRAM(S): 20 TABLET, DELAYED RELEASE ORAL at 06:04

## 2020-08-10 RX ADMIN — Medication 50 MILLIGRAM(S): at 17:37

## 2020-08-10 RX ADMIN — Medication 40 MILLIGRAM(S): at 09:55

## 2020-08-10 RX ADMIN — Medication 25 MICROGRAM(S): at 05:30

## 2020-08-10 RX ADMIN — Medication 6 UNIT(S): at 14:23

## 2020-08-10 RX ADMIN — SACUBITRIL AND VALSARTAN 1 TABLET(S): 24; 26 TABLET, FILM COATED ORAL at 05:31

## 2020-08-10 RX ADMIN — GABAPENTIN 100 MILLIGRAM(S): 400 CAPSULE ORAL at 17:27

## 2020-08-10 RX ADMIN — APIXABAN 2.5 MILLIGRAM(S): 2.5 TABLET, FILM COATED ORAL at 17:27

## 2020-08-10 RX ADMIN — ESCITALOPRAM OXALATE 20 MILLIGRAM(S): 10 TABLET, FILM COATED ORAL at 13:21

## 2020-08-10 RX ADMIN — Medication 10 MILLIGRAM(S): at 22:10

## 2020-08-10 RX ADMIN — ATORVASTATIN CALCIUM 80 MILLIGRAM(S): 80 TABLET, FILM COATED ORAL at 22:10

## 2020-08-10 RX ADMIN — Medication 8 UNIT(S): at 17:13

## 2020-08-10 RX ADMIN — Medication 20 MILLIGRAM(S): at 05:31

## 2020-08-10 RX ADMIN — Medication 40 MILLIGRAM(S): at 18:03

## 2020-08-10 RX ADMIN — Medication 12: at 17:13

## 2020-08-10 RX ADMIN — Medication 10 MILLIGRAM(S): at 17:37

## 2020-08-10 RX ADMIN — Medication 48 MILLIGRAM(S): at 13:21

## 2020-08-10 RX ADMIN — SACUBITRIL AND VALSARTAN 1 TABLET(S): 24; 26 TABLET, FILM COATED ORAL at 17:37

## 2020-08-10 RX ADMIN — Medication 8: at 13:21

## 2020-08-10 NOTE — PROGRESS NOTE ADULT - PROBLEM SELECTOR PLAN 1
presents with worsening SOB, orthopnea/PND and LE edema; B/L scattered rales, 2+ pitting edema B/L; SpO2 97% on 2-3L NC, net negative 400cc  -BNP 93414; trop negative x 3  -CXR 8/9: congestion and/or infiltrate  -Echo 1/15/19: LA/LV mod dilated, severe global hypokinesis, EF 25%, severely elevated LA pressure (>20), RA dilated, mod MR, mild-mod TR, mod pulmHTN (PASP 50), mild [place]; f/u repeat ordered for AM  -Continue Lasix 40mg IV BID (on Torsemide 20mg BID at home) and start Metolazone 5mg 30min prior to Lasix dose; continue Entresto 97/103mg BID and Toprol XL 200mg QD  -Core measures, daily weights, strict I&Os with 1.2L fluid restriction presents with worsening SOB, orthopnea/PND and LE edema; B/L scattered rales, 2+ pitting edema B/L; SpO2 97% on 2-3L NC, net negative 400cc  -BNP 51993; trop negative x 3  -CXR 8/9: congestion and/or infiltrate  -Echo 8/10: LV moderately dilated, akinesis of septal and hypokinesis of remaining regions, EF 30%, Grade III diastolic dysfunction, RV systolic function mildly reduced, mild-mod MR, trace TR, mild pulm HTN (PASP 45)  -Continue Lasix 40mg IV BID (on Torsemide 20mg BID at home) and start Metolazone 5mg 30min prior to Lasix dose; continue Entresto 97/103mg BID and Toprol XL 200mg QD  -Core measures, daily weights, strict I&Os with 1.2L fluid restriction

## 2020-08-10 NOTE — CONSULT NOTE ADULT - SUBJECTIVE AND OBJECTIVE BOX
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 Pt endorses SOB occurs both at rest and with exertion, with associated fatigue, 5 pillow orthopnea, PND and LE edema. She denies any CP, palpitations, dizziness, syncope, diaphoresis, N/V/D, abd pain, cough, congestion, fever, chills or recent sick contact.  On arrival to ED, /79, HR 81bpm, T 98.7, RR 24, SpO2 93% on RA, Labs significant for BNP 38490, Trop neg x 1, Hgb 10.6, BUN/Cr 51/1.98, COVID PCR negative, EKG revealed NSR with LAD, no ST-T changes, and a CXR revealing congestion and/or infiltrate. Pt received Lasix 40mg IVP x 1 and Nitro SL x 1. Pt now admitted to [facility] for further management of acute on chronic CHF exacerbation.    Age at Dx: 10 years ago  How dx: regular blood work  Current Therapy: levemir 30 units QHS, Novolog 12 units premeal.    Hx of hypoglycemia: Happens approximately every other week with glucose as low as 35-40. The patient states that she feels sweaty / light headed and takes a glucose tab or eats some candy.   Hx of DKA/HHS - HHS in the past as per the patient    Home FSG:  Fastins-low 200s  Lunch: 130s  Dinner: 130s   Bed: 130s     Hx of other regimens  Complications: retinopathy, neuropathy, ? gastroparesis  Outpatient Endo:     [facility] Hx:DIABETIC FOOT ULCER; PAD  Neuropathy  PAD (peripheral artery disease)  Bipolar depression  Depression  HTN (hypertension)  DVT (deep venous thrombosis)  CVA (cerebral vascular accident)  DM (diabetes mellitus)  Acute on chronic systolic heart failure  Congestive heart failure, unspecified HF chronicity, unspecified heart failure type  Chronic pain  HLD (hyperlipidemia)  CVA (cerebral vascular accident)    SH:  Smoking: current smoker, approximately 3 cigarettes/day   Etoh: denies   Recreational Drugs: denies     Current Meds:  ALBUTerol    90 MICROgram(s) HFA Inhaler 2 Puff(s) Inhalation every 6 hours PRN  amitriptyline 10 milliGRAM(s) Oral at bedtime  apixaban 2.5 milliGRAM(s) Oral two times a day  atorvastatin 80 milliGRAM(s) Oral at bedtime  budesonide 160 MICROgram(s)/formoterol 4.5 MICROgram(s) Inhaler 2 Puff(s) Inhalation two times a day  busPIRone 10 milliGRAM(s) Oral two times a day  dexAMETHasone     Tablet 20 milliGRAM(s) Oral <User Schedule>  dextrose 40% Gel 15 Gram(s) Oral once PRN  dextrose 5%. 1000 milliLiter(s) IV Continuous <Continuous>  dextrose 50% Injectable 12.5 Gram(s) IV Push once  dextrose 50% Injectable 25 Gram(s) IV Push once  dextrose 50% Injectable 25 Gram(s) IV Push once  escitalopram 20 milliGRAM(s) Oral daily  fenofibrate Tablet 48 milliGRAM(s) Oral daily  furosemide   Injectable 40 milliGRAM(s) IV Push two times a day  gabapentin 100 milliGRAM(s) Oral two times a day  glucagon  Injectable 1 milliGRAM(s) IntraMuscular once PRN  insulin glargine Injectable (LANTUS) 15 Unit(s) SubCutaneous at bedtime  insulin lispro (HumaLOG) corrective regimen sliding scale   SubCutaneous Before meals and at bedtime  insulin lispro Injectable (HumaLOG) 6 Unit(s) SubCutaneous three times a day before meals  levothyroxine 25 MICROGram(s) Oral daily  metolazone 5 milliGRAM(s) Oral <User Schedule>  metoprolol succinate  milliGRAM(s) Oral daily  pantoprazole    Tablet 40 milliGRAM(s) Oral before breakfast  sacubitril 97 mG/valsartan 103 mG 1 Tablet(s) Oral two times a day  traZODone 50 milliGRAM(s) Oral two times a day      Allergies:  aspirin (Other (Moderate); Rash)  IV Contrast (Unknown)     Review of Systems:  Other Review of Systems: All other review of systems negative, except as noted in HPI	    Vital Signs Last 24 Hrs  T(C): 36.6 (10 Aug 2020 09:52), Max: 36.6 (10 Aug 2020 05:47)  T(F): 97.8 (10 Aug 2020 09:52), Max: 97.9 (10 Aug 2020 05:47)  HR: 66 (10 Aug 2020 13:20) (59 - 77)  BP: 141/63 (10 Aug 2020 13:20) (116/56 - 141/63)  BP(mean): 94 (09 Aug 2020 18:02) (88 - 94)  RR: 16 (10 Aug 2020 09:33) (16 - 18)  SpO2: 97% (10 Aug 2020 09:33) (95% - 98%)  Height (cm): 165.1 ( @ 15:09)  Weight (kg): 103.2 ( @ 15:09)  BMI (kg/m2): 37.9 ( @ 15:09)      Appearance: Normal	  HEENT:   Normal oral mucosa, PERRL, EOMI	  Neck: Supple, - JVD; No Carotid Bruit   Cardiovascular: Normal S1 S2, No JVD, No murmurs,   Respiratory: Decreased Breath Sounds scattered rales B/L, No Rales/Wheezing	  Gastrointestinal:  Soft, Non-tender, + BS	  Skin: No rashes, No ecchymoses, No cyanosis  Extremities: 2+ pitting edema B/L, Normal range of motion, No clubbing or cyanosis  Vascular: Peripheral pulses palpable 2+ bilaterally  Neurologic: Non-focal  Psychiatry: A & O x 3, Mood & affect appropriate    LABS:                        9.9    7.94  )-----------( 181      ( 10 Aug 2020 06:58 )             32.5     08-10    142  |  101  |  47<H>  ----------------------------<  116<H>  4.3   |  28  |  1.85<H>    Ca    9.6      10 Aug 2020 06:58  Mg     2.2     08-10    TPro  7.8  /  Alb  4.1  /  TBili  0.6  /  DBili  x   /  AST  19  /  ALT  20  /  AlkPhos  151<H>            Thyroid Stimulating Hormone, Serum: 6.626 ( @ 18:32)  Thyroid Stimulating Hormone, Serum: 1.630 ( @ 05:48)  Thyroid Stimulating Hormone, Serum: 8.491 ( @ 12:45)      RADIOLOGY & ADDITIONAL STUDIES:  CAPILLARY BLOOD GLUCOSE      POCT Blood Glucose.: 301 mg/dL (10 Aug 2020 12:43)  POCT Blood Glucose.: 119 mg/dL (10 Aug 2020 06:54)  POCT Blood Glucose.: 139 mg/dL (09 Aug 2020 21:36)  POCT Blood Glucose.: 147 mg/dL (09 Aug 2020 16:51)        A/P:59y Female    1.  DM  Please continue lantus       units at night / morning.  Please continue lispro      units before each meal.  Please continue lispro moderate / low dose sliding scale four times daily with meals and at bedtime    Pt's fingerstick glucose goal is     Will continue to monitor     For discharge, pt can continue    Pt can follow up at discharge with [facility] by calling  to make an appointment.   Will discuss case with     and update primary team HPI:     58yo obese Female, current smoker, with Contrast/Aspirin Allergy and an extensive PMHx including HTN, NICM, chronic HFrEF with multiple admissions in the past (EF 25% s/p AICD for primary prevention), IDDM c/b neuropathy (on Methadone) and neuropathy, hx of recurrent DVTs (on Eliquis), CKD IV, CVA x2 (most recently in  with residual right sided weakness), PAD s/p bypass, COPD (on 2-3L home O2), DHARMESH on CPAP, Multiple Myeloma (on Dexamethasone and Ninlaro), hypothyroidism, depression and recent admission on 3/11/20 for B/L LE Cellulitis (L>R) s/p Vanc/Zosyn and Bactrim/Augmentin x 2 weeks, who now presents to [facility] ED c/o worsening SOB x 2 days. Pt endorses SOB occurs both at rest and with exertion, with associated fatigue, 5 pillow orthopnea, PND and LE edema. She denies any CP, palpitations, dizziness, syncope, diaphoresis, N/V/D, abd pain, cough, congestion, fever, chills or recent sick contact.  On arrival to ED, /79, HR 81bpm, T 98.7, RR 24, SpO2 93% on RA, Labs significant for BNP 54680, Trop neg x 1, Hgb 10.6, BUN/Cr 51/1.98, COVID PCR negative, EKG revealed NSR with LAD, no ST-T changes, and a CXR revealing congestion and/or infiltrate. Pt received Lasix 40mg IVP x 1 and Nitro SL x 1. Pt now admitted to [facility] for further management of acute on chronic CHF exacerbation.    Age at Dx: 10 years ago  How dx: regular blood work  Current Therapy: levemir 30 units QHS, Novolog 12 units premeal.    Hx of hypoglycemia: Happens approximately every other week with glucose as low as 35-40. The patient states that she feels sweaty / light headed and takes a glucose tab or eats some candy.   Hx of DKA/HHS - HHS in the past as per the patient    Home FSG:  Fastins-low 200s  Lunch: 130s  Dinner: 130s   Bed: 130s     Hx of other regimens  Complications: retinopathy, neuropathy, ? gastroparesis  Outpatient Endo:     [facility] Hx:DIABETIC FOOT ULCER; PAD  Neuropathy  PAD (peripheral artery disease)  Bipolar depression  Depression  HTN (hypertension)  DVT (deep venous thrombosis)  CVA (cerebral vascular accident)  DM (diabetes mellitus)  Acute on chronic systolic heart failure  Congestive heart failure, unspecified HF chronicity, unspecified heart failure type  Chronic pain  HLD (hyperlipidemia)  CVA (cerebral vascular accident)    SH:  Smoking: current smoker, approximately 3 cigarettes/day   Etoh: denies   Recreational Drugs: denies     Current Meds:  ALBUTerol    90 MICROgram(s) HFA Inhaler 2 Puff(s) Inhalation every 6 hours PRN  amitriptyline 10 milliGRAM(s) Oral at bedtime  apixaban 2.5 milliGRAM(s) Oral two times a day  atorvastatin 80 milliGRAM(s) Oral at bedtime  budesonide 160 MICROgram(s)/formoterol 4.5 MICROgram(s) Inhaler 2 Puff(s) Inhalation two times a day  busPIRone 10 milliGRAM(s) Oral two times a day  dexAMETHasone     Tablet 20 milliGRAM(s) Oral <User Schedule>  dextrose 40% Gel 15 Gram(s) Oral once PRN  dextrose 5%. 1000 milliLiter(s) IV Continuous <Continuous>  dextrose 50% Injectable 12.5 Gram(s) IV Push once  dextrose 50% Injectable 25 Gram(s) IV Push once  dextrose 50% Injectable 25 Gram(s) IV Push once  escitalopram 20 milliGRAM(s) Oral daily  fenofibrate Tablet 48 milliGRAM(s) Oral daily  furosemide   Injectable 40 milliGRAM(s) IV Push two times a day  gabapentin 100 milliGRAM(s) Oral two times a day  glucagon  Injectable 1 milliGRAM(s) IntraMuscular once PRN  insulin glargine Injectable (LANTUS) 15 Unit(s) SubCutaneous at bedtime  insulin lispro (HumaLOG) corrective regimen sliding scale   SubCutaneous Before meals and at bedtime  insulin lispro Injectable (HumaLOG) 6 Unit(s) SubCutaneous three times a day before meals  levothyroxine 25 MICROGram(s) Oral daily  metolazone 5 milliGRAM(s) Oral <User Schedule>  metoprolol succinate  milliGRAM(s) Oral daily  pantoprazole    Tablet 40 milliGRAM(s) Oral before breakfast  sacubitril 97 mG/valsartan 103 mG 1 Tablet(s) Oral two times a day  traZODone 50 milliGRAM(s) Oral two times a day      Allergies:  aspirin (Other (Moderate); Rash)  IV Contrast (Unknown)     Review of Systems:  Other Review of Systems: All other review of systems negative, except as noted in HPI	    Vital Signs Last 24 Hrs  T(C): 36.6 (10 Aug 2020 09:52), Max: 36.6 (10 Aug 2020 05:47)  T(F): 97.8 (10 Aug 2020 09:52), Max: 97.9 (10 Aug 2020 05:47)  HR: 66 (10 Aug 2020 13:20) (59 - 77)  BP: 141/63 (10 Aug 2020 13:20) (116/56 - 141/63)  BP(mean): 94 (09 Aug 2020 18:02) (88 - 94)  RR: 16 (10 Aug 2020 09:33) (16 - 18)  SpO2: 97% (10 Aug 2020 09:33) (95% - 98%)  Height (cm): 165.1 ( @ 15:09)  Weight (kg): 103.2 ( @ 15:09)  BMI (kg/m2): 37.9 ( @ 15:09)      Appearance: Normal	  HEENT:   Normal oral mucosa, PERRL, EOMI	  Neck: Supple, - JVD; No Carotid Bruit   Cardiovascular: Normal S1 S2, No JVD, No murmurs,   Respiratory: Decreased Breath Sounds scattered rales B/L, No Rales/Wheezing	  Gastrointestinal:  Soft, Non-tender, + BS	  Skin: No rashes, No ecchymoses, No cyanosis  Extremities: 2+ pitting edema B/L, Normal range of motion, No clubbing or cyanosis  Vascular: Peripheral pulses palpable 2+ bilaterally  Neurologic: Non-focal  Psychiatry: A & O x 3, Mood & affect appropriate    LABS:                        9.9    7.94  )-----------( 181      ( 10 Aug 2020 06:58 )             32.5     08-10    142  |  101  |  47<H>  ----------------------------<  116<H>  4.3   |  28  |  1.85<H>    Ca    9.6      10 Aug 2020 06:58  Mg     2.2     08-10    TPro  7.8  /  Alb  4.1  /  TBili  0.6  /  DBili  x   /  AST  19  /  ALT  20  /  AlkPhos  151<H>            Thyroid Stimulating Hormone, Serum: 6.626 ( @ 18:32)  Thyroid Stimulating Hormone, Serum: 1.630 ( @ 05:48)  Thyroid Stimulating Hormone, Serum: 8.491 ( @ 12:45)      RADIOLOGY & ADDITIONAL STUDIES:  CAPILLARY BLOOD GLUCOSE      POCT Blood Glucose.: 301 mg/dL (10 Aug 2020 12:43)  POCT Blood Glucose.: 119 mg/dL (10 Aug 2020 06:54)  POCT Blood Glucose.: 139 mg/dL (09 Aug 2020 21:36)  POCT Blood Glucose.: 147 mg/dL (09 Aug 2020 16:51)        A/P:59-year-old F with a PMH of HTN, NICM, chronic HFrEF with multiple admissions in the past (EF 25% s/p AICD for primary prevention), IDDM c/b neuropathy (on Methadone) and neuropathy, hx of recurrent DVTs (on Eliquis), CKD IV, CVA x2 (most recently in  with residual right sided weakness), PAD s/p bypass, COPD (on 2-3L home O2), DHARMESH on CPAP, and Multiple Myeloma, admitted for acute on chronic CHF exacerbation. Endocrinology consulted for DM and hypothyroidism management.     1.  DM  Please start Lantus 18 units at night.  Please start lispro 8 units before each meal.  Please continue lispro moderate dose sliding scale four times daily with meals and at bedtime.     Pt's fingerstick glucose goal is: .    2. Hypothyroidism   Please continue levothyroxine 25 units before breakfast. Patient instructed to take levothyroxine on an empty stomach.     Will continue to monitor     For discharge, pt can continue    Pt can follow up at discharge with [facility] by calling  to make an appointment.   Will discuss case with Dr. Knutson and update primary team. HPI: 58yo obese Female, current smoker, with Contrast/Aspirin Allergy and an extensive PMHx including HTN, NICM, chronic HFrEF with multiple admissions in the past (EF 25% s/p AICD for primary prevention), IDDM c/b neuropathy (on Methadone) and neuropathy, hx of recurrent DVTs (on Eliquis), CKD IV, CVA x2 (most recently in  with residual right sided weakness), PAD s/p bypass, COPD (on 2-3L home O2), DHARMESH on CPAP, Multiple Myeloma (on Dexamethasone and Ninlaro), hypothyroidism, depression and recent admission on 3/11/20 for B/L LE Cellulitis (L>R) s/p Vanc/Zosyn and Bactrim/Augmentin x 2 weeks, who now presents to [facility] ED c/o worsening SOB x 2 days. Pt endorses SOB occurs both at rest and with exertion, with associated fatigue, 5 pillow orthopnea, PND and LE edema. She denies any CP, palpitations, dizziness, syncope, diaphoresis, N/V/D, abd pain, cough, congestion, fever, chills or recent sick contact.  On arrival to ED, /79, HR 81bpm, T 98.7, RR 24, SpO2 93% on RA, Labs significant for BNP 58962, Trop neg x 1, Hgb 10.6, BUN/Cr 51/1.98, COVID PCR negative, EKG revealed NSR with LAD, no ST-T changes, and a CXR revealing congestion and/or infiltrate. Pt received Lasix 40mg IVP x 1 and Nitro SL x 1. Pt now admitted to [facility] for further management of acute on chronic CHF exacerbation.    Endocrine was consulted for DM management  FSG & Insulin received:  Yesterday:  pre-dinner fs, 6  units lispro SS, no dinner  bedtime fs, 15 lantus   units  Today:  pre-breakfast fs, 6  units lispro, had panckes, banana, cereal  pre-lunch FSg - 301    DM history    Age at Dx: 10 years ago  How dx: regular blood work  Current Therapy: levemir 30 units QHS, Novolog 12 units premeal.    Hx of hypoglycemia: Happens approximately every other week with glucose as low as 35-40. The patient states that she feels sweaty / light headed and takes a glucose tab or eats some candy.   Hx of DKA/HHS - HHS in the past as per the patient    Home FSG:  Fastins-low 200s  Lunch: 130s  Dinner: 130s   Bed: 130s     Hx of other regimens  Complications: retinopathy, neuropathy, ? gastroparesis  Outpatient Endo:     [facility] Hx:DIABETIC FOOT ULCER; PAD  Neuropathy  PAD (peripheral artery disease)  Bipolar depression  Depression  HTN (hypertension)  DVT (deep venous thrombosis)  CVA (cerebral vascular accident)  DM (diabetes mellitus)  Acute on chronic systolic heart failure  Congestive heart failure, unspecified HF chronicity, unspecified heart failure type  Chronic pain  HLD (hyperlipidemia)  CVA (cerebral vascular accident)    SH:  Smoking: current smoker, approximately 3 cigarettes/day   Etoh: denies   Recreational Drugs: denies     Current Meds:  ALBUTerol    90 MICROgram(s) HFA Inhaler 2 Puff(s) Inhalation every 6 hours PRN  amitriptyline 10 milliGRAM(s) Oral at bedtime  apixaban 2.5 milliGRAM(s) Oral two times a day  atorvastatin 80 milliGRAM(s) Oral at bedtime  budesonide 160 MICROgram(s)/formoterol 4.5 MICROgram(s) Inhaler 2 Puff(s) Inhalation two times a day  busPIRone 10 milliGRAM(s) Oral two times a day  dexAMETHasone     Tablet 20 milliGRAM(s) Oral <User Schedule>  dextrose 40% Gel 15 Gram(s) Oral once PRN  dextrose 5%. 1000 milliLiter(s) IV Continuous <Continuous>  dextrose 50% Injectable 12.5 Gram(s) IV Push once  dextrose 50% Injectable 25 Gram(s) IV Push once  dextrose 50% Injectable 25 Gram(s) IV Push once  escitalopram 20 milliGRAM(s) Oral daily  fenofibrate Tablet 48 milliGRAM(s) Oral daily  furosemide   Injectable 40 milliGRAM(s) IV Push two times a day  gabapentin 100 milliGRAM(s) Oral two times a day  glucagon  Injectable 1 milliGRAM(s) IntraMuscular once PRN  insulin glargine Injectable (LANTUS) 15 Unit(s) SubCutaneous at bedtime  insulin lispro (HumaLOG) corrective regimen sliding scale   SubCutaneous Before meals and at bedtime  insulin lispro Injectable (HumaLOG) 6 Unit(s) SubCutaneous three times a day before meals  levothyroxine 25 MICROGram(s) Oral daily  metolazone 5 milliGRAM(s) Oral <User Schedule>  metoprolol succinate  milliGRAM(s) Oral daily  pantoprazole    Tablet 40 milliGRAM(s) Oral before breakfast  sacubitril 97 mG/valsartan 103 mG 1 Tablet(s) Oral two times a day  traZODone 50 milliGRAM(s) Oral two times a day      Allergies:  aspirin (Other (Moderate); Rash)  IV Contrast (Unknown)     Review of Systems:  Other Review of Systems: All other review of systems negative, except as noted in HPI	    Vital Signs Last 24 Hrs  T(C): 36.6 (10 Aug 2020 09:52), Max: 36.6 (10 Aug 2020 05:47)  T(F): 97.8 (10 Aug 2020 09:52), Max: 97.9 (10 Aug 2020 05:47)  HR: 66 (10 Aug 2020 13:20) (59 - 77)  BP: 141/63 (10 Aug 2020 13:20) (116/56 - 141/63)  BP(mean): 94 (09 Aug 2020 18:02) (88 - 94)  RR: 16 (10 Aug 2020 09:33) (16 - 18)  SpO2: 97% (10 Aug 2020 09:33) (95% - 98%)  Height (cm): 165.1 ( @ 15:09)  Weight (kg): 103.2 ( @ 15:09)  BMI (kg/m2): 37.9 ( @ 15:09)      Appearance: Normal	  HEENT:   Normal oral mucosa, PERRL, EOMI	  Neck: Supple, - JVD; No Carotid Bruit   Cardiovascular: Normal S1 S2, No JVD, No murmurs,   Respiratory: Decreased Breath Sounds scattered rales B/L, No Rales/Wheezing	  Gastrointestinal:  Soft, Non-tender, + BS	  Skin: No rashes, No ecchymoses, No cyanosis  Extremities: 2+ pitting edema B/L, Normal range of motion, No clubbing or cyanosis  Vascular: Peripheral pulses palpable 2+ bilaterally  Neurologic: Non-focal  Psychiatry: A & O x 3, Mood & affect appropriate    LABS:                        9.9    7.94  )-----------( 181      ( 10 Aug 2020 06:58 )             32.5     08-10    142  |  101  |  47<H>  ----------------------------<  116<H>  4.3   |  28  |  1.85<H>    Ca    9.6      10 Aug 2020 06:58  Mg     2.2     08-10    TPro  7.8  /  Alb  4.1  /  TBili  0.6  /  DBili  x   /  AST  19  /  ALT  20  /  AlkPhos  151<H>            Thyroid Stimulating Hormone, Serum: 6.626 ( @ 18:32)  Thyroid Stimulating Hormone, Serum: 1.630 ( @ 05:48)  Thyroid Stimulating Hormone, Serum: 8.491 ( @ 12:45)      RADIOLOGY & ADDITIONAL STUDIES:  CAPILLARY BLOOD GLUCOSE      POCT Blood Glucose.: 301 mg/dL (10 Aug 2020 12:43)  POCT Blood Glucose.: 119 mg/dL (10 Aug 2020 06:54)  POCT Blood Glucose.: 139 mg/dL (09 Aug 2020 21:36)  POCT Blood Glucose.: 147 mg/dL (09 Aug 2020 16:51)        A/P:59-year-old F with a PMH of HTN, NICM, chronic HFrEF with multiple admissions in the past (EF 25% s/p AICD for primary prevention), IDDM c/b neuropathy (on Methadone) and neuropathy, hx of recurrent DVTs (on Eliquis), CKD IV, CVA x2 (most recently in  with residual right sided weakness), PAD s/p bypass, COPD (on 2-3L home O2), DHARMESH on CPAP, and Multiple Myeloma, admitted for acute on chronic CHF exacerbation. Endocrinology consulted for DM and hypothyroidism management.     1.  DM Type 2- uncontrolled with complications and hyperglycemia  - Hba1c was 10. ( was 9.2 in 2020)  CKD Stage III to IV  Cr 1.85 and GFR 34  - Wt 90 kg with BMI 33  - carb consistent diet    Please start Lantus 18 units at night.  Please start lispro 8 units before each meal.  Please continue lispro moderate dose sliding scale four times daily with meals and at bedtime.   Please obtain FSg at 3 Am  Pt's fingerstick glucose goal is: .    2. Hypothyroidism   - During this admission, TSh was 6.626, Sr T4 7.48, total T3 70  - TSH 1.632, FT4 0.83, abs neg in 2020  Please continue levothyroxine 25 units before breakfast. Patient instructed to take levothyroxine on an empty stomach.     Will continue to monitor     For discharge, TBD    Pt can follow up at discharge with [facility] by calling  to make an appointment.   Will discuss case with Dr. Knutson and update primary team.

## 2020-08-10 NOTE — CONSULT NOTE ADULT - ASSESSMENT
pt with IgG Kappa Myeloma, treated with weekly Decadron 20 mg with Ninlaro 3mg weekly on the same day...pt had nice response to current regiment with IgGKappa going from 2500->1500mg/dl...    Admitted now with CHF, poorly controlled DM...

## 2020-08-10 NOTE — PROGRESS NOTE ADULT - PROBLEM SELECTOR PLAN 2
CKD IV, baseline Cr 2-2.3 last admission; known to Dr. Neal   -Prateek today 1.85, on admission 1.98  -Avoid nephrotoxic agents and renally dose medications

## 2020-08-10 NOTE — PROGRESS NOTE ADULT - PROBLEM SELECTOR PLAN 6
A1C 10.1%  -Endocrine consulted  -Continue Lantus 15u HS, Humalog 6u TID and mISS  -Continue Gabapentin 100mg BID A1C 10.1%  -Endocrine consulted and following  -Continue Lantus 18u HS, Humalog 8u TID and mISS  -Continue Gabapentin 100mg BID

## 2020-08-10 NOTE — CONSULT NOTE ADULT - ATTENDING COMMENTS
Pt seen on rounds this afternoon.  Had been seen by our service during previous admission.  Now admitted for decompensated CHF.  Was taking 30 Lantus/12 premeal lispro at home, but running fingersticks of "200" in the AM, "130s" later in the day--though the A1c of 10% obviously suggests significantly higher blood sugars.  AM fingerstick today was 119 with only 15 Lantus last night, though expect higher glucoses tonight post Decadron this morning.  Will increase to 18 units, being unable to predict the degree of "carryover" from the steroid.  Will increase the pre-meal to 8 units for now, but will almost certainly need further increase.  (The spike to 301 pre-lunch was due to a fair amount of carb at breakfast today)

## 2020-08-10 NOTE — CONSULT NOTE ADULT - SUBJECTIVE AND OBJECTIVE BOX
HPI:  60yo obese Female, current smoker, with Contrast/Aspirin Allergy and an extensive PMHx including HTN, NICM, chronic HFrEF with multiple admissions in the past (EF 25% s/p AICD for primary prevention), IDDM c/b neuropathy (on Methadone), hx of recurrent DVTs (on Eliquis), CKD IV, CVA x2 (most recently in 2013 with residual right sided weakness), PAD s/p bypass, COPD (on 2-3L home O2), DHARMESH on CPAP, Multiple Myeloma (on Dexamethasone and Ninlaro), hypothyroidism, depression and recent admission on 3/11/20 for B/L LE Cellulitis (L>R) s/p Vanc/Zosyn and Bactrim/Augmentin x 2 weeks, who now presents to [facility] ED c/o worsening SOB x 2 days. Pt endorses SOB occurs both at rest and with exertion, with associated fatigue, 5 pillow orthopnea, PND and LE edema. She denies any CP, palpitations, dizziness, syncope, diaphoresis, N/V/D, abd pain, cough, congestion, fever, chills or recent sick contact.  On arrival to ED, /79, HR 81bpm, T 98.7, RR 24, SpO2 93% on RA, Labs significant for BNP 11432, Trop neg x 1, Hgb 10.6, BUN/Cr 51/1.98, COVID PCR negative, EKG revealed NSR with LAD, no ST-T changes, and a CXR revealing congestion and/or infiltrate. Pt received Lasix 40mg IVP x 1 and Nitro SL x 1. Pt now admitted to [facility] for further management of acute on chronic CHF exacerbation. (09 Aug 2020 14:11)      PAST MEDICAL & SURGICAL HISTORY:  CKD (chronic kidney disease), stage IV  Multiple myeloma  Hypothyroidism  Neuropathy  PAD (peripheral artery disease)  Bipolar depression  Depression  HTN (hypertension)  DVT (deep venous thrombosis)  CVA (cerebral vascular accident)  DM (diabetes mellitus)  Chronic pain  HLD (hyperlipidemia)  COPD (chronic obstructive pulmonary disease)  CHF (congestive heart failure)  H/O extremity bypass graft  History of cholecystectomy  H/O tubal ligation  H/O abdominal hysterectomy  AICD (automatic cardioverter/defibrillator) present       Review of Systems:  · General	negative  · Skin/Breast	negative  · Ophthalmologic	negative  · ENMT	negative  · Respiratory and Thorax	negative  · Cardiovascular	negative  · Gastrointestinal	negative  · Genitourinary	negative  · Musculoskeletal Comments	negative  · Neurological	negative      MEDICATIONS  (STANDING):  amitriptyline 10 milliGRAM(s) Oral at bedtime  apixaban 2.5 milliGRAM(s) Oral two times a day  atorvastatin 80 milliGRAM(s) Oral at bedtime  budesonide 160 MICROgram(s)/formoterol 4.5 MICROgram(s) Inhaler 2 Puff(s) Inhalation two times a day  busPIRone 10 milliGRAM(s) Oral two times a day  dexAMETHasone     Tablet 20 milliGRAM(s) Oral <User Schedule>  dextrose 5%. 1000 milliLiter(s) (50 mL/Hr) IV Continuous <Continuous>  dextrose 50% Injectable 12.5 Gram(s) IV Push once  dextrose 50% Injectable 25 Gram(s) IV Push once  dextrose 50% Injectable 25 Gram(s) IV Push once  escitalopram 20 milliGRAM(s) Oral daily  fenofibrate Tablet 48 milliGRAM(s) Oral daily  furosemide   Injectable 40 milliGRAM(s) IV Push two times a day  gabapentin 100 milliGRAM(s) Oral two times a day  insulin glargine Injectable (LANTUS) 18 Unit(s) SubCutaneous at bedtime  insulin lispro (HumaLOG) corrective regimen sliding scale   SubCutaneous Before meals and at bedtime  insulin lispro Injectable (HumaLOG) 8 Unit(s) SubCutaneous three times a day before meals  levothyroxine 25 MICROGram(s) Oral daily  metolazone 5 milliGRAM(s) Oral <User Schedule>  metoprolol succinate  milliGRAM(s) Oral daily  pantoprazole    Tablet 40 milliGRAM(s) Oral before breakfast  sacubitril 97 mG/valsartan 103 mG 1 Tablet(s) Oral two times a day  traZODone 50 milliGRAM(s) Oral two times a day    MEDICATIONS  (PRN):  ALBUTerol    90 MICROgram(s) HFA Inhaler 2 Puff(s) Inhalation every 6 hours PRN Shortness of Breath and/or Wheezing  dextrose 40% Gel 15 Gram(s) Oral once PRN Blood Glucose LESS THAN 70 milliGRAM(s)/deciliter  glucagon  Injectable 1 milliGRAM(s) IntraMuscular once PRN Glucose LESS THAN 70 milligrams/deciliter      Allergies    aspirin (Other (Moderate); Rash)  IV Contrast (Unknown)    Intolerances        SOCIAL HISTORY:    FAMILY HISTORY:  Family history of diabetes mellitus (Mother)  Family history of hypertension (Mother)      Vital Signs Last 24 Hrs  T(C): 36.2 (10 Aug 2020 13:40), Max: 36.6 (10 Aug 2020 05:47)  T(F): 97.1 (10 Aug 2020 13:40), Max: 97.9 (10 Aug 2020 05:47)  HR: 72 (10 Aug 2020 17:21) (59 - 75)  BP: 140/59 (10 Aug 2020 17:21) (116/56 - 141/63)  BP(mean): 94 (09 Aug 2020 18:02) (94 - 94)  RR: 18 (10 Aug 2020 17:21) (16 - 18)  SpO2: 96% (10 Aug 2020 17:21) (87% - 98%)     Physical Exam:  · Constitutional	detailed exam  · Constitutional Details	well-developed; well-groomed  · Eyes	EOMI; PERRL; no drainage or redness  · ENMT Comments	dry mucous membranes  · Respiratory	detailed exam  · Respiratory Comments	normal breath sounds at the lung bases bilaterally  · Cardiovascular	Regular rate & rhythm, normal S1, S2; no murmurs, gallops or rubs; no S3, S4  · Abd-Soft non tender  ·Ext-no edema, clubbing or cyanosis      LABS:                        9.9    7.94  )-----------( 181      ( 10 Aug 2020 06:58 )             32.5     08-10    142  |  101  |  47<H>  ----------------------------<  116<H>  4.3   |  28  |  1.85<H>    Ca    9.6      10 Aug 2020 06:58  Mg     2.2     08-10    TPro  7.8  /  Alb  4.1  /  TBili  0.6  /  DBili  x   /  AST  19  /  ALT  20  /  AlkPhos  151<H>  08-09          RADIOLOGY & ADDITIONAL STUDIES:

## 2020-08-10 NOTE — PROGRESS NOTE ADULT - PROBLEM SELECTOR PLAN 8
TSH 6.626, TotalT3 3.7, T4 7.48  -Endocrinology consulted  -Continue Home Levothyroxine 25mcg QD TSH 6.626, TotalT3 3.7, T4 7.48  -Endocrinology consulted and no changes to home med  -Continue Home Levothyroxine 25mcg QD

## 2020-08-10 NOTE — PROGRESS NOTE ADULT - SUBJECTIVE AND OBJECTIVE BOX
Cardiology PA Adult Progress Note    Subjective Assessment: Pt seen and examined at bedside this AM without any complaints or events overnight. She reports that SOB has improved but endorses persistent fatigue. Pt denies any CP, palpitations, dizziness, or abd pain.  ROS negative except for HPI or subjective.  	  MEDICATIONS:  furosemide   Injectable 40 milliGRAM(s) IV Push two times a day  metolazone 5 milliGRAM(s) Oral <User Schedule>  metoprolol succinate  milliGRAM(s) Oral daily  sacubitril 97 mG/valsartan 103 mG 1 Tablet(s) Oral two times a day  ALBUTerol    90 MICROgram(s) HFA Inhaler 2 Puff(s) Inhalation every 6 hours PRN  budesonide 160 MICROgram(s)/formoterol 4.5 MICROgram(s) Inhaler 2 Puff(s) Inhalation two times a day  amitriptyline 10 milliGRAM(s) Oral at bedtime  busPIRone 10 milliGRAM(s) Oral two times a day  escitalopram 20 milliGRAM(s) Oral daily  gabapentin 100 milliGRAM(s) Oral two times a day  traZODone 50 milliGRAM(s) Oral two times a day  pantoprazole    Tablet 40 milliGRAM(s) Oral before breakfast  atorvastatin 80 milliGRAM(s) Oral at bedtime  dexAMETHasone     Tablet 20 milliGRAM(s) Oral <User Schedule>  dextrose 40% Gel 15 Gram(s) Oral once PRN  dextrose 50% Injectable 12.5 Gram(s) IV Push once  dextrose 50% Injectable 25 Gram(s) IV Push once  dextrose 50% Injectable 25 Gram(s) IV Push once  fenofibrate Tablet 48 milliGRAM(s) Oral daily  glucagon  Injectable 1 milliGRAM(s) IntraMuscular once PRN  insulin glargine Injectable (LANTUS) 15 Unit(s) SubCutaneous at bedtime  insulin lispro (HumaLOG) corrective regimen sliding scale   SubCutaneous Before meals and at bedtime  insulin lispro Injectable (HumaLOG) 6 Unit(s) SubCutaneous three times a day before meals  levothyroxine 25 MICROGram(s) Oral daily  apixaban 2.5 milliGRAM(s) Oral two times a day  dextrose 5%. 1000 milliLiter(s) IV Continuous <Continuous>  ixazomib 3 milliGRAM(s) Oral every 7 days    VITAL SIGNS:  T(C): 36.6 (08-10-20 @ 09:52), Max: 36.6 (08-09-20 @ 11:48)  HR: 64 (08-10-20 @ 09:53) (59 - 77)  BP: 136/65 (08-10-20 @ 09:53) (116/56 - 139/67)  RR: 16 (08-10-20 @ 09:33) (16 - 18)  SpO2: 97% (08-10-20 @ 09:33) (95% - 98%)  Height (cm): 165.1 (08-09 @ 15:09)  Weight (kg): 103.2 (08-09 @ 15:09)  BMI (kg/m2): 37.9 (08-09 @ 15:09)  BSA (m2): 2.09 (08-09 @ 15:09)    I&O's Summary  09 Aug 2020 07:01  -  10 Aug 2020 07:00  --------------------------------------------------------  IN: 160 mL / OUT: 800 mL / NET: -640 mL    10 Aug 2020 07:01  -  10 Aug 2020 11:15  --------------------------------------------------------  IN: 298 mL / OUT: 0 mL / NET: 298 mL    PHYSICAL EXAM:  Appearance: Normal	  HEENT:   Normal oral mucosa, PERRL, EOMI	  Neck: Supple, - JVD; No Carotid Bruit   Cardiovascular: Normal S1 S2, No JVD, No murmurs,   Respiratory: Decreased Breath Sounds scattered rales B/L, No Rales/Wheezing	  Gastrointestinal:  Soft, Non-tender, + BS	  Skin: No rashes, No ecchymoses, No cyanosis  Extremities: 2+ pitting edema B/L, Normal range of motion, No clubbing or cyanosis  Vascular: Peripheral pulses palpable 2+ bilaterally  Neurologic: Non-focal  Psychiatry: A & O x 3, Mood & affect appropriate	    LABS:	 	             9.9    7.94  )-----------( 181      ( 10 Aug 2020 06:58 )             32.5     142  |  101  |  47<H>  ----------------------------<  116<H>  4.3   |  28  |  1.85<H>    Ca    9.6      10 Aug 2020 06:58  Mg     2.2     08-10  TPro  7.8  /  Alb  4.1  /  TBili  0.6  /  DBili  x   /  AST  19  /  ALT  20  /  AlkPhos  151<H>  08-09  TSH: Thyroid Stimulating Hormone, Serum: 6.626 uIU/mL (08-09 @ 18:32)

## 2020-08-10 NOTE — PROGRESS NOTE ADULT - ASSESSMENT
58yo obese Female, current smoker, with Contrast/Aspirin Allergy and an extensive PMHx including HTN, NICM, chronic HFrEF with multiple admissions in the past (EF 25% s/p AICD for primary prevention), IDDM c/b neuropathy (on Methadone), hx of recurrent DVTs (on Eliquis), CKD IV, CVA x2 (most recently in 2013 with residual right sided weakness), PAD s/p bypass, COPD (on 2-3L home O2), DHARMESH on CPAP, Multiple Myeloma (on Dexamethasone and Ninlaro), hypothyroidism, depression and recent admission on 3/11/20 for B/L LE Cellulitis (L>R) s/p Vanc/Zosyn and Bactrim/Augmentin x 2 weeks, who now presents to [facility] c/o worsening SOB with associated fatigue, 5 pillow orthopnea, PND and LE edema. Pt now admitted to [facility] for further management of acute on chronic CHF exacerbation. 60yo obese Female, current smoker, with Contrast/Aspirin Allergy and an extensive PMHx including HTN, NICM, chronic HFrEF with multiple admissions in the past (EF 25% s/p AICD for primary prevention), IDDM c/b neuropathy (on Methadone), hx of recurrent DVTs (on Eliquis), CKD IV, CVA x2 (most recently in 2013 with residual right sided weakness), PAD s/p bypass, COPD (on 2-3L home O2), DHARMESH on CPAP, Multiple Myeloma (on Dexamethasone and Ninlaro), hypothyroidism, depression and recent admission on 3/11/20 for B/L LE Cellulitis (L>R) s/p Vanc/Zosyn and Bactrim/Augmentin x 2 weeks, who now presents to [facility] c/o worsening SOB with associated fatigue, 5 pillow orthopnea, PND and LE edema. Pt now admitted to [facility] for further management of acute on chronic CHF exacerbation.

## 2020-08-11 LAB
ANION GAP SERPL CALC-SCNC: 10 MMOL/L — SIGNIFICANT CHANGE UP (ref 5–17)
ANION GAP SERPL CALC-SCNC: 12 MMOL/L — SIGNIFICANT CHANGE UP (ref 5–17)
BUN SERPL-MCNC: 47 MG/DL — HIGH (ref 7–23)
BUN SERPL-MCNC: 48 MG/DL — HIGH (ref 7–23)
CALCIUM SERPL-MCNC: 9.6 MG/DL — SIGNIFICANT CHANGE UP (ref 8.4–10.5)
CALCIUM SERPL-MCNC: 9.8 MG/DL — SIGNIFICANT CHANGE UP (ref 8.4–10.5)
CHLORIDE SERPL-SCNC: 93 MMOL/L — LOW (ref 96–108)
CHLORIDE SERPL-SCNC: 98 MMOL/L — SIGNIFICANT CHANGE UP (ref 96–108)
CO2 SERPL-SCNC: 31 MMOL/L — SIGNIFICANT CHANGE UP (ref 22–31)
CO2 SERPL-SCNC: 32 MMOL/L — HIGH (ref 22–31)
CREAT SERPL-MCNC: 1.89 MG/DL — HIGH (ref 0.5–1.3)
CREAT SERPL-MCNC: 1.95 MG/DL — HIGH (ref 0.5–1.3)
GLUCOSE BLDC GLUCOMTR-MCNC: 142 MG/DL — HIGH (ref 70–99)
GLUCOSE BLDC GLUCOMTR-MCNC: 143 MG/DL — HIGH (ref 70–99)
GLUCOSE BLDC GLUCOMTR-MCNC: 170 MG/DL — HIGH (ref 70–99)
GLUCOSE BLDC GLUCOMTR-MCNC: 178 MG/DL — HIGH (ref 70–99)
GLUCOSE BLDC GLUCOMTR-MCNC: 186 MG/DL — HIGH (ref 70–99)
GLUCOSE BLDC GLUCOMTR-MCNC: 256 MG/DL — HIGH (ref 70–99)
GLUCOSE BLDC GLUCOMTR-MCNC: 302 MG/DL — HIGH (ref 70–99)
GLUCOSE SERPL-MCNC: 162 MG/DL — HIGH (ref 70–99)
GLUCOSE SERPL-MCNC: 171 MG/DL — HIGH (ref 70–99)
HCT VFR BLD CALC: 33.7 % — LOW (ref 34.5–45)
HGB BLD-MCNC: 10.1 G/DL — LOW (ref 11.5–15.5)
MAGNESIUM SERPL-MCNC: 2.2 MG/DL — SIGNIFICANT CHANGE UP (ref 1.6–2.6)
MCHC RBC-ENTMCNC: 26.5 PG — LOW (ref 27–34)
MCHC RBC-ENTMCNC: 30 GM/DL — LOW (ref 32–36)
MCV RBC AUTO: 88.5 FL — SIGNIFICANT CHANGE UP (ref 80–100)
NRBC # BLD: 0 /100 WBCS — SIGNIFICANT CHANGE UP (ref 0–0)
PLATELET # BLD AUTO: 190 K/UL — SIGNIFICANT CHANGE UP (ref 150–400)
POTASSIUM SERPL-MCNC: 3.9 MMOL/L — SIGNIFICANT CHANGE UP (ref 3.5–5.3)
POTASSIUM SERPL-MCNC: 5 MMOL/L — SIGNIFICANT CHANGE UP (ref 3.5–5.3)
POTASSIUM SERPL-SCNC: 3.9 MMOL/L — SIGNIFICANT CHANGE UP (ref 3.5–5.3)
POTASSIUM SERPL-SCNC: 5 MMOL/L — SIGNIFICANT CHANGE UP (ref 3.5–5.3)
RBC # BLD: 3.81 M/UL — SIGNIFICANT CHANGE UP (ref 3.8–5.2)
RBC # FLD: 18.4 % — HIGH (ref 10.3–14.5)
SODIUM SERPL-SCNC: 137 MMOL/L — SIGNIFICANT CHANGE UP (ref 135–145)
SODIUM SERPL-SCNC: 139 MMOL/L — SIGNIFICANT CHANGE UP (ref 135–145)
WBC # BLD: 9.21 K/UL — SIGNIFICANT CHANGE UP (ref 3.8–10.5)
WBC # FLD AUTO: 9.21 K/UL — SIGNIFICANT CHANGE UP (ref 3.8–10.5)

## 2020-08-11 PROCEDURE — 99233 SBSQ HOSP IP/OBS HIGH 50: CPT

## 2020-08-11 PROCEDURE — 99232 SBSQ HOSP IP/OBS MODERATE 35: CPT | Mod: GC

## 2020-08-11 PROCEDURE — 99232 SBSQ HOSP IP/OBS MODERATE 35: CPT

## 2020-08-11 RX ORDER — FUROSEMIDE 40 MG
40 TABLET ORAL
Refills: 0 | Status: DISCONTINUED | OUTPATIENT
Start: 2020-08-11 | End: 2020-08-11

## 2020-08-11 RX ORDER — FUROSEMIDE 40 MG
10 TABLET ORAL
Qty: 500 | Refills: 0 | Status: DISCONTINUED | OUTPATIENT
Start: 2020-08-11 | End: 2020-08-12

## 2020-08-11 RX ORDER — INSULIN LISPRO 100/ML
12 VIAL (ML) SUBCUTANEOUS
Refills: 0 | Status: DISCONTINUED | OUTPATIENT
Start: 2020-08-11 | End: 2020-08-12

## 2020-08-11 RX ORDER — METOPROLOL TARTRATE 50 MG
100 TABLET ORAL DAILY
Refills: 0 | Status: DISCONTINUED | OUTPATIENT
Start: 2020-08-11 | End: 2020-08-12

## 2020-08-11 RX ORDER — INSULIN GLARGINE 100 [IU]/ML
20 INJECTION, SOLUTION SUBCUTANEOUS AT BEDTIME
Refills: 0 | Status: DISCONTINUED | OUTPATIENT
Start: 2020-08-11 | End: 2020-08-12

## 2020-08-11 RX ADMIN — Medication 48 MILLIGRAM(S): at 12:01

## 2020-08-11 RX ADMIN — Medication 10 MILLIGRAM(S): at 21:51

## 2020-08-11 RX ADMIN — SACUBITRIL AND VALSARTAN 1 TABLET(S): 24; 26 TABLET, FILM COATED ORAL at 06:21

## 2020-08-11 RX ADMIN — APIXABAN 2.5 MILLIGRAM(S): 2.5 TABLET, FILM COATED ORAL at 18:02

## 2020-08-11 RX ADMIN — Medication 10 MILLIGRAM(S): at 18:02

## 2020-08-11 RX ADMIN — SACUBITRIL AND VALSARTAN 1 TABLET(S): 24; 26 TABLET, FILM COATED ORAL at 18:01

## 2020-08-11 RX ADMIN — Medication 10 MILLIGRAM(S): at 06:24

## 2020-08-11 RX ADMIN — Medication 25 MICROGRAM(S): at 06:20

## 2020-08-11 RX ADMIN — Medication 2: at 06:58

## 2020-08-11 RX ADMIN — APIXABAN 2.5 MILLIGRAM(S): 2.5 TABLET, FILM COATED ORAL at 06:20

## 2020-08-11 RX ADMIN — BUDESONIDE AND FORMOTEROL FUMARATE DIHYDRATE 2 PUFF(S): 160; 4.5 AEROSOL RESPIRATORY (INHALATION) at 07:01

## 2020-08-11 RX ADMIN — GABAPENTIN 100 MILLIGRAM(S): 400 CAPSULE ORAL at 06:21

## 2020-08-11 RX ADMIN — Medication 40 MILLIGRAM(S): at 06:58

## 2020-08-11 RX ADMIN — BUDESONIDE AND FORMOTEROL FUMARATE DIHYDRATE 2 PUFF(S): 160; 4.5 AEROSOL RESPIRATORY (INHALATION) at 18:03

## 2020-08-11 RX ADMIN — Medication 50 MILLIGRAM(S): at 18:01

## 2020-08-11 RX ADMIN — Medication 200 MILLIGRAM(S): at 06:21

## 2020-08-11 RX ADMIN — INSULIN GLARGINE 20 UNIT(S): 100 INJECTION, SOLUTION SUBCUTANEOUS at 22:17

## 2020-08-11 RX ADMIN — ESCITALOPRAM OXALATE 20 MILLIGRAM(S): 10 TABLET, FILM COATED ORAL at 12:01

## 2020-08-11 RX ADMIN — Medication 8: at 12:03

## 2020-08-11 RX ADMIN — ALBUTEROL 2 PUFF(S): 90 AEROSOL, METERED ORAL at 06:25

## 2020-08-11 RX ADMIN — GABAPENTIN 100 MILLIGRAM(S): 400 CAPSULE ORAL at 18:02

## 2020-08-11 RX ADMIN — Medication 50 MILLIGRAM(S): at 06:21

## 2020-08-11 RX ADMIN — ATORVASTATIN CALCIUM 80 MILLIGRAM(S): 80 TABLET, FILM COATED ORAL at 21:50

## 2020-08-11 RX ADMIN — PANTOPRAZOLE SODIUM 40 MILLIGRAM(S): 20 TABLET, DELAYED RELEASE ORAL at 06:21

## 2020-08-11 RX ADMIN — Medication 8 UNIT(S): at 14:41

## 2020-08-11 RX ADMIN — Medication 5 MG/HR: at 09:57

## 2020-08-11 RX ADMIN — Medication 8 UNIT(S): at 08:30

## 2020-08-11 RX ADMIN — Medication 12 UNIT(S): at 18:46

## 2020-08-11 NOTE — PROGRESS NOTE ADULT - PROBLEM SELECTOR PLAN 10
-Continue Escitalopram 20mg QD, Amitriptyline 10mg QD and Buspirone 10mg QD      F: None, 1.2L fluid restriction  E: Replete if K<4 or Mag<2  N: DASH Diet  GIppx: Pantoprazole  VTEppx: Eliquis  Dispo: pending diuresis
-Continue Escitalopram 20mg QD, Amitriptyline 10mg QD and Buspirone 10mg QD      F: None, 1.2L fluid restriction  E: Replete if K<4 or Mag<2  N: DASH Diet  GIppx: Pantoprazole  VTEppx: Eliquis  Dispo: pending diuresis

## 2020-08-11 NOTE — PROGRESS NOTE ADULT - PROBLEM SELECTOR PROBLEM 1
Multiple myeloma not having achieved remission
Acute on chronic systolic congestive heart failure
Acute on chronic systolic congestive heart failure

## 2020-08-11 NOTE — PROGRESS NOTE ADULT - SUBJECTIVE AND OBJECTIVE BOX
HEALTH ISSUES - PROBLEM Dx:  Multiple myeloma not having achieved remission: Multiple myeloma not having achieved remission  Bipolar depression: Bipolar depression  Multiple myeloma: Multiple myeloma  Hypothyroidism: Hypothyroidism  COPD (chronic obstructive pulmonary disease): COPD (chronic obstructive pulmonary disease)  DM (diabetes mellitus): DM (diabetes mellitus)  DVT (deep venous thrombosis): DVT (deep venous thrombosis)  HLD (hyperlipidemia): HLD (hyperlipidemia)  HTN (hypertension): HTN (hypertension)  CKD (chronic kidney disease), stage IV: CKD (chronic kidney disease), stage IV  Acute on chronic systolic congestive heart failure: Acute on chronic systolic congestive heart failure          CHEMOTHERAPY REGIMEN:        Day:                          Diet:  Protocol:                                    IVF:      MEDICATIONS  (STANDING):  amitriptyline 10 milliGRAM(s) Oral at bedtime  apixaban 2.5 milliGRAM(s) Oral two times a day  atorvastatin 80 milliGRAM(s) Oral at bedtime  budesonide 160 MICROgram(s)/formoterol 4.5 MICROgram(s) Inhaler 2 Puff(s) Inhalation two times a day  busPIRone 10 milliGRAM(s) Oral two times a day  dexAMETHasone     Tablet 20 milliGRAM(s) Oral <User Schedule>  dextrose 5%. 1000 milliLiter(s) (50 mL/Hr) IV Continuous <Continuous>  dextrose 50% Injectable 12.5 Gram(s) IV Push once  dextrose 50% Injectable 25 Gram(s) IV Push once  dextrose 50% Injectable 25 Gram(s) IV Push once  escitalopram 20 milliGRAM(s) Oral daily  fenofibrate Tablet 48 milliGRAM(s) Oral daily  furosemide Infusion 10 mG/Hr (5 mL/Hr) IV Continuous <Continuous>  gabapentin 100 milliGRAM(s) Oral two times a day  insulin glargine Injectable (LANTUS) 18 Unit(s) SubCutaneous at bedtime  insulin lispro (HumaLOG) corrective regimen sliding scale   SubCutaneous Before meals and at bedtime  insulin lispro Injectable (HumaLOG) 8 Unit(s) SubCutaneous three times a day before meals  levothyroxine 25 MICROGram(s) Oral daily  metolazone 5 milliGRAM(s) Oral <User Schedule>  metoprolol succinate  milliGRAM(s) Oral daily  pantoprazole    Tablet 40 milliGRAM(s) Oral before breakfast  sacubitril 97 mG/valsartan 103 mG 1 Tablet(s) Oral two times a day  traZODone 50 milliGRAM(s) Oral two times a day    MEDICATIONS  (PRN):  ALBUTerol    90 MICROgram(s) HFA Inhaler 2 Puff(s) Inhalation every 6 hours PRN Shortness of Breath and/or Wheezing  dextrose 40% Gel 15 Gram(s) Oral once PRN Blood Glucose LESS THAN 70 milliGRAM(s)/deciliter  glucagon  Injectable 1 milliGRAM(s) IntraMuscular once PRN Glucose LESS THAN 70 milligrams/deciliter      Allergies    aspirin (Other (Moderate); Rash)  IV Contrast (Unknown)    Intolerances        DVT Prophylaxis: [ ] YES [ ] NO      Antibiotics: [ ] YES [ ] NO    Pain Scale (1-10):       Location:    Vital Signs Last 24 Hrs  T(C): 36.1 (11 Aug 2020 09:11), Max: 36.6 (10 Aug 2020 21:17)  T(F): 96.9 (11 Aug 2020 09:11), Max: 97.8 (10 Aug 2020 21:17)  HR: 64 (11 Aug 2020 09:00) (58 - 82)  BP: 115/59 (11 Aug 2020 09:00) (111/56 - 150/64)  BP(mean): 84 (11 Aug 2020 07:45) (78 - 84)  RR: 18 (11 Aug 2020 09:00) (16 - 18)  SpO2: 98% (11 Aug 2020 09:00) (87% - 98%)    Drug Dosing Weight  Height (cm): 165.1 (09 Aug 2020 15:09)  Weight (kg): 103.2 (09 Aug 2020 15:09)  BMI (kg/m2): 37.9 (09 Aug 2020 15:09)  BSA (m2): 2.09 (09 Aug 2020 15:09)     Physical Exam:  · Constitutional	detailed exam  · Constitutional Details	well-developed; well-groomed  · Eyes	EOMI; PERRL; no drainage or redness  · ENMT Comments	dry mucous membranes  · Respiratory	detailed exam  · Respiratory Comments	normal breath sounds at the lung bases bilaterally  · Cardiovascular	Regular rate & rhythm, normal S1, S2; no murmurs, gallops or rubs; no S3, S4  · Abd-Soft non tender  ·Ext-no edema, clubbing or cyanosis    URINARY CATHETER: [ ] YES [ ] NO     LABS:  CBC Full  -  ( 11 Aug 2020 06:51 )  WBC Count : 9.21 K/uL  RBC Count : 3.81 M/uL  Hemoglobin : 10.1 g/dL  Hematocrit : 33.7 %  Platelet Count - Automated : 190 K/uL  Mean Cell Volume : 88.5 fl  Mean Cell Hemoglobin : 26.5 pg  Mean Cell Hemoglobin Concentration : 30.0 gm/dL  Auto Neutrophil # : x  Auto Lymphocyte # : x  Auto Monocyte # : x  Auto Eosinophil # : x  Auto Basophil # : x  Auto Neutrophil % : x  Auto Lymphocyte % : x  Auto Monocyte % : x  Auto Eosinophil % : x  Auto Basophil % : x    08-11    139  |  98  |  47<H>  ----------------------------<  171<H>  5.0   |  31  |  1.89<H>    Ca    9.6      11 Aug 2020 06:51  Mg     2.2     08-11            CULTURES:    RADIOLOGY & ADDITIONAL STUDIES:

## 2020-08-11 NOTE — PROGRESS NOTE ADULT - PROBLEM SELECTOR PLAN 3
SBP stable  -Continue home Entresto 97/103mg BID, Furosemide gtt @ 10mg/hr and Toprol XL 100mg QD SBP stable  -Continue Entresto 97/103mg BID, Furosemide gtt @ 10mg/hr and Toprol XL 100mg QD

## 2020-08-11 NOTE — PROGRESS NOTE ADULT - PROBLEM SELECTOR PLAN 6
A1C 10.1%  -Endocrine consulted and following  -Continue Lantus 18u HS, Humalog 8u TID and mISS  -Continue Gabapentin 100mg BID A1C 10.1%  -Endocrine consulted and following  -Continue Lantus 20u HS, Humalog 12u TID and mISS  -Continue Gabapentin 100mg BID

## 2020-08-11 NOTE — PROGRESS NOTE ADULT - PROBLEM SELECTOR PLAN 4
, HDL 33, Triglycerides 143, Total 186  -Continue home Atorvastatin 80mg HS and Fenofibrate 48mg QD
, HDL 33, Triglycerides 143, Total 186  -Continue home Atorvastatin 80mg HS and Fenofibrate 48mg QD

## 2020-08-11 NOTE — PROGRESS NOTE ADULT - PROBLEM SELECTOR PLAN 2
CKD IV, baseline Cr 2-2.3 last admission; known to Dr. Neal   -Cr today 1.89, on admission 1.98  -Avoid nephrotoxic agents and renally dose medications CKD IV, baseline Cr 2-2.3 last admission; known to Dr. Neal   -Cr today 1.89, on admission 1.98  -f/u repeat BMP at 5pm  -Avoid nephrotoxic agents and renally dose medications

## 2020-08-11 NOTE — PROGRESS NOTE ADULT - SUBJECTIVE AND OBJECTIVE BOX
Cardiology PA Adult Progress Note    Subjective Assessment: Pt seen and examined at bedside this AM without any complaints or events overnight. She states SOB and fatigue improvement. She denies any CP, palpitations, dizziness or abd pain.  ROS negative except for HPI or subjective.  	  MEDICATIONS:  furosemide Infusion 10 mG/Hr IV Continuous <Continuous>  metolazone 5 milliGRAM(s) Oral <User Schedule>  metoprolol succinate  milliGRAM(s) Oral daily  sacubitril 97 mG/valsartan 103 mG 1 Tablet(s) Oral two times a day  ALBUTerol    90 MICROgram(s) HFA Inhaler 2 Puff(s) Inhalation every 6 hours PRN  budesonide 160 MICROgram(s)/formoterol 4.5 MICROgram(s) Inhaler 2 Puff(s) Inhalation two times a day  amitriptyline 10 milliGRAM(s) Oral at bedtime  busPIRone 10 milliGRAM(s) Oral two times a day  escitalopram 20 milliGRAM(s) Oral daily  gabapentin 100 milliGRAM(s) Oral two times a day  traZODone 50 milliGRAM(s) Oral two times a day  pantoprazole    Tablet 40 milliGRAM(s) Oral before breakfast  atorvastatin 80 milliGRAM(s) Oral at bedtime  dexAMETHasone     Tablet 20 milliGRAM(s) Oral <User Schedule>  dextrose 40% Gel 15 Gram(s) Oral once PRN  dextrose 50% Injectable 12.5 Gram(s) IV Push once  dextrose 50% Injectable 25 Gram(s) IV Push once  dextrose 50% Injectable 25 Gram(s) IV Push once  fenofibrate Tablet 48 milliGRAM(s) Oral daily  glucagon  Injectable 1 milliGRAM(s) IntraMuscular once PRN  insulin glargine Injectable (LANTUS) 18 Unit(s) SubCutaneous at bedtime  insulin lispro (HumaLOG) corrective regimen sliding scale   SubCutaneous Before meals and at bedtime  insulin lispro Injectable (HumaLOG) 8 Unit(s) SubCutaneous three times a day before meals  levothyroxine 25 MICROGram(s) Oral daily  apixaban 2.5 milliGRAM(s) Oral two times a day  dextrose 5%. 1000 milliLiter(s) IV Continuous <Continuous>    VITAL SIGNS:  T(C): 36.1 (08-11-20 @ 06:32), Max: 36.6 (08-10-20 @ 09:52)  HR: 66 (08-11-20 @ 07:45) (58 - 82)  BP: 126/58 (08-11-20 @ 07:45) (111/56 - 150/64)  RR: 18 (08-11-20 @ 07:45) (16 - 18)  SpO2: 98% (08-11-20 @ 07:45) (87% - 98%)    I&O's Summary  10 Aug 2020 07:01  -  11 Aug 2020 07:00  --------------------------------------------------------  IN: 754 mL / OUT: 1900 mL / NET: -1146 mL    PHYSICAL EXAM:  Appearance: Normal	  HEENT:   Normal oral mucosa, PERRL, EOMI	  Neck: Supple, + JVD/ - JVD; Carotid Bruit   Cardiovascular: Normal S1 S2, No JVD, No murmurs,   Respiratory: Lungs clear to auscultation/Decreased Breath Sounds/No Rales, Rhonchi, Wheezing	  Gastrointestinal:  Soft, Non-tender, + BS	  Skin: No rashes, No ecchymoses, No cyanosis  Extremities: Normal range of motion, No clubbing, cyanosis or edema  Vascular: Peripheral pulses palpable 2+ bilaterally  Neurologic: Non-focal  Psychiatry: A & O x 3, Mood & affect appropriate    LABS:	 	                      10.1   9.21  )-----------( 190      ( 11 Aug 2020 06:51 )             33.7     139  |  98  |  47<H>  ----------------------------<  171<H>  5.0   |  31  |  1.89<H>    Ca    9.6      11 Aug 2020 06:51  Mg     2.2     08-11  TPro  7.8  /  Alb  4.1  /  TBili  0.6  /  DBili  x   /  AST  19  /  ALT  20  /  AlkPhos  151<H>  08-09 Cardiology PA Adult Progress Note    Subjective Assessment: Pt seen and examined at bedside this AM without any complaints or events overnight. She states SOB and fatigue improvement. She denies any CP, palpitations, dizziness or abd pain.  ROS negative except for HPI or subjective.  	  MEDICATIONS:  furosemide Infusion 10 mG/Hr IV Continuous <Continuous>  metolazone 5 milliGRAM(s) Oral <User Schedule>  metoprolol succinate  milliGRAM(s) Oral daily  sacubitril 97 mG/valsartan 103 mG 1 Tablet(s) Oral two times a day  ALBUTerol    90 MICROgram(s) HFA Inhaler 2 Puff(s) Inhalation every 6 hours PRN  budesonide 160 MICROgram(s)/formoterol 4.5 MICROgram(s) Inhaler 2 Puff(s) Inhalation two times a day  amitriptyline 10 milliGRAM(s) Oral at bedtime  busPIRone 10 milliGRAM(s) Oral two times a day  escitalopram 20 milliGRAM(s) Oral daily  gabapentin 100 milliGRAM(s) Oral two times a day  traZODone 50 milliGRAM(s) Oral two times a day  pantoprazole    Tablet 40 milliGRAM(s) Oral before breakfast  atorvastatin 80 milliGRAM(s) Oral at bedtime  dexAMETHasone     Tablet 20 milliGRAM(s) Oral <User Schedule>  dextrose 40% Gel 15 Gram(s) Oral once PRN  dextrose 50% Injectable 12.5 Gram(s) IV Push once  dextrose 50% Injectable 25 Gram(s) IV Push once  dextrose 50% Injectable 25 Gram(s) IV Push once  fenofibrate Tablet 48 milliGRAM(s) Oral daily  glucagon  Injectable 1 milliGRAM(s) IntraMuscular once PRN  insulin glargine Injectable (LANTUS) 18 Unit(s) SubCutaneous at bedtime  insulin lispro (HumaLOG) corrective regimen sliding scale   SubCutaneous Before meals and at bedtime  insulin lispro Injectable (HumaLOG) 8 Unit(s) SubCutaneous three times a day before meals  levothyroxine 25 MICROGram(s) Oral daily  apixaban 2.5 milliGRAM(s) Oral two times a day  dextrose 5%. 1000 milliLiter(s) IV Continuous <Continuous>    VITAL SIGNS:  T(C): 36.1 (08-11-20 @ 06:32), Max: 36.6 (08-10-20 @ 09:52)  HR: 66 (08-11-20 @ 07:45) (58 - 82)  BP: 126/58 (08-11-20 @ 07:45) (111/56 - 150/64)  RR: 18 (08-11-20 @ 07:45) (16 - 18)  SpO2: 98% (08-11-20 @ 07:45) (87% - 98%)    I&O's Summary  10 Aug 2020 07:01  -  11 Aug 2020 07:00  --------------------------------------------------------  IN: 754 mL / OUT: 1900 mL / NET: -1146 mL    PHYSICAL EXAM:  Appearance: Normal	  HEENT:   Normal oral mucosa, PERRL, EOMI	  Neck: Supple, - JVD; No Carotid Bruit   Cardiovascular: Normal S1 S2, No JVD, No murmurs,   Respiratory: fine crackles L>R, Decreased Breath Sounds, No Rhonchi/Wheezing	  Gastrointestinal:  Soft, Non-tender, + BS	  Skin: No rashes, No ecchymoses, No cyanosis  Extremities: 1+ pitting edema B/L, Normal range of motion, No clubbing or cyanosis  Vascular: Peripheral pulses palpable 2+ bilaterally  Neurologic: Non-focal  Psychiatry: A & O x 3, Mood & affect appropriate    LABS:	 	                      10.1   9.21  )-----------( 190      ( 11 Aug 2020 06:51 )             33.7     139  |  98  |  47<H>  ----------------------------<  171<H>  5.0   |  31  |  1.89<H>    Ca    9.6      11 Aug 2020 06:51  Mg     2.2     08-11  TPro  7.8  /  Alb  4.1  /  TBili  0.6  /  DBili  x   /  AST  19  /  ALT  20  /  AlkPhos  151<H>  08-09

## 2020-08-11 NOTE — PROGRESS NOTE ADULT - PROBLEM SELECTOR PLAN 1
continue weekly Decadron while here...pt instructed to bring her own Ninlaro, to take in case she is here next Monday...awaiting immunoeletrophoresis...

## 2020-08-11 NOTE — PROGRESS NOTE ADULT - PROBLEM SELECTOR PLAN 7
hx of COPD with 2-3L home O2, currently satting 97% on 2-3L NC  -Continue home Ventolin HFA and Symbicort 160/45 BID
hx of COPD with 2-3L home O2, currently satting 97% on 2-3L NC  -Continue home Ventolin HFA and Symbicort 160/45 BID

## 2020-08-11 NOTE — PROGRESS NOTE ADULT - PROBLEM SELECTOR PLAN 9
follows up with Dr. Ruiz   -Continue Dexamethasone 20mg on Mondays and hold home Ninlaro 3mg per Dr. Ruiz
follows up with Dr. Ruiz   -Continue Dexamethasone 20mg on Mondays and hold home Ninlaro 3mg per Dr. Ruiz

## 2020-08-11 NOTE — PROGRESS NOTE ADULT - ASSESSMENT
60yo obese Female, current smoker, with Contrast/Aspirin Allergy and an extensive PMHx including HTN, NICM, chronic HFrEF with multiple admissions in the past (EF 25% s/p AICD for primary prevention), IDDM c/b neuropathy (on Methadone), hx of recurrent DVTs (on Eliquis), CKD IV, CVA x2 (most recently in 2013 with residual right sided weakness), PAD s/p bypass, COPD (on 2-3L home O2), DHARMESH on CPAP, Multiple Myeloma (on Dexamethasone and Ninlaro), hypothyroidism, depression and recent admission on 3/11/20 for B/L LE Cellulitis (L>R) s/p Vanc/Zosyn and Bactrim/Augmentin x 2 weeks, who now presents to [facility] c/o worsening SOB with associated fatigue, 5 pillow orthopnea, PND and LE edema. Pt now admitted to [facility] for further management of acute on chronic CHF exacerbation. 58yo obese Female, current smoker, with Contrast/Aspirin Allergy and an extensive PMHx including HTN, NICM, chronic HFrEF with multiple admissions in the past (EF 25% s/p AICD for primary prevention), IDDM c/b neuropathy (on Methadone), hx of recurrent DVTs (on Eliquis), CKD IV, CVA x2 (most recently in 2013 with residual right sided weakness), PAD s/p bypass, COPD (on 2-3L home O2), DHARMESH on CPAP, Multiple Myeloma (on Dexamethasone and Ninlaro), hypothyroidism, depression and recent admission on 3/11/20 for B/L LE Cellulitis (L>R) s/p Vanc/Zosyn and Bactrim/Augmentin x 2 weeks, who now presents to [facility] c/o worsening SOB with associated fatigue, 5 pillow orthopnea, PND and LE edema. Pt now admitted to [facility] for further management of acute on chronic CHF exacerbation and being diuresed with Lasix gtt and Metolazone.

## 2020-08-11 NOTE — PROGRESS NOTE ADULT - PROBLEM SELECTOR PLAN 1
presents with worsening SOB, orthopnea/PND and LE edema; B/L scattered rales, 2+ pitting edema B/L; SpO2 97% on 2-3L NC, net negative 1766  -BNP 88921; trop negative x 3  -CXR 8/9: congestion and/or infiltrate  -Echo 8/10: LV moderately dilated, akinesis of septal and hypokinesis of remaining regions, EF 30%, Grade III diastolic dysfunction, RV systolic function mildly reduced, mild-mod MR, trace TR, mild pulm HTN (PASP 45)  -discontinue Lasix 40mg IV BID (on Torsemide 20mg BID at home) and start Lasix gtt @ 10mg/hr  -Continue Metolazone 5mg TID and Entresto 97/103mg BID   -Decrease home Toprol XL to 100mg QD  -Core measures, daily weights, strict I&Os with 1.2L fluid restriction presents with worsening SOB, orthopnea/PND and LE edema, now improving; B/L fine rales L>R, 1+ pitting edema B/L; SpO2 97% on 2-3L NC, net negative 1766  -BNP 59961; trop negative x 3  -CXR 8/9: congestion and/or infiltrate  -Echo 8/10: LV moderately dilated, akinesis of septal and hypokinesis of remaining regions, EF 30%, Grade III diastolic dysfunction, RV systolic function mildly reduced, mild-mod MR, trace TR, mild pulm HTN (PASP 45)  -discontinue Lasix 40mg IV BID (on Torsemide 20mg BID at home) and start Lasix gtt @ 10mg/hr  -Continue Metolazone 5mg TID and Entresto 97/103mg BID   -Decrease home Toprol XL to 100mg QD  -Core measures, daily weights, strict I&Os with 1.2L fluid restriction presents with worsening SOB, orthopnea/PND and LE edema, now improving; fine rales L>R, 1+ pitting edema B/L; SpO2 97% on 2-3L NC, net negative 1766  -BNP 35516; trop negative x 3  -CXR 8/9: congestion and/or infiltrate, f/u repeat tmrw AM  -Echo 8/10: LV moderately dilated, akinesis of septal and hypokinesis of remaining regions, EF 30%, Grade III diastolic dysfunction, RV systolic function mildly reduced, mild-mod MR, trace TR, mild pulm HTN (PASP 45)  -discontinue Lasix 40mg IV BID and start Lasix gtt @ 10mg/hr; (pt on Torsemide 20mg BID at home)  -Continue Metolazone 5mg TID and Entresto 97/103mg BID   -Decrease home Toprol XL to 100mg QD  -Core measures, daily weights, strict I&Os with 1.2L fluid restriction

## 2020-08-11 NOTE — PROGRESS NOTE ADULT - PROBLEM SELECTOR PLAN 5
The mother chose not to exclusively breastfeed upon admission.
hx of recurrent DVTs  -Continue home Eliquis 2.5mg BID
hx of recurrent DVTs  -Continue home Eliquis 2.5mg BID

## 2020-08-11 NOTE — PROGRESS NOTE ADULT - PROBLEM SELECTOR PLAN 8
TSH 6.626, TotalT3 3.7, T4 7.48  -Endocrinology consulted and no changes to home med  -Continue Home Levothyroxine 25mcg QD TSH 6.626, Total T3 3.7, T4 7.48  -Endocrinology consulted and no changes to home med  -Continue Home Levothyroxine 25mcg QD

## 2020-08-11 NOTE — PROGRESS NOTE ADULT - SUBJECTIVE AND OBJECTIVE BOX
INTERVAL HPI/OVERNIGHT EVENTS:    Patient is a 59y old  Female who presents with a chief complaint of acute on chronic CHF (11 Aug 2020 11:22)      Pt reports the following symptoms:    CONSTITUTIONAL:  Negative fever or chills, feels well, good appetite  EYES:  Negative  blurry vision or double vision  CARDIOVASCULAR:  Negative for chest pain or palpitations  RESPIRATORY:  Negative for cough, wheezing, or SOB   GASTROINTESTINAL:  Negative for nausea, vomiting, diarrhea, constipation, or abdominal pain  GENITOURINARY:  Negative frequency, urgency or dysuria  NEUROLOGIC:  No headache, confusion, dizziness, lightheadedness    MEDICATIONS  (STANDING):  amitriptyline 10 milliGRAM(s) Oral at bedtime  apixaban 2.5 milliGRAM(s) Oral two times a day  atorvastatin 80 milliGRAM(s) Oral at bedtime  budesonide 160 MICROgram(s)/formoterol 4.5 MICROgram(s) Inhaler 2 Puff(s) Inhalation two times a day  busPIRone 10 milliGRAM(s) Oral two times a day  dexAMETHasone     Tablet 20 milliGRAM(s) Oral <User Schedule>  dextrose 5%. 1000 milliLiter(s) (50 mL/Hr) IV Continuous <Continuous>  dextrose 50% Injectable 12.5 Gram(s) IV Push once  dextrose 50% Injectable 25 Gram(s) IV Push once  dextrose 50% Injectable 25 Gram(s) IV Push once  escitalopram 20 milliGRAM(s) Oral daily  fenofibrate Tablet 48 milliGRAM(s) Oral daily  furosemide Infusion 10 mG/Hr (5 mL/Hr) IV Continuous <Continuous>  gabapentin 100 milliGRAM(s) Oral two times a day  insulin glargine Injectable (LANTUS) 18 Unit(s) SubCutaneous at bedtime  insulin lispro (HumaLOG) corrective regimen sliding scale   SubCutaneous Before meals and at bedtime  insulin lispro Injectable (HumaLOG) 8 Unit(s) SubCutaneous three times a day before meals  levothyroxine 25 MICROGram(s) Oral daily  metolazone 5 milliGRAM(s) Oral <User Schedule>  metoprolol succinate  milliGRAM(s) Oral daily  pantoprazole    Tablet 40 milliGRAM(s) Oral before breakfast  sacubitril 97 mG/valsartan 103 mG 1 Tablet(s) Oral two times a day  traZODone 50 milliGRAM(s) Oral two times a day    MEDICATIONS  (PRN):  ALBUTerol    90 MICROgram(s) HFA Inhaler 2 Puff(s) Inhalation every 6 hours PRN Shortness of Breath and/or Wheezing  dextrose 40% Gel 15 Gram(s) Oral once PRN Blood Glucose LESS THAN 70 milliGRAM(s)/deciliter  glucagon  Injectable 1 milliGRAM(s) IntraMuscular once PRN Glucose LESS THAN 70 milligrams/deciliter      PHYSICAL EXAM  Vital Signs Last 24 Hrs  T(C): 36.1 (11 Aug 2020 09:11), Max: 36.6 (10 Aug 2020 21:17)  T(F): 96.9 (11 Aug 2020 09:11), Max: 97.8 (10 Aug 2020 21:17)  HR: 64 (11 Aug 2020 09:00) (58 - 82)  BP: 115/59 (11 Aug 2020 09:00) (111/56 - 150/64)  BP(mean): 84 (11 Aug 2020 07:45) (78 - 84)  RR: 18 (11 Aug 2020 09:00) (16 - 18)  SpO2: 98% (11 Aug 2020 09:00) (87% - 98%)    Constitutional: wn/wd in NAD.   HEENT: NCAT, MMM, OP clear, EOMI, no proptosis or lid retraction  Neck: no thyromegaly or palpable thyroid nodules   Respiratory: lungs CTAB.  Cardiovascular: regular rhythm, normal S1 and S2, no audible murmurs, no peripheral edema  GI: soft, NT/ND, no masses/HSM appreciated.  Neurology: no tremors, DTR 2+  Skin: no visible rashes/lesions  Psychiatric: AAO x 3, normal affect/mood.    LABS:                        10.1   9.21  )-----------( 190      ( 11 Aug 2020 06:51 )             33.7     08-11    139  |  98  |  47<H>  ----------------------------<  171<H>  5.0   |  31  |  1.89<H>    Ca    9.6      11 Aug 2020 06:51  Mg     2.2     08-11          Thyroid Stimulating Hormone, Serum: 6.626 uIU/mL (08-09 @ 18:32)  Thyroid Stimulating Hormone, Serum: 1.630 uIU/mL (03-12 @ 05:48)  Thyroid Stimulating Hormone, Serum: 8.491 uIU/mL (02-12 @ 12:45)      HbA1C: 9.2 % (03-11 @ 04:46)  10.3 % (02-13 @ 06:59)    CAPILLARY BLOOD GLUCOSE      POCT Blood Glucose.: 302 mg/dL (11 Aug 2020 11:50)  POCT Blood Glucose.: 178 mg/dL (11 Aug 2020 08:36)  POCT Blood Glucose.: 170 mg/dL (11 Aug 2020 06:55)  POCT Blood Glucose.: 186 mg/dL (11 Aug 2020 03:15)  POCT Blood Glucose.: 326 mg/dL (10 Aug 2020 21:48)  POCT Blood Glucose.: 431 mg/dL (10 Aug 2020 16:56)  POCT Blood Glucose.: 354 mg/dL (10 Aug 2020 14:20)  POCT Blood Glucose.: 301 mg/dL (10 Aug 2020 12:43)      Insulin Sliding Scale requirements X 24 Hours:    RADIOLOGY & ADDITIONAL TESTS:    A/P: 59y Female with history of DM type II presenting for       1.  DM -     Please continue           units lantus at bedtime  / in the morning and        units lispro with meals and lispro moderate / low dose sliding scale 4 times daily with meals and at bedtime.  Please continue consistent carbohydrate diet.      Goal FSG is   Will continue to monitor   For discharge, pt can continue    Pt can follow up at discharge with [facility] by calling  to make an appointment.   Will discuss case with     and update primary team INTERVAL HPI/OVERNIGHT EVENTS:    Patient is a 59y old  Female who presents with a chief complaint of acute on chronic CHF (11 Aug 2020 11:22)      Pt reports the following symptoms:    CONSTITUTIONAL:  Negative fever or chills, feels well, good appetite  EYES:  Negative  blurry vision or double vision  CARDIOVASCULAR:  Negative for chest pain or palpitations  RESPIRATORY:  Negative for cough, wheezing, or SOB   GASTROINTESTINAL:  Negative for nausea, vomiting, diarrhea, constipation, or abdominal pain  GENITOURINARY:  Negative frequency, urgency or dysuria  NEUROLOGIC:  No headache, confusion, dizziness, lightheadedness    MEDICATIONS  (STANDING):  amitriptyline 10 milliGRAM(s) Oral at bedtime  apixaban 2.5 milliGRAM(s) Oral two times a day  atorvastatin 80 milliGRAM(s) Oral at bedtime  budesonide 160 MICROgram(s)/formoterol 4.5 MICROgram(s) Inhaler 2 Puff(s) Inhalation two times a day  busPIRone 10 milliGRAM(s) Oral two times a day  dexAMETHasone     Tablet 20 milliGRAM(s) Oral <User Schedule>  dextrose 5%. 1000 milliLiter(s) (50 mL/Hr) IV Continuous <Continuous>  dextrose 50% Injectable 12.5 Gram(s) IV Push once  dextrose 50% Injectable 25 Gram(s) IV Push once  dextrose 50% Injectable 25 Gram(s) IV Push once  escitalopram 20 milliGRAM(s) Oral daily  fenofibrate Tablet 48 milliGRAM(s) Oral daily  furosemide Infusion 10 mG/Hr (5 mL/Hr) IV Continuous <Continuous>  gabapentin 100 milliGRAM(s) Oral two times a day  insulin glargine Injectable (LANTUS) 18 Unit(s) SubCutaneous at bedtime  insulin lispro (HumaLOG) corrective regimen sliding scale   SubCutaneous Before meals and at bedtime  insulin lispro Injectable (HumaLOG) 8 Unit(s) SubCutaneous three times a day before meals  levothyroxine 25 MICROGram(s) Oral daily  metolazone 5 milliGRAM(s) Oral <User Schedule>  metoprolol succinate  milliGRAM(s) Oral daily  pantoprazole    Tablet 40 milliGRAM(s) Oral before breakfast  sacubitril 97 mG/valsartan 103 mG 1 Tablet(s) Oral two times a day  traZODone 50 milliGRAM(s) Oral two times a day    MEDICATIONS  (PRN):  ALBUTerol    90 MICROgram(s) HFA Inhaler 2 Puff(s) Inhalation every 6 hours PRN Shortness of Breath and/or Wheezing  dextrose 40% Gel 15 Gram(s) Oral once PRN Blood Glucose LESS THAN 70 milliGRAM(s)/deciliter  glucagon  Injectable 1 milliGRAM(s) IntraMuscular once PRN Glucose LESS THAN 70 milligrams/deciliter      PHYSICAL EXAM  Vital Signs Last 24 Hrs  T(C): 36.1 (11 Aug 2020 09:11), Max: 36.6 (10 Aug 2020 21:17)  T(F): 96.9 (11 Aug 2020 09:11), Max: 97.8 (10 Aug 2020 21:17)  HR: 64 (11 Aug 2020 09:00) (58 - 82)  BP: 115/59 (11 Aug 2020 09:00) (111/56 - 150/64)  BP(mean): 84 (11 Aug 2020 07:45) (78 - 84)  RR: 18 (11 Aug 2020 09:00) (16 - 18)  SpO2: 98% (11 Aug 2020 09:00) (87% - 98%)    Appearance: Normal	  HEENT:   Normal oral mucosa, PERRL, EOMI	  Neck: Supple, - JVD; No Carotid Bruit   Cardiovascular: Normal S1 S2, No JVD, No murmurs,   Respiratory: Decreased Breath Sounds scattered rales B/L, No Rales/Wheezing	  Gastrointestinal:  Soft, Non-tender, + BS	  Skin: No rashes, No ecchymoses, No cyanosis  Extremities: 2+ pitting edema B/L, Normal range of motion, No clubbing or cyanosis  Vascular: Peripheral pulses palpable 2+ bilaterally  Neurologic: Non-focal  Psychiatry: A & O x 3, Mood & affect appropriate      LABS:                        10.1   9.21  )-----------( 190      ( 11 Aug 2020 06:51 )             33.7     08-11    139  |  98  |  47<H>  ----------------------------<  171<H>  5.0   |  31  |  1.89<H>    Ca    9.6      11 Aug 2020 06:51  Mg     2.2     08-11          Thyroid Stimulating Hormone, Serum: 6.626 uIU/mL (08-09 @ 18:32)  Thyroid Stimulating Hormone, Serum: 1.630 uIU/mL (03-12 @ 05:48)  Thyroid Stimulating Hormone, Serum: 8.491 uIU/mL (02-12 @ 12:45)      HbA1C: 9.2 % (03-11 @ 04:46)  10.3 % (02-13 @ 06:59)    CAPILLARY BLOOD GLUCOSE      POCT Blood Glucose.: 302 mg/dL (11 Aug 2020 11:50)  POCT Blood Glucose.: 178 mg/dL (11 Aug 2020 08:36)  POCT Blood Glucose.: 170 mg/dL (11 Aug 2020 06:55)  POCT Blood Glucose.: 186 mg/dL (11 Aug 2020 03:15)  POCT Blood Glucose.: 326 mg/dL (10 Aug 2020 21:48)  POCT Blood Glucose.: 431 mg/dL (10 Aug 2020 16:56)  POCT Blood Glucose.: 354 mg/dL (10 Aug 2020 14:20)  POCT Blood Glucose.: 301 mg/dL (10 Aug 2020 12:43)      Insulin Sliding Scale requirements X 24 Hours:    RADIOLOGY & ADDITIONAL TESTS:    A/P: 59y Female with history of DM type II presenting for acute on chronic CHF exacerbation, endocrinology consulted for DM and hypothyroidism management.       1.  DM -     Please increase lantus to 20 unites at bedtime. Please increase pre-meal lispro to 12 units. Please continue lispro moderate dose sliding scale 4 times daily with meals and at bedtime.  Please continue consistent carbohydrate diet.      Goal FSG is .    2. Hypothyroidism     Please continue levothyroxine 25mcg PO daily.       Will continue to monitor       Pt can follow up at discharge with [facility] by calling  to make an appointment.   Will discuss case with Dr. Knutson and update primary team INTERVAL HPI/OVERNIGHT EVENTS:    Patient is a 59y old  Female who presents with a chief complaint of acute on chronic CHF (11 Aug 2020 11:22)  patient seen and examined at the bedside  no acute events overnight  her appetite has been okay - she eats a big breakfast  FSG & Insulin received:  Yesterday:  pre-dinner fs, 8 nutritional lispro   units + 12  units lispro SS, had baked potato,   bedtime fs, 18 lantus   units + 8   units lispro SS  Today:  3 Am FSg 186  pre-breakfast fs, 8 nutritional lispro   units+  2  units lispro SS, had panckae, fruit cup, yogurt, corn flakes  pre-lunch fs      Pt reports the following symptoms:  CONSTITUTIONAL:  Negative fever or chills  CARDIOVASCULAR:  Negative for chest pain or palpitations  RESPIRATORY:  Negative for cough, wheezing, or SOB   GASTROINTESTINAL:  Negative for nausea, vomiting, diarrhea, constipation  NEUROLOGIC:  No headache, confusion, dizziness, lightheadedness    MEDICATIONS  (STANDING):  amitriptyline 10 milliGRAM(s) Oral at bedtime  apixaban 2.5 milliGRAM(s) Oral two times a day  atorvastatin 80 milliGRAM(s) Oral at bedtime  budesonide 160 MICROgram(s)/formoterol 4.5 MICROgram(s) Inhaler 2 Puff(s) Inhalation two times a day  busPIRone 10 milliGRAM(s) Oral two times a day  dexAMETHasone     Tablet 20 milliGRAM(s) Oral <User Schedule>  dextrose 5%. 1000 milliLiter(s) (50 mL/Hr) IV Continuous <Continuous>  dextrose 50% Injectable 12.5 Gram(s) IV Push once  dextrose 50% Injectable 25 Gram(s) IV Push once  dextrose 50% Injectable 25 Gram(s) IV Push once  escitalopram 20 milliGRAM(s) Oral daily  fenofibrate Tablet 48 milliGRAM(s) Oral daily  furosemide Infusion 10 mG/Hr (5 mL/Hr) IV Continuous <Continuous>  gabapentin 100 milliGRAM(s) Oral two times a day  insulin glargine Injectable (LANTUS) 18 Unit(s) SubCutaneous at bedtime  insulin lispro (HumaLOG) corrective regimen sliding scale   SubCutaneous Before meals and at bedtime  insulin lispro Injectable (HumaLOG) 8 Unit(s) SubCutaneous three times a day before meals  levothyroxine 25 MICROGram(s) Oral daily  metolazone 5 milliGRAM(s) Oral <User Schedule>  metoprolol succinate  milliGRAM(s) Oral daily  pantoprazole    Tablet 40 milliGRAM(s) Oral before breakfast  sacubitril 97 mG/valsartan 103 mG 1 Tablet(s) Oral two times a day  traZODone 50 milliGRAM(s) Oral two times a day    MEDICATIONS  (PRN):  ALBUTerol    90 MICROgram(s) HFA Inhaler 2 Puff(s) Inhalation every 6 hours PRN Shortness of Breath and/or Wheezing  dextrose 40% Gel 15 Gram(s) Oral once PRN Blood Glucose LESS THAN 70 milliGRAM(s)/deciliter  glucagon  Injectable 1 milliGRAM(s) IntraMuscular once PRN Glucose LESS THAN 70 milligrams/deciliter      PHYSICAL EXAM  Vital Signs Last 24 Hrs  T(C): 36.1 (11 Aug 2020 09:11), Max: 36.6 (10 Aug 2020 21:17)  T(F): 96.9 (11 Aug 2020 09:11), Max: 97.8 (10 Aug 2020 21:17)  HR: 64 (11 Aug 2020 09:00) (58 - 82)  BP: 115/59 (11 Aug 2020 09:00) (111/56 - 150/64)  BP(mean): 84 (11 Aug 2020 07:45) (78 - 84)  RR: 18 (11 Aug 2020 09:00) (16 - 18)  SpO2: 98% (11 Aug 2020 09:00) (87% - 98%)    Appearance: Normal	  Cardiovascular: Normal S1 S2, No JVD, No murmurs,   Respiratory: Decreased Breath Sounds scattered rales B/L, No Rales/Wheezing	  Gastrointestinal:  Soft, Non-tender, + BS	  Extremities: 2+ pitting edema B/L, Normal range of motion, No clubbing or cyanosis  Neurologic: Non-focal  Psychiatry: A & O x 3, Mood & affect appropriate      LABS:                        10.1   9.21  )-----------( 190      ( 11 Aug 2020 06:51 )             33.7     08-11    139  |  98  |  47<H>  ----------------------------<  171<H>  5.0   |  31  |  1.89<H>    Ca    9.6      11 Aug 2020 06:51  Mg     2.2               Thyroid Stimulating Hormone, Serum: 6.626 uIU/mL ( @ 18:32)  Thyroid Stimulating Hormone, Serum: 1.630 uIU/mL ( @ 05:48)  Thyroid Stimulating Hormone, Serum: 8.491 uIU/mL ( @ 12:45)      HbA1C: 9.2 % ( @ 04:46)  10.3 % ( @ 06:59)    CAPILLARY BLOOD GLUCOSE      POCT Blood Glucose.: 302 mg/dL (11 Aug 2020 11:50)  POCT Blood Glucose.: 178 mg/dL (11 Aug 2020 08:36)  POCT Blood Glucose.: 170 mg/dL (11 Aug 2020 06:55)  POCT Blood Glucose.: 186 mg/dL (11 Aug 2020 03:15)  POCT Blood Glucose.: 326 mg/dL (10 Aug 2020 21:48)  POCT Blood Glucose.: 431 mg/dL (10 Aug 2020 16:56)  POCT Blood Glucose.: 354 mg/dL (10 Aug 2020 14:20)  POCT Blood Glucose.: 301 mg/dL (10 Aug 2020 12:43)      Insulin Sliding Scale requirements X 24 Hours:    RADIOLOGY & ADDITIONAL TESTS:    A/P:59-year-old F with a PMH of HTN, NICM, chronic HFrEF with multiple admissions in the past (EF 25% s/p AICD for primary prevention), IDDM c/b neuropathy (on Methadone) and neuropathy, hx of recurrent DVTs (on Eliquis), CKD IV, CVA x2 (most recently in  with residual right sided weakness), PAD s/p bypass, COPD (on 2-3L home O2), DHARMESH on CPAP, and Multiple Myeloma, admitted for acute on chronic CHF exacerbation. Endocrinology consulted for DM and hypothyroidism management.     1.  DM Type 2- uncontrolled with complications and hyperglycemia  - Hba1c was 10. ( was 9.2 in 2020)  CKD Stage III to IV  Cr 1.85 and GFR 34  - Wt 90 kg with BMI 33  - carb consistent diet    Please increase to Lantus 20 units at night.    Please increase to Lispro 12 units before each meal.    Please continue lispro moderate dose sliding scale four times daily with meals and at bedtime.   Please obtain FSg at 3 Am  Pt's fingerstick glucose goal is: .    2. Hypothyroidism   - During this admission, TSh was 6.626, Sr T4 7.48, total T3 70  - TSH 1.632, FT4 0.83, abs neg in 2020  Please continue levothyroxine 25 units before breakfast. Patient instructed to take levothyroxine on an empty stomach.     Will continue to monitor     For discharge, TBD    Pt can follow up at discharge with [facility] by calling  to make an appointment.   Will discuss case with Dr. Knutson and update primary team.

## 2020-08-12 ENCOUNTER — TRANSCRIPTION ENCOUNTER (OUTPATIENT)
Age: 60
End: 2020-08-12

## 2020-08-12 VITALS — TEMPERATURE: 97 F

## 2020-08-12 PROBLEM — N18.4 CHRONIC KIDNEY DISEASE, STAGE 4 (SEVERE): Chronic | Status: ACTIVE | Noted: 2020-08-09

## 2020-08-12 PROBLEM — E03.9 HYPOTHYROIDISM, UNSPECIFIED: Chronic | Status: ACTIVE | Noted: 2020-08-09

## 2020-08-12 PROBLEM — C90.00 MULTIPLE MYELOMA NOT HAVING ACHIEVED REMISSION: Chronic | Status: ACTIVE | Noted: 2020-08-09

## 2020-08-12 LAB
ANION GAP SERPL CALC-SCNC: 11 MMOL/L — SIGNIFICANT CHANGE UP (ref 5–17)
BUN SERPL-MCNC: 51 MG/DL — HIGH (ref 7–23)
CALCIUM SERPL-MCNC: 9.6 MG/DL — SIGNIFICANT CHANGE UP (ref 8.4–10.5)
CHLORIDE SERPL-SCNC: 94 MMOL/L — LOW (ref 96–108)
CO2 SERPL-SCNC: 36 MMOL/L — HIGH (ref 22–31)
CREAT SERPL-MCNC: 2.2 MG/DL — HIGH (ref 0.5–1.3)
GLUCOSE BLDC GLUCOMTR-MCNC: 113 MG/DL — HIGH (ref 70–99)
GLUCOSE BLDC GLUCOMTR-MCNC: 143 MG/DL — HIGH (ref 70–99)
GLUCOSE SERPL-MCNC: 107 MG/DL — HIGH (ref 70–99)
HCT VFR BLD CALC: 33.2 % — LOW (ref 34.5–45)
HGB BLD-MCNC: 9.9 G/DL — LOW (ref 11.5–15.5)
MAGNESIUM SERPL-MCNC: 2.1 MG/DL — SIGNIFICANT CHANGE UP (ref 1.6–2.6)
MCHC RBC-ENTMCNC: 26.2 PG — LOW (ref 27–34)
MCHC RBC-ENTMCNC: 29.8 GM/DL — LOW (ref 32–36)
MCV RBC AUTO: 87.8 FL — SIGNIFICANT CHANGE UP (ref 80–100)
NRBC # BLD: 0 /100 WBCS — SIGNIFICANT CHANGE UP (ref 0–0)
PLATELET # BLD AUTO: 194 K/UL — SIGNIFICANT CHANGE UP (ref 150–400)
POTASSIUM SERPL-MCNC: 3.9 MMOL/L — SIGNIFICANT CHANGE UP (ref 3.5–5.3)
POTASSIUM SERPL-SCNC: 3.9 MMOL/L — SIGNIFICANT CHANGE UP (ref 3.5–5.3)
RBC # BLD: 3.78 M/UL — LOW (ref 3.8–5.2)
RBC # FLD: 18.6 % — HIGH (ref 10.3–14.5)
SARS-COV-2 IGG SERPL QL IA: NEGATIVE — SIGNIFICANT CHANGE UP
SARS-COV-2 IGM SERPL IA-ACNC: 0.15 INDEX — SIGNIFICANT CHANGE UP
SODIUM SERPL-SCNC: 141 MMOL/L — SIGNIFICANT CHANGE UP (ref 135–145)
WBC # BLD: 8.56 K/UL — SIGNIFICANT CHANGE UP (ref 3.8–10.5)
WBC # FLD AUTO: 8.56 K/UL — SIGNIFICANT CHANGE UP (ref 3.8–10.5)

## 2020-08-12 PROCEDURE — 82962 GLUCOSE BLOOD TEST: CPT

## 2020-08-12 PROCEDURE — 80048 BASIC METABOLIC PNL TOTAL CA: CPT

## 2020-08-12 PROCEDURE — 83036 HEMOGLOBIN GLYCOSYLATED A1C: CPT

## 2020-08-12 PROCEDURE — 84484 ASSAY OF TROPONIN QUANT: CPT

## 2020-08-12 PROCEDURE — 97162 PT EVAL MOD COMPLEX 30 MIN: CPT

## 2020-08-12 PROCEDURE — 96374 THER/PROPH/DIAG INJ IV PUSH: CPT

## 2020-08-12 PROCEDURE — 82553 CREATINE MB FRACTION: CPT

## 2020-08-12 PROCEDURE — 94640 AIRWAY INHALATION TREATMENT: CPT

## 2020-08-12 PROCEDURE — 71045 X-RAY EXAM CHEST 1 VIEW: CPT

## 2020-08-12 PROCEDURE — 83880 ASSAY OF NATRIURETIC PEPTIDE: CPT

## 2020-08-12 PROCEDURE — 36415 COLL VENOUS BLD VENIPUNCTURE: CPT

## 2020-08-12 PROCEDURE — 84443 ASSAY THYROID STIM HORMONE: CPT

## 2020-08-12 PROCEDURE — 80053 COMPREHEN METABOLIC PANEL: CPT

## 2020-08-12 PROCEDURE — 84480 ASSAY TRIIODOTHYRONINE (T3): CPT

## 2020-08-12 PROCEDURE — 85027 COMPLETE CBC AUTOMATED: CPT

## 2020-08-12 PROCEDURE — 71045 X-RAY EXAM CHEST 1 VIEW: CPT | Mod: 26

## 2020-08-12 PROCEDURE — 85025 COMPLETE CBC W/AUTO DIFF WBC: CPT

## 2020-08-12 PROCEDURE — 82550 ASSAY OF CK (CPK): CPT

## 2020-08-12 PROCEDURE — 80061 LIPID PANEL: CPT

## 2020-08-12 PROCEDURE — 84439 ASSAY OF FREE THYROXINE: CPT

## 2020-08-12 PROCEDURE — 99285 EMERGENCY DEPT VISIT HI MDM: CPT | Mod: 25

## 2020-08-12 PROCEDURE — 99239 HOSP IP/OBS DSCHRG MGMT >30: CPT

## 2020-08-12 PROCEDURE — 99231 SBSQ HOSP IP/OBS SF/LOW 25: CPT | Mod: GC

## 2020-08-12 PROCEDURE — 86769 SARS-COV-2 COVID-19 ANTIBODY: CPT

## 2020-08-12 PROCEDURE — 83735 ASSAY OF MAGNESIUM: CPT

## 2020-08-12 PROCEDURE — 93306 TTE W/DOPPLER COMPLETE: CPT

## 2020-08-12 PROCEDURE — 87635 SARS-COV-2 COVID-19 AMP PRB: CPT

## 2020-08-12 PROCEDURE — 84436 ASSAY OF TOTAL THYROXINE: CPT

## 2020-08-12 RX ORDER — METOPROLOL TARTRATE 50 MG
1 TABLET ORAL
Qty: 30 | Refills: 3
Start: 2020-08-12 | End: 2020-12-09

## 2020-08-12 RX ORDER — METOPROLOL TARTRATE 50 MG
1 TABLET ORAL
Qty: 0 | Refills: 0 | DISCHARGE

## 2020-08-12 RX ORDER — FUROSEMIDE 40 MG
40 TABLET ORAL ONCE
Refills: 0 | Status: COMPLETED | OUTPATIENT
Start: 2020-08-12 | End: 2020-08-12

## 2020-08-12 RX ORDER — INSULIN DETEMIR 100/ML (3)
20 INSULIN PEN (ML) SUBCUTANEOUS
Qty: 2 | Refills: 3
Start: 2020-08-12 | End: 2020-12-09

## 2020-08-12 RX ORDER — POTASSIUM CHLORIDE 20 MEQ
20 PACKET (EA) ORAL ONCE
Refills: 0 | Status: COMPLETED | OUTPATIENT
Start: 2020-08-12 | End: 2020-08-12

## 2020-08-12 RX ADMIN — SACUBITRIL AND VALSARTAN 1 TABLET(S): 24; 26 TABLET, FILM COATED ORAL at 06:22

## 2020-08-12 RX ADMIN — Medication 12 UNIT(S): at 08:43

## 2020-08-12 RX ADMIN — Medication 48 MILLIGRAM(S): at 11:22

## 2020-08-12 RX ADMIN — Medication 20 MILLIEQUIVALENT(S): at 07:47

## 2020-08-12 RX ADMIN — APIXABAN 2.5 MILLIGRAM(S): 2.5 TABLET, FILM COATED ORAL at 06:22

## 2020-08-12 RX ADMIN — Medication 40 MILLIGRAM(S): at 13:18

## 2020-08-12 RX ADMIN — Medication 100 MILLIGRAM(S): at 06:22

## 2020-08-12 RX ADMIN — GABAPENTIN 100 MILLIGRAM(S): 400 CAPSULE ORAL at 06:22

## 2020-08-12 RX ADMIN — Medication 25 MICROGRAM(S): at 06:22

## 2020-08-12 RX ADMIN — BUDESONIDE AND FORMOTEROL FUMARATE DIHYDRATE 2 PUFF(S): 160; 4.5 AEROSOL RESPIRATORY (INHALATION) at 06:24

## 2020-08-12 RX ADMIN — ESCITALOPRAM OXALATE 20 MILLIGRAM(S): 10 TABLET, FILM COATED ORAL at 11:22

## 2020-08-12 RX ADMIN — Medication 50 MILLIGRAM(S): at 06:22

## 2020-08-12 RX ADMIN — Medication 12 UNIT(S): at 13:58

## 2020-08-12 RX ADMIN — PANTOPRAZOLE SODIUM 40 MILLIGRAM(S): 20 TABLET, DELAYED RELEASE ORAL at 06:22

## 2020-08-12 RX ADMIN — Medication 10 MILLIGRAM(S): at 07:10

## 2020-08-12 NOTE — PROGRESS NOTE ADULT - ATTENDING COMMENTS
Pt seen on rounds this afternoon.  Diuresis continues on Lasix drip.  Pt reports decreased SOB, though still has rales at both bases on exam.    Glucoses were extremely high late yesterday, mostly from the dexamethasone, though her pre-meal doses also were likely insufficient.  She has been generally adherent to her diet.  Will increase the Lantus slightly to 20 units, the pre-meal lispro to 12 units.  Will try to work out a regimen which the pt can use at home for the day each week when she takes dexamethasone--probably a coverage schedule for elevated readings that day at lunch and supper
Pt seen on rounds this afternoon prior to discharge.  Glucoses have been excellent--fingersticks 110-145 since last night.  Will discharge on her current regimen of 20 Lantus/12 lispro.  Emphasized to her that everything depends on her ability to control her diet at home.  She will hopefully start on a GLP-1 when seen as outpatient
59F w/ pmh of chronic sCHF 2/2 NICM, DM, h/o recurrent DVTs, Mult Myeloma, h/o CVA, PAD s/p bypass, COPD and DHARMESH p/w acute on chronic sCHF exacerbation    -CHF - acute on chronic sCHF exacerbation - c/w Lasix 40 IV BID and add Metolazone 5mg PO BID, c/w Toprol 200 daily, c/w Entresto BID; Strict Is & Os and daily wts.  -COPD/DHARMESH - stable, c/w Nebs PRN, supp O2 and Symbicort BID; CPAP overnight  -DM - c/w Lispro Premeal, Lantus and Insulin SS for coverage  -DASH diet  -DVT PPx: Eliquis  -OOB to chair  -Full Code  -Dispo: Cardiac Tele    Ld Dooley MD  Cardiology Attending

## 2020-08-12 NOTE — PROGRESS NOTE ADULT - NSHPATTENDINGPLANDISCUSS_GEN_ALL_CORE
Primary Team
Dr. Houston, Dr. Bowman and Cardiology team
Dr. Houston, Dr. Bowman, patient and Cardiology team

## 2020-08-12 NOTE — DISCHARGE NOTE PROVIDER - NSDCCPCAREPLAN_GEN_ALL_CORE_FT
PRINCIPAL DISCHARGE DIAGNOSIS  Diagnosis: Systolic CHF, acute on chronic  Assessment and Plan of Treatment:       SECONDARY DISCHARGE DIAGNOSES  Diagnosis: DM (diabetes mellitus)  Assessment and Plan of Treatment: DM (diabetes mellitus)    Diagnosis: HLD (hyperlipidemia)  Assessment and Plan of Treatment: HLD (hyperlipidemia)    Diagnosis: CKD (chronic kidney disease), stage IV  Assessment and Plan of Treatment: CKD (chronic kidney disease), stage IV    Diagnosis: Hypothyroidism  Assessment and Plan of Treatment: Hypothyroidism    Diagnosis: DVT (deep venous thrombosis)  Assessment and Plan of Treatment: DVT (deep venous thrombosis)    Diagnosis: COPD (chronic obstructive pulmonary disease)  Assessment and Plan of Treatment: COPD (chronic obstructive pulmonary disease)    Diagnosis: Multiple myeloma  Assessment and Plan of Treatment: Multiple myeloma    Diagnosis: Bipolar depression  Assessment and Plan of Treatment: Bipolar depression PRINCIPAL DISCHARGE DIAGNOSIS  Diagnosis: Systolic CHF, acute on chronic  Assessment and Plan of Treatment: You were admitted with exacerbation of your heart failure.  YOu have a reduced heart function of 30%.  When your heart function is reduced it can lead to water build up in your lungs and legs.  It is important that you monitor you weight daily.  If you gain 3 pounds in one day or 5 pounds in one week then call your cardiologist becuase this could be water weight.  maintaina low salt diet (less then 2gm per day).  Monitor you fluid intake to less then 2L per day.   - Follow up with the heart failure NP Hedy Angel on Tuesday August 18th at 10am  - Please continue to take your home Torsemide 20mg Twice daily, and Entresto Twice daily  - please DECREASE your metoprolol ER to 100mg daily. ** New prescription sent to the pharmacy      SECONDARY DISCHARGE DIAGNOSES  Diagnosis: DM (diabetes mellitus)  Assessment and Plan of Treatment: YOur diabetes is not well Controlled.  YOur Hgb A1c is 10.1, your goal is less then 7.  This may also be elevated becuase your are on steroids.   - Please follow up with your primary care doctor or diabetes doctor for better management  - Please DECREASE your Levemir to 20 units at bedtime  - Continue your Pre meal insulin at 12 units before meals.    Diagnosis: HLD (hyperlipidemia)  Assessment and Plan of Treatment: Continue your Atorvastatin 80mg daily at bedtime.  maintain a low fat diet.    Diagnosis: CKD (chronic kidney disease), stage IV  Assessment and Plan of Treatment: YOu kidney function has remained stable.  Follow up with Dr. Neal in 1-2 weeks.    Diagnosis: Hypothyroidism  Assessment and Plan of Treatment: Please continue to take Levothyroxine 25mcg daily    Diagnosis: DVT (deep venous thrombosis)  Assessment and Plan of Treatment: Continue your blood thinner Eliquis 2.5mg Twice daily.    Diagnosis: COPD (chronic obstructive pulmonary disease)  Assessment and Plan of Treatment: Continue your home oxygen and inhalers as usual.    Diagnosis: Multiple myeloma  Assessment and Plan of Treatment: Follow up with Dr. Ruiz.  Continue home mediations as usual.    Diagnosis: Bipolar depression  Assessment and Plan of Treatment: Continue home mediations as usual.

## 2020-08-12 NOTE — PHYSICAL THERAPY INITIAL EVALUATION ADULT - PERTINENT HX OF CURRENT PROBLEM, REHAB EVAL
58yo obese Female, current smoker, with Contrast/Aspirin Allergy and an extensive PMHx who now presents to [facility] c/o worsening SOB with associated fatigue, 5 pillow orthopnea, PND and LE edema. Pt now admitted to [facility] for further management of acute on chronic CHF exacerbation and being diuresed with Lasix gtt and Metolazone.

## 2020-08-12 NOTE — DISCHARGE NOTE PROVIDER - HOSPITAL COURSE
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 Pt now admitted to [facility] for further management of acute on chronic CHF exacerbation.  BNP 95904.  CE negative x3.  Patient started on IV lasix and then transitioned to Lasix gtt  w/ Metolazone 5mg PO TID.  Echo 8/10: LV moderately dilated, akinesis of septal and hypokinesis of remaining regions, EF 30%, Grade III diastolic dysfunction, RV systolic function mildly reduced, mild-mod MR, trace TR, mild pulm HTN (PASP 45).  CXR done 8/12AM shows improvement of congestion and patient appears euvolemic on discharge.  IV lasix gtt transitioned to Lasix 40mg IV x1 on 8/12 and transitioned back to home Torsemide 20mg PO BID.          -discontinue Lasix 40mg IV BID and start Lasix gtt @ 10mg/hr; (pt on Torsemide 20mg BID at home)    -Continue Metolazone 5mg TID and Entresto 97/103mg BID     -Decrease home Toprol XL to 100mg QD    -Core measures, daily weights, strict I&Os with 1.2L fluid restriction.          Problem/Plan - 2:    ·  Problem: CKD (chronic kidney disease), stage IV.  Plan: CKD IV, baseline Cr 2-2.3 last admission; known to Dr. Neal     -Cr today 1.89, on admission 1.98    -f/u repeat BMP at 5pm    -Avoid nephrotoxic agents and renally dose medications.          Problem/Plan - 3:    ·  Problem: HTN (hypertension).  Plan: SBP stable    -Continue Entresto 97/103mg BID, Furosemide gtt @ 10mg/hr and Toprol XL 100mg QD.          Problem/Plan - 4:    ·  Problem: HLD (hyperlipidemia).  Plan: , HDL 33, Triglycerides 143, Total 186    -Continue home Atorvastatin 80mg HS and Fenofibrate 48mg QD.          Problem/Plan - 5:    ·  Problem: DVT (deep venous thrombosis).  Plan: hx of recurrent DVTs    -Continue home Eliquis 2.5mg BID.          Problem/Plan - 6:    Problem: DM (diabetes mellitus). Plan: A1C 10.1%    -Endocrine consulted and following    -Continue Lantus 20u HS, Humalog 12u TID and mISS    -Continue Gabapentin 100mg BID.         Problem/Plan - 7:    ·  Problem: COPD (chronic obstructive pulmonary disease).  Plan: hx of COPD with 2-3L home O2, currently satting 97% on 2-3L NC    -Continue home Ventolin HFA and Symbicort 160/45 BID.          Problem/Plan - 8:    ·  Problem: Hypothyroidism.  Plan: TSH 6.626, Total T3 3.7, T4 7.48    -Endocrinology consulted and no changes to home med    -Continue Home Levothyroxine 25mcg QD.          Problem/Plan - 9:    ·  Problem: Multiple myeloma.  Plan: follows up with Dr. Ruiz     -Continue Dexamethasone 20mg on Mondays and hold home Ninlaro 3mg per Dr. Ruiz.          Problem/Plan - 10:    Problem: Bipolar depression. Plan; -Continue Escitalopram 20mg QD, Amitriptyline 10mg QD and Buspirone 10mg QD            F: None, 1.2L fluid restriction    E: Replete if K<4 or Mag<2    N: DASH Diet    GIppx: Pantoprazole    VTEppx: Eliquis    Dispo: pending diuresis. 58yo obese Female, current smoker, with Contrast/Aspirin Allergy and an extensive PMHx including HTN, NICM, chronic HFrEF with multiple admissions in the past (EF 25% s/p AICD for primary prevention), IDDM c/b neuropathy (on Methadone), hx of recurrent DVTs (on Eliquis), CKD IV, CVA x2 (most recently in 2013 with residual right sided weakness), PAD s/p bypass, COPD (on 2-3L home O2), DHARMESH on CPAP, Multiple Myeloma (on Dexamethasone and Ninlaro), hypothyroidism, depression and recent admission on 3/11/20 for B/L LE Cellulitis (L>R) s/p Vanc/Zosyn and Bactrim/Augmentin x 2 weeks, who now presents to [facility] c/o worsening SOB with associated fatigue, 5 pillow orthopnea, PND and LE edema. Pt now admitted to [facility] for further management of acute on chronic CHF exacerbation.  BNP 11659.  CE negative x3.  Patient started on IV lasix and then transitioned to Lasix gtt  w/ Metolazone 5mg PO TID.  Echo 8/10: LV moderately dilated, akinesis of septal and hypokinesis of remaining regions, EF 30%, Grade III diastolic dysfunction, RV systolic function mildly reduced, mild-mod MR, trace TR, mild pulm HTN (PASP 45).  CXR done 8/12AM shows improvement of congestion and patient appears euvolemic on discharge.  IV lasix gtt transitioned to Lasix 40mg IV x1 on 8/12 and transitioned back to home Torsemide 20mg PO BID.  Home Toprol XL decreased to 100mg daily.  She is continued on her home Entresto 97/103mg BID.  During hospital Stay Endocrine consulted for Hgb A1c 10.1.  Elevated likely in the setting of taking Dexamethasone at home.  Per Endocrine Recs her home regimen was adjusted to Lantus 20 units at bedtime, and Lispro pre meal to 12 units.  Patient also noted to have elevated TSH, slightly low T3, and T4 wnl with recommendation to continue usual dose of home levothyroxine 25mcg QD.  Patient evaluated at bedside on 8/12.  Labs, Vitals, Meds reviewed with Dr. Dooley and patient deemed stable for discharge home.  PT Recs home w/ home PT.  The adjusted medications are E prescribed to pharmacy of choice.  Patint will follow up with MAYELIN Muñoz 60yo obese Female, current smoker, with Contrast/Aspirin Allergy and an extensive PMHx including HTN, NICM, chronic HFrEF with multiple admissions in the past (EF 25% s/p AICD for primary prevention), IDDM c/b neuropathy (on Methadone), hx of recurrent DVTs (on Eliquis), CKD IV, CVA x2 (most recently in 2013 with residual right sided weakness), PAD s/p bypass, COPD (on 2-3L home O2), DHARMESH on CPAP, Multiple Myeloma (on Dexamethasone and Ninlaro), hypothyroidism, depression and recent admission on 3/11/20 for B/L LE Cellulitis (L>R) s/p Vanc/Zosyn and Bactrim/Augmentin x 2 weeks, who now presents to [facility] c/o worsening SOB with associated fatigue, 5 pillow orthopnea, PND and LE edema. Pt now admitted to [facility] for further management of acute on chronic CHF exacerbation.  BNP 41477.  CE negative x3.  Patient started on IV lasix and then transitioned to Lasix gtt  w/ Metolazone 5mg PO TID.  Echo 8/10: LV moderately dilated, akinesis of septal and hypokinesis of remaining regions, EF 30%, Grade III diastolic dysfunction, RV systolic function mildly reduced, mild-mod MR, trace TR, mild pulm HTN (PASP 45).  CXR done 8/12AM shows improvement of congestion and patient appears euvolemic on discharge.  IV lasix gtt transitioned to Lasix 40mg IV x1 on 8/12 and transitioned back to home Torsemide 20mg PO BID.  Home Toprol XL decreased to 100mg daily.  She is continued on her home Entresto 97/103mg BID.  During hospital Stay Endocrine consulted for Hgb A1c 10.1.  Elevated likely in the setting of taking Dexamethasone at home.  Per Endocrine Recs her home regimen was adjusted to Lantus 20 units at bedtime, and Lispro pre meal to 12 units.  Patient also noted to have elevated TSH, slightly low T3, and T4 wnl with recommendation to continue usual dose of home levothyroxine 25mcg QD.  Patient evaluated at bedside on 8/12.  Labs, Vitals, Meds reviewed with Dr. Dooley and patient deemed stable for discharge home.  PT Recs home w/ home PT.  The adjusted medications are E prescribed to pharmacy of choice.  Patint will follow up with MAYELIN Angel on August 18th at 10am.  All discharge and medication instructions are reviewed with patient on discharge. 60yo obese Female, current smoker, with Contrast/Aspirin Allergy and an extensive PMHx including HTN, NICM, chronic HFrEF with multiple admissions in the past (EF 25% s/p AICD for primary prevention), IDDM c/b neuropathy (on Methadone), hx of recurrent DVTs (on Eliquis), CKD IV, CVA x2 (most recently in 2013 with residual right sided weakness), PAD s/p bypass, COPD (on 2-3L home O2), DHARMESH on CPAP, Multiple Myeloma (on Dexamethasone and Ninlaro), hypothyroidism, depression and recent admission on 3/11/20 for B/L LE Cellulitis (L>R) s/p Vanc/Zosyn and Bactrim/Augmentin x 2 weeks, who now presents to [facility] c/o worsening SOB with associated fatigue, 5 pillow orthopnea, PND and LE edema. Pt now admitted to [facility] for further management of acute on chronic CHF exacerbation.  BNP 60400.  CE negative x3.  Patient started on IV lasix and then transitioned to Lasix gtt  w/ Metolazone 5mg PO TID.  Echo 8/10: LV moderately dilated, akinesis of septal and hypokinesis of remaining regions, EF 30%, Grade III diastolic dysfunction, RV systolic function mildly reduced, mild-mod MR, trace TR, mild pulm HTN (PASP 45).  CXR done 8/12AM shows improvement of congestion and patient appears euvolemic on discharge.  IV lasix gtt transitioned to Lasix 40mg IV x1 on 8/12 and transitioned back to home Torsemide 20mg PO BID.  Home Toprol XL decreased to 100mg daily.  She is continued on her home Entresto 97/103mg BID.  During hospital Stay Endocrine consulted for Hgb A1c 10.1.  Elevated likely in the setting of taking Dexamethasone at home.  Per Endocrine Recs her home regimen was adjusted to Lantus 20 units at bedtime, and Lispro pre meal to 12 units.  Patient also noted to have elevated TSH, slightly low T3, and T4 wnl with recommendation to continue usual dose of home levothyroxine 25mcg QD.  Patient evaluated at bedside on 8/12.  Labs, Vitals, Meds reviewed with Dr. Dooley and patient deemed stable for discharge home.  PT Recs home w/ home PT.  The adjusted medications are E prescribed to pharmacy of choice.  Called pharmacy and confirmed patient has refills remaining for home cardiac medications.  Ben will follow up with MAYELIN Angel on August 18th at 10am.  All discharge and medication instructions are reviewed with patient on discharge.

## 2020-08-12 NOTE — PROGRESS NOTE ADULT - SUBJECTIVE AND OBJECTIVE BOX
***note incomplete***     INTERVAL HPI/OVERNIGHT EVENTS:      Patient is a 59y old  Female who presents with a chief complaint of acute on chronic CHF (08-11-20)   was seen and examined today. Reports the following symptoms:    CONSTITUTIONAL:  Negative fever or chills, feels well, good appetite  EYES:  Negative  blurry vision or double vision  CARDIOVASCULAR:  Negative for chest pain or palpitations  RESPIRATORY:  Negative for cough, wheezing, or SOB   GASTROINTESTINAL:  Negative for nausea, vomiting, diarrhea, constipation, or abdominal pain  GENITOURINARY:  Negative frequency, urgency or dysuria  NEUROLOGIC:  No headache, confusion, dizziness, lightheadedness    MEDICATIONS  (STANDING):  amitriptyline 10 milliGRAM(s) Oral at bedtime  apixaban 2.5 milliGRAM(s) Oral two times a day  atorvastatin 80 milliGRAM(s) Oral at bedtime  budesonide 160 MICROgram(s)/formoterol 4.5 MICROgram(s) Inhaler 2 Puff(s) Inhalation two times a day  busPIRone 10 milliGRAM(s) Oral two times a day  dexAMETHasone     Tablet 20 milliGRAM(s) Oral <User Schedule>  dextrose 5%. 1000 milliLiter(s) (50 mL/Hr) IV Continuous <Continuous>  dextrose 50% Injectable 12.5 Gram(s) IV Push once  dextrose 50% Injectable 25 Gram(s) IV Push once  dextrose 50% Injectable 25 Gram(s) IV Push once  escitalopram 20 milliGRAM(s) Oral daily  fenofibrate Tablet 48 milliGRAM(s) Oral daily  furosemide Infusion 10 mG/Hr (5 mL/Hr) IV Continuous <Continuous>  gabapentin 100 milliGRAM(s) Oral two times a day  insulin glargine Injectable (LANTUS) 20 Unit(s) SubCutaneous at bedtime  insulin lispro (HumaLOG) corrective regimen sliding scale   SubCutaneous Before meals and at bedtime  insulin lispro Injectable (HumaLOG) 12 Unit(s) SubCutaneous three times a day before meals  levothyroxine 25 MICROGram(s) Oral daily  metolazone 5 milliGRAM(s) Oral <User Schedule>  metoprolol succinate  milliGRAM(s) Oral daily  pantoprazole    Tablet 40 milliGRAM(s) Oral before breakfast  sacubitril 97 mG/valsartan 103 mG 1 Tablet(s) Oral two times a day  traZODone 50 milliGRAM(s) Oral two times a day    MEDICATIONS  (PRN):  ALBUTerol    90 MICROgram(s) HFA Inhaler 2 Puff(s) Inhalation every 6 hours PRN Shortness of Breath and/or Wheezing  dextrose 40% Gel 15 Gram(s) Oral once PRN Blood Glucose LESS THAN 70 milliGRAM(s)/deciliter  glucagon  Injectable 1 milliGRAM(s) IntraMuscular once PRN Glucose LESS THAN 70 milligrams/deciliter        LABS:                        9.9    8.56  )-----------( 194      ( 12 Aug 2020 06:37 )             33.2     08-12    141  |  94<L>  |  51<H>  ----------------------------<  107<H>  3.9   |  36<H>  |  2.20<H>    Ca    9.6      12 Aug 2020 06:37  Mg     2.1     08-12      Hemoglobin A1C, Whole Blood: 9.2 % (03-11-20 @ 04:46)  Hemoglobin A1C, Whole Blood: 10.3 % (02-13-20 @ 06:59)        Thyroid Stimulating Hormone, Serum: 6.626 uIU/mL (08-09 @ 18:32)  Thyroid Stimulating Hormone, Serum: 1.630 uIU/mL (03-12 @ 05:48)  Thyroid Stimulating Hormone, Serum: 8.491 uIU/mL (02-12 @ 12:45)      RADIOLOGY & ADDITIONAL TESTS:      Vital Signs Last 24 Hrs  T(C): 36.4 (12 Aug 2020 07:08), Max: 36.4 (11 Aug 2020 22:21)  T(F): 97.6 (12 Aug 2020 07:08), Max: 97.6 (12 Aug 2020 07:08)  HR: 62 (12 Aug 2020 08:39) (56 - 66)  BP: 126/61 (12 Aug 2020 08:39) (110/57 - 126/61)  BP(mean): 84 (12 Aug 2020 05:29) (78 - 84)  RR: 18 (12 Aug 2020 08:39) (16 - 18)  SpO2: 98% (12 Aug 2020 08:39) (94% - 99%)    CAPILLARY BLOOD GLUCOSE      Total Insulin correction in last 24 hrs:     PHYSICAL EXAM:  Constitutional: wn/wd in NAD.   HEENT: NCAT, MMM, OP clear, EOMI, , no proptosis or lid retraction  Neck: supple, no acanthosis, no thyromegaly or palpable thyroid nodules   Respiratory: Lungs CTAB.  Cardiovascular: regular rhythm, normal S1 and S2, no audible murmurs, no peripheral edema  GI: soft, + bowel sounds, NT/ND, no masses/HSM appreciated.  Neurology: no tremors, DTR 2+  Skin: no visible rashes/lesions  Psychiatric: AAO x 3, normal affect/mood.        A/P: 59yFemale admitted for (    ). Consulted and being followed for Diabetes Type (  ).       Uncontrolled DM Type (  ) -     Please continue  Lantus         units  AM/PM, premeal Lispro  (  ) units TID, and  Lispro moderate/low dose sliding scale 4 times daily (before meals and bedtime).  Please continue consistent carbohydrate (Diabetic) diet, FS ac & hs. Target  - 150.      Case discussed with attending and primary team      Endocrine Service Pager  [phone]    Attending attestation:  I have seen and evaluated the patient at the bedside.   Endocrine Nurse Practitioner/Fellow/Resident’s note reviewed, including vital signs, PE and laboratory results.  Direct personal management and extensive interpretation of the data conducted.   I agree with the Nurse Practitioner/Fellow/Resident’s assessment and recommendations as above. ***note incomplete***     INTERVAL HPI/OVERNIGHT EVENTS:      Patient is a 59y old  Female who presents with a chief complaint of acute on chronic CHF (08-11-20)  Patient was seen and examined today. Reports the following symptoms:    CONSTITUTIONAL:  Negative fever or chills, feels well, good appetite  EYES:  Negative  blurry vision or double vision  CARDIOVASCULAR:  Negative for chest pain or palpitations  RESPIRATORY:  Negative for cough, wheezing. Some SOB with ambulation.   GASTROINTESTINAL:  Negative for nausea, vomiting, diarrhea, constipation, or abdominal pain  GENITOURINARY:  Negative frequency, urgency or dysuria  NEUROLOGIC:  No headache, confusion, dizziness, lightheadedness    MEDICATIONS  (STANDING):  amitriptyline 10 milliGRAM(s) Oral at bedtime  apixaban 2.5 milliGRAM(s) Oral two times a day  atorvastatin 80 milliGRAM(s) Oral at bedtime  budesonide 160 MICROgram(s)/formoterol 4.5 MICROgram(s) Inhaler 2 Puff(s) Inhalation two times a day  busPIRone 10 milliGRAM(s) Oral two times a day  dexAMETHasone     Tablet 20 milliGRAM(s) Oral <User Schedule>  dextrose 5%. 1000 milliLiter(s) (50 mL/Hr) IV Continuous <Continuous>  dextrose 50% Injectable 12.5 Gram(s) IV Push once  dextrose 50% Injectable 25 Gram(s) IV Push once  dextrose 50% Injectable 25 Gram(s) IV Push once  escitalopram 20 milliGRAM(s) Oral daily  fenofibrate Tablet 48 milliGRAM(s) Oral daily  furosemide Infusion 10 mG/Hr (5 mL/Hr) IV Continuous <Continuous>  gabapentin 100 milliGRAM(s) Oral two times a day  insulin glargine Injectable (LANTUS) 20 Unit(s) SubCutaneous at bedtime  insulin lispro (HumaLOG) corrective regimen sliding scale   SubCutaneous Before meals and at bedtime  insulin lispro Injectable (HumaLOG) 12 Unit(s) SubCutaneous three times a day before meals  levothyroxine 25 MICROGram(s) Oral daily  metolazone 5 milliGRAM(s) Oral <User Schedule>  metoprolol succinate  milliGRAM(s) Oral daily  pantoprazole    Tablet 40 milliGRAM(s) Oral before breakfast  sacubitril 97 mG/valsartan 103 mG 1 Tablet(s) Oral two times a day  traZODone 50 milliGRAM(s) Oral two times a day    MEDICATIONS  (PRN):  ALBUTerol    90 MICROgram(s) HFA Inhaler 2 Puff(s) Inhalation every 6 hours PRN Shortness of Breath and/or Wheezing  dextrose 40% Gel 15 Gram(s) Oral once PRN Blood Glucose LESS THAN 70 milliGRAM(s)/deciliter  glucagon  Injectable 1 milliGRAM(s) IntraMuscular once PRN Glucose LESS THAN 70 milligrams/deciliter        LABS:                        9.9    8.56  )-----------( 194      ( 12 Aug 2020 06:37 )             33.2     08-12    141  |  94<L>  |  51<H>  ----------------------------<  107<H>  3.9   |  36<H>  |  2.20<H>    Ca    9.6      12 Aug 2020 06:37  Mg     2.1     08-12      Hemoglobin A1C, Whole Blood: 9.2 % (03-11-20 @ 04:46)  Hemoglobin A1C, Whole Blood: 10.3 % (02-13-20 @ 06:59)        Thyroid Stimulating Hormone, Serum: 6.626 uIU/mL (08-09 @ 18:32)  Thyroid Stimulating Hormone, Serum: 1.630 uIU/mL (03-12 @ 05:48)  Thyroid Stimulating Hormone, Serum: 8.491 uIU/mL (02-12 @ 12:45)      RADIOLOGY & ADDITIONAL TESTS:      Vital Signs Last 24 Hrs  T(C): 36.4 (12 Aug 2020 07:08), Max: 36.4 (11 Aug 2020 22:21)  T(F): 97.6 (12 Aug 2020 07:08), Max: 97.6 (12 Aug 2020 07:08)  HR: 62 (12 Aug 2020 08:39) (56 - 66)  BP: 126/61 (12 Aug 2020 08:39) (110/57 - 126/61)  BP(mean): 84 (12 Aug 2020 05:29) (78 - 84)  RR: 18 (12 Aug 2020 08:39) (16 - 18)  SpO2: 98% (12 Aug 2020 08:39) (94% - 99%)    CAPILLARY BLOOD GLUCOSE      Total Insulin correction in last 24 hrs:     PHYSICAL EXAM:  Cardiovascular: Normal S1 S2, No JVD, No murmurs,   Respiratory: Decreased Breath Sounds and scattered rales B/L	  Gastrointestinal:  Soft, Non-tender, + BS	  Extremities: 2+ pitting edema B/L, Normal range of motion, No clubbing or cyanosis  Neurologic: Non-focal  Psychiatry: A & O x 3, Mood & affect appropriate        A/P: 59yFemale admitted for acute on chronic CHF. Consulted and being followed for Diabetes Type II and hypothyroidism.       1. Uncontrolled DM Type (  ) -     Please continue  Lantus         units  AM/PM, premeal Lispro  (  ) units TID, and  Lispro moderate/low dose sliding scale 4 times daily (before meals and bedtime).  Please continue consistent carbohydrate (Diabetic) diet, FS ac & hs. Target  - 150.      2. Hypothyroidism   Please continue levothyroxine 25mcg daily.     Case discussed with attending and primary team      Endocrine Service Pager  [phone]    Attending attestation:  I have seen and evaluated the patient at the bedside.   Endocrine Nurse Practitioner/Fellow/Resident’s note reviewed, including vital signs, PE and laboratory results.  Direct personal management and extensive interpretation of the data conducted.   I agree with the Nurse Practitioner/Fellow/Resident’s assessment and recommendations as above. ***note incomplete***     INTERVAL HPI/OVERNIGHT EVENTS:      Patient is a 59y old  Female who presents with a chief complaint of acute on chronic CHF (20)  Patient was seen and examined today. Reports the following symptoms:    CONSTITUTIONAL:  Negative fever or chills, feels well, good appetite  EYES:  Negative  blurry vision or double vision  CARDIOVASCULAR:  Negative for chest pain or palpitations  RESPIRATORY:  Negative for cough, wheezing. Some SOB with ambulation.   GASTROINTESTINAL:  Negative for nausea, vomiting, diarrhea, constipation, or abdominal pain  GENITOURINARY:  Negative frequency, urgency or dysuria  NEUROLOGIC:  No headache, confusion, dizziness, lightheadedness    MEDICATIONS  (STANDING):  amitriptyline 10 milliGRAM(s) Oral at bedtime  apixaban 2.5 milliGRAM(s) Oral two times a day  atorvastatin 80 milliGRAM(s) Oral at bedtime  budesonide 160 MICROgram(s)/formoterol 4.5 MICROgram(s) Inhaler 2 Puff(s) Inhalation two times a day  busPIRone 10 milliGRAM(s) Oral two times a day  dexAMETHasone     Tablet 20 milliGRAM(s) Oral <User Schedule>  dextrose 5%. 1000 milliLiter(s) (50 mL/Hr) IV Continuous <Continuous>  dextrose 50% Injectable 12.5 Gram(s) IV Push once  dextrose 50% Injectable 25 Gram(s) IV Push once  dextrose 50% Injectable 25 Gram(s) IV Push once  escitalopram 20 milliGRAM(s) Oral daily  fenofibrate Tablet 48 milliGRAM(s) Oral daily  furosemide Infusion 10 mG/Hr (5 mL/Hr) IV Continuous <Continuous>  gabapentin 100 milliGRAM(s) Oral two times a day  insulin glargine Injectable (LANTUS) 20 Unit(s) SubCutaneous at bedtime  insulin lispro (HumaLOG) corrective regimen sliding scale   SubCutaneous Before meals and at bedtime  insulin lispro Injectable (HumaLOG) 12 Unit(s) SubCutaneous three times a day before meals  levothyroxine 25 MICROGram(s) Oral daily  metolazone 5 milliGRAM(s) Oral <User Schedule>  metoprolol succinate  milliGRAM(s) Oral daily  pantoprazole    Tablet 40 milliGRAM(s) Oral before breakfast  sacubitril 97 mG/valsartan 103 mG 1 Tablet(s) Oral two times a day  traZODone 50 milliGRAM(s) Oral two times a day    MEDICATIONS  (PRN):  ALBUTerol    90 MICROgram(s) HFA Inhaler 2 Puff(s) Inhalation every 6 hours PRN Shortness of Breath and/or Wheezing  dextrose 40% Gel 15 Gram(s) Oral once PRN Blood Glucose LESS THAN 70 milliGRAM(s)/deciliter  glucagon  Injectable 1 milliGRAM(s) IntraMuscular once PRN Glucose LESS THAN 70 milligrams/deciliter        LABS:                        9.9    8.56  )-----------( 194      ( 12 Aug 2020 06:37 )             33.2     08    141  |  94<L>  |  51<H>  ----------------------------<  107<H>  3.9   |  36<H>  |  2.20<H>    Ca    9.6      12 Aug 2020 06:37  Mg     2.1     12      Hemoglobin A1C, Whole Blood: 9.2 % (20 @ 04:46)  Hemoglobin A1C, Whole Blood: 10.3 % (20 @ 06:59)        Thyroid Stimulating Hormone, Serum: 6.626 uIU/mL ( @ 18:32)  Thyroid Stimulating Hormone, Serum: 1.630 uIU/mL ( @ 05:48)  Thyroid Stimulating Hormone, Serum: 8.491 uIU/mL ( @ 12:45)      RADIOLOGY & ADDITIONAL TESTS:      Vital Signs Last 24 Hrs  T(C): 36.4 (12 Aug 2020 07:08), Max: 36.4 (11 Aug 2020 22:21)  T(F): 97.6 (12 Aug 2020 07:08), Max: 97.6 (12 Aug 2020 07:08)  HR: 62 (12 Aug 2020 08:39) (56 - 66)  BP: 126/61 (12 Aug 2020 08:39) (110/57 - 126/61)  BP(mean): 84 (12 Aug 2020 05:29) (78 - 84)  RR: 18 (12 Aug 2020 08:39) (16 - 18)  SpO2: 98% (12 Aug 2020 08:39) (94% - 99%)    CAPILLARY BLOOD GLUCOSE      Total Insulin correction in last 24 hrs:   Yesterday:  pre-dinner fs, 12 units nutritional lispro, had chicken and mashed potatoes   bedtime fs, 20 units lantus   Today:  pre-breakfast fs, 12 units nutritional lispro, had cereal, half a banana, and a small cup of apple juice   pre-lunch fs      PHYSICAL EXAM:  Cardiovascular: Normal S1 S2, No JVD, No murmurs,   Respiratory: Decreased Breath Sounds and scattered rales B/L	  Gastrointestinal:  Soft, Non-tender, + BS	  Extremities: 2+ pitting edema B/L, Normal range of motion, No clubbing or cyanosis  Neurologic: Non-focal  Psychiatry: A & O x 3, Mood & affect appropriate        A/P: 59yFemale admitted for acute on chronic CHF. Consulted and being followed for Diabetes Type II and hypothyroidism.       1. Uncontrolled DM Type (  ) -     Please continue  Lantus         units  AM/PM, premeal Lispro  (  ) units TID, and  Lispro moderate/low dose sliding scale 4 times daily (before meals and bedtime).  Please continue consistent carbohydrate (Diabetic) diet, FS ac & hs. Target  - 150.      2. Hypothyroidism   Please continue levothyroxine 25mcg daily.     Case discussed with attending and primary team      Endocrine Service Pager  [phone]    Attending attestation:  I have seen and evaluated the patient at the bedside.   Endocrine Nurse Practitioner/Fellow/Resident’s note reviewed, including vital signs, PE and laboratory results.  Direct personal management and extensive interpretation of the data conducted.   I agree with the Nurse Practitioner/Fellow/Resident’s assessment and recommendations as above. INTERVAL HPI/OVERNIGHT EVENTS:      Patient is a 59y old  Female who presents with a chief complaint of acute on chronic CHF (20)  Patient was seen and examined today. Reports the following symptoms:    CONSTITUTIONAL:  Negative fever or chills, feels well, good appetite  EYES:  Negative  blurry vision or double vision  CARDIOVASCULAR:  Negative for chest pain or palpitations  RESPIRATORY:  Negative for cough, wheezing. Some SOB with ambulation.   GASTROINTESTINAL:  Negative for nausea, vomiting, diarrhea, constipation, or abdominal pain  GENITOURINARY:  Negative frequency, urgency or dysuria  NEUROLOGIC:  No headache, confusion, dizziness, lightheadedness    MEDICATIONS  (STANDING):  amitriptyline 10 milliGRAM(s) Oral at bedtime  apixaban 2.5 milliGRAM(s) Oral two times a day  atorvastatin 80 milliGRAM(s) Oral at bedtime  budesonide 160 MICROgram(s)/formoterol 4.5 MICROgram(s) Inhaler 2 Puff(s) Inhalation two times a day  busPIRone 10 milliGRAM(s) Oral two times a day  dexAMETHasone     Tablet 20 milliGRAM(s) Oral <User Schedule>  dextrose 5%. 1000 milliLiter(s) (50 mL/Hr) IV Continuous <Continuous>  dextrose 50% Injectable 12.5 Gram(s) IV Push once  dextrose 50% Injectable 25 Gram(s) IV Push once  dextrose 50% Injectable 25 Gram(s) IV Push once  escitalopram 20 milliGRAM(s) Oral daily  fenofibrate Tablet 48 milliGRAM(s) Oral daily  furosemide Infusion 10 mG/Hr (5 mL/Hr) IV Continuous <Continuous>  gabapentin 100 milliGRAM(s) Oral two times a day  insulin glargine Injectable (LANTUS) 20 Unit(s) SubCutaneous at bedtime  insulin lispro (HumaLOG) corrective regimen sliding scale   SubCutaneous Before meals and at bedtime  insulin lispro Injectable (HumaLOG) 12 Unit(s) SubCutaneous three times a day before meals  levothyroxine 25 MICROGram(s) Oral daily  metolazone 5 milliGRAM(s) Oral <User Schedule>  metoprolol succinate  milliGRAM(s) Oral daily  pantoprazole    Tablet 40 milliGRAM(s) Oral before breakfast  sacubitril 97 mG/valsartan 103 mG 1 Tablet(s) Oral two times a day  traZODone 50 milliGRAM(s) Oral two times a day    MEDICATIONS  (PRN):  ALBUTerol    90 MICROgram(s) HFA Inhaler 2 Puff(s) Inhalation every 6 hours PRN Shortness of Breath and/or Wheezing  dextrose 40% Gel 15 Gram(s) Oral once PRN Blood Glucose LESS THAN 70 milliGRAM(s)/deciliter  glucagon  Injectable 1 milliGRAM(s) IntraMuscular once PRN Glucose LESS THAN 70 milligrams/deciliter        LABS:                        9.9    8.56  )-----------( 194      ( 12 Aug 2020 06:37 )             33.2     08    141  |  94<L>  |  51<H>  ----------------------------<  107<H>  3.9   |  36<H>  |  2.20<H>    Ca    9.6      12 Aug 2020 06:37  Mg     2.1           Hemoglobin A1C, Whole Blood: 9.2 % (20 @ 04:46)  Hemoglobin A1C, Whole Blood: 10.3 % (20 @ 06:59)        Thyroid Stimulating Hormone, Serum: 6.626 uIU/mL ( @ 18:32)  Thyroid Stimulating Hormone, Serum: 1.630 uIU/mL ( @ 05:48)  Thyroid Stimulating Hormone, Serum: 8.491 uIU/mL ( @ 12:45)      RADIOLOGY & ADDITIONAL TESTS:      Vital Signs Last 24 Hrs  T(C): 36.4 (12 Aug 2020 07:08), Max: 36.4 (11 Aug 2020 22:21)  T(F): 97.6 (12 Aug 2020 07:08), Max: 97.6 (12 Aug 2020 07:08)  HR: 62 (12 Aug 2020 08:39) (56 - 66)  BP: 126/61 (12 Aug 2020 08:39) (110/57 - 126/61)  BP(mean): 84 (12 Aug 2020 05:29) (78 - 84)  RR: 18 (12 Aug 2020 08:39) (16 - 18)  SpO2: 98% (12 Aug 2020 08:39) (94% - 99%)    CAPILLARY BLOOD GLUCOSE      Total Insulin correction in last 24 hrs:   Yesterday:  pre-dinner fs, 12 units nutritional lispro, had chicken and mashed potatoes   bedtime fs, 20 units lantus   Today:  pre-breakfast fs, 12 units nutritional lispro, had cereal, half a banana, and a small cup of apple juice   pre-lunch fs      PHYSICAL EXAM:  Cardiovascular: Normal S1 S2, No JVD, No murmurs,   Respiratory: Decreased Breath Sounds and scattered rales B/L	  Gastrointestinal:  Soft, Non-tender, + BS	  Extremities: 2+ pitting edema B/L, Normal range of motion, No clubbing or cyanosis  Neurologic: Non-focal  Psychiatry: A & O x 3, Mood & affect appropriate        A/P: 59yFemale admitted for acute on chronic CHF. Consulted and being followed for Diabetes Type II and hypothyroidism.       1. DM Type II  Please discharge patient with lantus 20 units at bedtime and premeal Lispro 12 units TID.     2. Hypothyroidism   May discharge patient with levothyroxine 25mcg daily. Patient given instructions to take levothyroxine in AM on an empty stomach.     Case discussed with attending and primary team      Endocrine Service Pager  [phone]    Attending attestation:  I have seen and evaluated the patient at the bedside.   Endocrine Nurse Practitioner/Fellow/Resident’s note reviewed, including vital signs, PE and laboratory results.  Direct personal management and extensive interpretation of the data conducted.   I agree with the Nurse Practitioner/Fellow/Resident’s assessment and recommendations as above. INTERVAL HPI/OVERNIGHT EVENTS:      Patient is a 59y old  Female who presents with a chief complaint of acute on chronic CHF (20)  Patient was seen and examined today. Reports the following symptoms:  Total Insulin correction in last 24 hrs:   Yesterday:  pre-dinner fs, 12 units nutritional lispro, had chicken and mashed potatoes   bedtime fs, 20 units lantus   Today:  pre-breakfast fs, 12 units nutritional lispro, had cereal, half a banana, and a small cup of apple juice   pre-lunch fs      CONSTITUTIONAL:  Negative fever or chills, feels well, good appetite  EYES:  Negative  blurry vision or double vision  CARDIOVASCULAR:  Negative for chest pain or palpitations  RESPIRATORY:  Negative for cough, wheezing. Some SOB with ambulation.   GASTROINTESTINAL:  Negative for nausea, vomiting, diarrhea, constipation, or abdominal pain  GENITOURINARY:  Negative frequency, urgency or dysuria  NEUROLOGIC:  No headache, confusion, dizziness, lightheadedness    MEDICATIONS  (STANDING):  amitriptyline 10 milliGRAM(s) Oral at bedtime  apixaban 2.5 milliGRAM(s) Oral two times a day  atorvastatin 80 milliGRAM(s) Oral at bedtime  budesonide 160 MICROgram(s)/formoterol 4.5 MICROgram(s) Inhaler 2 Puff(s) Inhalation two times a day  busPIRone 10 milliGRAM(s) Oral two times a day  dexAMETHasone     Tablet 20 milliGRAM(s) Oral <User Schedule>  dextrose 5%. 1000 milliLiter(s) (50 mL/Hr) IV Continuous <Continuous>  dextrose 50% Injectable 12.5 Gram(s) IV Push once  dextrose 50% Injectable 25 Gram(s) IV Push once  dextrose 50% Injectable 25 Gram(s) IV Push once  escitalopram 20 milliGRAM(s) Oral daily  fenofibrate Tablet 48 milliGRAM(s) Oral daily  furosemide Infusion 10 mG/Hr (5 mL/Hr) IV Continuous <Continuous>  gabapentin 100 milliGRAM(s) Oral two times a day  insulin glargine Injectable (LANTUS) 20 Unit(s) SubCutaneous at bedtime  insulin lispro (HumaLOG) corrective regimen sliding scale   SubCutaneous Before meals and at bedtime  insulin lispro Injectable (HumaLOG) 12 Unit(s) SubCutaneous three times a day before meals  levothyroxine 25 MICROGram(s) Oral daily  metolazone 5 milliGRAM(s) Oral <User Schedule>  metoprolol succinate  milliGRAM(s) Oral daily  pantoprazole    Tablet 40 milliGRAM(s) Oral before breakfast  sacubitril 97 mG/valsartan 103 mG 1 Tablet(s) Oral two times a day  traZODone 50 milliGRAM(s) Oral two times a day    MEDICATIONS  (PRN):  ALBUTerol    90 MICROgram(s) HFA Inhaler 2 Puff(s) Inhalation every 6 hours PRN Shortness of Breath and/or Wheezing  dextrose 40% Gel 15 Gram(s) Oral once PRN Blood Glucose LESS THAN 70 milliGRAM(s)/deciliter  glucagon  Injectable 1 milliGRAM(s) IntraMuscular once PRN Glucose LESS THAN 70 milligrams/deciliter        LABS:                        9.9    8.56  )-----------( 194      ( 12 Aug 2020 06:37 )             33.2     08    141  |  94<L>  |  51<H>  ----------------------------<  107<H>  3.9   |  36<H>  |  2.20<H>    Ca    9.6      12 Aug 2020 06:37  Mg     2.1           Hemoglobin A1C, Whole Blood: 9.2 % (20 @ 04:46)  Hemoglobin A1C, Whole Blood: 10.3 % (20 @ 06:59)        Thyroid Stimulating Hormone, Serum: 6.626 uIU/mL ( @ 18:32)  Thyroid Stimulating Hormone, Serum: 1.630 uIU/mL ( @ 05:48)  Thyroid Stimulating Hormone, Serum: 8.491 uIU/mL ( @ 12:45)      RADIOLOGY & ADDITIONAL TESTS:      Vital Signs Last 24 Hrs  T(C): 36.4 (12 Aug 2020 07:08), Max: 36.4 (11 Aug 2020 22:21)  T(F): 97.6 (12 Aug 2020 07:08), Max: 97.6 (12 Aug 2020 07:08)  HR: 62 (12 Aug 2020 08:39) (56 - 66)  BP: 126/61 (12 Aug 2020 08:39) (110/57 - 126/61)  BP(mean): 84 (12 Aug 2020 05:29) (78 - 84)  RR: 18 (12 Aug 2020 08:39) (16 - 18)  SpO2: 98% (12 Aug 2020 08:39) (94% - 99%)    CAPILLARY BLOOD GLUCOSE          PHYSICAL EXAM:  Cardiovascular: Normal S1 S2, No JVD, No murmurs,   Respiratory: Decreased Breath Sounds and scattered rales B/L	  Gastrointestinal:  Soft, Non-tender, + BS	  Extremities: 2+ pitting edema B/L, Normal range of motion, No clubbing or cyanosis  Neurologic: Non-focal  Psychiatry: A & O x 3, Mood & affect appropriate        A/P: 59yFemale admitted for acute on chronic CHF. Consulted and being followed for Diabetes Type II and hypothyroidism.       1. DM Type II  Please discharge patient with lantus 20 units at bedtime and premeal Lispro 12 units TID.     2. Hypothyroidism   May discharge patient with levothyroxine 25mcg daily. Patient given instructions to take levothyroxine in AM on an empty stomach.     Case discussed with attending and primary team      Endocrine Service Pager  [phone]

## 2020-08-12 NOTE — PHYSICAL THERAPY INITIAL EVALUATION ADULT - GAIT DEVIATIONS NOTED, PT EVAL
decreased step length/decreased liban/increased time in double stance/decreased weight-shifting ability

## 2020-08-12 NOTE — DISCHARGE NOTE PROVIDER - NSDCMRMEDTOKEN_GEN_ALL_CORE_FT
amitriptyline 10 mg oral tablet: 1 tab(s) orally once a day (at bedtime)  atorvastatin 80 mg oral tablet: 1 tab(s) orally once a day (at bedtime)  busPIRone 10 mg oral tablet: 1 tab(s) orally 2 times a day  Decadron 4 mg oral tablet: 5 tab(s) orally once a week on Monday  Eliquis 2.5 mg oral tablet: 1 tab(s) orally 2 times a day  Entresto 97 mg-103 mg oral tablet: 1 tab(s) orally 2 times a day  escitalopram 20 mg oral tablet: 1 tab(s) orally once a day  fenofibrate 48 mg oral tablet: 1 tab(s) orally once a day  gabapentin 100 mg oral capsule: 1 cap(s) orally 2 times a day  Levemir FlexTouch 100 units/mL subcutaneous solution: 30 unit(s) subcutaneous once a day (at bedtime)  levothyroxine 25 mcg (0.025 mg) oral tablet: 1 tab(s) orally once a day  methadone 10 mg oral tablet: 1 tab(s) orally every 8 hours  Ninlaro 3 mg oral capsule: 1 cap(s) orally every 7 days (mondays)  NovoLOG FlexPen 100 units/mL injectable solution: 12 unit(s) injectable 3 times a day (before meals)  omeprazole 40 mg oral delayed release capsule: 1 cap(s) orally once a day  Symbicort 160 mcg-4.5 mcg/inh inhalation aerosol: 2 puff(s) inhaled 2 times a day  tiZANidine 4 mg oral tablet: 1 tab(s) orally once a day  Toprol- mg oral tablet, extended release: 1 tab(s) orally once a day  torsemide 20 mg oral tablet: 1 tab(s) orally 2 times a day  traZODone 50 mg oral tablet: 1 tab(s) orally 2 times a day  Ventolin HFA 90 mcg/inh inhalation aerosol: 2 puff(s) inhaled every 6 hours amitriptyline 10 mg oral tablet: 1 tab(s) orally once a day (at bedtime)  atorvastatin 80 mg oral tablet: 1 tab(s) orally once a day (at bedtime)  busPIRone 10 mg oral tablet: 1 tab(s) orally 2 times a day  Decadron 4 mg oral tablet: 5 tab(s) orally once a week on Monday  Eliquis 2.5 mg oral tablet: 1 tab(s) orally 2 times a day  Entresto 97 mg-103 mg oral tablet: 1 tab(s) orally 2 times a day  escitalopram 20 mg oral tablet: 1 tab(s) orally once a day  fenofibrate 48 mg oral tablet: 1 tab(s) orally once a day  gabapentin 100 mg oral capsule: 1 cap(s) orally 2 times a day  Levemir FlexTouch 100 units/mL subcutaneous solution: 20 unit(s) subcutaneous once a day (at bedtime)   levothyroxine 25 mcg (0.025 mg) oral tablet: 1 tab(s) orally once a day  methadone 10 mg oral tablet: 1 tab(s) orally every 8 hours  metoprolol succinate 100 mg oral tablet, extended release: 1 tab(s) orally once a day  Ninlaro 3 mg oral capsule: 1 cap(s) orally every 7 days (mondays)  NovoLOG FlexPen 100 units/mL injectable solution: 12 unit(s) injectable 3 times a day (before meals)  omeprazole 40 mg oral delayed release capsule: 1 cap(s) orally once a day  Symbicort 160 mcg-4.5 mcg/inh inhalation aerosol: 2 puff(s) inhaled 2 times a day  tiZANidine 4 mg oral tablet: 1 tab(s) orally once a day  torsemide 20 mg oral tablet: 1 tab(s) orally 2 times a day  traZODone 50 mg oral tablet: 1 tab(s) orally 2 times a day  Ventolin HFA 90 mcg/inh inhalation aerosol: 2 puff(s) inhaled every 6 hours

## 2020-08-12 NOTE — PHYSICAL THERAPY INITIAL EVALUATION ADULT - PLANNED THERAPY INTERVENTIONS, PT EVAL
transfer training/bed mobility training/postural re-education/strengthening/balance training/gait training

## 2020-08-12 NOTE — PHYSICAL THERAPY INITIAL EVALUATION ADULT - ADDITIONAL COMMENTS
pt states that she lives w/ her  and daughter on the 2nd floor of a walk up apartment building. States that she typically ambulates w/ a SC, but does also own a RW. Denies hx of recent falls. States that her  and daughter both assist her at home

## 2020-08-12 NOTE — PHYSICAL THERAPY INITIAL EVALUATION ADULT - IMPAIRMENTS FOUND, PT EVAL
muscle strength/posture/gait, locomotion, and balance/aerobic capacity/endurance/gross motor/ventilation and respiration/gas exchange/joint integrity and mobility

## 2020-08-12 NOTE — DISCHARGE NOTE PROVIDER - CARE PROVIDER_API CALL
Hedy Angel (NP; RN)  NP in Family Health  [address] [place] [address]  Phone: [phone]  Fax: [phone]  Follow Up Time:

## 2020-08-12 NOTE — DISCHARGE NOTE NURSING/CASE MANAGEMENT/SOCIAL WORK - PATIENT PORTAL LINK FT
You can access the FollowMyHealth Patient Portal offered by [facility] by registering at the following website: [email or web address]. By joining [facility]’s FollowMyHealth portal, you will also be able to view your health information using other applications (apps) compatible with our system.

## 2020-08-12 NOTE — DISCHARGE NOTE PROVIDER - NSDCFUSCHEDAPPT_GEN_ALL_CORE_FT
SULY PENA ; 08/18/2020 ; [facility] [address] SULY PENA ; 08/18/2020 ; N[facility] [address]

## 2020-08-12 NOTE — PROGRESS NOTE ADULT - REASON FOR ADMISSION
acute on chronic CHF

## 2020-08-17 DIAGNOSIS — Z86.718 PERSONAL HISTORY OF OTHER VENOUS THROMBOSIS AND EMBOLISM: ICD-10-CM

## 2020-08-17 DIAGNOSIS — E11.22 TYPE 2 DIABETES MELLITUS WITH DIABETIC CHRONIC KIDNEY DISEASE: ICD-10-CM

## 2020-08-17 DIAGNOSIS — Z95.810 PRESENCE OF AUTOMATIC (IMPLANTABLE) CARDIAC DEFIBRILLATOR: ICD-10-CM

## 2020-08-17 DIAGNOSIS — Z91.041 RADIOGRAPHIC DYE ALLERGY STATUS: ICD-10-CM

## 2020-08-17 DIAGNOSIS — E66.9 OBESITY, UNSPECIFIED: ICD-10-CM

## 2020-08-17 DIAGNOSIS — Z90.710 ACQUIRED ABSENCE OF BOTH CERVIX AND UTERUS: ICD-10-CM

## 2020-08-17 DIAGNOSIS — R06.02 SHORTNESS OF BREATH: ICD-10-CM

## 2020-08-17 DIAGNOSIS — Z79.01 LONG TERM (CURRENT) USE OF ANTICOAGULANTS: ICD-10-CM

## 2020-08-17 DIAGNOSIS — N18.4 CHRONIC KIDNEY DISEASE, STAGE 4 (SEVERE): ICD-10-CM

## 2020-08-17 DIAGNOSIS — Z90.49 ACQUIRED ABSENCE OF OTHER SPECIFIED PARTS OF DIGESTIVE TRACT: ICD-10-CM

## 2020-08-17 DIAGNOSIS — E03.9 HYPOTHYROIDISM, UNSPECIFIED: ICD-10-CM

## 2020-08-17 DIAGNOSIS — Z99.81 DEPENDENCE ON SUPPLEMENTAL OXYGEN: ICD-10-CM

## 2020-08-17 DIAGNOSIS — F32.9 MAJOR DEPRESSIVE DISORDER, SINGLE EPISODE, UNSPECIFIED: ICD-10-CM

## 2020-08-17 DIAGNOSIS — I42.8 OTHER CARDIOMYOPATHIES: ICD-10-CM

## 2020-08-17 DIAGNOSIS — I50.23 ACUTE ON CHRONIC SYSTOLIC (CONGESTIVE) HEART FAILURE: ICD-10-CM

## 2020-08-17 DIAGNOSIS — E11.51 TYPE 2 DIABETES MELLITUS WITH DIABETIC PERIPHERAL ANGIOPATHY WITHOUT GANGRENE: ICD-10-CM

## 2020-08-17 DIAGNOSIS — G47.33 OBSTRUCTIVE SLEEP APNEA (ADULT) (PEDIATRIC): ICD-10-CM

## 2020-08-17 DIAGNOSIS — J44.9 CHRONIC OBSTRUCTIVE PULMONARY DISEASE, UNSPECIFIED: ICD-10-CM

## 2020-08-17 DIAGNOSIS — F31.9 BIPOLAR DISORDER, UNSPECIFIED: ICD-10-CM

## 2020-08-17 DIAGNOSIS — C90.00 MULTIPLE MYELOMA NOT HAVING ACHIEVED REMISSION: ICD-10-CM

## 2020-08-17 DIAGNOSIS — I13.0 HYPERTENSIVE HEART AND CHRONIC KIDNEY DISEASE WITH HEART FAILURE AND STAGE 1 THROUGH STAGE 4 CHRONIC KIDNEY DISEASE, OR UNSPECIFIED CHRONIC KIDNEY DISEASE: ICD-10-CM

## 2020-08-17 DIAGNOSIS — Z99.89 DEPENDENCE ON OTHER ENABLING MACHINES AND DEVICES: ICD-10-CM

## 2020-08-17 DIAGNOSIS — Z88.8 ALLERGY STATUS TO OTHER DRUGS, MEDICAMENTS AND BIOLOGICAL SUBSTANCES STATUS: ICD-10-CM

## 2020-08-17 DIAGNOSIS — Z79.4 LONG TERM (CURRENT) USE OF INSULIN: ICD-10-CM

## 2020-08-17 DIAGNOSIS — E11.40 TYPE 2 DIABETES MELLITUS WITH DIABETIC NEUROPATHY, UNSPECIFIED: ICD-10-CM

## 2020-08-17 DIAGNOSIS — F17.210 NICOTINE DEPENDENCE, CIGARETTES, UNCOMPLICATED: ICD-10-CM

## 2020-08-17 DIAGNOSIS — I69.951 HEMIPLEGIA AND HEMIPARESIS FOLLOWING UNSPECIFIED CEREBROVASCULAR DISEASE AFFECTING RIGHT DOMINANT SIDE: ICD-10-CM

## 2020-08-17 DIAGNOSIS — Z95.828 PRESENCE OF OTHER VASCULAR IMPLANTS AND GRAFTS: ICD-10-CM

## 2020-08-18 ENCOUNTER — APPOINTMENT (OUTPATIENT)
Dept: HEART AND VASCULAR | Facility: CLINIC | Age: 60
End: 2020-08-18
Payer: MEDICARE

## 2020-08-18 VITALS
HEART RATE: 76 BPM | SYSTOLIC BLOOD PRESSURE: 135 MMHG | BODY MASS INDEX: 36.65 KG/M2 | DIASTOLIC BLOOD PRESSURE: 72 MMHG | WEIGHT: 220 LBS | OXYGEN SATURATION: 96 % | HEIGHT: 65 IN

## 2020-08-18 DIAGNOSIS — I82.409 ACUTE EMBOLISM AND THROMBOSIS OF UNSPECIFIED DEEP VEINS OF UNSPECIFIED LOWER EXTREMITY: ICD-10-CM

## 2020-08-18 LAB
ALBUMIN MFR SERPL ELPH: 51.9 %
ALBUMIN SERPL-MCNC: 3.6 G/DL
ALBUMIN/GLOB SERPL: 1.1 RATIO
ALPHA1 GLOB MFR SERPL ELPH: 4.4 %
ALPHA1 GLOB SERPL ELPH-MCNC: 0.3 G/DL
ALPHA2 GLOB MFR SERPL ELPH: 14.3 %
ALPHA2 GLOB SERPL ELPH-MCNC: 1 G/DL
B-GLOBULIN MFR SERPL ELPH: 9.3 %
B-GLOBULIN SERPL ELPH-MCNC: 0.7 G/DL
DEPRECATED KAPPA LC FREE/LAMBDA SER: 4.87 RATIO
GAMMA GLOB FLD ELPH-MCNC: 1.4 G/DL
GAMMA GLOB MFR SERPL ELPH: 20.1 %
IGA SER QL IEP: 64 MG/DL
IGG SER QL IEP: 1514 MG/DL
IGM SER QL IEP: 34 MG/DL
INTERPRETATION SERPL IEP-IMP: NORMAL
KAPPA LC CSF-MCNC: 2.02 MG/DL
KAPPA LC SERPL-MCNC: 9.84 MG/DL
M PROTEIN MFR SERPL ELPH: 15.9 %
M PROTEIN SPEC IFE-MCNC: NORMAL
MONOCLON BAND OBS SERPL: 1.1 G/DL
PROT SERPL-MCNC: 7 G/DL
PROT SERPL-MCNC: 7 G/DL

## 2020-08-18 PROCEDURE — 36415 COLL VENOUS BLD VENIPUNCTURE: CPT

## 2020-08-18 PROCEDURE — 99214 OFFICE O/P EST MOD 30 MIN: CPT | Mod: 25

## 2020-08-18 NOTE — PHYSICAL EXAM
[General Appearance - Well Developed] : well developed [Normal Appearance] : normal appearance [Well Groomed] : well groomed [No Deformities] : no deformities [General Appearance - Well Nourished] : well nourished [General Appearance - In No Acute Distress] : no acute distress [Normal Conjunctiva] : the conjunctiva exhibited no abnormalities [Normal Oral Mucosa] : normal oral mucosa [No Oral Pallor] : no oral pallor [No Oral Cyanosis] : no oral cyanosis [Abnormal Walk] : normal gait [Skin Color & Pigmentation] : normal skin color and pigmentation [Affect] : the affect was normal [Oriented To Time, Place, And Person] : oriented to person, place, and time [Mood] : the mood was normal [No Anxiety] : not feeling anxious [Respiration, Rhythm And Depth] : normal respiratory rhythm and effort [] : no respiratory distress [Heart Sounds] : normal S1 and S2 [Bowel Sounds] : normal bowel sounds [Abdomen Tenderness] : non-tender [FreeTextEntry1] : b/l 2+ pitting edema to calf

## 2020-08-18 NOTE — REASON FOR VISIT
[Heart Failure] : congestive heart failure [Follow-Up - From Hospitalization] : follow-up of a recent hospitalization for [FreeTextEntry1] : PATIENT INSTRUCTIONS:\par \par 1. Labs today.\par \par 2. Please INCREASE the TORSEMIDE to 80mg daily. \par \par 3. Please do your pillow readings EVERY MORNING.\par \par 4. Follow up via telehealth in 1 week and in person office visit in 2 weeks.

## 2020-08-18 NOTE — ASSESSMENT
[FreeTextEntry1] : 60yo F with multiple comorbidites and NICM (EF 25%) s/p AICD and s/p CARDIOMEMS hemodynamic monitoring device 3/8/19 at [facility] who was recently diagnosed with IGg Myeloma and was recently hospitalized for acute decompensated HF at [facility].She is ACC/AHA Stage C, NYHA Class II-III, hypervolemic on exam. I have recommended the following:\par \par 1. Acute on Chronic Systolic HF- Stable but with volume overload. Recently hospitalized at [facility] requiring IV diuresis. Still with volume overload- will double Torsemide to 80mg daily. Advised that she do Cardiomems readings everyday. Her Toprol XL was halfed in hospital, will continue it at 100mg daily until euvolemic. Continue Entresto 97/103mg BID.  \par \par 2. CAD- no anginal symptoms. On Lipitor, BB and ARB (in Entresto).\par \par 3. RITCHIE on CKD Stage III- SCr 2.2 on discharge. Will recheck today. Diuretic adjustments as stated above with very close monitoring. Followed by Dr. Neal.\par \par 4. Hx of multiple DVTs- On Coumadin. Now having routine INR checks with Marta Baxter NP.\par \par 5. IGG Myeloma- Recently diagnosed. Stable and followed by Dr. Ruiz. \par \par 7. Follow up in 1 week via telehealth and 2 weeks in office.  \par \par \par

## 2020-08-19 ENCOUNTER — APPOINTMENT (OUTPATIENT)
Dept: CARDIOLOGY | Facility: CLINIC | Age: 60
End: 2020-08-19
Payer: MEDICARE

## 2020-08-19 VITALS
TEMPERATURE: 97.6 F | WEIGHT: 220 LBS | HEART RATE: 68 BPM | BODY MASS INDEX: 36.65 KG/M2 | DIASTOLIC BLOOD PRESSURE: 70 MMHG | HEIGHT: 65 IN | SYSTOLIC BLOOD PRESSURE: 120 MMHG

## 2020-08-19 DIAGNOSIS — Z86.73 PERSONAL HISTORY OF TRANSIENT ISCHEMIC ATTACK (TIA), AND CEREBRAL INFARCTION W/OUT RESIDUAL DEFICITS: ICD-10-CM

## 2020-08-19 DIAGNOSIS — I63.9 CEREBRAL INFARCTION, UNSPECIFIED: ICD-10-CM

## 2020-08-19 LAB
ANION GAP SERPL CALC-SCNC: 14 MMOL/L
BUN SERPL-MCNC: 89 MG/DL
CALCIUM SERPL-MCNC: 9.3 MG/DL
CHLORIDE SERPL-SCNC: 92 MMOL/L
CO2 SERPL-SCNC: 29 MMOL/L
CREAT SERPL-MCNC: 2.66 MG/DL
GLUCOSE SERPL-MCNC: 242 MG/DL
MAGNESIUM SERPL-MCNC: 2.3 MG/DL
NT-PROBNP SERPL-MCNC: 8418 PG/ML
POTASSIUM SERPL-SCNC: 4.5 MMOL/L
SODIUM SERPL-SCNC: 135 MMOL/L

## 2020-08-19 PROCEDURE — 93290 INTERROG DEV EVAL ICPMS IP: CPT | Mod: 59

## 2020-08-19 PROCEDURE — 99215 OFFICE O/P EST HI 40 MIN: CPT | Mod: 25

## 2020-08-19 PROCEDURE — 93283 PRGRMG EVAL IMPLANTABLE DFB: CPT | Mod: 59

## 2020-08-19 NOTE — PROCEDURE
[No] : not [Atrial Fibrillation] : atrial fibrillation [ICD] : Implantable cardioverter-defibrillator [VVI] : VVI [Lead Imp:  ___ohms] : lead impedance was [unfilled] ohms [Sensing Amplitude ___mv] : sensing amplitude was [unfilled] mv [___V @] : [unfilled] V [___ ms] : [unfilled] ms [Sense ___ %] : Sense [unfilled]% [de-identified] : Secura [de-identified] : MEDTRONIC [de-identified] : [id number] [de-identified] : 40 [de-identified] : 3-17-10 [de-identified] : [facility] TRIGGERED 8-16-20 [de-identified] : no events, crossed optivol threshold 7-2-20\par TRIGGERED DARCY 8-16-20

## 2020-08-19 NOTE — PHYSICAL EXAM
[Normal Appearance] : normal appearance [General Appearance - Well Developed] : well developed [Well Groomed] : well groomed [General Appearance - In No Acute Distress] : no acute distress [General Appearance - Well Nourished] : well nourished [No Deformities] : no deformities [Heart Rate And Rhythm] : heart rate and rhythm were normal [Murmurs] : no murmurs present [FreeTextEntry1] : GAIL EDEMA

## 2020-08-19 NOTE — HISTORY OF PRESENT ILLNESS
[de-identified] : POST ICD MDT 2010 FOR DILATED NON ISCHEMIC CARDIOMYOPATHY HAS BEEN FOLLOWED BY [facility]. HISTORY OF DIABETES, HYPERTENSION FOR 20 YEARS STOPPED SMOKING 3 MONTHS AGO, NO ALCOHOL, FAMILY HISTORY OF CAD MOTHER  7/2\par \par PT STATED  ICD ALERT ON 8/18/20     POST DISCHARGED  FROM  [facility] ON  8/12 FOR CHF  DENIES ANY CARDIAC C/O     8/19/20

## 2020-08-19 NOTE — ASSESSMENT
[FreeTextEntry1] : EKG S/R 68/MIN BIV ENLARGEMENT ,LAE 2 SINGLE UNIFORM  PVCS\par \par OPTIVOL ABOVE THRESHOLD SINCE 7/2/20\par BATTERY AT [facility]     8./18/20\par \par WILL REPLACE  BATTERY ON 9/10 /20\par PT STILL VOLUME OVERLOAD  BUT PT  IS FOLLOWED BY DR SANCHEZ AT [facility] WILL REQUIRE  OPTIMIZATION  OF\par DIURETICS   8/19/20\par \par F/U IN OCTOBER

## 2020-08-19 NOTE — REVIEW OF SYSTEMS
[Lower Ext Edema] : lower extremity edema [Dyspnea on exertion] : dyspnea during exertion [Wheezing] : wheezing [Negative] : Constitutional

## 2020-08-23 NOTE — ED PROVIDER NOTE - HISTORY ATTESTATION, MLM
Health Maintenance Due   Topic Date Due    Shingles Vaccine (1 of 2) 12/22/2001    Pneumococcal Vaccine (65+ Low/Medium Risk) (2 of 2 - PPSV23) 07/06/2018     Updates were requested from care everywhere.  Chart was reviewed for overdue Proactive [facility] Encounters (MARIO) topics (CRS, Breast Cancer Screening, Eye exam)  Health Maintenance has been updated.  LINKS immunization registry triggered.  Immunizations were reconciled.       I have reviewed and confirmed nurses' notes...

## 2020-08-25 ENCOUNTER — APPOINTMENT (OUTPATIENT)
Dept: HEART AND VASCULAR | Facility: CLINIC | Age: 60
End: 2020-08-25
Payer: MEDICARE

## 2020-08-25 PROCEDURE — 99214 OFFICE O/P EST MOD 30 MIN: CPT | Mod: 95

## 2020-08-25 NOTE — ASSESSMENT
[FreeTextEntry1] : 58yo F with multiple comorbidites and NICM (EF 25%) s/p AICD and s/p CARDIOMEMS hemodynamic monitoring device 3/8/19 at [facility] who was recently diagnosed with IGg Myeloma who responded well to diuresis since our visit last week.She is ACC/AHA Stage C, NYHA Class II-III, euvolemic on exam. I have recommended the following:\par \par 1. Acute on Chronic Systolic HF- Stable and well compensated. Now euvolemic with no RUPINDER- will continue Torsemide 80mg daily and check labs on Tuesday. Continue daily Cardiomems readings. Her Toprol XL was halfed in hospital, will increase at next visit so long as she is still euvolemic. Continue Entresto 97/103mg BID.  \par \par 2. CAD- no anginal symptoms. On Lipitor, BB and ARB (in Entresto).\par \par 3. RITCHIE on CKD Stage III- SCr 2.6. Will recheck tuesday. Avoid nephrotoxins. Followed by Dr. Neal.\par \par 4. IGG Myeloma- Recently diagnosed. Stable and followed by Dr. Ruiz. \par \par 5. Follow up in 1 week. \par \par

## 2020-08-25 NOTE — REASON FOR VISIT
[Heart Failure] : congestive heart failure [Follow-Up - Clinic] : a clinic follow-up of [FreeTextEntry1] : The visit was provided via telehealth using real-time 2-way audio visual technology. The patient, Tracy Mcknight, was located at home at the time of the visit. The provider, HEATHER ALBERT, was located at  the medical office in [facility]. The patient and provider participated in the telehealth encounter. Verbal consent given on 8/25/20 by patient.\par \par \par \par PATIENT INSTRUCTIONS:\par \par 1. Labs today.\par \par 2. Please INCREASE the TORSEMIDE to 80mg daily. \par \par 3. Please do your pillow readings EVERY MORNING.\par \par 4. Follow up via telehealth in 1 week and in person office visit in 2 weeks.

## 2020-08-25 NOTE — HISTORY OF PRESENT ILLNESS
[FreeTextEntry1] : 60 yo F with and an extensive PMHx, NICM (EF 30% on echo) s/p AICD for primary prevention, HTN, IDDM c/b neuropathy, bipolar disorder,depression, history of DVT on Coumadin (4 episodes in her lower extremities), CVA x2 (last one in 2013 with residual right sided weakness), PAD s/p bypass,hypothyroidism, current smoker with COPD (on 2-3L oxygen at home), DHARMESH on CPAP. She had a Cardiomems device placed 3/8/19 with Dr. Francis at [facility]. She was recently diagnosed with IGG multiple myeloma (congo red negative for amyloid) by Dr. Ruiz and was started on Solumedrol 1x week. Also of note, had +GGT, and elevated CRP and was recommended by GI (Dr. Monaco) to get an endoscopy which is pending for Cardiac clearance.  She was recently seen by Dr. Ruiz from [facility] and her Myeloma is stable and she continues to take Decadron 1x a week and Ninlaro for 3 weeks/off one week. \par \par Since our visit last week, she feels much better. Her PAD today was 20 down from 24 on thursday (has not been doing daily readings). She no longer has RUPINDER and her breathing is much better.  She still has b/l RUPINDER. She now feels back to her baseline in terms of activity. Currently, she denies CP,SOB at rest, orthopnea, PND, LH/dizziness,palpitations and syncope. Her appetite is normal and bowel and bladder habits are unchanged. She has been limiting fluid and sodium intake and taking medications as directed. She does not use NSAIDS and has not had any ICD shocks. \par \par \par

## 2020-08-27 ENCOUNTER — OUTPATIENT (OUTPATIENT)
Dept: OUTPATIENT SERVICES | Facility: HOSPITAL | Age: 60
LOS: 1 days | Discharge: HOME | End: 2020-08-27
Payer: MEDICARE

## 2020-08-27 VITALS
SYSTOLIC BLOOD PRESSURE: 120 MMHG | DIASTOLIC BLOOD PRESSURE: 60 MMHG | HEIGHT: 65 IN | OXYGEN SATURATION: 95 % | WEIGHT: 218.92 LBS | TEMPERATURE: 99 F | HEART RATE: 66 BPM | RESPIRATION RATE: 16 BRPM

## 2020-08-27 DIAGNOSIS — T82.111D BREAKDOWN (MECHANICAL) OF CARDIAC PULSE GENERATOR (BATTERY), SUBSEQUENT ENCOUNTER: ICD-10-CM

## 2020-08-27 DIAGNOSIS — Z01.818 ENCOUNTER FOR OTHER PREPROCEDURAL EXAMINATION: ICD-10-CM

## 2020-08-27 DIAGNOSIS — Z98.51 TUBAL LIGATION STATUS: Chronic | ICD-10-CM

## 2020-08-27 DIAGNOSIS — Z95.828 PRESENCE OF OTHER VASCULAR IMPLANTS AND GRAFTS: Chronic | ICD-10-CM

## 2020-08-27 DIAGNOSIS — Z90.49 ACQUIRED ABSENCE OF OTHER SPECIFIED PARTS OF DIGESTIVE TRACT: Chronic | ICD-10-CM

## 2020-08-27 DIAGNOSIS — Z95.810 PRESENCE OF AUTOMATIC (IMPLANTABLE) CARDIAC DEFIBRILLATOR: Chronic | ICD-10-CM

## 2020-08-27 DIAGNOSIS — T82.111A BREAKDOWN (MECHANICAL) OF CARDIAC PULSE GENERATOR (BATTERY), INITIAL ENCOUNTER: ICD-10-CM

## 2020-08-27 DIAGNOSIS — Z90.710 ACQUIRED ABSENCE OF BOTH CERVIX AND UTERUS: Chronic | ICD-10-CM

## 2020-08-27 LAB
A1C WITH ESTIMATED AVERAGE GLUCOSE RESULT: 10.7 % — HIGH (ref 4–5.6)
ALBUMIN SERPL ELPH-MCNC: 4.5 G/DL — SIGNIFICANT CHANGE UP (ref 3.5–5.2)
ALP SERPL-CCNC: 85 U/L — SIGNIFICANT CHANGE UP (ref 30–115)
ALT FLD-CCNC: 15 U/L — SIGNIFICANT CHANGE UP (ref 0–41)
ANION GAP SERPL CALC-SCNC: 15 MMOL/L — HIGH (ref 7–14)
APPEARANCE UR: CLEAR — SIGNIFICANT CHANGE UP
APTT BLD: 30.6 SEC — SIGNIFICANT CHANGE UP (ref 27–39.2)
AST SERPL-CCNC: 20 U/L — SIGNIFICANT CHANGE UP (ref 0–41)
BACTERIA # UR AUTO: NEGATIVE — SIGNIFICANT CHANGE UP
BASOPHILS # BLD AUTO: 0.06 K/UL — SIGNIFICANT CHANGE UP (ref 0–0.2)
BASOPHILS NFR BLD AUTO: 0.5 % — SIGNIFICANT CHANGE UP (ref 0–1)
BILIRUB SERPL-MCNC: 0.5 MG/DL — SIGNIFICANT CHANGE UP (ref 0.2–1.2)
BILIRUB UR-MCNC: NEGATIVE — SIGNIFICANT CHANGE UP
BUN SERPL-MCNC: 84 MG/DL — CRITICAL HIGH (ref 10–20)
CALCIUM SERPL-MCNC: 9.3 MG/DL — SIGNIFICANT CHANGE UP (ref 8.5–10.1)
CHLORIDE SERPL-SCNC: 94 MMOL/L — LOW (ref 98–110)
CO2 SERPL-SCNC: 28 MMOL/L — SIGNIFICANT CHANGE UP (ref 17–32)
COLOR SPEC: SIGNIFICANT CHANGE UP
CREAT SERPL-MCNC: 2.4 MG/DL — HIGH (ref 0.7–1.5)
DIFF PNL FLD: ABNORMAL
EOSINOPHIL # BLD AUTO: 0.07 K/UL — SIGNIFICANT CHANGE UP (ref 0–0.7)
EOSINOPHIL NFR BLD AUTO: 0.6 % — SIGNIFICANT CHANGE UP (ref 0–8)
EPI CELLS # UR: 0 /HPF — SIGNIFICANT CHANGE UP (ref 0–5)
ESTIMATED AVERAGE GLUCOSE: 260 MG/DL — HIGH (ref 68–114)
GLUCOSE SERPL-MCNC: 124 MG/DL — HIGH (ref 70–99)
GLUCOSE UR QL: NEGATIVE — SIGNIFICANT CHANGE UP
HCT VFR BLD CALC: 40.1 % — SIGNIFICANT CHANGE UP (ref 37–47)
HGB BLD-MCNC: 12 G/DL — SIGNIFICANT CHANGE UP (ref 12–16)
HYALINE CASTS # UR AUTO: 0 /LPF — SIGNIFICANT CHANGE UP (ref 0–7)
IMM GRANULOCYTES NFR BLD AUTO: 0.5 % — HIGH (ref 0.1–0.3)
INR BLD: 1.04 RATIO — SIGNIFICANT CHANGE UP (ref 0.65–1.3)
KETONES UR-MCNC: NEGATIVE — SIGNIFICANT CHANGE UP
LEUKOCYTE ESTERASE UR-ACNC: ABNORMAL
LYMPHOCYTES # BLD AUTO: 1.15 K/UL — LOW (ref 1.2–3.4)
LYMPHOCYTES # BLD AUTO: 10.4 % — LOW (ref 20.5–51.1)
MCHC RBC-ENTMCNC: 26.1 PG — LOW (ref 27–31)
MCHC RBC-ENTMCNC: 29.9 G/DL — LOW (ref 32–37)
MCV RBC AUTO: 87.2 FL — SIGNIFICANT CHANGE UP (ref 81–99)
MONOCYTES # BLD AUTO: 1 K/UL — HIGH (ref 0.1–0.6)
MONOCYTES NFR BLD AUTO: 9 % — SIGNIFICANT CHANGE UP (ref 1.7–9.3)
MRSA PCR RESULT.: NEGATIVE — SIGNIFICANT CHANGE UP
NEUTROPHILS # BLD AUTO: 8.76 K/UL — HIGH (ref 1.4–6.5)
NEUTROPHILS NFR BLD AUTO: 79 % — HIGH (ref 42.2–75.2)
NITRITE UR-MCNC: NEGATIVE — SIGNIFICANT CHANGE UP
NRBC # BLD: 0 /100 WBCS — SIGNIFICANT CHANGE UP (ref 0–0)
PH UR: 6 — SIGNIFICANT CHANGE UP (ref 5–8)
PLATELET # BLD AUTO: 131 K/UL — SIGNIFICANT CHANGE UP (ref 130–400)
POTASSIUM SERPL-MCNC: 4.9 MMOL/L — SIGNIFICANT CHANGE UP (ref 3.5–5)
POTASSIUM SERPL-SCNC: 4.9 MMOL/L — SIGNIFICANT CHANGE UP (ref 3.5–5)
PROT SERPL-MCNC: 8 G/DL — SIGNIFICANT CHANGE UP (ref 6–8)
PROT UR-MCNC: ABNORMAL
PROTHROM AB SERPL-ACNC: 12 SEC — SIGNIFICANT CHANGE UP (ref 9.95–12.87)
RBC # BLD: 4.6 M/UL — SIGNIFICANT CHANGE UP (ref 4.2–5.4)
RBC # FLD: 18.1 % — HIGH (ref 11.5–14.5)
RBC CASTS # UR COMP ASSIST: 2 /HPF — SIGNIFICANT CHANGE UP (ref 0–4)
SODIUM SERPL-SCNC: 137 MMOL/L — SIGNIFICANT CHANGE UP (ref 135–146)
SP GR SPEC: 1.01 — SIGNIFICANT CHANGE UP (ref 1.01–1.02)
UROBILINOGEN FLD QL: SIGNIFICANT CHANGE UP
WBC # BLD: 11.09 K/UL — HIGH (ref 4.8–10.8)
WBC # FLD AUTO: 11.09 K/UL — HIGH (ref 4.8–10.8)
WBC UR QL: 8 /HPF — HIGH (ref 0–5)

## 2020-08-27 PROCEDURE — 93010 ELECTROCARDIOGRAM REPORT: CPT

## 2020-08-27 RX ORDER — APIXABAN 2.5 MG/1
1 TABLET, FILM COATED ORAL
Qty: 0 | Refills: 0 | DISCHARGE

## 2020-08-27 RX ORDER — OMEPRAZOLE 10 MG/1
1 CAPSULE, DELAYED RELEASE ORAL
Qty: 0 | Refills: 0 | DISCHARGE

## 2020-08-27 NOTE — H&P PST ADULT - HISTORY OF PRESENT ILLNESS
Pt states when she was having device checked it was noted that the battery needed to be changed. Pt has had the device for over 10 years. Denies any chest pain, difficulty breathing, SOB, palpitations, dysuria, URI, or any other infections in the last 2 weeks. Denies any recent travel, contact, or exposure to any persons with known or suspected COVID-19. Pt also denies COVID testing within the last 2 weeks. Pt advised to self quarantine until day of procedure. Poor exercise tolerance and balance. Pt states she can climb 1 flight of stairs without dyspnea but with support for stability. DHARMESH reviewed with patient.     Anesthesia Alert  YES--Difficult Airway: Class IV  NO--History of neck surgery or radiation  NO--Limited ROM of neck  NO--History of Malignant hyperthermia  NO--No personal or family history of Pseudocholinesterase deficiency.  NO--Prior Anesthesia Complication  NO--Latex Allergy  NO--Loose teeth  NO--History of Rheumatoid Arthritis  YES--DHARMESH  NO--Other_____

## 2020-08-27 NOTE — H&P PST ADULT - NSICDXPASTMEDICALHX_GEN_ALL_CORE_FT
PAST MEDICAL HISTORY:  Bipolar depression     CHF (congestive heart failure)     Chronic pain     CKD (chronic kidney disease), stage IV     COPD (chronic obstructive pulmonary disease)     CVA (cerebral vascular accident)     Depression     DM (diabetes mellitus)     DVT (deep venous thrombosis)     HLD (hyperlipidemia)     HTN (hypertension)     Hypothyroidism     Multiple myeloma     Neuropathy     DHARMESH on CPAP     PAD (peripheral artery disease)

## 2020-08-31 ENCOUNTER — APPOINTMENT (OUTPATIENT)
Dept: NEPHROLOGY | Facility: CLINIC | Age: 60
End: 2020-08-31
Payer: MEDICARE

## 2020-08-31 ENCOUNTER — RX CHANGE (OUTPATIENT)
Age: 60
End: 2020-08-31

## 2020-08-31 PROBLEM — G47.33 OBSTRUCTIVE SLEEP APNEA (ADULT) (PEDIATRIC): Chronic | Status: ACTIVE | Noted: 2020-08-27

## 2020-08-31 PROCEDURE — 99442: CPT | Mod: 95

## 2020-08-31 NOTE — HISTORY OF PRESENT ILLNESS
[Other Location: e.g. Home (Enter Location, City,State)___] : at [unfilled] [Home] : at home, [unfilled] , at the time of the visit. [Verbal consent obtained from patient] : the patient, [unfilled] [FreeTextEntry1] : Kindly referred by Dr. Olguin for CKD.\par \par * CKD progressive, creatinine 2.66 in Aug 20. * Occasional unbalanced and she walks with a walker. No falling. No lightheadedness. * On eliquis without bleeding. * Following up with Dr. Ruiz for treatment of myeloma. * Hyponatremia improved, sodium 135. * BP reportedly controlled at home but she doesn't have the readings available. * Dm uncontrolled, HGA1c 10. \par \par Previous history (83Sif96): * UNSTEADY ON FEET but doesn't feel like she's about to pass out. * No N/V/D, no chest pain. * On coumadin. No bleeding. * On dexamethasone for myeloma. * CKD stable, creatinine 2.18. \par \par Previous history (94Hrm44): * She does not check BP at home. BP previously controlled. * She smokes 1/4 PPD and just smoked. * CKD progressive, creatinine 2.12. * Stable hyponatremia, sodium 132. * Difficulty walking due to osteoarthritis. Can't dress self. \par \par Previous history (): * Diagnosed with IgG myeloma by Dr. Ruiz. Bone marrow is being stained for amyloid. * HTN controlled. No lightheadedness. Compliant with medications.  * CKD stable, creatinine 1.49. \par \par * Mother in law  this morning. She is grieving. * Labs 08Epr41: Uprot 1.9 g/g. Cr 1.67. * Losartan 12.5 mg started last visit. * Torsemide increased from 80 mg to 120 mg daily on Mar 11 2019 based on cardiomem PCWP 29. Her weight has decreased from by approximately 1 - 2 pounds daily. Her weight has decreased from 200 to 196. \par \par Previous history (41Nve55): * Labs reviewed. Her creatinine has increased from 1.44 in 2017 to 1.91 in  (eGFR 29).  She had 1829 mg/g albuminuria in 2017. A CT of the abdomen in 2017 revealed a "stable inderminate left upper pole renal hypodensity" with no other abnormalitiese noted. She also had an unchanged ill-defined soft tissue density surrounding celialc axis, SMA, and common hepatic artery. \par \par * She sees Dr. Olguin for nonischemic systolic CHF. Her EF is 25%. She has not been treated with ACE or ARB given reported worsening renal dysfunction previously but the plan per Dr. Olguin was to restart this as outpatient. She has never had hyperkalemia or an allergic reaction to ACE/ARB. \par \par * She still smokes 3 cigarettes daily. \par \par * DM uncontrolled, last HGA1c 11 - 13.\par \par * She has had PVD with bilateral iliac artery angioplasty and stents, left femoral bypass. She has also had DVTs and is hypercoagulable and is treated with coumadin. \par \par * HTN controlled. No lightheadedness. Compliant with medications. Following low salt diet.\par \par Options for clinical preventative services\par \par Influenza: 2018 sep, sep 2019\par Pneumonia:  she received prenar and pneumovax per patient\par Shingles: advised 73Poa43\par TDAP: \par Colonoscopy: 2017, q 10 years per patient\par Dermatologist: advised to see derm yearly 45Iwb69\par \par Breast/Pelvic Exam: 2017, i have advised her to follow up with gyn 56Ewq03; breast exam ;  per aptient\par Mammogram: 2018,\par Bone Density >65:

## 2020-08-31 NOTE — ASSESSMENT
[FreeTextEntry1] : # HTN controlled.\par * A counseling information sheet has been given (currently or previously, in-person or electronically). All their questions were answered.\par * The patient has been counseled to check their BP at home with an automatic arm cuff, write down the readings, and reach me directly on the phone immediately if they are persistently > 180 systolic or if SBP is less than 100 or if lightheadedness develops. They were counseled to bring in all blood pressure readings and medications next visit.\par * The patient has been counseled that they have a a significant medical condition and regular office followup (at least every 1 month for now) is essential for monitoring, and that it is their responsibility to make follow up appointments.\par * The patient also has been counseled that they must never stop or change any medications without discussing this with me (or another physician). \par \par # CKD stage 4.\par * The patient has been counseled that chronic kidney disease is a significant condition and regular office followup with me (at least every 1 month for now) is essential for monitoring, and that it is their responsibility to make a follow up appointment.\par * A counseling information sheet has been given (currently or previously, in-person or electronically). All their questions were answered.\par * The patient has been counseled never to stop taking their medications without discussing it with me or another doctor.\par * The patient has been counseled on risk of acute renal failure and instructed to immediately call and speak with me or go immediately to ER with any severe symptoms, nausea, vomiting, diarrhea, chest pain, or shortness of breath.\par * The patient has been counseled on avoiding NSAIDs.\par \par # Myeloma.\par * Follow up with Dr. de la garza.\par \par # Hyponatremia.\par * Follow sodium. \par \par # DM.\par * Follow up with iker.

## 2020-09-01 ENCOUNTER — APPOINTMENT (OUTPATIENT)
Dept: HEART AND VASCULAR | Facility: CLINIC | Age: 60
End: 2020-09-01

## 2020-09-02 DIAGNOSIS — Z87.440 PERSONAL HISTORY OF URINARY (TRACT) INFECTIONS: ICD-10-CM

## 2020-09-04 RX ORDER — BUSPIRONE HYDROCHLORIDE 10 MG/1
10 TABLET ORAL
Qty: 90 | Refills: 3 | Status: ACTIVE | COMMUNITY
Start: 1900-01-01 | End: 1900-01-01

## 2020-09-04 RX ORDER — CHLORHEXIDINE GLUCONATE 4 %
325 (65 FE) LIQUID (ML) TOPICAL TWICE DAILY
Qty: 100 | Refills: 3 | Status: ACTIVE | COMMUNITY
Start: 2020-09-04 | End: 1900-01-01

## 2020-09-04 RX ORDER — ALCOHOL ANTISEPTIC PADS
PADS, MEDICATED (EA) TOPICAL
Qty: 3 | Refills: 3 | Status: ACTIVE | COMMUNITY
Start: 2018-03-09 | End: 1900-01-01

## 2020-09-04 RX ORDER — BLOOD-GLUCOSE METER
W/DEVICE KIT MISCELLANEOUS
Qty: 1 | Refills: 3 | Status: ACTIVE | COMMUNITY
Start: 2019-04-12 | End: 1900-01-01

## 2020-09-07 ENCOUNTER — LABORATORY RESULT (OUTPATIENT)
Age: 60
End: 2020-09-07

## 2020-09-07 ENCOUNTER — OUTPATIENT (OUTPATIENT)
Dept: OUTPATIENT SERVICES | Facility: HOSPITAL | Age: 60
LOS: 1 days | Discharge: HOME | End: 2020-09-07

## 2020-09-07 DIAGNOSIS — Z90.710 ACQUIRED ABSENCE OF BOTH CERVIX AND UTERUS: Chronic | ICD-10-CM

## 2020-09-07 DIAGNOSIS — Z90.49 ACQUIRED ABSENCE OF OTHER SPECIFIED PARTS OF DIGESTIVE TRACT: Chronic | ICD-10-CM

## 2020-09-07 DIAGNOSIS — Z95.828 PRESENCE OF OTHER VASCULAR IMPLANTS AND GRAFTS: Chronic | ICD-10-CM

## 2020-09-07 DIAGNOSIS — Z95.810 PRESENCE OF AUTOMATIC (IMPLANTABLE) CARDIAC DEFIBRILLATOR: Chronic | ICD-10-CM

## 2020-09-07 DIAGNOSIS — Z98.51 TUBAL LIGATION STATUS: Chronic | ICD-10-CM

## 2020-09-07 DIAGNOSIS — Z11.59 ENCOUNTER FOR SCREENING FOR OTHER VIRAL DISEASES: ICD-10-CM

## 2020-09-10 ENCOUNTER — OUTPATIENT (OUTPATIENT)
Dept: OUTPATIENT SERVICES | Facility: HOSPITAL | Age: 60
LOS: 1 days | Discharge: HOME | End: 2020-09-10
Payer: MEDICARE

## 2020-09-10 VITALS — WEIGHT: 199.96 LBS | HEIGHT: 65 IN

## 2020-09-10 DIAGNOSIS — T82.111A BREAKDOWN (MECHANICAL) OF CARDIAC PULSE GENERATOR (BATTERY), INITIAL ENCOUNTER: ICD-10-CM

## 2020-09-10 DIAGNOSIS — Z90.710 ACQUIRED ABSENCE OF BOTH CERVIX AND UTERUS: Chronic | ICD-10-CM

## 2020-09-10 DIAGNOSIS — Z98.51 TUBAL LIGATION STATUS: Chronic | ICD-10-CM

## 2020-09-10 DIAGNOSIS — Z95.810 PRESENCE OF AUTOMATIC (IMPLANTABLE) CARDIAC DEFIBRILLATOR: Chronic | ICD-10-CM

## 2020-09-10 DIAGNOSIS — Z90.49 ACQUIRED ABSENCE OF OTHER SPECIFIED PARTS OF DIGESTIVE TRACT: Chronic | ICD-10-CM

## 2020-09-10 DIAGNOSIS — Z95.828 PRESENCE OF OTHER VASCULAR IMPLANTS AND GRAFTS: Chronic | ICD-10-CM

## 2020-09-10 LAB
APPEARANCE UR: CLEAR — SIGNIFICANT CHANGE UP
BACTERIA # UR AUTO: NEGATIVE — SIGNIFICANT CHANGE UP
BILIRUB UR-MCNC: NEGATIVE — SIGNIFICANT CHANGE UP
COLOR SPEC: SIGNIFICANT CHANGE UP
DIFF PNL FLD: ABNORMAL
EPI CELLS # UR: 5 /HPF — SIGNIFICANT CHANGE UP (ref 0–5)
GLUCOSE BLDC GLUCOMTR-MCNC: 164 MG/DL — HIGH (ref 70–99)
GLUCOSE BLDC GLUCOMTR-MCNC: 190 MG/DL — HIGH (ref 70–99)
GLUCOSE UR QL: NEGATIVE — SIGNIFICANT CHANGE UP
HYALINE CASTS # UR AUTO: 4 /LPF — SIGNIFICANT CHANGE UP (ref 0–7)
KETONES UR-MCNC: NEGATIVE — SIGNIFICANT CHANGE UP
LEUKOCYTE ESTERASE UR-ACNC: NEGATIVE — SIGNIFICANT CHANGE UP
NITRITE UR-MCNC: NEGATIVE — SIGNIFICANT CHANGE UP
PH UR: 6 — SIGNIFICANT CHANGE UP (ref 5–8)
PROT UR-MCNC: ABNORMAL
RBC CASTS # UR COMP ASSIST: 2 /HPF — SIGNIFICANT CHANGE UP (ref 0–4)
SP GR SPEC: 1.01 — SIGNIFICANT CHANGE UP (ref 1.01–1.02)
UROBILINOGEN FLD QL: SIGNIFICANT CHANGE UP
WBC UR QL: 4 /HPF — SIGNIFICANT CHANGE UP (ref 0–5)

## 2020-09-10 PROCEDURE — 33262 RMVL& REPLC PULSE GEN 1 LEAD: CPT

## 2020-09-10 RX ORDER — MORPHINE SULFATE 50 MG/1
2 CAPSULE, EXTENDED RELEASE ORAL ONCE
Refills: 0 | Status: DISCONTINUED | OUTPATIENT
Start: 2020-09-10 | End: 2020-09-10

## 2020-09-10 RX ORDER — CEFAZOLIN SODIUM 1 G
2000 VIAL (EA) INJECTION ONCE
Refills: 0 | Status: DISCONTINUED | OUTPATIENT
Start: 2020-09-10 | End: 2020-09-10

## 2020-09-10 RX ORDER — MEROPENEM 1 G/30ML
2000 INJECTION INTRAVENOUS ONCE
Refills: 0 | Status: COMPLETED | OUTPATIENT
Start: 2020-09-10 | End: 2020-09-10

## 2020-09-10 RX ORDER — CEFAZOLIN SODIUM 1 G
1000 VIAL (EA) INJECTION ONCE
Refills: 0 | Status: DISCONTINUED | OUTPATIENT
Start: 2020-09-10 | End: 2020-09-24

## 2020-09-10 RX ADMIN — MORPHINE SULFATE 2 MILLIGRAM(S): 50 CAPSULE, EXTENDED RELEASE ORAL at 12:15

## 2020-09-10 RX ADMIN — MORPHINE SULFATE 2 MILLIGRAM(S): 50 CAPSULE, EXTENDED RELEASE ORAL at 11:55

## 2020-09-10 RX ADMIN — MEROPENEM 200 MILLIGRAM(S): 1 INJECTION INTRAVENOUS at 09:52

## 2020-09-10 NOTE — CHART NOTE - NSCHARTNOTEFT_GEN_A_CORE
Cardiac Electrophysiology Procedure Report    Date of procedure	9/10/2020  EP Attending	Shweta Galvez MD  Anesthesiologist	Yohannes Gupta CRNA  Referring Physician	Zoya Olguin MD  Procedure	Generator Change of Single Chamber ICD (Medtronic)    Indication: Generator at RRT  60 yo F with history of NIDCM s/p SC ICD for primary prevention with device now at RRT here for ICD generator change.     EQUIPMENT AND BASELINE PARAMETERS  Pulse Generator Explanted   :	Medtronic  Model Number:  [id number]  Serial Number:   [id number]  Implanted:          3/17/2010    Pulse Generator Implanted   :	Medtronic  Model Number:  [id number]  Serial Number:   [id number]  			  			  Right Ventricular Lead  :     Medtronic  Model Number:	6947-65  Serial Number:	[id number]  Date of implant: 	3/17/2010  Location: 	RV apical septum  Sensin.8 mV  Impedance:	342 Ohms/ RV Coil 48 Ohms/ SVC Coil 64 Ohms  Threshold:	 1.0 V at  0.4 msec      The patient was brought to the procedure room in a nonsedated and fasting state, having received preoperative antibiotics. Informed, written consent was obtained prior to the procedure. Conscious sedation was administered by the anesthesiologist. Blood pressure, oxygenation and level of comfort were stable throughout. The device was reprogrammed to VVI 40 and tachy therapies were turned off. The left shoulder region was cleaned and prepped with serial applications of Betadine and Chlorhexidine solution. Patient was then covered from head to toe with sterile drapes in the usual manner.  Vendor representative was present for the duration of the case for clinical support.     The left infraclavicular region was anesthetized with local anesthesia. An incision was made below the left clavicle along the prior incision site and the pocket was opened. The device was removed and the lead was detached. Bleeding points were cauterized. The visible portions of the lead had a normal appearance. The pocket was checked for hemostasis and then copiously irrigated with antibiotic solution. A new pulse generator was attached to the lead and appropriate function of the lead was verified through the device. The device and lead were placed into the pocket. Tyrx antibiotic pouch was used.    The wound margins were approximated well, without any tension or overlap. The wound was closed using an interrupted layer of 2-0 absorbable Vicryl suture for the deep fascial layer. This was followed by a continuous layer of 3-0 absorbable suture. The skin layer was approximated with Dermabond. Steri-strips were applied over this and a dry, sterile dressing was placed over this. A pressure dressing was placed over the dressing. Needle count was performed and found to be consistent at the end of the procedure. The patient was returned to a hospital room in stable condition. Device was reprogrammed and tachy therapies were turned on.     COMPLICATIONS:  The patient tolerated the procedure well. There were no immediate complications.    CONCLUSIONS:  Successful generator change of SC ICD (Medtronic)    EBL: 5 cc       _______________________________  Shweta Galvez MD  Cardiac Electrophysiology

## 2020-09-10 NOTE — PRE-ANESTHESIA EVALUATION ADULT - NSRADCARDRESULTSFT_GEN_ALL_CORE
EKG: NSR LBBB    ECHO:  CONCLUSIONS:     1. The left ventricle cavity size is moderately dilated. Akinesis of the septal region and hypokinesis of the remaining regions. The estimated lft ventricular ejection fraction is 30%. Analysis of mitral annular tissue Doppler, left atrial volume index, and tricuspid regurgitant velocity reveals: grade III diastolic dysfunction with elevated filling pressure.   2. The right ventricle is normal in size. Right ventricular systolic function is mildly reduced.   3. A device lead is noted in the right heart.   4. The aortic valve is mildly thickened.   5. There is mild-to-moderate mitral regurgitation.   6. There is trace tricuspid regurgitation.   7. There is mild pulmonary hypertension, pulmonary artery systolic pressure is 45 mmHg.   8. The aortic root is normal in size.   9. No pericardial effusion is seen.

## 2020-09-10 NOTE — PRE PROCEDURE NOTE - PRE PROCEDURE EVALUATION
I have personally seen and examined the patient. I agree with the history and physical//consult//progress note, which I have reviewed and noted any changes below.     Pre-op Diagnosis: ICD at [facility], [facility]    Procedure: ICD Generator Change I have personally seen and examined the patient. I agree with the history and physical//consult//progress note, which I have reviewed and noted any changes below.     Pre-op Diagnosis: ICD at [facility], [facility]    Procedure: ICD Generator Change

## 2020-09-10 NOTE — PROGRESS NOTE ADULT - SUBJECTIVE AND OBJECTIVE BOX
Electrophysiology Brief Post-Op Note    I have personally seen and examined the patient.  I agree with the history and physical which I have reviewed and noted any changes below.  09-10-20 @ 10:59    PRE-OP DIAGNOSIS: NIDCM, ICD at [facility]    POST-OP DIAGNOSIS: NIDCM, ICD at [facility]    PROCEDURE: ICD Generator Change    Physician: Shweta Galvez MD  Assistant: None    ESTIMATED BLOOD LOSS:     5  mL    ANESTHESIA TYPE:  [  ] General Anesthesia  [ x ] Sedation  [ x ] Local/Regional    CONDITION  [  ] Critical  [  ] Serious  [  ] Fair  [ x ] Good      SPECIMENS REMOVED (IF APPLICABLE): Old ICD Generator    IMPLANTS (IF APPLICABLE): New ICD Generator (Medtronic)    FINDINGS:  NIDCM, ICD at [facility]    COMPLICATIONS: None    PLAN OF CARE  - Meropenem prior to procedure  - Augmentin post-op  - Resume anticoagulation in 48 hours  - No shower x 3 days.  - No lifting left arm above shoulder level x 1 month. No heavy lifting with left arm x 1 mo.  - No swimming or hot tubs x 1 month.  - Follow up with Dr. Meléndez in 4 weeks    Shweta Galvez MD   Electrophysiology Attending

## 2020-09-14 DIAGNOSIS — Z91.041 RADIOGRAPHIC DYE ALLERGY STATUS: ICD-10-CM

## 2020-09-14 DIAGNOSIS — J44.9 CHRONIC OBSTRUCTIVE PULMONARY DISEASE, UNSPECIFIED: ICD-10-CM

## 2020-09-14 DIAGNOSIS — Z86.718 PERSONAL HISTORY OF OTHER VENOUS THROMBOSIS AND EMBOLISM: ICD-10-CM

## 2020-09-14 DIAGNOSIS — I12.9 HYPERTENSIVE CHRONIC KIDNEY DISEASE WITH STAGE 1 THROUGH STAGE 4 CHRONIC KIDNEY DISEASE, OR UNSPECIFIED CHRONIC KIDNEY DISEASE: ICD-10-CM

## 2020-09-14 DIAGNOSIS — N18.9 CHRONIC KIDNEY DISEASE, UNSPECIFIED: ICD-10-CM

## 2020-09-14 DIAGNOSIS — Z86.73 PERSONAL HISTORY OF TRANSIENT ISCHEMIC ATTACK (TIA), AND CEREBRAL INFARCTION WITHOUT RESIDUAL DEFICITS: ICD-10-CM

## 2020-09-14 DIAGNOSIS — E78.49 OTHER HYPERLIPIDEMIA: ICD-10-CM

## 2020-09-14 DIAGNOSIS — Z45.02 ENCOUNTER FOR ADJUSTMENT AND MANAGEMENT OF AUTOMATIC IMPLANTABLE CARDIAC DEFIBRILLATOR: ICD-10-CM

## 2020-09-14 DIAGNOSIS — Z79.01 LONG TERM (CURRENT) USE OF ANTICOAGULANTS: ICD-10-CM

## 2020-09-14 DIAGNOSIS — E11.9 TYPE 2 DIABETES MELLITUS WITHOUT COMPLICATIONS: ICD-10-CM

## 2020-09-14 DIAGNOSIS — I50.9 HEART FAILURE, UNSPECIFIED: ICD-10-CM

## 2020-09-14 DIAGNOSIS — Z79.84 LONG TERM (CURRENT) USE OF ORAL HYPOGLYCEMIC DRUGS: ICD-10-CM

## 2020-09-16 ENCOUNTER — RECORD ABSTRACTING (OUTPATIENT)
Age: 60
End: 2020-09-16

## 2020-09-16 DIAGNOSIS — I50.32 CHRONIC DIASTOLIC (CONGESTIVE) HEART FAILURE: ICD-10-CM

## 2020-09-16 DIAGNOSIS — Z95.810 PRESENCE OF AUTOMATIC (IMPLANTABLE) CARDIAC DEFIBRILLATOR: ICD-10-CM

## 2020-09-16 DIAGNOSIS — I34.0 NONRHEUMATIC MITRAL (VALVE) INSUFFICIENCY: ICD-10-CM

## 2020-09-16 DIAGNOSIS — Z86.39 PERSONAL HISTORY OF OTHER ENDOCRINE, NUTRITIONAL AND METABOLIC DISEASE: ICD-10-CM

## 2020-09-16 DIAGNOSIS — Z86.79 PERSONAL HISTORY OF OTHER DISEASES OF THE CIRCULATORY SYSTEM: ICD-10-CM

## 2020-09-16 DIAGNOSIS — R07.9 CHEST PAIN, UNSPECIFIED: ICD-10-CM

## 2020-09-16 RX ORDER — METHADONE HYDROCHLORIDE 10 MG/1
10 TABLET ORAL EVERY 6 HOURS
Refills: 0 | Status: ACTIVE | COMMUNITY

## 2020-09-29 ENCOUNTER — APPOINTMENT (OUTPATIENT)
Dept: NEPHROLOGY | Facility: CLINIC | Age: 60
End: 2020-09-29
Payer: MEDICARE

## 2020-09-29 ENCOUNTER — LABORATORY RESULT (OUTPATIENT)
Age: 60
End: 2020-09-29

## 2020-09-29 ENCOUNTER — APPOINTMENT (OUTPATIENT)
Dept: HEART AND VASCULAR | Facility: CLINIC | Age: 60
End: 2020-09-29
Payer: MEDICARE

## 2020-09-29 VITALS
SYSTOLIC BLOOD PRESSURE: 127 MMHG | OXYGEN SATURATION: 100 % | WEIGHT: 226 LBS | HEART RATE: 91 BPM | DIASTOLIC BLOOD PRESSURE: 63 MMHG | BODY MASS INDEX: 37.61 KG/M2

## 2020-09-29 VITALS — SYSTOLIC BLOOD PRESSURE: 107 MMHG | DIASTOLIC BLOOD PRESSURE: 60 MMHG | HEART RATE: 79 BPM

## 2020-09-29 DIAGNOSIS — R68.89 OTHER GENERAL SYMPTOMS AND SIGNS: ICD-10-CM

## 2020-09-29 DIAGNOSIS — Z79.899 OTHER LONG TERM (CURRENT) DRUG THERAPY: ICD-10-CM

## 2020-09-29 DIAGNOSIS — N18.4 CHRONIC KIDNEY DISEASE, STAGE 4 (SEVERE): ICD-10-CM

## 2020-09-29 DIAGNOSIS — E11.9 TYPE 2 DIABETES MELLITUS W/OUT COMPLICATIONS: ICD-10-CM

## 2020-09-29 DIAGNOSIS — E87.1 HYPO-OSMOLALITY AND HYPONATREMIA: ICD-10-CM

## 2020-09-29 DIAGNOSIS — R79.9 ABNORMAL FINDING OF BLOOD CHEMISTRY, UNSPECIFIED: ICD-10-CM

## 2020-09-29 DIAGNOSIS — G47.33 OBSTRUCTIVE SLEEP APNEA (ADULT) (PEDIATRIC): ICD-10-CM

## 2020-09-29 DIAGNOSIS — E83.50 UNSPECIFIED DISORDER OF CALCIUM METABOLISM: ICD-10-CM

## 2020-09-29 PROCEDURE — 99214 OFFICE O/P EST MOD 30 MIN: CPT | Mod: 25

## 2020-09-29 PROCEDURE — 99214 OFFICE O/P EST MOD 30 MIN: CPT

## 2020-09-29 PROCEDURE — 90686 IIV4 VACC NO PRSV 0.5 ML IM: CPT

## 2020-09-29 PROCEDURE — G0008: CPT

## 2020-09-29 RX ORDER — METOPROLOL SUCCINATE 200 MG/1
200 TABLET, EXTENDED RELEASE ORAL DAILY
Refills: 0 | Status: DISCONTINUED | COMMUNITY
End: 2020-09-29

## 2020-09-29 RX ORDER — FUROSEMIDE 80 MG/1
80 TABLET ORAL DAILY
Refills: 0 | Status: DISCONTINUED | COMMUNITY
End: 2020-09-29

## 2020-09-29 NOTE — ASSESSMENT
[FreeTextEntry1] : 60yo F with multiple comorbidites and NICM (EF 25%) s/p AICD and s/p CARDIOMEMS hemodynamic monitoring device 3/8/19 at [facility] who was recently diagnosed with IGg Myeloma who has had many hospitalizations for HF and PNA. She is ACC/AHA Stage C, NYHA Class II-III, euvolemic on exam. I have recommended the following:\par \par 1. Chronic Systolic HF- Stable and well compensated. Labs drawn by Nephrologist today. Continue Entresto 97/103mg BID. Increase Toprol XL to 150mg daily. Continue Torsemide 80mg daily and daily cardiomems readings.  \par \par 2. CAD- no anginal symptoms. On Lipitor, BB and ARB (in Entresto).\par \par 3. RITCHIE on CKD Stage III- SCr 2.6 post DC. Was repeated today. F/u result. Followed by Dr. Neal. Avoid Nephrotoxins. \par \par 4. Hx of multiple DVTs- On Eliquis.\par \par 5. IGG Myeloma- Recently diagnosed. Stable and followed by Dr. Ruiz. \par \par 6. DHARMESH- CPAP machine now fixed.\par \par 7. Follow up in 2 weeks. \par \par \par

## 2020-09-29 NOTE — HISTORY OF PRESENT ILLNESS
[FreeTextEntry1] : 60 yo F with and an extensive PMHx, NICM (EF 25% on echo) s/p AICD for primary prevention, HTN, IDDM c/b neuropathy, bipolar disorder,depression, history of DVT on Coumadin (4 episodes in her lower extremities), CVA x2 (last one in 2013 with residual right sided weakness), PAD s/p bypass,hypothyroidism, current smoker with COPD (on 2-3L oxygen at home), DHARMESH on CPAP. She had a Cardiomems device placed 3/8/19 with Dr. Francis at [facility]. She was recently diagnosed with IGG multiple myeloma (congo red negative for amyloid) by Dr. Ruiz and was started on Solumedrol 1x week. Also of note, had +GGT, and elevated CRP and was recommended by GI (Dr. Monaco) to get an endoscopy which is pending for Cardiac clearance.  She was recently seen by Dr. Ruiz from [facility] and her Myeloma is stable and she continues to take Decadron 1x a week and Ninlaro for 3 weeks/off one week. \par \par Since last seen 8/25 she was hospitalized at [place] for confusion & disorientation upon arising and PND. In the hospital she used a CPAP at night for her sleep apnea which helped her a lot. She was found to have PNA and treated with antibiotics. Also of note, she was having difficulty with her cardiomems because of her internet connection but it has now been fixed since discharge and she has been doing daily readings. On 9/3 she had a ICD battery change. \par \par Since being discharged, she feels much better. Her sleep apnea machine was fixed and she is sleeping through the night and not waking up disoriented. Her breathing is improved and has no RUPINDER.  Currently, she denies CP,SOB at rest, orthopnea, PND, LH/dizziness,palpitations and syncope. Her appetite is normal and bowel and bladder habits are unchanged. She has been limiting fluid and sodium intake and taking medications as directed. She does not use NSAIDS and has not had any ICD shocks. \par \par \par

## 2020-09-29 NOTE — HISTORY OF PRESENT ILLNESS
[FreeTextEntry1] : Kindly referred by Dr. Olguin for CKD.\par \par * CKD progressive, creatinine increased to 2.66. * * On eliquis without bleeding. * Following up with Dr. Ruiz for treatment of myeloma. * Hyponatremia improved, sodium 135. * HTN controlled. No lightheadedness. * DM uncontrolled, HGA1c previously 10. \par \par Previous history (88Ous48): * CKD progressive, creatinine 2.66 in Aug 20. * Occasional unbalanced and she walks with a walker. No falling. No lightheadedness. * On eliquis without bleeding. * Following up with Dr. Ruiz for treatment of myeloma. * Hyponatremia improved, sodium 135. * BP reportedly controlled at home but she doesn't have the readings available. * Dm uncontrolled, HGA1c 10. \par \par Previous history (68Gib49): * UNSTEADY ON FEET but doesn't feel like she's about to pass out. * No N/V/D, no chest pain. * On coumadin. No bleeding. * On dexamethasone for myeloma. * CKD stable, creatinine 2.18. \par \par Previous history (71Ofr53): * She does not check BP at home. BP previously controlled. * She smokes 1/4 PPD and just smoked. * CKD progressive, creatinine 2.12. * Stable hyponatremia, sodium 132. * Difficulty walking due to osteoarthritis. Can't dress self. \par \par Previous history (78Typ70): * Diagnosed with IgG myeloma by Dr. Ruiz. Bone marrow is being stained for amyloid. * HTN controlled. No lightheadedness. Compliant with medications.  * CKD stable, creatinine 1.49. \par \par * Mother in law  this morning. She is grieving. * Labs 67Sqy47: Uprot 1.9 g/g. Cr 1.67. * Losartan 12.5 mg started last visit. * Torsemide increased from 80 mg to 120 mg daily on Mar 11 2019 based on cardiomem PCWP 29. Her weight has decreased from by approximately 1 - 2 pounds daily. Her weight has decreased from 200 to 196. \par \par Previous history (15Ano61): * Labs reviewed. Her creatinine has increased from 1.44 in 2017 to 1.91 in  (eGFR 29).  She had 1829 mg/g albuminuria in 2017. A CT of the abdomen in 2017 revealed a "stable inderminate left upper pole renal hypodensity" with no other abnormalitiese noted. She also had an unchanged ill-defined soft tissue density surrounding celialc axis, SMA, and common hepatic artery. \par \par * She sees Dr. Olguin for nonischemic systolic CHF. Her EF is 25%. She has not been treated with ACE or ARB given reported worsening renal dysfunction previously but the plan per Dr. Olguin was to restart this as outpatient. She has never had hyperkalemia or an allergic reaction to ACE/ARB. \par \par * She still smokes 3 cigarettes daily. \par \par * DM uncontrolled, last HGA1c 11 - 13.\par \par * She has had PVD with bilateral iliac artery angioplasty and stents, left femoral bypass. She has also had DVTs and is hypercoagulable and is treated with coumadin. \par \par * HTN controlled. No lightheadedness. Compliant with medications. Following low salt diet.\par \par Options for clinical preventative services\par \par Influenza: 2018 sep, sep 2019\par Pneumonia:  she received prenar and pneumovax per patient\par Shingles: advised 95Hrv90\par TDAP: \par Colonoscopy: 2017, q 10 years per patient\par Dermatologist: advised to see derm yearly 83Qbw07\par \par Breast/Pelvic Exam: 2017, i have advised her to follow up with gyn 13Ocv13; breast exam ;  per aptient\par Mammogram: 2018,\par Bone Density >65:

## 2020-09-29 NOTE — PHYSICAL EXAM
[General Appearance - Alert] : alert [General Appearance - In No Acute Distress] : in no acute distress [Sclera] : the sclera and conjunctiva were normal [PERRL With Normal Accommodation] : pupils were equal in size, round, and reactive to light [Extraocular Movements] : extraocular movements were intact [Outer Ear] : the ears and nose were normal in appearance [Neck Appearance] : the appearance of the neck was normal [Neck Cervical Mass (___cm)] : no neck mass was observed [Jugular Venous Distention Increased] : there was no jugular-venous distention [Thyroid Diffuse Enlargement] : the thyroid was not enlarged [Thyroid Nodule] : there were no palpable thyroid nodules [Auscultation Breath Sounds / Voice Sounds] : lungs were clear to auscultation bilaterally [Heart Rate And Rhythm] : heart rate was normal and rhythm regular [Heart Sounds] : normal S1 and S2 [Heart Sounds Gallop] : no gallops [Murmurs] : no murmurs [Heart Sounds Pericardial Friction Rub] : no pericardial rub [Veins - Varicosity Changes] : there were no varicosital changes [Bowel Sounds] : normal bowel sounds [Abdomen Soft] : soft [Abdomen Tenderness] : non-tender [Abdomen Mass (___ Cm)] : no abdominal mass palpated [Cervical Lymph Nodes Enlarged Posterior Bilaterally] : posterior cervical [Cervical Lymph Nodes Enlarged Anterior Bilaterally] : anterior cervical [Supraclavicular Lymph Nodes Enlarged Bilaterally] : supraclavicular [Nail Clubbing] : no clubbing  or cyanosis of the fingernails [Musculoskeletal - Swelling] : no joint swelling seen [Motor Tone] : muscle strength and tone were normal [Skin Color & Pigmentation] : normal skin color and pigmentation [Skin Turgor] : normal skin turgor [] : no rash [Oriented To Time, Place, And Person] : oriented to person, place, and time [Impaired Insight] : insight and judgment were intact [Affect] : the affect was normal [FreeTextEntry1] : LE numbness

## 2020-09-29 NOTE — REASON FOR VISIT
[Follow-Up - From Hospitalization] : follow-up of a recent hospitalization for [Heart Failure] : congestive heart failure [FreeTextEntry1] : PATIENT INSTRUCTIONS:\par \par 1. I will review the labs that Dr. Neal joaquin today. \par \par 2. Please INCREASE the METOPROLOL to 150mg daily. \par \par 3. Follow up in 2 weeks.

## 2020-09-29 NOTE — PHYSICAL EXAM
[General Appearance - Well Developed] : well developed [Normal Appearance] : normal appearance [Well Groomed] : well groomed [General Appearance - Well Nourished] : well nourished [No Deformities] : no deformities [General Appearance - In No Acute Distress] : no acute distress [Normal Conjunctiva] : the conjunctiva exhibited no abnormalities [Normal Oral Mucosa] : normal oral mucosa [No Oral Pallor] : no oral pallor [No Oral Cyanosis] : no oral cyanosis [Respiration, Rhythm And Depth] : normal respiratory rhythm and effort [Heart Sounds] : normal S1 and S2 [Bowel Sounds] : normal bowel sounds [Abdomen Tenderness] : non-tender [Abnormal Walk] : normal gait [Skin Color & Pigmentation] : normal skin color and pigmentation [] : no rash [Oriented To Time, Place, And Person] : oriented to person, place, and time [Affect] : the affect was normal [Mood] : the mood was normal [No Anxiety] : not feeling anxious [Edema] : no peripheral edema present [FreeTextEntry1] : Sinus Tach

## 2020-09-29 NOTE — ASSESSMENT
[FreeTextEntry1] : # HTN controlled.\par * Cont entresto, furosemide, metoprolol.\par * The patient's blood pressure was checked with the [employer] [id number] using the SPRINT trial protocol after sitting quietly in an empty room with arm supported, back supported, and feet on the floor for 5 minutes. The average of 3 readings were taken.\par * A counseling information sheet has been given (currently or previously, in-person or electronically). All their questions were answered.\par * The patient has been counseled to check their BP at home with an automatic arm cuff, write down the readings, and reach me directly on the phone immediately if they are persistently > 180 systolic or if SBP is less than 100 or if lightheadedness develops. They were counseled to bring in all blood pressure readings and medications next visit.\par * The patient has been counseled that they have a a significant medical condition and regular office followup (at least every 2 months for now) is essential for monitoring, and that it is their responsibility to make follow up appointments.\par * The patient also has been counseled that they must never stop or change any medications without discussing this with me (or another physician). \par \par # CKD stage 4 c/w DM nephropthy.\par * Recheck labs.\par \par * The patient has been counseled that chronic kidney disease is a significant condition and regular office followup with me (at least every 2 months for now) is essential for monitoring, and that it is their responsibility to make a follow up appointment.\par * A counseling information sheet has been given (currently or previously, in-person or electronically). All their questions were answered.\par * The patient has been counseled never to stop taking their medications without discussing it with me or another doctor.\par * The patient has been counseled on risk of acute renal failure and instructed to immediately call and speak with me or go immediately to ER with any severe symptoms, nausea, vomiting, diarrhea, chest pain, or shortness of breath.\par * The patient has been counseled on avoiding NSAIDs.\par \par # Hyponatremia.\par * Recheck sodium.\par \par # Type 2 DM.\par * Check HGA1c.\par * Follow up with endocrinology. \par \par # Myeloma.\par * Follow up with Dr. Ruiz.

## 2020-09-30 RX ORDER — ESCITALOPRAM OXALATE 20 MG/1
20 TABLET ORAL DAILY
Qty: 90 | Refills: 3 | Status: DISCONTINUED | COMMUNITY
Start: 2017-03-21 | End: 2020-09-30

## 2020-10-01 ENCOUNTER — APPOINTMENT (OUTPATIENT)
Dept: VASCULAR SURGERY | Facility: CLINIC | Age: 60
End: 2020-10-01
Payer: MEDICARE

## 2020-10-01 DIAGNOSIS — I73.9 PERIPHERAL VASCULAR DISEASE, UNSPECIFIED: ICD-10-CM

## 2020-10-01 PROCEDURE — 93923 UPR/LXTR ART STDY 3+ LVLS: CPT

## 2020-10-01 PROCEDURE — 99214 OFFICE O/P EST MOD 30 MIN: CPT

## 2020-10-02 ENCOUNTER — APPOINTMENT (OUTPATIENT)
Dept: PULMONOLOGY | Facility: CLINIC | Age: 60
End: 2020-10-02
Payer: MEDICARE

## 2020-10-02 VITALS — WEIGHT: 226 LBS | HEIGHT: 65 IN | BODY MASS INDEX: 37.65 KG/M2 | TEMPERATURE: 98.3 F

## 2020-10-02 DIAGNOSIS — J44.9 CHRONIC OBSTRUCTIVE PULMONARY DISEASE, UNSPECIFIED: ICD-10-CM

## 2020-10-02 PROBLEM — I73.9 PAD (PERIPHERAL ARTERY DISEASE): Status: ACTIVE | Noted: 2020-10-02

## 2020-10-02 PROCEDURE — 99204 OFFICE O/P NEW MOD 45 MIN: CPT

## 2020-10-02 RX ORDER — PREDNISOLONE ACETATE 10 MG/ML
1 SUSPENSION/ DROPS OPHTHALMIC
Qty: 5 | Refills: 0 | Status: ACTIVE | COMMUNITY
Start: 2020-07-15

## 2020-10-02 NOTE — HISTORY OF PRESENT ILLNESS
[FreeTextEntry1] : 59 year old female with PMH of CAD, COPD, CVA, CKD, DM, cardiomyopathy, DVT (coumadin), HLD, HTN, hypothyroidism, multiple myeloma, DHARMESH, arthritis, current smoker. She comes in today for us to evaluate her bilateral leg pain. PATIENT IS A POOR HISTORIAN. She has stent cards with her from march, may and June 2013 and January 2015 from [facility]. She also had bilateral LE bypasses in the past at [facility]. Arterial US done in March (patient does not remember getting test done or why she was in the hospital), Left bypass patent, right leg with some SFA stenosis. \par \par She comes in today with complaints of posterior thigh pain/burning sensation.

## 2020-10-02 NOTE — PROCEDURE
[FreeTextEntry1] : EZRA/PVR done showing left 0.85 and right 0.57. Blunted waveforms on the right from the thigh down.

## 2020-10-02 NOTE — PHYSICAL EXAM
[Respiratory Effort] : normal respiratory effort [Normal Heart Sounds] : normal heart sounds [1+] : left 1+ [0] : left 0 [Alert] : alert [Oriented to Person] : oriented to person [Oriented to Place] : oriented to place [Oriented to Time] : oriented to time [Calm] : calm [de-identified] : overweight, bloodshot eyes, appears tired and weak, using walker [de-identified] : positive straight leg raise bilaterally

## 2020-10-02 NOTE — ASSESSMENT
[FreeTextEntry1] : 59 year old female with PMH of CAD, COPD, CVA, CKD, DM, cardiomyopathy, DVT (coumadin), HLD, HTN, hypothyroidism, multiple myeloma, DHARMESH, arthritis, current smoker. She comes in today for us to evaluate her bilateral posterior thigh pain.  EZRA/PVR done today showing flattened waveforms on the right, left leg with only mild disease. her symptoms are bilaterally and she has positive straight leg raise. Her symptoms are more likely related to the spine, recommend she see a neurologist for further workup. She does have some disease on the right that will need to be monitored, however she currently does not have any wounds or claudication. FU in 3 months or sooner with any issues.

## 2020-10-05 PROBLEM — J44.9 COPD WITH ASTHMA: Status: ACTIVE | Noted: 2017-06-09

## 2020-10-05 NOTE — PHYSICAL EXAM
[No Acute Distress] : no acute distress [Well Nourished] : well nourished [Normal Oropharynx] : normal oropharynx [III] : Mallampati Class: III [Normal Appearance] : normal appearance [No Neck Mass] : no neck mass [Normal Rate/Rhythm] : normal rate/rhythm [Normal S1, S2] : normal s1, s2 [No Resp Distress] : no resp distress [Clear to Auscultation Bilaterally] : clear to auscultation bilaterally [Benign] : benign [Not Tender] : not tender [Normal Gait] : normal gait [Gait - Sufficient For Exercise Testing] : gait sufficient for exercise testing [No Clubbing] : no clubbing [No Edema] : no edema [Normal Color/ Pigmentation] : normal color/ pigmentation [No Rash] : no rash [No Focal Deficits] : no focal deficits [No Sensory Deficits] : no sensory deficits [Oriented x3] : oriented x3 [Normal Affect] : normal affect

## 2020-10-07 LAB
25(OH)D3 SERPL-MCNC: 29.5 NG/ML
ALBUMIN SERPL ELPH-MCNC: 3.8 G/DL
ALP BLD-CCNC: 83 U/L
ALT SERPL-CCNC: 21 U/L
ANION GAP SERPL CALC-SCNC: 14 MMOL/L
APPEARANCE: ABNORMAL
AST SERPL-CCNC: 23 U/L
BASOPHILS # BLD AUTO: 0.06 K/UL
BASOPHILS NFR BLD AUTO: 0.8 %
BILIRUB SERPL-MCNC: 0.2 MG/DL
BILIRUBIN URINE: NEGATIVE
BLOOD URINE: NORMAL
BUN SERPL-MCNC: 53 MG/DL
CALCIUM SERPL-MCNC: 9.2 MG/DL
CALCIUM SERPL-MCNC: 9.2 MG/DL
CHLORIDE SERPL-SCNC: 99 MMOL/L
CHOLEST SERPL-MCNC: 303 MG/DL
CHOLEST/HDLC SERPL: 7.3 RATIO
CO2 SERPL-SCNC: 31 MMOL/L
COLOR: COLORLESS
CREAT SERPL-MCNC: 2.13 MG/DL
CREAT SPEC-SCNC: 50 MG/DL
CREAT SPEC-SCNC: 50 MG/DL
CREAT/PROT UR: 1.6 RATIO
EOSINOPHIL # BLD AUTO: 0.15 K/UL
EOSINOPHIL NFR BLD AUTO: 1.9 %
ESTIMATED AVERAGE GLUCOSE: 249 MG/DL
FERRITIN SERPL-MCNC: 278 NG/ML
GLUCOSE QUALITATIVE U: NEGATIVE
GLUCOSE SERPL-MCNC: 144 MG/DL
HBA1C MFR BLD HPLC: 10.3 %
HCT VFR BLD CALC: 34.8 %
HDLC SERPL-MCNC: 42 MG/DL
HGB BLD-MCNC: 10.6 G/DL
IMM GRANULOCYTES NFR BLD AUTO: 0.3 %
KETONES URINE: NEGATIVE
LDLC SERPL CALC-MCNC: NORMAL MG/DL
LEUKOCYTE ESTERASE URINE: NEGATIVE
LYMPHOCYTES # BLD AUTO: 1.34 K/UL
LYMPHOCYTES NFR BLD AUTO: 16.8 %
MAGNESIUM SERPL-MCNC: 1.7 MG/DL
MAN DIFF?: NORMAL
MCHC RBC-ENTMCNC: 28 PG
MCHC RBC-ENTMCNC: 30.5 GM/DL
MCV RBC AUTO: 91.8 FL
MICROALBUMIN 24H UR DL<=1MG/L-MCNC: 48.7 MG/DL
MICROALBUMIN/CREAT 24H UR-RTO: 973 MG/G
MONOCYTES # BLD AUTO: 1.08 K/UL
MONOCYTES NFR BLD AUTO: 13.6 %
NEUTROPHILS # BLD AUTO: 5.32 K/UL
NEUTROPHILS NFR BLD AUTO: 66.6 %
NITRITE URINE: NEGATIVE
PARATHYROID HORMONE INTACT: 89 PG/ML
PH URINE: 5.5
PHOSPHATE SERPL-MCNC: 4.2 MG/DL
PLATELET # BLD AUTO: 131 K/UL
POTASSIUM SERPL-SCNC: 4.9 MMOL/L
PROT SERPL-MCNC: 7.1 G/DL
PROT UR-MCNC: 80 MG/DL
PROTEIN URINE: ABNORMAL
RBC # BLD: 3.79 M/UL
RBC # FLD: 17.4 %
SODIUM SERPL-SCNC: 143 MMOL/L
SPECIFIC GRAVITY URINE: 1.01
TRIGL SERPL-MCNC: 514 MG/DL
TSH SERPL-ACNC: 6.02 UIU/ML
UROBILINOGEN URINE: NORMAL
VIT B12 SERPL-MCNC: 705 PG/ML
WBC # FLD AUTO: 7.97 K/UL

## 2020-10-09 NOTE — H&P ADULT - NS ABD PE RECTAL EXAM
not examined Samples Given: CeraVe foaming cleanser, CeraVe AM and PM moisturizer with SPF, La roche-posay clear skin 60 SPF Detail Level: Zone

## 2020-10-12 ENCOUNTER — APPOINTMENT (OUTPATIENT)
Dept: CARDIOLOGY | Facility: CLINIC | Age: 60
End: 2020-10-12
Payer: MEDICARE

## 2020-10-12 VITALS — SYSTOLIC BLOOD PRESSURE: 130 MMHG | DIASTOLIC BLOOD PRESSURE: 71 MMHG

## 2020-10-12 DIAGNOSIS — I47.2 VENTRICULAR TACHYCARDIA: ICD-10-CM

## 2020-10-12 DIAGNOSIS — I42.0 DILATED CARDIOMYOPATHY: ICD-10-CM

## 2020-10-12 DIAGNOSIS — Z45.02 ENCOUNTER FOR ADJUSTMENT AND MANAGEMENT OF AUTOMATIC IMPLANTABLE CARDIAC DEFIBRILLATOR: ICD-10-CM

## 2020-10-12 PROCEDURE — 93282 PRGRMG EVAL IMPLANTABLE DFB: CPT

## 2020-10-12 PROCEDURE — 93000 ELECTROCARDIOGRAM COMPLETE: CPT | Mod: 59

## 2020-10-12 PROCEDURE — 93290 INTERROG DEV EVAL ICPMS IP: CPT

## 2020-10-12 PROCEDURE — 99213 OFFICE O/P EST LOW 20 MIN: CPT

## 2020-10-12 RX ORDER — KETOROLAC TROMETHAMINE 5 MG/ML
0.5 SOLUTION OPHTHALMIC
Qty: 5 | Refills: 0 | Status: DISCONTINUED | COMMUNITY
Start: 2020-07-15

## 2020-10-12 RX ORDER — CEFDINIR 300 MG/1
300 CAPSULE ORAL
Qty: 20 | Refills: 0 | Status: DISCONTINUED | COMMUNITY
Start: 2020-06-29

## 2020-10-12 RX ORDER — HYDROCORTISONE 1 %
12 CREAM (GRAM) TOPICAL
Qty: 225 | Refills: 0 | Status: DISCONTINUED | COMMUNITY
Start: 2020-08-06

## 2020-10-12 RX ORDER — CIPROFLOXACIN 3 MG/ML
0.3 SOLUTION OPHTHALMIC
Qty: 5 | Refills: 0 | Status: DISCONTINUED | COMMUNITY
Start: 2020-07-15

## 2020-10-12 RX ORDER — ZOLPIDEM TARTRATE 10 MG/1
10 TABLET ORAL
Qty: 30 | Refills: 5 | Status: DISCONTINUED | COMMUNITY
End: 2020-10-12

## 2020-10-12 RX ORDER — METHYLPREDNISOLONE 4 MG/1
4 TABLET ORAL
Qty: 60 | Refills: 2 | Status: DISCONTINUED | COMMUNITY
Start: 2019-08-06 | End: 2020-10-12

## 2020-10-12 RX ORDER — AMOXICILLIN AND CLAVULANATE POTASSIUM 562.5; 437.5; 62.5 MG/1; MG/1; MG/1
1000-62.5 TABLET, MULTILAYER, EXTENDED RELEASE ORAL
Qty: 14 | Refills: 0 | Status: DISCONTINUED | COMMUNITY
Start: 2020-09-18

## 2020-10-12 RX ORDER — AMOXICILLIN AND CLAVULANATE POTASSIUM 500; 125 MG/1; MG/1
500-125 TABLET, FILM COATED ORAL TWICE DAILY
Qty: 6 | Refills: 0 | Status: DISCONTINUED | COMMUNITY
Start: 2020-09-03 | End: 2020-10-12

## 2020-10-12 NOTE — ADDENDUM
[FreeTextEntry1] : I was present with the NP/PA during the history and exam of the patient. I discussed patient’s management in details. I reviewed the NP’s note and agree with the documented findings and plan of care. Please do not hesitate to contact me if you have any further questions at [phone]. \par \par \par

## 2020-10-12 NOTE — ASSESSMENT
[FreeTextEntry1] : EKG S/R 85 /MIN BIV ENLARGEMENT \par \par AICD INTERROGATION : NORMAL SINGLE CHAMBER AICD FUNCTION. NO EVENTS OR ARRHYTHMIAS . \par \par LEFT INFRACLAVICULAR AICD SITE: HEALING WELL. NO S/S INFECTION . NO HEMATOMA \par \par REPORTS C/W MEDS . \par \par PLAN\par  \par -CONTINUE CURRENT MEDICATIONS \par -F/U WITH HF MANAGEMENT CLINIC AS SCHEDULED \par -RETURN IN 3 MONTHS

## 2020-10-12 NOTE — PROCEDURE
[No] : not [Atrial Fibrillation] : atrial fibrillation [ICD] : Implantable cardioverter-defibrillator [VVI] : VVI [Voltage: ___ volts] : Voltage was [unfilled] volts [Magnet Rate: ___ Ppm] : magnet rate was [unfilled] Ppm [Longevity: ___ months] : The estimated remaining battery life is [unfilled] months [Normal] : The battery status is normal. [Threshold Testing Performed] : Threshold testing was performed [Lead Imp:  ___ohms] : lead impedance was [unfilled] ohms [Sensing Amplitude ___mv] : sensing amplitude was [unfilled] mv [___V @] : [unfilled] V [___ ms] : [unfilled] ms [Sense ___ %] : Sense [unfilled]% [Pace ___ %] : Pace [unfilled]% [See Scanned Paceart Report] : See scanned paceart report [See Device Printout] : See device printout [de-identified] : Medtronic [de-identified] : Luz JOHNSON [de-identified] : [id number] [de-identified] : 9/10/2020 [de-identified] : 86bpm [de-identified] : No Episodes\par Normal Device Function

## 2020-10-12 NOTE — HISTORY OF PRESENT ILLNESS
[de-identified] : POST ICD MDT 2010 FOR DILATED NON ISCHEMIC CARDIOMYOPATHY HAS BEEN FOLLOWED BY [facility] FOR HF MANAGEMENT . HISTORY OF DIABETES, HYPERTENSION FOR 20 YEARS STOPPED SMOKING 2-3  MONTHS AGO, NO ALCOHOL, FAMILY HISTORY OF CAD MOTHER . \par LAST HOSPITAL HF ADMISSION ON 8/12/2020  \par S/P AICD GENERATOR REPLACEMENT 9/10/2020 \par \par RETURNING FOR FOLLOW UP . \par \par DENIES CP/SOB OR PALPITATIONS. STABLE ACTIVITY TOLERANCE. NO FEVER OR CHILLS

## 2020-10-12 NOTE — PROCEDURE
[No] : not [Atrial Fibrillation] : atrial fibrillation [ICD] : Implantable cardioverter-defibrillator [VVI] : VVI [Voltage: ___ volts] : Voltage was [unfilled] volts [Magnet Rate: ___ Ppm] : magnet rate was [unfilled] Ppm [Longevity: ___ months] : The estimated remaining battery life is [unfilled] months [Normal] : The battery status is normal. [Threshold Testing Performed] : Threshold testing was performed [Lead Imp:  ___ohms] : lead impedance was [unfilled] ohms [Sensing Amplitude ___mv] : sensing amplitude was [unfilled] mv [___V @] : [unfilled] V [___ ms] : [unfilled] ms [Sense ___ %] : Sense [unfilled]% [Pace ___ %] : Pace [unfilled]% [See Scanned Paceart Report] : See scanned paceart report [See Device Printout] : See device printout [de-identified] : Medtronic [de-identified] : Luz JOHNSON [de-identified] : [id number] [de-identified] : 9/10/2020 [de-identified] : 86bpm [de-identified] : No Episodes\par Normal Device Function

## 2020-10-12 NOTE — HISTORY OF PRESENT ILLNESS
[de-identified] : POST ICD MDT 2010 FOR DILATED NON ISCHEMIC CARDIOMYOPATHY HAS BEEN FOLLOWED BY [facility] FOR HF MANAGEMENT . HISTORY OF DIABETES, HYPERTENSION FOR 20 YEARS STOPPED SMOKING 2-3  MONTHS AGO, NO ALCOHOL, FAMILY HISTORY OF CAD MOTHER . \par LAST HOSPITAL HF ADMISSION ON 8/12/2020  \par S/P AICD GENERATOR REPLACEMENT 9/10/2020 \par \par RETURNING FOR FOLLOW UP . \par \par DENIES CP/SOB OR PALPITATIONS. STABLE ACTIVITY TOLERANCE. NO FEVER OR CHILLS

## 2020-10-12 NOTE — PHYSICAL EXAM
[General Appearance - Well Developed] : well developed [Normal Appearance] : normal appearance [Well Groomed] : well groomed [General Appearance - Well Nourished] : well nourished [No Deformities] : no deformities [General Appearance - In No Acute Distress] : no acute distress [Heart Rate And Rhythm] : heart rate and rhythm were normal [Heart Sounds] : normal S1 and S2 [Respiration, Rhythm And Depth] : normal respiratory rhythm and effort [Left Infraclavicular] : left infraclavicular area [Clean] : clean [Dry] : dry [Well-Healed] : well-healed [Bowel Sounds] : normal bowel sounds [Abdomen Soft] : soft [Nail Clubbing] : no clubbing of the fingernails [FreeTextEntry1] : MILD GAIL EDEMA

## 2020-10-12 NOTE — REVIEW OF SYSTEMS
[Dyspnea on exertion] : dyspnea during exertion [Lower Ext Edema] : lower extremity edema [Wheezing] : wheezing [Negative] : Constitutional

## 2020-10-18 NOTE — PHYSICAL THERAPY INITIAL EVALUATION ADULT - STANDING BALANCE: DYNAMIC, REHAB EVAL
Surgical History:      Procedure Laterality Date    APPENDECTOMY      BACK SURGERY  1980    x 3    CARDIAC SURGERY  1990    Bypass x 3    CARPAL TUNNEL RELEASE Bilateral 1980    wrist and elbows    CHOLECYSTECTOMY  2000    COLONOSCOPY      CORNEAL TRANSPLANT  2000    x 2    DILATATION, ESOPHAGUS      ENDOSCOPY, COLON, DIAGNOSTIC      EYE SURGERY      JOINT REPLACEMENT Left 1990    JOINT REPLACEMENT Right 1995    LAMINECTOMY N/A 7/11/2017    LUMBAR LAMINECTOMY POSTERIOR  L23 L34 performed by Michelle Valdez MD at [address] Left 1990    ROTATOR CUFF REPAIR Right 2010    TUMOR REMOVAL Left 1960    foot    TURP  2000    VASCULAR SURGERY Left 7/15/2015 [facility] & [address]    Aortoiliofemoral a'gram with bilateral lower extremity runoff; select views of the left superficial femoral artery and the left dorsalis pedis artery; crossing 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; a'plasty of left anterior tibial artery and dorsalis pedis artery with 2.5x300 vascutrak balloon and then with 3x220 nati balloon; completion a'gram       Medications in Hospital:      Current Facility-Administered Medications:     sodium chloride flush 0.9 % injection 10 mL, 10 mL, Intravenous, 2 times per day, Kalpana Ghosh MD, 10 mL at 10/18/20 0852    sodium chloride flush 0.9 % injection 10 mL, 10 mL, Intravenous, PRN, Kalpana Ghosh MD    acetaminophen (TYLENOL) tablet 650 mg, 650 mg, Oral, Q6H PRN **OR** acetaminophen (TYLENOL) suppository 650 mg, 650 mg, Rectal, Q6H PRN, Kalpana Ghosh MD    enoxaparin (LOVENOX) injection 40 mg, 40 mg, Subcutaneous, Daily, Kalpana Ghosh MD, 40 mg at 10/18/20 0853    ondansetron (ZOFRAN-ODT) disintegrating tablet 4 mg, 4 mg, Oral, Q8H PRN **OR** ondansetron (ZOFRAN) injection 4 mg, 4 mg, Intravenous, Q6H PRN, Kalpana Ghosh MD    losartan (COZAAR) tablet 25 mg, 25 mg, Oral, Daily, Kalpana Ghosh MD, 25 mg at 10/18/20 0851    hydrALAZINE (APRESOLINE) tablet 100 mg, 100 mg, Oral, 3 times per day, Ismael Landrum MD    insulin lispro (HUMALOG) injection vial 0-6 Units, 0-6 Units, Subcutaneous, TID WC, Ismael Landrum MD    insulin lispro (HUMALOG) injection vial 0-3 Units, 0-3 Units, Subcutaneous, Nightly, Ismael Landrum MD    glucose (GLUTOSE) 40 % oral gel 15 g, 15 g, Oral, PRN, Ismael Landrum MD    dextrose 50 % IV solution, 12.5 g, Intravenous, PRN, Ismael Landrum MD    glucagon (rDNA) injection 1 mg, 1 mg, Intramuscular, PRN, Ismael Landrum MD    dextrose 5 % solution, 100 mL/hr, Intravenous, PRN, Ismael Landrum MD    folic acid (FOLVITE) tablet 1 mg, 1 mg, Oral, Daily, Diallo Padilla MD, 1 mg at 10/18/20 1040    hydrALAZINE (APRESOLINE) tablet 25 mg, 25 mg, Oral, Q4H PRN, Ismael Landrum MD    hydrALAZINE (APRESOLINE) injection 20 mg, 20 mg, Intravenous, Q4H PRN, Ismael Landrum MD    finasteride (PROSCAR) tablet 5 mg, 5 mg, Oral, Daily, Usman Appiah MD, 5 mg at 10/18/20 2395    ferrous sulfate-C-folic acid (FOLITAB) 981-613-2.1 MG per extended release tablet 1 tablet, 1 tablet, Oral, Daily, Usman Appiah MD    docusate sodium (COLACE) capsule 100 mg, 100 mg, Oral, BID, Usman Appiah MD, 100 mg at 10/18/20 0851    diphenoxylate-atropine (LOMOTIL) 2.5-0.025 MG per tablet 1 tablet, 1 tablet, Oral, 4x Daily PRN, Usman Appiah MD    colchicine (COLCRYS) tablet 0.6 mg, 0.6 mg, Oral, PRN, Usman Appiah MD    baclofen (LIORESAL) tablet 10 mg, 10 mg, Oral, Daily PRN, Usman Appiah MD, 10 mg at 10/18/20 1022    vitamin B and C (TOTAL B-C) 1 tablet, 1 tablet, Oral, Daily, Usman Appiah MD, 1 tablet at 10/18/20 3262    aspirin chewable tablet 81 mg, 81 mg, Oral, Daily, Usman Appiah MD, 81 mg at 10/18/20 7268    vitamin C (ASCORBIC ACID) tablet 1,000 mg, 1,000 mg, Oral, Daily, Usman Appiah MD, 1,000 mg at 10/18/20 0851    allopurinol (ZYLOPRIM) tablet 600 mg, 600 mg, Oral, Daily, Usman Appiah MD, 600 mg at 10/18/20 0851    gabapentin (NEURONTIN) capsule 100 mg, 100 mg, Oral, BID, Melissa Steen MD, 100 mg at 10/18/20 8078    isosorbide mononitrate (IMDUR) extended release tablet 120 mg, 120 mg, Oral, Daily, Melissa Steen MD, 120 mg at 10/18/20 1125    meclizine (ANTIVERT) tablet 25 mg, 25 mg, Oral, 4x Daily PRN, Melissa Steen MD    therapeutic multivitamin-minerals 1 tablet, 1 tablet, Oral, Daily, Melissa Steen MD, 1 tablet at 10/18/20 0851    vitamin D (CHOLECALCIFEROL) tablet 1,000 Units, 1,000 Units, Oral, Daily, Melissa Steen MD, 1,000 Units at 10/18/20 0851    tamsulosin (FLOMAX) capsule 0.8 mg, 0.8 mg, Oral, Daily, Melissa Steen MD, 0.8 mg at 10/18/20 3196    sennosides-docusate sodium (SENOKOT-S) 8.6-50 MG tablet 2 tablet, 2 tablet, Oral, Daily, Melissa Steen MD, 2 tablet at 10/18/20 0852    rOPINIRole (REQUIP) tablet 1 mg, 1 mg, Oral, Nightly, Melissa Steen MD    prednisoLONE acetate (PRED FORTE) 1 % ophthalmic suspension 1 drop, 1 drop, Left Eye, BID, Melissa Steen MD, 1 drop at 10/18/20 0854    atorvastatin (LIPITOR) tablet 80 mg, 80 mg, Oral, Nightly, Melissa Steen MD    pantoprazole (PROTONIX) tablet 40 mg, 40 mg, Oral, BID, Melissa Steen MD, 40 mg at 10/18/20 473    nitroGLYCERIN (NITROSTAT) SL tablet 0.4 mg, 0.4 mg, Sublingual, Q5 Min PRN, MD Honey Moorepirocin [facility]) 2 % ointment, , Topical, BID, Melissa Steen MD    lidocaine 4 % external patch 1 patch, 1 patch, Topical, Daily, Melissa Steen MD, 1 patch at 10/18/20 0853    glimepiride (AMARYL) tablet 4 mg, 4 mg, Oral, BID, Melissa Steen MD, 4 mg at 10/18/20 3011    Allergies:  Pcn [penicillins] and Adhesive tape    Social History:   TOBACCO:   reports that he has never smoked. He has never used smokeless tobacco.  ETOH:   reports no history of alcohol use.     Family History:       Problem Relation Age of Onset    Cancer Mother     Heart Disease Father     Heart Disease Brother     Heart Disease Brother     Heart Disease Brother     Heart Disease Brother     Heart Attack Brother     Heart Disease Brother     Heart Disease Brother            Physical Exam:    Vitals: BP (!) 165/51   Pulse 56   Temp 98.5 °F (36.9 °C)   Resp 18   Ht 5' 10\" (1.778 m)   Wt 202 lb 3.2 oz (91.7 kg)   SpO2 94%   BMI 29.01 kg/m²     Constitutional - well developed, well nourished. Eyes - conjunctiva normal.  Pupils not tested  Ear, nose, throat -hearing unable to test No scars, masses, or lesions over external nose or ears, no atrophy of tongue  Neck-symmetric, no masses noted, no jugular vein distension  Respiration- chest wall appears symmetric, good expansion,   normal effort without use of accessory muscles  Musculoskeletal - no significant wasting of muscles noted, no bony deformities  Extremities-no clubbing, cyanosis or edema  Skin - warm, dry, and intact. No rash, erythema, or pallor.   Psychiatric - mood, affect, and behavior appear confused    Neurological exam  Awake,confused restless in restraints luent oriented to self  Attention and concentration impaired  Recent and remote memory unable to test  Speech with dysarthria  unable to test fund of knowledge    Cranial Nerve Exam   CN II- Visual fields grossly unremarkable  CN III, IV,VI-EOMI, No nystagmus, conjugate eye movements, no ptosis  CN V-sensation unreliable  CN VII-no facial assymetry  CN VIII-Hearing unable to test  CN IX and X- Palate not tested  CN XI-not test shoulder shrug  CN XII-Tongue not tested    Motor Exam  Moving all extremities spontanously no cogwheeling, normal tone    Sensory Exam  Sensation unreliable     Reflexes   Not tested    Tremors- no tremors in hands or head noted    Gait  Not tested    Coordination  Finger to nose-unable to do        CBC:   Recent Labs     10/17/20  1615   WBC 6.0   HGB 9.8*          BMP:    Recent Labs     10/17/20  1615 10/18/20  1016     --    K 4.1 3.9     --    CO2 24  --    BUN 21  --    CREATININE 0.9  --    GLUCOSE 94  -- Hepatic:   Recent Labs     10/17/20  1615   AST 25   ALT 27   BILITOT 0.3   ALKPHOS 91       Lipids:   Recent Labs     10/17/20  1615   CHOL 157*   HDL 51*       INR: No results for input(s): INR in the last 72 hours. Assessment and Plan     Altered mental status/encephalopathy/Hypertensive urgency    Non focal exam-confused/disordiented and not able to follow commands  CT head no clear acute changes noted-chronic infarcts right frontal and left occipital region    Certainly acute ischemic stroke/ PRESS / hypertensive encephalopathy are in the differential     Check MRI brain/EEG  B12/TSH/T4/ammonia levels unremarkable  Abg with hypoxia-supplement oxygen    DC Neurontin/Requip in the event these are contributing to confusion  Banana bag x 1  On MVI/folic acid  Start Thiamine 500 mg IV daily    CAD-ASA/statin  Hypertensive urgency-improved  DM-monitor BS  BPH-on meds  Low back pain-Lidoderm patch/tylenol PRN    minimize sedation-May consider Precedex as needed    Continue care    DW Dr Shaneka Regalado      Please feel free to call with any questions   [phone] (cell phone)    Dr Gela Mcgill certified in Neurology  Board Certified in [employer] [facility] Neurophysiology  Fellowship Trained in [employer] [facility] Neurophysiology    EMR Dragon/transcription disclaimer:Significant part of this  encounter note is electronic transcription/translation of spoken language to printed text. The electronic translation of spoken language may be erroneous, or at times, nonsensical words or phrases may be inadvertently transcribed.  Although I have reviewed the note for such errors, some may still exist. fair plus

## 2020-10-21 RX ORDER — BLOOD SUGAR DIAGNOSTIC
STRIP MISCELLANEOUS
Qty: 100 | Refills: 2 | Status: ACTIVE | COMMUNITY
Start: 2020-10-21 | End: 1900-01-01

## 2020-10-21 NOTE — PROGRESS NOTE ADULT - PROBLEM SELECTOR PLAN 6
-Currently normotensive, continue hydralazine TID  -Holding home BB in the setting of CHF exacerbation  -Monitor blood pressures    #DHARMESH:  -Continue with CPAP at night Currently normotensive. (Entresto was d/c;ed last admission 2/2 RITCHIE on CKD)  -c/w Hydralazine 10mg TID/ Imdur 60mg qd  - c/w Toprol 25mg qd    #DHARMESH:  -Continue with CPAP at night insulin glargine Injectable (LANTUS) 10 Unit(s) SubCutaneous at bedtime  insulin lispro (ADMELOG) corrective regimen sliding scale   SubCutaneous three times a day before meals  insulin lispro (ADMELOG) corrective regimen sliding scale   SubCutaneous at bedtime  insulin lispro Injectable (ADMELOG) 3 Unit(s) SubCutaneous three times a day before meals  levothyroxine, polyethylene glycol, Lipitor

## 2020-10-27 ENCOUNTER — NON-APPOINTMENT (OUTPATIENT)
Age: 60
End: 2020-10-27

## 2020-11-03 ENCOUNTER — APPOINTMENT (OUTPATIENT)
Dept: ENDOCRINOLOGY | Facility: CLINIC | Age: 60
End: 2020-11-03
Payer: MEDICARE

## 2020-11-03 PROCEDURE — 99205 OFFICE O/P NEW HI 60 MIN: CPT | Mod: 95

## 2020-11-03 PROCEDURE — 99215 OFFICE O/P EST HI 40 MIN: CPT | Mod: 95

## 2020-11-05 ENCOUNTER — APPOINTMENT (OUTPATIENT)
Dept: HEMATOLOGY ONCOLOGY | Facility: CLINIC | Age: 60
End: 2020-11-05
Payer: MEDICARE

## 2020-11-05 PROCEDURE — 99214 OFFICE O/P EST MOD 30 MIN: CPT | Mod: 95

## 2020-11-05 RX ORDER — PEN NEEDLE, DIABETIC 32GX 5/32"
32G X 4 MM NEEDLE, DISPOSABLE MISCELLANEOUS
Qty: 400 | Refills: 3 | Status: DISCONTINUED | COMMUNITY
Start: 2020-01-10 | End: 2020-11-05

## 2020-11-05 RX ORDER — FERROUS SULFATE 325(65) MG
325 TABLET ORAL TWICE DAILY
Refills: 0 | Status: DISCONTINUED | COMMUNITY
End: 2020-11-05

## 2020-11-05 RX ORDER — FLASH GLUCOSE SENSOR
KIT MISCELLANEOUS
Qty: 4 | Refills: 0 | Status: DISCONTINUED | COMMUNITY
Start: 2020-07-14 | End: 2020-11-05

## 2020-11-05 RX ORDER — PEN NEEDLE, DIABETIC 29 G X1/2"
32G X 4 MM NEEDLE, DISPOSABLE MISCELLANEOUS
Qty: 2 | Refills: 3 | Status: DISCONTINUED | COMMUNITY
Start: 2020-07-21 | End: 2020-11-05

## 2020-11-05 RX ORDER — INSULIN ASPART 100 [IU]/ML
100 INJECTION, SOLUTION INTRAVENOUS; SUBCUTANEOUS
Refills: 0 | Status: DISCONTINUED | COMMUNITY
Start: 2019-11-14 | End: 2020-11-05

## 2020-11-05 RX ORDER — BLOOD-GLUCOSE METER
EACH MISCELLANEOUS
Qty: 1 | Refills: 0 | Status: DISCONTINUED | COMMUNITY
Start: 2018-03-09 | End: 2020-11-05

## 2020-11-05 RX ORDER — LANCETS 33 GAUGE
EACH MISCELLANEOUS
Qty: 100 | Refills: 5 | Status: DISCONTINUED | COMMUNITY
Start: 2019-05-22 | End: 2020-11-05

## 2020-11-05 RX ORDER — BLOOD SUGAR DIAGNOSTIC
STRIP MISCELLANEOUS
Qty: 3 | Refills: 3 | Status: DISCONTINUED | COMMUNITY
Start: 2018-03-09 | End: 2020-11-05

## 2020-11-05 NOTE — ASSESSMENT
[FreeTextEntry1] : Recently diagnosed  myeloma.. Patient currently on weekly Decadron 20 mg and weekly Ninlaro 3 mg, which she states she has been taking...\par \par Repeat blood work ordered, will arrange for home draw since patient has a broken leg

## 2020-11-05 NOTE — REASON FOR VISIT
[Initial Consultation] : an initial consultation for [Blood Count Assessment] : blood count assessment [Monoclonal Gammopathy] : monoclonal gammopathy [FreeTextEntry2] : Myeloma

## 2020-11-05 NOTE — HISTORY OF PRESENT ILLNESS
[de-identified] : 57 y/o with long standing DM, CRF and anemia was found to have a light chain paraprotein...discussed with pt, will arrange for CT guided BM aspirate and biopsy... [de-identified] : 8-6-2019 BM aspirate and Bx consistent with myeloma...this was discussed in detail with pt and all her questions were answered...\par \par January 29, 2020 patient returns for follow-up... She states she has been taking the weekly Decadron only... She did not return for her follow-up appointment because of family issues, her mother in law passed away, she might be evicted from her apartment,etc...\par \par 3-9-2020 Has been taking the Decadron weekly on Monday with Ninlaro x 3 weeks, patient has a full understanding of the proper way to take the medications...Has a leg infection for which she takes antibiotics\par \par 7/23/2020 patient requested a follow-up appointment, since she wanted to know if the Decadron and the Ninlaro regiment is working.\par \par November 5, 2020 patient requested follow-up appointment to discuss her myeloma... Reviewed blood work done with Dr. Neal's office, her anemia and kidney function are stable, however her immune globulin level was not checked... Patient states she fell and sustained fracture in 2 places on her right leg [Home] : at home, [unfilled] , at the time of the visit. [Verbal consent obtained from patient] : the patient, [unfilled]

## 2020-11-10 ENCOUNTER — APPOINTMENT (OUTPATIENT)
Dept: HEART AND VASCULAR | Facility: CLINIC | Age: 60
End: 2020-11-10

## 2020-11-13 ENCOUNTER — APPOINTMENT (OUTPATIENT)
Dept: NEUROLOGY | Facility: CLINIC | Age: 60
End: 2020-11-13

## 2020-11-16 ENCOUNTER — APPOINTMENT (OUTPATIENT)
Dept: NEPHROLOGY | Facility: CLINIC | Age: 60
End: 2020-11-16

## 2020-11-16 ENCOUNTER — APPOINTMENT (OUTPATIENT)
Dept: DISASTER EMERGENCY | Facility: CLINIC | Age: 60
End: 2020-11-16

## 2020-11-16 DIAGNOSIS — Z01.818 ENCOUNTER FOR OTHER PREPROCEDURAL EXAMINATION: ICD-10-CM

## 2020-11-16 NOTE — HISTORY OF PRESENT ILLNESS
[TextBox_4] : 59 year old female, with h/o asthma with Ex-smoker, quit smoking in Sept 2020, started smoking at age of 20 years, 32 pack year of smoking with DHARMESH on CPAP ([facility], [phone]) came to clinic for management of COPD. Patient is c/o SOB on exertion for last few months. Denying cough, phlegm, chest pain, pedal edema. Patient is using albuterol inhaler as needed. Patient was given BIPAP after discharge from hospital. She was never diagnosed with DHARMESH by sleep study.

## 2020-11-16 NOTE — DISCUSSION/SUMMARY
[FreeTextEntry1] : 59 year old female, with h/o asthma with Ex-smoker, quit smoking in Sept 2020, started smoking at age of 20 years, 32 pack year of smoking with DHARMESH on CPAP ([facility], [phone]) with DVT with cardiomyopathy came to clinic for management of COPD.\par \par Oxygen saturation  100% on RA at rest\par \par A/P\par COPD with asthma:\par - Symbicort 2 puff bid with gargle\par - Prn albuterol\par - PFT with DLCO\par - Follow up after PFT\par \par DHARMESH:\par Continue CPAP at current setting.\par Complaint with usage.

## 2020-11-16 NOTE — REVIEW OF SYSTEMS
[Fever] : no fever [Chills] : no chills [Dry Eyes] : no dry eyes [Eye Irritation] : no eye irritation [Cough] : no cough [Sputum] : no sputum [Dyspnea] : no dyspnea [A.M. Dry Mouth] : no a.m. dry mouth [SOB on Exertion] : no sob on exertion [Chest Discomfort] : no chest discomfort [Orthopnea] : no orthopnea [Hay Fever] : no hay fever [Nasal Discharge] : no nasal discharge [TextBox_151] : rest of ROS negative

## 2020-11-18 NOTE — PHYSICAL EXAM
[General Appearance - Alert] : alert [General Appearance - In No Acute Distress] : in no acute distress [Sclera] : the sclera and conjunctiva were normal [PERRL With Normal Accommodation] : pupils were equal in size, round, and reactive to light [Extraocular Movements] : extraocular movements were intact [Outer Ear] : the ears and nose were normal in appearance [Neck Appearance] : the appearance of the neck was normal [Neck Cervical Mass (___cm)] : no neck mass was observed [Jugular Venous Distention Increased] : there was no jugular-venous distention [Thyroid Diffuse Enlargement] : the thyroid was not enlarged [Thyroid Nodule] : there were no palpable thyroid nodules [Auscultation Breath Sounds / Voice Sounds] : lungs were clear to auscultation bilaterally [Heart Rate And Rhythm] : heart rate was normal and rhythm regular [Heart Sounds] : normal S1 and S2 [Heart Sounds Gallop] : no gallops [Murmurs] : no murmurs [Heart Sounds Pericardial Friction Rub] : no pericardial rub [Veins - Varicosity Changes] : there were no varicosital changes [Bowel Sounds] : normal bowel sounds [Abdomen Soft] : soft [Abdomen Tenderness] : non-tender [Abdomen Mass (___ Cm)] : no abdominal mass palpated [Cervical Lymph Nodes Enlarged Posterior Bilaterally] : posterior cervical [Cervical Lymph Nodes Enlarged Anterior Bilaterally] : anterior cervical [Supraclavicular Lymph Nodes Enlarged Bilaterally] : supraclavicular [Nail Clubbing] : no clubbing  or cyanosis of the fingernails [Musculoskeletal - Swelling] : no joint swelling seen [Motor Tone] : muscle strength and tone were normal [Skin Color & Pigmentation] : normal skin color and pigmentation [Skin Turgor] : normal skin turgor [] : no rash [FreeTextEntry1] : LE numbness [Oriented To Time, Place, And Person] : oriented to person, place, and time [Impaired Insight] : insight and judgment were intact [Affect] : the affect was normal

## 2020-11-18 NOTE — HISTORY OF PRESENT ILLNESS
[FreeTextEntry1] : Kindly referred by Dr. Olguin for CKD.\par \par * CKD improved, creatinine decreased to 2.13. *  Following up with Dr. Ruiz for treatment of myeloma. * Hyponatremia improved. * HTN controlled, no lightheadedness. \par \par Previous history (21Dvx45): * CKD progressive, creatinine increased to 2.66. * * On eliquis without bleeding. * Following up with Dr. Ruiz for treatment of myeloma. * Hyponatremia improved, sodium 135. * HTN controlled. No lightheadedness. * DM uncontrolled, HGA1c previously 10. \par \par Previous history (87Dbc19): * CKD progressive, creatinine 2.66 in Aug 20. * Occasional unbalanced and she walks with a walker. No falling. No lightheadedness. * On eliquis without bleeding. * Following up with Dr. Ruiz for treatment of myeloma. * Hyponatremia improved, sodium 135. * BP reportedly controlled at home but she doesn't have the readings available. * Dm uncontrolled, HGA1c 10. \par \par Previous history (04Mng91): * UNSTEADY ON FEET but doesn't feel like she's about to pass out. * No N/V/D, no chest pain. * On coumadin. No bleeding. * On dexamethasone for myeloma. * CKD stable, creatinine 2.18. \par \par Previous history (53Ugb91): * She does not check BP at home. BP previously controlled. * She smokes 1/4 PPD and just smoked. * CKD progressive, creatinine 2.12. * Stable hyponatremia, sodium 132. * Difficulty walking due to osteoarthritis. Can't dress self. \par \par Previous history (35Yxu25): * Diagnosed with IgG myeloma by Dr. Ruiz. Bone marrow is being stained for amyloid. * HTN controlled. No lightheadedness. Compliant with medications.  * CKD stable, creatinine 1.49. \par \par * Mother in law  this morning. She is grieving. * Labs 14Dku76: Uprot 1.9 g/g. Cr 1.67. * Losartan 12.5 mg started last visit. * Torsemide increased from 80 mg to 120 mg daily on Mar 11 2019 based on cardiomem PCWP 29. Her weight has decreased from by approximately 1 - 2 pounds daily. Her weight has decreased from 200 to 196. \par \par Previous history (): * Labs reviewed. Her creatinine has increased from 1.44 in 2017 to 1.91 in  (eGFR 29).  She had 1829 mg/g albuminuria in 2017. A CT of the abdomen in 2017 revealed a "stable inderminate left upper pole renal hypodensity" with no other abnormalitiese noted. She also had an unchanged ill-defined soft tissue density surrounding celialc axis, SMA, and common hepatic artery. \par \par * She sees Dr. Olguin for nonischemic systolic CHF. Her EF is 25%. She has not been treated with ACE or ARB given reported worsening renal dysfunction previously but the plan per Dr. Olguin was to restart this as outpatient. She has never had hyperkalemia or an allergic reaction to ACE/ARB. \par \par * She still smokes 3 cigarettes daily. \par \par * DM uncontrolled, last HGA1c 11 - 13.\par \par * She has had PVD with bilateral iliac artery angioplasty and stents, left femoral bypass. She has also had DVTs and is hypercoagulable and is treated with coumadin. \par \par * HTN controlled. No lightheadedness. Compliant with medications. Following low salt diet.\par \par Options for clinical preventative services\par \par Influenza: 2018 sep, sep 2019\par Pneumonia:  she received prenar and pneumovax per patient\par Shingles: advised [id number]\par TDAP: \par Colonoscopy: 2017, q 10 years per patient\par Dermatologist: advised to see derm yearly [id number]\par \par Breast/Pelvic Exam: 2017, i have advised her to follow up with gyn 12Zmi30; breast exam ;  per aptient\par Mammogram: 2018,\par Bone Density >65:

## 2020-11-19 ENCOUNTER — APPOINTMENT (OUTPATIENT)
Dept: PULMONOLOGY | Facility: CLINIC | Age: 60
End: 2020-11-19

## 2020-12-07 ENCOUNTER — RX RENEWAL (OUTPATIENT)
Age: 60
End: 2020-12-07

## 2020-12-08 NOTE — HISTORY OF PRESENT ILLNESS
[FreeTextEntry1] : 59 y/o F pt, with Hx of uncontrolled T2DM (dx > 10 yrs) with multiple complications including: CVA, CKD stage 4 and CHF.\par Other PMHx: HTN, HLD, Stomach Ulcer (as per pt), DVT, COPD, Hypothyroidism, Multiple Myeloma \par Denies PMHx of amputation, Foot Ulcer\par Last Ophthalmologist: in 9/2020 (pt states she had cataract surgery done in 9/2020)\par Last Podiatrist visit: 4 months ago\par Sees cardiologist, vascular doctor, nephrologist\par SHx: former smoker (quit 2-3 months ago as per pt)\par \par From Dr. Chandra note:\par Generally feels well and endorses no acute complaints. Reports taking insulin though out most of the course of her disease. Multiple micro-macrovascular complications reported. Monitors infrequently. Log or glucometer not brought to today's apt. Reports skipping her Levemir 15 units QHS dose last night. Admits to frequent un compliance w/ insulin regimen. Previously on PO meds which were dced as renal function worsened. Has not tried GLP-1 agonists in the past. \par \par 11/3/20\par Pt's initial visit is through telehealth. \par \par Pt did not have any questions prior to the initiation of the telehealth visit.\par \par Today pt presents for DM f/u via telehealth, feeling well with c/o occasional low BS readings 2x a week. She notes when this happens she has nervousness, anxiousness, and shakiness. \par Pt states she checks her BS 3-4x QD.\par Pt states she does not eat big meals. \par She states she sees the nephrologist and her kidney is good and there are no talks for dialysis or transplant. \par \par Current Medications: Levemir 30u QD, NovoLog 12u ac meals, Levothyroxine 50mcg QD, Multivitamins \par \par Recent Labs:\par - 9/29/20 A1c 10.3%, TSH 6.02, Free T4 1.1, iPTH 89, Ca 9.2, s. creat 2.13, microalb/creat ratio 973, Vit D 25- OH 29.5, , Cholesterol 303  \par - 1/2020 A1c > 14%, \par - 8/2019 A1c 11.4%\par - 5/2019 A1c 14% \par - 2/2019 TSH 2.91, LDL-c 58, , Cholesterol 108

## 2020-12-08 NOTE — ASSESSMENT
[FreeTextEntry1] : 59 y/o F pt with:\par 1. T2DM (dx > 10 yrs) with multiple complications including: CVA, CKD stage 4 and CHF:\par Pt's DM is poorly controlled; her A1c for the past 3 yrs has been higher than 10.3%, except for one occasion of  7.3% in 2/2019. Pt also has non pertinent DM medical hx (COPD, Multiple Myeloma, DVT, Hypothyroidism). \par Pt was c/o episodes of hypoglycemia 2-3 x a week with no clear pattern. She notes her BS readings are all over. \par Pt is on Levemir 30u QD and NovoLog 12u ac meals. Diabetes treatment goals discussed, along with hypoglycemia prevention and treatment. Recommend pt work on modifying her lifestyle/diet, check BS readings, decrease NovoLog to 8u ac meals, and decrease Levemir to 25u QD, and meeting our diabetes team \par New information of hypothyroidism (TSH 6.02) will be addressed at her onsite appointment. \par \par Return in 1 month for a DM exam

## 2020-12-08 NOTE — END OF VISIT
[FreeTextEntry3] : All medical record entries made by the Scribe were at my, Dr. Cal Pimentel, direction and personally dictated by me on 11/03/2020. I have reviewed the chart and agree that the record accurately reflects my personal performance of the history, physical exam, assessment and plan. I have also personally directed, reviewed and agreed with the chart.

## 2020-12-08 NOTE — ADDENDUM
[FreeTextEntry1] : I, Rafael Bobby, acted solely as a scribe for Dr. Cal Pimentel on this date. 11/03/2020. \par \par Due to patient hypoglycemias patient will need to self adjust insulin as directed.

## 2020-12-14 RX ORDER — INSULIN DETEMIR 100 [IU]/ML
100 INJECTION, SOLUTION SUBCUTANEOUS
Qty: 5 | Refills: 5 | Status: DISCONTINUED | COMMUNITY
Start: 2020-07-21 | End: 2020-12-14

## 2020-12-23 ENCOUNTER — APPOINTMENT (OUTPATIENT)
Dept: HEART AND VASCULAR | Facility: CLINIC | Age: 60
End: 2020-12-23

## 2020-12-23 PROBLEM — Z87.440 HISTORY OF URINARY TRACT INFECTION: Status: RESOLVED | Noted: 2020-09-03 | Resolved: 2020-12-23

## 2021-01-07 ENCOUNTER — APPOINTMENT (OUTPATIENT)
Dept: VASCULAR SURGERY | Facility: CLINIC | Age: 61
End: 2021-01-07

## 2021-01-11 ENCOUNTER — APPOINTMENT (OUTPATIENT)
Dept: ENDOCRINOLOGY | Facility: CLINIC | Age: 61
End: 2021-01-11
Payer: MEDICARE

## 2021-01-11 PROCEDURE — 99214 OFFICE O/P EST MOD 30 MIN: CPT | Mod: 95

## 2021-01-11 NOTE — REASON FOR VISIT
[Home] : at home, [unfilled] , at the time of the visit. [Medical Office: ([facility])___] : at the medical office located in  [Verbal consent obtained from patient] : the patient, [unfilled] [Follow - Up] : a follow-up visit [DM Type 2] : DM Type 2

## 2021-01-14 NOTE — ASSESSMENT
[Importance of Diet and Exercise] : importance of diet and exercise to improve glycemic control, achieve weight loss and improve cardiovascular health [Hypoglycemia Management] : hypoglycemia management [Self Monitoring of Blood Glucose] : self monitoring of blood glucose [FreeTextEntry1] : 61 y/o F pt with:\par 1. T2DM (dx > 10 yrs) with multiple complications including: CVA, CKD stage 4 and CHF:\par She was recently D/C from the hospital 2/2 UTI. Her pre- breakfast BS are below 140, however, her post prandial BS readings can reach levels in the mid 300s. Pt continue to be at risk of developing worsening DM complications (CKD and CHF). \par Recommend pt increase Levemir to 25u QD and continue NovoLog 12u ac meals. Instructed pt that she must have a f/u onsite (sent in rx for Levemir).\par \par Return in 1 month for a DM exam

## 2021-01-14 NOTE — ADDENDUM
[FreeTextEntry1] : I, Rafael Bobby, acted solely as a scribe for Dr. Cal Pimentel on this date. 01/11/2021.

## 2021-01-14 NOTE — HISTORY OF PRESENT ILLNESS
[FreeTextEntry1] : 59 y/o F pt, with Hx of uncontrolled T2DM (dx > 10 yrs) with multiple complications including: CVA, CKD stage 4 and CHF.\par Other PMHx: HTN, HLD, Stomach Ulcer (as per pt), DVT, COPD, Hypothyroidism, Multiple Myeloma \par Denies PMHx of amputation, Foot Ulcer\par Last Ophthalmologist: in 11/2020 (pt states she had cataract surgery done in 9/2020)\par Sees cardiologist, vascular doctor, nephrologist\par SHx: former smoker (quit 2-3 months ago as per pt)\par \par From Dr. Chandra note:\par Generally feels well and endorses no acute complaints. Reports taking insulin though out most of the course of her disease. Multiple micro-macrovascular complications reported. Monitors infrequently. Log or glucometer not brought to today's apt. Reports skipping her Levemir 15 units QHS dose last night. Admits to frequent un compliance w/ insulin regimen. Previously on PO meds which were dced as renal function worsened. Has not tried GLP-1 agonists in the past. \par \par 11/3/20\par Pt's initial visit is through telehealth. \par \par Pt did not have any questions prior to the initiation of the telehealth visit.\par \par Today pt presents for DM f/u via telehealth, feeling well with c/o occasional low BS readings 2x a week. She notes when this happens she has nervousness, anxiousness, and shakiness. \par Pt states she checks her BS 3-4x QD.\par Pt states she does not eat big meals. \par She states she sees the nephrologist and her kidney is good and there are no talks for dialysis or transplant. \par \par 1/11/21\par Pt did not have any questions prior to the initiation of the telehealth visit. \par \par Today pt presents for DM f/u via telehealth, feeling unwell. \par Pt notes she was recently hospitalized and D/C on 1/8/21 2/2 UTI. \par She states she is not taking any tablets for DM. \par Pt reports she is taking NovoLog 12u 3x QD and Levemir "I think its 20u at night."\par Pt states she eats TID at 8AM/1PM/5PM.\par She notes BS readings of 140, 150, 180, 200\par Pt states she has an appointment to see nephrologist this week. \par Denies foot ulcer. \par \par Current Medications: Levemir 20u QD (as per pt), NovoLog 12u ac meals, Levothyroxine 50mcg QD, Multivitamins \par \par Recent Labs:\par - 9/29/20 A1c 10.3%, TSH 6.02, Free T4 1.1, iPTH 89, Ca 9.2, s. creat 2.13, microalb/creat ratio 973, Vit D 25- OH 29.5, , Cholesterol 303  \par - 1/2020 A1c > 14%, \par - 8/2019 A1c 11.4%\par - 5/2019 A1c 14% \par - 2/2019 TSH 2.91, LDL-c 58, , Cholesterol 108

## 2021-01-15 ENCOUNTER — APPOINTMENT (OUTPATIENT)
Dept: NEPHROLOGY | Facility: CLINIC | Age: 61
End: 2021-01-15

## 2021-02-08 ENCOUNTER — APPOINTMENT (OUTPATIENT)
Dept: CARDIOLOGY | Facility: CLINIC | Age: 61
End: 2021-02-08

## 2021-02-19 ENCOUNTER — RX RENEWAL (OUTPATIENT)
Age: 61
End: 2021-02-19

## 2021-03-02 ENCOUNTER — APPOINTMENT (OUTPATIENT)
Dept: HEART AND VASCULAR | Facility: CLINIC | Age: 61
End: 2021-03-02
Payer: MEDICARE

## 2021-03-02 VITALS
SYSTOLIC BLOOD PRESSURE: 130 MMHG | HEIGHT: 65 IN | BODY MASS INDEX: 38.65 KG/M2 | OXYGEN SATURATION: 96 % | DIASTOLIC BLOOD PRESSURE: 54 MMHG | WEIGHT: 232 LBS | HEART RATE: 93 BPM

## 2021-03-02 DIAGNOSIS — I25.10 ATHEROSCLEROTIC HEART DISEASE OF NATIVE CORONARY ARTERY W/OUT ANGINA PECTORIS: ICD-10-CM

## 2021-03-02 PROCEDURE — 36415 COLL VENOUS BLD VENIPUNCTURE: CPT

## 2021-03-02 PROCEDURE — 99214 OFFICE O/P EST MOD 30 MIN: CPT | Mod: 25

## 2021-03-02 NOTE — HISTORY OF PRESENT ILLNESS
[FreeTextEntry1] : 59 yo F with and an extensive PMHx, NICM (EF 25% on echo) s/p AICD for primary prevention, HTN, IDDM c/b neuropathy, bipolar disorder,depression, history of DVT on Coumadin (4 episodes in her lower extremities), CVA x2 (last one in 2013 with residual right sided weakness), PAD s/p bypass,hypothyroidism, current smoker with COPD (on 2-3L oxygen at home), DHARMESH on CPAP. She had a Cardiomems device placed 3/8/19 with Dr. Francis at [facility]. She was diagnosed with IGG multiple myeloma 3/2020 (congo red negative for amyloid) by Dr. Ruiz and was started on Solumedrol 1x week. Also of note, had +GGT, and elevated CRP and was recommended by GI (Dr. Monaco) to get an endoscopy which is pending for Cardiac clearance.  \par \par Since last seen 9/29/20, she has felt well from a Cardiac Standpoint. Her breathing is good with no RUPINDER. She has not been doing her Cardiomems readings as she had roaches in the house and her  took apart the machine to clean it. She assures me she will call today. Her biggest complaint in pain in the back of her legs which feels like a "pop" when she walks. She has made an appointment with Vascular. She hasn't f/u with Heme/Onc since November but has an appointment in a few weeks. She also has an appt with her PCP tomorrow. \par \par  Currently, she denies CP,SOB at rest, orthopnea, PND, LH/dizziness,palpitations and syncope. Her appetite is normal and bowel and bladder habits are unchanged. She has been limiting fluid and sodium intake and taking medications as directed. She does not use NSAIDS and has not had any ICD shocks. \par \par \par

## 2021-03-02 NOTE — ASSESSMENT
[FreeTextEntry1] : 60yo F with multiple comorbidites and NICM (EF 25%) s/p AICD and s/p CARDIOMEMS hemodynamic monitoring device 3/8/19 at [facility]  with IGg Myeloma 3/2020 who has been stable from a Cardiac standpoint. She is ACC/AHA Stage C, NYHA Class II-III, euvolemic on exam. I have recommended the following:\par \par 1. Chronic Systolic HF- Stable and well compensated. Check labs today. Continue Entresto 97/103mg BID. Increase Toprol XL to 200mg daily. Continue Torsemide 40mg daily. Non-compliant with Cardiomems. Had long discussion of importance- assures me she restart. \par \par 2. CAD- no anginal symptoms. On Lipitor, BB and ARB (in Entresto).\par \par 3. RITCHIE on CKD Stage III- SCr 2.13 in September.  Was repeated today. Recheck today.Followed by Dr. Neal. Avoid Nephrotoxins. \par \par 4. Hx of multiple DVTs- On Eliquis.\par \par 5. IGG Myeloma- Diagnosed 3/2020. Stable and followed by Dr. Ruiz. \par \par 6. DHARMESH- CPAP machine now fixed.\par \par 7. Follow up in 1 month. \par \par

## 2021-03-02 NOTE — REASON FOR VISIT
[Follow-Up - From Hospitalization] : follow-up of a recent hospitalization for [Heart Failure] : congestive heart failure [FreeTextEntry1] : PATIENT INSTRUCTIONS:\par \par 1. Labs today. \par \par 2. Please INCREASE the METOPROLOL to 200mg daily. \par \par 3. PLEASE DO YOUR CARDIOMEMS READINGS EVERYDAY. CALL THEM TODAY AS SOON AS YOU GET HOME!\par \par 4. Follow up in 1 month.

## 2021-03-02 NOTE — PHYSICAL EXAM
[General Appearance - Well Developed] : well developed [Normal Appearance] : normal appearance [Well Groomed] : well groomed [General Appearance - Well Nourished] : well nourished [No Deformities] : no deformities [General Appearance - In No Acute Distress] : no acute distress [Normal Conjunctiva] : the conjunctiva exhibited no abnormalities [Normal Oral Mucosa] : normal oral mucosa [No Oral Pallor] : no oral pallor [No Oral Cyanosis] : no oral cyanosis [Respiration, Rhythm And Depth] : normal respiratory rhythm and effort [Heart Sounds] : normal S1 and S2 [Edema] : no peripheral edema present [Bowel Sounds] : normal bowel sounds [Abdomen Tenderness] : non-tender [Abnormal Walk] : normal gait [Skin Color & Pigmentation] : normal skin color and pigmentation [] : no rash [Oriented To Time, Place, And Person] : oriented to person, place, and time [Affect] : the affect was normal [Mood] : the mood was normal [No Anxiety] : not feeling anxious [FreeTextEntry1] : JVP <6cm H2O, no HJR [Heart Rate And Rhythm] : heart rate and rhythm were normal

## 2021-03-04 ENCOUNTER — APPOINTMENT (OUTPATIENT)
Dept: VASCULAR SURGERY | Facility: CLINIC | Age: 61
End: 2021-03-04
Payer: MEDICARE

## 2021-03-04 DIAGNOSIS — L97.309 NON-PRESSURE CHRONIC ULCER OF UNSPECIFIED ANKLE WITH UNSPECIFIED SEVERITY: ICD-10-CM

## 2021-03-04 LAB
ANION GAP SERPL CALC-SCNC: 13 MMOL/L
BUN SERPL-MCNC: 49 MG/DL
CALCIUM SERPL-MCNC: 9.6 MG/DL
CHLORIDE SERPL-SCNC: 97 MMOL/L
CO2 SERPL-SCNC: 27 MMOL/L
CREAT SERPL-MCNC: 3.49 MG/DL
GLUCOSE SERPL-MCNC: 126 MG/DL
MAGNESIUM SERPL-MCNC: 2 MG/DL
NT-PROBNP SERPL-MCNC: 826 PG/ML
POTASSIUM SERPL-SCNC: 4.7 MMOL/L
SODIUM SERPL-SCNC: 137 MMOL/L

## 2021-03-04 PROCEDURE — 99213 OFFICE O/P EST LOW 20 MIN: CPT

## 2021-03-06 RX ORDER — DOXYCYCLINE 100 MG/1
100 TABLET, FILM COATED ORAL
Qty: 14 | Refills: 0 | Status: DISCONTINUED | COMMUNITY
Start: 2020-09-18

## 2021-03-06 NOTE — ASSESSMENT
[FreeTextEntry1] : 60 year old female with PMH of CAD, COPD, CVA, CKD, DM, cardiomyopathy, DVT (coumadin), HLD, HTN, hypothyroidism, multiple myeloma, DHARMESH, arthritis, current smoker. She comes in today for us to evaluate her bilateral posterior thigh pain.  EZRA/PVR done today showing flattened waveforms on the right, left leg with only mild disease. her symptoms are bilateral and she has positive straight leg raise. Her symptoms are more likely neuro, will refer to Dr. More. She does have some disease on the right that will need to be monitored, however she currently does not have any wounds or claudication. FU in 6 months or sooner with any issues.

## 2021-03-06 NOTE — HISTORY OF PRESENT ILLNESS
[FreeTextEntry1] : 60 year old female with PMH of CAD, COPD, CVA, CKD, DM, cardiomyopathy, DVT (coumadin), HLD, HTN, hypothyroidism, multiple myeloma, DHARMESH, arthritis, current smoker. She comes in today for us to evaluate her bilateral leg pain. PATIENT IS A POOR HISTORIAN. She has stent cards with her from march, may and June 2013 and January 2015 from [place]. She also had bilateral LE bypasses in the past at [place]. Arterial US done in March (patient does not remember getting test done or why she was in the hospital), Left bypass patent, right leg with SFA stenosis. \par \par She comes in today with complaints of posterior thigh pain/burning sensation bilateral R>L, same as last visit, she is using walker.

## 2021-03-06 NOTE — PHYSICAL EXAM
[Respiratory Effort] : normal respiratory effort [Normal Heart Sounds] : normal heart sounds [1+] : left 1+ [0] : left 0 [Alert] : alert [Oriented to Person] : oriented to person [Oriented to Place] : oriented to place [Oriented to Time] : oriented to time [Calm] : calm [de-identified] : overweight, using walker [FreeTextEntry1] : left DP/PT biphasic signal. Right DP with monophasic doppler signal [de-identified] : positive straight leg raise bilaterally

## 2021-03-11 ENCOUNTER — APPOINTMENT (OUTPATIENT)
Dept: ORTHOPEDIC SURGERY | Facility: CLINIC | Age: 61
End: 2021-03-11

## 2021-03-15 ENCOUNTER — APPOINTMENT (OUTPATIENT)
Dept: ENDOCRINOLOGY | Facility: CLINIC | Age: 61
End: 2021-03-15
Payer: MEDICARE

## 2021-03-15 VITALS
HEART RATE: 99 BPM | WEIGHT: 223 LBS | BODY MASS INDEX: 37.15 KG/M2 | HEIGHT: 65 IN | DIASTOLIC BLOOD PRESSURE: 67 MMHG | SYSTOLIC BLOOD PRESSURE: 117 MMHG

## 2021-03-15 DIAGNOSIS — E78.5 TYPE 2 DIABETES MELLITUS WITH OTHER SPECIFIED COMPLICATION: ICD-10-CM

## 2021-03-15 DIAGNOSIS — E03.9 HYPOTHYROIDISM, UNSPECIFIED: ICD-10-CM

## 2021-03-15 DIAGNOSIS — E11.69 TYPE 2 DIABETES MELLITUS WITH OTHER SPECIFIED COMPLICATION: ICD-10-CM

## 2021-03-15 PROCEDURE — 82962 GLUCOSE BLOOD TEST: CPT

## 2021-03-15 PROCEDURE — 99215 OFFICE O/P EST HI 40 MIN: CPT | Mod: 25

## 2021-03-15 PROCEDURE — 83036 HEMOGLOBIN GLYCOSYLATED A1C: CPT | Mod: QW

## 2021-03-16 PROBLEM — E03.9 HYPOTHYROIDISM: Status: ACTIVE | Noted: 2017-03-21

## 2021-03-16 PROBLEM — E11.69 HYPERLIPIDEMIA ASSOCIATED WITH TYPE 2 DIABETES MELLITUS: Status: ACTIVE | Noted: 2021-03-16

## 2021-03-16 NOTE — ASSESSMENT
[Importance of Diet and Exercise] : importance of diet and exercise to improve glycemic control, achieve weight loss and improve cardiovascular health [Hypoglycemia Management] : hypoglycemia management [Self Monitoring of Blood Glucose] : self monitoring of blood glucose [FreeTextEntry1] : 59 y/o F pt with:\par 1. T2DM (dx > 10 yrs) with multiple complications including: CVA, CKD stage 4 and CHF:\par She was recently D/C from the hospital 2/2 UTI. \par A1c 12.1 3/15/21\par Diabetes treatment goal explained\par Modify MDI: Basal levemir 20 u qd Novolog 7 u ac meal plus ss for g > 100 , max  8 units with a total of 15 u\par \par 2. Hyperlipidemia. high TG and cholesterol:  Decrease caloric and saturate fat consumption, an continue atorvastatin 80 mg qd\par \par 3.Hypothyroidism\par TSH 6.2 9/28/20\par Appears euthyroid, continue Levothyroxine 50 mcg qd\par \par Return in 4 months

## 2021-03-16 NOTE — HISTORY OF PRESENT ILLNESS
[FreeTextEntry1] : 61 y/o F pt, with Hx of uncontrolled T2DM (dx > 10 yrs) with multiple complications including: CVA, CKD stage 4 and CHF.\par Other PMHx: HTN, HLD, Stomach Ulcer (as per pt), DVT, COPD, Hypothyroidism, Multiple Myeloma. Covid infection on Jan 2021\par Denies PMHx of amputation, Foot Ulcer\par Last Ophthalmologist: Jan 2021 (pt states she had cataract surgery done in 9/2020)\par Sees cardiologist, vascular doctor, nephrologist\par SHx: former smoker (quit 2-3 months ago as per pt)\par \par From Dr. Lieberman note:\par Generally feels well and endorses no acute complaints. Reports taking insulin though out most of the course of her disease. Multiple micro-macrovascular complications reported. Monitors infrequently. Log or glucometer not brought to today's apt. Reports skipping her Levemir 15 units QHS dose last night. Admits to frequent un compliance w/ insulin regimen. Previously on PO meds which were dced as renal function worsened. Has not tried GLP-1 agonists in the past. \par \par 11/3/20\par Pt's initial visit is through telehealth. \par \par Pt did not have any questions prior to the initiation of the telehealth visit.\par \par Today pt presents for DM f/u via telehealth, feeling well with c/o occasional low BS readings 2x a week. She notes when this happens she has nervousness, anxiousness, and shakiness. \par Pt states she checks her BS 3-4x QD.\par Pt states she does not eat big meals. \par She states she sees the nephrologist and her kidney is good and there are no talks for dialysis or transplant. \par \par 1/11/21\par Pt did not have any questions prior to the initiation of the telehealth visit. \par \par Today pt presents for DM f/u via telehealth, feeling unwell. \par Pt notes she was recently hospitalized and D/C on 1/8/21 2/2 UTI. \par She states she is not taking any tablets for DM. \par Pt reports she is taking NovoLog 12u 3x QD and Levemir "I think its 20u at night."\par Pt states she eats TID at 8AM/1PM/5PM.\par She notes BS readings of 140, 150, 180, 200\par Pt states she has an appointment to see nephrologist this week. \par Denies foot ulcer. \par 3/15/21\par DM > 10 years, multiple complications including CVA,CKD, worsening kidney function s. creat 3.49\par No physical /co. Her glucose are fluctuating every day with occ low glucose levels.\par \par Current Medications: Levemir 20u QD (as per pt), NovoLog 12u ac meals, Levothyroxine 50mcg QD, Methadone 100 mg qd, Multivitamins \par \par Recent Labs:\par - 3/15/21 A1c POCT 12.1%\par - 9/29/20 A1c 10.3%, TSH 6.02, Free T4 1.1, iPTH 89, Ca 9.2, s. creat 2.13, microalb/creat ratio 973, Vit D 25- OH 29.5, , Cholesterol 303  \par - 1/2020 A1c > 14%, \par - 8/2019 A1c 11.4%\par - 5/2019 A1c 14% \par - 2/2019 TSH 2.91, LDL-c 58, , Cholesterol 108

## 2021-03-17 ENCOUNTER — APPOINTMENT (OUTPATIENT)
Dept: NEPHROLOGY | Facility: CLINIC | Age: 61
End: 2021-03-17
Payer: MEDICARE

## 2021-03-17 DIAGNOSIS — N18.30 CHRONIC KIDNEY DISEASE, STAGE 3 UNSPECIFIED: ICD-10-CM

## 2021-03-17 DIAGNOSIS — I10 ESSENTIAL (PRIMARY) HYPERTENSION: ICD-10-CM

## 2021-03-17 PROCEDURE — 99442: CPT | Mod: 95

## 2021-03-17 NOTE — ASSESSMENT
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.\par * Therapies for kidney disease: blood pressure control; proteinuria reduction with ARB/ACEi; advised other evidence-based therapies including exercise, a plant-based low-oxalate diet, and 400 mcg folic acid daily\par * Cardiovascular disease prevention: counseling on healthy diet, physical activity, weight loss, alcohol limitation, blood pressure control; moderate intensity statin therapy; cardiology evaluation advised\par * The patient has been counseled that chronic kidney disease is a significant condition and regular office followup with me (at least every 1 month for now) is important for monitoring and their health, and that it is their responsibility to make a follow up appointment.\par * The patient has been counseled never to stop taking their medications without discussing it with me or another doctor.\par * The patient has been counseled on avoiding NSAIDs.\par * The patient has been counseled on risk of acute renal failure and instructed to immediately call and speak with me or go immediately to ER with any severe symptoms, nausea, vomiting, diarrhea, chest pain, or shortness of breath.\par * A counseling information sheet has been given (today or previously). All their questions were answered.\par \par # HTN controlled. \par * Cont entresto, torsemide, metoprolol.\par * A counseling information sheet has been given (currently or previously, in-person or electronically). All their questions were answered.\par * The patient has been counseled to check their BP at home with an automatic arm cuff, write down the readings, and reach me directly on the phone immediately if they are persistently > 180 systolic or if SBP is less than 100 or if lightheadedness develops. They were counseled to bring in all blood pressure readings and medications next visit.\par * The patient has been counseled that regular office followup (at least every 1 month for now)  is important for monitoring and for their health, and that it is their responsibility to make follow up appointments.\par * The patient also has been counseled that they must never stop or change any medications without discussing this with me (or another physician). \par

## 2021-03-17 NOTE — HISTORY OF PRESENT ILLNESS
[Home] : at home, [unfilled] , at the time of the visit. [Medical Office: ([facility])___] : at the medical office located in  [Verbal consent obtained from patient] : the patient, [unfilled] [FreeTextEntry1] : Kindly referred by Dr. Olguin for CKD.\par \par * CKD progressive, creatinine 3.49 (eGFR 14). *  Following up with Dr. Ruiz for treatment of myeloma. * Hyponatremia improved.  * HTN controlled.  - 120s, no lightheadedness. \par \par Previous history ([id number]): * CKD improved, creatinine decreased to 2.13. *  Following up with Dr. Ruiz for treatment of myeloma. * Hyponatremia improved. * HTN controlled, no lightheadedness. \par \par Previous history (41Sga00): * CKD progressive, creatinine increased to 2.66. * * On eliquis without bleeding. * Following up with Dr. Ruiz for treatment of myeloma. * Hyponatremia improved, sodium 135. * HTN controlled. No lightheadedness. * DM uncontrolled, HGA1c previously 10. \par \par Previous history (32Div07): * CKD progressive, creatinine 2.66 in Aug 20. * Occasional unbalanced and she walks with a walker. No falling. No lightheadedness. * On eliquis without bleeding. * Following up with Dr. Ruiz for treatment of myeloma. * Hyponatremia improved, sodium 135. * BP reportedly controlled at home but she doesn't have the readings available. * Dm uncontrolled, HGA1c 10. \par \par Previous history (96Ysu34): * UNSTEADY ON FEET but doesn't feel like she's about to pass out. * No N/V/D, no chest pain. * On coumadin. No bleeding. * On dexamethasone for myeloma. * CKD stable, creatinine 2.18. \par \par Previous history (87Qwv95): * She does not check BP at home. BP previously controlled. * She smokes 1/4 PPD and just smoked. * CKD progressive, creatinine 2.12. * Stable hyponatremia, sodium 132. * Difficulty walking due to osteoarthritis. Can't dress self. \par \par Previous history (): * Diagnosed with IgG myeloma by Dr. Ruiz. Bone marrow is being stained for amyloid. * HTN controlled. No lightheadedness. Compliant with medications.  * CKD stable, creatinine 1.49. \par \par * Mother in law  this morning. She is grieving. * Labs [id number]: Uprot 1.9 g/g. Cr 1.67. * Losartan 12.5 mg started last visit. * Torsemide increased from 80 mg to 120 mg daily on Mar 11 2019 based on cardiomem PCWP 29. Her weight has decreased from by approximately 1 - 2 pounds daily. Her weight has decreased from 200 to 196. \par \par Previous history (): * Labs reviewed. Her creatinine has increased from 1.44 in 2017 to 1.91 in  (eGFR 29).  She had 1829 mg/g albuminuria in 2017. A CT of the abdomen in 2017 revealed a "stable inderminate left upper pole renal hypodensity" with no other abnormalitiese noted. She also had an unchanged ill-defined soft tissue density surrounding celialc axis, SMA, and common hepatic artery. \par \par * She sees Dr. Olguin for nonischemic systolic CHF. Her EF is 25%. She has not been treated with ACE or ARB given reported worsening renal dysfunction previously but the plan per Dr. Olguin was to restart this as outpatient. She has never had hyperkalemia or an allergic reaction to ACE/ARB. \par \par * She still smokes 3 cigarettes daily. \par \par * DM uncontrolled, last HGA1c 11 - 13.\par \par * She has had PVD with bilateral iliac artery angioplasty and stents, left femoral bypass. She has also had DVTs and is hypercoagulable and is treated with coumadin. \par \par * HTN controlled. No lightheadedness. Compliant with medications. Following low salt diet.\par \par Options for clinical preventative services\par \par Influenza: 2018 sep, sep 2019\par Pneumonia:  she received prenar and pneumovax per patient\par Shingles: advised [id number]\par TDAP: \par Colonoscopy: 2017, q 10 years per patient\par Dermatologist: advised to see derm yearly par \par Breast/Pelvic Exam: 2017, i have advised her to follow up with gyn [id number]; breast exam ;  per aptient\par Mammogram: 2018,\par Bone Density >65:

## 2021-03-20 NOTE — PROGRESS NOTE ADULT - PROBLEM/PLAN-10
Alert and oriented to person, place and time/Patient baseline mental status/Awake DISPLAY PLAN FREE TEXT

## 2021-03-24 ENCOUNTER — APPOINTMENT (OUTPATIENT)
Dept: CARDIOLOGY | Facility: CLINIC | Age: 61
End: 2021-03-24

## 2021-03-30 ENCOUNTER — APPOINTMENT (OUTPATIENT)
Dept: HEART AND VASCULAR | Facility: CLINIC | Age: 61
End: 2021-03-30

## 2021-03-30 NOTE — ASSESSMENT
[FreeTextEntry1] : 58yo F with multiple comorbidites and NICM (EF 25%) s/p AICD and s/p CARDIOMEMS hemodynamic monitoring device 3/8/19 at [facility]  with IGg Myeloma 3/2020 who has been stable from a Cardiac standpoint. She is ACC/AHA Stage C, NYHA Class II-III, euvolemic on exam. I have recommended the following:\par \par 1. Chronic Systolic HF- Stable and well compensated. Check labs today. Continue Entresto 97/103mg BID. Increase Toprol XL to 200mg daily. Continue Torsemide 40mg daily. Non-compliant with Cardiomems. Had long discussion of importance- assures me she restart. \par \par 2. CAD- no anginal symptoms. On Lipitor, BB and ARB (in Entresto).\par \par 3. RITCHIE on CKD Stage III- SCr 2.13 in September.  Was repeated today. Recheck today.Followed by Dr. Neal. Avoid Nephrotoxins. \par \par 4. Hx of multiple DVTs- On Eliquis.\par \par 5. IGG Myeloma- Diagnosed 3/2020. Stable and followed by Dr. Ruiz. \par \par 6. DHARMESH- CPAP machine now fixed.\par \par 7. Follow up in 1 month. \par \par

## 2021-03-30 NOTE — PHYSICAL EXAM
[General Appearance - Well Developed] : well developed [Normal Appearance] : normal appearance [Well Groomed] : well groomed [General Appearance - Well Nourished] : well nourished [No Deformities] : no deformities [General Appearance - In No Acute Distress] : no acute distress [Normal Conjunctiva] : the conjunctiva exhibited no abnormalities [Normal Oral Mucosa] : normal oral mucosa [No Oral Pallor] : no oral pallor [No Oral Cyanosis] : no oral cyanosis [Respiration, Rhythm And Depth] : normal respiratory rhythm and effort [Heart Rate And Rhythm] : heart rate and rhythm were normal [Heart Sounds] : normal S1 and S2 [Edema] : no peripheral edema present [Bowel Sounds] : normal bowel sounds [Abdomen Tenderness] : non-tender [Abnormal Walk] : normal gait [Skin Color & Pigmentation] : normal skin color and pigmentation [] : no rash [Affect] : the affect was normal [Oriented To Time, Place, And Person] : oriented to person, place, and time [Mood] : the mood was normal [No Anxiety] : not feeling anxious [FreeTextEntry1] : JVP <6cm H2O, no HJR

## 2021-03-30 NOTE — HISTORY OF PRESENT ILLNESS
[FreeTextEntry1] : 61 yo F with and an extensive PMHx, NICM (EF 25% on echo) s/p AICD for primary prevention, HTN, IDDM c/b neuropathy, bipolar disorder,depression, history of DVT on Coumadin (4 episodes in her lower extremities), CVA x2 (last one in 2013 with residual right sided weakness), PAD s/p bypass,hypothyroidism, current smoker with COPD (on 2-3L oxygen at home), DHARMESH on CPAP. She had a Cardiomems device placed 3/8/19 with Dr. Francis at [facility]. She was diagnosed with IGG multiple myeloma 3/2020 (congo red negative for amyloid) by Dr. Ruiz and was started on Solumedrol 1x week. Also of note, had +GGT, and elevated CRP and was recommended by GI (Dr. Monaco) to get an endoscopy which is pending for Cardiac clearance.  \par \par Since last seen 3/2/21 she has had f.u with Nephrology, Endo & Vascular. Her insulin was increased and she was evaluated by vascular for her b/l posterior thigh pain and had a CHIKI/PVR done showing glattened waveforms on the righ, left leg with only mild disease. She has + straight leg raise and suspect symptoms more likely neuro so referred to Dr. More and will monitor disease on R leg. Also of note she was hospitalized for dizziness/high FSG and fount to have UTI and started on IV antibiotics. Sent home with [facility] . \par \par   Her breathing is good with no RUPINDER. She has not been doing her Cardiomems readings as she had roaches in the house and her  took apart the machine to clean it. She assures me she will call today. Her biggest complaint in pain in the back of her legs which feels like a "pop" when she walks. She has made an appointment with Vascular. She hasn't f/u with Heme/Onc since November but has an appointment in a few weeks. She also has an appt with her PCP tomorrow. \par \par  Currently, she denies CP,SOB at rest, orthopnea, PND, LH/dizziness,palpitations and syncope. Her appetite is normal and bowel and bladder habits are unchanged. She has been limiting fluid and sodium intake and taking medications as directed. She does not use NSAIDS and has not had any ICD shocks. \par \par \par

## 2021-04-02 ENCOUNTER — APPOINTMENT (OUTPATIENT)
Dept: ENDOCRINOLOGY | Facility: CLINIC | Age: 61
End: 2021-04-02

## 2021-04-02 NOTE — PATIENT PROFILE ADULT - DOES PATIENT HAVE ADVANCE DIRECTIVE
Home health recertication 4/2/21.    PRIMARY DX/SKILLED NEED: BLE edema with stasis dermatitis; skin infection  SN FREQUENCY: 2w8, 1w1, 2 prn  ZIP CODE: [id number]  DISCIPLINES ORDERED: SN  INSURANCE & AUTHORIZATION: [facility]  CERTIFICATION PERIOD: 4/4 - 6/2/21 yes

## 2021-04-03 ENCOUNTER — RX RENEWAL (OUTPATIENT)
Age: 61
End: 2021-04-03

## 2021-04-07 ENCOUNTER — APPOINTMENT (OUTPATIENT)
Dept: HEMATOLOGY ONCOLOGY | Facility: CLINIC | Age: 61
End: 2021-04-07

## 2021-04-10 ENCOUNTER — RX RENEWAL (OUTPATIENT)
Age: 61
End: 2021-04-10

## 2021-04-12 ENCOUNTER — RX RENEWAL (OUTPATIENT)
Age: 61
End: 2021-04-12

## 2021-04-22 ENCOUNTER — APPOINTMENT (OUTPATIENT)
Dept: VASCULAR SURGERY | Facility: CLINIC | Age: 61
End: 2021-04-22

## 2021-04-28 NOTE — ED ADULT NURSE NOTE - NSFALLRSKHARMRISK_ED_ALL_ED
P Quality Flow/Interdisciplinary Rounds Progress Note        Quality Flow Rounds held on April 28, 2021    Disciplines Attending:  Bedside Nurse, ,  and Nursing Unit Leadership    Kelly Art was admitted on 4/23/2021 10:28 AM    Anticipated Discharge Date:  Expected Discharge Date: 04/25/21    Disposition:    Ranjit Score:  Ranjit Scale Score: 22    Readmission Risk              Risk of Unplanned Readmission:        12           Discussed patient goal for the day, patient clinical progression, and barriers to discharge.   The following Goal(s) of the Day/Commitment(s) have been identified:  Diagnostics - Report Results and Labs - Report Results      Frank Elise  April 28, 2021 no

## 2021-04-29 NOTE — DISCHARGE NOTE PROVIDER - NSDCCPCAREPLAN_GEN_ALL_CORE_FT
4 PRINCIPAL DISCHARGE DIAGNOSIS  Diagnosis: Hypotension  Assessment and Plan of Treatment: You were admitted with a low blood pressure which is likely from dehydration and several of your heart medications.  You were hydrated with improvement.  Your home medications have been adjusted as mentioned in heart failure.      SECONDARY DISCHARGE DIAGNOSES  Diagnosis: Chronic systolic congestive heart failure  Assessment and Plan of Treatment: - Please follow up with Hedy Valentin on Feb 18th at 3pm.   - Your heart failure is controlled.  YOur low blood pressure may also be related to several of your heart failure medications and they have been adjusted.   - CONTINUE Toprol XL 200mg daily  - DECREASE your Torsemide to 20mg daily started on Saturday 2/15.   - HOLD your Entresto until you see Hedy Valentin at your heart failure appointment  - STOP Isosorbide mononitrate.   - It is important that you continue to weigh your self daily.  if you notice an increase in your weight by 5 pounds in one day this could be water weight and you should call your cardiologist.  monitor your fluid and salt intake.    Diagnosis: Chronic kidney disease (CKD)  Assessment and Plan of Treatment: - Follow up with Dr. Neal in 1-2 weeks  - YOu were found to have worsening kidney function becuase of dehydration.  Your kidney function has improved with hydration.  Continue to monitor closely.    Diagnosis: Hypothyroidism  Assessment and Plan of Treatment: - Follow up with Dr. Lieberman [phone]  Your thyroid function was elevated.  This may becuase you were taking your synthroid with food.  Please resume your normal dose of synthroid but take 30 minutes prior to eating in the morning.    Diagnosis: Diabetes  Assessment and Plan of Treatment: - Follow up with Dr. Lieberman [phone]  - YOur diabetes is not well controlled with a Hgb A1c of 10.3.  Your goal is less then 7.  YOu have reported low blood sugars at home.  You were evaluated by diabetes doctor during your hospital stay   - Please DECREASE you Levemir to 30 units at bedtime  - DECREASE your novolog premeal to 15 units three times a day before meals.    Diagnosis: DVT (deep venous thrombosis)  Assessment and Plan of Treatment: - Please follow up with MAYELIN Moreno for INR visit within 1 weeks  - Your INR level has been low.  YOur last INR level on 2/14 is 1.14.   - Please INCREASE you Coumadin dosage   - take Coumadin 2mg on MOndays, and Thursday  - take Coumadin 1mg on Sundays, Tuesdays, Wednesdays, Friday, and Saturdays.    Diagnosis: Multiple myeloma  Assessment and Plan of Treatment: - Follow up with Dr. Ruiz for further management.    Diagnosis: Neuropathy  Assessment and Plan of Treatment: Continue Methadone as prescribed.    Diagnosis: Bipolar depression  Assessment and Plan of Treatment: Continue your home psychiatric medications.  Please follow up with your primary care doctor or pshychiatrist about decrease medications. PRINCIPAL DISCHARGE DIAGNOSIS  Diagnosis: Hypotension  Assessment and Plan of Treatment: You were admitted with a low blood pressure which is likely from dehydration and several of your heart medications.  You were hydrated with improvement.  Your home medications have been adjusted as mentioned in heart failure.      SECONDARY DISCHARGE DIAGNOSES  Diagnosis: Chronic systolic congestive heart failure  Assessment and Plan of Treatment: - Please follow up with Hedy Valentin on Feb 18th at 3pm.   - Your heart failure is controlled.  YOur low blood pressure may also be related to several of your heart failure medications and they have been adjusted.   - CONTINUE Toprol XL 200mg daily  - DECREASE your Torsemide to 20mg daily started on Saturday 2/15.   - HOLD your Entresto until you see Hedy Valentin at your heart failure appointment  - STOP Isosorbide mononitrate.   - It is important that you continue to weigh your self daily.  if you notice an increase in your weight by 5 pounds in one day this could be water weight and you should call your cardiologist.  monitor your fluid and salt intake.    Diagnosis: Chronic kidney disease (CKD)  Assessment and Plan of Treatment: - Follow up with Dr. Neal in 1-2 weeks  - YOu were found to have worsening kidney function becuase of dehydration.  Your kidney function has improved with hydration.  Continue to monitor closely.    Diagnosis: Hypothyroidism  Assessment and Plan of Treatment: - Follow up with Dr. Lieberman [phone]  Your thyroid function was elevated.  This may becuase you were taking your synthroid with food.  Please resume your normal dose of synthroid but take 30 minutes prior to eating in the morning.    Diagnosis: Diabetes  Assessment and Plan of Treatment: - Follow up with Dr. Lieberman [phone]  - YOur diabetes is not well controlled with a Hgb A1c of 10.3.  Your goal is less then 7.  YOu have reported low blood sugars at home.  You were evaluated by diabetes doctor during your hospital stay   - Please DECREASE you Levemir to 30 units at bedtime  - DECREASE your novolog premeal to 15 units three times a day before meals.    Diagnosis: DVT (deep venous thrombosis)  Assessment and Plan of Treatment: - Please follow up with MAYELIN Moreno for INR visit within 1 weeks  - Your INR level has been low.  YOur last INR level on 2/14 is 1.14.   - Please INCREASE you Coumadin dosage   - take Coumadin 2mg on MOndays, and Thursday  - take Coumadin 1mg on Sundays, Tuesdays, Wednesdays, Friday, and Saturdays.    Diagnosis: Multiple myeloma  Assessment and Plan of Treatment: - Follow up with Dr. Ruiz for further management.    Diagnosis: Neuropathy  Assessment and Plan of Treatment: Continue Methadone as prescribed.    Diagnosis: Bipolar depression  Assessment and Plan of Treatment: Continue your home psychiatric medications.  Please follow up with your primary care doctor or pshychiatrist about decrease medications.

## 2021-05-05 ENCOUNTER — RX RENEWAL (OUTPATIENT)
Age: 61
End: 2021-05-05

## 2021-05-07 ENCOUNTER — RX RENEWAL (OUTPATIENT)
Age: 61
End: 2021-05-07

## 2021-06-01 ENCOUNTER — APPOINTMENT (OUTPATIENT)
Dept: ENDOCRINOLOGY | Facility: CLINIC | Age: 61
End: 2021-06-01
Payer: MEDICARE

## 2021-06-01 LAB
GLUCOSE BLDC GLUCOMTR-MCNC: 254
HBA1C MFR BLD HPLC: 12.1

## 2021-06-01 PROCEDURE — 99213 OFFICE O/P EST LOW 20 MIN: CPT | Mod: 95

## 2021-06-03 ENCOUNTER — RX RENEWAL (OUTPATIENT)
Age: 61
End: 2021-06-03

## 2021-06-03 NOTE — ASSESSMENT
[Importance of Diet and Exercise] : importance of diet and exercise to improve glycemic control, achieve weight loss and improve cardiovascular health [Hypoglycemia Management] : hypoglycemia management [Self Monitoring of Blood Glucose] : self monitoring of blood glucose [FreeTextEntry1] : 59 y/o F pt with:\par \par 1. T2DM (dx > 10 yrs) with multiple complications including: CVA, CKD stage 4 and CHF:\par Pt was hospitalized 4/2021 because of COVID, she did not require mechanical ventilation. Pt was discharged with MDI. Pt lost weight during hospital stay but has since recovered. \par She states that FBS is below 150 and post prandial BS below 160. \par She has Hx of Multiple Myeloma and has an appointment with her hematologist soon. \par Recommend to continue exercise,  discretion with food portions and continue with MDI, basal 30u and bolus 12u. \par  \par Return in 2-3 months.

## 2021-06-03 NOTE — END OF VISIT
[Time Spent: ___ minutes] : I have spent [unfilled] minutes of time on the encounter. [FreeTextEntry3] : All medical record entries made by the Scribe were at my, Dr. Cal Pimentel, direction and personally dictated by me on 06/01/2021. I have reviewed the chart and agree that the record accurately reflects my personal performance of the history, physical exam, assessment and plan. I have also personally directed, reviewed and agreed with the chart.

## 2021-06-03 NOTE — ADDENDUM
[FreeTextEntry1] : I, Vince Marsh, acted solely as a scribe for Dr. Cal Pimentel on this date. 06/01/2021.

## 2021-06-03 NOTE — HISTORY OF PRESENT ILLNESS
[Home] : at home, [unfilled] , at the time of the visit. [Medical Office: ([facility])___] : at the medical office located in  [Verbal consent obtained from patient] : the patient, [unfilled] [FreeTextEntry1] : 59 y/o F pt, with Hx of uncontrolled T2DM (dx > 10 yrs) with multiple complications including: CVA, CKD stage 4 and CHF.\par Other PMHx: HTN, HLD, Stomach Ulcer (as per pt), DVT, COPD, Hypothyroidism, Multiple Myeloma. Covid infection on Jan 2021\par Denies PMHx of amputation, Foot Ulcer\par Last Ophthalmologist: Jan 2021 (pt states she had cataract surgery done in 9/2020)\par Sees cardiologist, vascular doctor, nephrologist\par SHx: former smoker (quit 2-3 months ago as per pt)\par \par From Dr. Lieberman note:\par Generally feels well and endorses no acute complaints. Reports taking insulin though out most of the course of her disease. Multiple micro-macrovascular complications reported. Monitors infrequently. Log or glucometer not brought to today's apt. Reports skipping her Levemir 15 units QHS dose last night. Admits to frequent un compliance w/ insulin regimen. Previously on PO meds which were dced as renal function worsened. Has not tried GLP-1 agonists in the past. \par \par 11/3/20\par Pt's initial visit is through telehealth. \par \par Pt did not have any questions prior to the initiation of the telehealth visit.\par \par Today pt presents for DM f/u via telehealth, feeling well with c/o occasional low BS readings 2x a week. She notes when this happens she has nervousness, anxiousness, and shakiness. \par Pt states she checks her BS 3-4x QD.\par Pt states she does not eat big meals. \par She states she sees the nephrologist and her kidney is good and there are no talks for dialysis or transplant. \par \par 1/11/21\par Pt did not have any questions prior to the initiation of the telehealth visit. \par \par Today pt presents for DM f/u via telehealth, feeling unwell. \par Pt notes she was recently hospitalized and D/C on 1/8/21 2/2 UTI. \par She states she is not taking any tablets for DM. \par Pt reports she is taking NovoLog 12u 3x QD and Levemir "I think its 20u at night."\par Pt states she eats TID at 8AM/1PM/5PM.\par She notes BS readings of 140, 150, 180, 200\par Pt states she has an appointment to see nephrologist this week. \par Denies foot ulcer. \par 3/15/21\par DM > 10 years, multiple complications including CVA,CKD, worsening kidney function s. creat 3.49\par No physical /co. Her glucose are fluctuating every day with occ low glucose levels.\par \par 06/01/2021\par Summary of pt chart: Pt with Hx of DM, poorly controlled, dx more than 10 years ago. She has complications of CVA, CKD, and CHF. Pt also has Hx of hypothyroidism. \par Pt is on multiple insulin injections and at the last visit(3/15/21), I recommended Levemir 20u, NovoLog 7u ac, and correction insulin by using sliding scale going from 2-8 additional units of NovoLog.\par Last s.creat is 3.49 on 3/2/21. \par \par Pt did not have any questions prior to the initiation of the telehealth visit. \par Today pt presents for DM f/u via telehealth,feeling good with no physical complaints. \par Pt was hospitalized with COVID in 04/2021 for approximately 3 weeks and did not require mechanical ventilation. Pt was discharged on 4/23/21. Pt reports she lost weight in the hospital due to COVID, but since shes come home she has been eating well but has not been trying to gain weight. Pt was told at the hospital that she had to take dialysis four times. After completing those sessions, pt has not since been asked to go back to dialysis. \par Pt checks BS pre and post prandial. Most recent FBS: 164, 169, 169. Post prandial BS: 190, 200, 150. \par Pt has an appointment to see her hematologist next month because she has Multiple Myeloma. \par \par Current Medications: Levemir 25u qd, NovoLog 12u ac, Levothyroxine 50mcg qd, Methadone 100 mg qd, Multivitamins \par \par Recent Labs:\par - 3/15/21 A1c POCT 12.1%\par - 3/2/21: s.creat 3.49, Ca 9.6, \par - 9/29/20 A1c 10.3%, TSH 6.02, Free T4 1.1, iPTH 89, Ca 9.2, s. creat 2.13, microalb/creat ratio 973, Vit D 25- OH 29.5, , Cholesterol 303 \par - 1/2020 A1c > 14%, \par - 8/2019 A1c 11.4%\par - 5/2019 A1c 14% \par - 2/2019 TSH 2.91, LDL-c 58, , Cholesterol 108

## 2021-06-05 NOTE — ED ADULT TRIAGE NOTE - TEMPERATURE IN FAHRENHEIT (DEGREES F)
Department of Anesthesiology  Postprocedure Note    Patient: Sima Maria  MRN: [id number]  YOB: 1959  Date of evaluation: 6/5/2021  Time:  11:20 AM     Procedure Summary     Date: 06/04/21 Room / Location: [facility]    Anesthesia Start: Sean Shoulder Anesthesia Stop: 9346    Procedure: ERCP STENT INSERTION (N/A ) Diagnosis: (/)    Surgeons: Margarett Cheadle, MD Responsible Provider: Brooke Adrian MD    Anesthesia Type: MAC ASA Status: 3          Anesthesia Type: MAC    Dominic Phase I: Dominic Score: 10    Dominic Phase II:      Last vitals: Reviewed and per EMR flowsheets.        Anesthesia Post Evaluation    Patient location during evaluation: PACU  Patient participation: complete - patient participated  Level of consciousness: awake  Airway patency: patent  Nausea & Vomiting: no vomiting and no nausea  Complications: no  Cardiovascular status: hemodynamically stable  Respiratory status: acceptable  Hydration status: stable 98

## 2021-06-14 ENCOUNTER — RX RENEWAL (OUTPATIENT)
Age: 61
End: 2021-06-14

## 2021-06-15 ENCOUNTER — APPOINTMENT (OUTPATIENT)
Dept: HEMATOLOGY ONCOLOGY | Facility: CLINIC | Age: 61
End: 2021-06-15

## 2021-06-17 ENCOUNTER — RX RENEWAL (OUTPATIENT)
Age: 61
End: 2021-06-17

## 2021-06-24 NOTE — PROGRESS NOTE ADULT - PROBLEM/PLAN-3
[facility] Emergency Department Encounter     Basic Information:  Patient: Thong Montano Age: 82 year old Sex: male  MRN: [id number] Encounter Date: 6/24/2021    Pt's PCP is Ariel Levi DO      Chief Complaint  Chief Complaint   Patient presents with   • Lightheaded       History of Present Illness    The patient is presenting with Son.  The patient reports that he was sitting at the table eating with son when he became lightheaded.  Reports that he did not faint, but felt as if he might. Reports that this sensation lasted for 30 minutes and has since abated.  Currently has no complaints.  Never had pain associated with this, ranked 0/10.  The patient has stage 4 lung cancer and is on immunotherapy.      Health Status  Allergies:  ALLERGIES:  No Known Allergies    Current Medications:  No current facility-administered medications on file prior to encounter.  furosemide (LASIX) 40 MG tablet  HYDROcodone-acetaminophen (NORCO) 5-325 MG per tablet  latanoprost (XALATAN) 0.005 % ophthalmic solution  meloxicam (MOBIC) 15 MG tablet  metoPROLOL succinate (TOPROL-XL) 25 MG 24 hr tablet  mirtazapine (REMERON) 45 MG tablet  omeprazole (PrilOSEC) 20 MG capsule  potassium CHLORIDE (KLOR-CON M) 20 MEQ kristpoher ER tablet  acetaminophen (TYLENOL) 325 MG tablet  chlorproMAZINE (THORAZINE) 25 MG tablet  dexamethasone (DECADRON) 2 MG tablet  gabapentin (NEURONTIN) 100 MG capsule  mirtazapine (REMERON) 15 MG tablet  predniSONE (DELTASONE) 10 MG tablet        Past Medical History    Past Medical History:   Diagnosis Date   • Congestive cardiac failure (CMS/HCC)    • Essential (primary) hypertension    • Lung cancer (CMS/HCC)        Past Surgical History:   Procedure Laterality Date   • Back surgery         No family history on file.    Social History     Tobacco Use   • Smoking status: Former Smoker   • Smokeless tobacco: Never Used   Substance Use Topics   • Alcohol use: Yes   • Drug use: Not on file         ROS:   10 point ROS  negative except as noted above in HPI.    Physical Exam:  Time:  ED Triage Vitals [06/24/21 1331]   /62   Heart Rate 82   Resp 14   Temp 97.6 °F (36.4 °C)   SpO2 100 %       Physical Exam  Constitutional:       General: He is not in acute distress.     Appearance: Normal appearance. He is not ill-appearing, toxic-appearing or diaphoretic.   HENT:      Head: Normocephalic and atraumatic.      Nose: Nose normal. No congestion or rhinorrhea.      Mouth/Throat:      Mouth: Mucous membranes are moist.      Pharynx: Oropharynx is clear. No oropharyngeal exudate or posterior oropharyngeal erythema.   Eyes:      General: No scleral icterus.        Right eye: No discharge.         Left eye: No discharge.      Extraocular Movements: Extraocular movements intact.      Pupils: Pupils are equal, round, and reactive to light.   Cardiovascular:      Rate and Rhythm: Normal rate and regular rhythm.      Pulses: Normal pulses.      Heart sounds: Normal heart sounds. No murmur heard.   No friction rub. No gallop.    Pulmonary:      Effort: Pulmonary effort is normal. No respiratory distress.      Breath sounds: Normal breath sounds. No stridor. No wheezing, rhonchi or rales.   Chest:      Chest wall: No tenderness.   Abdominal:      General: Bowel sounds are normal. There is no distension.      Palpations: Abdomen is soft. There is no mass.      Tenderness: There is no abdominal tenderness. There is no right CVA tenderness, left CVA tenderness, guarding or rebound.      Hernia: No hernia is present.   Musculoskeletal:         General: No swelling, tenderness, deformity or signs of injury. Normal range of motion.      Cervical back: Normal range of motion and neck supple. No rigidity. No muscular tenderness.   Lymphadenopathy:      Cervical: No cervical adenopathy.   Skin:     General: Skin is warm and dry.      Capillary Refill: Capillary refill takes less than 2 seconds.      Coloration: Skin is not jaundiced or pale.       Findings: No bruising, erythema, lesion or rash.   Neurological:      General: No focal deficit present.      Mental Status: He is alert and oriented to person, place, and time.      Sensory: No sensory deficit.      Motor: No weakness.      Coordination: Coordination normal.      Gait: Gait normal.   Psychiatric:         Mood and Affect: Mood normal.         Thought Content: Thought content normal.             Medical Decision Making    Orders:  Medications - No data to display    EKG:  Time: 1418  Rate: 78  EKG Interpretation  Rhythm: normal sinus rhythm and prolonged QTc     EKG interpreted by ED physician.     Laboratory Results:  Results for orders placed or performed during the hospital encounter of 06/24/21   Comprehensive Metabolic Panel   Result Value    Fasting Status     Sodium 140    Potassium 3.5    Chloride 108 (H)    Carbon Dioxide 27    Anion Gap 9 (L)    Glucose 100 (H)    BUN 16    Creatinine 1.10    Glomerular Filtration Rate 73 (L)     Comment: eGFR 60 - 89 mL/min/1.73m2 = Mild decrease in kidney function.    BUN/ Creatinine Ratio 15    Calcium 9.4    Bilirubin, Total 0.4    GOT/AST 26    GPT/ALT 15    Alkaline Phosphatase 54    Albumin 2.6 (L)    Protein, Total 7.3    Globulin 4.7 (H)    A/G Ratio 0.6 (L)   Troponin I Ultra Sensitive   Result Value    Troponin I, Ultra Sensitive <0.02   CBC with Automated Differential (performable only)   Result Value    WBC 7.6    RBC 3.29 (L)    HGB 8.8 (L)    HCT 29.4 (L)    MCV 89.4    MCH 26.7    MCHC 29.9 (L)    RDW-CV 15.9 (H)    RDW-SD 52.6 (H)        NRBC 0    Neutrophil, Percent 68    Lymphocytes, Percent 18    Mono, Percent 12    Eosinophils, Percent 1    Basophils, Percent 0    Immature Granulocytes 1    Absolute Neutrophils 5.2    Absolute Lymphocytes 1.4    Absolute Monocytes 0.9    Absolute Eosinophils  0.0    Absolute Basophils 0.0    Absolute Immmature Granulocytes 0.1       Radiology Results:  XR CHEST PA OR AP 1 VIEW   Final Result   1.   10.9 x 6.7 cm masslike opacity in left upper lobe.  This may represent   the patient's known lung cancer.  If prior studies are made available,   comparison can be performed and an addendum report issued.      **The absence of findings should not deter follow-up or further evaluation   of concerning clinical findings.      Electronically Signed by: MARJORIE CASTRO M.D.    Signed on: 6/24/2021 2:13 PM              Procedures:  Procedures      ED Course:  Vitals:    06/24/21 1329 06/24/21 1331 06/24/21 1426   BP:  125/62 116/56   Patient Position:  Sitting    Pulse:  82 78   Resp:  14    Temp:  97.6 °F (36.4 °C)    TempSrc:  Oral    SpO2:  100% 100%   Weight: 66.5 kg (146 lb 9.7 oz)     Height: 5' 10\" (1.778 m)         MDM    The patient is being evaluated for near syncope. The patient has reassuring labs and EKG other than the prolonged QTc.  Discussed with patient and Son risks and benefits of admission for observation.  Also discussed transfer to [facility].  Patient and son made informed decision to be admitted for observation at [facility].    I spoke with Kathy Echavarria who will admit for IC.           Impression and Plan:  Multiple differential diagnoses were considered. The patient / caregivers were apprised of diagnostic / treatment options including alternate modes of care, in addition to risks and benefits, for this medical condition. Based on this discussion the patient / caregiver agrees with this chosen diagnostic and treatment plan.    1. Near syncope           Condition:  STABLE      Disposition:    The patient will be admitted to a Medical Bed, with telemetry, Observation Status by the Hospitalist Service.          Counseled:  Patient, Family, Regarding diagnosis, Regarding diagnostic results, Regarding treatment, Patient understood and Family understood        Mike Hsu MD, 6/24/2021                 Mike Hsu MD  06/24/21 3847     DISPLAY PLAN FREE TEXT

## 2021-06-28 ENCOUNTER — RX RENEWAL (OUTPATIENT)
Age: 61
End: 2021-06-28

## 2021-07-06 ENCOUNTER — RX RENEWAL (OUTPATIENT)
Age: 61
End: 2021-07-06

## 2021-07-08 ENCOUNTER — RX RENEWAL (OUTPATIENT)
Age: 61
End: 2021-07-08

## 2021-07-12 ENCOUNTER — RX RENEWAL (OUTPATIENT)
Age: 61
End: 2021-07-12

## 2021-07-12 ENCOUNTER — APPOINTMENT (OUTPATIENT)
Dept: ENDOCRINOLOGY | Facility: CLINIC | Age: 61
End: 2021-07-12

## 2021-07-13 ENCOUNTER — RX RENEWAL (OUTPATIENT)
Age: 61
End: 2021-07-13

## 2021-07-14 ENCOUNTER — RX RENEWAL (OUTPATIENT)
Age: 61
End: 2021-07-14

## 2021-07-19 ENCOUNTER — RX RENEWAL (OUTPATIENT)
Age: 61
End: 2021-07-19

## 2021-07-19 RX ORDER — TRAZODONE HYDROCHLORIDE 50 MG/1
50 TABLET ORAL
Qty: 90 | Refills: 3 | Status: ACTIVE | COMMUNITY
Start: 2021-07-06 | End: 1900-01-01

## 2021-07-20 ENCOUNTER — RX RENEWAL (OUTPATIENT)
Age: 61
End: 2021-07-20

## 2021-07-21 ENCOUNTER — RX RENEWAL (OUTPATIENT)
Age: 61
End: 2021-07-21

## 2021-07-26 ENCOUNTER — RX RENEWAL (OUTPATIENT)
Age: 61
End: 2021-07-26

## 2021-07-26 NOTE — DATA REVIEWED
seveeral weeks of generalized abd pain   no vomiting  no bleeding   thought it might be ditis   wt has ;increased   little caffeine and alcohol   has had cholecystectomy [FreeTextEntry1] : Ultrasound shows <50% stenosis in the SMA and Celiac

## 2021-07-29 RX ORDER — LOSARTAN POTASSIUM 25 MG/1
25 TABLET, FILM COATED ORAL
Qty: 90 | Refills: 2 | Status: DISCONTINUED | COMMUNITY
Start: 2019-02-22 | End: 2021-07-29

## 2021-08-05 NOTE — PATIENT PROFILE ADULT. - PRO PAIN LIFE ADAPT
RN called pt today for blood pressure check. BP was  146/86, 134/85.    Do you complain of headaches unrelieved by tylenol?  no  Do you complain of vision changes?  no  Do you complain of abdominal pain (epigastric pain)?  no  Are you currently on any medications for your blood pressure? Yes  If so, name, strength, and directions of use of medication.   Nifedipine XL 30mg BID; Labetalol 200mg q8hrs      Dr Daniels was notified of patient's blood pressure, signs and symptoms as stated above, and medication if applicable.     Per Dr Daniels patient ok to continue regimen, call clinic with BPs >150/100, or with any symptoms of Pre-E. S/S of Pre-E reviewed with pt.        Patient also has one week C/S incision check.    Is patient febrile? no    Are vital signs WNL?    Is patient's pain well controlled? yes    Is incision clean, dry, and intact? yes    Is patient breast or bottlefeeding? both    Any feeding concerns? Engorgement; pt given engorgement tips (frequent feedings, warm compresses, massage, nursing for as long as the baby is hungry)    PHQ-9 Score 0    Patient has 3 week PP RN phone check-in on 8/19 and PP clinic visit on 9/7/21. Both appointments confirmed with patient.    Does patient have any other  questions/concerns today? no   none

## 2021-08-10 ENCOUNTER — APPOINTMENT (OUTPATIENT)
Dept: NEPHROLOGY | Facility: CLINIC | Age: 61
End: 2021-08-10

## 2021-08-12 NOTE — PHYSICAL THERAPY INITIAL EVALUATION ADULT - ASSISTIVE DEVICE FOR STAIR TRANSFER, REHAB EVAL
Davis Costa from [address] [place], [place] [address] calling to report that patient is having L arm edema from shoulder to the wrist  Stated that this started yesterday and she has had no new trauma to the area  Denies redness or heat to touch of the arm/shoulder  She stated the patient does have pain in the shoulder  Davis Costa is concerned because the edema came out of nowhere and is pretty significant  Unsure if this is related to her shoulder or other medical conditions patient has  Advised that if there is no redness, fever or heat to ouch of the shoulder would not suspect infection at this time  Advised ice 20 mins on/20 mins off  Would recommend calling PCP for advice as well given this is random and not precipitated by any new trauma  Patient does have heart history, Davis Costa reports  She does need a follow up appointment for next week as that is recommended follow up at last visit  Force on request sent to [employer] [employer]  Please advise  left rail down/straight cane/right rail up

## 2021-08-20 RX ORDER — METOPROLOL SUCCINATE 200 MG/1
200 TABLET, EXTENDED RELEASE ORAL DAILY
Qty: 30 | Refills: 4 | Status: DISCONTINUED | COMMUNITY
Start: 2019-01-23 | End: 2021-08-20

## 2021-08-24 ENCOUNTER — APPOINTMENT (OUTPATIENT)
Dept: HEMATOLOGY ONCOLOGY | Facility: CLINIC | Age: 61
End: 2021-08-24
Payer: MEDICARE

## 2021-08-24 VITALS
OXYGEN SATURATION: 95 % | BODY MASS INDEX: 36.82 KG/M2 | WEIGHT: 221 LBS | HEART RATE: 76 BPM | HEIGHT: 65 IN | TEMPERATURE: 97.3 F | SYSTOLIC BLOOD PRESSURE: 121 MMHG | DIASTOLIC BLOOD PRESSURE: 69 MMHG

## 2021-08-24 PROCEDURE — 36415 COLL VENOUS BLD VENIPUNCTURE: CPT

## 2021-08-24 PROCEDURE — 99214 OFFICE O/P EST MOD 30 MIN: CPT | Mod: 25

## 2021-08-24 RX ORDER — AMOXICILLIN AND CLAVULANATE POTASSIUM 875; 125 MG/1; MG/1
875-125 TABLET, COATED ORAL
Qty: 14 | Refills: 0 | Status: COMPLETED | COMMUNITY
Start: 2021-03-04 | End: 2021-08-24

## 2021-08-24 RX ORDER — MUPIROCIN 20 MG/G
2 OINTMENT TOPICAL
Qty: 22 | Refills: 0 | Status: COMPLETED | COMMUNITY
Start: 2020-06-02 | End: 2021-08-24

## 2021-08-24 RX ORDER — TIZANIDINE HYDROCHLORIDE 4 MG/1
4 CAPSULE ORAL
Qty: 360 | Refills: 3 | Status: COMPLETED | COMMUNITY
End: 2021-08-24

## 2021-08-24 RX ORDER — OXYCODONE AND ACETAMINOPHEN 10; 325 MG/1; MG/1
10-325 TABLET ORAL
Qty: 90 | Refills: 0 | Status: COMPLETED | COMMUNITY
Start: 2021-01-14 | End: 2021-08-24

## 2021-08-27 LAB
ALBUMIN SERPL ELPH-MCNC: 4 G/DL
ALP BLD-CCNC: 66 U/L
ALT SERPL-CCNC: 23 U/L
ANION GAP SERPL CALC-SCNC: 16 MMOL/L
AST SERPL-CCNC: 23 U/L
B2 MICROGLOB SERPL-MCNC: 18 MG/L
BASOPHILS # BLD AUTO: 0.04 K/UL
BASOPHILS NFR BLD AUTO: 0.6 %
BILIRUB SERPL-MCNC: 0.2 MG/DL
BUN SERPL-MCNC: 69 MG/DL
CALCIUM SERPL-MCNC: 9.5 MG/DL
CHLORIDE SERPL-SCNC: 101 MMOL/L
CO2 SERPL-SCNC: 26 MMOL/L
COVID-19 SPIKE DOMAIN ANTIBODY INTERPRETATION: POSITIVE
CREAT SERPL-MCNC: 4.17 MG/DL
EOSINOPHIL # BLD AUTO: 0.26 K/UL
EOSINOPHIL NFR BLD AUTO: 3.7 %
ERYTHROCYTE [SEDIMENTATION RATE] IN BLOOD BY WESTERGREN METHOD: 53 MM/HR
ESTIMATED AVERAGE GLUCOSE: 200 MG/DL
GLUCOSE SERPL-MCNC: 190 MG/DL
HBA1C MFR BLD HPLC: 8.6 %
HCT VFR BLD CALC: 31.8 %
HGB BLD-MCNC: 9.5 G/DL
IMM GRANULOCYTES NFR BLD AUTO: 0.3 %
LYMPHOCYTES # BLD AUTO: 1.76 K/UL
LYMPHOCYTES NFR BLD AUTO: 24.7 %
MAN DIFF?: NORMAL
MCHC RBC-ENTMCNC: 29 PG
MCHC RBC-ENTMCNC: 29.9 GM/DL
MCV RBC AUTO: 97 FL
MONOCYTES # BLD AUTO: 0.63 K/UL
MONOCYTES NFR BLD AUTO: 8.8 %
NEUTROPHILS # BLD AUTO: 4.41 K/UL
NEUTROPHILS NFR BLD AUTO: 61.9 %
PLATELET # BLD AUTO: 200 K/UL
POTASSIUM SERPL-SCNC: 5.2 MMOL/L
PROT SERPL-MCNC: 7.2 G/DL
RBC # BLD: 3.28 M/UL
RBC # FLD: 14.6 %
SARS-COV-2 AB SERPL IA-ACNC: >250 U/ML
SODIUM SERPL-SCNC: 142 MMOL/L
WBC # FLD AUTO: 7.12 K/UL

## 2021-08-27 NOTE — HISTORY OF PRESENT ILLNESS
[de-identified] : 57 y/o with long standing DM, CRF and anemia was found to have a light chain paraprotein...discussed with pt, will arrange for CT guided BM aspirate and biopsy... [de-identified] : 8-6-2019 BM aspirate and Bx consistent with myeloma...this was discussed in detail with pt and all her questions were answered...\par \par January 29, 2020 patient returns for follow-up... She states she has been taking the weekly Decadron only... She did not return for her follow-up appointment because of family issues, her mother in law passed away, she might be evicted from her apartment,etc...\par \par 3-9-2020 Has been taking the Decadron weekly on Monday with Ninlaro x 3 weeks, patient has a full understanding of the proper way to take the medications...Has a leg infection for which she takes antibiotics\par \par 7/23/2020 patient requested a follow-up appointment, since she wanted to know if the Decadron and the Ninlaro regiment is working.\par \par November 5, 2020 patient requested follow-up appointment to discuss her myeloma... Reviewed blood work done with Dr. Neal's office, her anemia and kidney function are stable, however her immune globulin level was not checked... Patient states she fell and sustained fracture in 2 places on her right leg\par \par \par August 24, 2021 patient has not been seen in the office in about 10 months... She states she had Covid in meanwhile... She has stopped taking the Decadron and Ninlaro for her myeloma...

## 2021-08-27 NOTE — ASSESSMENT
[FreeTextEntry1] : \par Repeat blood work done...and pt's IgG kappa paraprotein is up, and she is more anemic..\par \par \par We will renew Decadron and Ninlaro weekly.\par \par FU in my office in 2 months or sooner if needed.

## 2021-09-09 ENCOUNTER — RX RENEWAL (OUTPATIENT)
Age: 61
End: 2021-09-09

## 2021-09-09 LAB
ALBUMIN MFR SERPL ELPH: NORMAL %
ALBUMIN SERPL-MCNC: NORMAL G/DL
ALPHA1 GLOB MFR SERPL ELPH: NORMAL %
ALPHA1 GLOB SERPL ELPH-MCNC: NORMAL G/DL
ALPHA2 GLOB MFR SERPL ELPH: NORMAL %
ALPHA2 GLOB SERPL ELPH-MCNC: NORMAL G/DL
B-GLOBULIN MFR SERPL ELPH: NORMAL %
B-GLOBULIN SERPL ELPH-MCNC: NORMAL G/DL
DEPRECATED KAPPA LC FREE/LAMBDA SER: 4 RATIO
GAMMA GLOB FLD ELPH-MCNC: NORMAL G/DL
GAMMA GLOB MFR SERPL ELPH: NORMAL %
IGA SER QL IEP: 67 MG/DL
IGG SER QL IEP: 1812 MG/DL
IGM SER QL IEP: 38 MG/DL
KAPPA LC CSF-MCNC: 3.42 MG/DL
KAPPA LC SERPL-MCNC: 13.67 MG/DL
M PROTEIN SPEC IFE-MCNC: NORMAL
PROT SERPL-MCNC: 7.2 G/DL
PROT SERPL-MCNC: 7.2 G/DL

## 2021-09-10 ENCOUNTER — APPOINTMENT (OUTPATIENT)
Dept: CARDIOLOGY | Facility: CLINIC | Age: 61
End: 2021-09-10
Payer: MEDICARE

## 2021-09-10 ENCOUNTER — NON-APPOINTMENT (OUTPATIENT)
Age: 61
End: 2021-09-10

## 2021-09-10 PROCEDURE — 93296 REM INTERROG EVL PM/IDS: CPT

## 2021-09-10 PROCEDURE — 93295 DEV INTERROG REMOTE 1/2/MLT: CPT | Mod: NC

## 2021-09-23 ENCOUNTER — APPOINTMENT (OUTPATIENT)
Dept: HEART AND VASCULAR | Facility: CLINIC | Age: 61
End: 2021-09-23

## 2021-09-29 ENCOUNTER — RX RENEWAL (OUTPATIENT)
Age: 61
End: 2021-09-29

## 2021-10-07 ENCOUNTER — RX RENEWAL (OUTPATIENT)
Age: 61
End: 2021-10-07

## 2021-10-07 RX ORDER — PEN NEEDLE, DIABETIC 32 GX 1/4"
32G X 6 MM NEEDLE, DISPOSABLE MISCELLANEOUS
Qty: 100 | Refills: 4 | Status: ACTIVE | COMMUNITY
Start: 2021-07-13 | End: 1900-01-01

## 2021-10-08 ENCOUNTER — APPOINTMENT (OUTPATIENT)
Dept: NEPHROLOGY | Facility: CLINIC | Age: 61
End: 2021-10-08

## 2021-10-18 RX ORDER — INSULIN DETEMIR 100 [IU]/ML
100 INJECTION, SOLUTION SUBCUTANEOUS
Qty: 1 | Refills: 3 | Status: DISCONTINUED | COMMUNITY
Start: 2020-12-22 | End: 2021-10-18

## 2021-10-18 RX ORDER — IXAZOMIB 3 MG/1
3 CAPSULE ORAL
Qty: 3 | Refills: 5 | Status: DISCONTINUED | COMMUNITY
Start: 2020-01-29 | End: 2021-10-18

## 2021-10-18 RX ORDER — INSULIN DETEMIR 100 [IU]/ML
100 INJECTION, SOLUTION SUBCUTANEOUS
Qty: 1 | Refills: 0 | Status: DISCONTINUED | COMMUNITY
Start: 2021-03-15 | End: 2021-10-18

## 2021-10-28 NOTE — ED PROVIDER NOTE - GI COMPLAINT
From: Boris Martinez  To: Meseret Queen  Sent: 10/28/2021 12:59 PM CDT  Subject: Blood tests    Hi Dr. Queen:    My wife is still worried about the amount of time I sleep and my exhaustion. She thinks I need to have my thyroid checked. Is that something I can get done when I get my yearly blood work done?    Elvis Martinez  
Ok for cbc and tsh and vitamin d level  
To Dr. Queen:  Requested TSH level to be added to current lab orders for increased fatigue and exhaustion  
No

## 2021-11-09 ENCOUNTER — APPOINTMENT (OUTPATIENT)
Dept: HEMATOLOGY ONCOLOGY | Facility: CLINIC | Age: 61
End: 2021-11-09

## 2021-11-25 RX ORDER — GABAPENTIN 100 MG/1
100 CAPSULE ORAL
Qty: 360 | Refills: 3 | Status: ACTIVE | COMMUNITY
Start: 2017-03-21 | End: 1900-01-01

## 2021-11-25 RX ORDER — TIZANIDINE 4 MG/1
4 TABLET ORAL EVERY 8 HOURS
Qty: 90 | Refills: 0 | Status: ACTIVE | COMMUNITY
Start: 2020-09-08 | End: 1900-01-01

## 2021-11-29 ENCOUNTER — INPATIENT (INPATIENT)
Facility: HOSPITAL | Age: 61
LOS: 0 days | Discharge: HOME HEALTH | DRG: 291 | End: 2021-11-30
Attending: INTERNAL MEDICINE | Admitting: INTERNAL MEDICINE
Payer: MEDICARE

## 2021-11-29 VITALS
RESPIRATION RATE: 26 BRPM | OXYGEN SATURATION: 95 % | HEART RATE: 78 BPM | HEIGHT: 65 IN | TEMPERATURE: 98 F | SYSTOLIC BLOOD PRESSURE: 127 MMHG | DIASTOLIC BLOOD PRESSURE: 76 MMHG

## 2021-11-29 DIAGNOSIS — I10 ESSENTIAL (PRIMARY) HYPERTENSION: ICD-10-CM

## 2021-11-29 DIAGNOSIS — Z87.09 PERSONAL HISTORY OF OTHER DISEASES OF THE RESPIRATORY SYSTEM: ICD-10-CM

## 2021-11-29 DIAGNOSIS — Z90.49 ACQUIRED ABSENCE OF OTHER SPECIFIED PARTS OF DIGESTIVE TRACT: Chronic | ICD-10-CM

## 2021-11-29 DIAGNOSIS — E11.9 TYPE 2 DIABETES MELLITUS WITHOUT COMPLICATIONS: ICD-10-CM

## 2021-11-29 DIAGNOSIS — I50.23 ACUTE ON CHRONIC SYSTOLIC (CONGESTIVE) HEART FAILURE: ICD-10-CM

## 2021-11-29 DIAGNOSIS — N18.4 CHRONIC KIDNEY DISEASE, STAGE 4 (SEVERE): ICD-10-CM

## 2021-11-29 DIAGNOSIS — Z95.810 PRESENCE OF AUTOMATIC (IMPLANTABLE) CARDIAC DEFIBRILLATOR: Chronic | ICD-10-CM

## 2021-11-29 DIAGNOSIS — D64.9 ANEMIA, UNSPECIFIED: ICD-10-CM

## 2021-11-29 DIAGNOSIS — Z90.710 ACQUIRED ABSENCE OF BOTH CERVIX AND UTERUS: Chronic | ICD-10-CM

## 2021-11-29 DIAGNOSIS — C90.00 MULTIPLE MYELOMA NOT HAVING ACHIEVED REMISSION: ICD-10-CM

## 2021-11-29 DIAGNOSIS — B34.8 OTHER VIRAL INFECTIONS OF UNSPECIFIED SITE: ICD-10-CM

## 2021-11-29 DIAGNOSIS — Z95.828 PRESENCE OF OTHER VASCULAR IMPLANTS AND GRAFTS: Chronic | ICD-10-CM

## 2021-11-29 DIAGNOSIS — Z98.51 TUBAL LIGATION STATUS: Chronic | ICD-10-CM

## 2021-11-29 LAB
A1C WITH ESTIMATED AVERAGE GLUCOSE RESULT: 9 % — HIGH (ref 4–5.6)
ALBUMIN SERPL ELPH-MCNC: 3 G/DL — LOW (ref 3.3–5)
ALBUMIN SERPL ELPH-MCNC: 3.7 G/DL — SIGNIFICANT CHANGE UP (ref 3.3–5)
ALP SERPL-CCNC: 102 U/L — SIGNIFICANT CHANGE UP (ref 40–120)
ALP SERPL-CCNC: 95 U/L — SIGNIFICANT CHANGE UP (ref 40–120)
ALT FLD-CCNC: 13 U/L — SIGNIFICANT CHANGE UP (ref 10–45)
ALT FLD-CCNC: 14 U/L — SIGNIFICANT CHANGE UP (ref 10–45)
ANION GAP SERPL CALC-SCNC: 14 MMOL/L — SIGNIFICANT CHANGE UP (ref 5–17)
ANION GAP SERPL CALC-SCNC: 9 MMOL/L — SIGNIFICANT CHANGE UP (ref 5–17)
APTT BLD: 42.8 SEC — HIGH (ref 27.5–35.5)
AST SERPL-CCNC: 17 U/L — SIGNIFICANT CHANGE UP (ref 10–40)
AST SERPL-CCNC: 18 U/L — SIGNIFICANT CHANGE UP (ref 10–40)
BASE EXCESS BLDV CALC-SCNC: 3.6 MMOL/L — HIGH (ref -2–3)
BASOPHILS # BLD AUTO: 0.04 K/UL — SIGNIFICANT CHANGE UP (ref 0–0.2)
BASOPHILS NFR BLD AUTO: 0.4 % — SIGNIFICANT CHANGE UP (ref 0–2)
BILIRUB SERPL-MCNC: 0.2 MG/DL — SIGNIFICANT CHANGE UP (ref 0.2–1.2)
BILIRUB SERPL-MCNC: 0.3 MG/DL — SIGNIFICANT CHANGE UP (ref 0.2–1.2)
BUN SERPL-MCNC: 57 MG/DL — HIGH (ref 7–23)
BUN SERPL-MCNC: 59 MG/DL — HIGH (ref 7–23)
CA-I SERPL-SCNC: 1.09 MMOL/L — LOW (ref 1.15–1.33)
CALCIUM SERPL-MCNC: 8.9 MG/DL — SIGNIFICANT CHANGE UP (ref 8.4–10.5)
CALCIUM SERPL-MCNC: 9.3 MG/DL — SIGNIFICANT CHANGE UP (ref 8.4–10.5)
CHLORIDE SERPL-SCNC: 101 MMOL/L — SIGNIFICANT CHANGE UP (ref 96–108)
CHLORIDE SERPL-SCNC: 99 MMOL/L — SIGNIFICANT CHANGE UP (ref 96–108)
CHOLEST SERPL-MCNC: 245 MG/DL — HIGH
CK MB CFR SERPL CALC: 1.4 NG/ML — SIGNIFICANT CHANGE UP (ref 0–6.7)
CK SERPL-CCNC: 48 U/L — SIGNIFICANT CHANGE UP (ref 25–170)
CO2 BLDV-SCNC: 33.3 MMOL/L — HIGH (ref 22–26)
CO2 SERPL-SCNC: 23 MMOL/L — SIGNIFICANT CHANGE UP (ref 22–31)
CO2 SERPL-SCNC: 32 MMOL/L — HIGH (ref 22–31)
CREAT SERPL-MCNC: 4.02 MG/DL — HIGH (ref 0.5–1.3)
CREAT SERPL-MCNC: 4.18 MG/DL — HIGH (ref 0.5–1.3)
EOSINOPHIL # BLD AUTO: 0.2 K/UL — SIGNIFICANT CHANGE UP (ref 0–0.5)
EOSINOPHIL NFR BLD AUTO: 1.9 % — SIGNIFICANT CHANGE UP (ref 0–6)
ESTIMATED AVERAGE GLUCOSE: 212 MG/DL — HIGH (ref 68–114)
FERRITIN SERPL-MCNC: 213 NG/ML — HIGH (ref 15–150)
GAS PNL BLDV: 137 MMOL/L — SIGNIFICANT CHANGE UP (ref 136–145)
GAS PNL BLDV: SIGNIFICANT CHANGE UP
GAS PNL BLDV: SIGNIFICANT CHANGE UP
GLUCOSE BLDC GLUCOMTR-MCNC: 104 MG/DL — HIGH (ref 70–99)
GLUCOSE BLDC GLUCOMTR-MCNC: 121 MG/DL — HIGH (ref 70–99)
GLUCOSE BLDC GLUCOMTR-MCNC: 134 MG/DL — HIGH (ref 70–99)
GLUCOSE BLDC GLUCOMTR-MCNC: 243 MG/DL — HIGH (ref 70–99)
GLUCOSE SERPL-MCNC: 118 MG/DL — HIGH (ref 70–99)
GLUCOSE SERPL-MCNC: 60 MG/DL — LOW (ref 70–99)
HCO3 BLDV-SCNC: 31 MMOL/L — HIGH (ref 22–29)
HCT VFR BLD CALC: 29.7 % — LOW (ref 34.5–45)
HCT VFR BLD CALC: 31.5 % — LOW (ref 34.5–45)
HDLC SERPL-MCNC: 38 MG/DL — LOW
HGB BLD-MCNC: 9.1 G/DL — LOW (ref 11.5–15.5)
HGB BLD-MCNC: 9.8 G/DL — LOW (ref 11.5–15.5)
IMM GRANULOCYTES NFR BLD AUTO: 0.3 % — SIGNIFICANT CHANGE UP (ref 0–1.5)
INR BLD: 1.54 — HIGH (ref 0.88–1.16)
IRON SATN MFR SERPL: 18 UG/DL — LOW (ref 30–160)
LACTATE SERPL-SCNC: 1.2 MMOL/L — SIGNIFICANT CHANGE UP (ref 0.5–2)
LIPID PNL WITH DIRECT LDL SERPL: 171 MG/DL — HIGH
LYMPHOCYTES # BLD AUTO: 0.7 K/UL — LOW (ref 1–3.3)
LYMPHOCYTES # BLD AUTO: 6.7 % — LOW (ref 13–44)
MCHC RBC-ENTMCNC: 29 PG — SIGNIFICANT CHANGE UP (ref 27–34)
MCHC RBC-ENTMCNC: 29.7 PG — SIGNIFICANT CHANGE UP (ref 27–34)
MCHC RBC-ENTMCNC: 30.6 GM/DL — LOW (ref 32–36)
MCHC RBC-ENTMCNC: 31.1 GM/DL — LOW (ref 32–36)
MCV RBC AUTO: 94.6 FL — SIGNIFICANT CHANGE UP (ref 80–100)
MCV RBC AUTO: 95.5 FL — SIGNIFICANT CHANGE UP (ref 80–100)
MONOCYTES # BLD AUTO: 1.42 K/UL — HIGH (ref 0–0.9)
MONOCYTES NFR BLD AUTO: 13.6 % — SIGNIFICANT CHANGE UP (ref 2–14)
NEUTROPHILS # BLD AUTO: 8.02 K/UL — HIGH (ref 1.8–7.4)
NEUTROPHILS NFR BLD AUTO: 77.1 % — HIGH (ref 43–77)
NON HDL CHOLESTEROL: 207 MG/DL — HIGH
NRBC # BLD: 0 /100 WBCS — SIGNIFICANT CHANGE UP (ref 0–0)
NRBC # BLD: 0 /100 WBCS — SIGNIFICANT CHANGE UP (ref 0–0)
NT-PROBNP SERPL-SCNC: 4101 PG/ML — HIGH (ref 0–300)
PCO2 BLDV: 61 MMHG — HIGH (ref 39–42)
PH BLDV: 7.32 — SIGNIFICANT CHANGE UP (ref 7.32–7.43)
PLATELET # BLD AUTO: 125 K/UL — LOW (ref 150–400)
PLATELET # BLD AUTO: 161 K/UL — SIGNIFICANT CHANGE UP (ref 150–400)
PO2 BLDV: 33 MMHG — SIGNIFICANT CHANGE UP (ref 25–45)
POTASSIUM BLDV-SCNC: 4 MMOL/L — SIGNIFICANT CHANGE UP (ref 3.5–5.1)
POTASSIUM SERPL-MCNC: 4.6 MMOL/L — SIGNIFICANT CHANGE UP (ref 3.5–5.3)
POTASSIUM SERPL-MCNC: 4.8 MMOL/L — SIGNIFICANT CHANGE UP (ref 3.5–5.3)
POTASSIUM SERPL-SCNC: 4.6 MMOL/L — SIGNIFICANT CHANGE UP (ref 3.5–5.3)
POTASSIUM SERPL-SCNC: 4.8 MMOL/L — SIGNIFICANT CHANGE UP (ref 3.5–5.3)
PROCALCITONIN SERPL-MCNC: 0.53 NG/ML — HIGH (ref 0.02–0.1)
PROT SERPL-MCNC: 7.3 G/DL — SIGNIFICANT CHANGE UP (ref 6–8.3)
PROT SERPL-MCNC: 7.5 G/DL — SIGNIFICANT CHANGE UP (ref 6–8.3)
PROTHROM AB SERPL-ACNC: 18.1 SEC — HIGH (ref 10.6–13.6)
RAPID RVP RESULT: DETECTED
RBC # BLD: 3.14 M/UL — LOW (ref 3.8–5.2)
RBC # BLD: 3.3 M/UL — LOW (ref 3.8–5.2)
RBC # FLD: 13.6 % — SIGNIFICANT CHANGE UP (ref 10.3–14.5)
RBC # FLD: 13.8 % — SIGNIFICANT CHANGE UP (ref 10.3–14.5)
RV+EV RNA SPEC QL NAA+PROBE: DETECTED
SAO2 % BLDV: 63.3 % — LOW (ref 67–88)
SARS-COV-2 RNA SPEC QL NAA+PROBE: SIGNIFICANT CHANGE UP
SODIUM SERPL-SCNC: 136 MMOL/L — SIGNIFICANT CHANGE UP (ref 135–145)
SODIUM SERPL-SCNC: 142 MMOL/L — SIGNIFICANT CHANGE UP (ref 135–145)
T4 AB SER-ACNC: 4.79 UG/DL — SIGNIFICANT CHANGE UP (ref 4.5–11.7)
TRIGL SERPL-MCNC: 181 MG/DL — HIGH
TROPONIN T SERPL-MCNC: 0.04 NG/ML — CRITICAL HIGH (ref 0–0.01)
TROPONIN T SERPL-MCNC: 0.05 NG/ML — CRITICAL HIGH (ref 0–0.01)
TSH SERPL-MCNC: 1.33 UIU/ML — SIGNIFICANT CHANGE UP (ref 0.27–4.2)
WBC # BLD: 10.41 K/UL — SIGNIFICANT CHANGE UP (ref 3.8–10.5)
WBC # BLD: 9.77 K/UL — SIGNIFICANT CHANGE UP (ref 3.8–10.5)
WBC # FLD AUTO: 10.41 K/UL — SIGNIFICANT CHANGE UP (ref 3.8–10.5)
WBC # FLD AUTO: 9.77 K/UL — SIGNIFICANT CHANGE UP (ref 3.8–10.5)

## 2021-11-29 PROCEDURE — 99223 1ST HOSP IP/OBS HIGH 75: CPT

## 2021-11-29 PROCEDURE — 93306 TTE W/DOPPLER COMPLETE: CPT | Mod: 26

## 2021-11-29 PROCEDURE — 99221 1ST HOSP IP/OBS SF/LOW 40: CPT

## 2021-11-29 PROCEDURE — 99291 CRITICAL CARE FIRST HOUR: CPT | Mod: 25

## 2021-11-29 PROCEDURE — 93010 ELECTROCARDIOGRAM REPORT: CPT

## 2021-11-29 PROCEDURE — 71045 X-RAY EXAM CHEST 1 VIEW: CPT | Mod: 26

## 2021-11-29 RX ORDER — FUROSEMIDE 40 MG
80 TABLET ORAL
Refills: 0 | Status: DISCONTINUED | OUTPATIENT
Start: 2021-11-29 | End: 2021-11-29

## 2021-11-29 RX ORDER — ATORVASTATIN CALCIUM 80 MG/1
80 TABLET, FILM COATED ORAL AT BEDTIME
Refills: 0 | Status: DISCONTINUED | OUTPATIENT
Start: 2021-11-29 | End: 2021-11-30

## 2021-11-29 RX ORDER — ACETAMINOPHEN 500 MG
650 TABLET ORAL EVERY 6 HOURS
Refills: 0 | Status: DISCONTINUED | OUTPATIENT
Start: 2021-11-29 | End: 2021-11-30

## 2021-11-29 RX ORDER — FENOFIBRATE,MICRONIZED 130 MG
48 CAPSULE ORAL DAILY
Refills: 0 | Status: DISCONTINUED | OUTPATIENT
Start: 2021-11-29 | End: 2021-11-30

## 2021-11-29 RX ORDER — ESCITALOPRAM OXALATE 10 MG/1
20 TABLET, FILM COATED ORAL DAILY
Refills: 0 | Status: DISCONTINUED | OUTPATIENT
Start: 2021-11-29 | End: 2021-11-29

## 2021-11-29 RX ORDER — PANTOPRAZOLE SODIUM 20 MG/1
40 TABLET, DELAYED RELEASE ORAL
Refills: 0 | Status: DISCONTINUED | OUTPATIENT
Start: 2021-11-29 | End: 2021-11-30

## 2021-11-29 RX ORDER — IXAZOMIB 3 MG/1
3 CAPSULE ORAL ONCE
Refills: 0 | Status: DISCONTINUED | OUTPATIENT
Start: 2021-11-29 | End: 2021-11-30

## 2021-11-29 RX ORDER — TRAZODONE HCL 50 MG
1 TABLET ORAL
Qty: 0 | Refills: 0 | DISCHARGE

## 2021-11-29 RX ORDER — FUROSEMIDE 40 MG
40 TABLET ORAL ONCE
Refills: 0 | Status: COMPLETED | OUTPATIENT
Start: 2021-11-29 | End: 2021-11-29

## 2021-11-29 RX ORDER — METOPROLOL TARTRATE 50 MG
200 TABLET ORAL DAILY
Refills: 0 | Status: DISCONTINUED | OUTPATIENT
Start: 2021-11-29 | End: 2021-11-30

## 2021-11-29 RX ORDER — TRAZODONE HCL 50 MG
50 TABLET ORAL AT BEDTIME
Refills: 0 | Status: DISCONTINUED | OUTPATIENT
Start: 2021-11-29 | End: 2021-11-30

## 2021-11-29 RX ORDER — LEVOTHYROXINE SODIUM 125 MCG
25 TABLET ORAL DAILY
Refills: 0 | Status: DISCONTINUED | OUTPATIENT
Start: 2021-11-29 | End: 2021-11-29

## 2021-11-29 RX ORDER — ALBUTEROL 90 UG/1
2 AEROSOL, METERED ORAL EVERY 6 HOURS
Refills: 0 | Status: DISCONTINUED | OUTPATIENT
Start: 2021-11-29 | End: 2021-11-30

## 2021-11-29 RX ORDER — DEXTROSE 50 % IN WATER 50 %
25 SYRINGE (ML) INTRAVENOUS ONCE
Refills: 0 | Status: DISCONTINUED | OUTPATIENT
Start: 2021-11-29 | End: 2021-11-30

## 2021-11-29 RX ORDER — TIZANIDINE 4 MG/1
1 TABLET ORAL
Qty: 0 | Refills: 0 | DISCHARGE

## 2021-11-29 RX ORDER — LIDOCAINE 4 G/100G
1 CREAM TOPICAL DAILY
Refills: 0 | Status: DISCONTINUED | OUTPATIENT
Start: 2021-11-29 | End: 2021-11-30

## 2021-11-29 RX ORDER — SODIUM CHLORIDE 9 MG/ML
1000 INJECTION, SOLUTION INTRAVENOUS
Refills: 0 | Status: DISCONTINUED | OUTPATIENT
Start: 2021-11-29 | End: 2021-11-30

## 2021-11-29 RX ORDER — INSULIN DETEMIR 100/ML (3)
20 INSULIN PEN (ML) SUBCUTANEOUS AT BEDTIME
Refills: 0 | Status: DISCONTINUED | OUTPATIENT
Start: 2021-11-29 | End: 2021-11-30

## 2021-11-29 RX ORDER — METOPROLOL TARTRATE 50 MG
1 TABLET ORAL
Qty: 0 | Refills: 0 | DISCHARGE

## 2021-11-29 RX ORDER — IPRATROPIUM/ALBUTEROL SULFATE 18-103MCG
3 AEROSOL WITH ADAPTER (GRAM) INHALATION EVERY 6 HOURS
Refills: 0 | Status: DISCONTINUED | OUTPATIENT
Start: 2021-11-29 | End: 2021-11-30

## 2021-11-29 RX ORDER — INSULIN LISPRO 100/ML
4 VIAL (ML) SUBCUTANEOUS
Refills: 0 | Status: DISCONTINUED | OUTPATIENT
Start: 2021-11-29 | End: 2021-11-30

## 2021-11-29 RX ORDER — SACUBITRIL AND VALSARTAN 24; 26 MG/1; MG/1
1 TABLET, FILM COATED ORAL
Refills: 0 | Status: DISCONTINUED | OUTPATIENT
Start: 2021-11-29 | End: 2021-11-30

## 2021-11-29 RX ORDER — GABAPENTIN 400 MG/1
1 CAPSULE ORAL
Qty: 0 | Refills: 0 | DISCHARGE

## 2021-11-29 RX ORDER — IPRATROPIUM/ALBUTEROL SULFATE 18-103MCG
3 AEROSOL WITH ADAPTER (GRAM) INHALATION ONCE
Refills: 0 | Status: COMPLETED | OUTPATIENT
Start: 2021-11-29 | End: 2021-11-29

## 2021-11-29 RX ORDER — DEXTROSE 50 % IN WATER 50 %
12.5 SYRINGE (ML) INTRAVENOUS ONCE
Refills: 0 | Status: DISCONTINUED | OUTPATIENT
Start: 2021-11-29 | End: 2021-11-30

## 2021-11-29 RX ORDER — AMITRIPTYLINE HCL 25 MG
10 TABLET ORAL AT BEDTIME
Refills: 0 | Status: DISCONTINUED | OUTPATIENT
Start: 2021-11-29 | End: 2021-11-29

## 2021-11-29 RX ORDER — POLYETHYLENE GLYCOL 3350 17 G/17G
17 POWDER, FOR SOLUTION ORAL
Refills: 0 | Status: DISCONTINUED | OUTPATIENT
Start: 2021-11-29 | End: 2021-11-30

## 2021-11-29 RX ORDER — AMITRIPTYLINE HCL 25 MG
1 TABLET ORAL
Qty: 0 | Refills: 0 | DISCHARGE

## 2021-11-29 RX ORDER — FUROSEMIDE 40 MG
1 TABLET ORAL
Qty: 0 | Refills: 0 | DISCHARGE

## 2021-11-29 RX ORDER — GLUCAGON INJECTION, SOLUTION 0.5 MG/.1ML
1 INJECTION, SOLUTION SUBCUTANEOUS ONCE
Refills: 0 | Status: DISCONTINUED | OUTPATIENT
Start: 2021-11-29 | End: 2021-11-30

## 2021-11-29 RX ORDER — DEXAMETHASONE 0.5 MG/5ML
5 ELIXIR ORAL
Qty: 0 | Refills: 0 | DISCHARGE

## 2021-11-29 RX ORDER — DEXTROSE 50 % IN WATER 50 %
15 SYRINGE (ML) INTRAVENOUS ONCE
Refills: 0 | Status: DISCONTINUED | OUTPATIENT
Start: 2021-11-29 | End: 2021-11-30

## 2021-11-29 RX ORDER — APIXABAN 2.5 MG/1
2.5 TABLET, FILM COATED ORAL
Refills: 0 | Status: DISCONTINUED | OUTPATIENT
Start: 2021-11-29 | End: 2021-11-29

## 2021-11-29 RX ORDER — BUDESONIDE AND FORMOTEROL FUMARATE DIHYDRATE 160; 4.5 UG/1; UG/1
2 AEROSOL RESPIRATORY (INHALATION)
Refills: 0 | Status: DISCONTINUED | OUTPATIENT
Start: 2021-11-29 | End: 2021-11-30

## 2021-11-29 RX ORDER — INSULIN LISPRO 100/ML
VIAL (ML) SUBCUTANEOUS
Refills: 0 | Status: DISCONTINUED | OUTPATIENT
Start: 2021-11-29 | End: 2021-11-30

## 2021-11-29 RX ORDER — INSULIN ASPART 100 [IU]/ML
7 INJECTION, SOLUTION SUBCUTANEOUS
Qty: 0 | Refills: 0 | DISCHARGE

## 2021-11-29 RX ORDER — APIXABAN 2.5 MG/1
5 TABLET, FILM COATED ORAL
Refills: 0 | Status: DISCONTINUED | OUTPATIENT
Start: 2021-11-29 | End: 2021-11-30

## 2021-11-29 RX ORDER — ESCITALOPRAM OXALATE 10 MG/1
1 TABLET, FILM COATED ORAL
Qty: 0 | Refills: 0 | DISCHARGE

## 2021-11-29 RX ORDER — TRAZODONE HCL 50 MG
50 TABLET ORAL
Refills: 0 | Status: DISCONTINUED | OUTPATIENT
Start: 2021-11-29 | End: 2021-11-29

## 2021-11-29 RX ORDER — LEVOTHYROXINE SODIUM 125 MCG
50 TABLET ORAL DAILY
Refills: 0 | Status: DISCONTINUED | OUTPATIENT
Start: 2021-11-30 | End: 2021-11-30

## 2021-11-29 RX ORDER — APIXABAN 2.5 MG/1
1 TABLET, FILM COATED ORAL
Qty: 0 | Refills: 0 | DISCHARGE

## 2021-11-29 RX ADMIN — Medication 48 MILLIGRAM(S): at 12:34

## 2021-11-29 RX ADMIN — BUDESONIDE AND FORMOTEROL FUMARATE DIHYDRATE 2 PUFF(S): 160; 4.5 AEROSOL RESPIRATORY (INHALATION) at 17:56

## 2021-11-29 RX ADMIN — Medication 40 MILLIGRAM(S): at 04:32

## 2021-11-29 RX ADMIN — Medication 650 MILLIGRAM(S): at 22:20

## 2021-11-29 RX ADMIN — Medication 3 MILLILITER(S): at 09:15

## 2021-11-29 RX ADMIN — ESCITALOPRAM OXALATE 20 MILLIGRAM(S): 10 TABLET, FILM COATED ORAL at 12:59

## 2021-11-29 RX ADMIN — Medication 3 MILLILITER(S): at 22:24

## 2021-11-29 RX ADMIN — Medication 20 UNIT(S): at 22:24

## 2021-11-29 RX ADMIN — Medication 3 MILLILITER(S): at 16:49

## 2021-11-29 RX ADMIN — Medication 80 MILLIGRAM(S): at 10:01

## 2021-11-29 RX ADMIN — Medication 25 MICROGRAM(S): at 09:15

## 2021-11-29 RX ADMIN — Medication 40 MILLIGRAM(S): at 16:48

## 2021-11-29 RX ADMIN — Medication 40 MILLIGRAM(S): at 17:56

## 2021-11-29 RX ADMIN — Medication 3 MILLILITER(S): at 01:24

## 2021-11-29 RX ADMIN — Medication 4 UNIT(S): at 18:19

## 2021-11-29 RX ADMIN — Medication 650 MILLIGRAM(S): at 21:42

## 2021-11-29 RX ADMIN — Medication 4 UNIT(S): at 12:36

## 2021-11-29 RX ADMIN — SACUBITRIL AND VALSARTAN 1 TABLET(S): 24; 26 TABLET, FILM COATED ORAL at 17:56

## 2021-11-29 RX ADMIN — Medication 50 MILLIGRAM(S): at 22:23

## 2021-11-29 RX ADMIN — APIXABAN 5 MILLIGRAM(S): 2.5 TABLET, FILM COATED ORAL at 17:56

## 2021-11-29 RX ADMIN — LIDOCAINE 1 PATCH: 4 CREAM TOPICAL at 22:31

## 2021-11-29 RX ADMIN — Medication 200 MILLIGRAM(S): at 09:15

## 2021-11-29 RX ADMIN — ATORVASTATIN CALCIUM 80 MILLIGRAM(S): 80 TABLET, FILM COATED ORAL at 22:23

## 2021-11-29 NOTE — ED PROVIDER NOTE - PHYSICAL EXAMINATION
CONSTITUTIONAL: Awake, alert and in no apparent distress.  HEENT: Head is atraumatic. Eyes clear bilaterally, normal EOMI. Airway patent.  CARDIAC: Normal rate, regular rhythm.  Heart sounds S1, S2.   RESPIRATORY: +rales, speaking in full sentences  GASTROINTESTINAL: Abdomen soft, non-tender, no guarding, distension.  MUSCULOSKELETAL: Spine appears normal, no midline spinal tenderness, range of motion is not limited, no muscle or joint tenderness. no bony tenderness.   NEUROLOGICAL: Alert, no focal deficits, no motor or sensory deficits.  SKIN: Skin normal color for race, warm, dry and intact. No evidence of rash.  PSYCHIATRIC: Normal mood and affect. no apparent risk to self or others.

## 2021-11-29 NOTE — H&P ADULT - PROBLEM SELECTOR PLAN 6
continue home Meds: follows w/ Dr Ruiz  - continue home Meds: Ninlaro 3mg weekly on mondays, Planning to restart decadron soon

## 2021-11-29 NOTE — H&P ADULT - PROBLEM SELECTOR PROBLEM 5
[address]  [address]                    CONSULTATION                  
_______________________________________________________________________________
 
Name:       KATELIN LARSON                Room:           [address] [place]  
M.R.#:  [id number]      Account #:      [id number]  
Admission:  11/12/18     Attend Phys:    ELIJAH Rasmussen
Discharge:  11/15/18     Date of Birth:  [birth year]  
         Report #: [id number]
                                                                     [id number]  
_______________________________________________________________________________
THIS REPORT FOR:  //name//                      
 
CC: [facility] physician/PCP
    Keagan Morrissey MD
 
DATE OF SERVICE:  11/13/2018
 
 
REFERRING PHYSICIAN:  Dr. Morrissey.
 
The patient has no primary care provider at this time.
 
REASON FOR CONSULTATION:  Diarrhea.
 
IMPRESSION:
1.  Nausea, vomiting, diarrhea with recent antibiotic exposure -- suspect
Clostridium difficile colitis.
2.  Dehydration secondary to the same.
3.  End-stage liver disease secondary to nonalcoholic fatty liver, complicated
by ascites, peripheral edema, and hepatic encephalopathy -- last EGD and
colonoscopy done over 3 years ago in [place].
4.  Diabetes mellitus, hypothyroidism, and obesity, all contributing to #3.
5.  Recent right lower extremity cellulitis requiring antibiotics.
 
RECOMMENDATIONS:
1.  Agree with the patient being admitted to hospital for IV fluids, IV
antibiotics in the form of vancomycin and Flagyl at this time.
2.  C. diff precautions for now.
3.  No antidiarrheal medications, but Bentyl and Levsin ae okay.
4.  Clear liquid diet, advance as tolerated.
5.  Continue her dose of Aldactone, Lasix and rifaximin as directed.
6.  Upper and lower endoscopy as an outpatient unless her C. diff is negative
and she has persistent diarrhea.
7.  She is to establish a PCP outside the hospital upon discharge.  I have
discussed these plans and present plans with the patient as well and she is
agreeable.
 
HISTORY OF PRESENT ILLNESS:  The patient is a pleasant 56-year-old white female
with decompensated liver disease secondary to nonalcoholic fatty liver, who was
admitted to hospital with complaints of nausea, vomiting, diarrhea, and
abdominal pain.  She had a lot of gas and bloating as well.  She has been on
antibiotics for last 10 days for cellulitis for which she was hospitalized
earlier this year in September for right lower extremity cellulitis.  She does
have problem with fevers and chills as well.  She normally does not have issues
with diarrhea.  She is currently on medications to help with hepatic
 
 
 
[place] [address]                    CONSULTATION                  
_______________________________________________________________________________
 
Name:       KATELIN LARSON                Room:           [address]#:  [id number]      Account #:      [id number]  
Admission:  11/12/18     Attend Phys:    ELIJAH Rasmussen
Discharge:  11/15/18     Date of Birth:  [birth year]  
         Report #: [id number]
                                                                     [id number]  
_______________________________________________________________________________
encephalopathy in the form of Xifaxan.  She has been here and in [place] where
she was diagnosed with liver disease and currently is going to be residing
somewhere in [place].  She is currently in process of getting Medicaid or
has Medicaid at this time.  She is trying to establish with a primary care
provider.
 
She states her last endoscopic studies were performed over 3 years ago and were
possibly performed at [facility] on the [address].  She has a personal history of
colon polyps.  She has had a number of colon polyps in the past and is actually
due for endoscopic evaluation at this time.  She is admitted to hospital for
further evaluation and treatment.
 
ALLERGIES:  MULTIPLE AND INCLUDE HALDOL, IV CONTRAST ALLERGY, WHICH CAUSED
ANAPHYLAXIS; FISH PROTEINS WHICH ALSO CAUSED ANAPHYLAXIS, PENICILLIN WITH
ANAPHYLAXIS.  SULFA WITH ITCHING, A RASH WITH TETRACYCLINE, TRAMADOL CAUSED
ANAPHYLACTIC SHOCK AND TORADOL.
 
CURRENT MEDICATIONS:  Include levothyroxine 75 mcg once daily, aspirin 81 mg
daily, Flonase 1 spray twice to nares, insulin 100 units of Humalog 20 units
subQ before meals, potassium 10 mEq daily, Lantus insulin 23 units at bedtime,
Bumex 2 mg twice daily, guaifenesin 600 mg q.12 hours, rifaximin 550 mg twice
daily, spironolactone 50 mg twice daily and lactulose p.r.n.
 
PAST MEDICAL AND SURGICAL HISTORY:  Remarkable for end-stage liver disease
secondary to nonalcoholic fatty liver, documented by biopsy.  She has history of
hypothyroidism, diabetes, hypertension.  She has problem with ascites and
encephalopathy from the nonalcoholic fatty liver.  She has had multiple
surgeries on her abdomen and she states close to 21 surgeries on her abdomen. 
She has had abdominal wound, which has required multiple interventions in the
past.  She had hysterectomy.  She has had ovarian tumor removed, history of
seizure disorder and hernia repair.
 
SOCIAL HISTORY:  She does smoke a pack per day, does not drink any alcohol nor
does she have any history of the same.
 
FAMILY HISTORY:  Negative.
 
PHYSICAL EXAMINATION:
GENERAL:  Revealed ill-appearing 56-year-old white female who appears older than
stated age.
CARDIOPULMONARY EXAMINATION:  Revealed a regular rate and rhythm.  Lungs were
clear.
ABDOMEN:  Soft.  She does have a large area in the middle portion of her abdomen
where she had previous hernia and multiple interventions performed on the same.
EXTREMITIES:  She has 2-3+ peripheral edema.
 
 
 
 
[address]  [address]                    CONSULTATION                  
_______________________________________________________________________________
 
Name:       KATELIN LARSON                [address]           [address]  [id number]      Account #:      [id number]  
Admission:  11/12/18     Attend Phys:    ELIJAH Rasmussen
Discharge:  11/15/18     Date of Birth:  [birth year]  
         Report #: [id number]
                                                                     [id number]  
_______________________________________________________________________________
LABORATORY DATA:  Her laboratory tests from the 13th revealed a white count of
13.2, hemoglobin 11.9, platelet count 81,000, MCV is 91.2 and RDW is 15.6. 
Protime is 15.4 with an INR of 1.5.  Her sodium 140, potassium 3.9, chloride
107, bicarbonate 23, BUN is 13, creatinine 0.8, GFR 75.  Total bilirubin from
1.3, alkaline phosphatase is 123, AST is 28, ALT 16, albumin is 1.8.
 
CT scan of the abdomen and pelvis performed on 11/12/2018 revealed bibasilar
atelectasis.  She does have a small nodular liver compatible with cirrhosis with
some ascites around the liver.  Spleen is mildly prominent with varicosities,
felt to represent portal hypertension within epigastric and perisplenic areas. 
The pancreas appeared normal.  She does have large varicosities extending along
the retroperitoneum from cirrhosis and portal hypertension.  Kidneys appeared
normal.  Gallbladder is removed.  There is mild prominence of the common bile
duct felt to be related to prior cholecystectomy.  She does have some mild
panniculitis.  There is no abnormal fluid collection seen.  There is wall
thickening noted from the level of mid transverse colon with less prominent
findings compatible with colitis along the left colon as well.  There is minimal
free fluid noted in the right pelvis.
 
DISCUSSION:  At the present time, the patient has problems with _____ diarrhea
and I suspect she probably got Clostridium difficile or antibiotic-associated
diarrhea.  We will proceed with giving vancomycin and Flagyl at this point in
time and monitor response to the same.  We will continue to follow her as an
outpatient.  I have discussed these plans with the patient as well and she is
agreeable to the same.
 
 
 
 
 
 
 
 
 
 
 
 
 
 
 
 
 
 
 
                       
                                        By:                                
                 
D: 11/24/18 1540_______________________________________
T: 11/24/18 2209Johan Moore DO             /nt Multiple myeloma Anemia

## 2021-11-29 NOTE — ED ADULT NURSE NOTE - OBJECTIVE STATEMENT
received A&OX3 61years old female pt with c/o fo " I have SOB." pt has home oxygen. upon arrival to the ED, pt's O2 is at 88%. pt put on oxygen 5liters via nasal cannula, pt's O2 is 96%. BIPAP called. pt has mild wheezing, duoneb given. pt denies chest pain, nausea, vomiting, chills, fever. No BLE edema. will continue to monitor.

## 2021-11-29 NOTE — H&P ADULT - ASSESSMENT
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

## 2021-11-29 NOTE — H&P ADULT - PROBLEM SELECTOR PLAN 1
Presented with  Crackles, pitting edema  BNP 4.1k  trop 0.05, f/u 10am trop  CXR wet read congestion  Echo from 8/2020 moderately dilated LV Akinesis of the septal region and hypokinesis of the remaining regions EF 30%, grade III DD, mild to moderate MR, trace TR  echo ordered    s/p Lasix 40mg IV in ED CONTINUE Lasix  home Meds:   [facility] consults- has Cardiac Mems device, previously followed w/ Dr. Olguin/ Hedy Angel  Daily weight, strict I & Os, 1l fluid restriction Crackles and diffuse wheezing   BNP 4.1k  trop 0.05, f/u 10am trop  CXR wet read congestion  Echo from 8/2020 moderately dilated LV Akinesis of the septal region and hypokinesis of the remaining regions EF 30%, grade III DD, mild to moderate MR, trace TR  echo ordered    s/p Lasix 40mg IV in ED CONTINUE Lasix 80mg IV BID   home Meds: Torsemide 80mg BID, Entresto 97/103 BID, Toprol XL 200mg   [facility] consults- has Cardiac Mems device, previously followed w/ Dr. Olguin/ Hedy Angel  Daily weight, strict I & Os, 1l fluid restriction

## 2021-11-29 NOTE — H&P ADULT - PROBLEM SELECTOR PLAN 5
follows w/ Dr Ruiz  - continue home Meds: H/H 9.8/31.5 follows w/ Dr. Ruiz   - review of outpatient labs from 8/2021 Hb 9.5  - Likely secondary to Multiple Myeloma  - Iron, folate and B12 ordered for 10AM

## 2021-11-29 NOTE — ED PROVIDER NOTE - CLINICAL SUMMARY MEDICAL DECISION MAKING FREE TEXT BOX
Pt w sob since Friday, states similar to her HF, requiring BIPAP, cxr w congestive changes. No productive cough, fever, wbc so less likely acute infectious process. Will initiate on diuretics, plan to admit.

## 2021-11-29 NOTE — ED ADULT NURSE REASSESSMENT NOTE - NS ED NURSE REASSESS COMMENT FT1
pulse ox 85% on room air after pt had to go from WC to stretcher.  put on normal dose of home O2 3 liters via NC and pulse ox up to 96%

## 2021-11-29 NOTE — ED ADULT NURSE NOTE - IS THE PATIENT ABLE TO BE SCREENED?
Hematology/Oncology  Progress Note    Name: Eleanor Morgan  Date: 2019   : 1955    PCP: Dr. Rossy Borjas    Ms. Kennedy Shanks is a 60y.o. year old female who was seen for management of her myelodysplastic syndrome/refractory anemia    Current therapy: Iron supplement, Procrit or Aranesp is available whenever her hematocrit is below 30%. Subjective:     Ms. Kennedy Shanks is a 72-year-old -American woman with myelodysplastic syndrome/refractory anemia. She is continuing to do well. The patient reports that she continues taking the over-the-counter iron supplement as needed. She report arthritic pain and states she is currently on Mobic from her PCP. She has no other physical complaints at this time. Past medical history, family history, and social history were reviewed and remain unchanged. Past Medical History:   Diagnosis Date    Echocardiogram 2011    Tech difficult. EF 60-65%. Mild LVH. RVSP 20-25 mmHg.       Essential hypertension     History of colon polyps     Hx of endometriosis     had hyst    Hyperlipidemia     Hypertension     MDS (myelodysplastic syndrome) (HCC)     Refractory anemia (HCC)      Past Surgical History:   Procedure Laterality Date    HX HYSTERECTOMY       Social History     Socioeconomic History    Marital status: SINGLE     Spouse name: Not on file    Number of children: Not on file    Years of education: Not on file    Highest education level: Not on file   Occupational History    Not on file   Social Needs    Financial resource strain: Not on file    Food insecurity:     Worry: Not on file     Inability: Not on file    Transportation needs:     Medical: Not on file     Non-medical: Not on file   Tobacco Use    Smoking status: Never Smoker    Smokeless tobacco: Never Used   Substance and Sexual Activity    Alcohol use: No    Drug use: No    Sexual activity: Not on file   Lifestyle    Physical activity:     Days per week: Not on file     Minutes per session: Not on file    Stress: Not on file   Relationships    Social connections:     Talks on phone: Not on file     Gets together: Not on file     Attends Buddhism service: Not on file     Active member of club or organization: Not on file     Attends meetings of clubs or organizations: Not on file     Relationship status: Not on file    Intimate partner violence:     Fear of current or ex partner: Not on file     Emotionally abused: Not on file     Physically abused: Not on file     Forced sexual activity: Not on file   Other Topics Concern    Not on file   Social History Narrative    Not on file     Family History   Problem Relation Age of Onset    Cancer Mother     Arthritis-rheumatoid Sister     Diabetes Sister     Hypertension Sister     No Known Problems Father      Current Outpatient Medications   Medication Sig Dispense Refill    losartan-hydroCHLOROthiazide (HYZAAR) 100-12.5 mg per tablet TAKE 1 TABLET BY MOUTH ONCE DAILY 30 Tab 11    carvedilol (COREG) 25 mg tablet TAKE ONE TABLET BY MOUTH TWICE DAILY WITH MEALS 180 Tab 3    VITAMIN D2 50,000 unit capsule TAKE 1 CAPSULE BY MOUTH ONCE A WEEK 4 Cap 12    diclofenac (VOLTAREN) 1 % gel Apply 4 g to affected area four (4) times daily. 400 g 12    simvastatin (ZOCOR) 20 mg tablet TAKE ONE TABLET BY MOUTH ONCE DAILY AT BEDTIME 30 Tab 11    valACYclovir (VALTREX) 500 mg tablet TAKE ONE TABLET BY MOUTH TWICE DAILY 30 Tab 12    diclofenac EC (VOLTAREN) 75 mg EC tablet TAKE ONE TABLET BY MOUTH ONCE DAILY IN THE EVENING 30 Tab 5    polyethylene glycol (MIRALAX) 17 gram/dose powder MIX 17 GRAMS (1 CAPFUL) IN LIQUID AND DRINK BY MOUTH ONCE DAILY FOR CONSTIPATION 517 g 12    latanoprost (XALATAN) 0.005 % ophthalmic solution Administer 1 Drop to both eyes nightly.            Review of Systems  Constitutional: The patient complains of occasional fatigue  HEENT: The patient denies recent head trauma, eye pain, blurred vision,  hearing deficit, oropharyngeal mucosal pain or lesions, and the patient denies throat pain or discomfort. Lymphatics: The patient denies palpable peripheral lymphadenopathy. Hematologic: The patient denies having bruising, bleeding, or progressive fatigue. Respiratory: Patient denies having shortness of breath, cough, sputum production, fever, or dyspnea on exertion. Cardiovascular: The patient denies having leg pain, leg swelling, heart palpitations, chest permit, chest pain, or lightheadedness. The patient denies having dyspnea on exertion. Gastrointestinal: The patient denies having nausea, emesis, or diarrhea. The patient denies having any hematemesis or blood in the stool. Genitourinary: Patient denies having urinary urgency, frequency, or dysuria. The patient denies having blood in the urine. Psychological: The patient denies having symptoms of nervousness, anxiety, depression, or thoughts of harming himself some of this. Skin: Patient denies having skin rashes, skin, ulcerations, or unexplained itching or pruritus. Musculoskeletal: The patient  is complaining of occasional musculoskeletal pain primarily in the lower extremities. Objective:     Visit Vitals  /81   Pulse 66   Resp 18   Ht 5' 3\" (1.6 m)   Wt 86.2 kg (190 lb)   LMP 08/31/1998   SpO2 99%   BMI 33.66 kg/m²     Pain Scale 2/10  Physical Exam:   ECOG=0  Gen. Appearance: The patient is in no acute distress. Skin: There is no bruise or rash. HEENT: The exam is unremarkable. Neck: Supple without lymphadenopathy or thyromegaly. Lungs: Clear to auscultation and percussion; there are no wheezes or rhonchi. Heart: Regular rate and rhythm; there are no murmurs, gallops, or rubs. aanterior chest wall and breasts: Deferred. The axilla reveals no palpable axillary lymphadenopathy. Abdomen: Bowel sounds are present and normal.  There is no guarding, tenderness, or hepatosplenomegaly. Extremities: There is no clubbing, cyanosis, or edema.   Neurologic: There are no focal neurologic deficits. Lymphatics: There is no palpable peripheral lymphadenopathy. Lab data:      Results for orders placed or performed during the hospital encounter of 12/02/19   CBC WITH 3 PART DIFF   Result Value Ref Range    WBC 5.3 4.5 - 13.0 K/uL    RBC 3.41 (L) 4.10 - 5.10 M/uL    HGB 10.0 (L) 12.0 - 16 g/dL    HCT 31.1 (L) 36 - 48 %    MCV 91.2 78 - 102 FL    MCH 29.3 25.0 - 35.0 PG    MCHC 32.2 31 - 37 g/dL    RDW 12.9 11.5 - 14.5 %    PLATELET 105 723 - 390 K/uL    NEUTROPHILS 59 40 - 70 %    MIXED CELLS 7 0.1 - 17 %    LYMPHOCYTES 34 14 - 44 %    ABS. NEUTROPHILS 3.1 1.8 - 9.5 K/UL    ABS. MIXED CELLS 0.4 0.0 - 2.3 K/uL    ABS. LYMPHOCYTES 1.8 1.1 - 5.9 K/UL    DF AUTOMATED          Assessment:     1. MDS (myelodysplastic syndrome) ([facility] [address]    2. Anemia, unspecified type    3. Myelodysplastic syndrome ([facility] [address]    4. Vitamin D deficiency        Plan:   Myelodysplastic syndrome: I have explained to the patient that her WBC for today is 5.3, Hemoglobin of 10.0 g/dl and hematocrit of 31.1%. PLT is 289,000. Therapeutic intervention with Procrit will be provided for her if the hematocrit declines below 30% with a hemoglobin below 10 g/dL. the dose of Procrit will be 60,000 units given subcutaneously every 2 or 4 weeks. We do not need to pursue a bone marrow biopsy at this time. However, if she experiences a rapid decline in the hemoglobin and hematocrit with an elevation in her WBC counts that would be an indication to do a bone marrow biopsy to rule out whether not she is converting to a chronic myelomonocytic leukemia component of her MDS versus converting to an acute leukemic process. I will plan to have her return to clinic for a complete assessment in 3 months. Refractory anemia/MDS:   We will continue to monitor her hemoglobin and hematocrit every 3 months and if she should have a decrease in her hemoglobin below 10 g/dl and  hematocrit below 30% we will offer interventional therapy with either Procrit or Aranesp. At this time I will check a ferritin level and iron profile. The comprehensive metabolic panel will also be ordered. The patient states she is currently taking the ferrous sulfate daily. Arthritis/facet arthritis of the cervical region: The patient will continue to use Mobic provided to her for pain control by her PCP. Patient will return to clinic for a complete reassessment again in 3 months. Orders Placed This Encounter    COMPLETE CBC & AUTO DIFF WBC    InHouse CBC (Who@)     Standing Status:   Future     Number of Occurrences:   1     Standing Expiration Date:   12/9/2019    FERRITIN     Standing Status:   Future     Number of Occurrences:   1     Standing Expiration Date:   12/2/2020    IRON PROFILE     Standing Status:   Future     Number of Occurrences:   1     Standing Expiration Date:   63/8/5754    METABOLIC PANEL, COMPREHENSIVE     Standing Status:   Future     Number of Occurrences:   1     Standing Expiration Date:   12/2/2020    VITAMIN D, 25 HYDROXY     Standing Status:   Future     Number of Occurrences:   1     Standing Expiration Date:   1/2/2020       Chauncey High NP  12/02/2019     I have assessed the patient independently and  agree with the full assessment as outlined.   Dunia Nava MD, [address] [place]

## 2021-11-29 NOTE — H&P ADULT - PROBLEM SELECTOR PLAN 3
Cr 4. 02 on admission, follows w/ Dr. Neal  - review outpatient labs Cr form 8/2021 4.1  - consider renal consult as needed

## 2021-11-29 NOTE — CONSULT NOTE ADULT - SUBJECTIVE AND OBJECTIVE BOX
PULMONARY SERVICE INITIAL CONSULT NOTE    Per primary team HPI:    60yo obese Female, current smoker, with Contrast/Aspirin Allergy and an extensive PMHx including HTN, NICM, chronic HFrEF (EF 25% s/p AICD for primary prevention battery changed 8/2020, w/ cardiac Mems device), IDDM c/b neuropathy (on Methadone), hx of recurrent DVTs (on Eliquis), CKD IV, CVA x2 (most recently in 2013 with residual right sided weakness), PAD s/p bypass, COPD (on 3L home O2), DHARMESH on CPAP, Multiple Myeloma (on Ninlaro), hypothyroidism, depression who presented to [facility] ED endorsing acute onset of shortness of breath. Patient states she started with cold type symptoms on Friday 11/26 with congestion, runny nose, and coughing. Patient became acutely short of breath on Sunday. She noted she was feeling shortness of breath with walking across her apartment previously able to nearly 1 block. Patient had previously been doing well with managing CHF, taking meds and dietary compliance. Patient had multiple admissions in 4103-1191 for CHF exacerbations but has not been hospitalized in over 1 yr.  At [facility] 127/76mmHg, HR 78 bpm, RR 26, 97.6F 95% on RA, labs significant for H/H 9.8/31.5, CO2 32 BUN/Cr 57/ 4.02Trop 0.05, BNP 4.1K, + RVP for Rhinovirus, COVID negative, CXR wet read congestion EKG NSR TWI in I, AVL and V6. Patient received Duoneb and Lasix 40mg IV x 1. Patient now admitted to cardiac telemetry for acute on chronic HFrEF exacerbation.   (29 Nov 2021 05:43)    Subjective:  Pt states was in [facility] on Thursday and next day developed cough, headache, myalgias, runny nose, wheeze, and shortness of breath. Her grandson was ill and getting over a cold at home. She has not needed to increase her home O2 (wears 3L at baseline) and has used for many years. Last COPD exacerbation and steroid use was over 1 year ago. She had covid in May 2021 but not hospitalized. She is a current smoker and smokes 1 pack over 3 days--has smoked since the age of 20; multiple attempted quits with relapse. Uses symbicort twice daily at home and has ventolin and nebulizers which she has not needed to use in several months. 2 dogs and 1 cat at home which she has had for many years. She feels improved now than on admission. Her pulmonary doctor is Dr. Nish Payne here at [facility] (had televisit on 11/16).     REVIEW OF SYSTEMS:  Constitutional: No fever, weight loss or fatigue  Eyes: No eye pain, visual disturbances, or discharge  ENMT:  No difficulty hearing, tinnitus, vertigo; No sinus or throat pain  Neck: No pain, stiffness or neck swelling  Respiratory: see HPI  Cardiovascular: No chest pain, palpitations, dizziness or leg swelling  Gastrointestinal: No abdominal or epigastric pain. No nausea, vomiting or hematemesis; No diarrhea or constipation. No melena or hematochezia.  Genitourinary: No dysuria, frequency, hematuria or incontinence  Neurological: No headaches, memory loss, loss of strength, numbness or tremors  Skin: No itching, burning, rashes or lesions   Lymph Nodes: No enlarged glands  Endocrine: No heat or cold intolerance; No hair loss  Musculoskeletal: No joint pain or swelling; No muscle, back or extremity pain  Psychiatric: No depression, anxiety, mood swings or difficulty sleeping  Heme/Lymph: No easy bruising or bleeding gums  Allergy and Immunologic: No hives or eczema    PAST MEDICAL & SURGICAL HISTORY:  CHF (congestive heart failure)    COPD (chronic obstructive pulmonary disease)    HLD (hyperlipidemia)    Chronic pain    DM (diabetes mellitus)    CVA (cerebral vascular accident)    DVT (deep venous thrombosis)    HTN (hypertension)    Depression    Bipolar depression    PAD (peripheral artery disease)    Neuropathy    Hypothyroidism    Multiple myeloma    CKD (chronic kidney disease), stage IV    DHARMESH on CPAP    AICD (automatic cardioverter/defibrillator) present    H/O abdominal hysterectomy    H/O tubal ligation    History of cholecystectomy    H/O extremity bypass graft      FAMILY HISTORY:  Family history of hypertension (Mother)    Family history of diabetes mellitus (Mother)      SOCIAL HISTORY:  Smoking Status: [ ] Current, [ ] Former, [ ] Never  Pack Years:    MEDICATIONS:  Pulmonary:  ALBUTerol    90 MICROgram(s) HFA Inhaler 2 Puff(s) Inhalation every 6 hours PRN  albuterol/ipratropium for Nebulization 3 milliLiter(s) Nebulizer every 6 hours  budesonide 160 MICROgram(s)/formoterol 4.5 MICROgram(s) Inhaler 2 Puff(s) Inhalation two times a day    Antimicrobials:    Anticoagulants:  apixaban 5 milliGRAM(s) Oral two times a day    Onc:  ixazomib 3 milliGRAM(s) Oral once    GI/:  pantoprazole    Tablet 40 milliGRAM(s) Oral before breakfast  polyethylene glycol 3350 17 Gram(s) Oral two times a day PRN    Endocrine:  atorvastatin 80 milliGRAM(s) Oral at bedtime  dextrose 40% Gel 15 Gram(s) Oral once  dextrose 50% Injectable 25 Gram(s) IV Push once  dextrose 50% Injectable 12.5 Gram(s) IV Push once  dextrose 50% Injectable 25 Gram(s) IV Push once  fenofibrate Tablet 48 milliGRAM(s) Oral daily  glucagon  Injectable 1 milliGRAM(s) IntraMuscular once  insulin detemir injectable (LEVEMIR) 20 Unit(s) SubCutaneous at bedtime  insulin lispro (ADMELOG) corrective regimen sliding scale   SubCutaneous three times a day before meals  insulin lispro Injectable (ADMELOG) 4 Unit(s) SubCutaneous three times a day before meals  predniSONE   Tablet 40 milliGRAM(s) Oral daily    Cardiac:  metoprolol succinate  milliGRAM(s) Oral daily  sacubitril 97 mG/valsartan 103 mG 1 Tablet(s) Oral two times a day  torsemide 40 milliGRAM(s) Oral two times a day    Other Medications:  acetaminophen     Tablet .. 650 milliGRAM(s) Oral every 6 hours PRN  busPIRone 15 milliGRAM(s) Oral <User Schedule>  dextrose 5%. 1000 milliLiter(s) IV Continuous <Continuous>  dextrose 5%. 1000 milliLiter(s) IV Continuous <Continuous>  PARoxetine 10 milliGRAM(s) Oral daily  traZODone 50 milliGRAM(s) Oral at bedtime      Allergies    aspirin (Other (Moderate); Rash)  IV Contrast (Unknown)    Intolerances    Vital Signs Last 24 Hrs  T(C): 36.4 (29 Nov 2021 14:30), Max: 37.7 (29 Nov 2021 02:33)  T(F): 97.5 (29 Nov 2021 14:30), Max: 99.8 (29 Nov 2021 02:33)  HR: 73 (29 Nov 2021 12:19) (56 - 78)  BP: 141/61 (29 Nov 2021 12:19) (116/65 - 148/67)  BP(mean): 90 (29 Nov 2021 05:43) (90 - 90)  RR: 18 (29 Nov 2021 12:19) (13 - 26)  SpO2: 95% (29 Nov 2021 12:19) (95% - 99%)    11-29 @ 07:01  -  11-29 @ 17:44  --------------------------------------------------------  IN: 180 mL / OUT: 550 mL / NET: -370 mL    PHYSICAL EXAM:  Constitutional: tired appearing, no acute distress on NC  Head: NC/AT  EENT: PERRL, anicteric sclera; oropharynx clear, MMM  Neck: supple, no appreciable JVD  Respiratory: diffuse expiratory wheeze and rhonchi   Cardiovascular: +S1/S2, RRR  Gastrointestinal: soft, NT/ND  Extremities: WWP; no edema, clubbing or cyanosis; venous stasis changes  Vascular: 2+ radial pulses B/L  Neurological: awake and alert; VO    LABS:  CBC Full  -  ( 29 Nov 2021 11:15 )  WBC Count : 9.77 K/uL  RBC Count : 3.14 M/uL  Hemoglobin : 9.1 g/dL  Hematocrit : 29.7 %  Platelet Count - Automated : 125 K/uL  Mean Cell Volume : 94.6 fl  Mean Cell Hemoglobin : 29.0 pg  Mean Cell Hemoglobin Concentration : 30.6 gm/dL      11-29    136  |  99  |  59<H>  ----------------------------<  118<H>  4.8   |  23  |  4.18<H>    Ca    8.9      29 Nov 2021 11:15    TPro  7.3  /  Alb  3.0<L>  /  TBili  0.3  /  DBili  x   /  AST  17  /  ALT  13  /  AlkPhos  95  11-29    PT/INR - ( 29 Nov 2021 01:24 )   PT: 18.1 sec;   INR: 1.54          PTT - ( 29 Nov 2021 01:24 )  PTT:42.8 sec    RADIOLOGY & ADDITIONAL STUDIES:  CXR 11/29: IMPRESSION: Mild congestive changes. Cardiomegaly. Cannot exclude underlying pneumonia. Consider CT.

## 2021-11-29 NOTE — H&P ADULT - PROBLEM SELECTOR PROBLEM 4
[General Appearance - Well Developed] : well developed [Well Groomed] : well groomed [Normal Appearance] : normal appearance [General Appearance - Well Nourished] : well nourished [No Deformities] : no deformities [General Appearance - In No Acute Distress] : no acute distress [Normal Conjunctiva] : the conjunctiva exhibited no abnormalities [No Oral Pallor] : no oral pallor [Eyelids - No Xanthelasma] : the eyelids demonstrated no xanthelasmas [Normal Oral Mucosa] : normal oral mucosa [No Oral Cyanosis] : no oral cyanosis [Normal Jugular Venous A Waves Present] : normal jugular venous A waves present [Normal Jugular Venous V Waves Present] : normal jugular venous V waves present [No Jugular Venous Mayes A Waves] : no jugular venous mayes A waves [Respiration, Rhythm And Depth] : normal respiratory rhythm and effort [Auscultation Breath Sounds / Voice Sounds] : lungs were clear to auscultation bilaterally Anemia [Exaggerated Use Of Accessory Muscles For Inspiration] : no accessory muscle use [Heart Rate And Rhythm] : heart rate and rhythm were normal [Heart Sounds] : normal S1 and S2 [Murmurs] : no murmurs present [Abdomen Soft] : soft [Abdomen Tenderness] : non-tender [Abnormal Walk] : normal gait [Abdomen Mass (___ Cm)] : no abdominal mass palpated [Nail Clubbing] : no clubbing of the fingernails [Gait - Sufficient For Exercise Testing] : the gait was sufficient for exercise testing [Petechial Hemorrhages (___cm)] : no petechial hemorrhages [Cyanosis, Localized] : no localized cyanosis [Skin Color & Pigmentation] : normal skin color and pigmentation [] : no rash [No Venous Stasis] : no venous stasis [Skin Lesions] : no skin lesions [No Skin Ulcers] : no skin ulcer [No Xanthoma] : no  xanthoma was observed [Oriented To Time, Place, And Person] : oriented to person, place, and time [Affect] : the affect was normal [No Anxiety] : not feeling anxious [Mood] : the mood was normal Rhinovirus infection

## 2021-11-29 NOTE — H&P ADULT - PROBLEM SELECTOR PLAN 2
Cr 4. 02 on admission, follows w/ Dr. Neal  - review outpatient labs Cr form 8/2021 4.1  - consider renal consult as needed current smoker 1 pack over 3 days current smoker 1 pack over 3 days  - diffuse wheezing on exam  - continue home inhalers  - continue duonebs  - consider pulm consult or steroids for possible exacerbations

## 2021-11-29 NOTE — CONSULT NOTE ADULT - ASSESSMENT
61 year old female with pmhx of current smoking (>30pack years), COPD on home 3L O2, DHARMESH on CPAP, HTN, Non-ischemic cardiomyopathy EF 25% with AICD, IDDM c/b polyneuropathy (on methadone), recurrent DVTs on AC, CKD, prior CVA with R sided residual weakness p/w URI sx, wheeze, and SOB c/f CHF exacerbation with concurrent rhinovirus and COPD exacerbation.     #COPD   #chronic respiratory failure  #CHF exacerbation  #pulmonary edema  #DHARMESH on home CPAP    Last exacerbation/steroid use 1 year ago --states has been been well with torsemide at home. She has not needed to use   +sick contact with + RVP with entero/rhinovirus and just after holiday likely both CHF and COPD exacerbation given concurrent wheezing and rhonchi reasonable to treat for COPD and diurese. CXR with vascular congestion no lobar consolidation. She is on baseline home O2 with symptomatic improvement.     -5 days steroid burst--40mg PO prednisone  -continue home symbicort BID  -can use duonebs q6hrs prn wheeze/sob  -no abx   -continue home CPAP   -diuresis per primary team   -pending outpatient PFTS  -outpatient lung cancer screening

## 2021-11-29 NOTE — CONSULT NOTE ADULT - ATTENDING COMMENTS
MIld COPD exacerbation in setting of entero/rhino-virus. Confounded by decompensated heart failure. Five day course of steroids. C/w home inhalers. Duonebs as needed. Given current smoking she needs to viry enrolled in a lung cancer screening program as outpatient; can get first LDCT as inpatient. Has outpt pulm followup established.

## 2021-11-29 NOTE — H&P ADULT - PROBLEM SELECTOR PLAN 7
follows w/ Dr. Pimentel   - previous Hx of controlled DM  - f/u A1c continue home Meds: Entresto 97/103mg BID and Metoprolol succinte 200mg qd

## 2021-11-29 NOTE — ED PROVIDER NOTE - OBJECTIVE STATEMENT
60 yo male, current smoker, with Contrast/Aspirin Allergy and an extensive PMHx including HTN, NICM, chronic HFrEF with multiple admissions in the past (EF 25% s/p AICD for primary prevention), IDDM c/b neuropathy (on Methadone), hx of recurrent DVTs (on Eliquis), CKD IV, CVA x2 (most recently in 2013 with residual right sided weakness), PAD s/p bypass, COPD (on 2-3L home O2), DHARMESH on CPAP, Multiple Myeloma (on Dexamethasone and Ninlaro), hypothyroidism, depression with shortness of breath since Friday. States similar to that prompted previous admission for heart failure. States assoc cough, but no fever. States compliance with medications. No headache, lightheadedness, chest pain.

## 2021-11-29 NOTE — H&P ADULT - PROBLEM SELECTOR PLAN 8
follows w/ Dr. Pimenetl   - previous Hx of controlled DM  - f/u A1c  - reduced doses of home meds, continue Levemir 20units at bedtime, and Novolog 4 units TID with meals, mISS follows w/ Dr. Pimentel   - previous Hx of controlled DM  - f/u A1c  - reduced doses of home meds, continue Levemir 20units at bedtime, and Novolog 4 units TID with meals, mISS      VTE: Eliquis 2.5mg BID  Dispo: pending clinical progression

## 2021-11-29 NOTE — H&P ADULT - HISTORY OF PRESENT ILLNESS
58yo obese Female, current smoker, with Contrast/Aspirin Allergy and an extensive PMHx including HTN, NICM, chronic HFrEF (EF 25% s/p AICD for primary prevention battery changed 8/2020, w/ cardiac Mems device), IDDM c/b neuropathy (on Methadone), hx of recurrent DVTs (on Eliquis), CKD IV, CVA x2 (most recently in 2013 with residual right sided weakness), PAD s/p bypass, COPD (on 2-3L home O2), DHARMESH on CPAP, Multiple Myeloma (on Dexamethasone and Ninlaro), hypothyroidism, depression    who presented to [facility] ED endorsing    At [facility] 127/76mmHg, HR 78 bpm, rr 26, 97.6F 95% on RA, labs significant for H/H 9.8/31.5, CO2 32 BUN/Cr 57/ 4.02Trop 0.05, BNP 4.1K, + RVP for Rhinovirus, COVID negative, CXR wet read congestion EKG NSR TWI in I, AVL and V6,   Patient recieved Duoneb and Lasix 40mg IV x 1. Patient now admitted to cardiac telemetry for acute on chronic HFrEF exacerbation.   58yo obese Female, current smoker, with Contrast/Aspirin Allergy and an extensive PMHx including HTN, NICM, chronic HFrEF (EF 25% s/p AICD for primary prevention battery changed 8/2020, w/ cardiac Mems device), IDDM c/b neuropathy (on Methadone), hx of recurrent DVTs (on Eliquis), CKD IV, CVA x2 (most recently in 2013 with residual right sided weakness), PAD s/p bypass, COPD (on 2-3L home O2), DHARMESH on CPAP, Multiple Myeloma (on Ninlaro), hypothyroidism, depression who presented to [facility] ED endorsing acute onset of shortness of breath. Patient states she started with cold type symptoms on Friday 11/26 with congestion, runny nose, and coughing. Patient became acutely short of breath on Sunday. She noted she was feeling shortness of breath with walking across her apartment previously able to nearly 1 block. Patient had previously been doing well with managing CHF, taking meds and dietary compliance. Patient had multiple admissions in [id number] for CHF exacerbations but has not been hospitalized in over 1 yr.  At [facility] 127/76mmHg, HR 78 bpm, RR 26, 97.6F 95% on RA, labs significant for H/H 9.8/31.5, CO2 32 BUN/Cr 57/ 4.02Trop 0.05, BNP 4.1K, + RVP for Rhinovirus, COVID negative, CXR wet read congestion EKG NSR TWI in I, AVL and V6. Patient received Duoneb and Lasix 40mg IV x 1. Patient now admitted to cardiac telemetry for acute on chronic HFrEF exacerbation.   58yo obese Female, current smoker, with Contrast/Aspirin Allergy and an extensive PMHx including HTN, NICM, chronic HFrEF (EF 25% s/p AICD for primary prevention battery changed 8/2020, w/ cardiac Mems device), IDDM c/b neuropathy (on Methadone), hx of recurrent DVTs (on Eliquis), CKD IV, CVA x2 (most recently in 2013 with residual right sided weakness), PAD s/p bypass, COPD (on 3L home O2), DHARMESH on CPAP, Multiple Myeloma (on Ninlaro), hypothyroidism, depression who presented to [facility] ED endorsing acute onset of shortness of breath. Patient states she started with cold type symptoms on Friday 11/26 with congestion, runny nose, and coughing. Patient became acutely short of breath on Sunday. She noted she was feeling shortness of breath with walking across her apartment previously able to nearly 1 block. Patient had previously been doing well with managing CHF, taking meds and dietary compliance. Patient had multiple admissions in [id number] for CHF exacerbations but has not been hospitalized in over 1 yr.  At [facility] 127/76mmHg, HR 78 bpm, RR 26, 97.6F 95% on RA, labs significant for H/H 9.8/31.5, CO2 32 BUN/Cr 57/ 4.02Trop 0.05, BNP 4.1K, + RVP for Rhinovirus, COVID negative, CXR wet read congestion EKG NSR TWI in I, AVL and V6. Patient received Duoneb and Lasix 40mg IV x 1. Patient now admitted to cardiac telemetry for acute on chronic HFrEF exacerbation.

## 2021-11-29 NOTE — ED ADULT TRIAGE NOTE - ARRIVAL INFO ADDITIONAL COMMENTS
pt c/o sob and wet cough since friday. pt c/o sob and wet cough since friday.  pt uses home O2 3 liters but not on now (left in car)

## 2021-11-29 NOTE — H&P ADULT - PROBLEM SELECTOR PLAN 4
H/H 9.8/31.5 follows w/ Dr. Ruiz   - review of outpatient labs from 8/2021 Hb 9.5  - Likely secondary to Multiple Myeloma  - Iron, folate and B12 ordered for 10AM Pro caity elevated  - cold symptoms for last 2 days  - Procal elevated  - continue duonebs and symptomatic management

## 2021-11-30 ENCOUNTER — TRANSCRIPTION ENCOUNTER (OUTPATIENT)
Age: 61
End: 2021-11-30

## 2021-11-30 VITALS
RESPIRATION RATE: 18 BRPM | SYSTOLIC BLOOD PRESSURE: 152 MMHG | HEART RATE: 69 BPM | OXYGEN SATURATION: 93 % | DIASTOLIC BLOOD PRESSURE: 67 MMHG

## 2021-11-30 LAB
A1C WITH ESTIMATED AVERAGE GLUCOSE RESULT: 8.4 % — HIGH (ref 4–5.6)
ANION GAP SERPL CALC-SCNC: 13 MMOL/L — SIGNIFICANT CHANGE UP (ref 5–17)
BUN SERPL-MCNC: 66 MG/DL — HIGH (ref 7–23)
CALCIUM SERPL-MCNC: 8.7 MG/DL — SIGNIFICANT CHANGE UP (ref 8.4–10.5)
CHLORIDE SERPL-SCNC: 95 MMOL/L — LOW (ref 96–108)
CO2 SERPL-SCNC: 26 MMOL/L — SIGNIFICANT CHANGE UP (ref 22–31)
COVID-19 NUCLEOCAPSID GAM AB INTERP: POSITIVE
COVID-19 NUCLEOCAPSID TOTAL GAM ANTIBODY RESULT: 83 INDEX — HIGH
COVID-19 SPIKE DOMAIN AB INTERP: POSITIVE
COVID-19 SPIKE DOMAIN ANTIBODY RESULT: >250 U/ML — HIGH
CREAT SERPL-MCNC: 4.23 MG/DL — HIGH (ref 0.5–1.3)
ESTIMATED AVERAGE GLUCOSE: 194 MG/DL — HIGH (ref 68–114)
GLUCOSE BLDC GLUCOMTR-MCNC: 267 MG/DL — HIGH (ref 70–99)
GLUCOSE BLDC GLUCOMTR-MCNC: 286 MG/DL — HIGH (ref 70–99)
GLUCOSE BLDC GLUCOMTR-MCNC: 286 MG/DL — HIGH (ref 70–99)
GLUCOSE BLDC GLUCOMTR-MCNC: 294 MG/DL — HIGH (ref 70–99)
GLUCOSE SERPL-MCNC: 295 MG/DL — HIGH (ref 70–99)
HCT VFR BLD CALC: 30.8 % — LOW (ref 34.5–45)
HGB BLD-MCNC: 9.3 G/DL — LOW (ref 11.5–15.5)
MAGNESIUM SERPL-MCNC: 2.1 MG/DL — SIGNIFICANT CHANGE UP (ref 1.6–2.6)
MCHC RBC-ENTMCNC: 28.4 PG — SIGNIFICANT CHANGE UP (ref 27–34)
MCHC RBC-ENTMCNC: 30.2 GM/DL — LOW (ref 32–36)
MCV RBC AUTO: 94.2 FL — SIGNIFICANT CHANGE UP (ref 80–100)
NRBC # BLD: 0 /100 WBCS — SIGNIFICANT CHANGE UP (ref 0–0)
PHOSPHATE SERPL-MCNC: 6.9 MG/DL — HIGH (ref 2.5–4.5)
PLATELET # BLD AUTO: 166 K/UL — SIGNIFICANT CHANGE UP (ref 150–400)
POTASSIUM SERPL-MCNC: 4.8 MMOL/L — SIGNIFICANT CHANGE UP (ref 3.5–5.3)
POTASSIUM SERPL-SCNC: 4.8 MMOL/L — SIGNIFICANT CHANGE UP (ref 3.5–5.3)
RBC # BLD: 3.27 M/UL — LOW (ref 3.8–5.2)
RBC # FLD: 13.3 % — SIGNIFICANT CHANGE UP (ref 10.3–14.5)
SARS-COV-2 IGG+IGM SERPL QL IA: 83 INDEX — HIGH
SARS-COV-2 IGG+IGM SERPL QL IA: >250 U/ML — HIGH
SARS-COV-2 IGG+IGM SERPL QL IA: POSITIVE
SARS-COV-2 IGG+IGM SERPL QL IA: POSITIVE
SODIUM SERPL-SCNC: 134 MMOL/L — LOW (ref 135–145)
WBC # BLD: 7.41 K/UL — SIGNIFICANT CHANGE UP (ref 3.8–10.5)
WBC # FLD AUTO: 7.41 K/UL — SIGNIFICANT CHANGE UP (ref 3.8–10.5)

## 2021-11-30 PROCEDURE — 99239 HOSP IP/OBS DSCHRG MGMT >30: CPT

## 2021-11-30 PROCEDURE — 36000 PLACE NEEDLE IN VEIN: CPT

## 2021-11-30 PROCEDURE — 99233 SBSQ HOSP IP/OBS HIGH 50: CPT | Mod: GC

## 2021-11-30 PROCEDURE — 76937 US GUIDE VASCULAR ACCESS: CPT | Mod: 26

## 2021-11-30 RX ORDER — METOPROLOL TARTRATE 50 MG
1 TABLET ORAL
Qty: 0 | Refills: 0 | DISCHARGE

## 2021-11-30 RX ORDER — APIXABAN 2.5 MG/1
1 TABLET, FILM COATED ORAL
Qty: 60 | Refills: 0
Start: 2021-11-30 | End: 2021-12-29

## 2021-11-30 RX ORDER — METHADONE HYDROCHLORIDE 40 MG/1
10 TABLET ORAL THREE TIMES A DAY
Refills: 0 | Status: DISCONTINUED | OUTPATIENT
Start: 2021-11-30 | End: 2021-11-30

## 2021-11-30 RX ORDER — APIXABAN 2.5 MG/1
1 TABLET, FILM COATED ORAL
Qty: 0 | Refills: 0 | DISCHARGE

## 2021-11-30 RX ORDER — METHADONE HYDROCHLORIDE 40 MG/1
1 TABLET ORAL
Qty: 0 | Refills: 0 | DISCHARGE
Start: 2021-11-30

## 2021-11-30 RX ORDER — METOPROLOL TARTRATE 50 MG
1 TABLET ORAL
Qty: 30 | Refills: 0
Start: 2021-11-30 | End: 2021-12-29

## 2021-11-30 RX ORDER — SACUBITRIL AND VALSARTAN 24; 26 MG/1; MG/1
1 TABLET, FILM COATED ORAL
Qty: 0 | Refills: 0 | DISCHARGE

## 2021-11-30 RX ORDER — SACUBITRIL AND VALSARTAN 24; 26 MG/1; MG/1
1 TABLET, FILM COATED ORAL
Qty: 60 | Refills: 0
Start: 2021-11-30 | End: 2021-12-29

## 2021-11-30 RX ORDER — OXYCODONE AND ACETAMINOPHEN 5; 325 MG/1; MG/1
1 TABLET ORAL ONCE
Refills: 0 | Status: DISCONTINUED | OUTPATIENT
Start: 2021-11-30 | End: 2021-11-30

## 2021-11-30 RX ORDER — APIXABAN 2.5 MG/1
2.5 TABLET, FILM COATED ORAL
Refills: 0 | Status: DISCONTINUED | OUTPATIENT
Start: 2021-11-30 | End: 2021-11-30

## 2021-11-30 RX ADMIN — LIDOCAINE 1 PATCH: 4 CREAM TOPICAL at 07:20

## 2021-11-30 RX ADMIN — APIXABAN 2.5 MILLIGRAM(S): 2.5 TABLET, FILM COATED ORAL at 17:12

## 2021-11-30 RX ADMIN — Medication 3 MILLILITER(S): at 05:13

## 2021-11-30 RX ADMIN — OXYCODONE AND ACETAMINOPHEN 1 TABLET(S): 5; 325 TABLET ORAL at 05:39

## 2021-11-30 RX ADMIN — METHADONE HYDROCHLORIDE 10 MILLIGRAM(S): 40 TABLET ORAL at 13:37

## 2021-11-30 RX ADMIN — Medication 4 UNIT(S): at 08:45

## 2021-11-30 RX ADMIN — Medication 40 MILLIGRAM(S): at 05:13

## 2021-11-30 RX ADMIN — PANTOPRAZOLE SODIUM 40 MILLIGRAM(S): 20 TABLET, DELAYED RELEASE ORAL at 05:17

## 2021-11-30 RX ADMIN — Medication 40 MILLIGRAM(S): at 05:14

## 2021-11-30 RX ADMIN — Medication 3 MILLILITER(S): at 10:04

## 2021-11-30 RX ADMIN — LIDOCAINE 1 PATCH: 4 CREAM TOPICAL at 10:08

## 2021-11-30 RX ADMIN — Medication 50 MICROGRAM(S): at 05:16

## 2021-11-30 RX ADMIN — SACUBITRIL AND VALSARTAN 1 TABLET(S): 24; 26 TABLET, FILM COATED ORAL at 17:12

## 2021-11-30 RX ADMIN — Medication 200 MILLIGRAM(S): at 05:14

## 2021-11-30 RX ADMIN — Medication 6: at 17:55

## 2021-11-30 RX ADMIN — Medication 15 MILLIGRAM(S): at 05:12

## 2021-11-30 RX ADMIN — APIXABAN 5 MILLIGRAM(S): 2.5 TABLET, FILM COATED ORAL at 05:14

## 2021-11-30 RX ADMIN — Medication 4 UNIT(S): at 12:46

## 2021-11-30 RX ADMIN — BUDESONIDE AND FORMOTEROL FUMARATE DIHYDRATE 2 PUFF(S): 160; 4.5 AEROSOL RESPIRATORY (INHALATION) at 05:14

## 2021-11-30 RX ADMIN — Medication 10 MILLIGRAM(S): at 10:05

## 2021-11-30 RX ADMIN — Medication 4 UNIT(S): at 17:55

## 2021-11-30 RX ADMIN — Medication 6: at 12:46

## 2021-11-30 RX ADMIN — OXYCODONE AND ACETAMINOPHEN 1 TABLET(S): 5; 325 TABLET ORAL at 04:37

## 2021-11-30 RX ADMIN — BUDESONIDE AND FORMOTEROL FUMARATE DIHYDRATE 2 PUFF(S): 160; 4.5 AEROSOL RESPIRATORY (INHALATION) at 17:13

## 2021-11-30 RX ADMIN — Medication 40 MILLIGRAM(S): at 17:13

## 2021-11-30 RX ADMIN — Medication 3 MILLILITER(S): at 15:11

## 2021-11-30 RX ADMIN — Medication 48 MILLIGRAM(S): at 10:05

## 2021-11-30 RX ADMIN — Medication 6: at 07:34

## 2021-11-30 RX ADMIN — SACUBITRIL AND VALSARTAN 1 TABLET(S): 24; 26 TABLET, FILM COATED ORAL at 05:13

## 2021-11-30 NOTE — DISCHARGE NOTE PROVIDER - NSDCCPCAREPLAN_GEN_ALL_CORE_FT
PRINCIPAL DISCHARGE DIAGNOSIS  Diagnosis: Acute on chronic systolic congestive heart failure  Assessment and Plan of Treatment: You presented to the hospital with complaints of acute shortness of breath and were admitted to the cardiology service for acute on chronic heart failure exacerbation. An echocardiogram was performed which was unchanged from your prior exams. At this time, please continue a home medication regimen of Entresto 97 mg-103 mg BID, Torsemide 40 mg daily, and Toprol  mg daily. Please also be sure to attend your follow up appointment w/ Dr. Lehman when it is scheduled. It is imperative that you attend this appointment for continued management of your heart failure.      SECONDARY DISCHARGE DIAGNOSES  Diagnosis: Stage 4 chronic kidney disease  Assessment and Plan of Treatment: Please continue to follow up with Dr. Neal for continued managment of your kidney function.    Diagnosis: Hypertension  Assessment and Plan of Treatment: Please continue your home anti-hypertensive medications as they have been prescribed to you.    Diagnosis: Hyperlipidemia  Assessment and Plan of Treatment: Please continue your home cholesterol medications as they have been prescribed to you.    Diagnosis: Insulin dependent type 2 diabetes mellitus  Assessment and Plan of Treatment: Please continue all of your home diabetes medications.    Diagnosis: Recurrent deep vein thrombosis (DVT)  Assessment and Plan of Treatment: Please continue to take Eliquis 2.5 mg BID at this time. We have decreased this dose due to your kidney function.     PRINCIPAL DISCHARGE DIAGNOSIS  Diagnosis: Acute on chronic systolic congestive heart failure  Assessment and Plan of Treatment: You presented to the hospital with complaints of acute shortness of breath and were admitted to the cardiology service for acute on chronic heart failure exacerbation. An echocardiogram was performed which was unchanged from your prior exams. At this time, please continue a home medication regimen of Entresto 97 mg-103 mg BID, Torsemide 40 mg daily, and Toprol  mg daily. Please also be sure to attend your follow up appointment w/ Dr. Lehman when it is scheduled. It is imperative that you attend this appointment for continued management of your heart failure.      SECONDARY DISCHARGE DIAGNOSES  Diagnosis: Stage 4 chronic kidney disease  Assessment and Plan of Treatment: Please continue to follow up with Dr. Neal for continued managment of your kidney function.    Diagnosis: COPD exacerbation  Assessment and Plan of Treatment: Please continue to take Prednisone 40 mg for three more days to complete a five day course of steroids.    Diagnosis: Hypertension  Assessment and Plan of Treatment: Please continue your home anti-hypertensive medications as they have been prescribed to you.    Diagnosis: Hyperlipidemia  Assessment and Plan of Treatment: Please continue your home cholesterol medications as they have been prescribed to you.    Diagnosis: Insulin dependent type 2 diabetes mellitus  Assessment and Plan of Treatment: Please continue all of your home diabetes medications.    Diagnosis: Recurrent deep vein thrombosis (DVT)  Assessment and Plan of Treatment: Please continue to take Eliquis 2.5 mg BID at this time. We have decreased this dose due to your kidney function.

## 2021-11-30 NOTE — PROGRESS NOTE ADULT - SUBJECTIVE AND OBJECTIVE BOX
PULMONARY CONSULT SERVICE FOLLOW-UP NOTE    INTERVAL HPI:  Reviewed chart and overnight events; patient seen and examined at bedside.  started on prednisone  transitioned to po torsemide   TTE done--no significant changes     Subjective:    MEDICATIONS:  Pulmonary:  ALBUTerol    90 MICROgram(s) HFA Inhaler 2 Puff(s) Inhalation every 6 hours PRN  albuterol/ipratropium for Nebulization 3 milliLiter(s) Nebulizer every 6 hours  budesonide 160 MICROgram(s)/formoterol 4.5 MICROgram(s) Inhaler 2 Puff(s) Inhalation two times a day    Antimicrobials:    Anticoagulants:  apixaban 5 milliGRAM(s) Oral two times a day    Cardiac:  metoprolol succinate  milliGRAM(s) Oral daily  sacubitril 97 mG/valsartan 103 mG 1 Tablet(s) Oral two times a day  torsemide 40 milliGRAM(s) Oral two times a day      Allergies    aspirin (Other (Moderate); Rash)  IV Contrast (Unknown)    Intolerances        Vital Signs Last 24 Hrs  T(C): 36.4 (30 Nov 2021 04:59), Max: 37 (29 Nov 2021 18:41)  T(F): 97.5 (30 Nov 2021 04:59), Max: 98.6 (29 Nov 2021 18:41)  HR: 67 (30 Nov 2021 05:56) (64 - 76)  BP: 135/63 (29 Nov 2021 23:57) (130/63 - 141/61)  BP(mean): --  RR: 18 (30 Nov 2021 05:56) (11 - 18)  SpO2: 91% (30 Nov 2021 05:56) (91% - 98%)    11-29 @ 07:01  -  11-30 @ 07:00  --------------------------------------------------------  IN: 720 mL / OUT: 1050 mL / NET: -330 mL          PHYSICAL EXAM:  Constitutional: WDWN  HEENT: NC/AT; PERRL, anicteric sclera; MMM  Neck: supple  Cardiovascular: +S1/S2, RRR  Respiratory: CTA B/L; no W/R/R  Gastrointestinal: soft, NT/ND  Extremities: WWP; no edema, clubbing or cyanosis  Vascular: 2+ radial pulses B/L  Neurological: awake and alert; VO    LABS:      CBC Full  -  ( 30 Nov 2021 06:29 )  WBC Count : 7.41 K/uL  RBC Count : 3.27 M/uL  Hemoglobin : 9.3 g/dL  Hematocrit : 30.8 %  Platelet Count - Automated : 166 K/uL  Mean Cell Volume : 94.2 fl  Mean Cell Hemoglobin : 28.4 pg  Mean Cell Hemoglobin Concentration : 30.2 gm/dL  Auto Neutrophil # : x  Auto Lymphocyte # : x  Auto Monocyte # : x  Auto Eosinophil # : x  Auto Basophil # : x  Auto Neutrophil % : x  Auto Lymphocyte % : x  Auto Monocyte % : x  Auto Eosinophil % : x  Auto Basophil % : x    11-30    134<L>  |  95<L>  |  66<H>  ----------------------------<  295<H>  4.8   |  26  |  4.23<H>    Ca    8.7      30 Nov 2021 06:29  Phos  6.9     11-30  Mg     2.1     11-30    TPro  7.3  /  Alb  3.0<L>  /  TBili  0.3  /  DBili  x   /  AST  17  /  ALT  13  /  AlkPhos  95  11-29    PT/INR - ( 29 Nov 2021 01:24 )   PT: 18.1 sec;   INR: 1.54          PTT - ( 29 Nov 2021 01:24 )  PTT:42.8 sec    RADIOLOGY & ADDITIONAL STUDIES:  TTE 11/29/2021:   CONCLUSIONS:   1. Akinesis of the basal to mid inferoseptum and anteroseptum. Moderate to severe hypokinesis of the remaining regions. The left ventricle cavity size is mildly dilated. Left ventricular systolic function is severely reduced with a calculated ejection fraction of 25-30%.   2. The right ventricle is normal in size. Right ventricular systolic function is mildly reduced. A device lead is noted in the right heart.   3. The left atrium is severely dilated.   4. The mitral valve is mildly thickened. There is mild-to-moderate mitral regurgitation.   5. No pericardial effusion is seen.   6. Compared to the previous TTE performed on 1/5/2019, there have been no significant interval changes. PULMONARY CONSULT SERVICE FOLLOW-UP NOTE    INTERVAL HPI:  Reviewed chart and overnight events; patient seen and examined at bedside.  started on prednisone  transitioned to po torsemide   TTE done--no significant changes     Subjective:  Feels improved from admission and yesterday. continued intermittent wheeze but improved. cough and rhinorhea minimal.     MEDICATIONS:  Pulmonary:  ALBUTerol    90 MICROgram(s) HFA Inhaler 2 Puff(s) Inhalation every 6 hours PRN  albuterol/ipratropium for Nebulization 3 milliLiter(s) Nebulizer every 6 hours  budesonide 160 MICROgram(s)/formoterol 4.5 MICROgram(s) Inhaler 2 Puff(s) Inhalation two times a day    Antimicrobials:    Anticoagulants:  apixaban 5 milliGRAM(s) Oral two times a day    Cardiac:  metoprolol succinate  milliGRAM(s) Oral daily  sacubitril 97 mG/valsartan 103 mG 1 Tablet(s) Oral two times a day  torsemide 40 milliGRAM(s) Oral two times a day      Allergies    aspirin (Other (Moderate); Rash)  IV Contrast (Unknown)    Intolerances    Vital Signs Last 24 Hrs  T(C): 36.4 (30 Nov 2021 04:59), Max: 37 (29 Nov 2021 18:41)  T(F): 97.5 (30 Nov 2021 04:59), Max: 98.6 (29 Nov 2021 18:41)  HR: 67 (30 Nov 2021 05:56) (64 - 76)  BP: 135/63 (29 Nov 2021 23:57) (130/63 - 141/61)  BP(mean): --  RR: 18 (30 Nov 2021 05:56) (11 - 18)  SpO2: 91% (30 Nov 2021 05:56) (91% - 98%)    11-29 @ 07:01  -  11-30 @ 07:00  --------------------------------------------------------  IN: 720 mL / OUT: 1050 mL / NET: -330 mL    PHYSICAL EXAM:  Constitutional: No acute distress   HEENT: NC/AT; PERRL, anicteric sclera; MMM  Neck: supple  Cardiovascular: +S1/S2, RRR  Respiratory: diffuse rhonchi, minimal wheeze   Gastrointestinal: soft, NT/ND  Extremities: WWP; no edema, clubbing or cyanosis  Vascular: 2+ radial pulses B/L  Neurological: awake and alert; VO    LABS:  CBC Full  -  ( 30 Nov 2021 06:29 )  WBC Count : 7.41 K/uL  RBC Count : 3.27 M/uL  Hemoglobin : 9.3 g/dL  Hematocrit : 30.8 %  Platelet Count - Automated : 166 K/uL  Mean Cell Volume : 94.2 fl  Mean Cell Hemoglobin : 28.4 pg  Mean Cell Hemoglobin Concentration : 30.2 gm/dL    11-30    134<L>  |  95<L>  |  66<H>  ----------------------------<  295<H>  4.8   |  26  |  4.23<H>    Ca    8.7      30 Nov 2021 06:29  Phos  6.9     11-30  Mg     2.1     11-30    TPro  7.3  /  Alb  3.0<L>  /  TBili  0.3  /  DBili  x   /  AST  17  /  ALT  13  /  AlkPhos  95  11-29    PT/INR - ( 29 Nov 2021 01:24 )   PT: 18.1 sec;   INR: 1.54       PTT - ( 29 Nov 2021 01:24 )  PTT:42.8 sec    RADIOLOGY & ADDITIONAL STUDIES:  TTE 11/29/2021:   CONCLUSIONS:   1. Akinesis of the basal to mid inferoseptum and anteroseptum. Moderate to severe hypokinesis of the remaining regions. The left ventricle cavity size is mildly dilated. Left ventricular systolic function is severely reduced with a calculated ejection fraction of 25-30%.   2. The right ventricle is normal in size. Right ventricular systolic function is mildly reduced. A device lead is noted in the right heart.   3. The left atrium is severely dilated.   4. The mitral valve is mildly thickened. There is mild-to-moderate mitral regurgitation.   5. No pericardial effusion is seen.   6. Compared to the previous TTE performed on 1/5/2019, there have been no significant interval changes.

## 2021-11-30 NOTE — PHYSICAL THERAPY INITIAL EVALUATION ADULT - GAIT DEVIATIONS NOTED, PT EVAL
R foot drop from previous stroke/decreased liban/increased time in double stance/footdrop/decreased step length/decreased stride length

## 2021-11-30 NOTE — DISCHARGE NOTE NURSING/CASE MANAGEMENT/SOCIAL WORK - NSDCPEFALRISK_GEN_ALL_CORE
For information on Fall & Injury Prevention, visit: [email or web address] OR  [email or web address] OR  https://www.cdc.gov/steadi/patient.html

## 2021-11-30 NOTE — DISCHARGE NOTE NURSING/CASE MANAGEMENT/SOCIAL WORK - PATIENT PORTAL LINK FT
You can access the FollowMyHealth Patient Portal offered by [facility] by registering at the following website: [email or web address]. By joining [employer]’s FollowMyHealth portal, you will also be able to view your health information using other applications (apps) compatible with our system.

## 2021-11-30 NOTE — DISCHARGE NOTE NURSING/CASE MANAGEMENT/SOCIAL WORK - NSDCPEEMAIL_GEN_ALL_CORE
[facility] for Tobacco Control email [email or web address]

## 2021-11-30 NOTE — DISCHARGE NOTE PROVIDER - NSDCMRMEDTOKEN_GEN_ALL_CORE_FT
atorvastatin 80 mg oral tablet: 1 tab(s) orally once a day (at bedtime)  busPIRone 15 mg oral tablet: 1 tab(s) orally once a day (at bedtime)  dexamethasone 20 mg oral tablet: 1 tab(s) orally once a week (Mondays)  Eliquis 5 mg oral tablet: 1 tab(s) orally 2 times a day  Entresto 97 mg-103 mg oral tablet: 1 tab(s) orally 2 times a day  fenofibrate 48 mg oral tablet: 1 tab(s) orally once a day  gabapentin 600 mg oral tablet: 1 tab(s) orally 3 times a day, As Needed  Levemir 100 units/mL subcutaneous solution: 30 unit(s) subcutaneous once a day (at bedtime)  levothyroxine 50 mcg (0.05 mg) oral tablet: 1 tab(s) orally once a day  metoprolol succinate 200 mg oral tablet, extended release: 1 tab(s) orally once a day  Ninlaro 3 mg oral capsule: 1 cap(s) orally every 7 days (mondays)  NovoLOG 100 units/mL injectable solution: up to 12 unit(s) injectable 3 times a day (before meals)  omeprazole 40 mg oral delayed release capsule: 1 cap(s) orally once a day  PARoxetine 10 mg oral tablet: 1 tab(s) orally once a day  Symbicort 160 mcg-4.5 mcg/inh inhalation aerosol: 2 puff(s) inhaled 2 times a day  torsemide 20 mg oral tablet: 2 tab(s) orally 2 times a day  traZODone 50 mg oral tablet: 1 tab(s) orally once a day (at bedtime)  Ventolin HFA 90 mcg/inh inhalation aerosol: 2 puff(s) inhaled every 6 hours   apixaban 2.5 mg oral tablet: 1 tab(s) orally 2 times a day  atorvastatin 80 mg oral tablet: 1 tab(s) orally once a day (at bedtime)  busPIRone 15 mg oral tablet: 1 tab(s) orally once a day (at bedtime)  dexamethasone 20 mg oral tablet: 1 tab(s) orally once a week (Mondays)  Entresto 97 mg-103 mg oral tablet: 1 tab(s) orally 2 times a day  fenofibrate 48 mg oral tablet: 1 tab(s) orally once a day  gabapentin 600 mg oral tablet: 1 tab(s) orally 3 times a day, As Needed  Levemir 100 units/mL subcutaneous solution: 30 unit(s) subcutaneous once a day (at bedtime)  levothyroxine 50 mcg (0.05 mg) oral tablet: 1 tab(s) orally once a day  methadone 10 mg oral tablet: 1 tab(s) orally 3 times a day  metoprolol succinate 200 mg oral tablet, extended release: 1 tab(s) orally once a day  Ninlaro 3 mg oral capsule: 1 cap(s) orally every 7 days (mondays)  NovoLOG 100 units/mL injectable solution: up to 12 unit(s) injectable 3 times a day (before meals)  omeprazole 40 mg oral delayed release capsule: 1 cap(s) orally once a day  PARoxetine 10 mg oral tablet: 1 tab(s) orally once a day  predniSONE 20 mg oral tablet: 2 tab(s) orally once a day  Symbicort 160 mcg-4.5 mcg/inh inhalation aerosol: 2 puff(s) inhaled 2 times a day  torsemide 20 mg oral tablet: 2 tab(s) orally 2 times a day  traZODone 50 mg oral tablet: 1 tab(s) orally once a day (at bedtime)  Ventolin HFA 90 mcg/inh inhalation aerosol: 2 puff(s) inhaled every 6 hours

## 2021-11-30 NOTE — DISCHARGE NOTE PROVIDER - NSDCFUSCHEDAPPT_GEN_ALL_CORE_FT
SULY PENA ; 12/09/2021 ; NPP HemOnc [address]   SULY PENA ; 12/14/2021 ; NPP Nephro [address]   SULY PENA ; 12/15/2021 ; NPP HeartVasc [address]   SULY PENA ; 01/05/2022 ; NPP Endocrin [address] SULY PENA ; 12/09/2021 ; NPP HemOnc [address]   SULY PENA ; 12/15/2021 ; NPP HeartVasc [address]   SULY PENA ; 12/29/2021 ; NPP Nephro [address]  SULY PENA ; 01/05/2022 ; NPP Endocrin [address]

## 2021-11-30 NOTE — DISCHARGE NOTE PROVIDER - HOSPITAL COURSE
INCOMPLETE INCOMPLETE     58 y/o female, current smoker, w/ contrast and Aspirin allergy and PMHx HTN, non-ischemic cardiomyopathy/chronic HFrEF (EF 25%, s/p ICD for primary prevention and cardiac Mems device), IDDM c/b neuropathy (on Methadone), h/o recurrent DVT's (on Eliquis), stage IV-V CKD (current baseline ~4), and prior CVA x 2 (most recently in 2013, w/ residua right sided weakness), PAD (s/p prior bypass), COPD (on 3L NC at home), DHARMESH (on CPAP), hypothyroidism, multiple myeloma, and depression who presented to [facility] ED endorsing acute onset SOB that patient reported began as cold type symptoms (congestion/runny nose/coughing). Patient then reported acute SOB on Sunday w/ walking across her apartment, and stated she was previously able to walk nearly 1 block w/o issues. Patient previously had multiple admissions between 6759-7068 for CHF exacerbations, however had recently been doing well w/ managing her heart failure and had now been hospitalized in over one year. On arrival to ED, VS stable and labs significant           58yo obese Female, current smoker, with Contrast/Aspirin Allergy and an extensive PMHx including HTN, NICM, chronic HFrEF (EF 25% s/p AICD for primary prevention battery changed 8/2020, w/ cardiac Mems device), IDDM c/b neuropathy (on Methadone), hx of recurrent DVTs (on Eliquis), CKD IV, CVA x2 (most recently in 2013 with residual right sided weakness), PAD s/p bypass, COPD (on 3L home O2), DHARMESH on CPAP, Multiple Myeloma (on Ninlaro), hypothyroidism, depression who presented to [facility] ED endorsing acute onset of shortness of breath. Patient states she started with cold type symptoms on Friday 11/26 with congestion, runny nose, and coughing. Patient became acutely short of breath on Sunday. She noted she was feeling shortness of breath with walking across her apartment previously able to nearly 1 block. Patient had previously been doing well with managing CHF, taking meds and dietary compliance. Patient had multiple admissions in 0392-1816 for CHF exacerbations but has not been hospitalized in over 1 yr.  At [facility] 127/76mmHg, HR 78 bpm, RR 26, 97.6F 95% on RA, labs significant for H/H 9.8/31.5, CO2 32 BUN/Cr 57/ 4.02Trop 0.05, BNP 4.1K, + RVP for Rhinovirus, COVID negative, CXR wet read congestion EKG NSR TWI in I, AVL and V6. Patient received Duoneb and Lasix 40mg IV x 1. Patient now admitted to cardiac telemetry for acute on chronic HFrEF exacerbation. 58 y/o female, current smoker, w/ contrast and Aspirin allergy and PMHx HTN, non-ischemic cardiomyopathy/chronic HFrEF (EF 25%, s/p ICD for primary prevention and cardiac Mems device), IDDM c/b neuropathy (on Methadone), h/o recurrent DVT's (on Eliquis), stage IV-V CKD (current baseline ~4), and prior CVA x 2 (most recently in 2013, w/ residua right sided weakness), PAD (s/p prior bypass), COPD (on 3L NC at home), DHARMESH (on CPAP), hypothyroidism, multiple myeloma, and depression who presented to [facility] ED endorsing acute onset SOB that patient reported began as cold type symptoms (congestion/runny nose/coughing). Patient then reported acute SOB on Sunday w/ walking across her apartment, and stated she was previously able to walk nearly 1 block w/o issues. Patient previously had multiple admissions between 1451-8783 for CHF exacerbations, however had recently been doing well w/ managing her heart failure and had now been hospitalized in over one year. On arrival to ED, VS stable and labs significant for BUN/Cr 57/4.02, trop 0.05, BNP 4.1K, RVP positive for Rhinovirus, COVID PCR negative. Patient was admitted to cardiology/telemetry for acute on chronic HFrEF exacerbation.     Echocardiogram on 11/29/2021 showed EF 25-30% and no changes from previous echocardiogram. Pulmonary was consulted and recommended Prednisone 40 mg daily for 5 days in setting for COPD exacerbation. Patient has now been transitioned to home medication of Torsemide 40 mg BID. Renal was consulted and patient's outpatient Nephrologist (Dr. Neal) was contacted about patient's kidney function, and no additional medications or additional intervention/treatement is necessary at this time. Patient has now been medically cleared for discharge as per Dr. Lehman. Patient was given appropriate discharge instructions including medication regimen and follow up. Any prescriptions the patient requires refills on have been e-prescribed to patient's preferred pharmacy.     No significant events on telemetry overnight. Repeat EKG without ischemic changes. Patient has been medically cleared for discharge as per  ________. Patient has been given appropriate discharge instructions including medication regimen, access site management and follow up. Medications that patient needs refills on have been e-prescribed to preferred pharmacy.     Temp HR BP RR SpO2  Gen: NAD, A&O x3  Cards: RRR, clear S1 and S2 without murmur  Pulm: CTA B/L without w/r/r  Right __: No hematoma or ooze, peripheral pulses 2+ B/L  Abd: soft, NT  Ext: no LE edema or ulcerations B/L 58 y/o female, current smoker, w/ contrast and Aspirin allergy and PMHx HTN, non-ischemic cardiomyopathy/chronic HFrEF (EF 25%, s/p ICD for primary prevention and cardiac Mems device), IDDM c/b neuropathy (on Methadone), h/o recurrent DVT's (on Eliquis), stage IV-V CKD (current baseline ~4), and prior CVA x 2 (most recently in 2013, w/ residua right sided weakness), PAD (s/p prior bypass), COPD (on 3L NC at home), DHARMESH (on CPAP), hypothyroidism, multiple myeloma, and depression who presented to [facility] ED endorsing acute onset SOB that patient reported began as cold type symptoms (congestion/runny nose/coughing). Patient then reported acute SOB on Sunday w/ walking across her apartment, and stated she was previously able to walk nearly 1 block w/o issues. Patient previously had multiple admissions between 2150-2678 for CHF exacerbations, however had recently been doing well w/ managing her heart failure and had now been hospitalized in over one year. On arrival to ED, VS stable and labs significant for BUN/Cr 57/4.02, trop 0.05, BNP 4.1K, RVP positive for Rhinovirus, COVID PCR negative. Patient was admitted to cardiology/telemetry for acute on chronic HFrEF exacerbation.     Echocardiogram on 11/29/2021 showed EF 25-30% and no changes from previous echocardiogram. Pulmonary was consulted and recommended Prednisone 40 mg daily for 5 days in setting for COPD exacerbation. Patient has now been transitioned to home medication of Torsemide 40 mg BID. Renal was consulted and patient's outpatient Nephrologist (Dr. Neal) was contacted about patient's kidney function, and no additional medications or additional intervention/treatement is necessary at this time. Patient has now been medically cleared for discharge as per Dr. Lehman. Patient was given appropriate discharge instructions including medication regimen and follow up. Any prescriptions the patient requires refills on have been e-prescribed to patient's preferred pharmacy.     No significant events on telemetry overnight. Repeat EKG without ischemic changes. Patient has been medically cleared for discharge as per  ________. Patient has been given appropriate discharge instructions including medication regimen, access site management and follow up. Medications that patient needs refills on have been e-prescribed to preferred pharmacy.     Temp HR BP RR SpO2  Gen: NAD, A&O x3  Cards: RRR, clear S1 and S2 without murmur  Pulm: CTA B/L without w/r/r  Right __: No hematoma or ooze, peripheral pulses 2+ B/L  Abd: soft, NT  Ext: no LE edema or ulcerations B/L    I was physically present for the key portions of the evaluation and management (E/M) service provided.  I have personally seen and examined this patient.  I have reviewed vitals, labs, medications, cardiac studies and remaining additional imaging.  I agree with the above discharge documentation which I have reviewed and edited where appropriate. Patient is hemodynamically stable and appropriate for discharge home on the above prescribed medications.  Close outpatient cardiology follow up is recommended within 1-2 weeks.    Enriqueta Lehman MD   Cardiology Attending    35 minutes spent on total encounter; more than 50% of the visit was spent counseling and/or coordinating care by the attending physician, with plan of care discussed with the patient and cardiac team.

## 2021-11-30 NOTE — DISCHARGE NOTE NURSING/CASE MANAGEMENT/SOCIAL WORK - NSDCPEWEB_GEN_ALL_CORE
[facility] for Tobacco Control website --- [email or web address] website --- [email or web address]

## 2021-11-30 NOTE — DISCHARGE NOTE PROVIDER - NSDCHHBASESERVICE_GEN_ALL_CORE
Nursing Nursing/Physical therapy Consent (Temporal Branch)/Introductory Paragraph: The rationale for Mohs was explained to the patient and consent was obtained. The risks, benefits and alternatives to therapy were discussed in detail. Specifically, the risks of damage to the temporal branch of the facial nerve, infection, scarring, bleeding, prolonged wound healing, incomplete removal, allergy to anesthesia, and recurrence were addressed. Prior to the procedure, the treatment site was clearly identified and confirmed by the patient. All components of Universal Protocol/PAUSE Rule completed.

## 2021-11-30 NOTE — DISCHARGE NOTE PROVIDER - NSDCFUADDAPPT_GEN_ALL_CORE_FT
Please bring your Insurance card, Photo ID, Covid-19 vaccination card (if applicable) and Discharge paperwork to the following appointment:    (1) Please follow up with your Cardiology Provider, Dr. Enriqueta Lehman at [address] [place] [address] on 12/15/2021 at 1:15pm.    Please note that the office of Dr. Lehman will contact you for an earlier appointment once available.    Appointment was scheduled by Ms. ARIADNA Hsu, Referral Coordinator.

## 2021-11-30 NOTE — DISCHARGE NOTE PROVIDER - CARE PROVIDER_API CALL
Enriqueta Lehman G  CARDIOLOGY  [address] [address]  Phone: [phone]  Fax: (   )    -  Scheduled Appointment: 12/15/2021 01:15 PM

## 2021-11-30 NOTE — PHYSICAL THERAPY INITIAL EVALUATION ADULT - ADDITIONAL COMMENTS
Pt states she lives with her family in walk up apartment building ~20 steps. Pt uses RW for ambulation, also has. Pt daughter is her HHA 24 hr.

## 2021-11-30 NOTE — PROGRESS NOTE ADULT - ASSESSMENT
61 year old female with pmhx of current smoking (>30pack years), COPD on home 3L O2, DHARMESH on CPAP, HTN, Non-ischemic cardiomyopathy EF 25% with AICD, IDDM c/b polyneuropathy (on methadone), recurrent DVTs on AC, CKD, prior CVA with R sided residual weakness p/w URI sx, wheeze, and SOB c/f CHF exacerbation with concurrent rhinovirus and COPD exacerbation.     #COPD   #chronic respiratory failure  #CHF exacerbation  #pulmonary edema  #DHARMESH on home CPAP    Last exacerbation/steroid use 1 year ago --states has been been well with torsemide at home. She has not needed to use nebulizers or ventolin frequently.   +sick contact with + RVP with entero/rhinovirus and just after holiday likely both CHF and COPD exacerbation given concurrent wheezing and rhonchi reasonable to treat for COPD and diurese. CXR with vascular congestion no lobar consolidation. She is on baseline home O2 with symptomatic improvement. ***    -40mg PO prednisone for 5 days   -continue home symbicort BID  -can use duonebs q6hrs prn wheeze/sob  -no abx   -continue home CPAP   -diuresis per primary team   -pending outpatient PFTS  -outpatient lung cancer screening  61 year old female with pmhx of current smoking (>30pack years), COPD on home 3L O2, DHARMESH on CPAP, HTN, Non-ischemic cardiomyopathy EF 25% with AICD, IDDM c/b polyneuropathy (on methadone), recurrent DVTs on AC, CKD, prior CVA with R sided residual weakness p/w URI sx, wheeze, and SOB c/f CHF exacerbation with concurrent rhinovirus and COPD exacerbation.     #COPD   #chronic respiratory failure  #CHF exacerbation  #pulmonary edema  #DHARMESH on home CPAP    She is on baseline home O2 with symptomatic improvement.     -40mg PO prednisone for 5 day course   -continue home symbicort BID  -can use duonebs q6hrs prn wheeze/sob  -no abx   -continue home CPAP   -diuresis per primary team   -pending outpatient PFTS  -outpatient lung cancer screening   -please arrange outpatient follow up with pulmonologist Dr. Nish Payne

## 2021-11-30 NOTE — DISCHARGE NOTE NURSING/CASE MANAGEMENT/SOCIAL WORK - NSDCFUADDAPPT_GEN_ALL_CORE_FT
Please bring your Insurance card, Photo ID, Covid-19 vaccination card (if applicable) and Discharge paperwork to the following appointment:    (1) Please follow up with your Cardiology Provider, Dr. Enriqueta Lehman at [address] [place] [address] on 12/15/2021 at 1:15pm.    Please note that the office of Dr. Lehman will contact you for an earlier appointment once available.    Appointment was scheduled by Ms. ARIADNA Hsu, Referral Coordinator.

## 2021-11-30 NOTE — PHYSICAL THERAPY INITIAL EVALUATION ADULT - PERTINENT HX OF CURRENT PROBLEM, REHAB EVAL
pt is 60 yo female who presented to [facility] ED endorsing acute onset of shortness of breath. Patient states she started with cold type symptoms on Friday 11/26 with congestion, runny nose, and coughing. Patient became acutely short of breath on Sunday. She noted she was feeling shortness of breath with walking across her apartment previously able to nearly 1 block.   Patient now admitted to cardiac telemetry for acute on chronic HFrEF exacerbation.

## 2021-11-30 NOTE — PHYSICAL THERAPY INITIAL EVALUATION ADULT - GENERAL OBSERVATIONS, REHAB EVAL
Pt received semi supine in bed +2L NC +tele +hep lock. PT received consent to treat from DANISH Rasmussen. Pt was left on commode educated on use of call bell when finished, RN bradley.

## 2021-11-30 NOTE — PROCEDURE NOTE - NSICDXPROCEDURE_GEN_ALL_CORE_FT
PROCEDURES:  Insertion, catheter, intravenous 30-Nov-2021 08:56:28  Florence Cabezas  Ultrasound guidance for vascular access 30-Nov-2021 08:56:34  Florence Cabezas

## 2021-11-30 NOTE — DISCHARGE NOTE PROVIDER - PROVIDER TOKENS
FREE:[LAST:[Dominik],FIRST:[Enriqueta LUCERO],PHONE:[phone]],FAX:[(   )    -],ADDRESS:[[place] [address]],SCHEDULEDAPPT:[12/15/2021],SCHEDULEDAPPTTIME:[01:15 PM]]

## 2021-12-05 DIAGNOSIS — G47.33 OBSTRUCTIVE SLEEP APNEA (ADULT) (PEDIATRIC): ICD-10-CM

## 2021-12-05 DIAGNOSIS — F17.200 NICOTINE DEPENDENCE, UNSPECIFIED, UNCOMPLICATED: ICD-10-CM

## 2021-12-05 DIAGNOSIS — N18.4 CHRONIC KIDNEY DISEASE, STAGE 4 (SEVERE): ICD-10-CM

## 2021-12-05 DIAGNOSIS — I42.8 OTHER CARDIOMYOPATHIES: ICD-10-CM

## 2021-12-05 DIAGNOSIS — Z86.718 PERSONAL HISTORY OF OTHER VENOUS THROMBOSIS AND EMBOLISM: ICD-10-CM

## 2021-12-05 DIAGNOSIS — J96.10 CHRONIC RESPIRATORY FAILURE, UNSPECIFIED WHETHER WITH HYPOXIA OR HYPERCAPNIA: ICD-10-CM

## 2021-12-05 DIAGNOSIS — B97.89 OTHER VIRAL AGENTS AS THE CAUSE OF DISEASES CLASSIFIED ELSEWHERE: ICD-10-CM

## 2021-12-05 DIAGNOSIS — R53.1 WEAKNESS: ICD-10-CM

## 2021-12-05 DIAGNOSIS — D63.1 ANEMIA IN CHRONIC KIDNEY DISEASE: ICD-10-CM

## 2021-12-05 DIAGNOSIS — C90.00 MULTIPLE MYELOMA NOT HAVING ACHIEVED REMISSION: ICD-10-CM

## 2021-12-05 DIAGNOSIS — I69.998 OTHER SEQUELAE FOLLOWING UNSPECIFIED CEREBROVASCULAR DISEASE: ICD-10-CM

## 2021-12-05 DIAGNOSIS — I50.23 ACUTE ON CHRONIC SYSTOLIC (CONGESTIVE) HEART FAILURE: ICD-10-CM

## 2021-12-05 DIAGNOSIS — J44.1 CHRONIC OBSTRUCTIVE PULMONARY DISEASE WITH (ACUTE) EXACERBATION: ICD-10-CM

## 2021-12-05 DIAGNOSIS — E03.9 HYPOTHYROIDISM, UNSPECIFIED: ICD-10-CM

## 2021-12-05 DIAGNOSIS — E10.40 TYPE 1 DIABETES MELLITUS WITH DIABETIC NEUROPATHY, UNSPECIFIED: ICD-10-CM

## 2021-12-05 DIAGNOSIS — I50.9 HEART FAILURE, UNSPECIFIED: ICD-10-CM

## 2021-12-05 DIAGNOSIS — F32.9 MAJOR DEPRESSIVE DISORDER, SINGLE EPISODE, UNSPECIFIED: ICD-10-CM

## 2021-12-05 DIAGNOSIS — Z79.01 LONG TERM (CURRENT) USE OF ANTICOAGULANTS: ICD-10-CM

## 2021-12-05 DIAGNOSIS — Z95.810 PRESENCE OF AUTOMATIC (IMPLANTABLE) CARDIAC DEFIBRILLATOR: ICD-10-CM

## 2021-12-05 DIAGNOSIS — I13.0 HYPERTENSIVE HEART AND CHRONIC KIDNEY DISEASE WITH HEART FAILURE AND STAGE 1 THROUGH STAGE 4 CHRONIC KIDNEY DISEASE, OR UNSPECIFIED CHRONIC KIDNEY DISEASE: ICD-10-CM

## 2021-12-09 ENCOUNTER — APPOINTMENT (OUTPATIENT)
Dept: HEMATOLOGY ONCOLOGY | Facility: CLINIC | Age: 61
End: 2021-12-09
Payer: MEDICARE

## 2021-12-09 DIAGNOSIS — E11.40 TYPE 2 DIABETES MELLITUS WITH DIABETIC NEUROPATHY, UNSPECIFIED: ICD-10-CM

## 2021-12-09 DIAGNOSIS — C90.00 MULTIPLE MYELOMA NOT HAVING ACHIEVED REMISSION: ICD-10-CM

## 2021-12-09 DIAGNOSIS — E11.65 TYPE 2 DIABETES MELLITUS WITH DIABETIC NEUROPATHY, UNSPECIFIED: ICD-10-CM

## 2021-12-09 PROCEDURE — 99214 OFFICE O/P EST MOD 30 MIN: CPT | Mod: 25,95

## 2021-12-09 NOTE — ASSESSMENT
[FreeTextEntry1] : \par Discussed with patient the need to have repeat blood work..\par \par Once those results are available I will renew her Decadron and Ninlaro as indicated.

## 2021-12-09 NOTE — HISTORY OF PRESENT ILLNESS
[de-identified] : 59 y/o with long standing DM, CRF and anemia was found to have a light chain paraprotein...discussed with pt, will arrange for CT guided BM aspirate and biopsy... [de-identified] : 8-6-2019 BM aspirate and Bx consistent with myeloma...this was discussed in detail with pt and all her questions were answered...\par \par January 29, 2020 patient returns for follow-up... She states she has been taking the weekly Decadron only... She did not return for her follow-up appointment because of family issues, her mother in law passed away, she might be evicted from her apartment,etc...\par \par 3-9-2020 Has been taking the Decadron weekly on Monday with Ninlaro x 3 weeks, patient has a full understanding of the proper way to take the medications...Has a leg infection for which she takes antibiotics\par \par 7/23/2020 patient requested a follow-up appointment, since she wanted to know if the Decadron and the Ninlaro regiment is working.\par \par November 5, 2020 patient requested follow-up appointment to discuss her myeloma... Reviewed blood work done with Dr. Neal's office, her anemia and kidney function are stable, however her immune globulin level was not checked... Patient states she fell and sustained fracture in 2 places on her right leg\par \par \par August 24, 2021 patient has not been seen in the office in about 10 months... She states she had Covid in meanwhile... She has stopped taking the Decadron and Ninlaro for her myeloma...\par \par December 9, 2021 patient requested a follow-up telehealth.. She states nobody came to do her blood work and home draw... She keeps saying she can hear me during the interview meanwhile her television is blasting in the background.

## 2021-12-10 ENCOUNTER — APPOINTMENT (OUTPATIENT)
Dept: CARDIOLOGY | Facility: CLINIC | Age: 61
End: 2021-12-10

## 2021-12-14 ENCOUNTER — INPATIENT (INPATIENT)
Facility: HOSPITAL | Age: 61
LOS: 14 days | Discharge: HOME | DRG: 291 | End: 2021-12-29
Attending: INTERNAL MEDICINE | Admitting: INTERNAL MEDICINE
Payer: MEDICARE

## 2021-12-14 VITALS
SYSTOLIC BLOOD PRESSURE: 131 MMHG | HEIGHT: 65 IN | DIASTOLIC BLOOD PRESSURE: 81 MMHG | HEART RATE: 73 BPM | WEIGHT: 214.95 LBS | OXYGEN SATURATION: 100 % | TEMPERATURE: 98 F | RESPIRATION RATE: 20 BRPM

## 2021-12-14 DIAGNOSIS — Z95.828 PRESENCE OF OTHER VASCULAR IMPLANTS AND GRAFTS: Chronic | ICD-10-CM

## 2021-12-14 DIAGNOSIS — Z95.810 PRESENCE OF AUTOMATIC (IMPLANTABLE) CARDIAC DEFIBRILLATOR: Chronic | ICD-10-CM

## 2021-12-14 DIAGNOSIS — Z90.49 ACQUIRED ABSENCE OF OTHER SPECIFIED PARTS OF DIGESTIVE TRACT: Chronic | ICD-10-CM

## 2021-12-14 DIAGNOSIS — Z90.710 ACQUIRED ABSENCE OF BOTH CERVIX AND UTERUS: Chronic | ICD-10-CM

## 2021-12-14 DIAGNOSIS — Z98.51 TUBAL LIGATION STATUS: Chronic | ICD-10-CM

## 2021-12-14 LAB
ANION GAP SERPL CALC-SCNC: 11 MMOL/L — SIGNIFICANT CHANGE UP (ref 5–17)
APTT BLD: 35.1 SEC — SIGNIFICANT CHANGE UP (ref 27.5–35.5)
BASOPHILS # BLD AUTO: 0.01 K/UL — SIGNIFICANT CHANGE UP (ref 0–0.2)
BASOPHILS NFR BLD AUTO: 0.1 % — SIGNIFICANT CHANGE UP (ref 0–2)
BLD GP AB SCN SERPL QL: NEGATIVE — SIGNIFICANT CHANGE UP
BUN SERPL-MCNC: 82 MG/DL — HIGH (ref 7–23)
CALCIUM SERPL-MCNC: 9 MG/DL — SIGNIFICANT CHANGE UP (ref 8.4–10.5)
CHLORIDE SERPL-SCNC: 103 MMOL/L — SIGNIFICANT CHANGE UP (ref 96–108)
CO2 SERPL-SCNC: 28 MMOL/L — SIGNIFICANT CHANGE UP (ref 22–31)
CREAT SERPL-MCNC: 3.73 MG/DL — HIGH (ref 0.5–1.3)
EOSINOPHIL # BLD AUTO: 0.01 K/UL — SIGNIFICANT CHANGE UP (ref 0–0.5)
EOSINOPHIL NFR BLD AUTO: 0.1 % — SIGNIFICANT CHANGE UP (ref 0–6)
GLUCOSE SERPL-MCNC: 120 MG/DL — HIGH (ref 70–99)
HCT VFR BLD CALC: 24.5 % — LOW (ref 34.5–45)
HGB BLD-MCNC: 7.7 G/DL — LOW (ref 11.5–15.5)
IMM GRANULOCYTES NFR BLD AUTO: 0.6 % — SIGNIFICANT CHANGE UP (ref 0–1.5)
INR BLD: 1.47 — HIGH (ref 0.88–1.16)
LYMPHOCYTES # BLD AUTO: 0.86 K/UL — LOW (ref 1–3.3)
LYMPHOCYTES # BLD AUTO: 5.5 % — LOW (ref 13–44)
MCHC RBC-ENTMCNC: 29.6 PG — SIGNIFICANT CHANGE UP (ref 27–34)
MCHC RBC-ENTMCNC: 31.4 GM/DL — LOW (ref 32–36)
MCV RBC AUTO: 94.2 FL — SIGNIFICANT CHANGE UP (ref 80–100)
MONOCYTES # BLD AUTO: 1.45 K/UL — HIGH (ref 0–0.9)
MONOCYTES NFR BLD AUTO: 9.2 % — SIGNIFICANT CHANGE UP (ref 2–14)
NEUTROPHILS # BLD AUTO: 13.27 K/UL — HIGH (ref 1.8–7.4)
NEUTROPHILS NFR BLD AUTO: 84.5 % — HIGH (ref 43–77)
NRBC # BLD: 0 /100 WBCS — SIGNIFICANT CHANGE UP (ref 0–0)
PLATELET # BLD AUTO: 190 K/UL — SIGNIFICANT CHANGE UP (ref 150–400)
POTASSIUM SERPL-MCNC: 4.4 MMOL/L — SIGNIFICANT CHANGE UP (ref 3.5–5.3)
POTASSIUM SERPL-SCNC: 4.4 MMOL/L — SIGNIFICANT CHANGE UP (ref 3.5–5.3)
PROTHROM AB SERPL-ACNC: 17.3 SEC — HIGH (ref 10.6–13.6)
RBC # BLD: 2.6 M/UL — LOW (ref 3.8–5.2)
RBC # FLD: 14.3 % — SIGNIFICANT CHANGE UP (ref 10.3–14.5)
RH IG SCN BLD-IMP: POSITIVE — SIGNIFICANT CHANGE UP
SARS-COV-2 RNA SPEC QL NAA+PROBE: NEGATIVE — SIGNIFICANT CHANGE UP
SODIUM SERPL-SCNC: 142 MMOL/L — SIGNIFICANT CHANGE UP (ref 135–145)
WBC # BLD: 15.69 K/UL — HIGH (ref 3.8–10.5)
WBC # FLD AUTO: 15.69 K/UL — HIGH (ref 3.8–10.5)

## 2021-12-14 PROCEDURE — 93010 ELECTROCARDIOGRAM REPORT: CPT

## 2021-12-14 PROCEDURE — 71045 X-RAY EXAM CHEST 1 VIEW: CPT | Mod: 26

## 2021-12-14 PROCEDURE — 71250 CT THORAX DX C-: CPT | Mod: 26,MA

## 2021-12-14 PROCEDURE — 99285 EMERGENCY DEPT VISIT HI MDM: CPT

## 2021-12-14 RX ORDER — ACETAMINOPHEN 500 MG
1000 TABLET ORAL ONCE
Refills: 0 | Status: COMPLETED | OUTPATIENT
Start: 2021-12-14 | End: 2021-12-14

## 2021-12-14 RX ORDER — FUROSEMIDE 40 MG
40 TABLET ORAL ONCE
Refills: 0 | Status: COMPLETED | OUTPATIENT
Start: 2021-12-14 | End: 2021-12-14

## 2021-12-14 RX ORDER — IPRATROPIUM/ALBUTEROL SULFATE 18-103MCG
3 AEROSOL WITH ADAPTER (GRAM) INHALATION
Refills: 0 | Status: COMPLETED | OUTPATIENT
Start: 2021-12-14 | End: 2021-12-14

## 2021-12-14 RX ADMIN — Medication 400 MILLIGRAM(S): at 17:36

## 2021-12-14 RX ADMIN — Medication 3 MILLILITER(S): at 18:10

## 2021-12-14 RX ADMIN — Medication 40 MILLIGRAM(S): at 23:59

## 2021-12-14 RX ADMIN — Medication 3 MILLILITER(S): at 17:36

## 2021-12-14 RX ADMIN — Medication 3 MILLILITER(S): at 18:15

## 2021-12-14 NOTE — CONSULT NOTE ADULT - ATTENDING COMMENTS
Hemoptysis, not well documented, no picture or collected specimen.  Patient is clinically fluid overloaded, in setting of AC for af this alone can be a cause of hemoptysis.  If it does not subsides with diuresis will recommend bronchoscopy as she has COPD and is current smoker. No obvious lesion noted on CT, small  subpleural opacities of unclear significance noted, will need follow up CT.

## 2021-12-14 NOTE — ED PROVIDER NOTE - PHYSICAL EXAMINATION
Constitutional: Well appearing, awake, alert, oriented to person, place, time/situation and in no apparent distress.  ENMT: Airway patent. Normal MM  Eyes: Clear bilaterally  Cardiac: Normal rate, regular rhythm.  Heart sounds S1, S2.  No murmurs, rubs or gallops.  Respiratory: Diminished throughout. + exp wheezing L > R, No increased WOB, tachypnea, hypoxia, or accessory mm use. Pt speaks in full sentences. Sp02 on 3L %   Gastrointestinal: Abd soft, NT, obese, NABS. No guarding, rebound, or rigidity. No pulsatile abdominal masses.   Musculoskeletal: Range of motion is not limited. 1+ pitting edema b/l LE  Neuro: Alert and oriented x 3, face symmetric and speech fluent. Strength 5/5 x 4 ext and symmetric, nml gross motor movement, nml gait. No focal deficits noted.  Skin: Skin normal color for race, warm, dry and intact. No evidence of rash.  Psych: Alert and oriented to person, place, time/situation. normal mood and affect. no apparent risk to self or others. Constitutional: Well appearing, awake, alert, oriented to person, place, time/situation and in no apparent distress.  ENMT: Airway patent. Normal MM  Eyes: Clear bilaterally  Cardiac: Normal rate, regular rhythm.  Heart sounds S1, S2.  No murmurs, rubs or gallops.  Respiratory: Diminished throughout. + exp wheezing L > R, coarse BS. No increased WOB, tachypnea, hypoxia, or accessory mm use. Pt speaks in full sentences. Sp02 on 3L %   Gastrointestinal: Abd soft, NT, obese, NABS. No guarding, rebound, or rigidity. No pulsatile abdominal masses.   Musculoskeletal: Range of motion is not limited. 1+ pitting edema b/l LE  Neuro: Alert and oriented x 3, face symmetric and speech fluent. Strength 5/5 x 4 ext and symmetric, nml gross motor movement, nml gait. No focal deficits noted.  Skin: Skin normal color for race, warm, dry and intact. No evidence of rash.  Psych: Alert and oriented to person, place, time/situation. normal mood and affect. no apparent risk to self or others.

## 2021-12-14 NOTE — ED PROVIDER NOTE - NS ED ROS FT
Constitutional: No fever or chills.   Eyes: No pain, blurry vision, or discharge.  ENMT: No hearing changes, pain, discharge or infections. No neck pain or stiffness.  Cardiac: See HPI.  Respiratory: See HPI  GI: No nausea, vomiting, diarrhea or abdominal pain.  : No dysuria, frequency or burning.  MS: No myalgia, muscle weakness, joint pain or back pain.  Neuro: No headache or weakness. No LOC.  Skin: No skin rash.   Endocrine: No history of thyroid disease or diabetes.  Except as documented in the HPI, all other systems are negative.

## 2021-12-14 NOTE — ED PROVIDER NOTE - OBJECTIVE STATEMENT
Pt w/ PMHx COPD on 3L NC, CVA w/ R sided weakness, IDDM w/ neuropathy, MM, CAD, HTN, hypothyroidism, NICM w/ EF 25% s/p ICD, DVT on Eliquis, CKD, PAD s/p bypass, chronic pain on methadone 10 mg, DHARMESH w/ CPAP p/w hemoptysis, onset today. She reports 3 days of cough, initially w/ green sputum. She reports today she started coughing keyona blood. She reports approx 7 episodes of small amount of blood, gesturing to measure approx 2 mL. She reports inc SOB. She denies CP, f/c, n/v/d. She reports stable LE edema and chronic orthopnea. No hx TB, CHINO. No hx IVDA.  + COVID vax x 2

## 2021-12-14 NOTE — ED PROVIDER NOTE - PROGRESS NOTE DETAILS
Pt seen by pulm, CT shows CHF. No acute pulm intervention. Significantly increased BNP compared to prior. Will admit to cardio for IV diuresis. Pulm consulted in the setting of reported hemoptysis on AC w/ dec in H/H. No hemoptysis in ED. CXR w/ cardiomegaly and mild congestion. Pulm agrees w/ CT and they will see pt. Pulm feels hemoptysis low volume bleeding and unlikely to cause drop in H/H Pt seen by pulm, CT shows CHF. No acute pulm intervention. No hemoptysis in ED. Significantly increased BNP compared to prior. Will admit to cardio for IV diuresis.

## 2021-12-14 NOTE — CONSULT NOTE ADULT - ASSESSMENT
60 y/o morbidly obese female w/ hx of HTN, NICM, HFrEF s/p AICD + MEMs device, IDDM c/b b/l LE neuropathy, recurrent DVT, CKD 4, CVA x2, COPD on 3 L O2, DHARMESH, PAD s/p bypass, multiple myeloma who presents from home for hemoptysis likely in the setting of recurrent decompensated heart failure exacerbation.     Hemoptysis  - Hemoptysis is mild, small volumes likely due to cough in combination with increased hydrostatic pressures in the capillary beds from decompensated heart failure. This is further exacerbated by the presence of Eliquis. She has a extensive smoking history and COPD, a CT of the chest is warranted to evaluated for potential other etiologies of hemoptysis such as bronchiectasis or lung cancers. Doubt PE given patient on therapeutic Eliquis which she is taking consistently. Patient is clearing the small volumes of blood and protecting her airway, no role for bronchoscopic evaluation at this time.   - will f/u CT of the chest to evaluate parenchymal disease     COPD  - Patient does not appear to be in active COPD exacerbation at this time. No evidence of wheezing on exam, cough and hemoptysis are likely related to decompensated HF. She is on her home oxygen via NC.   - C/w previous home regimen: symbicort BID, duonebs q 6 prn wheezing while inpatient   - diuresis per primary team   - will need f/u with Dr. Payne at discharge     DHARMESH   - c/w home CPAP

## 2021-12-14 NOTE — ED PROVIDER NOTE - CLINICAL SUMMARY MEDICAL DECISION MAKING FREE TEXT BOX
Pt p/w hemoptysis, cough, SOB. No hemoptysis in the ED. No inc O2 demands. DDx includes but not limited to bronchitis, other pulm infection, bleeding diathesis 2/2 AC use, bronchopulm mass, less likely PE(on AC), decompensated HF, other pathology. Check labs, XR, probably CT chest. Pulm consult. Dispo pending w/u and clinical status

## 2021-12-14 NOTE — ED ADULT TRIAGE NOTE - CHIEF COMPLAINT QUOTE
pt BIBA from home for bloody cough, pt reports coughing for thew past 3 days and started to see blood and clots when coughing today, per reports pt with hx of DM,  multiple myeloma - on oral meds, CVA, MI and COPD with 3l o2 at home, take eliquis, speaking in full sentences, no active bleeding noted

## 2021-12-14 NOTE — ED ADULT TRIAGE NOTE - NS ED TRIAGE AVPU SCALE
Problem: Perioperative Period (Adult)  Goal: Signs and Symptoms of Listed Potential Problems Will be Absent or Manageable (Perioperative Period)  Outcome: Ongoing (interventions implemented as appropriate)    03/15/17 0912   Perioperative Period   Problems Assessed (Perioperative Period) pain   Problems Present (Perioperative Period) none            Alert-The patient is alert, awake and responds to voice. The patient is oriented to time, place, and person. The triage nurse is able to obtain subjective information.

## 2021-12-14 NOTE — ED ADULT NURSE NOTE - OBJECTIVE STATEMENT
61y female presents to ED c/o cough x3 days, noted clots in cough starting today. Noted to have redness, warmth, swelling, and pain to right leg. Pt on eliquis for previous dvt 3 years ago. Pt speaking in full and complete sentences. O2 sat 100% on 3L NC, which is what pt usually receives at home. Pt has pmh of mi, copd, dvt. Denies chest pain. A&Ox4.

## 2021-12-14 NOTE — CONSULT NOTE ADULT - SUBJECTIVE AND OBJECTIVE BOX
PULMONARY SERVICE INITIAL CONSULT NOTE    HPI:    60 y/o morbidly obese female w/ hx of HTN, NICM, HFrEF s/p AICD + MEMs device, IDDM c/b b/l LE neuropathy, recurrent DVT, CKD 4, CVA x2, COPD on 3 L O2, DHARMESH, PAD s/p bypass, multiple myeloma who presents from home for hemoptysis. Patient was recently admitted to the cardiology service in late November for decompensated HFrEF. Patient states that over the past 4 days she has become progressively more dyspneic on exertion accompanied by cough. Cough was initially dry but today began to have small amounts of bright red blood when she coughed. She believes the blood game as "globs" and is unclear if it was blood mixed with sputum or blood. She describes the volumes as small and occurring intermittently throughout the day - it does not occur each time she coughs. The cough has been associated with progressively worsening swelling in the bilateral lower extremities as well as worsening orthopnea at home. She is able to ambulate slowly however now is limited to short steps due to dyspnea. She has not noted any fevers, chills or rigors at home. There have been no sick contacts. She has not traveled. She continues to smoke actively at home. The cough was not relieved by ventolin at home. She reports having been compliant with all of her medications including GDMT for HF and her inhalers for COPD. She reports using her CPAP at [facility]. She completed prednisone after her last admission for mild COPD exacerbation in the setting of rhino/enterovirus. She was scheduled to f/u with Dr. Payne for pulmonary f/u but has not yet made an appointment in person.     REVIEW OF SYSTEMS:  All additional ROS negative.    PAST MEDICAL & SURGICAL HISTORY:  CHF (congestive heart failure)    COPD (chronic obstructive pulmonary disease)    HLD (hyperlipidemia)    Chronic pain    DM (diabetes mellitus)    CVA (cerebral vascular accident)    DVT (deep venous thrombosis)    HTN (hypertension)    Depression    Bipolar depression    PAD (peripheral artery disease)    Neuropathy    Hypothyroidism    Multiple myeloma    CKD (chronic kidney disease), stage IV    DHARMESH on CPAP    AICD (automatic cardioverter/defibrillator) present    H/O abdominal hysterectomy    H/O tubal ligation    History of cholecystectomy    H/O extremity bypass graft        FAMILY HISTORY:  Family history of hypertension (Mother)    Family history of diabetes mellitus (Mother)        SOCIAL HISTORY:  Smoking Status: [x] Current, [ ] Former, [ ] Never  Pack Years: 35    MEDICATIONS:  Pulmonary:    Antimicrobials:    Anticoagulants:    Onc:    GI/:    Endocrine:    Cardiac:    Other Medications:      Allergies    aspirin (Other (Moderate); Rash)  oral contrast (Unknown)    Intolerances        Vital Signs Last 24 Hrs  T(C): 36.8 (14 Dec 2021 15:24), Max: 36.8 (14 Dec 2021 15:24)  T(F): 98.2 (14 Dec 2021 15:24), Max: 98.2 (14 Dec 2021 15:24)  HR: 73 (14 Dec 2021 15:24) (73 - 73)  BP: 131/81 (14 Dec 2021 15:24) (131/81 - 131/81)  BP(mean): --  RR: 20 (14 Dec 2021 15:24) (20 - 20)  SpO2: 100% (14 Dec 2021 15:24) (100% - 100%)        PHYSICAL EXAM:  Constitutional: No acute distress, sleeping in bed - arousable to voice   Head: NC/AT  EENT: PERRL, anicteric sclera; oropharynx clear, MMM. No blood in the oropharynx or nares   Neck: supple, +Jvd  Respiratory: bibasilar rales to midchest, scattered rhonchi. no wheezing noted   Cardiovascular: +S1/S2, RRR  Gastrointestinal: soft, NT/ND  Extremities: WWP; 2+ pitting edema b/l lower extremities. no clubbing or cyanosis  Vascular: 2+ radial pulses B/L  Neurological: awake and alert; VO    LABS:      CBC Full  -  ( 14 Dec 2021 16:54 )  WBC Count : 15.69 K/uL  RBC Count : 2.60 M/uL  Hemoglobin : 7.7 g/dL  Hematocrit : 24.5 %  Platelet Count - Automated : 190 K/uL  Mean Cell Volume : 94.2 fl  Mean Cell Hemoglobin : 29.6 pg  Mean Cell Hemoglobin Concentration : 31.4 gm/dL  Auto Neutrophil # : 13.27 K/uL  Auto Lymphocyte # : 0.86 K/uL  Auto Monocyte # : 1.45 K/uL  Auto Eosinophil # : 0.01 K/uL  Auto Basophil # : 0.01 K/uL  Auto Neutrophil % : 84.5 %  Auto Lymphocyte % : 5.5 %  Auto Monocyte % : 9.2 %  Auto Eosinophil % : 0.1 %  Auto Basophil % : 0.1 %    12-14    142  |  103  |  82<H>  ----------------------------<  120<H>  4.4   |  28  |  3.73<H>    Ca    9.0      14 Dec 2021 16:54      PT/INR - ( 14 Dec 2021 16:54 )   PT: 17.3 sec;   INR: 1.47          PTT - ( 14 Dec 2021 16:54 )  PTT:35.1 sec                  RADIOLOGY & ADDITIONAL STUDIES:    Xray Chest 1 View-PORTABLE IMMEDIATE (Xray Chest 1 View-PORTABLE IMMEDIATE .)  Impression: Prominent cardiac silhouette. Suggestion of pulmonary   vascular congestion.

## 2021-12-15 ENCOUNTER — APPOINTMENT (OUTPATIENT)
Dept: HEART AND VASCULAR | Facility: CLINIC | Age: 61
End: 2021-12-15

## 2021-12-15 DIAGNOSIS — I50.23 ACUTE ON CHRONIC SYSTOLIC (CONGESTIVE) HEART FAILURE: ICD-10-CM

## 2021-12-15 DIAGNOSIS — E11.9 TYPE 2 DIABETES MELLITUS WITHOUT COMPLICATIONS: ICD-10-CM

## 2021-12-15 DIAGNOSIS — R04.2 HEMOPTYSIS: ICD-10-CM

## 2021-12-15 DIAGNOSIS — C90.00 MULTIPLE MYELOMA NOT HAVING ACHIEVED REMISSION: ICD-10-CM

## 2021-12-15 DIAGNOSIS — D64.9 ANEMIA, UNSPECIFIED: ICD-10-CM

## 2021-12-15 DIAGNOSIS — N18.4 CHRONIC KIDNEY DISEASE, STAGE 4 (SEVERE): ICD-10-CM

## 2021-12-15 LAB
ALBUMIN SERPL ELPH-MCNC: 3.1 G/DL — LOW (ref 3.3–5)
ALP SERPL-CCNC: 88 U/L — SIGNIFICANT CHANGE UP (ref 40–120)
ALT FLD-CCNC: 12 U/L — SIGNIFICANT CHANGE UP (ref 10–45)
ANION GAP SERPL CALC-SCNC: 11 MMOL/L — SIGNIFICANT CHANGE UP (ref 5–17)
ANION GAP SERPL CALC-SCNC: 12 MMOL/L — SIGNIFICANT CHANGE UP (ref 5–17)
AST SERPL-CCNC: 12 U/L — SIGNIFICANT CHANGE UP (ref 10–40)
BILIRUB SERPL-MCNC: 0.3 MG/DL — SIGNIFICANT CHANGE UP (ref 0.2–1.2)
BUN SERPL-MCNC: 78 MG/DL — HIGH (ref 7–23)
BUN SERPL-MCNC: 80 MG/DL — HIGH (ref 7–23)
CALCIUM SERPL-MCNC: 8.4 MG/DL — SIGNIFICANT CHANGE UP (ref 8.4–10.5)
CALCIUM SERPL-MCNC: 8.6 MG/DL — SIGNIFICANT CHANGE UP (ref 8.4–10.5)
CHLORIDE SERPL-SCNC: 103 MMOL/L — SIGNIFICANT CHANGE UP (ref 96–108)
CHLORIDE SERPL-SCNC: 104 MMOL/L — SIGNIFICANT CHANGE UP (ref 96–108)
CO2 SERPL-SCNC: 28 MMOL/L — SIGNIFICANT CHANGE UP (ref 22–31)
CO2 SERPL-SCNC: 29 MMOL/L — SIGNIFICANT CHANGE UP (ref 22–31)
CREAT SERPL-MCNC: 3.87 MG/DL — HIGH (ref 0.5–1.3)
CREAT SERPL-MCNC: 3.94 MG/DL — HIGH (ref 0.5–1.3)
GLUCOSE BLDC GLUCOMTR-MCNC: 107 MG/DL — HIGH (ref 70–99)
GLUCOSE BLDC GLUCOMTR-MCNC: 113 MG/DL — HIGH (ref 70–99)
GLUCOSE BLDC GLUCOMTR-MCNC: 154 MG/DL — HIGH (ref 70–99)
GLUCOSE BLDC GLUCOMTR-MCNC: 89 MG/DL — SIGNIFICANT CHANGE UP (ref 70–99)
GLUCOSE SERPL-MCNC: 110 MG/DL — HIGH (ref 70–99)
GLUCOSE SERPL-MCNC: 113 MG/DL — HIGH (ref 70–99)
HCT VFR BLD CALC: 24.2 % — LOW (ref 34.5–45)
HCT VFR BLD CALC: 27.7 % — LOW (ref 34.5–45)
HGB BLD-MCNC: 7.3 G/DL — LOW (ref 11.5–15.5)
HGB BLD-MCNC: 8.5 G/DL — LOW (ref 11.5–15.5)
MAGNESIUM SERPL-MCNC: 2 MG/DL — SIGNIFICANT CHANGE UP (ref 1.6–2.6)
MCHC RBC-ENTMCNC: 28.4 PG — SIGNIFICANT CHANGE UP (ref 27–34)
MCHC RBC-ENTMCNC: 29.2 PG — SIGNIFICANT CHANGE UP (ref 27–34)
MCHC RBC-ENTMCNC: 30.2 GM/DL — LOW (ref 32–36)
MCHC RBC-ENTMCNC: 30.7 GM/DL — LOW (ref 32–36)
MCV RBC AUTO: 94.2 FL — SIGNIFICANT CHANGE UP (ref 80–100)
MCV RBC AUTO: 95.2 FL — SIGNIFICANT CHANGE UP (ref 80–100)
NRBC # BLD: 0 /100 WBCS — SIGNIFICANT CHANGE UP (ref 0–0)
NRBC # BLD: 0 /100 WBCS — SIGNIFICANT CHANGE UP (ref 0–0)
NT-PROBNP SERPL-SCNC: HIGH PG/ML (ref 0–300)
PLATELET # BLD AUTO: 159 K/UL — SIGNIFICANT CHANGE UP (ref 150–400)
PLATELET # BLD AUTO: 181 K/UL — SIGNIFICANT CHANGE UP (ref 150–400)
POTASSIUM SERPL-MCNC: 3.8 MMOL/L — SIGNIFICANT CHANGE UP (ref 3.5–5.3)
POTASSIUM SERPL-MCNC: 4.2 MMOL/L — SIGNIFICANT CHANGE UP (ref 3.5–5.3)
POTASSIUM SERPL-SCNC: 3.8 MMOL/L — SIGNIFICANT CHANGE UP (ref 3.5–5.3)
POTASSIUM SERPL-SCNC: 4.2 MMOL/L — SIGNIFICANT CHANGE UP (ref 3.5–5.3)
PROT SERPL-MCNC: 6.7 G/DL — SIGNIFICANT CHANGE UP (ref 6–8.3)
RAPID RVP RESULT: SIGNIFICANT CHANGE UP
RBC # BLD: 2.57 M/UL — LOW (ref 3.8–5.2)
RBC # BLD: 2.91 M/UL — LOW (ref 3.8–5.2)
RBC # FLD: 14.6 % — HIGH (ref 10.3–14.5)
RBC # FLD: 14.6 % — HIGH (ref 10.3–14.5)
SARS-COV-2 RNA SPEC QL NAA+PROBE: SIGNIFICANT CHANGE UP
SODIUM SERPL-SCNC: 143 MMOL/L — SIGNIFICANT CHANGE UP (ref 135–145)
SODIUM SERPL-SCNC: 144 MMOL/L — SIGNIFICANT CHANGE UP (ref 135–145)
TROPONIN T SERPL-MCNC: 0.05 NG/ML — CRITICAL HIGH (ref 0–0.01)
WBC # BLD: 10.22 K/UL — SIGNIFICANT CHANGE UP (ref 3.8–10.5)
WBC # BLD: 10.24 K/UL — SIGNIFICANT CHANGE UP (ref 3.8–10.5)
WBC # FLD AUTO: 10.22 K/UL — SIGNIFICANT CHANGE UP (ref 3.8–10.5)
WBC # FLD AUTO: 10.24 K/UL — SIGNIFICANT CHANGE UP (ref 3.8–10.5)

## 2021-12-15 PROCEDURE — 99222 1ST HOSP IP/OBS MODERATE 55: CPT

## 2021-12-15 PROCEDURE — 36000 PLACE NEEDLE IN VEIN: CPT

## 2021-12-15 PROCEDURE — 99223 1ST HOSP IP/OBS HIGH 75: CPT

## 2021-12-15 PROCEDURE — 76937 US GUIDE VASCULAR ACCESS: CPT | Mod: 26

## 2021-12-15 PROCEDURE — 99223 1ST HOSP IP/OBS HIGH 75: CPT | Mod: GC

## 2021-12-15 RX ORDER — DEXAMETHASONE 0.5 MG/5ML
1 ELIXIR ORAL
Qty: 0 | Refills: 0 | DISCHARGE

## 2021-12-15 RX ORDER — SODIUM CHLORIDE 9 MG/ML
1000 INJECTION, SOLUTION INTRAVENOUS
Refills: 0 | Status: DISCONTINUED | OUTPATIENT
Start: 2021-12-15 | End: 2021-12-29

## 2021-12-15 RX ORDER — ALBUTEROL 90 UG/1
2.5 AEROSOL, METERED ORAL EVERY 6 HOURS
Refills: 0 | Status: DISCONTINUED | OUTPATIENT
Start: 2021-12-15 | End: 2021-12-29

## 2021-12-15 RX ORDER — GABAPENTIN 400 MG/1
600 CAPSULE ORAL
Refills: 0 | Status: DISCONTINUED | OUTPATIENT
Start: 2021-12-15 | End: 2021-12-23

## 2021-12-15 RX ORDER — PANTOPRAZOLE SODIUM 20 MG/1
40 TABLET, DELAYED RELEASE ORAL
Refills: 0 | Status: DISCONTINUED | OUTPATIENT
Start: 2021-12-15 | End: 2021-12-29

## 2021-12-15 RX ORDER — HEPARIN SODIUM 5000 [USP'U]/ML
7500 INJECTION INTRAVENOUS; SUBCUTANEOUS EVERY 8 HOURS
Refills: 0 | Status: DISCONTINUED | OUTPATIENT
Start: 2021-12-15 | End: 2021-12-17

## 2021-12-15 RX ORDER — INSULIN GLARGINE 100 [IU]/ML
20 INJECTION, SOLUTION SUBCUTANEOUS AT BEDTIME
Refills: 0 | Status: DISCONTINUED | OUTPATIENT
Start: 2021-12-15 | End: 2021-12-16

## 2021-12-15 RX ORDER — DEXTROSE 50 % IN WATER 50 %
12.5 SYRINGE (ML) INTRAVENOUS ONCE
Refills: 0 | Status: DISCONTINUED | OUTPATIENT
Start: 2021-12-15 | End: 2021-12-29

## 2021-12-15 RX ORDER — GLUCAGON INJECTION, SOLUTION 0.5 MG/.1ML
1 INJECTION, SOLUTION SUBCUTANEOUS ONCE
Refills: 0 | Status: DISCONTINUED | OUTPATIENT
Start: 2021-12-15 | End: 2021-12-29

## 2021-12-15 RX ORDER — DEXTROSE 50 % IN WATER 50 %
25 SYRINGE (ML) INTRAVENOUS ONCE
Refills: 0 | Status: DISCONTINUED | OUTPATIENT
Start: 2021-12-15 | End: 2021-12-29

## 2021-12-15 RX ORDER — DEXTROSE 50 % IN WATER 50 %
15 SYRINGE (ML) INTRAVENOUS ONCE
Refills: 0 | Status: DISCONTINUED | OUTPATIENT
Start: 2021-12-15 | End: 2021-12-29

## 2021-12-15 RX ORDER — SACUBITRIL AND VALSARTAN 24; 26 MG/1; MG/1
1 TABLET, FILM COATED ORAL
Refills: 0 | Status: DISCONTINUED | OUTPATIENT
Start: 2021-12-15 | End: 2021-12-29

## 2021-12-15 RX ORDER — FENOFIBRATE,MICRONIZED 130 MG
48 CAPSULE ORAL DAILY
Refills: 0 | Status: DISCONTINUED | OUTPATIENT
Start: 2021-12-15 | End: 2021-12-29

## 2021-12-15 RX ORDER — LEVOTHYROXINE SODIUM 125 MCG
25 TABLET ORAL DAILY
Refills: 0 | Status: DISCONTINUED | OUTPATIENT
Start: 2021-12-15 | End: 2021-12-29

## 2021-12-15 RX ORDER — BUDESONIDE AND FORMOTEROL FUMARATE DIHYDRATE 160; 4.5 UG/1; UG/1
2 AEROSOL RESPIRATORY (INHALATION)
Refills: 0 | Status: DISCONTINUED | OUTPATIENT
Start: 2021-12-15 | End: 2021-12-29

## 2021-12-15 RX ORDER — INSULIN LISPRO 100/ML
10 VIAL (ML) SUBCUTANEOUS
Refills: 0 | Status: DISCONTINUED | OUTPATIENT
Start: 2021-12-15 | End: 2021-12-16

## 2021-12-15 RX ORDER — INSULIN LISPRO 100/ML
VIAL (ML) SUBCUTANEOUS
Refills: 0 | Status: DISCONTINUED | OUTPATIENT
Start: 2021-12-15 | End: 2021-12-29

## 2021-12-15 RX ORDER — INSULIN LISPRO 100/ML
VIAL (ML) SUBCUTANEOUS AT BEDTIME
Refills: 0 | Status: DISCONTINUED | OUTPATIENT
Start: 2021-12-15 | End: 2021-12-15

## 2021-12-15 RX ORDER — LIDOCAINE 4 G/100G
1 CREAM TOPICAL DAILY
Refills: 0 | Status: DISCONTINUED | OUTPATIENT
Start: 2021-12-15 | End: 2021-12-29

## 2021-12-15 RX ORDER — TRAZODONE HCL 50 MG
50 TABLET ORAL AT BEDTIME
Refills: 0 | Status: DISCONTINUED | OUTPATIENT
Start: 2021-12-15 | End: 2021-12-20

## 2021-12-15 RX ORDER — METHADONE HYDROCHLORIDE 40 MG/1
10 TABLET ORAL THREE TIMES A DAY
Refills: 0 | Status: DISCONTINUED | OUTPATIENT
Start: 2021-12-15 | End: 2021-12-22

## 2021-12-15 RX ORDER — ACETAMINOPHEN 500 MG
1000 TABLET ORAL ONCE
Refills: 0 | Status: COMPLETED | OUTPATIENT
Start: 2021-12-15 | End: 2021-12-15

## 2021-12-15 RX ORDER — AMITRIPTYLINE HCL 25 MG
10 TABLET ORAL ONCE
Refills: 0 | Status: COMPLETED | OUTPATIENT
Start: 2021-12-15 | End: 2021-12-15

## 2021-12-15 RX ORDER — ATORVASTATIN CALCIUM 80 MG/1
80 TABLET, FILM COATED ORAL AT BEDTIME
Refills: 0 | Status: DISCONTINUED | OUTPATIENT
Start: 2021-12-15 | End: 2021-12-29

## 2021-12-15 RX ORDER — TRAZODONE HCL 50 MG
50 TABLET ORAL ONCE
Refills: 0 | Status: COMPLETED | OUTPATIENT
Start: 2021-12-15 | End: 2021-12-15

## 2021-12-15 RX ORDER — AMITRIPTYLINE HCL 25 MG
10 TABLET ORAL AT BEDTIME
Refills: 0 | Status: DISCONTINUED | OUTPATIENT
Start: 2021-12-15 | End: 2021-12-18

## 2021-12-15 RX ORDER — ESCITALOPRAM OXALATE 10 MG/1
20 TABLET, FILM COATED ORAL DAILY
Refills: 0 | Status: DISCONTINUED | OUTPATIENT
Start: 2021-12-15 | End: 2021-12-18

## 2021-12-15 RX ORDER — FUROSEMIDE 40 MG
40 TABLET ORAL
Refills: 0 | Status: DISCONTINUED | OUTPATIENT
Start: 2021-12-15 | End: 2021-12-18

## 2021-12-15 RX ORDER — ATORVASTATIN CALCIUM 80 MG/1
80 TABLET, FILM COATED ORAL ONCE
Refills: 0 | Status: COMPLETED | OUTPATIENT
Start: 2021-12-15 | End: 2021-12-15

## 2021-12-15 RX ORDER — METOPROLOL TARTRATE 50 MG
100 TABLET ORAL DAILY
Refills: 0 | Status: DISCONTINUED | OUTPATIENT
Start: 2021-12-15 | End: 2021-12-19

## 2021-12-15 RX ADMIN — BUDESONIDE AND FORMOTEROL FUMARATE DIHYDRATE 2 PUFF(S): 160; 4.5 AEROSOL RESPIRATORY (INHALATION) at 21:53

## 2021-12-15 RX ADMIN — Medication 1000 MILLIGRAM(S): at 20:10

## 2021-12-15 RX ADMIN — Medication 400 MILLIGRAM(S): at 19:48

## 2021-12-15 RX ADMIN — SACUBITRIL AND VALSARTAN 1 TABLET(S): 24; 26 TABLET, FILM COATED ORAL at 18:22

## 2021-12-15 RX ADMIN — ESCITALOPRAM OXALATE 20 MILLIGRAM(S): 10 TABLET, FILM COATED ORAL at 11:40

## 2021-12-15 RX ADMIN — SACUBITRIL AND VALSARTAN 1 TABLET(S): 24; 26 TABLET, FILM COATED ORAL at 09:59

## 2021-12-15 RX ADMIN — Medication 40 MILLIGRAM(S): at 18:43

## 2021-12-15 RX ADMIN — Medication 15 MILLIGRAM(S): at 03:12

## 2021-12-15 RX ADMIN — METHADONE HYDROCHLORIDE 10 MILLIGRAM(S): 40 TABLET ORAL at 22:14

## 2021-12-15 RX ADMIN — METHADONE HYDROCHLORIDE 10 MILLIGRAM(S): 40 TABLET ORAL at 14:37

## 2021-12-15 RX ADMIN — Medication 2: at 21:54

## 2021-12-15 RX ADMIN — Medication 10 UNIT(S): at 09:54

## 2021-12-15 RX ADMIN — Medication 48 MILLIGRAM(S): at 11:40

## 2021-12-15 RX ADMIN — ATORVASTATIN CALCIUM 80 MILLIGRAM(S): 80 TABLET, FILM COATED ORAL at 03:13

## 2021-12-15 RX ADMIN — Medication 15 MILLIGRAM(S): at 22:00

## 2021-12-15 RX ADMIN — PANTOPRAZOLE SODIUM 40 MILLIGRAM(S): 20 TABLET, DELAYED RELEASE ORAL at 09:55

## 2021-12-15 RX ADMIN — Medication 25 MICROGRAM(S): at 09:55

## 2021-12-15 RX ADMIN — Medication 50 MILLIGRAM(S): at 22:14

## 2021-12-15 RX ADMIN — GABAPENTIN 600 MILLIGRAM(S): 400 CAPSULE ORAL at 02:16

## 2021-12-15 RX ADMIN — BUDESONIDE AND FORMOTEROL FUMARATE DIHYDRATE 2 PUFF(S): 160; 4.5 AEROSOL RESPIRATORY (INHALATION) at 12:39

## 2021-12-15 RX ADMIN — METHADONE HYDROCHLORIDE 10 MILLIGRAM(S): 40 TABLET ORAL at 02:16

## 2021-12-15 RX ADMIN — LIDOCAINE 1 PATCH: 4 CREAM TOPICAL at 19:47

## 2021-12-15 RX ADMIN — Medication 100 MILLIGRAM(S): at 09:54

## 2021-12-15 RX ADMIN — INSULIN GLARGINE 20 UNIT(S): 100 INJECTION, SOLUTION SUBCUTANEOUS at 21:54

## 2021-12-15 RX ADMIN — Medication 50 MILLIGRAM(S): at 03:13

## 2021-12-15 RX ADMIN — GABAPENTIN 600 MILLIGRAM(S): 400 CAPSULE ORAL at 18:22

## 2021-12-15 RX ADMIN — HEPARIN SODIUM 7500 UNIT(S): 5000 INJECTION INTRAVENOUS; SUBCUTANEOUS at 18:23

## 2021-12-15 RX ADMIN — Medication 10 UNIT(S): at 14:26

## 2021-12-15 RX ADMIN — Medication 40 MILLIGRAM(S): at 10:08

## 2021-12-15 RX ADMIN — ATORVASTATIN CALCIUM 80 MILLIGRAM(S): 80 TABLET, FILM COATED ORAL at 21:55

## 2021-12-15 NOTE — H&P ADULT - EJECTION FRACTION >40 NO
severely reduced LV function no dysuria, no urinary urgency, no urinary frequency, no vaginal bleeding

## 2021-12-15 NOTE — CONSULT NOTE ADULT - SUBJECTIVE AND OBJECTIVE BOX
HPI:  62 yo obese Female, current smoker, with Contrast/Aspirin Allergy and an extensive PMHx including HTN, NICM, chronic HFrEF (EF 25% s/p AICD for primary prevention battery changed 8/2020, w/ cardiac Mems device), IDDM c/b neuropathy (on Methadone), hx of recurrent DVTs (on Eliquis), CKD IV, CVA x2 (most recently in 2013 with residual right sided weakness), PAD s/p bypass, COPD (on 3L home O2), DHARMESH on CPAP, Multiple Myeloma (on Ninlaro), hypothyroidism, depression who presented to [facility] with hemoptysis x 4 days   Pt states to having been coughing for 4 days, initially with green sputum, and on day of admission, she cough up keyona blood. She admits to SOB on minimal exertion, stable chronic  LE edema and orthopnea. Denies fever, sick contact, chest pain, N/V  In the ER, vitals with /81, HR 73, R 20, T 98.2, O2 sat 100% on 6L. Labs with H/H 7.7/24.5, Creat 3.73, BNP 31686, EKG unchanged from prior, CXR with prominent cardiac silhouette. Suggestion of pulmonary vascular congestion. Pulmonary consulted for hemoptysis and recommended CT Chest which shows mild pulmonary edema and trace right pleural effusion.   She received Lasix 40mg IV x 1 and admitted for CHF exacerbation (15 Dec 2021 00:31)      PAST MEDICAL & SURGICAL HISTORY:  CHF (congestive heart failure)    COPD (chronic obstructive pulmonary disease)    HLD (hyperlipidemia)    Chronic pain    DM (diabetes mellitus)    CVA (cerebral vascular accident)    DVT (deep venous thrombosis)    HTN (hypertension)    Depression    Bipolar depression    PAD (peripheral artery disease)    Neuropathy    Hypothyroidism    Multiple myeloma    CKD (chronic kidney disease), stage IV    DHARMESH on CPAP    AICD (automatic cardioverter/defibrillator) present    H/O abdominal hysterectomy    H/O tubal ligation    History of cholecystectomy    H/O extremity bypass graft         Review of Systems:  · General	negative  · Skin/Breast	negative  · Ophthalmologic	negative  · ENMT	negative  · Respiratory and Thorax	negative  · Cardiovascular	negative  · Gastrointestinal	negative  · Genitourinary	negative  · Musculoskeletal Comments	negative  · Neurological	negative      MEDICATIONS  (STANDING):  acetaminophen   IVPB .. 1000 milliGRAM(s) IV Intermittent once  amitriptyline 10 milliGRAM(s) Oral at bedtime  atorvastatin 80 milliGRAM(s) Oral at bedtime  budesonide  80 MICROgram(s)/formoterol 4.5 MICROgram(s) Inhaler 2 Puff(s) Inhalation two times a day  busPIRone 15 milliGRAM(s) Oral <User Schedule>  dextrose 40% Gel 15 Gram(s) Oral once  dextrose 5%. 1000 milliLiter(s) (50 mL/Hr) IV Continuous <Continuous>  dextrose 5%. 1000 milliLiter(s) (100 mL/Hr) IV Continuous <Continuous>  dextrose 50% Injectable 25 Gram(s) IV Push once  dextrose 50% Injectable 12.5 Gram(s) IV Push once  dextrose 50% Injectable 25 Gram(s) IV Push once  escitalopram 20 milliGRAM(s) Oral daily  fenofibrate Tablet 48 milliGRAM(s) Oral daily  furosemide   Injectable 40 milliGRAM(s) IV Push two times a day  gabapentin 600 milliGRAM(s) Oral two times a day  glucagon  Injectable 1 milliGRAM(s) IntraMuscular once  heparin   Injectable 7500 Unit(s) SubCutaneous every 8 hours  insulin glargine Injectable (LANTUS) 20 Unit(s) SubCutaneous at bedtime  insulin lispro (ADMELOG) corrective regimen sliding scale   SubCutaneous Before meals and at bedtime  insulin lispro Injectable (ADMELOG) 10 Unit(s) SubCutaneous three times a day before meals  levothyroxine 25 MICROGram(s) Oral daily  lidocaine   4% Patch 1 Patch Transdermal daily  methadone    Tablet 10 milliGRAM(s) Oral three times a day  metoprolol succinate  milliGRAM(s) Oral daily  pantoprazole    Tablet 40 milliGRAM(s) Oral before breakfast  sacubitril 97 mG/valsartan 103 mG 1 Tablet(s) Oral two times a day  traZODone 50 milliGRAM(s) Oral at bedtime    MEDICATIONS  (PRN):  benzonatate 100 milliGRAM(s) Oral every 8 hours PRN Cough  guaiFENesin Oral Liquid (Sugar-Free) 100 milliGRAM(s) Oral every 6 hours PRN Cough      Allergies    aspirin (Other (Moderate); Rash)  oral contrast (Unknown)    Intolerances        SOCIAL HISTORY:    FAMILY HISTORY:  Family history of hypertension (Mother)    Family history of diabetes mellitus (Mother)        Vital Signs Last 24 Hrs  T(C): 36.7 (15 Dec 2021 17:11), Max: 36.9 (15 Dec 2021 15:44)  T(F): 98.1 (15 Dec 2021 17:11), Max: 98.4 (15 Dec 2021 15:44)  HR: 60 (15 Dec 2021 15:44) (55 - 63)  BP: 147/60 (15 Dec 2021 17:11) (104/62 - 147/60)  BP(mean): --  RR: 18 (15 Dec 2021 17:11) (18 - 20)  SpO2: 99% (15 Dec 2021 17:11) (95% - 99%)     Physical Exam:  · Constitutional	detailed exam  · Constitutional Details	well-developed; well-groomed  · Eyes	EOMI; PERRL; no drainage or redness  · ENMT Comments	dry mucous membranes  · Respiratory	detailed exam  · Respiratory Comments	normal breath sounds at the lung bases bilaterally  · Cardiovascular	Regular rate & rhythm, normal S1, S2; no murmurs, gallops or rubs; no S3, S4  · Abd-Soft non tender  ·Ext-no edema, clubbing or cyanosis      LABS:                        7.3    10.22 )-----------( 159      ( 15 Dec 2021 12:45 )             24.2     12-15    144  |  104  |  78<H>  ----------------------------<  110<H>  4.2   |  28  |  3.94<H>    Ca    8.4      15 Dec 2021 12:45  Mg     2.0     12-15    TPro  6.7  /  Alb  3.1<L>  /  TBili  0.3  /  DBili  x   /  AST  12  /  ALT  12  /  AlkPhos  88  12-15    PT/INR - ( 14 Dec 2021 16:54 )   PT: 17.3 sec;   INR: 1.47          PTT - ( 14 Dec 2021 16:54 )  PTT:35.1 sec      RADIOLOGY & ADDITIONAL STUDIES:

## 2021-12-15 NOTE — CONSULT NOTE ADULT - PROBLEM SELECTOR RECOMMENDATION 2
Till recnetly pt was on weekly Decadron 20 mg only...Most recently started on weekly Ninlaro...present now with Rt leg large bulla which she attributes to Ninlaro...Please hold Ninlaro for now...    Fot immunoeletrophoresis  in am...Discussed with Analilia

## 2021-12-15 NOTE — H&P ADULT - PROBLEM SELECTOR PLAN 4
Follows with Dr Ruiz  Continue Dexa and Ninlaro every Monday  Pt with ulceration on dorsum of right foot 2/2 Ninlaro  Continue dry dressing

## 2021-12-15 NOTE — H&P ADULT - NSHPSOCIALHISTORY_GEN_ALL_CORE
1ppd for over 20 years, denies alcohol or drug use  Lives with  and daughter  Walks with walker,  Uses 3L oxygen  Uses CPAP at night

## 2021-12-15 NOTE — H&P ADULT - NSHPPHYSICALEXAM_GEN_ALL_CORE
T(C): 36.8 (12-14-21 @ 23:58), Max: 36.8 (12-14-21 @ 15:24)  HR: 63 (12-14-21 @ 23:58) (63 - 73)  BP: 120/62 (12-14-21 @ 23:58) (120/62 - 131/81)  RR: 20 (12-14-21 @ 23:58) (20 - 20)  SpO2: 95% (12-14-21 @ 23:58) (95% - 100%)  Wt(kg): --    Appearance: Normal	  HEENT:   Normal oral mucosa, PERRL, EOMI	  Neck: Supple, + JVD; No Carotid Bruit and 2+ pulses B/L  Cardiovascular: Normal S1 S2, No JVD, No murmurs  Respiratory:  Bibasilar rales to midchest, scattered rhonchi. No Rales, Rhonchi, Wheezing	  Gastrointestinal:  Soft, Non-tender, + BS	  Skin: No rashes, No ecchymoses, No cyanosis  Extremities: + edema b/l. Normal range of motion, No clubbing, cyanosis  Vascular: Femoral pulses 2+ b/l without bruit, DP 1+ b/l, PT 1+ b/l  Neurologic: Non-focal  Psychiatry: A & O x 3, Mood & affect appropriate

## 2021-12-15 NOTE — H&P ADULT - NSHPLABSRESULTS_GEN_ALL_CORE
7.7    15.69 )-----------( 190      ( 14 Dec 2021 16:54 )             24.5       12-14    142  |  103  |  82<H>  ----------------------------<  120<H>  4.4   |  28  |  3.73<H>    Ca    9.0      14 Dec 2021 16:54        PT/INR - ( 14 Dec 2021 16:54 )   PT: 17.3 sec;   INR: 1.47          PTT - ( 14 Dec 2021 16:54 )  PTT:35.1 sec

## 2021-12-15 NOTE — H&P ADULT - PROBLEM SELECTOR PLAN 2
BNP 29K, EF 25% last admission  Continue Lasix 40mg IV bid  continue Entresto   Pt doesn't seem to be in COPD exacerbation  Continue home O2 supplement  Repeat echo

## 2021-12-15 NOTE — PATIENT PROFILE ADULT - STATED REASON FOR ADMISSION
I was bleeding through my mouth every marciano eI cough and the lumps on my armpit and the lesions on my foot

## 2021-12-15 NOTE — CHART NOTE - NSCHARTNOTEFT_GEN_A_CORE
Update of Plan since H & P         Problem/Plan - 1:  ·  Problem: Hemoptysis.   ·  Plan: Seen by pulmonary, hemoptysis is mild, small volumes likely due to cough in combination with increased hydrostatic pressures in the capillary beds from decompensated heart failure.  Underwent CT Chest: Mild pulmonary edema and trace right pleural effusion.  - will hold eliquis for now   - cough suppressants   - IV diuresis      Problem/Plan - 2:  ·  Problem: Acute on chronic systolic congestive heart failure.   ·  Plan: BNP 29K,   Continue Lasix 40mg IV bid  continue Entresto 97/103mg BID   Continue home O2 supplement  Echo from 11/29/21 Akinesis of the basal to mid inferoseptum and anteroseptum. Moderate to severe hypokinesis of the remaining regions, mild to moderate MR       Problem/Plan - 3:  ·  Problem: Anemia.   ·  Plan: Hgb 7.7 today, was 9 last admission  Check iron panel in AM  Holding Eliquis tonight until clear reason of anemia.     Problem/Plan - 4:  ·  Problem: Multiple myeloma.   ·  Plan: Follows with Dr Ruiz  Continue Dexa and Ninlaro every Monday  Pt with ulceration on dorsum of right foot 2/2 Ninlaro  Continue dry dressing.     Problem/Plan - 5:  ·  Problem: Stage 4 chronic kidney disease.   ·  Plan: Creat 3.7, pt f/u with Dr Neal  Creat was 4 last admission  Continue to monitor.     Problem/Plan - 6:  ·  Problem: Diabetes.   ·  Plan: Levemir interchanged with Lantus  Continue meal coverage. Update of Plan since H & P         Problem/Plan - 1:  ·  Problem: Hemoptysis.   ·  Plan: Seen by pulmonary, hemoptysis is mild, small volumes likely due to cough in combination with increased hydrostatic pressures in the capillary beds from decompensated heart failure.  Underwent CT Chest: Mild pulmonary edema and trace right pleural effusion.  - will hold eliquis for now   - cough suppressants   - IV diuresis      Problem/Plan - 2:  ·  Problem: Acute on chronic systolic congestive heart failure.   ·  Plan: BNP 29K,   Continue Lasix 40mg IV bid  continue Entresto 97/103mg BID, continue home Toprol XL 100mg    Continue home O2 supplement  Echo from 11/29/21 Akinesis of the basal to mid inferoseptum and anteroseptum. Moderate to severe hypokinesis of the remaining regions, mild to moderate MR    R/out DVT  -As per Hx, on Eliquis for Recurrent DVT  -Hold ELiquis 2.5mg BID for now  - LE dupplex pending for Pain and palpable mass  - RUE dupplex for bruising and palpable mass      #Right foot wound  - Bullae x 2 over right foot, that have popped  - Wound care consulted  - no signs of infection           Problem/Plan - 3:  ·  Problem: Anemia.   ·  Plan: Hgb 7.7 today, was 9 last admission  - Transfuse 1 unit prbcs on 12/15   - Iron studies for AM labs   -likely secondary to multiple myeloma,   Problem/Plan - 4:  ·  Problem: Multiple myeloma.   ·  Plan: Follows with Dr Ruiz  Continue Dexa and Ninlaro every Monday     Problem/Plan - 5:  ·  Problem: Stage 4 chronic kidney disease.   ·  Plan: Creat 3.7, pt f/u with Dr Neal  Creat was 4 last admission  Continue to monitor.     Problem/Plan - 6:  ·  Problem: Diabetes.   ·  Plan: Levemir interchanged with Lantus while admitted  Continue meal coverage. Update of Plan since H & P         Problem/Plan - 1:  ·  Problem: Hemoptysis.   ·  Plan: Seen by pulmonary, hemoptysis is mild, small volumes likely due to cough in combination with increased hydrostatic pressures in the capillary beds from decompensated heart failure.  Underwent CT Chest: Mild pulmonary edema and trace right pleural effusion.  -f/u RVP and Sputum culture   - will hold eliquis for now   - cough suppressants   - IV diuresis      Problem/Plan - 2:  ·  Problem: Acute on chronic systolic congestive heart failure.   ·  Plan: BNP 29K,   Continue Lasix 40mg IV bid  continue Entresto 97/103mg BID, continue home Toprol XL 100mg    Continue home O2 supplement  Echo from 11/29/21 Akinesis of the basal to mid inferoseptum and anteroseptum. Moderate to severe hypokinesis of the remaining regions, mild to moderate MR    R/out DVT  -As per Hx, on Eliquis for Recurrent DVT  -Hold ELiquis 2.5mg BID for now  - LE dupplex pending for Pain and palpable mass  - RUE dupplex for bruising and palpable mass      #Right foot wound  - Bullae x 2 over right foot, that have popped  - Wound care consulted  - no signs of infection           Problem/Plan - 3:  ·  Problem: Anemia.   ·  Plan: Hgb 7.7 today, was 9 last admission  - Transfuse 1 unit prbcs on 12/15   - Iron studies for AM labs   -likely secondary to multiple myeloma,   Problem/Plan - 4:  ·  Problem: Multiple myeloma.   ·  Plan: Follows with Dr Ruiz  Continue Dexa and Ninlaro every Monday     Problem/Plan - 5:  ·  Problem: Stage 4 chronic kidney disease.   ·  Plan: Creat 3.7, pt f/u with Dr Neal  Creat was 4 last admission  Continue to monitor.     Problem/Plan - 6:  ·  Problem: Diabetes.   ·  Plan: Levemir interchanged with Lantus while admitted  Continue meal coverage. Update of Plan since H & P         Problem/Plan - 1:  ·  Problem: Hemoptysis.   ·  Plan: Seen by pulmonary, hemoptysis is mild, small volumes likely due to cough in combination with increased hydrostatic pressures in the capillary beds from decompensated heart failure.  Underwent CT Chest: Mild pulmonary edema and trace right pleural effusion.  -f/u RVP and Sputum culture   - will hold eliquis for now   - cough suppressants   - IV diuresis      Problem/Plan - 2:  ·  Problem: Acute on chronic systolic congestive heart failure.   ·  Plan: BNP 29K,   Continue Lasix 40mg IV bid  continue Entresto 97/103mg BID, continue home Toprol XL 100mg    Continue home O2 supplement  Echo from 11/29/21 Akinesis of the basal to mid inferoseptum and anteroseptum. Moderate to severe hypokinesis of the remaining regions, mild to moderate MR    R/out DVT  -As per Hx, on Eliquis for Recurrent DVT  -Hold ELiquis 2.5mg BID for now  - LE dupplex pending for Pain and palpable mass  - RUE dupplex for bruising and palpable mass      #Right foot wound  - Bullae x 2 over right foot, that have popped, pt believes due to ninlaro, as her dr. ruiz unlikely  - Hold ninlaro for now   - Wound care consulted  - no signs of infection           Problem/Plan - 3:  ·  Problem: Anemia.   ·  Plan: Hgb 7.7 today, was 9 last admission  - Transfuse 1 unit prbcs on 12/15   - Iron studies for AM labs   -likely secondary to multiple myeloma,   Problem/Plan - 4:  ·  Problem: Multiple myeloma.   ·  Plan: Follows with Dr Ruiz  Continue Dexa and Ninlaro every Monday     Problem/Plan - 5:  ·  Problem: Stage 4 chronic kidney disease.   ·  Plan: Creat 3.7, pt f/u with Dr Neal  Creat was 4 last admission  Continue to monitor.     Problem/Plan - 6:  ·  Problem: Diabetes.   ·  Plan: Levemir interchanged with Lantus while admitted  Continue meal coverage.

## 2021-12-15 NOTE — PATIENT PROFILE ADULT - FALL HARM RISK - HARM RISK INTERVENTIONS

## 2021-12-15 NOTE — H&P ADULT - PROBLEM SELECTOR PLAN 3
Hgb 7.7 today, was 9 last admission  Check iron panel in AM  Holding Eliquis tonight until clear reason of anemia

## 2021-12-15 NOTE — H&P ADULT - PROBLEM SELECTOR PLAN 1
Seen by pulmonary, hemoptysis is mild, small volumes likely due to cough in combination with increased hydrostatic pressures in the capillary beds from decompensated heart failure.  Underwent CT Chest: Mild pulmonary edema and trace right pleural effusion.  Holding Eliquis tonight, will discuss with attending

## 2021-12-15 NOTE — H&P ADULT - NSHPREVIEWOFSYSTEMS_GEN_ALL_CORE
GENERAL, CONSTITUTIONAL : denies recent weight loss, fever, chills  EYES, VISION: denies changes in vision   EARS, NOSE, THROAT: denies hearing loss  HEART, CARDIOVASCULAR: + SOB. Denies chest pain, arrhythmia, palpitations, LE edema, claudication  RESPIRATORY: + SOB, cough. Denies cough, wheezing, PND, orthopnea  GASTROINTESTINAL: Denies abdominal pain, heartburn, bloody stool, dark tarry stool  GENITOURINARY: Denies frequent urination, urgency  MUSCULOSKELETAL denies joint pain or swelling, restricted motion, musculoskeletal pain.   SKIN & INTEGUMENTARY: + , blister/ulceration to dorsum of left foot. Denies rashes, sores, growths.  NEUROLOGICAL: Denies numbness or tingling sensations, sensation loss, burning.   PSYCHIATRIC:+ depression Denies nervousness, anxiety  ENDOCRINE Denies heat or cold intolerance, excessive thirst  HEMATOLOGIC/LYMPHATIC: Denies abnormal bleeding, bleeding of any kind

## 2021-12-15 NOTE — H&P ADULT - HISTORY OF PRESENT ILLNESS
62 yo obese Female, current smoker, with Contrast/Aspirin Allergy and an extensive PMHx including HTN, NICM, chronic HFrEF (EF 25% s/p AICD for primary prevention battery changed 8/2020, w/ cardiac Mems device), IDDM c/b neuropathy (on Methadone), hx of recurrent DVTs (on Eliquis), CKD IV, CVA x2 (most recently in 2013 with residual right sided weakness), PAD s/p bypass, COPD (on 3L home O2), DHARMESH on CPAP, Multiple Myeloma (on Ninlaro), hypothyroidism, depression who presented to [facility]  60 yo obese Female, current smoker, with Contrast/Aspirin Allergy and an extensive PMHx including HTN, NICM, chronic HFrEF (EF 25% s/p AICD for primary prevention battery changed 8/2020, w/ cardiac Mems device), IDDM c/b neuropathy (on Methadone), hx of recurrent DVTs (on Eliquis), CKD IV, CVA x2 (most recently in 2013 with residual right sided weakness), PAD s/p bypass, COPD (on 3L home O2), DHARMESH on CPAP, Multiple Myeloma (on Ninlaro), hypothyroidism, depression who presented to [facility] with hemoptysis x 4 days   Pt states to having been coughing for 4 days, initially with green sputum, and on day of admission, she cough up keyona blood. She admits to SOB on minimal exertion, stable chronic  LE edema and orthopnea. Denies fever, sick contact, chest pain, N/V  In the ER, vitals with /81, HR 73, R 20, T 98.2, O2 sat 100% on 6L. Labs with H/H 7.7/24.5, Creat 3.73, BNP 60412, EKG unchanged from prior, CXR with prominent cardiac silhouette. Suggestion of pulmonary vascular congestion. Pulmonary consulted for hemoptysis and recommended CT Chest which shows mild pulmonary edema and trace right pleural effusion.   She received Lasix 40mg IV x 1 and admitted for CHF exacerbation

## 2021-12-15 NOTE — H&P ADULT - ASSESSMENT
62y/o female, obese, HTN, HPL, DM-II, Hypothyroidism, known NICM with EF 25%, MM, admitted with mild hemoptysis possibly due to CHF exacerbation, anemia. She was sSeen by pulmonary consult, no intervention at this time, Will hold eliquis tonight and continue IV diuresis

## 2021-12-16 ENCOUNTER — TRANSCRIPTION ENCOUNTER (OUTPATIENT)
Age: 61
End: 2021-12-16

## 2021-12-16 ENCOUNTER — FORM ENCOUNTER (OUTPATIENT)
Age: 61
End: 2021-12-16

## 2021-12-16 DIAGNOSIS — J44.9 CHRONIC OBSTRUCTIVE PULMONARY DISEASE, UNSPECIFIED: ICD-10-CM

## 2021-12-16 LAB
ALBUMIN SERPL ELPH-MCNC: 3.1 G/DL — LOW (ref 3.3–5)
ALP SERPL-CCNC: 88 U/L — SIGNIFICANT CHANGE UP (ref 40–120)
ALT FLD-CCNC: 11 U/L — SIGNIFICANT CHANGE UP (ref 10–45)
ANION GAP SERPL CALC-SCNC: 12 MMOL/L — SIGNIFICANT CHANGE UP (ref 5–17)
APTT BLD: 30.5 SEC — SIGNIFICANT CHANGE UP (ref 27.5–35.5)
AST SERPL-CCNC: 11 U/L — SIGNIFICANT CHANGE UP (ref 10–40)
BASOPHILS # BLD AUTO: 0.05 K/UL — SIGNIFICANT CHANGE UP (ref 0–0.2)
BASOPHILS NFR BLD AUTO: 0.6 % — SIGNIFICANT CHANGE UP (ref 0–2)
BILIRUB SERPL-MCNC: 0.3 MG/DL — SIGNIFICANT CHANGE UP (ref 0.2–1.2)
BUN SERPL-MCNC: 77 MG/DL — HIGH (ref 7–23)
CALCIUM SERPL-MCNC: 8 MG/DL — LOW (ref 8.4–10.5)
CHLORIDE SERPL-SCNC: 105 MMOL/L — SIGNIFICANT CHANGE UP (ref 96–108)
CO2 SERPL-SCNC: 26 MMOL/L — SIGNIFICANT CHANGE UP (ref 22–31)
CREAT SERPL-MCNC: 4.03 MG/DL — HIGH (ref 0.5–1.3)
EOSINOPHIL # BLD AUTO: 0.09 K/UL — SIGNIFICANT CHANGE UP (ref 0–0.5)
EOSINOPHIL NFR BLD AUTO: 1 % — SIGNIFICANT CHANGE UP (ref 0–6)
FERRITIN SERPL-MCNC: 204 NG/ML — HIGH (ref 15–150)
GLUCOSE BLDC GLUCOMTR-MCNC: 159 MG/DL — HIGH (ref 70–99)
GLUCOSE BLDC GLUCOMTR-MCNC: 164 MG/DL — HIGH (ref 70–99)
GLUCOSE BLDC GLUCOMTR-MCNC: 80 MG/DL — SIGNIFICANT CHANGE UP (ref 70–99)
GLUCOSE BLDC GLUCOMTR-MCNC: 83 MG/DL — SIGNIFICANT CHANGE UP (ref 70–99)
GLUCOSE BLDC GLUCOMTR-MCNC: 90 MG/DL — SIGNIFICANT CHANGE UP (ref 70–99)
GLUCOSE SERPL-MCNC: 149 MG/DL — HIGH (ref 70–99)
GRAM STN FLD: SIGNIFICANT CHANGE UP
HCT VFR BLD CALC: 30.7 % — LOW (ref 34.5–45)
HGB BLD-MCNC: 9.1 G/DL — LOW (ref 11.5–15.5)
IGA FLD-MCNC: 58 MG/DL — LOW (ref 84–499)
IGG FLD-MCNC: 1392 MG/DL — SIGNIFICANT CHANGE UP (ref 610–1660)
IGM SERPL-MCNC: 39 MG/DL — SIGNIFICANT CHANGE UP (ref 35–242)
IMM GRANULOCYTES NFR BLD AUTO: 0.6 % — SIGNIFICANT CHANGE UP (ref 0–1.5)
INR BLD: 1.06 — SIGNIFICANT CHANGE UP (ref 0.88–1.16)
IRON SATN MFR SERPL: 15 % — SIGNIFICANT CHANGE UP (ref 14–50)
IRON SATN MFR SERPL: 28 UG/DL — LOW (ref 30–160)
KAPPA LC SER QL IFE: 9.48 MG/DL — HIGH (ref 0.33–1.94)
KAPPA/LAMBDA FREE LIGHT CHAIN RATIO, SERUM: 3.35 RATIO — HIGH (ref 0.26–1.65)
LAMBDA LC SER QL IFE: 2.83 MG/DL — HIGH (ref 0.57–2.63)
LYMPHOCYTES # BLD AUTO: 1.49 K/UL — SIGNIFICANT CHANGE UP (ref 1–3.3)
LYMPHOCYTES # BLD AUTO: 16.4 % — SIGNIFICANT CHANGE UP (ref 13–44)
MAGNESIUM SERPL-MCNC: 1.9 MG/DL — SIGNIFICANT CHANGE UP (ref 1.6–2.6)
MCHC RBC-ENTMCNC: 28.3 PG — SIGNIFICANT CHANGE UP (ref 27–34)
MCHC RBC-ENTMCNC: 29.6 GM/DL — LOW (ref 32–36)
MCV RBC AUTO: 95.3 FL — SIGNIFICANT CHANGE UP (ref 80–100)
MONOCYTES # BLD AUTO: 0.94 K/UL — HIGH (ref 0–0.9)
MONOCYTES NFR BLD AUTO: 10.4 % — SIGNIFICANT CHANGE UP (ref 2–14)
NEUTROPHILS # BLD AUTO: 6.44 K/UL — SIGNIFICANT CHANGE UP (ref 1.8–7.4)
NEUTROPHILS NFR BLD AUTO: 71 % — SIGNIFICANT CHANGE UP (ref 43–77)
NRBC # BLD: 0 /100 WBCS — SIGNIFICANT CHANGE UP (ref 0–0)
PLATELET # BLD AUTO: 183 K/UL — SIGNIFICANT CHANGE UP (ref 150–400)
POTASSIUM SERPL-MCNC: 4.3 MMOL/L — SIGNIFICANT CHANGE UP (ref 3.5–5.3)
POTASSIUM SERPL-SCNC: 4.3 MMOL/L — SIGNIFICANT CHANGE UP (ref 3.5–5.3)
PROT SERPL-MCNC: 6.7 G/DL — SIGNIFICANT CHANGE UP (ref 6–8.3)
PROTHROM AB SERPL-ACNC: 12.7 SEC — SIGNIFICANT CHANGE UP (ref 10.6–13.6)
RBC # BLD: 3.22 M/UL — LOW (ref 3.8–5.2)
RBC # FLD: 14.6 % — HIGH (ref 10.3–14.5)
SODIUM SERPL-SCNC: 143 MMOL/L — SIGNIFICANT CHANGE UP (ref 135–145)
SPECIMEN SOURCE: SIGNIFICANT CHANGE UP
TIBC SERPL-MCNC: 189 UG/DL — LOW (ref 220–430)
TRANSFERRIN SERPL-MCNC: 158 MG/DL — LOW (ref 200–360)
UIBC SERPL-MCNC: 161 UG/DL — SIGNIFICANT CHANGE UP (ref 110–370)
WBC # BLD: 9.06 K/UL — SIGNIFICANT CHANGE UP (ref 3.8–10.5)
WBC # FLD AUTO: 9.06 K/UL — SIGNIFICANT CHANGE UP (ref 3.8–10.5)

## 2021-12-16 PROCEDURE — 99232 SBSQ HOSP IP/OBS MODERATE 35: CPT | Mod: GC

## 2021-12-16 PROCEDURE — 71045 X-RAY EXAM CHEST 1 VIEW: CPT | Mod: 26

## 2021-12-16 PROCEDURE — 99233 SBSQ HOSP IP/OBS HIGH 50: CPT

## 2021-12-16 RX ORDER — INSULIN GLARGINE 100 [IU]/ML
10 INJECTION, SOLUTION SUBCUTANEOUS AT BEDTIME
Refills: 0 | Status: DISCONTINUED | OUTPATIENT
Start: 2021-12-16 | End: 2021-12-29

## 2021-12-16 RX ORDER — OMEPRAZOLE 40 MG/1
40 CAPSULE, DELAYED RELEASE ORAL
Qty: 28 | Refills: 11 | Status: ACTIVE | COMMUNITY
Start: 2019-12-16 | End: 1900-01-01

## 2021-12-16 RX ORDER — INSULIN LISPRO 100/ML
5 VIAL (ML) SUBCUTANEOUS
Refills: 0 | Status: DISCONTINUED | OUTPATIENT
Start: 2021-12-17 | End: 2021-12-27

## 2021-12-16 RX ADMIN — Medication 10 UNIT(S): at 12:51

## 2021-12-16 RX ADMIN — HEPARIN SODIUM 7500 UNIT(S): 5000 INJECTION INTRAVENOUS; SUBCUTANEOUS at 17:43

## 2021-12-16 RX ADMIN — METHADONE HYDROCHLORIDE 10 MILLIGRAM(S): 40 TABLET ORAL at 22:02

## 2021-12-16 RX ADMIN — Medication 100 MILLIGRAM(S): at 05:37

## 2021-12-16 RX ADMIN — LIDOCAINE 1 PATCH: 4 CREAM TOPICAL at 06:15

## 2021-12-16 RX ADMIN — PANTOPRAZOLE SODIUM 40 MILLIGRAM(S): 20 TABLET, DELAYED RELEASE ORAL at 05:37

## 2021-12-16 RX ADMIN — ESCITALOPRAM OXALATE 20 MILLIGRAM(S): 10 TABLET, FILM COATED ORAL at 12:51

## 2021-12-16 RX ADMIN — Medication 10 MILLIGRAM(S): at 23:37

## 2021-12-16 RX ADMIN — Medication 2: at 07:46

## 2021-12-16 RX ADMIN — Medication 2: at 12:52

## 2021-12-16 RX ADMIN — Medication 10 UNIT(S): at 09:02

## 2021-12-16 RX ADMIN — HEPARIN SODIUM 7500 UNIT(S): 5000 INJECTION INTRAVENOUS; SUBCUTANEOUS at 11:01

## 2021-12-16 RX ADMIN — Medication 50 MILLIGRAM(S): at 22:02

## 2021-12-16 RX ADMIN — METHADONE HYDROCHLORIDE 10 MILLIGRAM(S): 40 TABLET ORAL at 05:47

## 2021-12-16 RX ADMIN — Medication 40 MILLIGRAM(S): at 17:42

## 2021-12-16 RX ADMIN — BUDESONIDE AND FORMOTEROL FUMARATE DIHYDRATE 2 PUFF(S): 160; 4.5 AEROSOL RESPIRATORY (INHALATION) at 21:53

## 2021-12-16 RX ADMIN — Medication 48 MILLIGRAM(S): at 12:51

## 2021-12-16 RX ADMIN — ATORVASTATIN CALCIUM 80 MILLIGRAM(S): 80 TABLET, FILM COATED ORAL at 22:01

## 2021-12-16 RX ADMIN — LIDOCAINE 1 PATCH: 4 CREAM TOPICAL at 23:33

## 2021-12-16 RX ADMIN — LIDOCAINE 1 PATCH: 4 CREAM TOPICAL at 12:51

## 2021-12-16 RX ADMIN — HEPARIN SODIUM 7500 UNIT(S): 5000 INJECTION INTRAVENOUS; SUBCUTANEOUS at 03:08

## 2021-12-16 RX ADMIN — LIDOCAINE 1 PATCH: 4 CREAM TOPICAL at 19:00

## 2021-12-16 RX ADMIN — Medication 25 MICROGRAM(S): at 05:37

## 2021-12-16 RX ADMIN — Medication 40 MILLIGRAM(S): at 05:37

## 2021-12-16 RX ADMIN — SACUBITRIL AND VALSARTAN 1 TABLET(S): 24; 26 TABLET, FILM COATED ORAL at 19:21

## 2021-12-16 RX ADMIN — GABAPENTIN 600 MILLIGRAM(S): 400 CAPSULE ORAL at 17:42

## 2021-12-16 RX ADMIN — BUDESONIDE AND FORMOTEROL FUMARATE DIHYDRATE 2 PUFF(S): 160; 4.5 AEROSOL RESPIRATORY (INHALATION) at 11:01

## 2021-12-16 RX ADMIN — Medication 15 MILLIGRAM(S): at 22:03

## 2021-12-16 RX ADMIN — SACUBITRIL AND VALSARTAN 1 TABLET(S): 24; 26 TABLET, FILM COATED ORAL at 05:36

## 2021-12-16 RX ADMIN — METHADONE HYDROCHLORIDE 10 MILLIGRAM(S): 40 TABLET ORAL at 15:04

## 2021-12-16 RX ADMIN — GABAPENTIN 600 MILLIGRAM(S): 400 CAPSULE ORAL at 05:37

## 2021-12-16 NOTE — PROGRESS NOTE ADULT - ATTENDING COMMENTS
Initial attending contact date  12/16/21    . See PA note written above for details. I reviewed the PA documentation. I have personally seen and examined this patient. I reviewed vitals, labs, medications, cardiac studies, and additional imaging. I agree with the above PA's findings and plans as written above with the following additions/statements.    61F obese, HTN, HPL, DM-II, Hypothyroidism, known NICM with EF 25%, MM, admitted with mild hemoptysis in context of chronic dry cough and acute on chronic anemia  -s/p 1 u prbc  -With mild bibasilar rales, CXR today still with pulm vasc congestion  -Cont lasix 40 mg IV bid with metolazone 5 mg po 30 mins prior to IV lasix dose  -Strict Is and Os with primfit   -Appreciate pulm recs, no further work up need for hemoptysis  -tessalon perle for cough suppression  -restart eliquis 2.5 bid (chronic  DVT)  -Cont toprol 100 mg qd, entresto 97/103 bid   -PT recs: Home with home PT  -Possible dc 12/17 Initial attending contact date  12/16/21    . See PA note written above for details. I reviewed the PA documentation. I have personally seen and examined this patient. I reviewed vitals, labs, medications, cardiac studies, and additional imaging. I agree with the above PA's findings and plans as written above with the following additions/statements.    61F obese, HTN, HLD, DM-II, Hypothyroidism, CRI, COPD on  home O2 (3L), chronic DVT , known NICM with EF 25%, MM, admitted with mild hemoptysis in context of chronic dry cough and acute on chronic anemia  -s/p 1 u prbc  -With mild bibasilar rales, CXR today still with pulm vasc congestion  -Cont lasix 40 mg IV bid add metolazone 5 mg po 30 mins prior to IV lasix dose  -Per flowsheet, suboptimal urine output. Pt has been using urinal when out of bed. Strict Is and Os with primfit   -Appreciate pulm recs, no further work up need for hemoptysis  -tessalon perle for cough suppression  -restart eliquis 2.5 bid (chronic  DVT)  -Cont toprol 100 mg qd, entresto 97/103 bid   -PT recs: Home with home PT

## 2021-12-16 NOTE — PHYSICAL THERAPY INITIAL EVALUATION ADULT - GENERAL OBSERVATIONS, REHAB EVAL
Patient received semi-alvarez in bed  in NAD on 3L O2 NC, +Telemetry, +Primafit, +SCDs, +Heplock. Cleared by RN. Agreeable to PT.

## 2021-12-16 NOTE — PROGRESS NOTE ADULT - PROBLEM SELECTOR PLAN 5
Creat 3.7, pt f/u with Dr Nael  Creat was 4 last admission  Continue to monitor Cr: 4.03 (~4 during prior admission)  - Continue to monitor   - Seen by Dr. Neal, if Cr and volume status worsens, can consider dialysis.

## 2021-12-16 NOTE — PROGRESS NOTE ADULT - PROBLEM SELECTOR PLAN 2
BNP 29K, EF 25% last admission  Continue Lasix 40mg IV bid  continue Entresto   Pt doesn't seem to be in COPD exacerbation  Continue home O2 supplement  Repeat echo BNP 29K  - c/w Lasix 40mg IV BID, added Metolazone 5mg x1, 30 min prior to Lasix  - c/w Entresto 97mg/103mg BID, Toprol XL 100mg daily  - c/w home O2 - 3L NC  - Echo 11/29/21: Akinesis of the basal to mid inferoseptum and anteroseptum. Moderate to severe hypokinesis of the remaining regions, mild to moderate MR Mild basilar rales on RLL, +edema of BLE, satting 94-95% on 3L NC, Net negative 520cc  - BNP 29K  - CXR 12/14/2021: Suggestive of pulmonary vascular congestion. Repeat CXR 12/16/2021 no significant changes after diuresis.  - c/w Lasix 40mg IV BID, added Metolazone 5mg x1, 30 min prior to Lasix  - c/w Entresto 97mg/103mg BID, Toprol XL 100mg daily  - c/w home O2 3L NC  - Echo 11/29/21: Akinesis of the basal to mid inferoseptum and anteroseptum. Moderate to severe hypokinesis of the remaining regions, mild to moderate MR Mild basilar rales on RLL, +edema of BLE, satting 94-95% on 3L NC, Net negative 520cc - not compliant with strict I&Os, Primafit now in place.  - BNP 29K  - CXR 12/14/2021: Suggestive of pulmonary vascular congestion. Repeat CXR 12/16/2021 no significant changes after diuresis.  - c/w Lasix 40mg IV BID, added Metolazone 5mg x1, 30 min prior to Lasix  - c/w Entresto 97mg/103mg BID, Toprol XL 100mg daily  - c/w home O2 3L NC  - Echo 11/29/21: Akinesis of the basal to mid inferoseptum and anteroseptum. Moderate to severe hypokinesis of the remaining regions, mild to moderate MR

## 2021-12-16 NOTE — DIETITIAN INITIAL EVALUATION ADULT. - OTHER CALCULATIONS
%YSC=570; IBW used to calculate estimated needs due to pt being >120% of IBW. Adjusted for CKD, CHF, BMI. Aim for upper end of protein. Fluids per team. %MJT=701; IBW used to calculate estimated needs due to pt being >120% of IBW. Adjusted for CKD, CHF, BMI. Fluids per team.

## 2021-12-16 NOTE — DISCHARGE NOTE PROVIDER - NSDCCPCAREPLAN_GEN_ALL_CORE_FT
PRINCIPAL DISCHARGE DIAGNOSIS  Diagnosis: Acute decompensated heart failure  Assessment and Plan of Treatment:       SECONDARY DISCHARGE DIAGNOSES  Diagnosis: Hemoptysis  Assessment and Plan of Treatment:     Diagnosis: Anemia  Assessment and Plan of Treatment:     Diagnosis: Multiple myeloma  Assessment and Plan of Treatment:     Diagnosis: Stage 4 chronic kidney disease  Assessment and Plan of Treatment:     Diagnosis: Diabetes  Assessment and Plan of Treatment:      PRINCIPAL DISCHARGE DIAGNOSIS  Diagnosis: Acute decompensated heart failure  Assessment and Plan of Treatment: -Heart failure is a condition in which the heart does not pump well. This causes the heart to lag behind in its job of moving blood throughout the body. As a result, fluid backs up in the body, and the organs in the body do not get as much blood as they need. This can lead to symptoms, such as swelling, trouble breathing, and feeling tired. If you have heart failure, your heart has not actually "failed" or stopped beating. It just isn't working as well as it should.  -Your heart pumping function is 25%, normal is >50%  -You were treated with IV medication to get some of the excess fluid out of your body  -Physical therapy saw you and recommended home with home PT  -Please continue _____ on discharge. New prescription have been sent to preferred pharmacy  -An appointment has been made for you with Dr. Lehman at _____  Guide to CHF management  -Please weigh yourself daily: if you have gained more than 2-3 lbs in one day or 5 lbs in one week contact your doctor immediately as you may be retaining water weight  -Please restrict your salt intake to less than 2 grams a day  -If you develop worsening shortening of breath, leg swelling, fatigue, chest pain, difficulty sleeping at night due to shortness of breath, contact your cardiologist immediately.      SECONDARY DISCHARGE DIAGNOSES  Diagnosis: Hemoptysis  Assessment and Plan of Treatment: You presented to the hospital with blood in your sputum. The lung doctors saw you and feel this was mild and likely due to your cough in combination with the heart failure. This has since resolved.    Diagnosis: Anemia  Assessment and Plan of Treatment: On admission your hemogloblin was low and yor received 1 unit of blood. You hemoglobin is now stable.    Diagnosis: Multiple myeloma  Assessment and Plan of Treatment: Please continue regular follow up with Dr. Ruiz for treatment of your multiple myeloma    Diagnosis: Stage 4 chronic kidney disease  Assessment and Plan of Treatment: Your kidney function was poor on this admission. We had the kidney doctors see you and they monitored your function.   -Please follow up with Dr. Neal in 1-2 weeks    Diagnosis: Diabetes  Assessment and Plan of Treatment: Please continue your home diabetic medications, there has been no changes    Diagnosis: DVT, lower extremity  Assessment and Plan of Treatment: You were found to have a DVT (or blood clot) in your right leg. We treated you with IV blood thinning medicine. The Vascular surgery team saw you and recomended _____  -Please continue _____ on discharge.    Diagnosis: Tremor  Assessment and Plan of Treatment: You were noted to have an upper arm abnormal movement. We had the neurology team see you who felt everything was fine from a neurologic perspective. The psychiatry team saw you and discontinued some of your medications  -Please STOP: Amitriptyline 10mg daily, Buspirone 15mg daily and escitalopram 20mg daily  -The combination of these medications with your UTI likely contributed to your abnormal movement and sleepy state at times     PRINCIPAL DISCHARGE DIAGNOSIS  Diagnosis: Acute decompensated heart failure  Assessment and Plan of Treatment: For CHF, please continue taking ENTRESTO two times a day. Your METOPROLOL dose was DECREASED to 100mg once day. Please follow up with Dr. Lehman on 1/7/22 at 10:00AM.      SECONDARY DISCHARGE DIAGNOSES  Diagnosis: End stage renal disease  Assessment and Plan of Treatment: Your kidney failure progressed to require dialysis. You will be getting dialysis Tues, Thurs, and Saturdays. Please take sevelamer 800mg three times a day to control your phosphorus levels. Follow up with Nephrologist Dr. Bunny Aguirre on 1/5/22 at 12:40pm.    Diagnosis: Urinary retention  Assessment and Plan of Treatment:     Diagnosis: Anemia  Assessment and Plan of Treatment: On admission your hemogloblin level was low and yor received 1 unit of blood. Your hemoglobin level is now stable.    Diagnosis: Multiple myeloma  Assessment and Plan of Treatment: Please continue regular follow up with Dr. Ruiz for treatment of your multiple myeloma. You should be taking decadron 20mg once a week.    Diagnosis: Diabetes  Assessment and Plan of Treatment: Your blood sugars were well-controlled on 10 units of long-acting insulin once a day and 3 units of short-acting insulin before meals. Please continue this regimen upon discharge and follow up with Dr. Pimentel.    Diagnosis: DVT, lower extremity  Assessment and Plan of Treatment: For your chronic DVT (blood clot in your leg) please continue taking eliquis 2.5mg twice a day.    Diagnosis: Tremor  Assessment and Plan of Treatment: You were noted to have an upper arm abnormal movement. We had the neurology team see you who felt everything was fine from a neurologic perspective. The psychiatry team saw you and discontinued some of your medications  -Please STOP: Amitriptyline 10mg daily, Buspirone 15mg daily and escitalopram 20mg daily  -The combination of these medications with your UTI likely contributed to your abnormal movement and sleepy state at times  -Please follow up with your psychiatrist to clarify your anti-depressant medications.    Diagnosis: Hemoptysis  Assessment and Plan of Treatment: You presented to the hospital with blood in your sputum. The lung doctors saw you and feel this was mild and likely due to your cough in combination with the heart failure. This has since resolved.     PRINCIPAL DISCHARGE DIAGNOSIS  Diagnosis: Acute decompensated heart failure  Assessment and Plan of Treatment: For CHF, please continue taking ENTRESTO two times a day. Your METOPROLOL dose was DECREASED to 100mg once day. Please follow up with Dr. Lehman on 1/7/22 at 10:00AM.      SECONDARY DISCHARGE DIAGNOSES  Diagnosis: End stage renal disease  Assessment and Plan of Treatment: Your kidney failure progressed to require dialysis. You will be getting dialysis Tues, Thurs, and Saturdays. Please take sevelamer 800mg three times a day to control your phosphorus levels. Follow up with Nephrologist Dr. Bunny Aguirre on 1/5/22 at 12:40pm.    Diagnosis: Urinary retention  Assessment and Plan of Treatment: You were unable to urinate and therefore had a medina catheter placed. Please follow up with Dr. Newton within 4 weeks for a catheter exchange and urodynamic studies.    Diagnosis: Anemia  Assessment and Plan of Treatment: On admission your hemogloblin level was low and yor received 1 unit of blood. Your hemoglobin level is now stable.    Diagnosis: Multiple myeloma  Assessment and Plan of Treatment: Please continue regular follow up with Dr. Ruiz for treatment of your multiple myeloma. You should be taking decadron 20mg once a week.    Diagnosis: Diabetes  Assessment and Plan of Treatment: Your blood sugars were well-controlled on 10 units of long-acting insulin once a day and 3 units of short-acting insulin before meals. Please continue this regimen upon discharge and follow up with Dr. Pimentel for further management.    Diagnosis: DVT, lower extremity  Assessment and Plan of Treatment: For your chronic DVT (blood clot in your leg) please continue taking eliquis 2.5mg twice a day.    Diagnosis: Tremor  Assessment and Plan of Treatment: You were noted to have an upper arm abnormal movement. We had the neurology team see you who felt everything was fine from a neurologic perspective. The psychiatry team saw you and discontinued some of your medications  -Please STOP: Amitriptyline 10mg daily, Buspirone 15mg daily and escitalopram 20mg daily  -The combination of these medications with your UTI likely contributed to your abnormal movement and sleepy state at times  -Please follow up with your psychiatrist to clarify your anti-depressant medications.    Diagnosis: Hemoptysis  Assessment and Plan of Treatment: You presented to the hospital with blood in your sputum. The lung doctors saw you and feel this was mild and likely due to your cough in combination with the heart failure. This has since resolved.

## 2021-12-16 NOTE — DIETITIAN INITIAL EVALUATION ADULT. - DIET TYPE
consistent carbohydrate (no snacks)/DASH/TLC (sodium and cholesterol restricted diet) DASH/TLC (sodium and cholesterol restricted diet)/consistent carbohydrate (evening snack)

## 2021-12-16 NOTE — DISCHARGE NOTE PROVIDER - NSDCMRMEDTOKEN_GEN_ALL_CORE_FT
apixaban 2.5 mg oral tablet: 1 tab(s) orally 2 times a day  atorvastatin 80 mg oral tablet: 1 tab(s) orally once a day (at bedtime)  busPIRone 15 mg oral tablet: 1 tab(s) orally once a day (at bedtime)  Entresto 97 mg-103 mg oral tablet: 1 tab(s) orally 2 times a day  fenofibrate 48 mg oral tablet: 1 tab(s) orally once a day  gabapentin 600 mg oral tablet: 1 tab(s) orally 3 times a day, As Needed  Levemir 100 units/mL subcutaneous solution: 30 unit(s) subcutaneous once a day (at bedtime)  levothyroxine 50 mcg (0.05 mg) oral tablet: 1 tab(s) orally once a day  methadone 10 mg oral tablet: 1 tab(s) orally 3 times a day  metoprolol succinate 200 mg oral tablet, extended release: 1 tab(s) orally once a day  Ninlaro 3 mg oral capsule: 1 cap(s) orally every 7 days (mondays)  NovoLOG 100 units/mL injectable solution: up to 12 unit(s) injectable 3 times a day (before meals)  omeprazole 40 mg oral delayed release capsule: 1 cap(s) orally once a day  PARoxetine 10 mg oral tablet: 1 tab(s) orally once a day  Symbicort 160 mcg-4.5 mcg/inh inhalation aerosol: 2 puff(s) inhaled 2 times a day  torsemide 20 mg oral tablet: 2 tab(s) orally 2 times a day  traZODone 50 mg oral tablet: 1 tab(s) orally once a day (at bedtime)  Ventolin HFA 90 mcg/inh inhalation aerosol: 2 puff(s) inhaled every 6 hours   apixaban 2.5 mg oral tablet: 1 tab(s) orally 2 times a day  atorvastatin 80 mg oral tablet: 1 tab(s) orally once a day (at bedtime)  busPIRone 15 mg oral tablet: 1 tab(s) orally once a day (at bedtime)  Decadron 4 mg oral tablet: 5 tab(s) orally once a week (mondays)  Entresto 97 mg-103 mg oral tablet: 1 tab(s) orally 2 times a day  fenofibrate 48 mg oral tablet: 1 tab(s) orally once a day  gabapentin 600 mg oral tablet: 1 tab(s) orally 3 times a day, As Needed  Levemir 100 units/mL subcutaneous solution: 30 unit(s) subcutaneous once a day (at bedtime)  levothyroxine 50 mcg (0.05 mg) oral tablet: 1 tab(s) orally once a day  methadone 10 mg oral tablet: 1 tab(s) orally 3 times a day  metoprolol succinate 200 mg oral tablet, extended release: 1 tab(s) orally once a day  Ninlaro 3 mg oral capsule: 1 cap(s) orally every 7 days (mondays)  NovoLOG 100 units/mL injectable solution: up to 12 unit(s) injectable 3 times a day (before meals)  omeprazole 40 mg oral delayed release capsule: 1 cap(s) orally once a day  PARoxetine 10 mg oral tablet: 1 tab(s) orally once a day  Symbicort 160 mcg-4.5 mcg/inh inhalation aerosol: 2 puff(s) inhaled 2 times a day  torsemide 20 mg oral tablet: 2 tab(s) orally 2 times a day  traZODone 50 mg oral tablet: 1 tab(s) orally once a day (at bedtime)  Ventolin HFA 90 mcg/inh inhalation aerosol: 2 puff(s) inhaled every 6 hours   apixaban 2.5 mg oral tablet: 1 tab(s) orally 2 times a day  atorvastatin 80 mg oral tablet: 1 tab(s) orally once a day (at bedtime)  Decadron 4 mg oral tablet: 5 tab(s) orally once a week (mondays)  Entresto 97 mg-103 mg oral tablet: 1 tab(s) orally 2 times a day  fenofibrate 48 mg oral tablet: 1 tab(s) orally once a day  Levemir 100 units/mL subcutaneous solution: 10 unit(s) subcutaneous once a day (at bedtime)  levothyroxine 50 mcg (0.05 mg) oral tablet: 1 tab(s) orally once a day  methadone 10 mg oral tablet: 1 tab(s) orally 3 times a day  metoprolol succinate 100 mg oral tablet, extended release: 1 tab(s) orally once a day  Ninlaro 3 mg oral capsule: 1 cap(s) orally every 7 days (mondays)  NovoLOG 100 units/mL injectable solution: 3 unit(s) injectable 3 times a day (before meals)  omeprazole 40 mg oral delayed release capsule: 1 cap(s) orally once a day  PARoxetine 10 mg oral tablet: 1 tab(s) orally once a day  sevelamer carbonate 800 mg oral tablet: 1 tab(s) orally 3 times a day  Symbicort 160 mcg-4.5 mcg/inh inhalation aerosol: 2 puff(s) inhaled 2 times a day  traZODone 50 mg oral tablet: 1 tab(s) orally once a day (at bedtime)  Ventolin HFA 90 mcg/inh inhalation aerosol: 2 puff(s) inhaled every 6 hours

## 2021-12-16 NOTE — DISCHARGE NOTE PROVIDER - NSDCFUSCHEDAPPT_GEN_ALL_CORE_FT
SULY PENA ; 12/29/2021 ; [facility] [address]  SULY PENA ; 01/05/2022 ; [facility] End[facility]in [address] SULY PENA ; 12/29/2021 ; [facility] Ne[facility] [address]   SULY PENA ; 01/05/2022 ; [facility] Endocr[facility] [address]   SULY PENA ; 01/13/2022 ; [facility] Card[facility] [address] [facility] SULY PENA ; 12/29/2021 ; [facility] Nephro [address]   SULY PENA ; 12/30/2021 ; [facility] HeartV[facility] [address]   SULY PENA ; 01/05/2022 ; [facility] Endocrin [address]   SULY PENA ; 01/13/2022 ; [facility] Card[facility] [address] [facility] SULY PENA ; 12/29/2021 ; [facility] Nephro [address]   SULY PENA ; 01/05/2022 ; [facility] Endocrin [address]   SULY PENA ; 01/07/2022 ; [facility] HeartVasc [address]   SULY PENA ; 01/13/2022 ; [facility] Card[facility] SULY PENA ; 12/29/2021 ; [facility] Nephro [address]   SULY PENA ; 01/05/2022 ; [facility] Endocrin [address]   SULY PENA ; 01/05/2022 ; [facility] NEPHRO [address]   SULY PENA ; 01/07/2022 ; [facility] HeartVasc [address]   SULY PENA ; 01/13/2022 ; [facility] Cardio [facility]

## 2021-12-16 NOTE — PROGRESS NOTE ADULT - PROBLEM SELECTOR PLAN 1
Seen by pulmonary, hemoptysis is mild, small volumes likely due to cough in combination with increased hydrostatic pressures in the capillary beds from decompensated heart failure.  Underwent CT Chest: Mild pulmonary edema and trace right pleural effusion.  Holding Eliquis tonight, will discuss with attending No new episodes since her admission.  - CT Chest 12/16/21: Mild pulmonary edema and trace right pleural effusion.  - Pulmonary consulted, hemoptysis is mild, likely due to cough in combination with increased hydrostatic pressure in capillary beds from decompensated heart failure.  - RVP and COVID negative. Sputum culture pending.  - c/w cough suppressants   - Eliquis held, plan to restart on 12/17    R/out DVT  - As per Hx, on Eliquis for Recurrent DVT  - BLE duplex pending for Pain and palpable mass  - RUE duplex for bruising and palpable mass No new episodes since her admission.  - CT Chest 12/16/21: Mild pulmonary edema and trace right pleural effusion.  - Pulmonary consulted, hemoptysis is mild, likely due to cough in combination with increased hydrostatic pressure in capillary beds from decompensated heart failure.  - RVP and COVID negative. Sputum culture pending.  - c/w cough suppressants   - Eliquis held, consider restarting on 12/17    R/out DVT  - As per Hx, on Eliquis for Recurrent DVT  - BLE duplex pending for Pain and palpable mass  - RUE duplex for bruising and palpable mass

## 2021-12-16 NOTE — DISCHARGE NOTE PROVIDER - PROVIDER TOKENS
PROVIDER:[TOKEN:[[id number]],ESTABLISHEDPATIENT:[T]],PROVIDER:[TOKEN:[[id number]],FOLLOWUP:[2 weeks],ESTABLISHEDPATIENT:[T]] PROVIDER:[TOKEN:[[id number]],FOLLOWUP:[2 weeks],ESTABLISHEDPATIENT:[T]],FREE:[LAST:[Dominik],FIRST:[Enriqueta LUCERO],PHONE:[phone]],FAX:[phone]],ADDRESS:[CARDIOLOGY  [address] [place] [address]],SCHEDULEDAPPT:[12/30/2021],SCHEDULEDAPPTTIME:[01:30 PM]] PROVIDER:[TOKEN:[[id number]],FOLLOWUP:[1 week]],PROVIDER:[TOKEN:[[id number]],FOLLOWUP:[2 weeks]],PROVIDER:[TOKEN:[[id number]],FOLLOWUP:[2 weeks]],PROVIDER:[TOKEN:[[id number]],FOLLOWUP:[2 weeks]],FREE:[LAST:[Dominik],FIRST:[Enriqueta LUCERO],PHONE:[phone]],FAX:[phone]],ADDRESS:[CARDIOLOGY  [address] [place] [address]],SCHEDULEDAPPT:[12/30/2021],SCHEDULEDAPPTTIME:[01:30 PM]],PROVIDER:[TOKEN:[[id number]],FOLLOWUP:[1 week]] PROVIDER:[TOKEN:[[id number]],FOLLOWUP:[2 weeks]],PROVIDER:[TOKEN:[[id number]],FOLLOWUP:[2 weeks]],PROVIDER:[TOKEN:[[id number]],FOLLOWUP:[2 weeks]],PROVIDER:[TOKEN:[[id number]],FOLLOWUP:[2 weeks]],FREE:[LAST:[Dominik],FIRST:[Enriqueta LUCERO],PHONE:[phone]],FAX:[phone]],ADDRESS:[[place] [address]],SCHEDULEDAPPT:[01/07/2022],SCHEDULEDAPPTTIME:[10:00 AM]],FREE:[LAST:[Timothy],FIRST:[Bunny],PHONE:[phone]],FAX:[phone]],ADDRESS:[[address] [place] [address]],SCHEDULEDAPPT:[01/05/2022],SCHEDULEDAPPTTIME:[12:40 PM]],PROVIDER:[TOKEN:[[id number]],SCHEDULEDAPPT:[01/05/2022],SCHEDULEDAPPTTIME:[10:20 AM]]

## 2021-12-16 NOTE — DISCHARGE NOTE PROVIDER - HOSPITAL COURSE
INCOMPLETE    HPI:  62 yo obese Female, current smoker, with Contrast/Aspirin Allergy and an extensive PMHx including HTN, NICM, chronic HFrEF (EF 25% s/p AICD for primary prevention battery changed 8/2020, w/ cardiac Mems device), IDDM c/b neuropathy (on Methadone), hx of recurrent DVTs (on Eliquis), CKD IV, CVA x2 (most recently in 2013 with residual right sided weakness), PAD s/p bypass, COPD (on 3L home O2), DHARMESH on CPAP, Multiple Myeloma (on Ninlaro), hypothyroidism, depression who presented to [facility] with hemoptysis x 4 days   Pt states to having been coughing for 4 days, initially with green sputum, and on day of admission, she cough up keyona blood. She admits to SOB on minimal exertion, stable chronic  LE edema and orthopnea. Denies fever, sick contact, chest pain, N/V  In the ER, vitals with /81, HR 73, R 20, T 98.2, O2 sat 100% on 6L. Labs with H/H 7.7/24.5, Creat 3.73, BNP 86555, EKG unchanged from prior, CXR with prominent cardiac silhouette. Suggestion of pulmonary vascular congestion. Pulmonary consulted for hemoptysis and recommended CT Chest which shows mild pulmonary edema and trace right pleural effusion.   She received Lasix 40mg IV x 1 and admitted for CHF exacerbation (15 Dec 2021 00:31)   INCOMPLETE    62 yo obese F, current smoker, with a PMH of HTN, HLD, DM-II, CKD Stage 4 (Cr ~3.9), Hypothyroidism, known NICM with EF 25% (s/p AICD for primary prevention), MM, COPD (3L home O2), recurrent DVTs (on Eliquis) reporting of mild hemoptysis with shortness of breath, admitted to cardiac tele for acute on chronic CHF exacerbation and anemia. On admission, s/p 1U PRBC on 12/15 for Hb 7.3 w/ appropriate response. Echocardiogram 11/29/21 with akinesis of the basal to mid inferoseptum and anteroseptum, moderate - severe hypokinesis of remaining region, mild-moderate MR. Pt was diuresed with IV Lasix and net negative _____ on discharge. Pulmonary consulted for hemoptysis/COPD stating hemoptysis likely 2/2 cough in combination with increased hydrostatic pressure in capillary beds from decompensated CHF. Home Eliquis resumed at that time. Sputum Cx (+) Proteus Mirabilis w/ associated (+) UA s/p CTX 1G q 24hrs x 5 days. Renal consulted for RITCHIE on CKD where they recommended ______. Renal US 12/18: mild b/l hydronephrosis.           likely due to cough, admitted for management of CHF exacerbation and anemia. IV Lasix dosing increased 2/2 increased oxygen demands. Neruo/psych following for management of myoclonic movements. Hospital course further c/b RLE DVT, vascular consulted and RITCHIE on CKD, renal following.    INCOMPLETE    60 yo obese F, current smoker, with a PMH of HTN, HLD, DM-II, CKD Stage 4 (Cr ~3.9), Hypothyroidism, known NICM with EF 25% (s/p AICD for primary prevention), MM, COPD (3L home O2), recurrent DVTs (on Eliquis) reporting of mild hemoptysis with shortness of breath, admitted to cardiac tele for acute on chronic CHF exacerbation and anemia. On admission, s/p 1U PRBC on 12/15 for Hb 7.3 w/ appropriate response. Echocardiogram 11/29/21 with akinesis of the basal to mid inferoseptum and anteroseptum, moderate - severe hypokinesis of remaining region, mild-moderate MR. Pt was diuresed with IV Lasix and net negative _____ on discharge. CHF team following and recommended ______. Pulmonary consulted for hemoptysis/COPD stating hemoptysis likely 2/2 cough in combination with increased hydrostatic pressure in capillary beds from decompensated CHF. Home Eliquis resumed at that time. Sputum Cx (+) Proteus Mirabilis w/ associated (+) UA s/p CTX 1G q 24hrs x 5 days. Renal consulted for RITCHIE on CKD where they recommended ______. Renal US 12/18: mild b/l hydronephrosis. Pt noted to have RUE weakness/pain with myoclonic movement despite otherwise normal neuro exam. Neuro/Stroke consulted who recommended CTH non-contrast (no acute changes) and psych consult for input on numerous CNS acting medications. Due to concern for ? serotonin syndrome and low concern for withdrawal from these medications, home Amitriptyline 10mg QD, Buspirone 15mg QD, and Escitalopram 20mg QD discontinued. Pain management was following for pain recommendations, however recommended Flexeril not administered 2/2 concern for rx w/ Trazadone. Hospital course further c/b b/l LE pain/swelling where RLE US revealed common femoral vein DVT. Pt started on heparin gtt and vascular consulted 2/2 concern for chronicity. Vascular recommended ____.      Patient seen and examined at the bedside. No complaints at this time. AM labs significant for ___. HD stable. No overnight events on tele. As per  ____, patient is stable for discharge home with home PT on _____ with instruction to follow up with Dr. Lehman on ______.    Reasons for No Cardiac Rehab Referral Rx (Must select 1 or more options):                Patient Refused            Medical Reason: ex needs Home Care, Home PT            Patient lacks medical coverage for Cardiac Rehab            Pt discharged to Nursing Care/[facility]/Long term Care Facility            Patient Lacks Transportation or no cardiac rehab within 60 minutes driving range            Patient already participates in Cardiac Rehab            Other: (provide details) ex: Hospice patient           INCOMPLETE--> updated on 12/26    62 yo obese Female, current smoker, with Contrast/Aspirin Allergy and an extensive PMHx including HTN, NICM, chronic HFrEF (EF 25% s/p AICD for primary prevention battery changed 8/2020, w/ cardiac Mems device), IDDM c/b neuropathy (on Methadone), hx of recurrent DVTs (on Eliquis), CKD IV, CVA x2 (most recently in 2013 with residual right sided weakness), PAD s/p bypass, COPD (on 3L home O2), DHARMESH on CPAP, Multiple Myeloma (on Ninlaro), hypothyroidism, depression who presented to [facility] with hemoptysis x 4 days.     Patient admitted to cardiac telemetry for acute on chronic HFrEF excerbation.  Patients H/H on Admission below baseline, found to be 7.7-> 7.3. patient received 1 units PRBC in the setting of acute CHF. Patient underwent LE dupplex 12/18/21 revealing Right common femoral partially occlusive deep vein thrombosis. vascular surgery consulted reviewed imaging determined  chronic DVT recommendation to continue Anticoagulation. Patient continued on Home Eliquis 2.5mg BID for risk reduction with recurrent DVT. pulm was consulted for hemoptysis, likely due to cough in combination with increased hydrostatic pressure in capillary beds from decompensated heart failure, symptoms resolved with cough suppressants. Sputum culture revealed Proteus Mirabilis w/ elevated procalcitonin for ehcih patient was treated with 5 day course of ceftriaxone which completed 12/22, and covered for Nitrate + UA on 12/18. Echo reviewed from previous admission  11/29/21: Akinesis of the basal to mid inferoseptum and anteroseptum. Moderate to severe hypokinesis of the remaining regions, mild to moderate MR initially diuresed with IV lasix and metolazone however on 12/20 patient had low urine out and RITCHIE on CKD with Cr on Admission 3.7 that up trended to 5.1 Renal team was consulted for RITCHIE. Patient also had new UE weakness with myoclonic jerking movements of upper extremities, neuro was consulted with concern for metabolic encephalopathy, CNS agents discontinued. On 12/21 patient had >1200 on Bladder scan, straight cath performed patient continued to have urinary retention, Urology was consulted indwelling Ward was placed, with recommendation to stay in place until outpatient follow up with urodynamic studies. Despite placement of Ward catheter patient had continued worsening of kidney function Cr uptrended to 5.6. On 12/22  patient continued to have worsening of UE jerking movements, with lethargy and flucuations in mental status. patient working with PT and had a fall Pt states "my legs collapse" patient did not hit her head or sustain injury. Renal and neuro made aware, decision made to place Tunneled HD cathter and initiation of HD on 12/23. Patient underwent HD sessions x 3 on 12/23, 12/24 and 12/26 with complete recovery in mental status and resolution of extremity jerking. Patient reevaluated by physical therapy and recommended for [facility] with HD.      On day of discharge, Patient AOx3, euvolemic on exam, saturating well on home 3L NC,  Pt. seen and examined at bedside this morning, without complaints Vital signs stable, labs and telemetry reviewed. Patient has been given appropriate discharge instructions including medication regimen, access site management and follow up. Med rec completed for [facility].     Home Meds:                  Pain management consulted: Methadone 5 mg PO q6h PRN breakthrough pain, c/w Methadone 10 mg PO TID (confirmed by Istop). Tylenol 650 mg PO q6h PRN moderate pain  -Psychiatry Consulted: Rec discontinuing Amitriptyline 10mg QD, Buspirone 15mg QD, Escitalopram 20mg QD, Trazadone 50mg QD - Now been off since 12/15 without resolution of symptoms  - Neuro consulted on 12/23 for continued jerking movements likely uremia  -CONT: Methadone as above, Melatonin 3mg QD, discontinued gabapentin      Dr. Dominik Aguirre (nephrology   Dr. Ruiz (hematology)   urology Dr. Newton or [place] [phone]  Dr. Medeiros [facility]- for AVF planning          Reasons for No Cardiac Rehab Referral Rx (Must select 1 or more options):                Patient Refused            Medical Reason: Going to [facility]             Patient lacks medical coverage for Cardiac Rehab            Pt discharged to Nursing Care/[facility]/Long term Care Facility            Patient Lacks Transportation or no cardiac rehab within 60 minutes driving range            Patient already participates in Cardiac Rehab            Other: (provide details) ex: Hospice patient           INCOMPLETE--> updated on 12/26    60 yo obese Female, current smoker, with Contrast/Aspirin Allergy and an extensive PMHx including HTN, NICM, chronic HFrEF (EF 25% s/p AICD for primary prevention battery changed 8/2020, w/ cardiac Mems device), IDDM c/b neuropathy (on Methadone), hx of recurrent DVTs (on Eliquis), CKD IV, CVA x2 (most recently in 2013 with residual right sided weakness), PAD s/p bypass, COPD (on 3L home O2), DHARMESH on CPAP, Multiple Myeloma (on Ninlaro), hypothyroidism, depression who presented to [facility] with hemoptysis x 4 days.     Patient admitted to cardiac telemetry for acute on chronic HFrEF excerbation.  Patients H/H on Admission below baseline, found to be 7.7-> 7.3. patient received 1 units PRBC in the setting of acute CHF. Patient underwent LE dupplex 12/18/21 revealing Right common femoral partially occlusive deep vein thrombosis. vascular surgery consulted reviewed imaging determined  chronic DVT recommendation to continue Anticoagulation. Patient continued on Home Eliquis 2.5mg BID for risk reduction with recurrent DVT. pulm was consulted for hemoptysis, likely due to cough in combination with increased hydrostatic pressure in capillary beds from decompensated heart failure, symptoms resolved with cough suppressants. Sputum culture revealed Proteus Mirabilis w/ elevated procalcitonin for ehcih patient was treated with 5 day course of ceftriaxone which completed 12/22, and covered for Nitrate + UA on 12/18. Echo reviewed from previous admission  11/29/21: Akinesis of the basal to mid inferoseptum and anteroseptum. Moderate to severe hypokinesis of the remaining regions, mild to moderate MR initially diuresed with IV lasix and metolazone however on 12/20 patient had low urine out and RITCHIE on CKD with Cr on Admission 3.7 that up trended to 5.1 Renal team was consulted for RITCHIE. Patient also had new UE weakness with myoclonic jerking movements of upper extremities, neuro was consulted with concern for metabolic encephalopathy, CNS agents discontinued. On 12/21 patient had >1200 on Bladder scan, straight cath performed patient continued to have urinary retention, Urology was consulted indwelling Ward was placed, with recommendation to stay in place until outpatient follow up with urodynamic studies. Despite placement of Ward catheter patient had continued worsening of kidney function Cr uptrended to 5.6. On 12/22  patient continued to have worsening of UE jerking movements, with lethargy and flucuations in mental status. patient working with PT and had a fall Pt states "my legs collapse" patient did not hit her head or sustain injury. Renal and neuro made aware, decision made to place Tunneled HD cathter and initiation of HD on 12/23. Patient underwent HD sessions x 3 on 12/23, 12/24 and 12/26 with complete recovery in mental status and resolution of extremity jerking. Patient reevaluated by physical therapy and recommended for [facility] with HD.      On day of discharge, Patient AOx3, euvolemic on exam, saturating well on home 3L NC,  Pt. seen and examined at bedside this morning, without complaints Vital signs stable, labs and telemetry reviewed. Patient has been given appropriate discharge instructions including medication regimen, access site management and follow up. Med rec completed for [facility].     Home Meds:    Discontinued Amitriptyline 10mg QD, Buspirone 15mg QD, Escitalopram 20mg QD, Trazadone 50mg QD, Gabapentin     Follow up:   Dr. Lehman 12/30  Dr. Nish Payne 2-3 weeks  Dr. Bunny Aguirre (nephrology would like to switch providers)   Dr. Ruiz (hematology)   urology Dr. Newton or [place] [phone]  Dr. Medeiros [facility]- for AVF planning        Reasons for No Cardiac Rehab Referral Rx (Must select 1 or more options):                Patient Refused            Medical Reason: Going to [facility]             Patient lacks medical coverage for Cardiac Rehab            Pt discharged to Nursing Care/[facility]/Long term Care Facility            Patient Lacks Transportation or no cardiac rehab within 60 minutes driving range            Patient already participates in Cardiac Rehab            Other: (provide details) ex: Hospice patient           60 yo obese Female, current smoker, with Contrast/Aspirin Allergy and an extensive PMHx including HTN, NICM, chronic HFrEF (EF 25% s/p AICD for primary prevention battery changed 8/2020, w/ Cardiomems device), IDDM-2 c/b neuropathy (on Methadone), hx of recurrent DVTs (on Eliquis), CKD IV, CVA x2 (most recently in 2013 with residual right sided weakness), PAD s/p bypass, COPD (on 3L home O2), DHARMESH on CPAP, Multiple Myeloma (on Ninlaro), hypothyroidism, depression who presented to [facility] on 12/14/21 with SOB and mild hemoptysis in setting of dry cough, admitted to cardiac telemetry for acute on chronic HFrEF excerbation, hospital course significant for worsening renal failure requiring initiation of HD.     #Acute on chronic HFrEF exacerbation  TTE from previous admission 11/29/21: EF 25-30%, akinesis of the basal to mid inferoseptum and anteroseptum. Moderate to severe hypokinesis of the remaining regions. RVSF mildly reduced, LA severely dilated, mild-to-mod MR (no significant interval changes from 1/5/19). Patient had suboptimal diuresis throughout hospital course despite increasing IV diuresis, with worsening renal function. HD was initiated on 12/23 with sig improvement in symptoms and resp status. Patient continued on high dose Entresto and Toprol XL 100mg daily. Follow up appt scheduled with Dr. Lehman for 1/7/22 @ 10:00AM.     #New ESRD  Pt with hx of CKD IV (Cr 3.7 on admission) that up trended to 5.6 on 12/22, despite medina placement for urinary retention/b/l hydronephrosis. Patient also developed AMS/asterixis in setting of uremia and decision was made to place Tunneled HD catheter and initiation of HD on 12/23. Patient underwent HD sessions x4 on 12/23, 12/24, 12/26, and 12/28 with complete recovery in mental status and resolution of extremity jerking. Patient arranged for outpatient HD at facility in [place] to be done T/TH/S. Patient requesting to follow up with Dr. Bunny Aguirre, appt made for Wednesday 1/5/21 at 12:40PM.     #AMS with Asterixis  Patient developed new UE weakness with myoclonic jerking movements of upper extremities consistent with asterixis and AMS, likely 2/2 worsening renal fx/uremia in combination of poor renal clearance of gabapentin and opiate, which were subsequently renally dosed.   Psych was also consulted, and on review of medications (chart and pt's kishor), patient on numerous serotonergic medications including lexapro, buspar, amitriptyline, trazodone prescribed by different providers. Psych concerned for delirium vs. serotonin syndrome given the polypharmacy and dehydration c/b acute medical problems. They recommended holding the following medications for now pending clarification: lexapro (pt reports not taking), buspar (unclear if taking), amitriptyline (unclear if taking). Can continue with trazodone 50 mg qhs PRN insomnia and hold this for oversedation and/or hypotension. Also recommended holding off on resuming flexeril given it's propensity to increase risk of serotonin syndrome in pt's on SSRIs.     #Hemoptysis  Pulm consulted, likely due to cough in combination with increased hydrostatic pressure in capillary beds from decompensated heart failure, symptoms resolved with cough suppressants.     #Acute Urinary Retention  On 12/21 patient had >1200 on Bladder scan, straight cath performed patient continued to have urinary retention. Renal US showed mild b/l hydronephrosis. Patient treated for UTI. Retention thought to contribute to progression of renal failure. Urology was consulted indwelling Medina was placed, with recommendation to stay in place until outpatient follow up. Pt to Follow up with Dr. Newton within 4 weeks for catheter exchange and to discuss urodynamic studies.     #Acute on chronic anemia  Hgb on Admission below baseline, found to be 7.7-> 7.3, received 1 units PRBC in the setting of acute CHF with appropriate response and remained stable in 9s. No EPO due to multiple myeloma.     #Chronic DVT  Patient underwent LE duplex 12/18/21 revealing Right common femoral partially occlusive deep vein thrombosis. Vascular surgery consulted reviewed imaging determined chronic DVT with recommendation to continue anticoagulation with Eliquis 2.5mg BID for risk reduction with recurrent DVT.     #Multiple Myeloma  Patient follows with Dr. Ruiz. Was previously on Ninlaro but stopped recently. Restarted decadron 20mg PO weekly. Follow up with Dr. Ruiz.     #Possible PNA/UTI  Sputum culture revealed Proteus Mirabilis w/ elevated procalcitonin for which patient was treated with 5 day course of ceftriaxone which completed 12/22, and covered for Nitrate + UA on 12/18.     #Depression  Held antidepressant medications as above per Psych recommendations. Pt encouraged to follow up with outpatient psychiatrist to clarify antidepressant regimen.     #Dispo  PT rec home with Home PT. Pt has RW, shower chair and commode. Pt receives assist from  and daughter for stairs if needed.     On day of discharge, Patient AOx3, euvolemic on exam, saturating well on home 3L NC,  Pt. seen and examined at bedside this morning, without complaints Vital signs stable, labs and telemetry reviewed. Patient has been given appropriate discharge instructions including medication regimen, access site management and follow up.     Medication changes:  Added: sevelamer 800mg TID  Changes: Toprol XL decreased to 100mg daily. Insulin glargine to 10 units, Novolog to 3 units TID  Discontinued Amitriptyline, Buspirone, Escitalopram, Gabapentin     Metrics:  Dx: TREATMENT FOR HEART FAILURE (or any chronic HF Meds adjusted) THIS ADMISSION: YES           If Yes to Heart Failure: 7 day Follow-Up Appointment Made: No, pt's schedule limited due to new dialysis T/TH/S, and office closed on Fri 12/31. Earliest appt with Dr. Lehman on 1/7/22 @ 10AM  Cardiac Rehab Indications (CHF):            *Education on benefits of Cardiac Rehab provided to patient: Yes         *Referral and Prescription Given for Cardiac Rehab: No; Medical Reason: needs Home PT  CHF: Beta Blocker Prescribed: Yes           ACE-I/ARB/Entresto Prescribed: Yes

## 2021-12-16 NOTE — PROGRESS NOTE ADULT - ASSESSMENT
60y/o female, obese, HTN, HPL, DM-II, Hypothyroidism, known NICM with EF 25%, MM, admitted with mild hemoptysis possibly due to CHF exacerbation, anemia. She was sSeen by pulmonary consult, no intervention at this time, Will hold eliquis tonight and continue IV diuresis 60y/o female, current smoker, obese, with PMHx HTN, HLD, DM-II, CKD Stage 4, Hypothyroidism, known NICM with EF 25% (s/p AICD for primary prevention), MM, COPD (3L home O2), recurrent DVTs (on Eliquis) reporting of mild hemoptysis likely due to cough, admitted for management of CHF exacerbation and anemia. Continue IV diurese with possible transition to PO on 12/17 and restart Eliquis tomorrow 12/17 as patient's hemoptysis has not recurred. S/p 1 unit pRBCs with appropriate response.  60yo female, current smoker, obese, with PMHx HTN, HLD, DM-II, CKD Stage 4, Hypothyroidism, known NICM with EF 25% (s/p AICD for primary prevention), MM, COPD (3L home O2), recurrent DVTs (on Eliquis) reporting of mild hemoptysis likely due to cough, admitted for management of CHF exacerbation and anemia. Continue IV diuresis with Lasix and Metolazone and consider restarting Eliquis 12/17 if patient's hemoptysis has not recurred. S/p 1 unit pRBCs with appropriate response.

## 2021-12-16 NOTE — DISCHARGE NOTE PROVIDER - CARE PROVIDERS DIRECT ADDRESSES
,[email or web address] ,DirectAddress_Unknown,[email or web address],DirectAddress_Unknown,[email or web address],DirectAddress_Unknown,DirectAddress_Unknown,[email or web address]

## 2021-12-16 NOTE — PROGRESS NOTE ADULT - ASSESSMENT
58 y/o morbidly obese female w/ hx of HTN, NICM, HFrEF s/p AICD + MEMs device, IDDM c/b b/l LE neuropathy, recurrent DVT, CKD 4, CVA x2, COPD on 3 L O2, DHARMESH, PAD s/p bypass, multiple myeloma who presents from home for hemoptysis likely in the setting of recurrent decompensated heart failure exacerbation.     #Reported hemoptysis  #COPD not in exacerbation  #DHARMESH    Patient presents with unclear report of hemoptysis that has not recurred since she has been admitted and in a patient on anticoagulation, this may occur from the AC alone vs secondary to underlying pulmonary edema in a patient with acute on chronic heart failure. Patient does not appear to have an active COPD exacerbation at this time. Agree with diuresis and continue home management of COPD with Symbicort. If hemoptysis persists, please notify pulmonary team.    Recommend:   - Agree with diuresis per primary team  - Continue Symbicort BID and PRN Duonebs q6hrs while inpatient  - Continue home CPAP  - Outpatient follow up with Dr. Nish Payne in 2-3 weeks after discharge.    Patient discussed with attending Dr. Saavedra. 58 y/o morbidly obese female w/ hx of HTN, NICM, HFrEF s/p AICD + MEMs device, IDDM c/b b/l LE neuropathy, recurrent DVT, CKD 4, CVA x2, COPD on 3 L O2, DHARMESH, PAD s/p bypass, multiple myeloma who presents from home for hemoptysis likely in the setting of recurrent decompensated heart failure exacerbation.     #Reported hemoptysis  #COPD GOLD D, not in exacerbation  #DHARMESH    Patient presents with unclear report of hemoptysis that has not recurred since she has been admitted and in a patient on anticoagulation, this may occur from the AC alone vs secondary to underlying pulmonary edema in a patient with acute on chronic heart failure. Patient does not appear to have an active COPD exacerbation at this time. Agree with diuresis and continue home management of COPD with Symbicort. CAT score 18, medium health impact; mmRC grade 2- based on these values and recent hospitalization 3 months ago, she is in GOLD group D. If hemoptysis recurs, please notify pulmonary team.    Recommend:   - Agree with diuresis per primary team  - Continue Symbicort BID and PRN duonebs q6hrs while inpatient  - Continue home CPAP  - Outpatient follow up with Dr. Nish Payne in 2-3 weeks after discharge.    Patient discussed with attending Dr. Saavedra.

## 2021-12-16 NOTE — PROGRESS NOTE ADULT - SUBJECTIVE AND OBJECTIVE BOX
PULMONARY CONSULT SERVICE FOLLOW-UP NOTE    INTERVAL HPI:  Reviewed chart and overnight events; patient seen and examined at bedside. Hemoptysis has not recurred. Continued SOB. On 3L NC. No cough.    MEDICATIONS:  Pulmonary:  ALBUTerol   0.5% 2.5 milliGRAM(s) Nebulizer every 6 hours PRN  benzonatate 100 milliGRAM(s) Oral every 8 hours PRN  budesonide  80 MICROgram(s)/formoterol 4.5 MICROgram(s) Inhaler 2 Puff(s) Inhalation two times a day  guaiFENesin Oral Liquid (Sugar-Free) 100 milliGRAM(s) Oral every 6 hours PRN    Anticoagulants:  heparin   Injectable 7500 Unit(s) SubCutaneous every 8 hours    Cardiac:  furosemide   Injectable 40 milliGRAM(s) IV Push two times a day  metoprolol succinate  milliGRAM(s) Oral daily  sacubitril 97 mG/valsartan 103 mG 1 Tablet(s) Oral two times a day    Allergies  aspirin (Other (Moderate); Rash)  oral contrast (Unknown)    Vital Signs Last 24 Hrs  T(C): 36.7 (16 Dec 2021 06:38), Max: 36.9 (15 Dec 2021 15:44)  T(F): 98.1 (16 Dec 2021 06:38), Max: 98.4 (15 Dec 2021 15:44)  HR: 62 (16 Dec 2021 06:03) (55 - 72)  BP: 122/58 (16 Dec 2021 05:27) (104/62 - 147/60)  BP(mean): --  RR: 18 (16 Dec 2021 06:03) (12 - 20)  SpO2: 94% (16 Dec 2021 06:03) (93% - 99%)    12-15 @ 07:01  -  12-16 @ 07:00  --------------------------------------------------------  IN: 180 mL / OUT: 700 mL / NET: -520 mL    PHYSICAL EXAM:  Constitutional: WD  HEENT: NC/AT; PERRL, anicteric sclera; MMM  Neck: supple, +JVD  Cardiovascular: +S1/S2, RRR  Respiratory: bibasilar crackles; no W/R/R  Gastrointestinal: soft, NT/ND  Extremities: WWP; no clubbing or cyanosis; 2+ pitting edema  Vascular: 2+ radial and pedal pulses  Neurological: AAOx3; no focal deficits    LABS:  CBC Full  -  ( 16 Dec 2021 06:47 )  WBC Count : 9.06 K/uL  RBC Count : 3.22 M/uL  Hemoglobin : 9.1 g/dL  Hematocrit : 30.7 %  Platelet Count - Automated : 183 K/uL  Mean Cell Volume : 95.3 fl  Mean Cell Hemoglobin : 28.3 pg  Mean Cell Hemoglobin Concentration : 29.6 gm/dL  Auto Neutrophil # : 6.44 K/uL  Auto Lymphocyte # : 1.49 K/uL  Auto Monocyte # : 0.94 K/uL  Auto Eosinophil # : 0.09 K/uL  Auto Basophil # : 0.05 K/uL  Auto Neutrophil % : 71.0 %  Auto Lymphocyte % : 16.4 %  Auto Monocyte % : 10.4 %  Auto Eosinophil % : 1.0 %  Auto Basophil % : 0.6 %    12-16    143  |  105  |  77<H>  ----------------------------<  149<H>  4.3   |  26  |  4.03<H>    Ca    8.0<L>      16 Dec 2021 06:47  Mg     1.9     12-16    TPro  6.7  /  Alb  3.1<L>  /  TBili  0.3  /  DBili  x   /  AST  11  /  ALT  11  /  AlkPhos  88  12-16    PT/INR - ( 16 Dec 2021 06:47 )   PT: 12.7 sec;   INR: 1.06       PTT - ( 16 Dec 2021 06:47 )  PTT:30.5 sec    RADIOLOGY & ADDITIONAL STUDIES: Reviewed.    < from: CT Chest No Cont (12.14.21 @ 21:29) >  Impression:    Probable mild alveolar and interstitial pulmonary edema, likely   cardiogenic.  Trace right pleural effusion.  Abundant multivessel coronary artery and aortic calcifications.  Cardiomegaly.    < end of copied text >

## 2021-12-16 NOTE — DIETITIAN INITIAL EVALUATION ADULT. - PERTINENT LABORATORY DATA
Hemoglobin: 9.1 (L)   Hematocrit: 30.7 (L)  POCT: 159, 154 (H)   BUN/CR: 77/4.03 (H)   Calcium: 8.0 (L)   GFR: 11 (L)   A1C: 9.0 (H)   Cholesterol: 245 (H)   Triglycerides: 181 (H)   HDL: 38 (L)   Non HDL: 207 (H)   LDL: 171 (H) Hemoglobin: 9.1 (L)   Hematocrit: 30.7 (L)  POCT: 159, 154 (H)   BUN/CR: 77/4.03 (H)   Calcium: 8.0 (L)   GFR: 11 (L)   A1C: 8.4 vs 9.0 (H)   Cholesterol: 245 (H)   Triglycerides: 181 (H)   HDL: 38 (L)   Non HDL: 207 (H)   LDL: 171 (H)  Phos: 6.9 (H)

## 2021-12-16 NOTE — PROGRESS NOTE ADULT - SUBJECTIVE AND OBJECTIVE BOX
Incomplete    S: Pt seen and examined bedside.  Patient denies C/P, SOB, N/V, dizziness, palpitations, and diaphoresis.  Pt denies fever/chills, dysuria, abdominal pain, diarrhea, and cough  12 Point ROS otherwise negative except as per HPI/subjective.     O: Vital Signs Last 24 Hrs  T(C): 36.7 (16 Dec 2021 06:38), Max: 36.9 (15 Dec 2021 15:44)  T(F): 98.1 (16 Dec 2021 06:38), Max: 98.4 (15 Dec 2021 15:44)  HR: 62 (16 Dec 2021 06:03) (57 - 72)  BP: 122/58 (16 Dec 2021 05:27) (104/62 - 147/60)  BP(mean): --  RR: 18 (16 Dec 2021 06:03) (12 - 20)  SpO2: 94% (16 Dec 2021 06:03) (93% - 99%)    PHYSICAL EXAM:  GEN: NAD  HEENT: No JVD  PULM:  CTA B/L  CARD:  RRR, S1 and S2   ABD: +BS, NT, soft/ND	  EXT: No Edema B/L LE  NEURO: A+Ox3, no focal deficit  PSYCH: Mood Appropriate    LABS:                        9.1    9.06  )-----------( 183      ( 16 Dec 2021 06:47 )             30.7         143  |  105  |  77<H>  ----------------------------<  149<H>  4.3   |  26  |  4.03<H>    Ca    8.0<L>      16 Dec 2021 06:47  Mg     1.9         TPro  6.7  /  Alb  3.1<L>  /  TBili  0.3  /  DBili  x   /  AST  11  /  ALT  11  /  AlkPhos  88      PT/INR - ( 16 Dec 2021 06:47 )   PT: 12.7 sec;   INR: 1.06          PTT - ( 16 Dec 2021 06:47 )  PTT:30.5 sec  Troponin T, Serum: 0.05 ng/mL (12-15-21 @ 03:39)      12-15 @ 07:01  -   @ 07:00  --------------------------------------------------------  IN: 180 mL / OUT: 700 mL / NET: -520 mL      Daily     Daily Weight in k.4 (16 Dec 2021 05:27)   Incomplete    S: Pt seen and examined bedside. Sitting comfortably in bed. Still reporting of cough today without episodes of hemoptysis. Otherwise doing well.   Patient denies C/P, SOB, N/V, dizziness, palpitations, and diaphoresis.  Pt denies fever/chills, dysuria, abdominal pain, diarrhea  12 Point ROS otherwise negative except as per HPI/subjective.     O: Vital Signs Last 24 Hrs  T(C): 36.7 (16 Dec 2021 06:38), Max: 36.9 (15 Dec 2021 15:44)  T(F): 98.1 (16 Dec 2021 06:38), Max: 98.4 (15 Dec 2021 15:44)  HR: 62 (16 Dec 2021 06:03) (57 - 72)  BP: 122/58 (16 Dec 2021 05:27) (104/62 - 147/60)  BP(mean): --  RR: 18 (16 Dec 2021 06:03) (12 - 20)  SpO2: 94% (16 Dec 2021 06:03) (93% - 99%)    PHYSICAL EXAM:  GEN: NAD  HEENT: No JVD  PULM:  Mild basilar rales on the left.  CARD:  RRR, S1 and S2   ABD: +BS, NT, soft/ND	  EXT: BLE wrapped in ACE wraps from foot to below the knee.   NEURO: A+Ox3, no focal deficit  PSYCH: Mood Appropriate    LABS:                        9.1    9.06  )-----------( 183      ( 16 Dec 2021 06:47 )             30.7     12-16    143  |  105  |  77<H>  ----------------------------<  149<H>  4.3   |  26  |  4.03<H>    Ca    8.0<L>      16 Dec 2021 06:47  Mg     1.9     12-16    TPro  6.7  /  Alb  3.1<L>  /  TBili  0.3  /  DBili  x   /  AST  11  /  ALT  11  /  AlkPhos  88  12-16    PT/INR - ( 16 Dec 2021 06:47 )   PT: 12.7 sec;   INR: 1.06          PTT - ( 16 Dec 2021 06:47 )  PTT:30.5 sec  Troponin T, Serum: 0.05 ng/mL (12-15-21 @ 03:39)      12-15 @ 07:01  -   @ 07:00  --------------------------------------------------------  IN: 180 mL / OUT: 700 mL / NET: -520 mL      Daily     Daily Weight in k.4 (16 Dec 2021 05:27)   Incomplete    S: Pt seen and examined bedside. Sitting comfortably in bed. Still reporting of cough today without episodes of hemoptysis. Otherwise doing well.   Patient denies C/P, SOB, N/V, dizziness, palpitations, and diaphoresis.  Pt denies fever/chills, dysuria, abdominal pain, diarrhea  12 Point ROS otherwise negative except as per HPI/subjective.     O: Vital Signs Last 24 Hrs  T(C): 36.7 (16 Dec 2021 06:38), Max: 36.9 (15 Dec 2021 15:44)  T(F): 98.1 (16 Dec 2021 06:38), Max: 98.4 (15 Dec 2021 15:44)  HR: 62 (16 Dec 2021 06:03) (57 - 72)  BP: 122/58 (16 Dec 2021 05:27) (104/62 - 147/60)  BP(mean): --  RR: 18 (16 Dec 2021 06:03) (12 - 20)  SpO2: 94% (16 Dec 2021 06:03) (93% - 99%)    PHYSICAL EXAM:  GEN: NAD  HEENT: No JVD  PULM:  Mild basilar rales on the left.  CARD:  RRR, S1 and S2   ABD: +BS, NT, soft/ND	  EXT: BLE with edema, wrapped in ACE bandages  NEURO: A+Ox3, no focal deficit  PSYCH: Mood Appropriate    LABS:                        9.1    9.06  )-----------( 183      ( 16 Dec 2021 06:47 )             30.7     12-16    143  |  105  |  77<H>  ----------------------------<  149<H>  4.3   |  26  |  4.03<H>    Ca    8.0<L>      16 Dec 2021 06:47  Mg     1.9     12-16    TPro  6.7  /  Alb  3.1<L>  /  TBili  0.3  /  DBili  x   /  AST  11  /  ALT  11  /  AlkPhos  88  12-16    PT/INR - ( 16 Dec 2021 06:47 )   PT: 12.7 sec;   INR: 1.06          PTT - ( 16 Dec 2021 06:47 )  PTT:30.5 sec  Troponin T, Serum: 0.05 ng/mL (12-15-21 @ 03:39)      12-15 @ 07:01  -   @ 07:00  --------------------------------------------------------  IN: 180 mL / OUT: 700 mL / NET: -520 mL      Daily     Daily Weight in k.4 (16 Dec 2021 05:27)   Incomplete    S: Pt seen and examined bedside. Sitting comfortably in bed. Still reporting of cough today without episodes of hemoptysis. Otherwise doing well.   Patient denies C/P, SOB, N/V, dizziness, palpitations, and diaphoresis.  Pt denies fever/chills, dysuria, abdominal pain, diarrhea  12 Point ROS otherwise negative except as per HPI/subjective.     O: Vital Signs Last 24 Hrs  T(C): 36.7 (16 Dec 2021 06:38), Max: 36.9 (15 Dec 2021 15:44)  T(F): 98.1 (16 Dec 2021 06:38), Max: 98.4 (15 Dec 2021 15:44)  HR: 62 (16 Dec 2021 06:03) (57 - 72)  BP: 122/58 (16 Dec 2021 05:27) (104/62 - 147/60)  BP(mean): --  RR: 18 (16 Dec 2021 06:03) (12 - 20)  SpO2: 94% (16 Dec 2021 06:03) (93% - 99%)    PHYSICAL EXAM:  GEN: obese female in NAD  HEENT: No JVD  PULM:  Mild basilar rales on the left.  CARD:  RRR, S1 and S2   ABD: +BS, NT, soft/ND	  EXT: BLE with edema, wrapped in ACE bandages  NEURO: A+Ox3, no focal deficit  PSYCH: Mood Appropriate    LABS:                        9.1    9.06  )-----------( 183      ( 16 Dec 2021 06:47 )             30.7     12-    143  |  105  |  77<H>  ----------------------------<  149<H>  4.3   |  26  |  4.03<H>    Ca    8.0<L>      16 Dec 2021 06:47  Mg     1.9     12-16    TPro  6.7  /  Alb  3.1<L>  /  TBili  0.3  /  DBili  x   /  AST  11  /  ALT  11  /  AlkPhos  88  12-16    PT/INR - ( 16 Dec 2021 06:47 )   PT: 12.7 sec;   INR: 1.06          PTT - ( 16 Dec 2021 06:47 )  PTT:30.5 sec  Troponin T, Serum: 0.05 ng/mL (12-15-21 @ 03:39)      12-15 @ 07:01  -   @ 07:00  --------------------------------------------------------  IN: 180 mL / OUT: 700 mL / NET: -520 mL      Daily     Daily Weight in k.4 (16 Dec 2021 05:27)   S: Pt seen and examined bedside. Sitting comfortably in bed. Still reporting of cough today without episodes of hemoptysis. Otherwise doing well.   Patient denies C/P, SOB, N/V, dizziness, palpitations, and diaphoresis.  Pt denies fever/chills, dysuria, abdominal pain, diarrhea  12 Point ROS otherwise negative except as per HPI/subjective.     O: Vital Signs Last 24 Hrs  T(C): 36.7 (16 Dec 2021 06:38), Max: 36.9 (15 Dec 2021 15:44)  T(F): 98.1 (16 Dec 2021 06:38), Max: 98.4 (15 Dec 2021 15:44)  HR: 62 (16 Dec 2021 06:03) (57 - 72)  BP: 122/58 (16 Dec 2021 05:27) (104/62 - 147/60)  BP(mean): --  RR: 18 (16 Dec 2021 06:03) (12 - 20)  SpO2: 94% (16 Dec 2021 06:03) (93% - 99%)    PHYSICAL EXAM:  GEN: obese female in NAD  HEENT: No JVD  PULM:  Mild basilar rales on the left.  CARD:  RRR, S1 and S2   ABD: +BS, NT, soft/ND	  EXT: BLE with edema, wrapped in ACE bandages  NEURO: A+Ox3, no focal deficit  PSYCH: Mood Appropriate    LABS:                        9.1    9.06  )-----------( 183      ( 16 Dec 2021 06:47 )             30.7     12-16    143  |  105  |  77<H>  ----------------------------<  149<H>  4.3   |  26  |  4.03<H>    Ca    8.0<L>      16 Dec 2021 06:47  Mg     1.9     12-16    TPro  6.7  /  Alb  3.1<L>  /  TBili  0.3  /  DBili  x   /  AST  11  /  ALT  11  /  AlkPhos  88  12-16    PT/INR - ( 16 Dec 2021 06:47 )   PT: 12.7 sec;   INR: 1.06          PTT - ( 16 Dec 2021 06:47 )  PTT:30.5 sec  Troponin T, Serum: 0.05 ng/mL (12-15-21 @ 03:39)      12-15 @ 07:01  -   @ 07:00  --------------------------------------------------------  IN: 180 mL / OUT: 700 mL / NET: -520 mL      Daily     Daily Weight in k.4 (16 Dec 2021 05:27)

## 2021-12-16 NOTE — PROGRESS NOTE ADULT - PROBLEM SELECTOR PLAN 4
Follows with Dr Ruiz  Continue Dexa and Ninlaro every Monday  Pt with ulceration on dorsum of right foot 2/2 Ninlaro  Continue dry dressing Followed by Dr. Ruiz, was on Dexamethasone and Ninlaro weekly (Monday)  - Holding Ninlaro due to bullae on right foot, per Dr. Ruiz it is unlikely the cause of the bullae but can be held at this time.  - Right foot bullae x2. No signs of infection. Continue dry dressing

## 2021-12-16 NOTE — PROGRESS NOTE ADULT - PROBLEM SELECTOR PLAN 3
Hgb 7.7 today, was 9 last admission  Check iron panel in AM  Holding Eliquis tonight until clear reason of anemia s/p 1 unit pRBC on 12/15, Hgb trended up from 7.3 -> 9.1  - Iron mildly low. s/p 1 unit pRBC on 12/15, Hgb trended up from 7.3 -> 9.1  - Iron mildly low.  - Maintain active T&S (last done 12/15, next due 12/18)

## 2021-12-16 NOTE — DIETITIAN INITIAL EVALUATION ADULT. - ADD RECOMMEND
1. Change diet to Consistent Carbohydrate w/ no snacks and DASH/TLC diet. 2. Monitor %PO intake, diet tolerance. 3. Monitor lytes, replete prn. 4. Monitor wts daily. 5. RD to remain available for additional nutrition interventions as needed. 1. Change diet to Consistent Carbohydrate and DASH/TLC diet. 2. Monitor %PO intake, diet tolerance. 3. Monitor Labs. 4. Monitor wts daily. 5. RD to remain available for additional nutrition interventions as needed.

## 2021-12-16 NOTE — DISCHARGE NOTE PROVIDER - CARE PROVIDER_API CALL
Enriqueta Lehman  CARDIOLOGY  [address] [address]  Phone: [phone]  Fax: [phone]  Established Patient  Follow Up Time:     Jony Neal)  Internal Medicine; Nephrology  [address], [facility] [address]  [place] [address]  Phone: [phone]  Fax: [phone]  Established Patient  Follow Up Time: 2 weeks   Jony Neal)  Internal Medicine; Nephrology  [address], [facility] [address]  [place] [address]  Phone: [phone]  Fax: [phone]  Established Patient  Follow Up Time: 2 weeks    Enriqueta Lehman  CARDIOLOGY  [address] [place] [address]  Phone: [phone]  Fax: [phone]  Scheduled Appointment: 12/30/2021 01:30 PM   Bunny Aguirre)  LHMAKidney Hypertension  [address] [place] [address]  Phone: [phone]  Fax: [phone]  Follow Up Time: 1 week    Payton Ruiz)  Internal Medicine  [address] [place] [address]  Phone: [phone]  Fax: [phone]  Follow Up Time: 2 weeks    Nish Payne)  Critical Care Medicine; Internal Medicine; Pulmonary Disease  [address] [place] [address]  Phone: [phone]  Fax: [phone]  Follow Up Time: 2 weeks    Marina Medeiros)  [facility] Surgery  Vascular  [address] [place] [address]  Phone: [phone]  Fax: [phone]  Follow Up Time: 2 weeks    Enriqueta Lehman G  CARDIOLOGY  [address] [address]  Phone: [phone]  Fax: [phone]  Scheduled Appointment: 12/30/2021 01:30 PM    Kt Newton)  Female Pelvic MedReconst Surg; Urology  [address] [place] [address]  Phone: [phone]  Fax: [phone]  Follow Up Time: 1 week   Payton Ruiz)  Internal Medicine  [address] [place] [address]  Phone: [phone]  Fax: [phone]  Follow Up Time: 2 weeks    Nish Payne)  Critical Care Medicine; Internal Medicine; Pulmonary Disease  [address] [place] [address]  Phone: [phone]  Fax: [phone]  Follow Up Time: 2 weeks    Marina Medeiros)  [facility] Surgery  Vascular  [address] [place] [address]  Phone: [phone]  Fax: [phone]  Follow Up Time: 2 weeks    Kt Newton)  Female Pelvic MedReconst Surg; Urology  [address] [place] [address]  Phone: [phone]  Fax: [phone]  Follow Up Time: 2 weeks    Enriqueta Lehman G  CARDIOLOGY  [address] [facility] [address] [place] [address]  Phone: [phone]  Fax: [phone]  Scheduled Appointment: 01/07/2022 10:00 AM    Bunny Aguirre  [address] [place] [address]  Phone: [phone]  Fax: (   )    -  Scheduled Appointment: 01/05/2022 12:40 PM    Cal Pimentel)  [address]  Endo  [address] [facility] [address]  [place] [address]  Phone: [phone]  Fax: [phone]  Scheduled Appointment: 01/05/2022 10:20 AM

## 2021-12-16 NOTE — PHYSICAL THERAPY INITIAL EVALUATION ADULT - ADDITIONAL COMMENTS
Patient lives with spouse and daughter in apartment with 18 steps to enter. Ambulates with rollator at baseline. Owns wheelchair, commode, shower chair. Has home health aid 5 hours/7 days a week.

## 2021-12-16 NOTE — DIETITIAN INITIAL EVALUATION ADULT. - PERTINENT MEDS FT
Heparin  Benzonatate  Metolazone  Insulin (lantus & sliding scale)  Albuterol   Amitriptyline  Atorvastatin  Benzonatate  Lexapro  Lasix  Gabapentin   Levothyroxine  Methadone  Metoprolol  Pantoprazole   Entresto  Desyrel

## 2021-12-16 NOTE — DISCHARGE NOTE PROVIDER - NSDCFUADDAPPT_GEN_ALL_CORE_FT
Please bring your Insurance card, Photo ID, Covid-19 vaccination card (if applicable) and Discharge paperwork to the following appointment:    (1) Please follow up with your Cardiology Provider, Dr. Enriqueta Lehman at [address] [place] [address] on 12/30/2021 at 1:30pm.    Appointment was scheduled by Ms. ARIADNA Hsu, Referral Coordinator.   Please bring your Insurance card, Photo ID, Covid-19 vaccination card (if applicable) and Discharge paperwork to your appointments.

## 2021-12-16 NOTE — PROGRESS NOTE ADULT - PROBLEM SELECTOR PLAN 6
Levemir interchanged with Lantus  Continue meal coverage Hgb A1c: 8.4 (prior admission)  - home Levemir interchanged with Lantus 20 units qHS  - Insulin Lispro 10 units before meals  - mISS    DVT: holding Eliquis 2/2 hemoptysis  Dispo: likely D/C 12/17, pending progression    PT mary grace, pending recs. Hgb A1c: 8.4 (prior admission)  - home Levemir interchanged with Lantus 20 units qHS  - Insulin Lispro 10 units before meals  - mISS    DVT: holding Eliquis 2/2 hemoptysis  Dispo: likely D/C 12/17, pending progression    PT mary grace, recommending home with home PT

## 2021-12-16 NOTE — DISCHARGE NOTE PROVIDER - NPI NUMBER (FOR SYSADMIN USE ONLY) :
[[id number]],[[id number]] [[id number]],[[id number]] [[id number]],[[id number]],[[id number]],[[id number]],[UNKNOWN],[[id number]] [[id number]],[[id number]],[[id number]],[[id number]],[UNKNOWN],[UNKNOWN],[[id number]]

## 2021-12-16 NOTE — DIETITIAN INITIAL EVALUATION ADULT. - OTHER INFO
60 yo obese Female, current smoker, with Contrast/Aspirin Allergy and an extensive PMHx including HTN, NICM, chronic HFrEF (EF 25% s/p AICD for primary prevention battery changed 8/2020, w/ cardiac Mems device), IDDM c/b neuropathy (on Methadone), hx of recurrent DVTs (on Eliquis), CKD IV, CVA x2 (most recently in 2013 with residual right sided weakness), PAD s/p bypass, COPD (on 3L home O2), DHARMESH on CPAP, Multiple Myeloma (on Ninlaro), hypothyroidism, depression who presented to [facility] with hemoptysis x 4 days 62 yo obese Female, current smoker, with Contrast/Aspirin Allergy and an extensive PMHx including HTN, NICM, chronic HFrEF (EF 25% s/p AICD for primary prevention battery changed 8/2020, w/ cardiac Mems device), IDDM c/b neuropathy (on Methadone), hx of recurrent DVTs (on Eliquis), CKD IV, CVA x2 (most recently in 2013 with residual right sided weakness), PAD s/p bypass, COPD (on 3L home O2), DHARMESH on CPAP, Multiple Myeloma (on Ninlaro), hypothyroidism, depression who presented to [facility] with hemoptysis x 4 days possibly due to dry cough.    On assessment, pt was resting comfortably in her bed, currently ordered for CC w/ no snacks eating approximately 75% of her meals with reportedly good appetite. No n/v/d/c/. Last +BM 12/14. NKFA. Skin: Kavon 17, 2+ L, R foot and leg edema, R foot lesions. PTA, pt reports eating well, denies any wt changes. She lives at home with her son and daughter in law, who cook for her and are knowledgeable about dietary restrictions for CKD, DM and heart health. Reinforced the importance of eating will, discussed lean proteins, heart-healthy and low-salt foods. Pt was receptive and understanding. Please see below for nutritions recommendations. 60 yo obese Female, current smoker, with Contrast/Aspirin Allergy and an extensive PMHx including HTN, NICM, chronic HFrEF (EF 25% s/p AICD for primary prevention battery changed 8/2020, w/ cardiac Mems device), IDDM c/b neuropathy (on Methadone), hx of recurrent DVTs (on Eliquis), CKD IV, CVA x2 (most recently in 2013 with residual right sided weakness), PAD s/p bypass, COPD (on 3L home O2), DHARMESH on CPAP, Multiple Myeloma (on Ninlaro), hypothyroidism, depression who presented to [facility] with hemoptysis x 4 days. Pt admitted for management of CHF exacerbation and anemia. S/p 1 unit pRBCs with appropriate response.     On assessment, pt was resting comfortably in her bed, currently ordered for ConsCHO diet/no snacks eating approximately 75% of her meals with reportedly good appetite. No n/v/d/c. Last +BM 12/14. NKFA. Skin: Kavon 17, 2+ L/R foot ankle and leg edema, R foot lesions. PTA, pt reports eating well, denies any wt changes. She lives at home with her son and daughter in law, who cook for her and are knowledgeable about dietary restrictions for CKD, DM and heart health. Reinforced the importance of eating will, discussed lean proteins, heart-healthy and low-salt foods. Pt was receptive and understanding.  Please see below for nutritions recommendations. 62 yo obese Female, current smoker, with Contrast/Aspirin Allergy and an extensive PMHx including HTN, NICM, chronic HFrEF (EF 25% s/p AICD for primary prevention battery changed 8/2020, w/ cardiac Mems device), IDDM c/b neuropathy (on Methadone), hx of recurrent DVTs (on Eliquis), CKD IV, CVA x2 (most recently in 2013 with residual right sided weakness), PAD s/p bypass, COPD (on 3L home O2), DHARMESH on CPAP, Multiple Myeloma (on Ninlaro), hypothyroidism, depression who presented to [facility] with hemoptysis x 4 days. Pt admitted for management of CHF exacerbation and anemia. S/p 1 unit pRBCs with appropriate response.     On assessment, pt was resting comfortably in her bed, currently ordered for ConsCHO diet/no snacks eating approximately 75% of her meals with reportedly good appetite. No n/v/d/c. Last +BM 12/14. NKFA. Skin: Kavon 17, 2+ L/R foot ankle and leg edema, R foot lesions. PTA, pt reports eating well, denies any wt changes. She lives at home with her son and daughter in law, who cook for her and are knowledgeable about dietary restrictions for CKD, DM and heart health. Reinforced the importance of eating will, discussed lean proteins, heart-healthy and low-salt foods. Pt was receptive and understanding.  Please see below for nutritions recommendations. Paged team.

## 2021-12-17 DIAGNOSIS — R29.898 OTHER SYMPTOMS AND SIGNS INVOLVING THE MUSCULOSKELETAL SYSTEM: ICD-10-CM

## 2021-12-17 LAB
ANION GAP SERPL CALC-SCNC: 10 MMOL/L — SIGNIFICANT CHANGE UP (ref 5–17)
BASOPHILS # BLD AUTO: 0.03 K/UL — SIGNIFICANT CHANGE UP (ref 0–0.2)
BASOPHILS NFR BLD AUTO: 0.4 % — SIGNIFICANT CHANGE UP (ref 0–2)
BUN SERPL-MCNC: 78 MG/DL — HIGH (ref 7–23)
CALCIUM SERPL-MCNC: 8.5 MG/DL — SIGNIFICANT CHANGE UP (ref 8.4–10.5)
CHLORIDE SERPL-SCNC: 104 MMOL/L — SIGNIFICANT CHANGE UP (ref 96–108)
CO2 SERPL-SCNC: 28 MMOL/L — SIGNIFICANT CHANGE UP (ref 22–31)
CREAT SERPL-MCNC: 3.93 MG/DL — HIGH (ref 0.5–1.3)
EOSINOPHIL # BLD AUTO: 0.14 K/UL — SIGNIFICANT CHANGE UP (ref 0–0.5)
EOSINOPHIL NFR BLD AUTO: 1.7 % — SIGNIFICANT CHANGE UP (ref 0–6)
GLUCOSE BLDC GLUCOMTR-MCNC: 111 MG/DL — HIGH (ref 70–99)
GLUCOSE BLDC GLUCOMTR-MCNC: 115 MG/DL — HIGH (ref 70–99)
GLUCOSE BLDC GLUCOMTR-MCNC: 168 MG/DL — HIGH (ref 70–99)
GLUCOSE BLDC GLUCOMTR-MCNC: 95 MG/DL — SIGNIFICANT CHANGE UP (ref 70–99)
GLUCOSE SERPL-MCNC: 112 MG/DL — HIGH (ref 70–99)
HCT VFR BLD CALC: 29.5 % — LOW (ref 34.5–45)
HCT VFR BLD CALC: 31 % — LOW (ref 34.5–45)
HGB BLD-MCNC: 8.9 G/DL — LOW (ref 11.5–15.5)
HGB BLD-MCNC: 9.4 G/DL — LOW (ref 11.5–15.5)
IMM GRANULOCYTES NFR BLD AUTO: 0.4 % — SIGNIFICANT CHANGE UP (ref 0–1.5)
LYMPHOCYTES # BLD AUTO: 1.46 K/UL — SIGNIFICANT CHANGE UP (ref 1–3.3)
LYMPHOCYTES # BLD AUTO: 17.5 % — SIGNIFICANT CHANGE UP (ref 13–44)
MAGNESIUM SERPL-MCNC: 2 MG/DL — SIGNIFICANT CHANGE UP (ref 1.6–2.6)
MCHC RBC-ENTMCNC: 28.5 PG — SIGNIFICANT CHANGE UP (ref 27–34)
MCHC RBC-ENTMCNC: 28.8 PG — SIGNIFICANT CHANGE UP (ref 27–34)
MCHC RBC-ENTMCNC: 30.2 GM/DL — LOW (ref 32–36)
MCHC RBC-ENTMCNC: 30.3 GM/DL — LOW (ref 32–36)
MCV RBC AUTO: 94.6 FL — SIGNIFICANT CHANGE UP (ref 80–100)
MCV RBC AUTO: 95.1 FL — SIGNIFICANT CHANGE UP (ref 80–100)
MONOCYTES # BLD AUTO: 1 K/UL — HIGH (ref 0–0.9)
MONOCYTES NFR BLD AUTO: 12 % — SIGNIFICANT CHANGE UP (ref 2–14)
NEUTROPHILS # BLD AUTO: 5.66 K/UL — SIGNIFICANT CHANGE UP (ref 1.8–7.4)
NEUTROPHILS NFR BLD AUTO: 68 % — SIGNIFICANT CHANGE UP (ref 43–77)
NRBC # BLD: 0 /100 WBCS — SIGNIFICANT CHANGE UP (ref 0–0)
NRBC # BLD: 0 /100 WBCS — SIGNIFICANT CHANGE UP (ref 0–0)
PLATELET # BLD AUTO: 177 K/UL — SIGNIFICANT CHANGE UP (ref 150–400)
PLATELET # BLD AUTO: 192 K/UL — SIGNIFICANT CHANGE UP (ref 150–400)
POTASSIUM SERPL-MCNC: 4.7 MMOL/L — SIGNIFICANT CHANGE UP (ref 3.5–5.3)
POTASSIUM SERPL-SCNC: 4.7 MMOL/L — SIGNIFICANT CHANGE UP (ref 3.5–5.3)
PROT SERPL-MCNC: 6.2 G/DL — SIGNIFICANT CHANGE UP (ref 6–8.3)
PROT SERPL-MCNC: 6.2 G/DL — SIGNIFICANT CHANGE UP (ref 6–8.3)
RBC # BLD: 3.12 M/UL — LOW (ref 3.8–5.2)
RBC # BLD: 3.26 M/UL — LOW (ref 3.8–5.2)
RBC # FLD: 14.2 % — SIGNIFICANT CHANGE UP (ref 10.3–14.5)
RBC # FLD: 14.4 % — SIGNIFICANT CHANGE UP (ref 10.3–14.5)
SODIUM SERPL-SCNC: 142 MMOL/L — SIGNIFICANT CHANGE UP (ref 135–145)
WBC # BLD: 8.32 K/UL — SIGNIFICANT CHANGE UP (ref 3.8–10.5)
WBC # BLD: 8.33 K/UL — SIGNIFICANT CHANGE UP (ref 3.8–10.5)
WBC # FLD AUTO: 8.32 K/UL — SIGNIFICANT CHANGE UP (ref 3.8–10.5)
WBC # FLD AUTO: 8.33 K/UL — SIGNIFICANT CHANGE UP (ref 3.8–10.5)

## 2021-12-17 PROCEDURE — 99221 1ST HOSP IP/OBS SF/LOW 40: CPT

## 2021-12-17 PROCEDURE — 99232 SBSQ HOSP IP/OBS MODERATE 35: CPT | Mod: GC

## 2021-12-17 PROCEDURE — 99233 SBSQ HOSP IP/OBS HIGH 50: CPT

## 2021-12-17 RX ORDER — APIXABAN 2.5 MG/1
2.5 TABLET, FILM COATED ORAL EVERY 12 HOURS
Refills: 0 | Status: DISCONTINUED | OUTPATIENT
Start: 2021-12-17 | End: 2021-12-18

## 2021-12-17 RX ORDER — ACETAMINOPHEN 500 MG
650 TABLET ORAL EVERY 6 HOURS
Refills: 0 | Status: DISCONTINUED | OUTPATIENT
Start: 2021-12-17 | End: 2021-12-25

## 2021-12-17 RX ORDER — CYCLOBENZAPRINE HYDROCHLORIDE 10 MG/1
5 TABLET, FILM COATED ORAL THREE TIMES A DAY
Refills: 0 | Status: DISCONTINUED | OUTPATIENT
Start: 2021-12-17 | End: 2021-12-18

## 2021-12-17 RX ORDER — METHADONE HYDROCHLORIDE 40 MG/1
5 TABLET ORAL EVERY 6 HOURS
Refills: 0 | Status: DISCONTINUED | OUTPATIENT
Start: 2021-12-17 | End: 2021-12-22

## 2021-12-17 RX ADMIN — HEPARIN SODIUM 7500 UNIT(S): 5000 INJECTION INTRAVENOUS; SUBCUTANEOUS at 06:06

## 2021-12-17 RX ADMIN — ESCITALOPRAM OXALATE 20 MILLIGRAM(S): 10 TABLET, FILM COATED ORAL at 11:35

## 2021-12-17 RX ADMIN — Medication 100 MILLIGRAM(S): at 22:43

## 2021-12-17 RX ADMIN — Medication 40 MILLIGRAM(S): at 06:08

## 2021-12-17 RX ADMIN — APIXABAN 2.5 MILLIGRAM(S): 2.5 TABLET, FILM COATED ORAL at 11:35

## 2021-12-17 RX ADMIN — Medication 650 MILLIGRAM(S): at 22:43

## 2021-12-17 RX ADMIN — Medication 48 MILLIGRAM(S): at 11:35

## 2021-12-17 RX ADMIN — BUDESONIDE AND FORMOTEROL FUMARATE DIHYDRATE 2 PUFF(S): 160; 4.5 AEROSOL RESPIRATORY (INHALATION) at 22:34

## 2021-12-17 RX ADMIN — Medication 50 MILLIGRAM(S): at 22:33

## 2021-12-17 RX ADMIN — SACUBITRIL AND VALSARTAN 1 TABLET(S): 24; 26 TABLET, FILM COATED ORAL at 09:19

## 2021-12-17 RX ADMIN — GABAPENTIN 600 MILLIGRAM(S): 400 CAPSULE ORAL at 06:08

## 2021-12-17 RX ADMIN — Medication 15 MILLIGRAM(S): at 22:33

## 2021-12-17 RX ADMIN — SACUBITRIL AND VALSARTAN 1 TABLET(S): 24; 26 TABLET, FILM COATED ORAL at 22:34

## 2021-12-17 RX ADMIN — ATORVASTATIN CALCIUM 80 MILLIGRAM(S): 80 TABLET, FILM COATED ORAL at 22:33

## 2021-12-17 RX ADMIN — Medication 650 MILLIGRAM(S): at 23:00

## 2021-12-17 RX ADMIN — Medication 2: at 12:16

## 2021-12-17 RX ADMIN — Medication 40 MILLIGRAM(S): at 18:09

## 2021-12-17 RX ADMIN — BUDESONIDE AND FORMOTEROL FUMARATE DIHYDRATE 2 PUFF(S): 160; 4.5 AEROSOL RESPIRATORY (INHALATION) at 09:19

## 2021-12-17 RX ADMIN — Medication 5 UNIT(S): at 12:49

## 2021-12-17 RX ADMIN — APIXABAN 2.5 MILLIGRAM(S): 2.5 TABLET, FILM COATED ORAL at 22:33

## 2021-12-17 RX ADMIN — METHADONE HYDROCHLORIDE 10 MILLIGRAM(S): 40 TABLET ORAL at 06:07

## 2021-12-17 RX ADMIN — METHADONE HYDROCHLORIDE 10 MILLIGRAM(S): 40 TABLET ORAL at 22:33

## 2021-12-17 RX ADMIN — LIDOCAINE 1 PATCH: 4 CREAM TOPICAL at 11:36

## 2021-12-17 RX ADMIN — LIDOCAINE 1 PATCH: 4 CREAM TOPICAL at 18:30

## 2021-12-17 RX ADMIN — PANTOPRAZOLE SODIUM 40 MILLIGRAM(S): 20 TABLET, DELAYED RELEASE ORAL at 06:07

## 2021-12-17 RX ADMIN — Medication 10 MILLIGRAM(S): at 22:33

## 2021-12-17 RX ADMIN — Medication 100 MILLIGRAM(S): at 06:06

## 2021-12-17 RX ADMIN — INSULIN GLARGINE 10 UNIT(S): 100 INJECTION, SOLUTION SUBCUTANEOUS at 22:32

## 2021-12-17 RX ADMIN — Medication 5 UNIT(S): at 17:34

## 2021-12-17 RX ADMIN — Medication 25 MICROGRAM(S): at 06:07

## 2021-12-17 RX ADMIN — GABAPENTIN 600 MILLIGRAM(S): 400 CAPSULE ORAL at 17:42

## 2021-12-17 NOTE — CONSULT NOTE ADULT - ASSESSMENT
62 yo obese Female CKD (baseline around 3.7-4) CHF, DM COPD (on 3L home o2) multiple myeloma presented to the hospital with complaints of hemoptysis and shortness of breath x 4 days;     Assessment/Plan:     #CKD Cr at baseline    Uop: 600CC/shift     Recommend:   daily CXR   continue w/ IV diuresis 40mg BID in addition to metolazone 5mg  strict i's and o's   daily BMP   renal sono  renal diet   daily weights  no need for HD currently given increasing Uop in response to IV diuresis and no electrolyte abnormalities currently       Thank you for the opportunity to participate in the care of your patient. The nephrology service remains available to assist with any questions or concerns. Please feel free to reach us by paging the on-call nephrology fellow for urgent issues or as below.     Krystle Billy D.O  PGY-4, Nephrology Fellow   P: [phone]

## 2021-12-17 NOTE — PROGRESS NOTE ADULT - SUBJECTIVE AND OBJECTIVE BOX
PULMONARY CONSULT SERVICE FOLLOW-UP NOTE    INTERVAL HPI:  Reviewed chart and overnight events; patient seen and examined at bedside. Shortness of breath improving. No more hemoptysis since admission.    MEDICATIONS:  Pulmonary:  ALBUTerol   0.5% 2.5 milliGRAM(s) Nebulizer every 6 hours PRN  benzonatate 100 milliGRAM(s) Oral every 8 hours PRN  budesonide  80 MICROgram(s)/formoterol 4.5 MICROgram(s) Inhaler 2 Puff(s) Inhalation two times a day  guaiFENesin Oral Liquid (Sugar-Free) 100 milliGRAM(s) Oral every 6 hours PRN    Anticoagulants:  heparin   Injectable 7500 Unit(s) SubCutaneous every 8 hours    Cardiac:  furosemide   Injectable 40 milliGRAM(s) IV Push two times a day  metolazone 5 milliGRAM(s) Oral <User Schedule>  metoprolol succinate  milliGRAM(s) Oral daily  sacubitril 97 mG/valsartan 103 mG 1 Tablet(s) Oral two times a day    Allergies  aspirin (Other (Moderate); Rash)  oral contrast (Unknown)    Vital Signs Last 24 Hrs  T(C): 36.2 (17 Dec 2021 05:19), Max: 36.4 (16 Dec 2021 22:12)  T(F): 97.2 (17 Dec 2021 05:19), Max: 97.6 (16 Dec 2021 22:12)  HR: 74 (17 Dec 2021 05:44) (56 - 74)  BP: 130/52 (17 Dec 2021 05:44) (102/52 - 134/64)  BP(mean): 81 (17 Dec 2021 01:45) (75 - 89)  RR: 15 (17 Dec 2021 05:44) (11 - 18)  SpO2: 95% (17 Dec 2021 05:44) (93% - 100%)    12-16 @ 07:01  -  12-17 @ 07:00  --------------------------------------------------------  IN: 660 mL / OUT: 200 mL / NET: 460 mL    PHYSICAL EXAM:  Constitutional: WD  HEENT: NC/AT; PERRL, anicteric sclera; MMM  Neck: supple, +JVD  Cardiovascular: +S1/S2, RRR  Respiratory: bibasilar crackles; no W/R/R  Gastrointestinal: soft, NT/ND  Extremities: WWP; no clubbing or cyanosis; 2+ pitting edema  Vascular: 2+ radial and pedal pulses  Neurological: AAOx3; no focal deficits    LABS:  CBC Full  -  ( 16 Dec 2021 23:52 )  WBC Count : 8.32 K/uL  RBC Count : 3.12 M/uL  Hemoglobin : 8.9 g/dL  Hematocrit : 29.5 %  Platelet Count - Automated : 177 K/uL  Mean Cell Volume : 94.6 fl  Mean Cell Hemoglobin : 28.5 pg  Mean Cell Hemoglobin Concentration : 30.2 gm/dL  Auto Neutrophil # : 5.66 K/uL  Auto Lymphocyte # : 1.46 K/uL  Auto Monocyte # : 1.00 K/uL  Auto Eosinophil # : 0.14 K/uL  Auto Basophil # : 0.03 K/uL  Auto Neutrophil % : 68.0 %  Auto Lymphocyte % : 17.5 %  Auto Monocyte % : 12.0 %  Auto Eosinophil % : 1.7 %  Auto Basophil % : 0.4 %    12-16    143  |  105  |  77<H>  ----------------------------<  149<H>  4.3   |  26  |  4.03<H>    Ca    8.0<L>      16 Dec 2021 06:47  Mg     1.9     12-16    TPro  6.7  /  Alb  3.1<L>  /  TBili  0.3  /  DBili  x   /  AST  11  /  ALT  11  /  AlkPhos  88  12-16    PT/INR - ( 16 Dec 2021 06:47 )   PT: 12.7 sec;   INR: 1.06       PTT - ( 16 Dec 2021 06:47 )  PTT:30.5 sec    RADIOLOGY & ADDITIONAL STUDIES: Reviewed.

## 2021-12-17 NOTE — CONSULT NOTE ADULT - SUBJECTIVE AND OBJECTIVE BOX
Patient is a 61y old  Female who presents with a chief complaint of fluid overload (16 Dec 2021 11:08)      HPI:  62 yo obese Female CKD (baseline around 3.7-4) CHF, DM COPD (on 3L home o2) multiple myeloma presented to the hospital with complaints of hemoptysis and shortness of breath x 4 days; CXR showing signs of pulmonary vascular congestion ; CT chest done showing mild pulmonary edema w/ trace right pleural effusion; patient started on IV diuretics (lasix 40 BID) w/ the addition of metolazone 5mg yesterday; Cr at baseline  cc/ day since admission; patient denies any changes in her urinary habits ; no changes in the appearance of her urine; nephrology consulted for volume management and possible need for hd     PAST MEDICAL & SURGICAL HISTORY:  CHF (congestive heart failure)    COPD (chronic obstructive pulmonary disease)    HLD (hyperlipidemia)    Chronic pain    DM (diabetes mellitus)    CVA (cerebral vascular accident)    DVT (deep venous thrombosis)    HTN (hypertension)    Depression    Bipolar depression    PAD (peripheral artery disease)    Neuropathy    Hypothyroidism    Multiple myeloma    CKD (chronic kidney disease), stage IV    DHARMESH on CPAP    AICD (automatic cardioverter/defibrillator) present    H/O abdominal hysterectomy    H/O tubal ligation    History of cholecystectomy    H/O extremity bypass graft          Allergies:  aspirin (Other (Moderate); Rash)  oral contrast (Unknown)      Home Medications:   ALBUTerol   0.5% 2.5 milliGRAM(s) Nebulizer every 6 hours PRN  amitriptyline 10 milliGRAM(s) Oral at bedtime  apixaban 2.5 milliGRAM(s) Oral every 12 hours  atorvastatin 80 milliGRAM(s) Oral at bedtime  benzonatate 100 milliGRAM(s) Oral every 8 hours PRN  budesonide  80 MICROgram(s)/formoterol 4.5 MICROgram(s) Inhaler 2 Puff(s) Inhalation two times a day  busPIRone 15 milliGRAM(s) Oral <User Schedule>  dextrose 40% Gel 15 Gram(s) Oral once  dextrose 5%. 1000 milliLiter(s) IV Continuous <Continuous>  dextrose 5%. 1000 milliLiter(s) IV Continuous <Continuous>  dextrose 50% Injectable 25 Gram(s) IV Push once  dextrose 50% Injectable 12.5 Gram(s) IV Push once  dextrose 50% Injectable 25 Gram(s) IV Push once  escitalopram 20 milliGRAM(s) Oral daily  fenofibrate Tablet 48 milliGRAM(s) Oral daily  furosemide   Injectable 40 milliGRAM(s) IV Push two times a day  gabapentin 600 milliGRAM(s) Oral two times a day  glucagon  Injectable 1 milliGRAM(s) IntraMuscular once  guaiFENesin Oral Liquid (Sugar-Free) 100 milliGRAM(s) Oral every 6 hours PRN  insulin glargine Injectable (LANTUS) 10 Unit(s) SubCutaneous at bedtime  insulin lispro (ADMELOG) corrective regimen sliding scale   SubCutaneous Before meals and at bedtime  insulin lispro Injectable (ADMELOG) 5 Unit(s) SubCutaneous three times a day before meals  levothyroxine 25 MICROGram(s) Oral daily  lidocaine   4% Patch 1 Patch Transdermal daily  methadone    Tablet 10 milliGRAM(s) Oral three times a day  metolazone 5 milliGRAM(s) Oral <User Schedule>  metoprolol succinate  milliGRAM(s) Oral daily  pantoprazole    Tablet 40 milliGRAM(s) Oral before breakfast  sacubitril 97 mG/valsartan 103 mG 1 Tablet(s) Oral two times a day  traZODone 50 milliGRAM(s) Oral at bedtime      Hospital Medications:   MEDICATIONS  (STANDING):  amitriptyline 10 milliGRAM(s) Oral at bedtime  apixaban 2.5 milliGRAM(s) Oral every 12 hours  atorvastatin 80 milliGRAM(s) Oral at bedtime  budesonide  80 MICROgram(s)/formoterol 4.5 MICROgram(s) Inhaler 2 Puff(s) Inhalation two times a day  busPIRone 15 milliGRAM(s) Oral <User Schedule>  dextrose 40% Gel 15 Gram(s) Oral once  dextrose 5%. 1000 milliLiter(s) (50 mL/Hr) IV Continuous <Continuous>  dextrose 5%. 1000 milliLiter(s) (100 mL/Hr) IV Continuous <Continuous>  dextrose 50% Injectable 25 Gram(s) IV Push once  dextrose 50% Injectable 12.5 Gram(s) IV Push once  dextrose 50% Injectable 25 Gram(s) IV Push once  escitalopram 20 milliGRAM(s) Oral daily  fenofibrate Tablet 48 milliGRAM(s) Oral daily  furosemide   Injectable 40 milliGRAM(s) IV Push two times a day  gabapentin 600 milliGRAM(s) Oral two times a day  glucagon  Injectable 1 milliGRAM(s) IntraMuscular once  insulin glargine Injectable (LANTUS) 10 Unit(s) SubCutaneous at bedtime  insulin lispro (ADMELOG) corrective regimen sliding scale   SubCutaneous Before meals and at bedtime  insulin lispro Injectable (ADMELOG) 5 Unit(s) SubCutaneous three times a day before meals  levothyroxine 25 MICROGram(s) Oral daily  lidocaine   4% Patch 1 Patch Transdermal daily  methadone    Tablet 10 milliGRAM(s) Oral three times a day  metolazone 5 milliGRAM(s) Oral <User Schedule>  metoprolol succinate  milliGRAM(s) Oral daily  pantoprazole    Tablet 40 milliGRAM(s) Oral before breakfast  sacubitril 97 mG/valsartan 103 mG 1 Tablet(s) Oral two times a day  traZODone 50 milliGRAM(s) Oral at bedtime      SOCIAL HISTORY:  Denies ETOh, Smoking,     Family History:  FAMILY HISTORY:  Family history of hypertension (Mother)    Family history of diabetes mellitus (Mother)          VITALS:  T(F): 97.6 (12-17-21 @ 09:36), Max: 97.6 (12-16-21 @ 22:12)  HR: 71 (12-17-21 @ 11:49)  BP: 113/56 (12-17-21 @ 11:49)  RR: 18 (12-17-21 @ 11:49)  SpO2: 95% (12-17-21 @ 11:49)  Wt(kg): --    12-16 @ 07:01  -  12-17 @ 07:00  --------------------------------------------------------  IN: 660 mL / OUT: 200 mL / NET: 460 mL    12-17 @ 07:01  -  12-17 @ 13:56  --------------------------------------------------------  IN: 0 mL / OUT: 600 mL / NET: -600 mL        CAPILLARY BLOOD GLUCOSE      POCT Blood Glucose.: 168 mg/dL (17 Dec 2021 11:50)  POCT Blood Glucose.: 111 mg/dL (17 Dec 2021 06:07)  POCT Blood Glucose.: 90 mg/dL (16 Dec 2021 21:57)  POCT Blood Glucose.: 80 mg/dL (16 Dec 2021 21:36)  POCT Blood Glucose.: 83 mg/dL (16 Dec 2021 17:45)      Review of Systems:  10 poiint ROS as per HPI    PHYSICAL EXAM:  GENERAL: Alert, awake, oriented x3 on 3 L NC  CHEST/LUNG: decreased breath sounds at the bases w/ bL rhonchi   HEART: Regular rate and rhythm, no murmur, no gallops, no rub   ABDOMEN: obese, Soft, nontender, non distended  : No flank or supra pubic tenderness.  EXTREMITIES: 2+ pitting edema BL      LABS:  12-17    142  |  104  |  78<H>  ----------------------------<  112<H>  4.7   |  28  |  3.93<H>    Ca    8.5      17 Dec 2021 07:55  Mg     2.0     12-17    TPro  6.7  /  Alb  3.1<L>  /  TBili  0.3  /  DBili      /  AST  11  /  ALT  11  /  AlkPhos  88  12-16    Creatinine Trend: 3.93 <--, 4.03 <--, 3.94 <--, 3.87 <--, 3.73 <--                        9.4    8.33  )-----------( 192      ( 17 Dec 2021 07:55 )             31.0     Urine Studies:

## 2021-12-17 NOTE — PROGRESS NOTE ADULT - PROBLEM SELECTOR PLAN 6
Hgb A1c: 8.4 (prior admission)  - home Levemir interchanged with Lantus 20 units qHS  - Insulin Lispro 10 units before meals  - mISS    DVT: holding Eliquis 2/2 hemoptysis  Dispo: likely D/C 12/17, pending progression    PT mary grace, recommending home with home PT Cr: 3.93 (~4 during prior admission)  -Continue to monitor   -Seen by Dr. Neal, if Cr and volume status worsens, can consider dialysis.

## 2021-12-17 NOTE — PROGRESS NOTE ADULT - PROBLEM SELECTOR PLAN 1
No new episodes since her admission.  - CT Chest 12/16/21: Mild pulmonary edema and trace right pleural effusion.  - Pulmonary consulted, hemoptysis is mild, likely due to cough in combination with increased hydrostatic pressure in capillary beds from decompensated heart failure.  - RVP and COVID negative. Sputum culture pending.  - c/w cough suppressants   - Eliquis held, consider restarting on 12/17    R/out DVT  - As per Hx, on Eliquis for Recurrent DVT  - BLE duplex pending for Pain and palpable mass  - RUE duplex for bruising and palpable mass No new episodes since her admission.  - CT Chest 12/16/21: Mild pulmonary edema and trace right pleural effusion.  - Pulmonary consulted, hemoptysis is mild, likely due to cough in combination with increased hydrostatic pressure in capillary beds from decompensated heart failure.  - RVP and COVID negative. Sputum culture pending.  - c/w cough suppressants   - Eliquis initially held 2/2 to hemoptysis, restarted on 12/17    R/O DVT  - As per Hx, on Eliquis for Recurrent DVT  - RUE and BLE duplex (pending) for Pain and palpable mass No new episodes since her admission.  - CT Chest 12/16/21: Mild pulmonary edema and trace right pleural effusion.  - Pulmonary consulted, hemoptysis is mild, likely due to cough in combination with increased hydrostatic pressure in capillary beds from decompensated heart failure.  - RVP and COVID negative. Sputum culture pending.  - c/w cough suppressants   - Eliquis initially held 2/2 to hemoptysis, restarted on 12/17    R/O DVT  - As per Hx, restarted on Eliquis for Recurrent DVT  - RUE and BLE duplex (pending) for Pain and palpable mass

## 2021-12-17 NOTE — PROGRESS NOTE ADULT - PROBLEM SELECTOR PLAN 4
Followed by Dr. Ruiz, was on Dexamethasone and Ninlaro weekly (Monday)  - Holding Ninlaro due to bullae on right foot, per Dr. Ruiz it is unlikely the cause of the bullae but can be held at this time.  - Right foot bullae x2. No signs of infection. Continue dry dressing Pt endorsing RUE weakness/pain and myoclonic movement  -Neuro exam otherwise unremarkable  -CTH w/o contrast to evaluate for stroke or any acute pathology (pending)  -Stroke/Neuro consulted, suspicious 2/2 toxic/metabolic causes, particularly CNS acting medications, low suspicion for seizure activities.  No further work-up from a stroke perspective is needed  -recs to limit use of CNS acting medications and renally adjust medications. Is symptoms persist, rec-consult neurology  -Pain management consulted: Methadone 5 mg PO q6h PRN breakthrough pain and Flexeril 5mg PO q8h, c/w Methadone 10 mg PO TID (confirmed by istop). EKG in AM to monitor ND iinterval.  Tylenol 650 mg PO q6h PRN moderate pain

## 2021-12-17 NOTE — PROGRESS NOTE ADULT - PROBLEM SELECTOR PLAN 3
s/p 1 unit pRBC on 12/15, Hgb trended up from 7.3 -> 9.1  - Iron mildly low.  - Maintain active T&S (last done 12/15, next due 12/18) s/p 1 unit pRBC on 12/15, Hgb trended up from 7.3 -> 9.1  - Iron mildly low.  - Maintain active T&S (ordered 12/18)

## 2021-12-17 NOTE — PROGRESS NOTE ADULT - SUBJECTIVE AND OBJECTIVE BOX
Incomplete    S: Pt seen and examined bedside.  Patient denies C/P, SOB, N/V, dizziness, palpitations, and diaphoresis.  Pt denies fever/chills, dysuria, abdominal pain, diarrhea, and cough  12 Point ROS otherwise negative except as per HPI/subjective.     O: Vital Signs Last 24 Hrs  T(C): 36.4 (17 Dec 2021 14:09), Max: 36.4 (16 Dec 2021 22:12)  T(F): 97.5 (17 Dec 2021 14:09), Max: 97.6 (16 Dec 2021 22:12)  HR: 71 (17 Dec 2021 11:49) (56 - 74)  BP: 113/56 (17 Dec 2021 11:49) (102/52 - 150/66)  BP(mean): 81 (17 Dec 2021 01:45) (75 - 89)  RR: 18 (17 Dec 2021 11:49) (11 - 20)  SpO2: 95% (17 Dec 2021 11:49) (93% - 100%)    PHYSICAL EXAM:  GEN: NAD  HEENT: No JVD  PULM:  CTA B/L  CARD:  RRR, S1 and S2   ABD: +BS, NT, soft/ND	  EXT: No Edema B/L LE  NEURO: A+Ox3, no focal deficit  PSYCH: Mood Appropriate    LABS:                        9.4    8.33  )-----------( 192      ( 17 Dec 2021 07:55 )             31.0         142  |  104  |  78<H>  ----------------------------<  112<H>  4.7   |  28  |  3.93<H>    Ca    8.5      17 Dec 2021 07:55  Mg     2.0         TPro  6.7  /  Alb  3.1<L>  /  TBili  0.3  /  DBili  x   /  AST  11  /  ALT  11  /  AlkPhos  88  -    PT/INR - ( 16 Dec 2021 06:47 )   PT: 12.7 sec;   INR: 1.06          PTT - ( 16 Dec 2021 06:47 )  PTT:30.5 sec  Troponin T, Serum: 0.05 ng/mL (12-15-21 @ 03:39)       @ 07: @ 07:00  --------------------------------------------------------  IN: 660 mL / OUT: 200 mL / NET: 460 mL     @ 07: @ 14:27  --------------------------------------------------------  IN: 120 mL / OUT: 600 mL / NET: -480 mL      Daily     Daily Weight in k.8 (17 Dec 2021 06:52)   S: Pt seen and examined bedside found with c/o worsening B/L Upper Extremtiy weakness, R>L, myoclonic movements.  Pt states that the pain started since admissions but has worsened today significantly.  Also continues to endorse chronic cough.   She currently denies active C/P, SOB, N/V, dizziness, palpitations, diaphoresis, fever/chills, dysuria, abdominal pain, diarrhea, and cough  12 Point ROS otherwise negative except as per HPI/subjective.     O: Vital Signs Last 24 Hrs  T(C): 36.4 (17 Dec 2021 14:09), Max: 36.4 (16 Dec 2021 22:12)  T(F): 97.5 (17 Dec 2021 14:09), Max: 97.6 (16 Dec 2021 22:12)  HR: 71 (17 Dec 2021 11:49) (56 - 74)  BP: 113/56 (17 Dec 2021 11:49) (102/52 - 150/66)  BP(mean): 81 (17 Dec 2021 01:45) (75 - 89)  RR: 18 (17 Dec 2021 11:49) (11 - 20)  SpO2: 95% (17 Dec 2021 11:49) (93% - 100%)    PHYSICAL EXAM:  GEN: NAD  HEENT: No JVD., no carotid bruit B/L  PULM:  Mild bibasilar rales, L > R, no rhonchi or wheezes noted  CARD:  RRR, S1 and S2   ABD: +BS, NT, soft/ND	  EXT: +2 B/L LE edema, Ace wrap in place, healing ulcers on RLE, dressing intact  NEURO: A+Ox3, no focal deficit  PSYCH: Mood Appropriate    LABS:                        9.4    8.33  )-----------( 192      ( 17 Dec 2021 07:55 )             31.0         142  |  104  |  78<H>  ----------------------------<  112<H>  4.7   |  28  |  3.93<H>    Ca    8.5      17 Dec 2021 07:55  Mg     2.0     -    TPro  6.7  /  Alb  3.1<L>  /  TBili  0.3  /  DBili  x   /  AST  11  /  ALT  11  /  AlkPhos  88  16    PT/INR - ( 16 Dec 2021 06:47 )   PT: 12.7 sec;   INR: 1.06          PTT - ( 16 Dec 2021 06:47 )  PTT:30.5 sec  Troponin T, Serum: 0.05 ng/mL (12-15-21 @ 03:39)       @ 07:01  -   @ 07:00  --------------------------------------------------------  IN: 660 mL / OUT: 200 mL / NET: 460 mL     @ 07:01  -   @ 14:27  --------------------------------------------------------  IN: 120 mL / OUT: 600 mL / NET: -480 mL      Daily     Daily Weight in k.8 (17 Dec 2021 06:52)

## 2021-12-17 NOTE — CONSULT NOTE ADULT - SUBJECTIVE AND OBJECTIVE BOX
Neurology Stroke Consult Note    Chief Complaint:   62 yo obese Female, current smoker, with Contrast/Aspirin Allergy and an extensive PMHx including HTN, NICM, chronic HFrEF (EF 25% s/p AICD for primary prevention battery changed 8/2020, w/ cardiac Mems device), IDDM c/b neuropathy (on Methadone), hx of recurrent DVTs (on Eliquis), CKD IV, CVA x2 (most recently in 2013 with residual right sided weakness), PAD s/p bypass, COPD (on 3L home O2), DHARMESH on CPAP, Multiple Myeloma (on N[place]o), hypothyroidism, depression who presented to [facility] on 12/15  with hemoptysis x 4 days.   During hospital course patient was seen by pulmonology who determined that there was no intervention at this time. Eliquis was held on 12/15 & 12/16 due to hemoptysis and anemia, restarted on 12/17.  Stroke neurology was consulted to see the patient due to bilateral upper extremity weakness (R>L, baseline right sided weakness).   Patient was seen and examined by stroke. Patient with flat affect, intermittently crying throughout exam. The patient states "I don't know what is going on with me, my arms keep jerking". When asked, the patient states she feels generally weak in all four extremities. Initially the patient was moving his arms slowly, however, she then lifted arms     HPI:    PAST MEDICAL & SURGICAL HISTORY:  CHF (congestive heart failure)    COPD (chronic obstructive pulmonary disease)    HLD (hyperlipidemia)    Chronic pain    DM (diabetes mellitus)    CVA (cerebral vascular accident)    DVT (deep venous thrombosis)    HTN (hypertension)    Depression    Bipolar depression    PAD (peripheral artery disease)    Neuropathy    Hypothyroidism    Multiple myeloma    CKD (chronic kidney disease), stage IV    DHARMESH on CPAP    AICD (automatic cardioverter/defibrillator) present    H/O abdominal hysterectomy    H/O tubal ligation    History of cholecystectomy    H/O extremity bypass graft        FAMILY HISTORY:  Family history of hypertension (Mother)    Family history of diabetes mellitus (Mother)    SOCIAL HISTORY:  current smoker     ROS:  as per HPI    MEDICATIONS  (STANDING):  amitriptyline 10 milliGRAM(s) Oral at bedtime  apixaban 2.5 milliGRAM(s) Oral every 12 hours  atorvastatin 80 milliGRAM(s) Oral at bedtime  budesonide  80 MICROgram(s)/formoterol 4.5 MICROgram(s) Inhaler 2 Puff(s) Inhalation two times a day  busPIRone 15 milliGRAM(s) Oral <User Schedule>  dextrose 40% Gel 15 Gram(s) Oral once  dextrose 5%. 1000 milliLiter(s) (50 mL/Hr) IV Continuous <Continuous>  dextrose 5%. 1000 milliLiter(s) (100 mL/Hr) IV Continuous <Continuous>  dextrose 50% Injectable 25 Gram(s) IV Push once  dextrose 50% Injectable 12.5 Gram(s) IV Push once  dextrose 50% Injectable 25 Gram(s) IV Push once  escitalopram 20 milliGRAM(s) Oral daily  fenofibrate Tablet 48 milliGRAM(s) Oral daily  furosemide   Injectable 40 milliGRAM(s) IV Push two times a day  gabapentin 600 milliGRAM(s) Oral two times a day  glucagon  Injectable 1 milliGRAM(s) IntraMuscular once  insulin glargine Injectable (LANTUS) 10 Unit(s) SubCutaneous at bedtime  insulin lispro (ADMELOG) corrective regimen sliding scale   SubCutaneous Before meals and at bedtime  insulin lispro Injectable (ADMELOG) 5 Unit(s) SubCutaneous three times a day before meals  levothyroxine 25 MICROGram(s) Oral daily  lidocaine   4% Patch 1 Patch Transdermal daily  methadone    Tablet 10 milliGRAM(s) Oral three times a day  metolazone 5 milliGRAM(s) Oral <User Schedule>  metoprolol succinate  milliGRAM(s) Oral daily  pantoprazole    Tablet 40 milliGRAM(s) Oral before breakfast  sacubitril 97 mG/valsartan 103 mG 1 Tablet(s) Oral two times a day  traZODone 50 milliGRAM(s) Oral at bedtime    MEDICATIONS  (PRN):  ALBUTerol   0.5% 2.5 milliGRAM(s) Nebulizer every 6 hours PRN Shortness of Breath and/or Wheezing  benzonatate 100 milliGRAM(s) Oral every 8 hours PRN Cough  guaiFENesin Oral Liquid (Sugar-Free) 100 milliGRAM(s) Oral every 6 hours PRN Cough      Allergies    aspirin (Other (Moderate); Rash)  oral contrast (Unknown)    Intolerances    Vital Signs Last 24 Hrs  T(C): 36.4 (17 Dec 2021 09:36), Max: 36.4 (16 Dec 2021 22:12)  T(F): 97.6 (17 Dec 2021 09:36), Max: 97.6 (16 Dec 2021 22:12)  HR: 71 (17 Dec 2021 11:49) (56 - 74)  BP: 113/56 (17 Dec 2021 11:49) (102/52 - 150/66)  BP(mean): 81 (17 Dec 2021 01:45) (75 - 89)  RR: 18 (17 Dec 2021 11:49) (11 - 20)  SpO2: 95% (17 Dec 2021 11:49) (93% - 100%)    Physical exam:  General: flat affect, intermittently crying      Neurologic:  -Mental status: Awake, alert, oriented to person, place, and time. Speech is fluent with intact naming, repetition, and comprehension, no dysarthria. Recent and remote memory intact. Follows commands. Attention/concentration intact. Fund of knowledge appropriate.  -Cranial nerves:   II: Visual fields are full to confrontation.  III, IV, VI: Extraocular movements are intact without nystagmus. Pupils equally round and reactive to light  V:  Facial sensation V1-V3 equal and intact   VII: Face is symmetric with normal eye closure and smile  VIII: Hearing is bilaterally intact to finger rub  IX, X: Uvula is midline and soft palate rises symmetrically  XI: Head turning and shoulder shrug are intact.  XII: Tongue protrudes midline  Motor: Normal bulk and tone. No pronator drift. Strength bilateral upper extremity 5/5, bilateral lower extremities 5/5.  Rapid alternating movements intact and symmetric  Sensation: Intact to light touch bilaterally. No neglect or extinction on double simultaneous testing.  Coordination: No dysmetria on finger-to-nose and heel-to-shin bilaterally  Reflexes: Downgoing toes bilaterally   Gait: Narrow gait and steady    NIHSS:     LABS:                        9.4    8.33  )-----------( 192      ( 17 Dec 2021 07:55 )             31.0     12-17    142  |  104  |  78<H>  ----------------------------<  112<H>  4.7   |  28  |  3.93<H>    Ca    8.5      17 Dec 2021 07:55  Mg     2.0     12-17    TPro  6.7  /  Alb  3.1<L>  /  TBili  0.3  /  DBili  x   /  AST  11  /  ALT  11  /  AlkPhos  88  12-16    PT/INR - ( 16 Dec 2021 06:47 )   PT: 12.7 sec;   INR: 1.06          PTT - ( 16 Dec 2021 06:47 )  PTT:30.5 sec      RADIOLOGY & ADDITIONAL TESTS:        Assessment and Plan  61y Female    Neuro  - Repeat CT head with any acute change in mental status.   - Keep temp <100F and maintain glucose 140-180.   - consider ASA 81mg and atorvastatin 80mg ***  - MRI brain w/ and w/out ***    Cardio  - Goal BP <180/105  - Notify MD if HR >100 or <55  - Echo with bubble***, may need DELLA    DVT Prophylaxis:   - Bilateral SCDs   - subq heparin if 24hr s/p tpA HCT clear   Neurology Stroke Consult Note    Chief Complaint:   60 yo obese Female, current smoker, with Contrast/Aspirin Allergy and an extensive PMHx including HTN, NICM, chronic HFrEF (EF 25% s/p AICD for primary prevention battery changed 8/2020, w/ cardiac Mems device), IDDM c/b neuropathy (on Methadone), hx of recurrent DVTs (on Eliquis), CKD IV, CVA x2 (most recently in 2013 with residual right sided weakness), PAD s/p bypass, COPD (on 3L home O2), DHARMESH on CPAP, Multiple Myeloma (on [place]), hypothyroidism, depression who presented to [facility] on 12/15  with hemoptysis x 4 days.   During hospital course patient was seen by pulmonology who determined that there was no intervention at this time. Eliquis was held on 12/15 & 12/16 due to hemoptysis and anemia, restarted on 12/17.  Stroke neurology was consulted to see the patient due to bilateral upper extremity weakness (R>L, baseline right sided weakness).   Patient was seen and examined by stroke. Patient with flat affect, intermittently crying throughout exam. The patient states "I don't know what is going on with me, my arms keep twitching". When asked, the patient states she feels generally weak in all four extremities. Initially the patient was moving his arms slowly, however, she then lifted arms and then they fell to bed and lifted, in a jerking maneuver.     HPI:    PAST MEDICAL & SURGICAL HISTORY:  CHF (congestive heart failure)    COPD (chronic obstructive pulmonary disease)    HLD (hyperlipidemia)    Chronic pain    DM (diabetes mellitus)    CVA (cerebral vascular accident)    DVT (deep venous thrombosis)    HTN (hypertension)    Depression    Bipolar depression    PAD (peripheral artery disease)    Neuropathy    Hypothyroidism    Multiple myeloma    CKD (chronic kidney disease), stage IV    DHARMESH on CPAP    AICD (automatic cardioverter/defibrillator) present    H/O abdominal hysterectomy    H/O tubal ligation    History of cholecystectomy    H/O extremity bypass graft        FAMILY HISTORY:  Family history of hypertension (Mother)    Family history of diabetes mellitus (Mother)    SOCIAL HISTORY:  current smoker     ROS:  as per HPI    MEDICATIONS  (STANDING):  amitriptyline 10 milliGRAM(s) Oral at bedtime  apixaban 2.5 milliGRAM(s) Oral every 12 hours  atorvastatin 80 milliGRAM(s) Oral at bedtime  budesonide  80 MICROgram(s)/formoterol 4.5 MICROgram(s) Inhaler 2 Puff(s) Inhalation two times a day  busPIRone 15 milliGRAM(s) Oral <User Schedule>  dextrose 40% Gel 15 Gram(s) Oral once  dextrose 5%. 1000 milliLiter(s) (50 mL/Hr) IV Continuous <Continuous>  dextrose 5%. 1000 milliLiter(s) (100 mL/Hr) IV Continuous <Continuous>  dextrose 50% Injectable 25 Gram(s) IV Push once  dextrose 50% Injectable 12.5 Gram(s) IV Push once  dextrose 50% Injectable 25 Gram(s) IV Push once  escitalopram 20 milliGRAM(s) Oral daily  fenofibrate Tablet 48 milliGRAM(s) Oral daily  furosemide   Injectable 40 milliGRAM(s) IV Push two times a day  gabapentin 600 milliGRAM(s) Oral two times a day  glucagon  Injectable 1 milliGRAM(s) IntraMuscular once  insulin glargine Injectable (LANTUS) 10 Unit(s) SubCutaneous at bedtime  insulin lispro (ADMELOG) corrective regimen sliding scale   SubCutaneous Before meals and at bedtime  insulin lispro Injectable (ADMELOG) 5 Unit(s) SubCutaneous three times a day before meals  levothyroxine 25 MICROGram(s) Oral daily  lidocaine   4% Patch 1 Patch Transdermal daily  methadone    Tablet 10 milliGRAM(s) Oral three times a day  metolazone 5 milliGRAM(s) Oral <User Schedule>  metoprolol succinate  milliGRAM(s) Oral daily  pantoprazole    Tablet 40 milliGRAM(s) Oral before breakfast  sacubitril 97 mG/valsartan 103 mG 1 Tablet(s) Oral two times a day  traZODone 50 milliGRAM(s) Oral at bedtime    MEDICATIONS  (PRN):  ALBUTerol   0.5% 2.5 milliGRAM(s) Nebulizer every 6 hours PRN Shortness of Breath and/or Wheezing  benzonatate 100 milliGRAM(s) Oral every 8 hours PRN Cough  guaiFENesin Oral Liquid (Sugar-Free) 100 milliGRAM(s) Oral every 6 hours PRN Cough      Allergies    aspirin (Other (Moderate); Rash)  oral contrast (Unknown)    Intolerances    Vital Signs Last 24 Hrs  T(C): 36.4 (17 Dec 2021 09:36), Max: 36.4 (16 Dec 2021 22:12)  T(F): 97.6 (17 Dec 2021 09:36), Max: 97.6 (16 Dec 2021 22:12)  HR: 71 (17 Dec 2021 11:49) (56 - 74)  BP: 113/56 (17 Dec 2021 11:49) (102/52 - 150/66)  BP(mean): 81 (17 Dec 2021 01:45) (75 - 89)  RR: 18 (17 Dec 2021 11:49) (11 - 20)  SpO2: 95% (17 Dec 2021 11:49) (93% - 100%)    Physical exam:  General: flat affect, intermittently crying    Neurologic:  -Mental status: Awake, alert, oriented to person, age, hospital.  Speech is fluent with intact naming, repetition, and comprehension, no dysarthria. Recent and remote memory intact. Follows commands. Attention/concentration intact. Fund of knowledge appropriate.  -Cranial nerves:   II: Visual fields are full to confrontation.  III, IV, VI: Extraocular movements are intact without nystagmus  V:  Facial sensation V1-V3 equal and intact   VII: Face is symmetric with normal eye closure and smile  VIII: Hearing is bilaterally intact to finger rub  Motor: Normal bulk and tone. Extremities x4 antigravity. When lifts bilateral upper extremities     Sensation: Intact to light touch bilaterally. No neglect or extinction on double simultaneous testing.  Coordination: No dysmetria on finger-to-nose and heel-to-shin bilaterally  Reflexes: Downgoing toes bilaterally   Gait: Narrow gait and steady    NIHSS:     LABS:                        9.4    8.33  )-----------( 192      ( 17 Dec 2021 07:55 )             31.0     12-17    142  |  104  |  78<H>  ----------------------------<  112<H>  4.7   |  28  |  3.93<H>    Ca    8.5      17 Dec 2021 07:55  Mg     2.0     12-17    TPro  6.7  /  Alb  3.1<L>  /  TBili  0.3  /  DBili  x   /  AST  11  /  ALT  11  /  AlkPhos  88  12-16    PT/INR - ( 16 Dec 2021 06:47 )   PT: 12.7 sec;   INR: 1.06          PTT - ( 16 Dec 2021 06:47 )  PTT:30.5 sec      RADIOLOGY & ADDITIONAL TESTS:        Assessment and Plan  60yo female, current smoker, obese, with PMHx HTN, HLD, DM-II, CKD Stage 4, Hypothyroidism, known NICM with EF 25% (s/p AICD for primary prevention), MM, COPD (3L home O2), recurrent DVTs (on Eliquis) reporting of mild hemoptysis likely due to cough, admitted for management of CHF exacerbation and anemia. Continue IV diuresis with Lasix and Metolazone and consider restarting Eliquis 12/17 if patient's hemoptysis has not recurred. S/p 1 unit pRBCs with appropriate response.  Neurology Stroke Consult Note    Chief Complaint:   60 yo obese Female, current smoker, with Contrast/Aspirin Allergy and an extensive PMHx including HTN, NICM, chronic HFrEF (EF 25% s/p AICD for primary prevention battery changed 8/2020, w/ cardiac Mems device), IDDM c/b neuropathy (on Methadone), hx of recurrent DVTs (on Eliquis), CKD IV, CVA x2 (most recently in 2013 with residual right sided weakness), PAD s/p bypass, COPD (on 3L home O2), DHARMESH on CPAP, Multiple Myeloma (on [place]o), hypothyroidism, depression who presented to [facility] on 12/15  with hemoptysis x 4 days.   During hospital course patient was seen by pulmonology who determined that there was no intervention at this time. Eliquis was held on 12/15 & 12/16 due to hemoptysis and anemia, restarted on 12/17.  Stroke neurology was consulted to see the patient due to bilateral upper extremity weakness (R>L, baseline right sided weakness).   Patient was seen and examined by stroke. Patient with flat affect, intermittently crying throughout exam. The patient states "I don't know what is going on with me, my arms keep twitching". When asked, the patient states she feels generally weak in all four extremities. Initially the patient was moving his arms slowly, however, she then lifted arms and then they fell to bed and lifted, in a jerking maneuver.     HPI:    PAST MEDICAL & SURGICAL HISTORY:  CHF (congestive heart failure)    COPD (chronic obstructive pulmonary disease)    HLD (hyperlipidemia)    Chronic pain    DM (diabetes mellitus)    CVA (cerebral vascular accident)    DVT (deep venous thrombosis)    HTN (hypertension)    Depression    Bipolar depression    PAD (peripheral artery disease)    Neuropathy    Hypothyroidism    Multiple myeloma    CKD (chronic kidney disease), stage IV    DHARMESH on CPAP    AICD (automatic cardioverter/defibrillator) present    H/O abdominal hysterectomy    H/O tubal ligation    History of cholecystectomy    H/O extremity bypass graft        FAMILY HISTORY:  Family history of hypertension (Mother)    Family history of diabetes mellitus (Mother)    SOCIAL HISTORY:  current smoker     ROS:  as per HPI    MEDICATIONS  (STANDING):  amitriptyline 10 milliGRAM(s) Oral at bedtime  apixaban 2.5 milliGRAM(s) Oral every 12 hours  atorvastatin 80 milliGRAM(s) Oral at bedtime  budesonide  80 MICROgram(s)/formoterol 4.5 MICROgram(s) Inhaler 2 Puff(s) Inhalation two times a day  busPIRone 15 milliGRAM(s) Oral <User Schedule>  dextrose 40% Gel 15 Gram(s) Oral once  dextrose 5%. 1000 milliLiter(s) (50 mL/Hr) IV Continuous <Continuous>  dextrose 5%. 1000 milliLiter(s) (100 mL/Hr) IV Continuous <Continuous>  dextrose 50% Injectable 25 Gram(s) IV Push once  dextrose 50% Injectable 12.5 Gram(s) IV Push once  dextrose 50% Injectable 25 Gram(s) IV Push once  escitalopram 20 milliGRAM(s) Oral daily  fenofibrate Tablet 48 milliGRAM(s) Oral daily  furosemide   Injectable 40 milliGRAM(s) IV Push two times a day  gabapentin 600 milliGRAM(s) Oral two times a day  glucagon  Injectable 1 milliGRAM(s) IntraMuscular once  insulin glargine Injectable (LANTUS) 10 Unit(s) SubCutaneous at bedtime  insulin lispro (ADMELOG) corrective regimen sliding scale   SubCutaneous Before meals and at bedtime  insulin lispro Injectable (ADMELOG) 5 Unit(s) SubCutaneous three times a day before meals  levothyroxine 25 MICROGram(s) Oral daily  lidocaine   4% Patch 1 Patch Transdermal daily  methadone    Tablet 10 milliGRAM(s) Oral three times a day  metolazone 5 milliGRAM(s) Oral <User Schedule>  metoprolol succinate  milliGRAM(s) Oral daily  pantoprazole    Tablet 40 milliGRAM(s) Oral before breakfast  sacubitril 97 mG/valsartan 103 mG 1 Tablet(s) Oral two times a day  traZODone 50 milliGRAM(s) Oral at bedtime    MEDICATIONS  (PRN):  ALBUTerol   0.5% 2.5 milliGRAM(s) Nebulizer every 6 hours PRN Shortness of Breath and/or Wheezing  benzonatate 100 milliGRAM(s) Oral every 8 hours PRN Cough  guaiFENesin Oral Liquid (Sugar-Free) 100 milliGRAM(s) Oral every 6 hours PRN Cough      Allergies    aspirin (Other (Moderate); Rash)  oral contrast (Unknown)    Intolerances    Vital Signs Last 24 Hrs  T(C): 36.4 (17 Dec 2021 09:36), Max: 36.4 (16 Dec 2021 22:12)  T(F): 97.6 (17 Dec 2021 09:36), Max: 97.6 (16 Dec 2021 22:12)  HR: 71 (17 Dec 2021 11:49) (56 - 74)  BP: 113/56 (17 Dec 2021 11:49) (102/52 - 150/66)  BP(mean): 81 (17 Dec 2021 01:45) (75 - 89)  RR: 18 (17 Dec 2021 11:49) (11 - 20)  SpO2: 95% (17 Dec 2021 11:49) (93% - 100%)    Physical exam:  General: flat affect, intermittently crying    Neurologic:  -Mental status: Awake, alert, oriented to person, age, hospital.  Speech is fluent with intact naming, repetition, and comprehension, no dysarthria. Recent and remote memory intact. Follows commands. Attention/concentration intact. Fund of knowledge appropriate.  -Cranial nerves:   II: Visual fields are full to confrontation.  III, IV, VI: Extraocular movements are intact without nystagmus  V:  Facial sensation V1-V3 equal and intact   VII: Face is symmetric with normal eye closure and smile  VIII: Hearing is bilaterally intact to finger rub  Motor: Normal bulk and tone. Extremities x4 antigravity. When lifts bilateral upper extremities, arms abruptly lower and then raise as if jerking like movement, does not occur at rest and did not occur initially when asked to raise arms. Bilateral lower extremities 3/5.   Sensation: Intact to light touch bilaterally. No neglect or extinction on double simultaneous testing.  Coordination: No dysmetria on finger-to-nose and heel-to-shin bilaterally  Reflexes: Downgoing toes bilaterally   Gait: Narrow gait and steady    NIHSS:     LABS:                        9.4    8.33  )-----------( 192      ( 17 Dec 2021 07:55 )             31.0     12-17    142  |  104  |  78<H>  ----------------------------<  112<H>  4.7   |  28  |  3.93<H>    Ca    8.5      17 Dec 2021 07:55  Mg     2.0     12-17    TPro  6.7  /  Alb  3.1<L>  /  TBili  0.3  /  DBili  x   /  AST  11  /  ALT  11  /  AlkPhos  88  12-16    PT/INR - ( 16 Dec 2021 06:47 )   PT: 12.7 sec;   INR: 1.06          PTT - ( 16 Dec 2021 06:47 )  PTT:30.5 sec      RADIOLOGY & ADDITIONAL TESTS:        Assessment and Plan  60yo female, current smoker, obese, with PMHx HTN, HLD, DM-II, CKD Stage 4, Hypothyroidism, known NICM with EF 25% (s/p AICD for primary prevention), MM, COPD (3L home O2), recurrent DVTs (on Eliquis) reporting of mild hemoptysis likely due to cough, admitted for management of CHF exacerbation and anemia. Continue IV diuresis with Lasix and Metolazone and consider restarting Eliquis 12/17 if patient's hemoptysis has not recurred. S/p 1 unit pRBCs with appropriate response.  Neurology Stroke Consult Note    Chief Complaint:   62 yo obese Female, current smoker, with Contrast/Aspirin Allergy and an extensive PMHx including HTN, NICM, chronic HFrEF (EF 25% s/p AICD for primary prevention battery changed 8/2020, w/ cardiac Mems device), IDDM c/b neuropathy (on Methadone), hx of recurrent DVTs (on Eliquis), CKD IV, CVA x2 (most recently in 2013 with residual right sided weakness), PAD s/p bypass, COPD (on 3L home O2), DHARMESH on CPAP, Multiple Myeloma (on [place]o), hypothyroidism, depression who presented to [facility] on 12/15  with hemoptysis x 4 days.   During hospital course patient was seen by pulmonology who determined that there was no intervention at this time. Eliquis was held on 12/15 & 12/16 due to hemoptysis and anemia, restarted on 12/17.  Stroke neurology was consulted to see the patient due to bilateral upper extremity weakness (R>L, baseline right sided weakness).   Patient was seen and examined by stroke. Patient with flat affect, intermittently crying throughout exam. The patient states "I don't know what is going on with me, my arms keep twitching". When asked, the patient states she feels generally weak in all four extremities. Initially the patient was moving his arms slowly, however, she then lifted arms and then they fell to bed and lifted, in a jerking maneuver.     HPI:    PAST MEDICAL & SURGICAL HISTORY:  CHF (congestive heart failure)    COPD (chronic obstructive pulmonary disease)    HLD (hyperlipidemia)    Chronic pain    DM (diabetes mellitus)    CVA (cerebral vascular accident)    DVT (deep venous thrombosis)    HTN (hypertension)    Depression    Bipolar depression    PAD (peripheral artery disease)    Neuropathy    Hypothyroidism    Multiple myeloma    CKD (chronic kidney disease), stage IV    DHARMESH on CPAP    AICD (automatic cardioverter/defibrillator) present    H/O abdominal hysterectomy    H/O tubal ligation    History of cholecystectomy    H/O extremity bypass graft        FAMILY HISTORY:  Family history of hypertension (Mother)    Family history of diabetes mellitus (Mother)    SOCIAL HISTORY:  current smoker     ROS:  as per HPI    MEDICATIONS  (STANDING):  amitriptyline 10 milliGRAM(s) Oral at bedtime  apixaban 2.5 milliGRAM(s) Oral every 12 hours  atorvastatin 80 milliGRAM(s) Oral at bedtime  budesonide  80 MICROgram(s)/formoterol 4.5 MICROgram(s) Inhaler 2 Puff(s) Inhalation two times a day  busPIRone 15 milliGRAM(s) Oral <User Schedule>  dextrose 40% Gel 15 Gram(s) Oral once  dextrose 5%. 1000 milliLiter(s) (50 mL/Hr) IV Continuous <Continuous>  dextrose 5%. 1000 milliLiter(s) (100 mL/Hr) IV Continuous <Continuous>  dextrose 50% Injectable 25 Gram(s) IV Push once  dextrose 50% Injectable 12.5 Gram(s) IV Push once  dextrose 50% Injectable 25 Gram(s) IV Push once  escitalopram 20 milliGRAM(s) Oral daily  fenofibrate Tablet 48 milliGRAM(s) Oral daily  furosemide   Injectable 40 milliGRAM(s) IV Push two times a day  gabapentin 600 milliGRAM(s) Oral two times a day  glucagon  Injectable 1 milliGRAM(s) IntraMuscular once  insulin glargine Injectable (LANTUS) 10 Unit(s) SubCutaneous at bedtime  insulin lispro (ADMELOG) corrective regimen sliding scale   SubCutaneous Before meals and at bedtime  insulin lispro Injectable (ADMELOG) 5 Unit(s) SubCutaneous three times a day before meals  levothyroxine 25 MICROGram(s) Oral daily  lidocaine   4% Patch 1 Patch Transdermal daily  methadone    Tablet 10 milliGRAM(s) Oral three times a day  metolazone 5 milliGRAM(s) Oral <User Schedule>  metoprolol succinate  milliGRAM(s) Oral daily  pantoprazole    Tablet 40 milliGRAM(s) Oral before breakfast  sacubitril 97 mG/valsartan 103 mG 1 Tablet(s) Oral two times a day  traZODone 50 milliGRAM(s) Oral at bedtime    MEDICATIONS  (PRN):  ALBUTerol   0.5% 2.5 milliGRAM(s) Nebulizer every 6 hours PRN Shortness of Breath and/or Wheezing  benzonatate 100 milliGRAM(s) Oral every 8 hours PRN Cough  guaiFENesin Oral Liquid (Sugar-Free) 100 milliGRAM(s) Oral every 6 hours PRN Cough      Allergies    aspirin (Other (Moderate); Rash)  oral contrast (Unknown)    Intolerances    Vital Signs Last 24 Hrs  T(C): 36.4 (17 Dec 2021 09:36), Max: 36.4 (16 Dec 2021 22:12)  T(F): 97.6 (17 Dec 2021 09:36), Max: 97.6 (16 Dec 2021 22:12)  HR: 71 (17 Dec 2021 11:49) (56 - 74)  BP: 113/56 (17 Dec 2021 11:49) (102/52 - 150/66)  BP(mean): 81 (17 Dec 2021 01:45) (75 - 89)  RR: 18 (17 Dec 2021 11:49) (11 - 20)  SpO2: 95% (17 Dec 2021 11:49) (93% - 100%)    Physical exam:  General: flat affect, intermittently crying    Neurologic:  -Mental status: Awake, alert, oriented to person, age, hospital.  Speech is fluent with intact naming, repetition, and comprehension, no dysarthria. Recent and remote memory intact. Follows commands. Attention/concentration intact. Fund of knowledge appropriate.  -Cranial nerves:   II: Visual fields are full to confrontation.  III, IV, VI: Extraocular movements are intact without nystagmus  V:  Facial sensation V1-V3 equal and intact   VII: Face is symmetric with normal eye closure and smile  VIII: Hearing is bilaterally intact to finger rub  Motor: Normal bulk and tone. Extremities x4 antigravity. When lifts bilateral upper extremities, arms abruptly lower and then raise as if jerking like movement, does not occur at rest and did not occur initially when asked to raise arms, possibly asterixis.  Bilateral lower extremities 3/5.   Sensation: Intact to light touch bilaterally. No neglect or extinction on double simultaneous testing.  Coordination: No dysmetria on finger-to-nose and heel-to-shin bilaterally  Reflexes: Downgoing toes bilaterally     NIHSS: 6    LABS:                        9.4    8.33  )-----------( 192      ( 17 Dec 2021 07:55 )             31.0     12-17    142  |  104  |  78<H>  ----------------------------<  112<H>  4.7   |  28  |  3.93<H>    Ca    8.5      17 Dec 2021 07:55  Mg     2.0     12-17    TPro  6.7  /  Alb  3.1<L>  /  TBili  0.3  /  DBili  x   /  AST  11  /  ALT  11  /  AlkPhos  88  12-16    PT/INR - ( 16 Dec 2021 06:47 )   PT: 12.7 sec;   INR: 1.06          PTT - ( 16 Dec 2021 06:47 )  PTT:30.5 sec      RADIOLOGY & ADDITIONAL TESTS:  pending CTH repeat

## 2021-12-17 NOTE — PROGRESS NOTE ADULT - ATTENDING COMMENTS
Initial attending contact date  12/16/21    . See PA note written above for details. I reviewed the PA documentation. I have personally seen and examined this patient. I reviewed vitals, labs, medications, cardiac studies, and additional imaging. I agree with the above PA's findings and plans as written above with the following additions/statements.    61F obese, HTN, HLD, DM-II, Hypothyroidism, CRI, COPD on  home O2 (3L), chronic DVT , fibromyalgia on methadone, known NICM with EF 25% with AICD, MM, CVA x 2 with residual R sided weakness admitted with mild hemoptysis in context of chronic dry cough and acute on chronic anemia  -Pulm consulted for hemoptysis, no further eval needed. Restart eliquis 2.5 bid (chronic DVT)  - out 1.8 L last 48 hours, renal consulted due to suboptimal diuresis   -Plan to continue lasix 40 IV bid with metolazone 5 mg po 30 mins prior to IV lasix dose  -Cont toprol 100 mg qd (home dose 200 mg qd), entresto 97/103 bid   -Strict Is and Os with primafit   -tessalon perle for cough suppression  -this am, with worsening R arm weakness. CT head ordered, Neuro consulted   -PT recs: Home with home PT

## 2021-12-17 NOTE — PROGRESS NOTE ADULT - ASSESSMENT
60yo female, current smoker, obese, with PMHx HTN, HLD, DM-II, CKD Stage 4, Hypothyroidism, known NICM with EF 25% (s/p AICD for primary prevention), MM, COPD (3L home O2), recurrent DVTs (on Eliquis) reporting of mild hemoptysis likely due to cough, admitted for management of CHF exacerbation and anemia. Continue IV diuresis with Lasix and Metolazone and consider restarting Eliquis 12/17 if patient's hemoptysis has not recurred. S/p 1 unit pRBCs with appropriate response.

## 2021-12-17 NOTE — PROGRESS NOTE ADULT - ASSESSMENT
60 y/o morbidly obese female w/ hx of HTN, NICM, HFrEF s/p AICD + MEMs device, IDDM c/b b/l LE neuropathy, recurrent DVT, CKD 4, CVA x2, COPD on 3 L O2, DHARMESH, PAD s/p bypass, multiple myeloma who presents from home for hemoptysis likely in the setting of recurrent decompensated heart failure exacerbation.     #Reported hemoptysis  #COPD GOLD D, not in exacerbation  #DHARMESH    Patient presents with unclear report of hemoptysis that has not recurred since she has been admitted and in a patient on anticoagulation, this may occur from the AC alone vs secondary to underlying pulmonary edema in a patient with acute on chronic heart failure. Patient does not appear to have an active COPD exacerbation at this time. Agree with diuresis and continue home management of COPD with Symbicort. CAT score 18, medium health impact; mmRC grade 2- based on these values and recent hospitalization 3 months ago, she is in GOLD group D.     Recommend:   - Agree with continued diuresis  - Continue Symbicort BID and PRN duonebs q6hrs while inpatient  - Continue home CPAP  - Wean O2 for goal SpO2 88-92%  - Outpatient follow up with Dr. Nish Payne in 2-3 weeks after discharge.    Patient discussed with attending Dr. Saavedra.

## 2021-12-17 NOTE — PROGRESS NOTE ADULT - PROBLEM SELECTOR PLAN 7
Hgb A1c: 8.4 (prior admission)  - home Levemir interchanged with Lantus 20 units qHS  - Insulin Lispro 10 units before meals  - mISS    PT: Home with home PT    DVT:  Eliquis  Dispo: pending clinical progression; likely D/C this weekend per Nephro

## 2021-12-17 NOTE — CONSULT NOTE ADULT - ATTENDING COMMENTS
61y old Female with a history of obesity, HTN, HLD, DM-II, CKD stage 4, hypothyroidism, know NICM with EF 25% (s/p AICD for primary prevention), MM, COPD, recurrent DVTs (on Eliquis) admitted for management of CHF exacerbation with reports of back pain. Pt evaluated for pain control. Chart reviewed, Medications reviewed. Pain control options discussed Q's answered. We will consider a comprehensive medical regimen and titrate to pain control and side effects. We will follow up as needed 61y old Female with a history of obesity, HTN, HLD, DM-II, CKD stage 4, hypothyroidism, know NICM with EF 25% (s/p AICD for primary prevention), MM, COPD, recurrent DVTs (on Eliquis) admitted for management of CHF exacerbation with reports of back pain. Pt evaluated for pain control. Chart reviewed, Medications reviewed. Pain control options discussed Q's answered. We will consider a comprehensive medical regimen and titrate to pain control and side effects. We will follow up as needed. Dr. Paz

## 2021-12-17 NOTE — CONSULT NOTE ADULT - ATTENDING COMMENTS
The patient is a 61-year-old female with multiple chronic comorbidities including prior strokes with residual right-sided weakness who is admitted with hemoptysis on 12/14 thought in association with CHF exacerbation. Over the last 24 ours, she developed jerking movements of her upper extremities.  On exam, she has unpredictable, jerking-like movements of her upper extremities when arms are outstretched.  The movements interrupt attempted activities. They do not seem necessarily distractible. Overall, suspicion is for myoclonus secondary to toxic/metabolic causes, particularly CNS acting medications and medications which are renally cleared give her renal failure.  Head CT is pending. Low suspicion for seizure and unlikely seizure given maintained alertness/interation despite movements and character of movement. No further work-up from a stroke perspective is needed.  Please limit use of CNS acting medications and renally adjust medications. If ysmptoms, persist, neurology can be reconsulted.

## 2021-12-17 NOTE — CONSULT NOTE ADULT - SUBJECTIVE AND OBJECTIVE BOX
Pain Management Consult Note - [facility] [phone]    Chief Complaint: back pain     HPI: Patient seen and examined today. This is a 62 y/o female PMH of obesity, HTN, HLD, DM-II, CKD stasge 4, hypothyroidism, know NICM with EF 25% (s/p AICD for primary prevention), MM, COPD, recurrent DVTs (on Eliquis) admitted for management of CHF exacerbation with reports of back pain. Patient reports endorsing low back pain for a "long time", that radiates down the bilateral lower extremities. Patient reports associated numbness and tingling to the BLEs. Patient also reports endorsing numbness/tingling down the bilateral upper extremities associated with weakness, right side worse than left side. Patient also with ulcer of the right foot, patient reports endorsing some pain there but back pain is worse at this time. Patient reports upper extremity weakness began when she came to the hospital, and she does not know why. As per Tanner RUTLEDGE, patient being worked up to r/o stroke. At home, patient reports taking Methadone 10 mg PO TID and Gabapentin 600 mg PO BID, patient sees pain management doctor in [place]. Patient reports Methadone usually helps with the pain, but works "on and off". Patient reports endorsing muscle spasms to the bilateral upper extremities that make it difficult to hold things and use her hands. Patient remains on home pain regimen, patient denies side effects from current pain regimen. Patient istop positive for Percocet 10/325mg po, Methadone 10 mg PO TID prescribed by Justin Luna.     Pain is __X_ sharp ____dull ___burning ___achy ___ Intensity: ____ mild __X_mod _X__severe     Location ____surgical site ____cervical __X___lumbar ____abd ____upper ext____lower ext    Worse with __X__activity _X___movement _____physical therapy___ Rest    Improved with __X__medication ____rest ____physical therapy    ROS: Const:  _N__febrile   Eyes:___ENT:___CV: __N_chest pain  Resp: ____sob  GI:__N_nausea _N__vomiting __N_abd pain ___npo ___clears __full diet __bm  :___ Musk: _Y__pain _Y__spasm  Skin:___ Neuro:  _N__sedation__N_confusion__Y_ numbness _Y__weakness _Y__paresth  Psych:__anxiety  Endo:___ Heme:___Allergy:_aspirin________, _Y__all others reviewed and negative    PAST MEDICAL & SURGICAL HISTORY:  CHF (congestive heart failure)  COPD (chronic obstructive pulmonary disease)  HLD (hyperlipidemia)  Chronic pain  DM (diabetes mellitus)  CVA (cerebral vascular accident)  DVT (deep venous thrombosis)  HTN (hypertension)  Depression  Bipolar depression  PAD (peripheral artery disease)  Neuropathy  Hypothyroidism  Multiple myeloma  CKD (chronic kidney disease), stage IV  DHARMESH on CPAP  AICD (automatic cardioverter/defibrillator) present  H/O abdominal hysterectomy  H/O tubal ligation  History of cholecystectomy  H/O extremity bypass graft    SH: __Y_Tobacco   _N__Alcohol                          FH:FAMILY HISTORY:  Family history of hypertension (Mother)  Family history of diabetes mellitus (Mother)    ALBUTerol   0.5% 2.5 milliGRAM(s) Nebulizer every 6 hours PRN  amitriptyline 10 milliGRAM(s) Oral at bedtime  apixaban 2.5 milliGRAM(s) Oral every 12 hours  atorvastatin 80 milliGRAM(s) Oral at bedtime  benzonatate 100 milliGRAM(s) Oral every 8 hours PRN  budesonide  80 MICROgram(s)/formoterol 4.5 MICROgram(s) Inhaler 2 Puff(s) Inhalation two times a day  busPIRone 15 milliGRAM(s) Oral <User Schedule>  dextrose 40% Gel 15 Gram(s) Oral once  dextrose 5%. 1000 milliLiter(s) IV Continuous <Continuous>  dextrose 5%. 1000 milliLiter(s) IV Continuous <Continuous>  dextrose 50% Injectable 25 Gram(s) IV Push once  dextrose 50% Injectable 12.5 Gram(s) IV Push once  dextrose 50% Injectable 25 Gram(s) IV Push once  escitalopram 20 milliGRAM(s) Oral daily  fenofibrate Tablet 48 milliGRAM(s) Oral daily  furosemide   Injectable 40 milliGRAM(s) IV Push two times a day  gabapentin 600 milliGRAM(s) Oral two times a day  glucagon  Injectable 1 milliGRAM(s) IntraMuscular once  guaiFENesin Oral Liquid (Sugar-Free) 100 milliGRAM(s) Oral every 6 hours PRN  insulin glargine Injectable (LANTUS) 10 Unit(s) SubCutaneous at bedtime  insulin lispro (ADMELOG) corrective regimen sliding scale   SubCutaneous Before meals and at bedtime  insulin lispro Injectable (ADMELOG) 5 Unit(s) SubCutaneous three times a day before meals  levothyroxine 25 MICROGram(s) Oral daily  lidocaine   4% Patch 1 Patch Transdermal daily  methadone    Tablet 10 milliGRAM(s) Oral three times a day  metolazone 5 milliGRAM(s) Oral <User Schedule>  metoprolol succinate  milliGRAM(s) Oral daily  pantoprazole    Tablet 40 milliGRAM(s) Oral before breakfast  sacubitril 97 mG/valsartan 103 mG 1 Tablet(s) Oral two times a day  traZODone 50 milliGRAM(s) Oral at bedtime      T(C): 36.4 (12-17-21 @ 09:36), Max: 36.4 (12-16-21 @ 22:12)  HR: 71 (12-17-21 @ 11:49) (56 - 74)  BP: 113/56 (12-17-21 @ 11:49) (102/52 - 150/66)  RR: 18 (12-17-21 @ 11:49) (11 - 20)  SpO2: 95% (12-17-21 @ 11:49) (93% - 100%)  Wt(kg): --    T(C): 36.4 (12-17-21 @ 09:36), Max: 36.4 (12-16-21 @ 22:12)  HR: 71 (12-17-21 @ 11:49) (56 - 74)  BP: 113/56 (12-17-21 @ 11:49) (102/52 - 150/66)  RR: 18 (12-17-21 @ 11:49) (11 - 20)  SpO2: 95% (12-17-21 @ 11:49) (93% - 100%)  Wt(kg): --    T(C): 36.4 (12-17-21 @ 09:36), Max: 36.4 (12-16-21 @ 22:12)  HR: 71 (12-17-21 @ 11:49) (56 - 74)  BP: 113/56 (12-17-21 @ 11:49) (102/52 - 150/66)  RR: 18 (12-17-21 @ 11:49) (11 - 20)  SpO2: 95% (12-17-21 @ 11:49) (93% - 100%)  Wt(kg): --    PHYSICAL EXAM:  Gen Appearance: __X_no acute distress X___appropriate        Neuro: ___SILT feet____ EOM Intact Psych: AAOX_3_, _X__mood/affect appropriate        Eyes: _X__conjunctiva WNL  __X___ Pupils equal and round        ENT: __X_ears and nose atraumatic_X__ Hearing grossly intact        Neck: X___trachea midline, no visible masses ___thyroid without palpable mass    Resp: _X__Nml WOB____No tactile fremitus ___clear to auscultation    Cardio: ___extremities free from edema ____pedal pulses palpable    GI/Abdomen: ___soft _____ Nontender____X__Nondistended_____HSM    Lymphatic: ___no palpable nodes in neck  ___no palpable nodes calves and feet    Skin/Wound: ___Incision, ___Dressing c/d/i,   ____surrounding tissues soft,  ___drain/chest tube present____    Muscular: EHL __4_/5  Gastrocnemius__4_/5    __X_absent clubbing/cyanosis      ASSESSMENT: This is a 61y old Female with a history of obesity, HTN, HLD, DM-II, CKD stasge 4, hypothyroidism, know NICM with EF 25% (s/p AICD for primary prevention), MM, COPD, recurrent DVTs (on Eliquis) admitted for management of CHF exacerbation with reports of back pain.    Recommended Treatment PLAN:  1. Consider adding Methadone 5 mg PO q6h PRN breakthrough pain.   2. Consider continuing home dose Methadone 10 mg PO TID (confirmed by istop). Recommend EKG to monitor QTc interval.  3. Consider adding Flexeril 5 mg PO q8h PRN muscle spasm if ok from renal perspective  4. Consider adding Tylenol 650 mg PO q6h PRN moderate pain  5. Consider continuing home dose Gabapentin 600 mg PO BID  6. Consider continuing daily Lidocaine patch to low back  Plan discussed with Dr. Paz

## 2021-12-17 NOTE — PROGRESS NOTE ADULT - PROBLEM SELECTOR PLAN 5
Cr: 4.03 (~4 during prior admission)  - Continue to monitor   - Seen by Dr. Neal, if Cr and volume status worsens, can consider dialysis. Followed by Dr. Ruiz, was on Dexamethasone and Ninlaro weekly (Monday)  -Holding Ninlaro due to bullae on right foot, per Dr. Ruiz it is unlikely the cause of the bullae but can be held at this time.  -Right foot bullae x2. No signs of infection. Continue dry dressing with RN

## 2021-12-17 NOTE — CONSULT NOTE ADULT - ATTENDING COMMENTS
have reviewed status and discussed current course.  continuing on current diuresis.    agree with findings and recommendations.

## 2021-12-17 NOTE — PROGRESS NOTE ADULT - PROBLEM SELECTOR PLAN 2
Mild basilar rales on RLL, +edema of BLE, satting 94-95% on 3L NC, Net negative 520cc - not compliant with strict I&Os, Primafit now in place.  - BNP 29K  - CXR 12/14/2021: Suggestive of pulmonary vascular congestion. Repeat CXR 12/16/2021 no significant changes after diuresis.  - c/w Lasix 40mg IV BID, added Metolazone 5mg x1, 30 min prior to Lasix  - c/w Entresto 97mg/103mg BID, Toprol XL 100mg daily  - c/w home O2 3L NC  - Echo 11/29/21: Akinesis of the basal to mid inferoseptum and anteroseptum. Moderate to severe hypokinesis of the remaining regions, mild to moderate MR Mild basilar rales on RLL, +edema of BLE, satting 93-95% on 3L NC, Net negative 600cc - not compliant with strict I&Os, Primafit now in place.   - BNP 29K on admit  - CXR 12/14/2021: Suggestive of pulmonary vascular congestion. Repeat CXR 12/16/2021 no significant changes after diuresis.  - c/w Lasix 40mg IV BID, added Metolazone 5mg BID, 30 min prior to Lasix  - c/w Entresto 97mg/103mg BID, Toprol XL 100mg daily  - c/w home O2 3L NC  - Echo 11/29/21: Akinesis of the basal to mid inferoseptum and anteroseptum. Moderate to severe hypokinesis of the remaining regions, mild to moderate MR    #Pulm: Continue Symbicort BID and PRN duonebs q6h  - Continue home CPAP  - Wean O2 for goal SpO2 88-92%  - Outpatient follow up with Dr. Nish Payne in 2-3 weeks after discharge.

## 2021-12-18 DIAGNOSIS — F32.9 MAJOR DEPRESSIVE DISORDER, SINGLE EPISODE, UNSPECIFIED: ICD-10-CM

## 2021-12-18 LAB
-  AMPICILLIN/SULBACTAM: SIGNIFICANT CHANGE UP
-  AMPICILLIN: SIGNIFICANT CHANGE UP
-  CEFAZOLIN: SIGNIFICANT CHANGE UP
-  CEFEPIME: SIGNIFICANT CHANGE UP
-  CEFTRIAXONE: SIGNIFICANT CHANGE UP
-  CIPROFLOXACIN: SIGNIFICANT CHANGE UP
-  ERTAPENEM: SIGNIFICANT CHANGE UP
-  GENTAMICIN: SIGNIFICANT CHANGE UP
-  PIPERACILLIN/TAZOBACTAM: SIGNIFICANT CHANGE UP
-  TOBRAMYCIN: SIGNIFICANT CHANGE UP
-  TRIMETHOPRIM/SULFAMETHOXAZOLE: SIGNIFICANT CHANGE UP
ANION GAP SERPL CALC-SCNC: 11 MMOL/L — SIGNIFICANT CHANGE UP (ref 5–17)
APPEARANCE UR: ABNORMAL
APTT BLD: 42.8 SEC — HIGH (ref 27.5–35.5)
BACTERIA # UR AUTO: SIGNIFICANT CHANGE UP /HPF
BILIRUB UR-MCNC: NEGATIVE — SIGNIFICANT CHANGE UP
BLD GP AB SCN SERPL QL: NEGATIVE — SIGNIFICANT CHANGE UP
BUN SERPL-MCNC: 74 MG/DL — HIGH (ref 7–23)
CALCIUM SERPL-MCNC: 9.1 MG/DL — SIGNIFICANT CHANGE UP (ref 8.4–10.5)
CHLORIDE SERPL-SCNC: 100 MMOL/L — SIGNIFICANT CHANGE UP (ref 96–108)
CO2 SERPL-SCNC: 29 MMOL/L — SIGNIFICANT CHANGE UP (ref 22–31)
COLOR SPEC: YELLOW — SIGNIFICANT CHANGE UP
CREAT SERPL-MCNC: 3.92 MG/DL — HIGH (ref 0.5–1.3)
CULTURE RESULTS: SIGNIFICANT CHANGE UP
DIFF PNL FLD: ABNORMAL
EPI CELLS # UR: SIGNIFICANT CHANGE UP /HPF (ref 0–5)
GLUCOSE BLDC GLUCOMTR-MCNC: 137 MG/DL — HIGH (ref 70–99)
GLUCOSE BLDC GLUCOMTR-MCNC: 139 MG/DL — HIGH (ref 70–99)
GLUCOSE BLDC GLUCOMTR-MCNC: 174 MG/DL — HIGH (ref 70–99)
GLUCOSE BLDC GLUCOMTR-MCNC: 190 MG/DL — HIGH (ref 70–99)
GLUCOSE BLDC GLUCOMTR-MCNC: 202 MG/DL — HIGH (ref 70–99)
GLUCOSE BLDC GLUCOMTR-MCNC: 246 MG/DL — HIGH (ref 70–99)
GLUCOSE BLDC GLUCOMTR-MCNC: 267 MG/DL — HIGH (ref 70–99)
GLUCOSE BLDC GLUCOMTR-MCNC: 409 MG/DL — HIGH (ref 70–99)
GLUCOSE SERPL-MCNC: 197 MG/DL — HIGH (ref 70–99)
GLUCOSE UR QL: 100
HCT VFR BLD CALC: 34.6 % — SIGNIFICANT CHANGE UP (ref 34.5–45)
HGB BLD-MCNC: 10.4 G/DL — LOW (ref 11.5–15.5)
KETONES UR-MCNC: NEGATIVE — SIGNIFICANT CHANGE UP
LEGIONELLA AG UR QL: NEGATIVE — SIGNIFICANT CHANGE UP
LEUKOCYTE ESTERASE UR-ACNC: NEGATIVE — SIGNIFICANT CHANGE UP
MAGNESIUM SERPL-MCNC: 2.1 MG/DL — SIGNIFICANT CHANGE UP (ref 1.6–2.6)
MCHC RBC-ENTMCNC: 28.4 PG — SIGNIFICANT CHANGE UP (ref 27–34)
MCHC RBC-ENTMCNC: 30.1 GM/DL — LOW (ref 32–36)
MCV RBC AUTO: 94.5 FL — SIGNIFICANT CHANGE UP (ref 80–100)
METHOD TYPE: SIGNIFICANT CHANGE UP
NITRITE UR-MCNC: POSITIVE
NRBC # BLD: 0 /100 WBCS — SIGNIFICANT CHANGE UP (ref 0–0)
ORGANISM # SPEC MICROSCOPIC CNT: SIGNIFICANT CHANGE UP
ORGANISM # SPEC MICROSCOPIC CNT: SIGNIFICANT CHANGE UP
PH UR: 6 — SIGNIFICANT CHANGE UP (ref 5–8)
PLATELET # BLD AUTO: 195 K/UL — SIGNIFICANT CHANGE UP (ref 150–400)
POTASSIUM SERPL-MCNC: 5.1 MMOL/L — SIGNIFICANT CHANGE UP (ref 3.5–5.3)
POTASSIUM SERPL-SCNC: 5.1 MMOL/L — SIGNIFICANT CHANGE UP (ref 3.5–5.3)
PROCALCITONIN SERPL-MCNC: 0.79 NG/ML — HIGH (ref 0.02–0.1)
PROT UR-MCNC: 100 MG/DL
RBC # BLD: 3.66 M/UL — LOW (ref 3.8–5.2)
RBC # FLD: 13.9 % — SIGNIFICANT CHANGE UP (ref 10.3–14.5)
RBC CASTS # UR COMP ASSIST: ABNORMAL /HPF
RH IG SCN BLD-IMP: POSITIVE — SIGNIFICANT CHANGE UP
SODIUM SERPL-SCNC: 140 MMOL/L — SIGNIFICANT CHANGE UP (ref 135–145)
SP GR SPEC: 1.02 — SIGNIFICANT CHANGE UP (ref 1–1.03)
SPECIMEN SOURCE: SIGNIFICANT CHANGE UP
UROBILINOGEN FLD QL: 0.2 E.U./DL — SIGNIFICANT CHANGE UP
WBC # BLD: 10.34 K/UL — SIGNIFICANT CHANGE UP (ref 3.8–10.5)
WBC # FLD AUTO: 10.34 K/UL — SIGNIFICANT CHANGE UP (ref 3.8–10.5)
WBC UR QL: < 5 /HPF — SIGNIFICANT CHANGE UP

## 2021-12-18 PROCEDURE — 99221 1ST HOSP IP/OBS SF/LOW 40: CPT

## 2021-12-18 PROCEDURE — 76770 US EXAM ABDO BACK WALL COMP: CPT | Mod: 26

## 2021-12-18 PROCEDURE — 93971 EXTREMITY STUDY: CPT | Mod: 26,RT

## 2021-12-18 PROCEDURE — 71045 X-RAY EXAM CHEST 1 VIEW: CPT | Mod: 26

## 2021-12-18 PROCEDURE — 99233 SBSQ HOSP IP/OBS HIGH 50: CPT

## 2021-12-18 PROCEDURE — 74019 RADEX ABDOMEN 2 VIEWS: CPT | Mod: 26

## 2021-12-18 PROCEDURE — 70450 CT HEAD/BRAIN W/O DYE: CPT | Mod: 26

## 2021-12-18 RX ORDER — HEPARIN SODIUM 5000 [USP'U]/ML
4000 INJECTION INTRAVENOUS; SUBCUTANEOUS EVERY 6 HOURS
Refills: 0 | Status: DISCONTINUED | OUTPATIENT
Start: 2021-12-18 | End: 2021-12-20

## 2021-12-18 RX ORDER — HEPARIN SODIUM 5000 [USP'U]/ML
8000 INJECTION INTRAVENOUS; SUBCUTANEOUS EVERY 6 HOURS
Refills: 0 | Status: DISCONTINUED | OUTPATIENT
Start: 2021-12-18 | End: 2021-12-20

## 2021-12-18 RX ORDER — SIMETHICONE 80 MG/1
80 TABLET, CHEWABLE ORAL ONCE
Refills: 0 | Status: COMPLETED | OUTPATIENT
Start: 2021-12-18 | End: 2021-12-18

## 2021-12-18 RX ORDER — FUROSEMIDE 40 MG
80 TABLET ORAL
Refills: 0 | Status: DISCONTINUED | OUTPATIENT
Start: 2021-12-18 | End: 2021-12-20

## 2021-12-18 RX ORDER — HEPARIN SODIUM 5000 [USP'U]/ML
INJECTION INTRAVENOUS; SUBCUTANEOUS
Qty: 25000 | Refills: 0 | Status: DISCONTINUED | OUTPATIENT
Start: 2021-12-18 | End: 2021-12-20

## 2021-12-18 RX ORDER — CEFTRIAXONE 500 MG/1
1000 INJECTION, POWDER, FOR SOLUTION INTRAMUSCULAR; INTRAVENOUS EVERY 24 HOURS
Refills: 0 | Status: COMPLETED | OUTPATIENT
Start: 2021-12-18 | End: 2021-12-22

## 2021-12-18 RX ORDER — FUROSEMIDE 40 MG
40 TABLET ORAL ONCE
Refills: 0 | Status: COMPLETED | OUTPATIENT
Start: 2021-12-18 | End: 2021-12-18

## 2021-12-18 RX ADMIN — Medication 40 MILLIGRAM(S): at 05:48

## 2021-12-18 RX ADMIN — LIDOCAINE 1 PATCH: 4 CREAM TOPICAL at 19:01

## 2021-12-18 RX ADMIN — APIXABAN 2.5 MILLIGRAM(S): 2.5 TABLET, FILM COATED ORAL at 10:26

## 2021-12-18 RX ADMIN — BUDESONIDE AND FORMOTEROL FUMARATE DIHYDRATE 2 PUFF(S): 160; 4.5 AEROSOL RESPIRATORY (INHALATION) at 22:29

## 2021-12-18 RX ADMIN — Medication 5 UNIT(S): at 14:24

## 2021-12-18 RX ADMIN — PANTOPRAZOLE SODIUM 40 MILLIGRAM(S): 20 TABLET, DELAYED RELEASE ORAL at 05:48

## 2021-12-18 RX ADMIN — SIMETHICONE 80 MILLIGRAM(S): 80 TABLET, CHEWABLE ORAL at 04:55

## 2021-12-18 RX ADMIN — ATORVASTATIN CALCIUM 80 MILLIGRAM(S): 80 TABLET, FILM COATED ORAL at 22:15

## 2021-12-18 RX ADMIN — Medication 2: at 18:07

## 2021-12-18 RX ADMIN — METHADONE HYDROCHLORIDE 10 MILLIGRAM(S): 40 TABLET ORAL at 05:48

## 2021-12-18 RX ADMIN — LIDOCAINE 1 PATCH: 4 CREAM TOPICAL at 14:25

## 2021-12-18 RX ADMIN — GABAPENTIN 600 MILLIGRAM(S): 400 CAPSULE ORAL at 17:39

## 2021-12-18 RX ADMIN — Medication 4: at 09:10

## 2021-12-18 RX ADMIN — HEPARIN SODIUM 1800 UNIT(S)/HR: 5000 INJECTION INTRAVENOUS; SUBCUTANEOUS at 19:07

## 2021-12-18 RX ADMIN — Medication 48 MILLIGRAM(S): at 14:26

## 2021-12-18 RX ADMIN — GABAPENTIN 600 MILLIGRAM(S): 400 CAPSULE ORAL at 05:49

## 2021-12-18 RX ADMIN — Medication 25 MICROGRAM(S): at 05:49

## 2021-12-18 RX ADMIN — SACUBITRIL AND VALSARTAN 1 TABLET(S): 24; 26 TABLET, FILM COATED ORAL at 10:17

## 2021-12-18 RX ADMIN — METHADONE HYDROCHLORIDE 10 MILLIGRAM(S): 40 TABLET ORAL at 14:26

## 2021-12-18 RX ADMIN — Medication 2: at 14:24

## 2021-12-18 RX ADMIN — Medication 100 MILLIGRAM(S): at 05:48

## 2021-12-18 RX ADMIN — SACUBITRIL AND VALSARTAN 1 TABLET(S): 24; 26 TABLET, FILM COATED ORAL at 22:15

## 2021-12-18 RX ADMIN — Medication 5 UNIT(S): at 10:16

## 2021-12-18 RX ADMIN — CEFTRIAXONE 100 MILLIGRAM(S): 500 INJECTION, POWDER, FOR SOLUTION INTRAMUSCULAR; INTRAVENOUS at 14:25

## 2021-12-18 RX ADMIN — INSULIN GLARGINE 10 UNIT(S): 100 INJECTION, SOLUTION SUBCUTANEOUS at 22:15

## 2021-12-18 RX ADMIN — Medication 80 MILLIGRAM(S): at 18:16

## 2021-12-18 RX ADMIN — BUDESONIDE AND FORMOTEROL FUMARATE DIHYDRATE 2 PUFF(S): 160; 4.5 AEROSOL RESPIRATORY (INHALATION) at 10:17

## 2021-12-18 RX ADMIN — Medication 5 UNIT(S): at 18:07

## 2021-12-18 RX ADMIN — Medication 50 MILLIGRAM(S): at 22:15

## 2021-12-18 RX ADMIN — METHADONE HYDROCHLORIDE 10 MILLIGRAM(S): 40 TABLET ORAL at 22:15

## 2021-12-18 RX ADMIN — Medication 40 MILLIGRAM(S): at 14:25

## 2021-12-18 NOTE — PROGRESS NOTE ADULT - SUBJECTIVE AND OBJECTIVE BOX
Interventional Cardiology PA Adult Progress Note    Subjective Assessment:  	  MEDICATIONS:  furosemide   Injectable 40 milliGRAM(s) IV Push two times a day  metolazone 5 milliGRAM(s) Oral <User Schedule>  metoprolol succinate  milliGRAM(s) Oral daily  sacubitril 97 mG/valsartan 103 mG 1 Tablet(s) Oral two times a day    cefTRIAXone   IVPB 1000 milliGRAM(s) IV Intermittent every 24 hours    ALBUTerol   0.5% 2.5 milliGRAM(s) Nebulizer every 6 hours PRN  benzonatate 100 milliGRAM(s) Oral every 8 hours PRN  budesonide  80 MICROgram(s)/formoterol 4.5 MICROgram(s) Inhaler 2 Puff(s) Inhalation two times a day  guaiFENesin Oral Liquid (Sugar-Free) 100 milliGRAM(s) Oral every 6 hours PRN    acetaminophen     Tablet .. 650 milliGRAM(s) Oral every 6 hours PRN  amitriptyline 10 milliGRAM(s) Oral at bedtime  busPIRone 15 milliGRAM(s) Oral <User Schedule>  escitalopram 20 milliGRAM(s) Oral daily  gabapentin 600 milliGRAM(s) Oral two times a day  methadone    Tablet 10 milliGRAM(s) Oral three times a day  methadone    Tablet 5 milliGRAM(s) Oral every 6 hours PRN  traZODone 50 milliGRAM(s) Oral at bedtime    pantoprazole    Tablet 40 milliGRAM(s) Oral before breakfast    atorvastatin 80 milliGRAM(s) Oral at bedtime  dextrose 40% Gel 15 Gram(s) Oral once  dextrose 50% Injectable 25 Gram(s) IV Push once  dextrose 50% Injectable 12.5 Gram(s) IV Push once  dextrose 50% Injectable 25 Gram(s) IV Push once  fenofibrate Tablet 48 milliGRAM(s) Oral daily  glucagon  Injectable 1 milliGRAM(s) IntraMuscular once  insulin glargine Injectable (LANTUS) 10 Unit(s) SubCutaneous at bedtime  insulin lispro (ADMELOG) corrective regimen sliding scale   SubCutaneous Before meals and at bedtime  insulin lispro Injectable (ADMELOG) 5 Unit(s) SubCutaneous three times a day before meals  levothyroxine 25 MICROGram(s) Oral daily    apixaban 2.5 milliGRAM(s) Oral every 12 hours  dextrose 5%. 1000 milliLiter(s) IV Continuous <Continuous>  dextrose 5%. 1000 milliLiter(s) IV Continuous <Continuous>  lidocaine   4% Patch 1 Patch Transdermal daily      	    [PHYSICAL EXAM:  TELEMETRY:  T(C): 36.3 (12-18-21 @ 10:22), Max: 37.8 (12-17-21 @ 22:09)  HR: 70 (12-18-21 @ 09:15) (70 - 102)  BP: 152/68 (12-18-21 @ 08:41) (127/61 - 166/72)  RR: 18 (12-18-21 @ 09:15) (18 - 20)  SpO2: 94% (12-18-21 @ 09:15) (92% - 97%)  Wt(kg): --  I&O's Summary    17 Dec 2021 07:01  -  18 Dec 2021 07:00  --------------------------------------------------------  IN: 120 mL / OUT: 1250 mL / NET: -1130 mL    18 Dec 2021 07:01  -  18 Dec 2021 12:55  --------------------------------------------------------  IN: 0 mL / OUT: 50 mL / NET: -50 mL        Ward:  Central/PICC/Mid Line:                                         Appearance: Normal	  HEENT:   Normal oral mucosa, PERRL, EOMI	  Neck: Supple, + JVD/ - JVD; Carotid Bruit   Cardiovascular: Normal S1 S2, No JVD, No murmurs,   Respiratory: Lungs clear to auscultation/Decreased Breath Sounds/No Rales, Rhonchi, Wheezing	  Gastrointestinal:  Soft, Non-tender, + BS	  Skin: No rashes, No ecchymoses, No cyanosis  Extremities: Normal range of motion, No clubbing, cyanosis or edema  Vascular: Peripheral pulses palpable 2+ bilaterally  Neurologic: Non-focal  Psychiatry: A & O x 3, Mood & affect appropriate      	    ECG:  	  RADIOLOGY:   DIAGNOSTIC TESTING:  [ ] Echocardiogram:  [ ]  Catheterization:  [ ] Stress Test:    [ ] DELLA  OTHER: 	    LABS:	 	  CARDIAC MARKERS:                                  10.4   10.34 )-----------( 195      ( 18 Dec 2021 06:20 )             34.6     12-18    140  |  100  |  74<H>  ----------------------------<  197<H>  5.1   |  29  |  3.92<H>    Ca    9.1      18 Dec 2021 06:20  Mg     2.1     12-18      proBNP:   Lipid Profile:   HgA1c:   TSH:       ASSESSMENT/PLAN: 	        DVT ppx:  Dispo:     Interventional Cardiology PA Adult Progress Note    Subjective Assessment:  	  MEDICATIONS:  furosemide   Injectable 80 milliGRAM(s) IV Push two times a day  metolazone 5 milliGRAM(s) Oral <User Schedule>  metoprolol succinate  milliGRAM(s) Oral daily  sacubitril 97 mG/valsartan 103 mG 1 Tablet(s) Oral two times a day  cefTRIAXone   IVPB 1000 milliGRAM(s) IV Intermittent every 24 hours  ALBUTerol   0.5% 2.5 milliGRAM(s) Nebulizer every 6 hours PRN  benzonatate 100 milliGRAM(s) Oral every 8 hours PRN  budesonide  80 MICROgram(s)/formoterol 4.5 MICROgram(s) Inhaler 2 Puff(s) Inhalation two times a day  guaiFENesin Oral Liquid (Sugar-Free) 100 milliGRAM(s) Oral every 6 hours PRN  acetaminophen     Tablet .. 650 milliGRAM(s) Oral every 6 hours PRN  gabapentin 600 milliGRAM(s) Oral two times a day  methadone    Tablet 10 milliGRAM(s) Oral three times a day  methadone    Tablet 5 milliGRAM(s) Oral every 6 hours PRN  traZODone 50 milliGRAM(s) Oral at bedtime  pantoprazole    Tablet 40 milliGRAM(s) Oral before breakfast  atorvastatin 80 milliGRAM(s) Oral at bedtime  dextrose 40% Gel 15 Gram(s) Oral once  dextrose 50% Injectable 25 Gram(s) IV Push once  dextrose 50% Injectable 12.5 Gram(s) IV Push once  dextrose 50% Injectable 25 Gram(s) IV Push once  fenofibrate Tablet 48 milliGRAM(s) Oral daily  glucagon  Injectable 1 milliGRAM(s) IntraMuscular once  insulin glargine Injectable (LANTUS) 10 Unit(s) SubCutaneous at bedtime  insulin lispro (ADMELOG) corrective regimen sliding scale   SubCutaneous Before meals and at bedtime  insulin lispro Injectable (ADMELOG) 5 Unit(s) SubCutaneous three times a day before meals  levothyroxine 25 MICROGram(s) Oral daily  apixaban 2.5 milliGRAM(s) Oral every 12 hours  dextrose 5%. 1000 milliLiter(s) IV Continuous <Continuous>  dextrose 5%. 1000 milliLiter(s) IV Continuous <Continuous>  lidocaine   4% Patch 1 Patch Transdermal daily    [PHYSICAL EXAM:  TELEMETRY:  T(C): 36.3 (12-18-21 @ 10:22), Max: 37.8 (12-17-21 @ 22:09)  HR: 70 (12-18-21 @ 09:15) (70 - 102)  BP: 152/68 (12-18-21 @ 08:41) (127/61 - 166/72)  RR: 18 (12-18-21 @ 09:15) (18 - 20)  SpO2: 94% (12-18-21 @ 09:15) (92% - 97%)  I&O's Summary    17 Dec 2021 07:01  -  18 Dec 2021 07:00  --------------------------------------------------------  IN: 120 mL / OUT: 1250 mL / NET: -1130 mL    18 Dec 2021 07:01  -  18 Dec 2021 12:55  --------------------------------------------------------  IN: 0 mL / OUT: 50 mL / NET: -50 mL                                      Appearance: drowsy	  Neck: Supple,- JVD; no Carotid Bruit b/l  Cardiovascular: Normal S1 S2, No murmurs, rubs, gallops  Respiratory: would not allow provider to access	  Gastrointestinal:  Soft, Non-tender, ND, + BS x4	  Extremities: b/l LE in ACE wraps, RLE in wrapping 2/2 blister  Vascular: Peripheral pulses palpable 2+ bilaterally carotids, radial  Neurologic: A & O x 3, Mood & affect appropriate  	    ECG:  	  RADIOLOGY:   DIAGNOSTIC TESTING:  [x ] Echocardiogram: < from: TTE Echo Complete w/ Contrast w/ Doppler (11.29.21 @ 15:49) >  CONCLUSIONS:     1. Akinesis of the basal to mid inferoseptum and anteroseptum. Moderate to severe hypokinesis of the remaining regions. The left ventricle cavity size is mildly dilated. Left ventricular systolic function is severely reduced with a calculated ejection fraction of 25-30%.   2. The right ventricle is normal in size. Right ventricular systolic function is mildly reduced. A device lead is noted in the right heart.   3. The left atrium is severely dilated.   4. The mitral valve is mildly thickened. There is mild-to-moderate mitral regurgitation.   5. No pericardial effusion is seen.   6. Compared to the previous TTE performed on 1/5/2019, there have been no significant interval changes.    < end of copied text >    [ ]  Catheterization:  [ ] Stress Test:    [ ] DELLA  OTHER: 	    LABS:	 	  CARDIAC MARKERS                        10.4   10.34 )-----------( 195      ( 18 Dec 2021 06:20 )             34.6     12-18    140  |  100  |  74<H>  ----------------------------<  197<H>  5.1   |  29  |  3.92<H>    Ca    9.1      18 Dec 2021 06:20  Mg     2.1     12-18       Interventional Cardiology PA Adult Progress Note    Subjective Assessment: Patient seen and examined at the bedside. States that her abdominal pain is still present but improved. Denies CP, SOB, Lower leg pain. All other ROS negative except those listed in subjective assessment.   	  MEDICATIONS:  furosemide   Injectable 80 milliGRAM(s) IV Push two times a day  metolazone 5 milliGRAM(s) Oral <User Schedule>  metoprolol succinate  milliGRAM(s) Oral daily  sacubitril 97 mG/valsartan 103 mG 1 Tablet(s) Oral two times a day  cefTRIAXone   IVPB 1000 milliGRAM(s) IV Intermittent every 24 hours  ALBUTerol   0.5% 2.5 milliGRAM(s) Nebulizer every 6 hours PRN  benzonatate 100 milliGRAM(s) Oral every 8 hours PRN  budesonide  80 MICROgram(s)/formoterol 4.5 MICROgram(s) Inhaler 2 Puff(s) Inhalation two times a day  guaiFENesin Oral Liquid (Sugar-Free) 100 milliGRAM(s) Oral every 6 hours PRN  acetaminophen     Tablet .. 650 milliGRAM(s) Oral every 6 hours PRN  gabapentin 600 milliGRAM(s) Oral two times a day  methadone    Tablet 10 milliGRAM(s) Oral three times a day  methadone    Tablet 5 milliGRAM(s) Oral every 6 hours PRN  traZODone 50 milliGRAM(s) Oral at bedtime  pantoprazole    Tablet 40 milliGRAM(s) Oral before breakfast  atorvastatin 80 milliGRAM(s) Oral at bedtime  dextrose 40% Gel 15 Gram(s) Oral once  dextrose 50% Injectable 25 Gram(s) IV Push once  dextrose 50% Injectable 12.5 Gram(s) IV Push once  dextrose 50% Injectable 25 Gram(s) IV Push once  fenofibrate Tablet 48 milliGRAM(s) Oral daily  glucagon  Injectable 1 milliGRAM(s) IntraMuscular once  insulin glargine Injectable (LANTUS) 10 Unit(s) SubCutaneous at bedtime  insulin lispro (ADMELOG) corrective regimen sliding scale   SubCutaneous Before meals and at bedtime  insulin lispro Injectable (ADMELOG) 5 Unit(s) SubCutaneous three times a day before meals  levothyroxine 25 MICROGram(s) Oral daily  apixaban 2.5 milliGRAM(s) Oral every 12 hours  dextrose 5%. 1000 milliLiter(s) IV Continuous <Continuous>  dextrose 5%. 1000 milliLiter(s) IV Continuous <Continuous>  lidocaine   4% Patch 1 Patch Transdermal daily    [PHYSICAL EXAM:  TELEMETRY:  T(C): 36.3 (12-18-21 @ 10:22), Max: 37.8 (12-17-21 @ 22:09)  HR: 70 (12-18-21 @ 09:15) (70 - 102)  BP: 152/68 (12-18-21 @ 08:41) (127/61 - 166/72)  RR: 18 (12-18-21 @ 09:15) (18 - 20)  SpO2: 94% (12-18-21 @ 09:15) (92% - 97%)  I&O's Summary    17 Dec 2021 07:01  -  18 Dec 2021 07:00  --------------------------------------------------------  IN: 120 mL / OUT: 1250 mL / NET: -1130 mL    18 Dec 2021 07:01  -  18 Dec 2021 12:55  --------------------------------------------------------  IN: 0 mL / OUT: 50 mL / NET: -50 mL                                      Appearance: drowsy	  Neck: Supple,- JVD; no Carotid Bruit b/l  Cardiovascular: Normal S1 S2, No murmurs, rubs, gallops  Respiratory: would not allow provider to access	  Gastrointestinal:  Soft, Non-tender, ND, + BS x4	  Extremities: b/l LE in ACE wraps, RLE in wrapping 2/2 blister  Vascular: Peripheral pulses palpable 2+ bilaterally carotids, radial  Neurologic: A & O x 3, Mood & affect appropriate  	    ECG:  	  RADIOLOGY:   DIAGNOSTIC TESTING:  [x ] Echocardiogram: < from: TTE Echo Complete w/ Contrast w/ Doppler (11.29.21 @ 15:49) >  CONCLUSIONS:     1. Akinesis of the basal to mid inferoseptum and anteroseptum. Moderate to severe hypokinesis of the remaining regions. The left ventricle cavity size is mildly dilated. Left ventricular systolic function is severely reduced with a calculated ejection fraction of 25-30%.   2. The right ventricle is normal in size. Right ventricular systolic function is mildly reduced. A device lead is noted in the right heart.   3. The left atrium is severely dilated.   4. The mitral valve is mildly thickened. There is mild-to-moderate mitral regurgitation.   5. No pericardial effusion is seen.   6. Compared to the previous TTE performed on 1/5/2019, there have been no significant interval changes.    < end of copied text >    [ ]  Catheterization:  [ ] Stress Test:    [ ] DELLA  OTHER: 	    LABS:	 	  CARDIAC MARKERS                        10.4   10.34 )-----------( 195      ( 18 Dec 2021 06:20 )             34.6     12-18    140  |  100  |  74<H>  ----------------------------<  197<H>  5.1   |  29  |  3.92<H>    Ca    9.1      18 Dec 2021 06:20  Mg     2.1     12-18

## 2021-12-18 NOTE — PROGRESS NOTE ADULT - PROBLEM SELECTOR PLAN 3
s/p 1 unit pRBC on 12/15, Hgb trended up from 7.3 -> 9.1  - Iron mildly low.  - Maintain active T&S (ordered 12/18) Endorsing RUE weakness/pain and myoclonic movement, neuro exam other wise unremarkable   -Stroke/Neuro consulted: suspicious 2/2 toxic/metabolic causes, particularly CNS acting medications, low suspicion for seizure activities. CTH non-contrast to r/o acute pathology, f/u  -Pain management consulted: Methadone 5 mg PO q6h PRN breakthrough pain, c/w Methadone 10 mg PO TID (confirmed by Istop). Tylenol 650 mg PO q6h PRN moderate pain  -Psychiatry Consulted: pt unsure what she truly takes at home, multiple pain medication prescribers. Rec discontinuing Amitriptyline 10mg QD, Buspirone 15mg QD, Escitalopram 20mg QD - low risk of withdrawal. Can consider addition of Flexeril if cleared wit pain management.   -CONT: Methadone as above, Gabapentin 600mg BID, Trazodone 50mg QD Endorsing RUE weakness/pain and myoclonic movement, neuro exam other wise unremarkable   -Stroke/Neuro consulted: suspicious 2/2 toxic/metabolic causes, particularly CNS acting medications, low suspicion for seizure activities.   -CTH non-contrast to r/o acute pathology, f/u  -Pain management consulted: Methadone 5 mg PO q6h PRN breakthrough pain, c/w Methadone 10 mg PO TID (confirmed by Istop). Tylenol 650 mg PO q6h PRN moderate pain  -Psychiatry Consulted: pt unsure what she truly takes at home, multiple pain medication prescribers. Rec discontinuing Amitriptyline 10mg QD, Buspirone 15mg QD, Escitalopram 20mg QD - low risk of withdrawal. Can consider addition of Flexeril if cleared with pain management.   -CONT: Methadone as above, Gabapentin 600mg BID, Trazodone 50mg QD Endorsing RUE weakness/pain and myoclonic movement, neuro exam other wise unremarkable   -Stroke/Neuro consulted: suspicious 2/2 toxic/metabolic causes, particularly CNS acting medications, low suspicion for seizure activities.   -CTH non-contrast 12/18: negative for acute changes  -Pain management consulted: Methadone 5 mg PO q6h PRN breakthrough pain, c/w Methadone 10 mg PO TID (confirmed by Istop). Tylenol 650 mg PO q6h PRN moderate pain  -Psychiatry Consulted: pt unsure what she truly takes at home, multiple pain medication prescribers. Rec discontinuing Amitriptyline 10mg QD, Buspirone 15mg QD, Escitalopram 20mg QD - low risk of withdrawal. Can consider addition of Flexeril if cleared with pain management.   -CONT: Methadone as above, Gabapentin 600mg BID, Trazodone 50mg QD

## 2021-12-18 NOTE — BH CONSULTATION LIAISON ASSESSMENT NOTE - SUMMARY
60 yo  F, current smoker, domiciled with , with a PMH of obesity, HTN, HLD, DM-II, CKD Stage 4, Hypothyroidism, known NICM with EF 25% (s/p AICD for primary prevention), MM, COPD (3L home O2), recurrent DVTs (on Eliquis), PPH depression, who presented with mild hemoptysis, admitted for management of CHF exacerbation and anemia. Neuro following for management of myoclonic movements. Psychiatry consulted for the same.  No movements appreciated on exam today and reportedly they have resolved over the past hours (am lexapro 20 was held as well). Unclear if the movements are related to delirium vs serotonin syndrome given the polypharmacy and dehydration c/b acute medical problems.    Plan:   observation level per team  please verify home psych medications with PCP/psych/pharmacy  hold the following medications for now pending clarification: lexapro (pt reports not taking), buspar (unclear if taking), amitriptyline (unclear if taking)  can continue with trazodone 50 mg qhs PRN insomnia and hold this for oversedation and/or hypotension  will recommend holding off on resuming flexeril given it's propensity to increase risk of serotonin syndrome in pt's on SSRIs    CL will continue following

## 2021-12-18 NOTE — PROGRESS NOTE ADULT - PROBLEM SELECTOR PLAN 4
Pt endorsing RUE weakness/pain and myoclonic movement  -Neuro exam otherwise unremarkable  -CTH w/o contrast to evaluate for stroke or any acute pathology (pending)  -Stroke/Neuro consulted, suspicious 2/2 toxic/metabolic causes, particularly CNS acting medications, low suspicion for seizure activities.  No further work-up from a stroke perspective is needed  -recs to limit use of CNS acting medications and renally adjust medications. Is symptoms persist, rec-consult neurology  -Pain management consulted: Methadone 5 mg PO q6h PRN breakthrough pain and Flexeril 5mg PO q8h, c/w Methadone 10 mg PO TID (confirmed by istop). EKG in AM to monitor NV iinterval.  Tylenol 650 mg PO q6h PRN moderate pain s/p 1 unit pRBC on 12/15 w/ appropriate response, now stable at 10.4   - Maintain active T&S (next due 12/21)

## 2021-12-18 NOTE — PROGRESS NOTE ADULT - PROBLEM SELECTOR PLAN 5
Followed by Dr. Ruiz, was on Dexamethasone and Ninlaro weekly (Monday)  -Holding Ninlaro due to bullae on right foot, per Dr. Ruiz it is unlikely the cause of the bullae but can be held at this time.  -Right foot bullae x2. No signs of infection. Continue dry dressing with RN

## 2021-12-18 NOTE — PROGRESS NOTE ADULT - SUBJECTIVE AND OBJECTIVE BOX
Patient is a 61y Female seen and evaluated at bedside. patient currently on 5L NC (more than her usual 3L NC at baseline) she states she is still having trouble breathing and does not enjoy wearing the ace bandages around her legs; has not received her diuretics yet this AM; she states that she does not have a lot of salt in her diet; electrolytes noted; uop: 1.1L/24 hours      Meds:    acetaminophen     Tablet .. 650 every 6 hours PRN  ALBUTerol   0.5% 2.5 every 6 hours PRN  amitriptyline 10 at bedtime  apixaban 2.5 every 12 hours  atorvastatin 80 at bedtime  benzonatate 100 every 8 hours PRN  budesonide  80 MICROgram(s)/formoterol 4.5 MICROgram(s) Inhaler 2 two times a day  busPIRone 15 <User Schedule>  cefTRIAXone   IVPB 1000 every 24 hours  dextrose 40% Gel 15 once  dextrose 5%. 1000 <Continuous>  dextrose 5%. 1000 <Continuous>  dextrose 50% Injectable 25 once  dextrose 50% Injectable 12.5 once  dextrose 50% Injectable 25 once  escitalopram 20 daily  fenofibrate Tablet 48 daily  furosemide   Injectable 40 two times a day  gabapentin 600 two times a day  glucagon  Injectable 1 once  guaiFENesin Oral Liquid (Sugar-Free) 100 every 6 hours PRN  insulin glargine Injectable (LANTUS) 10 at bedtime  insulin lispro (ADMELOG) corrective regimen sliding scale  Before meals and at bedtime  insulin lispro Injectable (ADMELOG) 5 three times a day before meals  levothyroxine 25 daily  lidocaine   4% Patch 1 daily  methadone    Tablet 10 three times a day  methadone    Tablet 5 every 6 hours PRN  metolazone 5 <User Schedule>  metoprolol succinate  daily  pantoprazole    Tablet 40 before breakfast  sacubitril 97 mG/valsartan 103 mG 1 two times a day  traZODone 50 at bedtime      T(C): , Max: 37.8 (21 @ 22:09)  T(F): , Max: 100.1 (21 @ 22:09)  HR: 70 (21 @ 09:15)  BP: 152/68 (21 @ 08:41)  BP(mean): --  RR: 18 (21 @ 09:15)  SpO2: 94% (21 @ 09:15)  Wt(kg): --     @ 07:01  -   @ 07:00  --------------------------------------------------------  IN: 120 mL / OUT: 1250 mL / NET: -1130 mL     @ 07:01  -   @ 12:33  --------------------------------------------------------  IN: 0 mL / OUT: 50 mL / NET: -50 mL          Review of Systems:  10 point ROS as per HPI      PHYSICAL EXAM:  GENERAL: appears in slight respiratory distress on 5L NC  CHEST/LUNG: decreased breath sounds at the bases; scattered rhonchi anteriorly   HEART: normal S1S2, RRR  ABDOMEN: Soft, Nontender, +BS,   EXTREMITIES: BL ace bandages in place; 1-2+ pitting edema BL  NEUROLOGY: AAO x3, no focal neurological deficit  ACCESS: good thrill and bruit appreciated      LABS:                        10.4   10.34 )-----------( 195      ( 18 Dec 2021 06:20 )             34.6         140  |  100  |  74<H>  ----------------------------<  197<H>  5.1   |  29  |  3.92<H>    Ca    9.1      18 Dec 2021 06:20  Mg     2.1     18          Urinalysis Basic - ( 18 Dec 2021 10:02 )    Color: Yellow / Appearance: Hazy / S.020 / pH: x  Gluc: x / Ketone: NEGATIVE  / Bili: Negative / Urobili: 0.2 E.U./dL   Blood: x / Protein: 100 mg/dL / Nitrite: POSITIVE   Leuk Esterase: NEGATIVE / RBC: 5-10 /HPF / WBC < 5 /HPF   Sq Epi: x / Non Sq Epi: 0-5 /HPF / Bacteria: None /HPF            RADIOLOGY & ADDITIONAL STUDIES:           Patient is a 61y Female seen and evaluated at bedside. patient currently on 5L NC (more than her usual 3L NC at baseline) she states she is still having trouble breathing and does not enjoy wearing the ace bandages around her legs; has not received her diuretics yet this AM; she states that she does not have a lot of salt in her diet; electrolytes noted; uop: 1.1L/24 hours      Meds:    acetaminophen     Tablet .. 650 every 6 hours PRN  ALBUTerol   0.5% 2.5 every 6 hours PRN  amitriptyline 10 at bedtime  apixaban 2.5 every 12 hours  atorvastatin 80 at bedtime  benzonatate 100 every 8 hours PRN  budesonide  80 MICROgram(s)/formoterol 4.5 MICROgram(s) Inhaler 2 two times a day  busPIRone 15 <User Schedule>  cefTRIAXone   IVPB 1000 every 24 hours  dextrose 40% Gel 15 once  dextrose 5%. 1000 <Continuous>  dextrose 5%. 1000 <Continuous>  dextrose 50% Injectable 25 once  dextrose 50% Injectable 12.5 once  dextrose 50% Injectable 25 once  escitalopram 20 daily  fenofibrate Tablet 48 daily  furosemide   Injectable 40 two times a day  gabapentin 600 two times a day  glucagon  Injectable 1 once  guaiFENesin Oral Liquid (Sugar-Free) 100 every 6 hours PRN  insulin glargine Injectable (LANTUS) 10 at bedtime  insulin lispro (ADMELOG) corrective regimen sliding scale  Before meals and at bedtime  insulin lispro Injectable (ADMELOG) 5 three times a day before meals  levothyroxine 25 daily  lidocaine   4% Patch 1 daily  methadone    Tablet 10 three times a day  methadone    Tablet 5 every 6 hours PRN  metolazone 5 <User Schedule>  metoprolol succinate  daily  pantoprazole    Tablet 40 before breakfast  sacubitril 97 mG/valsartan 103 mG 1 two times a day  traZODone 50 at bedtime      T(C): , Max: 37.8 (21 @ 22:09)  T(F): , Max: 100.1 (21 @ 22:09)  HR: 70 (21 @ 09:15)  BP: 152/68 (21 @ 08:41)  BP(mean): --  RR: 18 (21 @ 09:15)  SpO2: 94% (21 @ 09:15)  Wt(kg): --     @ 07:01  -   @ 07:00  --------------------------------------------------------  IN: 120 mL / OUT: 1250 mL / NET: -1130 mL     @ 07:01  -   @ 12:33  --------------------------------------------------------  IN: 0 mL / OUT: 50 mL / NET: -50 mL          Review of Systems:  All other ROS negative except as per HPI      PHYSICAL EXAM:  GENERAL: appears in slight respiratory distress on 5L NC  CHEST/LUNG: decreased breath sounds at the bases; scattered rhonchi anteriorly   HEART: normal S1S2, RRR  ABDOMEN: Soft, Nontender, +BS,   EXTREMITIES: BL ace bandages in place; 1-2+ pitting edema BL  NEUROLOGY: AAO x3, no focal neurological deficit  ACCESS: good thrill and bruit appreciated      LABS:                        10.4   10.34 )-----------( 195      ( 18 Dec 2021 06:20 )             34.6         140  |  100  |  74<H>  ----------------------------<  197<H>  5.1   |  29  |  3.92<H>    Ca    9.1      18 Dec 2021 06:20  Mg     2.1               Urinalysis Basic - ( 18 Dec 2021 10:02 )    Color: Yellow / Appearance: Hazy / S.020 / pH: x  Gluc: x / Ketone: NEGATIVE  / Bili: Negative / Urobili: 0.2 E.U./dL   Blood: x / Protein: 100 mg/dL / Nitrite: POSITIVE   Leuk Esterase: NEGATIVE / RBC: 5-10 /HPF / WBC < 5 /HPF   Sq Epi: x / Non Sq Epi: 0-5 /HPF / Bacteria: None /HPF            RADIOLOGY & ADDITIONAL STUDIES:      Reviewed

## 2021-12-18 NOTE — BH CONSULTATION LIAISON ASSESSMENT NOTE - CURRENT MEDICATION
MEDICATIONS  (STANDING):  apixaban 2.5 milliGRAM(s) Oral every 12 hours  atorvastatin 80 milliGRAM(s) Oral at bedtime  budesonide  80 MICROgram(s)/formoterol 4.5 MICROgram(s) Inhaler 2 Puff(s) Inhalation two times a day  cefTRIAXone   IVPB 1000 milliGRAM(s) IV Intermittent every 24 hours  dextrose 40% Gel 15 Gram(s) Oral once  dextrose 5%. 1000 milliLiter(s) (50 mL/Hr) IV Continuous <Continuous>  dextrose 5%. 1000 milliLiter(s) (100 mL/Hr) IV Continuous <Continuous>  dextrose 50% Injectable 25 Gram(s) IV Push once  dextrose 50% Injectable 12.5 Gram(s) IV Push once  dextrose 50% Injectable 25 Gram(s) IV Push once  fenofibrate Tablet 48 milliGRAM(s) Oral daily  furosemide   Injectable 80 milliGRAM(s) IV Push two times a day  gabapentin 600 milliGRAM(s) Oral two times a day  glucagon  Injectable 1 milliGRAM(s) IntraMuscular once  insulin glargine Injectable (LANTUS) 10 Unit(s) SubCutaneous at bedtime  insulin lispro (ADMELOG) corrective regimen sliding scale   SubCutaneous Before meals and at bedtime  insulin lispro Injectable (ADMELOG) 5 Unit(s) SubCutaneous three times a day before meals  levothyroxine 25 MICROGram(s) Oral daily  lidocaine   4% Patch 1 Patch Transdermal daily  methadone    Tablet 10 milliGRAM(s) Oral three times a day  metolazone 5 milliGRAM(s) Oral <User Schedule>  metoprolol succinate  milliGRAM(s) Oral daily  pantoprazole    Tablet 40 milliGRAM(s) Oral before breakfast  sacubitril 97 mG/valsartan 103 mG 1 Tablet(s) Oral two times a day  traZODone 50 milliGRAM(s) Oral at bedtime    MEDICATIONS  (PRN):  acetaminophen     Tablet .. 650 milliGRAM(s) Oral every 6 hours PRN Mild Pain (1 - 3)  ALBUTerol   0.5% 2.5 milliGRAM(s) Nebulizer every 6 hours PRN Shortness of Breath and/or Wheezing  benzonatate 100 milliGRAM(s) Oral every 8 hours PRN Cough  guaiFENesin Oral Liquid (Sugar-Free) 100 milliGRAM(s) Oral every 6 hours PRN Cough  methadone    Tablet 5 milliGRAM(s) Oral every 6 hours PRN Moderate Pain (4 - 6)

## 2021-12-18 NOTE — CHART NOTE - NSCHARTNOTEFT_GEN_A_CORE
NITE 12/18 4:30AM: called to bedside 2/2 abdominal pain. Vague abdominal pain throughout. TTP even when barely touching abdomen. +BS throughout. No guarding or rebound tenderness. Endorses 1 BM yesterday.  Simethicone given x1.  Noted to be tachypneic when speaking but comfortable when resting. A&O x3. Saturating well on 3.5L NC. Previously tolerating BiPAP. Rales to bases & scattered rhonchi throughout anteriorly. Refused posterior chest exam. 300 cc UOP in primafit. Noted earlier temp of 100.1F at 22:00. Repeat temp 98F. Noted Proteus Mirabilis growing in sputum culture. Repeat CXR & XR abd ordered. UA ordered. f/u AM procal. f/u pulm reccs. Awaiting CT head 2/2 jerky movements of arms R>L. Called CT x2 who have her "in the list to do."

## 2021-12-18 NOTE — PROGRESS NOTE ADULT - PROBLEM SELECTOR PLAN 6
Cr: 3.93 (~4 during prior admission)  -Continue to monitor   -Seen by Dr. Neal, if Cr and volume status worsens, can consider dialysis. Cr: 3.92 (~4 during prior admission)  -Continue to monitor   -Seen by Dr. Neal, if Cr and volume status worsens, can consider dialysis. Cr: 3.92 (~4 during prior admission)  -Retroperitoneal US 12/18: Mild bilateral hydronephrosis  -Seen by Dr. Neal, if Cr and volume status worsens, can consider dialysis.

## 2021-12-18 NOTE — BH CONSULTATION LIAISON ASSESSMENT NOTE - NSICDXPASTMEDICALHX_GEN_ALL_CORE_FT
PAST MEDICAL HISTORY:  Bipolar depression     CHF (congestive heart failure)     Chronic pain     CKD (chronic kidney disease), stage IV     COPD (chronic obstructive pulmonary disease)     CVA (cerebral vascular accident)     Depression     DM (diabetes mellitus)     DVT (deep venous thrombosis)     HLD (hyperlipidemia)     HTN (hypertension)     Hypothyroidism     Multiple myeloma     Neuropathy     HDARMESH on CPAP     PAD (peripheral artery disease)

## 2021-12-18 NOTE — PROGRESS NOTE ADULT - PROBLEM SELECTOR PLAN 1
No new episodes since her admission.  - CT Chest 12/16/21: Mild pulmonary edema and trace right pleural effusion.  - Pulmonary consulted, hemoptysis is mild, likely due to cough in combination with increased hydrostatic pressure in capillary beds from decompensated heart failure.  - RVP and COVID negative. Sputum culture pending.  - c/w cough suppressants   - Eliquis initially held 2/2 to hemoptysis, restarted on 12/17    R/O DVT  - As per Hx, restarted on Eliquis for Recurrent DVT  - RUE and BLE duplex (pending) for Pain and palpable mass Unable to access lungs, edema of BLE w/ ACE wraps, satting 94% on 5L NC (3L at home), Net negative 1.2L - not compliant with strict I&Os, Primafit now in place.   - Lasix IV increased on 12/19 to 80mg BID i/s/o increased O2 requirement & congestion on CXR  - CONT: Lasix 80mg IV BID, Metolazone 5mg BID (30 min prior to Lasix), Entresto 97mg/103mg BID, Toprol XL 100mg daily  - Echo 11/29/21: Akinesis of the basal to mid inferoseptum and anteroseptum. Moderate to severe hypokinesis of the remaining regions, mild to moderate MR    #Pulm: Continue Symbicort BID and PRN duonebs q6h  - Continue home CPAP  - Wean O2 for goal SpO2 88-92%  - Outpatient follow up with Dr. Nish Payne in 2-3 weeks after discharge. Unable to access lungs, edema of BLE w/ ACE wraps, satting 94% on 5L NC (3L at home), Net negative 1.2L - not compliant with strict I&Os, Primafit now in place.   - Echo 11/29/21: Akinesis of the basal to mid inferoseptum and anteroseptum. Moderate to severe hypokinesis of the remaining regions, mild to moderate MR  - Lasix IV increased on 12/18 to 80mg BID i/s/o increased O2 requirement & congestion on CXR  - CONT: Lasix 80mg IV BID, Metolazone 5mg BID (30 min prior to Lasix), Entresto 97mg/103mg BID, Toprol XL 100mg daily    #COPD  - CONT: Symbicort BID and PRN duonebs q6h w/ CPAP  - Wean O2 for goal SpO2 88-92%  - Outpatient follow up with Dr. Nish Payne in 2-3 weeks after discharge.

## 2021-12-18 NOTE — BH CONSULTATION LIAISON ASSESSMENT NOTE - NSSUICPROTFACT_PSY_ALL_CORE
Responsibility to children, family, or others/Identifies reasons for living/Supportive social network of family or friends/Cultural, spiritual and/or moral attitudes against suicide/Positive therapeutic relationships/Ability to cope with stress/Frustration tolerance

## 2021-12-18 NOTE — PROGRESS NOTE ADULT - ATTENDING COMMENTS
Patient seen and examined. Case discussed with ACP.     61W HTN HL DM CRI COPD on home O2, obesity, hypothyroidism, chronic DVT, fibromyalgia on methadone, NICM EF 25-30% with ICD, MM, prior CVA who presented with mild hemoptysis, chronic dry cough and acute on chronic anemia. Renal consulted for suboptimal diuresis. Pulm consulted for hemoptysis--no further eval recommended, apixaban restarted.   Overnight events noted with abdominal pain and lung exam with rales. With low grade temp and proteus noted in sputum culture.   Today somnolent but arousable. Difficult to do posterior lung exam. Denies abd pain, no tenderness on exam.  -Cr stable  -per discussion with renal, increasing diuresis given higher O2 requirements  -appreciate psych recs  -strict Is/Os  -continue beta blocker, Entresto  -f/u CXR, AXR  -given low grade temp and +sputum cx-- ceftriaxone added    Marie Partida MD

## 2021-12-18 NOTE — PROGRESS NOTE ADULT - ATTENDING COMMENTS
See the Fellow's note written above, for details. I reviewed the fellow documentation.  I have personally seen and examined this patient today. I have reviewed vitals, labs, medications, and imaging. I agree with the fellow's findings and plans as written above with the following additions/amendments:    Patient seen and examined at bedside. Wants to stop wearing ace bandages and denies eating salty foods, discussed need to keep on for now and continue low salt diet, higher dose of lasix as above.

## 2021-12-18 NOTE — BH CONSULTATION LIAISON ASSESSMENT NOTE - NSICDXBHSECONDARYDX_PSY_ALL_CORE
Hemoptysis   R04.2  Acute on chronic systolic congestive heart failure   I50.23  Anemia   D64.9  Multiple myeloma   C90.00  Stage 4 chronic kidney disease   N18.4  Diabetes   E11.9  IgG myeloma   C90.00  Chronic obstructive pulmonary disease (COPD)   J44.9  RUE weakness   R29.898  MDD (major depressive disorder)   F32.9

## 2021-12-18 NOTE — PROGRESS NOTE ADULT - ASSESSMENT
62yo female, current smoker, obese, with PMHx HTN, HLD, DM-II, CKD Stage 4, Hypothyroidism, known NICM with EF 25% (s/p AICD for primary prevention), MM, COPD (3L home O2), recurrent DVTs (on Eliquis) reporting of mild hemoptysis likely due to cough, admitted for management of CHF exacerbation and anemia. Continue IV diuresis with Lasix and Metolazone and consider restarting Eliquis 12/17 if patient's hemoptysis has not recurred. S/p 1 unit pRBCs with appropriate response.  60 yo obese F, current smoker, with a PMH of HTN, HLD, DM-II, CKD Stage 4, Hypothyroidism, known NICM with EF 25% (s/p AICD for primary prevention), MM, COPD (3L home O2), recurrent DVTs (on Eliquis) reporting of mild hemoptysis likely due to cough, admitted for management of CHF exacerbation and anemia. IV Lasix dosing increased 2/2 increased oxygen demands. Neruo/psych following for management of myoclonic movements. 60 yo obese F, current smoker, with a PMH of HTN, HLD, DM-II, CKD Stage 4, Hypothyroidism, known NICM with EF 25% (s/p AICD for primary prevention), MM, COPD (3L home O2), recurrent DVTs (on Eliquis) reporting of mild hemoptysis likely due to cough, admitted for management of CHF exacerbation and anemia. IV Lasix dosing increased 2/2 increased oxygen demands. Neruo/psych following for management of myoclonic movements. Hospital course further c/b RLE DVT, vascular consulted.

## 2021-12-18 NOTE — BH CONSULTATION LIAISON ASSESSMENT NOTE - NSBHCHARTREVIEWVS_PSY_A_CORE FT
Vital Signs Last 24 Hrs  T(C): 36.8 (18 Dec 2021 13:59), Max: 37.8 (17 Dec 2021 22:09)  T(F): 98.2 (18 Dec 2021 13:59), Max: 100.1 (17 Dec 2021 22:09)  HR: 70 (18 Dec 2021 09:15) (70 - 102)  BP: 152/68 (18 Dec 2021 08:41) (127/61 - 166/72)  BP(mean): --  RR: 18 (18 Dec 2021 09:15) (18 - 20)  SpO2: 94% (18 Dec 2021 09:15) (92% - 97%)

## 2021-12-18 NOTE — BH CONSULTATION LIAISON ASSESSMENT NOTE - NSBHADMITCOUNSEL_PSY_A_CORE
diagnostic results/impressions and/or recommended studies/risks and benefits of treatment options/instructions for management, treatment and follow up/risk factor reduction/client/family/caregiver education/prognosis

## 2021-12-18 NOTE — PROGRESS NOTE ADULT - ASSESSMENT
62 yo obese Female CKD (baseline around 3.7-4) CHF, DM COPD (on 3L home o2) multiple myeloma presented to the hospital with complaints of hemoptysis and shortness of breath x 4 days;     Assessment/Plan:     #CKD Cr at baseline    Uop: 1.1L/shift   would increase lasix to IV 80BID from 40 in addition to continuing metolazone 5 mg  medina for strict i's and o's  daily weights  BMP daily   renal diet  fluid and salt restriction     Krystle Billy D.O  PGY-4, Nephrology Fellow   P: [phone]

## 2021-12-18 NOTE — PROGRESS NOTE ADULT - PROBLEM SELECTOR PLAN 7
Hgb A1c: 8.4 (prior admission)  - home Levemir interchanged with Lantus 20 units qHS  - Insulin Lispro 10 units before meals  - mISS    PT: Home with home PT    DVT:  Eliquis  Dispo: pending clinical progression; likely D/C this weekend per Nephro Hgb A1c: 8.4 (prior admission)  - CONT: Lantus 20 units QD, Lispro 10 units before meals w/ mISS    PT: Home with home PT  DVT:  Eliquis  Dispo: pending clinical progression

## 2021-12-18 NOTE — CONSULT NOTE ADULT - SUBJECTIVE AND OBJECTIVE BOX
Vascular Attending:  Mala      HPI:  60 yo obese Female, current smoker, with Contrast/Aspirin Allergy and an extensive PMHx including HTN, NICM, chronic HFrEF (EF 25% s/p AICD for primary prevention battery changed 2020, w/ cardiac Mems device), IDDM c/b neuropathy (on Methadone), hx of recurrent DVTs (on Eliquis), CKD IV, CVA x2 (most recently in  with residual right sided weakness), PAD s/p bypass, COPD (on 3L home O2), DHARMESH on CPAP, Multiple Myeloma (on Ninlaro), hypothyroidism, depression who presented to [facility] with hemoptysis x 4 days   Pt states to having been coughing for 4 days, initially with green sputum, and on day of admission, she cough up keyona blood. She admits to SOB on minimal exertion, stable chronic  LE edema and orthopnea. Denies fever, sick contact, chest pain, N/V  In the ER, vitals with /81, HR 73, R 20, T 98.2, O2 sat 100% on 6L. Labs with H/H 7.7/24.5, Creat 3.73, BNP 95887, EKG unchanged from prior, CXR with prominent cardiac silhouette. Suggestion of pulmonary vascular congestion. Pulmonary consulted for hemoptysis and recommended CT Chest which shows mild pulmonary edema and trace right pleural effusion.   She received Lasix 40mg IV x 1 and admitted for CHF exacerbation.    Vascular Addendum:     61 year old female with PMH of CAD, COPD, CVA, CKD, DM, cardiomyopathy, DVT (on Eliquis), HLD, HTN, hypothyroidism, multiple myeloma, DHARMESH, arthritis, current smoker. PAD s/p L fem-pop bypass ([facility] ) and multiple stents. Last seen in office by Dr. Medeiros on 3/4/2021 for bilateral leg pain. Her most recent arterial duplex US showing a patent LLE bypass and R SFA stenosis.     PAST MEDICAL & SURGICAL HISTORY:  CHF (congestive heart failure)    COPD (chronic obstructive pulmonary disease)    HLD (hyperlipidemia)    Chronic pain    DM (diabetes mellitus)    CVA (cerebral vascular accident)    DVT (deep venous thrombosis)    HTN (hypertension)    Depression    Bipolar depression    PAD (peripheral artery disease)    Neuropathy    Hypothyroidism    Multiple myeloma    CKD (chronic kidney disease), stage IV    DHARMESH on CPAP    AICD (automatic cardioverter/defibrillator) present    H/O abdominal hysterectomy    H/O tubal ligation    History of cholecystectomy    H/O extremity bypass graft        REVIEW OF SYSTEMS      General:	    Skin/Breast:  	  Ophthalmologic:  	  ENMT:	    Respiratory and Thorax:  	  Cardiovascular:	    Gastrointestinal:	    Genitourinary:	    Musculoskeletal:	    Neurological:	    Psychiatric:	    Hematology/Lymphatics:	    Endocrine:	    Allergic/Immunologic:	    MEDICATIONS  (STANDING):  atorvastatin 80 milliGRAM(s) Oral at bedtime  budesonide  80 MICROgram(s)/formoterol 4.5 MICROgram(s) Inhaler 2 Puff(s) Inhalation two times a day  cefTRIAXone   IVPB 1000 milliGRAM(s) IV Intermittent every 24 hours  dextrose 40% Gel 15 Gram(s) Oral once  dextrose 5%. 1000 milliLiter(s) (50 mL/Hr) IV Continuous <Continuous>  dextrose 5%. 1000 milliLiter(s) (100 mL/Hr) IV Continuous <Continuous>  dextrose 50% Injectable 25 Gram(s) IV Push once  dextrose 50% Injectable 12.5 Gram(s) IV Push once  dextrose 50% Injectable 25 Gram(s) IV Push once  fenofibrate Tablet 48 milliGRAM(s) Oral daily  furosemide   Injectable 80 milliGRAM(s) IV Push two times a day  gabapentin 600 milliGRAM(s) Oral two times a day  glucagon  Injectable 1 milliGRAM(s) IntraMuscular once  heparin  Infusion.  Unit(s)/Hr (18 mL/Hr) IV Continuous <Continuous>  insulin glargine Injectable (LANTUS) 10 Unit(s) SubCutaneous at bedtime  insulin lispro (ADMELOG) corrective regimen sliding scale   SubCutaneous Before meals and at bedtime  insulin lispro Injectable (ADMELOG) 5 Unit(s) SubCutaneous three times a day before meals  levothyroxine 25 MICROGram(s) Oral daily  lidocaine   4% Patch 1 Patch Transdermal daily  methadone    Tablet 10 milliGRAM(s) Oral three times a day  metolazone 5 milliGRAM(s) Oral <User Schedule>  metoprolol succinate  milliGRAM(s) Oral daily  pantoprazole    Tablet 40 milliGRAM(s) Oral before breakfast  sacubitril 97 mG/valsartan 103 mG 1 Tablet(s) Oral two times a day  traZODone 50 milliGRAM(s) Oral at bedtime    MEDICATIONS  (PRN):  acetaminophen     Tablet .. 650 milliGRAM(s) Oral every 6 hours PRN Mild Pain (1 - 3)  ALBUTerol   0.5% 2.5 milliGRAM(s) Nebulizer every 6 hours PRN Shortness of Breath and/or Wheezing  benzonatate 100 milliGRAM(s) Oral every 8 hours PRN Cough  guaiFENesin Oral Liquid (Sugar-Free) 100 milliGRAM(s) Oral every 6 hours PRN Cough  heparin   Injectable 8000 Unit(s) IV Push every 6 hours PRN For aPTT less than 40  heparin   Injectable 4000 Unit(s) IV Push every 6 hours PRN For aPTT between 40 - 57  methadone    Tablet 5 milliGRAM(s) Oral every 6 hours PRN Moderate Pain (4 - 6)      Allergies    aspirin (Other (Moderate); Rash)  oral contrast (Unknown)    Intolerances        SOCIAL HISTORY:    FAMILY HISTORY:  Family history of hypertension (Mother)    Family history of diabetes mellitus (Mother)        Vital Signs Last 24 Hrs  T(C): 36.7 (18 Dec 2021 18:51), Max: 37.8 (17 Dec 2021 22:09)  T(F): 98.1 (18 Dec 2021 18:51), Max: 100.1 (17 Dec 2021 22:09)  HR: 72 (18 Dec 2021 17:37) (68 - 102)  BP: 141/63 (18 Dec 2021 17:37) (137/92 - 166/72)  BP(mean): --  RR: 19 (18 Dec 2021 17:37) (18 - 20)  SpO2: 95% (18 Dec 2021 17:37) (94% - 97%)    Physical Exam:  General: NAD, resting comfortably  HEENT: NC/AT, EOMI, normal hearing  Pulmonary: normal resp effort  Cardiovascular: NSR  Abdominal: soft, NT/ND, no organomegaly  Extremities: WWP, normal strength, no clubbing/cyanosis. Bilateral legs 1+ non-pitting edema. Healing blisters on dorsal aspect of right foot.   Neuro: A/O x 3, CNs II-XII grossly intact, normal sensation, no focal deficits  Pulses:   Right:      LABS:                        10.4   10.34 )-----------( 195      ( 18 Dec 2021 06:20 )             34.6     12-18    140  |  100  |  74<H>  ----------------------------<  197<H>  5.1   |  29  |  3.92<H>    Ca    9.1      18 Dec 2021 06:20  Mg     2.1     12-18      PTT - ( 18 Dec 2021 17:59 )  PTT:42.8 sec  Urinalysis Basic - ( 18 Dec 2021 10:02 )    Color: Yellow / Appearance: Hazy / S.020 / pH: x  Gluc: x / Ketone: NEGATIVE  / Bili: Negative / Urobili: 0.2 E.U./dL   Blood: x / Protein: 100 mg/dL / Nitrite: POSITIVE   Leuk Esterase: NEGATIVE / RBC: 5-10 /HPF / WBC < 5 /HPF   Sq Epi: x / Non Sq Epi: 0-5 /HPF / Bacteria: None /HPF    ACC: [id number] EXAM: US DPLX UPR EXT VEINS LTD RT    PROCEDURE DATE: 2021        INTERPRETATION: CLINICAL INFORMATION: Palpable mass right upper extremity    COMPARISON: None available.    TECHNIQUE: Duplex sonography of the RIGHT UPPER extremity veins with color and spectral Doppler, with and without compression.    FINDINGS:    The right internal jugular, subclavian, axillary and brachial veins are patent and compressible where applicable. The right basilic and cephalic veins (superficial veins) are patent and without thrombus.    The contralateral left subclavian vein is free of thrombus.    Doppler examination shows normal spontaneous and phasic flow.    Possible mild edema within the patient's area of concern on the right arm.    IMPRESSION:  No evidence of right upper extremity deep venous thrombosis.    --- End of Report ---          ECTOR JOSEPH MD; Resident Radiologist  This document has been electronically signed.  JERALD ROOT MD; Attending Radiologist  This document has been electronically signed. Dec 18 2021 5:57PM          RADIOLOGY & ADDITIONAL STUDIES Vascular Attending:  Mala      HPI:  60 yo obese Female, current smoker, with Contrast/Aspirin Allergy and an extensive PMHx including HTN, NICM, chronic HFrEF (EF 25% s/p AICD for primary prevention battery changed 2020, w/ cardiac Mems device), IDDM c/b neuropathy (on Methadone), hx of recurrent DVTs (on Eliquis), CKD IV, CVA x2 (most recently in  with residual right sided weakness), PAD s/p bypass, COPD (on 3L home O2), DHARMESH on CPAP, Multiple Myeloma (on Ninlaro), hypothyroidism, depression who presented to [facility] with hemoptysis x 4 days   Pt states to having been coughing for 4 days, initially with green sputum, and on day of admission, she cough up keyona blood. She admits to SOB on minimal exertion, stable chronic  LE edema and orthopnea. Denies fever, sick contact, chest pain, N/V  In the ER, vitals with /81, HR 73, R 20, T 98.2, O2 sat 100% on 6L. Labs with H/H 7.7/24.5, Creat 3.73, BNP 30651, EKG unchanged from prior, CXR with prominent cardiac silhouette. Suggestion of pulmonary vascular congestion. Pulmonary consulted for hemoptysis and recommended CT Chest which shows mild pulmonary edema and trace right pleural effusion.   She received Lasix 40mg IV x 1 and admitted for CHF exacerbation.    Vascular Addendum:     61 year old female with PMH of CAD, COPD, CVA, CKD, DM, cardiomyopathy, DVT (on Eliquis), HLD, HTN, hypothyroidism, multiple myeloma, DHARMESH, arthritis, current smoker. PAD s/p L fem-pop bypass ([facility] ) and multiple stents. Last seen in office by Dr. Medeiros on 3/4/2021 for bilateral leg pain. Her most recent arterial duplex US showing a patent LLE bypass and R SFA stenosis.     PAST MEDICAL & SURGICAL HISTORY:  CHF (congestive heart failure)    COPD (chronic obstructive pulmonary disease)    HLD (hyperlipidemia)    Chronic pain    DM (diabetes mellitus)    CVA (cerebral vascular accident)    DVT (deep venous thrombosis)    HTN (hypertension)    Depression    Bipolar depression    PAD (peripheral artery disease)    Neuropathy    Hypothyroidism    Multiple myeloma    CKD (chronic kidney disease), stage IV    DHARMESH on CPAP    AICD (automatic cardioverter/defibrillator) present    H/O abdominal hysterectomy    H/O tubal ligation    History of cholecystectomy    H/O extremity bypass graft        REVIEW OF SYSTEMS      General:	    Skin/Breast:  	  Ophthalmologic:  	  ENMT:	    Respiratory and Thorax:  	  Cardiovascular:	    Gastrointestinal:	    Genitourinary:	    Musculoskeletal:	    Neurological:	    Psychiatric:	    Hematology/Lymphatics:	    Endocrine:	    Allergic/Immunologic:	    MEDICATIONS  (STANDING):  atorvastatin 80 milliGRAM(s) Oral at bedtime  budesonide  80 MICROgram(s)/formoterol 4.5 MICROgram(s) Inhaler 2 Puff(s) Inhalation two times a day  cefTRIAXone   IVPB 1000 milliGRAM(s) IV Intermittent every 24 hours  dextrose 40% Gel 15 Gram(s) Oral once  dextrose 5%. 1000 milliLiter(s) (50 mL/Hr) IV Continuous <Continuous>  dextrose 5%. 1000 milliLiter(s) (100 mL/Hr) IV Continuous <Continuous>  dextrose 50% Injectable 25 Gram(s) IV Push once  dextrose 50% Injectable 12.5 Gram(s) IV Push once  dextrose 50% Injectable 25 Gram(s) IV Push once  fenofibrate Tablet 48 milliGRAM(s) Oral daily  furosemide   Injectable 80 milliGRAM(s) IV Push two times a day  gabapentin 600 milliGRAM(s) Oral two times a day  glucagon  Injectable 1 milliGRAM(s) IntraMuscular once  heparin  Infusion.  Unit(s)/Hr (18 mL/Hr) IV Continuous <Continuous>  insulin glargine Injectable (LANTUS) 10 Unit(s) SubCutaneous at bedtime  insulin lispro (ADMELOG) corrective regimen sliding scale   SubCutaneous Before meals and at bedtime  insulin lispro Injectable (ADMELOG) 5 Unit(s) SubCutaneous three times a day before meals  levothyroxine 25 MICROGram(s) Oral daily  lidocaine   4% Patch 1 Patch Transdermal daily  methadone    Tablet 10 milliGRAM(s) Oral three times a day  metolazone 5 milliGRAM(s) Oral <User Schedule>  metoprolol succinate  milliGRAM(s) Oral daily  pantoprazole    Tablet 40 milliGRAM(s) Oral before breakfast  sacubitril 97 mG/valsartan 103 mG 1 Tablet(s) Oral two times a day  traZODone 50 milliGRAM(s) Oral at bedtime    MEDICATIONS  (PRN):  acetaminophen     Tablet .. 650 milliGRAM(s) Oral every 6 hours PRN Mild Pain (1 - 3)  ALBUTerol   0.5% 2.5 milliGRAM(s) Nebulizer every 6 hours PRN Shortness of Breath and/or Wheezing  benzonatate 100 milliGRAM(s) Oral every 8 hours PRN Cough  guaiFENesin Oral Liquid (Sugar-Free) 100 milliGRAM(s) Oral every 6 hours PRN Cough  heparin   Injectable 8000 Unit(s) IV Push every 6 hours PRN For aPTT less than 40  heparin   Injectable 4000 Unit(s) IV Push every 6 hours PRN For aPTT between 40 - 57  methadone    Tablet 5 milliGRAM(s) Oral every 6 hours PRN Moderate Pain (4 - 6)      Allergies    aspirin (Other (Moderate); Rash)  oral contrast (Unknown)    Intolerances        SOCIAL HISTORY:    FAMILY HISTORY:  Family history of hypertension (Mother)    Family history of diabetes mellitus (Mother)        Vital Signs Last 24 Hrs  T(C): 36.7 (18 Dec 2021 18:51), Max: 37.8 (17 Dec 2021 22:09)  T(F): 98.1 (18 Dec 2021 18:51), Max: 100.1 (17 Dec 2021 22:09)  HR: 72 (18 Dec 2021 17:37) (68 - 102)  BP: 141/63 (18 Dec 2021 17:37) (137/92 - 166/72)  BP(mean): --  RR: 19 (18 Dec 2021 17:37) (18 - 20)  SpO2: 95% (18 Dec 2021 17:37) (94% - 97%)    Physical Exam:  General: NAD, resting comfortably  HEENT: NC/AT, EOMI, normal hearing  Pulmonary: normal resp effort  Cardiovascular: NSR  Abdominal: soft, NT/ND, no organomegaly  Extremities: WWP, normal strength, no clubbing/cyanosis. Bilateral legs 1+ non-pitting edema. Healing blisters on dorsal aspect of right foot. Healed bypass scar on LLE  Neuro: A/O x 3, CNs II-XII grossly intact, normal sensation, no focal deficits  Pulses:   FEM [x ]2+ [ ]+1  POP [ ]2+ [ x+]1   DP [ ]2+ [x ]1+   PT [ ]2+ [ x]1+       Left:  FEM [x ]2+ [ ]1+   POP [ ]2+ [ x]1+  DP [ ]2+ [ x]1+   PT [ ]2+ [x ]1+         LABS:                        10.4   10.34 )-----------( 195      ( 18 Dec 2021 06:20 )             34.6         140  |  100  |  74<H>  ----------------------------<  197<H>  5.1   |  29  |  3.92<H>    Ca    9.1      18 Dec 2021 06:20  Mg     2.1     18      PTT - ( 18 Dec 2021 17:59 )  PTT:42.8 sec  Urinalysis Basic - ( 18 Dec 2021 10:02 )    Color: Yellow / Appearance: Hazy / S.020 / pH: x  Gluc: x / Ketone: NEGATIVE  / Bili: Negative / Urobili: 0.2 E.U./dL   Blood: x / Protein: 100 mg/dL / Nitrite: POSITIVE   Leuk Esterase: NEGATIVE / RBC: 5-10 /HPF / WBC < 5 /HPF   Sq Epi: x / Non Sq Epi: 0-5 /HPF / Bacteria: None /HPF      ACC: [id number] EXAM: US DPLX LWR EXT VEINS LTD RT    PROCEDURE DATE: 2021        INTERPRETATION: CLINICAL INFORMATION: Right greater than left lower extremity swelling    COMPARISON: None available.    TECHNIQUE: Duplex sonography of the RIGHT LOWER extremity veins with color and spectral Doppler, with and without compression.    FINDINGS:  There is partial compressibility of the right common femoral vein due to a partially occlusive thrombus adherent to the sidewall.  There is normal compressibility of the right femoral and popliteal veins. Somewhat limited evaluation of the posterior tibial and peroneal veins due to small caliber.  The contralateral left common femoral vein is patent.  Doppler examination shows normal spontaneous and phasic flow.          IMPRESSION:  Right common femoral partially occlusive deep vein thrombosis.    These findings were communicated to Shana RUTLEDGE at 4:17 PM.    --- End of Report ---          ECTOR JOSEPH MD; Resident Radiologist  This document has been electronically signed.  JERALD ROOT MD; Attending Radiologist  This document has been electronically signed. Dec 18 2021 4:22PM          ECTOR JOSEPH MD; Resident Radiologist  This document has been electronically signed.  JERALD ROOT MD; Attending Radiologist  This document has been electronically signed. Dec 18 2021 5:57PM          RADIOLOGY & ADDITIONAL STUDIES

## 2021-12-18 NOTE — BH CONSULTATION LIAISON ASSESSMENT NOTE - HPI (INCLUDE ILLNESS QUALITY, SEVERITY, DURATION, TIMING, CONTEXT, MODIFYING FACTORS, ASSOCIATED SIGNS AND SYMPTOMS)
60 yo  F, current smoker, domiciled with , with a PMH of obesity, HTN, HLD, DM-II, CKD Stage 4, Hypothyroidism, known NICM with EF 25% (s/p AICD for primary prevention), MM, COPD (3L home O2), recurrent DVTs (on Eliquis), PPh depression, who presented with mild hemoptysis, admitted for management of CHF exacerbation and anemia. Neuro following for management of myoclonic movements. Psychiatry consulted for the same.    Pt was interviewed at bedside today and I also spoke to his  who was visiting. On evaluation, pt notably sedated, no concerns other than pain reproted and no SI/HI/AVH. She was AAO x 3 but with notably difficulty focusing and was not able to provide me with a reliable list of her psychiatric medications.  On discussion with  who was able to access her phone, pt with additional scripts for Paxil in her Wellness kishor, however  reports he is not sure if she has been taking any of her meds most recently and reports that Tracy told him she has been off of psychiatric medications (although on separate interview she stated she has been taking the trazodone). When speaking about the movement sx/twitching,  reported the onset following the admission to the hospital and has never observed the pt to have any motor sx/side effects to medications.    On review of medications (chart and pt's kishor), patient on numerous serotonergic medications including lexapro, buspar, amitriptyline, trazodone prescribed by different providers. Flexeril was reportedly added for spasms as well by pain management team during this admission.

## 2021-12-18 NOTE — PROGRESS NOTE ADULT - PROBLEM SELECTOR PLAN 2
Mild basilar rales on RLL, +edema of BLE, satting 93-95% on 3L NC, Net negative 600cc - not compliant with strict I&Os, Primafit now in place.   - BNP 29K on admit  - CXR 12/14/2021: Suggestive of pulmonary vascular congestion. Repeat CXR 12/16/2021 no significant changes after diuresis.  - c/w Lasix 40mg IV BID, added Metolazone 5mg BID, 30 min prior to Lasix  - c/w Entresto 97mg/103mg BID, Toprol XL 100mg daily  - c/w home O2 3L NC  - Echo 11/29/21: Akinesis of the basal to mid inferoseptum and anteroseptum. Moderate to severe hypokinesis of the remaining regions, mild to moderate MR    #Pulm: Continue Symbicort BID and PRN duonebs q6h  - Continue home CPAP  - Wean O2 for goal SpO2 88-92%  - Outpatient follow up with Dr. Nish Payne in 2-3 weeks after discharge. No new episodes since her admission.  - CT Chest 12/16/21: Mild pulmonary edema and trace right pleural effusion. No PE.  - Pulmonary consulted: hemoptysis is mild, likely due to cough in combination with increased hydrostatic pressure in capillary beds from decompensated heart failure.  - RVP and COVID negative.  - Sputum Cx growing Proteus Mirabilis w/ elevated procalcitonin, no infiltrate on CXR/WBC and afebrile -- started CTX 1G q 24hrs x 5 days (last day 12/22)  - CONT: cough suppressants, Eliquis 2.5mg BID    R/O DVT  - Hx of Recurrent DVT (on Eliquis)  - RUE and BLE duplex (pending) for Pain and palpable mass No new episodes since her admission.  - CT Chest 12/16/21: Mild pulmonary edema and trace right pleural effusion. No PE.  - Pulmonary consulted: hemoptysis is mild, likely due to cough in combination with increased hydrostatic pressure in capillary beds from decompensated heart failure.  - RVP and COVID negative.  - Sputum Cx growing Proteus Mirabilis w/ elevated procalcitonin, no infiltrate on CXR/WBC and afebrile -- started CTX 1G q 24hrs x 5 days (last day 12/22). Of note: will also cover UTI  -F/u urine legionella  - CONT: cough suppressants, Eliquis 2.5mg BID    R/O DVT  - Hx of Recurrent DVT (on Eliquis)  - RUE and BLE duplex (pending) for Pain and palpable mass No new episodes since her admission  - CT Chest 12/16/21: Mild pulmonary edema and trace right pleural effusion. No PE.  - Pulmonary consulted: hemoptysis is mild, likely due to cough in combination with increased hydrostatic pressure in capillary beds from decompensated heart failure.  - RVP and COVID negative  - Sputum Cx growing Proteus Mirabilis w/ elevated procalcitonin, no infiltrate on CXR/WBC and afebrile -- started CTX 1G q 24hrs x 5 days (last day 12/22). Of note: will also cover UTI  - F/u urine legionella  - CONT: cough suppressants, Eliquis 2.5mg BID    R/O DVT  - Hx of Recurrent DVT (on Eliquis)  - RUE and BLE duplex (pending) for pain and palpable mass No new episodes since her admission  - CT Chest 12/16/21: Mild pulmonary edema and trace right pleural effusion. No PE.  - Pulmonary consulted: hemoptysis is mild, likely due to cough in combination with increased hydrostatic pressure in capillary beds from decompensated heart failure.  - RVP, COVID and urine legionalla negative  - Sputum Cx growing Proteus Mirabilis w/ elevated procalcitonin, no infiltrate on CXR/WBC and afebrile -- started CTX 1G q 24hrs x 5 days (last day 12/22). Of note: will also cover UTI  - CONT: cough suppressants, Eliquis 2.5mg BID    #DVT  - Hx of Recurrent DVT (on Eliquis 2.5 BID)  - RUE US pending   - RLE US (+) common femoral vein DVT or unspecified chronicity, (-) for DVT in LLE  - Vascular consulted - f/u recs  - Started on heparin gtt prior to increasing Eliquis dose 2/2 Cr

## 2021-12-18 NOTE — CONSULT NOTE ADULT - ASSESSMENT
62 yo obese F, current smoker, with a PMH of HTN, HLD, DM-II, CKD Stage 4, Hypothyroidism, known NICM with EF 25% (s/p AICD for primary prevention), MM, COPD (3L home O2), recurrent DVTs (on Eliquis) reporting of mild hemoptysis likely due to cough, admitted for management of CHF exacerbation and anemia. Vascular consulted for RLE DVT, DUS showing R CFV partiall occlusive thrombus.     Order repeat bilateral duplex ultrasound   Start heparin gtt  Contact patient's outpatient Heme/Onc ( Dr. Ruiz)    Case discussed with chief resident and attending   Vascular will continue to follow

## 2021-12-19 LAB
ANION GAP SERPL CALC-SCNC: 11 MMOL/L — SIGNIFICANT CHANGE UP (ref 5–17)
APTT BLD: 115.5 SEC — HIGH (ref 27.5–35.5)
APTT BLD: 153.7 SEC — CRITICAL HIGH (ref 27.5–35.5)
APTT BLD: 93.7 SEC — HIGH (ref 27.5–35.5)
APTT BLD: 98.7 SEC — HIGH (ref 27.5–35.5)
BUN SERPL-MCNC: 78 MG/DL — HIGH (ref 7–23)
CALCIUM SERPL-MCNC: 8.9 MG/DL — SIGNIFICANT CHANGE UP (ref 8.4–10.5)
CHLORIDE SERPL-SCNC: 98 MMOL/L — SIGNIFICANT CHANGE UP (ref 96–108)
CO2 SERPL-SCNC: 30 MMOL/L — SIGNIFICANT CHANGE UP (ref 22–31)
CREAT SERPL-MCNC: 4.39 MG/DL — HIGH (ref 0.5–1.3)
GLUCOSE BLDC GLUCOMTR-MCNC: 110 MG/DL — HIGH (ref 70–99)
GLUCOSE BLDC GLUCOMTR-MCNC: 166 MG/DL — HIGH (ref 70–99)
GLUCOSE BLDC GLUCOMTR-MCNC: 194 MG/DL — HIGH (ref 70–99)
GLUCOSE BLDC GLUCOMTR-MCNC: 67 MG/DL — LOW (ref 70–99)
GLUCOSE BLDC GLUCOMTR-MCNC: 70 MG/DL — SIGNIFICANT CHANGE UP (ref 70–99)
GLUCOSE BLDC GLUCOMTR-MCNC: 85 MG/DL — SIGNIFICANT CHANGE UP (ref 70–99)
GLUCOSE SERPL-MCNC: 125 MG/DL — HIGH (ref 70–99)
HCT VFR BLD CALC: 30.9 % — LOW (ref 34.5–45)
HCT VFR BLD CALC: 33.7 % — LOW (ref 34.5–45)
HGB BLD-MCNC: 9.5 G/DL — LOW (ref 11.5–15.5)
HGB BLD-MCNC: 9.9 G/DL — LOW (ref 11.5–15.5)
MAGNESIUM SERPL-MCNC: 2 MG/DL — SIGNIFICANT CHANGE UP (ref 1.6–2.6)
MCHC RBC-ENTMCNC: 28.1 PG — SIGNIFICANT CHANGE UP (ref 27–34)
MCHC RBC-ENTMCNC: 29 PG — SIGNIFICANT CHANGE UP (ref 27–34)
MCHC RBC-ENTMCNC: 29.4 GM/DL — LOW (ref 32–36)
MCHC RBC-ENTMCNC: 30.7 GM/DL — LOW (ref 32–36)
MCV RBC AUTO: 94.2 FL — SIGNIFICANT CHANGE UP (ref 80–100)
MCV RBC AUTO: 95.7 FL — SIGNIFICANT CHANGE UP (ref 80–100)
NRBC # BLD: 0 /100 WBCS — SIGNIFICANT CHANGE UP (ref 0–0)
NRBC # BLD: 0 /100 WBCS — SIGNIFICANT CHANGE UP (ref 0–0)
PHOSPHATE SERPL-MCNC: 6.7 MG/DL — HIGH (ref 2.5–4.5)
PLATELET # BLD AUTO: 179 K/UL — SIGNIFICANT CHANGE UP (ref 150–400)
PLATELET # BLD AUTO: 179 K/UL — SIGNIFICANT CHANGE UP (ref 150–400)
POTASSIUM SERPL-MCNC: 4.4 MMOL/L — SIGNIFICANT CHANGE UP (ref 3.5–5.3)
POTASSIUM SERPL-SCNC: 4.4 MMOL/L — SIGNIFICANT CHANGE UP (ref 3.5–5.3)
RBC # BLD: 3.28 M/UL — LOW (ref 3.8–5.2)
RBC # BLD: 3.52 M/UL — LOW (ref 3.8–5.2)
RBC # FLD: 13.9 % — SIGNIFICANT CHANGE UP (ref 10.3–14.5)
RBC # FLD: 14.2 % — SIGNIFICANT CHANGE UP (ref 10.3–14.5)
SODIUM SERPL-SCNC: 139 MMOL/L — SIGNIFICANT CHANGE UP (ref 135–145)
WBC # BLD: 8.31 K/UL — SIGNIFICANT CHANGE UP (ref 3.8–10.5)
WBC # BLD: 8.55 K/UL — SIGNIFICANT CHANGE UP (ref 3.8–10.5)
WBC # FLD AUTO: 8.31 K/UL — SIGNIFICANT CHANGE UP (ref 3.8–10.5)
WBC # FLD AUTO: 8.55 K/UL — SIGNIFICANT CHANGE UP (ref 3.8–10.5)

## 2021-12-19 PROCEDURE — 99233 SBSQ HOSP IP/OBS HIGH 50: CPT

## 2021-12-19 PROCEDURE — 99497 ADVNCD CARE PLAN 30 MIN: CPT

## 2021-12-19 RX ORDER — METOPROLOL TARTRATE 50 MG
100 TABLET ORAL DAILY
Refills: 0 | Status: DISCONTINUED | OUTPATIENT
Start: 2021-12-19 | End: 2021-12-29

## 2021-12-19 RX ADMIN — METHADONE HYDROCHLORIDE 10 MILLIGRAM(S): 40 TABLET ORAL at 06:08

## 2021-12-19 RX ADMIN — SACUBITRIL AND VALSARTAN 1 TABLET(S): 24; 26 TABLET, FILM COATED ORAL at 22:01

## 2021-12-19 RX ADMIN — METHADONE HYDROCHLORIDE 10 MILLIGRAM(S): 40 TABLET ORAL at 22:02

## 2021-12-19 RX ADMIN — PANTOPRAZOLE SODIUM 40 MILLIGRAM(S): 20 TABLET, DELAYED RELEASE ORAL at 06:08

## 2021-12-19 RX ADMIN — HEPARIN SODIUM 1500 UNIT(S)/HR: 5000 INJECTION INTRAVENOUS; SUBCUTANEOUS at 10:54

## 2021-12-19 RX ADMIN — ATORVASTATIN CALCIUM 80 MILLIGRAM(S): 80 TABLET, FILM COATED ORAL at 22:01

## 2021-12-19 RX ADMIN — HEPARIN SODIUM 0 UNIT(S)/HR: 5000 INJECTION INTRAVENOUS; SUBCUTANEOUS at 09:39

## 2021-12-19 RX ADMIN — Medication 100 MILLIGRAM(S): at 22:01

## 2021-12-19 RX ADMIN — Medication 80 MILLIGRAM(S): at 09:48

## 2021-12-19 RX ADMIN — BUDESONIDE AND FORMOTEROL FUMARATE DIHYDRATE 2 PUFF(S): 160; 4.5 AEROSOL RESPIRATORY (INHALATION) at 22:05

## 2021-12-19 RX ADMIN — Medication 48 MILLIGRAM(S): at 12:18

## 2021-12-19 RX ADMIN — INSULIN GLARGINE 10 UNIT(S): 100 INJECTION, SOLUTION SUBCUTANEOUS at 22:25

## 2021-12-19 RX ADMIN — Medication 100 MILLIGRAM(S): at 15:25

## 2021-12-19 RX ADMIN — Medication 5 UNIT(S): at 08:58

## 2021-12-19 RX ADMIN — HEPARIN SODIUM 1800 UNIT(S)/HR: 5000 INJECTION INTRAVENOUS; SUBCUTANEOUS at 02:25

## 2021-12-19 RX ADMIN — SACUBITRIL AND VALSARTAN 1 TABLET(S): 24; 26 TABLET, FILM COATED ORAL at 09:47

## 2021-12-19 RX ADMIN — Medication 100 MILLIGRAM(S): at 18:33

## 2021-12-19 RX ADMIN — GABAPENTIN 600 MILLIGRAM(S): 400 CAPSULE ORAL at 07:11

## 2021-12-19 RX ADMIN — BUDESONIDE AND FORMOTEROL FUMARATE DIHYDRATE 2 PUFF(S): 160; 4.5 AEROSOL RESPIRATORY (INHALATION) at 09:43

## 2021-12-19 RX ADMIN — CEFTRIAXONE 100 MILLIGRAM(S): 500 INJECTION, POWDER, FOR SOLUTION INTRAMUSCULAR; INTRAVENOUS at 13:48

## 2021-12-19 RX ADMIN — Medication 50 MILLIGRAM(S): at 22:01

## 2021-12-19 RX ADMIN — Medication 5 UNIT(S): at 13:37

## 2021-12-19 RX ADMIN — Medication 2: at 12:49

## 2021-12-19 RX ADMIN — LIDOCAINE 1 PATCH: 4 CREAM TOPICAL at 02:15

## 2021-12-19 RX ADMIN — GABAPENTIN 600 MILLIGRAM(S): 400 CAPSULE ORAL at 17:30

## 2021-12-19 RX ADMIN — Medication 5 UNIT(S): at 17:29

## 2021-12-19 RX ADMIN — Medication 2: at 06:52

## 2021-12-19 RX ADMIN — Medication 80 MILLIGRAM(S): at 18:33

## 2021-12-19 RX ADMIN — HEPARIN SODIUM 1500 UNIT(S)/HR: 5000 INJECTION INTRAVENOUS; SUBCUTANEOUS at 17:27

## 2021-12-19 RX ADMIN — LIDOCAINE 1 PATCH: 4 CREAM TOPICAL at 18:32

## 2021-12-19 RX ADMIN — Medication 25 MICROGRAM(S): at 06:08

## 2021-12-19 RX ADMIN — METHADONE HYDROCHLORIDE 10 MILLIGRAM(S): 40 TABLET ORAL at 14:31

## 2021-12-19 RX ADMIN — LIDOCAINE 1 PATCH: 4 CREAM TOPICAL at 12:18

## 2021-12-19 NOTE — PROGRESS NOTE ADULT - PROBLEM SELECTOR PLAN 1
Unable to access lungs, edema of BLE w/ ACE wraps, satting 99% on 5L NC (3L at home), Net negative 1.2L - not compliant with strict I&Os, Primafit now in place.   - Echo 11/29/21: Akinesis of the basal to mid inferoseptum and anteroseptum. Moderate to severe hypokinesis of the remaining regions, mild to moderate MR  - CONT: Lasix 80mg IV BID, Metolazone 5mg BID (30 min prior to Lasix), Entresto 97mg/103mg BID, Toprol XL 100mg daily    #COPD  - CONT: Symbicort BID and PRN duonebs q6h w/ CPAP  - Wean O2 for goal SpO2 88-92%  - Outpatient follow up with Dr. Nish Payne in 2-3 weeks after discharge. B/l crackles at bases, edema of BLE w/ ACE wraps, satting 99% on home 3L NC, Net negative 1.2L - not compliant with strict I&Os, Primafit now in place.   - Echo 11/29/21: Akinesis of the basal to mid inferoseptum and anteroseptum. Moderate to severe hypokinesis of the remaining regions, mild to moderate MR  - CONT: Lasix 80mg IV BID, Metolazone 5mg BID (30 min prior to Lasix), Entresto 97mg/103mg BID, Toprol XL 100mg daily  -Consult CHF team in AM to evaluate CardioMems device     #COPD  - CONT: Symbicort BID and PRN duonebs q6h w/ CPAP  - Wean O2 for goal SpO2 88-92%  - Outpatient follow up with Dr. Nish Payne in 2-3 weeks after discharge. B/l crackles at bases, edema of BLE w/ ACE wraps, satting 99% on home 3L NC, Net negative 2.4L, Primafit in place   - Echo 11/29/21: Akinesis of the basal to mid inferoseptum and anteroseptum. Moderate to severe hypokinesis of the remaining regions, mild to moderate MR  - CONT: Lasix 80mg IV BID, Metolazone 5mg BID (30 min prior to Lasix), Entresto 97mg/103mg BID, Toprol XL 100mg daily  -Consult CHF team in AM to evaluate CardioMems device     #COPD  - CONT: Symbicort BID and PRN duonebs q6h w/ CPAP  - Wean O2 for goal SpO2 88-92%  - Outpatient follow up with Dr. Nish Payne in 2-3 weeks after discharge.

## 2021-12-19 NOTE — PROGRESS NOTE ADULT - PROBLEM SELECTOR PLAN 3
Endorsing RUE weakness/pain and myoclonic movement, neuro exam other wise unremarkable   -Stroke/Neuro consulted: suspicious 2/2 toxic/metabolic causes, particularly CNS acting medications, low suspicion for seizure activities.   -CTH non-contrast 12/18: negative for acute changes  -Pain management consulted: Methadone 5 mg PO q6h PRN breakthrough pain, c/w Methadone 10 mg PO TID (confirmed by Istop). Tylenol 650 mg PO q6h PRN moderate pain  -Psychiatry Consulted: pt unsure what she truly takes at home, multiple pain medication prescribers. Rec discontinuing Amitriptyline 10mg QD, Buspirone 15mg QD, Escitalopram 20mg QD - low risk of withdrawal. Can consider addition of Flexeril if cleared with pain management.   -CONT: Methadone as above, Gabapentin 600mg BID, Trazodone 50mg QD

## 2021-12-19 NOTE — PROGRESS NOTE ADULT - PROBLEM SELECTOR PLAN 4
s/p 1 unit pRBC on 12/15 w/ appropriate response, now stable at 9.9  - Maintain active T&S (next due 12/21) No new episodes since her admission  - CT Chest 12/16/21: Mild pulmonary edema and trace right pleural effusion. No PE.  - Pulmonary consulted: hemoptysis is mild, likely due to cough in combination with increased hydrostatic pressure in capillary beds from decompensated heart failure.  - RVP, COVID and urine legionalla negative  - Sputum Cx growing Proteus Mirabilis w/ elevated procalcitonin, no infiltrate on CXR/WBC and afebrile -- started CTX 1G q 24hrs x 5 days (last day 12/22). Of note: will also cover UTI  - CONT: cough suppressants    #DVT  - Hx of Recurrent DVT (on Eliquis 2.5 BID)  - RUE US and LLE US negative for DVT  - RLE US (+) common femoral vein DVT or unspecified chronicity  - Vascular consulted - rec repeat b/l LE US (ordered) and contacting Dr. Ruiz for collateral  - CONT: heparin gtt (no increase Eliquis i/s/o worsening renal disease) No new episodes since her admission  - CT Chest 12/16/21: Mild pulmonary edema and trace right pleural effusion. No PE.  - Pulmonary consulted: hemoptysis is mild, likely due to cough in combination with increased hydrostatic pressure in capillary beds from decompensated heart failure.  - RVP, COVID and urine legionella negative  - Sputum Cx growing Proteus Mirabilis w/ elevated procalcitonin, no infiltrate on CXR/WBC and afebrile -- started CTX 1G q 24hrs x 5 days (last day 12/22). Of note: will also cover UTI  - CONT: cough suppressants    #DVT  - Hx of Recurrent DVT (on Eliquis 2.5 BID)  - RUE US and LLE US negative for DVT  - RLE US (+) common femoral vein DVT or unspecified chronicity  - Vascular consulted - rec repeat b/l LE US (ordered) and contacting Dr. Ruiz for collateral  - CONT: heparin gtt (no increase Eliquis i/s/o worsening renal disease)

## 2021-12-19 NOTE — PROGRESS NOTE ADULT - PROBLEM SELECTOR PLAN 5
Followed by Dr. Ruiz, was on Dexamethasone and Ninlaro weekly (Monday)  -Holding Ninlaro due to bullae on right foot, per Dr. Ruiz it is unlikely the cause of the bullae but can be held at this time.  -Right foot bullae x2. No signs of infection. Continue dry dressing with RN s/p 1 unit pRBC on 12/15 w/ appropriate response, now stable at 9.9  - Maintain active T&S (next due 12/21)

## 2021-12-19 NOTE — PROGRESS NOTE ADULT - ASSESSMENT
62 yo obese F, current smoker, with a PMH of HTN, HLD, DM-II, CKD Stage 4 (Cr ~3.9), Hypothyroidism, known NICM with EF 25% (s/p AICD for primary prevention), MM, COPD (3L home O2), recurrent DVTs (on Eliquis) reporting of mild hemoptysis likely due to cough, admitted for management of CHF exacerbation and anemia. IV Lasix dosing increased 2/2 increased oxygen demands. Neruo/psych following for management of myoclonic movements. Hospital course further c/b RLE DVT, vascular consulted and RITCHIE on CKD, renal following.

## 2021-12-19 NOTE — PROGRESS NOTE ADULT - PROBLEM SELECTOR PLAN 7
Hgb A1c: 8.4 (prior admission)  - CONT: Lantus 20 units QD, Lispro 10 units before meals w/ mISS    PT: Home with home PT  DVT:  heparin gtt  Dispo: pending clinical progression  Case d/w Dr. Partida

## 2021-12-19 NOTE — GOALS OF CARE CONVERSATION - ADVANCED CARE PLANNING - CONVERSATION DETAILS
Patient in agreement to all necessary treatment options including intubation and CPR  Would like all necessary treatment options discussed

## 2021-12-19 NOTE — PROGRESS NOTE ADULT - PROBLEM SELECTOR PLAN 6
Cr stable at 3.9 (~4 during prior admission), 4.39 today  -Retroperitoneal US 12/18: Mild bilateral hydronephrosis  -Renal following: f/u recs  -Followed by Dr. Neal, if Cr and volume status worsens, can consider dialysis. Followed by Dr. Ruiz, was on Dexamethasone and Ninlaro weekly (Monday)  -Holding Ninlaro due to bullae on right foot, per Dr. Ruiz it is unlikely the cause of the bullae but can be held at this time.  -Right foot bullae x2. No signs of infection. Continue dry dressing with RN

## 2021-12-19 NOTE — PROGRESS NOTE ADULT - SUBJECTIVE AND OBJECTIVE BOX
Patient is a 61y Female seen and evaluated at bedside. patient was hypotensive overnight with MAPS in the low 60's she states she feels her breathing is a bit better though her Cr is rising and Uop was 1.2L/24 hours despite increasing diuretics;       Meds:    acetaminophen     Tablet .. 650 every 6 hours PRN  ALBUTerol   0.5% 2.5 every 6 hours PRN  atorvastatin 80 at bedtime  benzonatate 100 every 8 hours PRN  budesonide  80 MICROgram(s)/formoterol 4.5 MICROgram(s) Inhaler 2 two times a day  cefTRIAXone   IVPB 1000 every 24 hours  dextrose 40% Gel 15 once  dextrose 5%. 1000 <Continuous>  dextrose 5%. 1000 <Continuous>  dextrose 50% Injectable 25 once  dextrose 50% Injectable 12.5 once  dextrose 50% Injectable 25 once  fenofibrate Tablet 48 daily  furosemide   Injectable 80 two times a day  gabapentin 600 two times a day  glucagon  Injectable 1 once  guaiFENesin Oral Liquid (Sugar-Free) 100 every 6 hours PRN  heparin   Injectable 8000 every 6 hours PRN  heparin   Injectable 4000 every 6 hours PRN  heparin  Infusion.  <Continuous>  insulin glargine Injectable (LANTUS) 10 at bedtime  insulin lispro (ADMELOG) corrective regimen sliding scale  Before meals and at bedtime  insulin lispro Injectable (ADMELOG) 5 three times a day before meals  levothyroxine 25 daily  lidocaine   4% Patch 1 daily  methadone    Tablet 10 three times a day  methadone    Tablet 5 every 6 hours PRN  metolazone 5 <User Schedule>  metoprolol succinate  daily  pantoprazole    Tablet 40 before breakfast  sacubitril 97 mG/valsartan 103 mG 1 two times a day  traZODone 50 at bedtime      T(C): , Max: 36.8 (21 @ 22:35)  T(F): , Max: 98.3 (21 @ 22:35)  HR: 68 (21 @ 13:39)  BP: 102/49 (21 @ 13:39)  BP(mean): --  RR: 18 (21 @ 13:39)  SpO2: 94% (21 @ 13:39)  Wt(kg): --     @ 07:01  -   @ 07:00  --------------------------------------------------------  IN: 86 mL / OUT: 1150 mL / NET: -1064 mL     @ 07:01  -   @ 14:13  --------------------------------------------------------  IN: 460 mL / OUT: 400 mL / NET: 60 mL          Review of Systems:  all other ROS negative      PHYSICAL EXAM:  GENERAL: well-developed, well nourished, alert, no acute distress at present on 3LNC  CHEST/LUNG: crackles appreciated BL  HEART: normal S1S2, RRR  ABDOMEN: Soft, Nontender, +BS,   EXTREMITIES: 1+ pitting edema BL w/ ace wraps in place   NEUROLOGY: AAO x3, no focal neurological deficit        LABS:                        9.9    8.31  )-----------( 179      ( 19 Dec 2021 06:44 )             33.7         139  |  98  |  78<H>  ----------------------------<  125<H>  4.4   |  30  |  4.39<H>    Ca    8.9      19 Dec 2021 06:44  Phos  6.7       Mg     2.0             PTT - ( 19 Dec 2021 08:18 )  PTT:153.7 sec  Urinalysis Basic - ( 18 Dec 2021 10:02 )    Color: Yellow / Appearance: Hazy / S.020 / pH: x  Gluc: x / Ketone: NEGATIVE  / Bili: Negative / Urobili: 0.2 E.U./dL   Blood: x / Protein: 100 mg/dL / Nitrite: POSITIVE   Leuk Esterase: NEGATIVE / RBC: 5-10 /HPF / WBC < 5 /HPF   Sq Epi: x / Non Sq Epi: 0-5 /HPF / Bacteria: None /HPF            RADIOLOGY & ADDITIONAL STUDIES:           Patient is a 61y Female seen and evaluated at bedside. patient was hypotensive overnight with MAPS in the low 60's she states she feels her breathing is a bit better though her Cr is rising and Uop was 1.2L/24 hours despite increasing diuretics;       Meds:    acetaminophen     Tablet .. 650 every 6 hours PRN  ALBUTerol   0.5% 2.5 every 6 hours PRN  atorvastatin 80 at bedtime  benzonatate 100 every 8 hours PRN  budesonide  80 MICROgram(s)/formoterol 4.5 MICROgram(s) Inhaler 2 two times a day  cefTRIAXone   IVPB 1000 every 24 hours  dextrose 40% Gel 15 once  dextrose 5%. 1000 <Continuous>  dextrose 5%. 1000 <Continuous>  dextrose 50% Injectable 25 once  dextrose 50% Injectable 12.5 once  dextrose 50% Injectable 25 once  fenofibrate Tablet 48 daily  furosemide   Injectable 80 two times a day  gabapentin 600 two times a day  glucagon  Injectable 1 once  guaiFENesin Oral Liquid (Sugar-Free) 100 every 6 hours PRN  heparin   Injectable 8000 every 6 hours PRN  heparin   Injectable 4000 every 6 hours PRN  heparin  Infusion.  <Continuous>  insulin glargine Injectable (LANTUS) 10 at bedtime  insulin lispro (ADMELOG) corrective regimen sliding scale  Before meals and at bedtime  insulin lispro Injectable (ADMELOG) 5 three times a day before meals  levothyroxine 25 daily  lidocaine   4% Patch 1 daily  methadone    Tablet 10 three times a day  methadone    Tablet 5 every 6 hours PRN  metolazone 5 <User Schedule>  metoprolol succinate  daily  pantoprazole    Tablet 40 before breakfast  sacubitril 97 mG/valsartan 103 mG 1 two times a day  traZODone 50 at bedtime      T(C): , Max: 36.8 (21 @ 22:35)  T(F): , Max: 98.3 (21 @ 22:35)  HR: 68 (21 @ 13:39)  BP: 102/49 (21 @ 13:39)  BP(mean): --  RR: 18 (21 @ 13:39)  SpO2: 94% (21 @ 13:39)  Wt(kg): --     @ 07:01  -   @ 07:00  --------------------------------------------------------  IN: 86 mL / OUT: 1150 mL / NET: -1064 mL     @ 07:01  -   @ 14:13  --------------------------------------------------------  IN: 460 mL / OUT: 400 mL / NET: 60 mL          Review of Systems:  all other ROS negative      PHYSICAL EXAM:  GENERAL: well-developed, well nourished, alert, no acute distress at present on 3LNC  CHEST/LUNG: crackles appreciated BL  HEART: normal S1S2, RRR  ABDOMEN: Soft, Nontender, +BS,   EXTREMITIES: 1+ pitting edema BL w/ ace wraps in place   NEUROLOGY: AAO x3, no focal neurological deficit        LABS:                        9.9    8.31  )-----------( 179      ( 19 Dec 2021 06:44 )             33.7         139  |  98  |  78<H>  ----------------------------<  125<H>  4.4   |  30  |  4.39<H>    Ca    8.9      19 Dec 2021 06:44  Phos  6.7       Mg     2.0             PTT - ( 19 Dec 2021 08:18 )  PTT:153.7 sec  Urinalysis Basic - ( 18 Dec 2021 10:02 )    Color: Yellow / Appearance: Hazy / S.020 / pH: x  Gluc: x / Ketone: NEGATIVE  / Bili: Negative / Urobili: 0.2 E.U./dL   Blood: x / Protein: 100 mg/dL / Nitrite: POSITIVE   Leuk Esterase: NEGATIVE / RBC: 5-10 /HPF / WBC < 5 /HPF   Sq Epi: x / Non Sq Epi: 0-5 /HPF / Bacteria: None /HPF            RADIOLOGY & ADDITIONAL STUDIES:    Reviewed

## 2021-12-19 NOTE — PROGRESS NOTE ADULT - ASSESSMENT
60 yo obese Female CKD (baseline around 3.7-4) CHF, DM COPD (on 3L home o2) multiple myeloma presented to the hospital with complaints of hemoptysis and shortness of breath x 4 days;     Assessment/Plan:     #CKD Cr at baseline    Uop: 1.2L/shift   c/w 80 IV lasix BID and metolazone  may need inotropic therapy as liekly poor forward flow  medina for strict i's and o's  daily weights  BMP daily   renal diet  fluid and salt restriction     Krystle Billy D.O  PG[employer] 4 nephrology fellow  [phone]   60 yo obese Female CKD (baseline around 3.7-4) CHF, DM COPD (on 3L home o2) multiple myeloma presented to the hospital with complaints of hemoptysis and shortness of breath x 4 days;     Assessment/Plan:     #CKD Cr increasing   Uop: 1.2L/shift   c/w 80 IV lasix BID and metolazone  may need inotropic therapy as likely poor forward flow, awaiting cardiomems interrogation for accurate estimation of volume status  medina for strict i's and o's  daily weights  BMP daily   renal diet  fluid and salt restriction     Krystle Billy D.O  PG[employer] 4 nephrology fellow  [phone]

## 2021-12-19 NOTE — PROGRESS NOTE ADULT - SUBJECTIVE AND OBJECTIVE BOX
Interventional Cardiology PA Adult Progress Note    INCOMPLETE    Subjective Assessment: Patient seen and examined at the bedside today.   	  MEDICATIONS:  furosemide   Injectable 80 milliGRAM(s) IV Push two times a day  metolazone 5 milliGRAM(s) Oral <User Schedule>  metoprolol succinate  milliGRAM(s) Oral daily  sacubitril 97 mG/valsartan 103 mG 1 Tablet(s) Oral two times a day  cefTRIAXone   IVPB 1000 milliGRAM(s) IV Intermittent every 24 hours  ALBUTerol   0.5% 2.5 milliGRAM(s) Nebulizer every 6 hours PRN  benzonatate 100 milliGRAM(s) Oral every 8 hours PRN  budesonide  80 MICROgram(s)/formoterol 4.5 MICROgram(s) Inhaler 2 Puff(s) Inhalation two times a day  guaiFENesin Oral Liquid (Sugar-Free) 100 milliGRAM(s) Oral every 6 hours PRN  acetaminophen     Tablet .. 650 milliGRAM(s) Oral every 6 hours PRN  gabapentin 600 milliGRAM(s) Oral two times a day  methadone    Tablet 10 milliGRAM(s) Oral three times a day  methadone    Tablet 5 milliGRAM(s) Oral every 6 hours PRN  traZODone 50 milliGRAM(s) Oral at bedtime  pantoprazole    Tablet 40 milliGRAM(s) Oral before breakfast  atorvastatin 80 milliGRAM(s) Oral at bedtime  dextrose 40% Gel 15 Gram(s) Oral once  dextrose 50% Injectable 25 Gram(s) IV Push once  dextrose 50% Injectable 12.5 Gram(s) IV Push once  dextrose 50% Injectable 25 Gram(s) IV Push once  fenofibrate Tablet 48 milliGRAM(s) Oral daily  glucagon  Injectable 1 milliGRAM(s) IntraMuscular once  insulin glargine Injectable (LANTUS) 10 Unit(s) SubCutaneous at bedtime  insulin lispro (ADMELOG) corrective regimen sliding scale   SubCutaneous Before meals and at bedtime  insulin lispro Injectable (ADMELOG) 5 Unit(s) SubCutaneous three times a day before meals  levothyroxine 25 MICROGram(s) Oral daily  dextrose 5%. 1000 milliLiter(s) IV Continuous <Continuous>  dextrose 5%. 1000 milliLiter(s) IV Continuous <Continuous>  heparin   Injectable 8000 Unit(s) IV Push every 6 hours PRN  heparin   Injectable 4000 Unit(s) IV Push every 6 hours PRN  heparin  Infusion.  Unit(s)/Hr IV Continuous <Continuous>  lidocaine   4% Patch 1 Patch Transdermal daily	    [PHYSICAL EXAM:  TELEMETRY:  T(C): 36.3 (12-19-21 @ 06:09), Max: 36.8 (12-18-21 @ 13:59)  HR: 67 (12-19-21 @ 07:15) (60 - 80)  BP: 93/47 (12-19-21 @ 07:15) (89/45 - 152/68)  RR: 16 (12-19-21 @ 07:15) (16 - 20)  SpO2: 98% (12-19-21 @ 07:15) (93% - 99%)  I&O's Summary    18 Dec 2021 07:01  -  19 Dec 2021 07:00  --------------------------------------------------------  IN: 86 mL / OUT: 1150 mL / NET: -1064 mL      Appearance: drowsy	  Neck: Supple,- JVD; no Carotid Bruit b/l  Cardiovascular: Normal S1 S2, No murmurs, rubs, gallops  Respiratory: would not allow provider to access	  Gastrointestinal:  Soft, Non-tender, ND, + BS x4	  Extremities: b/l LE in ACE wraps, RLE in wrapping 2/2 blister  Vascular: Peripheral pulses palpable 2+ bilaterally carotids, radial  Neurologic: A & O x 3, Mood & affect appropriate    	    ECG:  	  RADIOLOGY:   DIAGNOSTIC TESTING:  [x] Echocardiogram: < from: TTE Echo Complete w/ Contrast w/ Doppler (11.29.21 @ 15:49) >  CONCLUSIONS:     1. Akinesis of the basal to mid inferoseptum and anteroseptum. Moderate to severe hypokinesis of the remaining regions. The left ventricle cavity size is mildly dilated. Left ventricular systolic function is severely reduced with a calculated ejection fraction of 25-30%.   2. The right ventricle is normal in size. Right ventricular systolic function is mildly reduced. A device lead is noted in the right heart.   3. The left atrium is severely dilated.   4. The mitral valve is mildly thickened. There is mild-to-moderate mitral regurgitation.   5. No pericardial effusion is seen.   6. Compared to the previous TTE performed on 1/5/2019, there have been no significant interval changes.      < end of copied text >    [ ]  Catheterization:  [ ] Stress Test:    [ ] DELLA  OTHER: 	    LABS:	 	  CARDIAC MARKERS:                        9.9    8.31  )-----------( 179      ( 19 Dec 2021 06:44 )             33.7     12-19    139  |  98  |  78<H>  ----------------------------<  125<H>  4.4   |  30  |  4.39<H>    Ca    8.9      19 Dec 2021 06:44  Phos  6.7     12-19  Mg     2.0     12-19           Interventional Cardiology PA Adult Progress Note    Subjective Assessment: Patient seen and examined at the bedside today. States she feels a lot better today compared to yesterday. Denies CP, SOB, palpitaitons. All other ROS negative except those listed in subjective assessment.    	  MEDICATIONS:  furosemide   Injectable 80 milliGRAM(s) IV Push two times a day  metolazone 5 milliGRAM(s) Oral <User Schedule>  metoprolol succinate  milliGRAM(s) Oral daily  sacubitril 97 mG/valsartan 103 mG 1 Tablet(s) Oral two times a day  cefTRIAXone   IVPB 1000 milliGRAM(s) IV Intermittent every 24 hours  ALBUTerol   0.5% 2.5 milliGRAM(s) Nebulizer every 6 hours PRN  benzonatate 100 milliGRAM(s) Oral every 8 hours PRN  budesonide  80 MICROgram(s)/formoterol 4.5 MICROgram(s) Inhaler 2 Puff(s) Inhalation two times a day  guaiFENesin Oral Liquid (Sugar-Free) 100 milliGRAM(s) Oral every 6 hours PRN  acetaminophen     Tablet .. 650 milliGRAM(s) Oral every 6 hours PRN  gabapentin 600 milliGRAM(s) Oral two times a day  methadone    Tablet 10 milliGRAM(s) Oral three times a day  methadone    Tablet 5 milliGRAM(s) Oral every 6 hours PRN  traZODone 50 milliGRAM(s) Oral at bedtime  pantoprazole    Tablet 40 milliGRAM(s) Oral before breakfast  atorvastatin 80 milliGRAM(s) Oral at bedtime  dextrose 40% Gel 15 Gram(s) Oral once  dextrose 50% Injectable 25 Gram(s) IV Push once  dextrose 50% Injectable 12.5 Gram(s) IV Push once  dextrose 50% Injectable 25 Gram(s) IV Push once  fenofibrate Tablet 48 milliGRAM(s) Oral daily  glucagon  Injectable 1 milliGRAM(s) IntraMuscular once  insulin glargine Injectable (LANTUS) 10 Unit(s) SubCutaneous at bedtime  insulin lispro (ADMELOG) corrective regimen sliding scale   SubCutaneous Before meals and at bedtime  insulin lispro Injectable (ADMELOG) 5 Unit(s) SubCutaneous three times a day before meals  levothyroxine 25 MICROGram(s) Oral daily  dextrose 5%. 1000 milliLiter(s) IV Continuous <Continuous>  dextrose 5%. 1000 milliLiter(s) IV Continuous <Continuous>  heparin   Injectable 8000 Unit(s) IV Push every 6 hours PRN  heparin   Injectable 4000 Unit(s) IV Push every 6 hours PRN  heparin  Infusion.  Unit(s)/Hr IV Continuous <Continuous>  lidocaine   4% Patch 1 Patch Transdermal daily	    [PHYSICAL EXAM:  TELEMETRY:  T(C): 36.3 (12-19-21 @ 06:09), Max: 36.8 (12-18-21 @ 13:59)  HR: 67 (12-19-21 @ 07:15) (60 - 80)  BP: 93/47 (12-19-21 @ 07:15) (89/45 - 152/68)  RR: 16 (12-19-21 @ 07:15) (16 - 20)  SpO2: 98% (12-19-21 @ 07:15) (93% - 99%)  I&O's Summary    18 Dec 2021 07:01  -  19 Dec 2021 07:00  --------------------------------------------------------  IN: 86 mL / OUT: 1150 mL / NET: -1064 mL      Appearance: drowsy	  Neck: Supple,- JVD; no Carotid Bruit b/l  Cardiovascular: Normal S1 S2, No murmurs, rubs, gallops  Respiratory: b/l crackles at bases (R>L)	  Gastrointestinal:  Soft, Non-tender, ND, + BS x4	  Extremities: b/l LE in ACE wraps, RLE in wrapping 2/2 blister  Vascular: Peripheral pulses palpable 2+ bilaterally carotids, radial  Neurologic: A & O x 3, Mood & affect appropriate    	    ECG:  	  RADIOLOGY:   DIAGNOSTIC TESTING:  [x] Echocardiogram: < from: TTE Echo Complete w/ Contrast w/ Doppler (11.29.21 @ 15:49) >  CONCLUSIONS:     1. Akinesis of the basal to mid inferoseptum and anteroseptum. Moderate to severe hypokinesis of the remaining regions. The left ventricle cavity size is mildly dilated. Left ventricular systolic function is severely reduced with a calculated ejection fraction of 25-30%.   2. The right ventricle is normal in size. Right ventricular systolic function is mildly reduced. A device lead is noted in the right heart.   3. The left atrium is severely dilated.   4. The mitral valve is mildly thickened. There is mild-to-moderate mitral regurgitation.   5. No pericardial effusion is seen.   6. Compared to the previous TTE performed on 1/5/2019, there have been no significant interval changes.      < end of copied text >    [ ]  Catheterization:  [ ] Stress Test:    [ ] DELLA  OTHER: 	    LABS:	 	  CARDIAC MARKERS:                        9.9    8.31  )-----------( 179      ( 19 Dec 2021 06:44 )             33.7     12-19    139  |  98  |  78<H>  ----------------------------<  125<H>  4.4   |  30  |  4.39<H>    Ca    8.9      19 Dec 2021 06:44  Phos  6.7     12-19  Mg     2.0     12-19           Interventional Cardiology PA Adult Progress Note    Subjective Assessment: Patient seen and examined at the bedside today. States she feels a lot better today compared to yesterday. Denies CP, SOB, palpitaitons. All other ROS negative except those listed in subjective assessment.    	  MEDICATIONS:  furosemide   Injectable 80 milliGRAM(s) IV Push two times a day  metolazone 5 milliGRAM(s) Oral <User Schedule>  metoprolol succinate  milliGRAM(s) Oral daily  sacubitril 97 mG/valsartan 103 mG 1 Tablet(s) Oral two times a day  cefTRIAXone   IVPB 1000 milliGRAM(s) IV Intermittent every 24 hours  ALBUTerol   0.5% 2.5 milliGRAM(s) Nebulizer every 6 hours PRN  benzonatate 100 milliGRAM(s) Oral every 8 hours PRN  budesonide  80 MICROgram(s)/formoterol 4.5 MICROgram(s) Inhaler 2 Puff(s) Inhalation two times a day  guaiFENesin Oral Liquid (Sugar-Free) 100 milliGRAM(s) Oral every 6 hours PRN  acetaminophen     Tablet .. 650 milliGRAM(s) Oral every 6 hours PRN  gabapentin 600 milliGRAM(s) Oral two times a day  methadone    Tablet 10 milliGRAM(s) Oral three times a day  methadone    Tablet 5 milliGRAM(s) Oral every 6 hours PRN  traZODone 50 milliGRAM(s) Oral at bedtime  pantoprazole    Tablet 40 milliGRAM(s) Oral before breakfast  atorvastatin 80 milliGRAM(s) Oral at bedtime  dextrose 40% Gel 15 Gram(s) Oral once  dextrose 50% Injectable 25 Gram(s) IV Push once  dextrose 50% Injectable 12.5 Gram(s) IV Push once  dextrose 50% Injectable 25 Gram(s) IV Push once  fenofibrate Tablet 48 milliGRAM(s) Oral daily  glucagon  Injectable 1 milliGRAM(s) IntraMuscular once  insulin glargine Injectable (LANTUS) 10 Unit(s) SubCutaneous at bedtime  insulin lispro (ADMELOG) corrective regimen sliding scale   SubCutaneous Before meals and at bedtime  insulin lispro Injectable (ADMELOG) 5 Unit(s) SubCutaneous three times a day before meals  levothyroxine 25 MICROGram(s) Oral daily  dextrose 5%. 1000 milliLiter(s) IV Continuous <Continuous>  dextrose 5%. 1000 milliLiter(s) IV Continuous <Continuous>  heparin   Injectable 8000 Unit(s) IV Push every 6 hours PRN  heparin   Injectable 4000 Unit(s) IV Push every 6 hours PRN  heparin  Infusion.  Unit(s)/Hr IV Continuous <Continuous>  lidocaine   4% Patch 1 Patch Transdermal daily	    [PHYSICAL EXAM:  TELEMETRY:  T(C): 36.3 (12-19-21 @ 06:09), Max: 36.8 (12-18-21 @ 13:59)  HR: 67 (12-19-21 @ 07:15) (60 - 80)  BP: 93/47 (12-19-21 @ 07:15) (89/45 - 152/68)  RR: 16 (12-19-21 @ 07:15) (16 - 20)  SpO2: 98% (12-19-21 @ 07:15) (93% - 99%)  I&O's Summary    18 Dec 2021 07:01  -  19 Dec 2021 07:00  --------------------------------------------------------  IN: 86 mL / OUT: 1150 mL / NET: -1064 mL      Appearance: normal	  Neck: Supple,- JVD; no Carotid Bruit b/l  Cardiovascular: Normal S1 S2, No murmurs, rubs, gallops  Respiratory: b/l crackles at bases (R>L)	  Gastrointestinal:  Soft, Non-tender, ND, + BS x4	  Extremities: b/l LE in ACE wraps, RLE in wrapping 2/2 blister  Vascular: Peripheral pulses palpable 2+ bilaterally carotids, radial  Neurologic: A & O x 3, Mood & affect appropriate    	    ECG:  	  RADIOLOGY:   DIAGNOSTIC TESTING:  [x] Echocardiogram: < from: TTE Echo Complete w/ Contrast w/ Doppler (11.29.21 @ 15:49) >  CONCLUSIONS:     1. Akinesis of the basal to mid inferoseptum and anteroseptum. Moderate to severe hypokinesis of the remaining regions. The left ventricle cavity size is mildly dilated. Left ventricular systolic function is severely reduced with a calculated ejection fraction of 25-30%.   2. The right ventricle is normal in size. Right ventricular systolic function is mildly reduced. A device lead is noted in the right heart.   3. The left atrium is severely dilated.   4. The mitral valve is mildly thickened. There is mild-to-moderate mitral regurgitation.   5. No pericardial effusion is seen.   6. Compared to the previous TTE performed on 1/5/2019, there have been no significant interval changes.      < end of copied text >    [ ]  Catheterization:  [ ] Stress Test:    [ ] DELLA  OTHER: 	    LABS:	 	  CARDIAC MARKERS:                        9.9    8.31  )-----------( 179      ( 19 Dec 2021 06:44 )             33.7     12-19    139  |  98  |  78<H>  ----------------------------<  125<H>  4.4   |  30  |  4.39<H>    Ca    8.9      19 Dec 2021 06:44  Phos  6.7     12-19  Mg     2.0     12-19

## 2021-12-19 NOTE — PROGRESS NOTE ADULT - ATTENDING COMMENTS
See the Fellow's note written above, for details. I reviewed the fellow documentation.  I have personally seen and examined this patient today. I have reviewed vitals, labs, medications, and imaging. I agree with the fellow's findings and plans as written above with the following additions/amendments:    Patient seen and examined at bedside. Discussed that UOP has not improved with increased diuretic and may need inotropy, patient still ill. sCr worsening despite same amount of measured UOP. Discussed with primary team, await cardiomems interrogation for accurate volume status assessment prior to inotropy

## 2021-12-19 NOTE — PROGRESS NOTE ADULT - PROBLEM SELECTOR PLAN 2
No new episodes since her admission  - CT Chest 12/16/21: Mild pulmonary edema and trace right pleural effusion. No PE.  - Pulmonary consulted: hemoptysis is mild, likely due to cough in combination with increased hydrostatic pressure in capillary beds from decompensated heart failure.  - RVP, COVID and urine legionalla negative  - Sputum Cx growing Proteus Mirabilis w/ elevated procalcitonin, no infiltrate on CXR/WBC and afebrile -- started CTX 1G q 24hrs x 5 days (last day 12/22). Of note: will also cover UTI  - CONT: cough suppressants    #DVT  - Hx of Recurrent DVT (on Eliquis 2.5 BID)  - RUE US and LLE US negative for DVT  - RLE US (+) common femoral vein DVT or unspecified chronicity  - Vascular consulted - rec repeat b/l LE US (ordered) and contacting Dr. Ruiz for collateral  - CONT: heparin gtt (no increase Eliquis i/s/o worsening renal disease) Cr previously stable at 3.9 (~4 during prior admission), 4.39 today  -Retroperitoneal US 12/18: Mild bilateral hydronephrosis  -Renal following: f/u recs  -Followed by Dr. Neal, if Cr and volume status worsens, can consider dialysis. Cr previously stable at 3.9 (~4 during prior admission), 4.39 today  -Retroperitoneal US 12/18: Mild bilateral hydronephrosis  -Renal following: rec continuing IV diuretics  -Followed by Dr. Neal, if Cr and volume status worsens, can consider dialysis.

## 2021-12-20 LAB
ALBUMIN SERPL ELPH-MCNC: 3.5 G/DL — SIGNIFICANT CHANGE UP (ref 3.3–5)
ALP SERPL-CCNC: 81 U/L — SIGNIFICANT CHANGE UP (ref 40–120)
ALT FLD-CCNC: 8 U/L — LOW (ref 10–45)
ANION GAP SERPL CALC-SCNC: 11 MMOL/L — SIGNIFICANT CHANGE UP (ref 5–17)
APTT BLD: 84.5 SEC — HIGH (ref 27.5–35.5)
APTT BLD: 97.6 SEC — HIGH (ref 27.5–35.5)
AST SERPL-CCNC: 12 U/L — SIGNIFICANT CHANGE UP (ref 10–40)
BILIRUB DIRECT SERPL-MCNC: 0.2 MG/DL — SIGNIFICANT CHANGE UP (ref 0–0.3)
BILIRUB INDIRECT FLD-MCNC: 0.1 MG/DL — LOW (ref 0.2–1)
BILIRUB SERPL-MCNC: 0.3 MG/DL — SIGNIFICANT CHANGE UP (ref 0.2–1.2)
BUN SERPL-MCNC: 79 MG/DL — HIGH (ref 7–23)
CALCIUM SERPL-MCNC: 8.8 MG/DL — SIGNIFICANT CHANGE UP (ref 8.4–10.5)
CHLORIDE SERPL-SCNC: 96 MMOL/L — SIGNIFICANT CHANGE UP (ref 96–108)
CO2 SERPL-SCNC: 31 MMOL/L — SIGNIFICANT CHANGE UP (ref 22–31)
CREAT SERPL-MCNC: 4.81 MG/DL — HIGH (ref 0.5–1.3)
GLUCOSE BLDC GLUCOMTR-MCNC: 123 MG/DL — HIGH (ref 70–99)
GLUCOSE BLDC GLUCOMTR-MCNC: 155 MG/DL — HIGH (ref 70–99)
GLUCOSE BLDC GLUCOMTR-MCNC: 159 MG/DL — HIGH (ref 70–99)
GLUCOSE BLDC GLUCOMTR-MCNC: 73 MG/DL — SIGNIFICANT CHANGE UP (ref 70–99)
GLUCOSE SERPL-MCNC: 148 MG/DL — HIGH (ref 70–99)
HAV IGM SER-ACNC: SIGNIFICANT CHANGE UP
HBV CORE AB SER-ACNC: SIGNIFICANT CHANGE UP
HBV CORE IGM SER-ACNC: SIGNIFICANT CHANGE UP
HBV SURFACE AB SER-ACNC: SIGNIFICANT CHANGE UP
HBV SURFACE AG SER-ACNC: SIGNIFICANT CHANGE UP
HCT VFR BLD CALC: 31.6 % — LOW (ref 34.5–45)
HCV AB S/CO SERPL IA: 0.04 S/CO — SIGNIFICANT CHANGE UP
HCV AB SERPL-IMP: SIGNIFICANT CHANGE UP
HGB BLD-MCNC: 9.8 G/DL — LOW (ref 11.5–15.5)
MAGNESIUM SERPL-MCNC: 2 MG/DL — SIGNIFICANT CHANGE UP (ref 1.6–2.6)
MCHC RBC-ENTMCNC: 29.5 PG — SIGNIFICANT CHANGE UP (ref 27–34)
MCHC RBC-ENTMCNC: 31 GM/DL — LOW (ref 32–36)
MCV RBC AUTO: 95.2 FL — SIGNIFICANT CHANGE UP (ref 80–100)
NRBC # BLD: 0 /100 WBCS — SIGNIFICANT CHANGE UP (ref 0–0)
NT-PROBNP SERPL-SCNC: 4262 PG/ML — HIGH (ref 0–300)
PHOSPHATE SERPL-MCNC: 7.1 MG/DL — HIGH (ref 2.5–4.5)
PLATELET # BLD AUTO: 166 K/UL — SIGNIFICANT CHANGE UP (ref 150–400)
POTASSIUM SERPL-MCNC: 4.7 MMOL/L — SIGNIFICANT CHANGE UP (ref 3.5–5.3)
POTASSIUM SERPL-SCNC: 4.7 MMOL/L — SIGNIFICANT CHANGE UP (ref 3.5–5.3)
PROT SERPL-MCNC: 7.7 G/DL — SIGNIFICANT CHANGE UP (ref 6–8.3)
RBC # BLD: 3.32 M/UL — LOW (ref 3.8–5.2)
RBC # FLD: 14.1 % — SIGNIFICANT CHANGE UP (ref 10.3–14.5)
SODIUM SERPL-SCNC: 138 MMOL/L — SIGNIFICANT CHANGE UP (ref 135–145)
WBC # BLD: 8.29 K/UL — SIGNIFICANT CHANGE UP (ref 3.8–10.5)
WBC # FLD AUTO: 8.29 K/UL — SIGNIFICANT CHANGE UP (ref 3.8–10.5)

## 2021-12-20 PROCEDURE — 99233 SBSQ HOSP IP/OBS HIGH 50: CPT

## 2021-12-20 PROCEDURE — 93970 EXTREMITY STUDY: CPT | Mod: 26

## 2021-12-20 PROCEDURE — 99232 SBSQ HOSP IP/OBS MODERATE 35: CPT

## 2021-12-20 PROCEDURE — 71045 X-RAY EXAM CHEST 1 VIEW: CPT | Mod: 26

## 2021-12-20 PROCEDURE — 99232 SBSQ HOSP IP/OBS MODERATE 35: CPT | Mod: GC

## 2021-12-20 RX ORDER — APIXABAN 2.5 MG/1
2.5 TABLET, FILM COATED ORAL EVERY 12 HOURS
Refills: 0 | Status: DISCONTINUED | OUTPATIENT
Start: 2021-12-20 | End: 2021-12-22

## 2021-12-20 RX ORDER — SIMETHICONE 80 MG/1
80 TABLET, CHEWABLE ORAL ONCE
Refills: 0 | Status: COMPLETED | OUTPATIENT
Start: 2021-12-20 | End: 2021-12-20

## 2021-12-20 RX ORDER — LANOLIN ALCOHOL/MO/W.PET/CERES
3 CREAM (GRAM) TOPICAL AT BEDTIME
Refills: 0 | Status: DISCONTINUED | OUTPATIENT
Start: 2021-12-20 | End: 2021-12-29

## 2021-12-20 RX ADMIN — HEPARIN SODIUM 1300 UNIT(S)/HR: 5000 INJECTION INTRAVENOUS; SUBCUTANEOUS at 00:01

## 2021-12-20 RX ADMIN — Medication 48 MILLIGRAM(S): at 12:40

## 2021-12-20 RX ADMIN — HEPARIN SODIUM 1300 UNIT(S)/HR: 5000 INJECTION INTRAVENOUS; SUBCUTANEOUS at 07:22

## 2021-12-20 RX ADMIN — METHADONE HYDROCHLORIDE 10 MILLIGRAM(S): 40 TABLET ORAL at 06:38

## 2021-12-20 RX ADMIN — APIXABAN 2.5 MILLIGRAM(S): 2.5 TABLET, FILM COATED ORAL at 17:32

## 2021-12-20 RX ADMIN — LIDOCAINE 1 PATCH: 4 CREAM TOPICAL at 19:43

## 2021-12-20 RX ADMIN — METHADONE HYDROCHLORIDE 10 MILLIGRAM(S): 40 TABLET ORAL at 22:51

## 2021-12-20 RX ADMIN — SIMETHICONE 80 MILLIGRAM(S): 80 TABLET, CHEWABLE ORAL at 11:42

## 2021-12-20 RX ADMIN — PANTOPRAZOLE SODIUM 40 MILLIGRAM(S): 20 TABLET, DELAYED RELEASE ORAL at 06:38

## 2021-12-20 RX ADMIN — Medication 5 UNIT(S): at 12:43

## 2021-12-20 RX ADMIN — SACUBITRIL AND VALSARTAN 1 TABLET(S): 24; 26 TABLET, FILM COATED ORAL at 22:50

## 2021-12-20 RX ADMIN — Medication 25 MICROGRAM(S): at 06:38

## 2021-12-20 RX ADMIN — METHADONE HYDROCHLORIDE 10 MILLIGRAM(S): 40 TABLET ORAL at 14:55

## 2021-12-20 RX ADMIN — GABAPENTIN 600 MILLIGRAM(S): 400 CAPSULE ORAL at 17:32

## 2021-12-20 RX ADMIN — BUDESONIDE AND FORMOTEROL FUMARATE DIHYDRATE 2 PUFF(S): 160; 4.5 AEROSOL RESPIRATORY (INHALATION) at 21:42

## 2021-12-20 RX ADMIN — Medication 2: at 12:43

## 2021-12-20 RX ADMIN — SACUBITRIL AND VALSARTAN 1 TABLET(S): 24; 26 TABLET, FILM COATED ORAL at 12:40

## 2021-12-20 RX ADMIN — HEPARIN SODIUM 1300 UNIT(S)/HR: 5000 INJECTION INTRAVENOUS; SUBCUTANEOUS at 14:02

## 2021-12-20 RX ADMIN — Medication 80 MILLIGRAM(S): at 06:38

## 2021-12-20 RX ADMIN — LIDOCAINE 1 PATCH: 4 CREAM TOPICAL at 00:04

## 2021-12-20 RX ADMIN — Medication 2: at 06:55

## 2021-12-20 RX ADMIN — Medication 5 UNIT(S): at 17:48

## 2021-12-20 RX ADMIN — INSULIN GLARGINE 10 UNIT(S): 100 INJECTION, SOLUTION SUBCUTANEOUS at 22:51

## 2021-12-20 RX ADMIN — LIDOCAINE 1 PATCH: 4 CREAM TOPICAL at 15:01

## 2021-12-20 RX ADMIN — GABAPENTIN 600 MILLIGRAM(S): 400 CAPSULE ORAL at 06:38

## 2021-12-20 RX ADMIN — CEFTRIAXONE 100 MILLIGRAM(S): 500 INJECTION, POWDER, FOR SOLUTION INTRAMUSCULAR; INTRAVENOUS at 14:54

## 2021-12-20 RX ADMIN — Medication 100 MILLIGRAM(S): at 06:38

## 2021-12-20 RX ADMIN — ATORVASTATIN CALCIUM 80 MILLIGRAM(S): 80 TABLET, FILM COATED ORAL at 22:51

## 2021-12-20 NOTE — PROGRESS NOTE ADULT - SUBJECTIVE AND OBJECTIVE BOX
Subjective: Patient visited bedside by the vascular surgery team. Complaining of bilateral LE pain (R>L) and right leg swelling since past Friday. Also has crampy abdominal pain that started after eating. Denies dizziness, palpitation, SOB, or CP.     Social   cefTRIAXone   IVPB 1000  cefTRIAXone   IVPB 1000  heparin   Injectable 8000 PRN  heparin   Injectable 4000 PRN  heparin  Infusion.   metoprolol succinate   sacubitril 97 mG/valsartan 103 mG 1      Allergies    aspirin (Other (Moderate); Rash)  oral contrast (Unknown)    Intolerances        Vital Signs Last 24 Hrs  T(C): 36.1 (20 Dec 2021 09:44), Max: 36.5 (19 Dec 2021 19:08)  T(F): 97 (20 Dec 2021 09:44), Max: 97.7 (19 Dec 2021 19:08)  HR: 70 (20 Dec 2021 09:43) (61 - 737)  BP: 104/51 (20 Dec 2021 08:57) (101/54 - 142/60)  BP(mean): --  RR: 18 (20 Dec 2021 09:43) (17 - 19)  SpO2: 99% (20 Dec 2021 09:43) (92% - 100%)  I&O's Summary    19 Dec 2021 07:01  -  20 Dec 2021 07:00  --------------------------------------------------------  IN: 986 mL / OUT: 1351 mL / NET: -365 mL    20 Dec 2021 07:01  -  20 Dec 2021 11:59  --------------------------------------------------------  IN: 180 mL / OUT: 0 mL / NET: 180 mL        Physical Exam:  General: NAD, resting in bed complaining of abdominal cramps  Pulmonary: normal resp effort  Cardiovascular: NSR  Abdominal: soft, NT/ND, no organomegaly  Extremities: WWP, normal strength, no clubbing/cyanosis. Bilateral legs 1+ non-pitting edema. Healing blisters on dorsal aspect of right foot. Healed bypass scar on LLE. Tenderness on palpation of right calf.  Neuro: A/O x 3, CNs II-XII grossly intact, normal sensation, no focal deficits  Pulses:   FEM [x ]2+ [ ]+1  POP [ ]2+ [ x+]1   DP [ ]2+ [x ]1+   PT [ ]2+ [ x]1+       Left:  FEM [x ]2+ [ ]1+   POP [ ]2+ [ x]1+  DP [ ]2+ [ x]1+   PT [ ]2+ [x ]1+       LABS:                        9.8    8.29  )-----------( 166      ( 20 Dec 2021 06:37 )             31.6     12-20    138  |  96  |  79<H>  ----------------------------<  148<H>  4.7   |  31  |  4.81<H>    Ca    8.8      20 Dec 2021 06:37  Phos  7.1     12-20  Mg     2.0     12-20      PTT - ( 20 Dec 2021 06:37 )  PTT:97.6 sec    Radiology and Additional Studies:

## 2021-12-20 NOTE — BH CONSULTATION LIAISON PROGRESS NOTE - NSBHASSESSMENTFT_PSY_ALL_CORE
Patient is a 62 yo  F, current smoker, domiciled with , with a PMH of obesity, HTN, HLD, DM-II, CKD Stage 4, Hypothyroidism, known NICM with EF 25% (s/p AICD for primary prevention), MM, COPD (3L home O2), recurrent DVTs (on Eliquis), PPH depression, who presented with mild hemoptysis, admitted for management of CHF exacerbation and anemia. Psychiatry consulted for recommendations after the patient started having myoclonic movements. At the time, patient was on several serotonergic agents which could have caused her symptoms (r/o serotonin syndrome). She presents now AAOx3, without any fluctuations in cognition, with improvement in neurologic symptoms (although pt still reporting earlier today).   Plan:  -continue to hold paxil, buspar (to be restarted as outpatient; please notify outpatient provider)  -hold trazodone 50mg po qhs ; start melatonin 3mg po qhs for insomnia  -CL to follow

## 2021-12-20 NOTE — PROGRESS NOTE ADULT - SUBJECTIVE AND OBJECTIVE BOX
Interventional Cardiology PA Adult Progress Note    INCOMPLETE  Subjective Assessment: Patient seen and examined at the bedside.   	  MEDICATIONS:  furosemide   Injectable 80 milliGRAM(s) IV Push two times a day  metolazone 5 milliGRAM(s) Oral <User Schedule>  metoprolol succinate  milliGRAM(s) Oral daily  sacubitril 97 mG/valsartan 103 mG 1 Tablet(s) Oral two times a day  cefTRIAXone   IVPB 1000 milliGRAM(s) IV Intermittent every 24 hours  ALBUTerol   0.5% 2.5 milliGRAM(s) Nebulizer every 6 hours PRN  benzonatate 100 milliGRAM(s) Oral every 8 hours PRN  budesonide  80 MICROgram(s)/formoterol 4.5 MICROgram(s) Inhaler 2 Puff(s) Inhalation two times a day  guaiFENesin Oral Liquid (Sugar-Free) 100 milliGRAM(s) Oral every 6 hours PRN  acetaminophen     Tablet .. 650 milliGRAM(s) Oral every 6 hours PRN  gabapentin 600 milliGRAM(s) Oral two times a day  methadone    Tablet 10 milliGRAM(s) Oral three times a day  methadone    Tablet 5 milliGRAM(s) Oral every 6 hours PRN  traZODone 50 milliGRAM(s) Oral at bedtime  pantoprazole    Tablet 40 milliGRAM(s) Oral before breakfast  atorvastatin 80 milliGRAM(s) Oral at bedtime  dextrose 40% Gel 15 Gram(s) Oral once  dextrose 50% Injectable 25 Gram(s) IV Push once  dextrose 50% Injectable 12.5 Gram(s) IV Push once  dextrose 50% Injectable 25 Gram(s) IV Push once  fenofibrate Tablet 48 milliGRAM(s) Oral daily  glucagon  Injectable 1 milliGRAM(s) IntraMuscular once  insulin glargine Injectable (LANTUS) 10 Unit(s) SubCutaneous at bedtime  insulin lispro (ADMELOG) corrective regimen sliding scale   SubCutaneous Before meals and at bedtime  insulin lispro Injectable (ADMELOG) 5 Unit(s) SubCutaneous three times a day before meals  levothyroxine 25 MICROGram(s) Oral daily  dextrose 5%. 1000 milliLiter(s) IV Continuous <Continuous>  dextrose 5%. 1000 milliLiter(s) IV Continuous <Continuous>  heparin   Injectable 8000 Unit(s) IV Push every 6 hours PRN  heparin   Injectable 4000 Unit(s) IV Push every 6 hours PRN  heparin  Infusion.  Unit(s)/Hr IV Continuous <Continuous>  lidocaine   4% Patch 1 Patch Transdermal daily    [PHYSICAL EXAM:  TELEMETRY:  T(C): 36.3 (12-20-21 @ 06:09), Max: 36.5 (12-19-21 @ 19:08)  HR: 67 (12-20-21 @ 05:48) (67 - 737)  BP: 142/60 (12-20-21 @ 05:00) (94/48 - 142/60)  RR: 18 (12-20-21 @ 05:48) (17 - 19)  SpO2: 100% (12-20-21 @ 05:48) (92% - 100%)  I&O's Summary    19 Dec 2021 07:01  -  20 Dec 2021 07:00  --------------------------------------------------------  IN: 986 mL / OUT: 1351 mL / NET: -365 mL                                      Appearance: normal	  Neck: Supple,- JVD; no Carotid Bruit b/l  Cardiovascular: Normal S1 S2, No murmurs, rubs, gallops  Respiratory: b/l crackles at bases (R>L)	  Gastrointestinal:  Soft, Non-tender, ND, + BS x4	  Extremities: b/l LE in ACE wraps, RLE in wrapping 2/2 blister  Vascular: Peripheral pulses palpable 2+ bilaterally carotids, radial  Neurologic: A & O x 3, Mood & affect appropriate  	    ECG:  	  RADIOLOGY:   DIAGNOSTIC TESTING:  [ x] Echocardiogram: < from: TTE Echo Complete w/ Contrast w/ Doppler (11.29.21 @ 15:49) >  CONCLUSIONS:     1. Akinesis of the basal to mid inferoseptum and anteroseptum. Moderate to severe hypokinesis of the remaining regions. The left ventricle cavity size is mildly dilated. Left ventricular systolic function is severely reduced with a calculated ejection fraction of 25-30%.   2. The right ventricle is normal in size. Right ventricular systolic function is mildly reduced. A device lead is noted in the right heart.   3. The left atrium is severely dilated.   4. The mitral valve is mildly thickened. There is mild-to-moderate mitral regurgitation.   5. No pericardial effusion is seen.   6. Compared to the previous TTE performed on 1/5/2019, there have been no significant interval changes.    < end of copied text >    [ ]  Catheterization:  [ ] Stress Test:    [ ] DELLA  OTHER: 	    LABS:	 	  CARDIAC MARKERS:                        9.8    8.29  )-----------( 166      ( 20 Dec 2021 06:37 )             31.6     12-20    138  |  96  |  79<H>  ----------------------------<  148<H>  4.7   |  31  |  4.81<H>    Ca    8.8      20 Dec 2021 06:37  Phos  7.1     12-20  Mg     2.0     12-20         Interventional Cardiology PA Adult Progress Note    INCOMPLETE  Subjective Assessment: Patient seen and examined at the bedside. Very sleepy this AM, did not sleep well last night. Later endorsing RLQ abdominal pain similar to 2 days prior. Given Simethicone x1. Denies CP, SOB, palpitations All other ROS negative except those listed in subjective assessment.  	  MEDICATIONS:  metoprolol succinate  milliGRAM(s) Oral daily  sacubitril 97 mG/valsartan 103 mG 1 Tablet(s) Oral two times a day  cefTRIAXone   IVPB 1000 milliGRAM(s) IV Intermittent every 24 hours  ALBUTerol   0.5% 2.5 milliGRAM(s) Nebulizer every 6 hours PRN  benzonatate 100 milliGRAM(s) Oral every 8 hours PRN  budesonide  80 MICROgram(s)/formoterol 4.5 MICROgram(s) Inhaler 2 Puff(s) Inhalation two times a day  guaiFENesin Oral Liquid (Sugar-Free) 100 milliGRAM(s) Oral every 6 hours PRN  acetaminophen     Tablet .. 650 milliGRAM(s) Oral every 6 hours PRN  gabapentin 600 milliGRAM(s) Oral two times a day  methadone    Tablet 10 milliGRAM(s) Oral three times a day  methadone    Tablet 5 milliGRAM(s) Oral every 6 hours PRN  traZODone 50 milliGRAM(s) Oral at bedtime  pantoprazole    Tablet 40 milliGRAM(s) Oral before breakfast  atorvastatin 80 milliGRAM(s) Oral at bedtime  dextrose 40% Gel 15 Gram(s) Oral once  dextrose 50% Injectable 25 Gram(s) IV Push once  dextrose 50% Injectable 12.5 Gram(s) IV Push once  dextrose 50% Injectable 25 Gram(s) IV Push once  fenofibrate Tablet 48 milliGRAM(s) Oral daily  glucagon  Injectable 1 milliGRAM(s) IntraMuscular once  insulin glargine Injectable (LANTUS) 10 Unit(s) SubCutaneous at bedtime  insulin lispro (ADMELOG) corrective regimen sliding scale   SubCutaneous Before meals and at bedtime  insulin lispro Injectable (ADMELOG) 5 Unit(s) SubCutaneous three times a day before meals  levothyroxine 25 MICROGram(s) Oral daily  dextrose 5%. 1000 milliLiter(s) IV Continuous <Continuous>  dextrose 5%. 1000 milliLiter(s) IV Continuous <Continuous>  heparin   Injectable 8000 Unit(s) IV Push every 6 hours PRN  heparin   Injectable 4000 Unit(s) IV Push every 6 hours PRN  heparin  Infusion.  Unit(s)/Hr IV Continuous <Continuous>  lidocaine   4% Patch 1 Patch Transdermal daily    [PHYSICAL EXAM:  TELEMETRY:  T(C): 36.3 (12-20-21 @ 06:09), Max: 36.5 (12-19-21 @ 19:08)  HR: 67 (12-20-21 @ 05:48) (67 - 737)  BP: 142/60 (12-20-21 @ 05:00) (94/48 - 142/60)  RR: 18 (12-20-21 @ 05:48) (17 - 19)  SpO2: 100% (12-20-21 @ 05:48) (92% - 100%)  I&O's Summary    19 Dec 2021 07:01  -  20 Dec 2021 07:00  --------------------------------------------------------  IN: 986 mL / OUT: 1351 mL / NET: -365 mL                                      Appearance: normal	  Neck: Supple,- JVD; no Carotid Bruit b/l  Cardiovascular: Normal S1 S2, No murmurs, rubs, gallops  Respiratory: CTA  Gastrointestinal:  Soft, Non-tender, ND, + BS x4	  Extremities: LLE no edema, RLE in wrapping 2/2 blister w/ ACE wrap  Vascular: Peripheral pulses palpable 2+ bilaterally carotids, radial  Neurologic: A & O x 3, Mood & affect appropriate  	    ECG:  	  RADIOLOGY:   DIAGNOSTIC TESTING:  [ x] Echocardiogram: < from: TTE Echo Complete w/ Contrast w/ Doppler (11.29.21 @ 15:49) >  CONCLUSIONS:     1. Akinesis of the basal to mid inferoseptum and anteroseptum. Moderate to severe hypokinesis of the remaining regions. The left ventricle cavity size is mildly dilated. Left ventricular systolic function is severely reduced with a calculated ejection fraction of 25-30%.   2. The right ventricle is normal in size. Right ventricular systolic function is mildly reduced. A device lead is noted in the right heart.   3. The left atrium is severely dilated.   4. The mitral valve is mildly thickened. There is mild-to-moderate mitral regurgitation.   5. No pericardial effusion is seen.   6. Compared to the previous TTE performed on 1/5/2019, there have been no significant interval changes.    < end of copied text >    [ ]  Catheterization:  [ ] Stress Test:    [ ] DELLA  OTHER: 	    LABS:	 	  CARDIAC MARKERS:                        9.8    8.29  )-----------( 166      ( 20 Dec 2021 06:37 )             31.6     12-20    138  |  96  |  79<H>  ----------------------------<  148<H>  4.7   |  31  |  4.81<H>    Ca    8.8      20 Dec 2021 06:37  Phos  7.1     12-20  Mg     2.0     12-20         Interventional Cardiology PA Adult Progress Note    Subjective Assessment: Patient seen and examined at the bedside. Very sleepy this AM, did not sleep well last night. Later endorsing RLQ abdominal pain similar to 2 days prior. Given Simethicone x1. Denies CP, SOB, palpitations All other ROS negative except those listed in subjective assessment.  	  MEDICATIONS:  metoprolol succinate  milliGRAM(s) Oral daily  sacubitril 97 mG/valsartan 103 mG 1 Tablet(s) Oral two times a day  cefTRIAXone   IVPB 1000 milliGRAM(s) IV Intermittent every 24 hours  ALBUTerol   0.5% 2.5 milliGRAM(s) Nebulizer every 6 hours PRN  benzonatate 100 milliGRAM(s) Oral every 8 hours PRN  budesonide  80 MICROgram(s)/formoterol 4.5 MICROgram(s) Inhaler 2 Puff(s) Inhalation two times a day  guaiFENesin Oral Liquid (Sugar-Free) 100 milliGRAM(s) Oral every 6 hours PRN  acetaminophen     Tablet .. 650 milliGRAM(s) Oral every 6 hours PRN  gabapentin 600 milliGRAM(s) Oral two times a day  methadone    Tablet 10 milliGRAM(s) Oral three times a day  methadone    Tablet 5 milliGRAM(s) Oral every 6 hours PRN  traZODone 50 milliGRAM(s) Oral at bedtime  pantoprazole    Tablet 40 milliGRAM(s) Oral before breakfast  atorvastatin 80 milliGRAM(s) Oral at bedtime  dextrose 40% Gel 15 Gram(s) Oral once  dextrose 50% Injectable 25 Gram(s) IV Push once  dextrose 50% Injectable 12.5 Gram(s) IV Push once  dextrose 50% Injectable 25 Gram(s) IV Push once  fenofibrate Tablet 48 milliGRAM(s) Oral daily  glucagon  Injectable 1 milliGRAM(s) IntraMuscular once  insulin glargine Injectable (LANTUS) 10 Unit(s) SubCutaneous at bedtime  insulin lispro (ADMELOG) corrective regimen sliding scale   SubCutaneous Before meals and at bedtime  insulin lispro Injectable (ADMELOG) 5 Unit(s) SubCutaneous three times a day before meals  levothyroxine 25 MICROGram(s) Oral daily  dextrose 5%. 1000 milliLiter(s) IV Continuous <Continuous>  dextrose 5%. 1000 milliLiter(s) IV Continuous <Continuous>  heparin   Injectable 8000 Unit(s) IV Push every 6 hours PRN  heparin   Injectable 4000 Unit(s) IV Push every 6 hours PRN  heparin  Infusion.  Unit(s)/Hr IV Continuous <Continuous>  lidocaine   4% Patch 1 Patch Transdermal daily    [PHYSICAL EXAM:  TELEMETRY:  T(C): 36.3 (12-20-21 @ 06:09), Max: 36.5 (12-19-21 @ 19:08)  HR: 67 (12-20-21 @ 05:48) (67 - 737)  BP: 142/60 (12-20-21 @ 05:00) (94/48 - 142/60)  RR: 18 (12-20-21 @ 05:48) (17 - 19)  SpO2: 100% (12-20-21 @ 05:48) (92% - 100%)  I&O's Summary    19 Dec 2021 07:01  -  20 Dec 2021 07:00  --------------------------------------------------------  IN: 986 mL / OUT: 1351 mL / NET: -365 mL                                      Appearance: normal	  Neck: Supple,- JVD; no Carotid Bruit b/l  Cardiovascular: Normal S1 S2, No murmurs, rubs, gallops  Respiratory: CTA  Gastrointestinal:  Soft, Non-tender, ND, + BS x4	  Extremities: LLE no edema, RLE in wrapping 2/2 blister w/ ACE wrap  Vascular: Peripheral pulses palpable 2+ bilaterally carotids, radial  Neurologic: A & O x 3, Mood & affect appropriate  	    ECG:  	  RADIOLOGY:   DIAGNOSTIC TESTING:  [ x] Echocardiogram: < from: TTE Echo Complete w/ Contrast w/ Doppler (11.29.21 @ 15:49) >  CONCLUSIONS:     1. Akinesis of the basal to mid inferoseptum and anteroseptum. Moderate to severe hypokinesis of the remaining regions. The left ventricle cavity size is mildly dilated. Left ventricular systolic function is severely reduced with a calculated ejection fraction of 25-30%.   2. The right ventricle is normal in size. Right ventricular systolic function is mildly reduced. A device lead is noted in the right heart.   3. The left atrium is severely dilated.   4. The mitral valve is mildly thickened. There is mild-to-moderate mitral regurgitation.   5. No pericardial effusion is seen.   6. Compared to the previous TTE performed on 1/5/2019, there have been no significant interval changes.    < end of copied text >    [ ]  Catheterization:  [ ] Stress Test:    [ ] DELLA  OTHER: 	    LABS:	 	  CARDIAC MARKERS:                        9.8    8.29  )-----------( 166      ( 20 Dec 2021 06:37 )             31.6     12-20    138  |  96  |  79<H>  ----------------------------<  148<H>  4.7   |  31  |  4.81<H>    Ca    8.8      20 Dec 2021 06:37  Phos  7.1     12-20  Mg     2.0     12-20         Interventional Cardiology PA Adult Progress Note    Subjective Assessment: Patient seen and examined at the bedside. Very sleepy this AM, did not sleep well last night. Later endorsing RLQ abdominal pain similar to 2 days prior. Given Simethicone x1. Denies CP, SOB, palpitations All other ROS negative except those listed in subjective assessment.  	  MEDICATIONS:  metoprolol succinate  milliGRAM(s) Oral daily  sacubitril 97 mG/valsartan 103 mG 1 Tablet(s) Oral two times a day  cefTRIAXone   IVPB 1000 milliGRAM(s) IV Intermittent every 24 hours  ALBUTerol   0.5% 2.5 milliGRAM(s) Nebulizer every 6 hours PRN  benzonatate 100 milliGRAM(s) Oral every 8 hours PRN  budesonide  80 MICROgram(s)/formoterol 4.5 MICROgram(s) Inhaler 2 Puff(s) Inhalation two times a day  guaiFENesin Oral Liquid (Sugar-Free) 100 milliGRAM(s) Oral every 6 hours PRN  acetaminophen     Tablet .. 650 milliGRAM(s) Oral every 6 hours PRN  gabapentin 600 milliGRAM(s) Oral two times a day  methadone    Tablet 10 milliGRAM(s) Oral three times a day  methadone    Tablet 5 milliGRAM(s) Oral every 6 hours PRN  traZODone 50 milliGRAM(s) Oral at bedtime  pantoprazole    Tablet 40 milliGRAM(s) Oral before breakfast  atorvastatin 80 milliGRAM(s) Oral at bedtime  dextrose 40% Gel 15 Gram(s) Oral once  dextrose 50% Injectable 25 Gram(s) IV Push once  dextrose 50% Injectable 12.5 Gram(s) IV Push once  dextrose 50% Injectable 25 Gram(s) IV Push once  fenofibrate Tablet 48 milliGRAM(s) Oral daily  glucagon  Injectable 1 milliGRAM(s) IntraMuscular once  insulin glargine Injectable (LANTUS) 10 Unit(s) SubCutaneous at bedtime  insulin lispro (ADMELOG) corrective regimen sliding scale   SubCutaneous Before meals and at bedtime  insulin lispro Injectable (ADMELOG) 5 Unit(s) SubCutaneous three times a day before meals  levothyroxine 25 MICROGram(s) Oral daily  dextrose 5%. 1000 milliLiter(s) IV Continuous <Continuous>  dextrose 5%. 1000 milliLiter(s) IV Continuous <Continuous>  eliquis 2.5 milliGRAM(s) Oral twice daily  lidocaine   4% Patch 1 Patch Transdermal daily    [PHYSICAL EXAM:  TELEMETRY:  T(C): 36.3 (12-20-21 @ 06:09), Max: 36.5 (12-19-21 @ 19:08)  HR: 67 (12-20-21 @ 05:48) (67 - 737)  BP: 142/60 (12-20-21 @ 05:00) (94/48 - 142/60)  RR: 18 (12-20-21 @ 05:48) (17 - 19)  SpO2: 100% (12-20-21 @ 05:48) (92% - 100%)  I&O's Summary    19 Dec 2021 07:01  -  20 Dec 2021 07:00  --------------------------------------------------------  IN: 986 mL / OUT: 1351 mL / NET: -365 mL                                      Appearance: normal	  Neck: Supple,- JVD; no Carotid Bruit b/l  Cardiovascular: Normal S1 S2, No murmurs, rubs, gallops  Respiratory: CTA  Gastrointestinal:  Soft, Non-tender, ND, + BS x4	  Extremities: LLE no edema, RLE in wrapping 2/2 blister w/ ACE wrap  Vascular: Peripheral pulses palpable 2+ bilaterally carotids, radial  Neurologic: A & O x 3, Mood & affect appropriate  	    ECG:  	  RADIOLOGY:   DIAGNOSTIC TESTING:  [ x] Echocardiogram: < from: TTE Echo Complete w/ Contrast w/ Doppler (11.29.21 @ 15:49) >  CONCLUSIONS:     1. Akinesis of the basal to mid inferoseptum and anteroseptum. Moderate to severe hypokinesis of the remaining regions. The left ventricle cavity size is mildly dilated. Left ventricular systolic function is severely reduced with a calculated ejection fraction of 25-30%.   2. The right ventricle is normal in size. Right ventricular systolic function is mildly reduced. A device lead is noted in the right heart.   3. The left atrium is severely dilated.   4. The mitral valve is mildly thickened. There is mild-to-moderate mitral regurgitation.   5. No pericardial effusion is seen.   6. Compared to the previous TTE performed on 1/5/2019, there have been no significant interval changes.    < end of copied text >    [ ]  Catheterization:  [ ] Stress Test:    [ ] DELLA  OTHER: 	    LABS:	 	  CARDIAC MARKERS:                        9.8    8.29  )-----------( 166      ( 20 Dec 2021 06:37 )             31.6     12-20    138  |  96  |  79<H>  ----------------------------<  148<H>  4.7   |  31  |  4.81<H>    Ca    8.8      20 Dec 2021 06:37  Phos  7.1     12-20  Mg     2.0     12-20

## 2021-12-20 NOTE — PROGRESS NOTE ADULT - SUBJECTIVE AND OBJECTIVE BOX
Patient is a 61y Female seen and evaluated at bedside. patient still remains on 3 L NC: on lasix 80 BID and metolazone 5mg w/ only 1.5L/24 hours output; electrolytes and volume status noted she feels her breathing is better       Meds:    acetaminophen     Tablet .. 650 every 6 hours PRN  ALBUTerol   0.5% 2.5 every 6 hours PRN  atorvastatin 80 at bedtime  benzonatate 100 every 8 hours PRN  budesonide  80 MICROgram(s)/formoterol 4.5 MICROgram(s) Inhaler 2 two times a day  cefTRIAXone   IVPB 1000 every 24 hours  dextrose 40% Gel 15 once  dextrose 5%. 1000 <Continuous>  dextrose 5%. 1000 <Continuous>  dextrose 50% Injectable 25 once  dextrose 50% Injectable 12.5 once  dextrose 50% Injectable 25 once  fenofibrate Tablet 48 daily  gabapentin 600 two times a day  glucagon  Injectable 1 once  guaiFENesin Oral Liquid (Sugar-Free) 100 every 6 hours PRN  heparin   Injectable 8000 every 6 hours PRN  heparin   Injectable 4000 every 6 hours PRN  heparin  Infusion.  <Continuous>  insulin glargine Injectable (LANTUS) 10 at bedtime  insulin lispro (ADMELOG) corrective regimen sliding scale  Before meals and at bedtime  insulin lispro Injectable (ADMELOG) 5 three times a day before meals  levothyroxine 25 daily  lidocaine   4% Patch 1 daily  methadone    Tablet 10 three times a day  methadone    Tablet 5 every 6 hours PRN  metoprolol succinate  daily  pantoprazole    Tablet 40 before breakfast  sacubitril 97 mG/valsartan 103 mG 1 two times a day  traZODone 50 at bedtime      T(C): , Max: 36.5 (12-19-21 @ 19:08)  T(F): , Max: 97.7 (12-19-21 @ 19:08)  HR: 72 (12-20-21 @ 12:20)  BP: 125/60 (12-20-21 @ 12:20)  BP(mean): --  RR: 18 (12-20-21 @ 12:20)  SpO2: 97% (12-20-21 @ 12:20)  Wt(kg): --    12-19 @ 07:01  -  12-20 @ 07:00  --------------------------------------------------------  IN: 986 mL / OUT: 1351 mL / NET: -365 mL    12-20 @ 07:01  -  12-20 @ 13:10  --------------------------------------------------------  IN: 180 mL / OUT: 0 mL / NET: 180 mL          Review of Systems:  all other ROS negative as per HPI      PHYSICAL EXAM:  GENERAL: well-developed, well nourished, alert, no acute distress at present on 3L NC  CHEST/LUNG: decreased breath sounds at the bases  HEART: normal S1S2, RRR  ABDOMEN: Soft, Nontender, +BS,   EXTREMITIES: 1+ pitting edema BL w/ ace bandages         LABS:                        9.8    8.29  )-----------( 166      ( 20 Dec 2021 06:37 )             31.6     12-20    138  |  96  |  79<H>  ----------------------------<  148<H>  4.7   |  31  |  4.81<H>    Ca    8.8      20 Dec 2021 06:37  Phos  7.1     12-20  Mg     2.0     12-20        PTT - ( 20 Dec 2021 06:37 )  PTT:97.6 sec          RADIOLOGY & ADDITIONAL STUDIES:

## 2021-12-20 NOTE — PROGRESS NOTE ADULT - ASSESSMENT
60 yo obese F, current smoker, with a PMH of HTN, HLD, DM-II, CKD Stage 4, Hypothyroidism, known NICM with EF 25% (s/p AICD for primary prevention), MM, COPD (3L home O2), recurrent DVTs (on Eliquis) reporting of mild hemoptysis likely due to cough, admitted for management of CHF exacerbation and anemia. Vascular consulted for RLE DVT, DUS showing partially occlusive thrombus at R CFV.    FU repeat bilateral duplex ultrasound   c/w heparin gtt  Contact patient's outpatient Heme/Onc ( Dr. 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 Vascular consulted for RLE DVT, DUS showing partially occlusive thrombus at R CFV.    - FU repeat bilateral duplex ultrasound   - c/w heparin gtt  - Contact patient's outpatient Heme/Onc (Dr. Ruiz)      -----------------------------------------------------------------------------------  Update1: Patient also consulted for AVF placement b/c starting on HD.  - Please obtain upper extremity vein mapping (both sides)    Update 2: Duplex US shows R CFV non-occlusive thrombus unchanged vs previous imaging  - c/w anticoagulation

## 2021-12-20 NOTE — PROGRESS NOTE ADULT - ASSESSMENT
62 yo obese Female CKD (baseline around 3.7-4) CHF, DM COPD (on 3L home o2) multiple myeloma presented to the hospital with complaints of hemoptysis and shortness of breath x 4 days;     Assessment/Plan:     #CKD Cr increasing   Uop: 1.5L/shift   minimal response to increase diuretic use  would obtain BNP and CXR  may need inotropic therapy as likely poor forward flow, awaiting cardiomems interrogation for accurate estimation of volume status  medina for strict i's and o's  daily weights  BMP daily   renal diet  fluid and salt restriction   would obtain vascular consult for vein mapping however may need to start HD this admissions     Krystle Billy D.O  PGy 4 nephrology fellow  [phone]

## 2021-12-20 NOTE — PROGRESS NOTE ADULT - ASSESSMENT
58 y/o morbidly obese female w/ hx of HTN, NICM, HFrEF s/p AICD + MEMs device, IDDM c/b b/l LE neuropathy, recurrent DVT, CKD 4, CVA x2, COPD on 3 L O2, DHARMESH, PAD s/p bypass, multiple myeloma who presents from home for hemoptysis likely in the setting of recurrent decompensated heart failure exacerbation.     #Reported hemoptysis  #COPD GOLD D, not in exacerbation  #DHARMESH    Patient presents with unclear report of hemoptysis that has not recurred since she has been admitted and in a patient on anticoagulation, this may occur from the AC alone vs secondary to underlying pulmonary edema in a patient with acute on chronic heart failure. Hemoptysis has resolved. Patient does not appear to have an active COPD exacerbation at this time. Agree with diuresis and continue home management of COPD with Symbicort. CAT score 18, medium health impact; mmRC grade 2- based on these values and recent hospitalization 3 months ago, she is in GOLD group D.     Recommend:   - Agree with continued diuresis  - Continue Symbicort BID and PRN Duonebs q6hrs while inpatient  - Continue home CPAP at night  - Oxygenation improving, now on 3L NC  - Outpatient follow up with Dr. Nish Payne in 2-3 weeks after discharge    Patient discussed with attending Dr. Saavedra. 60 y/o morbidly obese female w/ hx of HTN, NICM, HFrEF s/p AICD + MEMs device, IDDM c/b b/l LE neuropathy, recurrent DVT, CKD 4, CVA x2, COPD on 3 L O2, DHARMESH, PAD s/p bypass, multiple myeloma who presents from home for hemoptysis likely in the setting of recurrent decompensated heart failure exacerbation.     #Reported hemoptysis  #COPD GOLD D, not in exacerbation  #DHARMESH    Patient presents with unclear report of hemoptysis that has not recurred since she has been admitted and in a patient on anticoagulation, this may occur from the AC alone vs secondary to underlying pulmonary edema in a patient with acute on chronic heart failure. Hemoptysis has resolved. Patient does not appear to have an active COPD exacerbation at this time. Agree with diuresis and continue home management of COPD with Symbicort. CAT score 18, medium health impact; mmRC grade 2- based on these values and recent hospitalization 3 months ago, she is in GOLD group D. Patient is improved after diuresis.    Recommend:   - Continue Symbicort BID and PRN Duonebs q6hrs while inpatient  - Continue home CPAP at night  - Oxygenation improving, now on 3L NC which is her home O2 requirement  - Outpatient follow up with Dr. Nish Payne in 2-3 weeks after discharge    Patient discussed with attending Dr. Saavedra. 58 y/o morbidly obese female w/ hx of HTN, NICM, HFrEF s/p AICD + MEMs device, IDDM c/b b/l LE neuropathy, recurrent DVT, CKD 4, CVA x2, COPD on 3 L O2, DHARMESH, PAD s/p bypass, multiple myeloma who presents from home for hemoptysis likely in the setting of recurrent decompensated heart failure exacerbation.     #Reported hemoptysis  #COPD GOLD D, not in exacerbation  #DHARMESH    Patient presents with unclear report of hemoptysis that has not recurred since she has been admitted and in a patient on anticoagulation, this may occur from the AC alone vs secondary to underlying pulmonary edema in a patient with acute on chronic heart failure. Hemoptysis has resolved. Patient does not appear to have an active COPD exacerbation at this time. Agree with diuresis and continue home management of COPD with Symbicort. CAT score 18, medium health impact; mmRC grade 2- based on these values and recent hospitalization 3 months ago, she is in GOLD group D. Patient is improved after diuresis.    Recommend:   - Continue Symbicort BID and PRN Duonebs q6hrs while inpatient  - Continue home CPAP at night  - Oxygenation improving, now on 3L NC which is her home O2 requirement  - Outpatient follow up with Dr. Nish Payne in 2-3 weeks after discharge    Patient discussed with attending Dr. Saavedra. Please call back and reconsult with any questions.

## 2021-12-20 NOTE — PROGRESS NOTE ADULT - PROBLEM SELECTOR PLAN 5
s/p 1 unit pRBC on 12/15 w/ appropriate response, now stable at 9.9  - Maintain active T&S (next due 12/21) s/p 1 unit pRBC on 12/15 w/ appropriate response, now stable at 9.8  - Maintain active T&S (next due 12/21) s/p 1 unit pRBC on 12/15 w/ appropriate response, now stable at 9.8  - Maintain active T&S (next due 12/24)

## 2021-12-20 NOTE — BH CONSULTATION LIAISON PROGRESS NOTE - CURRENT MEDICATION
MEDICATIONS  (STANDING):  atorvastatin 80 milliGRAM(s) Oral at bedtime  budesonide  80 MICROgram(s)/formoterol 4.5 MICROgram(s) Inhaler 2 Puff(s) Inhalation two times a day  cefTRIAXone   IVPB 1000 milliGRAM(s) IV Intermittent every 24 hours  dextrose 40% Gel 15 Gram(s) Oral once  dextrose 5%. 1000 milliLiter(s) (50 mL/Hr) IV Continuous <Continuous>  dextrose 5%. 1000 milliLiter(s) (100 mL/Hr) IV Continuous <Continuous>  dextrose 50% Injectable 25 Gram(s) IV Push once  dextrose 50% Injectable 12.5 Gram(s) IV Push once  dextrose 50% Injectable 25 Gram(s) IV Push once  fenofibrate Tablet 48 milliGRAM(s) Oral daily  gabapentin 600 milliGRAM(s) Oral two times a day  glucagon  Injectable 1 milliGRAM(s) IntraMuscular once  heparin  Infusion.  Unit(s)/Hr (18 mL/Hr) IV Continuous <Continuous>  insulin glargine Injectable (LANTUS) 10 Unit(s) SubCutaneous at bedtime  insulin lispro (ADMELOG) corrective regimen sliding scale   SubCutaneous Before meals and at bedtime  insulin lispro Injectable (ADMELOG) 5 Unit(s) SubCutaneous three times a day before meals  levothyroxine 25 MICROGram(s) Oral daily  lidocaine   4% Patch 1 Patch Transdermal daily  methadone    Tablet 10 milliGRAM(s) Oral three times a day  metoprolol succinate  milliGRAM(s) Oral daily  pantoprazole    Tablet 40 milliGRAM(s) Oral before breakfast  sacubitril 97 mG/valsartan 103 mG 1 Tablet(s) Oral two times a day  traZODone 50 milliGRAM(s) Oral at bedtime    MEDICATIONS  (PRN):  acetaminophen     Tablet .. 650 milliGRAM(s) Oral every 6 hours PRN Mild Pain (1 - 3)  ALBUTerol   0.5% 2.5 milliGRAM(s) Nebulizer every 6 hours PRN Shortness of Breath and/or Wheezing  benzonatate 100 milliGRAM(s) Oral every 8 hours PRN Cough  guaiFENesin Oral Liquid (Sugar-Free) 100 milliGRAM(s) Oral every 6 hours PRN Cough  heparin   Injectable 8000 Unit(s) IV Push every 6 hours PRN For aPTT less than 40  heparin   Injectable 4000 Unit(s) IV Push every 6 hours PRN For aPTT between 40 - 57  methadone    Tablet 5 milliGRAM(s) Oral every 6 hours PRN Moderate Pain (4 - 6)

## 2021-12-20 NOTE — PROGRESS NOTE ADULT - PROBLEM SELECTOR PLAN 2
Cr previously stable at 3.9 (~4 during prior admission), 4.39 today  -Retroperitoneal US 12/18: Mild bilateral hydronephrosis  -Renal following: rec continuing IV diuretics  -Followed by Dr. Neal, if Cr and volume status worsens, can consider dialysis. Cr previously stable at baseline 3.9 - peaked today 4.81  -Retroperitoneal US 12/18: Mild bilateral hydronephrosis  -Renal following: now recommending _______  -Followed by Dr. Neal, if Cr and volume status worsens, can consider dialysis (hepatitis panel, QuantiFeron sent) Cr previously stable at baseline 3.9 - peaked today 4.81  -Retroperitoneal US 12/18: Mild bilateral hydronephrosis  -Renal following: now recommending repeat BNP and CXR. He will discuss w/ Dr. Neal (outpt nephrologist) about HD initiation (hepatitis panel, QuantiFeron sent, Vascular already following in event of vein mapping) Cr previously stable at baseline 3.9 - peaked today 4.81  -Retroperitoneal US 12/18: Mild bilateral hydronephrosis  -Renal following: ok to d/c diuretics, will discuss w/ Dr. Neal (outpt nephrologist) about future HD initiation (hepatitis panel, QuantiFeron sent, Vascular consulted for vein mapping) Cr previously stable at baseline 3.9 - peaked today 4.81  -Retroperitoneal US 12/18: Mild bilateral hydronephrosis  -Renal following: ok to d/c diuretics, will discuss w/ Dr. Neal (outpt nephrologist) about future HD initiation (hepatitis panel, QuantiFeron sent, Vascular consulted for vein mapping)  -B/l upper extremity US ordered for vein mapping Per Vascular

## 2021-12-20 NOTE — PROGRESS NOTE ADULT - PROBLEM SELECTOR PLAN 4
No new episodes since her admission  - CT Chest 12/16/21: Mild pulmonary edema and trace right pleural effusion. No PE.  - Pulmonary consulted: hemoptysis is mild, likely due to cough in combination with increased hydrostatic pressure in capillary beds from decompensated heart failure.  - RVP, COVID and urine legionella negative  - Sputum Cx growing Proteus Mirabilis w/ elevated procalcitonin, no infiltrate on CXR/WBC and afebrile -- started CTX 1G q 24hrs x 5 days (last day 12/22). Of note: will also cover UTI  - CONT: cough suppressants    #DVT  - Hx of Recurrent DVT (on Eliquis 2.5 BID)  - RUE US and LLE US negative for DVT  - RLE US (+) common femoral vein DVT or unspecified chronicity  - Vascular consulted - rec repeat b/l LE US (ordered) and contacting Dr. Ruiz for collateral  - CONT: heparin gtt (no increase Eliquis i/s/o worsening renal disease) No new episodes since her admission  - CT Chest 12/16/21: Mild pulmonary edema and trace right pleural effusion. No PE.  - Pulmonary consulted: hemoptysis is mild, likely due to cough in combination with increased hydrostatic pressure in capillary beds from decompensated heart failure. NTD.  - RVP, COVID and urine legionella negative  - Sputum Cx growing Proteus Mirabilis w/ elevated procalcitonin, no infiltrate on CXR/WBC and afebrile -- started CTX 1G q 24hrs x 5 days (last day 12/22). Of note: will also cover UTI  - CONT: cough suppressants    #DVT  - Hx of Recurrent DVT (on Eliquis 2.5 BID) on R leg  - RLE US (+) common femoral vein DVT or unspecified chronicity  - Vascular consulted - rec repeat b/l LE US (preformed)   - CONT: heparin gtt (home Eliquis IF chronic) No new episodes since her admission  - CT Chest 12/16/21: Mild pulmonary edema and trace right pleural effusion. No PE.  - Pulmonary consulted: hemoptysis is mild, likely due to cough in combination with increased hydrostatic pressure in capillary beds from decompensated heart failure. NTD.  - RVP, COVID and urine legionella negative  - Sputum Cx growing Proteus Mirabilis w/ elevated procalcitonin, no infiltrate on CXR/WBC and afebrile -- started CTX 1G q 24hrs x 5 days (last day 12/22). Of note: will also cover UTI  - CONT: cough suppressants    #DVT  - Hx of Recurrent DVT (on Eliquis 2.5 BID) on R leg  - RLE US (+) common femoral vein DVT or unspecified chronicity  - Vascular following - repeat B/l LE dopplers ordered, f/u results and recs on Eliquis dosing  - CONT: heparin gtt (home Eliquis IF chronic) No new episodes since her admission  - CT Chest 12/16/21: Mild pulmonary edema and trace right pleural effusion. No PE.  - Pulmonary consulted: hemoptysis is mild, likely due to cough in combination with increased hydrostatic pressure in capillary beds from decompensated heart failure. NTD.  - RVP, COVID and urine legionella negative  - Sputum Cx growing Proteus Mirabilis w/ elevated procalcitonin, no infiltrate on CXR/WBC and afebrile -- started CTX 1G q 24hrs x 5 days (last day 12/22). Of note: will also cover UTI  - CONT: cough suppressants    #DVT  - Hx of Recurrent DVT (on Eliquis 2.5 BID) on R leg  - RLE US (+) common femoral vein DVT or unspecified chronicity, repeat US w/ similar finding  - Vascular consulted: likely chronic, can continue home Eliquis 2.5 BID  - CONT: Eliquis 2.5mg BID * RESOLVED *   - CT Chest 12/16/21: Mild pulmonary edema and trace right pleural effusion. No PE.  - Pulmonary consulted: hemoptysis is mild, likely due to cough in combination with increased hydrostatic pressure in capillary beds from decompensated heart failure. NTD.  - RVP, COVID and urine legionella negative  - Sputum Cx growing Proteus Mirabilis w/ elevated procalcitonin, no infiltrate on CXR/WBC and afebrile -- started CTX 1G q 24hrs x 5 days (last day 12/22). Of note: will also cover UTI  - CONT: cough suppressants    #COPD  - CONT: Symbicort BID and PRN duonebs q6h w/ CPAP  - Wean O2 for goal SpO2 88-92%  - Outpatient follow up with Dr. Nish Payne in 2-3 weeks after discharge.

## 2021-12-20 NOTE — BH CONSULTATION LIAISON PROGRESS NOTE - NSBHFUPINTERVALHXFT_PSY_A_CORE
Patient seen at bedside. She reports that she continues to have "muscle twitching" and had an episode earlier in the day (none observed during this examination). She reports that she is currently seeing a psychiatrist at [facility] and is being prescribed buspar, trazodone and paxil. She states that the paxil was recently prescribed and she decided not to take it. She currently denies any significant mood or anxiety sx. She denies SI/HI. She agrees with the plan to continue to hold her psychotropics. she denies any recent nausea/vomiting/ diarrhea/ constipation or changes in cognition.   Per chart, patient is currently prescribed trazodone prn; all other psychotropics were d/felipe.

## 2021-12-20 NOTE — PROGRESS NOTE ADULT - ATTENDING COMMENTS
ASSESSMENT:  This is a 61y old Female with a history of obesity, HTN, HLD, DM-II, CKD stasge 4, hypothyroidism, know NICM with EF 25% (s/p AICD for primary prevention), MM, COPD, 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    Recommended Treatment PLAN:  1. Consider continuing Methadone 5 mg PO q6h PRN breakthrough pain.   2. Consider continuing home dose Methadone 10 mg PO TID (confirmed by istop). Recommend EKG to monitor QTc interval.  3. Consider adding Flexeril 5 mg PO q8h PRN muscle spasm if ok from renal perspective  4. Consider adding Tylenol 650 mg PO q6h PRN moderate pain  5. Consider continuing home dose Gabapentin 600 mg PO BID  6. Consider continuing daily Lidocaine patch to low back  pt seen and examined by me, NP acted as scribe

## 2021-12-20 NOTE — PROGRESS NOTE ADULT - ASSESSMENT
60 yo obese F, current smoker, with a PMH of HTN, HLD, DM-II, CKD Stage 4 (Cr ~3.9), Hypothyroidism, known NICM with EF 25% (s/p AICD for primary prevention), MM, COPD (3L home O2), recurrent DVTs (on Eliquis) reporting of mild hemoptysis likely due to cough, admitted for management of CHF exacerbation and anemia. IV Lasix dosing increased 2/2 increased oxygen demands. Neruo/psych following for management of myoclonic movements. Hospital course further c/b RLE DVT, vascular consulted and RITCHIE on CKD, renal following.  62 yo obese F, current smoker, with a PMH of HTN, HLD, DM-II, CKD Stage 4 (Cr ~3.9), Hypothyroidism, known NICM with EF 25% (s/p AICD for primary prevention), MM, COPD (3L home O2), recurrent DVTs (on Eliquis) admitted for acute on chronic CHF exacerbation - now euvolemic. Renal following for worsening RITCHIE on CKD with potential HD initiation. Vascular following for new vs chronic RLQ DVT.         62 yo obese F, current smoker, with a PMH of HTN, HLD, DM-II, CKD Stage 4 (Cr ~3.9), Hypothyroidism, known NICM with EF 25% (s/p AICD for primary prevention), MM, COPD (3L home O2), recurrent DVTs (on Eliquis) admitted for acute on chronic CHF exacerbation - now euvolemic with minimal urine output on diuretics. Renal following for worsening RITCHIE on CKD with potential HD initiation. Vascular following for new vs chronic RLQ DVT and vein mapping.         60 yo obese F, current smoker, with a PMH of HTN, HLD, DM-II, CKD Stage 4 (Cr ~3.9), Hypothyroidism, known NICM with EF 25% (s/p AICD for primary prevention), MM, COPD (3L home O2), recurrent DVTs (on Eliquis) admitted for acute on chronic CHF exacerbation - now euvolemic with minimal urine output on diuretics. Renal following for worsening RITCHIE on CKD with potential HD initiation. Vascular following vein mapping, pending US.         62 yo obese F, current smoker, with a PMH of HTN, HLD, DM-II, CKD Stage 4 (Cr ~3.9), Hypothyroidism, known NICM with EF 25% (s/p AICD for primary prevention), MM, COPD (3L home O2), recurrent DVTs (on Eliquis) admitted for acute on chronic CHF exacerbation - now euvolemic with minimal urine output on diuretics. Neuro/psych following for drowsiness/upper extremity jerk, now resolving. Renal following for worsening RITCHIE on CKD with potential HD initiation. Vascular following vein mapping, pending US.

## 2021-12-20 NOTE — BH CONSULTATION LIAISON PROGRESS NOTE - NSBHCHARTREVIEWVS_PSY_A_CORE FT
Vital Signs Last 24 Hrs  T(C): 36.2 (20 Dec 2021 13:13), Max: 36.5 (19 Dec 2021 19:08)  T(F): 97.1 (20 Dec 2021 13:13), Max: 97.7 (19 Dec 2021 19:08)  HR: 72 (20 Dec 2021 12:20) (61 - 98)  BP: 125/60 (20 Dec 2021 12:20) (102/49 - 142/60)  BP(mean): --  RR: 18 (20 Dec 2021 12:20) (18 - 19)  SpO2: 97% (20 Dec 2021 12:20) (92% - 100%)

## 2021-12-20 NOTE — PROGRESS NOTE ADULT - NUTRITIONAL ASSESSMENT
etched.  The movements interrupt attempted activities. They do not seem necessarily distractible. Overall, suspicion is for secondary to toxic/metaboli

## 2021-12-20 NOTE — PROGRESS NOTE ADULT - SUBJECTIVE AND OBJECTIVE BOX
PULMONARY CONSULT SERVICE FOLLOW-UP NOTE    INTERVAL HPI:  Reviewed chart and overnight events; patient seen and examined at bedside. Shortness of breath improved. No cough.     MEDICATIONS:  Pulmonary:  ALBUTerol   0.5% 2.5 milliGRAM(s) Nebulizer every 6 hours PRN  benzonatate 100 milliGRAM(s) Oral every 8 hours PRN  budesonide  80 MICROgram(s)/formoterol 4.5 MICROgram(s) Inhaler 2 Puff(s) Inhalation two times a day  guaiFENesin Oral Liquid (Sugar-Free) 100 milliGRAM(s) Oral every 6 hours PRN    Antimicrobials:  cefTRIAXone   IVPB 1000 milliGRAM(s) IV Intermittent every 24 hours    Anticoagulants:  heparin   Injectable 8000 Unit(s) IV Push every 6 hours PRN  heparin   Injectable 4000 Unit(s) IV Push every 6 hours PRN  heparin  Infusion.  Unit(s)/Hr IV Continuous <Continuous>    Cardiac:  furosemide   Injectable 80 milliGRAM(s) IV Push two times a day  metolazone 5 milliGRAM(s) Oral <User Schedule>  metoprolol succinate  milliGRAM(s) Oral daily  sacubitril 97 mG/valsartan 103 mG 1 Tablet(s) Oral two times a day    Allergies  aspirin (Other (Moderate); Rash)  oral contrast (Unknown)    Vital Signs Last 24 Hrs  T(C): 36.3 (20 Dec 2021 06:09), Max: 36.5 (19 Dec 2021 19:08)  T(F): 97.3 (20 Dec 2021 06:09), Max: 97.7 (19 Dec 2021 19:08)  HR: 67 (20 Dec 2021 05:48) (67 - 737)  BP: 142/60 (20 Dec 2021 05:00) (94/48 - 142/60)  BP(mean): --  RR: 18 (20 Dec 2021 05:48) (17 - 19)  SpO2: 100% (20 Dec 2021 05:48) (92% - 100%)     @ 07:01  -   @ 07:00  --------------------------------------------------------  IN: 986 mL / OUT: 1351 mL / NET: -365 mL    PHYSICAL EXAM:  Constitutional: WD  HEENT: NC/AT; PERRL, anicteric sclera; MMM  Neck: supple  Cardiovascular: +S1/S2, RRR  Respiratory: bibasilar crackles; no W/R/R  Gastrointestinal: soft, NT/ND  Extremities: WWP; no clubbing or cyanosis; 2+ pitting edema  Vascular: 2+ radial and pedal pulses  Neurological: AAOx3; no focal deficits    LABS:  CBC Full  -  ( 20 Dec 2021 06:37 )  WBC Count : 8.29 K/uL  RBC Count : 3.32 M/uL  Hemoglobin : 9.8 g/dL  Hematocrit : 31.6 %  Platelet Count - Automated : 166 K/uL  Mean Cell Volume : 95.2 fl  Mean Cell Hemoglobin : 29.5 pg  Mean Cell Hemoglobin Concentration : 31.0 gm/dL  Auto Neutrophil # : x  Auto Lymphocyte # : x  Auto Monocyte # : x  Auto Eosinophil # : x  Auto Basophil # : x  Auto Neutrophil % : x  Auto Lymphocyte % : x  Auto Monocyte % : x  Auto Eosinophil % : x  Auto Basophil % : x    12-20    138  |  96  |  79<H>  ----------------------------<  148<H>  4.7   |  31  |  4.81<H>    Ca    8.8      20 Dec 2021 06:37  Phos  7.1     12-20  Mg     2.0     12-20    PTT - ( 20 Dec 2021 06:37 )  PTT:97.6 sec    Urinalysis Basic - ( 18 Dec 2021 10:02 )    Color: Yellow / Appearance: Hazy / S.020 / pH: x  Gluc: x / Ketone: NEGATIVE  / Bili: Negative / Urobili: 0.2 E.U./dL   Blood: x / Protein: 100 mg/dL / Nitrite: POSITIVE   Leuk Esterase: NEGATIVE / RBC: 5-10 /HPF / WBC < 5 /HPF   Sq Epi: x / Non Sq Epi: 0-5 /HPF / Bacteria: None /HPF    RADIOLOGY & ADDITIONAL STUDIES: Reviewed.

## 2021-12-20 NOTE — PROGRESS NOTE ADULT - PROBLEM SELECTOR PLAN 7
Hgb A1c: 8.4 (prior admission)  - CONT: Lantus 20 units QD, Lispro 10 units before meals w/ mISS    PT: Home with home PT  DVT:  heparin gtt  Dispo: pending clinical progression  Case d/w Dr. Partida Hgb A1c: 8.4 (prior admission)  - CONT: Lantus 20 units QD, Lispro 10 units before meals w/ mISS    PT: Home with home PT  DVT:  heparin gtt  Dispo: pending clinical progression  Case d/w Dr. Hardy Hgb A1c: 8.4 (prior admission)  - CONT: Lantus 20 units QD, Lispro 10 units before meals w/ mISS    PT: Home with home PT  DVT:  Eliquis 2.5 BID  Dispo: pending decision on HD  Case d/w Dr. Hardy

## 2021-12-20 NOTE — PROGRESS NOTE ADULT - PROBLEM SELECTOR PLAN 1
B/l crackles at bases, edema of BLE w/ ACE wraps, satting 99% on home 3L NC, Net negative 2.4L, Primafit in place   - Echo 11/29/21: Akinesis of the basal to mid inferoseptum and anteroseptum. Moderate to severe hypokinesis of the remaining regions, mild to moderate MR  - CONT: Lasix 80mg IV BID, Metolazone 5mg BID (30 min prior to Lasix), Entresto 97mg/103mg BID, Toprol XL 100mg daily  -Consult CHF team in AM to evaluate CardioMems device     #COPD  - CONT: Symbicort BID and PRN duonebs q6h w/ CPAP  - Wean O2 for goal SpO2 88-92%  - Outpatient follow up with Dr. Nish Payne in 2-3 weeks after discharge. Currently euvolemic, satting 99% on home 3L NC, Net negative 2.6L, Primafit in place   - Echo 11/29/21: Akinesis of the basal to mid inferoseptum and anteroseptum. Moderate to severe hypokinesis of the remaining regions, mild to moderate MR  - IV lasix d/c'd on 12/20 due to low output and worsening renal fx (see below)  - CONT: Entresto 97mg/103mg BID, Toprol XL 100mg daily    #COPD  - CONT: Symbicort BID and PRN duonebs q6h w/ CPAP  - Wean O2 for goal SpO2 88-92%  - Outpatient follow up with Dr. Nish Payne in 2-3 weeks after discharge. Currently euvolemic, satting 99% on home 3L NC, Net negative 2.6L, Primafit in place   - Echo 11/29/21: Akinesis of the basal to mid inferoseptum and anteroseptum. Moderate to severe hypokinesis of the remaining regions, mild to moderate MR  - IV lasix/Metolazone d/c'd on 12/20 due to low output and worsening renal fx (see below)  - CONT: Entresto 97mg/103mg BID, Toprol XL 100mg daily    #COPD  - CONT: Symbicort BID and PRN duonebs q6h w/ CPAP  - Wean O2 for goal SpO2 88-92%  - Outpatient follow up with Dr. Nish Payne in 2-3 weeks after discharge. Currently euvolemic, satting 99% on home 3L NC, Net negative 2.6L, Primafit in place   - Echo 11/29/21: Akinesis of the basal to mid inferoseptum and anteroseptum. Moderate to severe hypokinesis of the remaining regions, mild to moderate MR  - IV lasix/Metolazone d/c'd on 12/20 due to low output and worsening renal fx (see below)  - No plan for CardioMems interrogation as it does not  readings anymore  - CONT: Entresto 97mg/103mg BID, Toprol XL 100mg daily      #DVT  - Hx of Recurrent DVT (on Eliquis 2.5 BID) on R leg  - RLE US (+) common femoral vein DVT of unspecified chronicity, repeat US w/ similar finding  - Vascular consulted: likely chronic, can d/c heparin gtt and continue home Eliquis 2.5 BID  - CONT: Eliquis 2.5mg BID Currently euvolemic, satting 99% on home 3L NC, Net negative 2.6L, Primafit in place   - Echo 11/29/21: Akinesis of the basal to mid inferoseptum and anteroseptum. Moderate to severe hypokinesis of the remaining regions, mild to moderate MR  - IV lasix/Metolazone d/c'd on 12/20 due to low output and worsening renal fx (see below)  - No plan for CardioMems interrogation as it does not  readings anymore  - F/u need for PO diuretics in AM (home torsemide 40 BID)  - CONT: Entresto 97mg/103mg BID, Toprol XL 100mg daily      #DVT  - Hx of Recurrent DVT (on Eliquis 2.5 BID) on R leg  - RLE US (+) common femoral vein DVT of unspecified chronicity, repeat US w/ similar finding  - Vascular consulted: likely chronic, can d/c heparin gtt and continue home Eliquis 2.5 BID  - CONT: Eliquis 2.5mg BID

## 2021-12-20 NOTE — PROGRESS NOTE ADULT - PROBLEM SELECTOR PLAN 3
Endorsing RUE weakness/pain and myoclonic movement, neuro exam other wise unremarkable   -Stroke/Neuro consulted: suspicious 2/2 toxic/metabolic causes, particularly CNS acting medications, low suspicion for seizure activities.   -CTH non-contrast 12/18: negative for acute changes  -Pain management consulted: Methadone 5 mg PO q6h PRN breakthrough pain, c/w Methadone 10 mg PO TID (confirmed by Istop). Tylenol 650 mg PO q6h PRN moderate pain  -Psychiatry Consulted: pt unsure what she truly takes at home, multiple pain medication prescribers. Rec discontinuing Amitriptyline 10mg QD, Buspirone 15mg QD, Escitalopram 20mg QD - low risk of withdrawal. Can consider addition of Flexeril if cleared with pain management.   -CONT: Methadone as above, Gabapentin 600mg BID, Trazodone 50mg QD Endorsing RUE weakness/pain and myoclonic movement, neuro exam other wise unremarkable   -CTH non-contrast 12/18: negative for acute changes; RUE US: negative for clot  -Stroke/Neuro consulted: suspicious 2/2 toxic/metabolic causes, particularly CNS acting medications, low suspicion for seizure activities.   -Pain management consulted: Methadone 5 mg PO q6h PRN breakthrough pain, c/w Methadone 10 mg PO TID (confirmed by Istop). Tylenol 650 mg PO q6h PRN moderate pain  -Psychiatry Consulted: Rec discontinuing Amitriptyline 10mg QD, Buspirone 15mg QD, Escitalopram 20mg QD - low risk of withdrawal. Can consider addition of Flexeril if cleared with pain management.   -CONT: Methadone as above, Gabapentin 600mg BID, Trazodone 50mg QD Endorsing RUE weakness/pain and myoclonic movement, neuro exam other wise unremarkable   -CTH non-contrast 12/18: negative for acute changes; RUE US: negative for clot  -Stroke/Neuro consulted: suspicious 2/2 toxic/metabolic causes, particularly CNS acting medications, low suspicion for seizure activities.   -Pain management consulted: Methadone 5 mg PO q6h PRN breakthrough pain, c/w Methadone 10 mg PO TID (confirmed by Istop). Tylenol 650 mg PO q6h PRN moderate pain  -Psychiatry Consulted: Rec discontinuing Amitriptyline 10mg QD, Buspirone 15mg QD, Escitalopram 20mg QD, Trazadone 50mg QD - low risk of withdrawal.   -CONT: Methadone as above, Gabapentin 600mg BID, Melatonin 3mg QD

## 2021-12-20 NOTE — PROGRESS NOTE ADULT - SUBJECTIVE AND OBJECTIVE BOX
Pain Management Progress Note - [facility] [phone]    HPI: Patient seen and examined today. Patient reports moderate level of back pain today. Patient reports back pain has moved to her side and low abdomen this AM. Patient remains on home-dose Methadone with PRN methadone available. Patient reports pain comes and goes. Patient denies side effects from current pain regimen.    Pertinent PMH: Pain at: _X__Back ___Neck___Knee ___Hip ___Shoulder _X__ Opioid tolerance    Pain is _X__ sharp ____dull ___burning ___achy ___ Intensity: ____ mild __X__mod ___X_severe     Location _____surgical site _____cervical ___X__lumbar ____abd _____upper ext____lower ext    Worse with ___X_activity _X___movement _____physical therapy___ Rest    Improved with __X__medication ____rest ____physical therapy    PMH:  CHF (congestive heart failure)  COPD (chronic obstructive pulmonary disease)  HLD (hyperlipidemia)  Chronic pain  DM (diabetes mellitus)  CVA (cerebral vascular accident)  DVT (deep venous thrombosis)  HTN (hypertension)  Depression  Bipolar depression  PAD (peripheral artery disease)  Neuropathy  Hypothyroidism  Multiple myeloma  CKD (chronic kidney disease), stage IV  DHARMESH on CPAP  AICD (automatic cardioverter/defibrillator) present  H/O abdominal hysterectomy  H/O tubal ligation  History of cholecystectomy  H/O extremity bypass graft    Medications:  simethicone  metoprolol succinate ER  heparin   Injectable  heparin   Injectable  heparin  Infusion.  furosemide   Injectable  furosemide   Injectable  cefTRIAXone   IVPB  simethicone  acetaminophen     Tablet ..  methadone    Tablet  cyclobenzaprine  metolazone  apixaban  insulin lispro Injectable (ADMELOG)  insulin glargine Injectable (LANTUS)  benzonatate  metolazone  ALBUTerol   0.5%  heparin   Injectable  acetaminophen   IVPB ..  lidocaine   4% Patch  benzonatate  guaiFENesin Oral Liquid (Sugar-Free)  atorvastatin  amitriptyline  traZODone  busPIRone  furosemide   Injectable  budesonide  80 MICROgram(s)/formoterol 4.5 MICROgram(s) Inhaler  pantoprazole    Tablet  metoprolol succinate ER  methadone    Tablet  levothyroxine  traZODone  fenofibrate Tablet  escitalopram  sacubitril 97 mG/valsartan 103 mG  busPIRone  atorvastatin  amitriptyline  gabapentin  glucagon  Injectable  dextrose 5%.  dextrose 50% Injectable  dextrose 50% Injectable  dextrose 50% Injectable  dextrose 5%.  dextrose 40% Gel  insulin lispro (ADMELOG) corrective regimen sliding scale  insulin lispro (ADMELOG) corrective regimen sliding scale  insulin lispro Injectable (ADMELOG)  insulin glargine Injectable (LANTUS)  furosemide   Injectable  acetaminophen   IVPB ..  albuterol/ipratropium for Nebulization..    ROS: Const:  _N_febrile   Eyes:___ENT:___CV: __N_chest pain  Resp: __N__sob  GI:__N_nausea _N__vomiting ____abd pain ___npo ___clears ___full diet __bm  :___ Musk: __Y_pain ___spasm  Skin:___ Neuro:  _N__sedation_N__confusion___N_ numbness ___weakness __N_paresthesia  Psych:___anxiety  Endo:___ Heme:___Allergy:__aspirin, oral contrast_      12-20 @ 06:379 mL/min/1.73M2<L>  Hemoglobin: 9.8 g/dL (12-20 @ 06:37)  Hemoglobin: 9.9 g/dL (12-19 @ 06:44)  Hemoglobin: 9.5 g/dL (12-19 @ 01:16)  T(C): 36.1 (12-20-21 @ 09:44), Max: 36.5 (12-19-21 @ 19:08)  HR: 72 (12-20-21 @ 12:20) (61 - 98)  BP: 125/60 (12-20-21 @ 12:20) (102/49 - 142/60)  RR: 18 (12-20-21 @ 12:20) (18 - 19)  SpO2: 97% (12-20-21 @ 12:20) (92% - 100%)  Wt(kg): --     PHYSICAL EXAM:  Gen Appearance: _X__no acute distress __X_appropriate       Neuro: ___SILT feet____ EOM Intact Psych: AAOX_3_, _X__mood/affect appropriate        Eyes: _X__conjunctiva WNL  ____X_ Pupils equal and round        ENT: __X_ears and nose atraumatic__X_ Hearing grossly intact        Neck: _X__trachea midline, no visible masses ___thyroid without palpable mass    Resp: __X_Nml WOB____No tactile fremitus ___clear to auscultation    Cardio: __X_extremities free from edema ____pedal pulses palpable    GI/Abdomen: ___soft _____ Nontender___X___Nondistended_____HSM    Lymphatic: ___no palpable nodes in neck  ___no palpable nodes calves and feet    Skin/Wound: ___Incision, ___Dressing c/d/i,   ____surrounding tissues soft,  ___drain/chest tube present____    Muscular: EHL ___/5  Gastrocnemius___/5    __X_absent clubbing/cyanosis         ASSESSMENT:  This is a 61y old Female with a history of obesity, HTN, HLD, DM-II, CKD stasge 4, hypothyroidism, know NICM with EF 25% (s/p AICD for primary prevention), MM, COPD, 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    Recommended Treatment PLAN:  1. Consider continuing Methadone 5 mg PO q6h PRN breakthrough pain.   2. Consider continuing home dose Methadone 10 mg PO TID (confirmed by istop). Recommend EKG to monitor QTc interval.  3. Consider adding Flexeril 5 mg PO q8h PRN muscle spasm if ok from renal perspective  4. Consider adding Tylenol 650 mg PO q6h PRN moderate pain  5. Consider continuing home dose Gabapentin 600 mg PO BID  6. Consider continuing daily Lidocaine patch to low back  Plan discussed with Dr. Kay     Pain Management Progress Note - [facility] [phone]    HPI: Patient seen and examined today. Patient reports moderate level of back pain today. Patient reports back pain has moved to her side and low abdomen this AM. Patient remains on home-dose Methadone with PRN methadone available. Patient reports pain comes and goes. Patient denies side effects from current pain regimen.    Pertinent PMH: Pain at: _X__Back ___Neck___Knee ___Hip ___Shoulder _X__ Opioid tolerance    Pain is _X__ sharp ____dull ___burning ___achy ___ Intensity: ____ mild __X__mod ___X_severe     Location _____surgical site _____cervical ___X__lumbar ____abd _____upper ext____lower ext    Worse with ___X_activity _X___movement _____physical therapy___ Rest    Improved with __X__medication ____rest ____physical therapy    PMH:  CHF (congestive heart failure)  COPD (chronic obstructive pulmonary disease)  HLD (hyperlipidemia)  Chronic pain  DM (diabetes mellitus)  CVA (cerebral vascular accident)  DVT (deep venous thrombosis)  HTN (hypertension)  Depression  Bipolar depression  PAD (peripheral artery disease)  Neuropathy  Hypothyroidism  Multiple myeloma  CKD (chronic kidney disease), stage IV  DHARMESH on CPAP  AICD (automatic cardioverter/defibrillator) present  H/O abdominal hysterectomy  H/O tubal ligation  History of cholecystectomy  H/O extremity bypass graft    Medications:  simethicone  metoprolol succinate ER  heparin   Injectable  heparin   Injectable  heparin  Infusion.  furosemide   Injectable  furosemide   Injectable  cefTRIAXone   IVPB  simethicone  acetaminophen     Tablet ..  methadone    Tablet  cyclobenzaprine  metolazone  apixaban  insulin lispro Injectable (ADMELOG)  insulin glargine Injectable (LANTUS)  benzonatate  metolazone  ALBUTerol   0.5%  heparin   Injectable  acetaminophen   IVPB ..  lidocaine   4% Patch  benzonatate  guaiFENesin Oral Liquid (Sugar-Free)  atorvastatin  amitriptyline  traZODone  busPIRone  furosemide   Injectable  budesonide  80 MICROgram(s)/formoterol 4.5 MICROgram(s) Inhaler  pantoprazole    Tablet  metoprolol succinate ER  methadone    Tablet  levothyroxine  traZODone  fenofibrate Tablet  escitalopram  sacubitril 97 mG/valsartan 103 mG  busPIRone  atorvastatin  amitriptyline  gabapentin  glucagon  Injectable  dextrose 5%.  dextrose 50% Injectable  dextrose 50% Injectable  dextrose 50% Injectable  dextrose 5%.  dextrose 40% Gel  insulin lispro (ADMELOG) corrective regimen sliding scale  insulin lispro (ADMELOG) corrective regimen sliding scale  insulin lispro Injectable (ADMELOG)  insulin glargine Injectable (LANTUS)  furosemide   Injectable  acetaminophen   IVPB ..  albuterol/ipratropium for Nebulization..    ROS: Const:  _N_febrile   Eyes:___ENT:___CV: __N_chest pain  Resp: __N__sob  GI:__N_nausea _N__vomiting ____abd pain ___npo ___clears ___full diet __bm  :___ Musk: __Y_pain ___spasm  Skin:___ Neuro:  _N__sedation_N__confusion___N_ numbness ___weakness __N_paresthesia  Psych:___anxiety  Endo:___ Heme:___Allergy:__aspirin, oral contrast_      12-20 @ 06:379 mL/min/1.73M2<L>  Hemoglobin: 9.8 g/dL (12-20 @ 06:37)  Hemoglobin: 9.9 g/dL (12-19 @ 06:44)  Hemoglobin: 9.5 g/dL (12-19 @ 01:16)  T(C): 36.1 (12-20-21 @ 09:44), Max: 36.5 (12-19-21 @ 19:08)  HR: 72 (12-20-21 @ 12:20) (61 - 98)  BP: 125/60 (12-20-21 @ 12:20) (102/49 - 142/60)  RR: 18 (12-20-21 @ 12:20) (18 - 19)  SpO2: 97% (12-20-21 @ 12:20) (92% - 100%)  Wt(kg): --     PHYSICAL EXAM:  Gen Appearance: _X__no acute distress __X_appropriate       Neuro: ___SILT feet____ EOM Intact Psych: AAOX_3_, _X__mood/affect appropriate        Eyes: _X__conjunctiva WNL  ____X_ Pupils equal and round        ENT: __X_ears and nose atraumatic__X_ Hearing grossly intact        Neck: _X__trachea midline, no visible masses ___thyroid without palpable mass    Resp: __X_Nml WOB____No tactile fremitus ___clear to auscultation    Cardio: __X_extremities free from edema ____pedal pulses palpable    GI/Abdomen: ___soft _____ Nontender___X___Nondistended_____HSM    Lymphatic: ___no palpable nodes in neck  ___no palpable nodes calves and feet    Skin/Wound: ___Incision, ___Dressing c/d/i,   ____surrounding tissues soft,  ___drain/chest tube present____    Muscular: EHL ___/5  Gastrocnemius___/5    __X_absent clubbing/cyanosis

## 2021-12-20 NOTE — PROGRESS NOTE ADULT - ATTENDING COMMENTS
above reviewed and discussed   pt response to diuretics is modest-moderate, labs hovering within advanced renal dysfunction range, not yet in need of urgent HD.  will discuss proceeding with creation of fistula with nitin dubois and charlee.  agree with findings and plans.

## 2021-12-20 NOTE — PROGRESS NOTE ADULT - ATTENDING COMMENTS
Initial attending contact date  12/16/21    . See PA note written above for details. I reviewed the PA documentation. I have personally seen and examined this patient. I reviewed vitals, labs, medications, cardiac studies, and additional imaging. I agree with the above PA's findings and plans as written above with the following additions/statements.    61F obese, HTN, HLD, DM-II, Hypothyroidism, CRI, COPD on  home O2 (3L), chronic DVT , fibromyalgia on methadone, known NICM with EF 25% with AICD, MM, CVA x 2 with residual R sided weakness admitted with mild hemoptysis in context of chronic dry cough and acute on chronic anemia  -With suboptimal diuresis throughout hospital course despite increasing IV diuresis  -CXR improved today with BNP decreased from 58458 to 4000  -On 3 L NC satting 99% which is her baseline  -Chronic LE DVT known from prior- restart home eliquis 2.5 mg bid   -Crt uptrending, hold diuretic for now  -Will likely need HD in future, however she is hesitant at this time but amenable to vein mapping studies  -Cont toprol 100 mg qd (home dose 200 mg qd), entresto 97/103 bid   -PT recs: Home with home PT  -Likely DC 12/21

## 2021-12-21 DIAGNOSIS — R33.9 RETENTION OF URINE, UNSPECIFIED: ICD-10-CM

## 2021-12-21 LAB
% ALBUMIN: 47.7 % — SIGNIFICANT CHANGE UP
% ALPHA 1: 5.3 % — SIGNIFICANT CHANGE UP
% ALPHA 2: 18.9 % — SIGNIFICANT CHANGE UP
% BETA: 9.2 % — SIGNIFICANT CHANGE UP
% GAMMA: 18.9 % — SIGNIFICANT CHANGE UP
% M SPIKE: 13.2 % — SIGNIFICANT CHANGE UP
ALBUMIN SERPL ELPH-MCNC: 2.8 G/DL — LOW (ref 3.3–5)
ALBUMIN SERPL ELPH-MCNC: 3 G/DL — LOW (ref 3.6–5.5)
ALBUMIN/GLOB SERPL ELPH: 0.9 RATIO — SIGNIFICANT CHANGE UP
ALP SERPL-CCNC: 75 U/L — SIGNIFICANT CHANGE UP (ref 40–120)
ALPHA1 GLOB SERPL ELPH-MCNC: 0.3 G/DL — SIGNIFICANT CHANGE UP (ref 0.1–0.4)
ALPHA2 GLOB SERPL ELPH-MCNC: 1.2 G/DL — HIGH (ref 0.5–1)
ALT FLD-CCNC: 8 U/L — LOW (ref 10–45)
ANION GAP SERPL CALC-SCNC: 14 MMOL/L — SIGNIFICANT CHANGE UP (ref 5–17)
APTT BLD: 33.7 SEC — SIGNIFICANT CHANGE UP (ref 27.5–35.5)
AST SERPL-CCNC: 18 U/L — SIGNIFICANT CHANGE UP (ref 10–40)
B-GLOBULIN SERPL ELPH-MCNC: 0.6 G/DL — SIGNIFICANT CHANGE UP (ref 0.5–1)
BILIRUB SERPL-MCNC: 0.2 MG/DL — SIGNIFICANT CHANGE UP (ref 0.2–1.2)
BLD GP AB SCN SERPL QL: NEGATIVE — SIGNIFICANT CHANGE UP
BUN SERPL-MCNC: 81 MG/DL — HIGH (ref 7–23)
CALCIUM SERPL-MCNC: 8.8 MG/DL — SIGNIFICANT CHANGE UP (ref 8.4–10.5)
CHLORIDE SERPL-SCNC: 93 MMOL/L — LOW (ref 96–108)
CO2 SERPL-SCNC: 28 MMOL/L — SIGNIFICANT CHANGE UP (ref 22–31)
CREAT SERPL-MCNC: 5.16 MG/DL — HIGH (ref 0.5–1.3)
GAMMA GLOBULIN: 1.2 G/DL — SIGNIFICANT CHANGE UP (ref 0.6–1.6)
GLUCOSE BLDC GLUCOMTR-MCNC: 105 MG/DL — HIGH (ref 70–99)
GLUCOSE BLDC GLUCOMTR-MCNC: 120 MG/DL — HIGH (ref 70–99)
GLUCOSE BLDC GLUCOMTR-MCNC: 143 MG/DL — HIGH (ref 70–99)
GLUCOSE BLDC GLUCOMTR-MCNC: 148 MG/DL — HIGH (ref 70–99)
GLUCOSE BLDC GLUCOMTR-MCNC: 76 MG/DL — SIGNIFICANT CHANGE UP (ref 70–99)
GLUCOSE BLDC GLUCOMTR-MCNC: 92 MG/DL — SIGNIFICANT CHANGE UP (ref 70–99)
GLUCOSE SERPL-MCNC: 117 MG/DL — HIGH (ref 70–99)
HCT VFR BLD CALC: 31 % — LOW (ref 34.5–45)
HGB BLD-MCNC: 9.3 G/DL — LOW (ref 11.5–15.5)
INTERPRETATION SERPL IFE-IMP: SIGNIFICANT CHANGE UP
M-SPIKE: 0.8 G/DL — HIGH (ref 0–0)
MAGNESIUM SERPL-MCNC: 2 MG/DL — SIGNIFICANT CHANGE UP (ref 1.6–2.6)
MCHC RBC-ENTMCNC: 28.8 PG — SIGNIFICANT CHANGE UP (ref 27–34)
MCHC RBC-ENTMCNC: 30 GM/DL — LOW (ref 32–36)
MCV RBC AUTO: 96 FL — SIGNIFICANT CHANGE UP (ref 80–100)
NRBC # BLD: 0 /100 WBCS — SIGNIFICANT CHANGE UP (ref 0–0)
PHOSPHATE SERPL-MCNC: 7.7 MG/DL — HIGH (ref 2.5–4.5)
PLATELET # BLD AUTO: 174 K/UL — SIGNIFICANT CHANGE UP (ref 150–400)
POTASSIUM SERPL-MCNC: 5.4 MMOL/L — HIGH (ref 3.5–5.3)
POTASSIUM SERPL-SCNC: 5.4 MMOL/L — HIGH (ref 3.5–5.3)
PROT PATTERN SERPL ELPH-IMP: SIGNIFICANT CHANGE UP
PROT SERPL-MCNC: 7 G/DL — SIGNIFICANT CHANGE UP (ref 6–8.3)
RBC # BLD: 3.23 M/UL — LOW (ref 3.8–5.2)
RBC # FLD: 14.2 % — SIGNIFICANT CHANGE UP (ref 10.3–14.5)
RH IG SCN BLD-IMP: POSITIVE — SIGNIFICANT CHANGE UP
SARS-COV-2 RNA SPEC QL NAA+PROBE: SIGNIFICANT CHANGE UP
SODIUM SERPL-SCNC: 135 MMOL/L — SIGNIFICANT CHANGE UP (ref 135–145)
WBC # BLD: 8 K/UL — SIGNIFICANT CHANGE UP (ref 3.8–10.5)
WBC # FLD AUTO: 8 K/UL — SIGNIFICANT CHANGE UP (ref 3.8–10.5)

## 2021-12-21 PROCEDURE — 99233 SBSQ HOSP IP/OBS HIGH 50: CPT

## 2021-12-21 PROCEDURE — 99231 SBSQ HOSP IP/OBS SF/LOW 25: CPT

## 2021-12-21 PROCEDURE — 93010 ELECTROCARDIOGRAM REPORT: CPT

## 2021-12-21 PROCEDURE — 99221 1ST HOSP IP/OBS SF/LOW 40: CPT

## 2021-12-21 PROCEDURE — 93990 DOPPLER FLOW TESTING: CPT | Mod: 26

## 2021-12-21 RX ORDER — SENNA PLUS 8.6 MG/1
2 TABLET ORAL AT BEDTIME
Refills: 0 | Status: DISCONTINUED | OUTPATIENT
Start: 2021-12-21 | End: 2021-12-29

## 2021-12-21 RX ORDER — DEXAMETHASONE 0.5 MG/5ML
20 ELIXIR ORAL ONCE
Refills: 0 | Status: COMPLETED | OUTPATIENT
Start: 2021-12-21 | End: 2021-12-22

## 2021-12-21 RX ORDER — SODIUM ZIRCONIUM CYCLOSILICATE 10 G/10G
5 POWDER, FOR SUSPENSION ORAL ONCE
Refills: 0 | Status: COMPLETED | OUTPATIENT
Start: 2021-12-21 | End: 2021-12-21

## 2021-12-21 RX ORDER — POLYETHYLENE GLYCOL 3350 17 G/17G
17 POWDER, FOR SOLUTION ORAL DAILY
Refills: 0 | Status: DISCONTINUED | OUTPATIENT
Start: 2021-12-21 | End: 2021-12-29

## 2021-12-21 RX ADMIN — PANTOPRAZOLE SODIUM 40 MILLIGRAM(S): 20 TABLET, DELAYED RELEASE ORAL at 06:16

## 2021-12-21 RX ADMIN — Medication 100 MILLIGRAM(S): at 06:17

## 2021-12-21 RX ADMIN — METHADONE HYDROCHLORIDE 10 MILLIGRAM(S): 40 TABLET ORAL at 14:17

## 2021-12-21 RX ADMIN — POLYETHYLENE GLYCOL 3350 17 GRAM(S): 17 POWDER, FOR SOLUTION ORAL at 14:17

## 2021-12-21 RX ADMIN — METHADONE HYDROCHLORIDE 10 MILLIGRAM(S): 40 TABLET ORAL at 06:16

## 2021-12-21 RX ADMIN — SACUBITRIL AND VALSARTAN 1 TABLET(S): 24; 26 TABLET, FILM COATED ORAL at 22:10

## 2021-12-21 RX ADMIN — Medication 5 UNIT(S): at 17:04

## 2021-12-21 RX ADMIN — Medication 48 MILLIGRAM(S): at 22:09

## 2021-12-21 RX ADMIN — LIDOCAINE 1 PATCH: 4 CREAM TOPICAL at 03:00

## 2021-12-21 RX ADMIN — BUDESONIDE AND FORMOTEROL FUMARATE DIHYDRATE 2 PUFF(S): 160; 4.5 AEROSOL RESPIRATORY (INHALATION) at 10:22

## 2021-12-21 RX ADMIN — ATORVASTATIN CALCIUM 80 MILLIGRAM(S): 80 TABLET, FILM COATED ORAL at 22:10

## 2021-12-21 RX ADMIN — BUDESONIDE AND FORMOTEROL FUMARATE DIHYDRATE 2 PUFF(S): 160; 4.5 AEROSOL RESPIRATORY (INHALATION) at 22:12

## 2021-12-21 RX ADMIN — CEFTRIAXONE 100 MILLIGRAM(S): 500 INJECTION, POWDER, FOR SOLUTION INTRAMUSCULAR; INTRAVENOUS at 14:17

## 2021-12-21 RX ADMIN — GABAPENTIN 600 MILLIGRAM(S): 400 CAPSULE ORAL at 06:18

## 2021-12-21 RX ADMIN — Medication 3 MILLIGRAM(S): at 22:10

## 2021-12-21 RX ADMIN — LIDOCAINE 1 PATCH: 4 CREAM TOPICAL at 19:43

## 2021-12-21 RX ADMIN — Medication 25 MICROGRAM(S): at 06:16

## 2021-12-21 RX ADMIN — Medication 5 UNIT(S): at 08:48

## 2021-12-21 RX ADMIN — APIXABAN 2.5 MILLIGRAM(S): 2.5 TABLET, FILM COATED ORAL at 17:05

## 2021-12-21 RX ADMIN — APIXABAN 2.5 MILLIGRAM(S): 2.5 TABLET, FILM COATED ORAL at 06:17

## 2021-12-21 RX ADMIN — LIDOCAINE 1 PATCH: 4 CREAM TOPICAL at 14:18

## 2021-12-21 RX ADMIN — GABAPENTIN 600 MILLIGRAM(S): 400 CAPSULE ORAL at 17:05

## 2021-12-21 RX ADMIN — METHADONE HYDROCHLORIDE 10 MILLIGRAM(S): 40 TABLET ORAL at 22:10

## 2021-12-21 RX ADMIN — INSULIN GLARGINE 10 UNIT(S): 100 INJECTION, SOLUTION SUBCUTANEOUS at 22:10

## 2021-12-21 RX ADMIN — SACUBITRIL AND VALSARTAN 1 TABLET(S): 24; 26 TABLET, FILM COATED ORAL at 10:21

## 2021-12-21 RX ADMIN — SODIUM ZIRCONIUM CYCLOSILICATE 5 GRAM(S): 10 POWDER, FOR SUSPENSION ORAL at 14:17

## 2021-12-21 NOTE — PROGRESS NOTE ADULT - SUBJECTIVE AND OBJECTIVE BOX
HEALTH ISSUES - PROBLEM Dx:  Hemoptysis    Acute on chronic systolic congestive heart failure    Anemia    Multiple myeloma    Stage 4 chronic kidney disease    Diabetes    IgG myeloma    Chronic obstructive pulmonary disease (COPD)    RUE weakness    MDD (major depressive disorder)    Urinary retention            CHEMOTHERAPY REGIMEN:        Day:                          Diet:  Protocol:                                    IVF:      MEDICATIONS  (STANDING):  apixaban 2.5 milliGRAM(s) Oral every 12 hours  atorvastatin 80 milliGRAM(s) Oral at bedtime  budesonide  80 MICROgram(s)/formoterol 4.5 MICROgram(s) Inhaler 2 Puff(s) Inhalation two times a day  cefTRIAXone   IVPB 1000 milliGRAM(s) IV Intermittent every 24 hours  dextrose 40% Gel 15 Gram(s) Oral once  dextrose 5%. 1000 milliLiter(s) (50 mL/Hr) IV Continuous <Continuous>  dextrose 5%. 1000 milliLiter(s) (100 mL/Hr) IV Continuous <Continuous>  dextrose 50% Injectable 25 Gram(s) IV Push once  dextrose 50% Injectable 12.5 Gram(s) IV Push once  dextrose 50% Injectable 25 Gram(s) IV Push once  fenofibrate Tablet 48 milliGRAM(s) Oral daily  gabapentin 600 milliGRAM(s) Oral two times a day  glucagon  Injectable 1 milliGRAM(s) IntraMuscular once  insulin glargine Injectable (LANTUS) 10 Unit(s) SubCutaneous at bedtime  insulin lispro (ADMELOG) corrective regimen sliding scale   SubCutaneous Before meals and at bedtime  insulin lispro Injectable (ADMELOG) 5 Unit(s) SubCutaneous three times a day before meals  levothyroxine 25 MICROGram(s) Oral daily  lidocaine   4% Patch 1 Patch Transdermal daily  melatonin 3 milliGRAM(s) Oral at bedtime  methadone    Tablet 10 milliGRAM(s) Oral three times a day  metoprolol succinate  milliGRAM(s) Oral daily  pantoprazole    Tablet 40 milliGRAM(s) Oral before breakfast  sacubitril 97 mG/valsartan 103 mG 1 Tablet(s) Oral two times a day  senna 2 Tablet(s) Oral at bedtime    MEDICATIONS  (PRN):  acetaminophen     Tablet .. 650 milliGRAM(s) Oral every 6 hours PRN Mild Pain (1 - 3)  ALBUTerol   0.5% 2.5 milliGRAM(s) Nebulizer every 6 hours PRN Shortness of Breath and/or Wheezing  benzonatate 100 milliGRAM(s) Oral every 8 hours PRN Cough  guaiFENesin Oral Liquid (Sugar-Free) 100 milliGRAM(s) Oral every 6 hours PRN Cough  methadone    Tablet 5 milliGRAM(s) Oral every 6 hours PRN Moderate Pain (4 - 6)  polyethylene glycol 3350 17 Gram(s) Oral daily PRN Constipation      Allergies    aspirin (Other (Moderate); Rash)  oral contrast (Unknown)    Intolerances        DVT Prophylaxis: [ ] YES [ ] NO      Antibiotics: [ ] YES [ ] NO    Pain Scale (1-10):       Location:    Vital Signs Last 24 Hrs  T(C): 37.1 (21 Dec 2021 15:14), Max: 37.1 (21 Dec 2021 15:14)  T(F): 98.7 (21 Dec 2021 15:14), Max: 98.7 (21 Dec 2021 15:14)  HR: 72 (21 Dec 2021 14:30) (62 - 86)  BP: 95/50 (21 Dec 2021 14:30) (95/50 - 128/60)  BP(mean): --  RR: 18 (21 Dec 2021 14:30) (16 - 18)  SpO2: 95% (21 Dec 2021 14:30) (95% - 100%)    Drug Dosing Weight  Height (cm): 165.1 (14 Dec 2021 15:24)  Weight (kg): 97.5 (14 Dec 2021 15:24)  BMI (kg/m2): 35.8 (14 Dec 2021 15:24)  BSA (m2): 2.04 (14 Dec 2021 15:24)     Physical Exam:  · Constitutional	detailed exam  · Constitutional Details	well-developed; well-groomed  · Eyes	EOMI; PERRL; no drainage or redness  · ENMT Comments	dry mucous membranes  · Respiratory	detailed exam  · Respiratory Comments	normal breath sounds at the lung bases bilaterally  · Cardiovascular	Regular rate & rhythm, normal S1, S2; no murmurs, gallops or rubs; no S3, S4  · Abd-Soft non tender  ·Ext-no edema, clubbing or cyanosis    URINARY CATHETER: [ ] YES [ ] NO     LABS:  CBC Full  -  ( 21 Dec 2021 07:10 )  WBC Count : 8.00 K/uL  RBC Count : 3.23 M/uL  Hemoglobin : 9.3 g/dL  Hematocrit : 31.0 %  Platelet Count - Automated : 174 K/uL  Mean Cell Volume : 96.0 fl  Mean Cell Hemoglobin : 28.8 pg  Mean Cell Hemoglobin Concentration : 30.0 gm/dL  Auto Neutrophil # : x  Auto Lymphocyte # : x  Auto Monocyte # : x  Auto Eosinophil # : x  Auto Basophil # : x  Auto Neutrophil % : x  Auto Lymphocyte % : x  Auto Monocyte % : x  Auto Eosinophil % : x  Auto Basophil % : x    12-21    135  |  93<L>  |  81<H>  ----------------------------<  117<H>  5.4<H>   |  28  |  5.16<H>    Ca    8.8      21 Dec 2021 07:10  Phos  7.7     12-21  Mg     2.0     12-21    TPro  7.0  /  Alb  2.8<L>  /  TBili  0.2  /  DBili  x   /  AST  18  /  ALT  8<L>  /  AlkPhos  75  12-21    PTT - ( 21 Dec 2021 07:10 )  PTT:33.7 sec      CULTURES:    RADIOLOGY & ADDITIONAL STUDIES:

## 2021-12-21 NOTE — PROGRESS NOTE ADULT - PROBLEM SELECTOR PLAN 2
Cr previously stable at baseline 3.9 - peaked today 4.81  -Retroperitoneal US 12/18: Mild bilateral hydronephrosis  -Renal following: ok to d/c diuretics, will discuss w/ Dr. Neal (outpt nephrologist) about future HD initiation (hepatitis panel, QuantiFeron sent, Vascular consulted for vein mapping)  -B/l upper extremity US ordered for vein mapping Per Vascular Cr previously stable at baseline 3.9 - continue to trend up 5.16.   - Retroperitoneal US 12/18: Mild bilateral hydronephrosis  - Initial concern for poor urine output and potential need for HD.  But now RITCHIE likely in setting of urinary retention.   - 12/21: Straight Cath post void at 12pm was 1250,  repeat straight cath at 4pm 285.  Decision made to place medina catheter to prevent urinary retention and monitor urine output.    - monitor renal function and f/u further renal recs.   -B/l upper extremity US ordered for vein mapping Per Vascular - plan to perform prior to discharge event though no urgent plan for HD.

## 2021-12-21 NOTE — PROGRESS NOTE ADULT - PROBLEM SELECTOR PLAN 6
Followed by Dr. Ruiz, was on Dexamethasone and Ninlaro weekly (Monday)  -Holding Ninlaro due to bullae on right foot, per Dr. Ruiz it is unlikely the cause of the bullae but can be held at this time.  -Right foot bullae x2. No signs of infection. Continue dry dressing with RN s/p 1 unit pRBC on 12/15 w/ appropriate response, now stable at 9.3  - Maintain active T&S (next due 12/24)

## 2021-12-21 NOTE — PROGRESS NOTE ADULT - PROBLEM SELECTOR PLAN 4
* RESOLVED *   - CT Chest 12/16/21: Mild pulmonary edema and trace right pleural effusion. No PE.  - Pulmonary consulted: hemoptysis is mild, likely due to cough in combination with increased hydrostatic pressure in capillary beds from decompensated heart failure. NTD.  - RVP, COVID and urine legionella negative  - Sputum Cx growing Proteus Mirabilis w/ elevated procalcitonin, no infiltrate on CXR/WBC and afebrile -- started CTX 1G q 24hrs x 5 days (last day 12/22). Of note: will also cover UTI  - CONT: cough suppressants    #COPD  - CONT: Symbicort BID and PRN duonebs q6h w/ CPAP  - Wean O2 for goal SpO2 88-92%  - Outpatient follow up with Dr. Nish Payne in 2-3 weeks after discharge. Endorsing RUE weakness/pain and myoclonic movement, neuro exam other wise unremarkable   -CTH non-contrast 12/18: negative for acute changes; RUE US: negative for clot  -Stroke/Neuro consulted: suspicious 2/2 toxic/metabolic causes, particularly CNS acting medications, low suspicion for seizure activities.   -Pain management consulted: Methadone 5 mg PO q6h PRN breakthrough pain, c/w Methadone 10 mg PO TID (confirmed by Istop). Tylenol 650 mg PO q6h PRN moderate pain  -Psychiatry Consulted: Rec discontinuing Amitriptyline 10mg QD, Buspirone 15mg QD, Escitalopram 20mg QD, Trazadone 50mg QD - low risk of withdrawal.   -CONT: Methadone as above, Gabapentin 600mg BID, Melatonin 3mg QD

## 2021-12-21 NOTE — PROGRESS NOTE ADULT - PROBLEM SELECTOR PLAN 7
Hgb A1c: 8.4 (prior admission)  - CONT: Lantus 20 units QD, Lispro 10 units before meals w/ mISS    PT: Home with home PT  DVT:  Eliquis 2.5 BID  Dispo: pending decision on HD  Case d/w Dr. Hardy Followed by Dr. Ruiz, was on Dexamethasone and Ninlaro weekly (Monday)  -Holding Ninlaro due to bullae on right foot, per Dr. Ruiz it is unlikely the cause of the bullae but can be held at this time.   - Will order dexamethasone weekly dose 20mg.   - Right foot bullae x2. No signs of infection. Continue dry dressing with RN

## 2021-12-21 NOTE — CONSULT NOTE ADULT - SUBJECTIVE AND OBJECTIVE BOX
CONSULT:      HPI:  62 yo obese Female, current smoker, with Contrast/Aspirin Allergy and an extensive PMHx including HTN, NICM, chronic HFrEF (EF 25% s/p AICD for primary prevention battery changed 8/2020, w/ cardiac Mems device), IDDM c/b neuropathy (on Methadone), hx of recurrent DVTs (on Eliquis), CKD IV, CVA x2 (most recently in 2013 with residual right sided weakness), PAD s/p bypass, COPD (on 3L home O2), DHARMESH on CPAP, Multiple Myeloma (on [place]o), hypothyroidism, depression admitted to [facility] 7 days ago with hemoptysis, 2/2 acute on chronic CHF exacerbation and RITCHIE.  Called to see patient for new urinary retention. Patient had RP US 12/18 which showed b/l hydr and distended bladder. Per primary team patient had elevated PVR today, was straight cathed and put out 1250cc clear urine. Per patient she was not uncomfortable, nor did she have a urge to urinate when they told her that her bladder was full. She states that at home she voids multiple times per day and night for large quantities she states she always feels like she empties her bladder and denies any recent uti, dysuria, urgency, frequency or hematuria. She states she normally ambulates more at  home then she has been here at the hospital, she also says that she has been voiding in bed through the primafit rather then getting up and sitting on the toilet. She denies constipation. She denies having ever seen a urologist before. Of note Patient UA from 12/18 + nitrites, patient is recieving ceftriaxone. Denies any abd or flank pain.       Vital Signs Last 24 Hrs  T(C): 36.6 (21 Dec 2021 10:25), Max: 37 (20 Dec 2021 21:52)  T(F): 97.9 (21 Dec 2021 10:25), Max: 98.6 (20 Dec 2021 21:52)  HR: 70 (21 Dec 2021 10:24) (62 - 86)  BP: 107/53 (21 Dec 2021 10:24) (93/48 - 128/60)  BP(mean): --  RR: 18 (21 Dec 2021 10:24) (16 - 18)  SpO2: 95% (21 Dec 2021 10:24) (95% - 100%)  I&O's Summary    20 Dec 2021 07:01  -  21 Dec 2021 07:00  --------------------------------------------------------  IN: 420 mL / OUT: 850 mL / NET: -430 mL    21 Dec 2021 07:01  -  21 Dec 2021 13:58  --------------------------------------------------------  IN: 0 mL / OUT: 1400 mL / NET: -1400 mL        PE:  Gen: nad  Abd: soft, nd, nt   : clear urine    LABS:                        9.3    8.00  )-----------( 174      ( 21 Dec 2021 07:10 )             31.0     12-21    135  |  93<L>  |  81<H>  ----------------------------<  117<H>  5.4<H>   |  28  |  5.16<H>    Ca    8.8      21 Dec 2021 07:10  Phos  7.7     12-21  Mg     2.0     12-21    TPro  7.0  /  Alb  2.8<L>  /  TBili  0.2  /  DBili  x   /  AST  18  /  ALT  8<L>  /  AlkPhos  75  12-21    PTT - ( 21 Dec 2021 07:10 )  PTT:33.7 sec  Cultures      A/P 62 yo f with urinary retention 2/2 UTI vs neurogenic bladder seeing as patient was not uncomfortable with 1250 in her bladder, likely used to holding larger residuals  1) Rec repeat PVR if elevated would consider medina to maximally drain until Cr back to baseline  2) cont abx  3) trend cr  4) will need outpt follow up for urodynamics  5) discussed with gu team

## 2021-12-21 NOTE — PROGRESS NOTE ADULT - ATTENDING COMMENTS
have reviewed pt's increasing creat, with report of increased uo episodically with increased diuresis and with decrease in bnp.   most recent sono reveals apparent bladder retention, and fo;nadia removed 1.2 l     will now observe uo with medina and request  consult.  discussed with team.

## 2021-12-21 NOTE — PROGRESS NOTE ADULT - ASSESSMENT
61yoF, current smoker, with PMH of HTN, HLD, DM-II, CKD Stage 4, Hypothyroidism, known NICM with EF 25% (s/p AICD for primary prevention), MM, COPD (3L home O2), recurrent DVTs (on Eliquis) admitted for CHF exacerbation and anemia in the setting of hemoptysis. Vascular consulted for RLE DVT and AVF creation in the setting of possible new HD.     US reviewed (12/20/21):  R CFV non-occlusive thrombus unchanged vs previous imaging  Continue anticoagulation   Obtain b/l UE vein mapping, ordered  F/u outpatient Heme/Onc (Dr. Ruiz)  Team 3c, Vascular surgery will continue to follow       Plan discussed with attending and chief resident.   ____________________________________________________  [facility] Resident   Surgery  61yoF, current smoker, with PMH of HTN, HLD, DM-II, CKD Stage 4, Hypothyroidism, known NICM with EF 25% (s/p AICD for primary prevention), MM, COPD (3L home O2), recurrent DVTs (on Eliquis) admitted for CHF exacerbation and anemia in the setting of hemoptysis. Vascular consulted for RLE DVT and AVF creation in the setting of possible new HD.     US reviewed (12/20/21):  R CFV non-occlusive thrombus unchanged vs previous imaging  Continue anticoagulation   Obtain b/l UE vein mapping, ordered and spoke with team, plan to complete on this admission   Per conversation with Cardiology and Nephrology teams, no plan for urgent HD on this admission. Can plan for outpatient follow-up pending HD decision/need.   Team 3c, Vascular surgery will sign off at this time. Reconsult as needed.     Please follow-up with Dr. Medeiros outpatient if plan for HD and need for AVF creation.     Dr. Marina Medeiros   [address]   [place] [id number] [phone]    Plan discussed with attending and chief resident.   ____________________________________________________  ISAIAS Valente - Resident   Surgery  ana

## 2021-12-21 NOTE — PROGRESS NOTE ADULT - ATTENDING COMMENTS
Initial attending contact date  12/16/21    . See PA note written above for details. I reviewed the PA documentation. I have personally seen and examined this patient. I reviewed vitals, labs, medications, cardiac studies, and additional imaging. I agree with the above PA's findings and plans as written above with the following additions/statements.    61F obese, HTN, HLD, DM-II, Hypothyroidism, CRI, COPD on  home O2 (3L), chronic DVT , fibromyalgia on methadone, known NICM with EF 25% with AICD, MM, CVA x 2 with residual R sided weakness admitted with mild hemoptysis in context of chronic dry cough and acute on chronic anemia  -With suboptimal diuresis throughout hospital course despite increasing IV diuresis  -CXR improved with BNP decreased from 79304 to 4000  -On 3 L NC satting 99% which is her baseline  -Chronic LE DVT known from prior- restart home eliquis 2.5 mg bid   -Crt uptrending, holding diuretic. Ward inserted for urinary retention. Appreciate urology recs  -Will likely need HD in future, however she is hesitant at this time but amenable to vein mapping studies  -Cont toprol 100 mg qd (home dose 200 mg qd), entresto 97/103 bid   -PT recs: Home with home PT  -Likely DC 12/22

## 2021-12-21 NOTE — CONSULT NOTE ADULT - SUPERVISING ATTENDING
Patients with significant renal failure and significant retention should have an indwelling medina until sCre is stabilized. While I realize the risk of CAUTIs there is an even greater risk from ARF

## 2021-12-21 NOTE — PROGRESS NOTE ADULT - PROBLEM SELECTOR PLAN 5
Visit Information Date & Time Provider Department Dept. Phone Encounter #  
 6/8/2017  3:20 PM Mckinley Baum MD [facility] [address] [phone] [id number] Upcoming Health Maintenance Date Due Hepatitis C Screening 1946 DTaP/Tdap/Td series (1 - Tdap) 5/17/1969 FOBT Q 1 YEAR AGE 50-75 5/17/1998 ZOSTER VACCINE AGE 60> 5/17/2008 GLAUCOMA SCREENING Q2Y 5/17/2013 Pneumococcal 65+ Low/Medium Risk (1 of 2 - PCV13) 5/17/2013 MEDICARE YEARLY EXAM 5/17/2013 INFLUENZA AGE 9 TO ADULT 8/1/2017 Allergies as of 6/8/2017  Review Complete On: 6/8/2017 By: [place] No Known Allergies Current Immunizations  Never Reviewed No immunizations on file. Not reviewed this visit Vitals BP Pulse Height(growth percentile) Weight(growth percentile) SpO2 BMI  
 144/78 (BP 1 Location: Right arm, BP Patient Position: Sitting) 64 5' 8.5\" (1.74 m) 177 lb 12.8 oz (80.6 kg) 96% 26.64 kg/m2 Smoking Status Heavy Tobacco Smoker BMI and BSA Data Body Mass Index Body Surface Area  
 26.64 kg/m 2 1.97 m 2 Preferred Pharmacy Pharmacy Name Phone [id number] [address] [facility] [phone] Your Updated Medication List  
  
   
This list is accurate as of: 6/8/17  3:42 PM.  Always use your most recent med list.  
  
  
  
  
 Azelastine 0.15 % (205.5 mcg) nasal spray Commonly known as:  ASTEPRO  
1-2 Sprays by Both Nostrils route two (2) times a day. clobetasol 0.05 % ointment Commonly known as:  Bautista Bereket Apply  to affected area two (2) times daily as needed for Skin Irritation. pantoprazole 40 mg tablet Commonly known as:  PROTONIX Take 1 Tab by mouth daily. * propranolol 80 mg tablet Commonly known as:  INDERAL Take 1 Tab by mouth two (2) times a day. Take with 40mg tab, for 120mg two times daily. * propranolol 40 mg tablet Commonly known as:  INDERAL Take 1 Tab by mouth two (2) times a day. Take with 80mg tab, for 120mg two times daily. * Notice: This list has 2 medication(s) that are the same as other medications prescribed for you. Read the directions carefully, and ask your doctor or other care provider to review them with you. Introducing [facility]! Toribio Nyhan introduces [employer] patient portal. Now you can access parts of your medical record, email your doctor's office, and request medication refills online. 1. In your internet browser, go to [email or web address] 2. Click on the First Time User? Click Here link in the Sign In box. You will see the New Member Sign Up page. 3. Enter your [employer] Access Code exactly as it appears below. You will not need to use this code after youve completed the sign-up process. If you do not sign up before the expiration date, you must request a new code. · [employer] Access Code: [email or web address] Expires: 7/24/2017 10:23 AM 
 
4. Enter the last four digits of your Social Security Number (xxxx) and Date of Birth (mm/dd/yyyy) as indicated and click Submit. You will be taken to the next sign-up page. 5. Create a [employer] ID. This will be your [employer] login ID and cannot be changed, so think of one that is secure and easy to remember. 6. Create a [employer] password. You can change your password at any time. 7. Enter your Password Reset Question and Answer. This can be used at a later time if you forget your password. 8. Enter your e-mail address. You will receive e-mail notification when new information is available in [address] 9. Click Sign Up. You can now view and download portions of your medical record. 10. Click the Download Summary menu link to download a portable copy of your medical information.  
 
If you have questions, please visit the Frequently Asked Questions section of the [employer]. Remember, [employer] is NOT to be used for urgent needs. For medical emergencies, dial 911. Now available from your iPhone and Android! Please provide this summary of care documentation to your next provider. Your primary care clinician is listed as [address] [facility]. If you have any questions after today's visit, please call [phone]. s/p 1 unit pRBC on 12/15 w/ appropriate response, now stable at 9.8  - Maintain active T&S (next due 12/24) * RESOLVED *   - CT Chest 12/16/21: Mild pulmonary edema and trace right pleural effusion. No PE.  - Pulmonary consulted: hemoptysis is mild, likely due to cough in combination with increased hydrostatic pressure in capillary beds from decompensated heart failure. NTD.  - RVP, COVID and urine legionella negative  - Sputum Cx growing Proteus Mirabilis w/ elevated procalcitonin, no infiltrate on CXR/WBC and afebrile -- started CTX 1G q 24hrs x 5 days (last day 12/22). Of note: will also cover UTI  - CONT: cough suppressants    #COPD  - CONT: Symbicort BID and PRN duonebs q6h w/ CPAP  - Wean O2 for goal SpO2 88-92%  - Outpatient follow up with Dr. Nish Payne in 2-3 weeks after discharge.

## 2021-12-21 NOTE — PROGRESS NOTE ADULT - SUBJECTIVE AND OBJECTIVE BOX
HX: CHF, possible new HD, R CFV DVT     SUBJECTIVE: Patient seen and examined bedside by chief resident.    apixaban 2.5 milliGRAM(s) Oral every 12 hours  cefTRIAXone   IVPB 1000 milliGRAM(s) IV Intermittent every 24 hours  metoprolol succinate  milliGRAM(s) Oral daily  sacubitril 97 mG/valsartan 103 mG 1 Tablet(s) Oral two times a day    MEDICATIONS  (PRN):  acetaminophen     Tablet .. 650 milliGRAM(s) Oral every 6 hours PRN Mild Pain (1 - 3)  ALBUTerol   0.5% 2.5 milliGRAM(s) Nebulizer every 6 hours PRN Shortness of Breath and/or Wheezing  benzonatate 100 milliGRAM(s) Oral every 8 hours PRN Cough  guaiFENesin Oral Liquid (Sugar-Free) 100 milliGRAM(s) Oral every 6 hours PRN Cough  methadone    Tablet 5 milliGRAM(s) Oral every 6 hours PRN Moderate Pain (4 - 6)      I&O's Detail    20 Dec 2021 07:01  -  21 Dec 2021 07:00  --------------------------------------------------------  IN:    Oral Fluid: 420 mL  Total IN: 420 mL    OUT:    Voided (mL): 850 mL  Total OUT: 850 mL    Total NET: -430 mL          Vital Signs Last 24 Hrs  T(C): 36.2 (21 Dec 2021 05:18), Max: 37 (20 Dec 2021 21:52)  T(F): 97.1 (21 Dec 2021 05:18), Max: 98.6 (20 Dec 2021 21:52)  HR: 66 (21 Dec 2021 05:22) (61 - 86)  BP: 111/52 (21 Dec 2021 05:22) (93/48 - 125/60)  BP(mean): --  RR: 16 (21 Dec 2021 05:22) (16 - 18)  SpO2: 98% (21 Dec 2021 05:22) (96% - 100%)    PHYSICAL EXAM:   Gen: NAD, resting comfortably   CV: NSR  Pulm: no respiratory distress on RA, CTAB   Abd: soft, ND, NTTP, no rebound or guarding   Ext: WWP, no edema   Neuro: motor/sensory grossly intact     LABS:                        9.8    8.29  )-----------( 166      ( 20 Dec 2021 06:37 )             31.6     12-20    138  |  96  |  79<H>  ----------------------------<  148<H>  4.7   |  31  |  4.81<H>    Ca    8.8      20 Dec 2021 06:37  Phos  7.1     12-20  Mg     2.0     12-20    TPro  7.7  /  Alb  3.5  /  TBili  0.3  /  DBili  0.2  /  AST  12  /  ALT  8<L>  /  AlkPhos  81  12-20    LIVER FUNCTIONS - ( 20 Dec 2021 13:05 )  Alb: 3.5 g/dL / Pro: 7.7 g/dL / ALK PHOS: 81 U/L / ALT: 8 U/L / AST: 12 U/L / GGT: x           PTT - ( 20 Dec 2021 13:29 )  PTT:84.5 sec  CAPILLARY BLOOD GLUCOSE      POCT Blood Glucose.: 120 mg/dL (21 Dec 2021 06:29)  POCT Blood Glucose.: 105 mg/dL (21 Dec 2021 00:01)  POCT Blood Glucose.: 73 mg/dL (20 Dec 2021 21:23)  POCT Blood Glucose.: 123 mg/dL (20 Dec 2021 16:37)  POCT Blood Glucose.: 155 mg/dL (20 Dec 2021 12:34)            Culture - Sputum (collected 16 Dec 2021 08:37)  Source: .Sputum Sputum  Gram Stain (16 Dec 2021 18:19):    Rare epithelial cells    Few-moderate White blood cells    Few-moderate Gram Negative Rods    Rare to few Gram Positive Cocci in Pairs and Chains  Final Report (18 Dec 2021 10:20):    Numerous Proteus mirabilis    Accompanied by normal respiratory diane  Organism: Proteus mirabilis (18 Dec 2021 10:20)  Organism: Proteus mirabilis (18 Dec 2021 10:20)           RADIOLOGY & ADDITIONAL STUDIES:  < from: US Duplex Venous Lower Ext Complete, Bilateral (12.20.21 @ 12:03) >    ACC: [id number] EXAM:  US DPLX LWR EXT VEINS COMPL BI                          PROCEDURE DATE:  12/20/2021          INTERPRETATION:  CLINICAL INFORMATION: DVT. Follow-up.    COMPARISON: Right lower extremity Doppler ultrasound dated 12/18/2021.    TECHNIQUE: Duplex sonography of the BILATERAL LOWER extremity veins with   color and spectral Doppler, with and without compression.    FINDINGS:    RIGHT:    The right common femoral vein demonstrates only partial compressibility   and intraluminal hypoechoic material consistent with thrombus. This   appearance is not significantly changed since prior exam.    Normal compressibility of the RIGHT femoral and popliteal veins.  Doppler examination shows normal spontaneous and phasic flow.  Limited evaluation of the right paired posterior tibial and peroneal   veins due to bandages.      LEFT:  Normal compressibility of the LEFT common femoral, femoral and popliteal   veins.  Doppler examination shows normal spontaneous and phasic flow.  No LEFT calf vein thrombosis is detected.    IMPRESSION:  Since 12/18/2021, unchanged nonocclusive thrombus within the right common   femoral vein.          --- End of Report ---          TAYLOR KENYON MD; Resident Radiologist  This document has been electronicallysigned.  JERALD ROOT MD; Attending Radiologist  This document has been electronically signed. Dec 20 2021 12:38PM    < end of copied text >     HX: CHF, possible new HD, R CFV DVT     SUBJECTIVE: Patient seen and examined bedside. States she is overall feeling well. Denies N/V, tolerating diet. Denies CP, SOB, fevers, chills. Teary eyed and sad about the prospect of HD/fistula creation.     apixaban 2.5 milliGRAM(s) Oral every 12 hours  cefTRIAXone   IVPB 1000 milliGRAM(s) IV Intermittent every 24 hours  metoprolol succinate  milliGRAM(s) Oral daily  sacubitril 97 mG/valsartan 103 mG 1 Tablet(s) Oral two times a day    MEDICATIONS  (PRN):  acetaminophen     Tablet .. 650 milliGRAM(s) Oral every 6 hours PRN Mild Pain (1 - 3)  ALBUTerol   0.5% 2.5 milliGRAM(s) Nebulizer every 6 hours PRN Shortness of Breath and/or Wheezing  benzonatate 100 milliGRAM(s) Oral every 8 hours PRN Cough  guaiFENesin Oral Liquid (Sugar-Free) 100 milliGRAM(s) Oral every 6 hours PRN Cough  methadone    Tablet 5 milliGRAM(s) Oral every 6 hours PRN Moderate Pain (4 - 6)      I&O's Detail    20 Dec 2021 07:01  -  21 Dec 2021 07:00  --------------------------------------------------------  IN:    Oral Fluid: 420 mL  Total IN: 420 mL    OUT:    Voided (mL): 850 mL  Total OUT: 850 mL    Total NET: -430 mL          Vital Signs Last 24 Hrs  T(C): 36.2 (21 Dec 2021 05:18), Max: 37 (20 Dec 2021 21:52)  T(F): 97.1 (21 Dec 2021 05:18), Max: 98.6 (20 Dec 2021 21:52)  HR: 66 (21 Dec 2021 05:22) (61 - 86)  BP: 111/52 (21 Dec 2021 05:22) (93/48 - 125/60)  BP(mean): --  RR: 16 (21 Dec 2021 05:22) (16 - 18)  SpO2: 98% (21 Dec 2021 05:22) (96% - 100%)    PHYSICAL EXAM:   Gen: NAD, resting comfortably in bed eating  Pulm: no respiratory distress on NC, wet cough   Ext: WWP, mild LUE edema, palpable radial and unlar pulses b/l, b/l LE with mild pitting edema symmetric, NTTP, R foot blister, LLE well healed bypass scar  Neuro: motor/sensory grossly intact     LABS:                        9.8    8.29  )-----------( 166      ( 20 Dec 2021 06:37 )             31.6     12-20    138  |  96  |  79<H>  ----------------------------<  148<H>  4.7   |  31  |  4.81<H>    Ca    8.8      20 Dec 2021 06:37  Phos  7.1     12-20  Mg     2.0     12-20    TPro  7.7  /  Alb  3.5  /  TBili  0.3  /  DBili  0.2  /  AST  12  /  ALT  8<L>  /  AlkPhos  81  12-20    LIVER FUNCTIONS - ( 20 Dec 2021 13:05 )  Alb: 3.5 g/dL / Pro: 7.7 g/dL / ALK PHOS: 81 U/L / ALT: 8 U/L / AST: 12 U/L / GGT: x           PTT - ( 20 Dec 2021 13:29 )  PTT:84.5 sec  CAPILLARY BLOOD GLUCOSE      POCT Blood Glucose.: 120 mg/dL (21 Dec 2021 06:29)  POCT Blood Glucose.: 105 mg/dL (21 Dec 2021 00:01)  POCT Blood Glucose.: 73 mg/dL (20 Dec 2021 21:23)  POCT Blood Glucose.: 123 mg/dL (20 Dec 2021 16:37)  POCT Blood Glucose.: 155 mg/dL (20 Dec 2021 12:34)            Culture - Sputum (collected 16 Dec 2021 08:37)  Source: .Sputum Sputum  Gram Stain (16 Dec 2021 18:19):    Rare epithelial cells    Few-moderate White blood cells    Few-moderate Gram Negative Rods    Rare to few Gram Positive Cocci in Pairs and Chains  Final Report (18 Dec 2021 10:20):    Numerous Proteus mirabilis    Accompanied by normal respiratory diane  Organism: Proteus mirabilis (18 Dec 2021 10:20)  Organism: Proteus mirabilis (18 Dec 2021 10:20)           RADIOLOGY & ADDITIONAL STUDIES:  < from: US Duplex Venous Lower Ext Complete, Bilateral (12.20.21 @ 12:03) >    ACC: [id number] EXAM:  US DPLX LWR EXT VEINS COMPL BI                          PROCEDURE DATE:  12/20/2021          INTERPRETATION:  CLINICAL INFORMATION: DVT. Follow-up.    COMPARISON: Right lower extremity Doppler ultrasound dated 12/18/2021.    TECHNIQUE: Duplex sonography of the BILATERAL LOWER extremity veins with   color and spectral Doppler, with and without compression.    FINDINGS:    RIGHT:    The right common femoral vein demonstrates only partial compressibility   and intraluminal hypoechoic material consistent with thrombus. This   appearance is not significantly changed since prior exam.    Normal compressibility of the RIGHT femoral and popliteal veins.  Doppler examination shows normal spontaneous and phasic flow.  Limited evaluation of the right paired posterior tibial and peroneal   veins due to bandages.      LEFT:  Normal compressibility of the LEFT common femoral, femoral and popliteal   veins.  Doppler examination shows normal spontaneous and phasic flow.  No LEFT calf vein thrombosis is detected.    IMPRESSION:  Since 12/18/2021, unchanged nonocclusive thrombus within the right common   femoral vein.          --- End of Report ---          TAYLOR KENYON MD; Resident Radiologist  This document has been electronicallysigned.  JERALD ROOT MD; Attending Radiologist  This document has been electronically signed. Dec 20 2021 12:38PM    < end of copied text >

## 2021-12-21 NOTE — PROGRESS NOTE ADULT - SUBJECTIVE AND OBJECTIVE BOX
NOT COMPLETE    Interventional Cardiology PA Adult Progress Note    Subjective Assessment:  	  MEDICATIONS:  metoprolol succinate  milliGRAM(s) Oral daily  sacubitril 97 mG/valsartan 103 mG 1 Tablet(s) Oral two times a day    cefTRIAXone   IVPB 1000 milliGRAM(s) IV Intermittent every 24 hours    ALBUTerol   0.5% 2.5 milliGRAM(s) Nebulizer every 6 hours PRN  benzonatate 100 milliGRAM(s) Oral every 8 hours PRN  budesonide  80 MICROgram(s)/formoterol 4.5 MICROgram(s) Inhaler 2 Puff(s) Inhalation two times a day  guaiFENesin Oral Liquid (Sugar-Free) 100 milliGRAM(s) Oral every 6 hours PRN    acetaminophen     Tablet .. 650 milliGRAM(s) Oral every 6 hours PRN  gabapentin 600 milliGRAM(s) Oral two times a day  melatonin 3 milliGRAM(s) Oral at bedtime  methadone    Tablet 5 milliGRAM(s) Oral every 6 hours PRN  methadone    Tablet 10 milliGRAM(s) Oral three times a day    pantoprazole    Tablet 40 milliGRAM(s) Oral before breakfast  polyethylene glycol 3350 17 Gram(s) Oral daily PRN  senna 2 Tablet(s) Oral at bedtime    atorvastatin 80 milliGRAM(s) Oral at bedtime  dextrose 40% Gel 15 Gram(s) Oral once  dextrose 50% Injectable 25 Gram(s) IV Push once  dextrose 50% Injectable 12.5 Gram(s) IV Push once  dextrose 50% Injectable 25 Gram(s) IV Push once  fenofibrate Tablet 48 milliGRAM(s) Oral daily  glucagon  Injectable 1 milliGRAM(s) IntraMuscular once  insulin glargine Injectable (LANTUS) 10 Unit(s) SubCutaneous at bedtime  insulin lispro (ADMELOG) corrective regimen sliding scale   SubCutaneous Before meals and at bedtime  insulin lispro Injectable (ADMELOG) 5 Unit(s) SubCutaneous three times a day before meals  levothyroxine 25 MICROGram(s) Oral daily    apixaban 2.5 milliGRAM(s) Oral every 12 hours  dextrose 5%. 1000 milliLiter(s) IV Continuous <Continuous>  dextrose 5%. 1000 milliLiter(s) IV Continuous <Continuous>  lidocaine   4% Patch 1 Patch Transdermal daily      	    [PHYSICAL EXAM:  TELEMETRY:  T(C): 37.1 (12-21-21 @ 15:14), Max: 37.1 (12-21-21 @ 15:14)  HR: 72 (12-21-21 @ 14:30) (62 - 86)  BP: 95/50 (12-21-21 @ 14:30) (95/50 - 128/60)  RR: 18 (12-21-21 @ 14:30) (16 - 18)  SpO2: 95% (12-21-21 @ 14:30) (95% - 100%)  Wt(kg): --  I&O's Summary    20 Dec 2021 07:01  -  21 Dec 2021 07:00  --------------------------------------------------------  IN: 420 mL / OUT: 850 mL / NET: -430 mL    21 Dec 2021 07:01  -  21 Dec 2021 16:37  --------------------------------------------------------  IN: 0 mL / OUT: 1400 mL / NET: -1400 mL        Ward:  Central/PICC/Mid Line:                                         Appearance: Normal	  HEENT:   Normal oral mucosa, PERRL, EOMI	  Neck: Supple, + JVD/ - JVD; Carotid Bruit   Cardiovascular: Normal S1 S2, No JVD, No murmurs,   Respiratory: Lungs clear to auscultation/Decreased Breath Sounds/No Rales, Rhonchi, Wheezing	  Gastrointestinal:  Soft, Non-tender, + BS	  Skin: No rashes, No ecchymoses, No cyanosis  Extremities: Normal range of motion, No clubbing, cyanosis or edema  Vascular: Peripheral pulses palpable 2+ bilaterally  Neurologic: Non-focal  Psychiatry: A & O x 3, Mood & affect appropriate      	    ECG:  	  RADIOLOGY:   DIAGNOSTIC TESTING:  [ ] Echocardiogram:  [ ]  Catheterization:  [ ] Stress Test:    [ ] DELLA  OTHER: 	    LABS:	 	  CARDIAC MARKERS:                                  9.3    8.00  )-----------( 174      ( 21 Dec 2021 07:10 )             31.0     12-21    135  |  93<L>  |  81<H>  ----------------------------<  117<H>  5.4<H>   |  28  |  5.16<H>    Ca    8.8      21 Dec 2021 07:10  Phos  7.7     12-21  Mg     2.0     12-21    TPro  7.0  /  Alb  2.8<L>  /  TBili  0.2  /  DBili  x   /  AST  18  /  ALT  8<L>  /  AlkPhos  75  12-21    proBNP:   Lipid Profile:   HgA1c:   TSH:   PTT - ( 21 Dec 2021 07:10 )  PTT:33.7 sec    ASSESSMENT/PLAN: 	        DVT ppx:  Dispo:     Interventional Cardiology PA Adult Progress Note    Subjective Assessment:  Patient seen and examined at bedside, unhappy in regards to kidney dysfunction.  otherwise states her breathing has improved  ROS negative except per HPI and subjective  	  MEDICATIONS:  metoprolol succinate  milliGRAM(s) Oral daily  sacubitril 97 mG/valsartan 103 mG 1 Tablet(s) Oral two times a day  cefTRIAXone   IVPB 1000 milliGRAM(s) IV Intermittent every 24 hours  ALBUTerol   0.5% 2.5 milliGRAM(s) Nebulizer every 6 hours PRN  benzonatate 100 milliGRAM(s) Oral every 8 hours PRN  budesonide  80 MICROgram(s)/formoterol 4.5 MICROgram(s) Inhaler 2 Puff(s) Inhalation two times a day  guaiFENesin Oral Liquid (Sugar-Free) 100 milliGRAM(s) Oral every 6 hours PRN  acetaminophen     Tablet .. 650 milliGRAM(s) Oral every 6 hours PRN  gabapentin 600 milliGRAM(s) Oral two times a day  melatonin 3 milliGRAM(s) Oral at bedtime  methadone    Tablet 5 milliGRAM(s) Oral every 6 hours PRN  methadone    Tablet 10 milliGRAM(s) Oral three times a day  pantoprazole    Tablet 40 milliGRAM(s) Oral before breakfast  polyethylene glycol 3350 17 Gram(s) Oral daily PRN  senna 2 Tablet(s) Oral at bedtime  atorvastatin 80 milliGRAM(s) Oral at bedtime  dextrose 40% Gel 15 Gram(s) Oral once  dextrose 50% Injectable 25 Gram(s) IV Push once  dextrose 50% Injectable 12.5 Gram(s) IV Push once  dextrose 50% Injectable 25 Gram(s) IV Push once  fenofibrate Tablet 48 milliGRAM(s) Oral daily  glucagon  Injectable 1 milliGRAM(s) IntraMuscular once  insulin glargine Injectable (LANTUS) 10 Unit(s) SubCutaneous at bedtime  insulin lispro (ADMELOG) corrective regimen sliding scale   SubCutaneous Before meals and at bedtime  insulin lispro Injectable (ADMELOG) 5 Unit(s) SubCutaneous three times a day before meals  levothyroxine 25 MICROGram(s) Oral daily  apixaban 2.5 milliGRAM(s) Oral every 12 hours  dextrose 5%. 1000 milliLiter(s) IV Continuous <Continuous>  dextrose 5%. 1000 milliLiter(s) IV Continuous <Continuous>  lidocaine   4% Patch 1 Patch Transdermal daily  	    [PHYSICAL EXAM:  TELEMETRY:  T(C): 37.1 (12-21-21 @ 15:14), Max: 37.1 (12-21-21 @ 15:14)  HR: 72 (12-21-21 @ 14:30) (62 - 86)  BP: 95/50 (12-21-21 @ 14:30) (95/50 - 128/60)  RR: 18 (12-21-21 @ 14:30) (16 - 18)  SpO2: 95% (12-21-21 @ 14:30) (95% - 100%)  Wt(kg): --  I&O's Summary    20 Dec 2021 07:01  -  21 Dec 2021 07:00  --------------------------------------------------------  IN: 420 mL / OUT: 850 mL / NET: -430 mL    21 Dec 2021 07:01  -  21 Dec 2021 16:37  --------------------------------------------------------  IN: 0 mL / OUT: 1400 mL / NET: -1400 mL        Ward: placed.                                        Appearance: Normal	  HEENT:   Normal oral mucosa, PERRL, EOMI	  Neck: Supple,  - JVD; Carotid Bruit   Cardiovascular: Normal S1 S2, No JVD, No murmurs,   Respiratory: + ronchi   Gastrointestinal:  Soft, Non-tender, + BS	  Skin: No rashes, No ecchymoses, No cyanosis  Extremities: Normal range of motion, No clubbing, cyanosis or edema  Vascular: Peripheral pulses palpable 2+ bilaterally  Neurologic: Non-focal  Psychiatry: A & O x 3, Mood & affect appropriate      	    LABS:	 	  CARDIAC MARKERS:                            9.3    8.00  )-----------( 174      ( 21 Dec 2021 07:10 )             31.0     12-21    135  |  93<L>  |  81<H>  ----------------------------<  117<H>  5.4<H>   |  28  |  5.16<H>    Ca    8.8      21 Dec 2021 07:10  Phos  7.7     12-21  Mg     2.0     12-21    TPro  7.0  /  Alb  2.8<L>  /  TBili  0.2  /  DBili  x   /  AST  18  /  ALT  8<L>  /  AlkPhos  75  12-21    proBNP:   Lipid Profile:   HgA1c:   TSH:   PTT - ( 21 Dec 2021 07:10 )  PTT:33.7 sec    ASSESSMENT/PLAN: 	        DVT ppx:  Dispo:

## 2021-12-21 NOTE — PROGRESS NOTE ADULT - SUBJECTIVE AND OBJECTIVE BOX
Patient is a 61y Female seen and evaluated at bedside. patient states that       Meds:    acetaminophen     Tablet .. 650 every 6 hours PRN  ALBUTerol   0.5% 2.5 every 6 hours PRN  apixaban 2.5 every 12 hours  atorvastatin 80 at bedtime  benzonatate 100 every 8 hours PRN  budesonide  80 MICROgram(s)/formoterol 4.5 MICROgram(s) Inhaler 2 two times a day  cefTRIAXone   IVPB 1000 every 24 hours  dextrose 40% Gel 15 once  dextrose 5%. 1000 <Continuous>  dextrose 5%. 1000 <Continuous>  dextrose 50% Injectable 25 once  dextrose 50% Injectable 12.5 once  dextrose 50% Injectable 25 once  fenofibrate Tablet 48 daily  gabapentin 600 two times a day  glucagon  Injectable 1 once  guaiFENesin Oral Liquid (Sugar-Free) 100 every 6 hours PRN  insulin glargine Injectable (LANTUS) 10 at bedtime  insulin lispro (ADMELOG) corrective regimen sliding scale  Before meals and at bedtime  insulin lispro Injectable (ADMELOG) 5 three times a day before meals  levothyroxine 25 daily  lidocaine   4% Patch 1 daily  melatonin 3 at bedtime  methadone    Tablet 5 every 6 hours PRN  methadone    Tablet 10 three times a day  metoprolol succinate  daily  pantoprazole    Tablet 40 before breakfast  polyethylene glycol 3350 17 daily PRN  sacubitril 97 mG/valsartan 103 mG 1 two times a day  senna 2 at bedtime  sodium zirconium cyclosilicate 5 once      T(C): , Max: 37 (12-20-21 @ 21:52)  T(F): , Max: 98.6 (12-20-21 @ 21:52)  HR: 70 (12-21-21 @ 10:24)  BP: 107/53 (12-21-21 @ 10:24)  BP(mean): --  RR: 18 (12-21-21 @ 10:24)  SpO2: 95% (12-21-21 @ 10:24)  Wt(kg): --    12-20 @ 07:01  -  12-21 @ 07:00  --------------------------------------------------------  IN: 420 mL / OUT: 850 mL / NET: -430 mL    12-21 @ 07:01  -  12-21 @ 13:29  --------------------------------------------------------  IN: 0 mL / OUT: 1400 mL / NET: -1400 mL          Review of Systems:  CONSTITUTIONAL: No fever or chills, No fatigue or tiredness  RESPIRATORY: No shortness of breath, cough, hemoptysis  CARDIOVASCULAR: No chest pain or shortness of breath  GASTROINTESTINAL: No abdominal or flank pain, No nausea or vomiting, No diarrhea  GENITOURINARY: No dysuria or urinary burning, No difficulty passing urine, No hematuria  NEUROLOGICAL: No headaches or blurred vision  SKIN: No skin rashes   MUSCULOSKELETAL: No arthralgia, No leg edema, No muscle pain      PHYSICAL EXAM:  GENERAL: well-developed, well nourished, alert, no acute distress at present  NECK: supple, No JVD  CHEST/LUNG: Clear to auscultation bilaterally  HEART: normal S1S2, RRR  ABDOMEN: Soft, Nontender, +BS, No flank tenderness bilateral  EXTREMITIES: No clubbing, cyanosis, or edema   NEUROLOGY: AAO x3, no focal neurological deficit  ACCESS: good thrill and bruit appreciated      LABS:                        9.3    8.00  )-----------( 174      ( 21 Dec 2021 07:10 )             31.0     12-21    135  |  93<L>  |  81<H>  ----------------------------<  117<H>  5.4<H>   |  28  |  5.16<H>    Ca    8.8      21 Dec 2021 07:10  Phos  7.7     12-21  Mg     2.0     12-21    TPro  7.0  /  Alb  2.8<L>  /  TBili  0.2  /  DBili  x   /  AST  18  /  ALT  8<L>  /  AlkPhos  75  12-21      PTT - ( 21 Dec 2021 07:10 )  PTT:33.7 sec          RADIOLOGY & ADDITIONAL STUDIES:           Patient is a 61y Female seen and evaluated at bedside. patient states that she is feeling OK but nervous that her kidney function is getting worse; straight cath done found to be retaining 1250ml ; remain hemodynamically stable ; electrolytes and renal function increasing      Meds:    acetaminophen     Tablet .. 650 every 6 hours PRN  ALBUTerol   0.5% 2.5 every 6 hours PRN  apixaban 2.5 every 12 hours  atorvastatin 80 at bedtime  benzonatate 100 every 8 hours PRN  budesonide  80 MICROgram(s)/formoterol 4.5 MICROgram(s) Inhaler 2 two times a day  cefTRIAXone   IVPB 1000 every 24 hours  dextrose 40% Gel 15 once  dextrose 5%. 1000 <Continuous>  dextrose 5%. 1000 <Continuous>  dextrose 50% Injectable 25 once  dextrose 50% Injectable 12.5 once  dextrose 50% Injectable 25 once  fenofibrate Tablet 48 daily  gabapentin 600 two times a day  glucagon  Injectable 1 once  guaiFENesin Oral Liquid (Sugar-Free) 100 every 6 hours PRN  insulin glargine Injectable (LANTUS) 10 at bedtime  insulin lispro (ADMELOG) corrective regimen sliding scale  Before meals and at bedtime  insulin lispro Injectable (ADMELOG) 5 three times a day before meals  levothyroxine 25 daily  lidocaine   4% Patch 1 daily  melatonin 3 at bedtime  methadone    Tablet 5 every 6 hours PRN  methadone    Tablet 10 three times a day  metoprolol succinate  daily  pantoprazole    Tablet 40 before breakfast  polyethylene glycol 3350 17 daily PRN  sacubitril 97 mG/valsartan 103 mG 1 two times a day  senna 2 at bedtime  sodium zirconium cyclosilicate 5 once      T(C): , Max: 37 (12-20-21 @ 21:52)  T(F): , Max: 98.6 (12-20-21 @ 21:52)  HR: 70 (12-21-21 @ 10:24)  BP: 107/53 (12-21-21 @ 10:24)  BP(mean): --  RR: 18 (12-21-21 @ 10:24)  SpO2: 95% (12-21-21 @ 10:24)  Wt(kg): --    12-20 @ 07:01  -  12-21 @ 07:00  --------------------------------------------------------  IN: 420 mL / OUT: 850 mL / NET: -430 mL    12-21 @ 07:01  -  12-21 @ 13:29  --------------------------------------------------------  IN: 0 mL / OUT: 1400 mL / NET: -1400 mL          Review of Systems:  all other ROS negative as per HPI      PHYSICAL EXAM:  GENERAL: well-developed, well nourished, alert, no acute distress at present on 3L NC  CHEST/LUNG: decreased breath sounds at the bases  HEART: normal S1S2, RRR  ABDOMEN: Soft, Nontender, +BS,   EXTREMITIES: 1+ pitting edema BL w/ ace bandages       LABS:                        9.3    8.00  )-----------( 174      ( 21 Dec 2021 07:10 )             31.0     12-21    135  |  93<L>  |  81<H>  ----------------------------<  117<H>  5.4<H>   |  28  |  5.16<H>    Ca    8.8      21 Dec 2021 07:10  Phos  7.7     12-21  Mg     2.0     12-21    TPro  7.0  /  Alb  2.8<L>  /  TBili  0.2  /  DBili  x   /  AST  18  /  ALT  8<L>  /  AlkPhos  75  12-21      PTT - ( 21 Dec 2021 07:10 )  PTT:33.7 sec          RADIOLOGY & ADDITIONAL STUDIES:

## 2021-12-21 NOTE — PROGRESS NOTE ADULT - PROBLEM SELECTOR PLAN 1
Currently euvolemic, satting 99% on home 3L NC, Net negative 2.6L, Primafit in place   - Echo 11/29/21: Akinesis of the basal to mid inferoseptum and anteroseptum. Moderate to severe hypokinesis of the remaining regions, mild to moderate MR  - IV lasix/Metolazone d/c'd on 12/20 due to low output and worsening renal fx (see below)  - No plan for CardioMems interrogation as it does not  readings anymore  - F/u need for PO diuretics in AM (home torsemide 40 BID)  - CONT: Entresto 97mg/103mg BID, Toprol XL 100mg daily      #DVT  - Hx of Recurrent DVT (on Eliquis 2.5 BID) on R leg  - RLE US (+) common femoral vein DVT of unspecified chronicity, repeat US w/ similar finding  - Vascular consulted: likely chronic, can d/c heparin gtt and continue home Eliquis 2.5 BID  - CONT: Eliquis 2.5mg BID Currently euvolemic, satting 99% on home 3L NC  - Echo 11/29/21: Akinesis of the basal to mid inferoseptum and anteroseptum. Moderate to severe hypokinesis of the remaining regions, mild to moderate MR  - IV lasix/Metolazone d/c'd on 12/20 due to low output and worsening renal fx.  no diuretics at this time, will need to discuss PO diuretics upon discharge (was on torsemide 40mg PO BID at home)  - CONT: Entresto 97mg/103mg BID, Toprol XL 100mg daily  - No heart failure consult.  No plan for CardioMems interrogation as it does not  readings anymore  - core measures, weight daily, strict I/Os, found to have urinary retention for which medina placed.           #DVT  - Hx of Recurrent DVT (on Eliquis 2.5 BID) on R leg  - RLE US (+) common femoral vein DVT of unspecified chronicity, repeat US w/ similar finding  - Vascular consulted: likely chronic, can d/c heparin gtt and continue home Eliquis 2.5 BID  - CONT: Eliquis 2.5mg BID

## 2021-12-21 NOTE — PROGRESS NOTE ADULT - ASSESSMENT
60 yo obese F, current smoker, with a PMH of HTN, HLD, DM-II, CKD Stage 4 (Cr ~3.9), Hypothyroidism, known NICM with EF 25% (s/p AICD for primary prevention), MM, COPD (3L home O2), recurrent DVTs (on Eliquis) admitted for acute on chronic CHF exacerbation - now euvolemic with minimal urine output on diuretics. Neuro/psych following for drowsiness/upper extremity jerk, now resolving. Renal following for worsening RITCHIE on CKD with potential HD initiation. Vascular following vein mapping, pending US.         62 yo obese F, current smoker, with a PMH of HTN, HLD, DM-II, CKD Stage 4 (Cr ~3.9), Hypothyroidism, known NICM with EF 25% (s/p AICD for primary prevention), MM, COPD (3L home O2), recurrent DVTs (on Eliquis) admitted for acute on chronic CHF exacerbation - now euvolemic with minimal urine output on diuretict and worsening RITCHIE on CKD for which renal is following.  Found to be retaining urine and medina placed and urology consulted. Neuro/psych following for drowsiness/upper extremity jerk, now resolving.  d/c pending improving of Crt.

## 2021-12-22 LAB
ALBUMIN SERPL ELPH-MCNC: 3.8 G/DL — SIGNIFICANT CHANGE UP (ref 3.3–5)
ALP SERPL-CCNC: 76 U/L — SIGNIFICANT CHANGE UP (ref 40–120)
ALT FLD-CCNC: 8 U/L — LOW (ref 10–45)
AMITRIP SERPL-MCNC: <20 NG/ML — SIGNIFICANT CHANGE UP
AMITRIP+NOR SERPL-MCNC: <40 NG/ML — LOW (ref 80–200)
ANION GAP SERPL CALC-SCNC: 11 MMOL/L — SIGNIFICANT CHANGE UP (ref 5–17)
ANION GAP SERPL CALC-SCNC: 11 MMOL/L — SIGNIFICANT CHANGE UP (ref 5–17)
APTT BLD: 42.3 SEC — HIGH (ref 27.5–35.5)
AST SERPL-CCNC: 13 U/L — SIGNIFICANT CHANGE UP (ref 10–40)
BILIRUB SERPL-MCNC: 0.2 MG/DL — SIGNIFICANT CHANGE UP (ref 0.2–1.2)
BUN SERPL-MCNC: 81 MG/DL — HIGH (ref 7–23)
BUN SERPL-MCNC: 85 MG/DL — HIGH (ref 7–23)
CALCIUM SERPL-MCNC: 8.6 MG/DL — SIGNIFICANT CHANGE UP (ref 8.4–10.5)
CALCIUM SERPL-MCNC: 8.8 MG/DL — SIGNIFICANT CHANGE UP (ref 8.4–10.5)
CHLORIDE SERPL-SCNC: 94 MMOL/L — LOW (ref 96–108)
CHLORIDE SERPL-SCNC: 95 MMOL/L — LOW (ref 96–108)
CO2 SERPL-SCNC: 31 MMOL/L — SIGNIFICANT CHANGE UP (ref 22–31)
CO2 SERPL-SCNC: 32 MMOL/L — HIGH (ref 22–31)
CREAT SERPL-MCNC: 5.51 MG/DL — HIGH (ref 0.5–1.3)
CREAT SERPL-MCNC: 5.6 MG/DL — HIGH (ref 0.5–1.3)
GAMMA INTERFERON BACKGROUND BLD IA-ACNC: 0.02 IU/ML — SIGNIFICANT CHANGE UP
GLUCOSE BLDC GLUCOMTR-MCNC: 136 MG/DL — HIGH (ref 70–99)
GLUCOSE BLDC GLUCOMTR-MCNC: 206 MG/DL — HIGH (ref 70–99)
GLUCOSE BLDC GLUCOMTR-MCNC: 250 MG/DL — HIGH (ref 70–99)
GLUCOSE BLDC GLUCOMTR-MCNC: 252 MG/DL — HIGH (ref 70–99)
GLUCOSE SERPL-MCNC: 106 MG/DL — HIGH (ref 70–99)
GLUCOSE SERPL-MCNC: 259 MG/DL — HIGH (ref 70–99)
HCT VFR BLD CALC: 29.1 % — LOW (ref 34.5–45)
HGB BLD-MCNC: 8.6 G/DL — LOW (ref 11.5–15.5)
M TB IFN-G BLD-IMP: NEGATIVE — SIGNIFICANT CHANGE UP
M TB IFN-G CD4+ BCKGRND COR BLD-ACNC: 0 IU/ML — SIGNIFICANT CHANGE UP
M TB IFN-G CD4+CD8+ BCKGRND COR BLD-ACNC: 0 IU/ML — SIGNIFICANT CHANGE UP
MAGNESIUM SERPL-MCNC: 2.1 MG/DL — SIGNIFICANT CHANGE UP (ref 1.6–2.6)
MCHC RBC-ENTMCNC: 28.4 PG — SIGNIFICANT CHANGE UP (ref 27–34)
MCHC RBC-ENTMCNC: 29.6 GM/DL — LOW (ref 32–36)
MCV RBC AUTO: 96 FL — SIGNIFICANT CHANGE UP (ref 80–100)
NORTRIP SERPL-MCNC: <20 NG/ML — SIGNIFICANT CHANGE UP
NRBC # BLD: 0 /100 WBCS — SIGNIFICANT CHANGE UP (ref 0–0)
PHOSPHATE SERPL-MCNC: 7.6 MG/DL — HIGH (ref 2.5–4.5)
PLATELET # BLD AUTO: 157 K/UL — SIGNIFICANT CHANGE UP (ref 150–400)
POTASSIUM SERPL-MCNC: 5.3 MMOL/L — SIGNIFICANT CHANGE UP (ref 3.5–5.3)
POTASSIUM SERPL-MCNC: 6 MMOL/L — HIGH (ref 3.5–5.3)
POTASSIUM SERPL-SCNC: 5.3 MMOL/L — SIGNIFICANT CHANGE UP (ref 3.5–5.3)
POTASSIUM SERPL-SCNC: 6 MMOL/L — HIGH (ref 3.5–5.3)
PROT SERPL-MCNC: 7.4 G/DL — SIGNIFICANT CHANGE UP (ref 6–8.3)
QUANT TB PLUS MITOGEN MINUS NIL: 1.83 IU/ML — SIGNIFICANT CHANGE UP
RBC # BLD: 3.03 M/UL — LOW (ref 3.8–5.2)
RBC # FLD: 14.1 % — SIGNIFICANT CHANGE UP (ref 10.3–14.5)
SODIUM SERPL-SCNC: 136 MMOL/L — SIGNIFICANT CHANGE UP (ref 135–145)
SODIUM SERPL-SCNC: 138 MMOL/L — SIGNIFICANT CHANGE UP (ref 135–145)
WBC # BLD: 7.83 K/UL — SIGNIFICANT CHANGE UP (ref 3.8–10.5)
WBC # FLD AUTO: 7.83 K/UL — SIGNIFICANT CHANGE UP (ref 3.8–10.5)

## 2021-12-22 PROCEDURE — 83880 ASSAY OF NATRIURETIC PEPTIDE: CPT

## 2021-12-22 PROCEDURE — 84100 ASSAY OF PHOSPHORUS: CPT

## 2021-12-22 PROCEDURE — 36415 COLL VENOUS BLD VENIPUNCTURE: CPT

## 2021-12-22 PROCEDURE — 94640 AIRWAY INHALATION TREATMENT: CPT

## 2021-12-22 PROCEDURE — 86769 SARS-COV-2 COVID-19 ANTIBODY: CPT

## 2021-12-22 PROCEDURE — 80061 LIPID PANEL: CPT

## 2021-12-22 PROCEDURE — 85025 COMPLETE CBC W/AUTO DIFF WBC: CPT

## 2021-12-22 PROCEDURE — 87040 BLOOD CULTURE FOR BACTERIA: CPT

## 2021-12-22 PROCEDURE — 0225U NFCT DS DNA&RNA 21 SARSCOV2: CPT

## 2021-12-22 PROCEDURE — 82330 ASSAY OF CALCIUM: CPT

## 2021-12-22 PROCEDURE — 94660 CPAP INITIATION&MGMT: CPT

## 2021-12-22 PROCEDURE — 82550 ASSAY OF CK (CPK): CPT

## 2021-12-22 PROCEDURE — 84443 ASSAY THYROID STIM HORMONE: CPT

## 2021-12-22 PROCEDURE — 82962 GLUCOSE BLOOD TEST: CPT

## 2021-12-22 PROCEDURE — 85610 PROTHROMBIN TIME: CPT

## 2021-12-22 PROCEDURE — 84436 ASSAY OF TOTAL THYROXINE: CPT

## 2021-12-22 PROCEDURE — 84145 PROCALCITONIN (PCT): CPT

## 2021-12-22 PROCEDURE — 80048 BASIC METABOLIC PNL TOTAL CA: CPT

## 2021-12-22 PROCEDURE — 99233 SBSQ HOSP IP/OBS HIGH 50: CPT

## 2021-12-22 PROCEDURE — 85730 THROMBOPLASTIN TIME PARTIAL: CPT

## 2021-12-22 PROCEDURE — C8929: CPT

## 2021-12-22 PROCEDURE — 99232 SBSQ HOSP IP/OBS MODERATE 35: CPT

## 2021-12-22 PROCEDURE — 82553 CREATINE MB FRACTION: CPT

## 2021-12-22 PROCEDURE — 82803 BLOOD GASES ANY COMBINATION: CPT

## 2021-12-22 PROCEDURE — 84484 ASSAY OF TROPONIN QUANT: CPT

## 2021-12-22 PROCEDURE — 71045 X-RAY EXAM CHEST 1 VIEW: CPT

## 2021-12-22 PROCEDURE — 99285 EMERGENCY DEPT VISIT HI MDM: CPT

## 2021-12-22 PROCEDURE — 83036 HEMOGLOBIN GLYCOSYLATED A1C: CPT

## 2021-12-22 PROCEDURE — 83735 ASSAY OF MAGNESIUM: CPT

## 2021-12-22 PROCEDURE — 83605 ASSAY OF LACTIC ACID: CPT

## 2021-12-22 PROCEDURE — 82728 ASSAY OF FERRITIN: CPT

## 2021-12-22 PROCEDURE — 97162 PT EVAL MOD COMPLEX 30 MIN: CPT

## 2021-12-22 PROCEDURE — 83540 ASSAY OF IRON: CPT

## 2021-12-22 PROCEDURE — 96374 THER/PROPH/DIAG INJ IV PUSH: CPT

## 2021-12-22 PROCEDURE — 84132 ASSAY OF SERUM POTASSIUM: CPT

## 2021-12-22 PROCEDURE — 97116 GAIT TRAINING THERAPY: CPT

## 2021-12-22 PROCEDURE — 80053 COMPREHEN METABOLIC PANEL: CPT

## 2021-12-22 PROCEDURE — 85027 COMPLETE CBC AUTOMATED: CPT

## 2021-12-22 PROCEDURE — 84295 ASSAY OF SERUM SODIUM: CPT

## 2021-12-22 RX ORDER — HEPARIN SODIUM 5000 [USP'U]/ML
8000 INJECTION INTRAVENOUS; SUBCUTANEOUS EVERY 6 HOURS
Refills: 0 | Status: DISCONTINUED | OUTPATIENT
Start: 2021-12-22 | End: 2021-12-22

## 2021-12-22 RX ORDER — METHADONE HYDROCHLORIDE 40 MG/1
5 TABLET ORAL EVERY 6 HOURS
Refills: 0 | Status: DISCONTINUED | OUTPATIENT
Start: 2021-12-22 | End: 2021-12-29

## 2021-12-22 RX ORDER — HEPARIN SODIUM 5000 [USP'U]/ML
INJECTION INTRAVENOUS; SUBCUTANEOUS
Qty: 25000 | Refills: 0 | Status: DISCONTINUED | OUTPATIENT
Start: 2021-12-22 | End: 2021-12-23

## 2021-12-22 RX ORDER — SEVELAMER CARBONATE 2400 MG/1
800 POWDER, FOR SUSPENSION ORAL THREE TIMES A DAY
Refills: 0 | Status: DISCONTINUED | OUTPATIENT
Start: 2021-12-22 | End: 2021-12-29

## 2021-12-22 RX ORDER — HEPARIN SODIUM 5000 [USP'U]/ML
4000 INJECTION INTRAVENOUS; SUBCUTANEOUS EVERY 6 HOURS
Refills: 0 | Status: DISCONTINUED | OUTPATIENT
Start: 2021-12-22 | End: 2021-12-22

## 2021-12-22 RX ORDER — METHADONE HYDROCHLORIDE 40 MG/1
10 TABLET ORAL THREE TIMES A DAY
Refills: 0 | Status: DISCONTINUED | OUTPATIENT
Start: 2021-12-22 | End: 2021-12-29

## 2021-12-22 RX ORDER — HEPARIN SODIUM 5000 [USP'U]/ML
4000 INJECTION INTRAVENOUS; SUBCUTANEOUS EVERY 6 HOURS
Refills: 0 | Status: DISCONTINUED | OUTPATIENT
Start: 2021-12-22 | End: 2021-12-23

## 2021-12-22 RX ORDER — HEPARIN SODIUM 5000 [USP'U]/ML
1300 INJECTION INTRAVENOUS; SUBCUTANEOUS
Qty: 25000 | Refills: 0 | Status: DISCONTINUED | OUTPATIENT
Start: 2021-12-22 | End: 2021-12-22

## 2021-12-22 RX ORDER — HEPARIN SODIUM 5000 [USP'U]/ML
8000 INJECTION INTRAVENOUS; SUBCUTANEOUS EVERY 6 HOURS
Refills: 0 | Status: DISCONTINUED | OUTPATIENT
Start: 2021-12-22 | End: 2021-12-23

## 2021-12-22 RX ORDER — HEPARIN SODIUM 5000 [USP'U]/ML
8000 INJECTION INTRAVENOUS; SUBCUTANEOUS ONCE
Refills: 0 | Status: DISCONTINUED | OUTPATIENT
Start: 2021-12-22 | End: 2021-12-22

## 2021-12-22 RX ADMIN — GABAPENTIN 600 MILLIGRAM(S): 400 CAPSULE ORAL at 05:40

## 2021-12-22 RX ADMIN — Medication 25 MICROGRAM(S): at 05:40

## 2021-12-22 RX ADMIN — INSULIN GLARGINE 10 UNIT(S): 100 INJECTION, SOLUTION SUBCUTANEOUS at 21:20

## 2021-12-22 RX ADMIN — Medication 5 UNIT(S): at 09:27

## 2021-12-22 RX ADMIN — LIDOCAINE 1 PATCH: 4 CREAM TOPICAL at 07:18

## 2021-12-22 RX ADMIN — METHADONE HYDROCHLORIDE 10 MILLIGRAM(S): 40 TABLET ORAL at 05:40

## 2021-12-22 RX ADMIN — SENNA PLUS 2 TABLET(S): 8.6 TABLET ORAL at 21:06

## 2021-12-22 RX ADMIN — SACUBITRIL AND VALSARTAN 1 TABLET(S): 24; 26 TABLET, FILM COATED ORAL at 09:27

## 2021-12-22 RX ADMIN — Medication 4: at 17:55

## 2021-12-22 RX ADMIN — SEVELAMER CARBONATE 800 MILLIGRAM(S): 2400 POWDER, FOR SUSPENSION ORAL at 21:21

## 2021-12-22 RX ADMIN — HEPARIN SODIUM 1800 UNIT(S)/HR: 5000 INJECTION INTRAVENOUS; SUBCUTANEOUS at 20:42

## 2021-12-22 RX ADMIN — LIDOCAINE 1 PATCH: 4 CREAM TOPICAL at 20:07

## 2021-12-22 RX ADMIN — BUDESONIDE AND FORMOTEROL FUMARATE DIHYDRATE 2 PUFF(S): 160; 4.5 AEROSOL RESPIRATORY (INHALATION) at 21:27

## 2021-12-22 RX ADMIN — Medication 3 MILLIGRAM(S): at 21:06

## 2021-12-22 RX ADMIN — BUDESONIDE AND FORMOTEROL FUMARATE DIHYDRATE 2 PUFF(S): 160; 4.5 AEROSOL RESPIRATORY (INHALATION) at 09:27

## 2021-12-22 RX ADMIN — PANTOPRAZOLE SODIUM 40 MILLIGRAM(S): 20 TABLET, DELAYED RELEASE ORAL at 05:41

## 2021-12-22 RX ADMIN — SACUBITRIL AND VALSARTAN 1 TABLET(S): 24; 26 TABLET, FILM COATED ORAL at 21:21

## 2021-12-22 RX ADMIN — SEVELAMER CARBONATE 800 MILLIGRAM(S): 2400 POWDER, FOR SUSPENSION ORAL at 17:06

## 2021-12-22 RX ADMIN — ATORVASTATIN CALCIUM 80 MILLIGRAM(S): 80 TABLET, FILM COATED ORAL at 21:06

## 2021-12-22 RX ADMIN — Medication 100 MILLIGRAM(S): at 12:33

## 2021-12-22 RX ADMIN — METHADONE HYDROCHLORIDE 10 MILLIGRAM(S): 40 TABLET ORAL at 21:07

## 2021-12-22 RX ADMIN — Medication 20 MILLIGRAM(S): at 05:40

## 2021-12-22 RX ADMIN — Medication 5 UNIT(S): at 17:56

## 2021-12-22 RX ADMIN — Medication 48 MILLIGRAM(S): at 12:33

## 2021-12-22 RX ADMIN — METHADONE HYDROCHLORIDE 10 MILLIGRAM(S): 40 TABLET ORAL at 13:24

## 2021-12-22 RX ADMIN — APIXABAN 2.5 MILLIGRAM(S): 2.5 TABLET, FILM COATED ORAL at 05:40

## 2021-12-22 RX ADMIN — Medication 5 UNIT(S): at 11:56

## 2021-12-22 RX ADMIN — CEFTRIAXONE 100 MILLIGRAM(S): 500 INJECTION, POWDER, FOR SOLUTION INTRAMUSCULAR; INTRAVENOUS at 13:40

## 2021-12-22 RX ADMIN — Medication 4: at 11:55

## 2021-12-22 RX ADMIN — Medication 6: at 21:21

## 2021-12-22 RX ADMIN — LIDOCAINE 1 PATCH: 4 CREAM TOPICAL at 12:37

## 2021-12-22 RX ADMIN — GABAPENTIN 600 MILLIGRAM(S): 400 CAPSULE ORAL at 17:58

## 2021-12-22 NOTE — PROGRESS NOTE ADULT - SUBJECTIVE AND OBJECTIVE BOX
SUBJECTIVE: Denies suprapubic/flank pain or fevers/chills       OBJECTIVE:    Vital Signs:  Vital Signs Last 24 Hrs  T(C): 36.1 (22 Dec 2021 05:05), Max: 37.1 (21 Dec 2021 15:14)  T(F): 97 (22 Dec 2021 05:05), Max: 98.7 (21 Dec 2021 15:14)  HR: 72 (22 Dec 2021 08:20) (65 - 98)  BP: 100/51 (22 Dec 2021 05:00) (91/42 - 107/53)  BP(mean): 61 (21 Dec 2021 23:58) (61 - 61)  RR: 18 (22 Dec 2021 08:20) (17 - 18)  SpO2: 93% (22 Dec 2021 08:20) (93% - 98%)    Physical Exam:  General: NAD  Pulmonary: Nonlabored breathing, no respiratory distress  Abdominal: No suprapubic tenderness  : Ward catheter draining clear yellow urine     Ins and Outs:  I&O's Summary    21 Dec 2021 07:01  -  22 Dec 2021 07:00  --------------------------------------------------------  IN: 180 mL / OUT: 1400 mL / NET: -1220 mL        Labs:                        8.6    7.83  )-----------( 157      ( 22 Dec 2021 07:05 )             29.1     12-22    138  |  95<L>  |  81<H>  ----------------------------<  106<H>  5.3   |  32<H>  |  5.51<H>    Ca    8.6      22 Dec 2021 07:05  Phos  7.6     12-22  Mg     2.1     12-22    TPro  7.0  /  Alb  2.8<L>  /  TBili  0.2  /  DBili  x   /  AST  18  /  ALT  8<L>  /  AlkPhos  75  12-21    PTT - ( 21 Dec 2021 07:10 )  PTT:33.7 sec    CAPILLARY BLOOD GLUCOSE      POCT Blood Glucose.: 136 mg/dL (22 Dec 2021 06:53)  POCT Blood Glucose.: 92 mg/dL (21 Dec 2021 22:17)  POCT Blood Glucose.: 76 mg/dL (21 Dec 2021 21:30)  POCT Blood Glucose.: 148 mg/dL (21 Dec 2021 16:21)  POCT Blood Glucose.: 143 mg/dL (21 Dec 2021 11:21)    LIVER FUNCTIONS - ( 21 Dec 2021 07:10 )  Alb: 2.8 g/dL / Pro: 7.0 g/dL / ALK PHOS: 75 U/L / ALT: 8 U/L / AST: 18 U/L / GGT: x

## 2021-12-22 NOTE — PROVIDER CONTACT NOTE (FALL NOTIFICATION) - RECOMMENDATIONS
Sunni was utilized  to move the patient back to bed but wasn't functioning on that time. Patient placed to bed with assist of five persons.

## 2021-12-22 NOTE — PROGRESS NOTE ADULT - ATTENDING COMMENTS
The patient was seen and examined.     She was difficult to get a history from because she did not want to speak to the provider and she has very poor medical literacy (e.g. she does not seem to understand she has DM). She currently has a medina draining clear yellow urine.    Labs, studies, meds, interval events reviewed.    A/P: Acute urinary retention causing fulminant acute on chronic renal failure.   Case d/w Dr. Howell. He and I agree with the use of a medina in this patient. Her medical literacy and poor motivation make her high risk for non-compliance. Her renal failure is a far greater risk to her health than a CAUTI. She is not interested in learning CIC at this time and I suspect that it is unlikely she ever will be.  There is very little we will offer her while she is an inpatient. Please have her f/u with one of our voiding specialists (Dr. Newton or [facility] [phone]) as an outpatient. Thank you    25 minutes spent with this patient obtaining history, physical, counseling and coordinating care

## 2021-12-22 NOTE — PROGRESS NOTE ADULT - PROBLEM SELECTOR PLAN 1
Currently euvolemic, satting 99% on home 3L NC  - Echo 11/29/21: Akinesis of the basal to mid inferoseptum and anteroseptum. Moderate to severe hypokinesis of the remaining regions, mild to moderate MR  - IV lasix/Metolazone d/c'd on 12/20 due to low output and worsening renal fx.  no diuretics at this time, will need to discuss PO diuretics upon discharge (was on torsemide 40mg PO BID at home)  - CONT: Entresto 97mg/103mg BID, Toprol XL 100mg daily  - No heart failure consult.  No plan for CardioMems interrogation as it does not  readings anymore  - core measures, weight daily, strict I/Os, found to have urinary retention for which medina placed.       #DVT  - Hx of Recurrent DVT (on Eliquis 2.5 BID) on R leg  - RLE US (+) common femoral vein DVT of unspecified chronicity, repeat US w/ similar finding  - Vascular consulted: likely chronic, can d/c heparin gtt and continue home Eliquis 2.5 BID  - CONT: Eliquis 2.5mg BID  (indicated after >6months of treatment)

## 2021-12-22 NOTE — PROGRESS NOTE ADULT - SUBJECTIVE AND OBJECTIVE BOX
Pain Management Progress Note - [facility] [phone]    HPI: Patient seen and examined today. Patient reports endorsing less back pain today. Patient reports back pain relieved with current pain regimen and addition of lidocaine patch. Patient denies any associated pain down the legs, numbness, or tingling. Patient denies side effects from current pain regimen.    Pertinent PMH: Pain at: __X_Back ___Neck___Knee ___Hip ___Shoulder __X_ Opioid tolerance    Pain is __X_ sharp ____dull ___burning ___achy ___ Intensity: ____ mild _X___mod ____severe     Location _____surgical site _____cervical __X___lumbar ____abd _____upper ext____lower ext    Worse with _X___activity __X__movement _____physical therapy___ Rest    Improved with __X__medication ____rest ____physical therapy    PMH:  CHF (congestive heart failure)  COPD (chronic obstructive pulmonary disease)  HLD (hyperlipidemia)  Chronic pain  DM (diabetes mellitus)  CVA (cerebral vascular accident)  DVT (deep venous thrombosis)  HTN (hypertension)  Depression  Bipolar depression  PAD (peripheral artery disease)  Neuropathy  Hypothyroidism  Multiple myeloma  CKD (chronic kidney disease), stage IV  DHARMESH on CPAP  AICD (automatic cardioverter/defibrillator) present  H/O abdominal hysterectomy  H/O tubal ligation  History of cholecystectomy  H/O extremity bypass graft    Medications:  sevelamer carbonate  methadone    Tablet  methadone    Tablet  dexAMETHasone     Tablet  polyethylene glycol 3350  senna  sodium zirconium cyclosilicate  apixaban  melatonin  simethicone  metoprolol succinate ER  heparin   Injectable  heparin   Injectable  heparin  Infusion.  furosemide   Injectable  furosemide   Injectable  cefTRIAXone   IVPB  simethicone  acetaminophen     Tablet ..  methadone    Tablet  cyclobenzaprine  metolazone  apixaban  insulin lispro Injectable (ADMELOG)  insulin glargine Injectable (LANTUS)  benzonatate  metolazone  ALBUTerol   0.5%  heparin   Injectable  acetaminophen   IVPB ..  lidocaine   4% Patch  benzonatate  guaiFENesin Oral Liquid (Sugar-Free)  atorvastatin  amitriptyline  traZODone  busPIRone  furosemide   Injectable  budesonide  80 MICROgram(s)/formoterol 4.5 MICROgram(s) Inhaler  pantoprazole    Tablet  metoprolol succinate ER  methadone    Tablet  levothyroxine  traZODone  fenofibrate Tablet  escitalopram  sacubitril 97 mG/valsartan 103 mG  busPIRone  atorvastatin  amitriptyline  gabapentin  glucagon  Injectable  dextrose 5%.  dextrose 50% Injectable  dextrose 50% Injectable  dextrose 50% Injectable  dextrose 5%.  dextrose 40% Gel  insulin lispro (ADMELOG) corrective regimen sliding scale  insulin lispro (ADMELOG) corrective regimen sliding scale  insulin lispro Injectable (ADMELOG)  insulin glargine Injectable (LANTUS)  furosemide   Injectable  acetaminophen   IVPB ..  albuterol/ipratropium for Nebulization..    ROS: Const:  _N__febrile   Eyes:___ENT:___CV: _N__chest pain  Resp: __N__sob  GI:__N_nausea _N__vomiting ___N_abd pain ___npo ___clears ___full diet __bm  :___ Musk: __Y_pain ___spasm  Skin:___ Neuro:  _N__sedation___confusion__N__ numbness ___weakness __N_paresthesia  Psych:___anxiety  Endo:___ Heme:___Allergy:__aspirin, oral contrast_      12-22 @ 07:058 mL/min/1.73M2<L>  Hemoglobin: 8.6 g/dL (12-22 @ 07:05)  Hemoglobin: 9.3 g/dL (12-21 @ 07:10)  T(C): 35.9 (12-22-21 @ 13:04), Max: 36.3 (12-22-21 @ 10:35)  HR: 102 (12-22-21 @ 15:25) (65 - 102)  BP: 113/59 (12-22-21 @ 15:25) (91/42 - 157/95)  RR: 18 (12-22-21 @ 12:18) (17 - 18)  SpO2: 96% (12-22-21 @ 12:18) (92% - 98%)  Wt(kg): --     PHYSICAL EXAM:  Gen Appearance: _X__no acute distress __X_appropriate      Neuro: ___SILT feet____ EOM Intact Psych: AAOX_3_, _X__mood/affect appropriate        Eyes: _X__conjunctiva WNL  ___X__ Pupils equal and round        ENT: _X__ears and nose atraumatic_X__ Hearing grossly intact        Neck: __X_trachea midline, no visible masses ___thyroid without palpable mass    Resp: _X__Nml WOB____No tactile fremitus ___clear to auscultation    Cardio: __X_extremities free from edema ____pedal pulses palpable    GI/Abdomen: ___soft _____ Nontender____X__Nondistended_____HSM    Lymphatic: ___no palpable nodes in neck  ___no palpable nodes calves and feet    Skin/Wound: ___Incision, ___Dressing c/d/i,   ____surrounding tissues soft,  ___drain/chest tube present____    Muscular: EHL ___/5  Gastrocnemius___/5    _X__absent clubbing/cyanosis         ASSESSMENT:  This is a 61y old Female with a history of obesity, HTN, HLD, DM-II, CKD stasge 4, hypothyroidism, know NICM with EF 25% (s/p AICD for primary prevention), MM, COPD, 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    Recommended Treatment PLAN:  1. Consider discontinuing Methadone 5 mg PO Q6h PRN breakthrough pain given increase in Creatinine. Can consider continuing Methadone 10 mg PO TID as benefits outweigh risk.  2. Consider de-escalating to Gabapentin 300 mg PO BID  3. Consider continuing Tylenol 650 mg PO q6h PRN mild pain  4. Consider continuing daily lidocaine patch to low back  Plan discussed with Dr. Kay, Pt seen and examined by Dr. Kay at PM rounds     Pain Management Progress Note - [facility] [phone]    HPI: Patient seen and examined today. Patient reports endorsing less back pain today. Patient reports back pain relieved with current pain regimen and addition of lidocaine patch. Patient denies any associated pain down the legs, numbness, or tingling. Patient denies side effects from current pain regimen.    Pertinent PMH: Pain at: __X_Back ___Neck___Knee ___Hip ___Shoulder __X_ Opioid tolerance    Pain is __X_ sharp ____dull ___burning ___achy ___ Intensity: ____ mild _X___mod ____severe     Location _____surgical site _____cervical __X___lumbar ____abd _____upper ext____lower ext    Worse with _X___activity __X__movement _____physical therapy___ Rest    Improved with __X__medication ____rest ____physical therapy    PMH:  CHF (congestive heart failure)  COPD (chronic obstructive pulmonary disease)  HLD (hyperlipidemia)  Chronic pain  DM (diabetes mellitus)  CVA (cerebral vascular accident)  DVT (deep venous thrombosis)  HTN (hypertension)  Depression  Bipolar depression  PAD (peripheral artery disease)  Neuropathy  Hypothyroidism  Multiple myeloma  CKD (chronic kidney disease), stage IV  DHARMESH on CPAP  AICD (automatic cardioverter/defibrillator) present  H/O abdominal hysterectomy  H/O tubal ligation  History of cholecystectomy  H/O extremity bypass graft    Medications:  sevelamer carbonate  methadone    Tablet  methadone    Tablet  dexAMETHasone     Tablet  polyethylene glycol 3350  senna  sodium zirconium cyclosilicate  apixaban  melatonin  simethicone  metoprolol succinate ER  heparin   Injectable  heparin   Injectable  heparin  Infusion.  furosemide   Injectable  furosemide   Injectable  cefTRIAXone   IVPB  simethicone  acetaminophen     Tablet ..  methadone    Tablet  cyclobenzaprine  metolazone  apixaban  insulin lispro Injectable (ADMELOG)  insulin glargine Injectable (LANTUS)  benzonatate  metolazone  ALBUTerol   0.5%  heparin   Injectable  acetaminophen   IVPB ..  lidocaine   4% Patch  benzonatate  guaiFENesin Oral Liquid (Sugar-Free)  atorvastatin  amitriptyline  traZODone  busPIRone  furosemide   Injectable  budesonide  80 MICROgram(s)/formoterol 4.5 MICROgram(s) Inhaler  pantoprazole    Tablet  metoprolol succinate ER  methadone    Tablet  levothyroxine  traZODone  fenofibrate Tablet  escitalopram  sacubitril 97 mG/valsartan 103 mG  busPIRone  atorvastatin  amitriptyline  gabapentin  glucagon  Injectable  dextrose 5%.  dextrose 50% Injectable  dextrose 50% Injectable  dextrose 50% Injectable  dextrose 5%.  dextrose 40% Gel  insulin lispro (ADMELOG) corrective regimen sliding scale  insulin lispro (ADMELOG) corrective regimen sliding scale  insulin lispro Injectable (ADMELOG)  insulin glargine Injectable (LANTUS)  furosemide   Injectable  acetaminophen   IVPB ..  albuterol/ipratropium for Nebulization..    ROS: Const:  _N__febrile   Eyes:___ENT:___CV: _N__chest pain  Resp: __N__sob  GI:__N_nausea _N__vomiting ___N_abd pain ___npo ___clears ___full diet __bm  :___ Musk: __Y_pain ___spasm  Skin:___ Neuro:  _N__sedation___confusion__N__ numbness ___weakness __N_paresthesia  Psych:___anxiety  Endo:___ Heme:___Allergy:__aspirin, oral contrast_      12-22 @ 07:058 mL/min/1.73M2<L>  Hemoglobin: 8.6 g/dL (12-22 @ 07:05)  Hemoglobin: 9.3 g/dL (12-21 @ 07:10)  T(C): 35.9 (12-22-21 @ 13:04), Max: 36.3 (12-22-21 @ 10:35)  HR: 102 (12-22-21 @ 15:25) (65 - 102)  BP: 113/59 (12-22-21 @ 15:25) (91/42 - 157/95)  RR: 18 (12-22-21 @ 12:18) (17 - 18)  SpO2: 96% (12-22-21 @ 12:18) (92% - 98%)  Wt(kg): --     PHYSICAL EXAM:  Gen Appearance: _X__no acute distress __X_appropriate      Neuro: ___SILT feet____ EOM Intact Psych: AAOX_3_, _X__mood/affect appropriate        Eyes: _X__conjunctiva WNL  ___X__ Pupils equal and round        ENT: _X__ears and nose atraumatic_X__ Hearing grossly intact        Neck: __X_trachea midline, no visible masses ___thyroid without palpable mass    Resp: _X__Nml WOB____No tactile fremitus ___clear to auscultation    Cardio: __X_extremities free from edema ____pedal pulses palpable    GI/Abdomen: ___soft _____ Nontender____X__Nondistended_____HSM    Lymphatic: ___no palpable nodes in neck  ___no palpable nodes calves and feet    Skin/Wound: ___Incision, ___Dressing c/d/i,   ____surrounding tissues soft,  ___drain/chest tube present____    Muscular: EHL ___/5  Gastrocnemius___/5    _X__absent clubbing/cyanosis

## 2021-12-22 NOTE — PROGRESS NOTE ADULT - SUBJECTIVE AND OBJECTIVE BOX
Patient is a 61y Female seen and evaluated at bedside. medina catheter was placed after PVR was 1250; patient Cr up to 5.5 K 5.3; had long discussion with patient about severity of her comorbid medical problems how she will likely need to be started on HD ; she does not seem to comprehend the severity of all of her medical conditions remains hemodynamically stable; UOP1.4L/24 hours feels her breathing is improved      Meds:    acetaminophen     Tablet .. 650 every 6 hours PRN  ALBUTerol   0.5% 2.5 every 6 hours PRN  apixaban 2.5 every 12 hours  atorvastatin 80 at bedtime  benzonatate 100 every 8 hours PRN  budesonide  80 MICROgram(s)/formoterol 4.5 MICROgram(s) Inhaler 2 two times a day  dextrose 40% Gel 15 once  dextrose 5%. 1000 <Continuous>  dextrose 5%. 1000 <Continuous>  dextrose 50% Injectable 25 once  dextrose 50% Injectable 12.5 once  dextrose 50% Injectable 25 once  fenofibrate Tablet 48 daily  gabapentin 600 two times a day  glucagon  Injectable 1 once  guaiFENesin Oral Liquid (Sugar-Free) 100 every 6 hours PRN  insulin glargine Injectable (LANTUS) 10 at bedtime  insulin lispro (ADMELOG) corrective regimen sliding scale  Before meals and at bedtime  insulin lispro Injectable (ADMELOG) 5 three times a day before meals  levothyroxine 25 daily  lidocaine   4% Patch 1 daily  melatonin 3 at bedtime  methadone    Tablet 5 every 6 hours PRN  methadone    Tablet 10 three times a day  metoprolol succinate  daily  pantoprazole    Tablet 40 before breakfast  polyethylene glycol 3350 17 daily PRN  sacubitril 97 mG/valsartan 103 mG 1 two times a day  senna 2 at bedtime      T(C): , Max: 37.1 (12-21-21 @ 15:14)  T(F): , Max: 98.7 (12-21-21 @ 15:14)  HR: 96 (12-22-21 @ 12:18)  BP: 157/95 (12-22-21 @ 12:18)  BP(mean): 61 (12-21-21 @ 23:58)  RR: 18 (12-22-21 @ 12:18)  SpO2: 96% (12-22-21 @ 12:18)  Wt(kg): --    12-21 @ 07:01 - 12-22 @ 07:00  --------------------------------------------------------  IN: 180 mL / OUT: 1400 mL / NET: -1220 mL    12-22 @ 07:01 - 12-22 @ 14:05  --------------------------------------------------------  IN: 360 mL / OUT: 0 mL / NET: 360 mL          Review of Systems:  all other ROS negative         PHYSICAL EXAM:  GENERAL: teaary-eyed; dozing off on 3L NC  CHEST/LUNG: decreased breath sounds at the bases  HEART: normal S1S2, RRR  ABDOMEN: Soft, Nontender, +BS,   EXTREMITIES: 1+ pitting edema BL w/ ace bandages       LABS:                        8.6    7.83  )-----------( 157      ( 22 Dec 2021 07:05 )             29.1     12-22    138  |  95<L>  |  81<H>  ----------------------------<  106<H>  5.3   |  32<H>  |  5.51<H>    Ca    8.6      22 Dec 2021 07:05  Phos  7.6     12-22  Mg     2.1     12-22    TPro  7.0  /  Alb  2.8<L>  /  TBili  0.2  /  DBili  x   /  AST  18  /  ALT  8<L>  /  AlkPhos  75  12-21      PTT - ( 21 Dec 2021 07:10 )  PTT:33.7 sec          RADIOLOGY & ADDITIONAL STUDIES:

## 2021-12-22 NOTE — PROGRESS NOTE ADULT - PROBLEM SELECTOR PLAN 4
Endorsing RUE weakness/pain and myoclonic movement, neuro exam other wise unremarkable   -CTH non-contrast 12/18: negative for acute changes; RUE US: negative for clot  -Stroke/Neuro consulted: suspicious 2/2 toxic/metabolic causes, particularly CNS acting medications, low suspicion for seizure activities.   -Pain management consulted: Methadone 5 mg PO q6h PRN breakthrough pain, c/w Methadone 10 mg PO TID (confirmed by Istop). Tylenol 650 mg PO q6h PRN moderate pain  -Psychiatry Consulted: Rec discontinuing Amitriptyline 10mg QD, Buspirone 15mg QD, Escitalopram 20mg QD, Trazadone 50mg QD - low risk of withdrawal.   -CONT: Methadone as above, Gabapentin 600mg BID, Melatonin 3mg QD

## 2021-12-22 NOTE — PROGRESS NOTE ADULT - ATTENDING COMMENTS
ASSESSMENT:  This is a 61y old Female with a history of obesity, HTN, HLD, DM-II, CKD stasge 4, hypothyroidism, know NICM with EF 25% (s/p AICD for primary prevention), MM, COPD, 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    Recommended Treatment PLAN:  1. Consider discontinuing Methadone 5 mg PO Q6h PRN breakthrough pain given increase in Creatinine. Can consider continuing Methadone 10 mg PO TID as benefits outweigh risk.  2. Consider de-escalating to Gabapentin 300 mg PO BID  3. Consider continuing Tylenol 650 mg PO q6h PRN mild pain  4. Consider continuing daily lidocaine patch to low back  pt seen and examined by me, NP acted as scribe

## 2021-12-22 NOTE — PROGRESS NOTE ADULT - PROBLEM SELECTOR PLAN 7
Followed by Dr. Ruiz, was on Dexamethasone and Ninlaro weekly (Monday)  -Holding Ninlaro due to bullae on right foot, per Dr. Ruiz it is unlikely the cause of the bullae but can be held at this time.   - Will order dexamethasone weekly dose 20mg.   - Right foot bullae x2. No signs of infection. Continue dry dressing with RN

## 2021-12-22 NOTE — PROGRESS NOTE ADULT - PROBLEM SELECTOR PLAN 5
* RESOLVED *   - CT Chest 12/16/21: Mild pulmonary edema and trace right pleural effusion. No PE.  - Pulmonary consulted: hemoptysis is mild, likely due to cough in combination with increased hydrostatic pressure in capillary beds from decompensated heart failure. NTD.  - RVP, COVID and urine legionella negative  - Sputum Cx growing Proteus Mirabilis w/ elevated procalcitonin, no infiltrate on CXR/WBC and afebrile -- started CTX 1G q 24hrs x 5 days (last day 12/22). Of note: will also cover UTI  - CONT: cough suppressants    #COPD  - CONT: Symbicort BID and PRN duonebs q6h w/ CPAP  - Wean O2 for goal SpO2 88-92%  - Outpatient follow up with Dr. Nish Payne in 2-3 weeks after discharge.

## 2021-12-22 NOTE — PROGRESS NOTE ADULT - ATTENDING COMMENTS
Initial attending contact date  12/16/21    . See PA note written above for details. I reviewed the PA documentation. I have personally seen and examined this patient. I reviewed vitals, labs, medications, cardiac studies, and additional imaging. I agree with the above PA's findings and plans as written above with the following additions/statements.    61F obese, HTN, HLD, DM-II, Hypothyroidism, CRI, COPD on  home O2 (3L), chronic DVT , fibromyalgia on methadone, known NICM with EF 25% with AICD, MM, CVA x 2 with residual R sided weakness admitted with mild hemoptysis in context of chronic dry cough and acute on chronic anemia  -With suboptimal diuresis throughout hospital course despite increasing IV diuresis  -CXR improved with BNP decreased from 93051 to 4000  -On 3 L NC satting 99% which is her baseline  -Chronic RLE DVT per vascular-  restarted on home eliquis 2.5 mg bid to prevent recurrence. Discussed with vascular   -Crt uptrending, holding diuretic. Medina inserted for urinary retention. -1.2 L with insertion   -Pt to be dc'd with indwelling medina with urology follow up   -Will likely need HD in future, however she is hesitant at this time but amenable to vein mapping studies  -Cont toprol 100 mg qd (home dose 200 mg qd), entresto 97/103 bid   -PT recs: Home with home PT  -Likely DC 12/23 pending improved Crt

## 2021-12-22 NOTE — PROGRESS NOTE ADULT - ASSESSMENT
Assessment: 61F current smoker PMH HTN, HLD, DM-II, CKD Stage 4 (Cr ~3.9), Hypothyroidism, known NICM with EF 25% (s/p AICD for primary prevention), MM, COPD (3L home O2), recurrent DVTs (on Eliquis) admitted for acute on chronic CHF exacerbation, found to have neurogenic urinary retention, likely 2/2 IDDM neuropathy. Patient has poor medical compliance and health literacy and is at risk of worsening renal deterioration if she goes into silent urinary retention again.    Recommendations:  - Continue catheter while inpatient and on discharge  - Treatment of presumed UTI per primary team  - Continue care per primary team  - No urologic surgery intervention at this time  - Follow up with Dr. Newton in 4 weeks of discharge for catheter exchange and to discuss urodynamic studies

## 2021-12-22 NOTE — PROGRESS NOTE ADULT - ATTENDING COMMENTS
have reviewed in detail and discussed at length with cardiol staff and urol.  her mental status appears more impaired over past sev days , and given impression of neuro that this level of dysfunction is c/w uremia will proceed with initiation of hd as outlined above.   agree with findings and recommendations.

## 2021-12-22 NOTE — PROGRESS NOTE ADULT - SUBJECTIVE AND OBJECTIVE BOX
INCOMPLETE        Interventional Cardiology PA Adult Progress Note    Subjective Assessment:  	  MEDICATIONS:  metoprolol succinate  milliGRAM(s) Oral daily  sacubitril 97 mG/valsartan 103 mG 1 Tablet(s) Oral two times a day    cefTRIAXone   IVPB 1000 milliGRAM(s) IV Intermittent every 24 hours    ALBUTerol   0.5% 2.5 milliGRAM(s) Nebulizer every 6 hours PRN  benzonatate 100 milliGRAM(s) Oral every 8 hours PRN  budesonide  80 MICROgram(s)/formoterol 4.5 MICROgram(s) Inhaler 2 Puff(s) Inhalation two times a day  guaiFENesin Oral Liquid (Sugar-Free) 100 milliGRAM(s) Oral every 6 hours PRN    acetaminophen     Tablet .. 650 milliGRAM(s) Oral every 6 hours PRN  gabapentin 600 milliGRAM(s) Oral two times a day  melatonin 3 milliGRAM(s) Oral at bedtime  methadone    Tablet 5 milliGRAM(s) Oral every 6 hours PRN  methadone    Tablet 10 milliGRAM(s) Oral three times a day    pantoprazole    Tablet 40 milliGRAM(s) Oral before breakfast  polyethylene glycol 3350 17 Gram(s) Oral daily PRN  senna 2 Tablet(s) Oral at bedtime    atorvastatin 80 milliGRAM(s) Oral at bedtime  dextrose 40% Gel 15 Gram(s) Oral once  dextrose 50% Injectable 25 Gram(s) IV Push once  dextrose 50% Injectable 12.5 Gram(s) IV Push once  dextrose 50% Injectable 25 Gram(s) IV Push once  fenofibrate Tablet 48 milliGRAM(s) Oral daily  glucagon  Injectable 1 milliGRAM(s) IntraMuscular once  insulin glargine Injectable (LANTUS) 10 Unit(s) SubCutaneous at bedtime  insulin lispro (ADMELOG) corrective regimen sliding scale   SubCutaneous Before meals and at bedtime  insulin lispro Injectable (ADMELOG) 5 Unit(s) SubCutaneous three times a day before meals  levothyroxine 25 MICROGram(s) Oral daily    apixaban 2.5 milliGRAM(s) Oral every 12 hours  dextrose 5%. 1000 milliLiter(s) IV Continuous <Continuous>  dextrose 5%. 1000 milliLiter(s) IV Continuous <Continuous>  lidocaine   4% Patch 1 Patch Transdermal daily      	    [PHYSICAL EXAM:  TELEMETRY:  T(C): 36.1 (12-22-21 @ 05:05), Max: 37.1 (12-21-21 @ 15:14)  HR: 69 (12-22-21 @ 05:11) (65 - 98)  BP: 100/51 (12-22-21 @ 05:00) (91/42 - 128/60)  RR: 18 (12-22-21 @ 05:11) (17 - 18)  SpO2: 96% (12-22-21 @ 05:11) (95% - 98%)  Wt(kg): --  I&O's Summary    21 Dec 2021 07:01  -  22 Dec 2021 07:00  --------------------------------------------------------  IN: 180 mL / OUT: 1400 mL / NET: -1220 mL        Ward:  Central/PICC/Mid Line:                                         Appearance: Normal	  HEENT:   Normal oral mucosa, PERRL, EOMI	  Neck: Supple, + JVD/ - JVD; Carotid Bruit   Cardiovascular: Normal S1 S2, No JVD, No murmurs,   Respiratory: Lungs clear to auscultation/Decreased Breath Sounds/No Rales, Rhonchi, Wheezing	  Gastrointestinal:  Soft, Non-tender, + BS	  Skin: No rashes, No ecchymoses, No cyanosis  Extremities: Normal range of motion, No clubbing, cyanosis or edema  Vascular: Peripheral pulses palpable 2+ bilaterally  Neurologic: Non-focal  Psychiatry: A & O x 3, Mood & affect appropriate      	    ECG:  	  RADIOLOGY:   DIAGNOSTIC TESTING:  [ ] Echocardiogram:  [ ]  Catheterization:  [ ] Stress Test:    [ ] DELLA  OTHER: 	    LABS:	 	  CARDIAC MARKERS:                                  8.6    7.83  )-----------( 157      ( 22 Dec 2021 07:05 )             29.1     12-22    138  |  95<L>  |  81<H>  ----------------------------<  106<H>  5.3   |  32<H>  |  5.51<H>    Ca    8.6      22 Dec 2021 07:05  Phos  7.6     12-22  Mg     2.1     12-22    TPro  7.0  /  Alb  2.8<L>  /  TBili  0.2  /  DBili  x   /  AST  18  /  ALT  8<L>  /  AlkPhos  75  12-21    proBNP:   Lipid Profile:   HgA1c:   TSH:   PTT - ( 21 Dec 2021 07:10 )  PTT:33.7 sec    ASSESSMENT/PLAN: 	        DVT ppx:  Dispo:     Interventional Cardiology PA Adult Progress Note    Subjective Assessment: Patient seen and examined at bedside w/ Renal team. Discussed with patient prognosis of CKD, and likelihood of needing HD in the future. Patient tearful following conversation, patient states "I am afraid of dialysis, I am afraid it will kill me." Patient denies other cardiac symptoms.    	  MEDICATIONS:  metoprolol succinate  milliGRAM(s) Oral daily  sacubitril 97 mG/valsartan 103 mG 1 Tablet(s) Oral two times a day  cefTRIAXone   IVPB 1000 milliGRAM(s) IV Intermittent every 24 hours  ALBUTerol   0.5% 2.5 milliGRAM(s) Nebulizer every 6 hours PRN  benzonatate 100 milliGRAM(s) Oral every 8 hours PRN  budesonide  80 MICROgram(s)/formoterol 4.5 MICROgram(s) Inhaler 2 Puff(s) Inhalation two times a day  guaiFENesin Oral Liquid (Sugar-Free) 100 milliGRAM(s) Oral every 6 hours PRN    acetaminophen     Tablet .. 650 milliGRAM(s) Oral every 6 hours PRN  gabapentin 600 milliGRAM(s) Oral two times a day  melatonin 3 milliGRAM(s) Oral at bedtime  methadone    Tablet 5 milliGRAM(s) Oral every 6 hours PRN  methadone    Tablet 10 milliGRAM(s) Oral three times a day    pantoprazole    Tablet 40 milliGRAM(s) Oral before breakfast  polyethylene glycol 3350 17 Gram(s) Oral daily PRN  senna 2 Tablet(s) Oral at bedtime    atorvastatin 80 milliGRAM(s) Oral at bedtime  dextrose 40% Gel 15 Gram(s) Oral once  dextrose 50% Injectable 25 Gram(s) IV Push once  dextrose 50% Injectable 12.5 Gram(s) IV Push once  dextrose 50% Injectable 25 Gram(s) IV Push once  fenofibrate Tablet 48 milliGRAM(s) Oral daily  glucagon  Injectable 1 milliGRAM(s) IntraMuscular once  insulin glargine Injectable (LANTUS) 10 Unit(s) SubCutaneous at bedtime  insulin lispro (ADMELOG) corrective regimen sliding scale   SubCutaneous Before meals and at bedtime  insulin lispro Injectable (ADMELOG) 5 Unit(s) SubCutaneous three times a day before meals  levothyroxine 25 MICROGram(s) Oral daily    apixaban 2.5 milliGRAM(s) Oral every 12 hours  dextrose 5%. 1000 milliLiter(s) IV Continuous <Continuous>  dextrose 5%. 1000 milliLiter(s) IV Continuous <Continuous>  lidocaine   4% Patch 1 Patch Transdermal daily      	    [PHYSICAL EXAM:  TELEMETRY:  T(C): 36.1 (12-22-21 @ 05:05), Max: 37.1 (12-21-21 @ 15:14)  HR: 69 (12-22-21 @ 05:11) (65 - 98)  BP: 100/51 (12-22-21 @ 05:00) (91/42 - 128/60)  RR: 18 (12-22-21 @ 05:11) (17 - 18)  SpO2: 96% (12-22-21 @ 05:11) (95% - 98%)  Wt(kg): --  I&O's Summary    21 Dec 2021 07:01  -  22 Dec 2021 07:00  --------------------------------------------------------  IN: 180 mL / OUT: 1400 mL / NET: -1220 mL        Ward:  Central/PICC/Mid Line:                                         Appearance: Normal	  HEENT:   Normal oral mucosa, PERRL, EOMI	  Neck: Supple, + JVD/ - JVD; Carotid Bruit   Cardiovascular: Normal S1 S2, No JVD, No murmurs,   Respiratory: Lungs clear to auscultation/Decreased Breath Sounds/No Rales, Rhonchi, Wheezing	  Gastrointestinal:  Soft, Non-tender, + BS	  Skin: No rashes, No ecchymoses, No cyanosis  Extremities: Normal range of motion, No clubbing, cyanosis or edema  Vascular: Peripheral pulses palpable 2+ bilaterally  Neurologic: jerking movements of UE   Psychiatry: A & O x 3, tearful and depressed mood during interveiw      	    ECG:  	  RADIOLOGY:   DIAGNOSTIC TESTING:  [ ] Echocardiogram:  [ ]  Catheterization:  [ ] Stress Test:    [ ] DELLA  OTHER: 	    LABS:	 	  CARDIAC MARKERS:                                  8.6    7.83  )-----------( 157      ( 22 Dec 2021 07:05 )             29.1     12-22    138  |  95<L>  |  81<H>  ----------------------------<  106<H>  5.3   |  32<H>  |  5.51<H>    Ca    8.6      22 Dec 2021 07:05  Phos  7.6     12-22  Mg     2.1     12-22    TPro  7.0  /  Alb  2.8<L>  /  TBili  0.2  /  DBili  x   /  AST  18  /  ALT  8<L>  /  AlkPhos  75  12-21    proBNP:   Lipid Profile:   HgA1c:   TSH:   PTT - ( 21 Dec 2021 07:10 )  PTT:33.7 sec    ASSESSMENT/PLAN: 	        DVT ppx:  Dispo:     Interventional Cardiology PA Adult Progress Note    Subjective Assessment: Patient seen and examined at bedside w/ Renal team. Discussed with patient prognosis of CKD, and likelihood of needing HD in the future. Patient tearful following conversation, patient states "I am afraid of dialysis, I am afraid it will kill me." Patient denies other cardiac symptoms.    	  MEDICATIONS:  metoprolol succinate  milliGRAM(s) Oral daily  sacubitril 97 mG/valsartan 103 mG 1 Tablet(s) Oral two times a day  cefTRIAXone   IVPB 1000 milliGRAM(s) IV Intermittent every 24 hours  ALBUTerol   0.5% 2.5 milliGRAM(s) Nebulizer every 6 hours PRN  benzonatate 100 milliGRAM(s) Oral every 8 hours PRN  budesonide  80 MICROgram(s)/formoterol 4.5 MICROgram(s) Inhaler 2 Puff(s) Inhalation two times a day  guaiFENesin Oral Liquid (Sugar-Free) 100 milliGRAM(s) Oral every 6 hours PRN    acetaminophen     Tablet .. 650 milliGRAM(s) Oral every 6 hours PRN  gabapentin 600 milliGRAM(s) Oral two times a day  melatonin 3 milliGRAM(s) Oral at bedtime  methadone    Tablet 5 milliGRAM(s) Oral every 6 hours PRN  methadone    Tablet 10 milliGRAM(s) Oral three times a day    pantoprazole    Tablet 40 milliGRAM(s) Oral before breakfast  polyethylene glycol 3350 17 Gram(s) Oral daily PRN  senna 2 Tablet(s) Oral at bedtime    atorvastatin 80 milliGRAM(s) Oral at bedtime  dextrose 40% Gel 15 Gram(s) Oral once  dextrose 50% Injectable 25 Gram(s) IV Push once  dextrose 50% Injectable 12.5 Gram(s) IV Push once  dextrose 50% Injectable 25 Gram(s) IV Push once  fenofibrate Tablet 48 milliGRAM(s) Oral daily  glucagon  Injectable 1 milliGRAM(s) IntraMuscular once  insulin glargine Injectable (LANTUS) 10 Unit(s) SubCutaneous at bedtime  insulin lispro (ADMELOG) corrective regimen sliding scale   SubCutaneous Before meals and at bedtime  insulin lispro Injectable (ADMELOG) 5 Unit(s) SubCutaneous three times a day before meals  levothyroxine 25 MICROGram(s) Oral daily    apixaban 2.5 milliGRAM(s) Oral every 12 hours  dextrose 5%. 1000 milliLiter(s) IV Continuous <Continuous>  dextrose 5%. 1000 milliLiter(s) IV Continuous <Continuous>  lidocaine   4% Patch 1 Patch Transdermal daily      	    [PHYSICAL EXAM:  TELEMETRY:  T(C): 36.1 (12-22-21 @ 05:05), Max: 37.1 (12-21-21 @ 15:14)  HR: 69 (12-22-21 @ 05:11) (65 - 98)  BP: 100/51 (12-22-21 @ 05:00) (91/42 - 128/60)  RR: 18 (12-22-21 @ 05:11) (17 - 18)  SpO2: 96% (12-22-21 @ 05:11) (95% - 98%)  Wt(kg): --  I&O's Summary    21 Dec 2021 07:01  -  22 Dec 2021 07:00  --------------------------------------------------------  IN: 180 mL / OUT: 1400 mL / NET: -1220 mL        Ward:  Central/PICC/Mid Line:                                         Appearance: Normal	  HEENT:   Normal oral mucosa, PERRL, EOMI	  Neck: Supple, + JVD/ - JVD; Carotid Bruit   Cardiovascular: Normal S1 S2, No JVD, No murmurs,   Respiratory: Lungs clear to auscultation/Decreased Breath Sounds/No Rales, Rhonchi, Wheezing	  Gastrointestinal:  Soft, Non-tender, + BS	  Skin: No rashes, No ecchymoses, No cyanosis  Extremities: Normal range of motion, No clubbing, cyanosis or edema  Vascular: Peripheral pulses palpable 2+ bilaterally  Neurologic: jerking movements of UE   Psychiatry: A & O x 3, tearful and depressed mood during interveiw                              8.6    7.83  )-----------( 157      ( 22 Dec 2021 07:05 )             29.1     12-22    138  |  95<L>  |  81<H>  ----------------------------<  106<H>  5.3   |  32<H>  |  5.51<H>    Ca    8.6      22 Dec 2021 07:05  Phos  7.6     12-22  Mg     2.1     12-22    TPro  7.0  /  Alb  2.8<L>  /  TBili  0.2  /  DBili  x   /  AST  18  /  ALT  8<L>  /  AlkPhos  75  12-21    proBNP:   Lipid Profile:   HgA1c:   TSH:   PTT - ( 21 Dec 2021 07:10 )  PTT:33.7 sec    ASSESSMENT/PLAN: 	        DVT ppx:  Dispo:     Interventional Cardiology PA Adult Progress Note    Subjective Assessment: Patient seen and examined at bedside w/ Renal team. Discussed with patient prognosis of CKD, and likelihood of needing HD in the future. Patient tearful following conversation, patient states "I am afraid of dialysis, I am afraid it will kill me." Patient denies other cardiac symptoms.    	  MEDICATIONS:  metoprolol succinate  milliGRAM(s) Oral daily  sacubitril 97 mG/valsartan 103 mG 1 Tablet(s) Oral two times a day  cefTRIAXone   IVPB 1000 milliGRAM(s) IV Intermittent every 24 hours  ALBUTerol   0.5% 2.5 milliGRAM(s) Nebulizer every 6 hours PRN  benzonatate 100 milliGRAM(s) Oral every 8 hours PRN  budesonide  80 MICROgram(s)/formoterol 4.5 MICROgram(s) Inhaler 2 Puff(s) Inhalation two times a day  guaiFENesin Oral Liquid (Sugar-Free) 100 milliGRAM(s) Oral every 6 hours PRN    acetaminophen     Tablet .. 650 milliGRAM(s) Oral every 6 hours PRN  gabapentin 600 milliGRAM(s) Oral two times a day  melatonin 3 milliGRAM(s) Oral at bedtime  methadone    Tablet 5 milliGRAM(s) Oral every 6 hours PRN  methadone    Tablet 10 milliGRAM(s) Oral three times a day    pantoprazole    Tablet 40 milliGRAM(s) Oral before breakfast  polyethylene glycol 3350 17 Gram(s) Oral daily PRN  senna 2 Tablet(s) Oral at bedtime    atorvastatin 80 milliGRAM(s) Oral at bedtime  dextrose 40% Gel 15 Gram(s) Oral once  dextrose 50% Injectable 25 Gram(s) IV Push once  dextrose 50% Injectable 12.5 Gram(s) IV Push once  dextrose 50% Injectable 25 Gram(s) IV Push once  fenofibrate Tablet 48 milliGRAM(s) Oral daily  glucagon  Injectable 1 milliGRAM(s) IntraMuscular once  insulin glargine Injectable (LANTUS) 10 Unit(s) SubCutaneous at bedtime  insulin lispro (ADMELOG) corrective regimen sliding scale   SubCutaneous Before meals and at bedtime  insulin lispro Injectable (ADMELOG) 5 Unit(s) SubCutaneous three times a day before meals  levothyroxine 25 MICROGram(s) Oral daily    apixaban 2.5 milliGRAM(s) Oral every 12 hours  dextrose 5%. 1000 milliLiter(s) IV Continuous <Continuous>  dextrose 5%. 1000 milliLiter(s) IV Continuous <Continuous>  lidocaine   4% Patch 1 Patch Transdermal daily      	    [PHYSICAL EXAM:  TELEMETRY:  T(C): 36.1 (12-22-21 @ 05:05), Max: 37.1 (12-21-21 @ 15:14)  HR: 69 (12-22-21 @ 05:11) (65 - 98)  BP: 100/51 (12-22-21 @ 05:00) (91/42 - 128/60)  RR: 18 (12-22-21 @ 05:11) (17 - 18)  SpO2: 96% (12-22-21 @ 05:11) (95% - 98%)  Wt(kg): --  I&O's Summary    21 Dec 2021 07:01  -  22 Dec 2021 07:00  --------------------------------------------------------  IN: 180 mL / OUT: 1400 mL / NET: -1220 mL        Ward: in place  Central/PICC/Mid Line:                                         Appearance: obese female  Neck: Supple, -JVD  Cardiovascular: Normal S1 S2,  No murmurs,   Respiratory: Lungs clear to auscultation No Rales, Rhonchi, Wheezing	  Skin: chronic venous stasis skin changes   Extremities: No edema   Vascular: Peripheral pulses palpable 2+ bilaterally  Neurologic: jerking movements of UE   Psychiatry: A & O x 3, tearful and depressed mood during interview- low medical literacy                               8.6    7.83  )-----------( 157      ( 22 Dec 2021 07:05 )             29.1     12-22    138  |  95<L>  |  81<H>  ----------------------------<  106<H>  5.3   |  32<H>  |  5.51<H>    Ca    8.6      22 Dec 2021 07:05  Phos  7.6     12-22  Mg     2.1     12-22    TPro  7.0  /  Alb  2.8<L>  /  TBili  0.2  /  DBili  x   /  AST  18  /  ALT  8<L>  /  AlkPhos  75  12-21    proBNP:   Lipid Profile:   HgA1c:   TSH:   PTT - ( 21 Dec 2021 07:10 )  PTT:33.7 sec    ASSESSMENT/PLAN: 	        DVT ppx:  Dispo:

## 2021-12-22 NOTE — PROGRESS NOTE ADULT - ASSESSMENT
62 yo obese F, current smoker, with a PMH of HTN, HLD, DM-II, CKD Stage 4 (Cr ~3.9), Hypothyroidism, known NICM with EF 25% (s/p AICD for primary prevention), MM, COPD (3L home O2), recurrent DVTs (on Eliquis) admitted for acute on chronic CHF exacerbation - now euvolemic with minimal urine output on diuretict and worsening RITCHIE on CKD for which renal is following.  Found to be retaining urine and medina placed and urology consulted. Neuro/psych following for drowsiness/upper extremity jerk, now resolving.  d/c pending improving of Crt, likely 12/23

## 2021-12-22 NOTE — PROGRESS NOTE ADULT - PROBLEM SELECTOR PLAN 6
s/p 1 unit pRBC on 12/15 w/ appropriate response, now stable at 8-9s  - Maintain active T&S (next due 12/24)

## 2021-12-22 NOTE — PROGRESS NOTE ADULT - PROBLEM SELECTOR PLAN 2
Cr previously stable at baseline 3.9 - continue to trend up 5.5 despite placement of medina  - Retroperitoneal US 12/18: Mild bilateral hydronephrosis  - Initial concern for poor urine output and potential need for HD.  But now RITCHIE likely in setting of urinary retention.   - 12/21: Straight Cath post void at 12pm was 1250,  repeat straight cath at 4pm 285.  Decision made to place medina catheter to prevent urinary retention and monitor urine output.    - monitor renal function and f/u further renal recs.   -Vein mapping completed as requested by vascular.

## 2021-12-22 NOTE — PROGRESS NOTE ADULT - ASSESSMENT
62 yo obese Female CKD (baseline around 3.7-4) CHF, DM COPD (on 3L home o2) multiple myeloma presented to the hospital with complaints of hemoptysis and shortness of breath x 4 days;     Assessment/Plan:     #CKD Cr increasing   Uop: 1.4L/shift   Cr up to 5.5  will monitor renal function post medina insertion through the weekend; if worsening will likely plan for TDC after weekend  vascular consult for vein mapping  urology follow up   strict i;s and o's  daily weights  daily BMP w/ phos  if K >5.5 would give lokelma 10grams  spoke in depth w/ patient and cardio PA at bedside    Krystle Billy D.O  PGY 4 nephrology fellow  [phone] 60 yo obese Female CKD (baseline around 3.7-4) CHF, DM COPD (on 3L home o2) multiple myeloma presented to the hospital with complaints of hemoptysis and shortness of breath x 4 days;     Assessment/Plan:     #CKD Cr increasing   Uop: 1.4L/shift   Cr up to 5.5  will monitor renal function post medina insertion through the weekend; if worsening will likely plan for TDC after weekend  vascular consult for vein mapping  urology follow up   strict i;s and o's  daily weights  daily BMP w/ phos  if K >5.5 would give lokelma 10grams  spoke in depth w/ patient and cardio PA at bedside    Krystle Billy D.O    *ADDENDUM:   given mental status changes and convo w/ neuro who think that the jerks are myoclonic jerks likely 2/2 metabolic encepholpathy and rising renal function despite medina catheter placement will make NPO after MN for TDC placement and HD initiation tomorrow 12/23  PGY 4 nephrology fellow  [phone]

## 2021-12-22 NOTE — PROGRESS NOTE ADULT - PROBLEM SELECTOR PLAN 3
recent poor urine output and RITCHIE  - per renal recs straight cath post void performed today 12/21 with 1250 output.  repeat at 4pm 285.  Ward catheter placed to prevent urinary retention and monitor output  - urology consulted.  UTI vs neurogenic bladder.  Continue Ceftriaxone as previously ordered.    - will need outpatient urology follow up.

## 2021-12-23 LAB
ALBUMIN SERPL ELPH-MCNC: 3.5 G/DL — SIGNIFICANT CHANGE UP (ref 3.3–5)
ALP SERPL-CCNC: 71 U/L — SIGNIFICANT CHANGE UP (ref 40–120)
ALT FLD-CCNC: 8 U/L — LOW (ref 10–45)
ANION GAP SERPL CALC-SCNC: 13 MMOL/L — SIGNIFICANT CHANGE UP (ref 5–17)
APTT BLD: 103.1 SEC — HIGH (ref 27.5–35.5)
APTT BLD: 122.9 SEC — CRITICAL HIGH (ref 27.5–35.5)
AST SERPL-CCNC: 15 U/L — SIGNIFICANT CHANGE UP (ref 10–40)
BASOPHILS # BLD AUTO: 0.01 K/UL — SIGNIFICANT CHANGE UP (ref 0–0.2)
BASOPHILS NFR BLD AUTO: 0.1 % — SIGNIFICANT CHANGE UP (ref 0–2)
BILIRUB SERPL-MCNC: 0.2 MG/DL — SIGNIFICANT CHANGE UP (ref 0.2–1.2)
BUN SERPL-MCNC: 86 MG/DL — HIGH (ref 7–23)
CALCIUM SERPL-MCNC: 9.1 MG/DL — SIGNIFICANT CHANGE UP (ref 8.4–10.5)
CHLORIDE SERPL-SCNC: 95 MMOL/L — LOW (ref 96–108)
CO2 SERPL-SCNC: 30 MMOL/L — SIGNIFICANT CHANGE UP (ref 22–31)
CREAT SERPL-MCNC: 5.6 MG/DL — HIGH (ref 0.5–1.3)
EOSINOPHIL # BLD AUTO: 0 K/UL — SIGNIFICANT CHANGE UP (ref 0–0.5)
EOSINOPHIL NFR BLD AUTO: 0 % — SIGNIFICANT CHANGE UP (ref 0–6)
GLUCOSE BLDC GLUCOMTR-MCNC: 150 MG/DL — HIGH (ref 70–99)
GLUCOSE BLDC GLUCOMTR-MCNC: 152 MG/DL — HIGH (ref 70–99)
GLUCOSE BLDC GLUCOMTR-MCNC: 167 MG/DL — HIGH (ref 70–99)
GLUCOSE BLDC GLUCOMTR-MCNC: 233 MG/DL — HIGH (ref 70–99)
GLUCOSE SERPL-MCNC: 159 MG/DL — HIGH (ref 70–99)
HCT VFR BLD CALC: 29.1 % — LOW (ref 34.5–45)
HCT VFR BLD CALC: 32.4 % — LOW (ref 34.5–45)
HGB BLD-MCNC: 10.1 G/DL — LOW (ref 11.5–15.5)
HGB BLD-MCNC: 9.3 G/DL — LOW (ref 11.5–15.5)
IMM GRANULOCYTES NFR BLD AUTO: 0.5 % — SIGNIFICANT CHANGE UP (ref 0–1.5)
LYMPHOCYTES # BLD AUTO: 0.59 K/UL — LOW (ref 1–3.3)
LYMPHOCYTES # BLD AUTO: 6.1 % — LOW (ref 13–44)
MAGNESIUM SERPL-MCNC: 2.2 MG/DL — SIGNIFICANT CHANGE UP (ref 1.6–2.6)
MCHC RBC-ENTMCNC: 29.3 PG — SIGNIFICANT CHANGE UP (ref 27–34)
MCHC RBC-ENTMCNC: 29.6 PG — SIGNIFICANT CHANGE UP (ref 27–34)
MCHC RBC-ENTMCNC: 31.2 GM/DL — LOW (ref 32–36)
MCHC RBC-ENTMCNC: 32 GM/DL — SIGNIFICANT CHANGE UP (ref 32–36)
MCV RBC AUTO: 92.7 FL — SIGNIFICANT CHANGE UP (ref 80–100)
MCV RBC AUTO: 93.9 FL — SIGNIFICANT CHANGE UP (ref 80–100)
MONOCYTES # BLD AUTO: 0.73 K/UL — SIGNIFICANT CHANGE UP (ref 0–0.9)
MONOCYTES NFR BLD AUTO: 7.5 % — SIGNIFICANT CHANGE UP (ref 2–14)
NEUTROPHILS # BLD AUTO: 8.35 K/UL — HIGH (ref 1.8–7.4)
NEUTROPHILS NFR BLD AUTO: 85.8 % — HIGH (ref 43–77)
NRBC # BLD: 0 /100 WBCS — SIGNIFICANT CHANGE UP (ref 0–0)
NRBC # BLD: 0 /100 WBCS — SIGNIFICANT CHANGE UP (ref 0–0)
PHOSPHATE SERPL-MCNC: 7.3 MG/DL — HIGH (ref 2.5–4.5)
PLATELET # BLD AUTO: 174 K/UL — SIGNIFICANT CHANGE UP (ref 150–400)
PLATELET # BLD AUTO: 196 K/UL — SIGNIFICANT CHANGE UP (ref 150–400)
POTASSIUM SERPL-MCNC: 5.2 MMOL/L — SIGNIFICANT CHANGE UP (ref 3.5–5.3)
POTASSIUM SERPL-SCNC: 5.2 MMOL/L — SIGNIFICANT CHANGE UP (ref 3.5–5.3)
PROT SERPL-MCNC: 7.4 G/DL — SIGNIFICANT CHANGE UP (ref 6–8.3)
RBC # BLD: 3.14 M/UL — LOW (ref 3.8–5.2)
RBC # BLD: 3.45 M/UL — LOW (ref 3.8–5.2)
RBC # FLD: 13.9 % — SIGNIFICANT CHANGE UP (ref 10.3–14.5)
RBC # FLD: 14 % — SIGNIFICANT CHANGE UP (ref 10.3–14.5)
SARS-COV-2 RNA SPEC QL NAA+PROBE: SIGNIFICANT CHANGE UP
SODIUM SERPL-SCNC: 138 MMOL/L — SIGNIFICANT CHANGE UP (ref 135–145)
WBC # BLD: 8.71 K/UL — SIGNIFICANT CHANGE UP (ref 3.8–10.5)
WBC # BLD: 9.73 K/UL — SIGNIFICANT CHANGE UP (ref 3.8–10.5)
WBC # FLD AUTO: 8.71 K/UL — SIGNIFICANT CHANGE UP (ref 3.8–10.5)
WBC # FLD AUTO: 9.73 K/UL — SIGNIFICANT CHANGE UP (ref 3.8–10.5)

## 2021-12-23 PROCEDURE — 77001 FLUOROGUIDE FOR VEIN DEVICE: CPT | Mod: 26

## 2021-12-23 PROCEDURE — 36558 INSERT TUNNELED CV CATH: CPT

## 2021-12-23 PROCEDURE — 99233 SBSQ HOSP IP/OBS HIGH 50: CPT

## 2021-12-23 PROCEDURE — 99356: CPT

## 2021-12-23 PROCEDURE — 76937 US GUIDE VASCULAR ACCESS: CPT | Mod: 26

## 2021-12-23 PROCEDURE — 99447 NTRPROF PH1/NTRNET/EHR 11-20: CPT

## 2021-12-23 PROCEDURE — 99152 MOD SED SAME PHYS/QHP 5/>YRS: CPT

## 2021-12-23 PROCEDURE — 93010 ELECTROCARDIOGRAM REPORT: CPT

## 2021-12-23 RX ORDER — APIXABAN 2.5 MG/1
2.5 TABLET, FILM COATED ORAL EVERY 12 HOURS
Refills: 0 | Status: DISCONTINUED | OUTPATIENT
Start: 2021-12-23 | End: 2021-12-29

## 2021-12-23 RX ADMIN — Medication 2: at 07:11

## 2021-12-23 RX ADMIN — Medication 25 MICROGRAM(S): at 05:36

## 2021-12-23 RX ADMIN — HEPARIN SODIUM 1600 UNIT(S)/HR: 5000 INJECTION INTRAVENOUS; SUBCUTANEOUS at 02:53

## 2021-12-23 RX ADMIN — APIXABAN 2.5 MILLIGRAM(S): 2.5 TABLET, FILM COATED ORAL at 21:25

## 2021-12-23 RX ADMIN — Medication 4: at 19:23

## 2021-12-23 RX ADMIN — HEPARIN SODIUM 1400 UNIT(S)/HR: 5000 INJECTION INTRAVENOUS; SUBCUTANEOUS at 09:25

## 2021-12-23 RX ADMIN — Medication 5 UNIT(S): at 19:24

## 2021-12-23 RX ADMIN — Medication 48 MILLIGRAM(S): at 19:22

## 2021-12-23 RX ADMIN — BUDESONIDE AND FORMOTEROL FUMARATE DIHYDRATE 2 PUFF(S): 160; 4.5 AEROSOL RESPIRATORY (INHALATION) at 21:24

## 2021-12-23 RX ADMIN — SENNA PLUS 2 TABLET(S): 8.6 TABLET ORAL at 21:24

## 2021-12-23 RX ADMIN — INSULIN GLARGINE 10 UNIT(S): 100 INJECTION, SOLUTION SUBCUTANEOUS at 21:30

## 2021-12-23 RX ADMIN — SEVELAMER CARBONATE 800 MILLIGRAM(S): 2400 POWDER, FOR SUSPENSION ORAL at 21:25

## 2021-12-23 RX ADMIN — LIDOCAINE 1 PATCH: 4 CREAM TOPICAL at 00:01

## 2021-12-23 RX ADMIN — METHADONE HYDROCHLORIDE 10 MILLIGRAM(S): 40 TABLET ORAL at 21:25

## 2021-12-23 RX ADMIN — Medication 3 MILLIGRAM(S): at 21:25

## 2021-12-23 RX ADMIN — PANTOPRAZOLE SODIUM 40 MILLIGRAM(S): 20 TABLET, DELAYED RELEASE ORAL at 06:24

## 2021-12-23 RX ADMIN — BUDESONIDE AND FORMOTEROL FUMARATE DIHYDRATE 2 PUFF(S): 160; 4.5 AEROSOL RESPIRATORY (INHALATION) at 11:24

## 2021-12-23 RX ADMIN — Medication 2: at 11:55

## 2021-12-23 RX ADMIN — SACUBITRIL AND VALSARTAN 1 TABLET(S): 24; 26 TABLET, FILM COATED ORAL at 21:24

## 2021-12-23 RX ADMIN — METHADONE HYDROCHLORIDE 10 MILLIGRAM(S): 40 TABLET ORAL at 05:36

## 2021-12-23 RX ADMIN — ATORVASTATIN CALCIUM 80 MILLIGRAM(S): 80 TABLET, FILM COATED ORAL at 21:25

## 2021-12-23 RX ADMIN — GABAPENTIN 600 MILLIGRAM(S): 400 CAPSULE ORAL at 05:36

## 2021-12-23 RX ADMIN — Medication 100 MILLIGRAM(S): at 06:24

## 2021-12-23 NOTE — CONSULT NOTE ADULT - SUBJECTIVE AND OBJECTIVE BOX
NEUROLOGY INITIAL CONSULT NOTE    CHIEF COMPLAINT:      HPI:  60 yo obese Female, current smoker, with Contrast/Aspirin Allergy and an extensive PMHx including HTN, NICM, chronic HFrEF (EF 25% s/p AICD for primary prevention battery changed 8/2020, w/ cardiac Mems device), IDDM c/b neuropathy (on Methadone), hx of recurrent DVTs (on Eliquis), CKD IV, CVA x2 (most recently in 2013 with residual right sided weakness), PAD s/p bypass, COPD (on 3L home O2), DHARMESH on CPAP, Multiple Myeloma (on [place]), hypothyroidism, depression who presented to [facility] with hemoptysis x 4 days found to be in CHF exacerbation. Course c/b RITCHIE on CKD with plan for HD. Pt also with new onset tremor that started on 12/17 that is most noticeable when trying to do activities. Otherwise denies numbness, tingling, burning, headaches.      PAST MEDICAL & SURGICAL HISTORY:  CHF (congestive heart failure)    COPD (chronic obstructive pulmonary disease)    HLD (hyperlipidemia)    Chronic pain    DM (diabetes mellitus)    CVA (cerebral vascular accident)    DVT (deep venous thrombosis)    HTN (hypertension)    Depression    Bipolar depression    PAD (peripheral artery disease)    Neuropathy    Hypothyroidism    Multiple myeloma    CKD (chronic kidney disease), stage IV    DHARMESH on CPAP    AICD (automatic cardioverter/defibrillator) present    H/O abdominal hysterectomy    H/O tubal ligation    History of cholecystectomy    H/O extremity bypass graft        REVIEW OF SYSTEMS:  As per HPI, otherwise negative for Constitutional, Eyes, Ears/Nose/Mouth/Throat, Neck, Cardiovascular, Respiratory, Gastrointestinal, Genitourinary, Skin, Endocrine, Musculoskeletal, Psychiatric, and Hematologic/Lymphatic.    MEDICATIONS  (STANDING):  atorvastatin 80 milliGRAM(s) Oral at bedtime  budesonide  80 MICROgram(s)/formoterol 4.5 MICROgram(s) Inhaler 2 Puff(s) Inhalation two times a day  dextrose 40% Gel 15 Gram(s) Oral once  dextrose 5%. 1000 milliLiter(s) (50 mL/Hr) IV Continuous <Continuous>  dextrose 5%. 1000 milliLiter(s) (100 mL/Hr) IV Continuous <Continuous>  dextrose 50% Injectable 25 Gram(s) IV Push once  dextrose 50% Injectable 12.5 Gram(s) IV Push once  dextrose 50% Injectable 25 Gram(s) IV Push once  fenofibrate Tablet 48 milliGRAM(s) Oral daily  gabapentin 600 milliGRAM(s) Oral two times a day  glucagon  Injectable 1 milliGRAM(s) IntraMuscular once  heparin  Infusion.  Unit(s)/Hr (18 mL/Hr) IV Continuous <Continuous>  insulin glargine Injectable (LANTUS) 10 Unit(s) SubCutaneous at bedtime  insulin lispro (ADMELOG) corrective regimen sliding scale   SubCutaneous Before meals and at bedtime  insulin lispro Injectable (ADMELOG) 5 Unit(s) SubCutaneous three times a day before meals  levothyroxine 25 MICROGram(s) Oral daily  lidocaine   4% Patch 1 Patch Transdermal daily  melatonin 3 milliGRAM(s) Oral at bedtime  methadone    Tablet 10 milliGRAM(s) Oral three times a day  metoprolol succinate  milliGRAM(s) Oral daily  pantoprazole    Tablet 40 milliGRAM(s) Oral before breakfast  sacubitril 97 mG/valsartan 103 mG 1 Tablet(s) Oral two times a day  senna 2 Tablet(s) Oral at bedtime  sevelamer carbonate 800 milliGRAM(s) Oral three times a day    MEDICATIONS  (PRN):  acetaminophen     Tablet .. 650 milliGRAM(s) Oral every 6 hours PRN Mild Pain (1 - 3)  ALBUTerol   0.5% 2.5 milliGRAM(s) Nebulizer every 6 hours PRN Shortness of Breath and/or Wheezing  benzonatate 100 milliGRAM(s) Oral every 8 hours PRN Cough  guaiFENesin Oral Liquid (Sugar-Free) 100 milliGRAM(s) Oral every 6 hours PRN Cough  heparin   Injectable 8000 Unit(s) IV Push every 6 hours PRN For aPTT less than 40  heparin   Injectable 4000 Unit(s) IV Push every 6 hours PRN For aPTT between 40 - 57  methadone    Tablet 5 milliGRAM(s) Oral every 6 hours PRN Moderate Pain (4 - 6)  polyethylene glycol 3350 17 Gram(s) Oral daily PRN Constipation      Allergies    aspirin (Other (Moderate); Rash)  oral contrast (Unknown)    Intolerances        FAMILY HISTORY:  Family history of hypertension (Mother)    Family history of diabetes mellitus (Mother)      VITAL SIGNS:  Vital Signs Last 24 Hrs  T(C): 36.3 (23 Dec 2021 08:47), Max: 36.3 (23 Dec 2021 08:47)  T(F): 97.3 (23 Dec 2021 08:47), Max: 97.3 (23 Dec 2021 08:47)  HR: 76 (23 Dec 2021 08:35) (67 - 102)  BP: 145/58 (23 Dec 2021 08:35) (107/54 - 157/95)  BP(mean): --  RR: 18 (23 Dec 2021 08:35) (14 - 18)  SpO2: 98% (23 Dec 2021 08:35) (95% - 100%)    PHYSICAL EXAMINATION:  Constitutional: lethargic but NAD  Neurologic:  - Mental Status:  AAOx3; speech is slow but fluent with intact naming, repetition, and comprehension; Good overall fund of knowledge. Pt appears tired and almost falls asleep during exam.  - Cranial Nerves II-XII:    II: Pupils are equal, round, and reactive to light.  III, IV, VI:  Extraocular movements are intact without nystagmus.  V:  Facial sensation is intact in the V1-V3 distribution bilaterally.  VII:  Face is symmetric with normal eye closure and smile  VIII:  Hearing is intact to finger rub.  IX, X:  Uvula is midline and soft palate rises symmetrically  XI:  Head turning and shoulder shrug are intact.  XII:  Tongue protrudes in the midline.  - Motor:  Strength is 5/5 throughout but with asterixis noted x4  - Sensory:  Intact to light touch throughout.  - Coordination:  Finger-nose-finger without dysmetria.  Rapid alternating hand movements intact.    LABS:                        10.1   9.73  )-----------( 196      ( 23 Dec 2021 07:39 )             32.4     12-23    138  |  95<L>  |  86<H>  ----------------------------<  159<H>  5.2   |  30  |  5.60<H>    Ca    9.1      23 Dec 2021 07:39  Phos  7.3     12-23  Mg     2.2     12-23    TPro  7.4  /  Alb  3.5  /  TBili  0.2  /  DBili  x   /  AST  15  /  ALT  8<L>  /  AlkPhos  71  12-23    PTT - ( 23 Dec 2021 08:42 )  PTT:122.9 sec      RADIOLOGY & ADDITIONAL STUDIES:      IMPRESSION & RECOMMENDATIONS:

## 2021-12-23 NOTE — CONSULT NOTE ADULT - CONSULT REQUESTED DATE/TIME
17-Dec-2021 12:36
23-Dec-2021 09:48
17-Dec-2021 13:52
18-Dec-2021 19:34
21-Dec-2021 13:57
14-Dec-2021 20:07
17-Dec-2021 14:00
23-Dec-2021 11:54
15-Dec-2021

## 2021-12-23 NOTE — CONSULT NOTE ADULT - ASSESSMENT
62 yo obese F, current smoker, with a PMH of HTN, HLD, DM-II, CKD Stage 4 (Cr ~3.9), Hypothyroidism, known NICM with EF 25% (s/p AICD for primary prevention), MM, COPD (3L home O2), recurrent DVTs (on Eliquis) admitted for acute on chronic CHF exacerbation - now euvolemic with minimal urine output on diuretic and worsening RITCHIE on CKD for which renal is following. Neuro consulted for drowsiness/upper extremity jerking.    #Lethargy/Asterixis  -neuro consulted for tremors and drowsiness with exam notable for asterixis in all 4 extremities. Pt is also AAOx3, but lethargic and slow to respond to questions  -likely 2/2 uremia from RITCHIE on CKD as well as poor renal clearance of medications  Recommendations:  -HD per renal  -please renally dose methadone and gabapentin as these can be playing a significant role in pt's lethargy and asterixis. Can even consider d/c'ing gabapentin as pt reports that her pain is well-controlled    Neurology to sign off, please feel free to reconsult with any questions   60 yo obese F, current smoker, with a PMH of HTN, HLD, DM-II, CKD Stage 4 (Cr ~3.9), Hypothyroidism, known NICM with EF 25% (s/p AICD for primary prevention), MM, COPD (3L home O2), recurrent DVTs (on Eliquis) admitted for acute on chronic CHF exacerbation - now euvolemic with minimal urine output on diuretic and worsening RITCHIE on CKD for which renal is following. Neuro consulted for drowsiness/upper extremity jerking.    #Lethargy/Asterixis  -neuro consulted for tremors and drowsiness with exam notable for course asterixis in all 4 extremities. Pt is also AAOx3, but lethargic and slow to respond to questions  -likely 2/2 uremia from RITCHIE on CKD as well as poor renal clearance of medications  Recommendations:  -HD per renal  -please renally dose methadone and gabapentin as these can be playing a significant role in pt's lethargy and asterixis. Can even consider d/c'ing gabapentin in the short term as pt reports that her pain is well-controlled    Neurology to sign off, please feel free to reconsult with any questions

## 2021-12-23 NOTE — CONSULT NOTE ADULT - ASSESSMENT
Assessment: 62 yo obese F, current smoker, with a PMH of HTN, HLD, DM-II, CKD Stage 4 (Cr ~3.9), Hypothyroidism, known NICM with EF 25% (s/p AICD for primary prevention), MM, COPD (3L home O2), recurrent DVTs (on Eliquis) admitted for acute on chronic CHF exacerbation - now euvolemic with minimal urine output on diuretics and worsening RITCHIE on CKD for which renal is following.  Found to be retaining urine and medina placed and urology consulted. Neuro/psych following for drowsiness/upper extremity jerk, now resolving.  d/c pending improving of Crt, likely 12/23. IR consulted for HD catheter placement. Case reviewed with Dr. Olmstead, plan for procedure with sedation.     Recommendations: NPO at midnight prior to procedure with sedation. Hold heparin gtt for 2 hours prior, d/c eliquis. Please ensure Covid swab is up to date (within last 72 hours). Recheck AM labs.     Communicated with: primary team

## 2021-12-23 NOTE — CONSULT NOTE ADULT - ATTENDING COMMENTS
Initial attending contact date  12/16/21    . See PA note written above for details. I reviewed the PA documentation. I have personally seen and examined this patient. I reviewed vitals, labs, medications, cardiac studies, and additional imaging. I agree with the above PA's findings and plans as written above with the following additions/statements.    61F obese, HTN, HLD, DM-II, Hypothyroidism, CRI, COPD on  home O2 (3L), chronic DVT , fibromyalgia on methadone, known NICM with EF 25% with AICD, MM, CVA x 2 with residual R sided weakness admitted with mild hemoptysis in context of chronic dry cough and acute on chronic anemia  -With suboptimal diuresis throughout hospital course despite increasing IV diuresis  -CXR improved with BNP decreased from 80950 to 4000  -On 3 L NC satting 99% which is her baseline  -Chronic LE DVT known from prior- restart home eliquis 2.5 mg bid post HD catheter  -Crt now plateauing at 5.6. Plan for IR HD catheter today per renal   -Medina inserted 12/21 due to likely neurogenic bladder/urinary retention. To be dc'd with medina and outpatient urology fu   -Cont toprol 100 mg qd (home dose 200 mg qd), entresto 97/103 bid   -PT to re-eval

## 2021-12-23 NOTE — PROGRESS NOTE ADULT - SUBJECTIVE AND OBJECTIVE BOX
Patient was seen and evaluated on dialysis.     Dialyzer: Optiflux V261PSc  QB: 200 mL/min  QD: 400 mL/min  K bath: 2  Goal UF: 0.5L  Duration: 120 min    Patient is tolerating the procedure well.   Continue full hemodialysis treatment as prescribed.

## 2021-12-23 NOTE — PROGRESS NOTE ADULT - ASSESSMENT
60 yo obese Female CKD (baseline around 3.7-4) CHF, DM COPD (on 3L home o2) multiple myeloma presented to the hospital with complaints of hemoptysis and shortness of breath x 4 days;     Assessment/Plan:     #CKD Cr increasing   worsening uop and rising Cr + asterixis  TDC catheter to be placed and HD to be initiated (12/23)  would obtain PTH Vit D 25  strict i;s and o's  daily weights  daily BMP w/ phos   SW planning for outpatient HD   send hepatitis panel and quanteferon     BP  hypotensive  no UF w/ HD    Anemia  Hgb not at goal  no EPO given multiple myeloma    BMD  Ca 9  phos 7.4  obtain PTH Vit D 25    Krystle Billy D.O.  PGY 4 nephrology fellow  [phone]

## 2021-12-23 NOTE — PROGRESS NOTE ADULT - ATTENDING COMMENTS
have reviewed course over several days, and today's neuro exam reveals marked asterixis in addition to variable mental status.   volume status acceptable.   have explained to pt that she requires HD to improve her neurologic and uremic dysfunction.   agree with findings and recommendations.

## 2021-12-23 NOTE — CONSULT NOTE ADULT - CONSULT REASON
CKD; fluid overload
Hemoptysis
back pain
bilateral arm weakness
RLE DVT
urinary retention
tremor
request for hemodialysis cather
Myeloma

## 2021-12-23 NOTE — CHART NOTE - NSCHARTNOTEFT_GEN_A_CORE
Admitting Diagnosis:   Patient is a 61y old  Female who presents with a chief complaint of Fluid overload (21 Dec 2021 17:52)      PAST MEDICAL & SURGICAL HISTORY:  CHF (congestive heart failure)    COPD (chronic obstructive pulmonary disease)    HLD (hyperlipidemia)    Chronic pain    DM (diabetes mellitus)    CVA (cerebral vascular accident)    DVT (deep venous thrombosis)    HTN (hypertension)    Depression    Bipolar depression    PAD (peripheral artery disease)    Neuropathy    Hypothyroidism    Multiple myeloma    CKD (chronic kidney disease), stage IV    DHARMESH on CPAP    AICD (automatic cardioverter/defibrillator) present    H/O abdominal hysterectomy    H/O tubal ligation    History of cholecystectomy    H/O extremity bypass graft        Current Nutrition Order:  NPO Start Date: 22-Dec-2021,   NPO Start Time: 23:59 (21 @ 16:42)    PO Intake: Good (%) [   ]  Fair (50-75%) [   ] Poor (<25%) [   ]- NA NPO    GI Issues:   Denies N/V, Reports +BM   Ordered for bowel reg     Pain:  Pain Medicine following  No pain at this time per flow sheets     Skin Integrity:  Kavon 20  2+BL leg edema  No pressure ulcers; dry wound appears to be a blister that has popped and is currently dry (2 sites)     Labs:       138  |  95<L>  |  86<H>  ----------------------------<  159<H>  5.2   |  30  |  5.60<H>    Ca    9.1      23 Dec 2021 07:39  Phos  7.3       Mg     2.2         TPro  7.4  /  Alb  3.5  /  TBili  0.2  /  DBili  x   /  AST  15  /  ALT  8<L>  /  AlkPhos  71      CAPILLARY BLOOD GLUCOSE      POCT Blood Glucose.: 152 mg/dL (23 Dec 2021 11:17)  POCT Blood Glucose.: 167 mg/dL (23 Dec 2021 06:37)  POCT Blood Glucose.: 252 mg/dL (22 Dec 2021 21:11)  POCT Blood Glucose.: 250 mg/dL (22 Dec 2021 17:29)      Medications:  MEDICATIONS  (STANDING):  atorvastatin 80 milliGRAM(s) Oral at bedtime  budesonide  80 MICROgram(s)/formoterol 4.5 MICROgram(s) Inhaler 2 Puff(s) Inhalation two times a day  dextrose 40% Gel 15 Gram(s) Oral once  dextrose 5%. 1000 milliLiter(s) (50 mL/Hr) IV Continuous <Continuous>  dextrose 5%. 1000 milliLiter(s) (100 mL/Hr) IV Continuous <Continuous>  dextrose 50% Injectable 25 Gram(s) IV Push once  dextrose 50% Injectable 12.5 Gram(s) IV Push once  dextrose 50% Injectable 25 Gram(s) IV Push once  fenofibrate Tablet 48 milliGRAM(s) Oral daily  glucagon  Injectable 1 milliGRAM(s) IntraMuscular once  heparin  Infusion.  Unit(s)/Hr (18 mL/Hr) IV Continuous <Continuous>  insulin glargine Injectable (LANTUS) 10 Unit(s) SubCutaneous at bedtime  insulin lispro (ADMELOG) corrective regimen sliding scale   SubCutaneous Before meals and at bedtime  insulin lispro Injectable (ADMELOG) 5 Unit(s) SubCutaneous three times a day before meals  levothyroxine 25 MICROGram(s) Oral daily  lidocaine   4% Patch 1 Patch Transdermal daily  melatonin 3 milliGRAM(s) Oral at bedtime  methadone    Tablet 10 milliGRAM(s) Oral three times a day  metoprolol succinate  milliGRAM(s) Oral daily  pantoprazole    Tablet 40 milliGRAM(s) Oral before breakfast  sacubitril 97 mG/valsartan 103 mG 1 Tablet(s) Oral two times a day  senna 2 Tablet(s) Oral at bedtime  sevelamer carbonate 800 milliGRAM(s) Oral three times a day    MEDICATIONS  (PRN):  acetaminophen     Tablet .. 650 milliGRAM(s) Oral every 6 hours PRN Mild Pain (1 - 3)  ALBUTerol   0.5% 2.5 milliGRAM(s) Nebulizer every 6 hours PRN Shortness of Breath and/or Wheezing  benzonatate 100 milliGRAM(s) Oral every 8 hours PRN Cough  guaiFENesin Oral Liquid (Sugar-Free) 100 milliGRAM(s) Oral every 6 hours PRN Cough  heparin   Injectable 8000 Unit(s) IV Push every 6 hours PRN For aPTT less than 40  heparin   Injectable 4000 Unit(s) IV Push every 6 hours PRN For aPTT between 40 - 57  methadone    Tablet 5 milliGRAM(s) Oral every 6 hours PRN Moderate Pain (4 - 6)  polyethylene glycol 3350 17 Gram(s) Oral daily PRN Constipation       Weight Assessment:  · Height for BMI (FEET)	5 Feet  · Height for BMI (INCHES)	5 Inch(s)  · Height for BMI (CENTIMETERS)	165.1 Centimeter(s)  · Weight for BMI (lbs)	215 lb  · Weight for BMI (kg)	97.5 kg  · Body Mass Index	35.7  · Change in Usual Weight Prior to Admission	no  · Ideal Body Weight (lbs)	125  · Ideal Body Weight (kg)	56.6    Weight Change: Varying EMR wts during admission assume d/t Fluid shifts in CHF/CKD pt - cont to trend daily    218 pounds   228.6 pounds    Estimated energy needs:   IBW For estimated needs due to pt being >120% of IBW (IIZ=047%)  Adjusted for CKD-plan for HD, CHF, BMI. Fluids per team.  30-35kcal/k-1995kcal/day  1.2-1.4.gm/k-80gm/day     Subjective: 62 yo obese F, current smoker, with a PMH of HTN, HLD, DM-II, CKD Stage 4 (Cr ~3.9), Hypothyroidism, known NICM with EF 25% (s/p AICD for primary prevention), MM, COPD (3L home O2), recurrent DVTs, admitted for acute on chronic CHF exacerbation - now euvolemic with minimal urine output on diuretict and worsening RITCHIE on CKD for which renal is following; IV dietuics d/c, no diuretics at this time. Found to be retaining urine and medina placed and urology consulted. No urologic surgery intervention at this time. Plan for TDC catheter to be placed and HD to be initiated (). Neuro/psych following for drowsiness/upper extremity jerk, now resolving.     On assessment, pt was resting comfortably in her bed however hard to obtain info from this AM, Noted upset over pending HD. NPO at this time for pending HD cath placement. Pt reports eating well during admission otherwise. Denies N/V, reports +BM .   Labs: POCT 167 252 250m potassium 6.0, sodium 136, phosphorus 7.6, BUN/Cr 85/5.60, Glucose 259.   Please see below for nutritions recommendations.    Prior PES: Increased Nutrient Needs Etiology RT hypermetabolic state Signs/Symptoms AEB CHF.   >>on going @ this time   Goal/Expected Outcome Pt will continuously meet >75% of estimated needs.      Recommendations:  1. Diet to be advanced in 24-48hrs. Renal/CONSCHO with evening snack with Cont Night time Insulin use.  2. Monitor %PO intake and need for oral nutrition supplements, Diet tolerance.  3. Labs: monitor BMP, CBC, glucose, lytes, trend renal indices, lfts, POCT.  4. Monitor abel/pre-post HD wts, Pain, GI, Skin, GOC.  5. RD to remain available for additional nutrition interventions as needed.   **Paged team.     Education: Attempted diet education on need for good PO/prot intake in setting of Pending HD-pt with little concern for education at this time.     Risk Level: High [ X  ] Moderate [   ] Low [   ]

## 2021-12-23 NOTE — PROGRESS NOTE ADULT - ASSESSMENT
62 yo obese F, current smoker, with a PMH of HTN, HLD, DM-II, CKD Stage 4 (Cr ~3.9), Hypothyroidism, known NICM with EF 25% (s/p AICD for primary prevention), MM, COPD (3L home O2), recurrent DVTs (on Eliquis) admitted for acute on chronic CHF exacerbation - now euvolemic with minimal urine output on diuretict and worsening RITCHIE on CKD for which renal is following.  Found to be retaining urine and medina placed and urology consulted. Neuro/psych following for drowsiness/upper extremity jerking, now plan for IR placement of HD catheter and Initiation of HD.

## 2021-12-23 NOTE — CONSULT NOTE ADULT - TIME BILLING
Discussed with Analilia Cardiology PA
evaluation, coordination of care, counseling, review of chart and imaging.
as noted above
review of patient information including recent vital signs, labs, imaging, and notes; assessing, examining patient; updating patient/family; discussion and coordination of care with multidisciplinary team.

## 2021-12-23 NOTE — CONSULT NOTE ADULT - CONSULT REQUESTED BY NAME
Cardiology
Rachel Hardy
Dr. Chao
Dr. Howell
Dr. Ria Castillo
Cardiology
Dr. Hardy
dr valdez
Dr. Hardy

## 2021-12-23 NOTE — CONSULT NOTE ADULT - SUBJECTIVE AND OBJECTIVE BOX
62 yo obese F, current smoker, with a PMH of HTN, HLD, DM-II, CKD Stage 4 (Cr ~3.9), Hypothyroidism, known NICM with EF 25% (s/p AICD for primary prevention), MM, COPD (3L home O2), recurrent DVTs (on Eliquis) admitted for acute on chronic CHF exacerbation - now euvolemic with minimal urine output on diuretics and worsening RITCHIE on CKD for which renal is following.  Found to be retaining urine and medina placed and urology consulted. Neuro/psych following for drowsiness/upper extremity jerk, now resolving.  d/c pending improving of Crt, likely 12/23. IR consulted for HD catheter placement.     Clinical History: HEART FAILURE    No pertinent family history in first degree relatives    Family history of hypertension (Mother)    Family history of diabetes mellitus (Mother)    No pertinent family history in first degree relatives    Handoff    MEWS Score    CHF (congestive heart failure)    Diabetes    COPD (chronic obstructive pulmonary disease)    Depression    CVA (cerebral vascular accident)    HLD (hyperlipidemia)    Chronic pain    Congestive heart failure, unspecified HF chronicity, unspecified heart failure type    Acute on chronic systolic heart failure    DM (diabetes mellitus)    CVA (cerebral vascular accident)    DVT (deep venous thrombosis)    HTN (hypertension)    Depression    Bipolar depression    PAD (peripheral artery disease)    Neuropathy    Hypothyroidism    Multiple myeloma    CKD (chronic kidney disease), stage IV    DHARMESH on CPAP    Delirium    Acute decompensated heart failure    Hemoptysis    Acute on chronic systolic congestive heart failure    Anemia    Multiple myeloma    Stage 4 chronic kidney disease    Diabetes    IgG myeloma    Chronic obstructive pulmonary disease (COPD)    RUE weakness    MDD (major depressive disorder)    Urinary retention    Insertion, needle, vein    US guided vascular access    AICD (automatic cardioverter/defibrillator) present    H/O abdominal hysterectomy    H/O tubal ligation    History of cholecystectomy    H/O extremity bypass graft    BLOODY COUGH    13    Hemoptysis    Anemia    Multiple myeloma    Stage 4 chronic kidney disease    Diabetes    DVT, lower extremity    Tremor    [email or web address]        Meds:acetaminophen     Tablet .. 650 milliGRAM(s) Oral every 6 hours PRN  ALBUTerol   0.5% 2.5 milliGRAM(s) Nebulizer every 6 hours PRN  atorvastatin 80 milliGRAM(s) Oral at bedtime  benzonatate 100 milliGRAM(s) Oral every 8 hours PRN  budesonide  80 MICROgram(s)/formoterol 4.5 MICROgram(s) Inhaler 2 Puff(s) Inhalation two times a day  dextrose 40% Gel 15 Gram(s) Oral once  dextrose 5%. 1000 milliLiter(s) IV Continuous <Continuous>  dextrose 5%. 1000 milliLiter(s) IV Continuous <Continuous>  dextrose 50% Injectable 25 Gram(s) IV Push once  dextrose 50% Injectable 12.5 Gram(s) IV Push once  dextrose 50% Injectable 25 Gram(s) IV Push once  fenofibrate Tablet 48 milliGRAM(s) Oral daily  gabapentin 600 milliGRAM(s) Oral two times a day  glucagon  Injectable 1 milliGRAM(s) IntraMuscular once  guaiFENesin Oral Liquid (Sugar-Free) 100 milliGRAM(s) Oral every 6 hours PRN  heparin   Injectable 8000 Unit(s) IV Push every 6 hours PRN  heparin   Injectable 4000 Unit(s) IV Push every 6 hours PRN  heparin  Infusion.  Unit(s)/Hr IV Continuous <Continuous>  insulin glargine Injectable (LANTUS) 10 Unit(s) SubCutaneous at bedtime  insulin lispro (ADMELOG) corrective regimen sliding scale   SubCutaneous Before meals and at bedtime  insulin lispro Injectable (ADMELOG) 5 Unit(s) SubCutaneous three times a day before meals  levothyroxine 25 MICROGram(s) Oral daily  lidocaine   4% Patch 1 Patch Transdermal daily  melatonin 3 milliGRAM(s) Oral at bedtime  methadone    Tablet 10 milliGRAM(s) Oral three times a day  methadone    Tablet 5 milliGRAM(s) Oral every 6 hours PRN  metoprolol succinate  milliGRAM(s) Oral daily  pantoprazole    Tablet 40 milliGRAM(s) Oral before breakfast  polyethylene glycol 3350 17 Gram(s) Oral daily PRN  sacubitril 97 mG/valsartan 103 mG 1 Tablet(s) Oral two times a day  senna 2 Tablet(s) Oral at bedtime  sevelamer carbonate 800 milliGRAM(s) Oral three times a day      Allergies:aspirin (Other (Moderate); Rash)  oral contrast (Unknown)        Labs:                           10.1   9.73  )-----------( 196      ( 23 Dec 2021 07:39 )             32.4     PTT - ( 23 Dec 2021 08:42 )  PTT:122.9 sec  12-23    138  |  95<L>  |  86<H>  ----------------------------<  159<H>  5.2   |  30  |  5.60<H>    Ca    9.1      23 Dec 2021 07:39  Phos  7.3     12-23  Mg     2.2     12-23    TPro  7.4  /  Alb  3.5  /  TBili  0.2  /  DBili  x   /  AST  15  /  ALT  8<L>  /  AlkPhos  71  12-23

## 2021-12-23 NOTE — CONSULT NOTE ADULT - ATTENDING COMMENTS
I was physically present for the key portions of the evaluation and managemnent (E/M) service provided.  I agree with the above history, physical, and plan which I have reviewed and edited where appropriate, with the exceptions as per my note.    Pt has been having asterixis. Was seen by stroke team several days ago given hx of stroke, and was deemed not to be having a stroke. Recommendation from stroke team was to renally dose medications given the asterixis.    Currently, pt is on gabapentin 600bid which is her home dose. She has been having worsening renal failure.    exam is nonfocal but with course asterixis in both arms. also occasionally loses focus and nods off during exam.    chart and CTH reviewed.    AP: this is likely 2/2 worsening renal fx/uremia in combination of poor renal clearance of gabapentin and opiate.    - recommend to renally dose the above meds.  - call with questions.

## 2021-12-23 NOTE — PROGRESS NOTE ADULT - PROBLEM SELECTOR PLAN 2
Cr previously stable at baseline 3.9 - continue to trend up 5.5 despite placement of medina  - Retroperitoneal US 12/18: Mild bilateral hydronephrosis  - 12/21: Straight Cath post void at 12pm was 1250,  repeat straight cath at 4pm 285. Medina placed  -plan for IR guided HD catheter placement and initiation of HD today   -Vein mapping completed as requested by vascular. Cr previously stable at baseline 3.9 - continue to trend up 5.6 despite placement of medina  - Retroperitoneal US 12/18: Mild bilateral hydronephrosis  - 12/21: Straight Cath post void at 12pm was 1250,  repeat straight cath at 4pm 285. Medina placed  -plan for IR guided HD catheter placement and initiation of HD today   -Vein mapping completed as requested by vascular.

## 2021-12-23 NOTE — PROGRESS NOTE ADULT - PROBLEM SELECTOR PLAN 3
recent poor urine output and RITCHIE  - per renal recs straight cath post void performed today 12/21 with 1250 output.  repeat at 4pm 285.  Ward catheter placed to prevent urinary retention and monitor output  - urology consulted.  UTI vs neurogenic bladder.   -s/p ceftriaxone 5 days course for UTI  - Ward will remain in place until outpatient follow up   - will need outpatient urology follow up.

## 2021-12-23 NOTE — CONSULT NOTE ADULT - PROVIDER SPECIALTY LIST ADULT
Neurology
Urology
Vascular Surgery
Neurology
Pain Medicine
Intervent Radiology
Pulmonology
Heme/Onc
Nephrology

## 2021-12-23 NOTE — PROGRESS NOTE ADULT - SUBJECTIVE AND OBJECTIVE BOX
Patient is a 61y Female seen and evaluated at bedside. patient  w/ increasing asterixis this AM and worsening renal function hypotensive as well after long talk decision was made to start patient on HD and patient agreeable; TDC to be placed and first HD session to start after ; patient tearful but understands - feels her breathing is around the same; Cr up to 5.6       Meds:    acetaminophen     Tablet .. 650 every 6 hours PRN  ALBUTerol   0.5% 2.5 every 6 hours PRN  atorvastatin 80 at bedtime  benzonatate 100 every 8 hours PRN  budesonide  80 MICROgram(s)/formoterol 4.5 MICROgram(s) Inhaler 2 two times a day  dextrose 40% Gel 15 once  dextrose 5%. 1000 <Continuous>  dextrose 5%. 1000 <Continuous>  dextrose 50% Injectable 25 once  dextrose 50% Injectable 12.5 once  dextrose 50% Injectable 25 once  fenofibrate Tablet 48 daily  gabapentin 600 two times a day  glucagon  Injectable 1 once  guaiFENesin Oral Liquid (Sugar-Free) 100 every 6 hours PRN  heparin   Injectable 8000 every 6 hours PRN  heparin   Injectable 4000 every 6 hours PRN  heparin  Infusion.  <Continuous>  insulin glargine Injectable (LANTUS) 10 at bedtime  insulin lispro (ADMELOG) corrective regimen sliding scale  Before meals and at bedtime  insulin lispro Injectable (ADMELOG) 5 three times a day before meals  levothyroxine 25 daily  lidocaine   4% Patch 1 daily  melatonin 3 at bedtime  methadone    Tablet 5 every 6 hours PRN  methadone    Tablet 10 three times a day  metoprolol succinate  daily  pantoprazole    Tablet 40 before breakfast  polyethylene glycol 3350 17 daily PRN  sacubitril 97 mG/valsartan 103 mG 1 two times a day  senna 2 at bedtime  sevelamer carbonate 800 three times a day      T(C): , Max: 36.3 (12-23-21 @ 08:47)  T(F): , Max: 97.3 (12-23-21 @ 08:47)  HR: 76 (12-23-21 @ 08:35)  BP: 145/58 (12-23-21 @ 08:35)  BP(mean): --  RR: 18 (12-23-21 @ 08:35)  SpO2: 98% (12-23-21 @ 08:35)  Wt(kg): --    12-22 @ 07:01 - 12-23 @ 07:00  --------------------------------------------------------  IN: 916 mL / OUT: 2200 mL / NET: -1284 mL    12-23 @ 07:01 - 12-23 @ 11:32  --------------------------------------------------------  IN: 16 mL / OUT: 0 mL / NET: 16 mL          Review of Systems:  all other ROS negative     PHYSICAL EXAM:  GENERAL: tearful;   CHEST/LUNG: decreased breath sounds at the bases  HEART: normal S1S2, RRR  ABDOMEN: Soft, Nontender, +BS,   EXTREMITIES: trace edema  NEUROLOGY: + asterixis        LABS:                        10.1   9.73  )-----------( 196      ( 23 Dec 2021 07:39 )             32.4     12-23    138  |  95<L>  |  86<H>  ----------------------------<  159<H>  5.2   |  30  |  5.60<H>    Ca    9.1      23 Dec 2021 07:39  Phos  7.3     12-23  Mg     2.2     12-23    TPro  7.4  /  Alb  3.5  /  TBili  0.2  /  DBili  x   /  AST  15  /  ALT  8<L>  /  AlkPhos  71  12-23      PTT - ( 23 Dec 2021 08:42 )  PTT:122.9 sec          RADIOLOGY & ADDITIONAL STUDIES:

## 2021-12-23 NOTE — PROGRESS NOTE ADULT - SUBJECTIVE AND OBJECTIVE BOX
Interventional Cardiology PA Adult Progress Note    Subjective Assessment:  	  MEDICATIONS:  metoprolol succinate  milliGRAM(s) Oral daily  sacubitril 97 mG/valsartan 103 mG 1 Tablet(s) Oral two times a day  ALBUTerol   0.5% 2.5 milliGRAM(s) Nebulizer every 6 hours PRN  benzonatate 100 milliGRAM(s) Oral every 8 hours PRN  budesonide  80 MICROgram(s)/formoterol 4.5 MICROgram(s) Inhaler 2 Puff(s) Inhalation two times a day  guaiFENesin Oral Liquid (Sugar-Free) 100 milliGRAM(s) Oral every 6 hours PRN  acetaminophen     Tablet .. 650 milliGRAM(s) Oral every 6 hours PRN  gabapentin 600 milliGRAM(s) Oral two times a day  melatonin 3 milliGRAM(s) Oral at bedtime  methadone    Tablet 10 milliGRAM(s) Oral three times a day  methadone    Tablet 5 milliGRAM(s) Oral every 6 hours PRN  pantoprazole    Tablet 40 milliGRAM(s) Oral before breakfast  polyethylene glycol 3350 17 Gram(s) Oral daily PRN  senna 2 Tablet(s) Oral at bedtime  atorvastatin 80 milliGRAM(s) Oral at bedtime  dextrose 40% Gel 15 Gram(s) Oral once  dextrose 50% Injectable 25 Gram(s) IV Push once  dextrose 50% Injectable 12.5 Gram(s) IV Push once  dextrose 50% Injectable 25 Gram(s) IV Push once  fenofibrate Tablet 48 milliGRAM(s) Oral daily  glucagon  Injectable 1 milliGRAM(s) IntraMuscular once  insulin glargine Injectable (LANTUS) 10 Unit(s) SubCutaneous at bedtime  insulin lispro (ADMELOG) corrective regimen sliding scale   SubCutaneous Before meals and at bedtime  insulin lispro Injectable (ADMELOG) 5 Unit(s) SubCutaneous three times a day before meals  levothyroxine 25 MICROGram(s) Oral daily  dextrose 5%. 1000 milliLiter(s) IV Continuous <Continuous>  dextrose 5%. 1000 milliLiter(s) IV Continuous <Continuous>  heparin   Injectable 8000 Unit(s) IV Push every 6 hours PRN  heparin   Injectable 4000 Unit(s) IV Push every 6 hours PRN  heparin  Infusion.  Unit(s)/Hr IV Continuous <Continuous>  lidocaine   4% Patch 1 Patch Transdermal daily      	    [PHYSICAL EXAM:  TELEMETRY:  T(C): 35.9 (12-23-21 @ 04:07), Max: 36.3 (12-22-21 @ 10:35)  HR: 67 (12-23-21 @ 05:26) (67 - 102)  BP: 119/62 (12-23-21 @ 05:26) (107/54 - 157/95)  RR: 18 (12-23-21 @ 05:45) (14 - 18)  SpO2: 95% (12-23-21 @ 05:45) (93% - 100%)  Wt(kg): --  I&O's Summary    22 Dec 2021 07:01  -  23 Dec 2021 07:00  --------------------------------------------------------  IN: 916 mL / OUT: 2200 mL / NET: -1284 mL                                      Appearance: Normal	  HEENT:   Normal oral mucosa, PERRL, EOMI	  Neck: Supple, + JVD/ - JVD; Carotid Bruit   Cardiovascular: Normal S1 S2, No JVD, No murmurs,   Respiratory: Lungs clear to auscultation/Decreased Breath Sounds/No Rales, Rhonchi, Wheezing	  Gastrointestinal:  Soft, Non-tender, + BS	  Skin: No rashes, No ecchymoses, No cyanosis  Extremities: Normal range of motion, No clubbing, cyanosis or edema  Vascular: Peripheral pulses palpable 2+ bilaterally  Neurologic: Non-focal  Psychiatry: A & O x 3, Mood & affect appropriate                              10.1   9.73  )-----------( 196      ( 23 Dec 2021 07:39 )             32.4     12-22    136  |  94<L>  |  85<H>  ----------------------------<  259<H>  6.0<H>   |  31  |  5.60<H>    Ca    8.8      22 Dec 2021 18:49  Phos  7.6     12-22  Mg     2.1     12-22    TPro  7.4  /  Alb  3.8  /  TBili  0.2  /  DBili  x   /  AST  13  /  ALT  8<L>  /  AlkPhos  76  12-22    proBNP:   Lipid Profile:   HgA1c:   TSH:   PTT - ( 23 Dec 2021 02:23 )  PTT:103.1 sec    ASSESSMENT/PLAN: 	        DVT ppx:  Dispo:     Interventional Cardiology PA Adult Progress Note    Subjective Assessment: Patient seen and examined at bedside, patient tearful during itnerview about desicion   	  MEDICATIONS:  metoprolol succinate  milliGRAM(s) Oral daily  sacubitril 97 mG/valsartan 103 mG 1 Tablet(s) Oral two times a day  ALBUTerol   0.5% 2.5 milliGRAM(s) Nebulizer every 6 hours PRN  benzonatate 100 milliGRAM(s) Oral every 8 hours PRN  budesonide  80 MICROgram(s)/formoterol 4.5 MICROgram(s) Inhaler 2 Puff(s) Inhalation two times a day  guaiFENesin Oral Liquid (Sugar-Free) 100 milliGRAM(s) Oral every 6 hours PRN  acetaminophen     Tablet .. 650 milliGRAM(s) Oral every 6 hours PRN  gabapentin 600 milliGRAM(s) Oral two times a day  melatonin 3 milliGRAM(s) Oral at bedtime  methadone    Tablet 10 milliGRAM(s) Oral three times a day  methadone    Tablet 5 milliGRAM(s) Oral every 6 hours PRN  pantoprazole    Tablet 40 milliGRAM(s) Oral before breakfast  polyethylene glycol 3350 17 Gram(s) Oral daily PRN  senna 2 Tablet(s) Oral at bedtime  atorvastatin 80 milliGRAM(s) Oral at bedtime  dextrose 40% Gel 15 Gram(s) Oral once  dextrose 50% Injectable 25 Gram(s) IV Push once  dextrose 50% Injectable 12.5 Gram(s) IV Push once  dextrose 50% Injectable 25 Gram(s) IV Push once  fenofibrate Tablet 48 milliGRAM(s) Oral daily  glucagon  Injectable 1 milliGRAM(s) IntraMuscular once  insulin glargine Injectable (LANTUS) 10 Unit(s) SubCutaneous at bedtime  insulin lispro (ADMELOG) corrective regimen sliding scale   SubCutaneous Before meals and at bedtime  insulin lispro Injectable (ADMELOG) 5 Unit(s) SubCutaneous three times a day before meals  levothyroxine 25 MICROGram(s) Oral daily  dextrose 5%. 1000 milliLiter(s) IV Continuous <Continuous>  dextrose 5%. 1000 milliLiter(s) IV Continuous <Continuous>  heparin   Injectable 8000 Unit(s) IV Push every 6 hours PRN  heparin   Injectable 4000 Unit(s) IV Push every 6 hours PRN  heparin  Infusion.  Unit(s)/Hr IV Continuous <Continuous>  lidocaine   4% Patch 1 Patch Transdermal daily      	    [PHYSICAL EXAM:  TELEMETRY:  T(C): 35.9 (12-23-21 @ 04:07), Max: 36.3 (12-22-21 @ 10:35)  HR: 67 (12-23-21 @ 05:26) (67 - 102)  BP: 119/62 (12-23-21 @ 05:26) (107/54 - 157/95)  RR: 18 (12-23-21 @ 05:45) (14 - 18)  SpO2: 95% (12-23-21 @ 05:45) (93% - 100%)  Wt(kg): --  I&O's Summary    22 Dec 2021 07:01  -  23 Dec 2021 07:00  --------------------------------------------------------  IN: 916 mL / OUT: 2200 mL / NET: -1284 mL                                      Appearance: Normal	  HEENT:   Normal oral mucosa, PERRL, EOMI	  Neck: Supple, + JVD/ - JVD; Carotid Bruit   Cardiovascular: Normal S1 S2, No JVD, No murmurs,   Respiratory: Lungs clear to auscultation/Decreased Breath Sounds/No Rales, Rhonchi, Wheezing	  Gastrointestinal:  Soft, Non-tender, + BS	  Skin: No rashes, No ecchymoses, No cyanosis  Extremities: Normal range of motion, No clubbing, cyanosis or edema  Vascular: Peripheral pulses palpable 2+ bilaterally  Neurologic: Non-focal  Psychiatry: A & O x 3, Mood & affect appropriate                              10.1   9.73  )-----------( 196      ( 23 Dec 2021 07:39 )             32.4     12-22    136  |  94<L>  |  85<H>  ----------------------------<  259<H>  6.0<H>   |  31  |  5.60<H>    Ca    8.8      22 Dec 2021 18:49  Phos  7.6     12-22  Mg     2.1     12-22    TPro  7.4  /  Alb  3.8  /  TBili  0.2  /  DBili  x   /  AST  13  /  ALT  8<L>  /  AlkPhos  76  12-22    proBNP:   Lipid Profile:   HgA1c:   TSH:   PTT - ( 23 Dec 2021 02:23 )  PTT:103.1 sec    ASSESSMENT/PLAN: 	        DVT ppx:  Dispo:     Interventional Cardiology PA Adult Progress Note    Subjective Assessment: Patient seen and examined at bedside, patient tearful during itnerview about having to start HD. Neurology team at bedside as well. Breathing about the same, denies chest pain or palpitations.   	  MEDICATIONS:  metoprolol succinate  milliGRAM(s) Oral daily  sacubitril 97 mG/valsartan 103 mG 1 Tablet(s) Oral two times a day  ALBUTerol   0.5% 2.5 milliGRAM(s) Nebulizer every 6 hours PRN  benzonatate 100 milliGRAM(s) Oral every 8 hours PRN  budesonide  80 MICROgram(s)/formoterol 4.5 MICROgram(s) Inhaler 2 Puff(s) Inhalation two times a day  guaiFENesin Oral Liquid (Sugar-Free) 100 milliGRAM(s) Oral every 6 hours PRN  acetaminophen     Tablet .. 650 milliGRAM(s) Oral every 6 hours PRN  gabapentin 600 milliGRAM(s) Oral two times a day  melatonin 3 milliGRAM(s) Oral at bedtime  methadone    Tablet 10 milliGRAM(s) Oral three times a day  methadone    Tablet 5 milliGRAM(s) Oral every 6 hours PRN  pantoprazole    Tablet 40 milliGRAM(s) Oral before breakfast  polyethylene glycol 3350 17 Gram(s) Oral daily PRN  senna 2 Tablet(s) Oral at bedtime  atorvastatin 80 milliGRAM(s) Oral at bedtime  dextrose 40% Gel 15 Gram(s) Oral once  dextrose 50% Injectable 25 Gram(s) IV Push once  dextrose 50% Injectable 12.5 Gram(s) IV Push once  dextrose 50% Injectable 25 Gram(s) IV Push once  fenofibrate Tablet 48 milliGRAM(s) Oral daily  glucagon  Injectable 1 milliGRAM(s) IntraMuscular once  insulin glargine Injectable (LANTUS) 10 Unit(s) SubCutaneous at bedtime  insulin lispro (ADMELOG) corrective regimen sliding scale   SubCutaneous Before meals and at bedtime  insulin lispro Injectable (ADMELOG) 5 Unit(s) SubCutaneous three times a day before meals  levothyroxine 25 MICROGram(s) Oral daily  dextrose 5%. 1000 milliLiter(s) IV Continuous <Continuous>  dextrose 5%. 1000 milliLiter(s) IV Continuous <Continuous>  heparin   Injectable 8000 Unit(s) IV Push every 6 hours PRN  heparin   Injectable 4000 Unit(s) IV Push every 6 hours PRN  heparin  Infusion.  Unit(s)/Hr IV Continuous <Continuous>  lidocaine   4% Patch 1 Patch Transdermal daily      	    [PHYSICAL EXAM:  TELEMETRY:  T(C): 35.9 (12-23-21 @ 04:07), Max: 36.3 (12-22-21 @ 10:35)  HR: 67 (12-23-21 @ 05:26) (67 - 102)  BP: 119/62 (12-23-21 @ 05:26) (107/54 - 157/95)  RR: 18 (12-23-21 @ 05:45) (14 - 18)  SpO2: 95% (12-23-21 @ 05:45) (93% - 100%)  Wt(kg): --  I&O's Summary    22 Dec 2021 07:01  -  23 Dec 2021 07:00  --------------------------------------------------------  IN: 916 mL / OUT: 2200 mL / NET: -1284 mL                                      Appearance: Normal		  Cardiovascular: Normal S1 S2, no murmur   Respiratory: Lungs clear to auscultation No Rales, Rhonchi, Wheezing	  Gastrointestinal:  Soft, Non-tender, + BS	  Skin: No rashes, No ecchymoses, No cyanosis  Extremities: No edema   Vascular: Peripheral pulses palpable 2+ bilaterally  Neurologic: b/l UE jerking   Psychiatry: A & O x 3, Mood & affect appropriate                              10.1   9.73  )-----------( 196      ( 23 Dec 2021 07:39 )             32.4     12-22    136  |  94<L>  |  85<H>  ----------------------------<  259<H>  6.0<H>   |  31  |  5.60<H>    Ca    8.8      22 Dec 2021 18:49  Phos  7.6     12-22  Mg     2.1     12-22    TPro  7.4  /  Alb  3.8  /  TBili  0.2  /  DBili  x   /  AST  13  /  ALT  8<L>  /  AlkPhos  76  12-22    proBNP:   Lipid Profile:   HgA1c:   TSH:   PTT - ( 23 Dec 2021 02:23 )  PTT:103.1 sec    ASSESSMENT/PLAN: 	        DVT ppx:  Dispo:

## 2021-12-23 NOTE — PROGRESS NOTE ADULT - PROBLEM SELECTOR PLAN 4
Endorsing RUE weakness/pain and myoclonic movement, neuro exam other wise unremarkable   -CTH non-contrast 12/18: negative for acute changes; RUE US: negative for clot  -Stroke/Neuro consulted: suspicious 2/2 toxic/metabolic causes, particularly CNS acting medications, low suspicion for seizure activities.   -Pain management consulted: Methadone 5 mg PO q6h PRN breakthrough pain, c/w Methadone 10 mg PO TID (confirmed by Istop). Tylenol 650 mg PO q6h PRN moderate pain  -Psychiatry Consulted: Rec discontinuing Amitriptyline 10mg QD, Buspirone 15mg QD, Escitalopram 20mg QD, Trazadone 50mg QD - Now been off since 12/15 without resolution of symptoms  - Neuro consulted on 12/23 for continued jerking movements   -CONT: Methadone as above, Gabapentin 600mg BID, Melatonin 3mg QD Endorsing RUE weakness/pain and myoclonic movement, neuro exam other wise unremarkable   -CTH non-contrast 12/18: negative for acute changes; RUE US: negative for clot  -Stroke/Neuro consulted: suspicious 2/2 toxic/metabolic causes, particularly CNS acting medications, low suspicion for seizure activities.   -Pain management consulted: Methadone 5 mg PO q6h PRN breakthrough pain, c/w Methadone 10 mg PO TID (confirmed by Istop). Tylenol 650 mg PO q6h PRN moderate pain  -Psychiatry Consulted: Rec discontinuing Amitriptyline 10mg QD, Buspirone 15mg QD, Escitalopram 20mg QD, Trazadone 50mg QD - Now been off since 12/15 without resolution of symptoms  - Neuro consulted on 12/23 for continued jerking movements likely uremia  -CONT: Methadone as above, Melatonin 3mg QD, discontinued gabapentin

## 2021-12-24 LAB
24R-OH-CALCIDIOL SERPL-MCNC: 35.4 NG/ML — SIGNIFICANT CHANGE UP (ref 30–80)
ALBUMIN SERPL ELPH-MCNC: 3.2 G/DL — LOW (ref 3.3–5)
ALP SERPL-CCNC: 64 U/L — SIGNIFICANT CHANGE UP (ref 40–120)
ALT FLD-CCNC: 8 U/L — LOW (ref 10–45)
ANION GAP SERPL CALC-SCNC: 15 MMOL/L — SIGNIFICANT CHANGE UP (ref 5–17)
AST SERPL-CCNC: 15 U/L — SIGNIFICANT CHANGE UP (ref 10–40)
BASOPHILS # BLD AUTO: 0.06 K/UL — SIGNIFICANT CHANGE UP (ref 0–0.2)
BASOPHILS NFR BLD AUTO: 0.7 % — SIGNIFICANT CHANGE UP (ref 0–2)
BILIRUB SERPL-MCNC: 0.2 MG/DL — SIGNIFICANT CHANGE UP (ref 0.2–1.2)
BUN SERPL-MCNC: 62 MG/DL — HIGH (ref 7–23)
CALCIUM SERPL-MCNC: 8.2 MG/DL — LOW (ref 8.4–10.5)
CALCIUM SERPL-MCNC: 9 MG/DL — SIGNIFICANT CHANGE UP (ref 8.4–10.5)
CHLORIDE SERPL-SCNC: 98 MMOL/L — SIGNIFICANT CHANGE UP (ref 96–108)
CO2 SERPL-SCNC: 26 MMOL/L — SIGNIFICANT CHANGE UP (ref 22–31)
CREAT SERPL-MCNC: 4.65 MG/DL — HIGH (ref 0.5–1.3)
EOSINOPHIL # BLD AUTO: 0.15 K/UL — SIGNIFICANT CHANGE UP (ref 0–0.5)
EOSINOPHIL NFR BLD AUTO: 1.8 % — SIGNIFICANT CHANGE UP (ref 0–6)
GLUCOSE BLDC GLUCOMTR-MCNC: 116 MG/DL — HIGH (ref 70–99)
GLUCOSE BLDC GLUCOMTR-MCNC: 75 MG/DL — SIGNIFICANT CHANGE UP (ref 70–99)
GLUCOSE BLDC GLUCOMTR-MCNC: 80 MG/DL — SIGNIFICANT CHANGE UP (ref 70–99)
GLUCOSE BLDC GLUCOMTR-MCNC: 89 MG/DL — SIGNIFICANT CHANGE UP (ref 70–99)
GLUCOSE SERPL-MCNC: 101 MG/DL — HIGH (ref 70–99)
HCT VFR BLD CALC: 30.5 % — LOW (ref 34.5–45)
HGB BLD-MCNC: 9.1 G/DL — LOW (ref 11.5–15.5)
IMM GRANULOCYTES NFR BLD AUTO: 0.5 % — SIGNIFICANT CHANGE UP (ref 0–1.5)
LYMPHOCYTES # BLD AUTO: 1.68 K/UL — SIGNIFICANT CHANGE UP (ref 1–3.3)
LYMPHOCYTES # BLD AUTO: 19.8 % — SIGNIFICANT CHANGE UP (ref 13–44)
MAGNESIUM SERPL-MCNC: 2.2 MG/DL — SIGNIFICANT CHANGE UP (ref 1.6–2.6)
MCHC RBC-ENTMCNC: 28.8 PG — SIGNIFICANT CHANGE UP (ref 27–34)
MCHC RBC-ENTMCNC: 29.8 GM/DL — LOW (ref 32–36)
MCV RBC AUTO: 96.5 FL — SIGNIFICANT CHANGE UP (ref 80–100)
MONOCYTES # BLD AUTO: 1.12 K/UL — HIGH (ref 0–0.9)
MONOCYTES NFR BLD AUTO: 13.2 % — SIGNIFICANT CHANGE UP (ref 2–14)
NEUTROPHILS # BLD AUTO: 5.43 K/UL — SIGNIFICANT CHANGE UP (ref 1.8–7.4)
NEUTROPHILS NFR BLD AUTO: 64 % — SIGNIFICANT CHANGE UP (ref 43–77)
NRBC # BLD: 0 /100 WBCS — SIGNIFICANT CHANGE UP (ref 0–0)
PLATELET # BLD AUTO: 185 K/UL — SIGNIFICANT CHANGE UP (ref 150–400)
POTASSIUM SERPL-MCNC: 4.8 MMOL/L — SIGNIFICANT CHANGE UP (ref 3.5–5.3)
POTASSIUM SERPL-SCNC: 4.8 MMOL/L — SIGNIFICANT CHANGE UP (ref 3.5–5.3)
PROT SERPL-MCNC: 7.3 G/DL — SIGNIFICANT CHANGE UP (ref 6–8.3)
PTH-INTACT FLD-MCNC: 190 PG/ML — HIGH (ref 15–65)
RBC # BLD: 3.16 M/UL — LOW (ref 3.8–5.2)
RBC # FLD: 14.4 % — SIGNIFICANT CHANGE UP (ref 10.3–14.5)
SODIUM SERPL-SCNC: 139 MMOL/L — SIGNIFICANT CHANGE UP (ref 135–145)
VIT D25+D1,25 OH+D1,25 PNL SERPL-MCNC: 10.1 PG/ML — LOW (ref 19.9–79.3)
WBC # BLD: 8.48 K/UL — SIGNIFICANT CHANGE UP (ref 3.8–10.5)
WBC # FLD AUTO: 8.48 K/UL — SIGNIFICANT CHANGE UP (ref 3.8–10.5)

## 2021-12-24 PROCEDURE — 99233 SBSQ HOSP IP/OBS HIGH 50: CPT

## 2021-12-24 RX ADMIN — SEVELAMER CARBONATE 800 MILLIGRAM(S): 2400 POWDER, FOR SUSPENSION ORAL at 08:35

## 2021-12-24 RX ADMIN — Medication 5 UNIT(S): at 08:35

## 2021-12-24 RX ADMIN — BUDESONIDE AND FORMOTEROL FUMARATE DIHYDRATE 2 PUFF(S): 160; 4.5 AEROSOL RESPIRATORY (INHALATION) at 22:20

## 2021-12-24 RX ADMIN — METHADONE HYDROCHLORIDE 10 MILLIGRAM(S): 40 TABLET ORAL at 14:17

## 2021-12-24 RX ADMIN — LIDOCAINE 1 PATCH: 4 CREAM TOPICAL at 14:17

## 2021-12-24 RX ADMIN — Medication 25 MICROGRAM(S): at 05:46

## 2021-12-24 RX ADMIN — SACUBITRIL AND VALSARTAN 1 TABLET(S): 24; 26 TABLET, FILM COATED ORAL at 08:26

## 2021-12-24 RX ADMIN — Medication 5 UNIT(S): at 17:36

## 2021-12-24 RX ADMIN — APIXABAN 2.5 MILLIGRAM(S): 2.5 TABLET, FILM COATED ORAL at 08:26

## 2021-12-24 RX ADMIN — INSULIN GLARGINE 10 UNIT(S): 100 INJECTION, SOLUTION SUBCUTANEOUS at 22:19

## 2021-12-24 RX ADMIN — Medication 3 MILLIGRAM(S): at 22:20

## 2021-12-24 RX ADMIN — METHADONE HYDROCHLORIDE 10 MILLIGRAM(S): 40 TABLET ORAL at 22:20

## 2021-12-24 RX ADMIN — LIDOCAINE 1 PATCH: 4 CREAM TOPICAL at 19:00

## 2021-12-24 RX ADMIN — Medication 48 MILLIGRAM(S): at 14:17

## 2021-12-24 RX ADMIN — BUDESONIDE AND FORMOTEROL FUMARATE DIHYDRATE 2 PUFF(S): 160; 4.5 AEROSOL RESPIRATORY (INHALATION) at 08:27

## 2021-12-24 RX ADMIN — APIXABAN 2.5 MILLIGRAM(S): 2.5 TABLET, FILM COATED ORAL at 22:20

## 2021-12-24 RX ADMIN — METHADONE HYDROCHLORIDE 10 MILLIGRAM(S): 40 TABLET ORAL at 05:46

## 2021-12-24 RX ADMIN — SACUBITRIL AND VALSARTAN 1 TABLET(S): 24; 26 TABLET, FILM COATED ORAL at 22:20

## 2021-12-24 RX ADMIN — Medication 100 MILLIGRAM(S): at 05:45

## 2021-12-24 RX ADMIN — PANTOPRAZOLE SODIUM 40 MILLIGRAM(S): 20 TABLET, DELAYED RELEASE ORAL at 05:45

## 2021-12-24 RX ADMIN — ATORVASTATIN CALCIUM 80 MILLIGRAM(S): 80 TABLET, FILM COATED ORAL at 22:20

## 2021-12-24 RX ADMIN — SEVELAMER CARBONATE 800 MILLIGRAM(S): 2400 POWDER, FOR SUSPENSION ORAL at 17:37

## 2021-12-24 RX ADMIN — SENNA PLUS 2 TABLET(S): 8.6 TABLET ORAL at 22:19

## 2021-12-24 NOTE — PROGRESS NOTE ADULT - PROBLEM SELECTOR PLAN 2
Cr previously stable at baseline 3.9 RITCHIE on this admission Cr uptrended to 5.6-> now progressed to ESRD  - Retroperitoneal US 12/18: Mild bilateral hydronephrosis  - urinary retention as below   - s/p tunnel catheter placement 12/23   - initiation of HD on 12/23 completed 1 session  -  for set up of outpatient HD   - QuantiFeron 12/20 negative  - Hepatitis panel 12/20 hep panel negative

## 2021-12-24 NOTE — PROGRESS NOTE ADULT - PROBLEM SELECTOR PLAN 4
Endorsing RUE weakness/pain and myoclonic movement, neuro exam other wise unremarkable   -CTH non-contrast 12/18: negative for acute changes; RUE US: negative for clot  -Stroke/Neuro consulted: suspicious 2/2 toxic/metabolic causes, particularly CNS acting medications, low suspicion for seizure activities.   -Pain management consulted: Methadone 5 mg PO q6h PRN breakthrough pain, c/w Methadone 10 mg PO TID (confirmed by Istop). Tylenol 650 mg PO q6h PRN moderate pain  -Psychiatry Consulted: Rec discontinuing Amitriptyline 10mg QD, Buspirone 15mg QD, Escitalopram 20mg QD, Trazadone 50mg QD - Now been off since 12/15 without resolution of symptoms  - Neuro consulted on 12/23 for continued jerking movements likely uremia  -CONT: Methadone as above, Melatonin 3mg QD, discontinued gabapentin    ---> Jerking movements significantly improved after first HD session

## 2021-12-24 NOTE — PROGRESS NOTE ADULT - PROBLEM SELECTOR PLAN 1
Currently euvolemic, satting 99% on home 3L NC  - Echo 11/29/21: Akinesis of the basal to mid inferoseptum and anteroseptum. Moderate to severe hypokinesis of the remaining regions, mild to moderate MR  - IV lasix/Metolazone d/c'd on 12/20 due to low output and worsening renal fx. holding diuretics now(was on torsemide 40mg PO BID at home)  - CONT: Entresto 97mg/103mg BID, Toprol XL 100mg daily  - No heart failure consult.  No plan for CardioMems interrogation as it does not  readings anymore  - core measures, weight daily, strict I/Os, found to have urinary retention for which medina placed.       #DVT  - Hx of Recurrent DVT (on Eliquis 2.5 BID) on R leg  - RLE US (+) common femoral vein DVT of unspecified chronicity, repeat US w/ similar finding  - Vascular consulted: likely chronic, can d/c heparin gtt and continue home Eliquis 2.5 BID  - CONT: Eliquis 2.5mg BID  (indicated after >6months of treatment)

## 2021-12-24 NOTE — PROGRESS NOTE ADULT - SUBJECTIVE AND OBJECTIVE BOX
Patient is a 61y Female seen and evaluated at bedside.   for hemodialysis #2   feels okay today   denies CP SOB     Meds:    acetaminophen     Tablet .. 650 every 6 hours PRN  ALBUTerol   0.5% 2.5 every 6 hours PRN  apixaban 2.5 every 12 hours  atorvastatin 80 at bedtime  benzonatate 100 every 8 hours PRN  budesonide  80 MICROgram(s)/formoterol 4.5 MICROgram(s) Inhaler 2 two times a day  dextrose 40% Gel 15 once  dextrose 5%. 1000 <Continuous>  dextrose 5%. 1000 <Continuous>  dextrose 50% Injectable 25 once  dextrose 50% Injectable 12.5 once  dextrose 50% Injectable 25 once  fenofibrate Tablet 48 daily  glucagon  Injectable 1 once  guaiFENesin Oral Liquid (Sugar-Free) 100 every 6 hours PRN  insulin glargine Injectable (LANTUS) 10 at bedtime  insulin lispro (ADMELOG) corrective regimen sliding scale  Before meals and at bedtime  insulin lispro Injectable (ADMELOG) 5 three times a day before meals  levothyroxine 25 daily  lidocaine   4% Patch 1 daily  melatonin 3 at bedtime  methadone    Tablet 10 three times a day  methadone    Tablet 5 every 6 hours PRN  metoprolol succinate  daily  pantoprazole    Tablet 40 before breakfast  polyethylene glycol 3350 17 daily PRN  sacubitril 97 mG/valsartan 103 mG 1 two times a day  senna 2 at bedtime  sevelamer carbonate 800 three times a day      T(C): , Max: 36.9 (12-24-21 @ 09:20)  T(F): , Max: 98.4 (12-24-21 @ 09:20)  HR: 65 (12-24-21 @ 09:20)  BP: 109/61 (12-24-21 @ 09:20)  BP(mean): --  RR: 18 (12-24-21 @ 09:20)  SpO2: 98% (12-24-21 @ 09:20)  Wt(kg): --    12-23 @ 07:01  -  12-24 @ 07:00  --------------------------------------------------------  IN: 136 mL / OUT: 600 mL / NET: -464 mL            Review of Systems:  all other ROS negative     PHYSICAL EXAM:  GENERAL: tearful;   CHEST/LUNG: decreased breath sounds at the bases  HEART: normal S1S2, RRR  ABDOMEN: Soft, Nontender  EXTREMITIES: trace edema  NEUROLOGY: + asterixis      LABS:                        9.1    8.48  )-----------( 185      ( 24 Dec 2021 06:03 )             30.5     12-24    139  |  98  |  62<H>  ----------------------------<  101<H>  4.8   |  26  |  4.65<H>    Ca    9.0      24 Dec 2021 06:03  Phos  7.3     12-23  Mg     2.2     12-24    TPro  7.3  /  Alb  3.2<L>  /  TBili  0.2  /  DBili  x   /  AST  15  /  ALT  8<L>  /  AlkPhos  64  12-24      PTT - ( 23 Dec 2021 08:42 )  PTT:122.9 sec          RADIOLOGY & ADDITIONAL STUDIES:

## 2021-12-24 NOTE — PROGRESS NOTE ADULT - SUBJECTIVE AND OBJECTIVE BOX
INCOMPLETE   Interventional Cardiology PA Adult Progress Note    Subjective Assessment:  	  MEDICATIONS:  metoprolol succinate  milliGRAM(s) Oral daily  sacubitril 97 mG/valsartan 103 mG 1 Tablet(s) Oral two times a day  ALBUTerol   0.5% 2.5 milliGRAM(s) Nebulizer every 6 hours PRN  benzonatate 100 milliGRAM(s) Oral every 8 hours PRN  budesonide  80 MICROgram(s)/formoterol 4.5 MICROgram(s) Inhaler 2 Puff(s) Inhalation two times a day  guaiFENesin Oral Liquid (Sugar-Free) 100 milliGRAM(s) Oral every 6 hours PRN    acetaminophen     Tablet .. 650 milliGRAM(s) Oral every 6 hours PRN  melatonin 3 milliGRAM(s) Oral at bedtime  methadone    Tablet 10 milliGRAM(s) Oral three times a day  methadone    Tablet 5 milliGRAM(s) Oral every 6 hours PRN  pantoprazole    Tablet 40 milliGRAM(s) Oral before breakfast  polyethylene glycol 3350 17 Gram(s) Oral daily PRN  senna 2 Tablet(s) Oral at bedtime  atorvastatin 80 milliGRAM(s) Oral at bedtime  dextrose 40% Gel 15 Gram(s) Oral once  dextrose 50% Injectable 25 Gram(s) IV Push once  dextrose 50% Injectable 12.5 Gram(s) IV Push once  dextrose 50% Injectable 25 Gram(s) IV Push once  fenofibrate Tablet 48 milliGRAM(s) Oral daily  glucagon  Injectable 1 milliGRAM(s) IntraMuscular once  insulin glargine Injectable (LANTUS) 10 Unit(s) SubCutaneous at bedtime  insulin lispro (ADMELOG) corrective regimen sliding scale   SubCutaneous Before meals and at bedtime  insulin lispro Injectable (ADMELOG) 5 Unit(s) SubCutaneous three times a day before meals  levothyroxine 25 MICROGram(s) Oral daily    apixaban 2.5 milliGRAM(s) Oral every 12 hours  dextrose 5%. 1000 milliLiter(s) IV Continuous <Continuous>  dextrose 5%. 1000 milliLiter(s) IV Continuous <Continuous>  lidocaine   4% Patch 1 Patch Transdermal daily      	    [PHYSICAL EXAM:  TELEMETRY:  T(C): 36.8 (12-24-21 @ 06:19), Max: 36.8 (12-23-21 @ 15:35)  HR: 66 (12-24-21 @ 05:20) (61 - 80)  BP: 121/57 (12-24-21 @ 05:20) (102/54 - 145/58)  RR: 18 (12-24-21 @ 05:20) (16 - 19)  SpO2: 99% (12-24-21 @ 05:20) (94% - 99%)  Wt(kg): --  I&O's Summary    23 Dec 2021 07:01  -  24 Dec 2021 07:00  --------------------------------------------------------  IN: 136 mL / OUT: 600 mL / NET: -464 mL        Ward:  Central/PICC/Mid Line:                                         Appearance: Normal	  HEENT:   Normal oral mucosa, PERRL, EOMI	  Neck: Supple, + JVD/ - JVD; Carotid Bruit   Cardiovascular: Normal S1 S2, No JVD, No murmurs,   Respiratory: Lungs clear to auscultation/Decreased Breath Sounds/No Rales, Rhonchi, Wheezing	  Gastrointestinal:  Soft, Non-tender, + BS	  Skin: No rashes, No ecchymoses, No cyanosis  Extremities: Normal range of motion, No clubbing, cyanosis or edema  Vascular: Peripheral pulses palpable 2+ bilaterally  Neurologic: Non-focal  Psychiatry: A & O x 3, Mood & affect appropriate                            9.1    8.48  )-----------( 185      ( 24 Dec 2021 06:03 )             30.5     12-24    139  |  98  |  62<H>  ----------------------------<  101<H>  4.8   |  26  |  4.65<H>    Ca    9.0      24 Dec 2021 06:03  Phos  7.3     12-23  Mg     2.2     12-24    TPro  7.3  /  Alb  3.2<L>  /  TBili  0.2  /  DBili  x   /  AST  15  /  ALT  8<L>  /  AlkPhos  64  12-24     Interventional Cardiology PA Adult Progress Note    Subjective Assessment: Patient seen and examined at bedside, feeling good today, in good spirits. Denies chest pain, palpitations dizziness. mental status significantly improved more awake, and conversive with appropriate responses, minimal jerking movements   	  MEDICATIONS:  metoprolol succinate  milliGRAM(s) Oral daily  sacubitril 97 mG/valsartan 103 mG 1 Tablet(s) Oral two times a day  ALBUTerol   0.5% 2.5 milliGRAM(s) Nebulizer every 6 hours PRN  benzonatate 100 milliGRAM(s) Oral every 8 hours PRN  budesonide  80 MICROgram(s)/formoterol 4.5 MICROgram(s) Inhaler 2 Puff(s) Inhalation two times a day  guaiFENesin Oral Liquid (Sugar-Free) 100 milliGRAM(s) Oral every 6 hours PRN    acetaminophen     Tablet .. 650 milliGRAM(s) Oral every 6 hours PRN  melatonin 3 milliGRAM(s) Oral at bedtime  methadone    Tablet 10 milliGRAM(s) Oral three times a day  methadone    Tablet 5 milliGRAM(s) Oral every 6 hours PRN  pantoprazole    Tablet 40 milliGRAM(s) Oral before breakfast  polyethylene glycol 3350 17 Gram(s) Oral daily PRN  senna 2 Tablet(s) Oral at bedtime  atorvastatin 80 milliGRAM(s) Oral at bedtime  dextrose 40% Gel 15 Gram(s) Oral once  dextrose 50% Injectable 25 Gram(s) IV Push once  dextrose 50% Injectable 12.5 Gram(s) IV Push once  dextrose 50% Injectable 25 Gram(s) IV Push once  fenofibrate Tablet 48 milliGRAM(s) Oral daily  glucagon  Injectable 1 milliGRAM(s) IntraMuscular once  insulin glargine Injectable (LANTUS) 10 Unit(s) SubCutaneous at bedtime  insulin lispro (ADMELOG) corrective regimen sliding scale   SubCutaneous Before meals and at bedtime  insulin lispro Injectable (ADMELOG) 5 Unit(s) SubCutaneous three times a day before meals  levothyroxine 25 MICROGram(s) Oral daily    apixaban 2.5 milliGRAM(s) Oral every 12 hours  dextrose 5%. 1000 milliLiter(s) IV Continuous <Continuous>  dextrose 5%. 1000 milliLiter(s) IV Continuous <Continuous>  lidocaine   4% Patch 1 Patch Transdermal daily      	    [PHYSICAL EXAM:  TELEMETRY:  T(C): 36.8 (12-24-21 @ 06:19), Max: 36.8 (12-23-21 @ 15:35)  HR: 66 (12-24-21 @ 05:20) (61 - 80)  BP: 121/57 (12-24-21 @ 05:20) (102/54 - 145/58)  RR: 18 (12-24-21 @ 05:20) (16 - 19)  SpO2: 99% (12-24-21 @ 05:20) (94% - 99%)  Wt(kg): --  I&O's Summary    23 Dec 2021 07:01  -  24 Dec 2021 07:00  --------------------------------------------------------  IN: 136 mL / OUT: 600 mL / NET: -464 mL        Ward:  Central/PICC/Mid Line:                                         Appearance: nontoxic appearing in no acute distress	  Neck:- JVD  Cardiovascular: Normal S1 S2, R chest wall HD catheter in place  Respiratory: Lungs clear to auscultation No Rales, Rhonchi, Wheezing	  Extremities: No edema   Vascular: Peripheral pulses palpable 2+ bilaterally  Neurologic: minimal UE jerking significant improvement)   Psychiatry: A & O x 3, Mood & affect appropriate                            9.1    8.48  )-----------( 185      ( 24 Dec 2021 06:03 )             30.5     12-24    139  |  98  |  62<H>  ----------------------------<  101<H>  4.8   |  26  |  4.65<H>    Ca    9.0      24 Dec 2021 06:03  Phos  7.3     12-23  Mg     2.2     12-24    TPro  7.3  /  Alb  3.2<L>  /  TBili  0.2  /  DBili  x   /  AST  15  /  ALT  8<L>  /  AlkPhos  64  12-24

## 2021-12-24 NOTE — PROGRESS NOTE ADULT - PROBLEM SELECTOR PLAN 3
diuresis withpoor urine output and RITCHIE  Urinary retention >1 L on 12/21-> [facility] placement   - urology consulted.  UTI vs neurogenic bladder- will be d/c'd with medina and follow up for urodynamic studies   -s/p ceftriaxone 5 days course for UTI

## 2021-12-24 NOTE — PROGRESS NOTE ADULT - ASSESSMENT
62 yo obese Female CKD (baseline around 3.7-4) CHF, DM COPD (on 3L home o2) multiple myeloma presented to the hospital with complaints of hemoptysis and shortness of breath x 4 days;     Assessment/Plan:     #CKD Cr increasing   worsening uop and rising Cr + asterixis  TDC placed and HD initiated (12/23)  hemodialysis #2 12/24  next hemodialysis 12/26   obtain PTH Vit D 25  strict i's and o's  daily weights  daily BMP w/ phos   SW planning for outpatient HD   send hepatitis panel and QuantiFeron     BP  hypotensive  no UF w/ HD    Anemia  Hgb not at goal  no EPO given multiple myeloma    BMD  Ca 9  phos 7.4  obtain PTH Vit D 25     Patient was seen and evaluated on dialysis.     Dialyzer: Optiflux [id number]  QB: 300 mL/min  QD: 400 mL/min  K bath: 2  Goal UF: 0L  Duration: 150 min    Patient is tolerating the procedure well.   Continue full hemodialysis treatment as prescribed.    Thank you for the opportunity to participate in the care of your patient. The nephrology service remains available to assist with any questions or concerns. Please feel free to reach us by paging the on-call nephrology fellow for urgent issues or as below.     Hang Garcia M.D.   PGY-5, Nephrology Fellow   C: [phone]   P: [phone]

## 2021-12-24 NOTE — PROGRESS NOTE ADULT - PROBLEM SELECTOR PLAN 6
s/p 1 unit pRBC on 12/15 w/ appropriate response, now stable at 8-9s  - Maintain active T&S (next due 12/24)  - likey secondary to MM +chronic disease  - no epo given MM as per renal

## 2021-12-24 NOTE — PROGRESS NOTE ADULT - ASSESSMENT
62 yo obese F, current smoker, with a PMH of HTN, HLD, DM-II, CKD Stage 4 (Cr ~3.9), Hypothyroidism, known NICM with EF 25% (s/p AICD for primary prevention), MM, COPD (3L home O2), recurrent DVTs (on Eliquis) admitted for acute on chronic CHF exacerbation - now euvolemic with minimal urine output on diuretict and worsening RITCHIE on CKD for which renal is following.  Found to be retaining urine and medina placed and urology consulted. Neuro/psych following for drowsiness/upper extremity jerking, now plan for IR placement of HD catheter and Initiation of HD.        62 yo obese F, current smoker, with a PMH of HTN, HLD, DM-II, CKD Stage 4 (Cr ~3.9), Hypothyroidism, known NICM with EF 25% (s/p AICD for primary prevention), MM, COPD (3L home O2), recurrent DVTs (on Eliquis) admitted for acute on chronic CHF exacerbation - now euvolemic with minimal urine output on diuretict and worsening RITCHIE on CKD for which renal is following.  Found to be retaining urine and medina placed and urology consulted. Neuro/psych following for drowsiness/upper extremity jerking, now s/p HD catheter placement with IR and HD sessions on 12/23 and 12/24 next session 12/26, discharge pending HD center placement and PT reeval (fall on 12/22)

## 2021-12-25 LAB
ALBUMIN SERPL ELPH-MCNC: 3.2 G/DL — LOW (ref 3.3–5)
ALP SERPL-CCNC: 61 U/L — SIGNIFICANT CHANGE UP (ref 40–120)
ALT FLD-CCNC: 8 U/L — LOW (ref 10–45)
ANION GAP SERPL CALC-SCNC: 9 MMOL/L — SIGNIFICANT CHANGE UP (ref 5–17)
AST SERPL-CCNC: 15 U/L — SIGNIFICANT CHANGE UP (ref 10–40)
BILIRUB SERPL-MCNC: 0.3 MG/DL — SIGNIFICANT CHANGE UP (ref 0.2–1.2)
BUN SERPL-MCNC: 46 MG/DL — HIGH (ref 7–23)
CALCIUM SERPL-MCNC: 9 MG/DL — SIGNIFICANT CHANGE UP (ref 8.4–10.5)
CHLORIDE SERPL-SCNC: 96 MMOL/L — SIGNIFICANT CHANGE UP (ref 96–108)
CO2 SERPL-SCNC: 29 MMOL/L — SIGNIFICANT CHANGE UP (ref 22–31)
CREAT SERPL-MCNC: 4.2 MG/DL — HIGH (ref 0.5–1.3)
GLUCOSE BLDC GLUCOMTR-MCNC: 101 MG/DL — HIGH (ref 70–99)
GLUCOSE BLDC GLUCOMTR-MCNC: 145 MG/DL — HIGH (ref 70–99)
GLUCOSE BLDC GLUCOMTR-MCNC: 153 MG/DL — HIGH (ref 70–99)
GLUCOSE BLDC GLUCOMTR-MCNC: 67 MG/DL — LOW (ref 70–99)
GLUCOSE BLDC GLUCOMTR-MCNC: 87 MG/DL — SIGNIFICANT CHANGE UP (ref 70–99)
GLUCOSE SERPL-MCNC: 144 MG/DL — HIGH (ref 70–99)
HCT VFR BLD CALC: 30.5 % — LOW (ref 34.5–45)
HGB BLD-MCNC: 9.1 G/DL — LOW (ref 11.5–15.5)
MAGNESIUM SERPL-MCNC: 1.9 MG/DL — SIGNIFICANT CHANGE UP (ref 1.6–2.6)
MCHC RBC-ENTMCNC: 28.5 PG — SIGNIFICANT CHANGE UP (ref 27–34)
MCHC RBC-ENTMCNC: 29.8 GM/DL — LOW (ref 32–36)
MCV RBC AUTO: 95.6 FL — SIGNIFICANT CHANGE UP (ref 80–100)
NRBC # BLD: 0 /100 WBCS — SIGNIFICANT CHANGE UP (ref 0–0)
PLATELET # BLD AUTO: 176 K/UL — SIGNIFICANT CHANGE UP (ref 150–400)
POTASSIUM SERPL-MCNC: 4.6 MMOL/L — SIGNIFICANT CHANGE UP (ref 3.5–5.3)
POTASSIUM SERPL-SCNC: 4.6 MMOL/L — SIGNIFICANT CHANGE UP (ref 3.5–5.3)
PROT SERPL-MCNC: 7 G/DL — SIGNIFICANT CHANGE UP (ref 6–8.3)
RBC # BLD: 3.19 M/UL — LOW (ref 3.8–5.2)
RBC # FLD: 14.4 % — SIGNIFICANT CHANGE UP (ref 10.3–14.5)
SODIUM SERPL-SCNC: 134 MMOL/L — LOW (ref 135–145)
WBC # BLD: 8.85 K/UL — SIGNIFICANT CHANGE UP (ref 3.8–10.5)
WBC # FLD AUTO: 8.85 K/UL — SIGNIFICANT CHANGE UP (ref 3.8–10.5)

## 2021-12-25 PROCEDURE — 99233 SBSQ HOSP IP/OBS HIGH 50: CPT

## 2021-12-25 RX ORDER — ACETAMINOPHEN 500 MG
650 TABLET ORAL EVERY 6 HOURS
Refills: 0 | Status: DISCONTINUED | OUTPATIENT
Start: 2021-12-25 | End: 2021-12-29

## 2021-12-25 RX ADMIN — LIDOCAINE 1 PATCH: 4 CREAM TOPICAL at 19:31

## 2021-12-25 RX ADMIN — APIXABAN 2.5 MILLIGRAM(S): 2.5 TABLET, FILM COATED ORAL at 09:36

## 2021-12-25 RX ADMIN — SEVELAMER CARBONATE 800 MILLIGRAM(S): 2400 POWDER, FOR SUSPENSION ORAL at 12:40

## 2021-12-25 RX ADMIN — Medication 5 UNIT(S): at 12:41

## 2021-12-25 RX ADMIN — LIDOCAINE 1 PATCH: 4 CREAM TOPICAL at 12:41

## 2021-12-25 RX ADMIN — SEVELAMER CARBONATE 800 MILLIGRAM(S): 2400 POWDER, FOR SUSPENSION ORAL at 21:40

## 2021-12-25 RX ADMIN — Medication 3 MILLIGRAM(S): at 21:38

## 2021-12-25 RX ADMIN — PANTOPRAZOLE SODIUM 40 MILLIGRAM(S): 20 TABLET, DELAYED RELEASE ORAL at 06:25

## 2021-12-25 RX ADMIN — SEVELAMER CARBONATE 800 MILLIGRAM(S): 2400 POWDER, FOR SUSPENSION ORAL at 00:05

## 2021-12-25 RX ADMIN — Medication 2: at 21:49

## 2021-12-25 RX ADMIN — METHADONE HYDROCHLORIDE 10 MILLIGRAM(S): 40 TABLET ORAL at 12:40

## 2021-12-25 RX ADMIN — SACUBITRIL AND VALSARTAN 1 TABLET(S): 24; 26 TABLET, FILM COATED ORAL at 21:37

## 2021-12-25 RX ADMIN — BUDESONIDE AND FORMOTEROL FUMARATE DIHYDRATE 2 PUFF(S): 160; 4.5 AEROSOL RESPIRATORY (INHALATION) at 21:36

## 2021-12-25 RX ADMIN — METHADONE HYDROCHLORIDE 10 MILLIGRAM(S): 40 TABLET ORAL at 06:25

## 2021-12-25 RX ADMIN — ATORVASTATIN CALCIUM 80 MILLIGRAM(S): 80 TABLET, FILM COATED ORAL at 21:38

## 2021-12-25 RX ADMIN — BUDESONIDE AND FORMOTEROL FUMARATE DIHYDRATE 2 PUFF(S): 160; 4.5 AEROSOL RESPIRATORY (INHALATION) at 09:36

## 2021-12-25 RX ADMIN — SEVELAMER CARBONATE 800 MILLIGRAM(S): 2400 POWDER, FOR SUSPENSION ORAL at 06:26

## 2021-12-25 RX ADMIN — APIXABAN 2.5 MILLIGRAM(S): 2.5 TABLET, FILM COATED ORAL at 21:38

## 2021-12-25 RX ADMIN — SACUBITRIL AND VALSARTAN 1 TABLET(S): 24; 26 TABLET, FILM COATED ORAL at 09:36

## 2021-12-25 RX ADMIN — INSULIN GLARGINE 10 UNIT(S): 100 INJECTION, SOLUTION SUBCUTANEOUS at 21:48

## 2021-12-25 RX ADMIN — LIDOCAINE 1 PATCH: 4 CREAM TOPICAL at 02:00

## 2021-12-25 RX ADMIN — METHADONE HYDROCHLORIDE 10 MILLIGRAM(S): 40 TABLET ORAL at 21:37

## 2021-12-25 RX ADMIN — Medication 5 UNIT(S): at 09:36

## 2021-12-25 RX ADMIN — Medication 25 MICROGRAM(S): at 06:25

## 2021-12-25 RX ADMIN — Medication 48 MILLIGRAM(S): at 18:18

## 2021-12-25 RX ADMIN — Medication 100 MILLIGRAM(S): at 06:27

## 2021-12-25 NOTE — OCCUPATIONAL THERAPY INITIAL EVALUATION ADULT - GENERAL OBSERVATIONS, REHAB EVAL
Patient received semisupine in bed +tele, +2 L O2 via NC, +medina cath, +TDC, +heplock IV, NAD. Patient A&Ox4, agreeable and tolerated session fairly.

## 2021-12-25 NOTE — PROGRESS NOTE ADULT - SUBJECTIVE AND OBJECTIVE BOX
Interventional Cardiology PA Adult Progress Note    Subjective Assessment:  	  MEDICATIONS:  metoprolol succinate  milliGRAM(s) Oral daily  sacubitril 97 mG/valsartan 103 mG 1 Tablet(s) Oral two times a day  ALBUTerol   0.5% 2.5 milliGRAM(s) Nebulizer every 6 hours PRN  benzonatate 100 milliGRAM(s) Oral every 8 hours PRN  budesonide  80 MICROgram(s)/formoterol 4.5 MICROgram(s) Inhaler 2 Puff(s) Inhalation two times a day  guaiFENesin Oral Liquid (Sugar-Free) 100 milliGRAM(s) Oral every 6 hours PRN  acetaminophen     Tablet .. 650 milliGRAM(s) Oral every 6 hours PRN  melatonin 3 milliGRAM(s) Oral at bedtime  methadone    Tablet 10 milliGRAM(s) Oral three times a day  methadone    Tablet 5 milliGRAM(s) Oral every 6 hours PRN  pantoprazole    Tablet 40 milliGRAM(s) Oral before breakfast  polyethylene glycol 3350 17 Gram(s) Oral daily PRN  senna 2 Tablet(s) Oral at bedtime  atorvastatin 80 milliGRAM(s) Oral at bedtime  dextrose 40% Gel 15 Gram(s) Oral once  dextrose 50% Injectable 25 Gram(s) IV Push once  dextrose 50% Injectable 12.5 Gram(s) IV Push once  dextrose 50% Injectable 25 Gram(s) IV Push once  fenofibrate Tablet 48 milliGRAM(s) Oral daily  glucagon  Injectable 1 milliGRAM(s) IntraMuscular once  insulin glargine Injectable (LANTUS) 10 Unit(s) SubCutaneous at bedtime  insulin lispro (ADMELOG) corrective regimen sliding scale   SubCutaneous Before meals and at bedtime  insulin lispro Injectable (ADMELOG) 5 Unit(s) SubCutaneous three times a day before meals  levothyroxine 25 MICROGram(s) Oral daily    apixaban 2.5 milliGRAM(s) Oral every 12 hours  dextrose 5%. 1000 milliLiter(s) IV Continuous <Continuous>  dextrose 5%. 1000 milliLiter(s) IV Continuous <Continuous>  lidocaine   4% Patch 1 Patch Transdermal daily      	    [PHYSICAL EXAM:  TELEMETRY:  T(C): 35.9 (12-25-21 @ 06:14), Max: 36.9 (12-24-21 @ 09:20)  HR: 60 (12-25-21 @ 06:30) (55 - 76)  BP: 118/53 (12-25-21 @ 06:26) (100/51 - 118/53)  RR: 16 (12-25-21 @ 06:30) (16 - 18)  SpO2: 97% (12-25-21 @ 06:30) (97% - 100%)  Wt(kg): --  I&O's Summary    24 Dec 2021 07:01  -  25 Dec 2021 07:00  --------------------------------------------------------  IN: 1160 mL / OUT: 546 mL / NET: 614 mL        Ward:  Central/PICC/Mid Line:                                         Appearance: Normal	  HEENT:   Normal oral mucosa, PERRL, EOMI	  Neck: Supple, + JVD/ - JVD; Carotid Bruit   Cardiovascular: Normal S1 S2, No JVD, No murmurs,   Respiratory: Lungs clear to auscultation/Decreased Breath Sounds/No Rales, Rhonchi, Wheezing	  Gastrointestinal:  Soft, Non-tender, + BS	  Skin: No rashes, No ecchymoses, No cyanosis  Extremities: Normal range of motion, No clubbing, cyanosis or edema  Vascular: Peripheral pulses palpable 2+ bilaterally  Neurologic: Non-focal  Psychiatry: A & O x 3, Mood & affect appropriate                          9.1    8.48  )-----------( 185      ( 24 Dec 2021 06:03 )             30.5     12-24    139  |  98  |  62<H>  ----------------------------<  101<H>  4.8   |  26  |  4.65<H>    Ca    9.0      24 Dec 2021 06:03  Phos  7.3     12-23  Mg     2.2     12-24    TPro  7.3  /  Alb  3.2<L>  /  TBili  0.2  /  DBili  x   /  AST  15  /  ALT  8<L>  /  AlkPhos  64  12-24    proBNP:   Lipid Profile:   HgA1c:   TSH:   PTT - ( 23 Dec 2021 08:42 )  PTT:122.9 sec    ASSESSMENT/PLAN: 	        DVT ppx:  Dispo:     Interventional Cardiology PA Adult Progress Note    Subjective Assessment:  	  MEDICATIONS:  metoprolol succinate  milliGRAM(s) Oral daily  sacubitril 97 mG/valsartan 103 mG 1 Tablet(s) Oral two times a day  ALBUTerol   0.5% 2.5 milliGRAM(s) Nebulizer every 6 hours PRN  benzonatate 100 milliGRAM(s) Oral every 8 hours PRN  budesonide  80 MICROgram(s)/formoterol 4.5 MICROgram(s) Inhaler 2 Puff(s) Inhalation two times a day  guaiFENesin Oral Liquid (Sugar-Free) 100 milliGRAM(s) Oral every 6 hours PRN  acetaminophen     Tablet .. 650 milliGRAM(s) Oral every 6 hours PRN  melatonin 3 milliGRAM(s) Oral at bedtime  methadone    Tablet 10 milliGRAM(s) Oral three times a day  methadone    Tablet 5 milliGRAM(s) Oral every 6 hours PRN  pantoprazole    Tablet 40 milliGRAM(s) Oral before breakfast  polyethylene glycol 3350 17 Gram(s) Oral daily PRN  senna 2 Tablet(s) Oral at bedtime  atorvastatin 80 milliGRAM(s) Oral at bedtime  dextrose 40% Gel 15 Gram(s) Oral once  dextrose 50% Injectable 25 Gram(s) IV Push once  dextrose 50% Injectable 12.5 Gram(s) IV Push once  dextrose 50% Injectable 25 Gram(s) IV Push once  fenofibrate Tablet 48 milliGRAM(s) Oral daily  glucagon  Injectable 1 milliGRAM(s) IntraMuscular once  insulin glargine Injectable (LANTUS) 10 Unit(s) SubCutaneous at bedtime  insulin lispro (ADMELOG) corrective regimen sliding scale   SubCutaneous Before meals and at bedtime  insulin lispro Injectable (ADMELOG) 5 Unit(s) SubCutaneous three times a day before meals  levothyroxine 25 MICROGram(s) Oral daily    apixaban 2.5 milliGRAM(s) Oral every 12 hours  dextrose 5%. 1000 milliLiter(s) IV Continuous <Continuous>  dextrose 5%. 1000 milliLiter(s) IV Continuous <Continuous>  lidocaine   4% Patch 1 Patch Transdermal daily      	    [PHYSICAL EXAM:  TELEMETRY:  T(C): 35.9 (12-25-21 @ 06:14), Max: 36.9 (12-24-21 @ 09:20)  HR: 60 (12-25-21 @ 06:30) (55 - 76)  BP: 118/53 (12-25-21 @ 06:26) (100/51 - 118/53)  RR: 16 (12-25-21 @ 06:30) (16 - 18)  SpO2: 97% (12-25-21 @ 06:30) (97% - 100%)  Wt(kg): --  I&O's Summary    24 Dec 2021 07:01  -  25 Dec 2021 07:00  --------------------------------------------------------  IN: 1160 mL / OUT: 546 mL / NET: 614 mL        Ward:  Central/PICC/Mid Line:                                         Appearance: Normal	  HEENT:   Normal oral mucosa, PERRL, EOMI	  Neck: Supple, + JVD/ - JVD; Carotid Bruit   Cardiovascular: Normal S1 S2, No JVD, No murmurs,   Respiratory: Lungs clear to auscultation/Decreased Breath Sounds/No Rales, Rhonchi, Wheezing	  Gastrointestinal:  Soft, Non-tender, + BS	  Skin: No rashes, No ecchymoses, No cyanosis  Extremities: Normal range of motion, No clubbing, cyanosis or edema  Vascular: Peripheral pulses palpable 2+ bilaterally  Neurologic: Non-focal  Psychiatry: A & O x 3, Mood & affect appropriate                          9.1    8.48  )-----------( 185      ( 24 Dec 2021 06:03 )             30.5     12-24    139  |  98  |  62<H>  ----------------------------<  101<H>  4.8   |  26  |  4.65<H>    Ca    9.0      24 Dec 2021 06:03  Phos  7.3     12-23  Mg     2.2     12-24    TPro  7.3  /  Alb  3.2<L>  /  TBili  0.2  /  DBili  x   /  AST  15  /  ALT  8<L>  /  AlkPhos  64  12-24    PTT - ( 23 Dec 2021 08:42 )  PTT:122.9 sec   Interventional Cardiology PA Adult Progress Note    Subjective Assessment: Patient seen and examined at bedside, feeling generally well offering no complaints Discussed with patient possibility of need CORY, patient not open to idea, agreed to work with PT today for further evaluation.   	  MEDICATIONS:  metoprolol succinate  milliGRAM(s) Oral daily  sacubitril 97 mG/valsartan 103 mG 1 Tablet(s) Oral two times a day  ALBUTerol   0.5% 2.5 milliGRAM(s) Nebulizer every 6 hours PRN  benzonatate 100 milliGRAM(s) Oral every 8 hours PRN  budesonide  80 MICROgram(s)/formoterol 4.5 MICROgram(s) Inhaler 2 Puff(s) Inhalation two times a day  guaiFENesin Oral Liquid (Sugar-Free) 100 milliGRAM(s) Oral every 6 hours PRN  acetaminophen     Tablet .. 650 milliGRAM(s) Oral every 6 hours PRN  melatonin 3 milliGRAM(s) Oral at bedtime  methadone    Tablet 10 milliGRAM(s) Oral three times a day  methadone    Tablet 5 milliGRAM(s) Oral every 6 hours PRN  pantoprazole    Tablet 40 milliGRAM(s) Oral before breakfast  polyethylene glycol 3350 17 Gram(s) Oral daily PRN  senna 2 Tablet(s) Oral at bedtime  atorvastatin 80 milliGRAM(s) Oral at bedtime  dextrose 40% Gel 15 Gram(s) Oral once  dextrose 50% Injectable 25 Gram(s) IV Push once  dextrose 50% Injectable 12.5 Gram(s) IV Push once  dextrose 50% Injectable 25 Gram(s) IV Push once  fenofibrate Tablet 48 milliGRAM(s) Oral daily  glucagon  Injectable 1 milliGRAM(s) IntraMuscular once  insulin glargine Injectable (LANTUS) 10 Unit(s) SubCutaneous at bedtime  insulin lispro (ADMELOG) corrective regimen sliding scale   SubCutaneous Before meals and at bedtime  insulin lispro Injectable (ADMELOG) 5 Unit(s) SubCutaneous three times a day before meals  levothyroxine 25 MICROGram(s) Oral daily    apixaban 2.5 milliGRAM(s) Oral every 12 hours  dextrose 5%. 1000 milliLiter(s) IV Continuous <Continuous>  dextrose 5%. 1000 milliLiter(s) IV Continuous <Continuous>  lidocaine   4% Patch 1 Patch Transdermal daily      	    [PHYSICAL EXAM:  TELEMETRY:  T(C): 35.9 (12-25-21 @ 06:14), Max: 36.9 (12-24-21 @ 09:20)  HR: 60 (12-25-21 @ 06:30) (55 - 76)  BP: 118/53 (12-25-21 @ 06:26) (100/51 - 118/53)  RR: 16 (12-25-21 @ 06:30) (16 - 18)  SpO2: 97% (12-25-21 @ 06:30) (97% - 100%)  Wt(kg): --  I&O's Summary    24 Dec 2021 07:01  -  25 Dec 2021 07:00  --------------------------------------------------------  IN: 1160 mL / OUT: 546 mL / NET: 614 mL        Ward:  Central/PICC/Mid Line:                                         Appearance: obese female well appearing   Neck:-JVD    Cardiovascular: Normal S1 S2, + murmur   Respiratory: Lungs clear to auscultation No Rales, Rhonchi, Wheezing	  Gastrointestinal:  Soft, Non-tender, + BS	  Extremities: No edema, Dressing over r foot c/d/i   Neurologic: Non-focal  Psychiatry: A & O x 3, Mood & affect appropriate                          9.1    8.48  )-----------( 185      ( 24 Dec 2021 06:03 )             30.5     12-24    139  |  98  |  62<H>  ----------------------------<  101<H>  4.8   |  26  |  4.65<H>    Ca    9.0      24 Dec 2021 06:03  Phos  7.3     12-23  Mg     2.2     12-24    TPro  7.3  /  Alb  3.2<L>  /  TBili  0.2  /  DBili  x   /  AST  15  /  ALT  8<L>  /  AlkPhos  64  12-24    PTT - ( 23 Dec 2021 08:42 )  PTT:122.9 sec

## 2021-12-25 NOTE — OCCUPATIONAL THERAPY INITIAL EVALUATION ADULT - MD ORDER
60 yo obese Female, current smoker, with Contrast/Aspirin Allergy and an extensive PMHx including HTN, NICM, chronic HFrEF (EF 25% s/p AICD for primary prevention battery changed 8/2020, w/ cardiac Mems device), IDDM c/b neuropathy (on Methadone), hx of recurrent DVTs (on Eliquis), CKD IV, CVA x2 (most recently in 2013 with residual right sided weakness)

## 2021-12-25 NOTE — PROGRESS NOTE ADULT - ASSESSMENT
62 yo obese Female CKD (baseline around 3.7-4) CHF, DM COPD (on 3L home o2) multiple myeloma presented to the hospital with complaints of hemoptysis and shortness of breath x 4 days;     Assessment/Plan:     #CKD Cr increasing   worsening uop and rising Cr + asterixis  TDC placed and HD initiated (12/23)  hemodialysis #2 12/24  next hemodialysis 12/26     strict i's and o's  daily weights  daily BMP w/ phos   SW planning for outpatient HD   send hepatitis panel and QuantiFeron     BP  hypotensive  no UF w/ HD    Anemia  Hgb not at goal  no EPO given multiple myeloma    BMD  Ca 9  phos 7.4  PTH Vit D noted    Thank you for the opportunity to participate in the care of your patient. The nephrology service remains available to assist with any questions or concerns. Please feel free to reach us by paging the on-call nephrology fellow for urgent issues or as below.     Hang Garcia M.D.   PGY-5, Nephrology Fellow   C: [phone]   P: [phone]

## 2021-12-25 NOTE — PROGRESS NOTE ADULT - ASSESSMENT
60 yo obese F, current smoker, with a PMH of HTN, HLD, DM-II, CKD Stage 4 (Cr ~3.9), Hypothyroidism, known NICM with EF 25% (s/p AICD for primary prevention), MM, COPD (3L home O2), recurrent DVTs (on Eliquis) admitted for acute on chronic CHF exacerbation - now euvolemic with minimal urine output on diuretict and worsening RITCHIE on CKD for which renal is following.  Found to be retaining urine and medina placed and urology consulted. Neuro/psych following for drowsiness/upper extremity jerking, now s/p HD catheter placement with IR and HD sessions on 12/23 and 12/24 next session 12/26, discharge pending HD center placement and PT reeval (fall on 12/22)

## 2021-12-25 NOTE — OCCUPATIONAL THERAPY INITIAL EVALUATION ADULT - ADDITIONAL COMMENTS
Patient reports living with her spouse and daughter in an apartment building with [address]. Patient reports ambulating with rollator at baseline. Patient has a HHA 5 hours a day/7 days a week. Patient owns a w/c, commode, shower chair at home.

## 2021-12-25 NOTE — OCCUPATIONAL THERAPY INITIAL EVALUATION ADULT - LEVEL OF INDEPENDENCE: DRESS LOWER BODY, OT EVAL
Patient able don/doff L sock independently, required Min A x 1 to initiating donning of R sock/minimum assist (75% patients effort)

## 2021-12-25 NOTE — OCCUPATIONAL THERAPY INITIAL EVALUATION ADULT - PERTINENT HX OF CURRENT PROBLEM, REHAB EVAL
PAD s/p bypass, COPD (on 3L home O2), DHARMESH on CPAP, Multiple Myeloma (on [place]), hypothyroidism, depression who presented to [facility] with hemoptysis x 4 days found to be in CHF exacerbation. Course c/b RITCHIE on CKD with plan for HD. Pt also with new onset tremor that started on 12/17 that is most noticeable when trying to do activities.

## 2021-12-25 NOTE — PROGRESS NOTE ADULT - SUBJECTIVE AND OBJECTIVE BOX
Patient is a 61y Female seen and evaluated at bedside.   for hemodialysis tomorrow    feels okay today   denies CP SOB     Meds:    acetaminophen     Tablet .. 650 every 6 hours PRN  ALBUTerol   0.5% 2.5 every 6 hours PRN  apixaban 2.5 every 12 hours  atorvastatin 80 at bedtime  benzonatate 100 every 8 hours PRN  budesonide  80 MICROgram(s)/formoterol 4.5 MICROgram(s) Inhaler 2 two times a day  dextrose 40% Gel 15 once  dextrose 5%. 1000 <Continuous>  dextrose 5%. 1000 <Continuous>  dextrose 50% Injectable 25 once  dextrose 50% Injectable 12.5 once  dextrose 50% Injectable 25 once  fenofibrate Tablet 48 daily  glucagon  Injectable 1 once  guaiFENesin Oral Liquid (Sugar-Free) 100 every 6 hours PRN  insulin glargine Injectable (LANTUS) 10 at bedtime  insulin lispro (ADMELOG) corrective regimen sliding scale  Before meals and at bedtime  insulin lispro Injectable (ADMELOG) 5 three times a day before meals  levothyroxine 25 daily  lidocaine   4% Patch 1 daily  melatonin 3 at bedtime  methadone    Tablet 10 three times a day  methadone    Tablet 5 every 6 hours PRN  metoprolol succinate  daily  pantoprazole    Tablet 40 before breakfast  polyethylene glycol 3350 17 daily PRN  sacubitril 97 mG/valsartan 103 mG 1 two times a day  senna 2 at bedtime  sevelamer carbonate 800 three times a day      T(C): , Max: 36.8 (12-24-21 @ 11:50)  T(F): , Max: 98.2 (12-24-21 @ 11:50)  HR: 67 (12-25-21 @ 08:36)  BP: 111/55 (12-25-21 @ 08:36)  BP(mean): --  RR: 17 (12-25-21 @ 08:36)  SpO2: 100% (12-25-21 @ 08:36)  Wt(kg): --    12-24 @ 07:01  -  12-25 @ 07:00  --------------------------------------------------------  IN: 1340 mL / OUT: 546 mL / NET: 794 mL    12-25 @ 07:01  -  12-25 @ 10:16  --------------------------------------------------------  IN: 180 mL / OUT: 0 mL / NET: 180 mL            Review of Systems:  all other ROS negative     PHYSICAL EXAM:  GENERAL: tearful;   CHEST/LUNG: decreased breath sounds at the bases  HEART: normal S1S2, RRR  ABDOMEN: Soft, Nontender  EXTREMITIES: trace edema        LABS:                        9.1    8.48  )-----------( 185      ( 24 Dec 2021 06:03 )             30.5     12-24    139  |  98  |  62<H>  ----------------------------<  101<H>  4.8   |  26  |  4.65<H>    Ca    9.0      24 Dec 2021 06:03  Mg     2.2     12-24    TPro  7.3  /  Alb  3.2<L>  /  TBili  0.2  /  DBili  x   /  AST  15  /  ALT  8<L>  /  AlkPhos  64  12-24                RADIOLOGY & ADDITIONAL STUDIES:

## 2021-12-25 NOTE — OCCUPATIONAL THERAPY INITIAL EVALUATION ADULT - DIAGNOSIS, OT EVAL
Patient to [facility] with hemoptysis x 4 found to be in CHF Exacerbation, presents with decreased overall strength, balance, activity tolerance impacting independence with functional activities and mobility.

## 2021-12-25 NOTE — PROGRESS NOTE ADULT - PROBLEM SELECTOR PLAN 3
diuresis withpoor urine output and RITCHIE  Urinary retention >1 L on 12/21-> medina placement   - urology consulted.  UTI vs neurogenic bladder- will be d/c'd with medina and follow up for urodynamic studies   -s/p ceftriaxone 5 days course for UTI diuresis with poor urine output and RITCHIE  Urinary retention >1 L on 12/21-> medina placement   - urology consulted.  UTI vs neurogenic bladder- will be d/c'd with medina and follow up for urodynamic studies   -s/p ceftriaxone 5 days course for UTI

## 2021-12-26 LAB
ALBUMIN SERPL ELPH-MCNC: 3.1 G/DL — LOW (ref 3.3–5)
ALBUMIN SERPL ELPH-MCNC: 4.1 G/DL — SIGNIFICANT CHANGE UP (ref 3.3–5)
ALP SERPL-CCNC: 56 U/L — SIGNIFICANT CHANGE UP (ref 40–120)
ALP SERPL-CCNC: 67 U/L — SIGNIFICANT CHANGE UP (ref 40–120)
ALT FLD-CCNC: 11 U/L — SIGNIFICANT CHANGE UP (ref 10–45)
ALT FLD-CCNC: 8 U/L — LOW (ref 10–45)
ANION GAP SERPL CALC-SCNC: 10 MMOL/L — SIGNIFICANT CHANGE UP (ref 5–17)
ANION GAP SERPL CALC-SCNC: 12 MMOL/L — SIGNIFICANT CHANGE UP (ref 5–17)
AST SERPL-CCNC: 17 U/L — SIGNIFICANT CHANGE UP (ref 10–40)
AST SERPL-CCNC: 23 U/L — SIGNIFICANT CHANGE UP (ref 10–40)
BASOPHILS # BLD AUTO: 0.04 K/UL — SIGNIFICANT CHANGE UP (ref 0–0.2)
BASOPHILS # BLD AUTO: 0.09 K/UL — SIGNIFICANT CHANGE UP (ref 0–0.2)
BASOPHILS NFR BLD AUTO: 0.4 % — SIGNIFICANT CHANGE UP (ref 0–2)
BASOPHILS NFR BLD AUTO: 0.9 % — SIGNIFICANT CHANGE UP (ref 0–2)
BILIRUB SERPL-MCNC: 0.3 MG/DL — SIGNIFICANT CHANGE UP (ref 0.2–1.2)
BILIRUB SERPL-MCNC: 0.4 MG/DL — SIGNIFICANT CHANGE UP (ref 0.2–1.2)
BUN SERPL-MCNC: 24 MG/DL — HIGH (ref 7–23)
BUN SERPL-MCNC: 52 MG/DL — HIGH (ref 7–23)
CALCIUM SERPL-MCNC: 8.9 MG/DL — SIGNIFICANT CHANGE UP (ref 8.4–10.5)
CALCIUM SERPL-MCNC: 9.4 MG/DL — SIGNIFICANT CHANGE UP (ref 8.4–10.5)
CHLORIDE SERPL-SCNC: 97 MMOL/L — SIGNIFICANT CHANGE UP (ref 96–108)
CHLORIDE SERPL-SCNC: 99 MMOL/L — SIGNIFICANT CHANGE UP (ref 96–108)
CO2 SERPL-SCNC: 28 MMOL/L — SIGNIFICANT CHANGE UP (ref 22–31)
CO2 SERPL-SCNC: 29 MMOL/L — SIGNIFICANT CHANGE UP (ref 22–31)
CREAT SERPL-MCNC: 3.22 MG/DL — HIGH (ref 0.5–1.3)
CREAT SERPL-MCNC: 4.97 MG/DL — HIGH (ref 0.5–1.3)
EOSINOPHIL # BLD AUTO: 0 K/UL — SIGNIFICANT CHANGE UP (ref 0–0.5)
EOSINOPHIL # BLD AUTO: 0.24 K/UL — SIGNIFICANT CHANGE UP (ref 0–0.5)
EOSINOPHIL NFR BLD AUTO: 0 % — SIGNIFICANT CHANGE UP (ref 0–6)
EOSINOPHIL NFR BLD AUTO: 2.7 % — SIGNIFICANT CHANGE UP (ref 0–6)
GIANT PLATELETS BLD QL SMEAR: PRESENT — SIGNIFICANT CHANGE UP
GLUCOSE BLDC GLUCOMTR-MCNC: 113 MG/DL — HIGH (ref 70–99)
GLUCOSE BLDC GLUCOMTR-MCNC: 129 MG/DL — HIGH (ref 70–99)
GLUCOSE BLDC GLUCOMTR-MCNC: 181 MG/DL — HIGH (ref 70–99)
GLUCOSE BLDC GLUCOMTR-MCNC: 42 MG/DL — CRITICAL LOW (ref 70–99)
GLUCOSE SERPL-MCNC: 108 MG/DL — HIGH (ref 70–99)
GLUCOSE SERPL-MCNC: 48 MG/DL — CRITICAL LOW (ref 70–99)
HCT VFR BLD CALC: 30.4 % — LOW (ref 34.5–45)
HCT VFR BLD CALC: 33.6 % — LOW (ref 34.5–45)
HGB BLD-MCNC: 10.1 G/DL — LOW (ref 11.5–15.5)
HGB BLD-MCNC: 9.3 G/DL — LOW (ref 11.5–15.5)
HYPOCHROMIA BLD QL: SLIGHT — SIGNIFICANT CHANGE UP
IMM GRANULOCYTES NFR BLD AUTO: 0.1 % — SIGNIFICANT CHANGE UP (ref 0–1.5)
LACTATE SERPL-SCNC: 0.8 MMOL/L — SIGNIFICANT CHANGE UP (ref 0.5–2)
LYMPHOCYTES # BLD AUTO: 1.69 K/UL — SIGNIFICANT CHANGE UP (ref 1–3.3)
LYMPHOCYTES # BLD AUTO: 18.7 % — SIGNIFICANT CHANGE UP (ref 13–44)
LYMPHOCYTES # BLD AUTO: 2.06 K/UL — SIGNIFICANT CHANGE UP (ref 1–3.3)
LYMPHOCYTES # BLD AUTO: 21.2 % — SIGNIFICANT CHANGE UP (ref 13–44)
MAGNESIUM SERPL-MCNC: 1.9 MG/DL — SIGNIFICANT CHANGE UP (ref 1.6–2.6)
MANUAL SMEAR VERIFICATION: SIGNIFICANT CHANGE UP
MCHC RBC-ENTMCNC: 28.1 PG — SIGNIFICANT CHANGE UP (ref 27–34)
MCHC RBC-ENTMCNC: 29.4 PG — SIGNIFICANT CHANGE UP (ref 27–34)
MCHC RBC-ENTMCNC: 30.1 GM/DL — LOW (ref 32–36)
MCHC RBC-ENTMCNC: 30.6 GM/DL — LOW (ref 32–36)
MCV RBC AUTO: 93.3 FL — SIGNIFICANT CHANGE UP (ref 80–100)
MCV RBC AUTO: 96.2 FL — SIGNIFICANT CHANGE UP (ref 80–100)
MONOCYTES # BLD AUTO: 1.22 K/UL — HIGH (ref 0–0.9)
MONOCYTES # BLD AUTO: 1.29 K/UL — HIGH (ref 0–0.9)
MONOCYTES NFR BLD AUTO: 13.3 % — SIGNIFICANT CHANGE UP (ref 2–14)
MONOCYTES NFR BLD AUTO: 13.5 % — SIGNIFICANT CHANGE UP (ref 2–14)
NEUTROPHILS # BLD AUTO: 5.85 K/UL — SIGNIFICANT CHANGE UP (ref 1.8–7.4)
NEUTROPHILS # BLD AUTO: 6.28 K/UL — SIGNIFICANT CHANGE UP (ref 1.8–7.4)
NEUTROPHILS NFR BLD AUTO: 64.6 % — SIGNIFICANT CHANGE UP (ref 43–77)
NEUTROPHILS NFR BLD AUTO: 64.6 % — SIGNIFICANT CHANGE UP (ref 43–77)
NRBC # BLD: 0 /100 WBCS — SIGNIFICANT CHANGE UP (ref 0–0)
OVALOCYTES BLD QL SMEAR: SLIGHT — SIGNIFICANT CHANGE UP
PLAT MORPH BLD: ABNORMAL
PLATELET # BLD AUTO: 178 K/UL — SIGNIFICANT CHANGE UP (ref 150–400)
PLATELET # BLD AUTO: 180 K/UL — SIGNIFICANT CHANGE UP (ref 150–400)
POIKILOCYTOSIS BLD QL AUTO: SLIGHT — SIGNIFICANT CHANGE UP
POTASSIUM SERPL-MCNC: 4 MMOL/L — SIGNIFICANT CHANGE UP (ref 3.5–5.3)
POTASSIUM SERPL-MCNC: 5 MMOL/L — SIGNIFICANT CHANGE UP (ref 3.5–5.3)
POTASSIUM SERPL-SCNC: 4 MMOL/L — SIGNIFICANT CHANGE UP (ref 3.5–5.3)
POTASSIUM SERPL-SCNC: 5 MMOL/L — SIGNIFICANT CHANGE UP (ref 3.5–5.3)
PROT SERPL-MCNC: 6.6 G/DL — SIGNIFICANT CHANGE UP (ref 6–8.3)
PROT SERPL-MCNC: 8 G/DL — SIGNIFICANT CHANGE UP (ref 6–8.3)
RBC # BLD: 3.16 M/UL — LOW (ref 3.8–5.2)
RBC # BLD: 3.6 M/UL — LOW (ref 3.8–5.2)
RBC # FLD: 14.1 % — SIGNIFICANT CHANGE UP (ref 10.3–14.5)
RBC # FLD: 14.2 % — SIGNIFICANT CHANGE UP (ref 10.3–14.5)
RBC BLD AUTO: ABNORMAL
SODIUM SERPL-SCNC: 137 MMOL/L — SIGNIFICANT CHANGE UP (ref 135–145)
SODIUM SERPL-SCNC: 138 MMOL/L — SIGNIFICANT CHANGE UP (ref 135–145)
WBC # BLD: 9.05 K/UL — SIGNIFICANT CHANGE UP (ref 3.8–10.5)
WBC # BLD: 9.72 K/UL — SIGNIFICANT CHANGE UP (ref 3.8–10.5)
WBC # FLD AUTO: 9.05 K/UL — SIGNIFICANT CHANGE UP (ref 3.8–10.5)
WBC # FLD AUTO: 9.72 K/UL — SIGNIFICANT CHANGE UP (ref 3.8–10.5)

## 2021-12-26 PROCEDURE — 99233 SBSQ HOSP IP/OBS HIGH 50: CPT

## 2021-12-26 PROCEDURE — 36000 PLACE NEEDLE IN VEIN: CPT

## 2021-12-26 PROCEDURE — 76937 US GUIDE VASCULAR ACCESS: CPT | Mod: 26

## 2021-12-26 PROCEDURE — 71045 X-RAY EXAM CHEST 1 VIEW: CPT | Mod: 26

## 2021-12-26 RX ORDER — DEXTROSE 50 % IN WATER 50 %
25 SYRINGE (ML) INTRAVENOUS ONCE
Refills: 0 | Status: COMPLETED | OUTPATIENT
Start: 2021-12-26 | End: 2021-12-26

## 2021-12-26 RX ORDER — MAGNESIUM OXIDE 400 MG ORAL TABLET 241.3 MG
400 TABLET ORAL ONCE
Refills: 0 | Status: COMPLETED | OUTPATIENT
Start: 2021-12-26 | End: 2021-12-26

## 2021-12-26 RX ADMIN — SEVELAMER CARBONATE 800 MILLIGRAM(S): 2400 POWDER, FOR SUSPENSION ORAL at 06:42

## 2021-12-26 RX ADMIN — METHADONE HYDROCHLORIDE 10 MILLIGRAM(S): 40 TABLET ORAL at 14:13

## 2021-12-26 RX ADMIN — LIDOCAINE 1 PATCH: 4 CREAM TOPICAL at 20:30

## 2021-12-26 RX ADMIN — Medication 25 MICROGRAM(S): at 06:43

## 2021-12-26 RX ADMIN — Medication 5 UNIT(S): at 18:10

## 2021-12-26 RX ADMIN — SEVELAMER CARBONATE 800 MILLIGRAM(S): 2400 POWDER, FOR SUSPENSION ORAL at 14:13

## 2021-12-26 RX ADMIN — LIDOCAINE 1 PATCH: 4 CREAM TOPICAL at 00:32

## 2021-12-26 RX ADMIN — ALBUTEROL 2.5 MILLIGRAM(S): 90 AEROSOL, METERED ORAL at 08:57

## 2021-12-26 RX ADMIN — LIDOCAINE 1 PATCH: 4 CREAM TOPICAL at 08:58

## 2021-12-26 RX ADMIN — SEVELAMER CARBONATE 800 MILLIGRAM(S): 2400 POWDER, FOR SUSPENSION ORAL at 23:28

## 2021-12-26 RX ADMIN — METHADONE HYDROCHLORIDE 10 MILLIGRAM(S): 40 TABLET ORAL at 06:43

## 2021-12-26 RX ADMIN — SACUBITRIL AND VALSARTAN 1 TABLET(S): 24; 26 TABLET, FILM COATED ORAL at 08:57

## 2021-12-26 RX ADMIN — Medication 48 MILLIGRAM(S): at 14:13

## 2021-12-26 RX ADMIN — Medication 100 MILLIGRAM(S): at 06:42

## 2021-12-26 RX ADMIN — BUDESONIDE AND FORMOTEROL FUMARATE DIHYDRATE 2 PUFF(S): 160; 4.5 AEROSOL RESPIRATORY (INHALATION) at 08:59

## 2021-12-26 RX ADMIN — APIXABAN 2.5 MILLIGRAM(S): 2.5 TABLET, FILM COATED ORAL at 08:57

## 2021-12-26 RX ADMIN — Medication 5 UNIT(S): at 07:37

## 2021-12-26 RX ADMIN — MAGNESIUM OXIDE 400 MG ORAL TABLET 400 MILLIGRAM(S): 241.3 TABLET ORAL at 07:38

## 2021-12-26 RX ADMIN — PANTOPRAZOLE SODIUM 40 MILLIGRAM(S): 20 TABLET, DELAYED RELEASE ORAL at 06:47

## 2021-12-26 RX ADMIN — LIDOCAINE 1 PATCH: 4 CREAM TOPICAL at 19:25

## 2021-12-26 RX ADMIN — Medication 25 GRAM(S): at 21:36

## 2021-12-26 NOTE — PROGRESS NOTE ADULT - ASSESSMENT
62 yo obese Female CKD (baseline around 3.7-4) CHF, DM COPD (on 3L home o2) multiple myeloma presented to the hospital with complaints of hemoptysis and shortness of breath x 4 days;     Assessment/Plan:     #CKD Cr increasing   worsening uop and rising Cr + asterixis  TDC placed and HD initiated (12/23)  hemodialysis #2 12/24  next hemodialysis 12/26     strict i's and o's  daily weights  daily BMP w/ phos   SW planning for outpatient HD   send hepatitis panel and QuantiFeron     BP  hypotensive  no UF w/ HD    Anemia  Hgb not at goal  no EPO given multiple myeloma    BMD  Ca 9  phos 7.4  PTH Vit D noted    Patient was seen and evaluated on dialysis.     Dialyzer: Optiflux [id number]  QB: 350 mL/min  QD: 500 mL/min  K bath: 2  Goal UF: 0.5L  Duration: 150 min    Patient is tolerating the procedure well.   Continue full hemodialysis treatment as prescribed.    Thank you for the opportunity to participate in the care of your patient. The nephrology service remains available to assist with any questions or concerns. Please feel free to reach us by paging the on-call nephrology fellow for urgent issues or as below.     Hang Garcia M.D.   PGY-5, Nephrology Fellow   C: [phone]   P: [phone]

## 2021-12-26 NOTE — PROGRESS NOTE ADULT - PROBLEM SELECTOR PLAN 3
diuresis with poor urine output and RITCHIE  Urinary retention >1 L on 12/21-> medina placement   - urology consulted.  UTI vs neurogenic bladder- will be d/c'd with medina and follow up for urodynamic studies   -s/p ceftriaxone 5 days course for UTI

## 2021-12-26 NOTE — PROCEDURE NOTE - NSPOSTCAREGUIDE_GEN_A_CORE
Verbal/written post procedure instructions were given to patient/caregiver/Instructed patient/caregiver regarding signs and symptoms of infection/Keep the cast/splint/dressing clean and dry/Care for catheter as per unit/ICU protocols
Verbal/written post procedure instructions were given to patient/caregiver

## 2021-12-26 NOTE — PROGRESS NOTE ADULT - SUBJECTIVE AND OBJECTIVE BOX
Interventional Cardiology PA Adult Progress Note    Subjective Assessment:   	  MEDICATIONS:  metoprolol succinate  milliGRAM(s) Oral daily  sacubitril 97 mG/valsartan 103 mG 1 Tablet(s) Oral two times a day  ALBUTerol   0.5% 2.5 milliGRAM(s) Nebulizer every 6 hours PRN  benzonatate 100 milliGRAM(s) Oral every 8 hours PRN  budesonide  80 MICROgram(s)/formoterol 4.5 MICROgram(s) Inhaler 2 Puff(s) Inhalation two times a day  guaiFENesin Oral Liquid (Sugar-Free) 100 milliGRAM(s) Oral every 6 hours PRN  acetaminophen     Tablet .. 650 milliGRAM(s) Oral every 6 hours PRN  melatonin 3 milliGRAM(s) Oral at bedtime  methadone    Tablet 10 milliGRAM(s) Oral three times a day  methadone    Tablet 5 milliGRAM(s) Oral every 6 hours PRN  pantoprazole    Tablet 40 milliGRAM(s) Oral before breakfast  polyethylene glycol 3350 17 Gram(s) Oral daily PRN  senna 2 Tablet(s) Oral at bedtime  atorvastatin 80 milliGRAM(s) Oral at bedtime  dextrose 40% Gel 15 Gram(s) Oral once  dextrose 50% Injectable 25 Gram(s) IV Push once  dextrose 50% Injectable 12.5 Gram(s) IV Push once  dextrose 50% Injectable 25 Gram(s) IV Push once  fenofibrate Tablet 48 milliGRAM(s) Oral daily  glucagon  Injectable 1 milliGRAM(s) IntraMuscular once  insulin glargine Injectable (LANTUS) 10 Unit(s) SubCutaneous at bedtime  insulin lispro (ADMELOG) corrective regimen sliding scale   SubCutaneous Before meals and at bedtime  insulin lispro Injectable (ADMELOG) 5 Unit(s) SubCutaneous three times a day before meals  levothyroxine 25 MICROGram(s) Oral daily  apixaban 2.5 milliGRAM(s) Oral every 12 hours  dextrose 5%. 1000 milliLiter(s) IV Continuous <Continuous>  dextrose 5%. 1000 milliLiter(s) IV Continuous <Continuous>  lidocaine   4% Patch 1 Patch Transdermal daily      	    [PHYSICAL EXAM:  TELEMETRY:  T(C): 36 (12-26-21 @ 05:10), Max: 37.1 (12-25-21 @ 18:41)  HR: 67 (12-26-21 @ 06:00) (65 - 77)  BP: 122/59 (12-26-21 @ 06:00) (94/63 - 129/79)  RR: 17 (12-26-21 @ 06:00) (16 - 17)  SpO2: 100% (12-26-21 @ 06:00) (93% - 100%)  Wt(kg): --  I&O's Summary    25 Dec 2021 07:01  -  26 Dec 2021 07:00  --------------------------------------------------------  IN: 540 mL / OUT: 810 mL / NET: -270 mL                                         Appearance: Normal	  HEENT:   Normal oral mucosa, PERRL, EOMI	  Neck: Supple, + JVD/ - JVD; Carotid Bruit   Cardiovascular: Normal S1 S2, No JVD, No murmurs,   Respiratory: Lungs clear to auscultation/Decreased Breath Sounds/No Rales, Rhonchi, Wheezing	  Gastrointestinal:  Soft, Non-tender, + BS	  Skin: No rashes, No ecchymoses, No cyanosis  Extremities: Normal range of motion, No clubbing, cyanosis or edema  Vascular: Peripheral pulses palpable 2+ bilaterally  Neurologic: Non-focal  Psychiatry: A & O x 3, Mood & affect appropriate                                9.3    9.72  )-----------( 178      ( 26 Dec 2021 06:28 )             30.4     12-26    137  |  97  |  52<H>  ----------------------------<  108<H>  5.0   |  28  |  4.97<H>    Ca    8.9      26 Dec 2021 06:28  Mg     1.9     12-26    TPro  6.6  /  Alb  3.1<L>  /  TBili  0.3  /  DBili  x   /  AST  17  /  ALT  8<L>  /  AlkPhos  56  12-26    proBNP:   Lipid Profile:   HgA1c:   TSH:       ASSESSMENT/PLAN: 	        DVT ppx:  Dispo:     Interventional Cardiology PA Adult Progress Note    Subjective Assessment:   	  MEDICATIONS:  metoprolol succinate  milliGRAM(s) Oral daily  sacubitril 97 mG/valsartan 103 mG 1 Tablet(s) Oral two times a day  ALBUTerol   0.5% 2.5 milliGRAM(s) Nebulizer every 6 hours PRN  benzonatate 100 milliGRAM(s) Oral every 8 hours PRN  budesonide  80 MICROgram(s)/formoterol 4.5 MICROgram(s) Inhaler 2 Puff(s) Inhalation two times a day  guaiFENesin Oral Liquid (Sugar-Free) 100 milliGRAM(s) Oral every 6 hours PRN  acetaminophen     Tablet .. 650 milliGRAM(s) Oral every 6 hours PRN  melatonin 3 milliGRAM(s) Oral at bedtime  methadone    Tablet 10 milliGRAM(s) Oral three times a day  methadone    Tablet 5 milliGRAM(s) Oral every 6 hours PRN  pantoprazole    Tablet 40 milliGRAM(s) Oral before breakfast  polyethylene glycol 3350 17 Gram(s) Oral daily PRN  senna 2 Tablet(s) Oral at bedtime  atorvastatin 80 milliGRAM(s) Oral at bedtime  dextrose 40% Gel 15 Gram(s) Oral once  dextrose 50% Injectable 25 Gram(s) IV Push once  dextrose 50% Injectable 12.5 Gram(s) IV Push once  dextrose 50% Injectable 25 Gram(s) IV Push once  fenofibrate Tablet 48 milliGRAM(s) Oral daily  glucagon  Injectable 1 milliGRAM(s) IntraMuscular once  insulin glargine Injectable (LANTUS) 10 Unit(s) SubCutaneous at bedtime  insulin lispro (ADMELOG) corrective regimen sliding scale   SubCutaneous Before meals and at bedtime  insulin lispro Injectable (ADMELOG) 5 Unit(s) SubCutaneous three times a day before meals  levothyroxine 25 MICROGram(s) Oral daily  apixaban 2.5 milliGRAM(s) Oral every 12 hours  dextrose 5%. 1000 milliLiter(s) IV Continuous <Continuous>  dextrose 5%. 1000 milliLiter(s) IV Continuous <Continuous>  lidocaine   4% Patch 1 Patch Transdermal daily      	    [PHYSICAL EXAM:  TELEMETRY:  T(C): 36 (12-26-21 @ 05:10), Max: 37.1 (12-25-21 @ 18:41)  HR: 67 (12-26-21 @ 06:00) (65 - 77)  BP: 122/59 (12-26-21 @ 06:00) (94/63 - 129/79)  RR: 17 (12-26-21 @ 06:00) (16 - 17)  SpO2: 100% (12-26-21 @ 06:00) (93% - 100%)  Wt(kg): --  I&O's Summary    25 Dec 2021 07:01  -  26 Dec 2021 07:00  --------------------------------------------------------  IN: 540 mL / OUT: 810 mL / NET: -270 mL                                         Appearance: Normal	  HEENT:   Normal oral mucosa, PERRL, EOMI	  Neck: Supple, + JVD/ - JVD; Carotid Bruit   Cardiovascular: Normal S1 S2, No JVD, No murmurs,   Respiratory: Lungs clear to auscultation/Decreased Breath Sounds/No Rales, Rhonchi, Wheezing	  Gastrointestinal:  Soft, Non-tender, + BS	  Skin: No rashes, No ecchymoses, No cyanosis  Extremities: Normal range of motion, No clubbing, cyanosis or edema  Vascular: Peripheral pulses palpable 2+ bilaterally  Neurologic: Non-focal  Psychiatry: A & O x 3, Mood & affect appropriate                                9.3    9.72  )-----------( 178      ( 26 Dec 2021 06:28 )             30.4     12-26    137  |  97  |  52<H>  ----------------------------<  108<H>  5.0   |  28  |  4.97<H>    Ca    8.9      26 Dec 2021 06:28  Mg     1.9     12-26    TPro  6.6  /  Alb  3.1<L>  /  TBili  0.3  /  DBili  x   /  AST  17  /  ALT  8<L>  /  AlkPhos  56  12-26   Interventional Cardiology PA Adult Progress Note    Subjective Assessment:  Patient seen and examined at bedside, offering no complaints feeling generally well.   	  MEDICATIONS:  metoprolol succinate  milliGRAM(s) Oral daily  sacubitril 97 mG/valsartan 103 mG 1 Tablet(s) Oral two times a day  ALBUTerol   0.5% 2.5 milliGRAM(s) Nebulizer every 6 hours PRN  benzonatate 100 milliGRAM(s) Oral every 8 hours PRN  budesonide  80 MICROgram(s)/formoterol 4.5 MICROgram(s) Inhaler 2 Puff(s) Inhalation two times a day  guaiFENesin Oral Liquid (Sugar-Free) 100 milliGRAM(s) Oral every 6 hours PRN  acetaminophen     Tablet .. 650 milliGRAM(s) Oral every 6 hours PRN  melatonin 3 milliGRAM(s) Oral at bedtime  methadone    Tablet 10 milliGRAM(s) Oral three times a day  methadone    Tablet 5 milliGRAM(s) Oral every 6 hours PRN  pantoprazole    Tablet 40 milliGRAM(s) Oral before breakfast  polyethylene glycol 3350 17 Gram(s) Oral daily PRN  senna 2 Tablet(s) Oral at bedtime  atorvastatin 80 milliGRAM(s) Oral at bedtime  dextrose 40% Gel 15 Gram(s) Oral once  dextrose 50% Injectable 25 Gram(s) IV Push once  dextrose 50% Injectable 12.5 Gram(s) IV Push once  dextrose 50% Injectable 25 Gram(s) IV Push once  fenofibrate Tablet 48 milliGRAM(s) Oral daily  glucagon  Injectable 1 milliGRAM(s) IntraMuscular once  insulin glargine Injectable (LANTUS) 10 Unit(s) SubCutaneous at bedtime  insulin lispro (ADMELOG) corrective regimen sliding scale   SubCutaneous Before meals and at bedtime  insulin lispro Injectable (ADMELOG) 5 Unit(s) SubCutaneous three times a day before meals  levothyroxine 25 MICROGram(s) Oral daily  apixaban 2.5 milliGRAM(s) Oral every 12 hours  dextrose 5%. 1000 milliLiter(s) IV Continuous <Continuous>  dextrose 5%. 1000 milliLiter(s) IV Continuous <Continuous>  lidocaine   4% Patch 1 Patch Transdermal daily      	    [PHYSICAL EXAM:  TELEMETRY:  T(C): 36 (12-26-21 @ 05:10), Max: 37.1 (12-25-21 @ 18:41)  HR: 67 (12-26-21 @ 06:00) (65 - 77)  BP: 122/59 (12-26-21 @ 06:00) (94/63 - 129/79)  RR: 17 (12-26-21 @ 06:00) (16 - 17)  SpO2: 100% (12-26-21 @ 06:00) (93% - 100%)  Wt(kg): --  I&O's Summary    25 Dec 2021 07:01  -  26 Dec 2021 07:00  --------------------------------------------------------  IN: 540 mL / OUT: 810 mL / NET: -270 mL                                         Appearance: Normal		  Cardiovascular: Normal S1 S2,  Respiratory: Lungs clear to auscultation No Rales, Rhonchi, Wheezing	  Gastrointestinal:  Soft, Non-tender, + BS	  Skin: No rashes, No ecchymoses, No cyanosis  Extremities: No Edema, dressing over right foot C/D/I   Vascular: Peripheral pulses palpable 2+ bilaterally  Neurologic: Non-focal  Psychiatry: A & O x 3, Mood & affect appropriate                                9.3    9.72  )-----------( 178      ( 26 Dec 2021 06:28 )             30.4     12-26    137  |  97  |  52<H>  ----------------------------<  108<H>  5.0   |  28  |  4.97<H>    Ca    8.9      26 Dec 2021 06:28  Mg     1.9     12-26    TPro  6.6  /  Alb  3.1<L>  /  TBili  0.3  /  DBili  x   /  AST  17  /  ALT  8<L>  /  AlkPhos  56  12-26

## 2021-12-26 NOTE — PROGRESS NOTE ADULT - ASSESSMENT
62 yo obese F, current smoker, with a PMH of HTN, HLD, DM-II, CKD Stage 4 (Cr ~3.9), Hypothyroidism, known NICM with EF 25% (s/p AICD for primary prevention), MM, COPD (3L home O2), recurrent DVTs (on Eliquis) admitted for acute on chronic CHF exacerbation - now euvolemic with minimal urine output on diuretict and worsening RITCHIE on CKD for which renal is following.  Found to be retaining urine and medina placed and urology consulted. Neuro/psych following for drowsiness/upper extremity jerking, now s/p HD catheter placement with IR and HD sessions on 12/23 and 12/24 next session 12/26, discharge pending HD center placement and PT recommending [facility].

## 2021-12-26 NOTE — PROGRESS NOTE ADULT - SUBJECTIVE AND OBJECTIVE BOX
Patient is a 61y Female seen and evaluated at bedside.   for hemodialysis today   feels well today   denies CP SOB     Meds:    acetaminophen     Tablet .. 650 every 6 hours PRN  ALBUTerol   0.5% 2.5 every 6 hours PRN  apixaban 2.5 every 12 hours  atorvastatin 80 at bedtime  benzonatate 100 every 8 hours PRN  budesonide  80 MICROgram(s)/formoterol 4.5 MICROgram(s) Inhaler 2 two times a day  dextrose 40% Gel 15 once  dextrose 5%. 1000 <Continuous>  dextrose 5%. 1000 <Continuous>  dextrose 50% Injectable 25 once  dextrose 50% Injectable 12.5 once  dextrose 50% Injectable 25 once  fenofibrate Tablet 48 daily  glucagon  Injectable 1 once  guaiFENesin Oral Liquid (Sugar-Free) 100 every 6 hours PRN  insulin glargine Injectable (LANTUS) 10 at bedtime  insulin lispro (ADMELOG) corrective regimen sliding scale  Before meals and at bedtime  insulin lispro Injectable (ADMELOG) 5 three times a day before meals  levothyroxine 25 daily  lidocaine   4% Patch 1 daily  melatonin 3 at bedtime  methadone    Tablet 10 three times a day  methadone    Tablet 5 every 6 hours PRN  metoprolol succinate  daily  pantoprazole    Tablet 40 before breakfast  polyethylene glycol 3350 17 daily PRN  sacubitril 97 mG/valsartan 103 mG 1 two times a day  senna 2 at bedtime  sevelamer carbonate 800 three times a day      T(C): , Max: 37.1 (12-25-21 @ 18:41)  T(F): , Max: 98.8 (12-25-21 @ 18:41)  HR: 71 (12-26-21 @ 10:30)  BP: 110/55 (12-26-21 @ 10:30)  BP(mean): 73 (12-26-21 @ 00:00)  RR: 18 (12-26-21 @ 10:30)  SpO2: 98% (12-26-21 @ 10:30)  Wt(kg): --    12-25 @ 07:01  -  12-26 @ 07:00  --------------------------------------------------------  IN: 540 mL / OUT: 810 mL / NET: -270 mL    12-26 @ 07:01  -  12-26 @ 11:46  --------------------------------------------------------  IN: 240 mL / OUT: 200 mL / NET: 40 mL            Review of Systems:  all other ROS negative     PHYSICAL EXAM:  GENERAL: tearful;   CHEST/LUNG: decreased breath sounds at the bases  HEART: normal S1S2, RRR  ABDOMEN: Soft, Nontender  EXTREMITIES: trace edema        LABS:                        9.3    9.72  )-----------( 178      ( 26 Dec 2021 06:28 )             30.4     12-26    137  |  97  |  52<H>  ----------------------------<  108<H>  5.0   |  28  |  4.97<H>    Ca    8.9      26 Dec 2021 06:28  Mg     1.9     12-26    TPro  6.6  /  Alb  3.1<L>  /  TBili  0.3  /  DBili  x   /  AST  17  /  ALT  8<L>  /  AlkPhos  56  12-26                RADIOLOGY & ADDITIONAL STUDIES:

## 2021-12-26 NOTE — PROCEDURE NOTE - NSICDXPROCEDURE_GEN_ALL_CORE_FT
PROCEDURES:  Insertion, needle, vein 15-Dec-2021 19:35:49  Sameera Interiano  US guided vascular access 15-Dec-2021 19:35:55  Sameera Interiano  
PROCEDURES:  Insertion, needle, vein 26-Dec-2021 17:29:32  Florence Cabezas  Ultrasound guidance for vascular access 26-Dec-2021 17:29:37  Florence Cabezas

## 2021-12-27 LAB
ALBUMIN SERPL ELPH-MCNC: 3 G/DL — LOW (ref 3.3–5)
ALP SERPL-CCNC: 53 U/L — SIGNIFICANT CHANGE UP (ref 40–120)
ALT FLD-CCNC: 8 U/L — LOW (ref 10–45)
ANION GAP SERPL CALC-SCNC: 9 MMOL/L — SIGNIFICANT CHANGE UP (ref 5–17)
AST SERPL-CCNC: 18 U/L — SIGNIFICANT CHANGE UP (ref 10–40)
BASOPHILS # BLD AUTO: 0.04 K/UL — SIGNIFICANT CHANGE UP (ref 0–0.2)
BASOPHILS NFR BLD AUTO: 0.5 % — SIGNIFICANT CHANGE UP (ref 0–2)
BILIRUB SERPL-MCNC: 0.4 MG/DL — SIGNIFICANT CHANGE UP (ref 0.2–1.2)
BUN SERPL-MCNC: 26 MG/DL — HIGH (ref 7–23)
CALCIUM SERPL-MCNC: 8.9 MG/DL — SIGNIFICANT CHANGE UP (ref 8.4–10.5)
CHLORIDE SERPL-SCNC: 99 MMOL/L — SIGNIFICANT CHANGE UP (ref 96–108)
CO2 SERPL-SCNC: 28 MMOL/L — SIGNIFICANT CHANGE UP (ref 22–31)
CREAT SERPL-MCNC: 3.4 MG/DL — HIGH (ref 0.5–1.3)
EOSINOPHIL # BLD AUTO: 0.22 K/UL — SIGNIFICANT CHANGE UP (ref 0–0.5)
EOSINOPHIL NFR BLD AUTO: 2.8 % — SIGNIFICANT CHANGE UP (ref 0–6)
GLUCOSE BLDC GLUCOMTR-MCNC: 136 MG/DL — HIGH (ref 70–99)
GLUCOSE BLDC GLUCOMTR-MCNC: 147 MG/DL — HIGH (ref 70–99)
GLUCOSE BLDC GLUCOMTR-MCNC: 164 MG/DL — HIGH (ref 70–99)
GLUCOSE BLDC GLUCOMTR-MCNC: 206 MG/DL — HIGH (ref 70–99)
GLUCOSE SERPL-MCNC: 149 MG/DL — HIGH (ref 70–99)
HCT VFR BLD CALC: 30.1 % — LOW (ref 34.5–45)
HGB BLD-MCNC: 9.2 G/DL — LOW (ref 11.5–15.5)
IMM GRANULOCYTES NFR BLD AUTO: 0.3 % — SIGNIFICANT CHANGE UP (ref 0–1.5)
LYMPHOCYTES # BLD AUTO: 1.06 K/UL — SIGNIFICANT CHANGE UP (ref 1–3.3)
LYMPHOCYTES # BLD AUTO: 13.3 % — SIGNIFICANT CHANGE UP (ref 13–44)
MAGNESIUM SERPL-MCNC: 1.8 MG/DL — SIGNIFICANT CHANGE UP (ref 1.6–2.6)
MCHC RBC-ENTMCNC: 29.2 PG — SIGNIFICANT CHANGE UP (ref 27–34)
MCHC RBC-ENTMCNC: 30.6 GM/DL — LOW (ref 32–36)
MCV RBC AUTO: 95.6 FL — SIGNIFICANT CHANGE UP (ref 80–100)
MONOCYTES # BLD AUTO: 0.97 K/UL — HIGH (ref 0–0.9)
MONOCYTES NFR BLD AUTO: 12.2 % — SIGNIFICANT CHANGE UP (ref 2–14)
NEUTROPHILS # BLD AUTO: 5.65 K/UL — SIGNIFICANT CHANGE UP (ref 1.8–7.4)
NEUTROPHILS NFR BLD AUTO: 70.9 % — SIGNIFICANT CHANGE UP (ref 43–77)
NRBC # BLD: 0 /100 WBCS — SIGNIFICANT CHANGE UP (ref 0–0)
PHOSPHATE SERPL-MCNC: 4.7 MG/DL — HIGH (ref 2.5–4.5)
PLATELET # BLD AUTO: 150 K/UL — SIGNIFICANT CHANGE UP (ref 150–400)
POTASSIUM SERPL-MCNC: 4.4 MMOL/L — SIGNIFICANT CHANGE UP (ref 3.5–5.3)
POTASSIUM SERPL-SCNC: 4.4 MMOL/L — SIGNIFICANT CHANGE UP (ref 3.5–5.3)
PROT SERPL-MCNC: 6.3 G/DL — SIGNIFICANT CHANGE UP (ref 6–8.3)
RBC # BLD: 3.15 M/UL — LOW (ref 3.8–5.2)
RBC # FLD: 14.2 % — SIGNIFICANT CHANGE UP (ref 10.3–14.5)
SARS-COV-2 RNA SPEC QL NAA+PROBE: SIGNIFICANT CHANGE UP
SODIUM SERPL-SCNC: 136 MMOL/L — SIGNIFICANT CHANGE UP (ref 135–145)
WBC # BLD: 7.96 K/UL — SIGNIFICANT CHANGE UP (ref 3.8–10.5)
WBC # FLD AUTO: 7.96 K/UL — SIGNIFICANT CHANGE UP (ref 3.8–10.5)

## 2021-12-27 PROCEDURE — 99232 SBSQ HOSP IP/OBS MODERATE 35: CPT

## 2021-12-27 PROCEDURE — 99233 SBSQ HOSP IP/OBS HIGH 50: CPT

## 2021-12-27 RX ORDER — DEXAMETHASONE 0.5 MG/5ML
20 ELIXIR ORAL
Refills: 0 | Status: DISCONTINUED | OUTPATIENT
Start: 2021-12-27 | End: 2021-12-29

## 2021-12-27 RX ORDER — INSULIN LISPRO 100/ML
3 VIAL (ML) SUBCUTANEOUS
Refills: 0 | Status: DISCONTINUED | OUTPATIENT
Start: 2021-12-27 | End: 2021-12-29

## 2021-12-27 RX ADMIN — INSULIN GLARGINE 10 UNIT(S): 100 INJECTION, SOLUTION SUBCUTANEOUS at 21:46

## 2021-12-27 RX ADMIN — BUDESONIDE AND FORMOTEROL FUMARATE DIHYDRATE 2 PUFF(S): 160; 4.5 AEROSOL RESPIRATORY (INHALATION) at 00:27

## 2021-12-27 RX ADMIN — LIDOCAINE 1 PATCH: 4 CREAM TOPICAL at 06:22

## 2021-12-27 RX ADMIN — APIXABAN 2.5 MILLIGRAM(S): 2.5 TABLET, FILM COATED ORAL at 21:08

## 2021-12-27 RX ADMIN — Medication 4: at 11:57

## 2021-12-27 RX ADMIN — SACUBITRIL AND VALSARTAN 1 TABLET(S): 24; 26 TABLET, FILM COATED ORAL at 00:28

## 2021-12-27 RX ADMIN — Medication 25 MICROGRAM(S): at 05:29

## 2021-12-27 RX ADMIN — SACUBITRIL AND VALSARTAN 1 TABLET(S): 24; 26 TABLET, FILM COATED ORAL at 21:39

## 2021-12-27 RX ADMIN — SEVELAMER CARBONATE 800 MILLIGRAM(S): 2400 POWDER, FOR SUSPENSION ORAL at 05:29

## 2021-12-27 RX ADMIN — Medication 48 MILLIGRAM(S): at 11:56

## 2021-12-27 RX ADMIN — BUDESONIDE AND FORMOTEROL FUMARATE DIHYDRATE 2 PUFF(S): 160; 4.5 AEROSOL RESPIRATORY (INHALATION) at 09:29

## 2021-12-27 RX ADMIN — METHADONE HYDROCHLORIDE 10 MILLIGRAM(S): 40 TABLET ORAL at 13:55

## 2021-12-27 RX ADMIN — APIXABAN 2.5 MILLIGRAM(S): 2.5 TABLET, FILM COATED ORAL at 00:32

## 2021-12-27 RX ADMIN — SEVELAMER CARBONATE 800 MILLIGRAM(S): 2400 POWDER, FOR SUSPENSION ORAL at 13:55

## 2021-12-27 RX ADMIN — SENNA PLUS 2 TABLET(S): 8.6 TABLET ORAL at 21:08

## 2021-12-27 RX ADMIN — SEVELAMER CARBONATE 800 MILLIGRAM(S): 2400 POWDER, FOR SUSPENSION ORAL at 21:40

## 2021-12-27 RX ADMIN — Medication 2: at 21:46

## 2021-12-27 RX ADMIN — BUDESONIDE AND FORMOTEROL FUMARATE DIHYDRATE 2 PUFF(S): 160; 4.5 AEROSOL RESPIRATORY (INHALATION) at 22:29

## 2021-12-27 RX ADMIN — APIXABAN 2.5 MILLIGRAM(S): 2.5 TABLET, FILM COATED ORAL at 09:29

## 2021-12-27 RX ADMIN — METHADONE HYDROCHLORIDE 10 MILLIGRAM(S): 40 TABLET ORAL at 06:43

## 2021-12-27 RX ADMIN — LIDOCAINE 1 PATCH: 4 CREAM TOPICAL at 00:27

## 2021-12-27 RX ADMIN — METHADONE HYDROCHLORIDE 10 MILLIGRAM(S): 40 TABLET ORAL at 21:08

## 2021-12-27 RX ADMIN — ATORVASTATIN CALCIUM 80 MILLIGRAM(S): 80 TABLET, FILM COATED ORAL at 00:32

## 2021-12-27 RX ADMIN — Medication 100 MILLIGRAM(S): at 05:32

## 2021-12-27 RX ADMIN — Medication 3 MILLIGRAM(S): at 21:11

## 2021-12-27 RX ADMIN — PANTOPRAZOLE SODIUM 40 MILLIGRAM(S): 20 TABLET, DELAYED RELEASE ORAL at 06:44

## 2021-12-27 RX ADMIN — ATORVASTATIN CALCIUM 80 MILLIGRAM(S): 80 TABLET, FILM COATED ORAL at 21:08

## 2021-12-27 RX ADMIN — Medication 3 UNIT(S): at 16:44

## 2021-12-27 RX ADMIN — SACUBITRIL AND VALSARTAN 1 TABLET(S): 24; 26 TABLET, FILM COATED ORAL at 09:29

## 2021-12-27 NOTE — PROGRESS NOTE ADULT - PROBLEM SELECTOR PLAN 2
Cr previously stable at baseline 3.9 RITCHIE, however on this admission Cr uptrended to 5.6-> now progressed to ESRD  - Retroperitoneal US 12/18: Mild bilateral hydronephrosis  - s/p tunnel catheter placement 12/23   - Tunneled dialysis catheter placed 12/23, s/p HD sessions (12/23, 12/24, and 12/26)  - next HD planned for 12/27  - QuantiFeron 12/20 negative  - Hepatitis panel 12/20 negative  - SW arranging outpatient dialysis center- requests sent on 12/27 Cr previously stable at baseline 3.9 RITCHIE, however on this admission Cr uptrended to 5.6-> now progressed to ESRD with +asterixis (resolved after HD initiation)  - Retroperitoneal US 12/18: Mild bilateral hydronephrosis  - s/p tunnel catheter placement 12/23   - Tunneled dialysis catheter placed 12/23, s/p HD sessions (12/23, 12/24, and 12/26)  - next HD planned for 12/27  - QuantiFeron 12/20 negative  - Hepatitis panel 12/20 negative  - SW arranging outpatient dialysis center- requests sent on 12/27

## 2021-12-27 NOTE — PROGRESS NOTE ADULT - TIME BILLING
Discussed with Cardiology PA
As above; Coordination of care with primary team, discussed diuretics  This excludes any time spent on separate procedures or teaching.
As above; Coordination of care with primary team, discussed RITCHIE, fluid status  This excludes any time spent on separate procedures or teaching.
Discussed with Cardiology PA and with pt
Patient seen and examined with house-staff during bedside rounds.  Resident note read, including vitals, physical findings, laboratory data, and radiological reports.   Revisions included below.  Direct personal management at bed side and extensive interpretation of the data.  Plan was outlined and discussed in details with the housestaff.  Decision making of high complexity  Action taken for acute disease activity to reflect the level of care provided:  - medication reconciliation  - review laboratory data  Continue diuresis.  Continue Symbicort.  BiPAP at night.  Patient pulmonary status is stable and no indication for escalation.  Patient had a exacerbation this year and a candidate for escalation of her regimen to either Roflumilast or azithromycin.  Will evaluate after diuresis
Patient seen and examined with house-staff during bedside rounds.  Resident note read, including vitals, physical findings, laboratory data, and radiological reports.   Revisions included below.  Direct personal management at bed side and extensive interpretation of the data.  Plan was outlined and discussed in details with the housestaff.  Decision making of high complexity  Action taken for acute disease activity to reflect the level of care provided:  - medication reconciliation  - review laboratory data  Patient clinically improving with diuresis.  Continue current management.  Patient will require PFT as an outpatient.  No indication for escalation of therapy.  CPAP at night
Patient seen and examined with house-staff during bedside rounds.  Resident note read, including vitals, physical findings, laboratory data, and radiological reports.   Revisions included below.  Direct personal management at bed side and extensive interpretation of the data.  Plan was outlined and discussed in details with the housestaff.  Decision making of high complexity  Action taken for acute disease activity to reflect the level of care provided:  - medication reconciliation  - review laboratory data
as noted above
as above
as above
as noted above
as noted above

## 2021-12-27 NOTE — PROGRESS NOTE ADULT - SUBJECTIVE AND OBJECTIVE BOX
Patient is a 61y Female seen and evaluated at bedside. no acute events overnight; patient has had 3 HD sessions; states that she is feeling better; tremors have much improved and patient is much more awake and alert this AM; remains hemodynamically stable      Meds:    acetaminophen     Tablet .. 650 every 6 hours PRN  ALBUTerol   0.5% 2.5 every 6 hours PRN  apixaban 2.5 every 12 hours  atorvastatin 80 at bedtime  benzonatate 100 every 8 hours PRN  budesonide  80 MICROgram(s)/formoterol 4.5 MICROgram(s) Inhaler 2 two times a day  dextrose 40% Gel 15 once  dextrose 5%. 1000 <Continuous>  dextrose 5%. 1000 <Continuous>  dextrose 50% Injectable 25 once  dextrose 50% Injectable 12.5 once  dextrose 50% Injectable 25 once  fenofibrate Tablet 48 daily  glucagon  Injectable 1 once  guaiFENesin Oral Liquid (Sugar-Free) 100 every 6 hours PRN  insulin glargine Injectable (LANTUS) 10 at bedtime  insulin lispro (ADMELOG) corrective regimen sliding scale  Before meals and at bedtime  levothyroxine 25 daily  lidocaine   4% Patch 1 daily  melatonin 3 at bedtime  methadone    Tablet 10 three times a day  methadone    Tablet 5 every 6 hours PRN  metoprolol succinate  daily  pantoprazole    Tablet 40 before breakfast  polyethylene glycol 3350 17 daily PRN  sacubitril 97 mG/valsartan 103 mG 1 two times a day  senna 2 at bedtime  sevelamer carbonate 800 three times a day      T(C): , Max: 36.8 (12-26-21 @ 09:55)  T(F): , Max: 98.2 (12-26-21 @ 09:55)  HR: 63 (12-27-21 @ 08:30)  BP: 133/63 (12-27-21 @ 08:30)  BP(mean): 90 (12-27-21 @ 08:30)  RR: 17 (12-27-21 @ 08:30)  SpO2: 98% (12-27-21 @ 08:30)  Wt(kg): --    12-26 @ 07:01  -  12-27 @ 07:00  --------------------------------------------------------  IN: 1220 mL / OUT: 1650 mL / NET: -430 mL          Review of Systems:  all other ROS negative as per HPI       PHYSICAL EXAM:  GENERAL: well-developed, well nourished, alert, no acute distress at present on 2L NC  CHEST/LUNG: decreased breath sounds at the bases;   HEART: normal S1S2, RRR  ABDOMEN: Soft, Nontender, +BS,   EXTREMITIES: No clubbing, cyanosis, or edema   NEUROLOGY: no asterixis present   ACCESS: +TDC      LABS:                        9.2    7.96  )-----------( 150      ( 27 Dec 2021 08:03 )             30.1     12-27    136  |  99  |  26<H>  ----------------------------<  149<H>  4.4   |  28  |  3.40<H>    Ca    8.9      27 Dec 2021 08:03  Mg     1.8     12-27    TPro  6.3  /  Alb  3.0<L>  /  TBili  0.4  /  DBili  x   /  AST  18  /  ALT  8<L>  /  AlkPhos  53  12-27                RADIOLOGY & ADDITIONAL STUDIES:

## 2021-12-27 NOTE — PROGRESS NOTE ADULT - SUBJECTIVE AND OBJECTIVE BOX
HEALTH ISSUES - PROBLEM Dx:  Hemoptysis    Acute on chronic systolic congestive heart failure    Anemia    Multiple myeloma    Stage 4 chronic kidney disease    Diabetes    IgG myeloma    Chronic obstructive pulmonary disease (COPD)    RUE weakness    MDD (major depressive disorder)    Urinary retention            CHEMOTHERAPY REGIMEN:        Day:                          Diet:  Protocol:                                    IVF:      MEDICATIONS  (STANDING):  apixaban 2.5 milliGRAM(s) Oral every 12 hours  atorvastatin 80 milliGRAM(s) Oral at bedtime  budesonide  80 MICROgram(s)/formoterol 4.5 MICROgram(s) Inhaler 2 Puff(s) Inhalation two times a day  dextrose 40% Gel 15 Gram(s) Oral once  dextrose 5%. 1000 milliLiter(s) (50 mL/Hr) IV Continuous <Continuous>  dextrose 5%. 1000 milliLiter(s) (100 mL/Hr) IV Continuous <Continuous>  dextrose 50% Injectable 25 Gram(s) IV Push once  dextrose 50% Injectable 12.5 Gram(s) IV Push once  dextrose 50% Injectable 25 Gram(s) IV Push once  fenofibrate Tablet 48 milliGRAM(s) Oral daily  glucagon  Injectable 1 milliGRAM(s) IntraMuscular once  insulin glargine Injectable (LANTUS) 10 Unit(s) SubCutaneous at bedtime  insulin lispro (ADMELOG) corrective regimen sliding scale   SubCutaneous Before meals and at bedtime  levothyroxine 25 MICROGram(s) Oral daily  lidocaine   4% Patch 1 Patch Transdermal daily  melatonin 3 milliGRAM(s) Oral at bedtime  methadone    Tablet 10 milliGRAM(s) Oral three times a day  metoprolol succinate  milliGRAM(s) Oral daily  pantoprazole    Tablet 40 milliGRAM(s) Oral before breakfast  sacubitril 97 mG/valsartan 103 mG 1 Tablet(s) Oral two times a day  senna 2 Tablet(s) Oral at bedtime  sevelamer carbonate 800 milliGRAM(s) Oral three times a day    MEDICATIONS  (PRN):  acetaminophen     Tablet .. 650 milliGRAM(s) Oral every 6 hours PRN Mild Pain (1 - 3), Moderate Pain (4 - 6)  ALBUTerol   0.5% 2.5 milliGRAM(s) Nebulizer every 6 hours PRN Shortness of Breath and/or Wheezing  benzonatate 100 milliGRAM(s) Oral every 8 hours PRN Cough  guaiFENesin Oral Liquid (Sugar-Free) 100 milliGRAM(s) Oral every 6 hours PRN Cough  methadone    Tablet 5 milliGRAM(s) Oral every 6 hours PRN Moderate Pain (4 - 6)  polyethylene glycol 3350 17 Gram(s) Oral daily PRN Constipation      Allergies    aspirin (Other (Moderate); Rash)  oral contrast (Unknown)    Intolerances        DVT Prophylaxis: [ ] YES [ ] NO      Antibiotics: [ ] YES [ ] NO    Pain Scale (1-10):       Location:    Vital Signs Last 24 Hrs  T(C): 36.5 (27 Dec 2021 10:54), Max: 36.5 (27 Dec 2021 10:54)  T(F): 97.7 (27 Dec 2021 10:54), Max: 97.7 (27 Dec 2021 10:54)  HR: 70 (27 Dec 2021 12:10) (50 - 74)  BP: 118/56 (27 Dec 2021 12:10) (97/49 - 169/83)  BP(mean): 81 (27 Dec 2021 12:10) (81 - 90)  RR: 17 (27 Dec 2021 12:10) (16 - 18)  SpO2: 97% (27 Dec 2021 12:10) (95% - 100%)    Drug Dosing Weight  Height (cm): 165.1 (14 Dec 2021 15:24)  Weight (kg): 97.5 (14 Dec 2021 15:24)  BMI (kg/m2): 35.8 (14 Dec 2021 15:24)  BSA (m2): 2.04 (14 Dec 2021 15:24)     Physical Exam:  · Constitutional	detailed exam  · Constitutional Details	well-developed; well-groomed  · Eyes	EOMI; PERRL; no drainage or redness  · ENMT Comments	dry mucous membranes  · Respiratory	detailed exam  · Respiratory Comments	normal breath sounds at the lung bases bilaterally  · Cardiovascular	Regular rate & rhythm, normal S1, S2; no murmurs, gallops or rubs; no S3, S4  · Abd-Soft non tender  ·Ext-no edema, clubbing or cyanosis    URINARY CATHETER: [ ] YES [ ] NO     LABS:  CBC Full  -  ( 27 Dec 2021 08:03 )  WBC Count : 7.96 K/uL  RBC Count : 3.15 M/uL  Hemoglobin : 9.2 g/dL  Hematocrit : 30.1 %  Platelet Count - Automated : 150 K/uL  Mean Cell Volume : 95.6 fl  Mean Cell Hemoglobin : 29.2 pg  Mean Cell Hemoglobin Concentration : 30.6 gm/dL  Auto Neutrophil # : 5.65 K/uL  Auto Lymphocyte # : 1.06 K/uL  Auto Monocyte # : 0.97 K/uL  Auto Eosinophil # : 0.22 K/uL  Auto Basophil # : 0.04 K/uL  Auto Neutrophil % : 70.9 %  Auto Lymphocyte % : 13.3 %  Auto Monocyte % : 12.2 %  Auto Eosinophil % : 2.8 %  Auto Basophil % : 0.5 %    12-27    136  |  99  |  26<H>  ----------------------------<  149<H>  4.4   |  28  |  3.40<H>    Ca    8.9      27 Dec 2021 08:03  Phos  4.7     12-27  Mg     1.8     12-27    TPro  6.3  /  Alb  3.0<L>  /  TBili  0.4  /  DBili  x   /  AST  18  /  ALT  8<L>  /  AlkPhos  53  12-27          CULTURES:    RADIOLOGY & ADDITIONAL STUDIES:

## 2021-12-27 NOTE — PROGRESS NOTE ADULT - PROBLEM SELECTOR PLAN 8
Hgb A1c: 8.4 (prior admission)  - CONT: Lantus 20 units QD, Lispro 10 units before meals w/ mISS    PT: needs reeval due to fall on 12/22  DVT:  Eliquis 2.5 BID  Dispo: Pending HD set up   Case d/w Dr. Salinas
Hgb A1c: 8.4 (prior admission)  - CONT: Lantus 20 units QD, Lispro 10 units before meals w/ mISS    PT: Home with home PT  DVT:  Eliquis 2.5 BID  Dispo: monitor renal function and urinary retention.   Case d/w Dr. Hardy
Hgb A1c: 8.4 (prior admission)  - pt noted to be hypoglycemic to 40s 12/27 ~3AM (symptomatic with AMS), received dextrose with improvement  - adjusting insulin regimen as indicated  - lantus 10 units QHS, lispro 3 units AC TID  - follows with Endo Dr. Pimentel    PT: pt ambulating well  DVT:  Eliquis 2.5 BID  Dispo: Pending HD set up   Case d/w Dr. Salinas
Hgb A1c: 8.4 (prior admission)  - CONT: Lantus 20 units QD, Lispro 10 units before meals w/ mISS    PT: Home with home PT  DVT:  Eliquis 2.5 BID  Dispo: monitor renal function and urinary retention.   Case d/w Dr. Hardy
Hgb A1c: 8.4 (prior admission)  - CONT: Lantus 20 units QD, Lispro 10 units before meals w/ mISS    PT: needs reeval due to fall on 12/22  DVT:  Eliquis 2.5 BID  Dispo: Pending HD set up   Case d/w Dr. Salinas
Hgb A1c: 8.4 (prior admission)  - CONT: Lantus 20 units QD, Lispro 10 units before meals w/ mISS    PT: needs reeval due to fall on 12/22  DVT:  Eliquis 2.5 BID  Dispo: Pending HD set up   Case d/w Dr. Salinas
Hgb A1c: 8.4 (prior admission)  - CONT: Lantus 20 units QD, Lispro 10 units before meals w/ mISS    PT: Home with home PT  DVT:  Eliquis 2.5 BID  Dispo: monitor renal function and urinary retention.   Case d/w Dr. Hardy

## 2021-12-27 NOTE — PROVIDER CONTACT NOTE (HYPOGLYCEMIA EVENT) - NS PROVIDER CONTACT RECOMMEND-HYPO
PA to bedside to assess pt. Follow hypoglycemic protocol. Repeat f/s, monitor neuro status, Hold nighttime Lantus, methadone.

## 2021-12-27 NOTE — PROGRESS NOTE ADULT - PROBLEM SELECTOR PLAN 7
Followed by Dr. Ruiz, was on Dexamethasone and Ninlaro weekly (Monday)  -Holding Ninlaro due to bullae on right foot, per Dr. Ruiz it is unlikely the cause of the bullae but can be held at this time.   - ordered dexamethasone 20mg PO every Tuesday  - Right foot bullae x2. No signs of infection. Continue dry dressing with RN

## 2021-12-27 NOTE — PROGRESS NOTE ADULT - ASSESSMENT
62 yo obese Female CKD (baseline around 3.7-4) CHF, DM COPD (on 3L home o2) multiple myeloma presented to the hospital with complaints of hemoptysis and shortness of breath x 4 days;     Assessment/Plan:     #CKD Cr increasing   worsening uop and rising Cr + asterixis  TDC placed and HD initiated (12/23)  hemodialysis #2 12/24   hemodialysis #3 12/26   likely to have HD tomorrow     strict i's and o's  daily weights  daily BMP w/ phos   SW planning for outpatient HD   send hepatitis panel and QuantiFeron     BP  hypotensive  no UF w/ HD    Anemia  Hgb not at goal  no EPO given multiple myeloma    BMD  Ca 9  phos 7.4  PTH Vit D noted    Krystle Billy D.O  PGY 4 Nephrology fellow  [phone]

## 2021-12-27 NOTE — PROGRESS NOTE ADULT - PROBLEM SELECTOR PLAN 4
Endorsing RUE weakness/pain and myoclonic movement, neuro exam other wise unremarkable   -CTH non-contrast 12/18: negative for acute changes; RUE US: negative for clot  -Stroke/Neuro consulted: suspicious 2/2 toxic/metabolic causes, particularly CNS acting medications, low suspicion for seizure activities.   -Pain management consulted: Methadone 5 mg PO q6h PRN breakthrough pain, c/w Methadone 10 mg PO TID (confirmed by Istop). Tylenol 650 mg PO q6h PRN moderate pain  -Psychiatry Consulted: Rec discontinuing Amitriptyline 10mg QD, Buspirone 15mg QD, Escitalopram 20mg QD, Trazadone 50mg QD (unclear which of these medications she has actually been taking)  - Neuro consulted on 12/23 for continued jerking movements likely uremia  -CONT: Methadone as above, Melatonin 3mg QD, discontinued gabapentin    ---> Jerking movements significantly improved after first HD session Endorsing RUE weakness/pain and myoclonic movement, neuro exam other wise unremarkable  -consistent with asterixis likely in setting of uremia and improved after 1st HD session  -CTH non-contrast 12/18: negative for acute changes; RUE US: negative for clot  -Stroke/Neuro consulted: suspicious 2/2 toxic/metabolic causes, particularly CNS acting medications, low suspicion for seizure activities.   -Pain management consulted: Methadone 5 mg PO q6h PRN breakthrough pain, c/w Methadone 10 mg PO TID (confirmed by Istop). Tylenol 650 mg PO q6h PRN moderate pain  -Psychiatry Consulted: Rec discontinuing Amitriptyline 10mg QD, Buspirone 15mg QD, Escitalopram 20mg QD, Trazadone 50mg QD (unclear which of these medications she has actually been taking)  -CONT: Methadone as above, Melatonin 3mg QD, discontinued gabapentin

## 2021-12-27 NOTE — PROGRESS NOTE ADULT - PROBLEM SELECTOR PLAN 5
* RESOLVED *   - CT Chest 12/16/21: Mild pulmonary edema and trace right pleural effusion. No PE.  - Pulmonary consulted: hemoptysis is mild, likely due to cough in combination with increased hydrostatic pressure in capillary beds from decompensated heart failure. NTD.  - RVP, COVID and urine legionella negative  - Sputum Cx growing Proteus Mirabilis w/ elevated procalcitonin, no infiltrate on CXR/WBC and afebrile  - s/p CTX 1G q 24hrs x 5 days (last day 12/22). Of note: will also cover UTI  - CONT: cough suppressants    #COPD  - CONT: Symbicort BID and PRN duonebs q6h w/ CPAP  - Wean O2 for goal SpO2 88-92%  - Outpatient follow up with Dr. Nish Payne in 2-3 weeks after discharge.

## 2021-12-27 NOTE — PROGRESS NOTE ADULT - PROBLEM SELECTOR PLAN 6
s/p 1 unit pRBC on 12/15 w/ appropriate response, now stable at 8-9s, no evidence of bleeding  - Maintain active T&S (next due 12/24)  - likey secondary to MM +chronic disease  - no epo given MM as per renal

## 2021-12-27 NOTE — PROVIDER CONTACT NOTE (HYPOGLYCEMIA EVENT) - NS PROVIDER CONTACT ASSESS-HYPO
Pt disoriented, lethargic, slow verbal response, unable to follow commands, drift on b/l upper extremities,  pt diaphoretic. /83, HR 70, SpO2 95% room air, RR 20.

## 2021-12-27 NOTE — PROGRESS NOTE ADULT - PROBLEM SELECTOR PLAN 3
Urinary retention >1 L on 12/21-> [facility] placement   - urology consulted.  UTI vs neurogenic bladder- will be d/c'd with [facility] and follow up for urodynamic studies   -s/p ceftriaxone 5 days course for UTI

## 2021-12-27 NOTE — PROGRESS NOTE ADULT - ASSESSMENT
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

## 2021-12-27 NOTE — PROVIDER CONTACT NOTE (HYPOGLYCEMIA EVENT) - NS PROVIDER CONTACT SITUATION-HYPO
Pt disoriented, lethargic, slow verbal response, unable to follow commands, unable to maintain movement on upper extremities. drift on b/l upper extremities.

## 2021-12-27 NOTE — PROVIDER CONTACT NOTE (HYPOGLYCEMIA EVENT) - NS PROVIDER CONTACT BACKGROUND-HYPO
Age: 61y    Gender: Female  Admitted for CHF exacerbation, now euvolemic. PMH HTN, DM, CKD Stage 4.  s/p HD catheter placement with IR and HD sessions on 12/23 and 12/24, session on 12/26, pt completed 3hours HD tx. During session fluids unable to be removed due to low B/P Patient gained 300 mls of fluid instead of losing.    POCT Blood Glucose:  181 mg/dL (12-26-21 @ 21:45)  42 mg/dL (12-26-21 @ 21:26)  129 mg/dL (12-26-21 @ 17:04)  113 mg/dL (12-26-21 @ 06:50)      eMAR:atorvastatin   80 milliGRAM(s) Oral (12-27-21 @ 00:32)    dextrose 50% Injectable   25 Gram(s) IV Push (12-26-21 @ 21:36)    fenofibrate Tablet   48 milliGRAM(s) Oral (12-26-21 @ 14:13)    insulin lispro Injectable (ADMELOG)   5 Unit(s) SubCutaneous (12-26-21 @ 18:10)   5 Unit(s) SubCutaneous (12-26-21 @ 07:37)    levothyroxine   25 MICROGram(s) Oral (12-26-21 @ 06:43)

## 2021-12-27 NOTE — PROGRESS NOTE ADULT - SUBJECTIVE AND OBJECTIVE BOX
OVERNIGHT EVENTS: per night PA as documented in handoff, "patient appearing lethargic and slightly Alerted, FS 48 and Blood Glucose 48 pt. was treated with dext Amp"    SUBJECTIVE / INTERVAL HPI: Patient seen and examined at bedside. Feels well, denies CP, SOB, dizziness/lightheadedness, palpitations    VITAL SIGNS:  Vital Signs Last 24 Hrs  T(C): 36.5 (27 Dec 2021 10:54), Max: 36.5 (27 Dec 2021 10:54)  T(F): 97.7 (27 Dec 2021 10:54), Max: 97.7 (27 Dec 2021 10:54)  HR: 80 (27 Dec 2021 13:15) (50 - 86)  BP: 146/68 (27 Dec 2021 13:15) (97/49 - 169/83)  BP(mean): 81 (27 Dec 2021 12:10) (81 - 90)  RR: 17 (27 Dec 2021 12:10) (16 - 18)  SpO2: 95% (27 Dec 2021 13:15) (95% - 100%)      I&O's Summary    26 Dec 2021 07:01  -  27 Dec 2021 07:00  --------------------------------------------------------  IN: 1220 mL / OUT: 1650 mL / NET: -430 mL    27 Dec 2021 07:01  -  27 Dec 2021 14:21  --------------------------------------------------------  IN: 480 mL / OUT: 0 mL / NET: 480 mL      PHYSICAL EXAM:  General: sitting up in bed, NAD  HEENT: conjunctiva clear; MMM  Neck: supple, no JVD  Cardiovascular: RRR, no murmurs  Respiratory: CTA B/L  Gastrointestinal: soft, NT/ND, +BS  Extremities: WWP, trace b/l LE edema  Neurological: AAOx3, no focal deficits    MEDICATIONS:  MEDICATIONS  (STANDING):  apixaban 2.5 milliGRAM(s) Oral every 12 hours  atorvastatin 80 milliGRAM(s) Oral at bedtime  budesonide  80 MICROgram(s)/formoterol 4.5 MICROgram(s) Inhaler 2 Puff(s) Inhalation two times a day  dextrose 40% Gel 15 Gram(s) Oral once  dextrose 5%. 1000 milliLiter(s) (50 mL/Hr) IV Continuous <Continuous>  dextrose 5%. 1000 milliLiter(s) (100 mL/Hr) IV Continuous <Continuous>  dextrose 50% Injectable 25 Gram(s) IV Push once  dextrose 50% Injectable 12.5 Gram(s) IV Push once  dextrose 50% Injectable 25 Gram(s) IV Push once  fenofibrate Tablet 48 milliGRAM(s) Oral daily  glucagon  Injectable 1 milliGRAM(s) IntraMuscular once  insulin glargine Injectable (LANTUS) 10 Unit(s) SubCutaneous at bedtime  insulin lispro (ADMELOG) corrective regimen sliding scale   SubCutaneous Before meals and at bedtime  levothyroxine 25 MICROGram(s) Oral daily  lidocaine   4% Patch 1 Patch Transdermal daily  melatonin 3 milliGRAM(s) Oral at bedtime  methadone    Tablet 10 milliGRAM(s) Oral three times a day  metoprolol succinate  milliGRAM(s) Oral daily  pantoprazole    Tablet 40 milliGRAM(s) Oral before breakfast  sacubitril 97 mG/valsartan 103 mG 1 Tablet(s) Oral two times a day  senna 2 Tablet(s) Oral at bedtime  sevelamer carbonate 800 milliGRAM(s) Oral three times a day    MEDICATIONS  (PRN):  acetaminophen     Tablet .. 650 milliGRAM(s) Oral every 6 hours PRN Mild Pain (1 - 3), Moderate Pain (4 - 6)  ALBUTerol   0.5% 2.5 milliGRAM(s) Nebulizer every 6 hours PRN Shortness of Breath and/or Wheezing  benzonatate 100 milliGRAM(s) Oral every 8 hours PRN Cough  guaiFENesin Oral Liquid (Sugar-Free) 100 milliGRAM(s) Oral every 6 hours PRN Cough  methadone    Tablet 5 milliGRAM(s) Oral every 6 hours PRN Moderate Pain (4 - 6)  polyethylene glycol 3350 17 Gram(s) Oral daily PRN Constipation      LABS:                        9.2    7.96  )-----------( 150      ( 27 Dec 2021 08:03 )             30.1       12-27    136  |  99  |  26<H>  ----------------------------<  149<H>  4.4   |  28  |  3.40<H>    Ca    8.9      27 Dec 2021 08:03  Phos  4.7     12-27  Mg     1.8     12-27    TPro  6.3  /  Alb  3.0<L>  /  TBili  0.4  /  DBili  x   /  AST  18  /  ALT  8<L>  /  AlkPhos  53  12-27        TELEMETRY: NSR    RADIOLOGY & ADDITIONAL TESTS: Reviewed.

## 2021-12-27 NOTE — PROVIDER CONTACT NOTE (HYPOGLYCEMIA EVENT) - NS PROVIDER CONTACT NOTE-TREATMENT-HYPO
MATY Robertson evaluated pt at bedside. Initial F/S 42, 4 oz orange juice given, Dextrose 50% 25 mg IV push administered, CBC, Lactate blood, CMP labs drawn, x-ray ordered. Repeat f/s 181. 22:00- 10 mg methadone and 10 units insulin glargine held per PA request./4 oz Fruit Juice (Specify quantity, date/time)/Dextrose 50% 25 Grams IV Push/Other (Specify)

## 2021-12-27 NOTE — PROGRESS NOTE ADULT - PROBLEM SELECTOR PLAN 1
Currently euvolemic, satting 99% on home NC  - Echo 11/29/21: Akinesis of the basal to mid inferoseptum and anteroseptum. Moderate to severe hypokinesis of the remaining regions, mild to moderate MR  - IV lasix/Metolazone d/c'd on 12/20 due to low output and worsening renal fx. holding diuretics now (was on torsemide 40mg PO BID at home)  - CONT: Entresto 97mg/103mg BID, Toprol XL 100mg daily  - No heart failure consult.  No plan for CardioMems interrogation as it does not  readings anymore  - core measures, weight daily, strict I/Os, found to have urinary retention for which [facility] placed  - volume management via HD    #DVT  - Hx of Recurrent DVT (on Eliquis 2.5 BID) on R leg  - RLE US (+) common femoral vein DVT of unspecified chronicity, repeat US w/ similar finding  - Vascular consulted: likely chronic, can d/c heparin gtt and continue home Eliquis 2.5 BID  - CONT: Eliquis 2.5mg BID  (indicated after >6months of treatment)

## 2021-12-27 NOTE — PROGRESS NOTE ADULT - ATTENDING COMMENTS
Initial attending contact date  12/16/21    . See PA note written above for details. I reviewed the PA documentation. I have personally seen and examined this patient. I reviewed vitals, labs, medications, cardiac studies, and additional imaging. I agree with the above PA's findings and plans as written above with the following additions/statements.    61F obese, HTN, HLD, DM-II, Hypothyroidism, CRI, COPD on  home O2 (3L), chronic DVT , fibromyalgia on methadone, known NICM with EF 25% with AICD, MM, CVA x 2 with residual R sided weakness admitted with mild hemoptysis in context of chronic dry cough and acute on chronic anemia  -With suboptimal diuresis throughout hospital course despite increasing IV diuresis  -now s/p IR HD catheter on HD  -With sig improvement in resp status with improved SOB with HD   -Chronic LE DVT known from prior- cont eliquis 2.5 mg bid   -[facility] inserted 12/21 due to likely neurogenic bladder/urinary retention. To be dc'd with [facility] and outpatient urology fu   -Cont toprol 100 mg qd (home dose 200 mg qd), entresto 97/103 bid   -Awaiting auth for [facility]

## 2021-12-27 NOTE — PROGRESS NOTE ADULT - ATTENDING COMMENTS
above rev iewed and pt examined.  since initiation of HD  pt markedly improved with normalization of speech and affect and resolution of asterixis.  discussed with dr deshpande.   agree with above findings and recommendations.

## 2021-12-28 ENCOUNTER — TRANSCRIPTION ENCOUNTER (OUTPATIENT)
Age: 61
End: 2021-12-28

## 2021-12-28 LAB
ANION GAP SERPL CALC-SCNC: 11 MMOL/L — SIGNIFICANT CHANGE UP (ref 5–17)
BUN SERPL-MCNC: 29 MG/DL — HIGH (ref 7–23)
CALCIUM SERPL-MCNC: 8.6 MG/DL — SIGNIFICANT CHANGE UP (ref 8.4–10.5)
CHLORIDE SERPL-SCNC: 100 MMOL/L — SIGNIFICANT CHANGE UP (ref 96–108)
CO2 SERPL-SCNC: 27 MMOL/L — SIGNIFICANT CHANGE UP (ref 22–31)
CREAT SERPL-MCNC: 4.16 MG/DL — HIGH (ref 0.5–1.3)
GLUCOSE BLDC GLUCOMTR-MCNC: 142 MG/DL — HIGH (ref 70–99)
GLUCOSE BLDC GLUCOMTR-MCNC: 175 MG/DL — HIGH (ref 70–99)
GLUCOSE BLDC GLUCOMTR-MCNC: 262 MG/DL — HIGH (ref 70–99)
GLUCOSE BLDC GLUCOMTR-MCNC: 81 MG/DL — SIGNIFICANT CHANGE UP (ref 70–99)
GLUCOSE SERPL-MCNC: 155 MG/DL — HIGH (ref 70–99)
HCT VFR BLD CALC: 30.7 % — LOW (ref 34.5–45)
HGB BLD-MCNC: 9.1 G/DL — LOW (ref 11.5–15.5)
MAGNESIUM SERPL-MCNC: 2 MG/DL — SIGNIFICANT CHANGE UP (ref 1.6–2.6)
MCHC RBC-ENTMCNC: 28.3 PG — SIGNIFICANT CHANGE UP (ref 27–34)
MCHC RBC-ENTMCNC: 29.6 GM/DL — LOW (ref 32–36)
MCV RBC AUTO: 95.6 FL — SIGNIFICANT CHANGE UP (ref 80–100)
NRBC # BLD: 0 /100 WBCS — SIGNIFICANT CHANGE UP (ref 0–0)
PHOSPHATE SERPL-MCNC: 4.7 MG/DL — HIGH (ref 2.5–4.5)
PLATELET # BLD AUTO: 160 K/UL — SIGNIFICANT CHANGE UP (ref 150–400)
POTASSIUM SERPL-MCNC: 4.7 MMOL/L — SIGNIFICANT CHANGE UP (ref 3.5–5.3)
POTASSIUM SERPL-SCNC: 4.7 MMOL/L — SIGNIFICANT CHANGE UP (ref 3.5–5.3)
RBC # BLD: 3.21 M/UL — LOW (ref 3.8–5.2)
RBC # FLD: 14.2 % — SIGNIFICANT CHANGE UP (ref 10.3–14.5)
SARS-COV-2 RNA SPEC QL NAA+PROBE: NEGATIVE — SIGNIFICANT CHANGE UP
SODIUM SERPL-SCNC: 138 MMOL/L — SIGNIFICANT CHANGE UP (ref 135–145)
WBC # BLD: 7.82 K/UL — SIGNIFICANT CHANGE UP (ref 3.8–10.5)
WBC # FLD AUTO: 7.82 K/UL — SIGNIFICANT CHANGE UP (ref 3.8–10.5)

## 2021-12-28 PROCEDURE — 82962 GLUCOSE BLOOD TEST: CPT

## 2021-12-28 PROCEDURE — 87186 SC STD MICRODIL/AGAR DIL: CPT

## 2021-12-28 PROCEDURE — 86704 HEP B CORE ANTIBODY TOTAL: CPT

## 2021-12-28 PROCEDURE — 76937 US GUIDE VASCULAR ACCESS: CPT

## 2021-12-28 PROCEDURE — 86480 TB TEST CELL IMMUN MEASURE: CPT

## 2021-12-28 PROCEDURE — 93971 EXTREMITY STUDY: CPT

## 2021-12-28 PROCEDURE — 86923 COMPATIBILITY TEST ELECTRIC: CPT

## 2021-12-28 PROCEDURE — C1769: CPT

## 2021-12-28 PROCEDURE — 82310 ASSAY OF CALCIUM: CPT

## 2021-12-28 PROCEDURE — 90935 HEMODIALYSIS ONE EVALUATION: CPT

## 2021-12-28 PROCEDURE — 99153 MOD SED SAME PHYS/QHP EA: CPT

## 2021-12-28 PROCEDURE — 80053 COMPREHEN METABOLIC PANEL: CPT

## 2021-12-28 PROCEDURE — 82652 VIT D 1 25-DIHYDROXY: CPT

## 2021-12-28 PROCEDURE — 85610 PROTHROMBIN TIME: CPT

## 2021-12-28 PROCEDURE — 70450 CT HEAD/BRAIN W/O DYE: CPT

## 2021-12-28 PROCEDURE — U0003: CPT

## 2021-12-28 PROCEDURE — 82728 ASSAY OF FERRITIN: CPT

## 2021-12-28 PROCEDURE — 0225U NFCT DS DNA&RNA 21 SARSCOV2: CPT

## 2021-12-28 PROCEDURE — 84165 PROTEIN E-PHORESIS SERUM: CPT

## 2021-12-28 PROCEDURE — 80048 BASIC METABOLIC PNL TOTAL CA: CPT

## 2021-12-28 PROCEDURE — 83880 ASSAY OF NATRIURETIC PEPTIDE: CPT

## 2021-12-28 PROCEDURE — 36558 INSERT TUNNELED CV CATH: CPT

## 2021-12-28 PROCEDURE — 99233 SBSQ HOSP IP/OBS HIGH 50: CPT

## 2021-12-28 PROCEDURE — 83605 ASSAY OF LACTIC ACID: CPT

## 2021-12-28 PROCEDURE — 80076 HEPATIC FUNCTION PANEL: CPT

## 2021-12-28 PROCEDURE — 74019 RADEX ABDOMEN 2 VIEWS: CPT

## 2021-12-28 PROCEDURE — 83970 ASSAY OF PARATHORMONE: CPT

## 2021-12-28 PROCEDURE — 96375 TX/PRO/DX INJ NEW DRUG ADDON: CPT

## 2021-12-28 PROCEDURE — 71250 CT THORAX DX C-: CPT | Mod: MA

## 2021-12-28 PROCEDURE — C1750: CPT

## 2021-12-28 PROCEDURE — 36430 TRANSFUSION BLD/BLD COMPNT: CPT

## 2021-12-28 PROCEDURE — 84484 ASSAY OF TROPONIN QUANT: CPT

## 2021-12-28 PROCEDURE — 86706 HEP B SURFACE ANTIBODY: CPT

## 2021-12-28 PROCEDURE — 83735 ASSAY OF MAGNESIUM: CPT

## 2021-12-28 PROCEDURE — 96374 THER/PROPH/DIAG INJ IV PUSH: CPT

## 2021-12-28 PROCEDURE — 94660 CPAP INITIATION&MGMT: CPT

## 2021-12-28 PROCEDURE — 77001 FLUOROGUIDE FOR VEIN DEVICE: CPT

## 2021-12-28 PROCEDURE — 87635 SARS-COV-2 COVID-19 AMP PRB: CPT

## 2021-12-28 PROCEDURE — 97116 GAIT TRAINING THERAPY: CPT

## 2021-12-28 PROCEDURE — 82306 VITAMIN D 25 HYDROXY: CPT

## 2021-12-28 PROCEDURE — 94640 AIRWAY INHALATION TREATMENT: CPT

## 2021-12-28 PROCEDURE — 86709 HEPATITIS A IGM ANTIBODY: CPT

## 2021-12-28 PROCEDURE — 97110 THERAPEUTIC EXERCISES: CPT

## 2021-12-28 PROCEDURE — 85730 THROMBOPLASTIN TIME PARTIAL: CPT

## 2021-12-28 PROCEDURE — 85027 COMPLETE CBC AUTOMATED: CPT

## 2021-12-28 PROCEDURE — 84145 PROCALCITONIN (PCT): CPT

## 2021-12-28 PROCEDURE — 84466 ASSAY OF TRANSFERRIN: CPT

## 2021-12-28 PROCEDURE — 36415 COLL VENOUS BLD VENIPUNCTURE: CPT

## 2021-12-28 PROCEDURE — 99152 MOD SED SAME PHYS/QHP 5/>YRS: CPT

## 2021-12-28 PROCEDURE — 86901 BLOOD TYPING SEROLOGIC RH(D): CPT

## 2021-12-28 PROCEDURE — 76770 US EXAM ABDO BACK WALL COMP: CPT

## 2021-12-28 PROCEDURE — 83540 ASSAY OF IRON: CPT

## 2021-12-28 PROCEDURE — 83550 IRON BINDING TEST: CPT

## 2021-12-28 PROCEDURE — 86850 RBC ANTIBODY SCREEN: CPT

## 2021-12-28 PROCEDURE — P9016: CPT

## 2021-12-28 PROCEDURE — 93990 DOPPLER FLOW TESTING: CPT

## 2021-12-28 PROCEDURE — 86705 HEP B CORE ANTIBODY IGM: CPT

## 2021-12-28 PROCEDURE — 99285 EMERGENCY DEPT VISIT HI MDM: CPT | Mod: 25

## 2021-12-28 PROCEDURE — 80335 ANTIDEPRESSANT TRICYCLIC 1/2: CPT

## 2021-12-28 PROCEDURE — 71045 X-RAY EXAM CHEST 1 VIEW: CPT

## 2021-12-28 PROCEDURE — 87449 NOS EACH ORGANISM AG IA: CPT

## 2021-12-28 PROCEDURE — 86900 BLOOD TYPING SEROLOGIC ABO: CPT

## 2021-12-28 PROCEDURE — 93005 ELECTROCARDIOGRAM TRACING: CPT

## 2021-12-28 PROCEDURE — U0005: CPT

## 2021-12-28 PROCEDURE — 86803 HEPATITIS C AB TEST: CPT

## 2021-12-28 PROCEDURE — 93970 EXTREMITY STUDY: CPT

## 2021-12-28 PROCEDURE — 84100 ASSAY OF PHOSPHORUS: CPT

## 2021-12-28 PROCEDURE — 87070 CULTURE OTHR SPECIMN AEROBIC: CPT

## 2021-12-28 PROCEDURE — 97161 PT EVAL LOW COMPLEX 20 MIN: CPT

## 2021-12-28 PROCEDURE — 85025 COMPLETE CBC W/AUTO DIFF WBC: CPT

## 2021-12-28 PROCEDURE — 97530 THERAPEUTIC ACTIVITIES: CPT

## 2021-12-28 PROCEDURE — 87340 HEPATITIS B SURFACE AG IA: CPT

## 2021-12-28 PROCEDURE — 81001 URINALYSIS AUTO W/SCOPE: CPT

## 2021-12-28 RX ORDER — INSULIN DETEMIR 100/ML (3)
30 INSULIN PEN (ML) SUBCUTANEOUS
Qty: 0 | Refills: 0 | DISCHARGE

## 2021-12-28 RX ORDER — SEVELAMER CARBONATE 2400 MG/1
1 POWDER, FOR SUSPENSION ORAL
Qty: 90 | Refills: 0
Start: 2021-12-28 | End: 2022-01-26

## 2021-12-28 RX ORDER — SACUBITRIL AND VALSARTAN 24; 26 MG/1; MG/1
1 TABLET, FILM COATED ORAL
Qty: 60 | Refills: 2
Start: 2021-12-28 | End: 2022-03-27

## 2021-12-28 RX ORDER — INSULIN ASPART 100 [IU]/ML
7 INJECTION, SOLUTION SUBCUTANEOUS
Qty: 0 | Refills: 0 | DISCHARGE

## 2021-12-28 RX ORDER — METOPROLOL TARTRATE 50 MG
1 TABLET ORAL
Qty: 30 | Refills: 2
Start: 2021-12-28 | End: 2022-03-27

## 2021-12-28 RX ORDER — GABAPENTIN 400 MG/1
1 CAPSULE ORAL
Qty: 0 | Refills: 0 | DISCHARGE

## 2021-12-28 RX ADMIN — APIXABAN 2.5 MILLIGRAM(S): 2.5 TABLET, FILM COATED ORAL at 22:31

## 2021-12-28 RX ADMIN — Medication 100 MILLIGRAM(S): at 05:19

## 2021-12-28 RX ADMIN — SEVELAMER CARBONATE 800 MILLIGRAM(S): 2400 POWDER, FOR SUSPENSION ORAL at 05:19

## 2021-12-28 RX ADMIN — SACUBITRIL AND VALSARTAN 1 TABLET(S): 24; 26 TABLET, FILM COATED ORAL at 22:31

## 2021-12-28 RX ADMIN — METHADONE HYDROCHLORIDE 10 MILLIGRAM(S): 40 TABLET ORAL at 13:33

## 2021-12-28 RX ADMIN — Medication 6: at 22:32

## 2021-12-28 RX ADMIN — METHADONE HYDROCHLORIDE 10 MILLIGRAM(S): 40 TABLET ORAL at 05:19

## 2021-12-28 RX ADMIN — SEVELAMER CARBONATE 800 MILLIGRAM(S): 2400 POWDER, FOR SUSPENSION ORAL at 13:34

## 2021-12-28 RX ADMIN — Medication 48 MILLIGRAM(S): at 13:33

## 2021-12-28 RX ADMIN — Medication 650 MILLIGRAM(S): at 05:02

## 2021-12-28 RX ADMIN — Medication 20 MILLIGRAM(S): at 15:41

## 2021-12-28 RX ADMIN — Medication 3 UNIT(S): at 18:30

## 2021-12-28 RX ADMIN — Medication 650 MILLIGRAM(S): at 05:48

## 2021-12-28 RX ADMIN — BUDESONIDE AND FORMOTEROL FUMARATE DIHYDRATE 2 PUFF(S): 160; 4.5 AEROSOL RESPIRATORY (INHALATION) at 22:30

## 2021-12-28 RX ADMIN — Medication 25 MICROGRAM(S): at 05:19

## 2021-12-28 RX ADMIN — ALBUTEROL 2.5 MILLIGRAM(S): 90 AEROSOL, METERED ORAL at 13:34

## 2021-12-28 RX ADMIN — Medication 2: at 18:29

## 2021-12-28 RX ADMIN — APIXABAN 2.5 MILLIGRAM(S): 2.5 TABLET, FILM COATED ORAL at 13:33

## 2021-12-28 RX ADMIN — BUDESONIDE AND FORMOTEROL FUMARATE DIHYDRATE 2 PUFF(S): 160; 4.5 AEROSOL RESPIRATORY (INHALATION) at 13:46

## 2021-12-28 RX ADMIN — ATORVASTATIN CALCIUM 80 MILLIGRAM(S): 80 TABLET, FILM COATED ORAL at 22:31

## 2021-12-28 RX ADMIN — PANTOPRAZOLE SODIUM 40 MILLIGRAM(S): 20 TABLET, DELAYED RELEASE ORAL at 06:15

## 2021-12-28 RX ADMIN — SEVELAMER CARBONATE 800 MILLIGRAM(S): 2400 POWDER, FOR SUSPENSION ORAL at 22:31

## 2021-12-28 RX ADMIN — METHADONE HYDROCHLORIDE 10 MILLIGRAM(S): 40 TABLET ORAL at 22:32

## 2021-12-28 RX ADMIN — INSULIN GLARGINE 10 UNIT(S): 100 INJECTION, SOLUTION SUBCUTANEOUS at 22:32

## 2021-12-28 RX ADMIN — Medication 3 UNIT(S): at 07:26

## 2021-12-28 NOTE — PROGRESS NOTE ADULT - ASSESSMENT
62 yo obese Female CKD (baseline around 3.7-4) CHF, DM COPD (on 3L home o2) multiple myeloma presented to the hospital with complaints of hemoptysis and shortness of breath x 4 days;     Assessment/Plan:     #New ESRD (start 12/23)  to have HD #4 today  minimal UF w/ HD  outpatient HD set up  to f/u with Dr Neal upon DC    BP  hypotensive  no UF w/ HD    Anemia  Hgb not at goal  no EPO given multiple myeloma    BMD  Ca 9  phos 7.4  PTH Vit D noted    Krystle Billy D.O  PGY 4 Nephrology fellow  [phone]

## 2021-12-28 NOTE — DISCHARGE NOTE NURSING/CASE MANAGEMENT/SOCIAL WORK - PATIENT PORTAL LINK FT
You can access the FollowMyHealth Patient Portal offered by [facility] by registering at the following website: [email or web address]. By joining [employer]’s FollowMyHealth portal, you will also be able to view your health information using other applications (apps) compatible with our system.

## 2021-12-28 NOTE — CHART NOTE - NSCHARTNOTEFT_GEN_A_CORE
Admitting Diagnosis:   Patient is a 61y old  Female who presents with a chief complaint of fluid overload (27 Dec 2021 13:57)      PAST MEDICAL & SURGICAL HISTORY:  CHF (congestive heart failure)    COPD (chronic obstructive pulmonary disease)    HLD (hyperlipidemia)    Chronic pain    DM (diabetes mellitus)    CVA (cerebral vascular accident)    DVT (deep venous thrombosis)    HTN (hypertension)    Depression    Bipolar depression    PAD (peripheral artery disease)    Neuropathy    Hypothyroidism    Multiple myeloma    CKD (chronic kidney disease), stage IV    DHARMESH on CPAP    AICD (automatic cardioverter/defibrillator) present    H/O abdominal hysterectomy    H/O tubal ligation    History of cholecystectomy    H/O extremity bypass graft        Current Nutrition Order:  CONSCHO     PO Intake: Good (%) [   ]  Fair (50-75%) [   ] Poor (<25%) [   ]--Please see Below     GI Issues:   No GI distress reported at this time  +BM      Pain:  Pain Medicine following  No pain at this time per flow sheets     Skin Integrity:  Kavon 17  1+gen and 2+BL Ankle edema  No pressure ulcers; Wound bundles and blisters noted, Please see Flow sheets         Labs:       138  |  100  |  29<H>  ----------------------------<  155<H>  4.7   |  27  |  4.16<H>    Ca    8.6      28 Dec 2021 08:38  Phos  4.7       Mg     2.0         TPro  6.3  /  Alb  3.0<L>  /  TBili  0.4  /  DBili  x   /  AST  18  /  ALT  8<L>  /  AlkPhos  53      CAPILLARY BLOOD GLUCOSE      POCT Blood Glucose.: 81 mg/dL (28 Dec 2021 12:50)  POCT Blood Glucose.: 142 mg/dL (28 Dec 2021 06:47)  POCT Blood Glucose.: 164 mg/dL (27 Dec 2021 21:38)  POCT Blood Glucose.: 136 mg/dL (27 Dec 2021 16:42)      Medications:  MEDICATIONS  (STANDING):  apixaban 2.5 milliGRAM(s) Oral every 12 hours  atorvastatin 80 milliGRAM(s) Oral at bedtime  budesonide  80 MICROgram(s)/formoterol 4.5 MICROgram(s) Inhaler 2 Puff(s) Inhalation two times a day  dexAMETHasone     Tablet 20 milliGRAM(s) Oral <User Schedule>  dextrose 40% Gel 15 Gram(s) Oral once  dextrose 5%. 1000 milliLiter(s) (50 mL/Hr) IV Continuous <Continuous>  dextrose 5%. 1000 milliLiter(s) (100 mL/Hr) IV Continuous <Continuous>  dextrose 50% Injectable 25 Gram(s) IV Push once  dextrose 50% Injectable 12.5 Gram(s) IV Push once  dextrose 50% Injectable 25 Gram(s) IV Push once  fenofibrate Tablet 48 milliGRAM(s) Oral daily  glucagon  Injectable 1 milliGRAM(s) IntraMuscular once  insulin glargine Injectable (LANTUS) 10 Unit(s) SubCutaneous at bedtime  insulin lispro (ADMELOG) corrective regimen sliding scale   SubCutaneous Before meals and at bedtime  insulin lispro Injectable (ADMELOG) 3 Unit(s) SubCutaneous three times a day before meals  levothyroxine 25 MICROGram(s) Oral daily  lidocaine   4% Patch 1 Patch Transdermal daily  melatonin 3 milliGRAM(s) Oral at bedtime  methadone    Tablet 10 milliGRAM(s) Oral three times a day  metoprolol succinate  milliGRAM(s) Oral daily  pantoprazole    Tablet 40 milliGRAM(s) Oral before breakfast  sacubitril 97 mG/valsartan 103 mG 1 Tablet(s) Oral two times a day  senna 2 Tablet(s) Oral at bedtime  sevelamer carbonate 800 milliGRAM(s) Oral three times a day    MEDICATIONS  (PRN):  acetaminophen     Tablet .. 650 milliGRAM(s) Oral every 6 hours PRN Mild Pain (1 - 3), Moderate Pain (4 - 6)  ALBUTerol   0.5% 2.5 milliGRAM(s) Nebulizer every 6 hours PRN Shortness of Breath and/or Wheezing  benzonatate 100 milliGRAM(s) Oral every 8 hours PRN Cough  guaiFENesin Oral Liquid (Sugar-Free) 100 milliGRAM(s) Oral every 6 hours PRN Cough  methadone    Tablet 5 milliGRAM(s) Oral every 6 hours PRN Moderate Pain (4 - 6)  polyethylene glycol 3350 17 Gram(s) Oral daily PRN Constipation               Weight Assessment:  · Height for BMI (FEET)	5 Feet  · Height for BMI (INCHES)	5 Inch(s)  · Height for BMI (CENTIMETERS)	165.1 Centimeter(s)  · Weight for BMI (lbs)	215 lb  · Weight for BMI (kg)	97.5 kg  · Body Mass Index	35.7  · Change in Usual Weight Prior to Admission	no  · Ideal Body Weight (lbs)	125  · Ideal Body Weight (kg)	56.6    Weight Change: Varying EMR wts during admission assume d/t Fluid shifts in CHF/CKD pt - cont to trend daily    220 pounds    218 pounds   228.6 pounds    Estimated energy needs:   IBW For estimated needs due to pt being >120% of IBW (ICW=181%)  Adjusted for CKD-HD, CHF, BMI. Fluids per team.  30-35kcal/k-1995kcal/day  1.2-1.4.gm/k-80gm/day     Subjective: 62 yo obese F, current smoker, with a PMH of HTN, HLD, DM-II, CKD Stage 4 (Cr ~3.9), Hypothyroidism, known NICM with EF 25% (s/p AICD for primary prevention), MM, COPD (3L home O2), recurrent DVTs, admitted for acute on chronic CHF exacerbation - now euvolemic with minimal urine output on diuretict and worsening RITCHIE on CKD for which renal is following; IV dietuics d/c, no diuretics at this time. Found to be retaining urine and medina placed and urology consulted. No urologic surgery intervention at this time. TDC catheter placed and HD initiated , S/p HF  with plan for HD today.     Pt off the unit at HD. Spoke with RN. Reports pt has been eating well, no issues to report. Per IDR d/c pending placement and OP HD set up. Noted low HH  (42), treated with dext Amp - ordered for bedtime Lantus pre meal insulin and corrective sliding scale. Now ordered for steroids this AM . Last POCT 142 162 136, glucose 149. Other Labs of note Phos 4.7, K 4.4, Na 136.   Please see below for nutritions recommendations. Paged team.     Prior PES: Increased Nutrient Needs Etiology RT hypermetabolic state Signs/Symptoms AEB CHF.   >>on going @ this time   Goal/Expected Outcome Pt will continuously meet >75% of estimated needs.    Recommendations:  1. Renal/CONSCHO with evening snack with Cont Night time Insulin use.  2. Monitor %PO intake and need for oral nutrition supplements, Diet tolerance.  3. Labs: monitor BMP, CBC, glucose, lytes, trend renal indices, lfts, POCT.  4. Monitor abel/pre-post HD wts, Pain, GI, Skin, GOC.  5. RD to remain available for additional nutrition interventions as needed.      Education: NA pt off the unit.     Risk Level: High [ X  ] Moderate [   ] Low [   ].

## 2021-12-28 NOTE — PROGRESS NOTE ADULT - PROVIDER SPECIALTY LIST ADULT
Cardiology
Intervent Cardiology
Intervent Cardiology
Nephrology
Vascular Surgery
Intervent Cardiology
Intervent Cardiology
Nephrology
Pain Medicine
Pain Medicine
Pulmonology
Nephrology
Nephrology
Pulmonology
Urology
Vascular Surgery
Intervent Cardiology
Pulmonology
Heme/Onc
Heme/Onc
Intervent Cardiology
Cardiology
Intervent Cardiology
Intervent Cardiology

## 2021-12-28 NOTE — PROGRESS NOTE ADULT - SUBJECTIVE AND OBJECTIVE BOX
Patient is a 61y Female seen and evaluated duirng hemodialysis;  remains hemodynamically stable; much more awake and alert this AM states that her breathing is improved and that she is very anxious to go home; electrolytes and volume status noted       Meds:    acetaminophen     Tablet .. 650 every 6 hours PRN  ALBUTerol   0.5% 2.5 every 6 hours PRN  apixaban 2.5 every 12 hours  atorvastatin 80 at bedtime  benzonatate 100 every 8 hours PRN  budesonide  80 MICROgram(s)/formoterol 4.5 MICROgram(s) Inhaler 2 two times a day  dexAMETHasone     Tablet 20 <User Schedule>  dextrose 40% Gel 15 once  dextrose 5%. 1000 <Continuous>  dextrose 5%. 1000 <Continuous>  dextrose 50% Injectable 25 once  dextrose 50% Injectable 12.5 once  dextrose 50% Injectable 25 once  fenofibrate Tablet 48 daily  glucagon  Injectable 1 once  guaiFENesin Oral Liquid (Sugar-Free) 100 every 6 hours PRN  insulin glargine Injectable (LANTUS) 10 at bedtime  insulin lispro (ADMELOG) corrective regimen sliding scale  Before meals and at bedtime  insulin lispro Injectable (ADMELOG) 3 three times a day before meals  levothyroxine 25 daily  lidocaine   4% Patch 1 daily  melatonin 3 at bedtime  methadone    Tablet 10 three times a day  methadone    Tablet 5 every 6 hours PRN  metoprolol succinate  daily  pantoprazole    Tablet 40 before breakfast  polyethylene glycol 3350 17 daily PRN  sacubitril 97 mG/valsartan 103 mG 1 two times a day  senna 2 at bedtime  sevelamer carbonate 800 three times a day      T(C): , Max: 36.5 (21 @ 10:54)  T(F): , Max: 97.7 (21 @ 10:54)  HR: 68 (21 @ 08:36)  BP: 117/58 (21 @ 08:36)  BP(mean): 83 (21 @ 08:36)  RR: 19 (21 @ 08:36)  SpO2: 97% (21 @ 08:36)  Wt(kg): --     @ 07:01  -   @ 07:00  --------------------------------------------------------  IN: 1080 mL / OUT: 2100 mL / NET: -1020 mL          Review of Systems:  all other ROS negative       PHYSICAL EXAM:  GENERAL: well-developed, well nourished, alert, no acute distress at present on 2L NC  CHEST/LUNG: decreased breath sounds at the bases;   HEART: normal S1S2, RRR  ABDOMEN: Soft, Nontender, +BS,   EXTREMITIES: No clubbing, cyanosis, or edema   NEUROLOGY: no asterixis present   ACCESS: +TDC        LABS:                        9.1    7.82  )-----------( 160      ( 28 Dec 2021 08:38 )             30.7         138  |  100  |  29<H>  ----------------------------<  155<H>  4.7   |  27  |  4.16<H>    Ca    8.6      28 Dec 2021 08:38  Phos  4.7       Mg     2.0         TPro  6.3  /  Alb  3.0<L>  /  TBili  0.4  /  DBili  x   /  AST  18  /  ALT  8<L>  /  AlkPhos  53                  RADIOLOGY & ADDITIONAL STUDIES:    Hemoglobin: 9.1 g/dL (21 @ 08:38)  Phosphorus Level, Serum: 4.7 mg/dL (21 @ 08:38)  Hemoglobin: 9.2 g/dL (21 @ 08:03)  Phosphorus Level, Serum: 4.7 mg/dL (21 @ 08:03)    Albumin, Serum: 3.0 g/dL (21 @ 08:03)  Albumin, Serum: 4.1 g/dL (21 @ 21:39)    sevelamer carbonate 800 milliGRAM(s) Oral three times a day, 21 @ 15:32, STAT    Hemodialysis Treatment.:     Schedule: Once, Modality: Hemodialysis, Access: Internal Jugular Central Venous Catheter    Dialyzer: Optiflux [id number], Time: 180 Min    Blood Flow: 400 mL/Min , Dialysate Flow: 500 mL/Min, Dialysate Temp: 36.5, Tubinmm (Adult)    Target Fluid Removal: 1 Liters    Dialysate Electrolytes (mEq/L): Potassium 2, Calcium 2.5, Sodium 138, Bicarbonate 35 (21 @ 07:06) [Active]

## 2021-12-28 NOTE — DISCHARGE NOTE NURSING/CASE MANAGEMENT/SOCIAL WORK - NSDCPEFALRISK_GEN_ALL_CORE
For information on Fall & Injury Prevention, visit: [email or web address] OR  [email or web address] OR  https://www.cdc.gov/steadi/patient.html

## 2021-12-28 NOTE — PROGRESS NOTE ADULT - ATTENDING SUPERVISION STATEMENT
ACP
ACP/Resident
Fellow
Fellow
ACP
Fellow
ACP

## 2021-12-28 NOTE — PROGRESS NOTE ADULT - ATTENDING COMMENTS
above reviewed and discussed, and pt examined while on hd.    appears markedly improved and eager for discharge.   agree with findings and recommendations.

## 2021-12-28 NOTE — DISCHARGE NOTE NURSING/CASE MANAGEMENT/SOCIAL WORK - NSDCPEWEB_GEN_ALL_CORE
[facility] for Tobacco Control website --- [email or web address] website --- [email or web address]

## 2021-12-29 ENCOUNTER — NON-APPOINTMENT (OUTPATIENT)
Age: 61
End: 2021-12-29

## 2021-12-29 ENCOUNTER — APPOINTMENT (OUTPATIENT)
Dept: NEPHROLOGY | Facility: CLINIC | Age: 61
End: 2021-12-29

## 2021-12-29 VITALS
HEART RATE: 65 BPM | OXYGEN SATURATION: 97 % | DIASTOLIC BLOOD PRESSURE: 59 MMHG | RESPIRATION RATE: 18 BRPM | SYSTOLIC BLOOD PRESSURE: 140 MMHG

## 2021-12-29 PROCEDURE — 99239 HOSP IP/OBS DSCHRG MGMT >30: CPT

## 2022-01-01 NOTE — CONSULT NOTE ADULT - ATTENDING COMMENTS
Pt seen on rounds this afternoon.  ADmitted with ulcer L first toe and cellulitis of both feet and lower legs.  X-ray of the left foot shows gas in the soft tissues and possible osteo.  Bone scan Is apparently pending.  Dressing over the left foot prevents adequate exam of the pulses, and it is difficult to evaluate capillary refill.  Glucoses have been erratic since admission, with rise in blood sugar overnight but then hypoglycemia after breakfast despite good intake.  Her glycemic control has improved significantly since her last admission, though she is unable to tell me why--she denies any significant change in her diet.  --Given the rising glucose overnight, increase the Lantus to 40 units  --Decrease the pre-meal to 12 units for now, but she likely needs different doses at the 3 meals.  --The appearance of the left first toe is somewhat concerning.  Pt has had a previous bypass in that leg (?? fem-pop) but current vascular status is uncertain.   Pt had previously seen Dr. Medeiros as outpatient, and presumably had Dopplers.  Dr. Medeiros should see the pt tomorrow to evaluate her vascular status.
Local

## 2022-01-02 DIAGNOSIS — E66.9 OBESITY, UNSPECIFIED: ICD-10-CM

## 2022-01-02 DIAGNOSIS — K71.3 TOXIC LIVER DISEASE WITH CHRONIC PERSISTENT HEPATITIS: ICD-10-CM

## 2022-01-02 DIAGNOSIS — B96.4 PROTEUS (MIRABILIS) (MORGANII) AS THE CAUSE OF DISEASES CLASSIFIED ELSEWHERE: ICD-10-CM

## 2022-01-02 DIAGNOSIS — I42.8 OTHER CARDIOMYOPATHIES: ICD-10-CM

## 2022-01-02 DIAGNOSIS — Z90.710 ACQUIRED ABSENCE OF BOTH CERVIX AND UTERUS: ICD-10-CM

## 2022-01-02 DIAGNOSIS — E03.9 HYPOTHYROIDISM, UNSPECIFIED: ICD-10-CM

## 2022-01-02 DIAGNOSIS — Z79.890 HORMONE REPLACEMENT THERAPY: ICD-10-CM

## 2022-01-02 DIAGNOSIS — E11.40 TYPE 2 DIABETES MELLITUS WITH DIABETIC NEUROPATHY, UNSPECIFIED: ICD-10-CM

## 2022-01-02 DIAGNOSIS — I50.23 ACUTE ON CHRONIC SYSTOLIC (CONGESTIVE) HEART FAILURE: ICD-10-CM

## 2022-01-02 DIAGNOSIS — N17.9 ACUTE KIDNEY FAILURE, UNSPECIFIED: ICD-10-CM

## 2022-01-02 DIAGNOSIS — D63.1 ANEMIA IN CHRONIC KIDNEY DISEASE: ICD-10-CM

## 2022-01-02 DIAGNOSIS — Z99.89 DEPENDENCE ON OTHER ENABLING MACHINES AND DEVICES: ICD-10-CM

## 2022-01-02 DIAGNOSIS — E11.22 TYPE 2 DIABETES MELLITUS WITH DIABETIC CHRONIC KIDNEY DISEASE: ICD-10-CM

## 2022-01-02 DIAGNOSIS — J44.9 CHRONIC OBSTRUCTIVE PULMONARY DISEASE, UNSPECIFIED: ICD-10-CM

## 2022-01-02 DIAGNOSIS — Z88.8 ALLERGY STATUS TO OTHER DRUGS, MEDICAMENTS AND BIOLOGICAL SUBSTANCES: ICD-10-CM

## 2022-01-02 DIAGNOSIS — Z99.81 DEPENDENCE ON SUPPLEMENTAL OXYGEN: ICD-10-CM

## 2022-01-02 DIAGNOSIS — I69.951 HEMIPLEGIA AND HEMIPARESIS FOLLOWING UNSPECIFIED CEREBROVASCULAR DISEASE AFFECTING RIGHT DOMINANT SIDE: ICD-10-CM

## 2022-01-02 DIAGNOSIS — F17.210 NICOTINE DEPENDENCE, CIGARETTES, UNCOMPLICATED: ICD-10-CM

## 2022-01-02 DIAGNOSIS — N18.6 END STAGE RENAL DISEASE: ICD-10-CM

## 2022-01-02 DIAGNOSIS — I82.511 CHRONIC EMBOLISM AND THROMBOSIS OF RIGHT FEMORAL VEIN: ICD-10-CM

## 2022-01-02 DIAGNOSIS — F32.9 MAJOR DEPRESSIVE DISORDER, SINGLE EPISODE, UNSPECIFIED: ICD-10-CM

## 2022-01-02 DIAGNOSIS — E78.5 HYPERLIPIDEMIA, UNSPECIFIED: ICD-10-CM

## 2022-01-02 DIAGNOSIS — C90.00 MULTIPLE MYELOMA NOT HAVING ACHIEVED REMISSION: ICD-10-CM

## 2022-01-02 DIAGNOSIS — E11.51 TYPE 2 DIABETES MELLITUS WITH DIABETIC PERIPHERAL ANGIOPATHY WITHOUT GANGRENE: ICD-10-CM

## 2022-01-02 DIAGNOSIS — I13.2 HYPERTENSIVE HEART AND CHRONIC KIDNEY DISEASE WITH HEART FAILURE AND WITH STAGE 5 CHRONIC KIDNEY DISEASE, OR END STAGE RENAL DISEASE: ICD-10-CM

## 2022-01-02 DIAGNOSIS — Z79.01 LONG TERM (CURRENT) USE OF ANTICOAGULANTS: ICD-10-CM

## 2022-01-02 DIAGNOSIS — Z90.49 ACQUIRED ABSENCE OF OTHER SPECIFIED PARTS OF DIGESTIVE TRACT: ICD-10-CM

## 2022-01-02 DIAGNOSIS — N39.0 URINARY TRACT INFECTION, SITE NOT SPECIFIED: ICD-10-CM

## 2022-01-02 DIAGNOSIS — R04.2 HEMOPTYSIS: ICD-10-CM

## 2022-01-02 DIAGNOSIS — Z79.4 LONG TERM (CURRENT) USE OF INSULIN: ICD-10-CM

## 2022-01-02 DIAGNOSIS — I95.9 HYPOTENSION, UNSPECIFIED: ICD-10-CM

## 2022-01-02 DIAGNOSIS — Z95.810 PRESENCE OF AUTOMATIC (IMPLANTABLE) CARDIAC DEFIBRILLATOR: ICD-10-CM

## 2022-01-02 DIAGNOSIS — G47.33 OBSTRUCTIVE SLEEP APNEA (ADULT) (PEDIATRIC): ICD-10-CM

## 2022-01-02 DIAGNOSIS — R33.9 RETENTION OF URINE, UNSPECIFIED: ICD-10-CM

## 2022-01-02 DIAGNOSIS — N13.30 UNSPECIFIED HYDRONEPHROSIS: ICD-10-CM

## 2022-01-02 DIAGNOSIS — I25.10 ATHEROSCLEROTIC HEART DISEASE OF NATIVE CORONARY ARTERY WITHOUT ANGINA PECTORIS: ICD-10-CM

## 2022-01-04 ENCOUNTER — NON-APPOINTMENT (OUTPATIENT)
Age: 62
End: 2022-01-04

## 2022-01-05 ENCOUNTER — APPOINTMENT (OUTPATIENT)
Dept: ENDOCRINOLOGY | Facility: CLINIC | Age: 62
End: 2022-01-05
Payer: MEDICARE

## 2022-01-05 ENCOUNTER — APPOINTMENT (OUTPATIENT)
Dept: NEPHROLOGY | Facility: CLINIC | Age: 62
End: 2022-01-05

## 2022-01-05 DIAGNOSIS — E11.65 TYPE 2 DIABETES MELLITUS WITH UNSPECIFIED COMPLICATIONS: ICD-10-CM

## 2022-01-05 DIAGNOSIS — E11.8 TYPE 2 DIABETES MELLITUS WITH UNSPECIFIED COMPLICATIONS: ICD-10-CM

## 2022-01-05 PROCEDURE — 99214 OFFICE O/P EST MOD 30 MIN: CPT | Mod: 95

## 2022-01-05 NOTE — REASON FOR VISIT
[Home] : at home, [unfilled] , at the time of the visit. [Medical Office: ([facility])___] : at the medical office located in  [Verbal consent obtained from patient] : the patient, [unfilled] [Follow - Up] : a follow-up visit [DM Type 2] : DM Type 2

## 2022-01-07 PROBLEM — E11.8 UNCONTROLLED DIABETES MELLITUS WITH COMPLICATIONS: Status: ACTIVE | Noted: 2020-11-05

## 2022-01-07 NOTE — HISTORY OF PRESENT ILLNESS
[FreeTextEntry1] : 60 y/o F pt, with Hx of uncontrolled T2DM (dx > 10 yrs) with multiple complications including: CVA, CKD stage 4 (started dialysis on 12/14/21) and CHF.\par Other PMHx: HTN, HLD, Stomach Ulcer (as per pt), DVT, COPD, Hypothyroidism, Multiple Myeloma. Covid infection (on 01/2021?), COVID POSITIVE (08/24/21)\par Denies PMHx of amputation, Foot Ulcer\par PSHx: Cataract surgery (09/2020)\par Checks BS 3-4 times a day. \par Last Ophthalmologist: Jan 2021 \par Sees cardiologist, vascular doctor, nephrologist\par SHx: former smoker (quit 2-3 months ago as per pt)\par \par From Dr. Lieberman note:\par Generally feels well and endorses no acute complaints. Reports taking insulin though out most of the course of her disease. Multiple micro-macrovascular complications reported. Monitors infrequently. Log or glucometer not brought to today's apt. Reports skipping her Levemir 15 units QHS dose last night. Admits to frequent un compliance w/ insulin regimen. Previously on PO meds which were dced as renal function worsened. Has not tried GLP-1 agonists in the past. \par \par 1/11/21\par Pt did not have any questions prior to the initiation of the telehealth visit. \par \par Today pt presents for DM f/u via telehealth, feeling unwell. \par Pt notes she was recently hospitalized and D/C on 1/8/21 2/2 UTI. \par She states she is not taking any tablets for DM. \par Pt reports she is taking NovoLog 12u 3x QD and Levemir "I think its 20u at night."\par Pt states she eats TID at 8AM/1PM/5PM.\par She notes BS readings of 140, 150, 180, 200\par Pt states she has an appointment to see nephrologist this week. \par Denies foot ulcer. \par \par 3/15/2021\par DM > 10 years, multiple complications including CVA,CKD, worsening kidney function s. creat 3.49\par No physical /co. Her glucose are fluctuating every day with occ low glucose levels.\par \par 06/01/2021\par Summary of pt chart: Pt with Hx of DM, poorly controlled, dx more than 10 years ago. She has complications of CVA, CKD, and CHF. Pt also has Hx of hypothyroidism. \par Pt is on multiple insulin injections and at the last visit(3/15/21), I recommended Levemir 20u, NovoLog 7u ac, and correction insulin by using sliding scale going from 2-8 additional units of NovoLog.\par Last s.creat is 3.49 on 3/2/21. \par \par Pt did not have any questions prior to the initiation of the telehealth visit. \par Today pt presents for DM f/u via telehealth,feeling good with no physical complaints. \par Pt was hospitalized with COVID in 04/2021 for approximately 3 weeks and did not require mechanical ventilation. Pt was discharged on 4/23/21. Pt reports she lost weight in the hospital due to COVID, but since shes come home she has been eating well but has not been trying to gain weight. Pt was told at the hospital that she had to take dialysis four times. After completing those sessions, pt has not since been asked to go back to dialysis. \par Pt checks BS pre and post prandial. Most recent FBS: 164, 169, 169. Post prandial BS: 190, 200, 150. \par Pt has an appointment to see her hematologist next month because she has Multiple Myeloma. \par \par 01/05/2022\par Review of pt's chart:\par - Hospitalized from 12/14/21-12/29/21 for SOB and blood-streaked sputum, with exacerbation of heart failure and pulmonary edema. \par - 12/09/21, pt was seen by Dr. Payton Ruiz, paraprotein level was down to 1800 from 2400. Pt is on Decatrone and Ninlaro qw. \par - 12/24/21: A1c 8.6%, s.creat 4.17\par \par Pt did not have any questions prior to the initiation of the telehealth visit. \par She presents today via telehealth for DM f/u, feeling good, with no other physical complaints. Pt was started dialysis on 12/14/21 and goes 3 times a week (Tues, Thurs, Sat) at 10:30-10:35 am. She feels that her DM is better because she is on dialysis. As per pt, she checks her BS every day (3-4), manages her diet, and makes sure to take her insulin the right way. Pt reports  and PPBS in the 200s and is rarely hypoglycemic ("once in a blue moon"). She is currently taking Novolog 7 u, 30 minutes before meals, but should take it 3-4 minutes before. Pt is also on Decadron 6 mg qw (Mondays), which was initiated a year ago. \par \par Current Medications: Levemir 30 u qpm, NovoLog 10 u ac (increased 01/05/22), Levothyroxine 50 mcg qd, Methadone 100 mg qd, Decadron 6 mg qw (Mondays; initiated a year ago), Multivitamins \par \par Labs:\par - 08/24/21: A1c 8.6% (H), s.creat 4.17 (H), BUN 69 (H), eGFR 11 (L)\par - 03/15/21: A1c POCT 12.1%\par - 03/02/21: s.creat 3.49, Ca 9.6, \par - 09/29/20 A1c 10.3%, TSH 6.02, Free T4 1.1, iPTH 89, Ca 9.2, s. creat 2.13, microalb/creat ratio 973, Vit D 25- OH 29.5, , Cholesterol 303 \par - 01/2020 A1c > 14%, \par - 08/2019 A1c 11.4%\par - 5/2019 A1c 14% \par - 2/2019 TSH 2.91, LDL-c 58, , Cholesterol 108

## 2022-01-07 NOTE — THERAPY
[Today's Date] : [unfilled] [Levemir] : Levemir [Novolog] : Novolog [FreeTextEntry9] : 30 u qpm [de-identified] : 10 u ac

## 2022-01-07 NOTE — END OF VISIT
[FreeTextEntry3] : All medical record entries made by the Scribe were at my, Dr. Cal Pimenetl, direction and personally dictated by me on 01/05/2022. I have reviewed the chart and agree that the record accurately reflects my personal performance of the history, physical exam, assessment and plan. I have also personally directed, reviewed and agreed with the chart.  [Time Spent: ___ minutes] : I have spent [unfilled] minutes of time on the encounter.

## 2022-01-07 NOTE — ADDENDUM
[FreeTextEntry1] : I Adiel Helio act soley as a scribe for Dr. Cal Pimentel on this date. 01/05/2022

## 2022-01-07 NOTE — ASSESSMENT
[FreeTextEntry1] : 62 y/o F pt with:\par \par 1. T2DM (dx > 10 yrs) with multiple complications including: CVA, CKD stage 4 and CHF:\par Pt began hemodialysis on 12/14/21 (T,Th,Sat at 10:30 am) and her BS have improved overall ever since. She has also been on Decadron 6 mg qw for the past 1.5 years. \par Pt was instructed on how the insulin works and the importance of taking her injections on time (stop taking Prandial insulin 1 hr before her meals). Continue with Levemir 30 u qpm and Novolog 10 u ac. \par F/u with pulmonary doctor and heme oncologist.  \par \par \par \par Return in: 4 months.  [Carbohydrate Consistent Diet] : carbohydrate consistent diet [Exercise/Effect on Glucose] : exercise/effect on glucose [Hypoglycemia Management] : hypoglycemia management [Self Monitoring of Blood Glucose] : self monitoring of blood glucose

## 2022-01-10 ENCOUNTER — RX RENEWAL (OUTPATIENT)
Age: 62
End: 2022-01-10

## 2022-01-11 ENCOUNTER — RX RENEWAL (OUTPATIENT)
Age: 62
End: 2022-01-11

## 2022-01-13 ENCOUNTER — APPOINTMENT (OUTPATIENT)
Dept: CARDIOLOGY | Facility: CLINIC | Age: 62
End: 2022-01-13

## 2022-01-19 ENCOUNTER — APPOINTMENT (OUTPATIENT)
Dept: HEART AND VASCULAR | Facility: CLINIC | Age: 62
End: 2022-01-19

## 2022-01-26 ENCOUNTER — APPOINTMENT (OUTPATIENT)
Dept: HEART AND VASCULAR | Facility: CLINIC | Age: 62
End: 2022-01-26

## 2022-01-28 ENCOUNTER — RX RENEWAL (OUTPATIENT)
Age: 62
End: 2022-01-28

## 2022-01-31 ENCOUNTER — APPOINTMENT (OUTPATIENT)
Dept: CARDIOLOGY | Facility: CLINIC | Age: 62
End: 2022-01-31

## 2022-02-02 NOTE — GOALS OF CARE CONVERSATION - ADVANCED CARE PLANNING - CONVERSATION/DISCUSSION
Dear ,  Reason for consultation: Abdominal pain with chronic constipation  HPI: It is a pleasure to see your patient Ignacy in our pediatric GI clinic as per your request for consultation. He is here with his parent for chronic abdominal pain for the past two years and worse since the last year.  Daily episodes of abdominal pain in the lower abdominal bilaterally, intermittent episodes of sharp pain usually in the morning. No nausea and no emesis.   Pain gets better after having a BM. BMs are once a day with straining sometimes but no blood or pain in the bottom. Log like stools.   Now on the miralax every other day with improvement in the pain. Burning sensation in the periumbilical area after eating breakfast that lasts 5-10 minutes.  Good growth. No joint pains, skin rashes, eye problems.  Eats a good amount of fruits. Does not drink a lot of water.  ?  ?  ROS: Extensive 10 point review of systems done and are negative other than those mentioned above  PMH: Asthma well controlled.  BH: FT baby B wt. , passed meconium in the first 24-48 hours of life, no complications during pregnancy or immediately after birth  FH: No GI related, liver related or autoimmune problems in the family members  Social H: Lives at home with both parents, school attendance, tobacco smoke exposure, pets in the hours, recent travel  Current Outpatient Medications   Medication Sig Dispense Refill   • Polyethylene Glycol 3350 Powder MIX 1 CAPFUL (17GM) IN 8 OUNCES OF WATER, JUICE, OR TEA AND DRINK DAILY     • prednisoLONE sod-phos (ORAPRED) 15 MG/5ML oral solution 20ml po q day x1 then 10 ml po q day for 4 days 60 mL 0   • EPINEPHrine 0.3 MG/0.3ML auto-injector Inject 0.3 mLs into the muscle 1 time as needed for Anaphylaxis. 2 each 0   • albuterol (PROAIR HFA) 108 (90 Base) MCG/ACT inhaler Inhale 2 puffs into the lungs every 4 hours as needed for Shortness of Breath or Wheezing. Indications: Asthma 1 Inhaler 4   • albuterol  Diagnosis/Treatment Options (VENTOLIN) (2.5 MG/3ML) 0.083% nebulizer solution Take 3 mLs by nebulization every 6 hours as needed for Wheezing. 75 mL 0   • fluticasone propionate (FLOVENT HFA) 44 MCG/ACT inhaler INHALE 2 PUFFS PO BID INCREASE AS INSTRUCTED WHEN SICK       No current facility-administered medications for this visit.     ALLERGIES:   Allergen Reactions   • Poppy Seeds   (Food) ANAPHYLAXIS   • Cat Dander Other (See Comments)     unknown   • Dog Dander Other (See Comments)     unknown   • Dust Mite Extract Other (See Comments)     unknown   • Fish   (Food Or Med) Other (See Comments)     unknown   • Mold   (Environmental) Other (See Comments)     unknown   • No Name Available Other (See Comments)     No Known Drug Allergies   • Nuts   (Food) Other (See Comments)     Tree nuts     • Peanut   (Food Or Med) Other (See Comments)     And sesame seed     • Sesame   (Food Or Med) Other (See Comments)     Sesame seeds         PE:  General awake alert well nourished NAD; cooperative and smiling  Visit Vitals  Ht 4' 10.03\" (1.474 m)   Wt 45.5 kg (100 lb 5 oz)   BMI 20.94 kg/m²       Anicteric, mmm with no oral lesions  Skin good turgor and no rashes  Clear breath sounds   Heart sounds normal and no murmur  Abd soft NTND and no palpable organomegaly; large mass of stool felt in the LLQ, bowel sounds are normal  Perianal exam  Rectal exam deferred  Neuro intact, symmetrical tone  Extremities warm and well perfused    Previous investigations:  Labs:  No visits with results within 2 Month(s) from this visit.   Latest known visit with results is:   Admission on 11/16/2021, Discharged on 11/16/2021   Component Date Value Ref Range Status   • Sodium 11/16/2021 137  135 - 145 mmol/L Final   • Potassium 11/16/2021 4.2  3.4 - 5.1 mmol/L Final   • Chloride 11/16/2021 104  98 - 107 mmol/L Final   • Carbon Dioxide 11/16/2021 26  21 - 32 mmol/L Final   • Anion Gap 11/16/2021 11  10 - 20 mmol/L Final   • Glucose 11/16/2021 106 (A) 70 - 99 mg/dL Final   •  BUN 11/16/2021 14  5 - 18 mg/dL Final   • Creatinine 11/16/2021 0.42  0.38 - 1.15 mg/dL Final   • Glomerular Filtration Rate 11/16/2021    Final   • BUN/ Creatinine Ratio 11/16/2021 33 (A) 7 - 25 Final   • Calcium 11/16/2021 10.1  8.0 - 11.0 mg/dL Final   • Bilirubin, Total 11/16/2021 0.5  0.2 - 1.4 mg/dL Final   • GOT/AST 11/16/2021 22  10 - 45 Units/L Final   • GPT/ALT 11/16/2021 36 (A) 10 - 35 Units/L Final   • Alkaline Phosphatase 11/16/2021 303  115 - 445 Units/L Final   • Albumin 11/16/2021 4.1  3.6 - 5.1 g/dL Final   • Protein, Total 11/16/2021 7.3  6.0 - 8.0 g/dL Final   • Globulin 11/16/2021 3.2  2.0 - 4.0 g/dL Final   • A/G Ratio 11/16/2021 1.3  1.0 - 2.4 Final   • C-Reactive Protein 11/16/2021 <0.3  <=1.0 mg/dL Final   • WBC 11/16/2021 16.8 (A) 4.2 - 13.5 K/mcL Final   • RBC 11/16/2021 5.49 (A) 3.90 - 5.30 mil/mcL Final   • HGB 11/16/2021 15.6 (A) 11.5 - 15.5 g/dL Final   • HCT 11/16/2021 45.2 (A) 35.0 - 45.0 % Final   • MCV 11/16/2021 82.3  77.0 - 95.0 fl Final   • MCH 11/16/2021 28.4  25.0 - 33.0 pg Final   • MCHC 11/16/2021 34.5  31.0 - 37.0 g/dL Final   • RDW-CV 11/16/2021 12.3  11.0 - 15.0 % Final   • RDW-SD 11/16/2021 37.1  35.0 - 47.0 fL Final   • PLT 11/16/2021 316  140 - 450 K/mcL Final   • NRBC 11/16/2021 0  <=0 /100 WBC Final   • Neutrophil, Percent 11/16/2021 84  % Final   • Lymphocytes, Percent 11/16/2021 7  % Final   • Mono, Percent 11/16/2021 7  % Final   • Eosinophils, Percent 11/16/2021 2  % Final   • Basophils, Percent 11/16/2021 0  % Final   • Immature Granulocytes 11/16/2021 0  % Final   • Absolute Neutrophils 11/16/2021 13.9 (A) 1.8 - 8.0 K/mcL Final   • Absolute Lymphocytes 11/16/2021 1.2 (A) 1.5 - 6.5 K/mcL Final   • Absolute Monocytes 11/16/2021 1.3 (A) 0.0 - 0.8 K/mcL Final   • Absolute Eosinophils  11/16/2021 0.4  0.0 - 0.5 K/mcL Final   • Absolute Basophils 11/16/2021 0.1  0.0 - 0.2 K/mcL Final   • Absolute Immmature Granulocytes 11/16/2021 0.0  0.0 - 0.2 K/mcL Final     US  APPENDIX  Narrative: EXAMINATION: US APPENDIX      EXAM DATE: 11/16/2021 9:51 AM    CLINICAL HISTORY: 13 years Male  rlq pain     COMPARISON: None      TECHNIQUE:  Grayscale and color Doppler images of the right lower quadrant  with focus on the appendix.      FINDINGS:     Appendix: Visualized        Diameter: 5.6 mm        Compressibility: Compressible        Vascularity: Normal        Appendicolith: Absent        Periappendiceal: No periappendiceal phlegmon or fluid    Fluid: No discrete fluid collection. No free fluid is seen in the abdomen.  Mesenteric RLQ fat: Normal.   Tenderness: No tenderness with compression in the right lower quadrant.  Lymph nodes: No pathologically enlarged lymph nodes are observed.   Bowel loops: Peristalsing, normal in appearance.     Additional findings: None.     Impression: Normal appendix visualized. No secondary features of  appendicitis.    The above findings should be interpreted in the context of the patient's  clinical presentation and laboratory values.    Electronically Signed by: ELLA HENDRICKSON M.D.   Signed on: 11/16/2021 10:32 AM   XR ABDOMEN 1 VIEW  Narrative: CLINICAL HISTORY: Abdominal pain    TECHNIQUE: Two supine images were obtained.    COMPARISON: none    FINDINGS: There is no bowel obstruction.  There is moderately extensive  stool throughout the colon.    There is no mass.  There is no organomegaly.  There is no abnormal  calcification.  Impression: Prominent stool throughout the colon.    Electronically Signed by: MARISELA FERGUSON M.D.   Signed on: 11/16/2021 9:46 AM       Assessment: 14 yo boy with chronic abdominal pain with moderate constipation and fecal impaction; no concerns for other disease process other than the burning sensation in the mid-abdomen.     Recommendations:   Bowel clean out on a Saturday: 1 chocolate exlax square followed by Magnesium citrate 10 oz  forllowed by 1 chocolate exlax square. Expect diarrhea on that day and the next. If not a  large amount of stools or diarrhea repeat the medicines on Sunday.  Daily medications: Miralax 1 capful 17 grams in 6 oz water daily in am OR senna 2 pills a day for one week and the one pill a day daily  Goal is to have soft mushy stools daily   Regular toilet sitting for 10 min after all the three meals of the day or after breakfast, school and dinner  Toilet sitting when in pain  NO withholding of stools  Diet: Healthy amounts of fibre rich fruit and vegetables.  If stools are still hard give extra dose of the medicine in the evening as well    Follow  Up: 6-8 weeks  Education provided. Resources: www.[facility].org  I discussed the diagnosis, management plan and the above recommendations in detail with the parents along with the long term risks and benefits involved. Parents are in agreement and understanding of the above plan.   Please contact my office for any questions or concerns.[phone]  KG

## 2022-02-03 ENCOUNTER — APPOINTMENT (OUTPATIENT)
Dept: ENDOCRINOLOGY | Facility: CLINIC | Age: 62
End: 2022-02-03

## 2022-02-07 ENCOUNTER — FORM ENCOUNTER (OUTPATIENT)
Age: 62
End: 2022-02-07

## 2022-02-16 ENCOUNTER — APPOINTMENT (OUTPATIENT)
Dept: NEPHROLOGY | Facility: CLINIC | Age: 62
End: 2022-02-16

## 2022-02-25 ENCOUNTER — APPOINTMENT (OUTPATIENT)
Dept: HEART AND VASCULAR | Facility: CLINIC | Age: 62
End: 2022-02-25

## 2022-03-04 ENCOUNTER — APPOINTMENT (OUTPATIENT)
Dept: UROLOGY | Facility: CLINIC | Age: 62
End: 2022-03-04

## 2022-03-11 ENCOUNTER — APPOINTMENT (OUTPATIENT)
Dept: CARDIOLOGY | Facility: CLINIC | Age: 62
End: 2022-03-11

## 2022-03-16 ENCOUNTER — APPOINTMENT (OUTPATIENT)
Dept: HEART AND VASCULAR | Facility: CLINIC | Age: 62
End: 2022-03-16
Payer: MEDICARE

## 2022-03-16 VITALS
OXYGEN SATURATION: 94 % | SYSTOLIC BLOOD PRESSURE: 112 MMHG | DIASTOLIC BLOOD PRESSURE: 49 MMHG | BODY MASS INDEX: 31.65 KG/M2 | HEART RATE: 80 BPM | HEIGHT: 65 IN | WEIGHT: 189.99 LBS

## 2022-03-16 DIAGNOSIS — Z01.810 ENCOUNTER FOR PREPROCEDURAL CARDIOVASCULAR EXAMINATION: ICD-10-CM

## 2022-03-16 PROCEDURE — 93000 ELECTROCARDIOGRAM COMPLETE: CPT

## 2022-03-16 PROCEDURE — 99214 OFFICE O/P EST MOD 30 MIN: CPT

## 2022-03-23 NOTE — ASSESSMENT
[FreeTextEntry1] : - Preoperative cardiac optimization RCRI score 4 she is high risk for intermediate risk procedure\par -given her history i would recommend she be admitted for elqiuis to heparin gtt bridge prior to procedure \par - systolic hf on toprol xl 200 daily, entresto 97/101 bid\par - Repeat echo\par - fu in three months

## 2022-03-23 NOTE — HISTORY OF PRESENT ILLNESS
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

## 2022-03-26 ENCOUNTER — INPATIENT (INPATIENT)
Facility: HOSPITAL | Age: 62
LOS: 1 days | Discharge: HOME | DRG: 314 | End: 2022-03-28
Attending: STUDENT IN AN ORGANIZED HEALTH CARE EDUCATION/TRAINING PROGRAM | Admitting: STUDENT IN AN ORGANIZED HEALTH CARE EDUCATION/TRAINING PROGRAM
Payer: MEDICARE

## 2022-03-26 VITALS
DIASTOLIC BLOOD PRESSURE: 54 MMHG | RESPIRATION RATE: 18 BRPM | TEMPERATURE: 98 F | HEART RATE: 80 BPM | WEIGHT: 227.08 LBS | SYSTOLIC BLOOD PRESSURE: 121 MMHG | HEIGHT: 65 IN | OXYGEN SATURATION: 97 %

## 2022-03-26 DIAGNOSIS — Z90.49 ACQUIRED ABSENCE OF OTHER SPECIFIED PARTS OF DIGESTIVE TRACT: Chronic | ICD-10-CM

## 2022-03-26 DIAGNOSIS — Z95.810 PRESENCE OF AUTOMATIC (IMPLANTABLE) CARDIAC DEFIBRILLATOR: Chronic | ICD-10-CM

## 2022-03-26 DIAGNOSIS — Z90.710 ACQUIRED ABSENCE OF BOTH CERVIX AND UTERUS: Chronic | ICD-10-CM

## 2022-03-26 DIAGNOSIS — I63.9 CEREBRAL INFARCTION, UNSPECIFIED: ICD-10-CM

## 2022-03-26 DIAGNOSIS — Z98.51 TUBAL LIGATION STATUS: Chronic | ICD-10-CM

## 2022-03-26 DIAGNOSIS — I10 ESSENTIAL (PRIMARY) HYPERTENSION: ICD-10-CM

## 2022-03-26 DIAGNOSIS — E11.9 TYPE 2 DIABETES MELLITUS WITHOUT COMPLICATIONS: ICD-10-CM

## 2022-03-26 DIAGNOSIS — Z95.828 PRESENCE OF OTHER VASCULAR IMPLANTS AND GRAFTS: Chronic | ICD-10-CM

## 2022-03-26 DIAGNOSIS — Z29.9 ENCOUNTER FOR PROPHYLACTIC MEASURES, UNSPECIFIED: ICD-10-CM

## 2022-03-26 DIAGNOSIS — I82.409 ACUTE EMBOLISM AND THROMBOSIS OF UNSPECIFIED DEEP VEINS OF UNSPECIFIED LOWER EXTREMITY: ICD-10-CM

## 2022-03-26 DIAGNOSIS — E78.5 HYPERLIPIDEMIA, UNSPECIFIED: ICD-10-CM

## 2022-03-26 DIAGNOSIS — J44.9 CHRONIC OBSTRUCTIVE PULMONARY DISEASE, UNSPECIFIED: ICD-10-CM

## 2022-03-26 DIAGNOSIS — N18.6 END STAGE RENAL DISEASE: ICD-10-CM

## 2022-03-26 DIAGNOSIS — N18.4 CHRONIC KIDNEY DISEASE, STAGE 4 (SEVERE): ICD-10-CM

## 2022-03-26 DIAGNOSIS — C90.00 MULTIPLE MYELOMA NOT HAVING ACHIEVED REMISSION: ICD-10-CM

## 2022-03-26 DIAGNOSIS — E03.9 HYPOTHYROIDISM, UNSPECIFIED: ICD-10-CM

## 2022-03-26 DIAGNOSIS — I50.22 CHRONIC SYSTOLIC (CONGESTIVE) HEART FAILURE: ICD-10-CM

## 2022-03-26 DIAGNOSIS — F32.9 MAJOR DEPRESSIVE DISORDER, SINGLE EPISODE, UNSPECIFIED: ICD-10-CM

## 2022-03-26 LAB
ALBUMIN SERPL ELPH-MCNC: 3.8 G/DL — SIGNIFICANT CHANGE UP (ref 3.3–5)
ALP SERPL-CCNC: 70 U/L — SIGNIFICANT CHANGE UP (ref 40–120)
ALT FLD-CCNC: 7 U/L — LOW (ref 10–45)
ANION GAP SERPL CALC-SCNC: 12 MMOL/L — SIGNIFICANT CHANGE UP (ref 5–17)
APTT BLD: 35.9 SEC — HIGH (ref 27.5–35.5)
AST SERPL-CCNC: 12 U/L — SIGNIFICANT CHANGE UP (ref 10–40)
BASOPHILS # BLD AUTO: 0.03 K/UL — SIGNIFICANT CHANGE UP (ref 0–0.2)
BASOPHILS NFR BLD AUTO: 0.4 % — SIGNIFICANT CHANGE UP (ref 0–2)
BILIRUB SERPL-MCNC: 0.3 MG/DL — SIGNIFICANT CHANGE UP (ref 0.2–1.2)
BUN SERPL-MCNC: 30 MG/DL — HIGH (ref 7–23)
CALCIUM SERPL-MCNC: 8.6 MG/DL — SIGNIFICANT CHANGE UP (ref 8.4–10.5)
CHLORIDE SERPL-SCNC: 101 MMOL/L — SIGNIFICANT CHANGE UP (ref 96–108)
CO2 SERPL-SCNC: 30 MMOL/L — SIGNIFICANT CHANGE UP (ref 22–31)
CREAT SERPL-MCNC: 4.32 MG/DL — HIGH (ref 0.5–1.3)
EGFR: 11 ML/MIN/1.73M2 — LOW
EOSINOPHIL # BLD AUTO: 0.22 K/UL — SIGNIFICANT CHANGE UP (ref 0–0.5)
EOSINOPHIL NFR BLD AUTO: 3 % — SIGNIFICANT CHANGE UP (ref 0–6)
GLUCOSE BLDC GLUCOMTR-MCNC: 133 MG/DL — HIGH (ref 70–99)
GLUCOSE SERPL-MCNC: 132 MG/DL — HIGH (ref 70–99)
HBV SURFACE AG SER-ACNC: SIGNIFICANT CHANGE UP
HCT VFR BLD CALC: 33.9 % — LOW (ref 34.5–45)
HCV AB S/CO SERPL IA: 0.04 S/CO — SIGNIFICANT CHANGE UP
HCV AB SERPL-IMP: SIGNIFICANT CHANGE UP
HGB BLD-MCNC: 10.5 G/DL — LOW (ref 11.5–15.5)
IMM GRANULOCYTES NFR BLD AUTO: 0.3 % — SIGNIFICANT CHANGE UP (ref 0–1.5)
INR BLD: 1.04 — SIGNIFICANT CHANGE UP (ref 0.88–1.16)
LYMPHOCYTES # BLD AUTO: 1.01 K/UL — SIGNIFICANT CHANGE UP (ref 1–3.3)
LYMPHOCYTES # BLD AUTO: 13.7 % — SIGNIFICANT CHANGE UP (ref 13–44)
MAGNESIUM SERPL-MCNC: 1.8 MG/DL — SIGNIFICANT CHANGE UP (ref 1.6–2.6)
MCHC RBC-ENTMCNC: 31 GM/DL — LOW (ref 32–36)
MCHC RBC-ENTMCNC: 31 PG — SIGNIFICANT CHANGE UP (ref 27–34)
MCV RBC AUTO: 100 FL — SIGNIFICANT CHANGE UP (ref 80–100)
MONOCYTES # BLD AUTO: 0.82 K/UL — SIGNIFICANT CHANGE UP (ref 0–0.9)
MONOCYTES NFR BLD AUTO: 11.2 % — SIGNIFICANT CHANGE UP (ref 2–14)
NEUTROPHILS # BLD AUTO: 5.25 K/UL — SIGNIFICANT CHANGE UP (ref 1.8–7.4)
NEUTROPHILS NFR BLD AUTO: 71.4 % — SIGNIFICANT CHANGE UP (ref 43–77)
NRBC # BLD: 0 /100 WBCS — SIGNIFICANT CHANGE UP (ref 0–0)
PLATELET # BLD AUTO: 168 K/UL — SIGNIFICANT CHANGE UP (ref 150–400)
POTASSIUM SERPL-MCNC: 5 MMOL/L — SIGNIFICANT CHANGE UP (ref 3.5–5.3)
POTASSIUM SERPL-SCNC: 5 MMOL/L — SIGNIFICANT CHANGE UP (ref 3.5–5.3)
PROT SERPL-MCNC: 6.7 G/DL — SIGNIFICANT CHANGE UP (ref 6–8.3)
PROTHROM AB SERPL-ACNC: 12.4 SEC — SIGNIFICANT CHANGE UP (ref 10.5–13.4)
RBC # BLD: 3.39 M/UL — LOW (ref 3.8–5.2)
RBC # FLD: 15.8 % — HIGH (ref 10.3–14.5)
SARS-COV-2 RNA SPEC QL NAA+PROBE: SIGNIFICANT CHANGE UP
SODIUM SERPL-SCNC: 143 MMOL/L — SIGNIFICANT CHANGE UP (ref 135–145)
WBC # BLD: 7.35 K/UL — SIGNIFICANT CHANGE UP (ref 3.8–10.5)
WBC # FLD AUTO: 7.35 K/UL — SIGNIFICANT CHANGE UP (ref 3.8–10.5)

## 2022-03-26 PROCEDURE — 93010 ELECTROCARDIOGRAM REPORT: CPT

## 2022-03-26 PROCEDURE — 99285 EMERGENCY DEPT VISIT HI MDM: CPT | Mod: 25

## 2022-03-26 PROCEDURE — 36010 PLACE CATHETER IN VEIN: CPT

## 2022-03-26 PROCEDURE — 99223 1ST HOSP IP/OBS HIGH 75: CPT | Mod: GC

## 2022-03-26 PROCEDURE — 71045 X-RAY EXAM CHEST 1 VIEW: CPT | Mod: 26,76

## 2022-03-26 PROCEDURE — 76937 US GUIDE VASCULAR ACCESS: CPT | Mod: 26

## 2022-03-26 RX ORDER — SEVELAMER CARBONATE 2400 MG/1
800 POWDER, FOR SUSPENSION ORAL THREE TIMES A DAY
Refills: 0 | Status: DISCONTINUED | OUTPATIENT
Start: 2022-03-26 | End: 2022-03-28

## 2022-03-26 RX ORDER — METHADONE HYDROCHLORIDE 40 MG/1
10 TABLET ORAL EVERY 8 HOURS
Refills: 0 | Status: DISCONTINUED | OUTPATIENT
Start: 2022-03-26 | End: 2022-03-28

## 2022-03-26 RX ORDER — ACETAMINOPHEN 500 MG
650 TABLET ORAL EVERY 6 HOURS
Refills: 0 | Status: DISCONTINUED | OUTPATIENT
Start: 2022-03-26 | End: 2022-03-27

## 2022-03-26 RX ORDER — ATORVASTATIN CALCIUM 80 MG/1
80 TABLET, FILM COATED ORAL AT BEDTIME
Refills: 0 | Status: DISCONTINUED | OUTPATIENT
Start: 2022-03-26 | End: 2022-03-28

## 2022-03-26 RX ORDER — FENOFIBRATE,MICRONIZED 130 MG
48 CAPSULE ORAL DAILY
Refills: 0 | Status: DISCONTINUED | OUTPATIENT
Start: 2022-03-27 | End: 2022-03-27

## 2022-03-26 RX ORDER — LEVOTHYROXINE SODIUM 125 MCG
50 TABLET ORAL DAILY
Refills: 0 | Status: DISCONTINUED | OUTPATIENT
Start: 2022-03-27 | End: 2022-03-28

## 2022-03-26 RX ORDER — ERYTHROPOIETIN 10000 [IU]/ML
10000 INJECTION, SOLUTION INTRAVENOUS; SUBCUTANEOUS ONCE
Refills: 0 | Status: COMPLETED | OUTPATIENT
Start: 2022-03-26 | End: 2022-03-26

## 2022-03-26 RX ORDER — BUDESONIDE AND FORMOTEROL FUMARATE DIHYDRATE 160; 4.5 UG/1; UG/1
2 AEROSOL RESPIRATORY (INHALATION)
Refills: 0 | Status: DISCONTINUED | OUTPATIENT
Start: 2022-03-26 | End: 2022-03-28

## 2022-03-26 RX ORDER — INSULIN GLARGINE 100 [IU]/ML
8 INJECTION, SOLUTION SUBCUTANEOUS AT BEDTIME
Refills: 0 | Status: DISCONTINUED | OUTPATIENT
Start: 2022-03-26 | End: 2022-03-27

## 2022-03-26 RX ORDER — APIXABAN 2.5 MG/1
2.5 TABLET, FILM COATED ORAL ONCE
Refills: 0 | Status: COMPLETED | OUTPATIENT
Start: 2022-03-26 | End: 2022-03-26

## 2022-03-26 RX ORDER — METOPROLOL TARTRATE 50 MG
100 TABLET ORAL EVERY 24 HOURS
Refills: 0 | Status: DISCONTINUED | OUTPATIENT
Start: 2022-03-27 | End: 2022-03-28

## 2022-03-26 RX ORDER — INSULIN LISPRO 100/ML
2 VIAL (ML) SUBCUTANEOUS
Refills: 0 | Status: DISCONTINUED | OUTPATIENT
Start: 2022-03-26 | End: 2022-03-28

## 2022-03-26 RX ORDER — HEPARIN SODIUM 5000 [USP'U]/ML
7500 INJECTION INTRAVENOUS; SUBCUTANEOUS EVERY 12 HOURS
Refills: 0 | Status: DISCONTINUED | OUTPATIENT
Start: 2022-03-26 | End: 2022-03-26

## 2022-03-26 RX ORDER — TRAZODONE HCL 50 MG
50 TABLET ORAL AT BEDTIME
Refills: 0 | Status: DISCONTINUED | OUTPATIENT
Start: 2022-03-26 | End: 2022-03-28

## 2022-03-26 RX ORDER — APIXABAN 2.5 MG/1
2.5 TABLET, FILM COATED ORAL EVERY 12 HOURS
Refills: 0 | Status: DISCONTINUED | OUTPATIENT
Start: 2022-03-26 | End: 2022-03-26

## 2022-03-26 RX ORDER — CHLORHEXIDINE GLUCONATE 213 G/1000ML
1 SOLUTION TOPICAL
Refills: 0 | Status: DISCONTINUED | OUTPATIENT
Start: 2022-03-26 | End: 2022-03-28

## 2022-03-26 RX ORDER — PANTOPRAZOLE SODIUM 20 MG/1
40 TABLET, DELAYED RELEASE ORAL
Refills: 0 | Status: DISCONTINUED | OUTPATIENT
Start: 2022-03-27 | End: 2022-03-28

## 2022-03-26 RX ORDER — ALBUTEROL 90 UG/1
2 AEROSOL, METERED ORAL EVERY 6 HOURS
Refills: 0 | Status: DISCONTINUED | OUTPATIENT
Start: 2022-03-26 | End: 2022-03-28

## 2022-03-26 RX ORDER — INSULIN LISPRO 100/ML
VIAL (ML) SUBCUTANEOUS
Refills: 0 | Status: DISCONTINUED | OUTPATIENT
Start: 2022-03-26 | End: 2022-03-28

## 2022-03-26 RX ORDER — SACUBITRIL AND VALSARTAN 24; 26 MG/1; MG/1
1 TABLET, FILM COATED ORAL
Refills: 0 | Status: DISCONTINUED | OUTPATIENT
Start: 2022-03-26 | End: 2022-03-28

## 2022-03-26 RX ADMIN — SEVELAMER CARBONATE 800 MILLIGRAM(S): 2400 POWDER, FOR SUSPENSION ORAL at 23:15

## 2022-03-26 RX ADMIN — INSULIN GLARGINE 8 UNIT(S): 100 INJECTION, SOLUTION SUBCUTANEOUS at 23:15

## 2022-03-26 RX ADMIN — APIXABAN 2.5 MILLIGRAM(S): 2.5 TABLET, FILM COATED ORAL at 23:03

## 2022-03-26 RX ADMIN — METHADONE HYDROCHLORIDE 10 MILLIGRAM(S): 40 TABLET ORAL at 23:03

## 2022-03-26 RX ADMIN — ERYTHROPOIETIN 10000 UNIT(S): 10000 INJECTION, SOLUTION INTRAVENOUS; SUBCUTANEOUS at 21:01

## 2022-03-26 RX ADMIN — ATORVASTATIN CALCIUM 80 MILLIGRAM(S): 80 TABLET, FILM COATED ORAL at 23:03

## 2022-03-26 RX ADMIN — BUDESONIDE AND FORMOTEROL FUMARATE DIHYDRATE 2 PUFF(S): 160; 4.5 AEROSOL RESPIRATORY (INHALATION) at 23:15

## 2022-03-26 NOTE — H&P ADULT - HISTORY OF PRESENT ILLNESS
62 yo obese Female ESRD (TTHS), HFrEF (EF 25% s/p AICD), IDDM2, COPD (on 3L home o2) multiple myeloma (on Ninlaro), hypothyroid, depression, neuropathy (on methadone), hx of recurrent DVTs (on 2.5mg eliquis), CVA x2 (w/ residual right weakness), PAD s/p bypass, DHARMESH on CPAP presented to the hospital with complaints of clotted HD catheter. Per patient went to her HD unit in [place] today and they tried to do HD but were not able to, referred her back to the hospital for further evaluation for new access. Pt had catheter placed in december with IR. Patient states last successful HD was done on 3/24, with no issues. Catheter is not functioning currently. She also complains of increased cough feeling as though she has a cold for the last 3 days.    ED Course:  Vitals: Temp 97.6F, HR 80, /54, RR 18, SaO2 97% on RA  Significant Labs: Hgb 10.5, Plt 168, K+ 5.0, BUN/CR 30/4.32  EKG: NSR at 81bpm  Imaging: CXR: increased congestion  Interventions: none  Consults: Nephrology for dialysis and CCNP for temporary access placement 62 yo obese Female ESRD (TTHS), HFrEF (EF 25% s/p AICD), IDDM2, COPD (on 3L home o2) multiple myeloma (on Ninlaro), hypothyroid, depression, neuropathy (on methadone), hx of recurrent DVTs (on 2.5mg eliquis), CVA x2 (w/ residual right weakness), PAD s/p bypass, DHARMESH on CPAP presented to the hospital with complaints of clotted HD catheter. Per patient went to her HD unit in [place] today and they tried to do HD but were not able to, referred her back to the hospital for further evaluation for new access. Pt had catheter placed in december with IR. Patient states last successful HD was done on 3/24, with no issues. Catheter is not functioning currently. She also complains of increased cough feeling as though she has a cold for the last 3 days. Complains of runny nose. Daughter is sick too with similar cold and is negative for COVID.    ED Course:  Vitals: Temp 97.6F, HR 80, /54, RR 18, SaO2 97% on RA  Significant Labs: Hgb 10.5, Plt 168, K+ 5.0, BUN/CR 30/4.32  EKG: NSR at 81bpm  Imaging: CXR: increased congestion  Interventions: none  Consults: Nephrology for dialysis and CCNP for temporary access placement

## 2022-03-26 NOTE — CONSULT NOTE ADULT - ASSESSMENT
60 yo obese Female ESRD (TT), CHF, DM COPD (on 3L home o2) multiple myeloma presented to the hospital with complaints of clotted HD catheter.    Assessment/Plan:   #ESRD on HD TTS @[facility]  usual Rx 4h .9kg?   Last Hd 3/24  -If potassium ok, may potentially be able to defer HD today  -If potassium high will need temp line placed for HD today  -Would consult IR early monday morning  -Make pt NPO and obtain pre-op labs on Sunday w/ COVID swab for possible intervention w/ IR    #HTN   BP at goal   -Restart home BP meds    #access   Clotted RIJ TDC  -will have HD nurse assess    #anemia  Hb at goal   -Epo w/ HD  transfusion as per primary team     #renal bone disease   Ca at goal  Phos at goal   no indication for Hectorol at this time     Thank you for the opportunity to participate in the care of your patient. The nephrology service remains available to assist with any questions or concerns. Please feel free to reach us by paging the on-call nephrology fellow for urgent issues or as below.     Day Astudillo D.O.  PGY-4, Nephrology Fellow    P: [phone]    62 yo obese Female ESRD (TTHS), CHF, DM COPD (on 3L home o2) multiple myeloma presented to the hospital with complaints of clotted HD catheter.    Assessment/Plan:   #ESRD on HD TTS @[facility]  usual Rx 4h .9kg?   Last Hd 3/24  -Potassium noted to be 5  -Per patient would prefer to have Hd today  -Hd nurse will try to see if line works, if it does we will dialyze through the catheter  -Otherwise, will need temp line placed  -Would consult IR early monday morning  -Make pt NPO and obtain pre-op labs on Sunday w/ COVID swab for possible intervention w/ IR    #HTN   BP at goal   -Restart home BP meds    #access   Clotted RIJ TDC  -will have HD nurse assess    #anemia  Hb at goal   -Epo w/ HD  transfusion as per primary team     #renal bone disease   Ca at goal  Phos at goal   no indication for Hectorol at this time     Thank you for the opportunity to participate in the care of your patient. The nephrology service remains available to assist with any questions or concerns. Please feel free to reach us by paging the on-call nephrology fellow for urgent issues or as below.     Day Astudillo D.O.  PGY-4, Nephrology Fellow    P: [phone]

## 2022-03-26 NOTE — H&P ADULT - PROBLEM SELECTOR PLAN 4
Hx of HFrEF 25% in 12/21 s/p AICD    c/w home metoprolol and entresto  monitor volume status with dialysis

## 2022-03-26 NOTE — H&P ADULT - PROBLEM SELECTOR PLAN 1
Pt with R chest wall cath for dialysis. Receives dialysis Tues/Thurs/Sat, last HD 3/24. Tunneled cath clogged and nonfunctional. Now s/p [facility] dialysis catheter. Will get dialysis today.   -f/u nephro recs  -Potassium noted to be 5  -Would consult IR early monday morning  -Make pt NPO and obtain pre-op labs on Sunday w/ COVID swab for possible intervention w/ IR  -c/w sevelamer

## 2022-03-26 NOTE — ED ADULT NURSE NOTE - NURSING ED SKIN COLOR
[id number] [employer]. [address] [place] [address]. Geri Aquino CNP      normal for race

## 2022-03-26 NOTE — ED PROVIDER NOTE - OBJECTIVE STATEMENT
61 Female with hx of ESRD on HD, CHF, DM COPD (on 3L home o2) multiple myeloma who p/w clogged permacath 61 Female with hx of ESRD on HD, CHF, DM COPD (on 3L home o2) multiple myeloma who p/w clogged R permacath, she was unable to get dialysis today. She also c/o cough for a few days, feels like she has a cold. Fully vaccinated against covid. No f/c, no cp, no dizziness or syncope.

## 2022-03-26 NOTE — H&P ADULT - PROBLEM SELECTOR PLAN 3
on lantus 10U and lispro 3U premeal    -f/u a1c  -restart insulin at 70-80% of home regimen - 8U lantus and 2U premeal  -mISS  -adjust as needed on lantus 10U and lispro 3U premeal    -f/u a1c  -restart insulin at 70-80% of home regimen - 8U lantus and 2U premeal  -mISS  -adjust as needed    #neuropathy  - takes methadone 10mg TID for peripheral neuropathy prescribed by Dr. Oliveira outpatient pain management doctor

## 2022-03-26 NOTE — ED PROVIDER NOTE - PHYSICAL EXAMINATION
GEN: Well appearing, well developed, awake, alert, oriented to person, place, time/situation and in no apparent distress. NTAF  ENT: Airway patent, Nasal congestion. Mouth with normal mucosa.  EYES: Clear bilaterally. PERRL, EOMI  RESPIRATORY: Breathing comfortably with normal RR. Occasional cough, satting well on 3L NC, no hypoxia or resp distress.  Right chest wall dialysis cath  CARDIAC: Regular rate and rhythm, no M/R/G  ABDOMEN: Soft, nontender, +bowel sounds, no rebound, rigidity, or guarding.  MSK: Range of motion is not limited, no deformities noted.  NEURO: Alert and oriented, no focal deficits.  SKIN: Skin normal color for race, warm, dry and intact. No evidence of rash.  PSYCH: Alert and oriented to person, place, time/situation. normal mood and affect. no apparent risk to self or others.

## 2022-03-26 NOTE — PATIENT PROFILE ADULT - FALL HARM RISK - HARM RISK INTERVENTIONS

## 2022-03-26 NOTE — ED ADULT TRIAGE NOTE - CHIEF COMPLAINT QUOTE
pt BIBA from dialysis center for eval of clogged right chest wall HD access unable to get dialyzed today. Pt denies CP reports SOB but uses 3L chronic home O2 noted productive cough feeling sick last few days fully vaccinated for covid

## 2022-03-26 NOTE — ED PROVIDER NOTE - CARE PLAN
Principal Discharge DX:	ESRD on dialysis  Secondary Diagnosis:	Complication of vascular dialysis catheter   1

## 2022-03-26 NOTE — H&P ADULT - ASSESSMENT
62 yo obese Female ESRD (TTHS), HFrEF (EF 25% s/p AICD), IDDM2, COPD (on 3L home o2) multiple myeloma (on Ninlaro), hypothyroid, depression, neuropathy (on methadone), hx of recurrent DVTs (on 2.5mg eliquis), CVA x2 (w/ residual right weakness), PAD s/p bypass, DHARMESH on CPAP presented to the hospital with complaints of clotted HD catheter s/p new [facility] dialysis catheter placement going for dialysis today and dialysis catheter replacement on Monday.

## 2022-03-26 NOTE — H&P ADULT - NSHPLABSRESULTS_GEN_ALL_CORE
10.5   7.35  )-----------( 168      ( 26 Mar 2022 13:50 )             33.9   03-26    143  |  101  |  30<H>  ----------------------------<  132<H>  5.0   |  30  |  4.32<H>    Ca    8.6      26 Mar 2022 13:51    TPro  6.7  /  Alb  3.8  /  TBili  0.3  /  DBili  x   /  AST  12  /  ALT  7<L>  /  AlkPhos  70  03-26

## 2022-03-26 NOTE — ED ADULT NURSE NOTE - OBJECTIVE STATEMENT
Patient alert oriented x 3  presented to Ed for clogged right chest wall HD access unable to get dialyzed today. Pt denies CP reports SOB but uses 3L chronic home O2 noted productive cough feeling sick last few days. Dialysis days are T-TH-S . Patient is in no apparent distress

## 2022-03-26 NOTE — H&P ADULT - PROBLEM SELECTOR PLAN 9
pt with multiple recurrent dvts in LE. on eliquis 2.5mg at home    - restart eliquis post dialysis catheter placement  - may need to hold until monday for IR procedure pt with multiple recurrent dvts in LE. on eliquis 2.5mg at home    - restart eliquis post dialysis catheter placement for 2 doses holding after for procedure on monday  - restart after procedure when cleared by IR

## 2022-03-26 NOTE — PATIENT PROFILE ADULT - STATED REASON FOR ADMISSION
I was bleeding through my mouth every marciano eI cough and the lumps on my armpit and the lesions on my foot  "My catheter is clogged."

## 2022-03-26 NOTE — H&P ADULT - PROBLEM SELECTOR PLAN 12
F: none  E: replete cautiously as dialysis pt  N: renal diet consistent card dash diet  DVT: holding for now - will restart eliquis   GI: pantoprazole  Dispo: RMF then home  FULL CODE F: none  E: replete cautiously as dialysis pt  N: renal diet consistent card dash diet  DVT: eliquis 2 doses  GI: pantoprazole  Dispo: RMF then home  FULL CODE

## 2022-03-26 NOTE — H&P ADULT - NSHPREVIEWOFSYSTEMS_GEN_ALL_CORE
REVIEW OF SYSTEMS:    CONSTITUTIONAL: No weakness, fevers or chills  EYES/ENT: No visual changes;  No vertigo or throat pain   NECK: No pain or stiffness  RESPIRATORY: +cough  CARDIOVASCULAR: No chest pain or palpitations  GASTROINTESTINAL: No abdominal or epigastric pain. No nausea, vomiting, or hematemesis; No diarrhea or constipation. No melena or hematochezia.  GENITOURINARY: No dysuria, frequency or hematuria  NEUROLOGICAL: No numbness or weakness  SKIN: No itching, rashes

## 2022-03-26 NOTE — PROCEDURE NOTE - NSICDXPROCEDURE_GEN_ALL_CORE_FT
PROCEDURES:  Central venous catheter placement with ultrasound guidance 26-Mar-2022 17:21:58  iLssy Steele

## 2022-03-26 NOTE — H&P ADULT - NSHPSOCIALHISTORY_GEN_ALL_CORE
lives at home with daughter and , smokes 1ppd for 20+ years, denies alcohol and drug use, uses 3L O2 at home, walks with walker

## 2022-03-26 NOTE — H&P ADULT - NSHPPHYSICALEXAM_GEN_ALL_CORE
Vital Signs Last 24 Hrs  T(C): 36.4 (26 Mar 2022 12:55), Max: 36.4 (26 Mar 2022 12:55)  T(F): 97.6 (26 Mar 2022 12:55), Max: 97.6 (26 Mar 2022 12:55)  HR: 67 (26 Mar 2022 13:05) (67 - 80)  BP: 138/61 (26 Mar 2022 13:05) (121/54 - 138/61)  BP(mean): --  RR: 18 (26 Mar 2022 13:05) (18 - 18)  SpO2: 96% (26 Mar 2022 13:05) (96% - 97%)    GENERAL: Alert, awake, oriented x3 on NC   HEENT: JAQUELIN, EOMI, neck supple, no JVP  CHEST/LUNG: Bilateral clear breath sounds  HEART: Regular rate and rhythm, no murmur, no gallops, no rub   ABDOMEN: Soft, nontender, non distended  EXTREMITIES: 1+ pitting edema, WWP  Neurology: AAOx3, no asterixis   SKIN: No rash or skin lesion   ACCESS: RIJ TDC and now Left IJ

## 2022-03-26 NOTE — PATIENT PROFILE ADULT - PATIENT REPRESENTATIVE: ( YOU CAN CHOOSE ANY PERSON THAT CAN ASSIST YOU WITH YOUR HEALTH CARE PREFERENCES, DOES NOT HAVE TO BE A SPOUSE, IMMEDIATE FAMILY OR SIGNIFICANT OTHER/PARTNER)
Dr. Grijalva recommends: Di/Di twins  37w1d     Continue Aspirin 81 mg daily   Continue Iron supplement 1 tablet daily  Continue Prenatal vitamin with folic acid     Offered Genetic Counseling-declined        Continue routine pregnancy care with Dr. Thacker     Recommend delivery during the 38th week gestation-  planned for 20     Fetal Movement Counting (\"Kick Counts\")  How to count your baby's movements:  -Try to do your kick counts at about the same time each day during your baby's active time.  -Note the time. Count your baby's movements until you feel ten movements. Note the time again.   -If you felt ten movements in less than two hours, it is reassuring and you may resume your normal activities for the day and count again tomorrow.  -Always remain aware of your baby's movements.  -If it took longer than two hours to count ten movements, call your doctor right away for recommendations.  
declines

## 2022-03-26 NOTE — H&P ADULT - ATTENDING COMMENTS
Agree with assessment and plan as documented by resident.  --continue eliquis tonight and will hold tomorrow in anticipation of IR guided catheter exchange   --HD today per nephro team  --methadone standing dose verified w pharmacy - will start dosing 10pm tonight after HD

## 2022-03-26 NOTE — PROCEDURE NOTE - ADDITIONAL PROCEDURE DETAILS
+Lung sliding on POCUS after procedure. Wire visualized in long and short axis of venous vessel prior to dilation.

## 2022-03-26 NOTE — H&P ADULT - PROBLEM SELECTOR PLAN 11
on Ninlaro at home on monday    restart on discharge on Ninlaro at home on monday  she also gets decadron 20mg on mondays (once a week) likely related to multiple myeloma but must clarify with oncologist on monday regarding its use  restart on discharge

## 2022-03-26 NOTE — ED PROVIDER NOTE - CLINICAL SUMMARY MEDICAL DECISION MAKING FREE TEXT BOX
61 Female with hx of ESRD on HD, CHF, DM COPD (on 3L home o2) multiple myeloma who p/w clogged R permacath, she was unable to get dialysis today. She also c/o cough for a few days, feels like she has a cold. Fully vaccinated against covid. No f/c, no cp, no dizziness or syncope.  Labs show K of 5, no ekg changes, CXR no effusion, pt without hypoxia on her baseline 3L NC.   Pt seen by renal fellow who recommend admission for dialysis, pt stable for RMF at this time.

## 2022-03-27 LAB
A1C WITH ESTIMATED AVERAGE GLUCOSE RESULT: 7.3 % — HIGH (ref 4–5.6)
ALBUMIN SERPL ELPH-MCNC: 3.9 G/DL — SIGNIFICANT CHANGE UP (ref 3.3–5)
ALP SERPL-CCNC: 65 U/L — SIGNIFICANT CHANGE UP (ref 40–120)
ALT FLD-CCNC: 8 U/L — LOW (ref 10–45)
ANION GAP SERPL CALC-SCNC: 12 MMOL/L — SIGNIFICANT CHANGE UP (ref 5–17)
AST SERPL-CCNC: 14 U/L — SIGNIFICANT CHANGE UP (ref 10–40)
BASOPHILS # BLD AUTO: 0.03 K/UL — SIGNIFICANT CHANGE UP (ref 0–0.2)
BASOPHILS NFR BLD AUTO: 0.4 % — SIGNIFICANT CHANGE UP (ref 0–2)
BILIRUB SERPL-MCNC: 0.3 MG/DL — SIGNIFICANT CHANGE UP (ref 0.2–1.2)
BUN SERPL-MCNC: 27 MG/DL — HIGH (ref 7–23)
CALCIUM SERPL-MCNC: 8.5 MG/DL — SIGNIFICANT CHANGE UP (ref 8.4–10.5)
CHLORIDE SERPL-SCNC: 102 MMOL/L — SIGNIFICANT CHANGE UP (ref 96–108)
CHOLEST SERPL-MCNC: 174 MG/DL — SIGNIFICANT CHANGE UP
CO2 SERPL-SCNC: 26 MMOL/L — SIGNIFICANT CHANGE UP (ref 22–31)
CREAT SERPL-MCNC: 3.81 MG/DL — HIGH (ref 0.5–1.3)
EGFR: 13 ML/MIN/1.73M2 — LOW
EOSINOPHIL # BLD AUTO: 0.3 K/UL — SIGNIFICANT CHANGE UP (ref 0–0.5)
EOSINOPHIL NFR BLD AUTO: 4.3 % — SIGNIFICANT CHANGE UP (ref 0–6)
ESTIMATED AVERAGE GLUCOSE: 163 MG/DL — HIGH (ref 68–114)
GLUCOSE BLDC GLUCOMTR-MCNC: 121 MG/DL — HIGH (ref 70–99)
GLUCOSE BLDC GLUCOMTR-MCNC: 149 MG/DL — HIGH (ref 70–99)
GLUCOSE BLDC GLUCOMTR-MCNC: 172 MG/DL — HIGH (ref 70–99)
GLUCOSE BLDC GLUCOMTR-MCNC: 93 MG/DL — SIGNIFICANT CHANGE UP (ref 70–99)
GLUCOSE SERPL-MCNC: 199 MG/DL — HIGH (ref 70–99)
HBV SURFACE AB SER-ACNC: SIGNIFICANT CHANGE UP
HCT VFR BLD CALC: 33.9 % — LOW (ref 34.5–45)
HDLC SERPL-MCNC: 42 MG/DL — LOW
HGB BLD-MCNC: 10.3 G/DL — LOW (ref 11.5–15.5)
IMM GRANULOCYTES NFR BLD AUTO: 0.1 % — SIGNIFICANT CHANGE UP (ref 0–1.5)
LIPID PNL WITH DIRECT LDL SERPL: 91 MG/DL — SIGNIFICANT CHANGE UP
LYMPHOCYTES # BLD AUTO: 1.02 K/UL — SIGNIFICANT CHANGE UP (ref 1–3.3)
LYMPHOCYTES # BLD AUTO: 14.5 % — SIGNIFICANT CHANGE UP (ref 13–44)
MAGNESIUM SERPL-MCNC: 1.7 MG/DL — SIGNIFICANT CHANGE UP (ref 1.6–2.6)
MCHC RBC-ENTMCNC: 29.9 PG — SIGNIFICANT CHANGE UP (ref 27–34)
MCHC RBC-ENTMCNC: 30.4 GM/DL — LOW (ref 32–36)
MCV RBC AUTO: 98.3 FL — SIGNIFICANT CHANGE UP (ref 80–100)
MONOCYTES # BLD AUTO: 0.83 K/UL — SIGNIFICANT CHANGE UP (ref 0–0.9)
MONOCYTES NFR BLD AUTO: 11.8 % — SIGNIFICANT CHANGE UP (ref 2–14)
NEUTROPHILS # BLD AUTO: 4.85 K/UL — SIGNIFICANT CHANGE UP (ref 1.8–7.4)
NEUTROPHILS NFR BLD AUTO: 68.9 % — SIGNIFICANT CHANGE UP (ref 43–77)
NON HDL CHOLESTEROL: 132 MG/DL — HIGH
NRBC # BLD: 0 /100 WBCS — SIGNIFICANT CHANGE UP (ref 0–0)
PHOSPHATE SERPL-MCNC: 4.6 MG/DL — HIGH (ref 2.5–4.5)
PLATELET # BLD AUTO: 147 K/UL — LOW (ref 150–400)
POTASSIUM SERPL-MCNC: 4.2 MMOL/L — SIGNIFICANT CHANGE UP (ref 3.5–5.3)
POTASSIUM SERPL-SCNC: 4.2 MMOL/L — SIGNIFICANT CHANGE UP (ref 3.5–5.3)
PROT SERPL-MCNC: 6.7 G/DL — SIGNIFICANT CHANGE UP (ref 6–8.3)
RBC # BLD: 3.45 M/UL — LOW (ref 3.8–5.2)
RBC # FLD: 15.6 % — HIGH (ref 10.3–14.5)
SODIUM SERPL-SCNC: 140 MMOL/L — SIGNIFICANT CHANGE UP (ref 135–145)
TRIGL SERPL-MCNC: 203 MG/DL — HIGH
WBC # BLD: 7.04 K/UL — SIGNIFICANT CHANGE UP (ref 3.8–10.5)
WBC # FLD AUTO: 7.04 K/UL — SIGNIFICANT CHANGE UP (ref 3.8–10.5)

## 2022-03-27 PROCEDURE — 99232 SBSQ HOSP IP/OBS MODERATE 35: CPT

## 2022-03-27 RX ORDER — FENOFIBRATE,MICRONIZED 130 MG
48 CAPSULE ORAL DAILY
Refills: 0 | Status: DISCONTINUED | OUTPATIENT
Start: 2022-03-28 | End: 2022-03-28

## 2022-03-27 RX ORDER — ACETAMINOPHEN 500 MG
650 TABLET ORAL EVERY 4 HOURS
Refills: 0 | Status: DISCONTINUED | OUTPATIENT
Start: 2022-03-27 | End: 2022-03-27

## 2022-03-27 RX ORDER — ACETAMINOPHEN 500 MG
650 TABLET ORAL EVERY 6 HOURS
Refills: 0 | Status: DISCONTINUED | OUTPATIENT
Start: 2022-03-27 | End: 2022-03-28

## 2022-03-27 RX ORDER — GABAPENTIN 400 MG/1
300 CAPSULE ORAL AT BEDTIME
Refills: 0 | Status: DISCONTINUED | OUTPATIENT
Start: 2022-03-27 | End: 2022-03-28

## 2022-03-27 RX ORDER — IPRATROPIUM/ALBUTEROL SULFATE 18-103MCG
3 AEROSOL WITH ADAPTER (GRAM) INHALATION EVERY 8 HOURS
Refills: 0 | Status: DISCONTINUED | OUTPATIENT
Start: 2022-03-27 | End: 2022-03-28

## 2022-03-27 RX ORDER — SODIUM CHLORIDE 0.65 %
1 AEROSOL, SPRAY (ML) NASAL EVERY 4 HOURS
Refills: 0 | Status: DISCONTINUED | OUTPATIENT
Start: 2022-03-27 | End: 2022-03-28

## 2022-03-27 RX ORDER — GABAPENTIN 400 MG/1
300 CAPSULE ORAL ONCE
Refills: 0 | Status: COMPLETED | OUTPATIENT
Start: 2022-03-27 | End: 2022-03-27

## 2022-03-27 RX ORDER — INSULIN GLARGINE 100 [IU]/ML
4 INJECTION, SOLUTION SUBCUTANEOUS AT BEDTIME
Refills: 0 | Status: DISCONTINUED | OUTPATIENT
Start: 2022-03-27 | End: 2022-03-28

## 2022-03-27 RX ADMIN — METHADONE HYDROCHLORIDE 10 MILLIGRAM(S): 40 TABLET ORAL at 14:08

## 2022-03-27 RX ADMIN — GABAPENTIN 300 MILLIGRAM(S): 400 CAPSULE ORAL at 21:42

## 2022-03-27 RX ADMIN — SACUBITRIL AND VALSARTAN 1 TABLET(S): 24; 26 TABLET, FILM COATED ORAL at 18:24

## 2022-03-27 RX ADMIN — SEVELAMER CARBONATE 800 MILLIGRAM(S): 2400 POWDER, FOR SUSPENSION ORAL at 14:08

## 2022-03-27 RX ADMIN — Medication 2: at 13:04

## 2022-03-27 RX ADMIN — INSULIN GLARGINE 4 UNIT(S): 100 INJECTION, SOLUTION SUBCUTANEOUS at 21:40

## 2022-03-27 RX ADMIN — Medication 48 MILLIGRAM(S): at 12:42

## 2022-03-27 RX ADMIN — Medication 10 MILLIGRAM(S): at 12:42

## 2022-03-27 RX ADMIN — GABAPENTIN 300 MILLIGRAM(S): 400 CAPSULE ORAL at 16:15

## 2022-03-27 RX ADMIN — Medication 2 UNIT(S): at 18:23

## 2022-03-27 RX ADMIN — CHLORHEXIDINE GLUCONATE 1 APPLICATION(S): 213 SOLUTION TOPICAL at 06:44

## 2022-03-27 RX ADMIN — BUDESONIDE AND FORMOTEROL FUMARATE DIHYDRATE 2 PUFF(S): 160; 4.5 AEROSOL RESPIRATORY (INHALATION) at 09:33

## 2022-03-27 RX ADMIN — Medication 100 MILLIGRAM(S): at 09:36

## 2022-03-27 RX ADMIN — SEVELAMER CARBONATE 800 MILLIGRAM(S): 2400 POWDER, FOR SUSPENSION ORAL at 21:39

## 2022-03-27 RX ADMIN — Medication 50 MICROGRAM(S): at 06:40

## 2022-03-27 RX ADMIN — Medication 2 UNIT(S): at 13:04

## 2022-03-27 RX ADMIN — PANTOPRAZOLE SODIUM 40 MILLIGRAM(S): 20 TABLET, DELAYED RELEASE ORAL at 06:40

## 2022-03-27 RX ADMIN — Medication 650 MILLIGRAM(S): at 21:39

## 2022-03-27 RX ADMIN — METHADONE HYDROCHLORIDE 10 MILLIGRAM(S): 40 TABLET ORAL at 21:39

## 2022-03-27 RX ADMIN — Medication 650 MILLIGRAM(S): at 22:39

## 2022-03-27 RX ADMIN — SACUBITRIL AND VALSARTAN 1 TABLET(S): 24; 26 TABLET, FILM COATED ORAL at 06:40

## 2022-03-27 RX ADMIN — Medication 1 SPRAY(S): at 22:11

## 2022-03-27 RX ADMIN — BUDESONIDE AND FORMOTEROL FUMARATE DIHYDRATE 2 PUFF(S): 160; 4.5 AEROSOL RESPIRATORY (INHALATION) at 21:38

## 2022-03-27 RX ADMIN — SEVELAMER CARBONATE 800 MILLIGRAM(S): 2400 POWDER, FOR SUSPENSION ORAL at 06:40

## 2022-03-27 RX ADMIN — Medication 2 UNIT(S): at 09:00

## 2022-03-27 RX ADMIN — METHADONE HYDROCHLORIDE 10 MILLIGRAM(S): 40 TABLET ORAL at 06:40

## 2022-03-27 RX ADMIN — ATORVASTATIN CALCIUM 80 MILLIGRAM(S): 80 TABLET, FILM COATED ORAL at 21:39

## 2022-03-27 NOTE — PROGRESS NOTE ADULT - PROBLEM SELECTOR PLAN 12
DVT: holding eliquis pre-procedure monday  GI: pantoprazole  Dispo: RMF then anticipate home once able to use HD permacath  FULL CODE

## 2022-03-27 NOTE — PROGRESS NOTE ADULT - PROBLEM SELECTOR PLAN 1
hemodialysis done yesterday evening following placement of a L IJ  hypotensive during dialysis session with no apparent symptoms  400cc UF removed  for Permacath evaluation tomorrow
Pt with R chest wall cath for dialysis. Receives dialysis Tues/Thurs/Sat, last HD 3/24. Tunneled cath clogged and nonfunctional. Now s/p LIJ dialysis catheter and adjunct HD on 3/26  -f/u nephro recs  -IR consult in place, will discuss early monday AM to add on case  -NPOpMN tonight, obtain pre-op labs w/ COVID swab for possible intervention w/ IR  -c/w sevelamer

## 2022-03-27 NOTE — PROGRESS NOTE ADULT - PROBLEM SELECTOR PLAN 11
on Ninlaro at home on monday  she also gets decadron 20mg on mondays (once a week) likely related to multiple myeloma but must clarify with oncologist on monday regarding its use  restart on discharge

## 2022-03-27 NOTE — PROGRESS NOTE ADULT - PROBLEM SELECTOR PLAN 9
pt with multiple recurrent dvts in LE. on eliquis 2.5mg at home    - restart eliquis after procedure when cleared by IR

## 2022-03-27 NOTE — PROGRESS NOTE ADULT - ASSESSMENT
60 yo obese Female ESRD (TTHS), CHF, DM COPD (on 3L home o2) multiple myeloma presented to the hospital with complaints of clotted Right Permacath catheter  Left IJ catheter placed upon admission and patient was dialyzed for 3 1/2 hours

## 2022-03-27 NOTE — PROGRESS NOTE ADULT - PROBLEM SELECTOR PLAN 3
on lantus 10U and lispro 3U premeal    -f/u a1c  -restart insulin at 70-80% of home regimen - 8U lantus and 2U premeal  -mISS  -adjust as needed    #neuropathy  - takes methadone 10mg TID for peripheral neuropathy prescribed by Dr. Oliveira outpatient pain management doctor  - add on gabapentin as she has neuropathic pain too

## 2022-03-28 ENCOUNTER — TRANSCRIPTION ENCOUNTER (OUTPATIENT)
Age: 62
End: 2022-03-28

## 2022-03-28 VITALS
RESPIRATION RATE: 18 BRPM | TEMPERATURE: 97 F | OXYGEN SATURATION: 98 % | SYSTOLIC BLOOD PRESSURE: 109 MMHG | WEIGHT: 222.23 LBS | HEART RATE: 56 BPM | DIASTOLIC BLOOD PRESSURE: 54 MMHG

## 2022-03-28 LAB
ANION GAP SERPL CALC-SCNC: 12 MMOL/L — SIGNIFICANT CHANGE UP (ref 5–17)
BUN SERPL-MCNC: 34 MG/DL — HIGH (ref 7–23)
CALCIUM SERPL-MCNC: 8.5 MG/DL — SIGNIFICANT CHANGE UP (ref 8.4–10.5)
CHLORIDE SERPL-SCNC: 101 MMOL/L — SIGNIFICANT CHANGE UP (ref 96–108)
CO2 SERPL-SCNC: 25 MMOL/L — SIGNIFICANT CHANGE UP (ref 22–31)
CREAT SERPL-MCNC: 4.67 MG/DL — HIGH (ref 0.5–1.3)
EGFR: 10 ML/MIN/1.73M2 — LOW
GLUCOSE BLDC GLUCOMTR-MCNC: 115 MG/DL — HIGH (ref 70–99)
GLUCOSE BLDC GLUCOMTR-MCNC: 117 MG/DL — HIGH (ref 70–99)
GLUCOSE BLDC GLUCOMTR-MCNC: 76 MG/DL — SIGNIFICANT CHANGE UP (ref 70–99)
GLUCOSE SERPL-MCNC: 115 MG/DL — HIGH (ref 70–99)
HCT VFR BLD CALC: 34.2 % — LOW (ref 34.5–45)
HGB BLD-MCNC: 10.2 G/DL — LOW (ref 11.5–15.5)
MAGNESIUM SERPL-MCNC: 2 MG/DL — SIGNIFICANT CHANGE UP (ref 1.6–2.6)
MCHC RBC-ENTMCNC: 29.5 PG — SIGNIFICANT CHANGE UP (ref 27–34)
MCHC RBC-ENTMCNC: 29.8 GM/DL — LOW (ref 32–36)
MCV RBC AUTO: 98.8 FL — SIGNIFICANT CHANGE UP (ref 80–100)
NRBC # BLD: 0 /100 WBCS — SIGNIFICANT CHANGE UP (ref 0–0)
PLATELET # BLD AUTO: 159 K/UL — SIGNIFICANT CHANGE UP (ref 150–400)
POTASSIUM SERPL-MCNC: 4.7 MMOL/L — SIGNIFICANT CHANGE UP (ref 3.5–5.3)
POTASSIUM SERPL-SCNC: 4.7 MMOL/L — SIGNIFICANT CHANGE UP (ref 3.5–5.3)
RBC # BLD: 3.46 M/UL — LOW (ref 3.8–5.2)
RBC # FLD: 15.7 % — HIGH (ref 10.3–14.5)
SARS-COV-2 RNA SPEC QL NAA+PROBE: SIGNIFICANT CHANGE UP
SODIUM SERPL-SCNC: 138 MMOL/L — SIGNIFICANT CHANGE UP (ref 135–145)
WBC # BLD: 7.21 K/UL — SIGNIFICANT CHANGE UP (ref 3.8–10.5)
WBC # FLD AUTO: 7.21 K/UL — SIGNIFICANT CHANGE UP (ref 3.8–10.5)

## 2022-03-28 PROCEDURE — 86803 HEPATITIS C AB TEST: CPT

## 2022-03-28 PROCEDURE — 80061 LIPID PANEL: CPT

## 2022-03-28 PROCEDURE — 86706 HEP B SURFACE ANTIBODY: CPT

## 2022-03-28 PROCEDURE — 87340 HEPATITIS B SURFACE AG IA: CPT

## 2022-03-28 PROCEDURE — 99152 MOD SED SAME PHYS/QHP 5/>YRS: CPT

## 2022-03-28 PROCEDURE — 36581 REPLACE TUNNELED CV CATH: CPT

## 2022-03-28 PROCEDURE — 83735 ASSAY OF MAGNESIUM: CPT

## 2022-03-28 PROCEDURE — 94640 AIRWAY INHALATION TREATMENT: CPT

## 2022-03-28 PROCEDURE — 87635 SARS-COV-2 COVID-19 AMP PRB: CPT

## 2022-03-28 PROCEDURE — 71045 X-RAY EXAM CHEST 1 VIEW: CPT

## 2022-03-28 PROCEDURE — 84100 ASSAY OF PHOSPHORUS: CPT

## 2022-03-28 PROCEDURE — 85610 PROTHROMBIN TIME: CPT

## 2022-03-28 PROCEDURE — 90935 HEMODIALYSIS ONE EVALUATION: CPT

## 2022-03-28 PROCEDURE — 77001 FLUOROGUIDE FOR VEIN DEVICE: CPT

## 2022-03-28 PROCEDURE — 71045 X-RAY EXAM CHEST 1 VIEW: CPT | Mod: 26

## 2022-03-28 PROCEDURE — 99447 NTRPROF PH1/NTRNET/EHR 11-20: CPT

## 2022-03-28 PROCEDURE — U0005: CPT

## 2022-03-28 PROCEDURE — 99285 EMERGENCY DEPT VISIT HI MDM: CPT | Mod: 25

## 2022-03-28 PROCEDURE — 99153 MOD SED SAME PHYS/QHP EA: CPT

## 2022-03-28 PROCEDURE — 80048 BASIC METABOLIC PNL TOTAL CA: CPT

## 2022-03-28 PROCEDURE — 36415 COLL VENOUS BLD VENIPUNCTURE: CPT

## 2022-03-28 PROCEDURE — U0003: CPT

## 2022-03-28 PROCEDURE — 85730 THROMBOPLASTIN TIME PARTIAL: CPT

## 2022-03-28 PROCEDURE — 83036 HEMOGLOBIN GLYCOSYLATED A1C: CPT

## 2022-03-28 PROCEDURE — 82962 GLUCOSE BLOOD TEST: CPT

## 2022-03-28 PROCEDURE — C1769: CPT

## 2022-03-28 PROCEDURE — 77001 FLUOROGUIDE FOR VEIN DEVICE: CPT | Mod: 26

## 2022-03-28 PROCEDURE — 99239 HOSP IP/OBS DSCHRG MGMT >30: CPT | Mod: GC

## 2022-03-28 PROCEDURE — 85025 COMPLETE CBC W/AUTO DIFF WBC: CPT

## 2022-03-28 PROCEDURE — 85027 COMPLETE CBC AUTOMATED: CPT

## 2022-03-28 PROCEDURE — C1750: CPT

## 2022-03-28 PROCEDURE — 93005 ELECTROCARDIOGRAM TRACING: CPT

## 2022-03-28 PROCEDURE — 80053 COMPREHEN METABOLIC PANEL: CPT

## 2022-03-28 RX ORDER — BUDESONIDE AND FORMOTEROL FUMARATE DIHYDRATE 160; 4.5 UG/1; UG/1
160-4.5 AEROSOL RESPIRATORY (INHALATION)
Qty: 10.2 | Refills: 3 | Status: ACTIVE | COMMUNITY
Start: 2018-09-12 | End: 1900-01-01

## 2022-03-28 RX ORDER — GABAPENTIN 400 MG/1
1 CAPSULE ORAL
Qty: 0 | Refills: 0 | DISCHARGE
Start: 2022-03-28

## 2022-03-28 RX ADMIN — METHADONE HYDROCHLORIDE 10 MILLIGRAM(S): 40 TABLET ORAL at 06:10

## 2022-03-28 RX ADMIN — SACUBITRIL AND VALSARTAN 1 TABLET(S): 24; 26 TABLET, FILM COATED ORAL at 06:10

## 2022-03-28 RX ADMIN — SEVELAMER CARBONATE 800 MILLIGRAM(S): 2400 POWDER, FOR SUSPENSION ORAL at 13:33

## 2022-03-28 RX ADMIN — Medication 650 MILLIGRAM(S): at 09:33

## 2022-03-28 RX ADMIN — CHLORHEXIDINE GLUCONATE 1 APPLICATION(S): 213 SOLUTION TOPICAL at 06:10

## 2022-03-28 RX ADMIN — SEVELAMER CARBONATE 800 MILLIGRAM(S): 2400 POWDER, FOR SUSPENSION ORAL at 06:10

## 2022-03-28 RX ADMIN — Medication 650 MILLIGRAM(S): at 10:15

## 2022-03-28 RX ADMIN — Medication 48 MILLIGRAM(S): at 13:33

## 2022-03-28 RX ADMIN — Medication 50 MILLIGRAM(S): at 00:12

## 2022-03-28 RX ADMIN — Medication 50 MICROGRAM(S): at 06:10

## 2022-03-28 RX ADMIN — Medication 10 MILLIGRAM(S): at 13:33

## 2022-03-28 RX ADMIN — METHADONE HYDROCHLORIDE 10 MILLIGRAM(S): 40 TABLET ORAL at 13:34

## 2022-03-28 RX ADMIN — BUDESONIDE AND FORMOTEROL FUMARATE DIHYDRATE 2 PUFF(S): 160; 4.5 AEROSOL RESPIRATORY (INHALATION) at 09:32

## 2022-03-28 RX ADMIN — PANTOPRAZOLE SODIUM 40 MILLIGRAM(S): 20 TABLET, DELAYED RELEASE ORAL at 06:10

## 2022-03-28 NOTE — DISCHARGE NOTE NURSING/CASE MANAGEMENT/SOCIAL WORK - NSDCPEPAMP_GEN_ALL_CORE
-- DO NOT REPLY / DO NOT REPLY ALL --  -- Message is from the [facility]--    General Patient Message      Reason for Call: Patient called stating she needs to speak to PCP or clinical staff in regards to wanting to know can she have an order put in to have a flu swab please call to discuss at your convenience if that is something that is provided     Caller Information       Type Contact Phone    03/18/2022 08:07 AM CDT Phone (Incoming) Radha Devries (Self) [phone] (M)          Alternative phone number: None    Turnaround time given to caller:   \"This message will be sent to [state Provider's name]. The clinical team will fulfill your request as soon as they review your message.\"     “[facility] for Tobacco Control” pamphlet given

## 2022-03-28 NOTE — DISCHARGE NOTE PROVIDER - HOSPITAL COURSE
#Discharge: do not delete    SULY PENA is a 61y obese Female with a past medical history of ESRD (T/Th/Sa), HFrEF (EF 25% s/p AICD), IDDM2, COPD (3L O2 at home PRN), multiple myeloma (on Ninlaro), hypothyroid, depression, neuropathy (on methadone), recurrent DVTs (on eliquis 2.5mg qd), CVA x2 (w/ residual R-sided weakness), PAD s/p bypass, DHARMESH on CPAP presented to the hospital with clotted HD catheter, unable to complete outpt session. Had LIJ dialysis catheter placed and had HD through it. IR cleared R HD cath, pt underwent shorter HD session 3/28 before dc and LIJ was removed. Scheduled to resume regularly scheduled HD on Tuesday 3/29 and AVF creation on Wednesday 3/30.    Problem List/Main Diagnoses (system-based):   #ESRD on dialysis.   Pt has R chest wall cath for dialysis (T/Th/Sa), last HD 3/24 before admission. R chest wall tunneled cath clotted and nonfunctional. LIJ dialysis catheter placed and pt had HD 3/26. IR cleared cath on 3/28, pt went for mini HD session 3/28 before dc. Continued home sevelamer. Held eliquis for procedure, resumed on dc.    #HTN (hypertension).   On entresto BID at home and metoprolol XL 100mg qd, continued inpt.    #DM (diabetes mellitus).   On lantus 10q qhs and lispro 3q TID. A1c 7.3% (3/27). As pt was NPO started 70-80% of home insulin regimen, was on lantus 8u qhs, lispro 2u TID and mISS. Regular regimen resumed on dc.  On methadone 10mg TID, Gabapentin 600mg qhs for peripheral neuropathy, prescribed by Dr. Oliveira outpatient pain management doctor. Decreased to gabapentin 300mg qhs and  dc'd on this dose.    #Chronic HFrEF (heart failure with reduced ejection fraction).   Hx of HFrEF 25% in 12/21 s/p AICD, continued home metoprolol and entresto.    #Chronic obstructive pulmonary disease (COPD).   On 3L NC PRN at home, continued inpt. Continued home inhalers.    #DHARMESH  CPAP at night.    #HLD (hyperlipidemia).   Continued home atorvastatin.    #H/o CVA (cerebral vascular accident).   H/o strokes in the past w/ right sided residual deficit. No interventions.    #Depression.   Continued paroxetine 10mg and trazodone qhs PRN for insomnia.    #DVT (deep venous thrombosis).   H/o multiple recurrent DVTs in LEs. On eliquis 2.5mg at home, held for IR procedure, resumed on dc.    #Hypothyroidism.   Continued home levothyroxine.    #Multiple myeloma.   On Ninlaro and decadron 20mg on Mondays (once/week). Restarted on discharge.    Patient was discharged to: home    New medications: n/a  Changes to old medications: n/a  Medications that were stopped: n/a    Items to follow up as outpatient:  1. Urology appt 3/30  2. PCP appt 4/22    Physical exam at the time of discharge:   General: adult female resting comfortably in bed  HEENT: NC/AT, EOMI, anicteric sclera, MMM  Neck: supple, left IJ HD cath site c/d/i  Chest: R HD cath site nontender, c/d/i  Cardiovascular: RRR, +S1/S2  Respiratory: rhonchi b/l, decreased breath sounds at b/l bases  Gastrointestinal: soft, NT/ND  Extremities: cool to touch; no LE edema; large vertical linear scar on inner L calf extending to thigh  Vascular: unable to palpate DP pulses, 1+ radial pulses b/l  Neurological: AOx3, no focal deficits but notably weaker on R side

## 2022-03-28 NOTE — DISCHARGE NOTE NURSING/CASE MANAGEMENT/SOCIAL WORK - NSDCPEFALRISK_GEN_ALL_CORE
For information on Fall & Injury Prevention, visit: [email or web address] OR  [email or web address] OR  https://www.cdc.gov/steadi/patient.html

## 2022-03-28 NOTE — DISCHARGE NOTE PROVIDER - NSDCMRMEDTOKEN_GEN_ALL_CORE_FT
apixaban 2.5 mg oral tablet: 1 tab(s) orally 2 times a day  atorvastatin 80 mg oral tablet: 1 tab(s) orally once a day (at bedtime)  Decadron 4 mg oral tablet: 5 tab(s) orally once a week (mondays)  Entresto 97 mg-103 mg oral tablet: 1 tab(s) orally 2 times a day  fenofibrate 48 mg oral tablet: 1 tab(s) orally once a day  gabapentin 300 mg oral capsule: 1 cap(s) orally once a day (at bedtime)  Levemir 100 units/mL subcutaneous solution: 10 unit(s) subcutaneous once a day (at bedtime)  levothyroxine 50 mcg (0.05 mg) oral tablet: 1 tab(s) orally once a day  methadone 10 mg oral tablet: 1 tab(s) orally 3 times a day  metoprolol succinate 100 mg oral tablet, extended release: 1 tab(s) orally once a day  Ninlaro 3 mg oral capsule: 1 cap(s) orally every 7 days (mondays)  NovoLOG 100 units/mL injectable solution: 3 unit(s) injectable 3 times a day (before meals)  omeprazole 40 mg oral delayed release capsule: 1 cap(s) orally once a day  PARoxetine 10 mg oral tablet: 1 tab(s) orally once a day  sevelamer carbonate 800 mg oral tablet: 1 tab(s) orally 3 times a day  Symbicort 160 mcg-4.5 mcg/inh inhalation aerosol: 2 puff(s) inhaled 2 times a day  traZODone 50 mg oral tablet: 1 tab(s) orally once a day (at bedtime)  Ventolin HFA 90 mcg/inh inhalation aerosol: 2 puff(s) inhaled every 6 hours

## 2022-03-28 NOTE — CONSULT NOTE ADULT - SUBJECTIVE AND OBJECTIVE BOX
60 yo obese Female ESRD (TTHS), HFrEF (EF 25% s/p AICD), IDDM2, COPD (on 3L home o2) multiple myeloma (on Ninlaro), hypothyroid, depression, neuropathy (on methadone), hx of recurrent DVTs (on 2.5mg eliquis), CVA x2 (w/ residual right weakness), PAD s/p bypass, DHARMESH on CPAP presented to the hospital with complaints of clotted Right HD catheter. Temp Left IJ catheter placed upon admission and patient was dialyzed for 3 1/2 hours. Catheter originally placed at [facility] by IR 12/23/21. IR consulted for HD catheter exchange.     Clinical History: ESRD ON DIALYSIS;COMPLICATION OF VASCULAR    No pertinent family history in first degree relatives    Family history of hypertension (Mother)    Family history of diabetes mellitus (Mother)    No pertinent family history in first degree relatives    Handoff    MEWS Score    CHF (congestive heart failure)    Diabetes    COPD (chronic obstructive pulmonary disease)    Depression    CVA (cerebral vascular accident)    HLD (hyperlipidemia)    Chronic pain    Congestive heart failure, unspecified HF chronicity, unspecified heart failure type    Acute on chronic systolic heart failure    DM (diabetes mellitus)    CVA (cerebral vascular accident)    DVT (deep venous thrombosis)    HTN (hypertension)    Depression    Bipolar depression    PAD (peripheral artery disease)    Neuropathy    Hypothyroidism    Multiple myeloma    CKD (chronic kidney disease), stage IV    DHARMESH on CPAP    ESRD on dialysis    Chronic HFrEF (heart failure with reduced ejection fraction)    Chronic obstructive pulmonary disease (COPD)    HLD (hyperlipidemia)    DM (diabetes mellitus)    CVA (cerebral vascular accident)    DVT (deep venous thrombosis)    HTN (hypertension)    Depression    Hypothyroidism    Multiple myeloma    CKD (chronic kidney disease), stage IV    ESRD on dialysis    Prophylactic measure    Central venous catheter placement with ultrasound guidance    AICD (automatic cardioverter/defibrillator) present    H/O abdominal hysterectomy    H/O tubal ligation    History of cholecystectomy    H/O extremity bypass graft    DIALYSIS    87    Complication of vascular dialysis catheter    SysAdmin_VisitLink        Meds:acetaminophen     Tablet .. 650 milliGRAM(s) Oral every 6 hours PRN  ALBUTerol    90 MICROgram(s) HFA Inhaler 2 Puff(s) Inhalation every 6 hours PRN  albuterol/ipratropium for Nebulization 3 milliLiter(s) Nebulizer every 8 hours PRN  atorvastatin 80 milliGRAM(s) Oral at bedtime  budesonide 160 MICROgram(s)/formoterol 4.5 MICROgram(s) Inhaler 2 Puff(s) Inhalation two times a day  chlorhexidine 2% Cloths 1 Application(s) Topical <User Schedule>  fenofibrate Tablet 48 milliGRAM(s) Oral daily  gabapentin 300 milliGRAM(s) Oral at bedtime  insulin glargine Injectable (LANTUS) 4 Unit(s) SubCutaneous at bedtime  insulin lispro (ADMELOG) corrective regimen sliding scale   SubCutaneous Before meals and at bedtime  insulin lispro Injectable (ADMELOG) 2 Unit(s) SubCutaneous three times a day before meals  levothyroxine 50 MICROGram(s) Oral daily  methadone    Tablet 10 milliGRAM(s) Oral every 8 hours  metoprolol succinate  milliGRAM(s) Oral every 24 hours  pantoprazole    Tablet 40 milliGRAM(s) Oral before breakfast  PARoxetine 10 milliGRAM(s) Oral daily  sacubitril 97 mG/valsartan 103 mG 1 Tablet(s) Oral two times a day  sevelamer carbonate 800 milliGRAM(s) Oral three times a day  sodium chloride 0.65% Nasal 1 Spray(s) Both Nostrils every 4 hours PRN  traZODone 50 milliGRAM(s) Oral at bedtime PRN      Allergies:aspirin (Other (Moderate); Rash)  oral contrast (Unknown)        Labs:                           10.2   7.21  )-----------( 159      ( 28 Mar 2022 09:05 )             34.2     PT/INR - ( 26 Mar 2022 13:51 )   PT: 12.4 sec;   INR: 1.04          PTT - ( 26 Mar 2022 13:51 )  PTT:35.9 sec  03-28    138  |  101  |  34<H>  ----------------------------<  115<H>  4.7   |  25  |  4.67<H>    Ca    8.5      28 Mar 2022 09:05  Phos  4.6     03-27  Mg     2.0     03-28    TPro  6.7  /  Alb  3.9  /  TBili  0.3  /  DBili  x   /  AST  14  /  ALT  8<L>  /  AlkPhos  65  03-27              
  HPI:  60 yo obese Female ESRD (TTHS), CHF, DM COPD (on 3L home o2) multiple myeloma presented to the hospital with complaints of clotted HD catheter. Per patient went to her HD unit today and they tried to do HD but were not able to, referred her back to the hospital for further evaluation. Pt had catheter placed in december with IR. Patient states last successful HD was done on 3/24, with no issues. Her treatment time is 4hrs and is unsure of her dry weight suggests it may be 103kg. Nephrology consulted for dialysis.     PAST MEDICAL & SURGICAL HISTORY:  CHF (congestive heart failure)    COPD (chronic obstructive pulmonary disease)    HLD (hyperlipidemia)    Chronic pain    DM (diabetes mellitus)    CVA (cerebral vascular accident)    DVT (deep venous thrombosis)    HTN (hypertension)    Depression    Bipolar depression    PAD (peripheral artery disease)    Neuropathy    Hypothyroidism    Multiple myeloma    CKD (chronic kidney disease), stage IV    DHARMESH on CPAP    AICD (automatic cardioverter/defibrillator) present    H/O abdominal hysterectomy    H/O tubal ligation    History of cholecystectomy    H/O extremity bypass graft          Allergies:  aspirin (Other (Moderate); Rash)  oral contrast (Unknown)      Home Medications:       Hospital Medications:   MEDICATIONS  (STANDING):      SOCIAL HISTORY:  Denies ETOh, Smoking,     Family History:  FAMILY HISTORY:  Family history of hypertension (Mother)    Family history of diabetes mellitus (Mother)          VITALS:  T(F): 97.6 (03-26-22 @ 12:55), Max: 97.6 (03-26-22 @ 12:55)  HR: 67 (03-26-22 @ 13:05)  BP: 138/61 (03-26-22 @ 13:05)  RR: 18 (03-26-22 @ 13:05)  SpO2: 96% (03-26-22 @ 13:05)  Wt(kg): --    Height (cm): 165.1 (03-26 @ 12:55)  Weight (kg): 103 (03-26 @ 12:55)  BMI (kg/m2): 37.8 (03-26 @ 12:55)  BSA (m2): 2.09 (03-26 @ 12:55)  CAPILLARY BLOOD GLUCOSE      Review of Systems:  ROS negative except as per HPI    PHYSICAL EXAM:  GENERAL: Alert, awake, oriented x3 on NC   HEENT: JAQUELIN, EOMI, neck supple, no JVP  CHEST/LUNG: Bilateral clear breath sounds  HEART: Regular rate and rhythm, no murmur, no gallops, no rub   ABDOMEN: Soft, nontender, non distended  EXTREMITIES: 1+ pitting edema, WWP  Neurology: AAOx3, no asterixis   SKIN: No rash or skin lesion   ACCESS: [facility] TDC    LABS:        Creatinine Trend:                         10.5   7.35  )-----------( 168      ( 26 Mar 2022 13:50 )             33.9     Urine Studies:

## 2022-03-28 NOTE — DISCHARGE NOTE PROVIDER - NSDCCPCAREPLAN_GEN_ALL_CORE_FT
PRINCIPAL DISCHARGE DIAGNOSIS  Diagnosis: Complication of vascular dialysis catheter  Assessment and Plan of Treatment: You came to the hospital because your dialysis catheter was clotted. A temporary catheter was placed in the left side of your neck so you could receive dialysis over the weekend. On Monday 3/28 the interventional radiology team cleaned and replaced your dialysis catheter, and you had a mini dialysis session on Monday to ensure the catheter was working.  Please resume your regular dialysis schedule after discharge.  Please resume your Eliquis tonight when you get home.       Benzoyl Peroxide Counseling: Patient counseled that medicine may cause skin irritation and bleach clothing.  In the event of skin irritation, the patient was advised to reduce the amount of the drug applied or use it less frequently.   The patient verbalized understanding of the proper use and possible adverse effects of benzoyl peroxide.  All of the patient's questions and concerns were addressed.

## 2022-03-28 NOTE — DISCHARGE NOTE PROVIDER - PROVIDER TOKENS
PROVIDER:[TOKEN:[[id number]],SCHEDULEDAPPT:[03/30/2022],SCHEDULEDAPPTTIME:[11:00 AM],ESTABLISHEDPATIENT:[T]],PROVIDER:[TOKEN:[[id number]],SCHEDULEDAPPT:[04/22/2022],SCHEDULEDAPPTTIME:[02:00 PM],ESTABLISHEDPATIENT:[T]],PROVIDER:[TOKEN:[[id number]],SCHEDULEDAPPT:[06/15/2022],SCHEDULEDAPPTTIME:[11:15 AM],ESTABLISHEDPATIENT:[T]]

## 2022-03-28 NOTE — DISCHARGE NOTE PROVIDER - NSDCFUSCHEDAPPT_GEN_ALL_CORE_FT
SULY PEAN ; 03/30/2022 ; NPP Urology [address]  SULY PENA ; 04/22/2022 ; NPP Med GenInt [address] SUL[address] ; 06/15/2022 ; \[facility] [place] [facility]\"" [facility] [address]

## 2022-03-28 NOTE — PROGRESS NOTE ADULT - ASSESSMENT
62 yo obese Female ESRD (TTHS), CHF, DM COPD (on 3L home o2) multiple myeloma presented to the hospital with complaints of clotted HD catheter.    Assessment/Plan:   #ESRD on HD TTS @[facility]  usual Rx 4h .9kg?   -to go for cath exchange w/ IR  -Can do HD post - short session to see if catheter works  -Full HD per schedule on 3/29  -Labs noted K of 4.7    #HTN   BP at goal   -c/w home meds    #access   -To go for IR cath exchange today  -Can remove TDC once done    #anemia  Hb at goal   -Epo w/ HD  transfusion as per primary team     #renal bone disease   Ca at goal  Phos at goal   no indication for Hectorol at this time     Thank you for the opportunity to participate in the care of your patient. The nephrology service remains available to assist with any questions or concerns. Please feel free to reach us by paging the on-call nephrology fellow for urgent issues or as below.     Day Astudillo D.O.  PGY-4, Nephrology Fellow    P: [phone]        60 yo obese Female ESRD ([facility]), CHF, DM COPD (on 3L home o2) multiple myeloma presented to the hospital with complaints of clotted HD catheter.    Assessment/Plan:   #ESRD on HD TTS @[facility]  usual Rx 4h .9kg?   -to go for cath exchange w/ IR  -Can do HD post - short session to see if catheter works  -Full HD per schedule on 3/29  -Labs noted K of 4.7    #HTN   BP at goal   -c/w home meds    #access   -To go for IR cath exchange today  -Can remove TDC once done    #anemia  Hb at goal   -Epo w/ HD  transfusion as per primary team     #renal bone disease   Ca at goal  Phos at goal   no indication for Hectorol at this time     Thank you for the opportunity to participate in the care of your patient. The nephrology service remains available to assist with any questions or concerns. Please feel free to reach us by paging the on-call nephrology fellow for urgent issues or as below.     Day Astudillo D.O.  PGY-4, Nephrology Fellow    P: [phone]     Hemoglobin: 10.2 g/dL (22 @ 09:05)  Hemoglobin: 10.3 g/dL (22 @ 11:40)  Phosphorus Level, Serum: 4.6 mg/dL (22 @ 11:40)  Hemoglobin: 10.5 g/dL (22 @ 13:50)    Albumin, Serum: 3.9 g/dL (22 @ 11:40)  Hepatitis B Surface Antibody: Nonreact (22 @ 19:29)    epoetin christal-epbx (RETACRIT) Injectable 66216 Unit(s) IV Push once, 22 @ 16:17, Routine, Stop order after: 1 Doses  sevelamer carbonate 800 milliGRAM(s) Oral three times a day, 22 @ 16:17, Routine      Hemodialysis Treatment.:     Schedule: Once, Modality: Hemodialysis, Access: Internal Jugular Central Venous Catheter    Dialyzer: Optiflux [id number], Time: 120 Min    Blood Flow: 400 mL/Min , Dialysate Flow: 500 mL/Min, Dialysate Temp: 36.5, Tubinmm (Adult)    Target Fluid Removal: 1 Liters    Dialysate Electrolytes (mEq/L): Potassium 2, Calcium 2.5, Sodium 138, Bicarbonate 35 (22 @ 12:42) [Completed]    Seen on HD s/p intervention with REINIER Hinkle working. Can have LIJ TDC removed prior to discharge. Prescription outlined as above

## 2022-03-28 NOTE — DISCHARGE NOTE NURSING/CASE MANAGEMENT/SOCIAL WORK - NSDCPEWEB_GEN_ALL_CORE
[facility] for Tobacco Control website --- [email or web address] website --- [email or web address]

## 2022-03-28 NOTE — CONSULT NOTE ADULT - ASSESSMENT
Assessment: 62 yo obese Female ESRD (TTHS), HFrEF (EF 25% s/p AICD), IDDM2, COPD (on 3L home o2) multiple myeloma (on Ninlaro), hypothyroid, depression, neuropathy (on methadone), hx of recurrent DVTs (on 2.5mg eliquis), CVA x2 (w/ residual right weakness), PAD s/p bypass, DHARMESH on CPAP presented to the hospital with complaints of clotted Right HD catheter. Temp Left IJ catheter placed upon admission and patient was dialyzed for 3 1/2 hours. Catheter originally placed at [facility] by IR 12/23/21. IR consulted for HD catheter exchange. Case reviewed with Dr. Hawthorne, plan for procedure with sedation and CPAP.     Recommendations: NPO at midnight prior to procedure with sedation. Please ensure Covid swab is up to date (within last 72 hours).    Communicated with: primary team

## 2022-03-28 NOTE — DISCHARGE NOTE NURSING/CASE MANAGEMENT/SOCIAL WORK - PATIENT PORTAL LINK FT
You can access the FollowMyHealth Patient Portal offered by [facility] by registering at the following website: [email or web address]. By joining [employer]’s FollowMyHealth portal, you will also be able to view your health information using other applications (apps) compatible with our system.

## 2022-03-28 NOTE — PROGRESS NOTE ADULT - ATTENDING COMMENTS
I agree with the fellow's findings and plans as written above with the following additions/amendments:    Seen and examined on HD, new line working, to have old line removed, short HD today with full HD at center tomorrow. Continue HD as above, Further recs as above

## 2022-03-28 NOTE — DISCHARGE NOTE NURSING/CASE MANAGEMENT/SOCIAL WORK - NSDCPEEMAIL_GEN_ALL_CORE
[facility] for [facility] email [email or web address]

## 2022-03-28 NOTE — PROGRESS NOTE ADULT - SUBJECTIVE AND OBJECTIVE BOX
--------------------------------------------------------------------------------  Chief Complaint: ESRD/Ongoing hemodialysis requirement    24 hour events/subjective:  Seen this morning doing well, to hopefully go with IR today for catheter exchange. Can plan for HD after and hopefully DC home    PAST HISTORY  --------------------------------------------------------------------------------  No significant changes to PMH, PSH, FHx, SHx, unless otherwise noted    ALLERGIES & MEDICATIONS  --------------------------------------------------------------------------------  Allergies    aspirin (Other (Moderate); Rash)  oral contrast (Unknown)    Intolerances      Standing Inpatient Medications  atorvastatin 80 milliGRAM(s) Oral at bedtime  budesonide 160 MICROgram(s)/formoterol 4.5 MICROgram(s) Inhaler 2 Puff(s) Inhalation two times a day  chlorhexidine 2% Cloths 1 Application(s) Topical <User Schedule>  fenofibrate Tablet 48 milliGRAM(s) Oral daily  gabapentin 300 milliGRAM(s) Oral at bedtime  insulin glargine Injectable (LANTUS) 4 Unit(s) SubCutaneous at bedtime  insulin lispro (ADMELOG) corrective regimen sliding scale   SubCutaneous Before meals and at bedtime  insulin lispro Injectable (ADMELOG) 2 Unit(s) SubCutaneous three times a day before meals  levothyroxine 50 MICROGram(s) Oral daily  methadone    Tablet 10 milliGRAM(s) Oral every 8 hours  metoprolol succinate  milliGRAM(s) Oral every 24 hours  pantoprazole    Tablet 40 milliGRAM(s) Oral before breakfast  PARoxetine 10 milliGRAM(s) Oral daily  sacubitril 97 mG/valsartan 103 mG 1 Tablet(s) Oral two times a day  sevelamer carbonate 800 milliGRAM(s) Oral three times a day    PRN Inpatient Medications  acetaminophen     Tablet .. 650 milliGRAM(s) Oral every 6 hours PRN  ALBUTerol    90 MICROgram(s) HFA Inhaler 2 Puff(s) Inhalation every 6 hours PRN  albuterol/ipratropium for Nebulization 3 milliLiter(s) Nebulizer every 8 hours PRN  sodium chloride 0.65% Nasal 1 Spray(s) Both Nostrils every 4 hours PRN  traZODone 50 milliGRAM(s) Oral at bedtime PRN      REVIEW OF SYSTEMS  --------------------------------------------------------------------------------  ROS negative except as per HPI    VITALS/PHYSICAL EXAM  --------------------------------------------------------------------------------  T(C): 36.4 (03-28-22 @ 05:57), Max: 37.4 (03-27-22 @ 21:03)  HR: 58 (03-28-22 @ 09:30) (58 - 87)  BP: 124/75 (03-28-22 @ 09:30) (119/71 - 135/79)  RR: 19 (03-28-22 @ 09:30) (19 - 20)  SpO2: 100% (03-28-22 @ 09:30) (94% - 100%)  Wt(kg): --  Drug Dosing Weight  Height (cm): 165.1 (26 Mar 2022 12:55)  Weight (kg): 103 (26 Mar 2022 12:55)  BMI (kg/m2): 37.8 (26 Mar 2022 12:55)  BSA (m2): 2.09 (26 Mar 2022 12:55)  Height (cm): 165.1 (03-26-22 @ 12:55)  Weight (kg): 103 (03-26-22 @ 12:55)  BMI (kg/m2): 37.8 (03-26-22 @ 12:55)  BSA (m2): 2.09 (03-26-22 @ 12:55)    PHYSICAL EXAM:  GENERAL: Alert, awake, oriented x3 on NC   HEENT: JAQUELIN, EOMI, neck supple, no JVP  CHEST/LUNG: Bilateral clear breath sounds  HEART: Regular rate and rhythm, no murmur, no gallops, no rub   ABDOMEN: Soft, nontender, non distended  EXTREMITIES: 1+ pitting edema, WWP  Neurology: AAOx3, no asterixis   SKIN: No rash or skin lesion   ACCESS: [facility]    LABS/STUDIES  --------------------------------------------------------------------------------              10.2   7.21  >-----------<  159      [03-28-22 @ 09:05]              34.2     138  |  101  |  34  ----------------------------<  115      [03-28-22 @ 09:05]  4.7   |  25  |  4.67        Ca     8.5     [03-28-22 @ 09:05]      Mg     2.0     [03-28-22 @ 09:05]      Phos  4.6     [03-27-22 @ 11:40]    TPro  6.7  /  Alb  3.9  /  TBili  0.3  /  DBili  x   /  AST  14  /  ALT  8   /  AlkPhos  65  [03-27-22 @ 11:40]    PT/INR: PT 12.4 , INR 1.04       [03-26-22 @ 13:51]  PTT: 35.9       [03-26-22 @ 13:51]      Iron 28, TIBC 189, %sat 15      [12-16-21 @ 06:47]  Ferritin 204      [12-16-21 @ 06:47]  PTH -- (Ca 8.2)      [12-24-21 @ 10:43]   190  Vitamin D (25OH) 35.4      [12-24-21 @ 10:43]  HbA1c 9.2      [03-11-20 @ 04:46]  TSH 1.330      [11-29-21 @ 11:15]  Lipid: chol 174, , HDL 42, LDL --      [03-27-22 @ 11:40]    HBsAb Nonreact      [03-26-22 @ 19:29]  HBsAg Nonreact      [03-26-22 @ 19:29]  HCV 0.04, Nonreact      [03-26-22 @ 19:29]      RADIOLOGY:  --------------------------------------------------------------------------------------
CC: ESRD ON DIALYSIS;COMPLICATION OF VASCULAR        INTERVAL HISTORY:  complains of some pain at site of L IJ      ROS: No chest pain, no sob, no abd pain. No n/v/d    PAST MEDICAL & SURGICAL HISTORY:  CHF (congestive heart failure)    Diabetes    COPD (chronic obstructive pulmonary disease)    Depression    CVA (cerebral vascular accident)    HLD (hyperlipidemia)    Chronic pain    Congestive heart failure, unspecified HF chronicity, unspecified heart failure type    Acute on chronic systolic heart failure    DM (diabetes mellitus)    CVA (cerebral vascular accident)    DVT (deep venous thrombosis)    HTN (hypertension)    Depression    Bipolar depression    PAD (peripheral artery disease)    Neuropathy    Hypothyroidism    Multiple myeloma    CKD (chronic kidney disease), stage IV    DHARMESH on CPAP    AICD (automatic cardioverter/defibrillator) present    H/O abdominal hysterectomy    H/O tubal ligation    History of cholecystectomy    H/O extremity bypass graft        PHYSICAL EXAM:  T(C): 36.6 (03-27-22 @ 06:12), Max: 36.6 (03-26-22 @ 21:55)  HR: 64 (03-27-22 @ 06:12)  BP: 144/74 (03-27-22 @ 06:12) (88/44 - 144/74)  RR: 19 (03-27-22 @ 06:12)  SpO2: 96% (03-27-22 @ 06:12)  Wt(kg): --  I&O's Summary    26 Mar 2022 07:01  -  27 Mar 2022 07:00  --------------------------------------------------------  IN: 0 mL / OUT: 400 mL / NET: -400 mL      Weight 103 (03-26 @ 12:55)  General: AAO x 3,  NAD.  HEENT: moist mucous membranes, no pallor/cyanosis.  Neck: no JVD visible.  Cardiac: S1, S2. RRR. No murmurs   Respratory: CTA b/l, no access muscle use.   Abdomen: soft. nontender. nondistended  Skin: no rashes.  Extremities: no LE edema b/l  Access:       DATA:                        10.5<L>  7.35  )-----------( 168      ( 26 Mar 2022 13:50 )             33.9<L>        143    |  101    |  30<H>  ----------------------------<  132<H>  Ca:8.6   (26 Mar 2022 13:51)  5.0     |  30     |  4.32<H>        TPro  6.7    /  Alb  3.8    /  TBili  0.3    /  DBili  x      /  AST  12     /  ALT  7<L>   /  AlkPhos  70     26 Mar 2022 13:51                    MEDICATIONS  (STANDING):  atorvastatin 80 milliGRAM(s) Oral at bedtime  budesonide 160 MICROgram(s)/formoterol 4.5 MICROgram(s) Inhaler 2 Puff(s) Inhalation two times a day  chlorhexidine 2% Cloths 1 Application(s) Topical <User Schedule>  fenofibrate Tablet 48 milliGRAM(s) Oral daily  insulin glargine Injectable (LANTUS) 8 Unit(s) SubCutaneous at bedtime  insulin lispro (ADMELOG) corrective regimen sliding scale   SubCutaneous Before meals and at bedtime  insulin lispro Injectable (ADMELOG) 2 Unit(s) SubCutaneous three times a day before meals  levothyroxine 50 MICROGram(s) Oral daily  methadone    Tablet 10 milliGRAM(s) Oral every 8 hours  metoprolol succinate  milliGRAM(s) Oral every 24 hours  pantoprazole    Tablet 40 milliGRAM(s) Oral before breakfast  PARoxetine 10 milliGRAM(s) Oral daily  sacubitril 97 mG/valsartan 103 mG 1 Tablet(s) Oral two times a day  sevelamer carbonate 800 milliGRAM(s) Oral three times a day    MEDICATIONS  (PRN):  acetaminophen     Tablet .. 650 milliGRAM(s) Oral every 6 hours PRN Temp greater or equal to 38C (100.4F), Mild Pain (1 - 3)  ALBUTerol    90 MICROgram(s) HFA Inhaler 2 Puff(s) Inhalation every 6 hours PRN Shortness of Breath and/or Wheezing  traZODone 50 milliGRAM(s) Oral at bedtime PRN insomnia                
HOSPITALIST MEDICINE PROGRESS NOTE    INTERVAL HPI:   Reviewed chart and overnight events, patient seen and examined at bedside. Only complaint remains the discomfort/pain at the site of the IJ HD cath when she moves her neck in bed. Otherwise hopeful to get her permacath fixed up tomorrow.    ROS:  12-point ROS otherwise negative except as specified.    VITAL SIGNS:  Vital Signs Last 24 Hrs  T(C): 37.1 (27 Mar 2022 13:48), Max: 37.1 (27 Mar 2022 13:48)  T(F): 98.7 (27 Mar 2022 13:48), Max: 98.7 (27 Mar 2022 13:48)  HR: 64 (27 Mar 2022 13:48) (54 - 83)  BP: 119/71 (27 Mar 2022 13:48) (88/44 - 144/74)  BP(mean): --  RR: 19 (27 Mar 2022 13:48) (17 - 19)  SpO2: 94% (27 Mar 2022 13:48) (94% - 100%)    PHYSICAL EXAM:  General: non-toxic woman resting in bed; NAD  HEENT: NC/AT; PERRL, anicteric sclera; MMM  Neck: supple, left IJ HD cath site c/d/i  Cardiovascular: +S1/S2, RRR  Respiratory: CTA B/L; no W/R/R  Gastrointestinal: soft, NT/ND  Extremities: WWP; 1+ LE edema, no clubbing or cyanosis  Vascular: 2+ pulses throughout  Neurological: awake and alert; VO    MEDICATIONS:  MEDICATIONS  (STANDING):  atorvastatin 80 milliGRAM(s) Oral at bedtime  budesonide 160 MICROgram(s)/formoterol 4.5 MICROgram(s) Inhaler 2 Puff(s) Inhalation two times a day  chlorhexidine 2% Cloths 1 Application(s) Topical <User Schedule>  fenofibrate Tablet 48 milliGRAM(s) Oral daily  gabapentin 300 milliGRAM(s) Oral at bedtime  insulin glargine Injectable (LANTUS) 8 Unit(s) SubCutaneous at bedtime  insulin lispro (ADMELOG) corrective regimen sliding scale   SubCutaneous Before meals and at bedtime  insulin lispro Injectable (ADMELOG) 2 Unit(s) SubCutaneous three times a day before meals  levothyroxine 50 MICROGram(s) Oral daily  methadone    Tablet 10 milliGRAM(s) Oral every 8 hours  metoprolol succinate  milliGRAM(s) Oral every 24 hours  pantoprazole    Tablet 40 milliGRAM(s) Oral before breakfast  PARoxetine 10 milliGRAM(s) Oral daily  sacubitril 97 mG/valsartan 103 mG 1 Tablet(s) Oral two times a day  sevelamer carbonate 800 milliGRAM(s) Oral three times a day    MEDICATIONS  (PRN):  acetaminophen     Tablet .. 650 milliGRAM(s) Oral every 6 hours PRN Temp greater or equal to 38C (100.4F), Mild Pain (1 - 3)  ALBUTerol    90 MICROgram(s) HFA Inhaler 2 Puff(s) Inhalation every 6 hours PRN Shortness of Breath and/or Wheezing  traZODone 50 milliGRAM(s) Oral at bedtime PRN insomnia      Allergies    aspirin (Other (Moderate); Rash)  oral contrast (Unknown)    Intolerances        LABS:                        10.3   7.04  )-----------( 147      ( 27 Mar 2022 11:40 )             33.9     03-27    140  |  102  |  27<H>  ----------------------------<  199<H>  4.2   |  26  |  3.81<H>    Ca    8.5      27 Mar 2022 11:40  Phos  4.6     03-27  Mg     1.7     03-27    TPro  6.7  /  Alb  3.9  /  TBili  0.3  /  DBili  x   /  AST  14  /  ALT  8<L>  /  AlkPhos  65  03-27    PT/INR - ( 26 Mar 2022 13:51 )   PT: 12.4 sec;   INR: 1.04          PTT - ( 26 Mar 2022 13:51 )  PTT:35.9 sec    CAPILLARY BLOOD GLUCOSE      POCT Blood Glucose.: 172 mg/dL (27 Mar 2022 12:03)      RADIOLOGY & ADDITIONAL TESTS:   Personally reviewed.

## 2022-03-28 NOTE — DISCHARGE NOTE PROVIDER - CARE PROVIDER_API CALL
Kt Newton)  Female Pelvic MedReconst Surg; Urology  [address] [place] [id number]  Phone: [phone]  Fax: [phone]  Established Patient  Scheduled Appointment: 03/30/2022 11:00 AM    Chiquis Galvez)  Internal Medicine  [address] [facility], Room [address] [place] [address]  Phone: [phone]  Fax: [phone]  Established Patient  Scheduled Appointment: 04/22/2022 02:00 PM    Enriqueta Lehman  CARDIOLOGY  [address] [place] [address]  Phone: [phone]  Fax: [phone]  Established Patient  Scheduled Appointment: 06/15/2022 11:15 AM

## 2022-03-28 NOTE — DISCHARGE NOTE PROVIDER - CARE PROVIDERS DIRECT ADDRESSES
,[email or web address],DirectAddress_Unknown,[email or web address]

## 2022-03-29 NOTE — H&P ADULT - NSICDXPASTSURGICALHX_GEN_ALL_CORE_FT
sensation is normal and strength is normal. PAST SURGICAL HISTORY:  AICD (automatic cardioverter/defibrillator) present     H/O abdominal hysterectomy     H/O extremity bypass graft     H/O tubal ligation     History of cholecystectomy

## 2022-03-30 ENCOUNTER — APPOINTMENT (OUTPATIENT)
Dept: UROLOGY | Facility: CLINIC | Age: 62
End: 2022-03-30
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
## 2022-04-12 DIAGNOSIS — C90.00 MULTIPLE MYELOMA NOT HAVING ACHIEVED REMISSION: ICD-10-CM

## 2022-04-12 DIAGNOSIS — Z79.4 LONG TERM (CURRENT) USE OF INSULIN: ICD-10-CM

## 2022-04-12 DIAGNOSIS — N18.6 END STAGE RENAL DISEASE: ICD-10-CM

## 2022-04-12 DIAGNOSIS — I69.351 HEMIPLEGIA AND HEMIPARESIS FOLLOWING CEREBRAL INFARCTION AFFECTING RIGHT DOMINANT SIDE: ICD-10-CM

## 2022-04-12 DIAGNOSIS — E11.22 TYPE 2 DIABETES MELLITUS WITH DIABETIC CHRONIC KIDNEY DISEASE: ICD-10-CM

## 2022-04-12 DIAGNOSIS — I50.22 CHRONIC SYSTOLIC (CONGESTIVE) HEART FAILURE: ICD-10-CM
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
## 2022-04-12 DIAGNOSIS — Z95.820 PERIPHERAL VASCULAR ANGIOPLASTY STATUS WITH IMPLANTS AND GRAFTS: ICD-10-CM

## 2022-04-12 DIAGNOSIS — G47.33 OBSTRUCTIVE SLEEP APNEA (ADULT) (PEDIATRIC): ICD-10-CM

## 2022-04-12 DIAGNOSIS — Z90.710 ACQUIRED ABSENCE OF BOTH CERVIX AND UTERUS: ICD-10-CM

## 2022-04-12 DIAGNOSIS — T82.868A THROMBOSIS DUE TO VASCULAR PROSTHETIC DEVICES, IMPLANTS AND GRAFTS, INITIAL ENCOUNTER: ICD-10-CM

## 2022-04-12 DIAGNOSIS — T82.398A OTHER MECHANICAL COMPLICATION OF OTHER VASCULAR GRAFTS, INITIAL ENCOUNTER: ICD-10-CM

## 2022-04-12 DIAGNOSIS — Z79.01 LONG TERM (CURRENT) USE OF ANTICOAGULANTS: ICD-10-CM

## 2022-04-12 DIAGNOSIS — Z90.49 ACQUIRED ABSENCE OF OTHER SPECIFIED PARTS OF DIGESTIVE TRACT: ICD-10-CM

## 2022-04-12 DIAGNOSIS — E11.40 TYPE 2 DIABETES MELLITUS WITH DIABETIC NEUROPATHY, UNSPECIFIED: ICD-10-CM

## 2022-04-12 DIAGNOSIS — E11.51 TYPE 2 DIABETES MELLITUS WITH DIABETIC PERIPHERAL ANGIOPATHY WITHOUT GANGRENE: ICD-10-CM

## 2022-04-12 DIAGNOSIS — Z86.718 PERSONAL HISTORY OF OTHER VENOUS THROMBOSIS AND EMBOLISM: ICD-10-CM

## 2022-04-12 DIAGNOSIS — Z91.041 RADIOGRAPHIC DYE ALLERGY STATUS: ICD-10-CM

## 2022-04-12 DIAGNOSIS — Z79.84 LONG TERM (CURRENT) USE OF ORAL HYPOGLYCEMIC DRUGS: ICD-10-CM

## 2022-04-12 DIAGNOSIS — Z99.89 DEPENDENCE ON OTHER ENABLING MACHINES AND DEVICES: ICD-10-CM

## 2022-04-12 DIAGNOSIS — E78.5 HYPERLIPIDEMIA, UNSPECIFIED: ICD-10-CM

## 2022-04-12 DIAGNOSIS — E03.9 HYPOTHYROIDISM, UNSPECIFIED: ICD-10-CM

## 2022-04-12 DIAGNOSIS — Z95.810 PRESENCE OF AUTOMATIC (IMPLANTABLE) CARDIAC DEFIBRILLATOR: ICD-10-CM

## 2022-04-12 DIAGNOSIS — Z99.2 DEPENDENCE ON RENAL DIALYSIS: ICD-10-CM

## 2022-04-12 DIAGNOSIS — J44.9 CHRONIC OBSTRUCTIVE PULMONARY DISEASE, UNSPECIFIED: ICD-10-CM

## 2022-04-12 DIAGNOSIS — Z88.6 ALLERGY STATUS TO ANALGESIC AGENT: ICD-10-CM

## 2022-04-12 DIAGNOSIS — I13.2 HYPERTENSIVE HEART AND CHRONIC KIDNEY DISEASE WITH HEART FAILURE AND WITH STAGE 5 CHRONIC KIDNEY DISEASE, OR END STAGE RENAL DISEASE: ICD-10-CM

## 2022-04-21 ENCOUNTER — INPATIENT (INPATIENT)
Facility: HOSPITAL | Age: 62
LOS: 6 days | Discharge: HOME HEALTH | DRG: 252 | End: 2022-04-28
Attending: STUDENT IN AN ORGANIZED HEALTH CARE EDUCATION/TRAINING PROGRAM | Admitting: INTERNAL MEDICINE
Payer: MEDICARE

## 2022-04-21 VITALS
SYSTOLIC BLOOD PRESSURE: 100 MMHG | OXYGEN SATURATION: 100 % | HEIGHT: 65 IN | TEMPERATURE: 98 F | RESPIRATION RATE: 18 BRPM | WEIGHT: 199.96 LBS | DIASTOLIC BLOOD PRESSURE: 54 MMHG | HEART RATE: 85 BPM

## 2022-04-21 DIAGNOSIS — Z98.51 TUBAL LIGATION STATUS: Chronic | ICD-10-CM

## 2022-04-21 DIAGNOSIS — Z95.828 PRESENCE OF OTHER VASCULAR IMPLANTS AND GRAFTS: Chronic | ICD-10-CM

## 2022-04-21 DIAGNOSIS — Z95.810 PRESENCE OF AUTOMATIC (IMPLANTABLE) CARDIAC DEFIBRILLATOR: Chronic | ICD-10-CM

## 2022-04-21 DIAGNOSIS — Z90.710 ACQUIRED ABSENCE OF BOTH CERVIX AND UTERUS: Chronic | ICD-10-CM

## 2022-04-21 DIAGNOSIS — Z90.49 ACQUIRED ABSENCE OF OTHER SPECIFIED PARTS OF DIGESTIVE TRACT: Chronic | ICD-10-CM

## 2022-04-21 DIAGNOSIS — I80.10 PHLEBITIS AND THROMBOPHLEBITIS OF UNSPECIFIED FEMORAL VEIN: ICD-10-CM

## 2022-04-21 LAB
ALBUMIN SERPL ELPH-MCNC: 4.1 G/DL — SIGNIFICANT CHANGE UP (ref 3.3–5)
ALP SERPL-CCNC: 89 U/L — SIGNIFICANT CHANGE UP (ref 40–120)
ALT FLD-CCNC: 6 U/L — LOW (ref 10–45)
ANION GAP SERPL CALC-SCNC: 13 MMOL/L — SIGNIFICANT CHANGE UP (ref 5–17)
APTT BLD: 38.9 SEC — HIGH (ref 27.5–35.5)
AST SERPL-CCNC: 13 U/L — SIGNIFICANT CHANGE UP (ref 10–40)
BASOPHILS # BLD AUTO: 0.05 K/UL — SIGNIFICANT CHANGE UP (ref 0–0.2)
BASOPHILS NFR BLD AUTO: 0.6 % — SIGNIFICANT CHANGE UP (ref 0–2)
BILIRUB SERPL-MCNC: 0.3 MG/DL — SIGNIFICANT CHANGE UP (ref 0.2–1.2)
BUN SERPL-MCNC: 41 MG/DL — HIGH (ref 7–23)
CALCIUM SERPL-MCNC: 8.2 MG/DL — LOW (ref 8.4–10.5)
CHLORIDE SERPL-SCNC: 97 MMOL/L — SIGNIFICANT CHANGE UP (ref 96–108)
CO2 SERPL-SCNC: 28 MMOL/L — SIGNIFICANT CHANGE UP (ref 22–31)
CREAT SERPL-MCNC: 5.99 MG/DL — HIGH (ref 0.5–1.3)
EGFR: 8 ML/MIN/1.73M2 — LOW
EOSINOPHIL # BLD AUTO: 0.14 K/UL — SIGNIFICANT CHANGE UP (ref 0–0.5)
EOSINOPHIL NFR BLD AUTO: 1.6 % — SIGNIFICANT CHANGE UP (ref 0–6)
ERYTHROCYTE [SEDIMENTATION RATE] IN BLOOD: 32 MM/HR — HIGH
GLUCOSE BLDC GLUCOMTR-MCNC: 211 MG/DL — HIGH (ref 70–99)
GLUCOSE SERPL-MCNC: 106 MG/DL — HIGH (ref 70–99)
HCT VFR BLD CALC: 34.1 % — LOW (ref 34.5–45)
HGB BLD-MCNC: 10.3 G/DL — LOW (ref 11.5–15.5)
IMM GRANULOCYTES NFR BLD AUTO: 0.5 % — SIGNIFICANT CHANGE UP (ref 0–1.5)
INR BLD: 1.07 — SIGNIFICANT CHANGE UP (ref 0.88–1.16)
LYMPHOCYTES # BLD AUTO: 0.95 K/UL — LOW (ref 1–3.3)
LYMPHOCYTES # BLD AUTO: 11 % — LOW (ref 13–44)
MAGNESIUM SERPL-MCNC: 1.8 MG/DL — SIGNIFICANT CHANGE UP (ref 1.6–2.6)
MCHC RBC-ENTMCNC: 29.6 PG — SIGNIFICANT CHANGE UP (ref 27–34)
MCHC RBC-ENTMCNC: 30.2 GM/DL — LOW (ref 32–36)
MCV RBC AUTO: 98 FL — SIGNIFICANT CHANGE UP (ref 80–100)
MONOCYTES # BLD AUTO: 1.08 K/UL — HIGH (ref 0–0.9)
MONOCYTES NFR BLD AUTO: 12.5 % — SIGNIFICANT CHANGE UP (ref 2–14)
NEUTROPHILS # BLD AUTO: 6.35 K/UL — SIGNIFICANT CHANGE UP (ref 1.8–7.4)
NEUTROPHILS NFR BLD AUTO: 73.8 % — SIGNIFICANT CHANGE UP (ref 43–77)
NRBC # BLD: 0 /100 WBCS — SIGNIFICANT CHANGE UP (ref 0–0)
NT-PROBNP SERPL-SCNC: HIGH PG/ML (ref 0–300)
PHOSPHATE SERPL-MCNC: 5.7 MG/DL — HIGH (ref 2.5–4.5)
PLATELET # BLD AUTO: 165 K/UL — SIGNIFICANT CHANGE UP (ref 150–400)
POTASSIUM SERPL-MCNC: 5 MMOL/L — SIGNIFICANT CHANGE UP (ref 3.5–5.3)
POTASSIUM SERPL-SCNC: 5 MMOL/L — SIGNIFICANT CHANGE UP (ref 3.5–5.3)
PROT SERPL-MCNC: 7.6 G/DL — SIGNIFICANT CHANGE UP (ref 6–8.3)
PROTHROM AB SERPL-ACNC: 12.8 SEC — SIGNIFICANT CHANGE UP (ref 10.5–13.4)
RBC # BLD: 3.48 M/UL — LOW (ref 3.8–5.2)
RBC # FLD: 15.5 % — HIGH (ref 10.3–14.5)
SARS-COV-2 RNA SPEC QL NAA+PROBE: NEGATIVE — SIGNIFICANT CHANGE UP
SODIUM SERPL-SCNC: 138 MMOL/L — SIGNIFICANT CHANGE UP (ref 135–145)
TROPONIN T SERPL-MCNC: 0.03 NG/ML — HIGH (ref 0–0.01)
WBC # BLD: 8.61 K/UL — SIGNIFICANT CHANGE UP (ref 3.8–10.5)
WBC # FLD AUTO: 8.61 K/UL — SIGNIFICANT CHANGE UP (ref 3.8–10.5)

## 2022-04-21 PROCEDURE — 73630 X-RAY EXAM OF FOOT: CPT | Mod: 26,RT

## 2022-04-21 PROCEDURE — 99223 1ST HOSP IP/OBS HIGH 75: CPT

## 2022-04-21 PROCEDURE — 71045 X-RAY EXAM CHEST 1 VIEW: CPT | Mod: 26

## 2022-04-21 PROCEDURE — 93971 EXTREMITY STUDY: CPT | Mod: 26,RT

## 2022-04-21 PROCEDURE — 99284 EMERGENCY DEPT VISIT MOD MDM: CPT

## 2022-04-21 RX ORDER — OXYCODONE AND ACETAMINOPHEN 5; 325 MG/1; MG/1
1 TABLET ORAL ONCE
Refills: 0 | Status: DISCONTINUED | OUTPATIENT
Start: 2022-04-21 | End: 2022-04-21

## 2022-04-21 RX ORDER — DEXTROSE 50 % IN WATER 50 %
25 SYRINGE (ML) INTRAVENOUS ONCE
Refills: 0 | Status: DISCONTINUED | OUTPATIENT
Start: 2022-04-21 | End: 2022-04-28

## 2022-04-21 RX ORDER — SODIUM CHLORIDE 9 MG/ML
1000 INJECTION, SOLUTION INTRAVENOUS
Refills: 0 | Status: DISCONTINUED | OUTPATIENT
Start: 2022-04-21 | End: 2022-04-28

## 2022-04-21 RX ORDER — SACUBITRIL AND VALSARTAN 24; 26 MG/1; MG/1
1 TABLET, FILM COATED ORAL
Refills: 0 | Status: DISCONTINUED | OUTPATIENT
Start: 2022-04-21 | End: 2022-04-28

## 2022-04-21 RX ORDER — PIPERACILLIN AND TAZOBACTAM 4; .5 G/20ML; G/20ML
3.38 INJECTION, POWDER, LYOPHILIZED, FOR SOLUTION INTRAVENOUS ONCE
Refills: 0 | Status: COMPLETED | OUTPATIENT
Start: 2022-04-21 | End: 2022-04-21

## 2022-04-21 RX ORDER — DEXTROSE 50 % IN WATER 50 %
12.5 SYRINGE (ML) INTRAVENOUS ONCE
Refills: 0 | Status: DISCONTINUED | OUTPATIENT
Start: 2022-04-21 | End: 2022-04-28

## 2022-04-21 RX ORDER — MORPHINE SULFATE 50 MG/1
2 CAPSULE, EXTENDED RELEASE ORAL ONCE
Refills: 0 | Status: DISCONTINUED | OUTPATIENT
Start: 2022-04-21 | End: 2022-04-21

## 2022-04-21 RX ORDER — ACETAMINOPHEN 500 MG
650 TABLET ORAL EVERY 6 HOURS
Refills: 0 | Status: DISCONTINUED | OUTPATIENT
Start: 2022-04-21 | End: 2022-04-28

## 2022-04-21 RX ORDER — METOPROLOL TARTRATE 50 MG
200 TABLET ORAL DAILY
Refills: 0 | Status: DISCONTINUED | OUTPATIENT
Start: 2022-04-21 | End: 2022-04-28

## 2022-04-21 RX ORDER — INSULIN LISPRO 100/ML
VIAL (ML) SUBCUTANEOUS
Refills: 0 | Status: DISCONTINUED | OUTPATIENT
Start: 2022-04-21 | End: 2022-04-28

## 2022-04-21 RX ORDER — INSULIN LISPRO 100/ML
5 VIAL (ML) SUBCUTANEOUS
Refills: 0 | Status: DISCONTINUED | OUTPATIENT
Start: 2022-04-21 | End: 2022-04-26

## 2022-04-21 RX ORDER — METHADONE HYDROCHLORIDE 40 MG/1
10 TABLET ORAL THREE TIMES A DAY
Refills: 0 | Status: DISCONTINUED | OUTPATIENT
Start: 2022-04-21 | End: 2022-04-22

## 2022-04-21 RX ORDER — FENOFIBRATE,MICRONIZED 130 MG
48 CAPSULE ORAL DAILY
Refills: 0 | Status: DISCONTINUED | OUTPATIENT
Start: 2022-04-21 | End: 2022-04-28

## 2022-04-21 RX ORDER — GABAPENTIN 400 MG/1
100 CAPSULE ORAL EVERY 12 HOURS
Refills: 0 | Status: DISCONTINUED | OUTPATIENT
Start: 2022-04-21 | End: 2022-04-28

## 2022-04-21 RX ORDER — INSULIN ASPART 100 [IU]/ML
3 INJECTION, SOLUTION SUBCUTANEOUS
Qty: 0 | Refills: 0 | DISCHARGE

## 2022-04-21 RX ORDER — INSULIN GLARGINE 100 [IU]/ML
10 INJECTION, SOLUTION SUBCUTANEOUS ONCE
Refills: 0 | Status: COMPLETED | OUTPATIENT
Start: 2022-04-21 | End: 2022-04-21

## 2022-04-21 RX ORDER — SACUBITRIL AND VALSARTAN 24; 26 MG/1; MG/1
1 TABLET, FILM COATED ORAL
Refills: 0 | Status: DISCONTINUED | OUTPATIENT
Start: 2022-04-21 | End: 2022-04-21

## 2022-04-21 RX ORDER — DEXTROSE 50 % IN WATER 50 %
15 SYRINGE (ML) INTRAVENOUS ONCE
Refills: 0 | Status: DISCONTINUED | OUTPATIENT
Start: 2022-04-21 | End: 2022-04-28

## 2022-04-21 RX ORDER — APIXABAN 2.5 MG/1
2.5 TABLET, FILM COATED ORAL EVERY 12 HOURS
Refills: 0 | Status: DISCONTINUED | OUTPATIENT
Start: 2022-04-21 | End: 2022-04-25

## 2022-04-21 RX ORDER — GLUCAGON INJECTION, SOLUTION 0.5 MG/.1ML
1 INJECTION, SOLUTION SUBCUTANEOUS ONCE
Refills: 0 | Status: DISCONTINUED | OUTPATIENT
Start: 2022-04-21 | End: 2022-04-28

## 2022-04-21 RX ORDER — BUDESONIDE AND FORMOTEROL FUMARATE DIHYDRATE 160; 4.5 UG/1; UG/1
2 AEROSOL RESPIRATORY (INHALATION)
Refills: 0 | Status: DISCONTINUED | OUTPATIENT
Start: 2022-04-21 | End: 2022-04-28

## 2022-04-21 RX ORDER — MORPHINE SULFATE 50 MG/1
4 CAPSULE, EXTENDED RELEASE ORAL ONCE
Refills: 0 | Status: DISCONTINUED | OUTPATIENT
Start: 2022-04-21 | End: 2022-04-21

## 2022-04-21 RX ORDER — VANCOMYCIN HCL 1 G
1000 VIAL (EA) INTRAVENOUS ONCE
Refills: 0 | Status: COMPLETED | OUTPATIENT
Start: 2022-04-21 | End: 2022-04-21

## 2022-04-21 RX ORDER — LANOLIN ALCOHOL/MO/W.PET/CERES
3 CREAM (GRAM) TOPICAL AT BEDTIME
Refills: 0 | Status: DISCONTINUED | OUTPATIENT
Start: 2022-04-21 | End: 2022-04-28

## 2022-04-21 RX ORDER — INSULIN DETEMIR 100/ML (3)
10 INSULIN PEN (ML) SUBCUTANEOUS
Qty: 0 | Refills: 0 | DISCHARGE

## 2022-04-21 RX ORDER — INSULIN GLARGINE 100 [IU]/ML
10 INJECTION, SOLUTION SUBCUTANEOUS AT BEDTIME
Refills: 0 | Status: DISCONTINUED | OUTPATIENT
Start: 2022-04-22 | End: 2022-04-28

## 2022-04-21 RX ORDER — TRAZODONE HCL 50 MG
50 TABLET ORAL AT BEDTIME
Refills: 0 | Status: DISCONTINUED | OUTPATIENT
Start: 2022-04-21 | End: 2022-04-28

## 2022-04-21 RX ORDER — PANTOPRAZOLE SODIUM 20 MG/1
40 TABLET, DELAYED RELEASE ORAL
Refills: 0 | Status: DISCONTINUED | OUTPATIENT
Start: 2022-04-21 | End: 2022-04-28

## 2022-04-21 RX ORDER — ATORVASTATIN CALCIUM 80 MG/1
80 TABLET, FILM COATED ORAL AT BEDTIME
Refills: 0 | Status: DISCONTINUED | OUTPATIENT
Start: 2022-04-21 | End: 2022-04-28

## 2022-04-21 RX ORDER — METOPROLOL TARTRATE 50 MG
200 TABLET ORAL DAILY
Refills: 0 | Status: DISCONTINUED | OUTPATIENT
Start: 2022-04-21 | End: 2022-04-21

## 2022-04-21 RX ORDER — CEFAZOLIN SODIUM 1 G
1000 VIAL (EA) INJECTION EVERY 8 HOURS
Refills: 0 | Status: DISCONTINUED | OUTPATIENT
Start: 2022-04-21 | End: 2022-04-26

## 2022-04-21 RX ADMIN — Medication 50 MILLIGRAM(S): at 22:58

## 2022-04-21 RX ADMIN — Medication 3 MILLIGRAM(S): at 22:57

## 2022-04-21 RX ADMIN — OXYCODONE AND ACETAMINOPHEN 1 TABLET(S): 5; 325 TABLET ORAL at 15:43

## 2022-04-21 RX ADMIN — OXYCODONE AND ACETAMINOPHEN 1 TABLET(S): 5; 325 TABLET ORAL at 16:48

## 2022-04-21 RX ADMIN — Medication 250 MILLIGRAM(S): at 15:37

## 2022-04-21 RX ADMIN — Medication 10 MILLIGRAM(S): at 22:58

## 2022-04-21 RX ADMIN — ATORVASTATIN CALCIUM 80 MILLIGRAM(S): 80 TABLET, FILM COATED ORAL at 22:58

## 2022-04-21 RX ADMIN — PIPERACILLIN AND TAZOBACTAM 200 GRAM(S): 4; .5 INJECTION, POWDER, LYOPHILIZED, FOR SOLUTION INTRAVENOUS at 15:05

## 2022-04-21 RX ADMIN — METHADONE HYDROCHLORIDE 10 MILLIGRAM(S): 40 TABLET ORAL at 22:58

## 2022-04-21 RX ADMIN — Medication 4: at 23:05

## 2022-04-21 RX ADMIN — MORPHINE SULFATE 2 MILLIGRAM(S): 50 CAPSULE, EXTENDED RELEASE ORAL at 20:35

## 2022-04-21 NOTE — H&P ADULT - PROBLEM SELECTOR PLAN 1
On HD Tues/Thurs/Sat, last HD 4/19. Right chest wall tunneled cath. Now s/p LIJ dialysis catheter and adjunct HD on 3/26  - Renal following  - Make-up HD session tomorrow 4/22  - C/w home sevelamer 800 Patient sent to ED from dialysis center for concern for infection of chronic sacral wound on right heel. No purulence, pus, or discharge. Some erythema of the foot and ankle, could be related to known chronic DVT.  - S/p Vanc and Zosyn in the ED  - Start Ancef 1g IV q8  - Podiatry consult  - PT consult  - Pain regimen: Tylenol for mild-moderate pain, dilaudid PRN severe pain

## 2022-04-21 NOTE — H&P ADULT - PROBLEM SELECTOR PROBLEM 4
Chronic HFrEF (heart failure with reduced ejection fraction) HFrEF (heart failure with reduced ejection fraction)

## 2022-04-21 NOTE — ED PROVIDER NOTE - OBJECTIVE STATEMENT
60 y/o F, PMHx of ESRD (TTHS), CHF, DM, COPD (on 3L home o2), 2 strokes, multiple myeloma, PAD, HTN, HLD, and hx of blood clots on legs, now presents to ED with R leg swelling x2 days. Pt states she was at her dialysis session when her DrMarguerite saw her leg and pt was crying from the leg pain. Pt states she sees a hematologist (Dr. Payton Ruiz), nephrologist (Dr. Timothy Hollis, [facility]), and PCP (Dr. Deisy Owen, [facility]). Pt notes she has not seen Dr. Hollis, but is going to f/u at a later time. Pt also reports she has had 2 strokes where she developed R sided weakness. Pt also states she had an atraumatic heel ulcer for past 6 mos. Pt states she saw foot , but she doesn't have any Abx for the ulcer. Pt also reports she had a fistula placement about 1 mo. ago and her first dialysis session was in 12/2021. Pt states she is on Apixaban 2.5mg orally (as per meds list). She also states she had a leg bypass because of her PAD about 10 yr. aog. Pt also reports difficulty breathing, but denies cough, fever, chest pain, and n/v/d. 60 y/o F, PMHx of ESRD (TTHS), CHF, DM, COPD (on 3L home o2), 2 strokes, multiple myeloma, PAD, HTN, HLD, and hx of DVT RLE (on apixiban per chart), now presents to ED with R leg swelling x2 days. Pt states she was at her dialysis session when her DrMarguerite saw her leg and pt was crying from the leg pain. Pt states she sees a hematologist (Dr. Payton Ruiz), nephrologist (Dr. Timothy Hollis, [facility]), and PCP (Dr. Deisy Owen, [facility]). Pt notes she has not seen Dr. Hollis, but is going to f/u at a later time. Pt also reports she has had 2 strokes where she developed R sided weakness. Pt also states she had an atraumatic heel ulcer for past 6 mos. Pt states she saw foot , but she doesn't have any Abx for the ulcer. Pt also reports she had a fistula placement about 1 mo. ago and her first dialysis session was in 12/2021. Pt states she is on Apixaban 2.5mg orally (as per meds list). She also states she had a leg bypass because of her PAD about 10 yr. aog. Pt also reports difficulty breathing, but denies cough, fever, chest pain, and n/v/d.

## 2022-04-21 NOTE — H&P ADULT - PROBLEM SELECTOR PLAN 11
Home med: Ninlaro 3mg on Mondays and Decadron 4mg on Mondays.  - Restart on discharge Home meds: paroxetine 10mg, trazodone 50 at bedtime PRN for insomnia, buspirone 15  - C/w home antidepressants and anti-anxiety meds

## 2022-04-21 NOTE — H&P ADULT - NSHPLABSRESULTS_GEN_ALL_CORE
.  LABS:                         10.3   8.61  )-----------( 165      ( 21 Apr 2022 14:58 )             34.1     04-21    138  |  97  |  41<H>  ----------------------------<  106<H>  5.0   |  28  |  5.99<H>    Ca    8.2<L>      21 Apr 2022 14:58  Phos  5.7     04-21  Mg     1.8     04-21    TPro  7.6  /  Alb  4.1  /  TBili  0.3  /  DBili  x   /  AST  13  /  ALT  6<L>  /  AlkPhos  89  04-21    PT/INR - ( 21 Apr 2022 14:58 )   PT: 12.8 sec;   INR: 1.07          PTT - ( 21 Apr 2022 14:58 )  PTT:38.9 sec    CARDIAC MARKERS ( 21 Apr 2022 14:58 )  x     / 0.03 ng/mL / x     / x     / x          Serum Pro-Brain Natriuretic Peptide: 25407 pg/mL (04-21 @ 14:58)        RADIOLOGY, EKG & ADDITIONAL TESTS: Reviewed.

## 2022-04-21 NOTE — H&P ADULT - ASSESSMENT
61F current smoker w/ PMHx ESRD (HD T/Th/Sa), HFrEF (EF 25% s/p AICD), IDDM2, COPD (3L O2 at home PRN), multiple myeloma (on Ninlaro), hypothyroidism, depression, neuropathy (on methadone), recurrent DVTs (on eliquis 2.5mg qd), CVA x2 (w/ residual R-sided weakness), PAD s/p LLE bypass, and DHARMESH on CPAP, who was sent in by her dialysis doctor for concern for worsening chronic RLE DVT and concern for infected chronic right heal ulcer.

## 2022-04-21 NOTE — ED PROVIDER NOTE - PHYSICAL EXAMINATION
R AV fistula well healed no bruit/thrill  R chest port with dressings c/d/i no erythema/crepitus/exudate  minimal edema of RLE compared to L non-pitting

## 2022-04-21 NOTE — H&P ADULT - PROBLEM SELECTOR PLAN 4
EF 25% in 12/21, s/p AICD. 2+ LE edema, no pulmonary edema or JVD. Not in exacerbation.   - Home meds: Entresto  BID, Toprol 100, Torsemide 80 BID   - C/w home Toprol, entresto, and torsemide  - Monitor volume status with dialysis EF 25% in 12/21, s/p AICD. 2+ LE edema, no pulmonary edema or JVD. Not in exacerbatione.   - Home meds: Entresto  BID, Toprol 100, Torsemide 80 BID   - C/w home Toprol, entresto, and torsemide  - Monitor volume status with dialysis EF 25% in 12/21, s/p AICD. 2+ LE edema, no pulmonary edema or JVD. Not in exacerbatione.   - Home meds: Entresto  BID, Toprol 100, Torsemide 40 BID   - C/w home Toprol, entresto, and torsemide  - Monitor volume status with dialysis

## 2022-04-21 NOTE — ED PROVIDER NOTE - CLINICAL SUMMARY MEDICAL DECISION MAKING FREE TEXT BOX
62 y/o F, PMHx of ESRD (TTHS), CHF, DM, COPD (on 3L home o2), 2 strokes, multiple myeloma, PAD, HTN, HLD, and hx of blood clots on legs, now presents to ED with R leg swelling x2 days. On exam, VS are normal, and her R foot has chronic appearing ulcer on heel, R leg is mildly swollen compared to L leg. Both legs have 2/5 motor strength which pt states is at baseline due to past stroke. Pt has diminished sensation on bilateral legs. likely from diabetes.    DDx: DVT of RLE vs. diabetic foot ulcer vs. fluid overload   Plan: Will obtain labs, ekg, cXr, xray of R foot, and DVT US or R leg, ESR. As Pt missed dialysis session today, will likely admit due to missed dialysis session.

## 2022-04-21 NOTE — ED ADULT NURSE NOTE - NSIMPLEMENTINTERV_GEN_ALL_ED
Implemented All Fall Risk Interventions:  [place] to call system. Call bell, personal items and telephone within reach. Instruct patient to call for assistance. Room bathroom lighting operational. Non-slip footwear when patient is off stretcher. Physically safe environment: no spills, clutter or unnecessary equipment. Stretcher in lowest position, wheels locked, appropriate side rails in place. Provide visual cue, wrist band, yellow gown, etc. Monitor gait and stability. Monitor for mental status changes and reorient to person, place, and time. Review medications for side effects contributing to fall risk. Reinforce activity limits and safety measures with patient and family.

## 2022-04-21 NOTE — CONSULT NOTE ADULT - ASSESSMENT
I: 61 female new ESRD (TTS last HD was tuesday 4/19 via [facility]DC) DM HTN history of DVT presented to the ED from HD center ([facility]) due to RLE swelling  Consult for hemodialysis     Assessment/Plan:     #ESRD on HD TTS@ [facility] center on [facility]  usual Rx 4h 2-3L   last hemodialysis 4/19 per schedule   due for HD today 4/21  electrolytes at goal   euvolemic, UF w/HD   dry weight TBD     #HTN   BP at goal   continue w/ home meds    #access   TDC    #anemia  Hb at goal   obtain iron studies including ferritin, transferrin, iron, and TIBC to be able to calculate % saturation   no indication for EPO or IV Iron at this time       #renal bone disease   Ca ~9  Phos ~3   PTH pending   VitD25/1,25 pending   no indication for Hectorol at this time     Thank you for the opportunity to participate in the care of your patient. The nephrology service remains available to assist with any questions or concerns. Please feel free to reach us by paging the on-call nephrology fellow for urgent issues or as below.     Krystle Billy D.O  PGY-4, Nephrology Fellow   P: [phone] I: 61 female new ESRD (TTS last HD was tuesday 4/19 via [facility]) DM HTN history of DVT presented to the ED from HD center ([facility]) due to RLE swelling  Consult for hemodialysis     Assessment/Plan:     #ESRD on HD TTS@ [facility] center on [facility]  usual Rx 4h 2-3L   last hemodialysis 4/19 per schedule   due for HD today 4/21  electrolytes at goal   euvolemic, UF w/HD   dry weight TBD     #HTN   BP at goal   continue w/ home meds    #access   TDC    #anemia  Hb at goal   obtain iron studies including ferritin, transferrin, iron, and TIBC to be able to calculate % saturation   no indication for EPO or IV Iron at this time       #renal bone disease   Ca ~9  Phos ~2   trend phos daily w/ labs  no indication for Hectorol at this time     Thank you for the opportunity to participate in the care of your patient. The nephrology service remains available to assist with any questions or concerns. Please feel free to reach us by paging the on-call nephrology fellow for urgent issues or as below.     Krystle Billy D.O  PGY-4, Nephrology Fellow   P: [phone]

## 2022-04-21 NOTE — H&P ADULT - PROBLEM SELECTOR PLAN 10
- c/w home levothyroxine 50 Hx of strokes in the past w/ right sided residual deficit  - C/w home atorvastatin 80

## 2022-04-21 NOTE — PATIENT PROFILE ADULT - FALL HARM RISK - HARM RISK INTERVENTIONS

## 2022-04-21 NOTE — H&P ADULT - PROBLEM SELECTOR PLAN 8
- c/w home paroxetine 10mg   - c/w trazodone 50 at bedtime PRN for insomnia Current smoker, 1/4 ppd. On 3L NC at home. CPAP at night for DHARMESH. Home inhalers: symbicort and albuterol PRN  - c/w home NC  - c/w home inhalers Current smoker, 1/4 ppd. On 3L NC at home. CPAP at night for DHARMESH. Home inhalers: Symbicort and albuterol PRN  - C/w home inhalers

## 2022-04-21 NOTE — H&P ADULT - PROBLEM SELECTOR PLAN 3
Home meds: Levemir 25U and novolog 10U premeal  - F/u a1c  - Restart Lantus 10 and lispro 5 TID for now  - mISS    #Neuropathy  - Home meds: Gabapentin 100 bid and methadone 10mg TID  - Follows with outpatient pain management Dr. Oliveira On HD Tues/Thurs/Sat, last HD 4/19. Right chest wall tunneled cath. Now s/p LIJ dialysis catheter and adjunct HD on 3/26  - Renal following  - Make-up HD session tomorrow 4/22  - C/w home sevelamer 800 On HD Tues/Thurs/Sat, last HD 4/19. Right chest wall tunneled cath.  - Renal following  - Make-up HD session tomorrow 4/22  - C/w home sevelamer 800

## 2022-04-21 NOTE — ED PROVIDER NOTE - MUSCULOSKELETAL, MLM
Spine appears normal. 1+ dp pulse bilaterally, well healed scarring on dorsal part of R foot, heel with 2cm by 3cm area of ulceration with underlying exudate, no active oozing and bleeding, no surrounding erythema, warmth, fluctuance, and induration. Intact sensation to light touch to bilateral L3/L4 regions, over lateral feet she has diminished sensation, cap refill <2 secs on legs. Has 2/5 strength on bilateral LE, which includes hip flexors, flexors and extensors, dorsi and plantar flexions, and pallecs flexor. Spine appears normal. 1+ dp pulse bilaterally, well healed scarring on dorsal part of R foot, heel with 2cm by 3cm area of ulceration with underlying exudate chronic appearing exudate, no erythema/induration/fluctuance/crepitus, no active oozing and bleeding, no surrounding erythema, warmth, fluctuance, and induration. Intact sensation to light touch to bilateral L3/L4 regions, over lateral feet she has diminished sensation, cap refill <2 secs on legs. Has 2/5 strength on bilateral LE, which includes hip flexors, flexors and extensors, dorsi and plantar flexions, and pallecs flexor.

## 2022-04-21 NOTE — H&P ADULT - NSHPPHYSICALEXAM_GEN_ALL_CORE
.  VITAL SIGNS:  T(C): 36.3 (04-21-22 @ 18:24), Max: 36.7 (04-21-22 @ 13:03)  T(F): 97.4 (04-21-22 @ 18:24), Max: 98 (04-21-22 @ 13:03)  HR: 80 (04-21-22 @ 18:24) (80 - 85)  BP: 104/68 (04-21-22 @ 18:24) (100/50 - 106/63)  BP(mean): --  RR: 18 (04-21-22 @ 18:24) (18 - 18)  SpO2: 96% (04-21-22 @ 18:24) (96% - 100%)  Wt(kg): --    PHYSICAL EXAM:    Constitutional: WDWN resting comfortably in bed; NAD  Head: NC/AT  Eyes: PERRL, EOMI, anicteric sclera  ENT: no nasal discharge; uvula midline, no oropharyngeal erythema or exudates; MMM  Neck: supple; no JVD or thyromegaly  Respiratory: CTA B/L; no W/R/R, no retractions  Cardiac: +S1/S2; RRR; no M/R/G; PMI non-displaced  Gastrointestinal: abdomen soft, NT/ND; no rebound or guarding; +BSx4  Back: spine midline, no bony tenderness or step-offs; no CVAT B/L  Extremities: WWP, no clubbing or cyanosis; no peripheral edema  Musculoskeletal: NROM x4; no joint swelling, tenderness or erythema  Vascular: 2+ radial, femoral, DP/PT pulses B/L  Dermatologic: skin warm, dry and intact; no rashes, wounds, or scars  Lymphatic: no submandibular or cervical LAD  Neurologic: AAOx3; CNII-XII grossly intact; no focal deficits  Psychiatric: affect and characteristics of appearance, verbalizations, behaviors are appropriate .  VITAL SIGNS:  T(C): 36.3 (04-21-22 @ 18:24), Max: 36.7 (04-21-22 @ 13:03)  T(F): 97.4 (04-21-22 @ 18:24), Max: 98 (04-21-22 @ 13:03)  HR: 80 (04-21-22 @ 18:24) (80 - 85)  BP: 104/68 (04-21-22 @ 18:24) (100/50 - 106/63)  BP(mean): --  RR: 18 (04-21-22 @ 18:24) (18 - 18)  SpO2: 96% (04-21-22 @ 18:24) (96% - 100%)  Wt(kg): --    PHYSICAL EXAM:    Constitutional: WDWN resting comfortably in bed; NAD  Head: NC/AT  Eyes: PERRL, EOMI, anicteric sclera  ENT: no nasal discharge; uvula midline, no oropharyngeal erythema or exudates; MMM  Neck: supple; no JVD or thyromegaly  Respiratory: CTA B/L; no W/R/R, no retractions  Cardiac: +S1/S2; RRR; no M/R/G; PMI non-displaced  Gastrointestinal: abdomen soft, NT/ND; no rebound or guarding; +BSx4  Back: spine midline, no bony tenderness or step-offs; no CVAT B/L  Extremities: 2+ lower extremity edema  Musculoskeletal: NROM x4; no joint swelling, tenderness or erythema  Vascular: 2+ radial, femoral, DP/PT pulses B/L  Dermatologic: skin warm, dry and intact; scar on right leg s/p bypass; +dark discoloration of right calf, tender to palpation  Lymphatic: no submandibular or cervical LAD  Neurologic: AAOx3; CNII-XII grossly intact; right-sided weakness  Psychiatric: affect and characteristics of appearance, verbalizations, behaviors are appropriate

## 2022-04-21 NOTE — ED PROVIDER NOTE - PROGRESS NOTE DETAILS
esr 32, bnp and trop elevated in setting of elevated Cr k is wnl Xr shows no free air in R foot CXR shows mild pulmonary vascular congestion US RLE shows chronic unchanged DVT RLE, no new DVT. likely that minimal leg swelling is 2/2 DM foot ulcer. pt s/p abx. nephrology team awaiting pt in order to start HD.

## 2022-04-21 NOTE — H&P ADULT - HISTORY OF PRESENT ILLNESS
61F w/  61F w/     In the ED:  Initial vital signs: T: 98 F, HR: 85, BP: 100/54, R: 18, SpO2: 100% on RA  Labs: significant for WBC 8.6, Hgb 10.3, ESR 32, PTT 38.9, Glc 106, BUN 41, Cr 5.99, Phos 5.7, trop 0.03,   Imaging:  CXR:   EKG:   Medications:   Consults: none  61F w/     In the ED:  Initial vital signs: T: 98 F, HR: 85, BP: 100/54, R: 18, SpO2: 100% on RA  Labs: significant for WBC 8.6, Hgb 10.3, ESR 32, PTT 38.9, Glc 106, BUN 41, Cr 5.99, Phos 5.7, trop 0.03,   Imaging:  CXR:  No acute cardiopulmonary disease process. Cardiomegaly.  EKG:   Medications: Vanc 1g, Zosyn 3.375, percocet 5/325, morphine 2  Consults: none  61F current smoker w/ PMHx ESRD (HD T/Th/Sa), HFrEF (EF 25% s/p AICD), IDDM2, COPD (3L O2 at home PRN), multiple myeloma (on Ninlaro), hypothyroidism, depression, neuropathy (on methadone), recurrent DVTs (on eliquis 2.5mg qd), CVA x2 (w/ residual R-sided weakness), PAD s/p LLE bypass, and DHARMESH on CPAP, who was sent in from her dialysis center by her dialysis doctor for concern for worsening chronic RLE DVT and concern for infected chronic right heal ulcer. Patient states that she began to notice worsening swelling of her right calf and ankle one week ago, but has become more painful in the past 2 days. Denies any pus or drainage from her right heal wound. Patient reports the dark discoloration of her right ankle is chronic. Denies chest pain, shortness of breath, abdominal pain, n/v/d/c.     In the ED:  Initial vital signs: T: 98 F, HR: 85, BP: 100/54, R: 18, SpO2: 100% on RA  Labs: significant for WBC 8.6, Hgb 10.3, ESR 32, PTT 38.9, Glc 106, BUN 41, Cr 5.99, Phos 5.7, trop 0.03,   Imaging:  CXR:  No acute cardiopulmonary disease process. Cardiomegaly.  EKG: NSR  Medications: Vanc 1g, Zosyn 3.375, percocet 5/325, morphine 2  Consults: none  61F current smoker w/ PMHx ESRD (HD T/Th/Sa), HFrEF (EF 25% s/p AICD), IDDM2, COPD (3L O2 at home PRN), multiple myeloma (on Ninlaro), hypothyroidism, depression, neuropathy (on methadone), recurrent DVTs (on eliquis 2.5mg qd), CVA x2 (w/ residual R-sided weakness), PAD s/p LLE bypass, and DHARMESH on CPAP, who was sent in by her dialysis doctor for concern for worsening chronic RLE DVT and concern for infected chronic right heal ulcer. Patient states that she began to notice worsening swelling of her right calf and ankle one week ago, but has become more painful in the past 2 days. Denies any pus or drainage from her right heal wound. Patient reports the dark discoloration of her right ankle is chronic. Denies chest pain, shortness of breath, abdominal pain, n/v/d/c.     In the ED:  Initial vital signs: T: 98 F, HR: 85, BP: 100/54, R: 18, SpO2: 100% on RA  Labs: significant for WBC 8.6, Hgb 10.3, ESR 32, PTT 38.9, Glc 106, BUN 41, Cr 5.99, Phos 5.7, trop 0.03,   Imaging:  CXR:  No acute cardiopulmonary disease process. Cardiomegaly.  EKG: NSR  Medications: Vanc 1g, Zosyn 3.375, percocet 5/325, morphine 2  Consults: none  61F current smoker w/ PMHx ESRD (HD T/Th/Sa), HFrEF (EF 25% s/p AICD), IDDM2, COPD (3L O2 at home PRN), multiple myeloma (on Ninlaro), hypothyroidism, depression, neuropathy (on methadone), recurrent DVTs (on eliquis 2.5mg qd), CVA x2 (w/ residual R-sided weakness), PAD s/p LLE bypass, and DHARMESH on CPAP, who was sent in by her dialysis doctor for concern for worsening chronic RLE DVT and concern for infected chronic right heal ulcer. Patient states that she began to notice worsening swelling of her right calf and ankle one week ago, but has become more painful in the past 2 days. Denies any pus or drainage from her right heal wound. Patient reports the dark discoloration of her right ankle is chronic. Of note, patient has been wheelchair-bound due to weakness since a hospitalization for COVID last year. Denies chest pain, shortness of breath, abdominal pain, n/v/d/c.     In the ED:  Initial vital signs: T: 98 F, HR: 85, BP: 100/54, R: 18, SpO2: 100% on RA  Labs: significant for WBC 8.6, Hgb 10.3, ESR 32, PTT 38.9, Glc 106, BUN 41, Cr 5.99, Phos 5.7, trop 0.03,   Imaging:  CXR:  No acute cardiopulmonary disease process. Cardiomegaly.  EKG: NSR  Medications: Vanc 1g, Zosyn 3.375, percocet 5/325, morphine 2  Consults: none

## 2022-04-21 NOTE — H&P ADULT - PROBLEM SELECTOR PLAN 9
Multiple recurrent DVTs in LEs. Home med: eliquis 2.5mg BID  - C/w home eliquis - CPAP at night for DHARMESH

## 2022-04-21 NOTE — ED ADULT NURSE NOTE - OBJECTIVE STATEMENT
61y Female c/o RLE pain/swelling. Pt sent in from dialysis center to R/O DVT due to pt complaining of RLE pain. RLE with diabetic ulcer to foot. Pt denies CP, SOB, F/C, N/V/D.

## 2022-04-21 NOTE — H&P ADULT - PROBLEM SELECTOR PLAN 12
F: None   E: Replete for K<4, Mag<2  N: Renal Diet  A: Eliquis 2.5 BID  Code status: Full  Dispo: [facility] Home med: Ninlaro 3mg on Mondays and Decadron 4mg on Mondays.  - Restart on discharge    #Hypothyroid  - c/w home levothyroxine 50    PPX  F: None   E: Replete for K<4, Mag<2  N: Renal Diet  A: Eliquis 2.5 BID  Code status: Full  Dispo: F

## 2022-04-21 NOTE — ED ADULT TRIAGE NOTE - BMI (KG/M2)
"Subjective:   Xander Saldivar is a 42 y.o. male here today for annual    Attention deficit hyperactivity disorder (ADHD), predominantly inattentive type  Has been taking metadate 20 mg twice daily, getting dry mouth and tremor. He was previously on metadate ER 20 mg twice daily, which worked better for him.         Current medicines (including changes today)  Current Outpatient Prescriptions   Medication Sig Dispense Refill   • [START ON 11/23/2018] methylphenidate (METADATE ER) 20 MG CR tablet Take 1 Tab by mouth 2 Times a Day for 30 days. 60 Tab 0     No current facility-administered medications for this visit.      He  has a past medical history of Attention deficit hyperactivity disorder (ADHD), predominantly inattentive type (1/19/2018); Chronic fatigue (7/25/2018); Left upper quadrant abdominal mass (7/25/2018); and Well adult exam (1/19/2018).    ROS   No chest pain, no shortness of breath, no abdominal pain, no testicular mass, no constipation.       Objective:     Blood pressure 120/76, pulse 63, temperature 36.4 °C (97.6 °F), height 1.74 m (5' 8.5\"), weight 80.7 kg (178 lb), SpO2 97 %. Body mass index is 26.67 kg/m².   Physical Exam:  Constitutional: Alert, no distress.  Skin: Warm, dry, good turgor, no rashes in visible areas.  Eye: Equal, round and reactive, conjunctiva clear, lids normal.  ENMT: Lips without lesions, good dentition, oropharynx clear.  Neck: Trachea midline, no masses, no thyromegaly. No cervical or supraclavicular lymphadenopathy  Respiratory: Unlabored respiratory effort, lungs clear to auscultation, no wheezes, no ronchi.  Cardiovascular: Normal S1, S2, no murmur, no edema.  Psych: Alert and oriented x3, normal affect and mood.        Assessment and Plan:   The following treatment plan was discussed    1. Annual physical exam  Advised healthy lifestyle. Check labs and call with results.  - COMP METABOLIC PANEL; Future  - CBC WITH DIFFERENTIAL; Future  - LIPID PROFILE; Future  - " TSH WITH REFLEX TO FT4; Future    2. Attention deficit hyperactivity disorder (ADHD), predominantly inattentive type  Change patient over to Metadate ER 20 mg tablets. Follow up with PCP in 3 months.  - methylphenidate (METADATE ER) 20 MG CR tablet; Take 1 Tab by mouth 2 Times a Day for 30 days.  Dispense: 60 Tab; Refill: 0      Followup: Return in about 1 year (around 9/28/2019) for Annual.          33.3

## 2022-04-21 NOTE — CONSULT NOTE ADULT - SUBJECTIVE AND OBJECTIVE BOX
Patient is a 61y old  Female who presents with a chief complaint of RLE swelling    HPI:   Nephrology consulted for dialysis.     PAST MEDICAL & SURGICAL HISTORY:  CHF (congestive heart failure)    COPD (chronic obstructive pulmonary disease)    HLD (hyperlipidemia)    Chronic pain    DM (diabetes mellitus)    CVA (cerebral vascular accident)    DVT (deep venous thrombosis)    HTN (hypertension)    Depression    Bipolar depression    PAD (peripheral artery disease)    Neuropathy    Hypothyroidism    Multiple myeloma    CKD (chronic kidney disease), stage IV    DHARMESH on CPAP    AICD (automatic cardioverter/defibrillator) present    H/O abdominal hysterectomy    H/O tubal ligation    History of cholecystectomy    H/O extremity bypass graft          Allergies:  aspirin (Other (Moderate); Rash)  oral contrast (Unknown)      Home Medications:   morphine  - Injectable 4 milliGRAM(s) IV Push Once  piperacillin/tazobactam IVPB... 3.375 Gram(s) IV Intermittent once  vancomycin  IVPB. 1000 milliGRAM(s) IV Intermittent once      Hospital Medications:   MEDICATIONS  (STANDING):  morphine  - Injectable 4 milliGRAM(s) IV Push Once  piperacillin/tazobactam IVPB... 3.375 Gram(s) IV Intermittent once  vancomycin  IVPB. 1000 milliGRAM(s) IV Intermittent once      SOCIAL HISTORY:  Denies ETOh, Smoking,     Family History:  FAMILY HISTORY:  Family history of hypertension (Mother)    Family history of diabetes mellitus (Mother)          VITALS:  T(F): 98 (04-21-22 @ 13:03), Max: 98 (04-21-22 @ 13:03)  HR: 85 (04-21-22 @ 13:03)  BP: 100/54 (04-21-22 @ 13:03)  RR: 18 (04-21-22 @ 13:03)  SpO2: 100% (04-21-22 @ 13:03)  Wt(kg): --    Height (cm): 165.1 (04-21 @ 13:03)  Weight (kg): 90.7 (04-21 @ 13:03)  BMI (kg/m2): 33.3 (04-21 @ 13:03)  BSA (m2): 1.98 (04-21 @ 13:03)  CAPILLARY BLOOD GLUCOSE          Review of Systems:  CONSTITUTIONAL: No fever   RESPIRATORY: No shortness of breath, cough  CARDIOVASCULAR: No Chest pain, shortness of breath  GASTROINTESTINAL: No abdominal pain, nausea, vomiting, diarrhea  GENITOURINARY: No urinary frequency, gross hematuria, dysuria  NEUROLOGICAL: No headache, weakness  SKIN: No rash or skin lesion  MUSCULOSKELETAL: No swelling  Psych: Denies suicidal or homicidal ideation    PHYSICAL EXAM:  GENERAL: Alert, awake, oriented x3 on RA   HEENT: JAQUELIN, EOMI, neck supple, no JVP  CHEST/LUNG: Bilateral clear breath sounds  HEART: Regular rate and rhythm, no murmur, no gallops, no rub   ABDOMEN: Soft, nontender, non distended  : No flank or supra pubic tenderness.  EXTREMITIES: no pedal edema  Neurology: AAOx3, no asterixis   SKIN: No rash or skin lesion   ACCESS: LUE AVF w/bruit and thrill     LABS:        Creatinine Trend:     Urine Studies:            68M PMHx ESRD on HD, multiple myeloma, chronic lymphedema, LLE DVT with IVC filter, c dif s/p fecal transplant, who presented [facility] the [facility] ED after being found hypotensive and hypothermic when he went for dialysis. Consult for hemodialysis     Assessment/Plan:     #ESRD on HD MWF @[address] [place] [address] Dialysis [place]   usual Rx 3h 3L   last hemodialysis 7/19 per schedule   no acute indication for hemodialysis   next hemodialysis 7/24 per schedule   electrolytes at goal   euvolemic, UF w/HD   dry weight TBD     #HTN   BP at goal   continue with norvasc, metoprolol, clonidine     #access   LUE AVF functional     #anemia  Hb at goal   obtain iron studies including ferritin, transferrin, iron, and TIBC to be able to calculate % saturation   no indication for EPO or IV Iron at this time   transfusion as per primary team     #renal bone disease   Ca ~9  Phos ~3   PTH pending   VitD25/1,25 pending   no indication for Hectorol at this time     Thank you for the opportunity to participate in the care of your patient. The nephrology service remains available to assist with any questions or concerns. Please feel free to reach us by paging the on-call nephrology fellow for urgent issues or as below.     Hang Garcia M.D.   PGY-5, Nephrology Fellow   C: [phone]   P: [phone]  Patient is a 61y old  Female who presents with a chief complaint of RLE swelling    HPI: 61 female new ESRD (TTS last HD was tuesday 4/19 via [facility]DC) DM HTN history of DVT presented to the ED from HD center ([facility]) due to RLE swelling- patient states that she started to noticed the swelling in her RLE about 2 days ago - denies any history of trauma to the area no systemic symtoms; was seen at the HD center (pre-starting her session) where the nephrrologist at the HD center was concerned for possible DVT with also right heel wound (appears chronic in nature) and sent her to the ED: she denies any SOB no fevers chills- has been having a hard time coping with her newly initiation of HD and is very tearful at this moment in time ;' usual rx 4H 2-3L does not know her estimated dry weight; labs notable for hgb 10.3    Nephrology consulted for dialysis.     PAST MEDICAL & SURGICAL HISTORY:  CHF (congestive heart failure)    COPD (chronic obstructive pulmonary disease)    HLD (hyperlipidemia)    Chronic pain    DM (diabetes mellitus)    CVA (cerebral vascular accident)    DVT (deep venous thrombosis)    HTN (hypertension)    Depression    Bipolar depression    PAD (peripheral artery disease)    Neuropathy    Hypothyroidism    Multiple myeloma    CKD (chronic kidney disease), stage IV    DHARMESH on CPAP    AICD (automatic cardioverter/defibrillator) present    H/O abdominal hysterectomy    H/O tubal ligation    History of cholecystectomy    H/O extremity bypass graft          Allergies:  aspirin (Other (Moderate); Rash)  oral contrast (Unknown)      Home Medications:   morphine  - Injectable 4 milliGRAM(s) IV Push Once  piperacillin/tazobactam IVPB... 3.375 Gram(s) IV Intermittent once  vancomycin  IVPB. 1000 milliGRAM(s) IV Intermittent once      Hospital Medications:   MEDICATIONS  (STANDING):  morphine  - Injectable 4 milliGRAM(s) IV Push Once  piperacillin/tazobactam IVPB... 3.375 Gram(s) IV Intermittent once  vancomycin  IVPB. 1000 milliGRAM(s) IV Intermittent once      SOCIAL HISTORY:  Denies ETOh, Smoking,     Family History:  FAMILY HISTORY:  Family history of hypertension (Mother)    Family history of diabetes mellitus (Mother)          VITALS:  T(F): 98 (04-21-22 @ 13:03), Max: 98 (04-21-22 @ 13:03)  HR: 85 (04-21-22 @ 13:03)  BP: 100/54 (04-21-22 @ 13:03)  RR: 18 (04-21-22 @ 13:03)  SpO2: 100% (04-21-22 @ 13:03)  Wt(kg): --    Height (cm): 165.1 (04-21 @ 13:03)  Weight (kg): 90.7 (04-21 @ 13:03)  BMI (kg/m2): 33.3 (04-21 @ 13:03)  BSA (m2): 1.98 (04-21 @ 13:03)  CAPILLARY BLOOD GLUCOSE          Review of Systems:  all other ROS negative     PHYSICAL EXAM:  GENERAL: Alert, awake, oriented x3 on RA   CHEST/LUNG: CTA BL no rales, rhonchi or wheezing   HEART: Regular rate and rhythm, no murmur, no gallops, no rub   ABDOMEN: Soft, nontender, non distended  EXTREMITIES: +RLE swelling   Neurology: AAOx3, no asterixis   SKIN: No rash or skin lesion   ACCESS: +TDC    LABS:        Creatinine Trend:     Urine Studies:             Patient is a 61y old  Female who presents with a chief complaint of RLE swelling    HPI: 61 female new ESRD (TTS last HD was tuesday 4/19 via [facility]) DM HTN history of DVT presented to the ED from HD center ([facility]) due to RLE swelling- patient states that she started to noticed the swelling in her RLE about 2 days ago - denies any history of trauma to the area no systemic symtoms; was seen at the HD center (pre-starting her session) where the nephrrologist at the HD center was concerned for possible DVT with also right heel wound (appears chronic in nature) and sent her to the ED: she denies any SOB no fevers chills- has been having a hard time coping with her newly initiation of HD and is very tearful at this moment in time ;' usual rx 4H 2-3L does not know her estimated dry weight; labs notable for hgb 10.3 K 5.0   Nephrology consulted for dialysis.     PAST MEDICAL & SURGICAL HISTORY:  CHF (congestive heart failure)    COPD (chronic obstructive pulmonary disease)    HLD (hyperlipidemia)    Chronic pain    DM (diabetes mellitus)    CVA (cerebral vascular accident)    DVT (deep venous thrombosis)    HTN (hypertension)    Depression    Bipolar depression    PAD (peripheral artery disease)    Neuropathy    Hypothyroidism    Multiple myeloma    CKD (chronic kidney disease), stage IV    DHARMESH on CPAP    AICD (automatic cardioverter/defibrillator) present    H/O abdominal hysterectomy    H/O tubal ligation    History of cholecystectomy    H/O extremity bypass graft          Allergies:  aspirin (Other (Moderate); Rash)  oral contrast (Unknown)      Home Medications:   morphine  - Injectable 4 milliGRAM(s) IV Push Once  piperacillin/tazobactam IVPB... 3.375 Gram(s) IV Intermittent once  vancomycin  IVPB. 1000 milliGRAM(s) IV Intermittent once      Hospital Medications:   MEDICATIONS  (STANDING):  morphine  - Injectable 4 milliGRAM(s) IV Push Once  piperacillin/tazobactam IVPB... 3.375 Gram(s) IV Intermittent once  vancomycin  IVPB. 1000 milliGRAM(s) IV Intermittent once      SOCIAL HISTORY:  Denies ETOh, Smoking,     Family History:  FAMILY HISTORY:  Family history of hypertension (Mother)    Family history of diabetes mellitus (Mother)          VITALS:  T(F): 98 (04-21-22 @ 13:03), Max: 98 (04-21-22 @ 13:03)  HR: 85 (04-21-22 @ 13:03)  BP: 100/54 (04-21-22 @ 13:03)  RR: 18 (04-21-22 @ 13:03)  SpO2: 100% (04-21-22 @ 13:03)  Wt(kg): --    Height (cm): 165.1 (04-21 @ 13:03)  Weight (kg): 90.7 (04-21 @ 13:03)  BMI (kg/m2): 33.3 (04-21 @ 13:03)  BSA (m2): 1.98 (04-21 @ 13:03)  CAPILLARY BLOOD GLUCOSE          Review of Systems:  all other ROS negative     PHYSICAL EXAM:  GENERAL: Alert, awake, oriented x3 on RA   CHEST/LUNG: CTA BL no rales, rhonchi or wheezing   HEART: Regular rate and rhythm, no murmur, no gallops, no rub   ABDOMEN: Soft, nontender, non distended  EXTREMITIES: +RLE swelling   Neurology: AAOx3, no asterixis   SKIN: No rash or skin lesion   ACCESS: +TDC    LABS:        Creatinine Trend:     Urine Studies:

## 2022-04-21 NOTE — H&P ADULT - PROBLEM SELECTOR PLAN 2
Home meds: Entresto  BID and metoprolol XL 100mg qd  - c/w home meds Multiple recurrent DVTs in LEs. Home med: eliquis 2.5mg BID. LE doppler US: Since 12/20/2021, unchanged nonocclusive thrombus within the right common   femoral vein. No new DVT.  - C/w home eliquis 2.5 BID

## 2022-04-21 NOTE — H&P ADULT - PROBLEM SELECTOR PLAN 7
Hx of strokes in the past w/ right sided residual deficit  - C/w home atorvastatin 80 Home meds: Levemir 25U and novolog 10U premeal  - F/u A1c  - Restart Lantus 10 and lispro 5 TID for now  - mISS Home meds: Levemir 25U and novolog 10U premeal  - F/u A1c  - Restart Lantus 10 and lispro 5 TID for now, uptitrate as needed  - mISS

## 2022-04-21 NOTE — H&P ADULT - NSHPSOCIALHISTORY_GEN_ALL_CORE
Lives in [place] with her , daughter, daughter in law, and grandson. Smokes 1/4 ppd, previously 1/2 ppd, for 45 years. Denies alcohol or illicit drug use.

## 2022-04-21 NOTE — H&P ADULT - PROBLEM SELECTOR PLAN 5
Current smoker, 1/4 ppd. On 3L NC at home. CPAP at night for DHARMESH. Home inhalers: symbicort and albuterol PRN  - c/w home NC  - c/w home inhalers    #DHARMESH  - CPAP at night for DHARMESH - Home meds: Gabapentin 100 bid and methadone 10mg TID  - Follows with outpatient pain management Dr. Oliveira C/b sacral heel ulcer. Home meds: Gabapentin 100 bid and methadone 10mg TID  - Follows with outpatient pain management Dr. Oliveira

## 2022-04-22 ENCOUNTER — APPOINTMENT (OUTPATIENT)
Dept: INTERNAL MEDICINE | Facility: CLINIC | Age: 62
End: 2022-04-22

## 2022-04-22 DIAGNOSIS — I50.20 UNSPECIFIED SYSTOLIC (CONGESTIVE) HEART FAILURE: ICD-10-CM

## 2022-04-22 DIAGNOSIS — L03.90 CELLULITIS, UNSPECIFIED: ICD-10-CM

## 2022-04-22 DIAGNOSIS — F41.9 ANXIETY DISORDER, UNSPECIFIED: ICD-10-CM

## 2022-04-22 DIAGNOSIS — M79.89 OTHER SPECIFIED SOFT TISSUE DISORDERS: ICD-10-CM

## 2022-04-22 DIAGNOSIS — G47.33 OBSTRUCTIVE SLEEP APNEA (ADULT) (PEDIATRIC): ICD-10-CM

## 2022-04-22 DIAGNOSIS — Z86.73 PERSONAL HISTORY OF TRANSIENT ISCHEMIC ATTACK (TIA), AND CEREBRAL INFARCTION WITHOUT RESIDUAL DEFICITS: ICD-10-CM

## 2022-04-22 DIAGNOSIS — E03.9 HYPOTHYROIDISM, UNSPECIFIED: ICD-10-CM

## 2022-04-22 DIAGNOSIS — E11.9 TYPE 2 DIABETES MELLITUS WITHOUT COMPLICATIONS: ICD-10-CM

## 2022-04-22 DIAGNOSIS — N18.6 END STAGE RENAL DISEASE: ICD-10-CM

## 2022-04-22 DIAGNOSIS — J44.9 CHRONIC OBSTRUCTIVE PULMONARY DISEASE, UNSPECIFIED: ICD-10-CM

## 2022-04-22 DIAGNOSIS — I10 ESSENTIAL (PRIMARY) HYPERTENSION: ICD-10-CM

## 2022-04-22 DIAGNOSIS — E11.40 TYPE 2 DIABETES MELLITUS WITH DIABETIC NEUROPATHY, UNSPECIFIED: ICD-10-CM

## 2022-04-22 DIAGNOSIS — C90.00 MULTIPLE MYELOMA NOT HAVING ACHIEVED REMISSION: ICD-10-CM

## 2022-04-22 DIAGNOSIS — I63.9 CEREBRAL INFARCTION, UNSPECIFIED: ICD-10-CM

## 2022-04-22 DIAGNOSIS — I82.409 ACUTE EMBOLISM AND THROMBOSIS OF UNSPECIFIED DEEP VEINS OF UNSPECIFIED LOWER EXTREMITY: ICD-10-CM

## 2022-04-22 DIAGNOSIS — Z29.9 ENCOUNTER FOR PROPHYLACTIC MEASURES, UNSPECIFIED: ICD-10-CM

## 2022-04-22 DIAGNOSIS — E11.628 TYPE 2 DIABETES MELLITUS WITH OTHER SKIN COMPLICATIONS: ICD-10-CM

## 2022-04-22 LAB
ALBUMIN SERPL ELPH-MCNC: 3.9 G/DL — SIGNIFICANT CHANGE UP (ref 3.3–5)
ALP SERPL-CCNC: 69 U/L — SIGNIFICANT CHANGE UP (ref 40–120)
ALT FLD-CCNC: 6 U/L — LOW (ref 10–45)
ANION GAP SERPL CALC-SCNC: 13 MMOL/L — SIGNIFICANT CHANGE UP (ref 5–17)
AST SERPL-CCNC: 11 U/L — SIGNIFICANT CHANGE UP (ref 10–40)
BASOPHILS # BLD AUTO: 0.04 K/UL — SIGNIFICANT CHANGE UP (ref 0–0.2)
BASOPHILS NFR BLD AUTO: 0.7 % — SIGNIFICANT CHANGE UP (ref 0–2)
BILIRUB SERPL-MCNC: 0.3 MG/DL — SIGNIFICANT CHANGE UP (ref 0.2–1.2)
BUN SERPL-MCNC: 52 MG/DL — HIGH (ref 7–23)
CALCIUM SERPL-MCNC: 8.3 MG/DL — LOW (ref 8.4–10.5)
CHLORIDE SERPL-SCNC: 100 MMOL/L — SIGNIFICANT CHANGE UP (ref 96–108)
CO2 SERPL-SCNC: 27 MMOL/L — SIGNIFICANT CHANGE UP (ref 22–31)
CREAT SERPL-MCNC: 6.86 MG/DL — HIGH (ref 0.5–1.3)
EGFR: 6 ML/MIN/1.73M2 — LOW
EOSINOPHIL # BLD AUTO: 0.15 K/UL — SIGNIFICANT CHANGE UP (ref 0–0.5)
EOSINOPHIL NFR BLD AUTO: 2.7 % — SIGNIFICANT CHANGE UP (ref 0–6)
FERRITIN SERPL-MCNC: 617 NG/ML — HIGH (ref 15–150)
GLUCOSE BLDC GLUCOMTR-MCNC: 106 MG/DL — HIGH (ref 70–99)
GLUCOSE BLDC GLUCOMTR-MCNC: 111 MG/DL — HIGH (ref 70–99)
GLUCOSE BLDC GLUCOMTR-MCNC: 119 MG/DL — HIGH (ref 70–99)
GLUCOSE BLDC GLUCOMTR-MCNC: 81 MG/DL — SIGNIFICANT CHANGE UP (ref 70–99)
GLUCOSE BLDC GLUCOMTR-MCNC: 88 MG/DL — SIGNIFICANT CHANGE UP (ref 70–99)
GLUCOSE SERPL-MCNC: 113 MG/DL — HIGH (ref 70–99)
HCT VFR BLD CALC: 30.6 % — LOW (ref 34.5–45)
HGB BLD-MCNC: 9.2 G/DL — LOW (ref 11.5–15.5)
IMM GRANULOCYTES NFR BLD AUTO: 0.2 % — SIGNIFICANT CHANGE UP (ref 0–1.5)
IRON SATN MFR SERPL: 18 % — SIGNIFICANT CHANGE UP (ref 14–50)
IRON SATN MFR SERPL: 42 UG/DL — SIGNIFICANT CHANGE UP (ref 30–160)
IRON SATN MFR SERPL: 48 UG/DL — SIGNIFICANT CHANGE UP (ref 30–160)
LYMPHOCYTES # BLD AUTO: 1.11 K/UL — SIGNIFICANT CHANGE UP (ref 1–3.3)
LYMPHOCYTES # BLD AUTO: 19.8 % — SIGNIFICANT CHANGE UP (ref 13–44)
MAGNESIUM SERPL-MCNC: 1.8 MG/DL — SIGNIFICANT CHANGE UP (ref 1.6–2.6)
MCHC RBC-ENTMCNC: 30.1 GM/DL — LOW (ref 32–36)
MCHC RBC-ENTMCNC: 30.2 PG — SIGNIFICANT CHANGE UP (ref 27–34)
MCV RBC AUTO: 100.3 FL — HIGH (ref 80–100)
MONOCYTES # BLD AUTO: 0.79 K/UL — SIGNIFICANT CHANGE UP (ref 0–0.9)
MONOCYTES NFR BLD AUTO: 14.1 % — HIGH (ref 2–14)
NEUTROPHILS # BLD AUTO: 3.5 K/UL — SIGNIFICANT CHANGE UP (ref 1.8–7.4)
NEUTROPHILS NFR BLD AUTO: 62.5 % — SIGNIFICANT CHANGE UP (ref 43–77)
NRBC # BLD: 0 /100 WBCS — SIGNIFICANT CHANGE UP (ref 0–0)
PHOSPHATE SERPL-MCNC: 6 MG/DL — HIGH (ref 2.5–4.5)
PLATELET # BLD AUTO: 152 K/UL — SIGNIFICANT CHANGE UP (ref 150–400)
POTASSIUM SERPL-MCNC: 4.8 MMOL/L — SIGNIFICANT CHANGE UP (ref 3.5–5.3)
POTASSIUM SERPL-SCNC: 4.8 MMOL/L — SIGNIFICANT CHANGE UP (ref 3.5–5.3)
PROT SERPL-MCNC: 6.9 G/DL — SIGNIFICANT CHANGE UP (ref 6–8.3)
RBC # BLD: 3.05 M/UL — LOW (ref 3.8–5.2)
RBC # FLD: 15.6 % — HIGH (ref 10.3–14.5)
SODIUM SERPL-SCNC: 140 MMOL/L — SIGNIFICANT CHANGE UP (ref 135–145)
TIBC SERPL-MCNC: 237 UG/DL — SIGNIFICANT CHANGE UP (ref 220–430)
TROPONIN T SERPL-MCNC: 0.03 NG/ML — HIGH (ref 0–0.01)
UIBC SERPL-MCNC: 195 UG/DL — SIGNIFICANT CHANGE UP (ref 110–370)
WBC # BLD: 5.6 K/UL — SIGNIFICANT CHANGE UP (ref 3.8–10.5)
WBC # FLD AUTO: 5.6 K/UL — SIGNIFICANT CHANGE UP (ref 3.8–10.5)

## 2022-04-22 PROCEDURE — 99233 SBSQ HOSP IP/OBS HIGH 50: CPT | Mod: GC

## 2022-04-22 PROCEDURE — 99222 1ST HOSP IP/OBS MODERATE 55: CPT | Mod: GC

## 2022-04-22 RX ORDER — OMEPRAZOLE 10 MG/1
1 CAPSULE, DELAYED RELEASE ORAL
Qty: 0 | Refills: 0 | DISCHARGE

## 2022-04-22 RX ORDER — ALBUTEROL 90 UG/1
2 AEROSOL, METERED ORAL
Qty: 0 | Refills: 0 | DISCHARGE

## 2022-04-22 RX ORDER — FENOFIBRATE,MICRONIZED 130 MG
1 CAPSULE ORAL
Qty: 0 | Refills: 0 | DISCHARGE

## 2022-04-22 RX ORDER — COLLAGENASE CLOSTRIDIUM HIST. 250 UNIT/G
1 OINTMENT (GRAM) TOPICAL DAILY
Refills: 0 | Status: DISCONTINUED | OUTPATIENT
Start: 2022-04-22 | End: 2022-04-28

## 2022-04-22 RX ORDER — TRAZODONE HCL 50 MG
1 TABLET ORAL
Qty: 0 | Refills: 0 | DISCHARGE

## 2022-04-22 RX ORDER — OXYCODONE AND ACETAMINOPHEN 5; 325 MG/1; MG/1
1 TABLET ORAL ONCE
Refills: 0 | Status: DISCONTINUED | OUTPATIENT
Start: 2022-04-22 | End: 2022-04-22

## 2022-04-22 RX ORDER — METHADONE HYDROCHLORIDE 40 MG/1
10 TABLET ORAL DAILY
Refills: 0 | Status: DISCONTINUED | OUTPATIENT
Start: 2022-04-23 | End: 2022-04-23

## 2022-04-22 RX ORDER — ACETAMINOPHEN 500 MG
650 TABLET ORAL EVERY 6 HOURS
Refills: 0 | Status: DISCONTINUED | OUTPATIENT
Start: 2022-04-22 | End: 2022-04-22

## 2022-04-22 RX ORDER — IXAZOMIB 3 MG/1
1 CAPSULE ORAL
Qty: 0 | Refills: 0 | DISCHARGE

## 2022-04-22 RX ORDER — LEVOTHYROXINE SODIUM 125 MCG
1 TABLET ORAL
Qty: 0 | Refills: 0 | DISCHARGE

## 2022-04-22 RX ORDER — METOPROLOL TARTRATE 50 MG
1 TABLET ORAL
Qty: 0 | Refills: 2 | DISCHARGE

## 2022-04-22 RX ORDER — SOD,AMMONIUM,POTASSIUM LACTATE
1 CREAM (GRAM) TOPICAL
Refills: 0 | Status: DISCONTINUED | OUTPATIENT
Start: 2022-04-22 | End: 2022-04-24

## 2022-04-22 RX ORDER — DEXAMETHASONE 0.5 MG/5ML
5 ELIXIR ORAL
Qty: 0 | Refills: 0 | DISCHARGE

## 2022-04-22 RX ORDER — ACETAMINOPHEN 500 MG
1000 TABLET ORAL ONCE
Refills: 0 | Status: COMPLETED | OUTPATIENT
Start: 2022-04-22 | End: 2022-04-22

## 2022-04-22 RX ORDER — BUDESONIDE AND FORMOTEROL FUMARATE DIHYDRATE 160; 4.5 UG/1; UG/1
2 AEROSOL RESPIRATORY (INHALATION)
Qty: 0 | Refills: 0 | DISCHARGE

## 2022-04-22 RX ADMIN — BUDESONIDE AND FORMOTEROL FUMARATE DIHYDRATE 2 PUFF(S): 160; 4.5 AEROSOL RESPIRATORY (INHALATION) at 06:38

## 2022-04-22 RX ADMIN — Medication 1000 MILLIGRAM(S): at 18:40

## 2022-04-22 RX ADMIN — INSULIN GLARGINE 10 UNIT(S): 100 INJECTION, SOLUTION SUBCUTANEOUS at 22:14

## 2022-04-22 RX ADMIN — OXYCODONE AND ACETAMINOPHEN 1 TABLET(S): 5; 325 TABLET ORAL at 14:45

## 2022-04-22 RX ADMIN — Medication 100 MILLIGRAM(S): at 22:08

## 2022-04-22 RX ADMIN — Medication 48 MILLIGRAM(S): at 14:18

## 2022-04-22 RX ADMIN — SACUBITRIL AND VALSARTAN 1 TABLET(S): 24; 26 TABLET, FILM COATED ORAL at 06:37

## 2022-04-22 RX ADMIN — Medication 5 UNIT(S): at 09:28

## 2022-04-22 RX ADMIN — OXYCODONE AND ACETAMINOPHEN 1 TABLET(S): 5; 325 TABLET ORAL at 14:18

## 2022-04-22 RX ADMIN — Medication 5 UNIT(S): at 14:18

## 2022-04-22 RX ADMIN — APIXABAN 2.5 MILLIGRAM(S): 2.5 TABLET, FILM COATED ORAL at 18:10

## 2022-04-22 RX ADMIN — Medication 200 MILLIGRAM(S): at 06:37

## 2022-04-22 RX ADMIN — Medication 5 UNIT(S): at 18:14

## 2022-04-22 RX ADMIN — Medication 100 MILLIGRAM(S): at 06:37

## 2022-04-22 RX ADMIN — GABAPENTIN 100 MILLIGRAM(S): 400 CAPSULE ORAL at 18:10

## 2022-04-22 RX ADMIN — Medication 10 MILLIGRAM(S): at 22:08

## 2022-04-22 RX ADMIN — PANTOPRAZOLE SODIUM 40 MILLIGRAM(S): 20 TABLET, DELAYED RELEASE ORAL at 06:37

## 2022-04-22 RX ADMIN — Medication 15 MILLIGRAM(S): at 06:37

## 2022-04-22 RX ADMIN — METHADONE HYDROCHLORIDE 10 MILLIGRAM(S): 40 TABLET ORAL at 06:37

## 2022-04-22 RX ADMIN — SACUBITRIL AND VALSARTAN 1 TABLET(S): 24; 26 TABLET, FILM COATED ORAL at 18:09

## 2022-04-22 RX ADMIN — GABAPENTIN 100 MILLIGRAM(S): 400 CAPSULE ORAL at 06:37

## 2022-04-22 RX ADMIN — Medication 1000 MILLIGRAM(S): at 18:09

## 2022-04-22 RX ADMIN — Medication 100 MILLIGRAM(S): at 14:17

## 2022-04-22 RX ADMIN — Medication 50 MILLIGRAM(S): at 22:08

## 2022-04-22 RX ADMIN — Medication 3 MILLIGRAM(S): at 22:08

## 2022-04-22 RX ADMIN — ATORVASTATIN CALCIUM 80 MILLIGRAM(S): 80 TABLET, FILM COATED ORAL at 22:08

## 2022-04-22 RX ADMIN — APIXABAN 2.5 MILLIGRAM(S): 2.5 TABLET, FILM COATED ORAL at 06:39

## 2022-04-22 RX ADMIN — BUDESONIDE AND FORMOTEROL FUMARATE DIHYDRATE 2 PUFF(S): 160; 4.5 AEROSOL RESPIRATORY (INHALATION) at 18:09

## 2022-04-22 NOTE — PROGRESS NOTE ADULT - PROBLEM SELECTOR PLAN 11
Home meds: paroxetine 10mg, trazodone 50 at bedtime PRN for insomnia, buspirone 15  - C/w home antidepressants and anti-anxiety meds Hx of strokes in the past w/ right sided residual deficit  - C/w home atorvastatin 80 Home med: Ninlaro 3mg on Mondays and Decadron 4mg on Mondays  - Restart on discharge

## 2022-04-22 NOTE — PROGRESS NOTE ADULT - ASSESSMENT
I: 61 female new ESRD (TTS last HD was tuesday 4/19 via [facility]) DM HTN history of DVT presented to the ED from HD center ([facility] on [facility]) due to RLE swelling  Consult for hemodialysis     Assessment/Plan:     #ESRD on HD TTS@ [facility] center on [facility]  usual Rx 4h 2-3L   last hemodialysis 4/19 per schedule   due for HD today   then will revert back to TTS schedule on 4/23  electrolytes at goal   euvolemic, UF w/HD   dry weight TBD     #HTN   BP at goal   continue w/ home meds    #access   [facility]    #anemia  Hb at goal   obtain iron studies including ferritin, transferrin, iron, and TIBC to be able to calculate % saturation   no indication for EPO or IV Iron at this time       #renal bone disease   Ca ~9  Phos ~2   trend phos daily w/ labs    Tati Billy D.O  PGY 4 nephrology fellow  [phone] I: 61 female new ESRD (TTS last HD was tuesday 4/19 via [facility]) DM HTN history of DVT presented to the ED from HD center ([facility] on [facility]) due to RLE swelling  Consult for hemodialysis     Assessment/Plan:     #ESRD on HD TTS@ [facility] center on [facility]  usual Rx 4h 2-3L   last hemodialysis 4/19 per schedule   due for HD today   then will revert back to TTS schedule on 4/23  electrolytes at goal   euvolemic, UF w/HD   dry weight TBD     #HTN   BP at goal   continue w/ home meds    #access   [facility]    #anemia  Hb at goal   obtain iron studies including ferritin, transferrin, iron, and TIBC to be able to calculate % saturation   no indication for EPO or IV Iron at this time         #renal bone disease   Ca ~9  Phos ~2   trend phos daily w/ labs    DVT  patient currently on 2.5mg BID of eliquis; however for DVT history the recommended dose is 5mg BID however would defer to hematology     Tati Billy D.O  PGY 4 nephrology fellow  [phone]

## 2022-04-22 NOTE — PROGRESS NOTE ADULT - PROBLEM SELECTOR PLAN 7
Home meds: Levemir 25U and novolog 10U premeal  - F/u A1c  - Restart Lantus 10 and lispro 5 TID for now, uptitrate as needed  - mISS Home meds: Entresto  BID and metoprolol XL 100mg qd  - c/w home meds Home meds: Entresto  BID and metoprolol XL 100mg qd  - c/w home meds      # Cerebrovascular accident (CVA)  Hx of strokes in the past w/ right sided residual deficit  - C/w home atorvastatin 80

## 2022-04-22 NOTE — PROGRESS NOTE ADULT - SUBJECTIVE AND OBJECTIVE BOX
Patient is a 61y old  Female who presents with a chief complaint of chronic RLE DVT and right heal ulcer (2022 10:56)      INTERVAL HPI/OVERNIGHT EVENTS:    Pt. seen and examined earlier today  Pt. c/o R foot pain  Pt. denies F/C, CP, SOB    Review of Systems: 12 point review of systems otherwise negative    MEDICATIONS  (STANDING):  acetaminophen     Tablet .. 1000 milliGRAM(s) Oral once  ammonium lactate 12% Lotion 1 Application(s) Topical <User Schedule>  apixaban 2.5 milliGRAM(s) Oral every 12 hours  atorvastatin 80 milliGRAM(s) Oral at bedtime  budesonide 160 MICROgram(s)/formoterol 4.5 MICROgram(s) Inhaler 2 Puff(s) Inhalation two times a day  ceFAZolin   IVPB 1000 milliGRAM(s) IV Intermittent every 8 hours  collagenase Ointment 1 Application(s) Topical daily  dextrose 5%. 1000 milliLiter(s) (50 mL/Hr) IV Continuous <Continuous>  dextrose 5%. 1000 milliLiter(s) (100 mL/Hr) IV Continuous <Continuous>  dextrose 50% Injectable 25 Gram(s) IV Push once  dextrose 50% Injectable 12.5 Gram(s) IV Push once  dextrose 50% Injectable 25 Gram(s) IV Push once  fenofibrate Tablet 48 milliGRAM(s) Oral daily  gabapentin 100 milliGRAM(s) Oral every 12 hours  glucagon  Injectable 1 milliGRAM(s) IntraMuscular once  insulin glargine Injectable (LANTUS) 10 Unit(s) SubCutaneous at bedtime  insulin lispro (ADMELOG) corrective regimen sliding scale   SubCutaneous Before meals and at bedtime  insulin lispro Injectable (ADMELOG) 5 Unit(s) SubCutaneous three times a day before meals  melatonin 3 milliGRAM(s) Oral at bedtime  metoprolol succinate  milliGRAM(s) Oral daily  pantoprazole    Tablet 40 milliGRAM(s) Oral before breakfast  PARoxetine 10 milliGRAM(s) Oral at bedtime  sacubitril 97 mG/valsartan 103 mG 1 Tablet(s) Oral two times a day  traZODone 50 milliGRAM(s) Oral at bedtime    MEDICATIONS  (PRN):  acetaminophen     Tablet .. 650 milliGRAM(s) Oral every 6 hours PRN Temp greater or equal to 38C (100.4F), Mild Pain (1 - 3)  dextrose Oral Gel 15 Gram(s) Oral once PRN Blood Glucose LESS THAN 70 milliGRAM(s)/deciliter      Allergies    aspirin (Other (Moderate); Rash)  oral contrast (Unknown)    Intolerances          Vital Signs Last 24 Hrs  T(C): 37.2 (2022 16:01), Max: 37.2 (2022 16:01)  T(F): 99 (2022 16:01), Max: 99 (2022 16:01)  HR: 85 (2022 16:01) (63 - 85)  BP: 137/74 (2022 16:01) (104/62 - 137/74)  BP(mean): --  RR: 18 (2022 16:01) (17 - 18)  SpO2: 96% (2022 16:01) (95% - 100%)  CAPILLARY BLOOD GLUCOSE      POCT Blood Glucose.: 106 mg/dL (2022 16:59)  POCT Blood Glucose.: 81 mg/dL (2022 13:55)  POCT Blood Glucose.: 111 mg/dL (2022 09:17)  POCT Blood Glucose.: 211 mg/dL (2022 22:06)  POCT Blood Glucose.: 144 mg/dL (2022 18:19)       @ 07:01  -   @ 17:36  --------------------------------------------------------  IN: 400 mL / OUT: 2900 mL / NET: -2500 mL        Physical Exam:  (earlier today)  Daily     Daily Weight in k.6 (2022 13:30)  General:  non-toxic and comfortable-appearing in NAD  HEENT:  MMM, R HD cath  CV:  RRR, no JVD  Lungs:  CTA B/L, normal WOB on RA  Abdomen:  soft NT ND  Extremities:  R foot Kerlix dressing C/D/DI  Skin:  WWP  Neuro:  AAOx3    LABS:                        9.2    5.60  )-----------( 152      ( 2022 06:01 )             30.6     04    140  |  100  |  52<H>  ----------------------------<  113<H>  4.8   |  27  |  6.86<H>    Ca    8.3<L>      2022 06:01  Phos  6.0       Mg     1.8         TPro  6.9  /  Alb  3.9  /  TBili  0.3  /  DBili  x   /  AST  11  /  ALT  6<L>  /  AlkPhos  69  0422    PT/INR - ( 2022 14:58 )   PT: 12.8 sec;   INR: 1.07          PTT - ( 2022 14:58 )  PTT:38.9 sec

## 2022-04-22 NOTE — PROGRESS NOTE ADULT - PROBLEM SELECTOR PLAN 8
Current smoker, 1/4 ppd. On 3L NC at home. CPAP at night for DHARMESH. Home inhalers: Symbicort and albuterol PRN  - C/w home inhalers Home meds: Levemir 25U and novolog 10U premeal  - A1c: 6.0%  - Restart Lantus 10 and lispro 5 TID for now, uptitrate as needed  - mISS Current smoker, 1/4 ppd. On 3L NC at home. CPAP at night for DHARMESH. Home inhalers: Symbicort and albuterol PRN  - C/w home inhalers      #DHARMESH  - CPAP at night for DHARMESH

## 2022-04-22 NOTE — PROGRESS NOTE ADULT - PROBLEM SELECTOR PLAN 9
- CPAP at night for DHARMESH Current smoker, 1/4 ppd. On 3L NC at home. CPAP at night for DHARMESH. Home inhalers: Symbicort and albuterol PRN  - C/w home inhalers Home meds: paroxetine 10mg, trazodone 50 at bedtime PRN for insomnia, buspirone 15  - C/w home antidepressants and anti-anxiety meds

## 2022-04-22 NOTE — PROGRESS NOTE ADULT - PROBLEM SELECTOR PLAN 2
Multiple recurrent DVTs in LEs. Home med: eliquis 2.5mg BID. LE doppler US: Since 12/20/2021, unchanged nonocclusive thrombus within the right common   femoral vein. No new DVT.  - C/w home eliquis 2.5 BID Concern for infection of chronic wound on right heel. No purulence, pus, or discharge. Some erythema of the foot and ankle, could be related to known chronic DVT.  - S/p Vanc and Zosyn in the ED  - Started Ancef 4/22 1g IV q8   - Podiatry consulted  - PT consult   - Pain regimen: Tylenol for mild-moderate pain, Dilaudid PRN severe pain Concern for infection of chronic wound on right heel. No purulence, pus, or discharge. Some erythema of the foot and ankle, could be related to known chronic DVT.  - S/p Vanc and Zosyn in the ED  - Started Ancef 4/22 1g IV q8   - Podiatry consulted  - PT consult   - Pain regimen: PRN dosed for ESRD

## 2022-04-22 NOTE — CONSULT NOTE ADULT - SUBJECTIVE AND OBJECTIVE BOX
Patient is a 61y old  Female who presents with a chief complaint of       HPI:  61F current smoker w/ PMHx ESRD (HD T/Th/Sa), HFrEF (EF 25% s/p AICD), IDDM2, COPD (3L O2 at home PRN), multiple myeloma (on [place]), hypothyroidism, depression, neuropathy (on methadone), recurrent DVTs (on eliquis 2.5mg qd), CVA x2 (w/ residual R-sided weakness), PAD s/p LLE bypass, and DHARMESH on CPAP, who was sent in by her dialysis doctor for concern for worsening chronic RLE DVT and concern for infected chronic right heal ulcer. Patient states that she began to notice worsening swelling of her right calf and ankle one week ago, but has become more painful in the past 2 days. Denies any pus or drainage from her right heal wound. Patient reports the dark discoloration of her right ankle is chronic. Of note, patient has been wheelchair-bound due to weakness since a hospitalization for COVID last year. Denies chest pain, shortness of breath, abdominal pain, n/v/d/c.     In the ED:  Initial vital signs: T: 98 F, HR: 85, BP: 100/54, R: 18, SpO2: 100% on RA  Labs: significant for WBC 8.6, Hgb 10.3, ESR 32, PTT 38.9, Glc 106, BUN 41, Cr 5.99, Phos 5.7, trop 0.03,   Imaging:  CXR:  No acute cardiopulmonary disease process. Cardiomegaly.  EKG: NSR  Medications: Vanc 1g, Zosyn 3.375, percocet 5/325, morphine 2  Consults: none  (21 Apr 2022 20:51)      PAST MEDICAL & SURGICAL HISTORY:  CHF (congestive heart failure)    COPD (chronic obstructive pulmonary disease)    HLD (hyperlipidemia)    Chronic pain    DM (diabetes mellitus)    CVA (cerebral vascular accident)    DVT (deep venous thrombosis)    HTN (hypertension)    Depression    Bipolar depression    PAD (peripheral artery disease)    Neuropathy    Hypothyroidism    Multiple myeloma    CKD (chronic kidney disease), stage IV    DHARMESH on CPAP    AICD (automatic cardioverter/defibrillator) present    H/O abdominal hysterectomy    H/O tubal ligation    History of cholecystectomy    H/O extremity bypass graft        MEDICATIONS  (STANDING):  apixaban 2.5 milliGRAM(s) Oral every 12 hours  atorvastatin 80 milliGRAM(s) Oral at bedtime  budesonide 160 MICROgram(s)/formoterol 4.5 MICROgram(s) Inhaler 2 Puff(s) Inhalation two times a day  ceFAZolin   IVPB 1000 milliGRAM(s) IV Intermittent every 8 hours  dextrose 5%. 1000 milliLiter(s) (50 mL/Hr) IV Continuous <Continuous>  dextrose 5%. 1000 milliLiter(s) (100 mL/Hr) IV Continuous <Continuous>  dextrose 50% Injectable 25 Gram(s) IV Push once  dextrose 50% Injectable 12.5 Gram(s) IV Push once  dextrose 50% Injectable 25 Gram(s) IV Push once  fenofibrate Tablet 48 milliGRAM(s) Oral daily  gabapentin 100 milliGRAM(s) Oral every 12 hours  glucagon  Injectable 1 milliGRAM(s) IntraMuscular once  insulin glargine Injectable (LANTUS) 10 Unit(s) SubCutaneous at bedtime  insulin lispro (ADMELOG) corrective regimen sliding scale   SubCutaneous three times a day before meals  insulin lispro Injectable (ADMELOG) 5 Unit(s) SubCutaneous three times a day before meals  melatonin 3 milliGRAM(s) Oral at bedtime  methadone    Tablet 10 milliGRAM(s) Oral three times a day  metoprolol succinate  milliGRAM(s) Oral daily  pantoprazole    Tablet 40 milliGRAM(s) Oral before breakfast  PARoxetine 10 milliGRAM(s) Oral at bedtime  sacubitril 97 mG/valsartan 103 mG 1 Tablet(s) Oral two times a day  traZODone 50 milliGRAM(s) Oral at bedtime    MEDICATIONS  (PRN):  acetaminophen     Tablet .. 650 milliGRAM(s) Oral every 6 hours PRN Temp greater or equal to 38C (100.4F), Mild Pain (1 - 3)  dextrose Oral Gel 15 Gram(s) Oral once PRN Blood Glucose LESS THAN 70 milliGRAM(s)/deciliter        Social History:                 -  Home Living Status:  lives with her , daughter in a private house , 5 [address], 18 steps inside         -  Prior Home Care Services:   none      Baseline Functional Level Prior to Admission:             - ADL's/ IADL's:  daughter assists prn         - ambulatory status PTA:  walked without devices at home, uses a rollator outdoors    FAMILY HISTORY:  Family history of hypertension (Mother)    Family history of diabetes mellitus (Mother)      CBC Full  -  ( 22 Apr 2022 06:01 )  WBC Count : 5.60 K/uL  RBC Count : 3.05 M/uL  Hemoglobin : 9.2 g/dL  Hematocrit : 30.6 %  Platelet Count - Automated : 152 K/uL  Mean Cell Volume : 100.3 fl  Mean Cell Hemoglobin : 30.2 pg  Mean Cell Hemoglobin Concentration : 30.1 gm/dL  Auto Neutrophil # : 3.50 K/uL  Auto Lymphocyte # : 1.11 K/uL  Auto Monocyte # : 0.79 K/uL  Auto Eosinophil # : 0.15 K/uL  Auto Basophil # : 0.04 K/uL  Auto Neutrophil % : 62.5 %  Auto Lymphocyte % : 19.8 %  Auto Monocyte % : 14.1 %  Auto Eosinophil % : 2.7 %  Auto Basophil % : 0.7 %      04-22    140  |  100  |  52<H>  ----------------------------<  113<H>  4.8   |  27  |  6.86<H>    Ca    8.3<L>      22 Apr 2022 06:01  Phos  6.0     04-22  Mg     1.8     04-22    TPro  6.9  /  Alb  3.9  /  TBili  0.3  /  DBili  x   /  AST  11  /  ALT  6<L>  /  AlkPhos  69  04-22            Radiology:    < from: Xray Chest 1 View- PORTABLE-Urgent (Xray Chest 1 View- PORTABLE-Urgent .) (04.21.22 @ 15:29) >    ACC: [id number] EXAM:  XR CHEST PORTABLE URGENT 1V                          PROCEDURE DATE:  04/21/2022          INTERPRETATION:  TECHNIQUE: Single portable view of the chest.    COMPARISON:  3/28/2022    CLINICAL HISTORY: sob    FINDINGS:    Single frontal view of the chest demonstrates the lungs to be clear. The   cardiomediastinal silhouette is enlarged. Right-sided dialysis catheter.   Left-sided AICD. Right humeral neck fracture deformity.. No acute osseous   abnormalities.    IMPRESSION: Noacute cardiopulmonary disease process. Cardiomegaly.                  Vital Signs Last 24 Hrs  T(C): 36.3 (22 Apr 2022 06:05), Max: 36.7 (21 Apr 2022 13:03)  T(F): 97.3 (22 Apr 2022 06:05), Max: 98 (21 Apr 2022 13:03)  HR: 76 (22 Apr 2022 06:05) (76 - 85)  BP: 108/62 (22 Apr 2022 06:05) (100/50 - 111/69)  BP(mean): --  RR: 18 (22 Apr 2022 06:05) (17 - 18)  SpO2: 99% (22 Apr 2022 06:05) (96% - 100%)        REVIEW OF SYSTEMS:    per HP        Physical Exam:  obese 62 yo  woman lying in semi Waters's position, awake, alert,  no acute complaints    Neurologic Exam:    Alert and oriented 3     Motor Exam:    Right UE:           no focal weakness,  > 3+/5 throughout                                 Left UE:             no focal weakness,  > 3+/5 throughou      Right LE:            no focal weakness,  > 3+/5 throughout      Left LE:              no focal weakness,  > 3+/5 throughout      Sensation:         intact to light touch x 4 extremities                                               DTR:                     biceps/brachioradialis: equal                                              patella/ankle: equal    Gait:  not tested              PM&R Impression:    1) deconditioned  2) no focal weakness    Recommendations/ Plan :    1) Physical / Occupational therapy focusing on therapeutic exercises, bed mobility/transfer out of bed evaluation, progressive ambulation with assistive devices prn.    2) Anticipated Disposition Plan/Recs:   pending functional progress

## 2022-04-22 NOTE — PROGRESS NOTE ADULT - SUBJECTIVE AND OBJECTIVE BOX
***INCOMPLETE***    OVERNIGHT EVENTS: NAEO.      SUBJECTIVE:        OBJECTIVE:  Vital Signs Last 24 Hrs  T(C): 36.4 (22 Apr 2022 10:00), Max: 36.7 (21 Apr 2022 13:03)  T(F): 97.6 (22 Apr 2022 10:00), Max: 98 (21 Apr 2022 13:03)  HR: 84 (22 Apr 2022 10:00) (76 - 85)  BP: 104/62 (22 Apr 2022 10:00) (100/50 - 111/71)  BP(mean): --  RR: 17 (22 Apr 2022 10:00) (17 - 18)  SpO2: 96% (22 Apr 2022 10:00) (96% - 100%)    Physical Exam:  Gen: no acute distress; smiling, interactive, well appearing  HEENT: NC/AT; AFOSF; pupils equal, responsive, reactive to light; no conjunctivitis or scleral icterus; no nasal discharge; no nasal congestion; oropharynx without exudates/erythema; mucus membranes moist  Neck: FROM, supple, no cervical lymphadenopathy  Chest: clear to auscultation bilaterally, no crackles/wheezes, good air entry, no tachypnea or retractions  CV: regular rate and rhythm, no murmurs   Abd: soft, nontender, nondistended, no HSM appreciated, NABS  : normal external genitalia  Back: no vertebral or paraspinal tenderness along entire spine; no CVAT  Extrem: no joint effusion or tenderness; FROM of all joints; no deformities or erythema noted. 2+ peripheral pulses, WWP  Neuro: grossly nonfocal, strength and tone grossly normal    Labs:                        9.2    5.60  )-----------( 152      ( 22 Apr 2022 06:01 )             30.6     04-22    140  |  100  |  52<H>  ----------------------------<  113<H>  4.8   |  27  |  6.86<H>    Ca    8.3<L>      22 Apr 2022 06:01  Phos  6.0     04-22  Mg     1.8     04-22    TPro  6.9  /  Alb  3.9  /  TBili  0.3  /  DBili  x   /  AST  11  /  ALT  6<L>  /  AlkPhos  69  04-22    PT/INR - ( 21 Apr 2022 14:58 )   PT: 12.8 sec;   INR: 1.07          PTT - ( 21 Apr 2022 14:58 )  PTT:38.9 sec  Fingerstick  glucose: POCT Blood Glucose.: 111 mg/dL (22 Apr 2022 09:17)  Ferritin, Serum: 617 ng/mL (04.22.22 @ 06:01)   Iron Total, Serum: 48 ug/dL (04.22.22 @ 06:01)   Sedimentation Rate, Erythrocyte: 32  Serum Pro-Brain Natriuretic Peptide: 32333      MEDICATIONS  (STANDING):  apixaban 2.5 milliGRAM(s) Oral every 12 hours  atorvastatin 80 milliGRAM(s) Oral at bedtime  budesonide 160 MICROgram(s)/formoterol 4.5 MICROgram(s) Inhaler 2 Puff(s) Inhalation two times a day  ceFAZolin   IVPB 1000 milliGRAM(s) IV Intermittent every 8 hours  dextrose 5%. 1000 milliLiter(s) (50 mL/Hr) IV Continuous <Continuous>  dextrose 5%. 1000 milliLiter(s) (100 mL/Hr) IV Continuous <Continuous>  dextrose 50% Injectable 25 Gram(s) IV Push once  dextrose 50% Injectable 12.5 Gram(s) IV Push once  dextrose 50% Injectable 25 Gram(s) IV Push once  fenofibrate Tablet 48 milliGRAM(s) Oral daily  gabapentin 100 milliGRAM(s) Oral every 12 hours  glucagon  Injectable 1 milliGRAM(s) IntraMuscular once  insulin glargine Injectable (LANTUS) 10 Unit(s) SubCutaneous at bedtime  insulin lispro (ADMELOG) corrective regimen sliding scale   SubCutaneous three times a day before meals  insulin lispro Injectable (ADMELOG) 5 Unit(s) SubCutaneous three times a day before meals  melatonin 3 milliGRAM(s) Oral at bedtime  methadone    Tablet 10 milliGRAM(s) Oral three times a day  metoprolol succinate  milliGRAM(s) Oral daily  pantoprazole    Tablet 40 milliGRAM(s) Oral before breakfast  PARoxetine 10 milliGRAM(s) Oral at bedtime  sacubitril 97 mG/valsartan 103 mG 1 Tablet(s) Oral two times a day  traZODone 50 milliGRAM(s) Oral at bedtime    MEDICATIONS  (PRN):  acetaminophen     Tablet .. 650 milliGRAM(s) Oral every 6 hours PRN Temp greater or equal to 38C (100.4F), Mild Pain (1 - 3)  dextrose Oral Gel 15 Gram(s) Oral once PRN Blood Glucose LESS THAN 70 milliGRAM(s)/deciliter      Allergies    aspirin (Other (Moderate); Rash)  oral contrast (Unknown)    Intolerances        RADIOLOGY & ADDITIONAL TESTS: Reviewed.  < from: US Duplex Venous Lower Ext Ltd, Right (04.21.22 @ 17:53) >  IMPRESSION:  Since 12/20/2021, unchanged nonocclusive thrombus within the right common   femoral vein. No new DVT.    < end of copied text >      < from: Xray Chest 1 View- PORTABLE-Urgent (Xray Chest 1 View- PORTABLE-Urgent .) (04.21.22 @ 15:29) >  IMPRESSION: Noacute cardiopulmonary disease process. Cardiomegaly.    < end of copied text >                       ***INCOMPLETE***    OVERNIGHT EVENTS: NAEO.      SUBJECTIVE:  Pt was seen sleeping in bed comfortably on RA. She states that she is in moderate pain specifically in the R leg. Denies feeling feverish, chills or night sweats. Denies SOB or chest pain. Mild productive cough that is unchanged from baseline.    ROS: Other than noted above, 12 point ROS was unremarkable.      OBJECTIVE:  Vital Signs Last 24 Hrs  T(C): 36.4 (22 Apr 2022 10:00), Max: 36.7 (21 Apr 2022 13:03)  T(F): 97.6 (22 Apr 2022 10:00), Max: 98 (21 Apr 2022 13:03)  HR: 84 (22 Apr 2022 10:00) (76 - 85)  BP: 104/62 (22 Apr 2022 10:00) (100/50 - 111/71)  BP(mean): --  RR: 17 (22 Apr 2022 10:00) (17 - 18)  SpO2: 96% (22 Apr 2022 10:00) (96% - 100%)    Physical Exam:  Gen: no acute distress; obese, interactive, well appearing  HEENT: pupils equal, responsive, reactive to light; no conjunctivitis or scleral icterus; no nasal discharge; no nasal congestion; mucus membranes moist  Neck: FROM, supple, no cervical lymphadenopathy  Chest: clear to auscultation bilaterally, no crackles/wheezes, good air entry, no tachypnea or retractions  CV: regular rate and rhythm, no murmurs   Abd: soft, nontender, nondistended, no HSM appreciated, NABS  Extrem: b/l UE 2+ distal pulses. Unable to palpate distal pulses in b/l LE. Unable to palpate femoral pulses b/l. R leg significant discoloration (pt reports unchanged from baseline), moderate pain with palpation of the R calf  Neuro: Sensation and motor grossly intact in the b/l UE. B/l LE sensation to touch pain and vibration absent in the feet, calf and medial thigh. Sensation to touch present in the lateral thigh b/l. 0/5 dorsiflexion of the R foot. 2/5 dorsiflexion of the L foot.    Labs:                        9.2    5.60  )-----------( 152      ( 22 Apr 2022 06:01 )             30.6     04-22    140  |  100  |  52<H>  ----------------------------<  113<H>  4.8   |  27  |  6.86<H>    Ca    8.3<L>      22 Apr 2022 06:01  Phos  6.0     04-22  Mg     1.8     04-22    TPro  6.9  /  Alb  3.9  /  TBili  0.3  /  DBili  x   /  AST  11  /  ALT  6<L>  /  AlkPhos  69  04-22    PT/INR - ( 21 Apr 2022 14:58 )   PT: 12.8 sec;   INR: 1.07          PTT - ( 21 Apr 2022 14:58 )  PTT:38.9 sec  Fingerstick  glucose: POCT Blood Glucose.: 111 mg/dL (22 Apr 2022 09:17)  Ferritin, Serum: 617 ng/mL (04.22.22 @ 06:01)   Iron Total, Serum: 48 ug/dL (04.22.22 @ 06:01)   Sedimentation Rate, Erythrocyte: 32  Serum Pro-Brain Natriuretic Peptide: 10649      MEDICATIONS  (STANDING):  apixaban 2.5 milliGRAM(s) Oral every 12 hours  atorvastatin 80 milliGRAM(s) Oral at bedtime  budesonide 160 MICROgram(s)/formoterol 4.5 MICROgram(s) Inhaler 2 Puff(s) Inhalation two times a day  ceFAZolin   IVPB 1000 milliGRAM(s) IV Intermittent every 8 hours  dextrose 5%. 1000 milliLiter(s) (50 mL/Hr) IV Continuous <Continuous>  dextrose 5%. 1000 milliLiter(s) (100 mL/Hr) IV Continuous <Continuous>  dextrose 50% Injectable 25 Gram(s) IV Push once  dextrose 50% Injectable 12.5 Gram(s) IV Push once  dextrose 50% Injectable 25 Gram(s) IV Push once  fenofibrate Tablet 48 milliGRAM(s) Oral daily  gabapentin 100 milliGRAM(s) Oral every 12 hours  glucagon  Injectable 1 milliGRAM(s) IntraMuscular once  insulin glargine Injectable (LANTUS) 10 Unit(s) SubCutaneous at bedtime  insulin lispro (ADMELOG) corrective regimen sliding scale   SubCutaneous three times a day before meals  insulin lispro Injectable (ADMELOG) 5 Unit(s) SubCutaneous three times a day before meals  melatonin 3 milliGRAM(s) Oral at bedtime  methadone    Tablet 10 milliGRAM(s) Oral three times a day  metoprolol succinate  milliGRAM(s) Oral daily  pantoprazole    Tablet 40 milliGRAM(s) Oral before breakfast  PARoxetine 10 milliGRAM(s) Oral at bedtime  sacubitril 97 mG/valsartan 103 mG 1 Tablet(s) Oral two times a day  traZODone 50 milliGRAM(s) Oral at bedtime    MEDICATIONS  (PRN):  acetaminophen     Tablet .. 650 milliGRAM(s) Oral every 6 hours PRN Temp greater or equal to 38C (100.4F), Mild Pain (1 - 3)  dextrose Oral Gel 15 Gram(s) Oral once PRN Blood Glucose LESS THAN 70 milliGRAM(s)/deciliter      Allergies    aspirin (Other (Moderate); Rash)  oral contrast (Unknown)    Intolerances        RADIOLOGY & ADDITIONAL TESTS: Reviewed.  < from: US Duplex Venous Lower Ext Ltd, Right (04.21.22 @ 17:53) >  IMPRESSION:  Since 12/20/2021, unchanged nonocclusive thrombus within the right common   femoral vein. No new DVT.    < end of copied text >      < from: Xray Chest 1 View- PORTABLE-Urgent (Xray Chest 1 View- PORTABLE-Urgent .) (04.21.22 @ 15:29) >  IMPRESSION: Noacute cardiopulmonary disease process. Cardiomegaly.    < end of copied text >                   OVERNIGHT EVENTS: NAEO.      SUBJECTIVE:  Pt was seen sleeping in bed comfortably on RA. She states that she is in moderate pain specifically in the R leg. Denies feeling feverish, chills or night sweats. Denies SOB or chest pain. Mild productive cough that is unchanged from baseline.    ROS: Other than noted above, 12 point ROS was unremarkable.      OBJECTIVE:  Vital Signs Last 24 Hrs  T(C): 36.4 (22 Apr 2022 10:00), Max: 36.7 (21 Apr 2022 13:03)  T(F): 97.6 (22 Apr 2022 10:00), Max: 98 (21 Apr 2022 13:03)  HR: 84 (22 Apr 2022 10:00) (76 - 85)  BP: 104/62 (22 Apr 2022 10:00) (100/50 - 111/71)  BP(mean): --  RR: 17 (22 Apr 2022 10:00) (17 - 18)  SpO2: 96% (22 Apr 2022 10:00) (96% - 100%)    Physical Exam:  Gen: no acute distress; obese, interactive, well appearing  HEENT: pupils equal, responsive, reactive to light; no conjunctivitis or scleral icterus; no nasal discharge; no nasal congestion; mucus membranes moist  Neck: FROM, supple, no cervical lymphadenopathy  Chest: clear to auscultation bilaterally, no crackles/wheezes, good air entry, no tachypnea or retractions  CV: regular rate and rhythm, no murmurs   Abd: soft, nontender, nondistended, no HSM appreciated, NABS  Extrem: b/l UE 2+ distal pulses. Unable to palpate distal pulses in b/l LE. Unable to palpate femoral pulses b/l. R leg significant discoloration (pt reports unchanged from baseline), moderate pain with palpation of the R calf  Neuro: Sensation and motor grossly intact in the b/l UE. B/l LE sensation to touch pain and vibration absent in the feet, calf and medial thigh. Sensation to touch present in the lateral thigh b/l. 0/5 dorsiflexion of the R foot. 2/5 dorsiflexion of the L foot.    Labs:                        9.2    5.60  )-----------( 152      ( 22 Apr 2022 06:01 )             30.6     04-22    140  |  100  |  52<H>  ----------------------------<  113<H>  4.8   |  27  |  6.86<H>    Ca    8.3<L>      22 Apr 2022 06:01  Phos  6.0     04-22  Mg     1.8     04-22    TPro  6.9  /  Alb  3.9  /  TBili  0.3  /  DBili  x   /  AST  11  /  ALT  6<L>  /  AlkPhos  69  04-22    PT/INR - ( 21 Apr 2022 14:58 )   PT: 12.8 sec;   INR: 1.07          PTT - ( 21 Apr 2022 14:58 )  PTT:38.9 sec  Fingerstick  glucose: POCT Blood Glucose.: 111 mg/dL (22 Apr 2022 09:17)  Ferritin, Serum: 617 ng/mL (04.22.22 @ 06:01)   Iron Total, Serum: 48 ug/dL (04.22.22 @ 06:01)   Sedimentation Rate, Erythrocyte: 32  Serum Pro-Brain Natriuretic Peptide: 53499      MEDICATIONS  (STANDING):  apixaban 2.5 milliGRAM(s) Oral every 12 hours  atorvastatin 80 milliGRAM(s) Oral at bedtime  budesonide 160 MICROgram(s)/formoterol 4.5 MICROgram(s) Inhaler 2 Puff(s) Inhalation two times a day  ceFAZolin   IVPB 1000 milliGRAM(s) IV Intermittent every 8 hours  dextrose 5%. 1000 milliLiter(s) (50 mL/Hr) IV Continuous <Continuous>  dextrose 5%. 1000 milliLiter(s) (100 mL/Hr) IV Continuous <Continuous>  dextrose 50% Injectable 25 Gram(s) IV Push once  dextrose 50% Injectable 12.5 Gram(s) IV Push once  dextrose 50% Injectable 25 Gram(s) IV Push once  fenofibrate Tablet 48 milliGRAM(s) Oral daily  gabapentin 100 milliGRAM(s) Oral every 12 hours  glucagon  Injectable 1 milliGRAM(s) IntraMuscular once  insulin glargine Injectable (LANTUS) 10 Unit(s) SubCutaneous at bedtime  insulin lispro (ADMELOG) corrective regimen sliding scale   SubCutaneous three times a day before meals  insulin lispro Injectable (ADMELOG) 5 Unit(s) SubCutaneous three times a day before meals  melatonin 3 milliGRAM(s) Oral at bedtime  methadone    Tablet 10 milliGRAM(s) Oral three times a day  metoprolol succinate  milliGRAM(s) Oral daily  pantoprazole    Tablet 40 milliGRAM(s) Oral before breakfast  PARoxetine 10 milliGRAM(s) Oral at bedtime  sacubitril 97 mG/valsartan 103 mG 1 Tablet(s) Oral two times a day  traZODone 50 milliGRAM(s) Oral at bedtime    MEDICATIONS  (PRN):  acetaminophen     Tablet .. 650 milliGRAM(s) Oral every 6 hours PRN Temp greater or equal to 38C (100.4F), Mild Pain (1 - 3)  dextrose Oral Gel 15 Gram(s) Oral once PRN Blood Glucose LESS THAN 70 milliGRAM(s)/deciliter      Allergies    aspirin (Other (Moderate); Rash)  oral contrast (Unknown)    Intolerances        RADIOLOGY & ADDITIONAL TESTS: Reviewed.  < from: US Duplex Venous Lower Ext Ltd, Right (04.21.22 @ 17:53) >  IMPRESSION:  Since 12/20/2021, unchanged nonocclusive thrombus within the right common   femoral vein. No new DVT.    < end of copied text >      < from: Xray Chest 1 View- PORTABLE-Urgent (Xray Chest 1 View- PORTABLE-Urgent .) (04.21.22 @ 15:29) >  IMPRESSION: Noacute cardiopulmonary disease process. Cardiomegaly.    < end of copied text >

## 2022-04-22 NOTE — H&P ADULT - NSHPOUTPATIENTPROVIDERS_GEN_ALL_CORE
Results given.  See result note.   PCP/Nephrologist - Dr. Neal  Cardiologist - Dr. Olguin  Oncologist - Dr. Ruiz  Podiatrist - Dr. Figueroa

## 2022-04-22 NOTE — CONSULT NOTE ADULT - SUBJECTIVE AND OBJECTIVE BOX
HPI:  61F current smoker w/ PMHx ESRD (HD T/Th/Sa), HFrEF (EF 25% s/p AICD), IDDM2, COPD (3L O2 at home PRN), multiple myeloma (on Ninlaro), hypothyroidism, depression, neuropathy (on methadone), recurrent DVTs (on eliquis 2.5mg qd), CVA x2 (w/ residual R-sided weakness), PAD s/p LLE bypass, and DHARMESH on CPAP, who was sent in by her dialysis doctor for concern for worsening chronic RLE DVT and concern for infected chronic right heal ulcer. Patient states that she began to notice worsening swelling of her right calf and ankle one week ago, but has become more painful in the past 2 days. Denies any pus or drainage from her right heal wound. Patient reports the dark discoloration of her right ankle is chronic. Of note, patient has been wheelchair-bound due to weakness since a hospitalization for COVID last year. Denies chest pain, shortness of breath, abdominal pain, n/v/d/c.     In the ED:  Initial vital signs: T: 98 F, HR: 85, BP: 100/54, R: 18, SpO2: 100% on RA  Labs: significant for WBC 8.6, Hgb 10.3, ESR 32, PTT 38.9, Glc 106, BUN 41, Cr 5.99, Phos 5.7, trop 0.03,   Imaging:  CXR:  No acute cardiopulmonary disease process. Cardiomegaly.  EKG: NSR  Medications: Vanc 1g, Zosyn 3.375, percocet 5/325, morphine 2  Consults: none  (21 Apr 2022 20:51)    Vascular addendum:   61F current smoker w/ PMHx ESRD (HD T/Th/Sa), HFrEF (EF 25% s/p AICD), IDDM2, COPD (3L O2 at home PRN), multiple myeloma (on Ninlaro), hypothyroidism, depression, neuropathy (on methadone), recurrent DVTs (on eliquis 2.5mg qd), CVA x2 (w/ residual R-sided weakness), PAD s/p LLE bypass, and DHARMESH on CPAP, who was sent in by her dialysis doctor for concern for worsening chronic RLE DVT and concern for infected chronic right heal ulcer. US demonstrated unchanged nonocclusive thrombus within the right common femoral vein. vascular was consulted to evaluate RLE calf pain and nonpalpable pulses.     Per patient had swelling for past week associated with 2-3 days of calf pain, tenderness and palpable lump. Reports occasional numbness and tingling.  She also has Right foot heal non healed ulcer for the past year without drainage or discharge. no fevers, chills, SOB or chest pain. no other concern or complaints; Patient is wheelchair bound.       PAST MEDICAL & SURGICAL HISTORY:  CHF (congestive heart failure)    COPD (chronic obstructive pulmonary disease)    HLD (hyperlipidemia)    Chronic pain    DM (diabetes mellitus)    CVA (cerebral vascular accident)    DVT (deep venous thrombosis)    HTN (hypertension)    Depression    Bipolar depression    PAD (peripheral artery disease)    Neuropathy    Hypothyroidism    Multiple myeloma    CKD (chronic kidney disease), stage IV    DHARMESH on CPAP    AICD (automatic cardioverter/defibrillator) present    H/O abdominal hysterectomy    H/O tubal ligation    History of cholecystectomy    H/O extremity bypass graft        MEDICATIONS  (STANDING):  apixaban 2.5 milliGRAM(s) Oral every 12 hours  atorvastatin 80 milliGRAM(s) Oral at bedtime  budesonide 160 MICROgram(s)/formoterol 4.5 MICROgram(s) Inhaler 2 Puff(s) Inhalation two times a day  ceFAZolin   IVPB 1000 milliGRAM(s) IV Intermittent every 8 hours  dextrose 5%. 1000 milliLiter(s) (50 mL/Hr) IV Continuous <Continuous>  dextrose 5%. 1000 milliLiter(s) (100 mL/Hr) IV Continuous <Continuous>  dextrose 50% Injectable 25 Gram(s) IV Push once  dextrose 50% Injectable 12.5 Gram(s) IV Push once  dextrose 50% Injectable 25 Gram(s) IV Push once  fenofibrate Tablet 48 milliGRAM(s) Oral daily  gabapentin 100 milliGRAM(s) Oral every 12 hours  glucagon  Injectable 1 milliGRAM(s) IntraMuscular once  insulin glargine Injectable (LANTUS) 10 Unit(s) SubCutaneous at bedtime  insulin lispro (ADMELOG) corrective regimen sliding scale   SubCutaneous three times a day before meals  insulin lispro Injectable (ADMELOG) 5 Unit(s) SubCutaneous three times a day before meals  melatonin 3 milliGRAM(s) Oral at bedtime  metoprolol succinate  milliGRAM(s) Oral daily  pantoprazole    Tablet 40 milliGRAM(s) Oral before breakfast  PARoxetine 10 milliGRAM(s) Oral at bedtime  sacubitril 97 mG/valsartan 103 mG 1 Tablet(s) Oral two times a day  traZODone 50 milliGRAM(s) Oral at bedtime    MEDICATIONS  (PRN):  acetaminophen     Tablet .. 650 milliGRAM(s) Oral every 6 hours PRN Temp greater or equal to 38C (100.4F), Mild Pain (1 - 3)  dextrose Oral Gel 15 Gram(s) Oral once PRN Blood Glucose LESS THAN 70 milliGRAM(s)/deciliter      Allergies    aspirin (Other (Moderate); Rash)  oral contrast (Unknown)    Intolerances        SOCIAL HISTORY:    FAMILY HISTORY:  Family history of hypertension (Mother)    Family history of diabetes mellitus (Mother)        Vital Signs Last 24 Hrs  T(C): 36.4 (22 Apr 2022 10:00), Max: 36.7 (21 Apr 2022 13:03)  T(F): 97.6 (22 Apr 2022 10:00), Max: 98 (21 Apr 2022 13:03)  HR: 84 (22 Apr 2022 10:00) (76 - 85)  BP: 104/62 (22 Apr 2022 10:00) (100/50 - 111/71)  BP(mean): --  RR: 17 (22 Apr 2022 10:00) (17 - 18)  SpO2: 96% (22 Apr 2022 10:00) (96% - 100%)    PHYSICAL EXAM:  GENERAL: NAD,  HEENT: NCAT, MMM, Normal conjunctiva, PERRL  RESP: Nonlabored breathing, No respiratory distress  CARD: Normal rate, Normal peripheral perfusion  GI: Soft, ND, NT, No guarding, No rebound tenderness;   EXTREM: WWP, Bilateral lower extremity edema, R > L, Right calf tenderness, negative Homens sign; Right heel nonhealing ulcer without drainage or discharge   NEURO: AAOx3, No focal motor or sensory deficits  Pulses: RLE: triphasic femoral, pop, mono DP, mono PT; LLE: triphasic femoral, pop, tri/bi PT/DP     LABS:                        9.2    5.60  )-----------( 152      ( 22 Apr 2022 06:01 )             30.6     04-22    140  |  100  |  52<H>  ----------------------------<  113<H>  4.8   |  27  |  6.86<H>    Ca    8.3<L>      22 Apr 2022 06:01  Phos  6.0     04-22  Mg     1.8     04-22    TPro  6.9  /  Alb  3.9  /  TBili  0.3  /  DBili  x   /  AST  11  /  ALT  6<L>  /  AlkPhos  69  04-22    PT/INR - ( 21 Apr 2022 14:58 )   PT: 12.8 sec;   INR: 1.07          PTT - ( 21 Apr 2022 14:58 )  PTT:38.9 sec      RADIOLOGY & ADDITIONAL STUDIES:   HPI:  61F current smoker w/ PMHx ESRD (HD T/Th/Sa), HFrEF (EF 25% s/p AICD), IDDM2, COPD (3L O2 at home PRN), multiple myeloma (on Ninlaro), hypothyroidism, depression, neuropathy (on methadone), recurrent DVTs (on eliquis 2.5mg qd), CVA x2 (w/ residual R-sided weakness), PAD s/p LLE bypass, and DHARMESH on CPAP, who was sent in by her dialysis doctor for concern for worsening chronic RLE DVT and concern for infected chronic right heal ulcer. Patient states that she began to notice worsening swelling of her right calf and ankle one week ago, but has become more painful in the past 2 days. Denies any pus or drainage from her right heal wound. Patient reports the dark discoloration of her right ankle is chronic. Of note, patient has been wheelchair-bound due to weakness since a hospitalization for COVID last year. Denies chest pain, shortness of breath, abdominal pain, n/v/d/c.     In the ED:  Initial vital signs: T: 98 F, HR: 85, BP: 100/54, R: 18, SpO2: 100% on RA  Labs: significant for WBC 8.6, Hgb 10.3, ESR 32, PTT 38.9, Glc 106, BUN 41, Cr 5.99, Phos 5.7, trop 0.03,   Imaging:  CXR:  No acute cardiopulmonary disease process. Cardiomegaly.  EKG: NSR  Medications: Vanc 1g, Zosyn 3.375, percocet 5/325, morphine 2  Consults: none  (21 Apr 2022 20:51)    Vascular addendum:   61F current smoker w/ PMHx ESRD (HD T/Th/Sa), HFrEF (EF 25% s/p AICD), IDDM2, COPD (3L O2 at home PRN), multiple myeloma (on Ninlaro), hypothyroidism, depression, neuropathy (on methadone), recurrent DVTs (on eliquis 2.5mg qd), CVA x2 (w/ residual R-sided weakness), PAD s/p LLE bypass, and DHARMESH on CPAP, who was sent in by her dialysis doctor for concern for worsening chronic RLE DVT and concern for infected chronic right heal ulcer. US demonstrated unchanged nonocclusive thrombus within the right common femoral vein. vascular was consulted to evaluate RLE calf pain and nonpalpable pulses.     Per patient had swelling for past week associated with 2-3 days of calf pain, tenderness and palpable lump. Reports occasional numbness and tingling.  She also has Right foot heal non healed ulcer for the past year without drainage or discharge. no fevers, chills, SOB or chest pain. no other concern or complaints; Patient is wheelchair bound.       PAST MEDICAL & SURGICAL HISTORY:  CHF (congestive heart failure)    COPD (chronic obstructive pulmonary disease)    HLD (hyperlipidemia)    Chronic pain    DM (diabetes mellitus)    CVA (cerebral vascular accident)    DVT (deep venous thrombosis)    HTN (hypertension)    Depression    Bipolar depression    PAD (peripheral artery disease)    Neuropathy    Hypothyroidism    Multiple myeloma    CKD (chronic kidney disease), stage IV    DHARMESH on CPAP    AICD (automatic cardioverter/defibrillator) present    H/O abdominal hysterectomy    H/O tubal ligation    History of cholecystectomy    H/O extremity bypass graft        MEDICATIONS  (STANDING):  apixaban 2.5 milliGRAM(s) Oral every 12 hours  atorvastatin 80 milliGRAM(s) Oral at bedtime  budesonide 160 MICROgram(s)/formoterol 4.5 MICROgram(s) Inhaler 2 Puff(s) Inhalation two times a day  ceFAZolin   IVPB 1000 milliGRAM(s) IV Intermittent every 8 hours  dextrose 5%. 1000 milliLiter(s) (50 mL/Hr) IV Continuous <Continuous>  dextrose 5%. 1000 milliLiter(s) (100 mL/Hr) IV Continuous <Continuous>  dextrose 50% Injectable 25 Gram(s) IV Push once  dextrose 50% Injectable 12.5 Gram(s) IV Push once  dextrose 50% Injectable 25 Gram(s) IV Push once  fenofibrate Tablet 48 milliGRAM(s) Oral daily  gabapentin 100 milliGRAM(s) Oral every 12 hours  glucagon  Injectable 1 milliGRAM(s) IntraMuscular once  insulin glargine Injectable (LANTUS) 10 Unit(s) SubCutaneous at bedtime  insulin lispro (ADMELOG) corrective regimen sliding scale   SubCutaneous three times a day before meals  insulin lispro Injectable (ADMELOG) 5 Unit(s) SubCutaneous three times a day before meals  melatonin 3 milliGRAM(s) Oral at bedtime  metoprolol succinate  milliGRAM(s) Oral daily  pantoprazole    Tablet 40 milliGRAM(s) Oral before breakfast  PARoxetine 10 milliGRAM(s) Oral at bedtime  sacubitril 97 mG/valsartan 103 mG 1 Tablet(s) Oral two times a day  traZODone 50 milliGRAM(s) Oral at bedtime    MEDICATIONS  (PRN):  acetaminophen     Tablet .. 650 milliGRAM(s) Oral every 6 hours PRN Temp greater or equal to 38C (100.4F), Mild Pain (1 - 3)  dextrose Oral Gel 15 Gram(s) Oral once PRN Blood Glucose LESS THAN 70 milliGRAM(s)/deciliter      Allergies    aspirin (Other (Moderate); Rash)  oral contrast (Unknown)    Intolerances        SOCIAL HISTORY:    FAMILY HISTORY:  Family history of hypertension (Mother)    Family history of diabetes mellitus (Mother)        Vital Signs Last 24 Hrs  T(C): 36.4 (22 Apr 2022 10:00), Max: 36.7 (21 Apr 2022 13:03)  T(F): 97.6 (22 Apr 2022 10:00), Max: 98 (21 Apr 2022 13:03)  HR: 84 (22 Apr 2022 10:00) (76 - 85)  BP: 104/62 (22 Apr 2022 10:00) (100/50 - 111/71)  BP(mean): --  RR: 17 (22 Apr 2022 10:00) (17 - 18)  SpO2: 96% (22 Apr 2022 10:00) (96% - 100%)    PHYSICAL EXAM:  GENERAL: NAD,  HEENT: NCAT, MMM, Normal conjunctiva, PERRL  RESP: Nonlabored breathing, No respiratory distress  CARD: Normal rate, Normal peripheral perfusion  GI: Soft, ND, NT, No guarding, No rebound tenderness;   EXTREM: WWP, Bilateral lower extremity edema, R > L, Right calf tenderness, negative Homens sign; Right heel nonhealing ulcer without drainage or discharge   NEURO: AAOx3, No focal motor or sensory deficits  Pulses: RLE: triphasic femoral, pop, mono DP, Bi PT; LLE: triphasic femoral, pop, tri/bi PT/DP     LABS:                        9.2    5.60  )-----------( 152      ( 22 Apr 2022 06:01 )             30.6     04-22    140  |  100  |  52<H>  ----------------------------<  113<H>  4.8   |  27  |  6.86<H>    Ca    8.3<L>      22 Apr 2022 06:01  Phos  6.0     04-22  Mg     1.8     04-22    TPro  6.9  /  Alb  3.9  /  TBili  0.3  /  DBili  x   /  AST  11  /  ALT  6<L>  /  AlkPhos  69  04-22    PT/INR - ( 21 Apr 2022 14:58 )   PT: 12.8 sec;   INR: 1.07          PTT - ( 21 Apr 2022 14:58 )  PTT:38.9 sec      RADIOLOGY & ADDITIONAL STUDIES:

## 2022-04-22 NOTE — PROGRESS NOTE ADULT - PROBLEM SELECTOR PLAN 4
EF 25% in 12/21, s/p AICD. 2+ LE edema, no pulmonary edema or JVD. Not in exacerbatione.   - Home meds: Entresto  BID, Toprol 100, Torsemide 40 BID   - C/w home Toprol, entresto, and torsemide  - Monitor volume status with dialysis On HD Tues/Thurs/Sat, last HD 4/19. Right chest wall tunneled cath.  - Renal following  - f/u on potential fistula placement  - HD session today 4/22  - C/w home sevelamer 800

## 2022-04-22 NOTE — PROGRESS NOTE ADULT - PROBLEM SELECTOR PLAN 1
Patient sent to ED from dialysis center for concern for infection of chronic sacral wound on right heel. No purulence, pus, or discharge. Some erythema of the foot and ankle, could be related to known chronic DVT.  - S/p Vanc and Zosyn in the ED  - Start Ancef 1g IV q8  - Podiatry consult  - PT consult  - Pain regimen: Tylenol for mild-moderate pain, dilaudid PRN severe pain Acute worsening of swelling in the R calf and ankle starting 1 week ago. Pain in the R LE started 2 days prior to admission. Pt has a known non-occlusive R common femoral vein DVT, unchanged since 12/21 per doppler on admission. Unable to palpate distal pulses in b/l LE on exam. History of PAD w/LLE bypass and IDDM2. Likely PAD vs. DVT vs. cellulitis.  - Doppler R LE 4/21: no new DVT, no change in R common femoral vein non-occlusive thrombus since 12/21  - vascular consulted

## 2022-04-22 NOTE — PROGRESS NOTE ADULT - PROBLEM SELECTOR PLAN 5
C/b sacral heel ulcer. Home meds: Gabapentin 100 bid and methadone 10mg TID  - Follows with outpatient pain management Dr. Oliveira EF 25% in 12/21, s/p AICD. 2+ LE edema, no pulmonary edema or JVD. Not in exacerbation.   - Home meds: Entresto  BID, Toprol 100, Torsemide 40 BID   - C/w home Toprol, entresto, and torsemide  - Monitor volume status with dialysis

## 2022-04-22 NOTE — PROGRESS NOTE ADULT - PROBLEM SELECTOR PLAN 3
On HD Tues/Thurs/Sat, last HD 4/19. Right chest wall tunneled cath.  - Renal following  - Make-up HD session tomorrow 4/22  - C/w home sevelamer 800 Multiple recurrent DVTs in LEs. Home med: eliquis 2.5mg BID. LE doppler US: Since 12/20/2021, unchanged nonocclusive thrombus within the right common femoral vein. No new DVT.  - C/w home eliquis 2.5 BID

## 2022-04-22 NOTE — CONSULT NOTE ADULT - ASSESSMENT
per Internal Medicine    61 y o F current smoker w/ PMHx ESRD (HD T/Th/Sa), HFrEF (EF 25% s/p AICD), IDDM2, COPD (3L O2 at home PRN), multiple myeloma (on Ninlaro), hypothyroidism, depression, neuropathy (on methadone), recurrent DVTs (on eliquis 2.5mg qd), CVA x2 (w/ residual R-sided weakness), PAD s/p LLE bypass, and DHARMESH on CPAP, who was sent in by her dialysis doctor for concern for worsening chronic RLE DVT and concern for infected chronic right heal ulcer.    Problem/Plan - 1   ·  Problem: Wound cellulitis.   ·  Plan: Patient sent to ED from dialysis center for concern for infection of chronic sacral wound on right heel. No purulence, pus, or discharge. Some erythema of the foot and ankle, could be related to known chronic DVT.  - S/p Vanc and Zosyn in the ED  - Start Ancef 1g IV q8  - Podiatry consult  - PT consult  - Pain regimen: Tylenol for mild-moderate pain, dilaudid PRN severe pain.    Problem/Plan - 2   ·  Problem: Recurrent deep vein thrombosis (DVT).   ·  Plan: Multiple recurrent DVTs in LEs. Home med: eliquis 2.5mg BID. LE doppler US: Since 12/20/2021, unchanged nonocclusive thrombus within the right common   femoral vein. No new DVT.  - C/w home eliquis 2.5 BID.    Problem/Plan - 3   ·  Problem: ESRD on hemodialysis.   ·  Plan: On HD Tues/Thurs/Sat, last HD 4/19. Right chest wall tunneled cath.  - Renal following  - Make-up HD session tomorrow 4/22  - C/w home sevelamer 800.    Problem/Plan - 4   ·  Problem: HFrEF (heart failure with reduced ejection fraction).   ·  Plan: EF 25% in 12/21, s/p AICD. 2+ LE edema, no pulmonary edema or JVD. Not in exacerbatione.   - Home meds: Entresto  BID, Toprol 100, Torsemide 40 BID   - C/w home Toprol, entresto, and torsemide  - Monitor volume status with dialysis.    Problem/Plan - 5   ·  Problem: Diabetic neuropathy.   ·  Plan: C/b sacral heel ulcer. Home meds: Gabapentin 100 bid and methadone 10mg TID  - Follows with outpatient pain management Dr. Oliveira.    Problem/Plan - 6   ·  Problem: Hypertension.   ·  Plan: Home meds: Entresto  BID and metoprolol XL 100mg qd  - c/w home meds.    Problem/Plan - 7   ·  Problem: DM2 (diabetes mellitus, type 2).   ·  Plan: Home meds: Levemir 25U and novolog 10U premeal  - F/u A1c  - Restart Lantus 10 and lispro 5 TID for now, uptitrate as needed  - mISS.    Problem/Plan - 8   ·  Problem: COPD (chronic obstructive pulmonary disease).   ·  Plan: Current smoker, 1/4 ppd. On 3L NC at home. CPAP at night for DHARMESH. Home inhalers: Symbicort and albuterol PRN  - C/w home inhalers.    Problem/Plan - 9   ·  Problem: DHARMESH on CPAP.   ·  Plan: - CPAP at night for DHARMESH.    Problem/Plan - 10   ·  Problem: Cerebrovascular accident (CVA).   ·  Plan; Hx of strokes in the past w/ right sided residual deficit  - C/w home atorvastatin 80.    Problem/Plan - 11   ·  Problem: Anxiety and depression.   ·  Plan: Home meds: paroxetine 10mg, trazodone 50 at bedtime PRN for insomnia, buspirone 15  - C/w home antidepressants and anti-anxiety meds.    Problem/Plan - 12   ·  Problem: Multiple myeloma.   ·  Plan: Home med: Ninlaro 3mg on Mondays and Decadron 4mg on Mondays.  - Restart on discharge    #Hypothyroid  - c/w home levothyroxine 50    PPX  F: None   E: Replete for K<4, Mag<2  N: Renal Diet  A: Eliquis 2.5 BID  Code status: Full  Dispo: RMF.

## 2022-04-22 NOTE — CONSULT NOTE ADULT - ASSESSMENT
61F current smoker w/ PMHx ESRD (HD T/Th/Sa), HFrEF (EF 25% s/p AICD), IDDM2, COPD (3L O2 at home PRN), multiple myeloma (on Ninlaro), hypothyroidism, depression, neuropathy (on methadone), recurrent DVTs (on eliquis 2.5mg qd), CVA x2 (w/ residual R-sided weakness), PAD s/p LLE bypass, and DHARMESH on CPAP, who was sent in by her dialysis doctor for concern for worsening chronic RLE DVT and concern for infected chronic right heal ulcer.     Podiatry consulted 4/22/2022 to evaluate right heel ulcer for cellulitis and/or osteomyelitis.     P:  Aseptic sharp excisional debridement of right heel ulcer w/ sterile #10 blade, up to and including bleeding subcutaneous tissue; tolerated w/o AE.   Bacitracin, DSD to R heel   Ammonium lactate to be applied to b/l LE   Collagenase to be applied to R heel     Podiatry following   61F current smoker w/ PMHx ESRD (HD T/Th/Sa), HFrEF (EF 25% s/p AICD), IDDM2, COPD (3L O2 at home PRN), multiple myeloma (on Ninlaro), hypothyroidism, depression, neuropathy (on methadone), recurrent DVTs (on eliquis 2.5mg qd), CVA x2 (w/ residual R-sided weakness), PAD s/p LLE bypass, and DHARMESH on CPAP, who was sent in by her dialysis doctor for concern for worsening chronic RLE DVT and concern for infected chronic right heal ulcer.     Podiatry consulted 4/22/2022 to evaluate right heel ulcer for cellulitis and/or osteomyelitis.     P:  Can continue Keflex for now  Aseptic sharp excisional debridement of right heel ulcer w/ sterile #10 blade, up to and including bleeding subcutaneous tissue; tolerated w/o AE.   Bacitracin, DSD to R heel   Ammonium lactate to be applied to b/l LE   Collagenase to be applied to R heel   Recc trending CRP     Podiatry following   61F current smoker w/ PMHx ESRD (HD T/Th/Sa), HFrEF (EF 25% s/p AICD), IDDM2, COPD (3L O2 at home PRN), multiple myeloma (on Ninlaro), hypothyroidism, depression, neuropathy (on methadone), recurrent DVTs (on eliquis 2.5mg qd), CVA x2 (w/ residual R-sided weakness), PAD s/p LLE bypass, and DHARMESH on CPAP, who was sent in by her dialysis doctor for concern for worsening chronic RLE DVT and concern for infected chronic right heal ulcer.     Podiatry consulted 4/22/2022 to evaluate right heel ulcer for cellulitis and/or osteomyelitis.     P:  Can continue Cefazolin IV for now as ESR was elevated, recc trending CRP   Aseptic sharp excisional debridement of right heel ulcer w/ sterile #10 blade, up to and including bleeding subcutaneous tissue; tolerated w/o AE.   Bacitracin, DSD to R heel   Ordered Ammonium lactate to be applied to b/l LE   Ordered Collagenase to be applied to R heel   R foot XR shows extensive arterial calcification, increased calcaneal sclerosis  Multiple medical comorbidities contributing to difficulty healing; most notably ESRD, PVD   Recc Z-float offloading boot to RLE    Podiatry following   61F current smoker w/ PMHx ESRD (HD T/Th/Sa), HFrEF (EF 25% s/p AICD), IDDM2, COPD (3L O2 at home PRN), multiple myeloma (on Ninlaro), hypothyroidism, depression, neuropathy (on methadone), recurrent DVTs (on eliquis 2.5mg qd), CVA x2 (w/ residual R-sided weakness), PAD s/p LLE bypass, and DHARMESH on CPAP, who was sent in by her dialysis doctor for concern for worsening chronic RLE DVT and concern for infected chronic right heal ulcer.     Podiatry consulted 4/22/2022 to evaluate right heel ulcer for cellulitis and/or osteomyelitis.     P:  Can continue Cefazolin IV for now as ESR was elevated, recc trending CRP   Aseptic sharp excisional debridement of right heel ulcer w/ sterile #10 blade, up to and including bleeding subcutaneous tissue; tolerated w/o AE.   Bacitracin, DSD to R heel   Ordered Ammonium lactate to be applied to b/l LE   Ordered Collagenase to be applied to R heel   R foot XR shows extensive arterial calcification, increased calcaneal sclerosis, heel radiolucency corresponding to soft tissue defect   Multiple medical comorbidities contributing to difficulty healing; most notably ESRD, PVD   Low suspicion OM - wound does not probe to bone  Recc Z-float offloading boot to RLE    Podiatry following   61F current smoker w/ PMHx ESRD (HD T/Th/Sa), HFrEF (EF 25% s/p AICD), IDDM2, COPD (3L O2 at home PRN), multiple myeloma (on Ninlaro), hypothyroidism, depression, neuropathy (on methadone), recurrent DVTs (on eliquis 2.5mg qd), CVA x2 (w/ residual R-sided weakness), PAD s/p LLE bypass, and DHARMESH on CPAP, who was sent in by her dialysis doctor for concern for worsening chronic RLE DVT and concern for infected chronic right heal ulcer.     Podiatry consulted 4/22/2022 to evaluate right heel ulcer for cellulitis and/or osteomyelitis.     P:  Can continue Cefazolin IV for now as ESR was elevated, recc trending CRP   Aseptic sharp excisional debridement of right heel ulcer w/ sterile #10 blade, up to and including bleeding subcutaneous tissue; tolerated w/o AE.   Bacitracin, DSD to R heel   Ordered Ammonium lactate to be applied to b/l LE   Ordered Collagenase to be applied to R heel   R foot XR shows extensive arterial calcification, increased calcaneal sclerosis, heel radiolucency corresponding to soft tissue defect   Multiple medical comorbidities contributing to difficulty healing; most notably ESRD, PVD and lack of offloading  No need for addn imaging  Low suspicion OM - wound does not probe to bone  Recc Z-float offloading boot to RLE    Podiatry following

## 2022-04-22 NOTE — PROGRESS NOTE ADULT - PROBLEM SELECTOR PLAN 12
Home med: Ninlaro 3mg on Mondays and Decadron 4mg on Mondays.  - Restart on discharge    #Hypothyroid  - c/w home levothyroxine 50    PPX  F: None   E: Replete for K<4, Mag<2  N: Renal Diet  A: Eliquis 2.5 BID  Code status: Full  Dispo: F Home meds: paroxetine 10mg, trazodone 50 at bedtime PRN for insomnia, buspirone 15  - C/w home antidepressants and anti-anxiety meds F: none  E: Replete for K<4, Mag <2  N: Renal diet  A: Eliquis 2.5 BID  Renally dose medications  Code status: Full  Dispo: RMF

## 2022-04-22 NOTE — CONSULT NOTE ADULT - SUBJECTIVE AND OBJECTIVE BOX
Attending: Braden Baird DPM     Patient is a 61y old  Female who presents with a chief complaint of chronic RLE DVT and right heal ulcer (22 Apr 2022 10:56)      HPI: 61F current smoker w/ PMHx ESRD (HD T/Th/Sa), HFrEF (EF 25% s/p AICD), IDDM2, COPD (3L O2 at home PRN), multiple myeloma (on Ninlaro), hypothyroidism, depression, neuropathy (on methadone), recurrent DVTs (on eliquis 2.5mg qd), CVA x2 (w/ residual R-sided weakness), PAD s/p LLE bypass, and DHARMESH on CPAP, who was sent in by her dialysis doctor for concern for worsening chronic RLE DVT and concern for infected chronic right heal ulcer. Patient states that she began to notice worsening swelling of her right calf and ankle one week ago, but has become more painful in the past 2 days. Denies any pus or drainage from her right heal wound. Patient reports the dark discoloration of her right ankle is chronic. Of note, patient has been wheelchair-bound due to weakness since a hospitalization for COVID last year. Denies chest pain, shortness of breath, abdominal pain, n/v/d/c.     In the ED:  Initial vital signs: T: 98 F, HR: 85, BP: 100/54, R: 18, SpO2: 100% on RA  Labs: significant for WBC 8.6, Hgb 10.3, ESR 32, PTT 38.9, Glc 106, BUN 41, Cr 5.99, Phos 5.7, trop 0.03,   Imaging:  CXR:  No acute cardiopulmonary disease process. Cardiomegaly.  EKG: NSR  Medications: Vanc 1g, Zosyn 3.375, percocet 5/325, morphine 2  Consults: none  (21 Apr 2022 20:51)      Review of systems negative except per HPI and as stated below  General:	 no weakness; no fevers, no chills  Skin/Breast: no rash  Respiratory and Thorax: no SOB, no cough  Cardiovascular:	No chest pain  Gastrointestinal:	 no nausea, vomiting , diarrhea  Genitourinary:	no dysuria, no difficulty urinating, no hematuria  Musculoskeletal:	no weakness, no joint swelling/pain  Neurological:	no focal weakness/numbness  Endocrine:	no polyuria, no polydipsia    PAST MEDICAL & SURGICAL HISTORY:  CHF (congestive heart failure)    COPD (chronic obstructive pulmonary disease)    HLD (hyperlipidemia)    Chronic pain    DM (diabetes mellitus)    CVA (cerebral vascular accident)    DVT (deep venous thrombosis)    HTN (hypertension)    Depression    Bipolar depression    PAD (peripheral artery disease)    Neuropathy    Hypothyroidism    Multiple myeloma    CKD (chronic kidney disease), stage IV    DHARMESH on CPAP    AICD (automatic cardioverter/defibrillator) present    H/O abdominal hysterectomy    H/O tubal ligation    History of cholecystectomy    H/O extremity bypass graft      Home Medications:  atorvastatin 80 mg oral tablet: 1 tab(s) orally once a day (at bedtime) (22 Apr 2022 05:54)  busPIRone 15 mg oral tablet: 1 tab(s) orally once a day (at bedtime) (22 Apr 2022 05:54)  Decadron 4 mg oral tablet: 5 tab(s) orally once a week (mondays) (22 Apr 2022 05:54)  fenofibrate 48 mg oral tablet: 1 tab(s) orally once a day (22 Apr 2022 05:54)  gabapentin 100 mg oral tablet: 1 tab(s) orally 2 times a day (22 Apr 2022 05:54)  Levemir 100 units/mL subcutaneous solution: 25 unit(s) subcutaneous once a day (at bedtime) (22 Apr 2022 05:54)  levothyroxine 50 mcg (0.05 mg) oral tablet: 1 tab(s) orally once a day (22 Apr 2022 05:54)  methadone 10 mg oral tablet: 1 tab(s) orally 3 times a day (22 Apr 2022 05:54)  Metoprolol Succinate  mg oral tablet, extended release: 1 tab(s) orally once a day (22 Apr 2022 05:54)  Ninlaro 3 mg oral capsule: 1 cap(s) orally every 7 days (mondays) (22 Apr 2022 05:54)  NovoLOG 100 units/mL injectable solution: 10 unit(s) injectable 3 times a day (before meals) (22 Apr 2022 05:54)  omeprazole 40 mg oral delayed release capsule: 1 cap(s) orally once a day (22 Apr 2022 05:54)  PARoxetine 10 mg oral tablet: 1 tab(s) orally once a day (at bedtime) (22 Apr 2022 05:54)  Symbicort 160 mcg-4.5 mcg/inh inhalation aerosol: 2 puff(s) inhaled 2 times a day (22 Apr 2022 05:54)  torsemide: 40 milligram(s) orally 2 times a day (22 Apr 2022 05:54)  traZODone 50 mg oral tablet: 1 tab(s) orally once a day (at bedtime) (22 Apr 2022 05:54)  Ventolin HFA 90 mcg/inh inhalation aerosol: 2 puff(s) inhaled every 6 hours (22 Apr 2022 05:54)    Allergies    aspirin (Other (Moderate); Rash)  oral contrast (Unknown)    Intolerances      FAMILY HISTORY:  Family history of hypertension (Mother)    Family history of diabetes mellitus (Mother)      Social History:       LABS                        9.2    5.60  )-----------( 152      ( 22 Apr 2022 06:01 )             30.6     04-22    140  |  100  |  52<H>  ----------------------------<  113<H>  4.8   |  27  |  6.86<H>    Ca    8.3<L>      22 Apr 2022 06:01  Phos  6.0     04-22  Mg     1.8     04-22    TPro  6.9  /  Alb  3.9  /  TBili  0.3  /  DBili  x   /  AST  11  /  ALT  6<L>  /  AlkPhos  69  04-22    PT/INR - ( 21 Apr 2022 14:58 )   PT: 12.8 sec;   INR: 1.07          PTT - ( 21 Apr 2022 14:58 )  PTT:38.9 sec  ESR: 32  CRP: --  04-21 @ 14:58    Vital Signs Last 24 Hrs  T(C): 36.4 (22 Apr 2022 10:00), Max: 36.7 (21 Apr 2022 13:03)  T(F): 97.6 (22 Apr 2022 10:00), Max: 98 (21 Apr 2022 13:03)  HR: 84 (22 Apr 2022 10:00) (76 - 85)  BP: 104/62 (22 Apr 2022 10:00) (100/50 - 111/71)  BP(mean): --  RR: 17 (22 Apr 2022 10:00) (17 - 18)  SpO2: 96% (22 Apr 2022 10:00) (96% - 100%)    PHYSICAL EXAM  General: NAD, AA0x3    Lower Extremity Focused:  Vasc:  Derm:  Neuro:  MSK:     RADIOLOGY                       Attending: Braden Baird DPM     Patient is a 61y old  Female who presents with a chief complaint of chronic RLE DVT and right heal ulcer (22 Apr 2022 10:56)      HPI: 61F current smoker w/ PMHx ESRD (HD T/Th/Sa), HFrEF (EF 25% s/p AICD), IDDM2, COPD (3L O2 at home PRN), multiple myeloma (on Ninlaro), hypothyroidism, depression, neuropathy (on methadone), recurrent DVTs (on eliquis 2.5mg qd), CVA x2 (w/ residual R-sided weakness), PAD s/p LLE bypass, and DHARMESH on CPAP, who was sent in by her dialysis doctor for concern for worsening chronic RLE DVT and concern for infected chronic right heal ulcer. Patient states that she began to notice worsening swelling of her right calf and ankle one week ago, but has become more painful in the past 2 days. Denies any pus or drainage from her right heal wound. Patient reports the dark discoloration of her right ankle is chronic. Of note, patient has been wheelchair-bound due to weakness since a hospitalization for COVID last year. Denies chest pain, shortness of breath, abdominal pain, n/v/d/c.     Podiatry consulted 4/22/2022 to evaluate right heel ulcer for cellulitis and/or osteomyelitis.   Right heel wound has been present for approx 1 year. Pt was following up w/ a podiatrist (name unknown), last visit was in March 2022, who recommended application of topical ointment and daily wound care. Pt was supposed to see a new podiatrist (located closer to [facility]) last Friday but her transportation service never picked her up. Pt's daughter assists w/ daily wound care at home. States that wound has fluctuated in size, confirms that it did decrease in size compared to when it first developed - initially as a blister. Pt denies recent drainage/purulence/malodor from the wound.       Review of systems negative except per HPI and as stated below  General:	 no weakness; no fevers, no chills  Skin/Breast: no rash  Respiratory and Thorax: no SOB, no cough  Cardiovascular:	No chest pain  Gastrointestinal:	 no nausea, vomiting , diarrhea  Genitourinary:	no dysuria, no difficulty urinating, no hematuria  Musculoskeletal:	no weakness, no joint swelling/pain  Neurological:	no focal weakness/numbness  Endocrine:	no polyuria, no polydipsia    PAST MEDICAL & SURGICAL HISTORY:  CHF (congestive heart failure)    COPD (chronic obstructive pulmonary disease)    HLD (hyperlipidemia)    Chronic pain    DM (diabetes mellitus)    CVA (cerebral vascular accident)    DVT (deep venous thrombosis)    HTN (hypertension)    Depression    Bipolar depression    PAD (peripheral artery disease)    Neuropathy    Hypothyroidism    Multiple myeloma    CKD (chronic kidney disease), stage IV    DHARMESH on CPAP    AICD (automatic cardioverter/defibrillator) present    H/O abdominal hysterectomy    H/O tubal ligation    History of cholecystectomy    H/O extremity bypass graft      Home Medications:  atorvastatin 80 mg oral tablet: 1 tab(s) orally once a day (at bedtime) (22 Apr 2022 05:54)  busPIRone 15 mg oral tablet: 1 tab(s) orally once a day (at bedtime) (22 Apr 2022 05:54)  Decadron 4 mg oral tablet: 5 tab(s) orally once a week (mondays) (22 Apr 2022 05:54)  fenofibrate 48 mg oral tablet: 1 tab(s) orally once a day (22 Apr 2022 05:54)  gabapentin 100 mg oral tablet: 1 tab(s) orally 2 times a day (22 Apr 2022 05:54)  Levemir 100 units/mL subcutaneous solution: 25 unit(s) subcutaneous once a day (at bedtime) (22 Apr 2022 05:54)  levothyroxine 50 mcg (0.05 mg) oral tablet: 1 tab(s) orally once a day (22 Apr 2022 05:54)  methadone 10 mg oral tablet: 1 tab(s) orally 3 times a day (22 Apr 2022 05:54)  Metoprolol Succinate  mg oral tablet, extended release: 1 tab(s) orally once a day (22 Apr 2022 05:54)  Ninlaro 3 mg oral capsule: 1 cap(s) orally every 7 days (mondays) (22 Apr 2022 05:54)  NovoLOG 100 units/mL injectable solution: 10 unit(s) injectable 3 times a day (before meals) (22 Apr 2022 05:54)  omeprazole 40 mg oral delayed release capsule: 1 cap(s) orally once a day (22 Apr 2022 05:54)  PARoxetine 10 mg oral tablet: 1 tab(s) orally once a day (at bedtime) (22 Apr 2022 05:54)  Symbicort 160 mcg-4.5 mcg/inh inhalation aerosol: 2 puff(s) inhaled 2 times a day (22 Apr 2022 05:54)  torsemide: 40 milligram(s) orally 2 times a day (22 Apr 2022 05:54)  traZODone 50 mg oral tablet: 1 tab(s) orally once a day (at bedtime) (22 Apr 2022 05:54)  Ventolin HFA 90 mcg/inh inhalation aerosol: 2 puff(s) inhaled every 6 hours (22 Apr 2022 05:54)    Allergies    aspirin (Other (Moderate); Rash)  oral contrast (Unknown)    Intolerances      FAMILY HISTORY:  Family history of hypertension (Mother)    Family history of diabetes mellitus (Mother)      Social History: Resides in , assisted by daughter.       LABS                        9.2    5.60  )-----------( 152      ( 22 Apr 2022 06:01 )             30.6     04-22    140  |  100  |  52<H>  ----------------------------<  113<H>  4.8   |  27  |  6.86<H>    Ca    8.3<L>      22 Apr 2022 06:01  Phos  6.0     04-22  Mg     1.8     04-22    TPro  6.9  /  Alb  3.9  /  TBili  0.3  /  DBili  x   /  AST  11  /  ALT  6<L>  /  AlkPhos  69  04-22    PT/INR - ( 21 Apr 2022 14:58 )   PT: 12.8 sec;   INR: 1.07          PTT - ( 21 Apr 2022 14:58 )  PTT:38.9 sec  ESR: 32  CRP: --  04-21 @ 14:58    Vital Signs Last 24 Hrs  T(C): 36.4 (22 Apr 2022 10:00), Max: 36.7 (21 Apr 2022 13:03)  T(F): 97.6 (22 Apr 2022 10:00), Max: 98 (21 Apr 2022 13:03)  HR: 84 (22 Apr 2022 10:00) (76 - 85)  BP: 104/62 (22 Apr 2022 10:00) (100/50 - 111/71)  BP(mean): --  RR: 17 (22 Apr 2022 10:00) (17 - 18)  SpO2: 96% (22 Apr 2022 10:00) (96% - 100%)    PHYSICAL EXAM  General: NAD, AA0x3    Lower Extremity Focused:  Vasc: Right DP/PT monophasic, Left DP/PT biphasic. CFT <2s x 10. TG warm to cool b/l. Non-pitting edema to both LE. No erythema.  Derm:   Right Foot - Aguirre Grade 1 wound to distal tip of hallux. Aguirre Grade 2 wound to plantar-lateral heel, neg malodor, neg drainage/purulence/PTB.   Left Foot- no open wounds/abrasions/lacerations. Hyperkeratotic skin to plantar medial heel.   Neuro: Protective sensation somewhat diminished b/l  MSK: 4/5 msk strength b/l in all crural compartments; overall generalized body weakness     RADIOLOGY  Pending final read of right foot XR   Resident Read - increased calcaneal sclerosis, no erosions. No fx, no gas.                      Attending: Braden Baird DPM     Patient is a 61y old  Female who presents with a chief complaint of chronic RLE DVT and right heal ulcer (22 Apr 2022 10:56)      HPI: 61F current smoker w/ PMHx ESRD (HD T/Th/Sa), HFrEF (EF 25% s/p AICD), IDDM2, COPD (3L O2 at home PRN), multiple myeloma (on Ninlaro), hypothyroidism, depression, neuropathy (on methadone), recurrent DVTs (on eliquis 2.5mg qd), CVA x2 (w/ residual R-sided weakness), PAD s/p LLE bypass, and DHARMESH on CPAP, who was sent in by her dialysis doctor for concern for worsening chronic RLE DVT and concern for infected chronic right heal ulcer. Patient states that she began to notice worsening swelling of her right calf and ankle one week ago, but has become more painful in the past 2 days. Denies any pus or drainage from her right heal wound. Patient reports the dark discoloration of her right ankle is chronic. Of note, patient has been wheelchair-bound due to weakness since a hospitalization for COVID last year. Denies chest pain, shortness of breath, abdominal pain, n/v/d/c.     Podiatry consulted 4/22/2022 to evaluate right heel ulcer for cellulitis and/or osteomyelitis.   Right heel wound has been present for approx 1 year. Pt was following up w/ a podiatrist (name unknown), last visit was in March 2022, who recommended application of topical ointment and daily wound care. Pt was supposed to see a new podiatrist (located closer to [facility]) last Friday but her transportation service never picked her up. Pt's daughter assists w/ daily wound care at home. States that wound has fluctuated in size, confirms that it did decrease in size compared to when it first developed - initially as a blister. Pt denies recent drainage/purulence/malodor from the wound.       Review of systems negative except per HPI and as stated below  General:	 no weakness; no fevers, no chills  Skin/Breast: no rash  Respiratory and Thorax: no SOB, no cough  Cardiovascular:	No chest pain  Gastrointestinal:	 no nausea, vomiting , diarrhea  Genitourinary:	no dysuria, no difficulty urinating, no hematuria  Musculoskeletal:	no weakness, no joint swelling/pain  Neurological:	no focal weakness/numbness  Endocrine:	no polyuria, no polydipsia    PAST MEDICAL & SURGICAL HISTORY:  CHF (congestive heart failure)    COPD (chronic obstructive pulmonary disease)    HLD (hyperlipidemia)    Chronic pain    DM (diabetes mellitus)    CVA (cerebral vascular accident)    DVT (deep venous thrombosis)    HTN (hypertension)    Depression    Bipolar depression    PAD (peripheral artery disease)    Neuropathy    Hypothyroidism    Multiple myeloma    CKD (chronic kidney disease), stage IV    DHARMESH on CPAP    AICD (automatic cardioverter/defibrillator) present    H/O abdominal hysterectomy    H/O tubal ligation    History of cholecystectomy    H/O extremity bypass graft      Home Medications:  atorvastatin 80 mg oral tablet: 1 tab(s) orally once a day (at bedtime) (22 Apr 2022 05:54)  busPIRone 15 mg oral tablet: 1 tab(s) orally once a day (at bedtime) (22 Apr 2022 05:54)  Decadron 4 mg oral tablet: 5 tab(s) orally once a week (mondays) (22 Apr 2022 05:54)  fenofibrate 48 mg oral tablet: 1 tab(s) orally once a day (22 Apr 2022 05:54)  gabapentin 100 mg oral tablet: 1 tab(s) orally 2 times a day (22 Apr 2022 05:54)  Levemir 100 units/mL subcutaneous solution: 25 unit(s) subcutaneous once a day (at bedtime) (22 Apr 2022 05:54)  levothyroxine 50 mcg (0.05 mg) oral tablet: 1 tab(s) orally once a day (22 Apr 2022 05:54)  methadone 10 mg oral tablet: 1 tab(s) orally 3 times a day (22 Apr 2022 05:54)  Metoprolol Succinate  mg oral tablet, extended release: 1 tab(s) orally once a day (22 Apr 2022 05:54)  Ninlaro 3 mg oral capsule: 1 cap(s) orally every 7 days (mondays) (22 Apr 2022 05:54)  NovoLOG 100 units/mL injectable solution: 10 unit(s) injectable 3 times a day (before meals) (22 Apr 2022 05:54)  omeprazole 40 mg oral delayed release capsule: 1 cap(s) orally once a day (22 Apr 2022 05:54)  PARoxetine 10 mg oral tablet: 1 tab(s) orally once a day (at bedtime) (22 Apr 2022 05:54)  Symbicort 160 mcg-4.5 mcg/inh inhalation aerosol: 2 puff(s) inhaled 2 times a day (22 Apr 2022 05:54)  torsemide: 40 milligram(s) orally 2 times a day (22 Apr 2022 05:54)  traZODone 50 mg oral tablet: 1 tab(s) orally once a day (at bedtime) (22 Apr 2022 05:54)  Ventolin HFA 90 mcg/inh inhalation aerosol: 2 puff(s) inhaled every 6 hours (22 Apr 2022 05:54)    Allergies    aspirin (Other (Moderate); Rash)  oral contrast (Unknown)    Intolerances      FAMILY HISTORY:  Family history of hypertension (Mother)    Family history of diabetes mellitus (Mother)      Social History: Resides in , assisted by daughter.       LABS                        9.2    5.60  )-----------( 152      ( 22 Apr 2022 06:01 )             30.6     04-22    140  |  100  |  52<H>  ----------------------------<  113<H>  4.8   |  27  |  6.86<H>    Ca    8.3<L>      22 Apr 2022 06:01  Phos  6.0     04-22  Mg     1.8     04-22    TPro  6.9  /  Alb  3.9  /  TBili  0.3  /  DBili  x   /  AST  11  /  ALT  6<L>  /  AlkPhos  69  04-22    PT/INR - ( 21 Apr 2022 14:58 )   PT: 12.8 sec;   INR: 1.07          PTT - ( 21 Apr 2022 14:58 )  PTT:38.9 sec  ESR: 32  CRP: --  04-21 @ 14:58    Vital Signs Last 24 Hrs  T(C): 36.4 (22 Apr 2022 10:00), Max: 36.7 (21 Apr 2022 13:03)  T(F): 97.6 (22 Apr 2022 10:00), Max: 98 (21 Apr 2022 13:03)  HR: 84 (22 Apr 2022 10:00) (76 - 85)  BP: 104/62 (22 Apr 2022 10:00) (100/50 - 111/71)  BP(mean): --  RR: 17 (22 Apr 2022 10:00) (17 - 18)  SpO2: 96% (22 Apr 2022 10:00) (96% - 100%)    PHYSICAL EXAM  General: NAD, AA0x3    Lower Extremity Focused:  Vasc: Right DP/PT monophasic, Left DP/PT biphasic. CFT <2s x 10. TG warm to cool b/l. Non-pitting edema to both LE. No erythema.  Derm:   Right Foot - Aguirre Grade 1 wound to distal tip of hallux. Aguirre Grade 2 wound to plantar-lateral heel, neg malodor, neg drainage/purulence/PTB; measures 4cm x 3cm x 0.2mm.   Left Foot- no open wounds/abrasions/lacerations. Hyperkeratotic skin to plantar medial heel.   Neuro: Protective sensation somewhat diminished b/l  MSK: 4/5 msk strength b/l in all crural compartments; overall generalized body weakness     RADIOLOGY  Pending final read of right foot XR   Resident Read - increased calcaneal sclerosis, no erosions. No fx, no gas.

## 2022-04-22 NOTE — CONSULT NOTE ADULT - ASSESSMENT
61F current smoker w/ PMHx ESRD (HD T/Th/Sa), HFrEF (EF 25% s/p AICD), IDDM2, COPD (3L O2 at home PRN), multiple myeloma (on Ninlaro), hypothyroidism, depression, neuropathy (on methadone), recurrent DVTs (on eliquis 2.5mg qd), CVA x2 (w/ residual R-sided weakness), PAD s/p LLE bypass, and DHARMESH on CPAP, who was sent in by her dialysis doctor for concern for worsening chronic RLE DVT and concern for infected chronic right heal ulcer. US demonstrated unchanged nonocclusive thrombus within the right common femoral vein. vascular was consulted to evaluate RLE calf pain and nonpalpable pulses.     plan:  61F current smoker w/ PMHx ESRD (HD T/Th/Sa), HFrEF (EF 25% s/p AICD), IDDM2, COPD (3L O2 at home PRN), multiple myeloma (on Ninlaro), hypothyroidism, depression, neuropathy (on methadone), recurrent DVTs (on eliquis 2.5mg qd), CVA x2 (w/ residual R-sided weakness), PAD s/p LLE bypass, and DHARMESH on CPAP, who was sent in by her dialysis doctor for concern for worsening chronic RLE DVT and concern for infected chronic right heal ulcer. US demonstrated unchanged nonocclusive thrombus within the right common femoral vein. vascular was consulted to evaluate RLE calf pain and nonpalpable pulses.     plan:   no acute vascular intervention   recommend Stat CT abd/pelvis with IV contrast and runoff to distal toes   podiatry to manage Right heel wound   rest of care per primary   vascular will continue to follow

## 2022-04-22 NOTE — PROGRESS NOTE ADULT - SUBJECTIVE AND OBJECTIVE BOX
Patient is a 61y Female seen and evaluated at bedside. DVT study didn't show evidence of DVT /62 Hgb 9.2 K 4.8 ; for HD today       Meds:    acetaminophen     Tablet .. 650 every 6 hours PRN  apixaban 2.5 every 12 hours  atorvastatin 80 at bedtime  budesonide 160 MICROgram(s)/formoterol 4.5 MICROgram(s) Inhaler 2 two times a day  ceFAZolin   IVPB 1000 every 8 hours  dextrose 5%. 1000 <Continuous>  dextrose 5%. 1000 <Continuous>  dextrose 50% Injectable 25 once  dextrose 50% Injectable 12.5 once  dextrose 50% Injectable 25 once  dextrose Oral Gel 15 once PRN  fenofibrate Tablet 48 daily  gabapentin 100 every 12 hours  glucagon  Injectable 1 once  insulin glargine Injectable (LANTUS) 10 at bedtime  insulin lispro (ADMELOG) corrective regimen sliding scale  three times a day before meals  insulin lispro Injectable (ADMELOG) 5 three times a day before meals  melatonin 3 at bedtime  methadone    Tablet 10 three times a day  metoprolol succinate  daily  pantoprazole    Tablet 40 before breakfast  PARoxetine 10 at bedtime  sacubitril 97 mG/valsartan 103 mG 1 two times a day  traZODone 50 at bedtime      T(C): , Max: 36.7 (22 @ 13:03)  T(F): , Max: 98 (22 @ 13:03)  HR: 76 (22 @ 06:05)  BP: 108/62 (22 @ 06:05)  BP(mean): --  RR: 18 (22 @ 06:05)  SpO2: 99% (22 @ 06:05)  Wt(kg): --    Height (cm): 165.1 ( @ 13:03)  Weight (kg): 90.7 ( @ 13:03)  BMI (kg/m2): 33.3 ( @ :03)  BSA (m2): 1.98 ( @ :03)    Review of Systems:  all other ROS negative     PHYSICAL EXAM:  GENERAL: Alert, awake, oriented x3 on RA   CHEST/LUNG: CTA BL no rales, rhonchi or wheezing   HEART: Regular rate and rhythm, no murmur, no gallops, no rub   ABDOMEN: Soft, nontender, non distended  EXTREMITIES: +RLE swelling   Neurology: AAOx3, no asterixis   SKIN: No rash or skin lesion   ACCESS: +TDC      LABS:                        9.2    5.60  )-----------( 152      ( 2022 06:01 )             30.6         140  |  100  |  52<H>  ----------------------------<  113<H>  4.8   |  27  |  6.86<H>    Ca    8.3<L>      2022 06:01  Phos  6.0       Mg     1.8         TPro  6.9  /  Alb  3.9  /  TBili  0.3  /  DBili  x   /  AST  11  /  ALT  6<L>  /  AlkPhos  69        PT/INR - ( 2022 14:58 )   PT: 12.8 sec;   INR: 1.07          PTT - ( 2022 14:58 )  PTT:38.9 sec          RADIOLOGY & ADDITIONAL STUDIES:        Hemoglobin: 9.2 g/dL (22 @ 06:01)  Phosphorus Level, Serum: 6.0 mg/dL (22 @ 06:01)  Iron Total, Serum: 42 ug/dL (22 @ 06:01)  Iron - Total Binding Capacity.: 237 ug/dL (22 @ 06:01)    Albumin, Serum: 3.9 g/dL (22 @ 06:01)  % Saturation, Iron: 18 % (22 @ 06:01)      Hemodialysis Treatment.:     Schedule: Once, Modality: Hemodialysis, Access: Internal Jugular Central Venous Catheter    Dialyzer: Optiflux [id number], Time: 210 Min    Blood Flow: 400 mL/Min , Dialysate Flow: 500 mL/Min, Dialysate Temp: 36.5, Tubinmm (Adult)    Target Fluid Removal: 2.5 Liters    Dialysate Electrolytes (mEq/L): Potassium 2, Calcium 2.5, Sodium 138, Bicarbonate 35 (22 @ 05:53) [Active]  Hemodialysis Treatment.:     Schedule: Once, Modality: Hemodialysis, Access: Internal Jugular Central Venous Catheter    Dialyzer: Optiflux [id number], Time: 180 Min    Blood Flow: 400 mL/Min , Dialysate Flow: 500 mL/Min, Dialysate Temp: 36.5, Tubinmm (Adult)    Target Fluid Removal: 2 Liters    Dialysate Electrolytes (mEq/L): Potassium 2, Calcium 2.5, Sodium 138, Bicarbonate 35 (22 @ 15:31) [Discontinued]  Hemodialysis Treatment.:     Schedule: Once, Modality: Hemodialysis, Access: Internal Jugular Central Venous Catheter    Dialyzer: Optiflux [id number], Time: 210 Min    Blood Flow: 400 mL/Min , Dialysate Flow: 500 mL/Min, Dialysate Temp: 36.5, Tubinmm (Adult)    Target Fluid Removal: 2.3 Liters    Dialysate Electrolytes (mEq/L): Potassium 2, Calcium 2.5, Sodium 138, Bicarbonate 35 (22 @ 15:18) [Discontinued]

## 2022-04-22 NOTE — PROGRESS NOTE ADULT - PROBLEM SELECTOR PLAN 6
Home meds: Entresto  BID and metoprolol XL 100mg qd  - c/w home meds C/b heel ulcer. Home meds: Gabapentin 100 bid and methadone 10mg TID.   - Follows with outpatient pain management Dr. Oliveira  - c/w gabapentin 100 bid   - methadone adjusted to 10mg QD with additional PRN as needed C/b heel ulcer. Home meds: Gabapentin 100 bid and methadone 10mg TID.   - Follows with outpatient pain management Dr. Oliveira  - c/w gabapentin 100 bid   - methadone adjusted to 10mg QD with additional PRN as needed      # DM2  Home meds: Levemir 25U and novolog 10U premeal  - A1c: 6.0%  - Restart Lantus 10 and lispro 5 TID for now, uptitrate as needed  - mISS

## 2022-04-23 LAB
ALBUMIN SERPL ELPH-MCNC: 3.6 G/DL — SIGNIFICANT CHANGE UP (ref 3.3–5)
ALP SERPL-CCNC: 74 U/L — SIGNIFICANT CHANGE UP (ref 40–120)
ALT FLD-CCNC: <5 U/L — LOW (ref 10–45)
ANION GAP SERPL CALC-SCNC: 12 MMOL/L — SIGNIFICANT CHANGE UP (ref 5–17)
AST SERPL-CCNC: 11 U/L — SIGNIFICANT CHANGE UP (ref 10–40)
BASOPHILS # BLD AUTO: 0.06 K/UL — SIGNIFICANT CHANGE UP (ref 0–0.2)
BASOPHILS NFR BLD AUTO: 1 % — SIGNIFICANT CHANGE UP (ref 0–2)
BILIRUB SERPL-MCNC: 0.3 MG/DL — SIGNIFICANT CHANGE UP (ref 0.2–1.2)
BUN SERPL-MCNC: 31 MG/DL — HIGH (ref 7–23)
CALCIUM SERPL-MCNC: 8.7 MG/DL — SIGNIFICANT CHANGE UP (ref 8.4–10.5)
CHLORIDE SERPL-SCNC: 99 MMOL/L — SIGNIFICANT CHANGE UP (ref 96–108)
CO2 SERPL-SCNC: 26 MMOL/L — SIGNIFICANT CHANGE UP (ref 22–31)
CREAT SERPL-MCNC: 4.88 MG/DL — HIGH (ref 0.5–1.3)
EGFR: 10 ML/MIN/1.73M2 — LOW
EOSINOPHIL # BLD AUTO: 0.16 K/UL — SIGNIFICANT CHANGE UP (ref 0–0.5)
EOSINOPHIL NFR BLD AUTO: 2.5 % — SIGNIFICANT CHANGE UP (ref 0–6)
GLUCOSE BLDC GLUCOMTR-MCNC: 104 MG/DL — HIGH (ref 70–99)
GLUCOSE BLDC GLUCOMTR-MCNC: 130 MG/DL — HIGH (ref 70–99)
GLUCOSE BLDC GLUCOMTR-MCNC: 135 MG/DL — HIGH (ref 70–99)
GLUCOSE BLDC GLUCOMTR-MCNC: 96 MG/DL — SIGNIFICANT CHANGE UP (ref 70–99)
GLUCOSE SERPL-MCNC: 146 MG/DL — HIGH (ref 70–99)
HCT VFR BLD CALC: 34.1 % — LOW (ref 34.5–45)
HGB BLD-MCNC: 10.1 G/DL — LOW (ref 11.5–15.5)
IMM GRANULOCYTES NFR BLD AUTO: 0.3 % — SIGNIFICANT CHANGE UP (ref 0–1.5)
LYMPHOCYTES # BLD AUTO: 1.37 K/UL — SIGNIFICANT CHANGE UP (ref 1–3.3)
LYMPHOCYTES # BLD AUTO: 21.7 % — SIGNIFICANT CHANGE UP (ref 13–44)
MAGNESIUM SERPL-MCNC: 1.9 MG/DL — SIGNIFICANT CHANGE UP (ref 1.6–2.6)
MCHC RBC-ENTMCNC: 29.6 GM/DL — LOW (ref 32–36)
MCHC RBC-ENTMCNC: 30 PG — SIGNIFICANT CHANGE UP (ref 27–34)
MCV RBC AUTO: 101.2 FL — HIGH (ref 80–100)
MONOCYTES # BLD AUTO: 0.88 K/UL — SIGNIFICANT CHANGE UP (ref 0–0.9)
MONOCYTES NFR BLD AUTO: 14 % — SIGNIFICANT CHANGE UP (ref 2–14)
NEUTROPHILS # BLD AUTO: 3.81 K/UL — SIGNIFICANT CHANGE UP (ref 1.8–7.4)
NEUTROPHILS NFR BLD AUTO: 60.5 % — SIGNIFICANT CHANGE UP (ref 43–77)
NRBC # BLD: 0 /100 WBCS — SIGNIFICANT CHANGE UP (ref 0–0)
PHOSPHATE SERPL-MCNC: 5.4 MG/DL — HIGH (ref 2.5–4.5)
PLATELET # BLD AUTO: 155 K/UL — SIGNIFICANT CHANGE UP (ref 150–400)
POTASSIUM SERPL-MCNC: 4.7 MMOL/L — SIGNIFICANT CHANGE UP (ref 3.5–5.3)
POTASSIUM SERPL-SCNC: 4.7 MMOL/L — SIGNIFICANT CHANGE UP (ref 3.5–5.3)
PROT SERPL-MCNC: 7.3 G/DL — SIGNIFICANT CHANGE UP (ref 6–8.3)
RBC # BLD: 3.37 M/UL — LOW (ref 3.8–5.2)
RBC # FLD: 15.5 % — HIGH (ref 10.3–14.5)
SODIUM SERPL-SCNC: 137 MMOL/L — SIGNIFICANT CHANGE UP (ref 135–145)
WBC # BLD: 6.3 K/UL — SIGNIFICANT CHANGE UP (ref 3.8–10.5)
WBC # FLD AUTO: 6.3 K/UL — SIGNIFICANT CHANGE UP (ref 3.8–10.5)

## 2022-04-23 PROCEDURE — 99233 SBSQ HOSP IP/OBS HIGH 50: CPT | Mod: GC

## 2022-04-23 RX ORDER — METHADONE HYDROCHLORIDE 40 MG/1
10 TABLET ORAL THREE TIMES A DAY
Refills: 0 | Status: DISCONTINUED | OUTPATIENT
Start: 2022-04-23 | End: 2022-04-28

## 2022-04-23 RX ORDER — OXYCODONE AND ACETAMINOPHEN 5; 325 MG/1; MG/1
1 TABLET ORAL ONCE
Refills: 0 | Status: DISCONTINUED | OUTPATIENT
Start: 2022-04-23 | End: 2022-04-23

## 2022-04-23 RX ORDER — MORPHINE SULFATE 50 MG/1
1 CAPSULE, EXTENDED RELEASE ORAL ONCE
Refills: 0 | Status: DISCONTINUED | OUTPATIENT
Start: 2022-04-23 | End: 2022-04-23

## 2022-04-23 RX ADMIN — INSULIN GLARGINE 10 UNIT(S): 100 INJECTION, SOLUTION SUBCUTANEOUS at 22:51

## 2022-04-23 RX ADMIN — Medication 100 MILLIGRAM(S): at 22:53

## 2022-04-23 RX ADMIN — BUDESONIDE AND FORMOTEROL FUMARATE DIHYDRATE 2 PUFF(S): 160; 4.5 AEROSOL RESPIRATORY (INHALATION) at 18:08

## 2022-04-23 RX ADMIN — Medication 650 MILLIGRAM(S): at 15:55

## 2022-04-23 RX ADMIN — PANTOPRAZOLE SODIUM 40 MILLIGRAM(S): 20 TABLET, DELAYED RELEASE ORAL at 06:59

## 2022-04-23 RX ADMIN — METHADONE HYDROCHLORIDE 10 MILLIGRAM(S): 40 TABLET ORAL at 14:55

## 2022-04-23 RX ADMIN — Medication 650 MILLIGRAM(S): at 14:55

## 2022-04-23 RX ADMIN — Medication 100 MILLIGRAM(S): at 14:55

## 2022-04-23 RX ADMIN — SACUBITRIL AND VALSARTAN 1 TABLET(S): 24; 26 TABLET, FILM COATED ORAL at 06:59

## 2022-04-23 RX ADMIN — OXYCODONE AND ACETAMINOPHEN 1 TABLET(S): 5; 325 TABLET ORAL at 19:07

## 2022-04-23 RX ADMIN — Medication 48 MILLIGRAM(S): at 14:55

## 2022-04-23 RX ADMIN — BUDESONIDE AND FORMOTEROL FUMARATE DIHYDRATE 2 PUFF(S): 160; 4.5 AEROSOL RESPIRATORY (INHALATION) at 06:59

## 2022-04-23 RX ADMIN — Medication 100 MILLIGRAM(S): at 07:05

## 2022-04-23 RX ADMIN — MORPHINE SULFATE 1 MILLIGRAM(S): 50 CAPSULE, EXTENDED RELEASE ORAL at 19:57

## 2022-04-23 RX ADMIN — Medication 200 MILLIGRAM(S): at 06:59

## 2022-04-23 RX ADMIN — APIXABAN 2.5 MILLIGRAM(S): 2.5 TABLET, FILM COATED ORAL at 18:21

## 2022-04-23 RX ADMIN — OXYCODONE AND ACETAMINOPHEN 1 TABLET(S): 5; 325 TABLET ORAL at 18:07

## 2022-04-23 RX ADMIN — Medication 50 MILLIGRAM(S): at 22:51

## 2022-04-23 RX ADMIN — METHADONE HYDROCHLORIDE 10 MILLIGRAM(S): 40 TABLET ORAL at 22:51

## 2022-04-23 RX ADMIN — Medication 5 UNIT(S): at 14:56

## 2022-04-23 RX ADMIN — SACUBITRIL AND VALSARTAN 1 TABLET(S): 24; 26 TABLET, FILM COATED ORAL at 18:08

## 2022-04-23 RX ADMIN — Medication 3 MILLIGRAM(S): at 22:51

## 2022-04-23 RX ADMIN — GABAPENTIN 100 MILLIGRAM(S): 400 CAPSULE ORAL at 18:07

## 2022-04-23 RX ADMIN — GABAPENTIN 100 MILLIGRAM(S): 400 CAPSULE ORAL at 07:05

## 2022-04-23 RX ADMIN — Medication 1 APPLICATION(S): at 14:55

## 2022-04-23 RX ADMIN — Medication 10 MILLIGRAM(S): at 22:51

## 2022-04-23 RX ADMIN — APIXABAN 2.5 MILLIGRAM(S): 2.5 TABLET, FILM COATED ORAL at 07:28

## 2022-04-23 RX ADMIN — ATORVASTATIN CALCIUM 80 MILLIGRAM(S): 80 TABLET, FILM COATED ORAL at 22:51

## 2022-04-23 NOTE — PROGRESS NOTE ADULT - SUBJECTIVE AND OBJECTIVE BOX
--------------------------------------------------------------------------------  Chief Complaint: ESRD/Ongoing hemodialysis requirement    24 hour events/subjective:  Seen this morning on HD, doing well with no acute complaints. State she is breathing well, no chest pain, palpitations        PAST HISTORY  --------------------------------------------------------------------------------  No significant changes to PMH, PSH, FHx, SHx, unless otherwise noted    ALLERGIES & MEDICATIONS  --------------------------------------------------------------------------------  Allergies    aspirin (Other (Moderate); Rash)  oral contrast (Unknown)    Intolerances      Standing Inpatient Medications  ammonium lactate 12% Lotion 1 Application(s) Topical <User Schedule>  apixaban 2.5 milliGRAM(s) Oral every 12 hours  atorvastatin 80 milliGRAM(s) Oral at bedtime  budesonide 160 MICROgram(s)/formoterol 4.5 MICROgram(s) Inhaler 2 Puff(s) Inhalation two times a day  ceFAZolin   IVPB 1000 milliGRAM(s) IV Intermittent every 8 hours  collagenase Ointment 1 Application(s) Topical daily  dextrose 5%. 1000 milliLiter(s) IV Continuous <Continuous>  dextrose 5%. 1000 milliLiter(s) IV Continuous <Continuous>  dextrose 50% Injectable 25 Gram(s) IV Push once  dextrose 50% Injectable 12.5 Gram(s) IV Push once  dextrose 50% Injectable 25 Gram(s) IV Push once  fenofibrate Tablet 48 milliGRAM(s) Oral daily  gabapentin 100 milliGRAM(s) Oral every 12 hours  glucagon  Injectable 1 milliGRAM(s) IntraMuscular once  insulin glargine Injectable (LANTUS) 10 Unit(s) SubCutaneous at bedtime  insulin lispro (ADMELOG) corrective regimen sliding scale   SubCutaneous Before meals and at bedtime  insulin lispro Injectable (ADMELOG) 5 Unit(s) SubCutaneous three times a day before meals  melatonin 3 milliGRAM(s) Oral at bedtime  methadone    Tablet 10 milliGRAM(s) Oral three times a day  methadone    Tablet 10 milliGRAM(s) Oral daily  metoprolol succinate  milliGRAM(s) Oral daily  pantoprazole    Tablet 40 milliGRAM(s) Oral before breakfast  PARoxetine 10 milliGRAM(s) Oral at bedtime  sacubitril 97 mG/valsartan 103 mG 1 Tablet(s) Oral two times a day  traZODone 50 milliGRAM(s) Oral at bedtime    PRN Inpatient Medications  acetaminophen     Tablet .. 650 milliGRAM(s) Oral every 6 hours PRN  dextrose Oral Gel 15 Gram(s) Oral once PRN      REVIEW OF SYSTEMS  --------------------------------------------------------------------------------  ROS negative except as per HPI      VITALS/PHYSICAL EXAM  --------------------------------------------------------------------------------  T(C): 36.3 (22 @ 10:20), Max: 37.4 (22 @ 20:34)  HR: 84 (22 @ 10:20) (63 - 88)  BP: 126/64 (22 @ 10:20) (107/63 - 138/83)  RR: 18 (22 @ 10:20) (15 - 18)  SpO2: 99% (22 @ 10:20) (95% - 100%)  Wt(kg): --  Drug Dosing Weight  Height (cm): 165.1 (2022 13:03)  Weight (kg): 90.7 (2022 13:03)  BMI (kg/m2): 33.3 (2022 13:03)  BSA (m2): 1.98 (2022 13:03)  Height (cm): 165.1 (22 @ 13:03)  Weight (kg): 90.7 (22 @ 13:03)  BMI (kg/m2): 33.3 (22 @ 13:03)  BSA (m2): 1.98 (22 @ 13:03)      22 @ 07:01  -  22 @ 07:00  --------------------------------------------------------  IN: 400 mL / OUT: 2900 mL / NET: -2500 mL    PHYSICAL EXAM:  GENERAL: Alert, awake, oriented x3 on RA   CHEST/LUNG: CTA BL no rales, rhonchi or wheezing   HEART: Regular rate and rhythm, no murmur, no gallops, no rub   ABDOMEN: Soft, nontender, non distended  EXTREMITIES: +RLE swelling   Neurology: AAOx3, no asterixis   SKIN: No rash or skin lesion   ACCESS: +TDC    LABS/STUDIES  --------------------------------------------------------------------------------              10.1   6.30  >-----------<  155      [22 @ 08:25]              34.1     137  |  99  |  31  ----------------------------<  146      [22 08:25]  4.7   |  26  |  4.88        Ca     8.7     [22 08:25]      Mg     1.9     [22 08:25]      Phos  5.4     [22 08:25]    TPro  7.3  /  Alb  3.6  /  TBili  0.3  /  DBili  x   /  AST  11  /  ALT  <5  /  AlkPhos  74  [22 08:25]    PT/INR: PT 12.8 , INR 1.07       [22 14:58]  PTT: 38.9       [22 14:58]    Troponin 0.03      [22 06:01]    Iron 42, TIBC 237, %sat 18      [22 06:01]  Ferritin 617      [22 06:01]  PTH -- (Ca 8.2)      [21 @ 10:43]   190  Vitamin D (25OH) 35.4      [21 @ 10:43]  HbA1c 9.2      [20 @ 04:46]  TSH 1.330      [21 @ 11:15]  Lipid: chol 174, , HDL 42, LDL --      [22 @ 11:40]    HBsAb Nonreact      [22 19:29]  HBsAg Nonreact      [22 19:29]  HCV 0.04, Nonreact      [22 19:29]      RADIOLOGY:  --------------------------------------------------------------------------------------    Hemoglobin: 10.1 g/dL (22 08:25)  Phosphorus Level, Serum: 5.4 mg/dL (22 08:25)  Hemoglobin: 9.2 g/dL (04-22-22 @ 06:01)  Phosphorus Level, Serum: 6.0 mg/dL (22 @ 06:01)    Albumin, Serum: 3.6 g/dL (22 @ 08:25)  Albumin, Serum: 3.9 g/dL (22 @ 06:01)        Hemodialysis Treatment.:     Schedule: Once, Modality: Hemodialysis, Access: Internal Jugular Central Venous Catheter    Dialyzer: Optiflux [id number], Time: 180 Min    Blood Flow: 400 mL/Min , Dialysate Flow: 500 mL/Min, Dialysate Temp: 36.5, Tubinmm (Adult)    Target Fluid Removal: 1 Liters    Dialysate Electrolytes (mEq/L): Potassium 2, Calcium 2.5, Sodium 138, Bicarbonate 35 (22 @ 07:46) [Completed]    Seen on HD, tolerating prescription. C/w outlined as above

## 2022-04-23 NOTE — PROGRESS NOTE ADULT - ASSESSMENT
61 female new ESRD (TTS last HD was tuesday 4/19 via TDC) DM HTN history of DVT presented to the ED from HD center ([facility] on [facility]) due to RLE swelling  Consult for hemodialysis     Assessment/Plan:   #ESRD on HD TTS@ [facility] on [facility]  usual Rx 4h 2-3L   -HD today to keep on schedule  electrolytes at goal   euvolemic, UF w/HD   dry weight TBD     #HTN   BP at goal   continue w/ home meds    #access   TDC    #anemia  Hb at goal   -Can consider Oral iron given Tsat 18%  -Epo w/ HD    #renal bone disease   calcium phos product acceptable  trend phos daily w/ labs    DVT  patient currently on 2.5mg BID of eliquis; however for DVT history the recommended dose is 5mg BID however would defer to hematology

## 2022-04-23 NOTE — PROGRESS NOTE ADULT - SUBJECTIVE AND OBJECTIVE BOX
OVERNIGHT EVENTS: NAEO.      SUBJECTIVE:  Pt was seen sleeping in bed comfortably on RA. She states that she is in moderate pain specifically in the R leg. Denies feeling feverish, chills or night sweats. Denies SOB or chest pain. Mild productive cough that is unchanged from baseline.    ROS: Other than noted above, 12 point ROS was unremarkable.      OBJECTIVE:  Vital Signs Last 24 Hrs  T(C): 36.4 (22 Apr 2022 10:00), Max: 36.7 (21 Apr 2022 13:03)  T(F): 97.6 (22 Apr 2022 10:00), Max: 98 (21 Apr 2022 13:03)  HR: 84 (22 Apr 2022 10:00) (76 - 85)  BP: 104/62 (22 Apr 2022 10:00) (100/50 - 111/71)  BP(mean): --  RR: 17 (22 Apr 2022 10:00) (17 - 18)  SpO2: 96% (22 Apr 2022 10:00) (96% - 100%)    Physical Exam:  Gen: no acute distress; obese, interactive, well appearing  HEENT: pupils equal, responsive, reactive to light; no conjunctivitis or scleral icterus; no nasal discharge; no nasal congestion; mucus membranes moist  Neck: FROM, supple, no cervical lymphadenopathy  Chest: clear to auscultation bilaterally, no crackles/wheezes, good air entry, no tachypnea or retractions  CV: regular rate and rhythm, no murmurs   Abd: soft, nontender, nondistended, no HSM appreciated, NABS  Extrem: b/l UE 2+ distal pulses. Unable to palpate distal pulses in b/l LE. Unable to palpate femoral pulses b/l. R leg significant discoloration (pt reports unchanged from baseline), moderate pain with palpation of the R calf  Neuro: Sensation and motor grossly intact in the b/l UE. B/l LE sensation to touch pain and vibration absent in the feet, calf and medial thigh. Sensation to touch present in the lateral thigh b/l. 0/5 dorsiflexion of the R foot. 2/5 dorsiflexion of the L foot.    Labs:                        9.2    5.60  )-----------( 152      ( 22 Apr 2022 06:01 )             30.6     04-22    140  |  100  |  52<H>  ----------------------------<  113<H>  4.8   |  27  |  6.86<H>    Ca    8.3<L>      22 Apr 2022 06:01  Phos  6.0     04-22  Mg     1.8     04-22    TPro  6.9  /  Alb  3.9  /  TBili  0.3  /  DBili  x   /  AST  11  /  ALT  6<L>  /  AlkPhos  69  04-22    PT/INR - ( 21 Apr 2022 14:58 )   PT: 12.8 sec;   INR: 1.07          PTT - ( 21 Apr 2022 14:58 )  PTT:38.9 sec  Fingerstick  glucose: POCT Blood Glucose.: 111 mg/dL (22 Apr 2022 09:17)  Ferritin, Serum: 617 ng/mL (04.22.22 @ 06:01)   Iron Total, Serum: 48 ug/dL (04.22.22 @ 06:01)   Sedimentation Rate, Erythrocyte: 32  Serum Pro-Brain Natriuretic Peptide: 74515      MEDICATIONS  (STANDING):  apixaban 2.5 milliGRAM(s) Oral every 12 hours  atorvastatin 80 milliGRAM(s) Oral at bedtime  budesonide 160 MICROgram(s)/formoterol 4.5 MICROgram(s) Inhaler 2 Puff(s) Inhalation two times a day  ceFAZolin   IVPB 1000 milliGRAM(s) IV Intermittent every 8 hours  dextrose 5%. 1000 milliLiter(s) (50 mL/Hr) IV Continuous <Continuous>  dextrose 5%. 1000 milliLiter(s) (100 mL/Hr) IV Continuous <Continuous>  dextrose 50% Injectable 25 Gram(s) IV Push once  dextrose 50% Injectable 12.5 Gram(s) IV Push once  dextrose 50% Injectable 25 Gram(s) IV Push once  fenofibrate Tablet 48 milliGRAM(s) Oral daily  gabapentin 100 milliGRAM(s) Oral every 12 hours  glucagon  Injectable 1 milliGRAM(s) IntraMuscular once  insulin glargine Injectable (LANTUS) 10 Unit(s) SubCutaneous at bedtime  insulin lispro (ADMELOG) corrective regimen sliding scale   SubCutaneous three times a day before meals  insulin lispro Injectable (ADMELOG) 5 Unit(s) SubCutaneous three times a day before meals  melatonin 3 milliGRAM(s) Oral at bedtime  methadone    Tablet 10 milliGRAM(s) Oral three times a day  metoprolol succinate  milliGRAM(s) Oral daily  pantoprazole    Tablet 40 milliGRAM(s) Oral before breakfast  PARoxetine 10 milliGRAM(s) Oral at bedtime  sacubitril 97 mG/valsartan 103 mG 1 Tablet(s) Oral two times a day  traZODone 50 milliGRAM(s) Oral at bedtime    MEDICATIONS  (PRN):  acetaminophen     Tablet .. 650 milliGRAM(s) Oral every 6 hours PRN Temp greater or equal to 38C (100.4F), Mild Pain (1 - 3)  dextrose Oral Gel 15 Gram(s) Oral once PRN Blood Glucose LESS THAN 70 milliGRAM(s)/deciliter      Allergies    aspirin (Other (Moderate); Rash)  oral contrast (Unknown)    Intolerances        RADIOLOGY & ADDITIONAL TESTS: Reviewed.  < from: US Duplex Venous Lower Ext Ltd, Right (04.21.22 @ 17:53) >  IMPRESSION:  Since 12/20/2021, unchanged nonocclusive thrombus within the right common   femoral vein. No new DVT.    < end of copied text >      < from: Xray Chest 1 View- PORTABLE-Urgent (Xray Chest 1 View- PORTABLE-Urgent .) (04.21.22 @ 15:29) >  IMPRESSION: Noacute cardiopulmonary disease process. Cardiomegaly.    < end of copied text >                   OVERNIGHT EVENTS: NAEO.      SUBJECTIVE:  Pt was seen sleeping in bed comfortably on RA. She states that she is in moderate pain specifically in the R leg. Denies feeling feverish, chills or night sweats. Denies SOB or chest pain. Mild productive cough that is unchanged from baseline. Restarted Methadone TID.    ROS: Other than noted above, 12 point ROS was unremarkable.      OBJECTIVE:  Vital Signs Last 24 Hrs  T(C): 36.4 (22 Apr 2022 10:00), Max: 36.7 (21 Apr 2022 13:03)  T(F): 97.6 (22 Apr 2022 10:00), Max: 98 (21 Apr 2022 13:03)  HR: 84 (22 Apr 2022 10:00) (76 - 85)  BP: 104/62 (22 Apr 2022 10:00) (100/50 - 111/71)  BP(mean): --  RR: 17 (22 Apr 2022 10:00) (17 - 18)  SpO2: 96% (22 Apr 2022 10:00) (96% - 100%)    Physical Exam:  Gen: no acute distress; obese, interactive, well appearing  HEENT: pupils equal, responsive, reactive to light; no conjunctivitis or scleral icterus; no nasal discharge; no nasal congestion; mucus membranes moist  Neck: FROM, supple, no cervical lymphadenopathy  Chest: clear to auscultation bilaterally, no crackles/wheezes, good air entry, no tachypnea or retractions  CV: regular rate and rhythm, no murmurs   Abd: soft, nontender, nondistended, no HSM appreciated, NABS  Extrem: b/l UE 2+ distal pulses. Unable to palpate distal pulses in b/l LE. Unable to palpate femoral pulses b/l. R leg significant discoloration (pt reports unchanged from baseline), moderate pain with palpation of the R calf  Neuro: Sensation and motor grossly intact in the b/l UE. B/l LE sensation to touch pain and vibration absent in the feet, calf and medial thigh. Sensation to touch present in the lateral thigh b/l. 0/5 dorsiflexion of the R foot. 2/5 dorsiflexion of the L foot.    Labs:                        9.2    5.60  )-----------( 152      ( 22 Apr 2022 06:01 )             30.6     04-22    140  |  100  |  52<H>  ----------------------------<  113<H>  4.8   |  27  |  6.86<H>    Ca    8.3<L>      22 Apr 2022 06:01  Phos  6.0     04-22  Mg     1.8     04-22    TPro  6.9  /  Alb  3.9  /  TBili  0.3  /  DBili  x   /  AST  11  /  ALT  6<L>  /  AlkPhos  69  04-22    PT/INR - ( 21 Apr 2022 14:58 )   PT: 12.8 sec;   INR: 1.07          PTT - ( 21 Apr 2022 14:58 )  PTT:38.9 sec  Fingerstick  glucose: POCT Blood Glucose.: 111 mg/dL (22 Apr 2022 09:17)  Ferritin, Serum: 617 ng/mL (04.22.22 @ 06:01)   Iron Total, Serum: 48 ug/dL (04.22.22 @ 06:01)   Sedimentation Rate, Erythrocyte: 32  Serum Pro-Brain Natriuretic Peptide: 76586      MEDICATIONS  (STANDING):  apixaban 2.5 milliGRAM(s) Oral every 12 hours  atorvastatin 80 milliGRAM(s) Oral at bedtime  budesonide 160 MICROgram(s)/formoterol 4.5 MICROgram(s) Inhaler 2 Puff(s) Inhalation two times a day  ceFAZolin   IVPB 1000 milliGRAM(s) IV Intermittent every 8 hours  dextrose 5%. 1000 milliLiter(s) (50 mL/Hr) IV Continuous <Continuous>  dextrose 5%. 1000 milliLiter(s) (100 mL/Hr) IV Continuous <Continuous>  dextrose 50% Injectable 25 Gram(s) IV Push once  dextrose 50% Injectable 12.5 Gram(s) IV Push once  dextrose 50% Injectable 25 Gram(s) IV Push once  fenofibrate Tablet 48 milliGRAM(s) Oral daily  gabapentin 100 milliGRAM(s) Oral every 12 hours  glucagon  Injectable 1 milliGRAM(s) IntraMuscular once  insulin glargine Injectable (LANTUS) 10 Unit(s) SubCutaneous at bedtime  insulin lispro (ADMELOG) corrective regimen sliding scale   SubCutaneous three times a day before meals  insulin lispro Injectable (ADMELOG) 5 Unit(s) SubCutaneous three times a day before meals  melatonin 3 milliGRAM(s) Oral at bedtime  methadone    Tablet 10 milliGRAM(s) Oral three times a day  metoprolol succinate  milliGRAM(s) Oral daily  pantoprazole    Tablet 40 milliGRAM(s) Oral before breakfast  PARoxetine 10 milliGRAM(s) Oral at bedtime  sacubitril 97 mG/valsartan 103 mG 1 Tablet(s) Oral two times a day  traZODone 50 milliGRAM(s) Oral at bedtime    MEDICATIONS  (PRN):  acetaminophen     Tablet .. 650 milliGRAM(s) Oral every 6 hours PRN Temp greater or equal to 38C (100.4F), Mild Pain (1 - 3)  dextrose Oral Gel 15 Gram(s) Oral once PRN Blood Glucose LESS THAN 70 milliGRAM(s)/deciliter      Allergies    aspirin (Other (Moderate); Rash)  oral contrast (Unknown)    Intolerances        RADIOLOGY & ADDITIONAL TESTS: Reviewed.  < from: US Duplex Venous Lower Ext Ltd, Right (04.21.22 @ 17:53) >  IMPRESSION:  Since 12/20/2021, unchanged nonocclusive thrombus within the right common   femoral vein. No new DVT.    < end of copied text >      < from: Xray Chest 1 View- PORTABLE-Urgent (Xray Chest 1 View- PORTABLE-Urgent .) (04.21.22 @ 15:29) >  IMPRESSION: Noacute cardiopulmonary disease process. Cardiomegaly.    < end of copied text >

## 2022-04-23 NOTE — PROGRESS NOTE ADULT - PROBLEM SELECTOR PLAN 8
Current smoker, 1/4 ppd. On 3L NC at home. CPAP at night for DHARMESH. Home inhalers: Symbicort and albuterol PRN  - C/w home inhalers      #DHARMESH  - CPAP at night for DHARMESH

## 2022-04-23 NOTE — PROGRESS NOTE ADULT - PROBLEM SELECTOR PLAN 7
Home meds: Entresto  BID and metoprolol XL 100mg qd  - c/w home meds      # Cerebrovascular accident (CVA)  Hx of strokes in the past w/ right sided residual deficit  - C/w home atorvastatin 80

## 2022-04-23 NOTE — PROGRESS NOTE ADULT - ASSESSMENT
61F current smoker w/ PMHx ESRD (HD T/Th/Sa), HFrEF (EF 25% s/p AICD), IDDM2, COPD (3L O2 at home PRN), multiple myeloma (on Ninlaro), hypothyroidism, depression, neuropathy (on methadone), recurrent DVTs (on eliquis 2.5mg qd), CVA x2 (w/ residual R-sided weakness), PAD s/p LLE bypass, and DHARMESH on CPAP, who was sent in by her dialysis doctor for concern for worsening chronic RLE DVT and concern for infected chronic right heal ulcer. US demonstrated unchanged nonocclusive thrombus within the right common femoral vein. vascular was consulted to evaluate RLE calf pain and nonpalpable pulses.     plan:   no acute vascular intervention   recommend Stat CT abd/pelvis with IV contrast and runoff to distal toes  Order b/l Carotid duplex   podiatry to manage Right heel wound   rest of care per primary   vascular will continue to follow

## 2022-04-23 NOTE — PROGRESS NOTE ADULT - PROBLEM SELECTOR PLAN 1
s/p bedside debridement 4/22 by Podiatry; cont. Ancef; f/u cultures; wound care per Podiatry; Vascular consulted in setting of PAD -- will check CT A/P, per recs Keystone Flap Text: The defect edges were debeveled with a #15 scalpel blade.  Given the location of the defect, shape of the defect a keystone flap was deemed most appropriate.  Using a sterile surgical marker, an appropriate keystone flap was drawn incorporating the defect, outlining the appropriate donor tissue and placing the expected incisions within the relaxed skin tension lines where possible. The area thus outlined was incised deep to adipose tissue with a #15 scalpel blade.  The skin margins were undermined to an appropriate distance in all directions around the primary defect and laterally outward around the flap utilizing iris scissors.

## 2022-04-23 NOTE — PROGRESS NOTE ADULT - SUBJECTIVE AND OBJECTIVE BOX
SUBJECTIVE: Patient seen at bedside in no acute distress, complaining of some RLE calf pain. No CP,SOB, fevers or chills    Vital Signs Last 24 Hrs  T(C): 37.2 (23 Apr 2022 16:15), Max: 37.4 (22 Apr 2022 20:34)  T(F): 98.9 (23 Apr 2022 16:15), Max: 99.3 (22 Apr 2022 20:34)  HR: 65 (23 Apr 2022 16:15) (65 - 88)  BP: 118/65 (23 Apr 2022 16:15) (108/61 - 145/73)  BP(mean): --  RR: 17 (23 Apr 2022 16:15) (15 - 18)  SpO2: 99% (23 Apr 2022 16:15) (95% - 100%)    Physical Exam:  GENERAL: NAD,  HEENT: NCAT, MMM, Normal conjunctiva, PERRL  RESP: Nonlabored breathing, No respiratory distress  CARD: Normal rate, Normal peripheral perfusion  GI: Soft, ND, NT, No guarding, No rebound tenderness;   EXTREM: WWP, Bilateral lower extremity edema, R > L, Right calf tenderness, negative Homens sign; Right heel nonhealing ulcer without drainage or discharge   NEURO: AAOx3, No focal motor or sensory deficits  Pulses: RLE: triphasic femoral, pop, mono DP and PT; LLE: triphasic femoral, pop, tri/bi PT/DP     Lines/drains/tubes:    I&O's Summary    22 Apr 2022 07:01  -  23 Apr 2022 07:00  --------------------------------------------------------  IN: 400 mL / OUT: 2900 mL / NET: -2500 mL    23 Apr 2022 07:01  -  23 Apr 2022 17:25  --------------------------------------------------------  IN: 400 mL / OUT: 1400 mL / NET: -1000 mL        LABS:                        10.1   6.30  )-----------( 155      ( 23 Apr 2022 08:25 )             34.1     04-23    137  |  99  |  31<H>  ----------------------------<  146<H>  4.7   |  26  |  4.88<H>    Ca    8.7      23 Apr 2022 08:25  Phos  5.4     04-23  Mg     1.9     04-23    TPro  7.3  /  Alb  3.6  /  TBili  0.3  /  DBili  x   /  AST  11  /  ALT  <5<L>  /  AlkPhos  74  04-23        CAPILLARY BLOOD GLUCOSE      POCT Blood Glucose.: 104 mg/dL (23 Apr 2022 14:51)  POCT Blood Glucose.: 130 mg/dL (23 Apr 2022 08:50)  POCT Blood Glucose.: 88 mg/dL (22 Apr 2022 22:10)  POCT Blood Glucose.: 119 mg/dL (22 Apr 2022 18:12)    LIVER FUNCTIONS - ( 23 Apr 2022 08:25 )  Alb: 3.6 g/dL / Pro: 7.3 g/dL / ALK PHOS: 74 U/L / ALT: <5 U/L / AST: 11 U/L / GGT: x             RADIOLOGY & ADDITIONAL STUDIES:

## 2022-04-23 NOTE — PROGRESS NOTE ADULT - PROBLEM SELECTOR PLAN 3
Multiple recurrent DVTs in LEs. Home med: eliquis 2.5mg BID. LE doppler US: Since 12/20/2021, unchanged nonocclusive thrombus within the right common femoral vein. No new DVT.  - C/w home eliquis 2.5 BID

## 2022-04-23 NOTE — PROGRESS NOTE ADULT - ASSESSMENT
61F current smoker w/ PMHx ESRD (HD T/Th/Sa), HFrEF (EF 25% s/p AICD), IDDM2, COPD (3L O2 at home PRN), multiple myeloma (on Ninlaro), hypothyroidism, depression, neuropathy (on methadone), recurrent DVTs (on eliquis 2.5mg qd), CVA x2 (w/ residual R-sided weakness), PAD s/p LLE bypass, and DHARMESH on CPAP, who was sent in by her dialysis doctor for concern for worsening chronic RLE DVT and concern for infected chronic right heal ulcer. Multiple medical comorbidities contributing to difficulty healing; most notably ESRD, PVD and lack of offloading. Of note, R foot XR shows extensive arterial calcification, increased calcaneal sclerosis, heel radiolucency corresponding to soft tissue defect; No need for addn imaging at this time. Low suspicion OM - wound does not probe to bone.    Podiatry consulted 4/22/2022 to evaluate right heel ulcer for cellulitis and/or osteomyelitis.     Plan:   - C/w Cefazolin IV for now as ESR was elevated, recc trending CRP  - Local wound care: Santyl + DSD, Kerlix wrap to heel  - Ammonium lactate to be applied to b/l LE  - Recc Z-float offloading boot to RLE  - rest of care per primary team    Podiatry following. Plan d/w attending.

## 2022-04-23 NOTE — PROGRESS NOTE ADULT - PROBLEM SELECTOR PLAN 5
EF 25% in 12/21, s/p AICD. 2+ LE edema, no pulmonary edema or JVD. Not in exacerbation.   - Home meds: Entresto  BID, Toprol 100, Torsemide 40 BID   - C/w home Toprol, entresto, and torsemide  - Monitor volume status with dialysis

## 2022-04-23 NOTE — PROGRESS NOTE ADULT - PROBLEM SELECTOR PLAN 12
F: none  E: Replete for K<4, Mag <2  N: Renal diet  A: Eliquis 2.5 BID  Renally dose medications  Code status: Full  Dispo: RMF

## 2022-04-23 NOTE — PROGRESS NOTE ADULT - SUBJECTIVE AND OBJECTIVE BOX
Patient is a 61y old  Female who presents with a chief complaint of LE pain (2022 09:09)      INTERVAL HPI/OVERNIGHT EVENTS:    Pt. seen and examined earlier today  Pt. reports her R foot pain is better controlled  No new complaints  Pt. denies F/C, CP, SOB    Review of Systems: 12 point review of systems otherwise negative    MEDICATIONS  (STANDING):  ammonium lactate 12% Lotion 1 Application(s) Topical <User Schedule>  apixaban 2.5 milliGRAM(s) Oral every 12 hours  atorvastatin 80 milliGRAM(s) Oral at bedtime  budesonide 160 MICROgram(s)/formoterol 4.5 MICROgram(s) Inhaler 2 Puff(s) Inhalation two times a day  ceFAZolin   IVPB 1000 milliGRAM(s) IV Intermittent every 8 hours  collagenase Ointment 1 Application(s) Topical daily  dextrose 5%. 1000 milliLiter(s) (50 mL/Hr) IV Continuous <Continuous>  dextrose 5%. 1000 milliLiter(s) (100 mL/Hr) IV Continuous <Continuous>  dextrose 50% Injectable 25 Gram(s) IV Push once  dextrose 50% Injectable 12.5 Gram(s) IV Push once  dextrose 50% Injectable 25 Gram(s) IV Push once  fenofibrate Tablet 48 milliGRAM(s) Oral daily  gabapentin 100 milliGRAM(s) Oral every 12 hours  glucagon  Injectable 1 milliGRAM(s) IntraMuscular once  insulin glargine Injectable (LANTUS) 10 Unit(s) SubCutaneous at bedtime  insulin lispro (ADMELOG) corrective regimen sliding scale   SubCutaneous Before meals and at bedtime  insulin lispro Injectable (ADMELOG) 5 Unit(s) SubCutaneous three times a day before meals  melatonin 3 milliGRAM(s) Oral at bedtime  methadone    Tablet 10 milliGRAM(s) Oral three times a day  methadone    Tablet 10 milliGRAM(s) Oral daily  metoprolol succinate  milliGRAM(s) Oral daily  pantoprazole    Tablet 40 milliGRAM(s) Oral before breakfast  PARoxetine 10 milliGRAM(s) Oral at bedtime  sacubitril 97 mG/valsartan 103 mG 1 Tablet(s) Oral two times a day  traZODone 50 milliGRAM(s) Oral at bedtime    MEDICATIONS  (PRN):  acetaminophen     Tablet .. 650 milliGRAM(s) Oral every 6 hours PRN Temp greater or equal to 38C (100.4F), Mild Pain (1 - 3)  dextrose Oral Gel 15 Gram(s) Oral once PRN Blood Glucose LESS THAN 70 milliGRAM(s)/deciliter      Allergies    aspirin (Other (Moderate); Rash)  oral contrast (Unknown)    Intolerances          Vital Signs Last 24 Hrs  T(C): 36.3 (2022 10:20), Max: 37.4 (2022 20:34)  T(F): 97.3 (2022 10:20), Max: 99.3 (2022 20:34)  HR: 84 (2022 10:20) (63 - 88)  BP: 126/64 (2022 10:20) (107/63 - 138/83)  BP(mean): --  RR: 18 (2022 10:20) (15 - 18)  SpO2: 99% (2022 10:20) (95% - 100%)  CAPILLARY BLOOD GLUCOSE      POCT Blood Glucose.: 130 mg/dL (2022 08:50)  POCT Blood Glucose.: 88 mg/dL (2022 22:10)  POCT Blood Glucose.: 119 mg/dL (2022 18:12)  POCT Blood Glucose.: 106 mg/dL (2022 16:59)  POCT Blood Glucose.: 81 mg/dL (2022 13:55)       @ 07:01  -   @ 07:00  --------------------------------------------------------  IN: 400 mL / OUT: 2900 mL / NET: -2500 mL        Physical Exam:  (earlier today)  Daily     Daily Weight in k.2 (2022 10:20)  General:  non-toxic and comfortable-appearing in NAD  HEENT:  MMM, R chest HD cath  CV:  RRR, no JVD  Lungs:  CTA B/L, normal WOB on RA  Abdomen:  soft NT ND  Extremities:  R foot Kerlix dressing C/D/I; maturing RUE AVF  Skin:  WWP  Neuro:  AAOx3  LABS:                        10.1   6.30  )-----------( 155      ( 2022 08:25 )             34.1         137  |  99  |  31<H>  ----------------------------<  146<H>  4.7   |  26  |  4.88<H>    Ca    8.7      2022 08:25  Phos  5.4       Mg     1.9         TPro  7.3  /  Alb  3.6  /  TBili  0.3  /  DBili  x   /  AST  11  /  ALT  <5<L>  /  AlkPhos  74  23    PT/INR - ( 2022 14:58 )   PT: 12.8 sec;   INR: 1.07          PTT - ( 2022 14:58 )  PTT:38.9 sec

## 2022-04-23 NOTE — PROGRESS NOTE ADULT - PROBLEM SELECTOR PLAN 6
C/b heel ulcer. Home meds: Gabapentin 100 bid and methadone 10mg TID.   - Follows with outpatient pain management Dr. Oliveira  - c/w gabapentin 100 bid   - methadone adjusted to 10mg QD with additional PRN as needed      # DM2  Home meds: Levemir 25U and novolog 10U premeal  - A1c: 6.0%  - Restart Lantus 10 and lispro 5 TID for now, uptitrate as needed  - mISS

## 2022-04-23 NOTE — PROGRESS NOTE ADULT - PROBLEM SELECTOR PLAN 4
On HD Tues/Thurs/Sat, last HD 4/19. Right chest wall tunneled cath.  - Renal following  - f/u on potential fistula placement  - HD session today 4/22  - C/w home sevelamer 800

## 2022-04-23 NOTE — PROGRESS NOTE ADULT - PROBLEM SELECTOR PLAN 9
Home meds: paroxetine 10mg, trazodone 50 at bedtime PRN for insomnia, buspirone 15  - C/w home antidepressants and anti-anxiety meds

## 2022-04-23 NOTE — PROGRESS NOTE ADULT - SUBJECTIVE AND OBJECTIVE BOX
Patient is a 61y old  Female who presents with a chief complaint of LE pain (23 Apr 2022 09:09)      INTERVAL HPI/ OVERNIGHT EVENTS: afebrile o/n. WBC wnl. No new pedal complaints at this time.      LABS                        10.1   6.30  )-----------( 155      ( 23 Apr 2022 08:25 )             34.1     04-23    137  |  99  |  31<H>  ----------------------------<  146<H>  4.7   |  26  |  4.88<H>    Ca    8.7      23 Apr 2022 08:25  Phos  5.4     04-23  Mg     1.9     04-23    TPro  7.3  /  Alb  3.6  /  TBili  0.3  /  DBili  x   /  AST  11  /  ALT  <5<L>  /  AlkPhos  74  04-23    PT/INR - ( 21 Apr 2022 14:58 )   PT: 12.8 sec;   INR: 1.07          PTT - ( 21 Apr 2022 14:58 )  PTT:38.9 sec    ICU Vital Signs Last 24 Hrs  T(C): 36.2 (23 Apr 2022 13:20), Max: 37.4 (22 Apr 2022 20:34)  T(F): 97.2 (23 Apr 2022 13:20), Max: 99.3 (22 Apr 2022 20:34)  HR: 72 (23 Apr 2022 13:20) (72 - 88)  BP: 145/73 (23 Apr 2022 13:20) (108/61 - 145/73)  BP(mean): --  ABP: --  ABP(mean): --  RR: 17 (23 Apr 2022 13:20) (15 - 18)  SpO2: 99% (23 Apr 2022 13:20) (95% - 100%)      RADIOLOGY    MICROBIOLOGY    PHYSICAL EXAM  Lower Extremity Focused:  Vasc: Right DP/PT monophasic, Left DP/PT biphasic. CFT <2s x 10. TG warm to cool b/l. Non-pitting edema to both LE. No erythema.  Derm:   Right Foot - Aguirre Grade 1 wound to distal tip of hallux. Aguirre Grade 2 wound to plantar-lateral heel, neg malodor, neg drainage/purulence/PTB; measures 4cm x 3cm x 0.2mm.   Left Foot- no open wounds/abrasions/lacerations. Hyperkeratotic skin to plantar medial heel.   Neuro: Protective sensation somewhat diminished b/l  MSK: 4/5 msk strength b/l in all crural compartments; overall generalized body weakness

## 2022-04-23 NOTE — PROGRESS NOTE ADULT - PROBLEM SELECTOR PLAN 2
Concern for infection of chronic wound on right heel. No purulence, pus, or discharge. Some erythema of the foot and ankle, could be related to known chronic DVT.  - S/p Vanc and Zosyn in the ED  - Started Ancef 4/22 1g IV q8   - Podiatry consulted  - PT consult   - Pain regimen: PRN dosed for ESRD

## 2022-04-23 NOTE — PROGRESS NOTE ADULT - PROBLEM SELECTOR PLAN 1
Acute worsening of swelling in the R calf and ankle starting 1 week ago. Pain in the R LE started 2 days prior to admission. Pt has a known non-occlusive R common femoral vein DVT, unchanged since 12/21 per doppler on admission. Unable to palpate distal pulses in b/l LE on exam. History of PAD w/LLE bypass and IDDM2. Likely PAD vs. DVT vs. cellulitis.  - Doppler R LE 4/21: no new DVT, no change in R common femoral vein non-occlusive thrombus since 12/21  - vascular consulted Acute worsening of swelling in the R calf and ankle starting 1 week ago. Pain in the R LE started 2 days prior to admission. Pt has a known non-occlusive R common femoral vein DVT, unchanged since 12/21 per doppler on admission. Unable to palpate distal pulses in b/l LE on exam. History of PAD w/LLE bypass and IDDM2. Likely PAD vs. DVT vs. cellulitis.  - Doppler R LE 4/21: no new DVT, no change in R common femoral vein non-occlusive thrombus since 12/21  - vascular consulted: recommend CT with runoff as well as b/l carotid doppler

## 2022-04-24 LAB
ALBUMIN SERPL ELPH-MCNC: 3.7 G/DL — SIGNIFICANT CHANGE UP (ref 3.3–5)
ALP SERPL-CCNC: 77 U/L — SIGNIFICANT CHANGE UP (ref 40–120)
ALT FLD-CCNC: <5 U/L — LOW (ref 10–45)
ANION GAP SERPL CALC-SCNC: 10 MMOL/L — SIGNIFICANT CHANGE UP (ref 5–17)
AST SERPL-CCNC: 12 U/L — SIGNIFICANT CHANGE UP (ref 10–40)
BASOPHILS # BLD AUTO: 0.05 K/UL — SIGNIFICANT CHANGE UP (ref 0–0.2)
BASOPHILS NFR BLD AUTO: 0.7 % — SIGNIFICANT CHANGE UP (ref 0–2)
BILIRUB SERPL-MCNC: 0.3 MG/DL — SIGNIFICANT CHANGE UP (ref 0.2–1.2)
BUN SERPL-MCNC: 22 MG/DL — SIGNIFICANT CHANGE UP (ref 7–23)
CALCIUM SERPL-MCNC: 8.9 MG/DL — SIGNIFICANT CHANGE UP (ref 8.4–10.5)
CHLORIDE SERPL-SCNC: 100 MMOL/L — SIGNIFICANT CHANGE UP (ref 96–108)
CO2 SERPL-SCNC: 29 MMOL/L — SIGNIFICANT CHANGE UP (ref 22–31)
CREAT SERPL-MCNC: 3.93 MG/DL — HIGH (ref 0.5–1.3)
EGFR: 13 ML/MIN/1.73M2 — LOW
EOSINOPHIL # BLD AUTO: 0.15 K/UL — SIGNIFICANT CHANGE UP (ref 0–0.5)
EOSINOPHIL NFR BLD AUTO: 2.2 % — SIGNIFICANT CHANGE UP (ref 0–6)
GLUCOSE BLDC GLUCOMTR-MCNC: 103 MG/DL — HIGH (ref 70–99)
GLUCOSE BLDC GLUCOMTR-MCNC: 107 MG/DL — HIGH (ref 70–99)
GLUCOSE BLDC GLUCOMTR-MCNC: 153 MG/DL — HIGH (ref 70–99)
GLUCOSE BLDC GLUCOMTR-MCNC: 172 MG/DL — HIGH (ref 70–99)
GLUCOSE BLDC GLUCOMTR-MCNC: 241 MG/DL — HIGH (ref 70–99)
GLUCOSE BLDC GLUCOMTR-MCNC: 60 MG/DL — LOW (ref 70–99)
GLUCOSE BLDC GLUCOMTR-MCNC: 73 MG/DL — SIGNIFICANT CHANGE UP (ref 70–99)
GLUCOSE SERPL-MCNC: 94 MG/DL — SIGNIFICANT CHANGE UP (ref 70–99)
HCT VFR BLD CALC: 34.6 % — SIGNIFICANT CHANGE UP (ref 34.5–45)
HGB BLD-MCNC: 10.4 G/DL — LOW (ref 11.5–15.5)
IMM GRANULOCYTES NFR BLD AUTO: 0.1 % — SIGNIFICANT CHANGE UP (ref 0–1.5)
LYMPHOCYTES # BLD AUTO: 0.96 K/UL — LOW (ref 1–3.3)
LYMPHOCYTES # BLD AUTO: 14.2 % — SIGNIFICANT CHANGE UP (ref 13–44)
MAGNESIUM SERPL-MCNC: 1.9 MG/DL — SIGNIFICANT CHANGE UP (ref 1.6–2.6)
MCHC RBC-ENTMCNC: 29.9 PG — SIGNIFICANT CHANGE UP (ref 27–34)
MCHC RBC-ENTMCNC: 30.1 GM/DL — LOW (ref 32–36)
MCV RBC AUTO: 99.4 FL — SIGNIFICANT CHANGE UP (ref 80–100)
MONOCYTES # BLD AUTO: 0.96 K/UL — HIGH (ref 0–0.9)
MONOCYTES NFR BLD AUTO: 14.2 % — HIGH (ref 2–14)
NEUTROPHILS # BLD AUTO: 4.63 K/UL — SIGNIFICANT CHANGE UP (ref 1.8–7.4)
NEUTROPHILS NFR BLD AUTO: 68.6 % — SIGNIFICANT CHANGE UP (ref 43–77)
NRBC # BLD: 0 /100 WBCS — SIGNIFICANT CHANGE UP (ref 0–0)
PHOSPHATE SERPL-MCNC: 5.1 MG/DL — HIGH (ref 2.5–4.5)
PLATELET # BLD AUTO: 164 K/UL — SIGNIFICANT CHANGE UP (ref 150–400)
POTASSIUM SERPL-MCNC: 4.5 MMOL/L — SIGNIFICANT CHANGE UP (ref 3.5–5.3)
POTASSIUM SERPL-SCNC: 4.5 MMOL/L — SIGNIFICANT CHANGE UP (ref 3.5–5.3)
PROT SERPL-MCNC: 7.1 G/DL — SIGNIFICANT CHANGE UP (ref 6–8.3)
RBC # BLD: 3.48 M/UL — LOW (ref 3.8–5.2)
RBC # FLD: 15.1 % — HIGH (ref 10.3–14.5)
SODIUM SERPL-SCNC: 139 MMOL/L — SIGNIFICANT CHANGE UP (ref 135–145)
WBC # BLD: 6.76 K/UL — SIGNIFICANT CHANGE UP (ref 3.8–10.5)
WBC # FLD AUTO: 6.76 K/UL — SIGNIFICANT CHANGE UP (ref 3.8–10.5)

## 2022-04-24 PROCEDURE — 93308 TTE F-UP OR LMTD: CPT | Mod: 26

## 2022-04-24 PROCEDURE — 99233 SBSQ HOSP IP/OBS HIGH 50: CPT | Mod: GC

## 2022-04-24 PROCEDURE — 93321 DOPPLER ECHO F-UP/LMTD STD: CPT | Mod: 26

## 2022-04-24 PROCEDURE — 93880 EXTRACRANIAL BILAT STUDY: CPT | Mod: 26

## 2022-04-24 PROCEDURE — 75635 CT ANGIO ABDOMINAL ARTERIES: CPT | Mod: 26

## 2022-04-24 RX ORDER — HYDROMORPHONE HYDROCHLORIDE 2 MG/ML
0.5 INJECTION INTRAMUSCULAR; INTRAVENOUS; SUBCUTANEOUS ONCE
Refills: 0 | Status: DISCONTINUED | OUTPATIENT
Start: 2022-04-24 | End: 2022-04-24

## 2022-04-24 RX ORDER — OXYCODONE AND ACETAMINOPHEN 5; 325 MG/1; MG/1
1 TABLET ORAL ONCE
Refills: 0 | Status: DISCONTINUED | OUTPATIENT
Start: 2022-04-24 | End: 2022-04-24

## 2022-04-24 RX ORDER — DEXTROSE 10 % IN WATER 10 %
250 INTRAVENOUS SOLUTION INTRAVENOUS ONCE
Refills: 0 | Status: DISCONTINUED | OUTPATIENT
Start: 2022-04-24 | End: 2022-04-24

## 2022-04-24 RX ORDER — DEXTROSE 10 % IN WATER 10 %
250 INTRAVENOUS SOLUTION INTRAVENOUS ONCE
Refills: 0 | Status: COMPLETED | OUTPATIENT
Start: 2022-04-24 | End: 2022-04-24

## 2022-04-24 RX ORDER — DEXTROSE 50 % IN WATER 50 %
50 SYRINGE (ML) INTRAVENOUS ONCE
Refills: 0 | Status: DISCONTINUED | OUTPATIENT
Start: 2022-04-24 | End: 2022-04-24

## 2022-04-24 RX ORDER — SOD,AMMONIUM,POTASSIUM LACTATE
1 CREAM (GRAM) TOPICAL
Refills: 0 | Status: DISCONTINUED | OUTPATIENT
Start: 2022-04-24 | End: 2022-04-28

## 2022-04-24 RX ADMIN — Medication 1 APPLICATION(S): at 12:07

## 2022-04-24 RX ADMIN — Medication 200 MILLIGRAM(S): at 10:41

## 2022-04-24 RX ADMIN — BUDESONIDE AND FORMOTEROL FUMARATE DIHYDRATE 2 PUFF(S): 160; 4.5 AEROSOL RESPIRATORY (INHALATION) at 06:46

## 2022-04-24 RX ADMIN — Medication 40 MILLIGRAM(S): at 18:01

## 2022-04-24 RX ADMIN — Medication 100 MILLIGRAM(S): at 14:16

## 2022-04-24 RX ADMIN — HYDROMORPHONE HYDROCHLORIDE 0.5 MILLIGRAM(S): 2 INJECTION INTRAMUSCULAR; INTRAVENOUS; SUBCUTANEOUS at 16:28

## 2022-04-24 RX ADMIN — Medication 750 MILLILITER(S): at 19:16

## 2022-04-24 RX ADMIN — Medication 100 MILLIGRAM(S): at 22:18

## 2022-04-24 RX ADMIN — Medication 2: at 12:37

## 2022-04-24 RX ADMIN — ATORVASTATIN CALCIUM 80 MILLIGRAM(S): 80 TABLET, FILM COATED ORAL at 22:20

## 2022-04-24 RX ADMIN — OXYCODONE AND ACETAMINOPHEN 1 TABLET(S): 5; 325 TABLET ORAL at 14:39

## 2022-04-24 RX ADMIN — APIXABAN 2.5 MILLIGRAM(S): 2.5 TABLET, FILM COATED ORAL at 18:01

## 2022-04-24 RX ADMIN — METHADONE HYDROCHLORIDE 10 MILLIGRAM(S): 40 TABLET ORAL at 22:19

## 2022-04-24 RX ADMIN — HYDROMORPHONE HYDROCHLORIDE 0.5 MILLIGRAM(S): 2 INJECTION INTRAMUSCULAR; INTRAVENOUS; SUBCUTANEOUS at 16:43

## 2022-04-24 RX ADMIN — METHADONE HYDROCHLORIDE 10 MILLIGRAM(S): 40 TABLET ORAL at 14:16

## 2022-04-24 RX ADMIN — GABAPENTIN 100 MILLIGRAM(S): 400 CAPSULE ORAL at 06:45

## 2022-04-24 RX ADMIN — PANTOPRAZOLE SODIUM 40 MILLIGRAM(S): 20 TABLET, DELAYED RELEASE ORAL at 06:44

## 2022-04-24 RX ADMIN — GABAPENTIN 100 MILLIGRAM(S): 400 CAPSULE ORAL at 18:01

## 2022-04-24 RX ADMIN — Medication 100 MILLIGRAM(S): at 06:45

## 2022-04-24 RX ADMIN — Medication 50 MILLIGRAM(S): at 22:19

## 2022-04-24 RX ADMIN — SACUBITRIL AND VALSARTAN 1 TABLET(S): 24; 26 TABLET, FILM COATED ORAL at 06:45

## 2022-04-24 RX ADMIN — Medication 5 UNIT(S): at 12:36

## 2022-04-24 RX ADMIN — SACUBITRIL AND VALSARTAN 1 TABLET(S): 24; 26 TABLET, FILM COATED ORAL at 18:01

## 2022-04-24 RX ADMIN — OXYCODONE AND ACETAMINOPHEN 1 TABLET(S): 5; 325 TABLET ORAL at 15:39

## 2022-04-24 RX ADMIN — APIXABAN 2.5 MILLIGRAM(S): 2.5 TABLET, FILM COATED ORAL at 06:45

## 2022-04-24 RX ADMIN — INSULIN GLARGINE 10 UNIT(S): 100 INJECTION, SOLUTION SUBCUTANEOUS at 22:21

## 2022-04-24 RX ADMIN — Medication 10 MILLIGRAM(S): at 22:19

## 2022-04-24 RX ADMIN — METHADONE HYDROCHLORIDE 10 MILLIGRAM(S): 40 TABLET ORAL at 06:45

## 2022-04-24 RX ADMIN — Medication 200 MILLIGRAM(S): at 06:45

## 2022-04-24 RX ADMIN — BUDESONIDE AND FORMOTEROL FUMARATE DIHYDRATE 2 PUFF(S): 160; 4.5 AEROSOL RESPIRATORY (INHALATION) at 18:01

## 2022-04-24 RX ADMIN — Medication 3 MILLIGRAM(S): at 22:19

## 2022-04-24 RX ADMIN — Medication 48 MILLIGRAM(S): at 12:06

## 2022-04-24 NOTE — PROGRESS NOTE ADULT - ASSESSMENT
61F current smoker w/ PMHx ESRD (HD T/Th/Sa), HFrEF (EF 25% s/p AICD), IDDM2, COPD (3L O2 at home PRN), multiple myeloma (on Ninlaro), hypothyroidism, depression, neuropathy (on methadone), recurrent DVTs (on eliquis 2.5mg qd), CVA x2 (w/ residual R-sided weakness), PAD s/p LLE bypass, and DHARMESH on CPAP, who was sent in by her dialysis doctor for concern for worsening chronic RLE DVT and concern for infected chronic right heal ulcer. Multiple medical comorbidities contributing to difficulty healing; most notably ESRD, PVD and lack of offloading. Of note, R foot XR shows extensive arterial calcification, increased calcaneal sclerosis, heel radiolucency corresponding to soft tissue defect; No need for addn imaging at this time. Low suspicion OM - wound does not probe to bone.    Podiatry consulted 4/22/2022 to evaluate right heel ulcer for cellulitis and/or osteomyelitis.     Plan:   - C/w Cefazolin IV for now as ESR was elevated, recc trending CRP  - Local wound care: Santyl + DSD, Kerlix wrap to heel  - Ammonium lactate to be applied to b/l LE  - Recc Z-float offloading boot to RLE  - rest of care per primary team    Podiatry following. Plan d/w attending.    61F current smoker w/ PMHx ESRD (HD T/Th/Sa), HFrEF (EF 25% s/p AICD), IDDM2, COPD (3L O2 at home PRN), multiple myeloma (on Ninlaro), hypothyroidism, depression, neuropathy (on methadone), recurrent DVTs (on eliquis 2.5mg qd), CVA x2 (w/ residual R-sided weakness), PAD s/p LLE bypass, and DHARMESH on CPAP, who was sent in by her dialysis doctor for concern for worsening chronic RLE DVT and concern for infected chronic right heal ulcer. Multiple medical comorbidities contributing to difficulty healing; most notably ESRD, PVD and lack of offloading. Of note, R foot XR shows extensive arterial calcification, increased calcaneal sclerosis, heel radiolucency corresponding to soft tissue defect; No need for addn imaging at this time. Low suspicion OM - wound does not probe to bone.    Podiatry consulted 4/22/2022 to evaluate right heel ulcer for cellulitis and/or osteomyelitis.     Plan:   - C/w Cefazolin IV for now as ESR was elevated, recc trending CRP  - Local wound care: Santyl + DSD, Kerlix wrap to heel  - Ammonium lactate to be applied to b/l LE  - Recc Z-float offloading boot to RLE  - Vasc reccs (pt is pending carotid duplex and CT abd/pelvis with IV contrast and runoff to distal toes)  - rest of care per primary team    Podiatry following. Plan d/w attending.

## 2022-04-24 NOTE — PROGRESS NOTE ADULT - ASSESSMENT
61F current smoker w/ PMHx ESRD (HD T/Th/Sa), HFrEF (EF 25% s/p AICD), IDDM2, COPD (3L O2 at home PRN), multiple myeloma (on Ninlaro), hypothyroidism, depression, neuropathy (on methadone), recurrent DVTs (on eliquis 2.5mg qd), CVA x2 (w/ residual R-sided weakness), PAD s/p LLE bypass, and DHARMESH on CPAP, who was sent in by her dialysis doctor for concern for worsening chronic RLE DVT and concern for infected chronic right heal ulcer. US demonstrated unchanged nonocclusive thrombus within the right common femoral vein. vascular was consulted to evaluate RLE calf pain and nonpalpable pulses.     plan:   no acute vascular intervention   f/u CT abd/pelvis with IV contrast and runoff to distal toes read  f/u b/l Carotid duplex  final read  podiatry to manage Right heel wound   rest of care per primary   vascular will continue to follow     INCOMPLETE*** 61F current smoker w/ PMHx ESRD (HD T/Th/Sa), HFrEF (EF 25% s/p AICD), IDDM2, COPD (3L O2 at home PRN), multiple myeloma (on Ninlaro), hypothyroidism, depression, neuropathy (on methadone), recurrent DVTs (on eliquis 2.5mg qd), CVA x2 (w/ residual R-sided weakness), PAD s/p LLE bypass, and DHARMESH on CPAP, who was sent in by her dialysis doctor for concern for worsening chronic RLE DVT and concern for infected chronic right heal ulcer. US demonstrated unchanged nonocclusive thrombus within the right common femoral vein. vascular was consulted to evaluate RLE calf pain and nonpalpable pulses.     plan:   -CTA preliminary read reviewed, no acute vascular intervention at this time, will follow up final read of CTA  -Recommend urgent cardiology consult for possible evidence of right atrial thrombus on CT scan-discussed with primary team by phone  -Bilateral carotid duplex demonstrates no evidence of significant stenosis   -podiatry to manage Right heel wound   -rest of care per primary   -vascular surgery to continue to follow   -Discussed with Dr. Medeiros

## 2022-04-24 NOTE — PROGRESS NOTE ADULT - SUBJECTIVE AND OBJECTIVE BOX
Patient is a 61y old  Female who presents with a chief complaint of LE pain (23 Apr 2022 09:09)      INTERVAL HPI/ OVERNIGHT EVENTS: Pt evaluated at bedside. Pt notes improved pain to RLE heel wound. Pt was afebrile o/n.       LABS                        10.4   6.76  )-----------( 164      ( 24 Apr 2022 08:01 )             34.6     04-24    139  |  100  |  22  ----------------------------<  94  4.5   |  29  |  3.93<H>    Ca    8.9      24 Apr 2022 08:01  Phos  5.1     04-24  Mg     1.9     04-24    TPro  7.1  /  Alb  3.7  /  TBili  0.3  /  DBili  x   /  AST  12  /  ALT  <5<L>  /  AlkPhos  77  04-24        ICU Vital Signs Last 24 Hrs  T(C): 36.9 (24 Apr 2022 09:21), Max: 37.2 (23 Apr 2022 16:15)  T(F): 98.4 (24 Apr 2022 09:21), Max: 98.9 (23 Apr 2022 16:15)  HR: 78 (24 Apr 2022 09:21) (65 - 82)  BP: 127/76 (24 Apr 2022 09:21) (111/63 - 145/73)  BP(mean): --  ABP: --  ABP(mean): --  RR: 18 (24 Apr 2022 09:21) (13 - 18)  SpO2: 100% (24 Apr 2022 09:21) (96% - 100%)      RADIOLOGY    MICROBIOLOGY    PHYSICAL EXAM  Lower Extremity Focused:  Vasc: Right DP/PT monophasic, Left DP/PT biphasic. CFT <2s x 10. TG warm to cool b/l. Non-pitting edema to both LE. No erythema.  Derm:   Right Foot - Aguirre Grade 1 wound to distal tip of hallux. Aguirre Grade 2 wound to plantar-lateral heel, neg malodor, neg drainage/purulence/PTB; measures 4cm x 3cm x 0.2mm.   Left Foot- no open wounds/abrasions/lacerations. Hyperkeratotic skin to plantar medial heel.   Neuro: Protective sensation somewhat diminished b/l  MSK: 4/5 msk strength b/l in all crural compartments; overall generalized body weakness

## 2022-04-24 NOTE — PROGRESS NOTE ADULT - SUBJECTIVE AND OBJECTIVE BOX
STATUS POST:      POST OPERATIVE DAY #:     SUBJECTIVE: Pt seen and examined at bedside this am by surgery team. Tolerating diet, pain well controlled. Denies f/n/v/cp/sob.    MEDICATIONS  (STANDING):  ammonium lactate 12% Lotion 1 Application(s) Topical <User Schedule>  apixaban 2.5 milliGRAM(s) Oral every 12 hours  atorvastatin 80 milliGRAM(s) Oral at bedtime  budesonide 160 MICROgram(s)/formoterol 4.5 MICROgram(s) Inhaler 2 Puff(s) Inhalation two times a day  ceFAZolin   IVPB 1000 milliGRAM(s) IV Intermittent every 8 hours  collagenase Ointment 1 Application(s) Topical daily  dextrose 5%. 1000 milliLiter(s) (50 mL/Hr) IV Continuous <Continuous>  dextrose 5%. 1000 milliLiter(s) (100 mL/Hr) IV Continuous <Continuous>  dextrose 50% Injectable 25 Gram(s) IV Push once  dextrose 50% Injectable 12.5 Gram(s) IV Push once  dextrose 50% Injectable 25 Gram(s) IV Push once  fenofibrate Tablet 48 milliGRAM(s) Oral daily  gabapentin 100 milliGRAM(s) Oral every 12 hours  glucagon  Injectable 1 milliGRAM(s) IntraMuscular once  insulin glargine Injectable (LANTUS) 10 Unit(s) SubCutaneous at bedtime  insulin lispro (ADMELOG) corrective regimen sliding scale   SubCutaneous Before meals and at bedtime  insulin lispro Injectable (ADMELOG) 5 Unit(s) SubCutaneous three times a day before meals  melatonin 3 milliGRAM(s) Oral at bedtime  methadone    Tablet 10 milliGRAM(s) Oral three times a day  metoprolol succinate  milliGRAM(s) Oral daily  pantoprazole    Tablet 40 milliGRAM(s) Oral before breakfast  PARoxetine 10 milliGRAM(s) Oral at bedtime  sacubitril 97 mG/valsartan 103 mG 1 Tablet(s) Oral two times a day  traZODone 50 milliGRAM(s) Oral at bedtime    MEDICATIONS  (PRN):  acetaminophen     Tablet .. 650 milliGRAM(s) Oral every 6 hours PRN Temp greater or equal to 38C (100.4F), Mild Pain (1 - 3)  dextrose Oral Gel 15 Gram(s) Oral once PRN Blood Glucose LESS THAN 70 milliGRAM(s)/deciliter  guaiFENesin Oral Liquid (Sugar-Free) 200 milliGRAM(s) Oral every 6 hours PRN Cough      Vital Signs Last 24 Hrs  T(C): 36.9 (24 Apr 2022 09:21), Max: 37.2 (23 Apr 2022 16:15)  T(F): 98.4 (24 Apr 2022 09:21), Max: 98.9 (23 Apr 2022 16:15)  HR: 74 (24 Apr 2022 12:30) (65 - 82)  BP: 127/76 (24 Apr 2022 09:21) (111/63 - 127/76)  BP(mean): --  RR: 16 (24 Apr 2022 12:30) (13 - 18)  SpO2: 99% (24 Apr 2022 12:30) (96% - 100%)    Physical Exam  General: NAD, resting comfortably in bed  Pulmonary: Nonlabored breathing, no respiratory distress  CV: NSR  Abd: soft, NT/ND  Extremities: WWP, Bilateral lower extremity edema, R > L, Right calf tenderness, negative Homens sign; Right heel nonhealing ulcer without drainage or discharge   NEURO: AAOx3, No focal motor or sensory deficits  Pulses: RLE: triphasic femoral, pop, mono DP and PT; LLE: triphasic femoral, pop, tri/bi PT/DP       I&O's Detail    23 Apr 2022 07:01  -  24 Apr 2022 07:00  --------------------------------------------------------  IN:    Other (mL): 400 mL  Total IN: 400 mL    OUT:    Other (mL): 1400 mL  Total OUT: 1400 mL    Total NET: -1000 mL          LABS:                        10.4   6.76  )-----------( 164      ( 24 Apr 2022 08:01 )             34.6     04-24    139  |  100  |  22  ----------------------------<  94  4.5   |  29  |  3.93<H>    Ca    8.9      24 Apr 2022 08:01  Phos  5.1     04-24  Mg     1.9     04-24    TPro  7.1  /  Alb  3.7  /  TBili  0.3  /  DBili  x   /  AST  12  /  ALT  <5<L>  /  AlkPhos  77  04-24          RADIOLOGY & ADDITIONAL STUDIES:

## 2022-04-24 NOTE — CONSULT NOTE ADULT - SUBJECTIVE AND OBJECTIVE BOX
HPI:  61F current smoker w/ PMHx ESRD (HD T/Th/Sa), HFrEF (EF 25% 9/21, s/p AICD), IDDM2, COPD (3L O2 at home PRN), multiple myeloma (on Ninlaro), hypothyroidism, depression, neuropathy (on methadone), recurrent DVTs (on eliquis 2.5mg qd), CVA x2 (w/ residual R-sided weakness), PAD s/p LLE bypass, and DHARMESH on CPAP, who was sent in by her dialysis doctor for concern for worsening chronic RLE DVT and concern for infected chronic right heal ulcer. Doppler R LE 4/21: no new DVT, no change in R common femoral vein non-occlusive thrombus since 12/21. CT a/p incidental RA filling defect for which cardiology is consulted.  Patient denies CP, SOB, abdominal pain, palpitations, dizziness, and syncope. States she came to the hospital due to lower extremity pain.       PAST MEDICAL & SURGICAL HISTORY:  CHF (congestive heart failure)    COPD (chronic obstructive pulmonary disease)    HLD (hyperlipidemia)    Chronic pain    DM (diabetes mellitus)    CVA (cerebral vascular accident)    DVT (deep venous thrombosis)    HTN (hypertension)    Depression    Bipolar depression    PAD (peripheral artery disease)    Neuropathy    Hypothyroidism    Multiple myeloma    CKD (chronic kidney disease), stage IV    DHARMESH on CPAP    AICD (automatic cardioverter/defibrillator) present    H/O abdominal hysterectomy    H/O tubal ligation    History of cholecystectomy    H/O extremity bypass graft      PREVIOUS DIAGNOSTIC TESTING:        FAMILY HISTORY:  Family history of hypertension (Mother)    Family history of diabetes mellitus (Mother)        Social History:  Lives in [place] with her , daughter, daughter in law, and grandson. Smokes 1/4 ppd, previously 1/2 ppd, for 45 years. Denies alcohol or illicit drug use. (21 Apr 2022 20:51)      ALLERGIES/INTOLERANCES:  aspirin (Other (Moderate); Rash)  oral contrast (Unknown)    HOME MEDICATIONS:    INPATIENT MEDICATIONS:  metoprolol succinate  milliGRAM(s) Oral daily  sacubitril 97 mG/valsartan 103 mG 1 Tablet(s) Oral two times a day  torsemide 40 milliGRAM(s) Oral every 12 hours    apixaban 2.5 milliGRAM(s) Oral every 12 hours    acetaminophen     Tablet .. 650 milliGRAM(s) Oral every 6 hours PRN  ammonium lactate 12% Lotion 1 Application(s) Topical <User Schedule>  atorvastatin 80 milliGRAM(s) Oral at bedtime  budesonide 160 MICROgram(s)/formoterol 4.5 MICROgram(s) Inhaler 2 Puff(s) Inhalation two times a day  ceFAZolin   IVPB 1000 milliGRAM(s) IV Intermittent every 8 hours  collagenase Ointment 1 Application(s) Topical daily  dextrose 5%. 1000 milliLiter(s) IV Continuous <Continuous>  dextrose 5%. 1000 milliLiter(s) IV Continuous <Continuous>  dextrose 50% Injectable 25 Gram(s) IV Push once  dextrose 50% Injectable 12.5 Gram(s) IV Push once  dextrose 50% Injectable 25 Gram(s) IV Push once  dextrose Oral Gel 15 Gram(s) Oral once PRN  fenofibrate Tablet 48 milliGRAM(s) Oral daily  gabapentin 100 milliGRAM(s) Oral every 12 hours  glucagon  Injectable 1 milliGRAM(s) IntraMuscular once  guaiFENesin Oral Liquid (Sugar-Free) 200 milliGRAM(s) Oral every 6 hours PRN  insulin glargine Injectable (LANTUS) 10 Unit(s) SubCutaneous at bedtime  insulin lispro (ADMELOG) corrective regimen sliding scale   SubCutaneous Before meals and at bedtime  insulin lispro Injectable (ADMELOG) 5 Unit(s) SubCutaneous three times a day before meals  melatonin 3 milliGRAM(s) Oral at bedtime  methadone    Tablet 10 milliGRAM(s) Oral three times a day  pantoprazole    Tablet 40 milliGRAM(s) Oral before breakfast  PARoxetine 10 milliGRAM(s) Oral at bedtime  traZODone 50 milliGRAM(s) Oral at bedtime      REVIEW OF SYSTEMS: See HPI     PHYSICAL EXAM:  Gen: NAD   HEENT: EOMI   Neck: Flat JVP   Cor: Normal s1, s2. RRR.   Abd: soft, non-tender, non-distended  Ext: no edema  Neuro: alert and attentive    T(C): 36.9 (04-24-22 @ 09:21), Max: 37.2 (04-23-22 @ 16:15)  HR: 74 (04-24-22 @ 12:30) (65 - 82)  BP: 127/76 (04-24-22 @ 09:21) (111/63 - 127/76)  RR: 16 (04-24-22 @ 12:30) (13 - 18)  SpO2: 99% (04-24-22 @ 12:30) (96% - 100%)  Wt(kg): --    I&O's Summary    23 Apr 2022 07:01  -  24 Apr 2022 07:00  --------------------------------------------------------  IN: 400 mL / OUT: 1400 mL / NET: -1000 mL    24 Apr 2022 07:01  -  24 Apr 2022 16:11  --------------------------------------------------------  IN: 50 mL / OUT: 0 mL / NET: 50 mL    ECG – NSR, left axis, poor R wave progression   	  LABS:                        10.4   6.76  )-----------( 164      ( 24 Apr 2022 08:01 )             34.6     04-24    139  |  100  |  22  ----------------------------<  94  4.5   |  29  |  3.93<H>    Ca    8.9      24 Apr 2022 08:01  Phos  5.1     04-24  Mg     1.9     04-24    TPro  7.1  /  Alb  3.7  /  TBili  0.3  /  DBili  x   /  AST  12  /  ALT  <5<L>  /  AlkPhos  77  04-24        proBNP: Serum Pro-Brain Natriuretic Peptide: [id number] pg/mL (04-21 @ 14:58)      RADIOLOGY:    TTE (9/21):      1. Akinesis of the basal to mid inferoseptum and anteroseptum. Moderate to severe hypokinesis of the remaining regions. The left ventricle cavity size is mildly dilated. Left ventricular systolic function is severely reduced with a calculated ejection fraction of 25-30%.   2. The right ventricle is normal in size. Right ventricular systolic function is mildly reduced. A device lead is noted in the right heart.   3. The left atrium is severely dilated.   4. The mitral valve is mildly thickened. There is mild-to-moderate mitral regurgitation.   5. No pericardial effusion is seen.   6. Compared to the previous TTE performed on 1/5/2019, there have been no significant interval changes.      Bedside TTE (4/22): LVEF severely reduced. A device lead noted in the right heard. Dilated LA. mild to moderate MR. Mild to moderate TR.

## 2022-04-24 NOTE — CONSULT NOTE ADULT - ASSESSMENT
61F current smoker w/ PMHx ESRD (HD T/Th/Sa), HFrEF (EF 25% 9/21, s/p AICD), IDDM2, COPD (3L O2 at home PRN), multiple myeloma (on Ninlaro), hypothyroidism, depression, neuropathy (on methadone), recurrent DVTs (on eliquis 2.5mg qd), CVA x2 (w/ residual R-sided weakness), PAD s/p LLE bypass, and DHARMESH on CPAP, who was sent in by her dialysis doctor for concern for worsening chronic RLE DVT and concern for infected chronic right heal ulcer. CT a/p incidental RA filling defect for which cardiology is consulted.    #RA filling defect   CT shows apparent filling defect in the right atrium. Bedside TTE without any obvious thrombus. Patient with a known history of ICD. Asymptomatic.   - recommend official TTE with contrast in the AM   - c/w home eliquis BID for now (take for recurrent DVTs)  - if any changes in hemodynamics or new onset CP/SOB, please call cardiology fellow     #HFrEF   EF 25% in 9/21. Asymptomatic   Diuretics: c/h torsemide 40 BID  GDMT:   Metop succinate 200, entresto  BID  - not in acute decompensated HF  - repeat TTE as above

## 2022-04-24 NOTE — PROGRESS NOTE ADULT - PROBLEM SELECTOR PLAN 1
s/p bedside debridement 4/22 by Podiatry; cont. Ancef; f/u cultures; wound care per Podiatry; Vascular consulted in setting of PAD -- will check CT A/P and carotid duplex, per recs

## 2022-04-24 NOTE — PROGRESS NOTE ADULT - SUBJECTIVE AND OBJECTIVE BOX
Patient is a 61y old  Female who presents with a chief complaint of LE pain (2022 09:09)      INTERVAL HPI/OVERNIGHT EVENTS:    Pt. seen and examined earlier today  Pt. reports improved R foot pain  Pt. c/o occasional cough  Pt. denies F/C, CP, SOB    Review of Systems: 12 point review of systems otherwise negative    MEDICATIONS  (STANDING):  ammonium lactate 12% Lotion 1 Application(s) Topical <User Schedule>  apixaban 2.5 milliGRAM(s) Oral every 12 hours  atorvastatin 80 milliGRAM(s) Oral at bedtime  budesonide 160 MICROgram(s)/formoterol 4.5 MICROgram(s) Inhaler 2 Puff(s) Inhalation two times a day  ceFAZolin   IVPB 1000 milliGRAM(s) IV Intermittent every 8 hours  collagenase Ointment 1 Application(s) Topical daily  dextrose 5%. 1000 milliLiter(s) (50 mL/Hr) IV Continuous <Continuous>  dextrose 5%. 1000 milliLiter(s) (100 mL/Hr) IV Continuous <Continuous>  dextrose 50% Injectable 25 Gram(s) IV Push once  dextrose 50% Injectable 12.5 Gram(s) IV Push once  dextrose 50% Injectable 25 Gram(s) IV Push once  fenofibrate Tablet 48 milliGRAM(s) Oral daily  gabapentin 100 milliGRAM(s) Oral every 12 hours  glucagon  Injectable 1 milliGRAM(s) IntraMuscular once  insulin glargine Injectable (LANTUS) 10 Unit(s) SubCutaneous at bedtime  insulin lispro (ADMELOG) corrective regimen sliding scale   SubCutaneous Before meals and at bedtime  insulin lispro Injectable (ADMELOG) 5 Unit(s) SubCutaneous three times a day before meals  melatonin 3 milliGRAM(s) Oral at bedtime  methadone    Tablet 10 milliGRAM(s) Oral three times a day  metoprolol succinate  milliGRAM(s) Oral daily  pantoprazole    Tablet 40 milliGRAM(s) Oral before breakfast  PARoxetine 10 milliGRAM(s) Oral at bedtime  sacubitril 97 mG/valsartan 103 mG 1 Tablet(s) Oral two times a day  traZODone 50 milliGRAM(s) Oral at bedtime    MEDICATIONS  (PRN):  acetaminophen     Tablet .. 650 milliGRAM(s) Oral every 6 hours PRN Temp greater or equal to 38C (100.4F), Mild Pain (1 - 3)  dextrose Oral Gel 15 Gram(s) Oral once PRN Blood Glucose LESS THAN 70 milliGRAM(s)/deciliter  guaiFENesin Oral Liquid (Sugar-Free) 200 milliGRAM(s) Oral every 6 hours PRN Cough      Allergies    aspirin (Other (Moderate); Rash)  oral contrast (Unknown)    Intolerances          Vital Signs Last 24 Hrs  T(C): 36.9 (2022 09:21), Max: 37.2 (2022 16:15)  T(F): 98.4 (2022 09:21), Max: 98.9 (2022 16:15)  HR: 78 (2022 09:21) (65 - 82)  BP: 127/76 (2022 09:21) (111/63 - 145/73)  BP(mean): --  RR: 18 (2022 09:21) (13 - 18)  SpO2: 100% (2022 09:21) (96% - 100%)  CAPILLARY BLOOD GLUCOSE      POCT Blood Glucose.: 107 mg/dL (2022 08:37)  POCT Blood Glucose.: 135 mg/dL (2022 21:45)  POCT Blood Glucose.: 96 mg/dL (2022 17:43)  POCT Blood Glucose.: 104 mg/dL (2022 14:51)       @ 07:01  -   @ 07:00  --------------------------------------------------------  IN: 400 mL / OUT: 1400 mL / NET: -1000 mL        Physical Exam:  (earlier today)  Daily     Daily Weight in k.2 (2022 13:20)  General:  non-toxic and comfortable-appearing in NAD  HEENT:  MMM, R chest HD cath  CV:  RRR, no JVD  Lungs:  CTA B/L, normal WOB on RA  Abdomen:  soft NT ND  Extremities:  R foot Kerlix dressing C/D/I; maturing RUE AVF  Skin:  WWP  Neuro:  AAOx3    LABS:                        10.4   6.76  )-----------( 164      ( 2022 08:01 )             34.6     04-24    139  |  100  |  22  ----------------------------<  94  4.5   |  29  |  3.93<H>    Ca    8.9      2022 08:01  Phos  5.1     -  Mg     1.9         TPro  7.1  /  Alb  3.7  /  TBili  0.3  /  DBili  x   /  AST  12  /  ALT  <5<L>  /  AlkPhos  77  24

## 2022-04-24 NOTE — CONSULT NOTE ADULT - CONSULT REASON
Rehab evaluation
ESRD on HD
Right heel ulcer
RLE DVT, non-plapable pulses/ulcer
Filling defect in RA

## 2022-04-24 NOTE — PROGRESS NOTE ADULT - PROBLEM SELECTOR PLAN 12
CXR shows "right humeral neck fracture deformity;" fracture is chronic, no need for further work-up or intervention, per Ortho recs

## 2022-04-25 ENCOUNTER — TRANSCRIPTION ENCOUNTER (OUTPATIENT)
Age: 62
End: 2022-04-25

## 2022-04-25 DIAGNOSIS — I73.9 PERIPHERAL VASCULAR DISEASE, UNSPECIFIED: ICD-10-CM

## 2022-04-25 DIAGNOSIS — R93.89 ABNORMAL FINDINGS ON DIAGNOSTIC IMAGING OF OTHER SPECIFIED BODY STRUCTURES: ICD-10-CM

## 2022-04-25 LAB
ALBUMIN SERPL ELPH-MCNC: 4 G/DL — SIGNIFICANT CHANGE UP (ref 3.3–5)
ALP SERPL-CCNC: 75 U/L — SIGNIFICANT CHANGE UP (ref 40–120)
ALT FLD-CCNC: <5 U/L — LOW (ref 10–45)
ANION GAP SERPL CALC-SCNC: 13 MMOL/L — SIGNIFICANT CHANGE UP (ref 5–17)
ANION GAP SERPL CALC-SCNC: 15 MMOL/L — SIGNIFICANT CHANGE UP (ref 5–17)
AST SERPL-CCNC: 12 U/L — SIGNIFICANT CHANGE UP (ref 10–40)
BASOPHILS # BLD AUTO: 0.04 K/UL — SIGNIFICANT CHANGE UP (ref 0–0.2)
BASOPHILS NFR BLD AUTO: 0.6 % — SIGNIFICANT CHANGE UP (ref 0–2)
BILIRUB SERPL-MCNC: 0.2 MG/DL — SIGNIFICANT CHANGE UP (ref 0.2–1.2)
BLD GP AB SCN SERPL QL: NEGATIVE — SIGNIFICANT CHANGE UP
BLD GP AB SCN SERPL QL: NEGATIVE — SIGNIFICANT CHANGE UP
BUN SERPL-MCNC: 32 MG/DL — HIGH (ref 7–23)
BUN SERPL-MCNC: 40 MG/DL — HIGH (ref 7–23)
CALCIUM SERPL-MCNC: 8.7 MG/DL — SIGNIFICANT CHANGE UP (ref 8.4–10.5)
CALCIUM SERPL-MCNC: 8.9 MG/DL — SIGNIFICANT CHANGE UP (ref 8.4–10.5)
CHLORIDE SERPL-SCNC: 93 MMOL/L — LOW (ref 96–108)
CHLORIDE SERPL-SCNC: 94 MMOL/L — LOW (ref 96–108)
CO2 SERPL-SCNC: 25 MMOL/L — SIGNIFICANT CHANGE UP (ref 22–31)
CO2 SERPL-SCNC: 28 MMOL/L — SIGNIFICANT CHANGE UP (ref 22–31)
CREAT SERPL-MCNC: 5.39 MG/DL — HIGH (ref 0.5–1.3)
CREAT SERPL-MCNC: 6.05 MG/DL — HIGH (ref 0.5–1.3)
EGFR: 7 ML/MIN/1.73M2 — LOW
EGFR: 9 ML/MIN/1.73M2 — LOW
EOSINOPHIL # BLD AUTO: 0.2 K/UL — SIGNIFICANT CHANGE UP (ref 0–0.5)
EOSINOPHIL NFR BLD AUTO: 2.8 % — SIGNIFICANT CHANGE UP (ref 0–6)
GLUCOSE BLDC GLUCOMTR-MCNC: 138 MG/DL — HIGH (ref 70–99)
GLUCOSE BLDC GLUCOMTR-MCNC: 148 MG/DL — HIGH (ref 70–99)
GLUCOSE BLDC GLUCOMTR-MCNC: 159 MG/DL — HIGH (ref 70–99)
GLUCOSE BLDC GLUCOMTR-MCNC: 180 MG/DL — HIGH (ref 70–99)
GLUCOSE BLDC GLUCOMTR-MCNC: 52 MG/DL — CRITICAL LOW (ref 70–99)
GLUCOSE BLDC GLUCOMTR-MCNC: 73 MG/DL — SIGNIFICANT CHANGE UP (ref 70–99)
GLUCOSE BLDC GLUCOMTR-MCNC: 92 MG/DL — SIGNIFICANT CHANGE UP (ref 70–99)
GLUCOSE SERPL-MCNC: 115 MG/DL — HIGH (ref 70–99)
GLUCOSE SERPL-MCNC: 130 MG/DL — HIGH (ref 70–99)
HCT VFR BLD CALC: 33.3 % — LOW (ref 34.5–45)
HCT VFR BLD CALC: 33.9 % — LOW (ref 34.5–45)
HGB BLD-MCNC: 10.1 G/DL — LOW (ref 11.5–15.5)
HGB BLD-MCNC: 10.2 G/DL — LOW (ref 11.5–15.5)
IMM GRANULOCYTES NFR BLD AUTO: 0.3 % — SIGNIFICANT CHANGE UP (ref 0–1.5)
LYMPHOCYTES # BLD AUTO: 0.99 K/UL — LOW (ref 1–3.3)
LYMPHOCYTES # BLD AUTO: 14 % — SIGNIFICANT CHANGE UP (ref 13–44)
MAGNESIUM SERPL-MCNC: 1.9 MG/DL — SIGNIFICANT CHANGE UP (ref 1.6–2.6)
MAGNESIUM SERPL-MCNC: 2 MG/DL — SIGNIFICANT CHANGE UP (ref 1.6–2.6)
MCHC RBC-ENTMCNC: 29.7 PG — SIGNIFICANT CHANGE UP (ref 27–34)
MCHC RBC-ENTMCNC: 30.1 GM/DL — LOW (ref 32–36)
MCHC RBC-ENTMCNC: 30.1 PG — SIGNIFICANT CHANGE UP (ref 27–34)
MCHC RBC-ENTMCNC: 30.3 GM/DL — LOW (ref 32–36)
MCV RBC AUTO: 98.5 FL — SIGNIFICANT CHANGE UP (ref 80–100)
MCV RBC AUTO: 99.1 FL — SIGNIFICANT CHANGE UP (ref 80–100)
MONOCYTES # BLD AUTO: 0.99 K/UL — HIGH (ref 0–0.9)
MONOCYTES NFR BLD AUTO: 14 % — SIGNIFICANT CHANGE UP (ref 2–14)
NEUTROPHILS # BLD AUTO: 4.84 K/UL — SIGNIFICANT CHANGE UP (ref 1.8–7.4)
NEUTROPHILS NFR BLD AUTO: 68.3 % — SIGNIFICANT CHANGE UP (ref 43–77)
NRBC # BLD: 0 /100 WBCS — SIGNIFICANT CHANGE UP (ref 0–0)
NRBC # BLD: 0 /100 WBCS — SIGNIFICANT CHANGE UP (ref 0–0)
PHOSPHATE SERPL-MCNC: 6.1 MG/DL — HIGH (ref 2.5–4.5)
PHOSPHATE SERPL-MCNC: 6.4 MG/DL — HIGH (ref 2.5–4.5)
PLATELET # BLD AUTO: 159 K/UL — SIGNIFICANT CHANGE UP (ref 150–400)
PLATELET # BLD AUTO: 165 K/UL — SIGNIFICANT CHANGE UP (ref 150–400)
POTASSIUM SERPL-MCNC: 4.9 MMOL/L — SIGNIFICANT CHANGE UP (ref 3.5–5.3)
POTASSIUM SERPL-MCNC: 5.6 MMOL/L — HIGH (ref 3.5–5.3)
POTASSIUM SERPL-SCNC: 4.9 MMOL/L — SIGNIFICANT CHANGE UP (ref 3.5–5.3)
POTASSIUM SERPL-SCNC: 5.6 MMOL/L — HIGH (ref 3.5–5.3)
PROT SERPL-MCNC: 7.6 G/DL — SIGNIFICANT CHANGE UP (ref 6–8.3)
RBC # BLD: 3.36 M/UL — LOW (ref 3.8–5.2)
RBC # BLD: 3.44 M/UL — LOW (ref 3.8–5.2)
RBC # FLD: 15.1 % — HIGH (ref 10.3–14.5)
RBC # FLD: 15.1 % — HIGH (ref 10.3–14.5)
RH IG SCN BLD-IMP: POSITIVE — SIGNIFICANT CHANGE UP
RH IG SCN BLD-IMP: POSITIVE — SIGNIFICANT CHANGE UP
SODIUM SERPL-SCNC: 133 MMOL/L — LOW (ref 135–145)
SODIUM SERPL-SCNC: 135 MMOL/L — SIGNIFICANT CHANGE UP (ref 135–145)
WBC # BLD: 11.17 K/UL — HIGH (ref 3.8–10.5)
WBC # BLD: 7.08 K/UL — SIGNIFICANT CHANGE UP (ref 3.8–10.5)
WBC # FLD AUTO: 11.17 K/UL — HIGH (ref 3.8–10.5)
WBC # FLD AUTO: 7.08 K/UL — SIGNIFICANT CHANGE UP (ref 3.8–10.5)

## 2022-04-25 PROCEDURE — 99233 SBSQ HOSP IP/OBS HIGH 50: CPT | Mod: GC

## 2022-04-25 PROCEDURE — 99232 SBSQ HOSP IP/OBS MODERATE 35: CPT | Mod: GC

## 2022-04-25 PROCEDURE — 99221 1ST HOSP IP/OBS SF/LOW 40: CPT

## 2022-04-25 RX ORDER — HYDROMORPHONE HYDROCHLORIDE 2 MG/ML
0.5 INJECTION INTRAMUSCULAR; INTRAVENOUS; SUBCUTANEOUS ONCE
Refills: 0 | Status: DISCONTINUED | OUTPATIENT
Start: 2022-04-25 | End: 2022-04-25

## 2022-04-25 RX ORDER — HEPARIN SODIUM 5000 [USP'U]/ML
INJECTION INTRAVENOUS; SUBCUTANEOUS
Qty: 25000 | Refills: 0 | Status: DISCONTINUED | OUTPATIENT
Start: 2022-04-25 | End: 2022-04-25

## 2022-04-25 RX ADMIN — Medication 1 APPLICATION(S): at 12:54

## 2022-04-25 RX ADMIN — Medication 200 MILLIGRAM(S): at 06:40

## 2022-04-25 RX ADMIN — ATORVASTATIN CALCIUM 80 MILLIGRAM(S): 80 TABLET, FILM COATED ORAL at 22:06

## 2022-04-25 RX ADMIN — GABAPENTIN 100 MILLIGRAM(S): 400 CAPSULE ORAL at 22:06

## 2022-04-25 RX ADMIN — BUDESONIDE AND FORMOTEROL FUMARATE DIHYDRATE 2 PUFF(S): 160; 4.5 AEROSOL RESPIRATORY (INHALATION) at 17:39

## 2022-04-25 RX ADMIN — Medication 5 UNIT(S): at 09:20

## 2022-04-25 RX ADMIN — METHADONE HYDROCHLORIDE 10 MILLIGRAM(S): 40 TABLET ORAL at 22:06

## 2022-04-25 RX ADMIN — Medication 100 MILLIGRAM(S): at 15:05

## 2022-04-25 RX ADMIN — Medication 10 MILLIGRAM(S): at 22:06

## 2022-04-25 RX ADMIN — Medication 2: at 09:19

## 2022-04-25 RX ADMIN — Medication 0: at 17:21

## 2022-04-25 RX ADMIN — SACUBITRIL AND VALSARTAN 1 TABLET(S): 24; 26 TABLET, FILM COATED ORAL at 06:40

## 2022-04-25 RX ADMIN — Medication 48 MILLIGRAM(S): at 13:03

## 2022-04-25 RX ADMIN — Medication 5 UNIT(S): at 13:03

## 2022-04-25 RX ADMIN — Medication 100 MILLIGRAM(S): at 22:05

## 2022-04-25 RX ADMIN — APIXABAN 2.5 MILLIGRAM(S): 2.5 TABLET, FILM COATED ORAL at 06:40

## 2022-04-25 RX ADMIN — PANTOPRAZOLE SODIUM 40 MILLIGRAM(S): 20 TABLET, DELAYED RELEASE ORAL at 06:40

## 2022-04-25 RX ADMIN — HYDROMORPHONE HYDROCHLORIDE 0.5 MILLIGRAM(S): 2 INJECTION INTRAMUSCULAR; INTRAVENOUS; SUBCUTANEOUS at 10:11

## 2022-04-25 RX ADMIN — Medication 40 MILLIGRAM(S): at 17:38

## 2022-04-25 RX ADMIN — SACUBITRIL AND VALSARTAN 1 TABLET(S): 24; 26 TABLET, FILM COATED ORAL at 17:39

## 2022-04-25 RX ADMIN — Medication 50 MILLIGRAM(S): at 22:06

## 2022-04-25 RX ADMIN — METHADONE HYDROCHLORIDE 10 MILLIGRAM(S): 40 TABLET ORAL at 15:05

## 2022-04-25 RX ADMIN — Medication 1 APPLICATION(S): at 09:36

## 2022-04-25 RX ADMIN — METHADONE HYDROCHLORIDE 10 MILLIGRAM(S): 40 TABLET ORAL at 06:40

## 2022-04-25 RX ADMIN — Medication 0: at 13:02

## 2022-04-25 RX ADMIN — Medication 100 MILLIGRAM(S): at 06:45

## 2022-04-25 RX ADMIN — GABAPENTIN 100 MILLIGRAM(S): 400 CAPSULE ORAL at 06:45

## 2022-04-25 RX ADMIN — Medication 2: at 22:42

## 2022-04-25 RX ADMIN — Medication 3 MILLIGRAM(S): at 22:06

## 2022-04-25 RX ADMIN — INSULIN GLARGINE 10 UNIT(S): 100 INJECTION, SOLUTION SUBCUTANEOUS at 22:42

## 2022-04-25 RX ADMIN — Medication 40 MILLIGRAM(S): at 06:40

## 2022-04-25 RX ADMIN — HYDROMORPHONE HYDROCHLORIDE 0.5 MILLIGRAM(S): 2 INJECTION INTRAMUSCULAR; INTRAVENOUS; SUBCUTANEOUS at 09:41

## 2022-04-25 NOTE — PROGRESS NOTE ADULT - SUBJECTIVE AND OBJECTIVE BOX
SUBJECTIVE: Patient seen and examined at bedside.    apixaban 2.5 milliGRAM(s) Oral every 12 hours  ceFAZolin   IVPB 1000 milliGRAM(s) IV Intermittent every 8 hours  metoprolol succinate  milliGRAM(s) Oral daily  sacubitril 97 mG/valsartan 103 mG 1 Tablet(s) Oral two times a day  torsemide 40 milliGRAM(s) Oral every 12 hours    MEDICATIONS  (PRN):  acetaminophen     Tablet .. 650 milliGRAM(s) Oral every 6 hours PRN Temp greater or equal to 38C (100.4F), Mild Pain (1 - 3)  dextrose Oral Gel 15 Gram(s) Oral once PRN Blood Glucose LESS THAN 70 milliGRAM(s)/deciliter  guaiFENesin Oral Liquid (Sugar-Free) 200 milliGRAM(s) Oral every 6 hours PRN Cough      I&O's Detail    24 Apr 2022 07:01  -  25 Apr 2022 07:00  --------------------------------------------------------  IN:    IV PiggyBack: 300 mL  Total IN: 300 mL    OUT:  Total OUT: 0 mL    Total NET: 300 mL          T(C): 36.9 (04-25-22 @ 05:28), Max: 36.9 (04-24-22 @ 09:21)  HR: 74 (04-25-22 @ 05:28) (72 - 81)  BP: 110/65 (04-25-22 @ 05:28) (109/69 - 127/76)  RR: 11 (04-25-22 @ 05:28) (11 - 18)  SpO2: 100% (04-25-22 @ 05:28) (95% - 100%)    General: NAD, resting comfortably in bed  Pulmonary: Nonlabored breathing, no respiratory distress  CV: NSR  Abd: soft, NT/ND  Extremities: WWP, Bilateral lower extremity edema, R > L, Right calf tenderness, negative Homens sign; Right heel nonhealing ulcer without drainage or discharge   NEURO: AAOx3, No focal motor or sensory deficits  Pulses: RLE: triphasic femoral, pop, mono DP and PT; LLE: triphasic femoral, pop, tri/bi PT/DP     LABS:                        10.4   6.76  )-----------( 164      ( 24 Apr 2022 08:01 )             34.6     04-24    139  |  100  |  22  ----------------------------<  94  4.5   |  29  |  3.93<H>    Ca    8.9      24 Apr 2022 08:01  Phos  5.1     04-24  Mg     1.9     04-24    TPro  7.1  /  Alb  3.7  /  TBili  0.3  /  DBili  x   /  AST  12  /  ALT  <5<L>  /  AlkPhos  77  04-24          RADIOLOGY & ADDITIONAL STUDIES:      Culture - Blood (collected 04-21-22 @ 16:26)  Source: .Blood Blood-Venous  Preliminary Report (04-24-22 @ 17:00):    No growth at 3 days.    Culture - Blood (collected 04-21-22 @ 16:26)  Source: .Blood Blood-Peripheral  Preliminary Report (04-24-22 @ 17:00):    No growth at 3 days.     SUBJECTIVE: Patient seen and examined at bedside. continues to have pain/swelling to RLE     apixaban 2.5 milliGRAM(s) Oral every 12 hours  ceFAZolin   IVPB 1000 milliGRAM(s) IV Intermittent every 8 hours  metoprolol succinate  milliGRAM(s) Oral daily  sacubitril 97 mG/valsartan 103 mG 1 Tablet(s) Oral two times a day  torsemide 40 milliGRAM(s) Oral every 12 hours    MEDICATIONS  (PRN):  acetaminophen     Tablet .. 650 milliGRAM(s) Oral every 6 hours PRN Temp greater or equal to 38C (100.4F), Mild Pain (1 - 3)  dextrose Oral Gel 15 Gram(s) Oral once PRN Blood Glucose LESS THAN 70 milliGRAM(s)/deciliter  guaiFENesin Oral Liquid (Sugar-Free) 200 milliGRAM(s) Oral every 6 hours PRN Cough      I&O's Detail    24 Apr 2022 07:01  -  25 Apr 2022 07:00  --------------------------------------------------------  IN:    IV PiggyBack: 300 mL  Total IN: 300 mL    OUT:  Total OUT: 0 mL    Total NET: 300 mL          T(C): 36.9 (04-25-22 @ 05:28), Max: 36.9 (04-24-22 @ 09:21)  HR: 74 (04-25-22 @ 05:28) (72 - 81)  BP: 110/65 (04-25-22 @ 05:28) (109/69 - 127/76)  RR: 11 (04-25-22 @ 05:28) (11 - 18)  SpO2: 100% (04-25-22 @ 05:28) (95% - 100%)    General: NAD, resting comfortably in bed  Pulmonary: Nonlabored breathing, no respiratory distress  CV: NSR  Abd: soft, NT/ND  Extremities: WWP, Bilateral lower extremity edema, R > L, Right calf tenderness, negative Homens sign; Right heel nonhealing ulcer without drainage or discharge   NEURO: AAOx3, No focal motor or sensory deficits  Pulses: RLE: triphasic femoral, pop, mono DP and PT; LLE: triphasic femoral, pop, tri/bi PT/DP     LABS:                        10.4   6.76  )-----------( 164      ( 24 Apr 2022 08:01 )             34.6     04-24    139  |  100  |  22  ----------------------------<  94  4.5   |  29  |  3.93<H>    Ca    8.9      24 Apr 2022 08:01  Phos  5.1     04-24  Mg     1.9     04-24    TPro  7.1  /  Alb  3.7  /  TBili  0.3  /  DBili  x   /  AST  12  /  ALT  <5<L>  /  AlkPhos  77  04-24          RADIOLOGY & ADDITIONAL STUDIES:      Culture - Blood (collected 04-21-22 @ 16:26)  Source: .Blood Blood-Venous  Preliminary Report (04-24-22 @ 17:00):    No growth at 3 days.    Culture - Blood (collected 04-21-22 @ 16:26)  Source: .Blood Blood-Peripheral  Preliminary Report (04-24-22 @ 17:00):    No growth at 3 days.

## 2022-04-25 NOTE — PROGRESS NOTE ADULT - PROBLEM SELECTOR PLAN 1
Acute worsening of swelling in the R calf and ankle starting 1 week prior to admission. Pain in the R LE started 2 days prior to admission. Pt has a known non-occlusive R common femoral vein DVT, unchanged since 12/21 per doppler on admission. Unable to palpate distal pulses in b/l LE on exam. History of PAD w/LLE bypass and IDDM2. CTA showed occlusion of R SFA.  - Doppler R LE 4/21: no new DVT, no change in R common femoral vein non-occlusive thrombus since 12/21  - CTA with run-off: occluded SFA w/ reconstitution of popliteal artery  - Carotid doppler: no stenosis    - Vascular: plan for angiogram tomorrow with possible angioplasty/stent  - **switch eliquis to heparin @5pm in preparation for procedure tomorrow   -**repeat PT/PTT tonight

## 2022-04-25 NOTE — PROGRESS NOTE ADULT - SUBJECTIVE AND OBJECTIVE BOX
Patient is a 61y Female seen and evaluated at bedside.   pending cardiac cath?  for hemodialysis tomorrow   Denies CP SOB   BP is acceptable   on heparin drip     Meds:    acetaminophen     Tablet .. 650 every 6 hours PRN  ammonium lactate 12% Lotion 1 <User Schedule>  atorvastatin 80 at bedtime  budesonide 160 MICROgram(s)/formoterol 4.5 MICROgram(s) Inhaler 2 two times a day  ceFAZolin   IVPB 1000 every 8 hours  collagenase Ointment 1 daily  dextrose 5%. 1000 <Continuous>  dextrose 5%. 1000 <Continuous>  dextrose 50% Injectable 25 once  dextrose 50% Injectable 12.5 once  dextrose 50% Injectable 25 once  dextrose Oral Gel 15 once PRN  fenofibrate Tablet 48 daily  gabapentin 100 every 12 hours  glucagon  Injectable 1 once  guaiFENesin Oral Liquid (Sugar-Free) 200 every 6 hours PRN  heparin  Infusion.  <Continuous>  insulin glargine Injectable (LANTUS) 10 at bedtime  insulin lispro (ADMELOG) corrective regimen sliding scale  Before meals and at bedtime  insulin lispro Injectable (ADMELOG) 5 three times a day before meals  melatonin 3 at bedtime  methadone    Tablet 10 three times a day  metoprolol succinate  daily  pantoprazole    Tablet 40 before breakfast  PARoxetine 10 at bedtime  sacubitril 97 mG/valsartan 103 mG 1 two times a day  torsemide 40 every 12 hours  traZODone 50 at bedtime      T(C): , Max: 36.9 (04-25-22 @ 05:28)  T(F): , Max: 98.4 (04-25-22 @ 05:28)  HR: 75 (04-25-22 @ 09:38)  BP: 125/77 (04-25-22 @ 09:27)  BP(mean): 93 (04-25-22 @ 09:27)  RR: 16 (04-25-22 @ 09:38)  SpO2: 97% (04-25-22 @ 09:38)  Wt(kg): --    04-24 @ 07:01  -  04-25 @ 07:00  --------------------------------------------------------  IN: 300 mL / OUT: 0 mL / NET: 300 mL        Review of Systems:  all other ROS negative     PHYSICAL EXAM:  GENERAL: Alert, awake, oriented x3 on RA   CHEST/LUNG: CTA BL no rales, rhonchi or wheezing   HEART: Regular rate and rhythm, no murmur, no gallops, no rub   ABDOMEN: Soft, nontender, non distended  EXTREMITIES: +RLE swelling   ACCESS: +TDC      LABS:                        10.2   7.08  )-----------( 159      ( 25 Apr 2022 07:46 )             33.9     04-25    135  |  94<L>  |  32<H>  ----------------------------<  130<H>  4.9   |  28  |  5.39<H>    Ca    8.9      25 Apr 2022 07:46  Phos  6.1     04-25  Mg     2.0     04-25    TPro  7.6  /  Alb  4.0  /  TBili  0.2  /  DBili  x   /  AST  12  /  ALT  <5<L>  /  AlkPhos  75  04-25                RADIOLOGY & ADDITIONAL STUDIES:

## 2022-04-25 NOTE — PROGRESS NOTE ADULT - SUBJECTIVE AND OBJECTIVE BOX
Physical Medicine and Rehabilitation Progress Note:    Patient is a 61y old  Female who presents with a chief complaint of DIABETIC FOOT ULCER     (25 Apr 2022 11:05)      HPI:  61F current smoker w/ PMHx ESRD (HD T/Th/Sa), HFrEF (EF 25% s/p AICD), IDDM2, COPD (3L O2 at home PRN), multiple myeloma (on Ninlaro), hypothyroidism, depression, neuropathy (on methadone), recurrent DVTs (on eliquis 2.5mg qd), CVA x2 (w/ residual R-sided weakness), PAD s/p LLE bypass, and DHARMESH on CPAP, who was sent in by her dialysis doctor for concern for worsening chronic RLE DVT and concern for infected chronic right heal ulcer. Patient states that she began to notice worsening swelling of her right calf and ankle one week ago, but has become more painful in the past 2 days. Denies any pus or drainage from her right heal wound. Patient reports the dark discoloration of her right ankle is chronic. Of note, patient has been wheelchair-bound due to weakness since a hospitalization for COVID last year. Denies chest pain, shortness of breath, abdominal pain, n/v/d/c.     In the ED:  Initial vital signs: T: 98 F, HR: 85, BP: 100/54, R: 18, SpO2: 100% on RA  Labs: significant for WBC 8.6, Hgb 10.3, ESR 32, PTT 38.9, Glc 106, BUN 41, Cr 5.99, Phos 5.7, trop 0.03,   Imaging:  CXR:  No acute cardiopulmonary disease process. Cardiomegaly.  EKG: NSR  Medications: Vanc 1g, Zosyn 3.375, percocet 5/325, morphine 2  Consults: none  (21 Apr 2022 20:51)                            10.2   7.08  )-----------( 159      ( 25 Apr 2022 07:46 )             33.9       04-25    135  |  94<L>  |  32<H>  ----------------------------<  130<H>  4.9   |  28  |  5.39<H>    Ca    8.9      25 Apr 2022 07:46  Phos  6.1     04-25  Mg     2.0     04-25    TPro  7.6  /  Alb  4.0  /  TBili  0.2  /  DBili  x   /  AST  12  /  ALT  <5<L>  /  AlkPhos  75  04-25    Vital Signs Last 24 Hrs  T(C): 36.7 (25 Apr 2022 08:48), Max: 36.9 (25 Apr 2022 05:28)  T(F): 98.1 (25 Apr 2022 08:48), Max: 98.4 (25 Apr 2022 05:28)  HR: 75 (25 Apr 2022 09:38) (71 - 85)  BP: 125/77 (25 Apr 2022 09:27) (109/69 - 125/77)  BP(mean): 93 (25 Apr 2022 09:27) (81 - 93)  RR: 16 (25 Apr 2022 09:38) (11 - 18)  SpO2: 97% (25 Apr 2022 09:38) (94% - 100%)    MEDICATIONS  (STANDING):  ammonium lactate 12% Lotion 1 Application(s) Topical <User Schedule>  atorvastatin 80 milliGRAM(s) Oral at bedtime  budesonide 160 MICROgram(s)/formoterol 4.5 MICROgram(s) Inhaler 2 Puff(s) Inhalation two times a day  ceFAZolin   IVPB 1000 milliGRAM(s) IV Intermittent every 8 hours  collagenase Ointment 1 Application(s) Topical daily  dextrose 5%. 1000 milliLiter(s) (50 mL/Hr) IV Continuous <Continuous>  dextrose 5%. 1000 milliLiter(s) (100 mL/Hr) IV Continuous <Continuous>  dextrose 50% Injectable 25 Gram(s) IV Push once  dextrose 50% Injectable 12.5 Gram(s) IV Push once  dextrose 50% Injectable 25 Gram(s) IV Push once  fenofibrate Tablet 48 milliGRAM(s) Oral daily  gabapentin 100 milliGRAM(s) Oral every 12 hours  glucagon  Injectable 1 milliGRAM(s) IntraMuscular once  heparin  Infusion.  Unit(s)/Hr (17 mL/Hr) IV Continuous <Continuous>  insulin glargine Injectable (LANTUS) 10 Unit(s) SubCutaneous at bedtime  insulin lispro (ADMELOG) corrective regimen sliding scale   SubCutaneous Before meals and at bedtime  insulin lispro Injectable (ADMELOG) 5 Unit(s) SubCutaneous three times a day before meals  melatonin 3 milliGRAM(s) Oral at bedtime  methadone    Tablet 10 milliGRAM(s) Oral three times a day  metoprolol succinate  milliGRAM(s) Oral daily  pantoprazole    Tablet 40 milliGRAM(s) Oral before breakfast  PARoxetine 10 milliGRAM(s) Oral at bedtime  sacubitril 97 mG/valsartan 103 mG 1 Tablet(s) Oral two times a day  torsemide 40 milliGRAM(s) Oral every 12 hours  traZODone 50 milliGRAM(s) Oral at bedtime    MEDICATIONS  (PRN):  acetaminophen     Tablet .. 650 milliGRAM(s) Oral every 6 hours PRN Temp greater or equal to 38C (100.4F), Mild Pain (1 - 3)  dextrose Oral Gel 15 Gram(s) Oral once PRN Blood Glucose LESS THAN 70 milliGRAM(s)/deciliter  guaiFENesin Oral Liquid (Sugar-Free) 200 milliGRAM(s) Oral every 6 hours PRN Cough    Currently Undergoing Physical/ Occupational Therapy at bedside.    PT/OT Functional Status Assessment:       Previous Level of Function:     · Additional Comments	Pt lives in an apartment with 1FOS to enter. Pt states she only leaves the home for dialysis and doctor appointments via ambulette which carries her up/down stairs. Pt has a bedside commode, wears depends daily, shower chair for tub shower, rolling walker and wheelchair. Pt is able to ambulate in home with RW without assistance. Requires assist from 24/7 HHA for ADLs such as cooking, bathing and grooming. She wears 3L O2 as needed and uses CPAP at night. Pt reported one fall from bed while sleeping.    Cognitive Status Examination:   · Orientation	oriented to person, place, time and situation  · Level of Consciousness	alert  · Follows Commands and Answers Questions	100% of the time  · Personal Safety and Judgment	intact  · Short Term Memory	intact    Range of Motion Exam:   · Active Range of Motion Examination	bilateral upper extremity Active ROM was WFL (within functional limits); bilateral  lower extremity Active ROM was WFL (within functional limits); Unable to lift LEs against gravity in sitting.    Manual Muscle Testing:   · Manual Muscle Testing Results	UE/LE strength grossly assessed with functional mobility. Bilaterally pt strength is equal/greater than 3/5.    Bed Mobility: Rolling/Turning:     · Level of [place]	modified independence  · Assistive Device	bed rails; HOB elevated    Bed Mobility: Scooting/Bridging:     · Level of [place]	independent    Bed Mobility: Sit to Supine:     · Level of [place]	minimum assist (75% patients effort)  · Physical Assist/Nonphysical Assist	1 person assist; verbal cues    Bed Mobility: Supine to Sit:     · Level of [place]	modified independence; required b/l LE assist  · Physical Assist/Nonphysical Assist	1 person assist; verbal cues  · Assistive Device	bed rails    Bed Mobility Analysis:     · Impairments Contributing to Impaired Bed Mobility	decreased strength; pain    Transfer: Sit to Stand:     · Level of [place]	supervision  · Weight-Bearing Restrictions	weight-bearing as tolerated; RLE  · Assistive Device	rolling walker    Transfer: Stand to Sit:     · Level of [place]	supervision  · Weight-Bearing Restrictions	full weight-bearing  · Assistive Device	rolling walker    Sit/Stand Transfer Safety Analysis:     · Transfer Safety Concerns Noted	Pt performed STS from/to EOB at normal seat height, into/from w/c and also performed toilet transfer.; decreased weight-shifting ability  · Impairments Contributing to Impaired Transfers	decreased strength; impaired balance; impaired postural control; pain    Gait Skills:     · Level of [place]	stand-by assist  · Physical Assist/Nonphysical Assist	1 person assist  · Weight-Bearing Restrictions	weight-bearing as tolerated; LLE  · Assistive Device	rolling walker  · Gait Distance	15ft + 10ft + 10ft + 15ft    Gait Analysis:     · Gait Pattern Used	3-point gait  Pt ambulated with antalgic gait pattern to bathroom, taking seated breaks in w/c and for toilet transfer. Pt reported 10/10 RLE throbbing pain. No hands on assistance required.  · Gait Deviations Noted	decreased liban; decreased step length; decreased stride length; decreased weight-shifting ability  · Impairments Contributing to Gait Deviations	decreased strength; impaired balance; pain; impaired postural control    Stair Negotiation:     · Level of [place]	NA - pt dependent with stair climbing at baseline.    Balance Skills Assessment:     · Sitting Balance: Static	good balance  · Sitting Balance: Dynamic	good balance  · Sit-to-Stand Balance	fair plus  · Standing Balance: Static	fair plus  · Standing Balance: Dynamic	fair plus  · Systems Impairment Contributing to Balance Disturbance	musculoskeletal; neuromuscular  · Identified Impairments Contributing to Balance Disturbance	decreased strength; pain; impaired postural control    Treatment Location:   · Comments	Pt appears to be close to baseline with mobility, however is limited by pain. PT will follow up to assess mobility post planned procedure. Pt educated to ambulate with nursing staff and to sit in chair for meals to maintain mobility during hospital and decrease risk for new DVT.    Clinical Impressions:   · Criteria for Skilled Therapeutic Interventions	impairments found; functional limitations in following categories; risk reduction/prevention  · Impairments Found (describe specific impairments)	aerobic capacity/endurance; muscle strength; gait, locomotion, and balance; posture  · Functional Limitations in Following Categories (describe specific limitations)	self-care; home management; community/leisure  · Risk Reduction/Prevention (Describe Specific Areas of risk reduction/prevention)	risk factors  · Risk Areas	fall  · Rehab Potential	fair, will monitor progress closely  · Therapy Frequency	2-3x/week        PM&R Impression: as above    Current Disposition Plan Recommendations:    d/c home, home PT, 24/7 home supervision

## 2022-04-25 NOTE — PROGRESS NOTE ADULT - PROBLEM SELECTOR PLAN 3
CTA shows "apparent filling defect in the right atrium, suspicious for thrombus;" cont. A/C as above, f/u TTE -- will consult Structural Heart if abnormal

## 2022-04-25 NOTE — PHYSICAL THERAPY INITIAL EVALUATION ADULT - ADDITIONAL COMMENTS
Pt lives in an apartment with 1FOS to enter. Pt states she only leaves the home for dialysis and doctor appointments via ambulette which carries her up/down stairs. Pt has a bedside commode, wears depends daily, shower chair for tub shower, rolling walker and wheelchair. Pt is able to ambulate in home with RW without assistance. Requires assist from 24/7 HHA for ADLs such as cooking, bathing and grooming. She wears 3L O2 as needed. Pt reported one fall from bed while sleeping. Pt lives in an apartment with 1FOS to enter. Pt states she only leaves the home for dialysis and doctor appointments via ambulette which carries her up/down stairs. Pt has a bedside commode, wears depends daily, shower chair for tub shower, rolling walker and wheelchair. Pt is able to ambulate in home with RW without assistance. Requires assist from 24/7 HHA for ADLs such as cooking, bathing and grooming. She wears 3L O2 as needed and uses CPAP at night. Pt reported one fall from bed while sleeping.

## 2022-04-25 NOTE — PROGRESS NOTE ADULT - PROBLEM SELECTOR PLAN 2
CTA shows "occluded right SFA with reconstitution of popliteal artery through collaterals; patent left common femoral to popliteal bypass graft; appreciate Vascular recs, plan for angiogram tomorrow; DOAC transitioned to heparin gtt for now

## 2022-04-25 NOTE — PHYSICAL THERAPY INITIAL EVALUATION ADULT - PERTINENT HX OF CURRENT PROBLEM, REHAB EVAL
60yo F current smoker w/ PMHx ESRD HFrEF, COPD, multiple myeloma, neuropathy, recurrent DVTs, CVA x2 (residual R-sided weakness), PAD,  who was sent in by her dialysis doctor for concern for worsening chronic RLE DVT and concern for infected chronic right heal ulcer. R foot XR shows extensive arterial calcification, increased calcaneal sclerosis, heel radiolucency corresponding to soft tissue defect.

## 2022-04-25 NOTE — PROGRESS NOTE ADULT - ASSESSMENT
61F current smoker w/ PMHx ESRD (HD T/Th/Sa), HFrEF (EF 25% s/p AICD), IDDM2, COPD (3L O2 at home PRN), multiple myeloma (on Ninlaro), hypothyroidism, depression, neuropathy (on methadone), recurrent DVTs (on eliquis 2.5mg qd), CVA x2 (w/ residual R-sided weakness), PAD s/p LLE bypass, and DHARMESH on CPAP, who was sent in by her dialysis doctor for concern for worsening chronic RLE DVT and concern for infected chronic right heal ulcer. US demonstrated unchanged nonocclusive thrombus within the right common femoral vein. vascular was consulted to evaluate RLE calf pain and nonpalpable pulses, found to have Occluded right SFA with reconstitution of popliteal artery through collaterals on CTA       plan:   -Plan for RLE angiogram, possible angioplasty, possible stenting tomorrow, 4/26/22  -NPO at midnight  -will need pre-op lab: CBC/CMP/Mg?phosph, PT/INR/PTT, Type and screen, covid screen   -cardiology following for possible evidence of right atrial thrombus on CT scan, recommend TTE,  -podiatry to manage Right heel wound   -rest of care per primary   -vascular surgery to continue to follow    61F current smoker w/ PMHx ESRD (HD T/Th/Sa), HFrEF (EF 25% s/p AICD), IDDM2, COPD (3L O2 at home PRN), multiple myeloma (on Ninlaro), hypothyroidism, depression, neuropathy (on methadone), recurrent DVTs (on eliquis 2.5mg qd), CVA x2 (w/ residual R-sided weakness), PAD s/p LLE bypass, and DHARMESH on CPAP, who was sent in by her dialysis doctor for concern for worsening chronic RLE DVT and concern for infected chronic right heal ulcer. US demonstrated unchanged nonocclusive thrombus within the right common femoral vein. vascular was consulted to evaluate RLE calf pain and nonpalpable pulses, found to have Occluded right SFA with reconstitution of popliteal artery through collaterals on CTA       plan:   -Plan for RLE angiogram, possible angioplasty, possible stenting tomorrow, 4/26/22  -NPO at midnight  - Stop eliquis, start hep gtt tonight (can stop tomorrow morning)  -will need pre-op lab: CBC/CMP/Mg?phosph, PT/INR/PTT, Type and screen, covid screen, UA  -cardiology following for possible evidence of right atrial thrombus on CT scan, recommend TTE,  -podiatry to manage Right heel wound   -rest of care per primary   -vascular surgery to continue to follow    61F current smoker w/ PMHx ESRD (HD T/Th/Sa), HFrEF (EF 25% s/p AICD), IDDM2, COPD (3L O2 at home PRN), multiple myeloma (on Ninlaro), hypothyroidism, depression, neuropathy (on methadone), recurrent DVTs (on eliquis 2.5mg qd), CVA x2 (w/ residual R-sided weakness), PAD s/p LLE bypass, and DHARMESH on CPAP, who was sent in by her dialysis doctor for concern for worsening chronic RLE DVT and concern for infected chronic right heal ulcer. US demonstrated unchanged nonocclusive thrombus within the right common femoral vein. vascular was consulted to evaluate RLE calf pain and nonpalpable pulses, found to have Occluded right SFA with reconstitution of popliteal artery through collaterals on CTA     plan:   -Plan for RLE angiogram, possible angioplasty, possible stenting tomorrow, 4/26/22  -NPO at midnight  -Stop eliquis, nO hep drip   -will need pre-op lab: CBC/CMP/Mg?phosph, PT/INR/PTT, Type and screen, covid screen, UA  -cardiology following for possible evidence of right atrial thrombus on CT scan, recommend TTE,  -podiatry to manage Right heel wound   -rest of care per primary   -vascular surgery to continue to follow

## 2022-04-25 NOTE — PHYSICAL THERAPY INITIAL EVALUATION ADULT - ACTIVE RANGE OF MOTION EXAMINATION, REHAB EVAL
Unable to lift LEs against gravity in sitting./bilateral upper extremity Active ROM was WFL (within functional limits)/bilateral  lower extremity Active ROM was WFL (within functional limits)

## 2022-04-25 NOTE — DIETITIAN INITIAL EVALUATION ADULT - NS FNS DIET ORDER
Diet, NPO after Midnight:      NPO Start Date: 25-Apr-2022,   NPO Start Time: 23:59 (04-25-22 @ 07:45)

## 2022-04-25 NOTE — PHYSICAL THERAPY INITIAL EVALUATION ADULT - DISCHARGE DISPOSITION, PT EVAL
Home with 24/7 HHA assist/supervision as prior to admission vs Home with HPT pending mobility post procedure

## 2022-04-25 NOTE — PROGRESS NOTE ADULT - SUBJECTIVE AND OBJECTIVE BOX
Patient is a 61y old  Female who presents with a chief complaint of LE pain (23 Apr 2022 09:09)      INTERVAL HPI/ OVERNIGHT EVENTS      LABS                        10.4   6.76  )-----------( 164      ( 24 Apr 2022 08:01 )             34.6     04-24    139  |  100  |  22  ----------------------------<  94  4.5   |  29  |  3.93<H>    Ca    8.9      24 Apr 2022 08:01  Phos  5.1     04-24  Mg     1.9     04-24    TPro  7.1  /  Alb  3.7  /  TBili  0.3  /  DBili  x   /  AST  12  /  ALT  <5<L>  /  AlkPhos  77  04-24        ICU Vital Signs Last 24 Hrs  T(C): 36.9 (25 Apr 2022 05:28), Max: 36.9 (24 Apr 2022 09:21)  T(F): 98.4 (25 Apr 2022 05:28), Max: 98.4 (24 Apr 2022 09:21)  HR: 74 (25 Apr 2022 05:28) (72 - 81)  BP: 110/65 (25 Apr 2022 05:28) (109/69 - 127/76)  BP(mean): --  ABP: --  ABP(mean): --  RR: 11 (25 Apr 2022 05:28) (11 - 18)  SpO2: 100% (25 Apr 2022 05:28) (95% - 100%)      RADIOLOGY    MICROBIOLOGY    PHYSICAL EXAM  Lower Extremity Focused  Vasc:  Derm:  Neuro:  MSK: Patient is a 61y old  Female who presents with a chief complaint of LE pain (23 Apr 2022 09:09)      INTERVAL HPI/ OVERNIGHT EVENTS: KASEY O/N. C/o tenderness to right heel; offloading Z-float boot provided for addn comfort and offloading.       LABS                        10.4   6.76  )-----------( 164      ( 24 Apr 2022 08:01 )             34.6     04-24    139  |  100  |  22  ----------------------------<  94  4.5   |  29  |  3.93<H>    Ca    8.9      24 Apr 2022 08:01  Phos  5.1     04-24  Mg     1.9     04-24    TPro  7.1  /  Alb  3.7  /  TBili  0.3  /  DBili  x   /  AST  12  /  ALT  <5<L>  /  AlkPhos  77  04-24        ICU Vital Signs Last 24 Hrs  T(C): 36.9 (25 Apr 2022 05:28), Max: 36.9 (24 Apr 2022 09:21)  T(F): 98.4 (25 Apr 2022 05:28), Max: 98.4 (24 Apr 2022 09:21)  HR: 74 (25 Apr 2022 05:28) (72 - 81)  BP: 110/65 (25 Apr 2022 05:28) (109/69 - 127/76)  BP(mean): --  ABP: --  ABP(mean): --  RR: 11 (25 Apr 2022 05:28) (11 - 18)  SpO2: 100% (25 Apr 2022 05:28) (95% - 100%)      RADIOLOGY  < from: Xray Foot AP + Lateral + Oblique, Right (04.21.22 @ 15:29) >  INTERPRETATION:  Clinical History: Soft tissue ulcer.    3 views of the right foot demonstrates no evidence of acute fracture or   dislocation. Vascular calcifications noted. No radiographic evidence of   osteomyelitis.    IMPRESSION: No evidence of acute fracture. No radiographic evidence of   osteomyelitis    < end of copied text >        PHYSICAL EXAM  Lower Extremity Focused  Vasc: Right DP/PT monophasic, Left DP/PT biphasic. CFT <2s x 10. TG warm to cool b/l. Non-pitting edema to both LE. No erythema.  Derm:   Right Foot - Aguirre Grade 1 wound to distal tip of hallux. Aguirre Grade 2 wound to plantar-lateral heel, neg malodor, neg drainage/purulence/PTB; measures 4cm x 3cm x 0.2mm.   Left Foot- no open wounds/abrasions/lacerations. Hyperkeratotic skin to plantar medial heel.   Neuro: Protective sensation somewhat diminished b/l  MSK: 4/5 msk strength b/l in all crural compartments; overall generalized body weakness

## 2022-04-25 NOTE — PROGRESS NOTE ADULT - PROBLEM SELECTOR PLAN 4
On HD Tues/Thurs/Sat, last HD 4/23. Right chest wall tunneled cath.  - Renal following  - f/u on potential fistula placement  - C/w home sevelamer 800 On HD Tues/Thurs/Sat, last HD 4/23. Right chest wall tunneled cath.  - Renal following  - will discuss with renal plan for dialysis tomorrow   - f/u on potential fistula placement  - C/w home sevelamer 800

## 2022-04-25 NOTE — PROGRESS NOTE ADULT - ASSESSMENT
61F current smoker w/ PMHx ESRD (HD T/Th/Sa), HFrEF (EF 25% s/p AICD), IDDM2, COPD (3L O2 at home PRN), multiple myeloma (on Ninlaro), hypothyroidism, depression, neuropathy (on methadone), recurrent DVTs (on eliquis 2.5mg qd), CVA x2 (w/ residual R-sided weakness), PAD s/p LLE bypass, and DHARMESH on CPAP, who was sent in by her dialysis doctor for concern for worsening chronic RLE DVT and concern for infected chronic right heal ulcer. Multiple medical comorbidities contributing to difficulty healing; most notably ESRD, PVD and lack of offloading. Of note, R foot XR shows extensive arterial calcification, increased calcaneal sclerosis, heel radiolucency corresponding to soft tissue defect; No need for addn imaging at this time. Low suspicion OM - wound does not probe to bone.    Podiatry consulted 4/22/2022 to evaluate right heel ulcer for cellulitis and/or osteomyelitis.     Plan:   - No acute signs of infection to right heel at this time, no surgical intervention planned   - Local wound care: Santyl + DSD, Kerlix wrap to heel  - Ammonium lactate to be applied to b/l LE  - Recc Z-float offloading boot to RLE  - Vasc reccs - RLE angio antoinette 4/26/22  - rest of care per primary team    Podiatry following. Plan d/w attending.

## 2022-04-25 NOTE — PROGRESS NOTE ADULT - ASSESSMENT
61 female new ESRD (TTS last HD was tuesday 4/19 via [facility]) DM HTN history of DVT presented to the ED from HD center ([facility] on [facility]) due to RLE swelling  Consult for hemodialysis     Assessment/Plan:     #ESRD on HD TTS@ [facility] center on [facility]  usual Rx 4h 2-3L   next hemodialysis 4/26   electrolytes at goal   euvolemic, UF w/HD   dry weight TBD     #HTN   BP at goal   continue w/ home meds    #access   TDC    #anemia  Hb at goal   obtain iron studies including ferritin, transferrin, iron, and TIBC to be able to calculate % saturation   no indication for EPO or IV Iron at this time     #renal bone disease   Ca ~9  Phos ~6   trend phos daily w/ labs    DVT  DVT history recommended dose is 5mg BID; defer to primary team/hematology     Thank you for the opportunity to participate in the care of your patient. The nephrology service remains available to assist with any questions or concerns. Please feel free to reach us by paging the on-call nephrology fellow for urgent issues or as below.     Hang Garcia M.D.   PGY-5, Nephrology Fellow   C: [phone]   P: [phone]  61 female new ESRD (TTS last HD was tuesday 4/19 via [facility]) DM HTN history of DVT presented to the ED from HD center ([facility] on [facility]) due to RLE swelling  Consult for hemodialysis     Assessment/Plan:     #ESRD on HD TTS@ [facility] center on [facility]  usual Rx 4h 2-3L   next hemodialysis 4/26   electrolytes at goal   euvolemic, UF w/HD   dry weight TBD     #HTN   BP at goal   continue w/ home meds    #access   TDC    #anemia  Hb at goal   obtain iron studies including ferritin, transferrin, iron, and TIBC to be able to calculate % saturation   no indication for EPO or IV Iron at this time     #renal bone disease   Ca ~9  Phos ~6   trend phos daily w/ labs    DVT  DVT history recommended dose of eliquis is 5mg BID; defer to primary team/hematology     Thank you for the opportunity to participate in the care of your patient. The nephrology service remains available to assist with any questions or concerns. Please feel free to reach us by paging the on-call nephrology fellow for urgent issues or as below.     Hang Garcia M.D.   PGY-5, Nephrology Fellow   C: [phone]   P: [phone]

## 2022-04-25 NOTE — PROGRESS NOTE ADULT - ASSESSMENT
per Internal Medicine    61 y o F current smoker w/ PMHx ESRD (HD T/Th/Sa), HFrEF (EF 25% s/p AICD), IDDM2, COPD (3L O2 at home PRN), multiple myeloma (on Ninlaro), hypothyroidism, depression, neuropathy (on methadone), recurrent DVTs (on eliquis 2.5mg qd), CVA x2 (w/ residual R-sided weakness), PAD s/p LLE bypass, and DHARMESH on CPAP, who was sent in by her dialysis doctor for concern for worsening chronic RLE DVT and concern for infected chronic right heal ulcer. Found to have occlusion of R SFA with distal reconstitution pending possible angioplasty vs. stent placement.    Problem/Plan - 1:  ·  Problem: PAD (peripheral artery disease).   ·  Plan: Acute worsening of swelling in the R calf and ankle starting 1 week prior to admission. Pain in the R LE started 2 days prior to admission. Pt has a known non-occlusive R common femoral vein DVT, unchanged since 12/21 per doppler on admission. Unable to palpate distal pulses in b/l LE on exam. History of PAD w/LLE bypass and IDDM2. CTA showed occlusion of R SFA.  - Doppler R LE 4/21: no new DVT, no change in R common femoral vein non-occlusive thrombus since 12/21  - CTA with run-off: occluded SFA w/ reconstitution of popliteal artery  - Carotid doppler: no stenosis    - Vascular: plan for angiogram tomorrow with possible angioplasty/stent  - **switch eliquis to heparin @5pm in preparation for procedure tomorrow   -**repeat PT/PTT tonight.    Problem/Plan - 2:  ·  Problem: Wound cellulitis.   ·  Plan: Concern for infection of chronic wound on right heel. No purulence, pus, or discharge. Some erythema of the foot and ankle, could be related to known chronic DVT.  - S/p Vanc and Zosyn in the ED  - c/w Ancef (started 4/22) 1g IV q8   - Podiatry: low concern for OM, no probe to bone         - Local wound care: Santyl + DSD, Kerlix wrap to heel         - Ammonium lactate to be applied to b/l LE         - Recc Z-float offloading boot to RLE  - Pain regimen: PRN dosed for ESRD.    Problem/Plan - 3:  ·  Problem: Recurrent deep vein thrombosis (DVT).   ·  Plan: Multiple recurrent DVTs in LEs. Home med: eliquis 2.5mg BID. LE doppler US: Since 12/20/2021, unchanged nonocclusive thrombus within the right common femoral vein. No new DVT.  -switch eliquis to heparin @5pm in preparation for procedure tomorrow.    Problem/Plan - 4:  ·  Problem: ESRD on hemodialysis.   ·  Plan: On HD Tues/Thurs/Sat, last HD 4/23. Right chest wall tunneled cath.  - Renal following  - will discuss with renal plan for dialysis tomorrow   - f/u on potential fistula placement  - C/w home sevelamer 800.    Problem/Plan - 5:  ·  Problem: HFrEF (heart failure with reduced ejection fraction).   ·  Plan: EF 25% in 12/21, s/p AICD. 2+ LE edema, no pulmonary edema or JVD. Not in exacerbation.   - Home meds: Entresto  BID, Toprol 100, Torsemide 40 BID   - C/w home Toprol, entresto, and torsemide  - Monitor volume status with dialysis.    Problem/Plan - 6:  ·  Problem: Diabetic neuropathy.   ·  Plan: C/b heel ulcer. Home meds: Gabapentin 100 bid and methadone 10mg TID.   - Follows with outpatient pain management Dr. Oliveira  - c/w gabapentin 100 bid   - methadone adjusted to 10mg QD with additional PRN as needed      # DM2  Home meds: Levemir 25U and novolog 10U premeal  - A1c: 6.0%  - Restart Lantus 10 and lispro 5 TID for now, uptitrate as needed  - mISS.    Problem/Plan - 7:  ·  Problem: Hypertension.   ·  Plan: Home meds: Entresto  BID and metoprolol XL 100mg qd  - c/w home meds      # Cerebrovascular accident (CVA)  Hx of strokes in the past w/ right sided residual deficit  - C/w home atorvastatin 80.    Problem/Plan - 8:  ·  Problem: COPD (chronic obstructive pulmonary disease).   ·  Plan: Current smoker, 1/4 ppd. On 3L NC at home. CPAP at night for DHARMESH. Home inhalers: Symbicort and albuterol PRN  - C/w home inhalers      #DHARMESH  - CPAP at night for DHARMESH.    Problem/Plan - 9:  ·  Problem: Anxiety and depression.   ·  Plan: Home meds: paroxetine 10mg, trazodone 50 at bedtime PRN for insomnia, buspirone 15  - C/w home antidepressants and anti-anxiety meds.    Problem/Plan - 10:  ·  Problem: Hypothyroidism.   ·  Plan; - c/w home levothyroxine 50mcg.    Problem/Plan - 11:  ·  Problem: Multiple myeloma.   ·  Plan: Home med: Ninlaro 3mg on Mondays and Decadron 4mg on Mondays  - Restart on discharge.    Problem/Plan - 12:  ·  Problem: Prophylactic measure.   ·  Plan: F: none  E: Replete for K<4, Mag <2  N: Renal diet  A: Eliquis 2.5 BID  Renally dose medications  Code status: Full  Dispo: [facility].

## 2022-04-25 NOTE — PROVIDER CONTACT NOTE (HYPOGLYCEMIA EVENT) - NS PROVIDER CONTACT BACKGROUND-HYPO
Age: 61y    Gender: Female    POCT Blood Glucose:  73 mg/dL (04-25-22 @ 16:36)  52 mg/dL (04-25-22 @ 16:12)  148 mg/dL (04-25-22 @ 12:18)  180 mg/dL (04-25-22 @ 08:21)  138 mg/dL (04-25-22 @ 02:09)  153 mg/dL (04-24-22 @ 22:04)  241 mg/dL (04-24-22 @ 19:44)  103 mg/dL (04-24-22 @ 18:41)      eMAR:atorvastatin   80 milliGRAM(s) Oral (04-24-22 @ 22:20)    fenofibrate Tablet   48 milliGRAM(s) Oral (04-25-22 @ 13:03)    insulin glargine Injectable (LANTUS)   10 Unit(s) SubCutaneous (04-24-22 @ 22:21)    insulin lispro (ADMELOG) corrective regimen sliding scale   0 Unit(s) SubCutaneous (04-25-22 @ 13:02)   2 Unit(s) SubCutaneous (04-25-22 @ 09:19)    insulin lispro Injectable (ADMELOG)   5 Unit(s) SubCutaneous (04-25-22 @ 13:03)   5 Unit(s) SubCutaneous (04-25-22 @ 09:20)

## 2022-04-25 NOTE — PROGRESS NOTE ADULT - PROBLEM SELECTOR PLAN 2
Concern for infection of chronic wound on right heel. No purulence, pus, or discharge. Some erythema of the foot and ankle, could be related to known chronic DVT.  - S/p Vanc and Zosyn in the ED  - c/w Ancef (started 4/22) 1g IV q8   - Podiatry: low concern for OM, no probe to bone         - Local wound care: Santyl + DSD, Kerlix wrap to heel         - Ammonium lactate to be applied to b/l LE         - Recc Z-float offloading boot to RLE  - Pain regimen: PRN dosed for ESRD

## 2022-04-25 NOTE — PHYSICAL THERAPY INITIAL EVALUATION ADULT - MODALITIES TREATMENT COMMENTS
Pt appears to be close to baseline with mobility, however is limited by pain. PT will follow up to assess mobility post planned procedure. Pt educated to ambulate with nursing staff and to sit in chair for meals to maintain mobility during hospital and decrease risk for new DVT.

## 2022-04-25 NOTE — PHYSICAL THERAPY INITIAL EVALUATION ADULT - TRANSFER SAFETY CONCERNS NOTED: SIT/STAND, REHAB EVAL
Pt performed STS from/to EOB at normal seat height, into/from w/c and also performed toilet transfer./decreased weight-shifting ability

## 2022-04-25 NOTE — PROGRESS NOTE ADULT - SUBJECTIVE AND OBJECTIVE BOX
***INCOMPLETE***    OVERNIGHT EVENTS:       SUBJECTIVE:        OBJECTIVE:  Vital Signs Last 24 Hrs  T(C): 36.9 (25 Apr 2022 05:28), Max: 36.9 (24 Apr 2022 09:21)  T(F): 98.4 (25 Apr 2022 05:28), Max: 98.4 (24 Apr 2022 09:21)  HR: 74 (25 Apr 2022 05:28) (72 - 81)  BP: 110/65 (25 Apr 2022 05:28) (109/69 - 127/76)  BP(mean): --  RR: 11 (25 Apr 2022 05:28) (11 - 18)  SpO2: 100% (25 Apr 2022 05:28) (95% - 100%)    Physical Exam:  Gen: no acute distress; smiling, interactive, well appearing  HEENT: NC/AT; AFOSF; pupils equal, responsive, reactive to light; no conjunctivitis or scleral icterus; no nasal discharge; no nasal congestion; oropharynx without exudates/erythema; mucus membranes moist  Neck: FROM, supple, no cervical lymphadenopathy  Chest: clear to auscultation bilaterally, no crackles/wheezes, good air entry, no tachypnea or retractions  CV: regular rate and rhythm, no murmurs   Abd: soft, nontender, nondistended, no HSM appreciated, NABS  : normal external genitalia  Back: no vertebral or paraspinal tenderness along entire spine; no CVAT  Extrem: no joint effusion or tenderness; FROM of all joints; no deformities or erythema noted. 2+ peripheral pulses, WWP  Neuro: grossly nonfocal, strength and tone grossly normal    Labs:                        10.2   7.08  )-----------( 159      ( 25 Apr 2022 07:46 )             33.9     04-25    135  |  94<L>  |  32<H>  ----------------------------<  130<H>  4.9   |  28  |  5.39<H>    Ca    8.9      25 Apr 2022 07:46  Phos  6.1     04-25  Mg     2.0     04-25    TPro  7.6  /  Alb  4.0  /  TBili  0.2  /  DBili  x   /  AST  12  /  ALT  <5<L>  /  AlkPhos  75  04-25      Fingerstick  glucose: POCT Blood Glucose.: 180 mg/dL (25 Apr 2022 08:21)      MEDICATIONS  (STANDING):  ammonium lactate 12% Lotion 1 Application(s) Topical <User Schedule>  apixaban 2.5 milliGRAM(s) Oral every 12 hours  atorvastatin 80 milliGRAM(s) Oral at bedtime  budesonide 160 MICROgram(s)/formoterol 4.5 MICROgram(s) Inhaler 2 Puff(s) Inhalation two times a day  ceFAZolin   IVPB 1000 milliGRAM(s) IV Intermittent every 8 hours  collagenase Ointment 1 Application(s) Topical daily  dextrose 5%. 1000 milliLiter(s) (50 mL/Hr) IV Continuous <Continuous>  dextrose 5%. 1000 milliLiter(s) (100 mL/Hr) IV Continuous <Continuous>  dextrose 50% Injectable 25 Gram(s) IV Push once  dextrose 50% Injectable 12.5 Gram(s) IV Push once  dextrose 50% Injectable 25 Gram(s) IV Push once  fenofibrate Tablet 48 milliGRAM(s) Oral daily  gabapentin 100 milliGRAM(s) Oral every 12 hours  glucagon  Injectable 1 milliGRAM(s) IntraMuscular once  insulin glargine Injectable (LANTUS) 10 Unit(s) SubCutaneous at bedtime  insulin lispro (ADMELOG) corrective regimen sliding scale   SubCutaneous Before meals and at bedtime  insulin lispro Injectable (ADMELOG) 5 Unit(s) SubCutaneous three times a day before meals  melatonin 3 milliGRAM(s) Oral at bedtime  methadone    Tablet 10 milliGRAM(s) Oral three times a day  metoprolol succinate  milliGRAM(s) Oral daily  pantoprazole    Tablet 40 milliGRAM(s) Oral before breakfast  PARoxetine 10 milliGRAM(s) Oral at bedtime  sacubitril 97 mG/valsartan 103 mG 1 Tablet(s) Oral two times a day  torsemide 40 milliGRAM(s) Oral every 12 hours  traZODone 50 milliGRAM(s) Oral at bedtime    MEDICATIONS  (PRN):  acetaminophen     Tablet .. 650 milliGRAM(s) Oral every 6 hours PRN Temp greater or equal to 38C (100.4F), Mild Pain (1 - 3)  dextrose Oral Gel 15 Gram(s) Oral once PRN Blood Glucose LESS THAN 70 milliGRAM(s)/deciliter  guaiFENesin Oral Liquid (Sugar-Free) 200 milliGRAM(s) Oral every 6 hours PRN Cough      Allergies    aspirin (Other (Moderate); Rash)  oral contrast (Unknown)    Intolerances        RADIOLOGY & ADDITIONAL TESTS: Reviewed.                     OVERNIGHT EVENTS: NAEO.      SUBJECTIVE:  Pt was seen and examined at bedside. She feels unchanged from admission, now complaining of 9/10 RLE pain with minimal improvement with tylenol. Given Dilaudid due to improvement in the past.      OBJECTIVE:  Vital Signs Last 24 Hrs  T(C): 36.9 (25 Apr 2022 05:28), Max: 36.9 (24 Apr 2022 09:21)  T(F): 98.4 (25 Apr 2022 05:28), Max: 98.4 (24 Apr 2022 09:21)  HR: 74 (25 Apr 2022 05:28) (72 - 81)  BP: 110/65 (25 Apr 2022 05:28) (109/69 - 127/76)  BP(mean): --  RR: 11 (25 Apr 2022 05:28) (11 - 18)  SpO2: 100% (25 Apr 2022 05:28) (95% - 100%)    Physical Exam:  Gen: no acute distress; obese, interactive, well appearing  HEENT: pupils equal, responsive, reactive to light; no conjunctivitis or scleral icterus; no nasal discharge; no nasal congestion; mucus membranes moist  Neck: FROM, supple, no cervical lymphadenopathy  Chest: clear to auscultation bilaterally, no crackles/wheezes, good air entry, no tachypnea or retractions  CV: regular rate and rhythm, no murmurs   Abd: soft, nontender, nondistended, no HSM appreciated, NABS  Extrem: b/l UE 2+ distal pulses. Unable to palpate distal pulses in RLE, 1+ pulses in LLE. R leg significant discoloration (pt reports unchanged from baseline), moderate pain with palpation of the R calf  Neuro: Sensation and motor grossly intact in the b/l UE. B/l LE sensation to touch pain and vibration absent in the feet, calf and medial thigh. Sensation to touch present in the lateral thigh b/l. 0/5 dorsiflexion of the R foot. 2/5 dorsiflexion of the L foot.    Labs:                        10.2   7.08  )-----------( 159      ( 25 Apr 2022 07:46 )             33.9     04-25    135  |  94<L>  |  32<H>  ----------------------------<  130<H>  4.9   |  28  |  5.39<H>    Ca    8.9      25 Apr 2022 07:46  Phos  6.1     04-25  Mg     2.0     04-25    TPro  7.6  /  Alb  4.0  /  TBili  0.2  /  DBili  x   /  AST  12  /  ALT  <5<L>  /  AlkPhos  75  04-25      Fingerstick  glucose: POCT Blood Glucose.: 180 mg/dL (25 Apr 2022 08:21)      MEDICATIONS  (STANDING):  ammonium lactate 12% Lotion 1 Application(s) Topical <User Schedule>  apixaban 2.5 milliGRAM(s) Oral every 12 hours  atorvastatin 80 milliGRAM(s) Oral at bedtime  budesonide 160 MICROgram(s)/formoterol 4.5 MICROgram(s) Inhaler 2 Puff(s) Inhalation two times a day  ceFAZolin   IVPB 1000 milliGRAM(s) IV Intermittent every 8 hours  collagenase Ointment 1 Application(s) Topical daily  dextrose 5%. 1000 milliLiter(s) (50 mL/Hr) IV Continuous <Continuous>  dextrose 5%. 1000 milliLiter(s) (100 mL/Hr) IV Continuous <Continuous>  dextrose 50% Injectable 25 Gram(s) IV Push once  dextrose 50% Injectable 12.5 Gram(s) IV Push once  dextrose 50% Injectable 25 Gram(s) IV Push once  fenofibrate Tablet 48 milliGRAM(s) Oral daily  gabapentin 100 milliGRAM(s) Oral every 12 hours  glucagon  Injectable 1 milliGRAM(s) IntraMuscular once  insulin glargine Injectable (LANTUS) 10 Unit(s) SubCutaneous at bedtime  insulin lispro (ADMELOG) corrective regimen sliding scale   SubCutaneous Before meals and at bedtime  insulin lispro Injectable (ADMELOG) 5 Unit(s) SubCutaneous three times a day before meals  melatonin 3 milliGRAM(s) Oral at bedtime  methadone    Tablet 10 milliGRAM(s) Oral three times a day  metoprolol succinate  milliGRAM(s) Oral daily  pantoprazole    Tablet 40 milliGRAM(s) Oral before breakfast  PARoxetine 10 milliGRAM(s) Oral at bedtime  sacubitril 97 mG/valsartan 103 mG 1 Tablet(s) Oral two times a day  torsemide 40 milliGRAM(s) Oral every 12 hours  traZODone 50 milliGRAM(s) Oral at bedtime    MEDICATIONS  (PRN):  acetaminophen     Tablet .. 650 milliGRAM(s) Oral every 6 hours PRN Temp greater or equal to 38C (100.4F), Mild Pain (1 - 3)  dextrose Oral Gel 15 Gram(s) Oral once PRN Blood Glucose LESS THAN 70 milliGRAM(s)/deciliter  guaiFENesin Oral Liquid (Sugar-Free) 200 milliGRAM(s) Oral every 6 hours PRN Cough      Allergies    aspirin (Other (Moderate); Rash)  oral contrast (Unknown)    Intolerances        RADIOLOGY & ADDITIONAL TESTS: Reviewed.    < from: CT Angio Abd Aorta w/run-off w/ IV Cont (04.24.22 @ 13:34) >  IMPRESSION:    Extensive calcific atherosclerotic disease of the abdominal and lower   extremity arteries as above. No aneurysm.    Occluded right SFA with reconstitution of popliteal artery through   collaterals.    Patent left common femoral to popliteal bypass graft.    Apparent filling defect in the right atrium, suspicious for thrombus.   Recommend correlation with echocardiogram.    Left renal lesions, probably cysts. Recommend correlation with ultrasound   to exclude solid masses.    < end of copied text >        < from: US Duplex Carotid Arteries Complete, Bilateral (04.24.22 @ 13:11) >  IMPRESSION:  Moderate calcified plaque. No significant hemodynamic   stenosis of either carotid artery.      Measurement of carotid stenosis is based on velocity parameters that   correlate the residual internal carotid diameter with that of the more   distal vessel in accordance with a method such as the [place] [employer]   Symptomatic Carotid Endarterectomy Trial (NASCET).    < end of copied text >

## 2022-04-25 NOTE — DIETITIAN INITIAL EVALUATION ADULT - ADD RECOMMEND
1. Continue Renal diet  2. Consider Renvela given elevated phosphorus (Post-HD [4/24] 5.1mg/dL)  3. Educated provided and will reinforce prn 1. Continue Renal diet; monitor need for Consistent Carbohydrate modifier  2. Will honor food preferences as able   3. Continue insulin regimen and adjust prn to promote euglycemia   4. Consider Renvela given elevated phosphorus (Post-HD Phos 5.1mg/dL [4/24])  5. Education provided and will reinforce prn

## 2022-04-25 NOTE — DIETITIAN INITIAL EVALUATION ADULT - PERTINENT LABORATORY DATA
04-25    135  |  94<L>  |  32<H>  ----------------------------<  130<H>  4.9   |  28  |  5.39<H>    Ca    8.9      25 Apr 2022 07:46  Phos  6.1     04-25  Mg     2.0     04-25    TPro  7.6  /  Alb  4.0  /  TBili  0.2  /  DBili  x   /  AST  12  /  ALT  <5<L>  /  AlkPhos  75  04-25  POCT Blood Glucose.: 180 mg/dL (04-25-22 @ 08:21)  A1C with Estimated Average Glucose Result: 7.3 % (03-27-22 @ 11:40)  A1C with Estimated Average Glucose Result: 8.4 % (11-30-21 @ 06:29)  A1C with Estimated Average Glucose Result: 9.0 % (11-29-21 @ 11:15)   04-25    135  |  94<L>  |  32<H>  ----------------------------<  130<H>  4.9   |  28  |  5.39<H>    Ca    8.9      25 Apr 2022 07:46  Phos  6.1 (H)    04-25  Mg     2.0     04-25    TPro  7.6  /  Alb  4.0  /  TBili  0.2  /  DBili  x   /  AST  12  /  ALT  <5<L>  /  AlkPhos  75  04-25  POCT Blood Glucose.: 180 mg/dL (04-25-22 @ 08:21)  A1C 6.0% per MD note 4/22/22  A1C with Estimated Average Glucose Result: 7.3 % (03-27-22 @ 11:40)  A1C with Estimated Average Glucose Result: 8.4 % (11-30-21 @ 06:29)  A1C with Estimated Average Glucose Result: 9.0 % (11-29-21 @ 11:15)

## 2022-04-25 NOTE — PHYSICAL THERAPY INITIAL EVALUATION ADULT - GENERAL OBSERVATIONS, REHAB EVAL
PT IE Completed. Pt received semi-supine in bed, A&Ox4, +RA, +heplock, in reporting 9/10 RLE pain, and agreeable to work with PT, DANISH Diallo notified.Pt left as found +bed alarm, +call dewitt within reach, DANISH Diallo notified. Pt can benefit from PT in order to improve quality of life by increasing strength/endurance/balance with functional mobility and ADLS. PT IE Completed. Pt received semi-supine in bed, A&Ox4, R chest permacath, RLE CAIR boot, +RA, +heplock, in reporting 9/10 RLE pain, and agreeable to work with PT, ADNISH Diallo notified.Pt left as found +bed alarm, +call dewitt within reach, DANISH Diallo notified. Pt can benefit from PT in order to improve quality of life by increasing strength/endurance/balance with functional mobility and ADLS.

## 2022-04-25 NOTE — PROGRESS NOTE ADULT - ASSESSMENT
61F current smoker w/ PMHx ESRD (HD T/Th/Sa), HFrEF (EF 25% s/p AICD), IDDM2, COPD (3L O2 at home PRN), multiple myeloma (on Ninlaro), hypothyroidism, depression, neuropathy (on methadone), recurrent DVTs (on eliquis 2.5mg qd), CVA x2 (w/ residual R-sided weakness), PAD s/p LLE bypass, and DHARMESH on CPAP, who was sent in by her dialysis doctor for concern for worsening chronic RLE DVT and concern for infected chronic right heal ulcer. Found to have occlusion of R SFA with distal reconstitution pending possible angioplasty vs. stent placement.

## 2022-04-25 NOTE — PROGRESS NOTE ADULT - PROBLEM SELECTOR PLAN 3
Multiple recurrent DVTs in LEs. Home med: eliquis 2.5mg BID. LE doppler US: Since 12/20/2021, unchanged nonocclusive thrombus within the right common femoral vein. No new DVT.  -switch eliquis to heparin @5pm in preparation for procedure tomorrow

## 2022-04-25 NOTE — DIETITIAN INITIAL EVALUATION ADULT - OTHER CALCULATIONS
Fluid needs: 1000 ml + urine output  IBW 125lbs +/- 10%  Ideal body weight used to calculate estimated nutrition needs based on standards of care as pt is >120% of IBW (160%). Adjusting for age, BMI, ESRD on HD Fluid needs per MD team  IBW 125lbs +/- 10%  Ideal body weight used to calculate estimated nutrition needs based on standards of care as pt is >120% of IBW (160%). Adjusting for age, BMI, ESRD on HD

## 2022-04-25 NOTE — DIETITIAN INITIAL EVALUATION ADULT - PERTINENT MEDS FT
MEDICATIONS  (STANDING):  ammonium lactate 12% Lotion 1 Application(s) Topical <User Schedule>  atorvastatin 80 milliGRAM(s) Oral at bedtime  budesonide 160 MICROgram(s)/formoterol 4.5 MICROgram(s) Inhaler 2 Puff(s) Inhalation two times a day  ceFAZolin   IVPB 1000 milliGRAM(s) IV Intermittent every 8 hours  collagenase Ointment 1 Application(s) Topical daily  dextrose 5%. 1000 milliLiter(s) (50 mL/Hr) IV Continuous <Continuous>  dextrose 5%. 1000 milliLiter(s) (100 mL/Hr) IV Continuous <Continuous>  dextrose 50% Injectable 25 Gram(s) IV Push once  dextrose 50% Injectable 12.5 Gram(s) IV Push once  dextrose 50% Injectable 25 Gram(s) IV Push once  fenofibrate Tablet 48 milliGRAM(s) Oral daily  gabapentin 100 milliGRAM(s) Oral every 12 hours  glucagon  Injectable 1 milliGRAM(s) IntraMuscular once  heparin  Infusion.  Unit(s)/Hr (17 mL/Hr) IV Continuous <Continuous>  insulin glargine Injectable (LANTUS) 10 Unit(s) SubCutaneous at bedtime  insulin lispro (ADMELOG) corrective regimen sliding scale   SubCutaneous Before meals and at bedtime  insulin lispro Injectable (ADMELOG) 5 Unit(s) SubCutaneous three times a day before meals  melatonin 3 milliGRAM(s) Oral at bedtime  methadone    Tablet 10 milliGRAM(s) Oral three times a day  metoprolol succinate  milliGRAM(s) Oral daily  pantoprazole    Tablet 40 milliGRAM(s) Oral before breakfast  PARoxetine 10 milliGRAM(s) Oral at bedtime  sacubitril 97 mG/valsartan 103 mG 1 Tablet(s) Oral two times a day  torsemide 40 milliGRAM(s) Oral every 12 hours  traZODone 50 milliGRAM(s) Oral at bedtime    MEDICATIONS  (PRN):  acetaminophen     Tablet .. 650 milliGRAM(s) Oral every 6 hours PRN Temp greater or equal to 38C (100.4F), Mild Pain (1 - 3)  dextrose Oral Gel 15 Gram(s) Oral once PRN Blood Glucose LESS THAN 70 milliGRAM(s)/deciliter  guaiFENesin Oral Liquid (Sugar-Free) 200 milliGRAM(s) Oral every 6 hours PRN Cough

## 2022-04-25 NOTE — PROGRESS NOTE ADULT - SUBJECTIVE AND OBJECTIVE BOX
INTERVAL EVENTS: KASEY    PAST MEDICAL & SURGICAL HISTORY:  CHF (congestive heart failure)    Diabetes    COPD (chronic obstructive pulmonary disease)    Depression    CVA (cerebral vascular accident)    HLD (hyperlipidemia)    Chronic pain    Congestive heart failure, unspecified HF chronicity, unspecified heart failure type    Acute on chronic systolic heart failure    DM (diabetes mellitus)    CVA (cerebral vascular accident)    DVT (deep venous thrombosis)    HTN (hypertension)    Depression    Bipolar depression    PAD (peripheral artery disease)    Neuropathy    Hypothyroidism    Multiple myeloma    CKD (chronic kidney disease), stage IV    DHARMESH on CPAP    AICD (automatic cardioverter/defibrillator) present    H/O abdominal hysterectomy    H/O tubal ligation    History of cholecystectomy    H/O extremity bypass graft        MEDICATIONS  (STANDING):  ammonium lactate 12% Lotion 1 Application(s) Topical <User Schedule>  apixaban 2.5 milliGRAM(s) Oral every 12 hours  atorvastatin 80 milliGRAM(s) Oral at bedtime  budesonide 160 MICROgram(s)/formoterol 4.5 MICROgram(s) Inhaler 2 Puff(s) Inhalation two times a day  ceFAZolin   IVPB 1000 milliGRAM(s) IV Intermittent every 8 hours  collagenase Ointment 1 Application(s) Topical daily  dextrose 5%. 1000 milliLiter(s) (50 mL/Hr) IV Continuous <Continuous>  dextrose 5%. 1000 milliLiter(s) (100 mL/Hr) IV Continuous <Continuous>  dextrose 50% Injectable 25 Gram(s) IV Push once  dextrose 50% Injectable 12.5 Gram(s) IV Push once  dextrose 50% Injectable 25 Gram(s) IV Push once  fenofibrate Tablet 48 milliGRAM(s) Oral daily  gabapentin 100 milliGRAM(s) Oral every 12 hours  glucagon  Injectable 1 milliGRAM(s) IntraMuscular once  insulin glargine Injectable (LANTUS) 10 Unit(s) SubCutaneous at bedtime  insulin lispro (ADMELOG) corrective regimen sliding scale   SubCutaneous Before meals and at bedtime  insulin lispro Injectable (ADMELOG) 5 Unit(s) SubCutaneous three times a day before meals  melatonin 3 milliGRAM(s) Oral at bedtime  methadone    Tablet 10 milliGRAM(s) Oral three times a day  metoprolol succinate  milliGRAM(s) Oral daily  pantoprazole    Tablet 40 milliGRAM(s) Oral before breakfast  PARoxetine 10 milliGRAM(s) Oral at bedtime  sacubitril 97 mG/valsartan 103 mG 1 Tablet(s) Oral two times a day  torsemide 40 milliGRAM(s) Oral every 12 hours  traZODone 50 milliGRAM(s) Oral at bedtime    MEDICATIONS  (PRN):  acetaminophen     Tablet .. 650 milliGRAM(s) Oral every 6 hours PRN Temp greater or equal to 38C (100.4F), Mild Pain (1 - 3)  dextrose Oral Gel 15 Gram(s) Oral once PRN Blood Glucose LESS THAN 70 milliGRAM(s)/deciliter  guaiFENesin Oral Liquid (Sugar-Free) 200 milliGRAM(s) Oral every 6 hours PRN Cough    Vital Signs Last 24 Hrs  T(C): 36.9 (25 Apr 2022 05:28), Max: 36.9 (24 Apr 2022 09:21)  T(F): 98.4 (25 Apr 2022 05:28), Max: 98.4 (24 Apr 2022 09:21)  HR: 74 (25 Apr 2022 05:28) (72 - 81)  BP: 110/65 (25 Apr 2022 05:28) (109/69 - 127/76)  BP(mean): --  RR: 11 (25 Apr 2022 05:28) (11 - 18)  SpO2: 100% (25 Apr 2022 05:28) (95% - 100%)    PHYSICAL EXAM:  GEN: NAD  HEENT: EOMI   RESP: CTA b/l  CV: RRR. Normal S1/S2. No m/r/g.  ABD: soft, non-distended  EXT: No edema   NEURO: alert and attentive    LABS:                        10.4   6.76  )-----------( 164      ( 24 Apr 2022 08:01 )             34.6     04-24    139  |  100  |  22  ----------------------------<  94  4.5   |  29  |  3.93<H>    Ca    8.9      24 Apr 2022 08:01  Phos  5.1     04-24  Mg     1.9     04-24    TPro  7.1  /  Alb  3.7  /  TBili  0.3  /  DBili  x   /  AST  12  /  ALT  <5<L>  /  AlkPhos  77  04-24            I&O's Summary    24 Apr 2022 07:01  -  25 Apr 2022 07:00  --------------------------------------------------------  IN: 300 mL / OUT: 0 mL / NET: 300 mL

## 2022-04-25 NOTE — PROGRESS NOTE ADULT - TIME BILLING
Dispo: angiogram tomorrow
as noted above
Dispo: home pending Vascular and Podiatry recs
Dispo: home pending vascular work-up, cx data
Dispo: home pending CT A/P, cx data

## 2022-04-25 NOTE — DIETITIAN INITIAL EVALUATION ADULT - OTHER INFO
61F current smoker w/ PMHx ESRD (HD T/Th/Sa), HFrEF (EF 25% s/p AICD), IDDM2, COPD (3L O2 at home PRN), multiple myeloma (on Ninlaro), hypothyroidism, depression, neuropathy (on methadone), recurrent DVTs (on eliquis 2.5mg qd), CVA x2 (w/ residual R-sided weakness), PAD s/p LLE bypass, and DHARMESH on CPAP, who was sent in by her dialysis doctor for concern for worsening chronic RLE DVT and concern for infected chronic right heal ulcer. Found to have occlusion of R SFA with distal reconstitution pending possible angioplasty vs. stent placement. Pt had last HD on 4/23.    Patient seen sitting up in bed doing a cross word puzzle. Patient reports good appetite/po intake PTA, completing 3 meals per day and completing >/= 75% of meals. Pt endorses fluctuating weights but reports UBW ~200lbs (consistent with admit weight). Denies food allergies.    In-house, pt is currently NPO for , however writer observed 100% of breakfast tray completed at bedside. Previously on a Renal diet and reports good appetite/po intake, completing >75% of meals, though expressed menu fatigue on renal diet (obtained preferences and will honor as able). Discussed Renal diet with pt and encouraged pt to limit intake of processed foods and select fresh/frozen vegetables to reduce intake of sodium and phosphorus. Pt's post-HD weight (4/23) 192 lbs. Pt is meeting >/=75% of estimated nutrition needs at this time.    Denies N/V/D/C; per flowsheets, +BM 4/25. Reports pain in right leg. Skin: wound L shin scab, R diabetic ulcer. No edema noted. Kavon 19.   61F current smoker w/ PMHx ESRD (HD T/Th/Sa), HFrEF (EF 25% s/p AICD), IDDM2, COPD (3L O2 at home PRN), multiple myeloma (on Ninlaro), hypothyroidism, depression, neuropathy (on methadone), recurrent DVTs (on eliquis 2.5mg qd), CVA x2 (w/ residual R-sided weakness), PAD s/p LLE bypass, and DHARMESH on CPAP, who was sent in by her dialysis doctor for concern for worsening chronic RLE DVT and concern for infected chronic right heal ulcer. Found to have occlusion of R SFA with distal reconstitution pending possible angioplasty vs. stent placement. Pt had last HD on 4/23.    Patient seen sitting up in bed doing a cross word puzzle. Patient reports good appetite/po intake PTA, completing 3 meals per day and completing >/= 75% of meals. Pt endorses fluctuating weights but reports UBW ~200lbs (consistent with admit weight). Denies food allergies.    In-house, pt is currently on Renal diet. Pt reports good appetite/po intake, completing >75% of meals, though expressed menu fatigue on renal diet (obtained preferences and will honor as able). Discussed Renal diet with pt and encouraged pt to limit intake of processed foods and select fresh/frozen vegetables to reduce intake of sodium and phosphorus. Pt is meeting >/=75% of estimated nutrition needs at this time. Post-HD weight (4/23) 192lbs. Denies N/V/D/C; per flowsheets, +BM 4/25. Reports pain in right leg. Skin: wound L shin scab, R diabetic ulcer. No edema noted. Kavon 19.

## 2022-04-25 NOTE — PROGRESS NOTE ADULT - SUBJECTIVE AND OBJECTIVE BOX
Patient is a 61y old  Female who presents with a chief complaint of DM foot ulcer (25 Apr 2022 13:06)      INTERVAL HPI/OVERNIGHT EVENTS:    Pt. seen and examined earlier today  Pt. c/o RLE pain, controlled w/ rx  Imaging results d/w Pt.  Pt. denies F/C, CP, SOB    Review of Systems: 12 point review of systems otherwise negative    MEDICATIONS  (STANDING):  ammonium lactate 12% Lotion 1 Application(s) Topical <User Schedule>  atorvastatin 80 milliGRAM(s) Oral at bedtime  budesonide 160 MICROgram(s)/formoterol 4.5 MICROgram(s) Inhaler 2 Puff(s) Inhalation two times a day  ceFAZolin   IVPB 1000 milliGRAM(s) IV Intermittent every 8 hours  collagenase Ointment 1 Application(s) Topical daily  dextrose 5%. 1000 milliLiter(s) (50 mL/Hr) IV Continuous <Continuous>  dextrose 5%. 1000 milliLiter(s) (100 mL/Hr) IV Continuous <Continuous>  dextrose 50% Injectable 25 Gram(s) IV Push once  dextrose 50% Injectable 12.5 Gram(s) IV Push once  dextrose 50% Injectable 25 Gram(s) IV Push once  fenofibrate Tablet 48 milliGRAM(s) Oral daily  gabapentin 100 milliGRAM(s) Oral every 12 hours  glucagon  Injectable 1 milliGRAM(s) IntraMuscular once  heparin  Infusion.  Unit(s)/Hr (17 mL/Hr) IV Continuous <Continuous>  insulin glargine Injectable (LANTUS) 10 Unit(s) SubCutaneous at bedtime  insulin lispro (ADMELOG) corrective regimen sliding scale   SubCutaneous Before meals and at bedtime  insulin lispro Injectable (ADMELOG) 5 Unit(s) SubCutaneous three times a day before meals  melatonin 3 milliGRAM(s) Oral at bedtime  methadone    Tablet 10 milliGRAM(s) Oral three times a day  metoprolol succinate  milliGRAM(s) Oral daily  pantoprazole    Tablet 40 milliGRAM(s) Oral before breakfast  PARoxetine 10 milliGRAM(s) Oral at bedtime  sacubitril 97 mG/valsartan 103 mG 1 Tablet(s) Oral two times a day  torsemide 40 milliGRAM(s) Oral every 12 hours  traZODone 50 milliGRAM(s) Oral at bedtime    MEDICATIONS  (PRN):  acetaminophen     Tablet .. 650 milliGRAM(s) Oral every 6 hours PRN Temp greater or equal to 38C (100.4F), Mild Pain (1 - 3)  dextrose Oral Gel 15 Gram(s) Oral once PRN Blood Glucose LESS THAN 70 milliGRAM(s)/deciliter  guaiFENesin Oral Liquid (Sugar-Free) 200 milliGRAM(s) Oral every 6 hours PRN Cough      Allergies    aspirin (Other (Moderate); Rash)  oral contrast (Unknown)    Intolerances          Vital Signs Last 24 Hrs  T(C): 36.7 (25 Apr 2022 08:48), Max: 36.9 (25 Apr 2022 05:28)  T(F): 98.1 (25 Apr 2022 08:48), Max: 98.4 (25 Apr 2022 05:28)  HR: 75 (25 Apr 2022 09:38) (71 - 85)  BP: 125/77 (25 Apr 2022 09:27) (109/69 - 125/77)  BP(mean): 93 (25 Apr 2022 09:27) (81 - 93)  RR: 16 (25 Apr 2022 09:38) (11 - 18)  SpO2: 97% (25 Apr 2022 09:38) (94% - 100%)  CAPILLARY BLOOD GLUCOSE      POCT Blood Glucose.: 148 mg/dL (25 Apr 2022 12:18)  POCT Blood Glucose.: 180 mg/dL (25 Apr 2022 08:21)  POCT Blood Glucose.: 138 mg/dL (25 Apr 2022 02:09)  POCT Blood Glucose.: 153 mg/dL (24 Apr 2022 22:04)  POCT Blood Glucose.: 241 mg/dL (24 Apr 2022 19:44)  POCT Blood Glucose.: 103 mg/dL (24 Apr 2022 18:41)  POCT Blood Glucose.: 73 mg/dL (24 Apr 2022 18:04)  POCT Blood Glucose.: 60 mg/dL (24 Apr 2022 17:35)      04-24 @ 07:01  -  04-25 @ 07:00  --------------------------------------------------------  IN: 300 mL / OUT: 0 mL / NET: 300 mL        Physical Exam:  (earlier today)  Daily     Daily   General:  non-toxic and comfortable-appearing in NAD  HEENT:  MMM, R chest HD cath  CV:  RRR, no JVD  Lungs:  CTA B/L, normal WOB on RA  Abdomen:  soft NT ND  Extremities:  R foot Kerlix dressing C/D/I +off-loading boot; maturing RUE AVF  Skin:  WWP  Neuro:  AAOx3    LABS:                        10.2   7.08  )-----------( 159      ( 25 Apr 2022 07:46 )             33.9     04-25    135  |  94<L>  |  32<H>  ----------------------------<  130<H>  4.9   |  28  |  5.39<H>    Ca    8.9      25 Apr 2022 07:46  Phos  6.1     04-25  Mg     2.0     04-25    TPro  7.6  /  Alb  4.0  /  TBili  0.2  /  DBili  x   /  AST  12  /  ALT  <5<L>  /  AlkPhos  75  04-25

## 2022-04-25 NOTE — PHYSICAL THERAPY INITIAL EVALUATION ADULT - GAIT PATTERN USED, PT EVAL
Pt ambulated with antalgic gait pattern to bathroom, taking seated breaks in w/c and for toilet transfer. Pt reported 10/10 RLE throbbing pain. No hands on assistance required./3-point gait

## 2022-04-25 NOTE — PROGRESS NOTE ADULT - ASSESSMENT
61F current smoker w/ PMHx ESRD (HD T/Th/Sa), HFrEF (EF 25% 9/21, s/p AICD), IDDM2, COPD (3L O2 at home PRN), multiple myeloma (on Ninlaro), hypothyroidism, depression, neuropathy (on methadone), recurrent DVTs (on eliquis 2.5mg qd), CVA x2 (w/ residual R-sided weakness), PAD s/p LLE bypass, and DHARMESH on CPAP, who was sent in by her dialysis doctor for concern for worsening chronic RLE DVT and concern for infected chronic right heal ulcer. CT a/p incidental RA filling defect for which cardiology is consulted.    #RA filling defect   CT shows apparent filling defect in the right atrium. Bedside TTE without any obvious thrombus. Patient with a known history of ICD. Asymptomatic.   - recommend official TTE with contrast in the AM   - c/w home eliquis BID for now (take for recurrent DVTs)  - if any changes in hemodynamics or new onset CP/SOB, please call cardiology fellow     #HFrEF   EF 25% in 9/21. Asymptomatic   Diuretics: c/h torsemide 40 BID  GDMT:   Metop succinate 200, entresto  BID  - not in acute decompensated HF  - repeat TTE as above     *incomplete    61F current smoker w/ PMHx ESRD (HD T/Th/Sa), HFrEF (EF 25% 9/21, s/p AICD), IDDM2, COPD (3L O2 at home PRN), multiple myeloma (on Ninlaro), hypothyroidism, depression, neuropathy (on methadone), recurrent DVTs (on eliquis 2.5mg qd), CVA x2 (w/ residual R-sided weakness), PAD s/p LLE bypass, and DHARMESH on CPAP, who was sent in by her dialysis doctor for concern for worsening chronic RLE DVT and concern for infected chronic right heal ulcer. CT a/p incidental RA filling defect for which cardiology is consulted.    #RA filling defect   CT shows apparent filling defect in the right atrium. Bedside TTE without any obvious thrombus. Patient with a known history of ICD. Asymptomatic.   - recommend official TTE with contrast in the AM   - c/w home eliquis BID for now (take for recurrent DVTs)  - if any changes in hemodynamics or new onset CP/SOB, please call cardiology fellow     #HFrEF   EF 25% in 9/21. Asymptomatic   Diuretics: c/h torsemide 40 BID  GDMT:  Metop succinate 200, entresto  BID  - not in acute decompensated HF  - repeat TTE as above     Please re-consult as needed. Please have patient follow up with her cardiologist 1-2 weeks post-discharge.

## 2022-04-26 ENCOUNTER — TRANSCRIPTION ENCOUNTER (OUTPATIENT)
Age: 62
End: 2022-04-26

## 2022-04-26 LAB
ALBUMIN SERPL ELPH-MCNC: 3.8 G/DL — SIGNIFICANT CHANGE UP (ref 3.3–5)
ALP SERPL-CCNC: 81 U/L — SIGNIFICANT CHANGE UP (ref 40–120)
ALT FLD-CCNC: <5 U/L — LOW (ref 10–45)
ANION GAP SERPL CALC-SCNC: 14 MMOL/L — SIGNIFICANT CHANGE UP (ref 5–17)
APTT BLD: 42.4 SEC — HIGH (ref 27.5–35.5)
AST SERPL-CCNC: 14 U/L — SIGNIFICANT CHANGE UP (ref 10–40)
BASOPHILS # BLD AUTO: 0.03 K/UL — SIGNIFICANT CHANGE UP (ref 0–0.2)
BASOPHILS NFR BLD AUTO: 0.4 % — SIGNIFICANT CHANGE UP (ref 0–2)
BILIRUB SERPL-MCNC: 0.2 MG/DL — SIGNIFICANT CHANGE UP (ref 0.2–1.2)
BUN SERPL-MCNC: 43 MG/DL — HIGH (ref 7–23)
CALCIUM SERPL-MCNC: 8.6 MG/DL — SIGNIFICANT CHANGE UP (ref 8.4–10.5)
CHLORIDE SERPL-SCNC: 94 MMOL/L — LOW (ref 96–108)
CO2 SERPL-SCNC: 23 MMOL/L — SIGNIFICANT CHANGE UP (ref 22–31)
CREAT SERPL-MCNC: 6.73 MG/DL — HIGH (ref 0.5–1.3)
CULTURE RESULTS: SIGNIFICANT CHANGE UP
CULTURE RESULTS: SIGNIFICANT CHANGE UP
EGFR: 7 ML/MIN/1.73M2 — LOW
EOSINOPHIL # BLD AUTO: 0.17 K/UL — SIGNIFICANT CHANGE UP (ref 0–0.5)
EOSINOPHIL NFR BLD AUTO: 2.4 % — SIGNIFICANT CHANGE UP (ref 0–6)
GLUCOSE BLDC GLUCOMTR-MCNC: 124 MG/DL — HIGH (ref 70–99)
GLUCOSE BLDC GLUCOMTR-MCNC: 72 MG/DL — SIGNIFICANT CHANGE UP (ref 70–99)
GLUCOSE BLDC GLUCOMTR-MCNC: 79 MG/DL — SIGNIFICANT CHANGE UP (ref 70–99)
GLUCOSE BLDC GLUCOMTR-MCNC: 87 MG/DL — SIGNIFICANT CHANGE UP (ref 70–99)
GLUCOSE BLDC GLUCOMTR-MCNC: 91 MG/DL — SIGNIFICANT CHANGE UP (ref 70–99)
GLUCOSE BLDC GLUCOMTR-MCNC: 99 MG/DL — SIGNIFICANT CHANGE UP (ref 70–99)
GLUCOSE SERPL-MCNC: 85 MG/DL — SIGNIFICANT CHANGE UP (ref 70–99)
HCT VFR BLD CALC: 32.6 % — LOW (ref 34.5–45)
HGB BLD-MCNC: 9.8 G/DL — LOW (ref 11.5–15.5)
IMM GRANULOCYTES NFR BLD AUTO: 0.6 % — SIGNIFICANT CHANGE UP (ref 0–1.5)
INR BLD: 1.37 — HIGH (ref 0.88–1.16)
LYMPHOCYTES # BLD AUTO: 0.8 K/UL — LOW (ref 1–3.3)
LYMPHOCYTES # BLD AUTO: 11.4 % — LOW (ref 13–44)
MAGNESIUM SERPL-MCNC: 2 MG/DL — SIGNIFICANT CHANGE UP (ref 1.6–2.6)
MCHC RBC-ENTMCNC: 29.3 PG — SIGNIFICANT CHANGE UP (ref 27–34)
MCHC RBC-ENTMCNC: 30.1 GM/DL — LOW (ref 32–36)
MCV RBC AUTO: 97.3 FL — SIGNIFICANT CHANGE UP (ref 80–100)
MONOCYTES # BLD AUTO: 0.8 K/UL — SIGNIFICANT CHANGE UP (ref 0–0.9)
MONOCYTES NFR BLD AUTO: 11.4 % — SIGNIFICANT CHANGE UP (ref 2–14)
NEUTROPHILS # BLD AUTO: 5.17 K/UL — SIGNIFICANT CHANGE UP (ref 1.8–7.4)
NEUTROPHILS NFR BLD AUTO: 73.8 % — SIGNIFICANT CHANGE UP (ref 43–77)
NRBC # BLD: 0 /100 WBCS — SIGNIFICANT CHANGE UP (ref 0–0)
PHOSPHATE SERPL-MCNC: 6.2 MG/DL — HIGH (ref 2.5–4.5)
PLATELET # BLD AUTO: 153 K/UL — SIGNIFICANT CHANGE UP (ref 150–400)
POTASSIUM SERPL-MCNC: 5.2 MMOL/L — SIGNIFICANT CHANGE UP (ref 3.5–5.3)
POTASSIUM SERPL-SCNC: 5.2 MMOL/L — SIGNIFICANT CHANGE UP (ref 3.5–5.3)
PROT SERPL-MCNC: 7.7 G/DL — SIGNIFICANT CHANGE UP (ref 6–8.3)
PROTHROM AB SERPL-ACNC: 16.4 SEC — HIGH (ref 10.5–13.4)
RBC # BLD: 3.35 M/UL — LOW (ref 3.8–5.2)
RBC # FLD: 14.9 % — HIGH (ref 10.3–14.5)
SARS-COV-2 RNA SPEC QL NAA+PROBE: SIGNIFICANT CHANGE UP
SODIUM SERPL-SCNC: 131 MMOL/L — LOW (ref 135–145)
SPECIMEN SOURCE: SIGNIFICANT CHANGE UP
SPECIMEN SOURCE: SIGNIFICANT CHANGE UP
WBC # BLD: 7.01 K/UL — SIGNIFICANT CHANGE UP (ref 3.8–10.5)
WBC # FLD AUTO: 7.01 K/UL — SIGNIFICANT CHANGE UP (ref 3.8–10.5)

## 2022-04-26 PROCEDURE — 37226: CPT | Mod: GC

## 2022-04-26 PROCEDURE — 71045 X-RAY EXAM CHEST 1 VIEW: CPT | Mod: 26

## 2022-04-26 PROCEDURE — 99233 SBSQ HOSP IP/OBS HIGH 50: CPT

## 2022-04-26 DEVICE — CATH OMNI FLSH 0.035IN 5FRX65: Type: IMPLANTABLE DEVICE | Site: RIGHT | Status: FUNCTIONAL

## 2022-04-26 DEVICE — GUIDEWIRE RADIFOCUS GLIDEWIRE ANGLED 0.035" X 260CM STIFF: Type: IMPLANTABLE DEVICE | Site: RIGHT | Status: FUNCTIONAL

## 2022-04-26 DEVICE — SHEATH INTRODUCER TERUMO PINNACLE CORONARY 5FR X 10CM X 0.038" MINI WIRE: Type: IMPLANTABLE DEVICE | Site: RIGHT | Status: FUNCTIONAL

## 2022-04-26 DEVICE — IMPLANTABLE DEVICE: Type: IMPLANTABLE DEVICE | Site: RIGHT | Status: FUNCTIONAL

## 2022-04-26 DEVICE — INTRO FLEXOR CHECK RAABE 5FR X 55CM: Type: IMPLANTABLE DEVICE | Site: RIGHT | Status: FUNCTIONAL

## 2022-04-26 DEVICE — INTRO MICROPUNC 5FRX10CM SS: Type: IMPLANTABLE DEVICE | Site: RIGHT | Status: FUNCTIONAL

## 2022-04-26 DEVICE — CATH BALLOON STERILING OTW 4 X 40MM X 135CM: Type: IMPLANTABLE DEVICE | Site: RIGHT | Status: FUNCTIONAL

## 2022-04-26 DEVICE — GWIRE SUPRACORE .035X370: Type: IMPLANTABLE DEVICE | Site: RIGHT | Status: FUNCTIONAL

## 2022-04-26 DEVICE — CATH QUICK CROSS .035X135CM: Type: IMPLANTABLE DEVICE | Site: RIGHT | Status: FUNCTIONAL

## 2022-04-26 DEVICE — GWIRE ANGL .035X180 STD: Type: IMPLANTABLE DEVICE | Site: RIGHT | Status: FUNCTIONAL

## 2022-04-26 DEVICE — GWIRE BENT STRT 0.035INX150CM: Type: IMPLANTABLE DEVICE | Site: RIGHT | Status: FUNCTIONAL

## 2022-04-26 DEVICE — CATH ANGIO GLIDECATH ANGLE 5FR X 100CM: Type: IMPLANTABLE DEVICE | Site: RIGHT | Status: FUNCTIONAL

## 2022-04-26 DEVICE — SHEATH INTRODUCER TERUMO PINNACLE CORONARY 6FR X 10CM X 0.038" MINI WIRE: Type: IMPLANTABLE DEVICE | Site: RIGHT | Status: FUNCTIONAL

## 2022-04-26 DEVICE — CATH ANG BERENSTN 5FRX65CM: Type: IMPLANTABLE DEVICE | Site: RIGHT | Status: FUNCTIONAL

## 2022-04-26 DEVICE — STABILIZER .014 X 300 STR  MIN 5 STEERABLE GUIDEWIRE: Type: IMPLANTABLE DEVICE | Site: RIGHT | Status: FUNCTIONAL

## 2022-04-26 RX ORDER — FUROSEMIDE 40 MG
40 TABLET ORAL ONCE
Refills: 0 | Status: DISCONTINUED | OUTPATIENT
Start: 2022-04-26 | End: 2022-04-26

## 2022-04-26 RX ORDER — CALCIUM GLUCONATE 100 MG/ML
1 VIAL (ML) INTRAVENOUS ONCE
Refills: 0 | Status: DISCONTINUED | OUTPATIENT
Start: 2022-04-26 | End: 2022-04-26

## 2022-04-26 RX ORDER — DEXTROSE 50 % IN WATER 50 %
50 SYRINGE (ML) INTRAVENOUS ONCE
Refills: 0 | Status: DISCONTINUED | OUTPATIENT
Start: 2022-04-26 | End: 2022-04-26

## 2022-04-26 RX ORDER — CEFAZOLIN SODIUM 1 G
1000 VIAL (EA) INJECTION EVERY 24 HOURS
Refills: 0 | Status: DISCONTINUED | OUTPATIENT
Start: 2022-04-27 | End: 2022-04-28

## 2022-04-26 RX ORDER — HYDROMORPHONE HYDROCHLORIDE 2 MG/ML
0.5 INJECTION INTRAMUSCULAR; INTRAVENOUS; SUBCUTANEOUS ONCE
Refills: 0 | Status: DISCONTINUED | OUTPATIENT
Start: 2022-04-26 | End: 2022-04-27

## 2022-04-26 RX ORDER — SODIUM ZIRCONIUM CYCLOSILICATE 10 G/10G
10 POWDER, FOR SUSPENSION ORAL ONCE
Refills: 0 | Status: DISCONTINUED | OUTPATIENT
Start: 2022-04-26 | End: 2022-04-26

## 2022-04-26 RX ORDER — CLOPIDOGREL BISULFATE 75 MG/1
75 TABLET, FILM COATED ORAL DAILY
Refills: 0 | Status: DISCONTINUED | OUTPATIENT
Start: 2022-04-26 | End: 2022-04-28

## 2022-04-26 RX ORDER — SODIUM CHLORIDE 9 MG/ML
250 INJECTION INTRAMUSCULAR; INTRAVENOUS; SUBCUTANEOUS ONCE
Refills: 0 | Status: DISCONTINUED | OUTPATIENT
Start: 2022-04-26 | End: 2022-04-28

## 2022-04-26 RX ORDER — INSULIN HUMAN 100 [IU]/ML
10 INJECTION, SOLUTION SUBCUTANEOUS ONCE
Refills: 0 | Status: DISCONTINUED | OUTPATIENT
Start: 2022-04-26 | End: 2022-04-26

## 2022-04-26 RX ORDER — DEXTROSE 10 % IN WATER 10 %
250 INTRAVENOUS SOLUTION INTRAVENOUS ONCE
Refills: 0 | Status: DISCONTINUED | OUTPATIENT
Start: 2022-04-26 | End: 2022-04-26

## 2022-04-26 RX ADMIN — METHADONE HYDROCHLORIDE 10 MILLIGRAM(S): 40 TABLET ORAL at 14:51

## 2022-04-26 RX ADMIN — ATORVASTATIN CALCIUM 80 MILLIGRAM(S): 80 TABLET, FILM COATED ORAL at 22:58

## 2022-04-26 RX ADMIN — Medication 40 MILLIGRAM(S): at 06:22

## 2022-04-26 RX ADMIN — PANTOPRAZOLE SODIUM 40 MILLIGRAM(S): 20 TABLET, DELAYED RELEASE ORAL at 06:22

## 2022-04-26 RX ADMIN — GABAPENTIN 100 MILLIGRAM(S): 400 CAPSULE ORAL at 06:22

## 2022-04-26 RX ADMIN — Medication 650 MILLIGRAM(S): at 18:18

## 2022-04-26 RX ADMIN — Medication 200 MILLIGRAM(S): at 06:21

## 2022-04-26 RX ADMIN — Medication 3 MILLIGRAM(S): at 22:58

## 2022-04-26 RX ADMIN — Medication 50 MILLIGRAM(S): at 22:58

## 2022-04-26 RX ADMIN — SACUBITRIL AND VALSARTAN 1 TABLET(S): 24; 26 TABLET, FILM COATED ORAL at 06:22

## 2022-04-26 RX ADMIN — CLOPIDOGREL BISULFATE 75 MILLIGRAM(S): 75 TABLET, FILM COATED ORAL at 11:58

## 2022-04-26 RX ADMIN — Medication 48 MILLIGRAM(S): at 14:52

## 2022-04-26 RX ADMIN — Medication 650 MILLIGRAM(S): at 17:37

## 2022-04-26 RX ADMIN — METHADONE HYDROCHLORIDE 10 MILLIGRAM(S): 40 TABLET ORAL at 22:51

## 2022-04-26 RX ADMIN — Medication 100 MILLIGRAM(S): at 06:21

## 2022-04-26 RX ADMIN — METHADONE HYDROCHLORIDE 10 MILLIGRAM(S): 40 TABLET ORAL at 06:22

## 2022-04-26 RX ADMIN — BUDESONIDE AND FORMOTEROL FUMARATE DIHYDRATE 2 PUFF(S): 160; 4.5 AEROSOL RESPIRATORY (INHALATION) at 07:01

## 2022-04-26 NOTE — PROGRESS NOTE ADULT - PROBLEM SELECTOR PLAN 1
Acute worsening of swelling in the R calf and ankle starting 1 week prior to admission. Pain in the R LE started 2 days prior to admission. Pt has a known non-occlusive R common femoral vein DVT, unchanged since 12/21 per doppler on admission. Unable to palpate distal pulses in b/l LE on exam. History of PAD w/LLE bypass and IDDM2. CTA showed occlusion of R SFA.  - Doppler R LE 4/21: no new DVT, no change in R common femoral vein non-occlusive thrombus since 12/21  - CTA with run-off: occluded SFA w/ reconstitution of popliteal artery  - Carotid doppler: no stenosis    - Vascular: plan for angiogram today with possible angioplasty/stent  - eliquis after yesterdays AM dose, heparin d/c yesterday afternoon - currently off all AC  - repeat PT/PTT and type and screen resulted

## 2022-04-26 NOTE — PROGRESS NOTE ADULT - PROBLEM SELECTOR PLAN 3
Multiple recurrent DVTs in LEs. Home med: eliquis 2.5mg BID. LE doppler US: Since 12/20/2021, unchanged nonocclusive thrombus within the right common femoral vein. No new DVT.  - f/u vascular recs for resuming AC after today's procedure

## 2022-04-26 NOTE — PROGRESS NOTE ADULT - PROBLEM SELECTOR PLAN 6
C/b heel ulcer. Home meds: Gabapentin 100 bid and methadone 10mg TID.   - Follows with outpatient pain management Dr. Oliveira  - c/w home meds      # DM2  Home meds: Levemir 25U and novolog 10U premeal  - A1c: 6.0%  - Restart Lantus 10 and lispro 5 TID for now, uptitrate as needed  - mISS C/b heel ulcer. Home meds: Gabapentin 100 bid and methadone 10mg TID.   - Follows with outpatient pain management Dr. Oliveira  - c/w home meds      # DM2  Home meds: Levemir 25U and novolog 10U premeal  - A1c: 7.1%  - c/w Lantus 10 qHS  - d/c lispro 5 TID for now  - mISS

## 2022-04-26 NOTE — BRIEF OPERATIVE NOTE - NSICDXBRIEFPROCEDURE_GEN_ALL_CORE_FT
PROCEDURES:  Angiogram, extremity, right 26-Apr-2022 11:29:17 SFA angioplasty and stent x2 Lissy Simpson   1

## 2022-04-26 NOTE — PROGRESS NOTE ADULT - ASSESSMENT
61F current smoker w/ PMHx ESRD (HD T/Th/Sa), HFrEF (EF 25% s/p AICD), IDDM2, COPD (3L O2 at home PRN), multiple myeloma (on Ninlaro), hypothyroidism, depression, neuropathy (on methadone), recurrent DVTs (on eliquis 2.5mg qd), CVA x2 (w/ residual R-sided weakness), PAD s/p LLE bypass, and DHARMESH on CPAP, who was sent in by her dialysis doctor for concern for worsening chronic RLE DVT and concern for infected chronic right heal ulcer. Found to have occlusion of R SFA with distal reconstitution pending angioplasty vs. stent placement.

## 2022-04-26 NOTE — PROGRESS NOTE ADULT - SUBJECTIVE AND OBJECTIVE BOX
***INCOMPLETE***      OVERNIGHT EVENTS: Pt was hypoglycemic to 52 yesterday afternoon, checked after pt reported feeling hot and weak. Given fruit juice with resolution of symptoms and improvement in BG.      SUBJECTIVE:  Pt was seen and examined at bedside, lying in bed. She is uncomfortable due to the pain in her RLE, describes it as burning with pins and needles. Pain remains at an 8/10, unchanged from the last 2 days. Reports Dilaudid is helpful in relieving the pain. Denies any shortness of breath, chest pain, palpitations, or dysuria. States her cough is at baseline, no changes in her respiratory comfort.       OBJECTIVE:  Vital Signs Last 24 Hrs  T(C): 36.7 (26 Apr 2022 05:17), Max: 36.8 (25 Apr 2022 16:25)  T(F): 98 (26 Apr 2022 05:17), Max: 98.3 (25 Apr 2022 21:52)  HR: 79 (26 Apr 2022 05:17) (74 - 86)  BP: 103/64 (26 Apr 2022 05:17) (103/64 - 121/73)  BP(mean): --  RR: 16 (26 Apr 2022 05:17) (14 - 18)  SpO2: 100% (26 Apr 2022 05:17) (96% - 100%)    Physical Exam:  Gen: no acute distress; obese, interactive, well appearing  HEENT: pupils equal, responsive, reactive to light; no conjunctivitis or scleral icterus; no nasal discharge; no nasal congestion; mucus membranes moist  Neck: FROM, supple, no cervical lymphadenopathy  Chest: inspiratory wheezing b/l with L>R diffuse throughout, no crackles/wheezes, good air entry, no tachypnea or retractions  CV: regular rate and rhythm, no murmurs   Abd: soft, nontender, nondistended, no HSM appreciated, NABS  Extrem: b/l UE 2+ distal pulses. Boot on RLE. Unable to palpate distal pulses in RLE, 1+ pulses in LLE. R leg significant discoloration (unchanged), moderate pain with palpation of the R calf  Neuro: Sensation and motor grossly intact in the b/l UE. B/l LE sensation to touch pain and vibration absent in the feet, calf and medial thigh. Sensation to touch present in the lateral thigh b/l. 0/5 dorsiflexion of the R foot. 2/5 dorsiflexion of the L foot.  Labs:                        9.8    7.01  )-----------( 153      ( 26 Apr 2022 08:04 )             32.6     04-26    131<L>  |  94<L>  |  43<H>  ----------------------------<  85  5.2   |  23  |  6.73<H>    Ca    8.6      26 Apr 2022 08:04  Phos  6.2     04-26  Mg     2.0     04-26    TPro  7.7  /  Alb  3.8  /  TBili  0.2  /  DBili  x   /  AST  14  /  ALT  <5<L>  /  AlkPhos  81  04-26    PT/INR - ( 26 Apr 2022 08:04 )   PT: 16.4 sec;   INR: 1.37          PTT - ( 26 Apr 2022 08:04 )  PTT:42.4 sec  Fingerstick  glucose: POCT Blood Glucose.: 99 mg/dL (26 Apr 2022 08:23)        MEDICATIONS  (STANDING):  ammonium lactate 12% Lotion 1 Application(s) Topical <User Schedule>  atorvastatin 80 milliGRAM(s) Oral at bedtime  budesonide 160 MICROgram(s)/formoterol 4.5 MICROgram(s) Inhaler 2 Puff(s) Inhalation two times a day  ceFAZolin   IVPB 1000 milliGRAM(s) IV Intermittent every 8 hours  collagenase Ointment 1 Application(s) Topical daily  dextrose 5%. 1000 milliLiter(s) (50 mL/Hr) IV Continuous <Continuous>  dextrose 5%. 1000 milliLiter(s) (100 mL/Hr) IV Continuous <Continuous>  dextrose 50% Injectable 25 Gram(s) IV Push once  dextrose 50% Injectable 12.5 Gram(s) IV Push once  dextrose 50% Injectable 25 Gram(s) IV Push once  fenofibrate Tablet 48 milliGRAM(s) Oral daily  gabapentin 100 milliGRAM(s) Oral every 12 hours  glucagon  Injectable 1 milliGRAM(s) IntraMuscular once  insulin glargine Injectable (LANTUS) 10 Unit(s) SubCutaneous at bedtime  insulin lispro (ADMELOG) corrective regimen sliding scale   SubCutaneous Before meals and at bedtime  insulin lispro Injectable (ADMELOG) 5 Unit(s) SubCutaneous three times a day before meals  melatonin 3 milliGRAM(s) Oral at bedtime  methadone    Tablet 10 milliGRAM(s) Oral three times a day  metoprolol succinate  milliGRAM(s) Oral daily  pantoprazole    Tablet 40 milliGRAM(s) Oral before breakfast  PARoxetine 10 milliGRAM(s) Oral at bedtime  sacubitril 97 mG/valsartan 103 mG 1 Tablet(s) Oral two times a day  torsemide 40 milliGRAM(s) Oral every 12 hours  traZODone 50 milliGRAM(s) Oral at bedtime    MEDICATIONS  (PRN):  acetaminophen     Tablet .. 650 milliGRAM(s) Oral every 6 hours PRN Temp greater or equal to 38C (100.4F), Mild Pain (1 - 3)  dextrose Oral Gel 15 Gram(s) Oral once PRN Blood Glucose LESS THAN 70 milliGRAM(s)/deciliter  guaiFENesin Oral Liquid (Sugar-Free) 200 milliGRAM(s) Oral every 6 hours PRN Cough      Allergies    aspirin (Other (Moderate); Rash)  oral contrast (Unknown)    Intolerances        RADIOLOGY & ADDITIONAL TESTS: Reviewed.                   OVERNIGHT EVENTS: Pt was hypoglycemic to 52 yesterday afternoon, checked after pt reported feeling hot and weak. Given fruit juice with resolution of symptoms and improvement in BG.      SUBJECTIVE:  Pt was seen and examined at bedside, lying in bed. She is uncomfortable due to the pain in her RLE, describes it as burning with pins and needles. Pain remains at an 8/10, unchanged from the last 2 days. Reports Dilaudid is helpful in relieving the pain. Denies any shortness of breath, chest pain, palpitations, or dysuria. States her cough is at baseline, no changes in her respiratory comfort.       OBJECTIVE:  Vital Signs Last 24 Hrs  T(C): 36.7 (26 Apr 2022 05:17), Max: 36.8 (25 Apr 2022 16:25)  T(F): 98 (26 Apr 2022 05:17), Max: 98.3 (25 Apr 2022 21:52)  HR: 79 (26 Apr 2022 05:17) (74 - 86)  BP: 103/64 (26 Apr 2022 05:17) (103/64 - 121/73)  BP(mean): --  RR: 16 (26 Apr 2022 05:17) (14 - 18)  SpO2: 100% (26 Apr 2022 05:17) (96% - 100%)    Physical Exam:  Gen: no acute distress; obese, interactive, well appearing  HEENT: pupils equal, responsive, reactive to light; no conjunctivitis or scleral icterus; no nasal discharge; no nasal congestion; mucus membranes moist  Neck: FROM, supple, no cervical lymphadenopathy  Chest: inspiratory wheezing b/l with L>R diffuse throughout, no crackles/wheezes, good air entry, no tachypnea or retractions  CV: regular rate and rhythm, no murmurs   Abd: soft, nontender, nondistended, no HSM appreciated, NABS  Extrem: b/l UE 2+ distal pulses. Boot on RLE. Unable to palpate distal pulses in RLE, 1+ pulses in LLE. R leg significant discoloration (unchanged), moderate pain with palpation of the R calf  Neuro: Sensation and motor grossly intact in the b/l UE. B/l LE sensation to touch pain and vibration absent in the feet, calf and medial thigh. Sensation to touch present in the lateral thigh b/l. 0/5 dorsiflexion of the R foot. 2/5 dorsiflexion of the L foot.  Labs:                        9.8    7.01  )-----------( 153      ( 26 Apr 2022 08:04 )             32.6     04-26    131<L>  |  94<L>  |  43<H>  ----------------------------<  85  5.2   |  23  |  6.73<H>    Ca    8.6      26 Apr 2022 08:04  Phos  6.2     04-26  Mg     2.0     04-26    TPro  7.7  /  Alb  3.8  /  TBili  0.2  /  DBili  x   /  AST  14  /  ALT  <5<L>  /  AlkPhos  81  04-26    PT/INR - ( 26 Apr 2022 08:04 )   PT: 16.4 sec;   INR: 1.37          PTT - ( 26 Apr 2022 08:04 )  PTT:42.4 sec  Fingerstick  glucose: POCT Blood Glucose.: 99 mg/dL (26 Apr 2022 08:23)        MEDICATIONS  (STANDING):  ammonium lactate 12% Lotion 1 Application(s) Topical <User Schedule>  atorvastatin 80 milliGRAM(s) Oral at bedtime  budesonide 160 MICROgram(s)/formoterol 4.5 MICROgram(s) Inhaler 2 Puff(s) Inhalation two times a day  ceFAZolin   IVPB 1000 milliGRAM(s) IV Intermittent every 8 hours  collagenase Ointment 1 Application(s) Topical daily  dextrose 5%. 1000 milliLiter(s) (50 mL/Hr) IV Continuous <Continuous>  dextrose 5%. 1000 milliLiter(s) (100 mL/Hr) IV Continuous <Continuous>  dextrose 50% Injectable 25 Gram(s) IV Push once  dextrose 50% Injectable 12.5 Gram(s) IV Push once  dextrose 50% Injectable 25 Gram(s) IV Push once  fenofibrate Tablet 48 milliGRAM(s) Oral daily  gabapentin 100 milliGRAM(s) Oral every 12 hours  glucagon  Injectable 1 milliGRAM(s) IntraMuscular once  insulin glargine Injectable (LANTUS) 10 Unit(s) SubCutaneous at bedtime  insulin lispro (ADMELOG) corrective regimen sliding scale   SubCutaneous Before meals and at bedtime  insulin lispro Injectable (ADMELOG) 5 Unit(s) SubCutaneous three times a day before meals  melatonin 3 milliGRAM(s) Oral at bedtime  methadone    Tablet 10 milliGRAM(s) Oral three times a day  metoprolol succinate  milliGRAM(s) Oral daily  pantoprazole    Tablet 40 milliGRAM(s) Oral before breakfast  PARoxetine 10 milliGRAM(s) Oral at bedtime  sacubitril 97 mG/valsartan 103 mG 1 Tablet(s) Oral two times a day  torsemide 40 milliGRAM(s) Oral every 12 hours  traZODone 50 milliGRAM(s) Oral at bedtime    MEDICATIONS  (PRN):  acetaminophen     Tablet .. 650 milliGRAM(s) Oral every 6 hours PRN Temp greater or equal to 38C (100.4F), Mild Pain (1 - 3)  dextrose Oral Gel 15 Gram(s) Oral once PRN Blood Glucose LESS THAN 70 milliGRAM(s)/deciliter  guaiFENesin Oral Liquid (Sugar-Free) 200 milliGRAM(s) Oral every 6 hours PRN Cough      Allergies    aspirin (Other (Moderate); Rash)  oral contrast (Unknown)    Intolerances        RADIOLOGY & ADDITIONAL TESTS: Reviewed.

## 2022-04-26 NOTE — PACU DISCHARGE NOTE - COMMENTS
VSS. pt is s/p angiogram with stent via left groin. Report given to Shailesh PENG. Left groin dressing clean dry and intact. Pulses+ on wyatt lower extremities by doppler. pt tolerated fluids and cracker. transferred pt to [id number] in stable condition.

## 2022-04-26 NOTE — PROGRESS NOTE ADULT - PROBLEM SELECTOR PLAN 5
EF 25% in 12/21, s/p AICD. 2+ LE edema, no pulmonary edema or JVD. Not in exacerbation.   - Home meds: Entresto  BID, Toprol 100, Torsemide 40 BID   - C/w home Toprol, entresto, and torsemide  - Monitor volume status with dialysis EF 25% in 12/21, s/p AICD. 2+ LE edema, no pulmonary edema or JVD. Not in exacerbation.   - Home meds: Entresto  BID, Toprol 100, Torsemide 40 BID   - C/w home Toprol, entresto, and torsemide  - Monitor volume status with dialysis  - repeat CXR ordered EF 25% in 12/21, s/p AICD. 2+ LE edema, no pulmonary edema or JVD. Not in exacerbation.   - Home meds: Entresto  BID, Toprol 100, Torsemide 40 BID   - C/w home Toprol, entresto, and torsemide  - Monitor volume status with dialysis  - repeat CXR

## 2022-04-26 NOTE — PROGRESS NOTE ADULT - PROBLEM SELECTOR PROBLEM 10
Hypothyroidism Keystone Flap Text: The defect edges were debeveled with a #15 scalpel blade.  Given the location of the defect, shape of the defect a keystone flap was deemed most appropriate.  Using a sterile surgical marker, an appropriate keystone flap was drawn incorporating the defect, outlining the appropriate donor tissue and placing the expected incisions within the relaxed skin tension lines where possible. The area thus outlined was incised deep to adipose tissue with a #15 scalpel blade.  The skin margins were undermined to an appropriate distance in all directions around the primary defect and laterally outward around the flap utilizing iris scissors.

## 2022-04-26 NOTE — BRIEF OPERATIVE NOTE - ESTIMATED BLOOD LOSS
5
Deconditioning, decreased strength, decreased balance, decreased endurance, decreased postural control all limiting pts. ability to perform functional mobility

## 2022-04-26 NOTE — PROGRESS NOTE ADULT - ASSESSMENT
61 female new ESRD (TTS last HD was tuesday 4/19 via [facility]) DM HTN history of DVT presented to the ED from HD center ([facility] on [facility]) due to RLE swelling  Consult for hemodialysis     Assessment/Plan:   #ESRD on HD TTS@ [facility] center on [facility]  usual Rx 4h 2-3L   HD today as per schedule  K noted to be 5.2    #HTN   BP at goal   continue w/ home meds    #access   TDC    #anemia  Hb at goal   obtain iron studies including ferritin, transferrin, iron, and TIBC to be able to calculate % saturation   no indication for EPO or IV Iron at this time     #renal bone disease   Ca ~8.6  Phos ~6.2   trend phos daily w/ labs    DVT  DVT history recommended dose of eliquis is 5mg BID; defer to primary team/hematology     Thank you for the opportunity to participate in the care of your patient. The nephrology service remains available to assist with any questions or concerns. Please feel free to reach us by paging the on-call nephrology fellow for urgent issues or as below.      61 female new ESRD (TTS last HD was tuesday 4/19 via [facility]) DM HTN history of DVT presented to the ED from HD center ([facility] on [facility]) due to RLE swelling  Consult for hemodialysis     Assessment/Plan:   #ESRD on HD TTS@ [facility] center on [facility]  usual Rx 4h 2-3L   HD today as per schedule  K noted to be 5.2  BP noted to be soft, will do HD for clearance only    #HTN   BP at goal   continue w/ home meds    #access   TDC    #anemia  Hb at goal   obtain iron studies including ferritin, transferrin, iron, and TIBC to be able to calculate % saturation   no indication for EPO or IV Iron at this time     #renal bone disease   Ca ~8.6  Phos ~6.2   trend phos daily w/ labs    DVT  DVT history recommended dose of eliquis is 5mg BID; defer to primary team/hematology     Thank you for the opportunity to participate in the care of your patient. The nephrology service remains available to assist with any questions or concerns. Please feel free to reach us by paging the on-call nephrology fellow for urgent issues or as below.

## 2022-04-26 NOTE — PROGRESS NOTE ADULT - SUBJECTIVE AND OBJECTIVE BOX
Vascular Surgery Post-Op Note    Procedure: RLE angiogram, angioplasty and stents of SFA x 2    Diagnosis/Indication: RLE non healing heel wound    Surgeon: Dr. Medeiros    S: Pt has no complaints. Denies CP, SOB, RAMSEY, calf tenderness. Pain controlled with medication.    O:  T(C): --  T(F): --  HR: 75 (04-26-22 @ 16:00) (72 - 75)  BP: 97/51 (04-26-22 @ 16:00) (97/50 - 111/56)  RR: 14 (04-26-22 @ 16:00) (13 - 17)  SpO2: 96% (04-26-22 @ 16:00) (96% - 100%)  Wt(kg): --                        9.8    7.01  )-----------( 153      ( 26 Apr 2022 08:04 )             32.6     04-26    131<L>  |  94<L>  |  43<H>  ----------------------------<  85  5.2   |  23  |  6.73<H>    Ca    8.6      26 Apr 2022 08:04  Phos  6.2     04-26  Mg     2.0     04-26    TPro  7.7  /  Alb  3.8  /  TBili  0.2  /  DBili  x   /  AST  14  /  ALT  <5<L>  /  AlkPhos  81  04-26      Gen: NAD, resting comfortably in bed  C/V: NSR  Pulm: Nonlabored breathing, no respiratory distress  Abd: soft, NT/ND  Groin: left groin 5 fr sheath removed, 20mins manual pressure applied, no hematoma, no bleeding  Extrem: right leg warm, well perfused. RLE: triphasic AT, biphasic PT. LLE: biphasic DP/PT      A/P: 61yFemale s/p above procedure  Diet: advance after bedrest  Strict bedrest til 10pm  HD today  Pain/nausea control

## 2022-04-26 NOTE — PROGRESS NOTE ADULT - ASSESSMENT
61F current smoker w/ PMHx ESRD (HD T/Th/Sa), HFrEF (EF 25% s/p AICD), IDDM2, COPD (3L O2 at home PRN), multiple myeloma (on Ninlaro), hypothyroidism, depression, neuropathy (on methadone), recurrent DVTs (on eliquis 2.5mg qd), CVA x2 (w/ residual R-sided weakness), PAD s/p LLE bypass, and DHARMESH on CPAP, who was sent in by her dialysis doctor for concern for worsening chronic RLE DVT and concern for infected chronic right heal ulcer. US demonstrated unchanged nonocclusive thrombus within the right common femoral vein. vascular was consulted to evaluate RLE calf pain and nonpalpable pulses, found to have Occluded right SFA with reconstitution of popliteal artery through collaterals on CTA     plan:   -Plan for RLE angiogram, possible angioplasty, possible stenting today, 4/26/22  -NPO for procedure   -off Eliquis   - will need HD today, this am prior to surgery   -podiatry to manage Right heel wound   -rest of care per primary   -vascular surgery to continue to follow

## 2022-04-26 NOTE — PROGRESS NOTE ADULT - SUBJECTIVE AND OBJECTIVE BOX
SUBJECTIVE: Patient seen and examined at bedside.    ceFAZolin   IVPB 1000 milliGRAM(s) IV Intermittent every 8 hours  metoprolol succinate  milliGRAM(s) Oral daily  sacubitril 97 mG/valsartan 103 mG 1 Tablet(s) Oral two times a day  torsemide 40 milliGRAM(s) Oral every 12 hours    MEDICATIONS  (PRN):  acetaminophen     Tablet .. 650 milliGRAM(s) Oral every 6 hours PRN Temp greater or equal to 38C (100.4F), Mild Pain (1 - 3)  dextrose Oral Gel 15 Gram(s) Oral once PRN Blood Glucose LESS THAN 70 milliGRAM(s)/deciliter  guaiFENesin Oral Liquid (Sugar-Free) 200 milliGRAM(s) Oral every 6 hours PRN Cough      I&O's Detail    25 Apr 2022 07:01  -  26 Apr 2022 07:00  --------------------------------------------------------  IN:    IV PiggyBack: 50 mL    Oral Fluid: 480 mL  Total IN: 530 mL    OUT:  Total OUT: 0 mL    Total NET: 530 mL          T(C): 36.7 (04-26-22 @ 05:17), Max: 36.8 (04-25-22 @ 16:25)  HR: 79 (04-26-22 @ 05:17) (71 - 86)  BP: 103/64 (04-26-22 @ 05:17) (103/64 - 125/77)  RR: 16 (04-26-22 @ 05:17) (14 - 18)  SpO2: 100% (04-26-22 @ 05:17) (94% - 100%)    General: NAD, resting comfortably in bed  Pulmonary: Nonlabored breathing, no respiratory distress  CV: NSR  Abd: soft, NT/ND  Extremities: WWP, Bilateral lower extremity edema, R > L, Right calf tenderness, negative Homens sign; Right heel nonhealing ulcer without drainage or discharge   NEURO: AAOx3, No focal motor or sensory deficits  Pulses: RLE: triphasic femoral, pop, mono DP and PT; LLE: triphasic femoral, pop, tri/bi PT/DP    LABS:                        10.1   11.17 )-----------( 165      ( 25 Apr 2022 20:29 )             33.3     04-25    133<L>  |  93<L>  |  40<H>  ----------------------------<  115<H>  5.6<H>   |  25  |  6.05<H>    Ca    8.7      25 Apr 2022 20:29  Phos  6.4     04-25  Mg     1.9     04-25    TPro  7.6  /  Alb  4.0  /  TBili  0.2  /  DBili  x   /  AST  12  /  ALT  <5<L>  /  AlkPhos  75  04-25          RADIOLOGY & ADDITIONAL STUDIES:      Culture - Blood (collected 04-21-22 @ 16:26)  Source: .Blood Blood-Venous  Preliminary Report (04-25-22 @ 17:00):    No growth at 4 days.    Culture - Blood (collected 04-21-22 @ 16:26)  Source: .Blood Blood-Peripheral  Preliminary Report (04-25-22 @ 17:00):    No growth at 4 days.

## 2022-04-26 NOTE — PROGRESS NOTE ADULT - SUBJECTIVE AND OBJECTIVE BOX
Patient is a 61y Female seen and evaluated at bedside post op. Procedure went ok. To have HD this evening. Labs noted for K of 5.2, Hgb 9.8, Na 131      Meds:    acetaminophen     Tablet .. 650 every 6 hours PRN  ammonium lactate 12% Lotion 1 <User Schedule>  atorvastatin 80 at bedtime  budesonide 160 MICROgram(s)/formoterol 4.5 MICROgram(s) Inhaler 2 two times a day  clopidogrel Tablet 75 daily  collagenase Ointment 1 daily  dextrose 5%. 1000 <Continuous>  dextrose 5%. 1000 <Continuous>  dextrose 50% Injectable 25 once  dextrose 50% Injectable 12.5 once  dextrose 50% Injectable 25 once  dextrose Oral Gel 15 once PRN  fenofibrate Tablet 48 daily  gabapentin 100 every 12 hours  glucagon  Injectable 1 once  guaiFENesin Oral Liquid (Sugar-Free) 200 every 6 hours PRN  insulin glargine Injectable (LANTUS) 10 at bedtime  insulin lispro (ADMELOG) corrective regimen sliding scale  Before meals and at bedtime  melatonin 3 at bedtime  methadone    Tablet 10 three times a day  metoprolol succinate  daily  pantoprazole    Tablet 40 before breakfast  PARoxetine 10 at bedtime  sacubitril 97 mG/valsartan 103 mG 1 two times a day  sodium chloride 0.9% Bolus 250 once  torsemide 40 every 12 hours  traZODone 50 at bedtime      T(C): , Max: 36.9 (22 @ 11:22)  T(F): , Max: 98.5 (22 @ 11:22)  HR: 74 (22 @ 17:10)  BP: 85/50 (22 @ 17:10)  BP(mean): 63 (22 @ 17:10)  RR: 20 (22 @ 17:10)  SpO2: 98% (22 @ 17:10)  Wt(kg): --     @ 07:01  -   @ 07:00  --------------------------------------------------------  IN: 530 mL / OUT: 0 mL / NET: 530 mL     @ 07:01  -   @ 17:21  --------------------------------------------------------  IN: 250 mL / OUT: 0 mL / NET: 250 mL      Review of Systems:  ROS negative except as per HPI      PHYSICAL EXAM:  GENERAL: Alert, awake, oriented x3 on RA   CHEST/LUNG: CTA BL no rales, rhonchi or wheezing   HEART: Regular rate and rhythm, no murmur, no gallops, no rub   ABDOMEN: Soft, nontender, non distended  EXTREMITIES: +RLE swelling   ACCESS: +TDC        LABS:                        9.8    7.01  )-----------( 153      ( 2022 08:04 )             32.6         131<L>  |  94<L>  |  43<H>  ----------------------------<  85  5.2   |  23  |  6.73<H>    Ca    8.6      2022 08:04  Phos  6.2       Mg     2.0         TPro  7.7  /  Alb  3.8  /  TBili  0.2  /  DBili  x   /  AST  14  /  ALT  <5<L>  /  AlkPhos  81        PT/INR - ( 2022 08:04 )   PT: 16.4 sec;   INR: 1.37          PTT - ( 2022 08:04 )  PTT:42.4 sec      HD Order:  Hemoglobin: 9.8 g/dL (22 @ 08:04)  Phosphorus Level, Serum: 6.2 mg/dL (22 @ 08:04)  Hemoglobin: 10.1 g/dL (22 @ 20:29)  Phosphorus Level, Serum: 6.4 mg/dL (22 @ 20:29)    Albumin, Serum: 3.8 g/dL (22 @ 08:04)  Albumin, Serum: 4.0 g/dL (22 @ 07:46)        Hemodialysis Treatment.:     Schedule: Once, Modality: Hemodialysis, Access: Internal Jugular Central Venous Catheter    Dialyzer: Optiflux [id number], Time: 210 Min    Blood Flow: 400 mL/Min , Dialysate Flow: 500 mL/Min, Dialysate Temp: 36.5, Tubinmm (Adult)    Target Fluid Removal: 1 Liters    Dialysate Electrolytes (mEq/L): Potassium 2, Calcium 2.5, Sodium 138, Bicarbonate 35 (22 @ 07:34) [Active]             Patient is a 61y Female seen and evaluated at bedside post op. Procedure went ok. To have HD this evening. Labs noted for K of 5.2, Hgb 9.8, Na 131. Post op BP in the low 90's to 80's. Discussed with team, they will give some volume we will do HD just for clearance if tolerates otherwise will resort to Lokelma.      Meds:    acetaminophen     Tablet .. 650 every 6 hours PRN  ammonium lactate 12% Lotion 1 <User Schedule>  atorvastatin 80 at bedtime  budesonide 160 MICROgram(s)/formoterol 4.5 MICROgram(s) Inhaler 2 two times a day  clopidogrel Tablet 75 daily  collagenase Ointment 1 daily  dextrose 5%. 1000 <Continuous>  dextrose 5%. 1000 <Continuous>  dextrose 50% Injectable 25 once  dextrose 50% Injectable 12.5 once  dextrose 50% Injectable 25 once  dextrose Oral Gel 15 once PRN  fenofibrate Tablet 48 daily  gabapentin 100 every 12 hours  glucagon  Injectable 1 once  guaiFENesin Oral Liquid (Sugar-Free) 200 every 6 hours PRN  insulin glargine Injectable (LANTUS) 10 at bedtime  insulin lispro (ADMELOG) corrective regimen sliding scale  Before meals and at bedtime  melatonin 3 at bedtime  methadone    Tablet 10 three times a day  metoprolol succinate  daily  pantoprazole    Tablet 40 before breakfast  PARoxetine 10 at bedtime  sacubitril 97 mG/valsartan 103 mG 1 two times a day  sodium chloride 0.9% Bolus 250 once  torsemide 40 every 12 hours  traZODone 50 at bedtime      T(C): , Max: 36.9 (22 @ 11:22)  T(F): , Max: 98.5 (22 @ 11:22)  HR: 74 (22 @ 17:10)  BP: 85/50 (22 @ 17:10)  BP(mean): 63 (22 @ 17:10)  RR: 20 (22 @ 17:10)  SpO2: 98% (22 @ 17:10)  Wt(kg): --     @ 07:01  -   @ 07:00  --------------------------------------------------------  IN: 530 mL / OUT: 0 mL / NET: 530 mL     @ 07:01  -   @ 17:21  --------------------------------------------------------  IN: 250 mL / OUT: 0 mL / NET: 250 mL      Review of Systems:  ROS negative except as per HPI      PHYSICAL EXAM:  GENERAL: Alert, awake, oriented x3 on RA   CHEST/LUNG: CTA BL no rales, rhonchi or wheezing   HEART: Regular rate and rhythm, no murmur, no gallops, no rub   ABDOMEN: Soft, nontender, non distended  EXTREMITIES: +RLE swelling   ACCESS: +TDC        LABS:                        9.8    7.01  )-----------( 153      ( 2022 08:04 )             32.6         131<L>  |  94<L>  |  43<H>  ----------------------------<  85  5.2   |  23  |  6.73<H>    Ca    8.6      2022 08:04  Phos  6.2       Mg     2.0         TPro  7.7  /  Alb  3.8  /  TBili  0.2  /  DBili  x   /  AST  14  /  ALT  <5<L>  /  AlkPhos  81        PT/INR - ( 2022 08:04 )   PT: 16.4 sec;   INR: 1.37          PTT - ( 2022 08:04 )  PTT:42.4 sec      HD Order:  Hemoglobin: 9.8 g/dL (22 @ 08:04)  Phosphorus Level, Serum: 6.2 mg/dL (22 @ 08:04)  Hemoglobin: 10.1 g/dL (22 @ 20:29)  Phosphorus Level, Serum: 6.4 mg/dL (22 @ 20:29)    Albumin, Serum: 3.8 g/dL (22 @ 08:04)  Albumin, Serum: 4.0 g/dL (22 @ 07:46)        Hemodialysis Treatment.:     Schedule: Once, Modality: Hemodialysis, Access: Internal Jugular Central Venous Catheter    Dialyzer: Optiflux [id number], Time: 210 Min    Blood Flow: 400 mL/Min , Dialysate Flow: 500 mL/Min, Dialysate Temp: 36.5, Tubinmm (Adult)    Target Fluid Removal: 1 Liters    Dialysate Electrolytes (mEq/L): Potassium 2, Calcium 2.5, Sodium 138, Bicarbonate 35 (22 @ 07:34) [Active]

## 2022-04-26 NOTE — PROGRESS NOTE ADULT - PROBLEM SELECTOR PLAN 4
On HD Tues/Thurs/Sat, last HD 4/23. Right chest wall tunneled cath.  - Renal following  - plan for dialysis today after OR  - f/u on potential fistula placement  - C/w home sevelamer 800

## 2022-04-26 NOTE — BRIEF OPERATIVE NOTE - OPERATION/FINDINGS
Findings: patent RLE iliacs, CFA, profunda, SFA with diffuse disease reconstitutes distally, patent pop, AT and PT runoff    Procedure: RLE angiogram, SFA angioplasty with drug coated balloon (Peyton 5x80mm), and SFA stents (Zilver 518 5x40mm in proximal SFA and Zilver 518 5x60mm in the distal SFA) with improved flow on completion.    Max sheath L groin 5 fr  Contrast: 145cc

## 2022-04-27 ENCOUNTER — TRANSCRIPTION ENCOUNTER (OUTPATIENT)
Age: 62
End: 2022-04-27

## 2022-04-27 LAB
ANION GAP SERPL CALC-SCNC: 12 MMOL/L — SIGNIFICANT CHANGE UP (ref 5–17)
BUN SERPL-MCNC: 27 MG/DL — HIGH (ref 7–23)
CALCIUM SERPL-MCNC: 8.4 MG/DL — SIGNIFICANT CHANGE UP (ref 8.4–10.5)
CHLORIDE SERPL-SCNC: 94 MMOL/L — LOW (ref 96–108)
CO2 SERPL-SCNC: 25 MMOL/L — SIGNIFICANT CHANGE UP (ref 22–31)
CREAT SERPL-MCNC: 5.03 MG/DL — HIGH (ref 0.5–1.3)
EGFR: 9 ML/MIN/1.73M2 — LOW
GLUCOSE BLDC GLUCOMTR-MCNC: 76 MG/DL — SIGNIFICANT CHANGE UP (ref 70–99)
GLUCOSE BLDC GLUCOMTR-MCNC: 80 MG/DL — SIGNIFICANT CHANGE UP (ref 70–99)
GLUCOSE BLDC GLUCOMTR-MCNC: 86 MG/DL — SIGNIFICANT CHANGE UP (ref 70–99)
GLUCOSE BLDC GLUCOMTR-MCNC: 91 MG/DL — SIGNIFICANT CHANGE UP (ref 70–99)
GLUCOSE SERPL-MCNC: 73 MG/DL — SIGNIFICANT CHANGE UP (ref 70–99)
HCT VFR BLD CALC: 31.7 % — LOW (ref 34.5–45)
HGB BLD-MCNC: 9.5 G/DL — LOW (ref 11.5–15.5)
MAGNESIUM SERPL-MCNC: 1.9 MG/DL — SIGNIFICANT CHANGE UP (ref 1.6–2.6)
MCHC RBC-ENTMCNC: 29.6 PG — SIGNIFICANT CHANGE UP (ref 27–34)
MCHC RBC-ENTMCNC: 30 GM/DL — LOW (ref 32–36)
MCV RBC AUTO: 98.8 FL — SIGNIFICANT CHANGE UP (ref 80–100)
NRBC # BLD: 0 /100 WBCS — SIGNIFICANT CHANGE UP (ref 0–0)
PHOSPHATE SERPL-MCNC: 5.7 MG/DL — HIGH (ref 2.5–4.5)
PLATELET # BLD AUTO: 138 K/UL — LOW (ref 150–400)
POTASSIUM SERPL-MCNC: 4.9 MMOL/L — SIGNIFICANT CHANGE UP (ref 3.5–5.3)
POTASSIUM SERPL-SCNC: 4.9 MMOL/L — SIGNIFICANT CHANGE UP (ref 3.5–5.3)
RBC # BLD: 3.21 M/UL — LOW (ref 3.8–5.2)
RBC # FLD: 15 % — HIGH (ref 10.3–14.5)
SODIUM SERPL-SCNC: 131 MMOL/L — LOW (ref 135–145)
WBC # BLD: 6.85 K/UL — SIGNIFICANT CHANGE UP (ref 3.8–10.5)
WBC # FLD AUTO: 6.85 K/UL — SIGNIFICANT CHANGE UP (ref 3.8–10.5)

## 2022-04-27 PROCEDURE — 99233 SBSQ HOSP IP/OBS HIGH 50: CPT | Mod: GC

## 2022-04-27 PROCEDURE — 99232 SBSQ HOSP IP/OBS MODERATE 35: CPT | Mod: GC

## 2022-04-27 PROCEDURE — 76937 US GUIDE VASCULAR ACCESS: CPT | Mod: 26

## 2022-04-27 PROCEDURE — 36000 PLACE NEEDLE IN VEIN: CPT

## 2022-04-27 RX ORDER — COLLAGENASE CLOSTRIDIUM HIST. 250 UNIT/G
1 OINTMENT (GRAM) TOPICAL
Qty: 1 | Refills: 0
Start: 2022-04-27

## 2022-04-27 RX ORDER — CLOPIDOGREL BISULFATE 75 MG/1
1 TABLET, FILM COATED ORAL
Qty: 30 | Refills: 0
Start: 2022-04-27 | End: 2022-05-26

## 2022-04-27 RX ORDER — ACETAMINOPHEN 500 MG
2 TABLET ORAL
Qty: 0 | Refills: 0 | DISCHARGE
Start: 2022-04-27

## 2022-04-27 RX ORDER — CHLORHEXIDINE GLUCONATE 213 G/1000ML
1 SOLUTION TOPICAL DAILY
Refills: 0 | Status: DISCONTINUED | OUTPATIENT
Start: 2022-04-27 | End: 2022-04-28

## 2022-04-27 RX ADMIN — Medication 3 MILLIGRAM(S): at 22:32

## 2022-04-27 RX ADMIN — Medication 10 MILLIGRAM(S): at 00:30

## 2022-04-27 RX ADMIN — Medication 200 MILLIGRAM(S): at 07:06

## 2022-04-27 RX ADMIN — Medication 10 MILLIGRAM(S): at 22:32

## 2022-04-27 RX ADMIN — BUDESONIDE AND FORMOTEROL FUMARATE DIHYDRATE 2 PUFF(S): 160; 4.5 AEROSOL RESPIRATORY (INHALATION) at 18:30

## 2022-04-27 RX ADMIN — GABAPENTIN 100 MILLIGRAM(S): 400 CAPSULE ORAL at 18:25

## 2022-04-27 RX ADMIN — Medication 40 MILLIGRAM(S): at 18:25

## 2022-04-27 RX ADMIN — METHADONE HYDROCHLORIDE 10 MILLIGRAM(S): 40 TABLET ORAL at 07:06

## 2022-04-27 RX ADMIN — Medication 50 MILLIGRAM(S): at 22:32

## 2022-04-27 RX ADMIN — Medication 650 MILLIGRAM(S): at 08:38

## 2022-04-27 RX ADMIN — BUDESONIDE AND FORMOTEROL FUMARATE DIHYDRATE 2 PUFF(S): 160; 4.5 AEROSOL RESPIRATORY (INHALATION) at 07:06

## 2022-04-27 RX ADMIN — ATORVASTATIN CALCIUM 80 MILLIGRAM(S): 80 TABLET, FILM COATED ORAL at 22:32

## 2022-04-27 RX ADMIN — Medication 48 MILLIGRAM(S): at 13:27

## 2022-04-27 RX ADMIN — GABAPENTIN 100 MILLIGRAM(S): 400 CAPSULE ORAL at 07:06

## 2022-04-27 RX ADMIN — METHADONE HYDROCHLORIDE 10 MILLIGRAM(S): 40 TABLET ORAL at 13:27

## 2022-04-27 RX ADMIN — HYDROMORPHONE HYDROCHLORIDE 0.5 MILLIGRAM(S): 2 INJECTION INTRAMUSCULAR; INTRAVENOUS; SUBCUTANEOUS at 00:30

## 2022-04-27 RX ADMIN — Medication 40 MILLIGRAM(S): at 07:07

## 2022-04-27 RX ADMIN — Medication 100 MILLIGRAM(S): at 18:31

## 2022-04-27 RX ADMIN — HYDROMORPHONE HYDROCHLORIDE 0.5 MILLIGRAM(S): 2 INJECTION INTRAMUSCULAR; INTRAVENOUS; SUBCUTANEOUS at 01:00

## 2022-04-27 RX ADMIN — PANTOPRAZOLE SODIUM 40 MILLIGRAM(S): 20 TABLET, DELAYED RELEASE ORAL at 07:06

## 2022-04-27 RX ADMIN — Medication 650 MILLIGRAM(S): at 09:38

## 2022-04-27 RX ADMIN — SACUBITRIL AND VALSARTAN 1 TABLET(S): 24; 26 TABLET, FILM COATED ORAL at 18:25

## 2022-04-27 RX ADMIN — SACUBITRIL AND VALSARTAN 1 TABLET(S): 24; 26 TABLET, FILM COATED ORAL at 07:07

## 2022-04-27 RX ADMIN — CLOPIDOGREL BISULFATE 75 MILLIGRAM(S): 75 TABLET, FILM COATED ORAL at 13:27

## 2022-04-27 RX ADMIN — METHADONE HYDROCHLORIDE 10 MILLIGRAM(S): 40 TABLET ORAL at 22:32

## 2022-04-27 NOTE — DISCHARGE NOTE PROVIDER - NSDCCPTREATMENT_GEN_ALL_CORE_FT
PRINCIPAL PROCEDURE  Procedure: Angiogram, extremity, right  Findings and Treatment: SFA angioplasty and stent placement x 2

## 2022-04-27 NOTE — PROGRESS NOTE ADULT - ATTENDING COMMENTS
61F current smoker w/ PMHx ESRD (HD T/Th/Sa), HFrEF (EF 25% s/p AICD), IDDM2, COPD (3L O2 at home PRN), multiple myeloma (on Ninlaro), hypothyroidism, depression, neuropathy (on methadone), recurrent DVTs (on eliquis 2.5mg qd), CVA x2 (w/ residual R-sided weakness), PAD s/p LLE bypass, and DHARMESH on CPAP, who was sent in by her dialysis doctor for concern for worsening chronic RLE DVT and concern for infected chronic right heal ulcer.    Patient was seen and examined this morning, no acute complaints.     1-Infected R heel ulcer.   s/p angiogram with stent placement     2- LE DVT- c/w AC today     3- PVD: occlusion of R SFA.  s/p angioplasty on 04/26/22.   c/w anticoagulation after procedure.     4- ESRD on HD  5- Heart failure with reduced EF- c/w current regime  6- chronic hypoxic respiratory failure   7- Hx CVA- c/w AC
ESRD. Labs/case reviewed with renal fellow.   Pt seen at bedside while on HD.  VS's stable. Pt tolerating procedure well.   Epo with HD.
I: HTN controlled. Last dialyzed yesterday. HGB 9.5.     A: ESRD. Anemia stable.   P: Continue dialysis. HEATHER at HD.
I: HTN controlled. Last HD 2 days ago.   A: ESRD.   P: Continue dialysis. Continue BP meds. Dose of eliquis per PI is 5 mg bid. Defer to primary team/hematology.
Consultation also reviewed/confirmed. I:   Seen on dialysis today. Admitted with unilateral swelling on eliquis.   A: ESRD. Hx of DVT.   P: Continue dialysis. Please note PI for eliquis recommends 5 mg bid for anticoagulation so we recommend this dose. However, we must defer to hematology re dosing.
Initial attending contact date  4/25/22    . See fellow note written above for details. I reviewed the fellow documentation. I have personally seen and examined this patient. I reviewed vitals, labs, medications, cardiac studies, and additional imaging. I agree with the above fellow's findings and plans as written above with the following additions/statements.    61F current smoker w/ PMHx ESRD (HD T/Th/Sa), HFrEF (EF 25% 9/21, s/p AICD), IDDM2, COPD (3L O2 at home PRN), multiple myeloma (on Ninlaro), hypothyroidism, depression, neuropathy (on methadone), recurrent DVTs (on eliquis 2.5mg qd), CVA x2 (w/ residual R-sided weakness), PAD s/p LLE bypass, and DHARMESH on CPAP, who was sent in by her dialysis doctor for concern for worsening chronic RLE DVT and concern for infected chronic right heal ulcer. CT A/P incidental RA filling defect for which cardiology is consulted.  -Awaiting official ECHO with contrast - r/o thrombus  - c/w home eliquis BID for now (take for recurrent DVTs)  -HFrEF : euvolemic on exam, cont torsemide 40 BID, Metop succinate 200, entresto  BID  -To fu with her outpatient cardiologist
61F current smoker w/ PMHx ESRD (HD T/Th/Sa), HFrEF (EF 25% s/p AICD), IDDM2, COPD (3L O2 at home PRN), multiple myeloma (on Ninlaro), hypothyroidism, depression, neuropathy (on methadone), recurrent DVTs (on eliquis 2.5mg qd), CVA x2 (w/ residual R-sided weakness), PAD s/p LLE bypass, and DHARMESH on CPAP, who was sent in by her dialysis doctor for concern for worsening chronic RLE DVT and concern for infected chronic right heal ulcer.    Patient was seen and examined this morning, no acute complaints.     # Recurrent DVT.   RLE US without any changes from previous US.   Resume anticoagulation after angioplasty procedure.     # R heel ulcer.   Podiatry input appreciated: low concern for OM.   c/w Ancef (started 4/22) 1g IV q8.     # PVD: occlusion of R SFA.  s/p angioplasty on 04/26/22.   c/w anticoagulation after procedure.     Rest of care as pre the medical resident's note.

## 2022-04-27 NOTE — DISCHARGE NOTE NURSING/CASE MANAGEMENT/SOCIAL WORK - NSDCPEFALRISK_GEN_ALL_CORE
For information on Fall & Injury Prevention, visit: [email or web address] OR  [email or web address] OR  https://www.cdc.gov/steadi/patient.html

## 2022-04-27 NOTE — DISCHARGE NOTE NURSING/CASE MANAGEMENT/SOCIAL WORK - NSDCPEEMAIL_GEN_ALL_CORE
[facility] email [email or web address]

## 2022-04-27 NOTE — PROGRESS NOTE ADULT - SUBJECTIVE AND OBJECTIVE BOX
Patient is a 61y Female seen and evaluated at bedside.   s/p hemodialysis yesterday   s/p angio yesterday   lytes are acceptable  for hemodialysis tomorrow   denies CP SOB     Meds:    acetaminophen     Tablet .. 650 every 6 hours PRN  ammonium lactate 12% Lotion 1 <User Schedule>  atorvastatin 80 at bedtime  budesonide 160 MICROgram(s)/formoterol 4.5 MICROgram(s) Inhaler 2 two times a day  ceFAZolin   IVPB 1000 every 24 hours  chlorhexidine 2% Cloths 1 daily  clopidogrel Tablet 75 daily  collagenase Ointment 1 daily  dextrose 5%. 1000 <Continuous>  dextrose 5%. 1000 <Continuous>  dextrose 50% Injectable 25 once  dextrose 50% Injectable 12.5 once  dextrose 50% Injectable 25 once  dextrose Oral Gel 15 once PRN  fenofibrate Tablet 48 daily  gabapentin 100 every 12 hours  glucagon  Injectable 1 once  guaiFENesin Oral Liquid (Sugar-Free) 200 every 6 hours PRN  insulin glargine Injectable (LANTUS) 10 at bedtime  insulin lispro (ADMELOG) corrective regimen sliding scale  Before meals and at bedtime  melatonin 3 at bedtime  methadone    Tablet 10 three times a day  metoprolol succinate  daily  pantoprazole    Tablet 40 before breakfast  PARoxetine 10 at bedtime  sacubitril 97 mG/valsartan 103 mG 1 two times a day  sodium chloride 0.9% Bolus 250 once  torsemide 40 every 12 hours  traZODone 50 at bedtime      T(C): , Max: 36.9 (04-26-22 @ 22:35)  T(F): , Max: 98.4 (04-26-22 @ 22:35)  HR: 79 (04-27-22 @ 09:30)  BP: 118/52 (04-27-22 @ 08:30)  BP(mean): 78 (04-26-22 @ 21:30)  RR: 18 (04-27-22 @ 09:30)  SpO2: 99% (04-27-22 @ 09:30)  Wt(kg): --    04-26 @ 07:01  -  04-27 @ 07:00  --------------------------------------------------------  IN: 650 mL / OUT: 400 mL / NET: 250 mL            Review of Systems:  ROS negative except as per HPI      PHYSICAL EXAM:  GENERAL: Alert, awake, oriented x3 on RA   CHEST/LUNG: CTA BL no rales, rhonchi or wheezing   HEART: Regular rate and rhythm, no murmur, no gallops, no rub   ABDOMEN: Soft, nontender, non distended  EXTREMITIES: +RLE swelling   ACCESS: +TDC    LABS:                        9.5    6.85  )-----------( 138      ( 27 Apr 2022 07:52 )             31.7     04-27    131<L>  |  94<L>  |  27<H>  ----------------------------<  73  4.9   |  25  |  5.03<H>    Ca    8.4      27 Apr 2022 07:52  Phos  5.7     04-27  Mg     1.9     04-27    TPro  7.7  /  Alb  3.8  /  TBili  0.2  /  DBili  x   /  AST  14  /  ALT  <5<L>  /  AlkPhos  81  04-26      PT/INR - ( 26 Apr 2022 08:04 )   PT: 16.4 sec;   INR: 1.37          PTT - ( 26 Apr 2022 08:04 )  PTT:42.4 sec          RADIOLOGY & ADDITIONAL STUDIES:

## 2022-04-27 NOTE — DISCHARGE NOTE PROVIDER - HOSPITAL COURSE
61F current smoker w/ PMHx ESRD (HD T/Th/Sa), HFrEF (EF 25% s/p AICD), IDDM2, COPD (3L O2 at home PRN), multiple myeloma (on Ninlaro), hypothyroidism, depression, neuropathy (on methadone), recurrent DVTs (on eliquis 2.5mg qd), CVA x2 (w/ residual R-sided weakness), PAD s/p IVC filter 6/17/11, Right iliac stent 3/1/13, Left SFA stents 5/10/13 and 6/11/13, Left femoral-below knee popliteal bypass with rSVG 1/6/15, and DHARMESH on CPAP, who was sent in by her dialysis doctor for concern for worsening chronic RLE DVT and concern for infected chronic right heal ulcer. Patient was admitted to the medicine service and started on antibiotics. Podiatry was consulted for further management of the heal ulcer. US demonstrated unchanged nonocclusive thrombus within the right common femoral vein. Vascular was consulted to evaluate RLE calf pain and nonpalpable pulses.   CT (04/24) found that patient had occlusion of R SFA with distal reconstitution pending angioplasty vs. stent placement.    INCOMPLETE 61F current smoker w/ PMHx ESRD (HD T/Th/Sa), HFrEF (EF 25% s/p AICD), IDDM2, COPD (3L O2 at home PRN), multiple myeloma (on Ninlaro), hypothyroidism, depression, neuropathy (on methadone), recurrent DVTs (on eliquis 2.5mg qd), CVA x2 (w/ residual R-sided weakness), PAD s/p IVC filter 6/17/11, Right iliac stent 3/1/13, Left SFA stents 5/10/13 and 6/11/13, Left femoral-below knee popliteal bypass with rSVG 1/6/15, and DHARMESH on CPAP, who was sent in by her dialysis doctor for concern for worsening chronic RLE DVT and concern for infected chronic right heal ulcer. Patient was admitted to the medicine service and started on antibiotics. Podiatry was consulted for further management of the heal ulcer. US demonstrated unchanged nonocclusive thrombus within the right common femoral vein (no new DVTs). Vascular was consulted to evaluate RLE calf pain and nonpalpable pulses.   CTA (04/24) found that patient had occlusion of R SFA with distal reconstitution. Patient was brought to the operating room on 4/26 for an a.  Patient tolerated the procedure well with no known complications.  Patient is tolerating diet, pain is well controlled, ambulating and voiding.  In accordance with the attending the patient is stable for discharge and is to follow up as an outpatient.      INCOMPLETE 61F current smoker w/ PMHx ESRD (HD T/Th/Sa), HFrEF (EF 25% s/p AICD), IDDM2, COPD (3L O2 at home PRN), multiple myeloma (on Ninlaro), hypothyroidism, depression, neuropathy (on methadone), recurrent DVTs (on eliquis 2.5mg qd), CVA x2 (w/ residual R-sided weakness), PAD s/p IVC filter 6/17/11, Right iliac stent 3/1/13, Left SFA stents 5/10/13 and 6/11/13, Left femoral-below knee popliteal bypass with rSVG 1/6/15, and DHARMESH on CPAP, who was sent in by her dialysis doctor for concern for worsening chronic RLE DVT and concern for infected chronic right heal ulcer. Patient was admitted to the medicine service and started on antibiotics. Podiatry was consulted for further management of the heal ulcer. US demonstrated unchanged nonocclusive thrombus within the right common femoral vein (no new DVTs). Vascular was consulted to evaluate RLE calf pain and nonpalpable pulses.   CTA (04/24) found that patient had occlusion of R SFA with distal reconstitution. Patient was brought to the operating room on 4/26 for an angiogram of the right lower extremity with angioplasty and 2 stents placed.  Patient tolerated the procedure well with no known complications. Patient underwent dialysis after the surgery. Patient is tolerating diet, pain is well controlled, ambulating and voiding.  On day of discharge patient was hemodynamically stable to go home and follow-up with her Dr. Medeiros and her podiatrist. Patient voiced understanding with the plan.

## 2022-04-27 NOTE — DISCHARGE NOTE PROVIDER - NSDCFUSCHEDAPPT_GEN_ALL_CORE_FT
Enriqueta Lehman  [facility] Physician [facility]  [facility] [address]  Scheduled Appointment: 06/15/2022     Marina Medeiros  [facility] Physician [facility]  Surg Vasc [address]  Scheduled Appointment: 05/12/2022    Enriqueta Lehman  [facility] Physician [facility]  [facility] [address]  Scheduled Appointment: 06/15/2022

## 2022-04-27 NOTE — DISCHARGE NOTE PROVIDER - CARE PROVIDERS DIRECT ADDRESSES
[email or web address]

## 2022-04-27 NOTE — DISCHARGE NOTE NURSING/CASE MANAGEMENT/SOCIAL WORK - PATIENT PORTAL LINK FT
You can access the FollowMyHealth Patient Portal offered by [facility] by registering at the following website: [email or web address]. By joining [email or web address]’s FollowMyHealth portal, you will also be able to view your health information using other applications (apps) compatible with our system.

## 2022-04-27 NOTE — PROGRESS NOTE ADULT - ASSESSMENT
61F current smoker w/ PMHx ESRD (HD T/Th/Sa), HFrEF (EF 25% s/p AICD), IDDM2, COPD (3L O2 at home PRN), multiple myeloma (on Ninlaro), hypothyroidism, depression, neuropathy (on methadone), recurrent DVTs (on eliquis 2.5mg qd), CVA x2 (w/ residual R-sided weakness), PAD s/p LLE bypass, and DHARMESH on CPAP, who was sent in by her dialysis doctor for concern for worsening chronic RLE DVT and concern for infected chronic right heal ulcer. Multiple medical comorbidities contributing to difficulty healing; most notably ESRD, PVD and lack of offloading. Of note, R foot XR shows extensive arterial calcification, increased calcaneal sclerosis, heel radiolucency corresponding to soft tissue defect; No need for addn imaging at this time. Low suspicion OM - wound does not probe to bone.    Podiatry consulted 4/22/2022 to evaluate right heel ulcer for cellulitis and/or osteomyelitis.     Plan:   - No acute signs of infection to right heel at this time, no surgical intervention planned   - Local wound care: Santyl + DSD, Kerlix wrap to heel  - Ammonium lactate to be applied to b/l LE  - Recc Z-float offloading boot to RLE  - rest of care per primary team  - Upon discharge pt should apply santyl to heel wound, gauze ABD pad and kerlix.  If pt is unable to she should have VNS do this three times per week.    Patient should follow up with Dr. Braden Baird within 1 week of discharge.    Office information:          [place] Address- [address], [place] [address] Phone: [phone]         [place] Address- [address] [facility], [place] [address] Phone: [phone]

## 2022-04-27 NOTE — PROGRESS NOTE ADULT - SUBJECTIVE AND OBJECTIVE BOX
4/25: no AC until after angiogram   O/N: HD, KASEY, VSS                                                            ---------------------------------------------------------------------------  PLEASE CHECK WHEN PRESENT:  [  ] Heart Failure  [  ] Acute  [  ] Acute on Chronic  [  ] Chronic  -------------------------------------------------------------------  [  ]Diastolic [HFpEF]  [  ]Systolic [HFrEF]  [  ]Combined [HFpEF & HFrEF]  [  ] afib  [x  ] hypertensive heart disease  [  ]Other:  -------------------------------------------------------------------  [ ] Respiratory failure  [ ] Acute cor pulmonale  [ ] Asthma/COPD Exacerbation  [ ] Pleural effusion  [ ] Aspiration pneumonia  -------------------------------------------------------------------  [  ]RITCHIE  [  ]ATN  [  ]Reneal Medullary Necrosis  [  ]Renal Cortical Necrosis  [  ]Other Pathological Lesions:    [  ]CKD 1  [  ]CKD 2  [  ]CKD 3  [  ]CKD 4  [ x ]CKD 5  [  ]Other  -------------------------------------------------------------------  [ x ]Diabetes  [x  ] Diabetic PVD Ulcer  [x  ] Neuropathic ulcer to DM  [  ] Diabetes with Nephropathy  [  ] Osteomyelitis due to diabetes  --------------------------------------------------------------------  [  ]Malnutrition: See Nutrition Note  [  ]Cachexia  [  ]Other:   [  ]Supplement Ordered:  [  ]Morbid Obesity (BMI >=40]  ---------------------------------------------------------------------  [ ] Sepsis/severe sepsis/septic shock  [ ] UTI  [ ] Pneumonia  -----------------------------------------------------------------------  [ ] Acidosis/alkalosis  [ ] Fluid overload  [ ] Hypokalemia  [ ] Hyperkalemia  [ ] Hypomagnesemia  [ ] Hypophosphatemia  [ ] Hyperphosphatemia  ------------------------------------------------------------------------  [ ] Acute blood loss anemia  [ ] Post op blood loss anemia  [ ] Iron deficiency anemia  [ ] Anemia due to chronic disease  [ ] Hypercoagulable state  ----------------------------------------------------------------------  [x ] Cerebral infarction  [ ] Transient ischemia attack  [ ] Encephalopathy              Assessment and Plan:   · Assessment	  61F current smoker w/ PMHx ESRD (HD T/Th/Sa), HFrEF (EF 25% s/p AICD), IDDM2, COPD (3L O2 at home PRN), multiple myeloma (on Ninlaro), hypothyroidism, depression, neuropathy (on methadone), recurrent DVTs (on eliquis 2.5mg qd), CVA x2 (w/ residual R-sided weakness), PAD s/p LLE bypass, and DHARMESH on CPAP, who was sent in by her dialysis doctor for concern for worsening chronic RLE DVT and concern for infected chronic right heal ulcer. US demonstrated unchanged nonocclusive thrombus within the right common femoral vein. vascular was consulted to evaluate RLE calf pain and nonpalpable pulses, found to have Occluded right SFA with reconstitution of popliteal artery through collaterals on CTA     plan:   -Plan for RLE angiogram, possible angioplasty, possible stenting today, 4/26/22  -NPO for procedure   -off Eliquis   - will need HD today, this am prior to surgery   -podiatry to manage Right heel wound   -rest of care per primary   -vascular surgery to continue to follow  4/25: no AC until after angiogram   O/N: HD, KASEY, VSS    Subjective: Patient seen and examined at bedside.  Patient has no complaints.     ROS:   Denies Headache, blurred vision, Chest Pain, SOB, Abdominal pain, nausea or vomiting     Social   ceFAZolin   IVPB 1000  ceFAZolin   IVPB 1000  clopidogrel Tablet 75  metoprolol succinate   sacubitril 97 mG/valsartan 103 mG 1  torsemide 40      Allergies    aspirin (Other (Moderate); Rash)  oral contrast (Unknown)    Intolerances        Vital Signs Last 24 Hrs  T(C): 36.3 (27 Apr 2022 06:13), Max: 36.9 (26 Apr 2022 11:22)  T(F): 97.3 (27 Apr 2022 06:13), Max: 98.5 (26 Apr 2022 11:22)  HR: 70 (27 Apr 2022 03:30) (62 - 79)  BP: 105/55 (27 Apr 2022 00:00) (85/50 - 112/53)  BP(mean): 78 (26 Apr 2022 21:30) (58 - 557)  RR: 22 (27 Apr 2022 03:30) (11 - 22)  SpO2: 100% (27 Apr 2022 03:30) (88% - 100%)  I&O's Summary    26 Apr 2022 07:01  -  27 Apr 2022 07:00  --------------------------------------------------------  IN: 650 mL / OUT: 400 mL / NET: 250 mL        Physical Exam:  Gen: NAD, resting comfortably in bed  C/V: NSR  Pulm: Nonlabored breathing, no respiratory distress  Abd: soft, NT/ND  Groin: left groin- soft, no hematoma  Extrem: RLE: triphasic AT, biphasic PT. LLE: biphasic DP/PT      LABS:                        9.8    7.01  )-----------( 153      ( 26 Apr 2022 08:04 )             32.6     04-26    131<L>  |  94<L>  |  43<H>  ----------------------------<  85  5.2   |  23  |  6.73<H>    Ca    8.6      26 Apr 2022 08:04  Phos  6.2     04-26  Mg     2.0     04-26    TPro  7.7  /  Alb  3.8  /  TBili  0.2  /  DBili  x   /  AST  14  /  ALT  <5<L>  /  AlkPhos  81  04-26    PT/INR - ( 26 Apr 2022 08:04 )   PT: 16.4 sec;   INR: 1.37          PTT - ( 26 Apr 2022 08:04 )  PTT:42.4 sec      ---------------------------------------------------------------------------  PLEASE CHECK WHEN PRESENT:  [  ] Heart Failure  [  ] Acute  [  ] Acute on Chronic  [  ] Chronic  -------------------------------------------------------------------  [  ]Diastolic [HFpEF]  [  ]Systolic [HFrEF]  [  ]Combined [HFpEF & HFrEF]  [  ] afib  [x  ] hypertensive heart disease  [  ]Other:  -------------------------------------------------------------------  [ ] Respiratory failure  [ ] Acute cor pulmonale  [ ] Asthma/COPD Exacerbation  [ ] Pleural effusion  [ ] Aspiration pneumonia  -------------------------------------------------------------------  [  ]RITCHIE  [  ]ATN  [  ]Reneal Medullary Necrosis  [  ]Renal Cortical Necrosis  [  ]Other Pathological Lesions:    [  ]CKD 1  [  ]CKD 2  [  ]CKD 3  [  ]CKD 4  [ x ]CKD 5  [  ]Other  -------------------------------------------------------------------  [ x ]Diabetes  [x  ] Diabetic PVD Ulcer  [x  ] Neuropathic ulcer to DM  [  ] Diabetes with Nephropathy  [  ] Osteomyelitis due to diabetes  --------------------------------------------------------------------  [  ]Malnutrition: See Nutrition Note  [  ]Cachexia  [  ]Other:   [  ]Supplement Ordered:  [  ]Morbid Obesity (BMI >=40]  ---------------------------------------------------------------------  [ ] Sepsis/severe sepsis/septic shock  [ ] UTI  [ ] Pneumonia  -----------------------------------------------------------------------  [ ] Acidosis/alkalosis  [ ] Fluid overload  [ ] Hypokalemia  [ ] Hyperkalemia  [ ] Hypomagnesemia  [ ] Hypophosphatemia  [ ] Hyperphosphatemia  ------------------------------------------------------------------------  [ ] Acute blood loss anemia  [ ] Post op blood loss anemia  [ ] Iron deficiency anemia  [ ] Anemia due to chronic disease  [ ] Hypercoagulable state  ----------------------------------------------------------------------  [x ] Cerebral infarction  [ ] Transient ischemia attack  [ ] Encephalopathy              Assessment and Plan:   · Assessment	  61F current smoker w/ PMHx ESRD (HD T/Th/Sa), HFrEF (EF 25% s/p AICD), IDDM2, COPD (3L O2 at home PRN), multiple myeloma (on Ninlaro), hypothyroidism, depression, neuropathy (on methadone), recurrent DVTs (on eliquis 2.5mg qd), CVA x2 (w/ residual R-sided weakness), PAD s/p LLE bypass, and DHARMESH on CPAP, who was sent in by her dialysis doctor for concern for worsening chronic RLE DVT and concern for infected chronic right heal ulcer. US demonstrated unchanged nonocclusive thrombus within the right common femoral vein. vascular was consulted to evaluate RLE calf pain and nonpalpable pulses, found to have Occluded right SFA with reconstitution of popliteal artery through collaterals on CTA     plan:   -s/p RLE angiogram, SFA stent x2 4/26/22  -off Eliquis   -podiatry to manage Right heel wound   -transfer back to medicine today  -pain control  -f/u am labs  -f/u carotid duplex  -f/u renal recs  -started plavix 4/26

## 2022-04-27 NOTE — DISCHARGE NOTE PROVIDER - CARE PROVIDER_API CALL
Marina Medeiros)  [facility] Surgery  Vascular  [address] [place] [address]  Phone: [phone]  Fax: [phone]  Follow Up Time:     Braden BairdDPM)  Podiatric Medicine and Surgery  [address], [address]  [place] [address]  Phone: [phone]  Fax: [phone]  Follow Up Time: 1 week

## 2022-04-27 NOTE — DISCHARGE NOTE NURSING/CASE MANAGEMENT/SOCIAL WORK - NSDCPEWEB_GEN_ALL_CORE
[facility] for Tobacco Control website --- [email or web address] --- [email or web address]

## 2022-04-27 NOTE — DISCHARGE NOTE PROVIDER - NSDCMRMEDTOKEN_GEN_ALL_CORE_FT
apixaban 2.5 mg oral tablet: 1 tab(s) orally 2 times a day  atorvastatin 80 mg oral tablet: 1 tab(s) orally once a day (at bedtime)  busPIRone 15 mg oral tablet: 1 tab(s) orally once a day (at bedtime)  Decadron 4 mg oral tablet: 5 tab(s) orally once a week (mondays)  Entresto 97 mg-103 mg oral tablet: 1 tab(s) orally 2 times a day  fenofibrate 48 mg oral tablet: 1 tab(s) orally once a day  gabapentin 100 mg oral tablet: 1 tab(s) orally 2 times a day  Levemir 100 units/mL subcutaneous solution: 25 unit(s) subcutaneous once a day (at bedtime)  levothyroxine 50 mcg (0.05 mg) oral tablet: 1 tab(s) orally once a day  methadone 10 mg oral tablet: 1 tab(s) orally 3 times a day  Metoprolol Succinate  mg oral tablet, extended release: 1 tab(s) orally once a day  Ninlaro 3 mg oral capsule: 1 cap(s) orally every 7 days (mondays)  NovoLOG 100 units/mL injectable solution: 10 unit(s) injectable 3 times a day (before meals)  omeprazole 40 mg oral delayed release capsule: 1 cap(s) orally once a day  PARoxetine 10 mg oral tablet: 1 tab(s) orally once a day (at bedtime)  sevelamer carbonate 800 mg oral tablet: 1 tab(s) orally 3 times a day  Symbicort 160 mcg-4.5 mcg/inh inhalation aerosol: 2 puff(s) inhaled 2 times a day  torsemide: 40 milligram(s) orally 2 times a day  traZODone 50 mg oral tablet: 1 tab(s) orally once a day (at bedtime)  Ventolin HFA 90 mcg/inh inhalation aerosol: 2 puff(s) inhaled every 6 hours   acetaminophen 325 mg oral tablet: 2 tab(s) orally every 6 hours, As needed, Temp greater or equal to 38C (100.4F), Mild Pain (1 - 3)  apixaban 2.5 mg oral tablet: 1 tab(s) orally 2 times a day  atorvastatin 80 mg oral tablet: 1 tab(s) orally once a day (at bedtime)  busPIRone 15 mg oral tablet: 1 tab(s) orally once a day (at bedtime)  collagenase 250 units/g topical ointment: 1 application topically once a day  Decadron 4 mg oral tablet: 5 tab(s) orally once a week (mondays)  Entresto 97 mg-103 mg oral tablet: 1 tab(s) orally 2 times a day  fenofibrate 48 mg oral tablet: 1 tab(s) orally once a day  gabapentin 100 mg oral tablet: 1 tab(s) orally 2 times a day  Levemir 100 units/mL subcutaneous solution: 25 unit(s) subcutaneous once a day (at bedtime)  levothyroxine 50 mcg (0.05 mg) oral tablet: 1 tab(s) orally once a day  methadone 10 mg oral tablet: 1 tab(s) orally 3 times a day  Metoprolol Succinate  mg oral tablet, extended release: 1 tab(s) orally once a day  Ninlaro 3 mg oral capsule: 1 cap(s) orally every 7 days (mondays)  NovoLOG 100 units/mL injectable solution: 10 unit(s) injectable 3 times a day (before meals)  omeprazole 40 mg oral delayed release capsule: 1 cap(s) orally once a day  PARoxetine 10 mg oral tablet: 1 tab(s) orally once a day (at bedtime)  sevelamer carbonate 800 mg oral tablet: 1 tab(s) orally 3 times a day  Symbicort 160 mcg-4.5 mcg/inh inhalation aerosol: 2 puff(s) inhaled 2 times a day  torsemide: 40 milligram(s) orally 2 times a day  traZODone 50 mg oral tablet: 1 tab(s) orally once a day (at bedtime)  Ventolin HFA 90 mcg/inh inhalation aerosol: 2 puff(s) inhaled every 6 hours   acetaminophen 325 mg oral tablet: 2 tab(s) orally every 6 hours, As needed, Temp greater or equal to 38C (100.4F), Mild Pain (1 - 3)  apixaban 2.5 mg oral tablet: 1 tab(s) orally 2 times a day  atorvastatin 80 mg oral tablet: 1 tab(s) orally once a day (at bedtime)  busPIRone 15 mg oral tablet: 1 tab(s) orally once a day (at bedtime)  clopidogrel 75 mg oral tablet: 1 tab(s) orally once a day  collagenase 250 units/g topical ointment: 1 application topically once a day  Decadron 4 mg oral tablet: 5 tab(s) orally once a week (mondays)  Entresto 97 mg-103 mg oral tablet: 1 tab(s) orally 2 times a day  fenofibrate 48 mg oral tablet: 1 tab(s) orally once a day  gabapentin 100 mg oral tablet: 1 tab(s) orally 2 times a day  Levemir 100 units/mL subcutaneous solution: 25 unit(s) subcutaneous once a day (at bedtime)  levothyroxine 50 mcg (0.05 mg) oral tablet: 1 tab(s) orally once a day  methadone 10 mg oral tablet: 1 tab(s) orally 3 times a day  Metoprolol Succinate  mg oral tablet, extended release: 1 tab(s) orally once a day  Ninlaro 3 mg oral capsule: 1 cap(s) orally every 7 days (mondays)  NovoLOG 100 units/mL injectable solution: 10 unit(s) injectable 3 times a day (before meals)  omeprazole 40 mg oral delayed release capsule: 1 cap(s) orally once a day  PARoxetine 10 mg oral tablet: 1 tab(s) orally once a day (at bedtime)  sevelamer carbonate 800 mg oral tablet: 1 tab(s) orally 3 times a day  Symbicort 160 mcg-4.5 mcg/inh inhalation aerosol: 2 puff(s) inhaled 2 times a day  torsemide: 40 milligram(s) orally 2 times a day  traZODone 50 mg oral tablet: 1 tab(s) orally once a day (at bedtime)  Ventolin HFA 90 mcg/inh inhalation aerosol: 2 puff(s) inhaled every 6 hours   acetaminophen 325 mg oral tablet: 2 tab(s) orally every 6 hours, As needed, Temp greater or equal to 38C (100.4F), Mild Pain (1 - 3)  apixaban 2.5 mg oral tablet: 1 tab(s) orally 2 times a day  atorvastatin 80 mg oral tablet: 1 tab(s) orally once a day (at bedtime)  busPIRone 15 mg oral tablet: 1 tab(s) orally once a day (at bedtime)  clopidogrel 75 mg oral tablet: 1 tab(s) orally once a day  collagenase 250 units/g topical ointment: 1 application topically once a day  Decadron 4 mg oral tablet: 5 tab(s) orally once a week (mondays)  Entresto 97 mg-103 mg oral tablet: 1 tab(s) orally 2 times a day  fenofibrate 48 mg oral tablet: 1 tab(s) orally once a day  gabapentin 100 mg oral tablet: 1 tab(s) orally 2 times a day  Levemir 100 units/mL subcutaneous solution: 10 unit(s) subcutaneous once a day (at bedtime)  levothyroxine 50 mcg (0.05 mg) oral tablet: 1 tab(s) orally once a day  methadone 10 mg oral tablet: 1 tab(s) orally 3 times a day  Metoprolol Succinate  mg oral tablet, extended release: 1 tab(s) orally once a day  Ninlaro 3 mg oral capsule: 1 cap(s) orally every 7 days (mondays)  omeprazole 40 mg oral delayed release capsule: 1 cap(s) orally once a day  PARoxetine 10 mg oral tablet: 1 tab(s) orally once a day (at bedtime)  sevelamer carbonate 800 mg oral tablet: 1 tab(s) orally 3 times a day  Symbicort 160 mcg-4.5 mcg/inh inhalation aerosol: 2 puff(s) inhaled 2 times a day  torsemide: 40 milligram(s) orally 2 times a day  traZODone 50 mg oral tablet: 1 tab(s) orally once a day (at bedtime)  Ventolin HFA 90 mcg/inh inhalation aerosol: 2 puff(s) inhaled every 6 hours

## 2022-04-27 NOTE — PROGRESS NOTE ADULT - ASSESSMENT
61 female new ESRD (TTS last HD was tuesday 4/19 via [facility]) DM HTN history of DVT presented to the ED from HD center ([facility]) due to RLE swelling s/p peripheral angio 4/26. Consult for hemodialysis     Assessment/Plan:   #ESRD on HD TTS@ [facility] center on [facility]  usual Rx 4h 2-3L   HD tomorrow as per schedule  lytes are acceptable     #HTN   BP at goal   continue w/ home meds    #access   +TDC, immature AVF     #anemia  Hb at goal   no indication for EPO or IV Iron at this time     #renal bone disease   Ca ~8  Phos ~6   trend Ca, Phos daily w/labs    DVT  DVT history recommended dose of eliquis is 5mg BID; defer to primary team/hematology     Thank you for the opportunity to participate in the care of your patient. The nephrology service remains available to assist with any questions or concerns. Please feel free to reach us by paging the on-call nephrology fellow for urgent issues or as below.     Hang Garcia M.D.   PGY-5, Nephrology Fellow   C: [phone]   P: [phone]

## 2022-04-27 NOTE — PROCEDURE NOTE - NSICDXPROCEDURE_GEN_ALL_CORE_FT
PROCEDURES:  Insertion, needle, vein 27-Apr-2022 01:19:35  Sameera Interiano  US guided vascular access 27-Apr-2022 01:19:39  Sameera Interiano

## 2022-04-27 NOTE — DISCHARGE NOTE PROVIDER - NSDCCPCAREPLAN_GEN_ALL_CORE_FT
PRINCIPAL DISCHARGE DIAGNOSIS  Diagnosis: Diabetic foot ulcer  Assessment and Plan of Treatment:       SECONDARY DISCHARGE DIAGNOSES  Diagnosis: ESRD on dialysis  Assessment and Plan of Treatment:     Diagnosis: Chronic HFrEF (heart failure with reduced ejection fraction)  Assessment and Plan of Treatment:     Diagnosis: Hypertension  Assessment and Plan of Treatment:     Diagnosis: DM2 (diabetes mellitus, type 2)  Assessment and Plan of Treatment:     Diagnosis: Diabetic neuropathy  Assessment and Plan of Treatment:     Diagnosis: Recurrent deep vein thrombosis (DVT)  Assessment and Plan of Treatment:     Diagnosis: COPD (chronic obstructive pulmonary disease)  Assessment and Plan of Treatment:     Diagnosis: DHARMESH on CPAP  Assessment and Plan of Treatment:     Diagnosis: Anxiety and depression  Assessment and Plan of Treatment:     Diagnosis: Multiple myeloma  Assessment and Plan of Treatment:     Diagnosis: Wound cellulitis  Assessment and Plan of Treatment:     Diagnosis: Hypothyroidism  Assessment and Plan of Treatment:     Diagnosis: PAD (peripheral artery disease)  Assessment and Plan of Treatment:     Diagnosis: History of CVA (cerebrovascular accident)  Assessment and Plan of Treatment:

## 2022-04-27 NOTE — DISCHARGE NOTE PROVIDER - NSDCFUADDINST_GEN_ALL_CORE_FT
FOLLOW UP: Dr. Medeiros in 1 week. Your appointment has been made for _______. Call the office at  with any questions.  WOUND CARE: Apply santyl to heel wound, gauze ABD pad and kerlix. You may shower; soap and water over incision sites. Do not scrub. Pat dry when done.   ACTIVITY: Ambulate as tolerated, but no heavy lifting (>10lbs) or strenuous exercise.  DIET: You may resume regular diet. Call the office if you experience increasing pain, redness, swelling or drainage from incision sites/wounds, or temperature >101.4F.  NEW MEDICATIONS: Please start taking Plavix 75 daily  ADDITIONAL FOLLOW UP AFTER DISCHARGE:  Please follow-up with Dr. Braden Baird within 1 week of discharge. Call to schedule an appointment.    Office information:          [place] Address- [address], [place] [address] Phone: [phone]         [place] Address- [address], [place] [address] Phone: [phone]      DISCHARGE DESTINATION:   FOLLOW UP: Dr. Medeiros in 1 week. Your appointment has been made for _______. Call the office at  with any questions.  WOUND CARE: Apply santyl to heel wound, gauze ABD pad and kerlix. You may shower; soap and water over incision sites. Do not scrub. Pat dry when done.   ACTIVITY: Ambulate as tolerated, but no heavy lifting (>10lbs) or strenuous exercise. Please wear the z-float boot and try to off load your heal as much as possible.   DIET: You may resume regular diet. Call the office if you experience increasing pain, redness, swelling or drainage from incision sites/wounds, or temperature >101.4F.  NEW MEDICATIONS: Please start taking Plavix 75 daily  ADDITIONAL FOLLOW UP AFTER DISCHARGE:  Please follow-up with Dr. Braden Baidr within 1 week of discharge. Call to schedule an appointment.    Office information:          [place] Address- [address] [address] Phone: [phone]         [place] Address- [address], [place] [address] Phone: [phone]  DISCHARGE DESTINATION: home   FOLLOW UP: Dr. Medeiros in 1 week. Your appointment has been made for 5/12. Call the office at  with any questions.  WOUND CARE: Apply santyl to heel wound, gauze ABD pad and kerlix. You may shower; soap and water over incision sites. Do not scrub. Pat dry when done.   ACTIVITY: Ambulate as tolerated, but no heavy lifting (>10lbs) or strenuous exercise. Please wear the z-float boot and try to off load your heal as much as possible.   DIET: You may resume regular diet. Call the office if you experience increasing pain, redness, swelling or drainage from incision sites/wounds, or temperature >101.4F.  NEW MEDICATIONS: Please start taking Plavix 75 daily. Please continue your other home medications as directed.   ADDITIONAL FOLLOW UP AFTER DISCHARGE:  Please follow-up with Dr. Braden Baird within 1 week of discharge. Call to schedule an appointment.    Office information:          [place] Address- [address] [place] [address] Phone: [phone]         [place] Address- [address] [address] Phone: [phone]  Follow-up with Dr. Luna regarding your methadone prescription  DISCHARGE DESTINATION: home   FOLLOW UP: Dr. Medeiros in 1 week. Your appointment has been made for 5/12 at 11:30. Call the office at  with any questions.  WOUND CARE: Clean wound with saline or water. Apply santyl to heel wound, gauze ABD pad and kerlix. You may shower; soap and water over incision site. Do not scrub. Pat dry when done.   ACTIVITY: Ambulate as tolerated, but no heavy lifting (>10lbs) or strenuous exercise. Please wear the z-float boot and try to off load your heal as much as possible.   DIET: You may resume regular diet. Call the office if you experience increasing pain, redness, swelling or drainage from incision sites/wounds, or temperature >101.4F.  NEW MEDICATIONS: Please start taking Plavix 75 daily. Please continue your other home medications as directed.   ADDITIONAL FOLLOW UP AFTER DISCHARGE:  Please follow-up with Dr. Braden Baird within 1 week of discharge. Call to schedule an appointment.    Office information:          [place] Address- [address], [place] [address] Phone: [phone]         [place] Address- [address], [place] [address] Phone: [phone]  Follow-up with Dr. Luna regarding your methadone prescription  DISCHARGE DESTINATION: home   FOLLOW UP: Dr. Medeiros in 1 week. Your appointment has been made for 5/12 at 11:30. Call the office at  with any questions.  WOUND CARE: Clean wound with saline or water. Apply santyl to heel wound, gauze ABD pad and kerlix. You may shower; soap and water over incision site. Do not scrub. Pat dry when done.   ACTIVITY: Ambulate as tolerated, but no heavy lifting (>10lbs) or strenuous exercise. Please wear the z-float boot and try to off load your heal as much as possible.   DIET: You may resume regular diet. Call the office if you experience increasing pain, redness, swelling or drainage from incision sites/wounds, or temperature >101.4F.  NEW MEDICATIONS: Please start taking Plavix 75 daily.   We reduced your lantus from 25 to 10u and stopped your Novolog 10u three times a day due to low glucose levels- please follow up with your primary care physician or your endocrinologist in 1 week to make sure your glucose levels are not high.   ADDITIONAL FOLLOW UP AFTER DISCHARGE:  Please follow-up with Dr. Braden Baird within 1 week of discharge. Call to schedule an appointment.    Office information:          [place] Address- [address], [place] [address] Phone: [phone]         [place] Address- [address] [facility] [address] [facility] [address], [place] [address] Phone: [phone]  Follow-up with Dr. Luna regarding your methadone prescription  DISCHARGE DESTINATION: home

## 2022-04-27 NOTE — PROGRESS NOTE ADULT - SUBJECTIVE AND OBJECTIVE BOX
OVERNIGHT EVENTS: Pt was hypoglycemic to 52 yesterday afternoon, checked after pt reported feeling hot and weak. Given fruit juice with resolution of symptoms and improvement in BG.      SUBJECTIVE:  pt denies acute complaints  no sob/cp  ros otherwise negative      OBJECTIVE:  Vital Signs Last 24 Hrs  T(C): 36.7 (26 Apr 2022 05:17), Max: 36.8 (25 Apr 2022 16:25)  T(F): 98 (26 Apr 2022 05:17), Max: 98.3 (25 Apr 2022 21:52)  HR: 79 (26 Apr 2022 05:17) (74 - 86)  BP: 103/64 (26 Apr 2022 05:17) (103/64 - 121/73)  BP(mean): --  RR: 16 (26 Apr 2022 05:17) (14 - 18)  SpO2: 100% (26 Apr 2022 05:17) (96% - 100%)    Physical Exam:  Gen: no acute distress; obese, interactive, well appearing  HEENT: pupils equal, responsive, reactive to light; no conjunctivitis or scleral icterus; no nasal discharge; no nasal congestion; mucus membranes moist  Neck: FROM, supple, no cervical lymphadenopathy  Chest: inspiratory wheezing b/l with L>R diffuse throughout, no crackles/wheezes, good air entry, no tachypnea or retractions  CV: regular rate and rhythm, no murmurs   Abd: soft, nontender, nondistended, no HSM appreciated, NABS  Extrem: b/l UE 2+ distal pulses. Boot on RLE. Unable to palpate distal pulses in RLE, 1+ pulses in LLE. R leg significant discoloration (unchanged), moderate pain with palpation of the R calf  Neuro: Sensation and motor grossly intact in the b/l UE. B/l LE sensation to touch pain and vibration absent in the feet, calf and medial thigh. Sensation to touch present in the lateral thigh b/l. 0/5 dorsiflexion of the R foot. 2/5 dorsiflexion of the L foot.  Labs:                        9.8    7.01  )-----------( 153      ( 26 Apr 2022 08:04 )             32.6     04-26    131<L>  |  94<L>  |  43<H>  ----------------------------<  85  5.2   |  23  |  6.73<H>    Ca    8.6      26 Apr 2022 08:04  Phos  6.2     04-26  Mg     2.0     04-26    TPro  7.7  /  Alb  3.8  /  TBili  0.2  /  DBili  x   /  AST  14  /  ALT  <5<L>  /  AlkPhos  81  04-26    PT/INR - ( 26 Apr 2022 08:04 )   PT: 16.4 sec;   INR: 1.37          PTT - ( 26 Apr 2022 08:04 )  PTT:42.4 sec  Fingerstick  glucose: POCT Blood Glucose.: 99 mg/dL (26 Apr 2022 08:23)        MEDICATIONS  (STANDING):  ammonium lactate 12% Lotion 1 Application(s) Topical <User Schedule>  atorvastatin 80 milliGRAM(s) Oral at bedtime  budesonide 160 MICROgram(s)/formoterol 4.5 MICROgram(s) Inhaler 2 Puff(s) Inhalation two times a day  ceFAZolin   IVPB 1000 milliGRAM(s) IV Intermittent every 8 hours  collagenase Ointment 1 Application(s) Topical daily  dextrose 5%. 1000 milliLiter(s) (50 mL/Hr) IV Continuous <Continuous>  dextrose 5%. 1000 milliLiter(s) (100 mL/Hr) IV Continuous <Continuous>  dextrose 50% Injectable 25 Gram(s) IV Push once  dextrose 50% Injectable 12.5 Gram(s) IV Push once  dextrose 50% Injectable 25 Gram(s) IV Push once  fenofibrate Tablet 48 milliGRAM(s) Oral daily  gabapentin 100 milliGRAM(s) Oral every 12 hours  glucagon  Injectable 1 milliGRAM(s) IntraMuscular once  insulin glargine Injectable (LANTUS) 10 Unit(s) SubCutaneous at bedtime  insulin lispro (ADMELOG) corrective regimen sliding scale   SubCutaneous Before meals and at bedtime  insulin lispro Injectable (ADMELOG) 5 Unit(s) SubCutaneous three times a day before meals  melatonin 3 milliGRAM(s) Oral at bedtime  methadone    Tablet 10 milliGRAM(s) Oral three times a day  metoprolol succinate  milliGRAM(s) Oral daily  pantoprazole    Tablet 40 milliGRAM(s) Oral before breakfast  PARoxetine 10 milliGRAM(s) Oral at bedtime  sacubitril 97 mG/valsartan 103 mG 1 Tablet(s) Oral two times a day  torsemide 40 milliGRAM(s) Oral every 12 hours  traZODone 50 milliGRAM(s) Oral at bedtime    MEDICATIONS  (PRN):  acetaminophen     Tablet .. 650 milliGRAM(s) Oral every 6 hours PRN Temp greater or equal to 38C (100.4F), Mild Pain (1 - 3)  dextrose Oral Gel 15 Gram(s) Oral once PRN Blood Glucose LESS THAN 70 milliGRAM(s)/deciliter  guaiFENesin Oral Liquid (Sugar-Free) 200 milliGRAM(s) Oral every 6 hours PRN Cough      Allergies    aspirin (Other (Moderate); Rash)  oral contrast (Unknown)    Intolerances        RADIOLOGY & ADDITIONAL TESTS: Reviewed.

## 2022-04-27 NOTE — PROGRESS NOTE ADULT - SUBJECTIVE AND OBJECTIVE BOX
Patient is a 61y old  Female who presents with a chief complaint of LE pain (26 Apr 2022 10:05)      INTERVAL HPI/ OVERNIGHT EVENTS: No overnight events.      LABS                        9.5    6.85  )-----------( 138      ( 27 Apr 2022 07:52 )             31.7     04-27    131<L>  |  94<L>  |  27<H>  ----------------------------<  73  4.9   |  25  |  5.03<H>    Ca    8.4      27 Apr 2022 07:52  Phos  5.7     04-27  Mg     1.9     04-27    TPro  7.7  /  Alb  3.8  /  TBili  0.2  /  DBili  x   /  AST  14  /  ALT  <5<L>  /  AlkPhos  81  04-26    PT/INR - ( 26 Apr 2022 08:04 )   PT: 16.4 sec;   INR: 1.37          PTT - ( 26 Apr 2022 08:04 )  PTT:42.4 sec    ICU Vital Signs Last 24 Hrs  T(C): 36.3 (27 Apr 2022 06:13), Max: 36.9 (26 Apr 2022 11:22)  T(F): 97.3 (27 Apr 2022 06:13), Max: 98.5 (26 Apr 2022 11:22)  HR: 79 (27 Apr 2022 04:56) (62 - 79)  BP: 119/56 (27 Apr 2022 04:56) (85/50 - 119/56)  BP(mean): 78 (26 Apr 2022 21:30) (58 - 557)  ABP: --  ABP(mean): --  RR: 20 (27 Apr 2022 04:56) (11 - 22)  SpO2: 100% (27 Apr 2022 04:56) (88% - 100%)        PHYSICAL EXAM  Lower Extremity Focused  Vasc: Right DP/PT monophasic, Left DP/PT biphasic. CFT <2s x 10. TG warm to cool b/l. Non-pitting edema to both LE. No erythema.  Derm:   Right Foot - Aguirre Grade 1 wound to distal tip of hallux. Aguirre Grade 2 wound to plantar-lateral heel, neg malodor, neg drainage/purulence/PTB; measures 4cm x 3cm x 0.2mm.   Left Foot- no open wounds/abrasions/lacerations. Hyperkeratotic skin to plantar medial heel.   Neuro: Protective sensation somewhat diminished b/l  MSK: 4/5 msk strength b/l in all crural compartments; overall generalized body weakness

## 2022-04-28 ENCOUNTER — RX RENEWAL (OUTPATIENT)
Age: 62
End: 2022-04-28

## 2022-04-28 VITALS — TEMPERATURE: 98 F

## 2022-04-28 LAB
ANION GAP SERPL CALC-SCNC: 12 MMOL/L — SIGNIFICANT CHANGE UP (ref 5–17)
BUN SERPL-MCNC: 39 MG/DL — HIGH (ref 7–23)
CALCIUM SERPL-MCNC: 8.6 MG/DL — SIGNIFICANT CHANGE UP (ref 8.4–10.5)
CHLORIDE SERPL-SCNC: 91 MMOL/L — LOW (ref 96–108)
CO2 SERPL-SCNC: 26 MMOL/L — SIGNIFICANT CHANGE UP (ref 22–31)
CREAT SERPL-MCNC: 6.68 MG/DL — HIGH (ref 0.5–1.3)
EGFR: 7 ML/MIN/1.73M2 — LOW
GLUCOSE BLDC GLUCOMTR-MCNC: 77 MG/DL — SIGNIFICANT CHANGE UP (ref 70–99)
GLUCOSE BLDC GLUCOMTR-MCNC: 99 MG/DL — SIGNIFICANT CHANGE UP (ref 70–99)
GLUCOSE SERPL-MCNC: 87 MG/DL — SIGNIFICANT CHANGE UP (ref 70–99)
HBV SURFACE AG SER-ACNC: SIGNIFICANT CHANGE UP
HCT VFR BLD CALC: 29.8 % — LOW (ref 34.5–45)
HGB BLD-MCNC: 9.2 G/DL — LOW (ref 11.5–15.5)
MAGNESIUM SERPL-MCNC: 1.9 MG/DL — SIGNIFICANT CHANGE UP (ref 1.6–2.6)
MCHC RBC-ENTMCNC: 30.2 PG — SIGNIFICANT CHANGE UP (ref 27–34)
MCHC RBC-ENTMCNC: 30.9 GM/DL — LOW (ref 32–36)
MCV RBC AUTO: 97.7 FL — SIGNIFICANT CHANGE UP (ref 80–100)
NRBC # BLD: 0 /100 WBCS — SIGNIFICANT CHANGE UP (ref 0–0)
PHOSPHATE SERPL-MCNC: 7.1 MG/DL — HIGH (ref 2.5–4.5)
PLATELET # BLD AUTO: 143 K/UL — LOW (ref 150–400)
POTASSIUM SERPL-MCNC: 5.7 MMOL/L — HIGH (ref 3.5–5.3)
POTASSIUM SERPL-SCNC: 5.7 MMOL/L — HIGH (ref 3.5–5.3)
RBC # BLD: 3.05 M/UL — LOW (ref 3.8–5.2)
RBC # FLD: 14.8 % — HIGH (ref 10.3–14.5)
SODIUM SERPL-SCNC: 129 MMOL/L — LOW (ref 135–145)
WBC # BLD: 6.1 K/UL — SIGNIFICANT CHANGE UP (ref 3.8–10.5)
WBC # FLD AUTO: 6.1 K/UL — SIGNIFICANT CHANGE UP (ref 3.8–10.5)

## 2022-04-28 PROCEDURE — 90935 HEMODIALYSIS ONE EVALUATION: CPT

## 2022-04-28 PROCEDURE — 80053 COMPREHEN METABOLIC PANEL: CPT

## 2022-04-28 PROCEDURE — 93306 TTE W/DOPPLER COMPLETE: CPT

## 2022-04-28 PROCEDURE — 83540 ASSAY OF IRON: CPT

## 2022-04-28 PROCEDURE — 99233 SBSQ HOSP IP/OBS HIGH 50: CPT | Mod: GC

## 2022-04-28 PROCEDURE — 93880 EXTRACRANIAL BILAT STUDY: CPT

## 2022-04-28 PROCEDURE — 93005 ELECTROCARDIOGRAM TRACING: CPT

## 2022-04-28 PROCEDURE — 83735 ASSAY OF MAGNESIUM: CPT

## 2022-04-28 PROCEDURE — 84100 ASSAY OF PHOSPHORUS: CPT

## 2022-04-28 PROCEDURE — C8929: CPT

## 2022-04-28 PROCEDURE — U0005: CPT

## 2022-04-28 PROCEDURE — 36415 COLL VENOUS BLD VENIPUNCTURE: CPT

## 2022-04-28 PROCEDURE — C1725: CPT

## 2022-04-28 PROCEDURE — 93971 EXTREMITY STUDY: CPT

## 2022-04-28 PROCEDURE — 85730 THROMBOPLASTIN TIME PARTIAL: CPT

## 2022-04-28 PROCEDURE — 71045 X-RAY EXAM CHEST 1 VIEW: CPT

## 2022-04-28 PROCEDURE — 85652 RBC SED RATE AUTOMATED: CPT

## 2022-04-28 PROCEDURE — 85025 COMPLETE CBC W/AUTO DIFF WBC: CPT

## 2022-04-28 PROCEDURE — 94640 AIRWAY INHALATION TREATMENT: CPT

## 2022-04-28 PROCEDURE — 87635 SARS-COV-2 COVID-19 AMP PRB: CPT

## 2022-04-28 PROCEDURE — 82728 ASSAY OF FERRITIN: CPT

## 2022-04-28 PROCEDURE — C1894: CPT

## 2022-04-28 PROCEDURE — 93308 TTE F-UP OR LMTD: CPT | Mod: 26

## 2022-04-28 PROCEDURE — 86900 BLOOD TYPING SEROLOGIC ABO: CPT

## 2022-04-28 PROCEDURE — 76000 FLUOROSCOPY <1 HR PHYS/QHP: CPT

## 2022-04-28 PROCEDURE — C1876: CPT

## 2022-04-28 PROCEDURE — 99285 EMERGENCY DEPT VISIT HI MDM: CPT

## 2022-04-28 PROCEDURE — 73630 X-RAY EXAM OF FOOT: CPT

## 2022-04-28 PROCEDURE — 86850 RBC ANTIBODY SCREEN: CPT

## 2022-04-28 PROCEDURE — 87040 BLOOD CULTURE FOR BACTERIA: CPT

## 2022-04-28 PROCEDURE — 83880 ASSAY OF NATRIURETIC PEPTIDE: CPT

## 2022-04-28 PROCEDURE — 80048 BASIC METABOLIC PNL TOTAL CA: CPT

## 2022-04-28 PROCEDURE — 97162 PT EVAL MOD COMPLEX 30 MIN: CPT

## 2022-04-28 PROCEDURE — U0003: CPT

## 2022-04-28 PROCEDURE — 86901 BLOOD TYPING SEROLOGIC RH(D): CPT

## 2022-04-28 PROCEDURE — C1769: CPT

## 2022-04-28 PROCEDURE — 84484 ASSAY OF TROPONIN QUANT: CPT

## 2022-04-28 PROCEDURE — 94660 CPAP INITIATION&MGMT: CPT

## 2022-04-28 PROCEDURE — 85027 COMPLETE CBC AUTOMATED: CPT

## 2022-04-28 PROCEDURE — 82962 GLUCOSE BLOOD TEST: CPT

## 2022-04-28 PROCEDURE — 83550 IRON BINDING TEST: CPT

## 2022-04-28 PROCEDURE — 97116 GAIT TRAINING THERAPY: CPT

## 2022-04-28 PROCEDURE — 75635 CT ANGIO ABDOMINAL ARTERIES: CPT

## 2022-04-28 PROCEDURE — C2623: CPT

## 2022-04-28 PROCEDURE — 87340 HEPATITIS B SURFACE AG IA: CPT

## 2022-04-28 PROCEDURE — 85610 PROTHROMBIN TIME: CPT

## 2022-04-28 PROCEDURE — C1887: CPT

## 2022-04-28 RX ORDER — INSULIN ASPART 100 [IU]/ML
10 INJECTION, SOLUTION SUBCUTANEOUS
Qty: 0 | Refills: 0 | DISCHARGE

## 2022-04-28 RX ORDER — INSULIN DETEMIR 100/ML (3)
10 INSULIN PEN (ML) SUBCUTANEOUS
Qty: 0 | Refills: 0 | DISCHARGE

## 2022-04-28 RX ORDER — INSULIN DETEMIR 100/ML (3)
25 INSULIN PEN (ML) SUBCUTANEOUS
Qty: 0 | Refills: 0 | DISCHARGE

## 2022-04-28 RX ORDER — APIXABAN 2.5 MG/1
2.5 TABLET, FILM COATED ORAL EVERY 12 HOURS
Refills: 0 | Status: DISCONTINUED | OUTPATIENT
Start: 2022-04-28 | End: 2022-04-28

## 2022-04-28 RX ADMIN — Medication 200 MILLIGRAM(S): at 06:17

## 2022-04-28 RX ADMIN — SACUBITRIL AND VALSARTAN 1 TABLET(S): 24; 26 TABLET, FILM COATED ORAL at 06:17

## 2022-04-28 RX ADMIN — METHADONE HYDROCHLORIDE 10 MILLIGRAM(S): 40 TABLET ORAL at 17:58

## 2022-04-28 RX ADMIN — SACUBITRIL AND VALSARTAN 1 TABLET(S): 24; 26 TABLET, FILM COATED ORAL at 18:00

## 2022-04-28 RX ADMIN — Medication 48 MILLIGRAM(S): at 17:58

## 2022-04-28 RX ADMIN — CLOPIDOGREL BISULFATE 75 MILLIGRAM(S): 75 TABLET, FILM COATED ORAL at 17:57

## 2022-04-28 RX ADMIN — METHADONE HYDROCHLORIDE 10 MILLIGRAM(S): 40 TABLET ORAL at 06:17

## 2022-04-28 RX ADMIN — PANTOPRAZOLE SODIUM 40 MILLIGRAM(S): 20 TABLET, DELAYED RELEASE ORAL at 06:17

## 2022-04-28 RX ADMIN — APIXABAN 2.5 MILLIGRAM(S): 2.5 TABLET, FILM COATED ORAL at 08:38

## 2022-04-28 RX ADMIN — Medication 40 MILLIGRAM(S): at 18:00

## 2022-04-28 RX ADMIN — GABAPENTIN 100 MILLIGRAM(S): 400 CAPSULE ORAL at 17:58

## 2022-04-28 RX ADMIN — BUDESONIDE AND FORMOTEROL FUMARATE DIHYDRATE 2 PUFF(S): 160; 4.5 AEROSOL RESPIRATORY (INHALATION) at 06:16

## 2022-04-28 RX ADMIN — Medication 40 MILLIGRAM(S): at 06:17

## 2022-04-28 RX ADMIN — GABAPENTIN 100 MILLIGRAM(S): 400 CAPSULE ORAL at 06:16

## 2022-04-28 RX ADMIN — APIXABAN 2.5 MILLIGRAM(S): 2.5 TABLET, FILM COATED ORAL at 17:58

## 2022-04-28 NOTE — PROGRESS NOTE ADULT - SUBJECTIVE AND OBJECTIVE BOX
Subjective/Interval events:  -No major complaints this am, pt feeling well, resp status stable, minimal pain RLE    MEDICATIONS  (STANDING):  ammonium lactate 12% Lotion 1 Application(s) Topical <User Schedule>  apixaban 2.5 milliGRAM(s) Oral every 12 hours  atorvastatin 80 milliGRAM(s) Oral at bedtime  budesonide 160 MICROgram(s)/formoterol 4.5 MICROgram(s) Inhaler 2 Puff(s) Inhalation two times a day  ceFAZolin   IVPB 1000 milliGRAM(s) IV Intermittent every 24 hours  chlorhexidine 2% Cloths 1 Application(s) Topical daily  clopidogrel Tablet 75 milliGRAM(s) Oral daily  collagenase Ointment 1 Application(s) Topical daily  dextrose 5%. 1000 milliLiter(s) (50 mL/Hr) IV Continuous <Continuous>  dextrose 5%. 1000 milliLiter(s) (100 mL/Hr) IV Continuous <Continuous>  dextrose 50% Injectable 25 Gram(s) IV Push once  dextrose 50% Injectable 12.5 Gram(s) IV Push once  dextrose 50% Injectable 25 Gram(s) IV Push once  fenofibrate Tablet 48 milliGRAM(s) Oral daily  gabapentin 100 milliGRAM(s) Oral every 12 hours  glucagon  Injectable 1 milliGRAM(s) IntraMuscular once  insulin glargine Injectable (LANTUS) 10 Unit(s) SubCutaneous at bedtime  insulin lispro (ADMELOG) corrective regimen sliding scale   SubCutaneous Before meals and at bedtime  melatonin 3 milliGRAM(s) Oral at bedtime  methadone    Tablet 10 milliGRAM(s) Oral three times a day  metoprolol succinate  milliGRAM(s) Oral daily  pantoprazole    Tablet 40 milliGRAM(s) Oral before breakfast  PARoxetine 10 milliGRAM(s) Oral at bedtime  sacubitril 97 mG/valsartan 103 mG 1 Tablet(s) Oral two times a day  sodium chloride 0.9% Bolus 250 milliLiter(s) IV Bolus once  torsemide 40 milliGRAM(s) Oral every 12 hours  traZODone 50 milliGRAM(s) Oral at bedtime    MEDICATIONS  (PRN):  acetaminophen     Tablet .. 650 milliGRAM(s) Oral every 6 hours PRN Temp greater or equal to 38C (100.4F), Mild Pain (1 - 3)  dextrose Oral Gel 15 Gram(s) Oral once PRN Blood Glucose LESS THAN 70 milliGRAM(s)/deciliter  guaiFENesin Oral Liquid (Sugar-Free) 200 milliGRAM(s) Oral every 6 hours PRN Cough      Vital Signs Last 24 Hrs  T(C): 36.7 (28 Apr 2022 09:36), Max: 36.7 (27 Apr 2022 14:52)  T(F): 98 (28 Apr 2022 09:36), Max: 98.1 (27 Apr 2022 18:12)  HR: 78 (28 Apr 2022 08:55) (67 - 87)  BP: 122/58 (28 Apr 2022 08:55) (102/51 - 133/71)  BP(mean): 74 (27 Apr 2022 20:25) (74 - 74)  RR: 18 (28 Apr 2022 08:55) (11 - 18)  SpO2: 95% (28 Apr 2022 08:55) (93% - 100%)    PHYSICAL EXAM:  GENERAL: pleasant, NAD  NEURO: Aox3, intact strength/sensation all 4 ext   HEENT: clear op, mmm  NECK:  No JVD, no lymphadenopathy  CHEST/LUNG: Clear to auscultation bilaterally; No rales, rhonchi, wheezing. Normal work of breathing, not tachypneic  HEART: Regular rate and rhythm; No murmurs, rubs, or gallops  ABDOMEN: Soft, Nontender, Nondistended. Normoactive bowel sounds  EXTREMITIES:  RLE in boot no signs of sig cellulitis currently, with reduced sensation, TDC for HD access R chest     LABS (reviewed) fsg stable    ASSESSMENT AND PLAN: 61F w/ PMHx ESRD (HD T/Th/Sa), HFrEF (EF 25% s/p AICD), IDDM2, COPD (3L O2 at home PRN), multiple myeloma (on Ninlaro), hypothyroidism, depression, neuropathy (on methadone), recurrent DVTs (on eliquis 2.5mg qd), CVA x2 (w/ residual R-sided weakness), PAD s/p LLE bypass, and DHARMESH on CPAP, who presented for worsening RLE DVT and concern for infected chronic right heal ulcer  #Infected R heel ulcer 2/2 critical leg ischemia RLE s/p angio and stent- c/w cefzolin for now, no further abx per primary team on dc, c/w statin   #DVT hx- c/w eliquis 2.5mg bid as not recent DVT  #HFrEF hx- TTE to ensure no clot noted, c/w entresto toprol torsemide   #Neuropathy- c/w methadone, monitor qtc  #Hx of CVA- anticoag and  #Hx of type II DM on insulin, a1c 7.3- c/w lantus 10- 1 episode of hypoglycemia even on this dose, favor lower dose 10 of lantus nightly and 5 short acting when dc and close outpt endo f/u  #ESRD on HD- appreciate renal recs   #Cystic renal lesions on CT- outpt f/u  #COPD hx- on baseline 02 2-3L, c/w home symbicort and short acting inhalers    #DVT px- eliquis  #Dispo- pending TTE and HD today    Patient was seen and examined by me at bedside    Greater than 35 minutes spent on total encounter; more than 50% of the visit was spent counseling and/or coordinating care by the attending physician.    Elias Pizano MD [phone]  Subjective/Interval events:  -No major complaints this am, pt feeling well, resp status stable, minimal pain RLE    MEDICATIONS  (STANDING):  ammonium lactate 12% Lotion 1 Application(s) Topical <User Schedule>  apixaban 2.5 milliGRAM(s) Oral every 12 hours  atorvastatin 80 milliGRAM(s) Oral at bedtime  budesonide 160 MICROgram(s)/formoterol 4.5 MICROgram(s) Inhaler 2 Puff(s) Inhalation two times a day  ceFAZolin   IVPB 1000 milliGRAM(s) IV Intermittent every 24 hours  chlorhexidine 2% Cloths 1 Application(s) Topical daily  clopidogrel Tablet 75 milliGRAM(s) Oral daily  collagenase Ointment 1 Application(s) Topical daily  dextrose 5%. 1000 milliLiter(s) (50 mL/Hr) IV Continuous <Continuous>  dextrose 5%. 1000 milliLiter(s) (100 mL/Hr) IV Continuous <Continuous>  dextrose 50% Injectable 25 Gram(s) IV Push once  dextrose 50% Injectable 12.5 Gram(s) IV Push once  dextrose 50% Injectable 25 Gram(s) IV Push once  fenofibrate Tablet 48 milliGRAM(s) Oral daily  gabapentin 100 milliGRAM(s) Oral every 12 hours  glucagon  Injectable 1 milliGRAM(s) IntraMuscular once  insulin glargine Injectable (LANTUS) 10 Unit(s) SubCutaneous at bedtime  insulin lispro (ADMELOG) corrective regimen sliding scale   SubCutaneous Before meals and at bedtime  melatonin 3 milliGRAM(s) Oral at bedtime  methadone    Tablet 10 milliGRAM(s) Oral three times a day  metoprolol succinate  milliGRAM(s) Oral daily  pantoprazole    Tablet 40 milliGRAM(s) Oral before breakfast  PARoxetine 10 milliGRAM(s) Oral at bedtime  sacubitril 97 mG/valsartan 103 mG 1 Tablet(s) Oral two times a day  sodium chloride 0.9% Bolus 250 milliLiter(s) IV Bolus once  torsemide 40 milliGRAM(s) Oral every 12 hours  traZODone 50 milliGRAM(s) Oral at bedtime    MEDICATIONS  (PRN):  acetaminophen     Tablet .. 650 milliGRAM(s) Oral every 6 hours PRN Temp greater or equal to 38C (100.4F), Mild Pain (1 - 3)  dextrose Oral Gel 15 Gram(s) Oral once PRN Blood Glucose LESS THAN 70 milliGRAM(s)/deciliter  guaiFENesin Oral Liquid (Sugar-Free) 200 milliGRAM(s) Oral every 6 hours PRN Cough      Vital Signs Last 24 Hrs  T(C): 36.7 (28 Apr 2022 09:36), Max: 36.7 (27 Apr 2022 14:52)  T(F): 98 (28 Apr 2022 09:36), Max: 98.1 (27 Apr 2022 18:12)  HR: 78 (28 Apr 2022 08:55) (67 - 87)  BP: 122/58 (28 Apr 2022 08:55) (102/51 - 133/71)  BP(mean): 74 (27 Apr 2022 20:25) (74 - 74)  RR: 18 (28 Apr 2022 08:55) (11 - 18)  SpO2: 95% (28 Apr 2022 08:55) (93% - 100%)    PHYSICAL EXAM:  GENERAL: pleasant, NAD  NEURO: Aox3, intact strength/sensation all 4 ext   HEENT: clear op, mmm  NECK:  No JVD, no lymphadenopathy  CHEST/LUNG: Clear to auscultation bilaterally; No rales, rhonchi, wheezing. Normal work of breathing, not tachypneic  HEART: Regular rate and rhythm; No murmurs, rubs, or gallops  ABDOMEN: Soft, Nontender, Nondistended. Normoactive bowel sounds  EXTREMITIES:  RLE in boot no signs of sig cellulitis currently, with reduced sensation, TDC for HD access R chest     LABS (reviewed) fsg stable    ASSESSMENT AND PLAN: 61F w/ PMHx ESRD (HD T/Th/Sa), HFrEF (EF 25% s/p AICD), IDDM2, COPD (3L O2 at home PRN), multiple myeloma (on Ninlaro), hypothyroidism, depression, neuropathy (on methadone), recurrent DVTs (on eliquis 2.5mg qd), CVA x2 (w/ residual R-sided weakness), PAD s/p LLE bypass, and DHARMESH on CPAP, who presented for worsening RLE DVT and concern for infected chronic right heal ulcer  #Infected R heel ulcer 2/2 critical leg ischemia RLE s/p angio and stent- c/w cefzolin for now, no further abx per primary team on dc, c/w statin   #DVT hx- c/w eliquis 2.5mg bid as not recent DVT  #HFrEF hx- TTE to ensure no clot noted, c/w entresto toprol torsemide   #Neuropathy- c/w methadone, monitor qtc  #Hx of CVA- statin and anticoag as above  #Hx of type II DM on insulin, a1c 7.3- c/w lantus 10- 1 episode of hypoglycemia even on this dose, favor lower dose 10 of lantus nightly and 5 short acting when dc and close outpt endo f/u  #ESRD on HD- appreciate renal recs   #Cystic renal lesions on CT- outpt f/u  #COPD hx- on baseline 02 2-3L, c/w home symbicort and short acting inhalers    #DVT px- eliquis  #Dispo- pending TTE and HD today    Patient was seen and examined by me at bedside    Greater than 35 minutes spent on total encounter; more than 50% of the visit was spent counseling and/or coordinating care by the attending physician.    Elias Pizano MD [phone]

## 2022-04-28 NOTE — PROGRESS NOTE ADULT - ASSESSMENT
61 female new ESRD (TTS last HD was tuesday 4/19 via TDC) DM HTN history of DVT presented to the ED from HD center ([facility]) due to RLE swelling s/p peripheral angio 4/26. Consult for hemodialysis     Assessment/Plan:   #ESRD on HD TTS@ [facility] center on [facility] [address] [facility]  usual Rx 4h 2-3L   HD today as per schedule  lytes are acceptable     #HTN   BP at goal   continue w/ home meds    #access   +TDC, immature AVF     #anemia  Hb at goal   no indication for EPO or IV Iron at this time     #renal bone disease   Ca ~8  Phos ~6   trend Ca, Phos daily w/labs    DVT  DVT history recommended dose of eliquis is 5mg BID; defer to primary team/hematology     Thank you for the opportunity to participate in the care of your patient. The nephrology service remains available to assist with any questions or concerns. Please feel free to reach us by paging the on-call nephrology fellow for urgent issues or as below.     Hang Garcia M.D.   PGY-5, Nephrology Fellow   C: [phone]   P: [phone]

## 2022-04-28 NOTE — PROGRESS NOTE ADULT - SUBJECTIVE AND OBJECTIVE BOX
O/N: MEY PARKS                                                                        ---------------------------------------------------------------------------  PLEASE CHECK WHEN PRESENT:  [  ] Heart Failure  [  ] Acute  [  ] Acute on Chronic  [  ] Chronic  -------------------------------------------------------------------  [  ]Diastolic [HFpEF]  [  ]Systolic [HFrEF]  [  ]Combined [HFpEF & HFrEF]  [  ] afib  [x  ] hypertensive heart disease  [  ]Other:  -------------------------------------------------------------------  [ ] Respiratory failure  [ ] Acute cor pulmonale  [ ] Asthma/COPD Exacerbation  [ ] Pleural effusion  [ ] Aspiration pneumonia  -------------------------------------------------------------------  [  ]RITHCIE  [  ]ATN  [  ]Reneal Medullary Necrosis  [  ]Renal Cortical Necrosis  [  ]Other Pathological Lesions:    [  ]CKD 1  [  ]CKD 2  [  ]CKD 3  [  ]CKD 4  [ x ]CKD 5  [  ]Other  -------------------------------------------------------------------  [ x ]Diabetes  [x  ] Diabetic PVD Ulcer  [x  ] Neuropathic ulcer to DM  [  ] Diabetes with Nephropathy  [  ] Osteomyelitis due to diabetes  --------------------------------------------------------------------  [  ]Malnutrition: See Nutrition Note  [  ]Cachexia  [  ]Other:   [  ]Supplement Ordered:  [  ]Morbid Obesity (BMI >=40]  ---------------------------------------------------------------------  [ ] Sepsis/severe sepsis/septic shock  [ ] UTI  [ ] Pneumonia  -----------------------------------------------------------------------  [ ] Acidosis/alkalosis  [ ] Fluid overload  [ ] Hypokalemia  [ ] Hyperkalemia  [ ] Hypomagnesemia  [ ] Hypophosphatemia  [ ] Hyperphosphatemia  ------------------------------------------------------------------------  [ ] Acute blood loss anemia  [ ] Post op blood loss anemia  [ ] Iron deficiency anemia  [ ] Anemia due to chronic disease  [ ] Hypercoagulable state  ----------------------------------------------------------------------  [x ] Cerebral infarction  [ ] Transient ischemia attack  [ ] Encephalopathy              Assessment and Plan:   · Assessment	  61F current smoker w/ PMHx ESRD (HD T/Th/Sa), HFrEF (EF 25% s/p AICD), IDDM2, COPD (3L O2 at home PRN), multiple myeloma (on Ninlaro), hypothyroidism, depression, neuropathy (on methadone), recurrent DVTs (on eliquis 2.5mg qd), CVA x2 (w/ residual R-sided weakness), PAD s/p LLE bypass, and DHARMESH on CPAP, who was sent in by her dialysis doctor for concern for worsening chronic RLE DVT and concern for infected chronic right heal ulcer. US demonstrated unchanged nonocclusive thrombus within the right common femoral vein. vascular was consulted to evaluate RLE calf pain and nonpalpable pulses, found to have Occluded right SFA with reconstitution of popliteal artery through collaterals on CTA     plan:   -s/p RLE angiogram, SFA stent x2 4/26/22  -off Eliquis   -podiatry to manage Right heel wound   -transfer back to medicine today  -pain control  -f/u am labs  -f/u carotid duplex  -f/u renal recs  -started plavix 4/26   O/N: KASEY, VSS    Subjective: Patient seen and examined at bedside.  Patient has no complaints.     ROS:   Denies Headache, blurred vision, Chest Pain, SOB, Abdominal pain, nausea or vomiting     Social   ceFAZolin   IVPB 1000  ceFAZolin   IVPB 1000  clopidogrel Tablet 75  metoprolol succinate   sacubitril 97 mG/valsartan 103 mG 1  torsemide 40      Allergies    aspirin (Other (Moderate); Rash)  oral contrast (Unknown)    Intolerances        Vital Signs Last 24 Hrs  T(C): 36.5 (28 Apr 2022 06:11), Max: 36.7 (27 Apr 2022 09:26)  T(F): 97.7 (28 Apr 2022 06:11), Max: 98.1 (27 Apr 2022 18:12)  HR: 80 (28 Apr 2022 06:13) (67 - 87)  BP: 133/71 (28 Apr 2022 06:13) (102/51 - 133/71)  BP(mean): 74 (27 Apr 2022 20:25) (74 - 74)  RR: 15 (28 Apr 2022 06:13) (11 - 18)  SpO2: 98% (28 Apr 2022 06:13) (93% - 100%)  I&O's Summary    27 Apr 2022 07:01  -  28 Apr 2022 07:00  --------------------------------------------------------  IN: 540 mL / OUT: 0 mL / NET: 540 mL        Physical Exam:  Gen: NAD, resting comfortably in bed  C/V: NSR  Pulm: Nonlabored breathing, no respiratory distress  Abd: soft, NT/ND  Groin: left groin- soft, no hematoma  Extrem: RLE: triphasic AT, biphasic PT. LLE: biphasic DP/PT      LABS:                        9.5    6.85  )-----------( 138      ( 27 Apr 2022 07:52 )             31.7     04-27    131<L>  |  94<L>  |  27<H>  ----------------------------<  73  4.9   |  25  |  5.03<H>    Ca    8.4      27 Apr 2022 07:52  Phos  5.7     04-27  Mg     1.9     04-27    TPro  7.7  /  Alb  3.8  /  TBili  0.2  /  DBili  x   /  AST  14  /  ALT  <5<L>  /  AlkPhos  81  04-26    PT/INR - ( 26 Apr 2022 08:04 )   PT: 16.4 sec;   INR: 1.37          PTT - ( 26 Apr 2022 08:04 )  PTT:42.4 sec    ---------------------------------------------------------------------------  PLEASE CHECK WHEN PRESENT:  [  ] Heart Failure  [  ] Acute  [  ] Acute on Chronic  [  ] Chronic  -------------------------------------------------------------------  [  ]Diastolic [HFpEF]  [  ]Systolic [HFrEF]  [  ]Combined [HFpEF & HFrEF]  [  ] afib  [x  ] hypertensive heart disease  [  ]Other:  -------------------------------------------------------------------  [ ] Respiratory failure  [ ] Acute cor pulmonale  [ ] Asthma/COPD Exacerbation  [ ] Pleural effusion  [ ] Aspiration pneumonia  -------------------------------------------------------------------  [  ]RITCHIE  [  ]ATN  [  ]Reneal Medullary Necrosis  [  ]Renal Cortical Necrosis  [  ]Other Pathological Lesions:    [  ]CKD 1  [  ]CKD 2  [  ]CKD 3  [  ]CKD 4  [ x ]CKD 5  [  ]Other  -------------------------------------------------------------------  [ x ]Diabetes  [x  ] Diabetic PVD Ulcer  [x  ] Neuropathic ulcer to DM  [  ] Diabetes with Nephropathy  [  ] Osteomyelitis due to diabetes  --------------------------------------------------------------------  [  ]Malnutrition: See Nutrition Note  [  ]Cachexia  [  ]Other:   [  ]Supplement Ordered:  [  ]Morbid Obesity (BMI >=40]  ---------------------------------------------------------------------  [ ] Sepsis/severe sepsis/septic shock  [ ] UTI  [ ] Pneumonia  -----------------------------------------------------------------------  [ ] Acidosis/alkalosis  [ ] Fluid overload  [ ] Hypokalemia  [ ] Hyperkalemia  [ ] Hypomagnesemia  [ ] Hypophosphatemia  [ ] Hyperphosphatemia  ------------------------------------------------------------------------  [ ] Acute blood loss anemia  [ ] Post op blood loss anemia  [ ] Iron deficiency anemia  [ ] Anemia due to chronic disease  [ ] Hypercoagulable state  ----------------------------------------------------------------------  [x ] Cerebral infarction  [ ] Transient ischemia attack  [ ] Encephalopathy      Assessment and Plan:   · Assessment	  61F current smoker w/ PMHx ESRD (HD T/Th/Sa), HFrEF (EF 25% s/p AICD), IDDM2, COPD (3L O2 at home PRN), multiple myeloma (on Ninlaro), hypothyroidism, depression, neuropathy (on methadone), recurrent DVTs (on eliquis 2.5mg qd), CVA x2 (w/ residual R-sided weakness), PAD s/p LLE bypass, and DHARMESH on CPAP, who was sent in by her dialysis doctor for concern for worsening chronic RLE DVT and concern for infected chronic right heal ulcer. US demonstrated unchanged nonocclusive thrombus within the right common femoral vein. vascular was consulted to evaluate RLE calf pain and nonpalpable pulses, found to have Occluded right SFA with reconstitution of popliteal artery through collaterals on CTA     plan:   -s/p RLE angiogram, SFA stent x2 4/26/22  -off Eliquis   -podiatry to manage Right heel wound   -f/u echo  -HD today  -pain control  -f/u am labs  -f/u renal recs  -started plavix 4/26  -dc planning

## 2022-04-28 NOTE — PROGRESS NOTE ADULT - SUBJECTIVE AND OBJECTIVE BOX
Patient is a 61y Female seen and evaluated at bedside.   lytes are acceptable  for hemodialysis today   denies CP SOB     Meds:    acetaminophen     Tablet .. 650 every 6 hours PRN  ammonium lactate 12% Lotion 1 <User Schedule>  apixaban 2.5 every 12 hours  atorvastatin 80 at bedtime  budesonide 160 MICROgram(s)/formoterol 4.5 MICROgram(s) Inhaler 2 two times a day  ceFAZolin   IVPB 1000 every 24 hours  chlorhexidine 2% Cloths 1 daily  clopidogrel Tablet 75 daily  collagenase Ointment 1 daily  dextrose 5%. 1000 <Continuous>  dextrose 5%. 1000 <Continuous>  dextrose 50% Injectable 25 once  dextrose 50% Injectable 12.5 once  dextrose 50% Injectable 25 once  dextrose Oral Gel 15 once PRN  fenofibrate Tablet 48 daily  gabapentin 100 every 12 hours  glucagon  Injectable 1 once  guaiFENesin Oral Liquid (Sugar-Free) 200 every 6 hours PRN  insulin glargine Injectable (LANTUS) 10 at bedtime  insulin lispro (ADMELOG) corrective regimen sliding scale  Before meals and at bedtime  melatonin 3 at bedtime  methadone    Tablet 10 three times a day  metoprolol succinate  daily  pantoprazole    Tablet 40 before breakfast  PARoxetine 10 at bedtime  sacubitril 97 mG/valsartan 103 mG 1 two times a day  sodium chloride 0.9% Bolus 250 once  torsemide 40 every 12 hours  traZODone 50 at bedtime      T(C): , Max: 36.7 (04-27-22 @ 14:52)  T(F): , Max: 98.1 (04-27-22 @ 18:12)  HR: 75 (04-28-22 @ 12:55)  BP: 132/53 (04-28-22 @ 12:55)  BP(mean): 74 (04-27-22 @ 20:25)  RR: 18 (04-28-22 @ 12:55)  SpO2: 96% (04-28-22 @ 12:55)  Wt(kg): --    04-27 @ 07:01  -  04-28 @ 07:00  --------------------------------------------------------  IN: 540 mL / OUT: 0 mL / NET: 540 mL    04-28 @ 07:01  -  04-28 @ 14:00  --------------------------------------------------------  IN: 180 mL / OUT: 0 mL / NET: 180 mL              Review of Systems:  ROS negative except as per HPI      PHYSICAL EXAM:  GENERAL: Alert, awake, oriented x3 on RA   CHEST/LUNG: CTA BL no rales, rhonchi or wheezing   HEART: Regular rate and rhythm, no murmur, no gallops, no rub   ABDOMEN: Soft, nontender, non distended  EXTREMITIES: +RLE swelling   ACCESS: +TDC      LABS:                        9.2    6.10  )-----------( 143      ( 28 Apr 2022 13:44 )             29.8     04-27    131<L>  |  94<L>  |  27<H>  ----------------------------<  73  4.9   |  25  |  5.03<H>    Ca    8.4      27 Apr 2022 07:52  Phos  5.7     04-27  Mg     1.9     04-27                  RADIOLOGY & ADDITIONAL STUDIES:

## 2022-05-05 DIAGNOSIS — F32.A DEPRESSION, UNSPECIFIED: ICD-10-CM

## 2022-05-05 DIAGNOSIS — Z91.041 RADIOGRAPHIC DYE ALLERGY STATUS: ICD-10-CM

## 2022-05-05 DIAGNOSIS — D64.9 ANEMIA, UNSPECIFIED: ICD-10-CM

## 2022-05-05 DIAGNOSIS — Z99.81 DEPENDENCE ON SUPPLEMENTAL OXYGEN: ICD-10-CM

## 2022-05-05 DIAGNOSIS — Z90.710 ACQUIRED ABSENCE OF BOTH CERVIX AND UTERUS: ICD-10-CM

## 2022-05-05 DIAGNOSIS — I82.511 CHRONIC EMBOLISM AND THROMBOSIS OF RIGHT FEMORAL VEIN: ICD-10-CM

## 2022-05-05 DIAGNOSIS — E11.22 TYPE 2 DIABETES MELLITUS WITH DIABETIC CHRONIC KIDNEY DISEASE: ICD-10-CM

## 2022-05-05 DIAGNOSIS — I70.221 ATHEROSCLEROSIS OF NATIVE ARTERIES OF EXTREMITIES WITH REST PAIN, RIGHT LEG: ICD-10-CM

## 2022-05-05 DIAGNOSIS — N28.1 CYST OF KIDNEY, ACQUIRED: ICD-10-CM

## 2022-05-05 DIAGNOSIS — F41.9 ANXIETY DISORDER, UNSPECIFIED: ICD-10-CM

## 2022-05-05 DIAGNOSIS — Z99.3 DEPENDENCE ON WHEELCHAIR: ICD-10-CM

## 2022-05-05 DIAGNOSIS — C90.00 MULTIPLE MYELOMA NOT HAVING ACHIEVED REMISSION: ICD-10-CM

## 2022-05-05 DIAGNOSIS — I50.22 CHRONIC SYSTOLIC (CONGESTIVE) HEART FAILURE: ICD-10-CM

## 2022-05-05 DIAGNOSIS — Z88.6 ALLERGY STATUS TO ANALGESIC AGENT: ICD-10-CM

## 2022-05-05 DIAGNOSIS — E78.5 HYPERLIPIDEMIA, UNSPECIFIED: ICD-10-CM

## 2022-05-05 DIAGNOSIS — Z79.4 LONG TERM (CURRENT) USE OF INSULIN: ICD-10-CM

## 2022-05-05 DIAGNOSIS — Z83.3 FAMILY HISTORY OF DIABETES MELLITUS: ICD-10-CM

## 2022-05-05 DIAGNOSIS — I13.2 HYPERTENSIVE HEART AND CHRONIC KIDNEY DISEASE WITH HEART FAILURE AND WITH STAGE 5 CHRONIC KIDNEY DISEASE, OR END STAGE RENAL DISEASE: ICD-10-CM

## 2022-05-05 DIAGNOSIS — N18.6 END STAGE RENAL DISEASE: ICD-10-CM

## 2022-05-05 DIAGNOSIS — E11.621 TYPE 2 DIABETES MELLITUS WITH FOOT ULCER: ICD-10-CM

## 2022-05-05 DIAGNOSIS — E11.51 TYPE 2 DIABETES MELLITUS WITH DIABETIC PERIPHERAL ANGIOPATHY WITHOUT GANGRENE: ICD-10-CM

## 2022-05-05 DIAGNOSIS — Z99.89 DEPENDENCE ON OTHER ENABLING MACHINES AND DEVICES: ICD-10-CM

## 2022-05-05 DIAGNOSIS — L97.411 NON-PRESSURE CHRONIC ULCER OF RIGHT HEEL AND MIDFOOT LIMITED TO BREAKDOWN OF SKIN: ICD-10-CM

## 2022-05-05 DIAGNOSIS — Z95.828 PRESENCE OF OTHER VASCULAR IMPLANTS AND GRAFTS: ICD-10-CM

## 2022-05-05 DIAGNOSIS — Z79.51 LONG TERM (CURRENT) USE OF INHALED STEROIDS: ICD-10-CM

## 2022-05-05 DIAGNOSIS — J44.9 CHRONIC OBSTRUCTIVE PULMONARY DISEASE, UNSPECIFIED: ICD-10-CM

## 2022-05-05 DIAGNOSIS — I69.351 HEMIPLEGIA AND HEMIPARESIS FOLLOWING CEREBRAL INFARCTION AFFECTING RIGHT DOMINANT SIDE: ICD-10-CM

## 2022-05-05 DIAGNOSIS — L03.115 CELLULITIS OF RIGHT LOWER LIMB: ICD-10-CM

## 2022-05-05 DIAGNOSIS — E11.40 TYPE 2 DIABETES MELLITUS WITH DIABETIC NEUROPATHY, UNSPECIFIED: ICD-10-CM

## 2022-05-05 DIAGNOSIS — J96.11 CHRONIC RESPIRATORY FAILURE WITH HYPOXIA: ICD-10-CM

## 2022-05-05 DIAGNOSIS — Z72.0 TOBACCO USE: ICD-10-CM

## 2022-05-05 DIAGNOSIS — Z95.810 PRESENCE OF AUTOMATIC (IMPLANTABLE) CARDIAC DEFIBRILLATOR: ICD-10-CM

## 2022-05-05 DIAGNOSIS — Z99.2 DEPENDENCE ON RENAL DIALYSIS: ICD-10-CM

## 2022-05-05 DIAGNOSIS — Z79.01 LONG TERM (CURRENT) USE OF ANTICOAGULANTS: ICD-10-CM

## 2022-05-05 DIAGNOSIS — G47.33 OBSTRUCTIVE SLEEP APNEA (ADULT) (PEDIATRIC): ICD-10-CM

## 2022-05-31 NOTE — H&P ADULT - DOES THIS PATIENT HAVE A HISTORY OF OR HAS BEEN DX WITH HEART FAILURE?
Printed last 90 day compliance data that is available for review.  Patient's set up was 3-8-2017.  [employer] stopped transmitting due to the 5G cell tower update.  Will need to send them an SD card.    Routed to Dr. Johnson, please review and advise on data.   yes

## 2022-06-02 ENCOUNTER — APPOINTMENT (OUTPATIENT)
Dept: VASCULAR SURGERY | Facility: CLINIC | Age: 62
End: 2022-06-02

## 2022-06-15 ENCOUNTER — APPOINTMENT (OUTPATIENT)
Dept: HEART AND VASCULAR | Facility: CLINIC | Age: 62
End: 2022-06-15

## 2022-06-17 ENCOUNTER — APPOINTMENT (OUTPATIENT)
Dept: INTERNAL MEDICINE | Facility: CLINIC | Age: 62
End: 2022-06-17

## 2022-06-27 NOTE — PHYSICAL THERAPY INITIAL EVALUATION ADULT - HEALTH SCREEN CRITERIA
yes
Patient seen with PA.  CT abdoment without surgical pathology.  Worsening O2 saturation.  Likely combination of edema with effusion.  will admit.

## 2022-06-30 ENCOUNTER — RX RENEWAL (OUTPATIENT)
Age: 62
End: 2022-06-30

## 2022-07-01 NOTE — PROGRESS NOTE ADULT - PROBLEM SELECTOR PLAN 10
Hx of strokes in the past w/ right sided residual deficit  - C/w home atorvastatin 80 Speaking Coherently - CPAP at night for DHARMESH - c/w home levothyroxine 50mcg

## 2022-07-28 ENCOUNTER — RX RENEWAL (OUTPATIENT)
Age: 62
End: 2022-07-28

## 2022-07-28 RX ORDER — ALBUTEROL SULFATE 90 UG/1
108 (90 BASE) INHALANT RESPIRATORY (INHALATION)
Qty: 18 | Refills: 3 | Status: ACTIVE | COMMUNITY
Start: 2020-07-21 | End: 1900-01-01

## 2022-08-03 NOTE — ED ADULT NURSE NOTE - NS ED NURSE RECORD ANOTHER HT AND WT
Instructions: This plan will send the code FBSE to the PM system.  DO NOT or CHANGE the price. Price (Do Not Change): 0.00 Detail Level: Simple Yes

## 2022-08-26 ENCOUNTER — APPOINTMENT (OUTPATIENT)
Dept: INTERNAL MEDICINE | Facility: CLINIC | Age: 62
End: 2022-08-26

## 2022-09-02 RX ORDER — BLOOD-GLUCOSE METER
W/DEVICE EACH MISCELLANEOUS
Qty: 1 | Refills: 0 | Status: ACTIVE | COMMUNITY
Start: 2022-09-02 | End: 1900-01-01

## 2022-09-02 RX ORDER — LANCETS
EACH MISCELLANEOUS
Qty: 100 | Refills: 3 | Status: ACTIVE | COMMUNITY
Start: 2022-09-02 | End: 1900-01-01

## 2022-10-04 ENCOUNTER — RX RENEWAL (OUTPATIENT)
Age: 62
End: 2022-10-04

## 2022-10-08 NOTE — DISCHARGE NOTE NURSING/CASE MANAGEMENT/SOCIAL WORK - NSDCPEEMAIL_GEN_ALL_CORE
T(C): --  HR: 114 (10-07-22 @ 20:16) (114 - 114)  BP: 77/55 (10-07-22 @ 20:16) (77/55 - 77/55)  RR: 22 (10-07-22 @ 20:16) (22 - 22)  SpO2: 88% (10-07-22 @ 20:16) (88% - 88%)    GENERAL: patient appears well, no acute distress, appropriately interactive  EYES: sclera clear, no exudates  ENMT: oropharynx clear without erythema, no exudates, moist mucous membranes  NECK: supple, soft  LUNGS: good air entry bilaterally, clear to auscultation, symmetric breath sounds, no wheezing or rhonchi appreciated  HEART: soft S1/S2, regular rate and rhythm, no murmurs noted, no lower extremity edema  GASTROINTESTINAL: abdomen is soft, nontender, nondistended, normoactive bowel sounds  INTEGUMENT: good skin turgor, warm skin, appears well perfused  MUSCULOSKELETAL: no clubbing or cyanosis, no obvious deformity  NEUROLOGIC: awake, alert, oriented x3, good muscle tone in all 4 extremities  HEME/LYMPH: no obvious ecchymosis or petechiae [facility] email [email or web address] T(C): --  HR: 114 (10-07-22 @ 20:16) (114 - 114)  BP: 77/55 (10-07-22 @ 20:16) (77/55 - 77/55)  RR: 22 (10-07-22 @ 20:16) (22 - 22)  SpO2: 88% (10-07-22 @ 20:16) (88% - 88%)    Limited PE due to patient being unresponsive    GENERAL: patient unresponsive, ill-appearing, malnourished  EYES: sclera clear, minimal responsiveness to light  ENMT: oropharynx no exudates  NECK: supple, soft  LUNGS: good air entry bilaterally +bibasilar crackles  HEART: soft S1/S2, regular rate and rhythm, no murmurs noted, no lower extremity edema  GASTROINTESTINAL: abdomen is soft, nondistended, +thoracic aorta pulse felt  INTEGUMENT: warm skin, appears well perfused  MUSCULOSKELETAL: no clubbing or cyanosis, no obvious deformity  NEUROLOGIC: unresponsive T(C): --  HR: 114 (10-07-22 @ 20:16) (114 - 114)  BP: 77/55 (10-07-22 @ 20:16) (77/55 - 77/55)  RR: 22 (10-07-22 @ 20:16) (22 - 22)  SpO2: 88% (10-07-22 @ 20:16) (88% - 88%)    Limited PE due to patient being unresponsive    GENERAL: patient unresponsive, ill-appearing, malnourished, cachectic   EYES: sclera clear, minimal responsiveness to light  ENMT: oropharynx no exudates  NECK: supple, soft  LUNGS: good air entry bilaterally +bibasilar crackles  HEART: soft S1/S2, regular rate and rhythm, no murmurs noted, no lower extremity edema  GASTROINTESTINAL: abdomen is soft, nondistended, +thoracic aorta pulse felt  INTEGUMENT: warm skin, appears well perfused  MUSCULOSKELETAL: no clubbing or cyanosis, no obvious deformity  NEUROLOGIC: unresponsive

## 2022-10-11 NOTE — H&P ADULT - EYES
Prep Survey    Flowsheet Row Responses   [facility] facility patient discharged from? [facility]   Is LACE score < 7 ? No   Emergency Room discharge w/ pulse ox? No   Eligibility Readm Mgmt   Discharge diagnosis rapid heartbeat persistent afib   Does the patient have one of the following disease processes/diagnoses(primary or secondary)? Other   Does the patient have Home health ordered? No   Is there a DME ordered? No   Prep survey completed? Yes          PRESTON SEVILLA - Registered Nurse         detailed exam PERRL/EOMI

## 2022-10-21 ENCOUNTER — APPOINTMENT (OUTPATIENT)
Dept: INTERNAL MEDICINE | Facility: CLINIC | Age: 62
End: 2022-10-21

## 2022-11-02 ENCOUNTER — RX RENEWAL (OUTPATIENT)
Age: 62
End: 2022-11-02

## 2022-11-17 ENCOUNTER — APPOINTMENT (OUTPATIENT)
Dept: HEART AND VASCULAR | Facility: CLINIC | Age: 62
End: 2022-11-17
Payer: MEDICARE

## 2022-11-17 DIAGNOSIS — I50.23 ACUTE ON CHRONIC SYSTOLIC (CONGESTIVE) HEART FAILURE: ICD-10-CM

## 2022-11-17 PROCEDURE — 99443: CPT | Mod: 95

## 2022-11-17 RX ORDER — METOPROLOL SUCCINATE 100 MG/1
100 TABLET, EXTENDED RELEASE ORAL
Qty: 28 | Refills: 0 | Status: DISCONTINUED | COMMUNITY
Start: 2022-05-27 | End: 2022-11-17

## 2022-11-17 RX ORDER — APIXABAN 5 MG/1
5 TABLET, FILM COATED ORAL
Qty: 180 | Refills: 3 | Status: DISCONTINUED | COMMUNITY
Start: 2020-03-09 | End: 2022-11-17

## 2022-11-17 RX ORDER — BUSPIRONE HYDROCHLORIDE 15 MG/1
15 TABLET ORAL
Qty: 28 | Refills: 0 | Status: DISCONTINUED | COMMUNITY
Start: 2022-05-20 | End: 2022-11-17

## 2022-11-17 RX ORDER — PAROXETINE HYDROCHLORIDE 10 MG/1
10 TABLET, FILM COATED ORAL
Qty: 28 | Refills: 0 | Status: ACTIVE | COMMUNITY
Start: 2022-05-20

## 2022-11-17 RX ORDER — AZITHROMYCIN 250 MG/1
250 TABLET, FILM COATED ORAL
Qty: 6 | Refills: 0 | Status: DISCONTINUED | COMMUNITY
Start: 2022-06-22 | End: 2022-11-17

## 2022-11-17 RX ORDER — CLINDAMYCIN HYDROCHLORIDE 300 MG/1
300 CAPSULE ORAL
Qty: 16 | Refills: 0 | Status: DISCONTINUED | COMMUNITY
Start: 2022-11-06 | End: 2022-11-17

## 2022-11-20 NOTE — DISCHARGE NOTE ADULT - NSFTFSERV1RD_GEN_ALL_CORE
Vaccine status unknown
teaching and training/rehabilitation services/medication teaching and assessment/observation and assessment

## 2022-11-21 PROBLEM — I50.23 ACUTE ON CHRONIC SYSTOLIC CONGESTIVE HEART FAILURE: Status: ACTIVE | Noted: 2020-08-18

## 2022-11-21 NOTE — REASON FOR VISIT
[Cardiac Failure] : cardiac failure [FreeTextEntry1] : \par Discussed with patient: You have chosen to receive care through the use of tele-media. Tele-media enables health care providers at different locations to provide safe, effective, and convenient care through the use of technology. Please note this is a billable encounter. As with any health care service, there are risks associated with the use of tele-media, including equipment failure, poor image and/or resolution, and  issues. You understand that I cannot physically examine you and that you may need to come to the clinic to complete the assessment. Patient agreed verbally to understanding the risks and benefits of tele-media as explained. All questions regarding tele-media encounters were answered. \par \par \par attempted but failed telemedia converted to telephonic visit.\par

## 2022-11-21 NOTE — ASSESSMENT
[FreeTextEntry1] : - given her history i would recommend she be admitted for elqiuis to heparin gtt bridge prior to procedure \par - systolic hf on toprol xl 200 daily, entresto 97/101 bid\par - fu in three months

## 2022-12-05 ENCOUNTER — RX RENEWAL (OUTPATIENT)
Age: 62
End: 2022-12-05

## 2022-12-19 ENCOUNTER — RX RENEWAL (OUTPATIENT)
Age: 62
End: 2022-12-19

## 2022-12-22 ENCOUNTER — RX RENEWAL (OUTPATIENT)
Age: 62
End: 2022-12-22

## 2023-02-14 NOTE — CONSULT NOTE ADULT - NS ED [facility] TELEPSYCH PROVIDER LOCATION
Other Cephalexin Counseling: I counseled the patient regarding use of cephalexin as an antibiotic for prophylactic and/or therapeutic purposes. Cephalexin (commonly prescribed under brand name Keflex) is a cephalosporin antibiotic which is active against numerous classes of bacteria, including most skin bacteria. Side effects may include nausea, diarrhea, gastrointestinal upset, rash, hives, yeast infections, and in rare cases, hepatitis, kidney disease, seizures, fever, confusion, neurologic symptoms, and others. Patients with severe allergies to penicillin medications are cautioned that there is about a 10% incidence of cross-reactivity with cephalosporins. When possible, patients with penicillin allergies should use alternatives to cephalosporins for antibiotic therapy.

## 2023-03-11 ENCOUNTER — EMERGENCY (EMERGENCY)
Facility: HOSPITAL | Age: 63
LOS: 1 days | Discharge: HOME | End: 2023-03-11
Attending: STUDENT IN AN ORGANIZED HEALTH CARE EDUCATION/TRAINING PROGRAM | Admitting: STUDENT IN AN ORGANIZED HEALTH CARE EDUCATION/TRAINING PROGRAM
Payer: MEDICARE

## 2023-03-11 VITALS
TEMPERATURE: 99 F | HEART RATE: 80 BPM | DIASTOLIC BLOOD PRESSURE: 55 MMHG | RESPIRATION RATE: 18 BRPM | OXYGEN SATURATION: 100 % | SYSTOLIC BLOOD PRESSURE: 124 MMHG

## 2023-03-11 DIAGNOSIS — Z95.810 PRESENCE OF AUTOMATIC (IMPLANTABLE) CARDIAC DEFIBRILLATOR: Chronic | ICD-10-CM

## 2023-03-11 DIAGNOSIS — Z95.828 PRESENCE OF OTHER VASCULAR IMPLANTS AND GRAFTS: Chronic | ICD-10-CM

## 2023-03-11 DIAGNOSIS — Z98.51 TUBAL LIGATION STATUS: Chronic | ICD-10-CM

## 2023-03-11 DIAGNOSIS — Z90.49 ACQUIRED ABSENCE OF OTHER SPECIFIED PARTS OF DIGESTIVE TRACT: Chronic | ICD-10-CM

## 2023-03-11 DIAGNOSIS — Z90.710 ACQUIRED ABSENCE OF BOTH CERVIX AND UTERUS: Chronic | ICD-10-CM

## 2023-03-11 LAB
ALBUMIN SERPL ELPH-MCNC: 4.2 G/DL — SIGNIFICANT CHANGE UP (ref 3.3–5)
ALP SERPL-CCNC: 153 U/L — HIGH (ref 40–120)
ALT FLD-CCNC: 16 U/L — SIGNIFICANT CHANGE UP (ref 4–33)
ANION GAP SERPL CALC-SCNC: 13 MMOL/L — SIGNIFICANT CHANGE UP (ref 7–14)
AST SERPL-CCNC: 20 U/L — SIGNIFICANT CHANGE UP (ref 4–32)
BASOPHILS # BLD AUTO: 0.05 K/UL — SIGNIFICANT CHANGE UP (ref 0–0.2)
BASOPHILS NFR BLD AUTO: 0.6 % — SIGNIFICANT CHANGE UP (ref 0–2)
BILIRUB SERPL-MCNC: 0.5 MG/DL — SIGNIFICANT CHANGE UP (ref 0.2–1.2)
BUN SERPL-MCNC: 29 MG/DL — HIGH (ref 7–23)
CALCIUM SERPL-MCNC: 9.2 MG/DL — SIGNIFICANT CHANGE UP (ref 8.4–10.5)
CHLORIDE SERPL-SCNC: 95 MMOL/L — LOW (ref 98–107)
CO2 SERPL-SCNC: 25 MMOL/L — SIGNIFICANT CHANGE UP (ref 22–31)
CREAT SERPL-MCNC: 3.77 MG/DL — HIGH (ref 0.5–1.3)
EGFR: 13 ML/MIN/1.73M2 — LOW
EOSINOPHIL # BLD AUTO: 0.28 K/UL — SIGNIFICANT CHANGE UP (ref 0–0.5)
EOSINOPHIL NFR BLD AUTO: 3.6 % — SIGNIFICANT CHANGE UP (ref 0–6)
GLUCOSE SERPL-MCNC: 138 MG/DL — HIGH (ref 70–99)
HCT VFR BLD CALC: 36.9 % — SIGNIFICANT CHANGE UP (ref 34.5–45)
HGB BLD-MCNC: 11.6 G/DL — SIGNIFICANT CHANGE UP (ref 11.5–15.5)
IANC: 4.38 K/UL — SIGNIFICANT CHANGE UP (ref 1.8–7.4)
IMM GRANULOCYTES NFR BLD AUTO: 0.3 % — SIGNIFICANT CHANGE UP (ref 0–0.9)
LYMPHOCYTES # BLD AUTO: 2.06 K/UL — SIGNIFICANT CHANGE UP (ref 1–3.3)
LYMPHOCYTES # BLD AUTO: 26.6 % — SIGNIFICANT CHANGE UP (ref 13–44)
MCHC RBC-ENTMCNC: 31.4 GM/DL — LOW (ref 32–36)
MCHC RBC-ENTMCNC: 33 PG — SIGNIFICANT CHANGE UP (ref 27–34)
MCV RBC AUTO: 105.1 FL — HIGH (ref 80–100)
MONOCYTES # BLD AUTO: 0.94 K/UL — HIGH (ref 0–0.9)
MONOCYTES NFR BLD AUTO: 12.2 % — SIGNIFICANT CHANGE UP (ref 2–14)
NEUTROPHILS # BLD AUTO: 4.38 K/UL — SIGNIFICANT CHANGE UP (ref 1.8–7.4)
NEUTROPHILS NFR BLD AUTO: 56.7 % — SIGNIFICANT CHANGE UP (ref 43–77)
NRBC # BLD: 0 /100 WBCS — SIGNIFICANT CHANGE UP (ref 0–0)
NRBC # FLD: 0 K/UL — SIGNIFICANT CHANGE UP (ref 0–0)
PLATELET # BLD AUTO: 153 K/UL — SIGNIFICANT CHANGE UP (ref 150–400)
POTASSIUM SERPL-MCNC: 4.3 MMOL/L — SIGNIFICANT CHANGE UP (ref 3.5–5.3)
POTASSIUM SERPL-SCNC: 4.3 MMOL/L — SIGNIFICANT CHANGE UP (ref 3.5–5.3)
PROT SERPL-MCNC: 8.3 G/DL — SIGNIFICANT CHANGE UP (ref 6–8.3)
RBC # BLD: 3.51 M/UL — LOW (ref 3.8–5.2)
RBC # FLD: 14.4 % — SIGNIFICANT CHANGE UP (ref 10.3–14.5)
SODIUM SERPL-SCNC: 133 MMOL/L — LOW (ref 135–145)
TROPONIN T, HIGH SENSITIVITY RESULT: 60 NG/L — CRITICAL HIGH
TROPONIN T, HIGH SENSITIVITY RESULT: 63 NG/L — CRITICAL HIGH
WBC # BLD: 7.73 K/UL — SIGNIFICANT CHANGE UP (ref 3.8–10.5)
WBC # FLD AUTO: 7.73 K/UL — SIGNIFICANT CHANGE UP (ref 3.8–10.5)

## 2023-03-11 PROCEDURE — 71045 X-RAY EXAM CHEST 1 VIEW: CPT | Mod: 26

## 2023-03-11 PROCEDURE — 99284 EMERGENCY DEPT VISIT MOD MDM: CPT

## 2023-03-11 RX ORDER — OXYCODONE HYDROCHLORIDE 5 MG/1
5 TABLET ORAL ONCE
Refills: 0 | Status: DISCONTINUED | OUTPATIENT
Start: 2023-03-11 | End: 2023-03-11

## 2023-03-11 RX ORDER — ALBUTEROL 90 UG/1
3 AEROSOL, METERED ORAL ONCE
Refills: 0 | Status: COMPLETED | OUTPATIENT
Start: 2023-03-11 | End: 2023-03-11

## 2023-03-11 RX ORDER — ACETAMINOPHEN 500 MG
650 TABLET ORAL ONCE
Refills: 0 | Status: COMPLETED | OUTPATIENT
Start: 2023-03-11 | End: 2023-03-11

## 2023-03-11 RX ADMIN — OXYCODONE HYDROCHLORIDE 5 MILLIGRAM(S): 5 TABLET ORAL at 22:50

## 2023-03-11 RX ADMIN — OXYCODONE HYDROCHLORIDE 5 MILLIGRAM(S): 5 TABLET ORAL at 17:29

## 2023-03-11 RX ADMIN — ALBUTEROL 3 PUFF(S): 90 AEROSOL, METERED ORAL at 17:19

## 2023-03-11 NOTE — ED PROVIDER NOTE - PROGRESS NOTE DETAILS
Cb Farias- pt has some pain, will give meds. pt labs wnl, updated pt on plan to dc with pcp/nephro f/u.

## 2023-03-11 NOTE — ED ADULT TRIAGE NOTE - CHIEF COMPLAINT QUOTE
PT sent from dialysis (Right chest wall access T-TH-SAT) center for unstable B/P's. PT was two hours into TX when PT developed symptoms. PT is asymptomatic to B/P changes. PMH AICD, CVA (right side weakness), COPD, HTN, DMI, HLD, CHF

## 2023-03-11 NOTE — ED PROVIDER NOTE - PATIENT PORTAL LINK FT
You can access the FollowMyHealth Patient Portal offered by [facility] by registering at the following website: [email or web address]. By joining [facility]’s FollowMyHealth portal, you will also be able to view your health information using other applications (apps) compatible with our system.

## 2023-03-11 NOTE — ED PROVIDER NOTE - CLINICAL SUMMARY MEDICAL DECISION MAKING FREE TEXT BOX
61yo F pmh HTN/HLD, COPD, CVA, ESRD (HD TTS), CHF - presents to emergency department for fluctuations in blood pressures after dialysis.  Patient reports in the last several dialysis sessions her blood pressures have been dropping.  At times it is associated with blurred vision at other times associated with right shoulder pain.  Patient reports she broke her shoulder several years ago and has limited function since, pain recurs intermittently.  Currently mild right shoulder pain. Denies any chest pain, SOB, Blurred vision, headache. Of note patient reports that over the past 2 weeks was doubling up on her midodrine dose accidentally.  Last time she took double dose of midodrine was 4 days ago.    Vital stable with heart rate 80 /55 afebrile 100% on room air.  Patient with nonlabored breathing speaking in full sentences, does have mild bilateral wheezing expiratory.  Pulses 2+ in bilateral upper extremities, no rashes or lesions on the skin, right chest permacath nontender with no associated cellulitis.  2+ bipedal edema.  Dry mucous membranes.    Patient is very well-appearing, BP is 1/24/1953.  We will continue to cycle it.  Patient has no focal deficits currently no chest pain shortness of breath.  Is possible change in blood pressure is related to over diuresis.  Also consider adverse effects of doubling up on Midrin however that was 4 days ago effects unlikely to persist on out.  We will check CBC CMP troponin and chest x-ray.  Given wheezing will give albuterol and reassess.  Acetaminophen for shoulder pain

## 2023-03-11 NOTE — ED PROVIDER NOTE - OBJECTIVE STATEMENT
63yo F pmh HTN/HLD, COPD, CVA, ESRD (HD TTS), CHF - presents to emergency department for fluctuations in blood pressures after dialysis.  Patient reports in the last several dialysis sessions her blood pressures have been dropping.  At times it is associated with blurred vision at other times associated with right shoulder pain.  Patient reports she broke her shoulder several years ago and has limited function since, pain recurs intermittently.  Currently mild right shoulder pain. Denies any chest pain, SOB, Blurred vision, headache.    Of note patient reports that over the past 2 weeks was doubling up on her midodrine dose accidentally.  Last time she took double dose of midodrine was 4 days ago.

## 2023-03-11 NOTE — ED PROVIDER NOTE - NSFOLLOWUPINSTRUCTIONS_ED_ALL_ED_FT
You were seen in the ER for hypotension during dialysis. Your lab results did not show any large abnormalities.     Please follow up with your nephrologist.  Please follow up with your primary care doctor.    Please return to the ER if you have worsening symptoms including fever, chest pain, shortness of breath, abdominal pain, nausea, vomiting, diarrhea, weakness or lightheadedness/fainting.

## 2023-03-11 NOTE — ED ADULT TRIAGE NOTE - NS ED TRIAGE AVPU SCALE
Gen: Alert, NAD  Head: NC, AT   Eyes: PERRL, EOMI, normal lids/conjunctiva  ENT: normal hearing, patent oropharynx without erythema/exudate, uvula midline  Neck: supple, no tenderness, Trachea midline  Pulm: Bilateral BS, normal resp effort, no wheeze/stridor/retractions  CV: RRR, no M/R/G, 2+ radial and dp pulses bl, no edema  Abd: soft, NT/ND, +BS, no hepatosplenomegaly  Mskel: extremities x4 with normal ROM and no joint effusions. no ctl spine ttp.   Skin: no rash, no bruising   Neuro: AAOx3, no sensory/motor deficits, CN 2-12 intact Alert-The patient is alert, awake and responds to voice. The patient is oriented to time, place, and person. The triage nurse is able to obtain subjective information.

## 2023-03-11 NOTE — ED PROVIDER NOTE - ATTENDING CONTRIBUTION TO CARE
62F h/o HTN, HLD, COPD, CVA, ESRD, CHF sent from HD session for intra-session hypotension. States was ~2hrs through her session when the nurse noted her blood pressure was low, however pt was asymptomatic. Had similar issue during her Tue and Thurs sessions as well. Pt notes that she had not been taking her midodrine during this time as she had accidentally been doubling up her dose ( gave in AM and HD center gave pre-HD), last took at home 4 days ago. Denies chest pain, SOB, HA, dizziness, abd pain, n/v, difficulty breathing. Of note, pt c/o chronic R shoulder pain after suffering fracture last year. No other complaints. Has not been following closely with her outpatient providers due to fears of leaving her home after Covid. All providers affiliated with [facility].  Gen: nad, AOx3  CV: rrr, no appreciable murmur  Pulm: clear lungs  Abd: soft, ntnd  Ext: no edema/swelling  Skin: R permacath site clean, dry, non-erythematous  MDM: 62F h/o HTN, HLD, COPD, CVA, ESRD, CHF sent from HD session for intra-session hypotension. Pt unsure of how much fluid attempting to remove. Will send trop and check EKG to assess for cardiac origin, send cbc for anemia, cmp to assess for electrolyte abnormalities. If all wnl may be 2/2 missed midodrine dosing prior to sessions.

## 2023-03-11 NOTE — ED ADULT NURSE NOTE - OBJECTIVE STATEMENT
A&Ox4. ambulatory. c/o varying blood pressures. PT was sent from dialysis. positive fistula observed to left ar, positive thrill and bruit. NAD. pt denies SOB, chest pain, dizziness, weakness, urinary symptoms, HA, n/v/d, fevers, chills. respirations ar even and un labored. skin intact. safety precautions maintained.

## 2023-03-12 VITALS
SYSTOLIC BLOOD PRESSURE: 117 MMHG | DIASTOLIC BLOOD PRESSURE: 38 MMHG | HEART RATE: 76 BPM | TEMPERATURE: 98 F | OXYGEN SATURATION: 100 % | RESPIRATION RATE: 16 BRPM

## 2023-03-12 NOTE — PROVIDER CONTACT NOTE (OTHER) - ASSESSMENT
KWASI was notified by medical provider that pt is cleared for discharge and transport via ambulance is needed for pt to return home. KWASI met with pt at bedside, she confirmed address to be [address] [place] [address] [address] [place] [address]. KWASI [facility] spoke with Ayde to arrnage transport via ambulance; Trip# [id number] 1AM

## 2023-03-12 NOTE — ED ADULT NURSE REASSESSMENT NOTE - NS ED NURSE REASSESS COMMENT FT1
Break coverage RN: pt A&Ox4 resting on stretcher. Respirations even and unlabored. No acute distress noted. pending transport back home.

## 2023-03-21 ENCOUNTER — RX RENEWAL (OUTPATIENT)
Age: 63
End: 2023-03-21

## 2023-03-24 ENCOUNTER — NON-APPOINTMENT (OUTPATIENT)
Age: 63
End: 2023-03-24

## 2023-03-24 ENCOUNTER — APPOINTMENT (OUTPATIENT)
Dept: HEART AND VASCULAR | Facility: CLINIC | Age: 63
End: 2023-03-24
Payer: MEDICARE

## 2023-03-24 VITALS
WEIGHT: 221.98 LBS | TEMPERATURE: 99 F | HEART RATE: 78 BPM | HEIGHT: 65 IN | SYSTOLIC BLOOD PRESSURE: 163 MMHG | BODY MASS INDEX: 36.98 KG/M2 | DIASTOLIC BLOOD PRESSURE: 60 MMHG

## 2023-03-24 DIAGNOSIS — I50.22 CHRONIC SYSTOLIC (CONGESTIVE) HEART FAILURE: ICD-10-CM

## 2023-03-24 PROCEDURE — 99214 OFFICE O/P EST MOD 30 MIN: CPT | Mod: 25

## 2023-03-24 PROCEDURE — 36415 COLL VENOUS BLD VENIPUNCTURE: CPT

## 2023-03-24 PROCEDURE — 93000 ELECTROCARDIOGRAM COMPLETE: CPT

## 2023-03-24 RX ORDER — FENOFIBRATE 48 MG/1
48 TABLET ORAL
Qty: 90 | Refills: 3 | Status: ACTIVE | COMMUNITY
Start: 2017-07-07 | End: 1900-01-01

## 2023-03-24 RX ORDER — APIXABAN 2.5 MG/1
2.5 TABLET, FILM COATED ORAL
Qty: 180 | Refills: 3 | Status: ACTIVE | COMMUNITY

## 2023-03-24 RX ORDER — SEVELAMER CARBONATE 800 MG/1
800 TABLET, FILM COATED ORAL
Refills: 0 | Status: ACTIVE | COMMUNITY

## 2023-03-24 RX ORDER — TORSEMIDE 20 MG/1
20 TABLET ORAL
Qty: 240 | Refills: 6 | Status: ACTIVE | COMMUNITY
Start: 2019-01-23 | End: 1900-01-01

## 2023-03-24 RX ORDER — ATORVASTATIN CALCIUM 80 MG/1
80 TABLET, FILM COATED ORAL
Qty: 90 | Refills: 3 | Status: ACTIVE | COMMUNITY
Start: 2017-07-07 | End: 1900-01-01

## 2023-03-24 RX ORDER — SACUBITRIL AND VALSARTAN 97; 103 MG/1; MG/1
97-103 TABLET, FILM COATED ORAL
Qty: 180 | Refills: 3 | Status: ACTIVE | COMMUNITY
Start: 2019-03-25 | End: 1900-01-01

## 2023-03-24 RX ORDER — METOPROLOL SUCCINATE 200 MG/1
200 TABLET, EXTENDED RELEASE ORAL DAILY
Qty: 90 | Refills: 3 | Status: ACTIVE | COMMUNITY
Start: 2021-02-03 | End: 1900-01-01

## 2023-03-24 NOTE — PHYSICAL EXAM
[Well Developed] : well developed [Well Nourished] : well nourished [No Acute Distress] : no acute distress [Normal Conjunctiva] : normal conjunctiva [Normal Venous Pressure] : normal venous pressure [No Carotid Bruit] : no carotid bruit [No Murmur] : no murmur [Normal S1, S2] : normal S1, S2 [No Rub] : no rub [No Gallop] : no gallop [Clear Lung Fields] : clear lung fields [Good Air Entry] : good air entry [No Respiratory Distress] : no respiratory distress  [Soft] : abdomen soft [Non Tender] : non-tender [No Masses/organomegaly] : no masses/organomegaly [Normal Bowel Sounds] : normal bowel sounds [No Edema] : no edema [Normal Gait] : normal gait [No Cyanosis] : no cyanosis [No Clubbing] : no clubbing [No Varicosities] : no varicosities [No Rash] : no rash [No Skin Lesions] : no skin lesions [Moves all extremities] : moves all extremities [No Focal Deficits] : no focal deficits [Normal Speech] : normal speech [Alert and Oriented] : alert and oriented [Normal memory] : normal memory

## 2023-03-26 LAB
ALBUMIN SERPL ELPH-MCNC: 4.2 G/DL
ALP BLD-CCNC: 161 U/L
ALT SERPL-CCNC: 20 U/L
ANION GAP SERPL CALC-SCNC: 18 MMOL/L
AST SERPL-CCNC: 24 U/L
BASOPHILS # BLD AUTO: 0.06 K/UL
BASOPHILS NFR BLD AUTO: 0.8 %
BILIRUB SERPL-MCNC: 0.4 MG/DL
BUN SERPL-MCNC: 28 MG/DL
CALCIUM SERPL-MCNC: 9.5 MG/DL
CHLORIDE SERPL-SCNC: 91 MMOL/L
CHOLEST SERPL-MCNC: 163 MG/DL
CO2 SERPL-SCNC: 23 MMOL/L
CREAT SERPL-MCNC: 3.65 MG/DL
EGFR: 14 ML/MIN/1.73M2
EOSINOPHIL # BLD AUTO: 0.25 K/UL
EOSINOPHIL NFR BLD AUTO: 3.3 %
ESTIMATED AVERAGE GLUCOSE: 163 MG/DL
GLUCOSE SERPL-MCNC: 312 MG/DL
HBA1C MFR BLD HPLC: 7.3 %
HCT VFR BLD CALC: 35.5 %
HDLC SERPL-MCNC: 39 MG/DL
HGB BLD-MCNC: 11.5 G/DL
IMM GRANULOCYTES NFR BLD AUTO: 0.3 %
LDLC SERPL CALC-MCNC: 71 MG/DL
LYMPHOCYTES # BLD AUTO: 1.56 K/UL
LYMPHOCYTES NFR BLD AUTO: 20.3 %
MAN DIFF?: NORMAL
MCHC RBC-ENTMCNC: 32.4 GM/DL
MCHC RBC-ENTMCNC: 33.9 PG
MCV RBC AUTO: 104.7 FL
MONOCYTES # BLD AUTO: 0.84 K/UL
MONOCYTES NFR BLD AUTO: 10.9 %
NEUTROPHILS # BLD AUTO: 4.95 K/UL
NEUTROPHILS NFR BLD AUTO: 64.4 %
NONHDLC SERPL-MCNC: 124 MG/DL
PLATELET # BLD AUTO: 134 K/UL
POTASSIUM SERPL-SCNC: 5.2 MMOL/L
PROT SERPL-MCNC: 7.8 G/DL
RBC # BLD: 3.39 M/UL
RBC # FLD: 14.2 %
SODIUM SERPL-SCNC: 132 MMOL/L
TRIGL SERPL-MCNC: 268 MG/DL
WBC # FLD AUTO: 7.68 K/UL

## 2023-03-26 NOTE — ASSESSMENT
[FreeTextEntry1] : - PAD on atorvastatin 80 mg daily \par - systolic hf on toprol xl 200 daily, entresto 97/101 bid\par - Repeat echo due to sob\par - fu in 6 months

## 2023-03-26 NOTE — HISTORY OF PRESENT ILLNESS
[FreeTextEntry1] : 62 F NICM AICD for primary prevention, HTN, IDDM c/b neuropathy, bipolar disorder, depression, DVT on Eliquis, CVA x 2 residual right sided weakness, PAD s/p bypass, hypothyroid, COPD on 3L home Oxygen Active Smoker, DHARMEHS on CPAP Cardiomems Multiple myeloma ESRD on HD, Fibromyalgia on methadone, Permacath Infection \par \par she walks with a walker uses a wheelchair she stops due to fatigue and weakness she has not been admitted to the hospital since 12/2021 when dialysis was initiated she is able to walk 1/2 block no chest pain \par \par \par \par 11/2021 EF 25-30% with WMA mild to moderate MR \par ECG 3/24/2023 NSR LAD IVCD septal infarction poor r wave progression

## 2023-03-31 ENCOUNTER — APPOINTMENT (OUTPATIENT)
Dept: NEPHROLOGY | Facility: CLINIC | Age: 63
End: 2023-03-31

## 2023-04-07 ENCOUNTER — APPOINTMENT (OUTPATIENT)
Dept: HEART AND VASCULAR | Facility: CLINIC | Age: 63
End: 2023-04-07

## 2023-04-17 NOTE — CONSULT NOTE ADULT - CONSULT REQUESTED BY NAME
Dr. Hernández
Medicine
Pt. with anemia in the setting of ESRD. Hgb (8.5) is below goal for ESRD. Recommend to check iron studies. Will hold off on EPO until BP improves.    If you have any questions, please feel free to contact me  Nando Rowell  Nephrology Fellow  [phone] / [employer] Teams(Preferred)  (After 5pm or on weekends please page the on-call fellow)

## 2023-05-08 ENCOUNTER — APPOINTMENT (OUTPATIENT)
Dept: ENDOCRINOLOGY | Facility: CLINIC | Age: 63
End: 2023-05-08

## 2023-05-15 NOTE — DISCHARGE NOTE NURSING/CASE MANAGEMENT/SOCIAL WORK - WILL THE PATIENT ACCEPT THE PFIZER COVID-19 VACCINE IF ELIGIBLE AND IT IS AVAILABLE?
Doing well on Cymbalta to 60 mg.  Much better but has had more anxiety taking care of mother.   LFTs due next visit.   Follow-up 3 months.    
Pap- normal 12/2022, sees gynecology.  Mammogram - ordered.   Colonoscopy 48 y/o.  Flu vaccine declined.  Tdap -received 9/2022.  COVID 19 vaccine - received 2021 season.  New COVID 19 vaccine recommended but pt declined.   Former smoker.   Addendum 8/7/2023: Mammogram normal, recheck 1 year.  
Tries to limit use of both Norco and Flexeril to 3-4 times per week each.  Cannot tolerate traditional NSAIDs.  Se's to Mobic also.    Did not tolerate Neurontin in past.   Celebrex not covered previously, will try again.   Can add Pepcid if Celebrex causes Gi se's.   Can consider Elavil next visit.    MRI in the past only showed minimal changes.  CSA signed 5/2023.  Urine tox repeat today since last urine tox did not show norco.  STOP PRESCRIBING IF URINE TOX DOES NOT SHOW NORCO AGAIN.   States ran out of Norco 2 days ago.   IPMP checked.   Increased Cymbalta to 60 mg dose which she's tolerating this time but no improvement with back pain.  It is, however, helping her Anxiety.    Normal neurologic exam.    Frequently does use extra Norco.  Addendum 6/5/2023: Pain specialist did send me a message that she could test negative being out of [place] for 3 days.  Will emphasize to patient next visit that she cannot overuse her medications.  
Not applicable

## 2023-06-11 ENCOUNTER — NON-APPOINTMENT (OUTPATIENT)
Age: 63
End: 2023-06-11

## 2023-06-12 ENCOUNTER — APPOINTMENT (OUTPATIENT)
Dept: ENDOCRINOLOGY | Facility: CLINIC | Age: 63
End: 2023-06-12

## 2023-06-19 ENCOUNTER — APPOINTMENT (OUTPATIENT)
Dept: ENDOCRINOLOGY | Facility: CLINIC | Age: 63
End: 2023-06-19
Payer: MEDICARE

## 2023-06-19 VITALS
HEART RATE: 64 BPM | BODY MASS INDEX: 33.28 KG/M2 | SYSTOLIC BLOOD PRESSURE: 119 MMHG | DIASTOLIC BLOOD PRESSURE: 52 MMHG | WEIGHT: 200 LBS

## 2023-06-19 DIAGNOSIS — E11.9 TYPE 2 DIABETES MELLITUS W/OUT COMPLICATIONS: ICD-10-CM

## 2023-06-19 PROCEDURE — 83036 HEMOGLOBIN GLYCOSYLATED A1C: CPT | Mod: QW

## 2023-06-19 PROCEDURE — 82962 GLUCOSE BLOOD TEST: CPT

## 2023-06-19 PROCEDURE — 99214 OFFICE O/P EST MOD 30 MIN: CPT | Mod: 25

## 2023-06-19 NOTE — ASSESSMENT
[Carbohydrate Consistent Diet] : carbohydrate consistent diet [Exercise/Effect on Glucose] : exercise/effect on glucose [Hypoglycemia Management] : hypoglycemia management [Self Monitoring of Blood Glucose] : self monitoring of blood glucose

## 2023-06-20 RX ORDER — ISOPROPYL ALCOHOL 70 ML/100ML
70 SWAB TOPICAL
Qty: 100 | Refills: 2 | Status: DISCONTINUED | COMMUNITY
Start: 2021-07-13 | End: 2023-06-20

## 2023-06-20 RX ORDER — PEN NEEDLE, DIABETIC 32 GX 1/4"
32G X 6 MM NEEDLE, DISPOSABLE MISCELLANEOUS
Qty: 100 | Refills: 5 | Status: DISCONTINUED | COMMUNITY
Start: 2022-03-29 | End: 2023-06-20

## 2023-06-20 RX ORDER — PEN NEEDLE, DIABETIC 32 GX 1/4"
32G X 6 MM NEEDLE, DISPOSABLE MISCELLANEOUS
Qty: 1 | Refills: 1 | Status: ACTIVE | COMMUNITY
Start: 2020-10-21 | End: 1900-01-01

## 2023-06-20 RX ORDER — FLASH GLUCOSE SCANNING READER
EACH MISCELLANEOUS
Qty: 1 | Refills: 0 | Status: ACTIVE | COMMUNITY
Start: 2020-07-14 | End: 1900-01-01

## 2023-06-20 RX ORDER — BLOOD SUGAR DIAGNOSTIC
STRIP MISCELLANEOUS
Qty: 3 | Refills: 2 | Status: ACTIVE | COMMUNITY
Start: 2022-09-02 | End: 1900-01-01

## 2023-06-20 RX ORDER — ISOPROPYL ALCOHOL 70 ML/100ML
SWAB TOPICAL
Qty: 300 | Refills: 1 | Status: ACTIVE | COMMUNITY
Start: 2018-03-09 | End: 1900-01-01

## 2023-06-22 NOTE — ADDENDUM
[FreeTextEntry1] : I, Annabelle Huynh act soley as a scribe for Dr. Cal Pimentel on this date. 06/19/2023

## 2023-06-22 NOTE — THERAPY
[Today's Date] : [unfilled] [Levemir] : Levemir [Novolog] : Novolog [FreeTextEntry9] : 30 u qpm [de-identified] : 12 u ac

## 2023-06-22 NOTE — HISTORY OF PRESENT ILLNESS
[FreeTextEntry1] : 63 y/o F pt, with Hx of T2DM (dx > 10 yrs) with multiple complications including: CVA, CKD stage 4 (started dialysis on 12/14/21) and CHF.\par Other PMHx: HTN, HLD, Stomach Ulcer (as per pt), DVT, COPD, Hypothyroidism, Multiple Myeloma. Covid infection (on 01/2021?), COVID POSITIVE (08/24/21)\par Denies PMHx of amputation, Foot Ulcer\par PSHx: Cataract surgery (09/2020)\par Checks BS 3-4 times a day. \par Last Ophthalmologist: Jan 2021 \par Sees cardiologist, vascular doctor, nephrologist\par SHx: former smoker (quit 2-3 months ago as per pt)\par \par 01/05/2022\par Review of pt's chart:\par - Hospitalized from 12/14/21-12/29/21 for SOB and blood-streaked sputum, with exacerbation of heart failure and pulmonary edema. \par - 12/09/21, pt was seen by Dr. Payton Ruiz, paraprotein level was down to 1800 from 2400. Pt is on Decatrone and Ninlaro qw. \par - 12/24/21: A1c 8.6%, s.creat 4.17\par \par Pt did not have any questions prior to the initiation of the telehealth visit. \par She presents today via telehealth for DM f/u, feeling good, with no other physical complaints. Pt was started dialysis on 12/14/21 and goes 3 times a week (Tues, Thurs, Sat) at 10:30-10:35 am. She feels that her DM is better because she is on dialysis. As per pt, she checks her BS every day (3-4), manages her diet, and makes sure to take her insulin the right way. Pt reports  and PPBS in the 200s and is rarely hypoglycemic ("once in a blue moon"). She is currently taking Novolog 7 u, 30 minutes before meals, but should take it 3-4 minutes before. Pt is also on Decadron 6 mg qw (Mondays), which was initiated a year ago. \par \par 06/19/2023\par Review of pt's chart:\par - 03/24 labs A1c 7.3%, platelets 134, LDL-c 71\par - 09/29/20 TSH 6.02\par \par Pt has A1c 7.9%, POCT glucose 327, /64 and BMI 33.28. She gained _ lbs in _ months. \par Pt is wheelchair bound.  \par CC: "I'm here for follow-up. I haven't been here in a long time"\par Pt states she needs new prescriptions. \par As per pt her BS levels are okay for the past 2-3 months. She goes to dialysis Tues, Thu, and Sat. When she is at dialysis she does not have lunch until she goes home (2-3pm). On the days she has dialysis dinner is usually around 6-7pm and breakfast is usually around 8am. \par Pt endorses eye pain and eye redness. She cannot look to the sides. \par \par \par [Medications verified as per pt on 06/19/2023) \par Current Medications: Levemir 30 u qpm, NovoLog 12 u ac, Methadone 10 mg qd, Decadron 6 mg qw (Mondays; initiated a year ago), Multivitamins \par

## 2023-06-22 NOTE — PHYSICAL EXAM
[Alert] : alert [Well Nourished] : well nourished [No Acute Distress] : no acute distress [Well Developed] : well developed [EOMI] : extra ocular movement intact [No Proptosis] : no proptosis [Normal Oropharynx] : the oropharynx was normal [Thyroid Not Enlarged] : the thyroid was not enlarged [No Thyroid Nodules] : no palpable thyroid nodules [No Accessory Muscle Use] : no accessory muscle use [Clear to Auscultation] : lungs were clear to auscultation bilaterally [Normal S1, S2] : normal S1 and S2 [Normal Rate] : heart rate was normal [Regular Rhythm] : with a regular rhythm [No Edema] : no peripheral edema [Pedal Pulses Normal] : the pedal pulses are present [Normal Bowel Sounds] : normal bowel sounds [Not Tender] : non-tender [Not Distended] : not distended [Soft] : abdomen soft [No Spinal Tenderness] : no spinal tenderness [Spine Straight] : spine straight [No Stigmata of Cushings Syndrome] : no stigmata of Cushings Syndrome [Normal Gait] : normal gait [Normal Strength/Tone] : muscle strength and tone were normal [No Rash] : no rash [Normal Reflexes] : deep tendon reflexes were 2+ and symmetric [No Tremors] : no tremors [Oriented x3] : oriented to person, place, and time [Acanthosis Nigricans] : no acanthosis nigricans [de-identified] : GAIL eye redness, no discharge or pain. visual accuracy not impaired.

## 2023-06-22 NOTE — END OF VISIT
[FreeTextEntry3] : All medical record entries made by the Scribe were at my, Dr. Cal Pimentel, direction and personally dictated by me on 06/19/2023. I have reviewed the chart and agree that the record accurately reflects my personal performance of the history, physical exam, assessment and plan. I have also personally directed, reviewed and agreed with the chart.  [Time Spent: ___ minutes] : I have spent [unfilled] minutes of time on the encounter.

## 2023-07-03 RX ORDER — INSULIN ASPART 100 [IU]/ML
100 INJECTION, SOLUTION INTRAVENOUS; SUBCUTANEOUS
Qty: 12 | Refills: 6 | Status: ACTIVE | COMMUNITY
Start: 2020-01-10 | End: 1900-01-01

## 2023-08-02 ENCOUNTER — APPOINTMENT (OUTPATIENT)
Dept: ENDOCRINOLOGY | Facility: CLINIC | Age: 63
End: 2023-08-02

## 2023-08-15 ENCOUNTER — APPOINTMENT (OUTPATIENT)
Dept: INTERNAL MEDICINE | Facility: CLINIC | Age: 63
End: 2023-08-15

## 2023-08-29 LAB
ANION GAP SERPL CALC-SCNC: 15 MMOL/L
BUN SERPL-MCNC: 48 MG/DL
CALCIUM SERPL-MCNC: 9 MG/DL
CHLORIDE SERPL-SCNC: 93 MMOL/L
CHOLEST SERPL-MCNC: 191 MG/DL
CO2 SERPL-SCNC: 26 MMOL/L
CREAT SERPL-MCNC: 5.1 MG/DL
EGFR: 9 ML/MIN/1.73M2
ESTIMATED AVERAGE GLUCOSE: 200 MG/DL
GLUCOSE BLDC GLUCOMTR-MCNC: 327
GLUCOSE SERPL-MCNC: 328 MG/DL
HBA1C MFR BLD HPLC: 7.9
HBA1C MFR BLD HPLC: 8.6 %
HDLC SERPL-MCNC: 31 MG/DL
LDLC SERPL CALC-MCNC: NORMAL MG/DL
NONHDLC SERPL-MCNC: 160 MG/DL
POTASSIUM SERPL-SCNC: 5.4 MMOL/L
SODIUM SERPL-SCNC: 135 MMOL/L
TRIGL SERPL-MCNC: 411 MG/DL

## 2023-09-07 NOTE — ED ADULT NURSE NOTE - CCCP TRG CHIEF CMPLNT
[Dear  ___] : Dear  [unfilled], [Consult Letter:] : I had the pleasure of evaluating your patient, [unfilled]. [Please see my note below.] : Please see my note below. [Consult Closing:] : Thank you very much for allowing me to participate in the care of this patient.  If you have any questions, please do not hesitate to contact me. [Sincerely,] : Sincerely, [FreeTextEntry2] : Dr Mac Ybarra [FreeTextEntry3] : \par  Ryley Galo MD, FACS\par  \par  Otolaryngology-Head and Neck Surgery\par  [employer] [facility]\par   leg swelling

## 2023-09-18 RX ORDER — LEVOTHYROXINE SODIUM 0.05 MG/1
50 TABLET ORAL DAILY
Qty: 30 | Refills: 1 | Status: ACTIVE | COMMUNITY
Start: 2022-10-04 | End: 1900-01-01

## 2023-09-20 RX ORDER — FLASH GLUCOSE SCANNING READER
EACH MISCELLANEOUS
Qty: 1 | Refills: 0 | Status: ACTIVE | OUTPATIENT
Start: 2023-06-20

## 2023-09-20 RX ORDER — FLASH GLUCOSE SENSOR
KIT MISCELLANEOUS
Qty: 6 | Refills: 3 | Status: ACTIVE | OUTPATIENT
Start: 2023-06-20

## 2023-09-22 ENCOUNTER — APPOINTMENT (OUTPATIENT)
Dept: HEART AND VASCULAR | Facility: CLINIC | Age: 63
End: 2023-09-22

## 2023-09-26 RX ORDER — INSULIN DETEMIR 100 [IU]/ML
100 INJECTION, SOLUTION SUBCUTANEOUS
Qty: 1 | Refills: 3 | Status: DISCONTINUED | COMMUNITY
Start: 2020-12-14 | End: 2023-09-26

## 2023-09-26 RX ORDER — INSULIN DETEMIR 100 [IU]/ML
100 INJECTION, SOLUTION SUBCUTANEOUS
Qty: 3 | Refills: 2 | Status: ACTIVE | COMMUNITY
Start: 2023-09-26 | End: 1900-01-01

## 2023-10-16 ENCOUNTER — APPOINTMENT (OUTPATIENT)
Dept: ENDOCRINOLOGY | Facility: CLINIC | Age: 63
End: 2023-10-16

## 2023-12-26 NOTE — DIETITIAN INITIAL EVALUATION ADULT. - PROBLEM SELECTOR PLAN 4
Pharmacy requesting refill: Folic Acid  Last OV: 9/12/23  Next OV: None scheduled  Last refill: 12/8/22  Refilled.      Continue home dose Psych meds but monitor ECG for QTc

## 2024-01-03 NOTE — DISCHARGE NOTE ADULT - NSFTFHOME1RD_GEN_ALL_CORE
[FreeTextEntry2] : left shoulder pain Chest pain/weakness during/after ambulation greater than 20 feet

## 2024-01-16 NOTE — DISCHARGE NOTE NURSING/CASE MANAGEMENT/SOCIAL WORK - NSDCPEELIQUIS_GEN_ALL_CORE
ABDOMINAL PAIN/DIARRHEA
Apixaban/Eliquis - Compliance/Apixaban/Eliquis - Dietary Advice/Apixaban/Eliquis - Follow up monitoring/Apixaban/Eliquis - Potential for adverse drug reactions and interactions

## 2024-01-27 NOTE — ED PROVIDER NOTE - OBJECTIVE STATEMENT
58 year old female with pmhx of CHF, EF 20-25% s/p AICD, COPD on 2L home O2, 4 past intubations, DM, CVA with residual rt sided weakness c/o 2 days of worsening sob and non productive cough. PT had recent URI runny nose. Sts she feels like she is having a copd exacerbation. Pt also c/o RUQ abdominal pain x 1 week. The pain is intermittent and worse with eating. + nausea. + hx of cholecystectomy, hysterectomy. Denies any chest pain, fever/chills, recent travel, leg swelling/pain, hx of DVT, diarrhea.
66 y.o. F, PMH Of HFrEF: 35-40%, pAfib s/p MINNA/DCCV to SR supposed to be on Metoprolol, Amiodarone and Eliquis but she stopped taking her medications stating her cardiologist told her to. Pt presents to the ED for eval of 4 days of palpitations. She woke with them this morning which caused her concern and prompted ED visit. Last saw cardiologist 1 year ago S/P hospital admission for similar symptoms. Denies CP/SOB, fever/chills, N/V/D, weakness, HA. On exam, pt in NAD, AAOx3, head NC/AT, CN II-XII intact, PEERL, EOMi, neck (-) midline tenderness, lungs CTA B/L, CV S1S2 irregular/tachy, abdomen soft/NT/ND/(+)BS, ext (-) edema, motor 5/5x4, sensation intact, ambulating with steady gait. Pt found to be in a.fib with RVR. Cardizem given, drip started. Will admit for further care.

## 2024-03-07 NOTE — PATIENT PROFILE ADULT - BRADEN ACTIVITY
thyromegaly.      Vascular: No carotid bruit.   Cardiovascular:      Rate and Rhythm: Normal rate and regular rhythm.      Pulses: Normal pulses.      Heart sounds: No murmur heard.     No friction rub. No gallop.   Pulmonary:      Effort: Pulmonary effort is normal. No respiratory distress.      Breath sounds: Normal breath sounds.   Abdominal:      General: Abdomen is flat. Bowel sounds are normal.      Palpations: Abdomen is soft.      Tenderness: There is no abdominal tenderness.   Musculoskeletal:      Cervical back: Normal range of motion and neck supple.   Lymphadenopathy:      Cervical: No cervical adenopathy.   Skin:     General: Skin is warm and dry.      Findings: No rash.   Neurological:      General: No focal deficit present.      Mental Status: She is alert and oriented to person, place, and time. Mental status is at baseline.   Psychiatric:         Mood and Affect: Mood normal.         Behavior: Behavior normal.         Thought Content: Thought content normal.         Judgment: Judgment normal.        BP (!) 136/100   Pulse 88   Temp 97.4 °F (36.3 °C) (Temporal)   Resp 18   Ht 1.575 m (5' 2\")   Wt (!) 136.5 kg (301 lb)   SpO2 97%   BMI 55.05 kg/m²      ASSESSMENT/PLAN:    1. Blood pressure elevated without history of HTN  2. Screen for colon cancer  -     Fecal DNA Colorectal cancer screening (Cologuard)  3. Well woman exam without gynecological exam  -     CBC with Auto Differential  -     Comprehensive Metabolic Panel  -     Lipid Panel  -     Hemoglobin A1C  -     TSH with Reflex to FT4  4. Gastroesophageal reflux disease, unspecified whether esophagitis present       Blood pressure was elevated today in office she is going to monitor it at home and let us know if it is continuing to run elevated.  If it is discussed we would need to look at starting her on some blood pressure medication as it has been elevated in the past as well but she has never officially been diagnosed with  (3) walks occasionally

## 2024-03-08 NOTE — ED PROVIDER NOTE - RESPIRATORY, MLM
In an effort to ensure that our patients LiveWell, a Team Member has reviewed your chart and identified an opportunity to provide the best care possible. An attempt was made to discuss or schedule overdue Preventive or Disease Management screening.     The Outcome was Contact was not made, left messageIf you have any questions or need help with scheduling, contact your primary care provider.. Care Gaps include Wellness Visits.    Breath sounds clear and equal bilaterally.

## 2024-04-19 NOTE — PATIENT PROFILE ADULT - FALL HARM RISK - TYPE OF ASSESSMENT
- Reach and stay at a healthy weight. This will lower your risk for many problems, such as obesity, diabetes, heart disease, and high blood pressure.  - Get at least 30 minutes of physical activity on most days of the week. Walking is a good choice. Discuss any changes in your exercise program with your doctor.  - Do not smoke or allow others to smoke around you. If you need help quitting, talk to your doctor about stop-smoking programs and medicines. These can increase your chances of quitting for good.  - Talk to your doctor about whether you have any risk factors for sexually transmitted infections (STIs). Having one sex partner (who does not have STIs and does not have sex with anyone else) is a good way to avoid these infections.   - Use birth control if you do not want to have children at this time. Talk with your doctor about the choices available and what might be best for you.  - Protect your skin from too much sun. Wear sunglasses that block UV rays. Even when it's cloudy, put broad-spectrum sunscreen (SPF 30 or higher) on any exposed skin.  - See a dentist one or two times a year for checkups and to have your teeth cleaned.  - Wear a seat belt in the car.  - Drink alcohol in moderation, if at all. That means no more than 2 drinks a day for men and 1 drink a day for women.  - UTD with Tdap and other vaccines  - Follow-up yearly or as needed  
Admission

## 2024-05-06 NOTE — PROGRESS NOTE ADULT - ASSESSMENT
62 yo obese Female CKD (baseline around 3.7-4) CHF, DM COPD (on 3L home o2) multiple myeloma presented to the hospital with complaints of hemoptysis and shortness of breath x 4 days;     Assessment/Plan:     #CKD Cr increasing   Uop: 1.5L/shift   found to be retaining 1.2L of urine- likely the cause of her increasing Cr  recommend urology eval  medina placement;  renal function should improve w/ medina placement  daily weights  BMP daily   renal diet    Krystle Billy D.O  PGy 4 nephrology fellow  [phone] 62 yo obese Female CKD (baseline around 3.7-4) CHF, DM COPD (on 3L home o2) multiple myeloma presented to the hospital with complaints of hemoptysis and shortness of breath x 4 days;     Assessment/Plan:     #CKD Cr increasing   Uop: 1.5L/shift   found to be retaining 1.2L of urine- likely the cause of her increasing Cr  recommend urology eval  medina placement;  renal function should improve w/ medina placement  daily weights  BMP daily   renal diet  would give 10grams of lokelma x 1 for K of 5.4    Krystle Billy D.O  PGy 4 nephrology fellow  [phone] Yes

## 2024-05-20 NOTE — PHYSICAL THERAPY INITIAL EVALUATION ADULT - ASR WT BEARING STATUS EVAL
Stephy Morales is an 87 year old female with PMHx of HTN, HLD, and NIDDM2 who presented to the ED on 5/19/24 for complaints of chest pain and admitted for ACS r/o.    Chest pain, r/o ACS and r/o dissection  Complaints of persistent sternal chest pain radiating to back, described as tightness and someone sitting on chest  No associated diaphoresis, shortness of breath, nausea, or vomiting  HEART score 6 which is 12-65% 30-day risk of MACE  EKG personally reviewed; T-wave inversions in leads II, III, aVF, V5-V6, appears similar to EKG on 3/3/24  Initial troponin WNL  ASA 81 mg started  F/u TSH, lipid panel, A1c for risk stratification  F/u CTA chest to r/o dissection given radiation to back and uneven b/l UE blood pressures  F/u serial troponins, echocardiogram  Telemetry  - Cards c/s placed- started metoprolol and discontinued nifedipine.  - TTE reviewed     Elevated creatinine  Cr 1.33 on admission, unclear baseline as Cr appears to fluctuate between 0.9-1.3  Avoid nephrotoxins, renally dose meds  Encourage PO hydration  Monitor renal function    Chronic medical conditions:  HTN: PTA nifedipine ER 60 mg, losartan 100 mg held due to elevated Cr  HLD: PTA atorvastatin 40 mg  NIDDM2: last known A1c 6.0 (on 9/30/22), will hold off on fingersticks given euglycemia  Normocytic anemia, chronic: hgb 11.0 on admission, appears around baseline, no signs of active bleeding    Medication reconciliation completed using med list provided by daughter.    
no weight-bearing restrictions

## 2024-11-06 NOTE — H&P PST ADULT - GASTROINTESTINAL
PROCEDURES:  Excision of breast mass 06-Nov-2024 11:13:43 right 10:00 Palleschi, Susan M   Soft, non-tender, no hepatosplenomegaly, normal bowel sounds

## 2024-11-07 NOTE — ED ADULT NURSE NOTE - CARDIO WDL
Onset of symptoms 5 days ago. Will start tessalon pearls and albuterol inhaler. Recommended supportive care with increased fluids/humidity/12-hour decongestant nasal spray twice daily for a maximum of 4 days/1 tsp honey for the cough prn/Tylenol as needed/Ibuprofen as needed/Steam inhalation for sinus pressure. Recheck if not improved in one week or sooner if worsening.   Normal rate, regular rhythm, normal S1, S2 heart sounds heard.

## 2025-01-08 NOTE — PATIENT PROFILE ADULT - PACKS YRS CALCULATION
Date of Service:  01/06/2025    The patient was here to have Gynecology examination, Pap and breast exam.    PHYSICAL EXAMINATION:  BREASTS:  There is no abnormality upon palpation.  There is no abnormality upon inspection.  The patient had discharge upon examination that was cultured.  RECTAL:  FOBT not necessary.  PELVIC:  ___________ Exam done with the MA in the room.    ASSESSMENT AND PLAN:  In Epic.        Dictated By:  Liliane Dunn MD  Signing Provider:  Liliane Dunn MD    MS/AQT  D:  01/06/2025 11:37:40 AM  T:  01/07/2025 06:52:23 PM  Job:  [id number]        0

## 2025-01-10 ENCOUNTER — NON-APPOINTMENT (OUTPATIENT)
Age: 65
End: 2025-01-10

## 2025-03-24 NOTE — DISCHARGE NOTE NURSING/CASE MANAGEMENT/SOCIAL WORK - NSTOBACCONEVERSMOKERY/N_GEN_A
Yes
I will START or STAY ON the medications listed below when I get home from the hospital:    ibuprofen 600 mg oral tablet  -- 1 tab(s) by mouth every 6 hours  -- Indication: For Pain    acetaminophen 325 mg oral tablet  -- 3 tab(s) by mouth every 6 hours  -- Indication: For Pain

## 2025-04-02 NOTE — ED ADULT NURSE NOTE - NS ED NURSE LEVEL OF CONSCIOUSNESS MENTAL STATUS
Patient: Sandra Frost  : 1960    Encounter Date: 2025    Subjective  Sandra Frost is a 64 y.o. female Here for skilled visit.     HPI  Health problems reviewed and no acute concerns.  Participating in therapy and feeling stronger, starting to stand pivot in therapy.   Eating and drinking well.  Pain overall controlled, using tylenol.    No concerns per staff.  Case discussed with multiple disciplines within the facility.  Lovenox was continued through orthopedics.  She is waiting for delivery of DME equipment for discharge.          Review of Systems   Constitutional:  Negative for chills, fatigue and fever.   Respiratory:  Negative for cough and shortness of breath.    Cardiovascular:  Negative for chest pain and palpitations.   Gastrointestinal:  Negative for abdominal pain, constipation, diarrhea, nausea and vomiting.   Genitourinary:  Negative for difficulty urinating.       Objective  /78   Pulse 81   Temp 36.4 °C (97.6 °F)   Resp 18   Wt 145 kg (320 lb)   SpO2 97%   BMI 50.12 kg/m²     Physical Exam  Constitutional:       General: She is not in acute distress.     Appearance: She is obese.   HENT:      Head: Normocephalic and atraumatic.   Eyes:      Conjunctiva/sclera: Conjunctivae normal.   Cardiovascular:      Rate and Rhythm: Normal rate and regular rhythm.   Pulmonary:      Effort: Pulmonary effort is normal. No respiratory distress.      Breath sounds: Normal breath sounds.   Abdominal:      General: Bowel sounds are normal. There is no distension.      Palpations: Abdomen is soft.      Tenderness: There is no abdominal tenderness.   Musculoskeletal:      Right lower leg: No edema.      Left lower leg: No edema.      Comments: LLE knee immobilizer in place, neurovascularly intact   Skin:     General: Skin is warm and dry.   Neurological:      General: No focal deficit present.      Mental Status: She is alert and oriented to person, place, and time.   Psychiatric:         Mood  and Affect: Mood normal.         Behavior: Behavior normal.         Assessment & Plan  Closed bicondylar fracture of left femur with routine healing  After mechanical fall, sustained left femur fx, evaluated by orthopedics and nonoperative management.  She is NWB to E and followed up with orthopedics.   She was on lovenox for DVT PPX through ortho.    Follow up with orthopedics as scheduled.    She was planning for discharge yesterday but there was a delay by the [employer] company to deliver bed and wheelchair, awaiting delivery for dishcarge.   HTN (hypertension), benign  On losartan.  Continue to monitor and adjust meds based on clinical course.   It appears she was also taking amlodipine in the hospital, she states she was not taking either losartan or amlodipine at home.   She states she was over a year ago on losartan for a short period of time which nephrology dc'd, she will follow up with Dr Dahl    Stage 3a chronic kidney disease (Multi)  Stable on labs in the hospital.  She is s/p unilateral nephrectomy due to large nephrolithiasis.   She follows with Dr Dahl, bmp showed hypernatremia 3/24 at 149, sodium bicarb held for 1 day, repeat today sodium is 141.    Hypothyroidism, unspecified type  On armour thyroid.  Defer to PCP for further monitoring as an outpatient    labs/meds/orders reviewed  staff to monitor and notify for any changes.  continue with therapy as able.  Follow up with orthopedics as scheduled  NWB to [facility]           Electronically Signed By: BROOK Oro   4/2/25 12:52 PM   Awake/Alert

## 2025-07-15 NOTE — PHYSICAL EXAM
Pt arrived to floor after having a left knee replacement done by Dr. Johnson. Pt has a Hemovac in place. Pt arrived to the floor a little drowsy but easily aroused. The patient and  did mention that  she has narcolepsy.   [Normal] : affect appropriate

## (undated) DEVICE — INFLATOR ENCORE 26

## (undated) DEVICE — TORQUE DEVICE FOR GUIDEWIRE 0.0100.038"

## (undated) DEVICE — PACK ANGIOGRAM LNX SURGICOUNT

## (undated) DEVICE — SUT VICRYL 2-0 27" CT-1 UNDYED

## (undated) DEVICE — WARMING BLANKET UPPER ADULT

## (undated) DEVICE — GLV 7.5 PROTEXIS (WHITE)

## (undated) DEVICE — MARKING PEN W RULER